# Patient Record
Sex: FEMALE | Race: WHITE | NOT HISPANIC OR LATINO | Employment: OTHER | URBAN - METROPOLITAN AREA
[De-identification: names, ages, dates, MRNs, and addresses within clinical notes are randomized per-mention and may not be internally consistent; named-entity substitution may affect disease eponyms.]

---

## 2017-02-03 ENCOUNTER — OFFICE VISIT (OUTPATIENT)
Dept: ORTHOPEDIC SURGERY | Facility: CLINIC | Age: 72
End: 2017-02-03

## 2017-02-03 VITALS — TEMPERATURE: 98 F | WEIGHT: 293 LBS | BODY MASS INDEX: 45.99 KG/M2 | HEIGHT: 67 IN

## 2017-02-03 DIAGNOSIS — G89.29 CHRONIC PAIN OF BOTH SHOULDERS: Primary | ICD-10-CM

## 2017-02-03 DIAGNOSIS — M25.512 CHRONIC PAIN OF BOTH SHOULDERS: Primary | ICD-10-CM

## 2017-02-03 DIAGNOSIS — M25.511 CHRONIC PAIN OF BOTH SHOULDERS: Primary | ICD-10-CM

## 2017-02-03 PROCEDURE — 20610 DRAIN/INJ JOINT/BURSA W/O US: CPT | Performed by: ORTHOPAEDIC SURGERY

## 2017-02-03 RX ORDER — BUPIVACAINE HYDROCHLORIDE 5 MG/ML
4 INJECTION, SOLUTION PERINEURAL
Status: COMPLETED | OUTPATIENT
Start: 2017-02-03 | End: 2017-02-03

## 2017-02-03 RX ORDER — METHYLPREDNISOLONE ACETATE 80 MG/ML
80 INJECTION, SUSPENSION INTRA-ARTICULAR; INTRALESIONAL; INTRAMUSCULAR; SOFT TISSUE
Status: COMPLETED | OUTPATIENT
Start: 2017-02-03 | End: 2017-02-03

## 2017-02-03 RX ADMIN — BUPIVACAINE HYDROCHLORIDE 4 ML: 5 INJECTION, SOLUTION PERINEURAL at 12:58

## 2017-02-03 RX ADMIN — METHYLPREDNISOLONE ACETATE 80 MG: 80 INJECTION, SUSPENSION INTRA-ARTICULAR; INTRALESIONAL; INTRAMUSCULAR; SOFT TISSUE at 12:58

## 2017-05-08 ENCOUNTER — CLINICAL SUPPORT (OUTPATIENT)
Dept: ORTHOPEDIC SURGERY | Facility: CLINIC | Age: 72
End: 2017-05-08

## 2017-05-08 VITALS — BODY MASS INDEX: 45.99 KG/M2 | WEIGHT: 293 LBS | HEIGHT: 67 IN

## 2017-05-08 DIAGNOSIS — R52 PAIN: Primary | ICD-10-CM

## 2017-05-08 PROCEDURE — 20610 DRAIN/INJ JOINT/BURSA W/O US: CPT | Performed by: ORTHOPAEDIC SURGERY

## 2017-05-08 RX ORDER — METHYLPREDNISOLONE ACETATE 80 MG/ML
80 INJECTION, SUSPENSION INTRA-ARTICULAR; INTRALESIONAL; INTRAMUSCULAR; SOFT TISSUE
Status: COMPLETED | OUTPATIENT
Start: 2017-05-08 | End: 2017-05-08

## 2017-05-08 RX ORDER — BUPIVACAINE HYDROCHLORIDE 2.5 MG/ML
4 INJECTION, SOLUTION INFILTRATION; PERINEURAL
Status: COMPLETED | OUTPATIENT
Start: 2017-05-08 | End: 2017-05-08

## 2017-05-08 RX ADMIN — METHYLPREDNISOLONE ACETATE 80 MG: 80 INJECTION, SUSPENSION INTRA-ARTICULAR; INTRALESIONAL; INTRAMUSCULAR; SOFT TISSUE at 12:27

## 2017-05-08 RX ADMIN — BUPIVACAINE HYDROCHLORIDE 4 ML: 2.5 INJECTION, SOLUTION INFILTRATION; PERINEURAL at 12:27

## 2017-05-15 DIAGNOSIS — E78.2 MIXED HYPERLIPIDEMIA: ICD-10-CM

## 2017-05-15 DIAGNOSIS — F41.1 GENERALIZED ANXIETY DISORDER: ICD-10-CM

## 2017-05-15 DIAGNOSIS — I10 ESSENTIAL HYPERTENSION: ICD-10-CM

## 2017-05-15 RX ORDER — VALSARTAN 40 MG/1
TABLET ORAL
Qty: 90 TABLET | Refills: 0 | OUTPATIENT
Start: 2017-05-15

## 2017-05-15 RX ORDER — ESCITALOPRAM OXALATE 10 MG/1
TABLET ORAL
Qty: 90 TABLET | Refills: 0 | OUTPATIENT
Start: 2017-05-15

## 2017-05-15 RX ORDER — NISOLDIPINE 8.5 MG/1
TABLET, FILM COATED, EXTENDED RELEASE ORAL
Qty: 90 TABLET | Refills: 0 | OUTPATIENT
Start: 2017-05-15

## 2017-05-15 RX ORDER — ATORVASTATIN CALCIUM 20 MG/1
TABLET, FILM COATED ORAL
Qty: 90 TABLET | Refills: 0 | OUTPATIENT
Start: 2017-05-15

## 2017-05-30 ENCOUNTER — OFFICE VISIT (OUTPATIENT)
Dept: FAMILY MEDICINE CLINIC | Facility: CLINIC | Age: 72
End: 2017-05-30

## 2017-05-30 VITALS
HEART RATE: 114 BPM | DIASTOLIC BLOOD PRESSURE: 83 MMHG | SYSTOLIC BLOOD PRESSURE: 156 MMHG | RESPIRATION RATE: 20 BRPM | BODY MASS INDEX: 44.41 KG/M2 | TEMPERATURE: 97.7 F | WEIGHT: 293 LBS | HEIGHT: 68 IN

## 2017-05-30 DIAGNOSIS — F41.1 GENERALIZED ANXIETY DISORDER: ICD-10-CM

## 2017-05-30 DIAGNOSIS — R73.01 IFG (IMPAIRED FASTING GLUCOSE): ICD-10-CM

## 2017-05-30 DIAGNOSIS — I10 ESSENTIAL HYPERTENSION: ICD-10-CM

## 2017-05-30 DIAGNOSIS — Z00.00 ANNUAL PHYSICAL EXAM: Primary | ICD-10-CM

## 2017-05-30 DIAGNOSIS — E78.2 MIXED HYPERLIPIDEMIA: ICD-10-CM

## 2017-05-30 PROCEDURE — 99214 OFFICE O/P EST MOD 30 MIN: CPT | Performed by: FAMILY MEDICINE

## 2017-05-30 PROCEDURE — G0438 PPPS, INITIAL VISIT: HCPCS | Performed by: FAMILY MEDICINE

## 2017-05-30 RX ORDER — NISOLDIPINE 8.5 MG/1
8.5 TABLET, FILM COATED, EXTENDED RELEASE ORAL DAILY
Qty: 90 TABLET | Refills: 1 | Status: SHIPPED | OUTPATIENT
Start: 2017-05-30 | End: 2017-11-26 | Stop reason: SDUPTHER

## 2017-05-30 RX ORDER — ESCITALOPRAM OXALATE 10 MG/1
10 TABLET ORAL DAILY
Qty: 90 TABLET | Refills: 1 | Status: SHIPPED | OUTPATIENT
Start: 2017-05-30 | End: 2017-11-26 | Stop reason: SDUPTHER

## 2017-05-30 RX ORDER — ATORVASTATIN CALCIUM 20 MG/1
20 TABLET, FILM COATED ORAL DAILY
Qty: 90 TABLET | Refills: 1 | Status: SHIPPED | OUTPATIENT
Start: 2017-05-30 | End: 2017-11-26 | Stop reason: SDUPTHER

## 2017-05-30 RX ORDER — VALSARTAN 40 MG/1
40 TABLET ORAL DAILY
Qty: 90 TABLET | Refills: 1 | Status: SHIPPED | OUTPATIENT
Start: 2017-05-30 | End: 2017-11-26 | Stop reason: SDUPTHER

## 2017-08-08 ENCOUNTER — CLINICAL SUPPORT (OUTPATIENT)
Dept: ORTHOPEDIC SURGERY | Facility: CLINIC | Age: 72
End: 2017-08-08

## 2017-08-08 VITALS — TEMPERATURE: 97.7 F

## 2017-08-08 DIAGNOSIS — M25.511 CHRONIC PAIN OF BOTH SHOULDERS: Primary | ICD-10-CM

## 2017-08-08 DIAGNOSIS — M25.512 CHRONIC PAIN OF BOTH SHOULDERS: Primary | ICD-10-CM

## 2017-08-08 DIAGNOSIS — M19.90 ARTHRITIS: ICD-10-CM

## 2017-08-08 DIAGNOSIS — G89.29 CHRONIC PAIN OF BOTH SHOULDERS: Primary | ICD-10-CM

## 2017-08-08 PROCEDURE — 73030 X-RAY EXAM OF SHOULDER: CPT | Performed by: ORTHOPAEDIC SURGERY

## 2017-08-08 PROCEDURE — 20610 DRAIN/INJ JOINT/BURSA W/O US: CPT | Performed by: ORTHOPAEDIC SURGERY

## 2017-08-08 RX ADMIN — BUPIVACAINE HYDROCHLORIDE 4 ML: 5 INJECTION, SOLUTION EPIDURAL; INTRACAUDAL at 12:33

## 2017-08-08 RX ADMIN — METHYLPREDNISOLONE ACETATE 80 MG: 80 INJECTION, SUSPENSION INTRA-ARTICULAR; INTRALESIONAL; INTRAMUSCULAR; SOFT TISSUE at 12:33

## 2017-08-08 NOTE — PROGRESS NOTES
Bilateral Shoulder Injection glenohumeral      Patient: Claudia Arnold        YOB: 1945            Chief Complaints: Bilateral Shoulder pain  Chief Complaint   Patient presents with   • Left Shoulder - Follow-up   • Right Shoulder - Follow-up           History of Present Illness: Pt gets intermittent shoulder injections with good relief. Is here for repeat injection.Her daughter is here today for further discussion on potential possibilities.  She has significant arthritis and wants nor options.  Her options are injections these fail joint replacement      Physical Exam: 72 y.o. female  General Appearance:    Alert, cooperative, in no acute distress                   Vitals:    08/08/17 1231   Temp: 97.7 °F (36.5 °C)   TempSrc: Temporal Artery       Patient is alert and read ×3 no acute distress appears her above-listed at height weight and age.  Affect is normal respiratory rate is normal unlabored. Heart rate regular rate rhythm, sclera, dentition and hearing are normal for the purpose of this exam.  Exam and complaints are unchanged.  X-rays AP scapular Y of both shoulders were taken she cannot tolerate the axillary lateral.  She has significant arthritis in both shoulders.  These were compared to previous films and appears stable she has marked narrowing of both joint spaces    Procedure:  Large Joint Arthrocentesis  Date/Time: 8/8/2017 12:33 PM  Consent given by: patient  Site marked: site marked  Timeout: Immediately prior to procedure a time out was called to verify the correct patient, procedure, equipment, support staff and site/side marked as required   Supporting Documentation  Indications: pain   Procedure Details  Location: shoulder - R glenohumeral  Preparation: Patient was prepped and draped in the usual sterile fashion  Needle size: 25 G  Approach: posterior  Medications administered: 4 mL bupivacaine (PF) 0.5 %; 80 mg methylPREDNISolone acetate 80 MG/ML  Patient tolerance: patient  tolerated the procedure well with no immediate complications    Large Joint Arthrocentesis  Date/Time: 8/8/2017 12:33 PM  Consent given by: patient  Site marked: site marked  Timeout: Immediately prior to procedure a time out was called to verify the correct patient, procedure, equipment, support staff and site/side marked as required   Supporting Documentation  Indications: pain   Procedure Details  Location: shoulder - L glenohumeral  Preparation: Patient was prepped and draped in the usual sterile fashion  Needle size: 25 G  Approach: posterior  Medications administered: 4 mL bupivacaine (PF) 0.5 %; 80 mg methylPREDNISolone acetate 80 MG/ML  Patient tolerance: patient tolerated the procedure well with no immediate complications                Assessment. Persistent shoulder pain with arthritis plan is proceed with an injection          Plan: Is to proceed with injection    The subacromial space was injected under strict sterile technique is form on a Marcaine and Depo-Medrol this was done sterilely and tolerated tolerated  well we did talk in detail about potential shoulder replacement she does not want to do that at this time

## 2017-08-14 RX ORDER — BUPIVACAINE HYDROCHLORIDE 5 MG/ML
4 INJECTION, SOLUTION EPIDURAL; INTRACAUDAL
Status: COMPLETED | OUTPATIENT
Start: 2017-08-08 | End: 2017-08-08

## 2017-08-14 RX ORDER — METHYLPREDNISOLONE ACETATE 80 MG/ML
80 INJECTION, SUSPENSION INTRA-ARTICULAR; INTRALESIONAL; INTRAMUSCULAR; SOFT TISSUE
Status: COMPLETED | OUTPATIENT
Start: 2017-08-08 | End: 2017-08-08

## 2017-11-13 ENCOUNTER — CLINICAL SUPPORT (OUTPATIENT)
Dept: ORTHOPEDIC SURGERY | Facility: CLINIC | Age: 72
End: 2017-11-13

## 2017-11-13 VITALS — TEMPERATURE: 98.7 F | HEIGHT: 67 IN | BODY MASS INDEX: 45.99 KG/M2 | WEIGHT: 293 LBS

## 2017-11-13 DIAGNOSIS — M19.90 ARTHRITIS: Primary | ICD-10-CM

## 2017-11-13 PROCEDURE — 20610 DRAIN/INJ JOINT/BURSA W/O US: CPT | Performed by: ORTHOPAEDIC SURGERY

## 2017-11-13 RX ADMIN — BUPIVACAINE HYDROCHLORIDE 4 ML: 5 INJECTION, SOLUTION EPIDURAL; INTRACAUDAL at 12:37

## 2017-11-13 RX ADMIN — METHYLPREDNISOLONE ACETATE 80 MG: 80 INJECTION, SUSPENSION INTRA-ARTICULAR; INTRALESIONAL; INTRAMUSCULAR; SOFT TISSUE at 12:37

## 2017-11-13 NOTE — PROGRESS NOTES
"Bilateral Shoulder Injection Glenohumeral      Patient: Claudia Arnold        YOB: 1945            Chief Complaints: Bilateral Shoulder pain  Chief Complaint   Patient presents with   • Left Shoulder - Follow-up   • Right Shoulder - Follow-up           History of Present Illness: Pt gets intermittent shoulder injections with good relief. Is here for repeat injection.      Physical Exam: 72 y.o. female  General Appearance:    Alert, cooperative, in no acute distress                   Vitals:    11/13/17 1235   Temp: 98.7 °F (37.1 °C)   TempSrc: Temporal Artery    Weight: (!) 329 lb (149 kg)   Height: 67\" (170.2 cm)      Patient is alert and read ×3 no acute distress appears her above-listed at height weight and age.  Affect is normal respiratory rate is normal unlabored. Heart rate regular rate rhythm, sclera, dentition and hearing are normal for the purpose of this exam.  Exam and complaints are unchanged.      Procedure:  Large Joint Arthrocentesis  Date/Time: 11/13/2017 12:37 PM  Consent given by: patient  Site marked: site marked  Timeout: Immediately prior to procedure a time out was called to verify the correct patient, procedure, equipment, support staff and site/side marked as required   Supporting Documentation  Indications: pain   Procedure Details  Location: shoulder - R glenohumeral  Preparation: Patient was prepped and draped in the usual sterile fashion  Needle size: 25 G  Approach: posterior  Medications administered: 80 mg methylPREDNISolone acetate 80 MG/ML; 4 mL bupivacaine (PF) 0.5 %  Patient tolerance: patient tolerated the procedure well with no immediate complications    Large Joint Arthrocentesis  Date/Time: 11/13/2017 12:37 PM  Consent given by: patient  Site marked: site marked  Timeout: Immediately prior to procedure a time out was called to verify the correct patient, procedure, equipment, support staff and site/side marked as required   Supporting Documentation  Indications: " pain   Procedure Details  Location: shoulder - L glenohumeral  Preparation: Patient was prepped and draped in the usual sterile fashion  Needle size: 25 G  Approach: posterior  Medications administered: 4 mL bupivacaine (PF) 0.5 %; 80 mg methylPREDNISolone acetate 80 MG/ML  Patient tolerance: patient tolerated the procedure well with no immediate complications                Assessment. Persistent shoulder pain with glenohumeral arthritis          Plan: Is to proceed with injection    The glenohumeral was injected under strict sterile technique is form on a Marcaine and Depo-Medrol this was done sterilely and tolerated tolerated  well

## 2017-11-14 RX ORDER — BUPIVACAINE HYDROCHLORIDE 5 MG/ML
4 INJECTION, SOLUTION EPIDURAL; INTRACAUDAL
Status: COMPLETED | OUTPATIENT
Start: 2017-11-13 | End: 2017-11-13

## 2017-11-14 RX ORDER — METHYLPREDNISOLONE ACETATE 80 MG/ML
80 INJECTION, SUSPENSION INTRA-ARTICULAR; INTRALESIONAL; INTRAMUSCULAR; SOFT TISSUE
Status: COMPLETED | OUTPATIENT
Start: 2017-11-13 | End: 2017-11-13

## 2017-11-26 DIAGNOSIS — E78.2 MIXED HYPERLIPIDEMIA: ICD-10-CM

## 2017-11-26 DIAGNOSIS — I10 ESSENTIAL HYPERTENSION: ICD-10-CM

## 2017-11-26 DIAGNOSIS — F41.1 GENERALIZED ANXIETY DISORDER: ICD-10-CM

## 2017-11-27 RX ORDER — NISOLDIPINE 8.5 MG/1
TABLET, FILM COATED, EXTENDED RELEASE ORAL
Qty: 90 TABLET | Refills: 1 | Status: SHIPPED | OUTPATIENT
Start: 2017-11-27 | End: 2017-12-18 | Stop reason: SDUPTHER

## 2017-11-27 RX ORDER — VALSARTAN 40 MG/1
TABLET ORAL
Qty: 90 TABLET | Refills: 1 | Status: SHIPPED | OUTPATIENT
Start: 2017-11-27 | End: 2017-12-18 | Stop reason: SDUPTHER

## 2017-11-27 RX ORDER — ATORVASTATIN CALCIUM 20 MG/1
TABLET, FILM COATED ORAL
Qty: 90 TABLET | Refills: 1 | Status: SHIPPED | OUTPATIENT
Start: 2017-11-27 | End: 2017-12-18 | Stop reason: SDUPTHER

## 2017-11-27 RX ORDER — ESCITALOPRAM OXALATE 10 MG/1
TABLET ORAL
Qty: 90 TABLET | Refills: 1 | Status: SHIPPED | OUTPATIENT
Start: 2017-11-27 | End: 2017-12-18 | Stop reason: SDUPTHER

## 2017-12-02 LAB
ALBUMIN SERPL-MCNC: 4.2 G/DL (ref 3.5–4.8)
ALBUMIN/GLOB SERPL: 1.5 {RATIO} (ref 1.2–2.2)
ALP SERPL-CCNC: 88 IU/L (ref 39–117)
ALT SERPL-CCNC: 10 IU/L (ref 0–32)
AST SERPL-CCNC: 12 IU/L (ref 0–40)
BASOPHILS # BLD AUTO: 0 X10E3/UL (ref 0–0.2)
BASOPHILS NFR BLD AUTO: 0 %
BILIRUB SERPL-MCNC: 0.7 MG/DL (ref 0–1.2)
BUN SERPL-MCNC: 26 MG/DL (ref 8–27)
BUN/CREAT SERPL: 27 (ref 12–28)
CALCIUM SERPL-MCNC: 10.4 MG/DL (ref 8.7–10.3)
CHLORIDE SERPL-SCNC: 101 MMOL/L (ref 96–106)
CHOLEST SERPL-MCNC: 167 MG/DL (ref 100–199)
CO2 SERPL-SCNC: 24 MMOL/L (ref 18–29)
CREAT SERPL-MCNC: 0.98 MG/DL (ref 0.57–1)
EOSINOPHIL # BLD AUTO: 0.1 X10E3/UL (ref 0–0.4)
EOSINOPHIL NFR BLD AUTO: 2 %
ERYTHROCYTE [DISTWIDTH] IN BLOOD BY AUTOMATED COUNT: 14.3 % (ref 12.3–15.4)
GFR SERPLBLD CREATININE-BSD FMLA CKD-EPI: 58 ML/MIN/1.73
GFR SERPLBLD CREATININE-BSD FMLA CKD-EPI: 67 ML/MIN/1.73
GLOBULIN SER CALC-MCNC: 2.8 G/DL (ref 1.5–4.5)
GLUCOSE SERPL-MCNC: 132 MG/DL (ref 65–99)
HBA1C MFR BLD: 6 % (ref 4.8–5.6)
HCT VFR BLD AUTO: 40.7 % (ref 34–46.6)
HDLC SERPL-MCNC: 69 MG/DL
HGB BLD-MCNC: 13.2 G/DL (ref 11.1–15.9)
IMM GRANULOCYTES # BLD: 0 X10E3/UL (ref 0–0.1)
IMM GRANULOCYTES NFR BLD: 0 %
LDLC SERPL CALC-MCNC: 84 MG/DL (ref 0–99)
LYMPHOCYTES # BLD AUTO: 1.6 X10E3/UL (ref 0.7–3.1)
LYMPHOCYTES NFR BLD AUTO: 18 %
MCH RBC QN AUTO: 29.7 PG (ref 26.6–33)
MCHC RBC AUTO-ENTMCNC: 32.4 G/DL (ref 31.5–35.7)
MCV RBC AUTO: 92 FL (ref 79–97)
MONOCYTES # BLD AUTO: 0.7 X10E3/UL (ref 0.1–0.9)
MONOCYTES NFR BLD AUTO: 8 %
NEUTROPHILS # BLD AUTO: 6.5 X10E3/UL (ref 1.4–7)
NEUTROPHILS NFR BLD AUTO: 72 %
PLATELET # BLD AUTO: 284 X10E3/UL (ref 150–379)
POTASSIUM SERPL-SCNC: 4.6 MMOL/L (ref 3.5–5.2)
PROT SERPL-MCNC: 7 G/DL (ref 6–8.5)
RBC # BLD AUTO: 4.45 X10E6/UL (ref 3.77–5.28)
SODIUM SERPL-SCNC: 143 MMOL/L (ref 134–144)
TRIGL SERPL-MCNC: 69 MG/DL (ref 0–149)
TSH SERPL DL<=0.005 MIU/L-ACNC: 2.74 UIU/ML (ref 0.45–4.5)
VLDLC SERPL CALC-MCNC: 14 MG/DL (ref 5–40)
WBC # BLD AUTO: 8.9 X10E3/UL (ref 3.4–10.8)

## 2017-12-18 ENCOUNTER — OFFICE VISIT (OUTPATIENT)
Dept: FAMILY MEDICINE CLINIC | Facility: CLINIC | Age: 72
End: 2017-12-18

## 2017-12-18 VITALS
WEIGHT: 293 LBS | HEART RATE: 94 BPM | SYSTOLIC BLOOD PRESSURE: 138 MMHG | BODY MASS INDEX: 44.41 KG/M2 | DIASTOLIC BLOOD PRESSURE: 72 MMHG | OXYGEN SATURATION: 96 % | TEMPERATURE: 98 F | HEIGHT: 68 IN | RESPIRATION RATE: 20 BRPM

## 2017-12-18 DIAGNOSIS — F41.1 GENERALIZED ANXIETY DISORDER: ICD-10-CM

## 2017-12-18 DIAGNOSIS — E78.2 MIXED HYPERLIPIDEMIA: ICD-10-CM

## 2017-12-18 DIAGNOSIS — R73.01 IFG (IMPAIRED FASTING GLUCOSE): ICD-10-CM

## 2017-12-18 DIAGNOSIS — I10 ESSENTIAL HYPERTENSION: Primary | ICD-10-CM

## 2017-12-18 PROCEDURE — 99214 OFFICE O/P EST MOD 30 MIN: CPT | Performed by: FAMILY MEDICINE

## 2017-12-18 RX ORDER — ESCITALOPRAM OXALATE 10 MG/1
10 TABLET ORAL DAILY
Qty: 90 TABLET | Refills: 1 | Status: SHIPPED | OUTPATIENT
Start: 2017-12-18 | End: 2018-12-11 | Stop reason: SDUPTHER

## 2017-12-18 RX ORDER — VALSARTAN 40 MG/1
40 TABLET ORAL DAILY
Qty: 90 TABLET | Refills: 1 | Status: SHIPPED | OUTPATIENT
Start: 2017-12-18 | End: 2018-08-14

## 2017-12-18 RX ORDER — ATORVASTATIN CALCIUM 20 MG/1
20 TABLET, FILM COATED ORAL DAILY
Qty: 90 TABLET | Refills: 1 | Status: SHIPPED | OUTPATIENT
Start: 2017-12-18 | End: 2018-12-11 | Stop reason: SDUPTHER

## 2017-12-18 RX ORDER — NISOLDIPINE 8.5 MG/1
8.5 TABLET, FILM COATED, EXTENDED RELEASE ORAL DAILY
Qty: 90 TABLET | Refills: 1 | Status: SHIPPED | OUTPATIENT
Start: 2017-12-18 | End: 2018-12-11 | Stop reason: SDUPTHER

## 2017-12-18 NOTE — PROGRESS NOTES
"Subjective   Claudia Arnold is a 72 y.o. female.     History of Present Illness     Chief Complaint:   Chief Complaint   Patient presents with   • Hypertension     MED REFILL - EYE EXAM DUE / MAMMOGRAM / DUE   • Hyperlipidemia     PATIENT GETS LABS DONE [facility]    • Arthritis     MANE BP DONE    • LAB RESULTS       Claudia Arnold 72 y.o. female who presents today for Medical Management of the below listed issues and medication refills.  she has a problem list of   Patient Active Problem List   Diagnosis   • Tear of rotator cuff   • Hypertension   • Generalized anxiety disorder   • Hyperlipidemia   • IFG (impaired fasting glucose)   • Heart murmur, systolic   • Shoulder pain, bilateral   • Pain of both shoulder joints   • Chronic pain of both shoulders   .  Since the last visit, she has overall felt well.  she has been compliant with   Current Outpatient Prescriptions:   •  atorvastatin (LIPITOR) 20 MG tablet, Take 1 tablet by mouth Daily., Disp: 90 tablet, Rfl: 1  •  escitalopram (LEXAPRO) 10 MG tablet, Take 1 tablet by mouth Daily., Disp: 90 tablet, Rfl: 1  •  nisoldipine (SULAR) 8.5 MG 24 hr tablet, Take 1 tablet by mouth Daily., Disp: 90 tablet, Rfl: 1  •  valsartan (DIOVAN) 40 MG tablet, Take 1 tablet by mouth Daily., Disp: 90 tablet, Rfl: 1  •  acetaminophen (TYLENOL) 325 MG tablet, Take 650 mg by mouth every 6 (six) hours as needed for mild pain (1-3)., Disp: , Rfl:   •  aspirin 81 MG EC tablet, Take 81 mg by mouth daily., Disp: , Rfl:   •  calcium carbonate (OS-DEBORAH) 600 MG tablet, Take 600 mg by mouth daily., Disp: , Rfl:   •  doxylamine (UNISOM) 25 MG tablet, Take 25 mg by mouth as needed for sleep., Disp: , Rfl: .  she denies medication side effects.    All of the chronic condition(s) listed above are stable w/o issues.    /72  Pulse 94  Temp 98 °F (36.7 °C) (Oral)   Resp 20  Ht 171.5 cm (67.5\")  Wt (!) 146 kg (322 lb)  SpO2 96%  BMI 49.69 kg/m2    Results for orders placed or " performed in visit on 12/01/17   Comprehensive Metabolic Panel   Result Value Ref Range    Glucose 132 (H) 65 - 99 mg/dL    BUN 26 8 - 27 mg/dL    Creatinine 0.98 0.57 - 1.00 mg/dL    eGFR Non African Am 58 (L) >59 mL/min/1.73    eGFR African Am 67 >59 mL/min/1.73    BUN/Creatinine Ratio 27 12 - 28    Sodium 143 134 - 144 mmol/L    Potassium 4.6 3.5 - 5.2 mmol/L    Chloride 101 96 - 106 mmol/L    Total CO2 24 18 - 29 mmol/L    Calcium 10.4 (H) 8.7 - 10.3 mg/dL    Total Protein 7.0 6.0 - 8.5 g/dL    Albumin 4.2 3.5 - 4.8 g/dL    Globulin 2.8 1.5 - 4.5 g/dL    A/G Ratio 1.5 1.2 - 2.2    Total Bilirubin 0.7 0.0 - 1.2 mg/dL    Alkaline Phosphatase 88 39 - 117 IU/L    AST (SGOT) 12 0 - 40 IU/L    ALT (SGPT) 10 0 - 32 IU/L   Lipid Panel   Result Value Ref Range    Total Cholesterol 167 100 - 199 mg/dL    Triglycerides 69 0 - 149 mg/dL    HDL Cholesterol 69 >39 mg/dL    VLDL Cholesterol 14 5 - 40 mg/dL    LDL Cholesterol  84 0 - 99 mg/dL   Hemoglobin A1c   Result Value Ref Range    Hemoglobin A1C 6.0 (H) 4.8 - 5.6 %   TSH   Result Value Ref Range    TSH 2.740 0.450 - 4.500 uIU/mL   CBC & Differential   Result Value Ref Range    WBC 8.9 3.4 - 10.8 x10E3/uL    RBC 4.45 3.77 - 5.28 x10E6/uL    Hemoglobin 13.2 11.1 - 15.9 g/dL    Hematocrit 40.7 34.0 - 46.6 %    MCV 92 79 - 97 fL    MCH 29.7 26.6 - 33.0 pg    MCHC 32.4 31.5 - 35.7 g/dL    RDW 14.3 12.3 - 15.4 %    Platelets 284 150 - 379 x10E3/uL    Neutrophil Rel % 72 Not Estab. %    Lymphocyte Rel % 18 Not Estab. %    Monocyte Rel % 8 Not Estab. %    Eosinophil Rel % 2 Not Estab. %    Basophil Rel % 0 Not Estab. %    Neutrophils Absolute 6.5 1.4 - 7.0 x10E3/uL    Lymphocytes Absolute 1.6 0.7 - 3.1 x10E3/uL    Monocytes Absolute 0.7 0.1 - 0.9 x10E3/uL    Eosinophils Absolute 0.1 0.0 - 0.4 x10E3/uL    Basophils Absolute 0.0 0.0 - 0.2 x10E3/uL    Immature Granulocyte Rel % 0 Not Estab. %    Immature Grans Absolute 0.0 0.0 - 0.1 x10E3/uL           The following portions of the  patient's history were reviewed and updated as appropriate: allergies, current medications, past family history, past medical history, past social history, past surgical history and problem list.    Review of Systems   Constitutional: Negative for activity change, chills, fatigue and fever.   Respiratory: Negative for cough, chest tightness and shortness of breath.    Cardiovascular: Negative for chest pain and palpitations.   Gastrointestinal: Negative for abdominal pain and nausea.   Endocrine: Negative for cold intolerance.   Musculoskeletal: Negative for neck pain.   Neurological: Negative for headaches.   Psychiatric/Behavioral: Negative for behavioral problems and dysphoric mood.       Objective   Physical Exam   Constitutional: She appears well-developed and well-nourished.   Neck: Neck supple. No thyromegaly present.   Cardiovascular: Normal rate and regular rhythm.    Murmur heard.   Systolic murmur is present with a grade of 1/6   Pulmonary/Chest: Effort normal and breath sounds normal.   Abdominal: Bowel sounds are normal.   Psychiatric: She has a normal mood and affect. Her behavior is normal.   Nursing note and vitals reviewed.  Labs reviewed with pt today during visit. All questions answered.      Assessment/Plan   Claudia was seen today for hypertension, hyperlipidemia, arthritis and lab results.    Diagnoses and all orders for this visit:    Essential hypertension  -     valsartan (DIOVAN) 40 MG tablet; Take 1 tablet by mouth Daily.  -     nisoldipine (SULAR) 8.5 MG 24 hr tablet; Take 1 tablet by mouth Daily.  -     Comprehensive metabolic panel; Future  -     Lipid panel; Future  -     CBC and Differential; Future  -     TSH; Future    Generalized anxiety disorder  -     escitalopram (LEXAPRO) 10 MG tablet; Take 1 tablet by mouth Daily.    Mixed hyperlipidemia  -     atorvastatin (LIPITOR) 20 MG tablet; Take 1 tablet by mouth Daily.  -     Lipid panel; Future    IFG (impaired fasting glucose)  -      Hemoglobin A1c; Future    Other orders  -     Cancel: Mammo Screening Bilateral With CAD; Future      Pt declines mammogram AMA.

## 2018-02-12 ENCOUNTER — CLINICAL SUPPORT (OUTPATIENT)
Dept: ORTHOPEDIC SURGERY | Facility: CLINIC | Age: 73
End: 2018-02-12

## 2018-02-12 VITALS — BODY MASS INDEX: 45.99 KG/M2 | HEIGHT: 67 IN | WEIGHT: 293 LBS | TEMPERATURE: 97.7 F

## 2018-02-12 DIAGNOSIS — M19.019 PRIMARY LOCALIZED OSTEOARTHROSIS OF SHOULDER REGION, UNSPECIFIED LATERALITY: ICD-10-CM

## 2018-02-12 DIAGNOSIS — M25.511 CHRONIC PAIN OF BOTH SHOULDERS: Primary | ICD-10-CM

## 2018-02-12 DIAGNOSIS — G89.29 CHRONIC PAIN OF BOTH SHOULDERS: Primary | ICD-10-CM

## 2018-02-12 DIAGNOSIS — M25.512 CHRONIC PAIN OF BOTH SHOULDERS: Primary | ICD-10-CM

## 2018-02-12 PROCEDURE — 20610 DRAIN/INJ JOINT/BURSA W/O US: CPT | Performed by: ORTHOPAEDIC SURGERY

## 2018-02-12 RX ADMIN — METHYLPREDNISOLONE ACETATE 80 MG: 80 INJECTION, SUSPENSION INTRA-ARTICULAR; INTRALESIONAL; INTRAMUSCULAR; SOFT TISSUE at 12:33

## 2018-02-12 RX ADMIN — BUPIVACAINE HYDROCHLORIDE 4 ML: 5 INJECTION, SOLUTION EPIDURAL; INTRACAUDAL at 12:33

## 2018-02-12 NOTE — PROGRESS NOTES
"Bilateral Shoulder Injection Glenohumeral      Patient: Claudia Arnold        YOB: 1945            Chief Complaints: Bilateral Shoulder pain  Chief Complaint   Patient presents with   • Left Shoulder - Follow-up, Pain, Injections   • Right Shoulder - Follow-up, Pain, Injections         History of Present Illness: Pt gets intermittent shoulder injections with good relief. Is here for repeat injection.      Physical Exam: 72 y.o. female  General Appearance:    Alert, cooperative, in no acute distress                   Vitals:    02/12/18 1228   Temp: 97.7 °F (36.5 °C)   TempSrc: Temporal Artery    Weight: (!) 147 kg (325 lb)   Height: 170.2 cm (67\")      Patient is alert and read ×3 no acute distress appears her above-listed at height weight and age.  Affect is normal respiratory rate is normal unlabored. Heart rate regular rate rhythm, sclera, dentition and hearing are normal for the purpose of this exam.  Exam and complaints are unchanged.      Procedure:  Large Joint Arthrocentesis  Date/Time: 2/12/2018 12:33 PM  Consent given by: patient  Site marked: site marked  Timeout: Immediately prior to procedure a time out was called to verify the correct patient, procedure, equipment, support staff and site/side marked as required   Supporting Documentation  Indications: pain   Procedure Details  Location: shoulder - R glenohumeral  Preparation: Patient was prepped and draped in the usual sterile fashion  Needle size: 25 G  Approach: posterior  Medications administered: 80 mg methylPREDNISolone acetate 80 MG/ML; 4 mL bupivacaine (PF) 0.5 %  Patient tolerance: patient tolerated the procedure well with no immediate complications    Large Joint Arthrocentesis  Date/Time: 2/12/2018 12:33 PM  Consent given by: patient  Site marked: site marked  Timeout: Immediately prior to procedure a time out was called to verify the correct patient, procedure, equipment, support staff and site/side marked as required "   Supporting Documentation  Indications: pain   Procedure Details  Location: shoulder - L glenohumeral  Preparation: Patient was prepped and draped in the usual sterile fashion  Needle size: 25 G  Approach: posterior  Medications administered: 80 mg methylPREDNISolone acetate 80 MG/ML; 4 mL bupivacaine (PF) 0.5 %  Patient tolerance: patient tolerated the procedure well with no immediate complications                Assessment. Persistent shoulder pain with glenohumeral arthritis          Plan: Is to proceed with injection    The glenohumeral was injected under strict sterile technique is form on a Marcaine and Depo-Medrol this was done sterilely and tolerated tolerated  well

## 2018-02-15 RX ORDER — METHYLPREDNISOLONE ACETATE 80 MG/ML
80 INJECTION, SUSPENSION INTRA-ARTICULAR; INTRALESIONAL; INTRAMUSCULAR; SOFT TISSUE
Status: COMPLETED | OUTPATIENT
Start: 2018-02-12 | End: 2018-02-12

## 2018-02-15 RX ORDER — BUPIVACAINE HYDROCHLORIDE 5 MG/ML
4 INJECTION, SOLUTION EPIDURAL; INTRACAUDAL
Status: COMPLETED | OUTPATIENT
Start: 2018-02-12 | End: 2018-02-12

## 2018-04-18 ENCOUNTER — RESULTS ENCOUNTER (OUTPATIENT)
Dept: FAMILY MEDICINE CLINIC | Facility: CLINIC | Age: 73
End: 2018-04-18

## 2018-04-18 DIAGNOSIS — I10 ESSENTIAL HYPERTENSION: ICD-10-CM

## 2018-04-18 DIAGNOSIS — R73.01 IFG (IMPAIRED FASTING GLUCOSE): ICD-10-CM

## 2018-04-18 DIAGNOSIS — E78.2 MIXED HYPERLIPIDEMIA: ICD-10-CM

## 2018-05-09 ENCOUNTER — TELEPHONE (OUTPATIENT)
Dept: ORTHOPEDIC SURGERY | Facility: CLINIC | Age: 73
End: 2018-05-09

## 2018-05-10 NOTE — TELEPHONE ENCOUNTER
Spoke with daughter, she is going to call [facility] and they will contact our office for orders for PT and OT.     (FYI, daughter states that it is hard to get mother out due to so much pain, only gets out when she needs to get injections.)

## 2018-05-14 ENCOUNTER — CLINICAL SUPPORT (OUTPATIENT)
Dept: ORTHOPEDIC SURGERY | Facility: CLINIC | Age: 73
End: 2018-05-14

## 2018-05-14 VITALS — TEMPERATURE: 97.7 F | BODY MASS INDEX: 45.99 KG/M2 | WEIGHT: 293 LBS | HEIGHT: 67 IN

## 2018-05-14 DIAGNOSIS — G89.29 CHRONIC PAIN OF BOTH SHOULDERS: Primary | ICD-10-CM

## 2018-05-14 DIAGNOSIS — M19.019 PRIMARY LOCALIZED OSTEOARTHROSIS OF SHOULDER REGION, UNSPECIFIED LATERALITY: ICD-10-CM

## 2018-05-14 DIAGNOSIS — M25.511 CHRONIC PAIN OF BOTH SHOULDERS: Primary | ICD-10-CM

## 2018-05-14 DIAGNOSIS — M25.512 CHRONIC PAIN OF BOTH SHOULDERS: Primary | ICD-10-CM

## 2018-05-14 PROCEDURE — 20610 DRAIN/INJ JOINT/BURSA W/O US: CPT | Performed by: ORTHOPAEDIC SURGERY

## 2018-05-14 RX ORDER — METHYLPREDNISOLONE ACETATE 80 MG/ML
80 INJECTION, SUSPENSION INTRA-ARTICULAR; INTRALESIONAL; INTRAMUSCULAR; SOFT TISSUE
Status: COMPLETED | OUTPATIENT
Start: 2018-05-14 | End: 2018-05-14

## 2018-05-14 RX ORDER — LIDOCAINE HYDROCHLORIDE 20 MG/ML
4 INJECTION, SOLUTION EPIDURAL; INFILTRATION; INTRACAUDAL; PERINEURAL
Status: COMPLETED | OUTPATIENT
Start: 2018-05-14 | End: 2018-05-14

## 2018-05-14 RX ADMIN — METHYLPREDNISOLONE ACETATE 80 MG: 80 INJECTION, SUSPENSION INTRA-ARTICULAR; INTRALESIONAL; INTRAMUSCULAR; SOFT TISSUE at 12:28

## 2018-05-14 RX ADMIN — METHYLPREDNISOLONE ACETATE 80 MG: 80 INJECTION, SUSPENSION INTRA-ARTICULAR; INTRALESIONAL; INTRAMUSCULAR; SOFT TISSUE at 12:27

## 2018-05-14 RX ADMIN — LIDOCAINE HYDROCHLORIDE 4 ML: 20 INJECTION, SOLUTION EPIDURAL; INFILTRATION; INTRACAUDAL; PERINEURAL at 12:28

## 2018-05-14 RX ADMIN — LIDOCAINE HYDROCHLORIDE 4 ML: 20 INJECTION, SOLUTION EPIDURAL; INFILTRATION; INTRACAUDAL; PERINEURAL at 12:27

## 2018-05-14 NOTE — PROGRESS NOTES
"Bilateral Shoulder Injection Glenohumeral      Patient: Claudia Arnold        YOB: 1945            Chief Complaints: Bilateral Shoulder pain  Chief Complaint   Patient presents with   • Right Shoulder - Follow-up, Pain, Injections   • Left Shoulder - Follow-up, Pain, Injections           History of Present Illness: Pt gets intermittent shoulder injections with good relief. Is here for repeat injection.      Physical Exam: 72 y.o. female  General Appearance:    Alert, cooperative, in no acute distress                   Vitals:    05/14/18 1225   Temp: 97.7 °F (36.5 °C)   TempSrc: Temporal Artery    Weight: (!) 147 kg (325 lb)   Height: 170.2 cm (67\")      Patient is alert and read ×3 no acute distress appears her above-listed at height weight and age.  Affect is normal respiratory rate is normal unlabored. Heart rate regular rate rhythm, sclera, dentition and hearing are normal for the purpose of this exam.  Exam and complaints are unchanged.      Procedure:  Large Joint Arthrocentesis  Date/Time: 5/14/2018 12:27 PM  Consent given by: patient  Site marked: site marked  Timeout: Immediately prior to procedure a time out was called to verify the correct patient, procedure, equipment, support staff and site/side marked as required   Supporting Documentation  Indications: pain   Procedure Details  Location: shoulder - L glenohumeral  Preparation: Patient was prepped and draped in the usual sterile fashion  Needle size: 25 G  Approach: posterior  Medications administered: 80 mg methylPREDNISolone acetate 80 MG/ML; 4 mL lidocaine PF 2% 2 %  Patient tolerance: patient tolerated the procedure well with no immediate complications    Large Joint Arthrocentesis  Date/Time: 5/14/2018 12:28 PM  Consent given by: patient  Site marked: site marked  Timeout: Immediately prior to procedure a time out was called to verify the correct patient, procedure, equipment, support staff and site/side marked as required "   Supporting Documentation  Indications: pain   Procedure Details  Location: shoulder - R glenohumeral  Preparation: Patient was prepped and draped in the usual sterile fashion  Needle size: 25 G  Approach: posterior  Medications administered: 80 mg methylPREDNISolone acetate 80 MG/ML; 4 mL lidocaine PF 2% 2 %  Patient tolerance: patient tolerated the procedure well with no immediate complications                Assessment. Persistent shoulder pain with glenohumeral arthritis          Plan: Is to proceed with injection    The glenohumeral was injected under strict sterile technique is form on a Marcaine and Depo-Medrol this was done sterilely and tolerated tolerated  well

## 2018-05-23 DIAGNOSIS — Z74.09 IMPAIRED MOBILITY: Primary | ICD-10-CM

## 2018-05-24 ENCOUNTER — TELEPHONE (OUTPATIENT)
Dept: ORTHOPEDIC SURGERY | Facility: CLINIC | Age: 73
End: 2018-05-24

## 2018-05-30 ENCOUNTER — TELEPHONE (OUTPATIENT)
Dept: ORTHOPEDIC SURGERY | Facility: CLINIC | Age: 73
End: 2018-05-30

## 2018-06-04 ENCOUNTER — TELEPHONE (OUTPATIENT)
Dept: ORTHOPEDIC SURGERY | Facility: CLINIC | Age: 73
End: 2018-06-04

## 2018-08-14 ENCOUNTER — CLINICAL SUPPORT (OUTPATIENT)
Dept: ORTHOPEDIC SURGERY | Facility: CLINIC | Age: 73
End: 2018-08-14

## 2018-08-14 VITALS — HEIGHT: 67 IN | BODY MASS INDEX: 45.99 KG/M2 | WEIGHT: 293 LBS

## 2018-08-14 DIAGNOSIS — G89.29 CHRONIC PAIN OF BOTH SHOULDERS: Primary | ICD-10-CM

## 2018-08-14 DIAGNOSIS — M25.511 CHRONIC PAIN OF BOTH SHOULDERS: Primary | ICD-10-CM

## 2018-08-14 DIAGNOSIS — M19.019 PRIMARY LOCALIZED OSTEOARTHROSIS OF SHOULDER REGION, UNSPECIFIED LATERALITY: ICD-10-CM

## 2018-08-14 DIAGNOSIS — M25.512 CHRONIC PAIN OF BOTH SHOULDERS: Primary | ICD-10-CM

## 2018-08-14 PROCEDURE — 73030 X-RAY EXAM OF SHOULDER: CPT | Performed by: ORTHOPAEDIC SURGERY

## 2018-08-14 PROCEDURE — 20610 DRAIN/INJ JOINT/BURSA W/O US: CPT | Performed by: ORTHOPAEDIC SURGERY

## 2018-08-14 RX ORDER — LOSARTAN POTASSIUM 25 MG/1
TABLET ORAL
COMMUNITY
Start: 2018-08-01 | End: 2018-12-11 | Stop reason: SDUPTHER

## 2018-08-14 RX ORDER — BUMETANIDE 1 MG/1
TABLET ORAL
COMMUNITY
Start: 2018-08-01 | End: 2018-12-11 | Stop reason: SDUPTHER

## 2018-08-14 RX ADMIN — METHYLPREDNISOLONE ACETATE 80 MG: 80 INJECTION, SUSPENSION INTRA-ARTICULAR; INTRALESIONAL; INTRAMUSCULAR; SOFT TISSUE at 12:30

## 2018-08-14 RX ADMIN — LIDOCAINE HYDROCHLORIDE 4 ML: 10 INJECTION, SOLUTION EPIDURAL; INFILTRATION; INTRACAUDAL; PERINEURAL at 12:30

## 2018-08-14 NOTE — PROGRESS NOTES
"Bilateral Shoulder Injection Glenohumeral      Patient: Claudia Arnold        YOB: 1945            Chief Complaints:Bilatearl Shoulder pain  Chief Complaint   Patient presents with   • Right Shoulder - Injections   • Left Shoulder - Injections         History of Present Illness: Pt gets intermittent shoulder injections with good relief. Is here for repeat injection.      Physical Exam: 73 y.o. female  General Appearance:    Alert, cooperative, in no acute distress                   Vitals:    08/14/18 1228   Weight: (!) 145 kg (320 lb)   Height: 170.2 cm (67\")      Patient is alert and read ×3 no acute distress appears her above-listed at height weight and age.  Affect is normal respiratory rate is normal unlabored. Heart rate regular rate rhythm, sclera, dentition and hearing are normal for the purpose of this exam.  Exam and complaints are unchanged.      Procedure:  Large Joint Arthrocentesis  Date/Time: 8/14/2018 12:30 PM  Consent given by: patient  Site marked: site marked  Timeout: Immediately prior to procedure a time out was called to verify the correct patient, procedure, equipment, support staff and site/side marked as required   Supporting Documentation  Indications: pain   Procedure Details  Location: shoulder - L glenohumeral  Preparation: Patient was prepped and draped in the usual sterile fashion  Needle size: 22 G  Approach: posterior  Medications administered: 80 mg methylPREDNISolone acetate 80 MG/ML; 4 mL lidocaine PF 1% 1 %  Patient tolerance: patient tolerated the procedure well with no immediate complications    Large Joint Arthrocentesis  Date/Time: 8/14/2018 12:30 PM  Consent given by: patient  Site marked: site marked  Timeout: Immediately prior to procedure a time out was called to verify the correct patient, procedure, equipment, support staff and site/side marked as required   Supporting Documentation  Indications: pain   Procedure Details  Location: shoulder - R " glenohumeral  Preparation: Patient was prepped and draped in the usual sterile fashion  Needle size: 22 G  Approach: posterior  Medications administered: 80 mg methylPREDNISolone acetate 80 MG/ML; 4 mL lidocaine PF 1% 1 %  Patient tolerance: patient tolerated the procedure well with no immediate complications            X-rays AP scapular Y and axillary lateral both shoulders were taken to evaluate her symptoms and compared to previous films she has severe degenerative changes about both with complete loss of joint space and osteophyte formation    Assessment. Persistent shoulder pain with glenohumeral arthritis          Plan: Is to proceed with injection    The glenohumeral was injected under strict sterile technique is form on a Marcaine and Depo-Medrol this was done sterilely and tolerated tolerated  well

## 2018-08-16 RX ORDER — METHYLPREDNISOLONE ACETATE 80 MG/ML
80 INJECTION, SUSPENSION INTRA-ARTICULAR; INTRALESIONAL; INTRAMUSCULAR; SOFT TISSUE
Status: COMPLETED | OUTPATIENT
Start: 2018-08-14 | End: 2018-08-14

## 2018-08-16 RX ORDER — LIDOCAINE HYDROCHLORIDE 10 MG/ML
4 INJECTION, SOLUTION EPIDURAL; INFILTRATION; INTRACAUDAL; PERINEURAL
Status: COMPLETED | OUTPATIENT
Start: 2018-08-14 | End: 2018-08-14

## 2018-11-26 ENCOUNTER — CLINICAL SUPPORT (OUTPATIENT)
Dept: ORTHOPEDIC SURGERY | Facility: CLINIC | Age: 73
End: 2018-11-26

## 2018-11-26 VITALS — TEMPERATURE: 97.4 F | HEIGHT: 67 IN | WEIGHT: 293 LBS | BODY MASS INDEX: 45.99 KG/M2

## 2018-11-26 DIAGNOSIS — M19.90 ARTHRITIS: Primary | ICD-10-CM

## 2018-11-26 PROCEDURE — 20610 DRAIN/INJ JOINT/BURSA W/O US: CPT | Performed by: ORTHOPAEDIC SURGERY

## 2018-11-26 RX ORDER — METHYLPREDNISOLONE ACETATE 80 MG/ML
80 INJECTION, SUSPENSION INTRA-ARTICULAR; INTRALESIONAL; INTRAMUSCULAR; SOFT TISSUE
Status: COMPLETED | OUTPATIENT
Start: 2018-11-26 | End: 2018-11-26

## 2018-11-26 RX ORDER — LIDOCAINE HYDROCHLORIDE 10 MG/ML
4 INJECTION, SOLUTION EPIDURAL; INFILTRATION; INTRACAUDAL; PERINEURAL
Status: COMPLETED | OUTPATIENT
Start: 2018-11-26 | End: 2018-11-26

## 2018-11-26 RX ADMIN — METHYLPREDNISOLONE ACETATE 80 MG: 80 INJECTION, SUSPENSION INTRA-ARTICULAR; INTRALESIONAL; INTRAMUSCULAR; SOFT TISSUE at 12:47

## 2018-11-26 RX ADMIN — LIDOCAINE HYDROCHLORIDE 4 ML: 10 INJECTION, SOLUTION EPIDURAL; INFILTRATION; INTRACAUDAL; PERINEURAL at 12:47

## 2018-11-26 RX ADMIN — METHYLPREDNISOLONE ACETATE 80 MG: 80 INJECTION, SUSPENSION INTRA-ARTICULAR; INTRALESIONAL; INTRAMUSCULAR; SOFT TISSUE at 12:46

## 2018-11-26 RX ADMIN — LIDOCAINE HYDROCHLORIDE 4 ML: 10 INJECTION, SOLUTION EPIDURAL; INFILTRATION; INTRACAUDAL; PERINEURAL at 12:46

## 2018-12-11 ENCOUNTER — OFFICE VISIT (OUTPATIENT)
Dept: FAMILY MEDICINE CLINIC | Facility: CLINIC | Age: 73
End: 2018-12-11

## 2018-12-11 VITALS
SYSTOLIC BLOOD PRESSURE: 141 MMHG | RESPIRATION RATE: 16 BRPM | HEIGHT: 67 IN | TEMPERATURE: 97.8 F | DIASTOLIC BLOOD PRESSURE: 69 MMHG | BODY MASS INDEX: 45.99 KG/M2 | WEIGHT: 293 LBS | HEART RATE: 91 BPM

## 2018-12-11 DIAGNOSIS — I10 ESSENTIAL HYPERTENSION: Primary | ICD-10-CM

## 2018-12-11 DIAGNOSIS — R73.01 IFG (IMPAIRED FASTING GLUCOSE): ICD-10-CM

## 2018-12-11 DIAGNOSIS — F41.1 GENERALIZED ANXIETY DISORDER: ICD-10-CM

## 2018-12-11 DIAGNOSIS — E78.2 MIXED HYPERLIPIDEMIA: ICD-10-CM

## 2018-12-11 DIAGNOSIS — Z23 IMMUNIZATION DUE: ICD-10-CM

## 2018-12-11 PROCEDURE — 90674 CCIIV4 VAC NO PRSV 0.5 ML IM: CPT | Performed by: FAMILY MEDICINE

## 2018-12-11 PROCEDURE — 99214 OFFICE O/P EST MOD 30 MIN: CPT | Performed by: FAMILY MEDICINE

## 2018-12-11 PROCEDURE — 90471 IMMUNIZATION ADMIN: CPT | Performed by: FAMILY MEDICINE

## 2018-12-11 RX ORDER — ATORVASTATIN CALCIUM 20 MG/1
20 TABLET, FILM COATED ORAL DAILY
Qty: 90 TABLET | Refills: 1 | Status: SHIPPED | OUTPATIENT
Start: 2018-12-11 | End: 2019-02-18 | Stop reason: HOSPADM

## 2018-12-11 RX ORDER — LOSARTAN POTASSIUM 25 MG/1
25 TABLET ORAL DAILY
Qty: 90 TABLET | Refills: 1 | Status: SHIPPED | OUTPATIENT
Start: 2018-12-11 | End: 2018-12-11 | Stop reason: SDUPTHER

## 2018-12-11 RX ORDER — LOSARTAN POTASSIUM 25 MG/1
25 TABLET ORAL DAILY
Qty: 90 TABLET | Refills: 1 | Status: SHIPPED | OUTPATIENT
Start: 2018-12-11 | End: 2019-02-18 | Stop reason: HOSPADM

## 2018-12-11 RX ORDER — ESCITALOPRAM OXALATE 10 MG/1
10 TABLET ORAL DAILY
Qty: 90 TABLET | Refills: 1 | Status: SHIPPED | OUTPATIENT
Start: 2018-12-11 | End: 2019-05-08 | Stop reason: SDUPTHER

## 2018-12-11 RX ORDER — ATORVASTATIN CALCIUM 20 MG/1
20 TABLET, FILM COATED ORAL DAILY
Qty: 90 TABLET | Refills: 1 | Status: SHIPPED | OUTPATIENT
Start: 2018-12-11 | End: 2018-12-11 | Stop reason: SDUPTHER

## 2018-12-11 RX ORDER — BUMETANIDE 1 MG/1
1 TABLET ORAL DAILY
Qty: 90 TABLET | Refills: 1 | Status: SHIPPED | OUTPATIENT
Start: 2018-12-11 | End: 2018-12-11 | Stop reason: SDUPTHER

## 2018-12-11 RX ORDER — ESCITALOPRAM OXALATE 10 MG/1
10 TABLET ORAL DAILY
Qty: 90 TABLET | Refills: 1 | Status: SHIPPED | OUTPATIENT
Start: 2018-12-11 | End: 2018-12-11 | Stop reason: SDUPTHER

## 2018-12-11 RX ORDER — BUMETANIDE 1 MG/1
1 TABLET ORAL DAILY
Qty: 90 TABLET | Refills: 1 | Status: SHIPPED | OUTPATIENT
Start: 2018-12-11 | End: 2019-02-18 | Stop reason: HOSPADM

## 2018-12-11 RX ORDER — NISOLDIPINE 8.5 MG/1
8.5 TABLET, FILM COATED, EXTENDED RELEASE ORAL DAILY
Qty: 90 TABLET | Refills: 1 | Status: SHIPPED | OUTPATIENT
Start: 2018-12-11 | End: 2019-02-18 | Stop reason: HOSPADM

## 2018-12-11 RX ORDER — NISOLDIPINE 8.5 MG/1
8.5 TABLET, FILM COATED, EXTENDED RELEASE ORAL DAILY
Qty: 90 TABLET | Refills: 1 | Status: SHIPPED | OUTPATIENT
Start: 2018-12-11 | End: 2018-12-11 | Stop reason: SDUPTHER

## 2018-12-11 NOTE — PROGRESS NOTES
"Subjective   Claudia Arnold is a 73 y.o. female.     History of Present Illness     Chief Complaint:   Chief Complaint   Patient presents with   • Anxiety   • Hypertension   • Hyperlipidemia       Claudia Arnold 73 y.o. female who presents today for Medical Management of the below listed issues and medication refills.  she has a problem list of   Patient Active Problem List   Diagnosis   • Tear of rotator cuff   • Hypertension   • Generalized anxiety disorder   • Hyperlipidemia   • IFG (impaired fasting glucose)   • Heart murmur, systolic   • Shoulder pain, bilateral   • Pain of both shoulder joints   • Chronic pain of both shoulders   .  Since the last visit, she has been going through rehab through [place] and has had some of her medications changed.   she has been compliant with   Current Outpatient Medications:   •  acetaminophen (TYLENOL) 325 MG tablet, Take 650 mg by mouth every 6 (six) hours as needed for mild pain (1-3)., Disp: , Rfl:   •  aspirin 81 MG EC tablet, Take 81 mg by mouth daily., Disp: , Rfl:   •  atorvastatin (LIPITOR) 20 MG tablet, Take 1 tablet by mouth Daily., Disp: 90 tablet, Rfl: 1  •  bumetanide (BUMEX) 1 MG tablet, Take 1 tablet by mouth Daily., Disp: 90 tablet, Rfl: 1  •  calcium carbonate (OS-DEBORAH) 600 MG tablet, Take 600 mg by mouth daily., Disp: , Rfl:   •  doxylamine (UNISOM) 25 MG tablet, Take 25 mg by mouth as needed for sleep., Disp: , Rfl:   •  escitalopram (LEXAPRO) 10 MG tablet, Take 1 tablet by mouth Daily., Disp: 90 tablet, Rfl: 1  •  losartan (COZAAR) 25 MG tablet, Take 1 tablet by mouth Daily., Disp: 90 tablet, Rfl: 1  •  nisoldipine (SULAR) 8.5 MG 24 hr tablet, Take 1 tablet by mouth Daily., Disp: 90 tablet, Rfl: 1.  she denies medication side effects.    All of the chronic condition(s) listed above are stable w/o issues.    /69   Pulse 91   Temp 97.8 °F (36.6 °C) (Oral)   Resp 16   Ht 170.2 cm (67\")   Wt (!) 145 kg (320 lb)   BMI 50.12 kg/m²     Results for " orders placed or performed in visit on 12/01/17   Comprehensive Metabolic Panel   Result Value Ref Range    Glucose 132 (H) 65 - 99 mg/dL    BUN 26 8 - 27 mg/dL    Creatinine 0.98 0.57 - 1.00 mg/dL    eGFR Non African Am 58 (L) >59 mL/min/1.73    eGFR African Am 67 >59 mL/min/1.73    BUN/Creatinine Ratio 27 12 - 28    Sodium 143 134 - 144 mmol/L    Potassium 4.6 3.5 - 5.2 mmol/L    Chloride 101 96 - 106 mmol/L    Total CO2 24 18 - 29 mmol/L    Calcium 10.4 (H) 8.7 - 10.3 mg/dL    Total Protein 7.0 6.0 - 8.5 g/dL    Albumin 4.2 3.5 - 4.8 g/dL    Globulin 2.8 1.5 - 4.5 g/dL    A/G Ratio 1.5 1.2 - 2.2    Total Bilirubin 0.7 0.0 - 1.2 mg/dL    Alkaline Phosphatase 88 39 - 117 IU/L    AST (SGOT) 12 0 - 40 IU/L    ALT (SGPT) 10 0 - 32 IU/L   Lipid Panel   Result Value Ref Range    Total Cholesterol 167 100 - 199 mg/dL    Triglycerides 69 0 - 149 mg/dL    HDL Cholesterol 69 >39 mg/dL    VLDL Cholesterol 14 5 - 40 mg/dL    LDL Cholesterol  84 0 - 99 mg/dL   Hemoglobin A1c   Result Value Ref Range    Hemoglobin A1C 6.0 (H) 4.8 - 5.6 %   TSH   Result Value Ref Range    TSH 2.740 0.450 - 4.500 uIU/mL   CBC & Differential   Result Value Ref Range    WBC 8.9 3.4 - 10.8 x10E3/uL    RBC 4.45 3.77 - 5.28 x10E6/uL    Hemoglobin 13.2 11.1 - 15.9 g/dL    Hematocrit 40.7 34.0 - 46.6 %    MCV 92 79 - 97 fL    MCH 29.7 26.6 - 33.0 pg    MCHC 32.4 31.5 - 35.7 g/dL    RDW 14.3 12.3 - 15.4 %    Platelets 284 150 - 379 x10E3/uL    Neutrophil Rel % 72 Not Estab. %    Lymphocyte Rel % 18 Not Estab. %    Monocyte Rel % 8 Not Estab. %    Eosinophil Rel % 2 Not Estab. %    Basophil Rel % 0 Not Estab. %    Neutrophils Absolute 6.5 1.4 - 7.0 x10E3/uL    Lymphocytes Absolute 1.6 0.7 - 3.1 x10E3/uL    Monocytes Absolute 0.7 0.1 - 0.9 x10E3/uL    Eosinophils Absolute 0.1 0.0 - 0.4 x10E3/uL    Basophils Absolute 0.0 0.0 - 0.2 x10E3/uL    Immature Granulocyte Rel % 0 Not Estab. %    Immature Grans Absolute 0.0 0.0 - 0.1 x10E3/uL           The following  portions of the patient's history were reviewed and updated as appropriate: allergies, current medications, past family history, past medical history, past social history, past surgical history and problem list.    Review of Systems   Constitutional: Negative for activity change, chills, fatigue and fever.   Respiratory: Negative for cough and chest tightness.    Cardiovascular: Negative for chest pain and palpitations.   Gastrointestinal: Negative for abdominal pain and nausea.   Endocrine: Negative for cold intolerance.   Psychiatric/Behavioral: Negative for behavioral problems and dysphoric mood.       Objective   Physical Exam   Constitutional: She appears well-developed and well-nourished.   Neck: Neck supple. No thyromegaly present.   Cardiovascular: Normal rate and regular rhythm.   Murmur heard.   Systolic murmur is present with a grade of 2/6.  Pulmonary/Chest: Effort normal and breath sounds normal.   Abdominal: Bowel sounds are normal. There is no tenderness.   Neurological: She is alert.   Psychiatric: She has a normal mood and affect. Her behavior is normal.   Nursing note and vitals reviewed.  Labs ordered and pt to complete.    Assessment/Plan   Claudia was seen today for anxiety, hypertension and hyperlipidemia.    Diagnoses and all orders for this visit:    Essential hypertension  -     Discontinue: nisoldipine (SULAR) 8.5 MG 24 hr tablet; Take 1 tablet by mouth Daily.  -     Discontinue: losartan (COZAAR) 25 MG tablet; Take 1 tablet by mouth Daily.  -     Discontinue: bumetanide (BUMEX) 1 MG tablet; Take 1 tablet by mouth Daily.  -     bumetanide (BUMEX) 1 MG tablet; Take 1 tablet by mouth Daily.  -     losartan (COZAAR) 25 MG tablet; Take 1 tablet by mouth Daily.  -     nisoldipine (SULAR) 8.5 MG 24 hr tablet; Take 1 tablet by mouth Daily.    Generalized anxiety disorder  -     Discontinue: escitalopram (LEXAPRO) 10 MG tablet; Take 1 tablet by mouth Daily.  -     escitalopram (LEXAPRO) 10 MG tablet;  Take 1 tablet by mouth Daily.    Mixed hyperlipidemia  -     Discontinue: atorvastatin (LIPITOR) 20 MG tablet; Take 1 tablet by mouth Daily.  -     atorvastatin (LIPITOR) 20 MG tablet; Take 1 tablet by mouth Daily.    IFG (impaired fasting glucose)    Immunization due  -     Flucelvax Quad=>4Years (PFS)

## 2019-01-10 ENCOUNTER — TELEPHONE (OUTPATIENT)
Dept: FAMILY MEDICINE CLINIC | Facility: CLINIC | Age: 74
End: 2019-01-10

## 2019-01-10 NOTE — TELEPHONE ENCOUNTER
concerned about pt with so much Edema in legs that she states that her legs are weeping. I asked her to come in for appt and she will not come also told to go to ER and she will not do that either. Patient calls today and wants us to order home health without her having to come in for that either. Explained to her that she really needs to get her legs looked at right away. Pt refuses, she hangs up. Dana Wallace did witness.

## 2019-01-16 ENCOUNTER — HOSPITAL ENCOUNTER (INPATIENT)
Facility: HOSPITAL | Age: 74
LOS: 33 days | Discharge: NURSING, REHAB OR LONG-TERM CARE | End: 2019-02-18
Attending: EMERGENCY MEDICINE | Admitting: THORACIC SURGERY (CARDIOTHORACIC VASCULAR SURGERY)

## 2019-01-16 ENCOUNTER — APPOINTMENT (OUTPATIENT)
Dept: GENERAL RADIOLOGY | Facility: HOSPITAL | Age: 74
End: 2019-01-16

## 2019-01-16 ENCOUNTER — APPOINTMENT (OUTPATIENT)
Dept: CARDIOLOGY | Facility: HOSPITAL | Age: 74
End: 2019-01-16

## 2019-01-16 DIAGNOSIS — N17.9 ACUTE RENAL FAILURE, UNSPECIFIED ACUTE RENAL FAILURE TYPE (HCC): ICD-10-CM

## 2019-01-16 DIAGNOSIS — I05.0 MITRAL VALVE STENOSIS, UNSPECIFIED ETIOLOGY: ICD-10-CM

## 2019-01-16 DIAGNOSIS — I48.92 ATRIAL FLUTTER, UNSPECIFIED TYPE (HCC): ICD-10-CM

## 2019-01-16 DIAGNOSIS — I50.9 ACUTE CONGESTIVE HEART FAILURE, UNSPECIFIED HEART FAILURE TYPE (HCC): Primary | ICD-10-CM

## 2019-01-16 DIAGNOSIS — R53.1 GENERALIZED WEAKNESS: ICD-10-CM

## 2019-01-16 DIAGNOSIS — Z95.2 S/P AVR (AORTIC VALVE REPLACEMENT): ICD-10-CM

## 2019-01-16 DIAGNOSIS — I07.1 TRICUSPID VALVE INSUFFICIENCY, UNSPECIFIED ETIOLOGY: ICD-10-CM

## 2019-01-16 DIAGNOSIS — I35.0 AORTIC VALVE STENOSIS, ETIOLOGY OF CARDIAC VALVE DISEASE UNSPECIFIED: ICD-10-CM

## 2019-01-16 PROBLEM — B36.9 FUNGAL SKIN INFECTION: Status: ACTIVE | Noted: 2019-01-16

## 2019-01-16 PROBLEM — E66.01 OBESITY, MORBID, BMI 50 OR HIGHER (HCC): Chronic | Status: ACTIVE | Noted: 2019-01-16

## 2019-01-16 LAB
ALBUMIN SERPL-MCNC: 3.6 G/DL (ref 3.5–5.2)
ALBUMIN/GLOB SERPL: 1.1 G/DL
ALP SERPL-CCNC: 81 U/L (ref 39–117)
ALT SERPL W P-5'-P-CCNC: 12 U/L (ref 1–33)
ANION GAP SERPL CALCULATED.3IONS-SCNC: 12 MMOL/L
ANION GAP SERPL CALCULATED.3IONS-SCNC: 13.5 MMOL/L
AST SERPL-CCNC: 12 U/L (ref 1–32)
BASOPHILS # BLD AUTO: 0.01 10*3/MM3 (ref 0–0.2)
BASOPHILS NFR BLD AUTO: 0.1 % (ref 0–1.5)
BILIRUB SERPL-MCNC: 0.5 MG/DL (ref 0.1–1.2)
BUN BLD-MCNC: 17 MG/DL (ref 8–23)
BUN BLD-MCNC: 19 MG/DL (ref 8–23)
BUN/CREAT SERPL: 20.2 (ref 7–25)
BUN/CREAT SERPL: 20.7 (ref 7–25)
CALCIUM SPEC-SCNC: 10 MG/DL (ref 8.6–10.5)
CALCIUM SPEC-SCNC: 10 MG/DL (ref 8.6–10.5)
CHLORIDE SERPL-SCNC: 102 MMOL/L (ref 98–107)
CHLORIDE SERPL-SCNC: 104 MMOL/L (ref 98–107)
CO2 SERPL-SCNC: 27.5 MMOL/L (ref 22–29)
CO2 SERPL-SCNC: 28 MMOL/L (ref 22–29)
CREAT BLD-MCNC: 0.84 MG/DL (ref 0.57–1)
CREAT BLD-MCNC: 0.92 MG/DL (ref 0.57–1)
DEPRECATED RDW RBC AUTO: 50.1 FL (ref 37–54)
EOSINOPHIL # BLD AUTO: 0.32 10*3/MM3 (ref 0–0.7)
EOSINOPHIL NFR BLD AUTO: 4.2 % (ref 0.3–6.2)
ERYTHROCYTE [DISTWIDTH] IN BLOOD BY AUTOMATED COUNT: 14.4 % (ref 11.7–13)
GFR SERPL CREATININE-BSD FRML MDRD: 60 ML/MIN/1.73
GFR SERPL CREATININE-BSD FRML MDRD: 66 ML/MIN/1.73
GLOBULIN UR ELPH-MCNC: 3.4 GM/DL
GLUCOSE BLD-MCNC: 101 MG/DL (ref 65–99)
GLUCOSE BLD-MCNC: 124 MG/DL (ref 65–99)
GLUCOSE BLDC GLUCOMTR-MCNC: 119 MG/DL (ref 70–130)
HCT VFR BLD AUTO: 38 % (ref 35.6–45.5)
HGB BLD-MCNC: 12 G/DL (ref 11.9–15.5)
HOLD SPECIMEN: NORMAL
HOLD SPECIMEN: NORMAL
IMM GRANULOCYTES # BLD AUTO: 0.03 10*3/MM3 (ref 0–0.03)
IMM GRANULOCYTES NFR BLD AUTO: 0.4 % (ref 0–0.5)
LYMPHOCYTES # BLD AUTO: 0.74 10*3/MM3 (ref 0.9–4.8)
LYMPHOCYTES NFR BLD AUTO: 9.7 % (ref 19.6–45.3)
MCH RBC QN AUTO: 30 PG (ref 26.9–32)
MCHC RBC AUTO-ENTMCNC: 31.6 G/DL (ref 32.4–36.3)
MCV RBC AUTO: 95 FL (ref 80.5–98.2)
MONOCYTES # BLD AUTO: 0.93 10*3/MM3 (ref 0.2–1.2)
MONOCYTES NFR BLD AUTO: 12.2 % (ref 5–12)
NEUTROPHILS # BLD AUTO: 5.65 10*3/MM3 (ref 1.9–8.1)
NEUTROPHILS NFR BLD AUTO: 73.8 % (ref 42.7–76)
NT-PROBNP SERPL-MCNC: 3101 PG/ML (ref 0–900)
PLATELET # BLD AUTO: 290 10*3/MM3 (ref 140–500)
PMV BLD AUTO: 10.5 FL (ref 6–12)
POTASSIUM BLD-SCNC: 3.5 MMOL/L (ref 3.5–5.2)
POTASSIUM BLD-SCNC: 4 MMOL/L (ref 3.5–5.2)
PROT SERPL-MCNC: 7 G/DL (ref 6–8.5)
RBC # BLD AUTO: 4 10*6/MM3 (ref 3.9–5.2)
SODIUM BLD-SCNC: 143 MMOL/L (ref 136–145)
SODIUM BLD-SCNC: 144 MMOL/L (ref 136–145)
TROPONIN T SERPL-MCNC: 0.02 NG/ML (ref 0–0.03)
TROPONIN T SERPL-MCNC: 0.03 NG/ML (ref 0–0.03)
WBC NRBC COR # BLD: 7.65 10*3/MM3 (ref 4.5–10.7)
WHOLE BLOOD HOLD SPECIMEN: NORMAL
WHOLE BLOOD HOLD SPECIMEN: NORMAL

## 2019-01-16 PROCEDURE — 71045 X-RAY EXAM CHEST 1 VIEW: CPT

## 2019-01-16 PROCEDURE — 99284 EMERGENCY DEPT VISIT MOD MDM: CPT

## 2019-01-16 PROCEDURE — 93005 ELECTROCARDIOGRAM TRACING: CPT | Performed by: PHYSICIAN ASSISTANT

## 2019-01-16 PROCEDURE — 25010000002 ENOXAPARIN PER 10 MG: Performed by: INTERNAL MEDICINE

## 2019-01-16 PROCEDURE — 84484 ASSAY OF TROPONIN QUANT: CPT | Performed by: NURSE PRACTITIONER

## 2019-01-16 PROCEDURE — 83880 ASSAY OF NATRIURETIC PEPTIDE: CPT | Performed by: PHYSICIAN ASSISTANT

## 2019-01-16 PROCEDURE — 93005 ELECTROCARDIOGRAM TRACING: CPT | Performed by: EMERGENCY MEDICINE

## 2019-01-16 PROCEDURE — 85025 COMPLETE CBC W/AUTO DIFF WBC: CPT | Performed by: PHYSICIAN ASSISTANT

## 2019-01-16 PROCEDURE — 93005 ELECTROCARDIOGRAM TRACING: CPT | Performed by: NURSE PRACTITIONER

## 2019-01-16 PROCEDURE — 84484 ASSAY OF TROPONIN QUANT: CPT | Performed by: PHYSICIAN ASSISTANT

## 2019-01-16 PROCEDURE — 93970 EXTREMITY STUDY: CPT

## 2019-01-16 PROCEDURE — 99222 1ST HOSP IP/OBS MODERATE 55: CPT | Performed by: NURSE PRACTITIONER

## 2019-01-16 PROCEDURE — 82962 GLUCOSE BLOOD TEST: CPT

## 2019-01-16 PROCEDURE — 80053 COMPREHEN METABOLIC PANEL: CPT | Performed by: PHYSICIAN ASSISTANT

## 2019-01-16 PROCEDURE — 93010 ELECTROCARDIOGRAM REPORT: CPT | Performed by: INTERNAL MEDICINE

## 2019-01-16 RX ORDER — NICOTINE POLACRILEX 4 MG
15 LOZENGE BUCCAL
Status: DISCONTINUED | OUTPATIENT
Start: 2019-01-16 | End: 2019-01-28

## 2019-01-16 RX ORDER — NYSTATIN 100000 [USP'U]/G
POWDER TOPICAL EVERY 12 HOURS SCHEDULED
Status: DISCONTINUED | OUTPATIENT
Start: 2019-01-16 | End: 2019-01-28

## 2019-01-16 RX ORDER — CEFAZOLIN SODIUM 1 G/50ML
1 INJECTION, SOLUTION INTRAVENOUS EVERY 8 HOURS
Status: DISCONTINUED | OUTPATIENT
Start: 2019-01-16 | End: 2019-01-18

## 2019-01-16 RX ORDER — SODIUM CHLORIDE 0.9 % (FLUSH) 0.9 %
3 SYRINGE (ML) INJECTION EVERY 12 HOURS SCHEDULED
Status: DISCONTINUED | OUTPATIENT
Start: 2019-01-16 | End: 2019-01-23

## 2019-01-16 RX ORDER — VIT E ACET/GLY/DIMETH/WATER
LOTION (ML) TOPICAL EVERY 12 HOURS SCHEDULED
Status: DISCONTINUED | OUTPATIENT
Start: 2019-01-16 | End: 2019-02-18 | Stop reason: HOSPADM

## 2019-01-16 RX ORDER — DEXTROSE MONOHYDRATE 25 G/50ML
25 INJECTION, SOLUTION INTRAVENOUS
Status: DISCONTINUED | OUTPATIENT
Start: 2019-01-16 | End: 2019-01-28

## 2019-01-16 RX ORDER — ASPIRIN 81 MG/1
81 TABLET ORAL DAILY
Status: DISCONTINUED | OUTPATIENT
Start: 2019-01-17 | End: 2019-01-28

## 2019-01-16 RX ORDER — ACETAMINOPHEN 325 MG/1
650 TABLET ORAL EVERY 6 HOURS PRN
Status: DISCONTINUED | OUTPATIENT
Start: 2019-01-16 | End: 2019-01-21 | Stop reason: SDUPTHER

## 2019-01-16 RX ORDER — POTASSIUM CHLORIDE 750 MG/1
40 CAPSULE, EXTENDED RELEASE ORAL ONCE
Status: COMPLETED | OUTPATIENT
Start: 2019-01-16 | End: 2019-01-16

## 2019-01-16 RX ORDER — SODIUM CHLORIDE 0.9 % (FLUSH) 0.9 %
3-10 SYRINGE (ML) INJECTION AS NEEDED
Status: DISCONTINUED | OUTPATIENT
Start: 2019-01-16 | End: 2019-01-23

## 2019-01-16 RX ORDER — UREA 10 %
1 LOTION (ML) TOPICAL NIGHTLY PRN
Status: DISCONTINUED | OUTPATIENT
Start: 2019-01-16 | End: 2019-01-28

## 2019-01-16 RX ORDER — ESCITALOPRAM OXALATE 10 MG/1
10 TABLET ORAL DAILY
Status: DISCONTINUED | OUTPATIENT
Start: 2019-01-17 | End: 2019-01-28

## 2019-01-16 RX ORDER — SODIUM CHLORIDE 0.9 % (FLUSH) 0.9 %
10 SYRINGE (ML) INJECTION AS NEEDED
Status: DISCONTINUED | OUTPATIENT
Start: 2019-01-16 | End: 2019-01-28

## 2019-01-16 RX ORDER — ATORVASTATIN CALCIUM 20 MG/1
20 TABLET, FILM COATED ORAL DAILY
Status: DISCONTINUED | OUTPATIENT
Start: 2019-01-17 | End: 2019-01-28

## 2019-01-16 RX ORDER — ONDANSETRON 2 MG/ML
4 INJECTION INTRAMUSCULAR; INTRAVENOUS EVERY 6 HOURS PRN
Status: DISCONTINUED | OUTPATIENT
Start: 2019-01-16 | End: 2019-01-28

## 2019-01-16 RX ORDER — POTASSIUM CHLORIDE 1.5 G/1.77G
40 POWDER, FOR SOLUTION ORAL AS NEEDED
Status: DISCONTINUED | OUTPATIENT
Start: 2019-01-16 | End: 2019-01-18 | Stop reason: SDUPTHER

## 2019-01-16 RX ORDER — BUMETANIDE 0.25 MG/ML
1 INJECTION INTRAMUSCULAR; INTRAVENOUS ONCE
Status: COMPLETED | OUTPATIENT
Start: 2019-01-16 | End: 2019-01-16

## 2019-01-16 RX ORDER — ALPRAZOLAM 0.25 MG/1
0.25 TABLET ORAL 2 TIMES DAILY PRN
Status: DISCONTINUED | OUTPATIENT
Start: 2019-01-16 | End: 2019-01-17

## 2019-01-16 RX ORDER — BUMETANIDE 0.25 MG/ML
2 INJECTION INTRAMUSCULAR; INTRAVENOUS EVERY 12 HOURS
Status: DISCONTINUED | OUTPATIENT
Start: 2019-01-16 | End: 2019-01-18

## 2019-01-16 RX ORDER — POTASSIUM CHLORIDE 750 MG/1
40 CAPSULE, EXTENDED RELEASE ORAL AS NEEDED
Status: DISCONTINUED | OUTPATIENT
Start: 2019-01-16 | End: 2019-01-18 | Stop reason: SDUPTHER

## 2019-01-16 RX ORDER — LOSARTAN POTASSIUM 25 MG/1
25 TABLET ORAL DAILY
Status: DISCONTINUED | OUTPATIENT
Start: 2019-01-17 | End: 2019-01-19

## 2019-01-16 RX ADMIN — ENOXAPARIN SODIUM 40 MG: 40 INJECTION SUBCUTANEOUS at 17:47

## 2019-01-16 RX ADMIN — SODIUM CHLORIDE, PRESERVATIVE FREE 10 ML: 5 INJECTION INTRAVENOUS at 17:48

## 2019-01-16 RX ADMIN — NYSTATIN: 100000 POWDER TOPICAL at 20:27

## 2019-01-16 RX ADMIN — BUMETANIDE 1 MG: 0.25 INJECTION INTRAMUSCULAR; INTRAVENOUS at 12:32

## 2019-01-16 RX ADMIN — BUMETANIDE 2 MG: 0.25 INJECTION INTRAMUSCULAR; INTRAVENOUS at 17:48

## 2019-01-16 RX ADMIN — ACETAMINOPHEN 650 MG: 325 TABLET, FILM COATED ORAL at 20:37

## 2019-01-16 RX ADMIN — POTASSIUM CHLORIDE 40 MEQ: 750 CAPSULE, EXTENDED RELEASE ORAL at 20:28

## 2019-01-16 RX ADMIN — SODIUM CHLORIDE, PRESERVATIVE FREE 3 ML: 5 INJECTION INTRAVENOUS at 20:27

## 2019-01-16 RX ADMIN — POTASSIUM CHLORIDE 40 MEQ: 750 CAPSULE, EXTENDED RELEASE ORAL at 17:48

## 2019-01-17 ENCOUNTER — APPOINTMENT (OUTPATIENT)
Dept: CARDIOLOGY | Facility: HOSPITAL | Age: 74
End: 2019-01-17

## 2019-01-17 PROBLEM — L03.119 CELLULITIS OF LOWER EXTREMITY: Status: ACTIVE | Noted: 2019-01-17

## 2019-01-17 LAB
ANION GAP SERPL CALCULATED.3IONS-SCNC: 9.8 MMOL/L
AORTIC DIMENSIONLESS INDEX: 0.3 (DI)
BH CV ECHO MEAS - ACS: 1.1 CM
BH CV ECHO MEAS - AO MAX PG (FULL): 87.4 MMHG
BH CV ECHO MEAS - AO MAX PG: 96 MMHG
BH CV ECHO MEAS - AO MEAN PG (FULL): 50 MMHG
BH CV ECHO MEAS - AO MEAN PG: 54 MMHG
BH CV ECHO MEAS - AO ROOT AREA (BSA CORRECTED): 1.4
BH CV ECHO MEAS - AO ROOT AREA: 9.6 CM^2
BH CV ECHO MEAS - AO ROOT DIAM: 3.5 CM
BH CV ECHO MEAS - AO V2 MAX: 489 CM/SEC
BH CV ECHO MEAS - AO V2 MEAN: 344 CM/SEC
BH CV ECHO MEAS - AO V2 VTI: 108 CM
BH CV ECHO MEAS - AVA(I,A): 0.84 CM^2
BH CV ECHO MEAS - AVA(I,D): 0.84 CM^2
BH CV ECHO MEAS - AVA(V,A): 0.83 CM^2
BH CV ECHO MEAS - AVA(V,D): 0.83 CM^2
BH CV ECHO MEAS - BSA(HAYCOCK): 2.7 M^2
BH CV ECHO MEAS - BSA: 2.5 M^2
BH CV ECHO MEAS - BZI_BMI: 51.4 KILOGRAMS/M^2
BH CV ECHO MEAS - BZI_METRIC_HEIGHT: 170.2 CM
BH CV ECHO MEAS - BZI_METRIC_WEIGHT: 148.8 KG
BH CV ECHO MEAS - EDV(CUBED): 227 ML
BH CV ECHO MEAS - EDV(MOD-SP2): 133 ML
BH CV ECHO MEAS - EDV(MOD-SP4): 154 ML
BH CV ECHO MEAS - EDV(TEICH): 186.9 ML
BH CV ECHO MEAS - EF(CUBED): 81.1 %
BH CV ECHO MEAS - EF(MOD-BP): 59 %
BH CV ECHO MEAS - EF(MOD-SP2): 61.7 %
BH CV ECHO MEAS - EF(MOD-SP4): 55.8 %
BH CV ECHO MEAS - EF(TEICH): 72.8 %
BH CV ECHO MEAS - ESV(CUBED): 42.9 ML
BH CV ECHO MEAS - ESV(MOD-SP2): 51 ML
BH CV ECHO MEAS - ESV(MOD-SP4): 68 ML
BH CV ECHO MEAS - ESV(TEICH): 50.9 ML
BH CV ECHO MEAS - FS: 42.6 %
BH CV ECHO MEAS - IVS/LVPW: 1
BH CV ECHO MEAS - IVSD: 1 CM
BH CV ECHO MEAS - LAT PEAK E' VEL: 11 CM/SEC
BH CV ECHO MEAS - LV DIASTOLIC VOL/BSA (35-75): 61.7 ML/M^2
BH CV ECHO MEAS - LV MASS(C)D: 253.9 GRAMS
BH CV ECHO MEAS - LV MASS(C)DI: 101.8 GRAMS/M^2
BH CV ECHO MEAS - LV MAX PG: 8.3 MMHG
BH CV ECHO MEAS - LV MEAN PG: 4 MMHG
BH CV ECHO MEAS - LV SYSTOLIC VOL/BSA (12-30): 27.3 ML/M^2
BH CV ECHO MEAS - LV V1 MAX: 144 CM/SEC
BH CV ECHO MEAS - LV V1 MEAN: 95.5 CM/SEC
BH CV ECHO MEAS - LV V1 VTI: 32.1 CM
BH CV ECHO MEAS - LVIDD: 6.1 CM
BH CV ECHO MEAS - LVIDS: 3.5 CM
BH CV ECHO MEAS - LVLD AP2: 9.6 CM
BH CV ECHO MEAS - LVLD AP4: 9.8 CM
BH CV ECHO MEAS - LVLS AP2: 7.7 CM
BH CV ECHO MEAS - LVLS AP4: 8 CM
BH CV ECHO MEAS - LVOT AREA (M): 2.8 CM^2
BH CV ECHO MEAS - LVOT AREA: 2.8 CM^2
BH CV ECHO MEAS - LVOT DIAM: 1.9 CM
BH CV ECHO MEAS - LVPWD: 1 CM
BH CV ECHO MEAS - MED PEAK E' VEL: 6 CM/SEC
BH CV ECHO MEAS - MR MAX PG: 136.4 MMHG
BH CV ECHO MEAS - MR MAX VEL: 584 CM/SEC
BH CV ECHO MEAS - MV A DUR: 0.15 SEC
BH CV ECHO MEAS - MV A MAX VEL: 158 CM/SEC
BH CV ECHO MEAS - MV DEC SLOPE: 930.5 CM/SEC^2
BH CV ECHO MEAS - MV DEC TIME: 0.25 SEC
BH CV ECHO MEAS - MV E MAX VEL: 197 CM/SEC
BH CV ECHO MEAS - MV E/A: 1.2
BH CV ECHO MEAS - MV MAX PG: 21.8 MMHG
BH CV ECHO MEAS - MV MEAN PG: 11 MMHG
BH CV ECHO MEAS - MV P1/2T MAX VEL: 236.5 CM/SEC
BH CV ECHO MEAS - MV P1/2T: 74.4 MSEC
BH CV ECHO MEAS - MV V2 MAX: 233.5 CM/SEC
BH CV ECHO MEAS - MV V2 MEAN: 159 CM/SEC
BH CV ECHO MEAS - MV V2 VTI: 59.8 CM
BH CV ECHO MEAS - MVA P1/2T LCG: 0.93 CM^2
BH CV ECHO MEAS - MVA(P1/2T): 3 CM^2
BH CV ECHO MEAS - MVA(VTI): 1.5 CM^2
BH CV ECHO MEAS - PA ACC TIME: 0.12 SEC
BH CV ECHO MEAS - PA MAX PG (FULL): 5.7 MMHG
BH CV ECHO MEAS - PA MAX PG: 7.6 MMHG
BH CV ECHO MEAS - PA PR(ACCEL): 26.8 MMHG
BH CV ECHO MEAS - PA V2 MAX: 138 CM/SEC
BH CV ECHO MEAS - PULM A REVS DUR: 0.15 SEC
BH CV ECHO MEAS - PULM A REVS VEL: 29.6 CM/SEC
BH CV ECHO MEAS - PULM DIAS VEL: 39.8 CM/SEC
BH CV ECHO MEAS - PULM S/D: 1.5
BH CV ECHO MEAS - PULM SYS VEL: 57.8 CM/SEC
BH CV ECHO MEAS - PVA(V,A): 3.1 CM^2
BH CV ECHO MEAS - PVA(V,D): 3.1 CM^2
BH CV ECHO MEAS - QP/QS: 0.84
BH CV ECHO MEAS - RAP SYSTOLE: 8 MMHG
BH CV ECHO MEAS - RV MAX PG: 1.9 MMHG
BH CV ECHO MEAS - RV MEAN PG: 1 MMHG
BH CV ECHO MEAS - RV V1 MAX: 68.7 CM/SEC
BH CV ECHO MEAS - RV V1 MEAN: 43.1 CM/SEC
BH CV ECHO MEAS - RV V1 VTI: 12.4 CM
BH CV ECHO MEAS - RVOT AREA: 6.2 CM^2
BH CV ECHO MEAS - RVOT DIAM: 2.8 CM
BH CV ECHO MEAS - RVSP: 52.1 MMHG
BH CV ECHO MEAS - SI(AO): 416.4 ML/M^2
BH CV ECHO MEAS - SI(CUBED): 73.8 ML/M^2
BH CV ECHO MEAS - SI(LVOT): 36.5 ML/M^2
BH CV ECHO MEAS - SI(MOD-SP2): 32.9 ML/M^2
BH CV ECHO MEAS - SI(MOD-SP4): 34.5 ML/M^2
BH CV ECHO MEAS - SI(TEICH): 54.5 ML/M^2
BH CV ECHO MEAS - SV(AO): 1039 ML
BH CV ECHO MEAS - SV(CUBED): 184.1 ML
BH CV ECHO MEAS - SV(LVOT): 91 ML
BH CV ECHO MEAS - SV(MOD-SP2): 82 ML
BH CV ECHO MEAS - SV(MOD-SP4): 86 ML
BH CV ECHO MEAS - SV(RVOT): 76.4 ML
BH CV ECHO MEAS - SV(TEICH): 136.1 ML
BH CV ECHO MEAS - TAPSE (>1.6): 1.9 CM2
BH CV ECHO MEAS - TR MAX VEL: 332 CM/SEC
BH CV ECHO MEASUREMENTS AVERAGE E/E' RATIO: 23.18
BH CV LOWER VASCULAR LEFT COMMON FEMORAL AUGMENT: NORMAL
BH CV LOWER VASCULAR LEFT COMMON FEMORAL COMPETENT: NORMAL
BH CV LOWER VASCULAR LEFT COMMON FEMORAL COMPRESS: NORMAL
BH CV LOWER VASCULAR LEFT COMMON FEMORAL PHASIC: NORMAL
BH CV LOWER VASCULAR LEFT COMMON FEMORAL SPONT: NORMAL
BH CV LOWER VASCULAR LEFT GASTRONEMIUS COMPRESS: NORMAL
BH CV LOWER VASCULAR LEFT GREATER SAPH AK COMPRESS: NORMAL
BH CV LOWER VASCULAR LEFT GREATER SAPH BK COMPRESS: NORMAL
BH CV LOWER VASCULAR LEFT MID FEMORAL AUGMENT: NORMAL
BH CV LOWER VASCULAR LEFT MID FEMORAL COMPETENT: NORMAL
BH CV LOWER VASCULAR LEFT MID FEMORAL COMPRESS: NORMAL
BH CV LOWER VASCULAR LEFT MID FEMORAL PHASIC: NORMAL
BH CV LOWER VASCULAR LEFT MID FEMORAL SPONT: NORMAL
BH CV LOWER VASCULAR LEFT PERONEAL COMPRESS: NORMAL
BH CV LOWER VASCULAR LEFT POPLITEAL AUGMENT: NORMAL
BH CV LOWER VASCULAR LEFT POPLITEAL COMPETENT: NORMAL
BH CV LOWER VASCULAR LEFT POPLITEAL COMPRESS: NORMAL
BH CV LOWER VASCULAR LEFT POPLITEAL PHASIC: NORMAL
BH CV LOWER VASCULAR LEFT POPLITEAL SPONT: NORMAL
BH CV LOWER VASCULAR LEFT POSTERIOR TIBIAL COMPRESS: NORMAL
BH CV LOWER VASCULAR LEFT PROXIMAL FEMORAL COMPRESS: NORMAL
BH CV LOWER VASCULAR LEFT SAPHENOFEMORAL JUNCTION AUGMENT: NORMAL
BH CV LOWER VASCULAR LEFT SAPHENOFEMORAL JUNCTION COMPETENT: NORMAL
BH CV LOWER VASCULAR LEFT SAPHENOFEMORAL JUNCTION COMPRESS: NORMAL
BH CV LOWER VASCULAR LEFT SAPHENOFEMORAL JUNCTION PHASIC: NORMAL
BH CV LOWER VASCULAR LEFT SAPHENOFEMORAL JUNCTION SPONT: NORMAL
BH CV LOWER VASCULAR RIGHT COMMON FEMORAL AUGMENT: NORMAL
BH CV LOWER VASCULAR RIGHT COMMON FEMORAL COMPETENT: NORMAL
BH CV LOWER VASCULAR RIGHT COMMON FEMORAL COMPRESS: NORMAL
BH CV LOWER VASCULAR RIGHT COMMON FEMORAL PHASIC: NORMAL
BH CV LOWER VASCULAR RIGHT COMMON FEMORAL SPONT: NORMAL
BH CV LOWER VASCULAR RIGHT GASTRONEMIUS COMPRESS: NORMAL
BH CV LOWER VASCULAR RIGHT GREATER SAPH AK COMPRESS: NORMAL
BH CV LOWER VASCULAR RIGHT GREATER SAPH BK COMPRESS: NORMAL
BH CV LOWER VASCULAR RIGHT MID FEMORAL AUGMENT: NORMAL
BH CV LOWER VASCULAR RIGHT MID FEMORAL COMPETENT: NORMAL
BH CV LOWER VASCULAR RIGHT MID FEMORAL COMPRESS: NORMAL
BH CV LOWER VASCULAR RIGHT MID FEMORAL PHASIC: NORMAL
BH CV LOWER VASCULAR RIGHT MID FEMORAL SPONT: NORMAL
BH CV LOWER VASCULAR RIGHT PERONEAL COMPRESS: NORMAL
BH CV LOWER VASCULAR RIGHT POPLITEAL AUGMENT: NORMAL
BH CV LOWER VASCULAR RIGHT POPLITEAL COMPETENT: NORMAL
BH CV LOWER VASCULAR RIGHT POPLITEAL COMPRESS: NORMAL
BH CV LOWER VASCULAR RIGHT POPLITEAL PHASIC: NORMAL
BH CV LOWER VASCULAR RIGHT POPLITEAL SPONT: NORMAL
BH CV LOWER VASCULAR RIGHT POSTERIOR TIBIAL COMPRESS: NORMAL
BH CV LOWER VASCULAR RIGHT PROXIMAL FEMORAL COMPRESS: NORMAL
BH CV LOWER VASCULAR RIGHT SAPHENOFEMORAL JUNCTION AUGMENT: NORMAL
BH CV LOWER VASCULAR RIGHT SAPHENOFEMORAL JUNCTION COMPETENT: NORMAL
BH CV LOWER VASCULAR RIGHT SAPHENOFEMORAL JUNCTION COMPRESS: NORMAL
BH CV LOWER VASCULAR RIGHT SAPHENOFEMORAL JUNCTION PHASIC: NORMAL
BH CV LOWER VASCULAR RIGHT SAPHENOFEMORAL JUNCTION SPONT: NORMAL
BH CV VAS BP RIGHT ARM: NORMAL MMHG
BH CV XLRA - RV BASE: 4 CM
BH CV XLRA - TDI S': 17 CM/SEC
BUN BLD-MCNC: 16 MG/DL (ref 8–23)
BUN/CREAT SERPL: 18 (ref 7–25)
CALCIUM SPEC-SCNC: 9.8 MG/DL (ref 8.6–10.5)
CHLORIDE SERPL-SCNC: 102 MMOL/L (ref 98–107)
CHOLEST SERPL-MCNC: 118 MG/DL (ref 0–200)
CO2 SERPL-SCNC: 31.2 MMOL/L (ref 22–29)
CREAT BLD-MCNC: 0.89 MG/DL (ref 0.57–1)
DEPRECATED RDW RBC AUTO: 51.1 FL (ref 37–54)
ERYTHROCYTE [DISTWIDTH] IN BLOOD BY AUTOMATED COUNT: 14.6 % (ref 11.7–13)
GFR SERPL CREATININE-BSD FRML MDRD: 62 ML/MIN/1.73
GLUCOSE BLD-MCNC: 101 MG/DL (ref 65–99)
GLUCOSE BLDC GLUCOMTR-MCNC: 114 MG/DL (ref 70–130)
GLUCOSE BLDC GLUCOMTR-MCNC: 134 MG/DL (ref 70–130)
GLUCOSE BLDC GLUCOMTR-MCNC: 140 MG/DL (ref 70–130)
GLUCOSE BLDC GLUCOMTR-MCNC: 97 MG/DL (ref 70–130)
HBA1C MFR BLD: 6 % (ref 4.8–5.6)
HCT VFR BLD AUTO: 36.4 % (ref 35.6–45.5)
HDLC SERPL-MCNC: 51 MG/DL (ref 40–60)
HGB BLD-MCNC: 11.2 G/DL (ref 11.9–15.5)
LDLC SERPL CALC-MCNC: 53 MG/DL (ref 0–100)
LDLC/HDLC SERPL: 1.05 {RATIO}
LEFT ATRIUM VOLUME INDEX: 42 ML/M2
LEFT ATRIUM VOLUME: 98 CM3
MAGNESIUM SERPL-MCNC: 2 MG/DL (ref 1.6–2.4)
MAXIMAL PREDICTED HEART RATE: 147 BPM
MCH RBC QN AUTO: 29.6 PG (ref 26.9–32)
MCHC RBC AUTO-ENTMCNC: 30.8 G/DL (ref 32.4–36.3)
MCV RBC AUTO: 96 FL (ref 80.5–98.2)
PLATELET # BLD AUTO: 260 10*3/MM3 (ref 140–500)
PMV BLD AUTO: 10.3 FL (ref 6–12)
POTASSIUM BLD-SCNC: 4.8 MMOL/L (ref 3.5–5.2)
RBC # BLD AUTO: 3.79 10*6/MM3 (ref 3.9–5.2)
SODIUM BLD-SCNC: 143 MMOL/L (ref 136–145)
STRESS TARGET HR: 125 BPM
TRIGL SERPL-MCNC: 68 MG/DL (ref 0–150)
TROPONIN T SERPL-MCNC: 0.03 NG/ML (ref 0–0.03)
VLDLC SERPL-MCNC: 13.6 MG/DL (ref 5–40)
WBC NRBC COR # BLD: 7.02 10*3/MM3 (ref 4.5–10.7)

## 2019-01-17 PROCEDURE — 97162 PT EVAL MOD COMPLEX 30 MIN: CPT

## 2019-01-17 PROCEDURE — 82962 GLUCOSE BLOOD TEST: CPT

## 2019-01-17 PROCEDURE — 36415 COLL VENOUS BLD VENIPUNCTURE: CPT | Performed by: NURSE PRACTITIONER

## 2019-01-17 PROCEDURE — 99233 SBSQ HOSP IP/OBS HIGH 50: CPT | Performed by: INTERNAL MEDICINE

## 2019-01-17 PROCEDURE — 25010000003 CEFAZOLIN 1-4 GM/50ML-% SOLUTION: Performed by: INTERNAL MEDICINE

## 2019-01-17 PROCEDURE — 80061 LIPID PANEL: CPT | Performed by: NURSE PRACTITIONER

## 2019-01-17 PROCEDURE — 85027 COMPLETE CBC AUTOMATED: CPT | Performed by: NURSE PRACTITIONER

## 2019-01-17 PROCEDURE — 83735 ASSAY OF MAGNESIUM: CPT | Performed by: INTERNAL MEDICINE

## 2019-01-17 PROCEDURE — 93306 TTE W/DOPPLER COMPLETE: CPT | Performed by: INTERNAL MEDICINE

## 2019-01-17 PROCEDURE — 93306 TTE W/DOPPLER COMPLETE: CPT

## 2019-01-17 PROCEDURE — 99232 SBSQ HOSP IP/OBS MODERATE 35: CPT | Performed by: NURSE PRACTITIONER

## 2019-01-17 PROCEDURE — 84484 ASSAY OF TROPONIN QUANT: CPT | Performed by: NURSE PRACTITIONER

## 2019-01-17 PROCEDURE — 80048 BASIC METABOLIC PNL TOTAL CA: CPT | Performed by: NURSE PRACTITIONER

## 2019-01-17 PROCEDURE — 97165 OT EVAL LOW COMPLEX 30 MIN: CPT

## 2019-01-17 PROCEDURE — 97110 THERAPEUTIC EXERCISES: CPT

## 2019-01-17 PROCEDURE — 25010000002 ENOXAPARIN PER 10 MG: Performed by: INTERNAL MEDICINE

## 2019-01-17 PROCEDURE — 83036 HEMOGLOBIN GLYCOSYLATED A1C: CPT | Performed by: NURSE PRACTITIONER

## 2019-01-17 RX ORDER — ALPRAZOLAM 0.25 MG/1
0.25 TABLET ORAL 4 TIMES DAILY PRN
Status: DISCONTINUED | OUTPATIENT
Start: 2019-01-17 | End: 2019-01-28

## 2019-01-17 RX ORDER — CARVEDILOL 3.12 MG/1
3.12 TABLET ORAL EVERY 12 HOURS SCHEDULED
Status: DISCONTINUED | OUTPATIENT
Start: 2019-01-17 | End: 2019-01-19

## 2019-01-17 RX ORDER — HYDROMORPHONE HYDROCHLORIDE 1 MG/ML
0.5 INJECTION, SOLUTION INTRAMUSCULAR; INTRAVENOUS; SUBCUTANEOUS
Status: DISPENSED | OUTPATIENT
Start: 2019-01-17 | End: 2019-01-27

## 2019-01-17 RX ADMIN — EMOLLIENT - LOTION: LOTION at 01:16

## 2019-01-17 RX ADMIN — BUMETANIDE 2 MG: 0.25 INJECTION INTRAMUSCULAR; INTRAVENOUS at 18:05

## 2019-01-17 RX ADMIN — CARVEDILOL 3.12 MG: 3.12 TABLET, FILM COATED ORAL at 21:15

## 2019-01-17 RX ADMIN — ESCITALOPRAM 10 MG: 10 TABLET, FILM COATED ORAL at 09:30

## 2019-01-17 RX ADMIN — ACETAMINOPHEN 650 MG: 325 TABLET, FILM COATED ORAL at 22:05

## 2019-01-17 RX ADMIN — CEFAZOLIN SODIUM 1 G: 1 INJECTION, SOLUTION INTRAVENOUS at 01:15

## 2019-01-17 RX ADMIN — EMOLLIENT - LOTION: LOTION at 22:05

## 2019-01-17 RX ADMIN — BUMETANIDE 2 MG: 0.25 INJECTION INTRAMUSCULAR; INTRAVENOUS at 05:51

## 2019-01-17 RX ADMIN — CEFAZOLIN SODIUM 1 G: 1 INJECTION, SOLUTION INTRAVENOUS at 09:30

## 2019-01-17 RX ADMIN — ENOXAPARIN SODIUM 40 MG: 40 INJECTION SUBCUTANEOUS at 13:33

## 2019-01-17 RX ADMIN — LOSARTAN POTASSIUM 25 MG: 25 TABLET, FILM COATED ORAL at 09:29

## 2019-01-17 RX ADMIN — NYSTATIN: 100000 POWDER TOPICAL at 21:15

## 2019-01-17 RX ADMIN — ACETAMINOPHEN 650 MG: 325 TABLET, FILM COATED ORAL at 09:30

## 2019-01-17 RX ADMIN — SODIUM CHLORIDE, PRESERVATIVE FREE 3 ML: 5 INJECTION INTRAVENOUS at 09:33

## 2019-01-17 RX ADMIN — ACETAMINOPHEN 650 MG: 325 TABLET, FILM COATED ORAL at 15:16

## 2019-01-17 RX ADMIN — NYSTATIN: 100000 POWDER TOPICAL at 09:33

## 2019-01-17 RX ADMIN — EMOLLIENT - LOTION: LOTION at 09:30

## 2019-01-17 RX ADMIN — CEFAZOLIN SODIUM 1 G: 1 INJECTION, SOLUTION INTRAVENOUS at 18:05

## 2019-01-17 RX ADMIN — ATORVASTATIN CALCIUM 20 MG: 20 TABLET, FILM COATED ORAL at 09:30

## 2019-01-17 RX ADMIN — ASPIRIN 81 MG: 81 TABLET, DELAYED RELEASE ORAL at 09:30

## 2019-01-18 LAB
ANION GAP SERPL CALCULATED.3IONS-SCNC: 13.1 MMOL/L
BASOPHILS # BLD AUTO: 0.01 10*3/MM3 (ref 0–0.2)
BASOPHILS NFR BLD AUTO: 0.1 % (ref 0–1.5)
BUN BLD-MCNC: 19 MG/DL (ref 8–23)
BUN/CREAT SERPL: 22.6 (ref 7–25)
CALCIUM SPEC-SCNC: 9.7 MG/DL (ref 8.6–10.5)
CHLORIDE SERPL-SCNC: 99 MMOL/L (ref 98–107)
CO2 SERPL-SCNC: 31.9 MMOL/L (ref 22–29)
CREAT BLD-MCNC: 0.84 MG/DL (ref 0.57–1)
DEPRECATED RDW RBC AUTO: 51.5 FL (ref 37–54)
EOSINOPHIL # BLD AUTO: 0.51 10*3/MM3 (ref 0–0.7)
EOSINOPHIL NFR BLD AUTO: 4.9 % (ref 0.3–6.2)
ERYTHROCYTE [DISTWIDTH] IN BLOOD BY AUTOMATED COUNT: 14.5 % (ref 11.7–13)
GFR SERPL CREATININE-BSD FRML MDRD: 66 ML/MIN/1.73
GLUCOSE BLD-MCNC: 114 MG/DL (ref 65–99)
GLUCOSE BLDC GLUCOMTR-MCNC: 103 MG/DL (ref 70–130)
GLUCOSE BLDC GLUCOMTR-MCNC: 111 MG/DL (ref 70–130)
GLUCOSE BLDC GLUCOMTR-MCNC: 127 MG/DL (ref 70–130)
GLUCOSE BLDC GLUCOMTR-MCNC: 146 MG/DL (ref 70–130)
HCT VFR BLD AUTO: 37.3 % (ref 35.6–45.5)
HGB BLD-MCNC: 11.2 G/DL (ref 11.9–15.5)
IMM GRANULOCYTES # BLD AUTO: 0.02 10*3/MM3 (ref 0–0.03)
IMM GRANULOCYTES NFR BLD AUTO: 0.2 % (ref 0–0.5)
LYMPHOCYTES # BLD AUTO: 0.74 10*3/MM3 (ref 0.9–4.8)
LYMPHOCYTES NFR BLD AUTO: 7.1 % (ref 19.6–45.3)
MCH RBC QN AUTO: 29.2 PG (ref 26.9–32)
MCHC RBC AUTO-ENTMCNC: 30 G/DL (ref 32.4–36.3)
MCV RBC AUTO: 97.4 FL (ref 80.5–98.2)
MONOCYTES # BLD AUTO: 1.46 10*3/MM3 (ref 0.2–1.2)
MONOCYTES NFR BLD AUTO: 14 % (ref 5–12)
NEUTROPHILS # BLD AUTO: 7.71 10*3/MM3 (ref 1.9–8.1)
NEUTROPHILS NFR BLD AUTO: 73.7 % (ref 42.7–76)
PLATELET # BLD AUTO: 253 10*3/MM3 (ref 140–500)
PMV BLD AUTO: 10 FL (ref 6–12)
POTASSIUM BLD-SCNC: 4.1 MMOL/L (ref 3.5–5.2)
RBC # BLD AUTO: 3.83 10*6/MM3 (ref 3.9–5.2)
SODIUM BLD-SCNC: 144 MMOL/L (ref 136–145)
WBC NRBC COR # BLD: 10.45 10*3/MM3 (ref 4.5–10.7)

## 2019-01-18 PROCEDURE — 25010000002 HYDROMORPHONE PER 4 MG: Performed by: INTERNAL MEDICINE

## 2019-01-18 PROCEDURE — 36415 COLL VENOUS BLD VENIPUNCTURE: CPT | Performed by: NURSE PRACTITIONER

## 2019-01-18 PROCEDURE — 85025 COMPLETE CBC W/AUTO DIFF WBC: CPT | Performed by: HOSPITALIST

## 2019-01-18 PROCEDURE — 97110 THERAPEUTIC EXERCISES: CPT

## 2019-01-18 PROCEDURE — 80048 BASIC METABOLIC PNL TOTAL CA: CPT | Performed by: NURSE PRACTITIONER

## 2019-01-18 PROCEDURE — 99232 SBSQ HOSP IP/OBS MODERATE 35: CPT | Performed by: THORACIC SURGERY (CARDIOTHORACIC VASCULAR SURGERY)

## 2019-01-18 PROCEDURE — 99233 SBSQ HOSP IP/OBS HIGH 50: CPT | Performed by: INTERNAL MEDICINE

## 2019-01-18 PROCEDURE — 25010000002 ENOXAPARIN PER 10 MG: Performed by: INTERNAL MEDICINE

## 2019-01-18 PROCEDURE — 82962 GLUCOSE BLOOD TEST: CPT

## 2019-01-18 PROCEDURE — 99223 1ST HOSP IP/OBS HIGH 75: CPT | Performed by: INTERNAL MEDICINE

## 2019-01-18 PROCEDURE — 25010000003 CEFAZOLIN 1-4 GM/50ML-% SOLUTION: Performed by: INTERNAL MEDICINE

## 2019-01-18 RX ORDER — POTASSIUM CHLORIDE 1.5 G/1.77G
40 POWDER, FOR SOLUTION ORAL AS NEEDED
Status: DISCONTINUED | OUTPATIENT
Start: 2019-01-18 | End: 2019-01-28

## 2019-01-18 RX ORDER — BUMETANIDE 0.25 MG/ML
2 INJECTION INTRAMUSCULAR; INTRAVENOUS EVERY 8 HOURS SCHEDULED
Status: DISCONTINUED | OUTPATIENT
Start: 2019-01-18 | End: 2019-01-19

## 2019-01-18 RX ORDER — MAGNESIUM SULFATE HEPTAHYDRATE 40 MG/ML
4 INJECTION, SOLUTION INTRAVENOUS AS NEEDED
Status: DISCONTINUED | OUTPATIENT
Start: 2019-01-18 | End: 2019-01-28

## 2019-01-18 RX ORDER — POTASSIUM CHLORIDE 750 MG/1
40 CAPSULE, EXTENDED RELEASE ORAL AS NEEDED
Status: DISCONTINUED | OUTPATIENT
Start: 2019-01-18 | End: 2019-01-28

## 2019-01-18 RX ORDER — MAGNESIUM SULFATE HEPTAHYDRATE 40 MG/ML
2 INJECTION, SOLUTION INTRAVENOUS AS NEEDED
Status: DISCONTINUED | OUTPATIENT
Start: 2019-01-18 | End: 2019-01-28

## 2019-01-18 RX ADMIN — NYSTATIN: 100000 POWDER TOPICAL at 09:10

## 2019-01-18 RX ADMIN — BUMETANIDE 2 MG: 0.25 INJECTION INTRAMUSCULAR; INTRAVENOUS at 05:15

## 2019-01-18 RX ADMIN — NYSTATIN: 100000 POWDER TOPICAL at 21:08

## 2019-01-18 RX ADMIN — LOSARTAN POTASSIUM 25 MG: 25 TABLET, FILM COATED ORAL at 09:07

## 2019-01-18 RX ADMIN — MICAFUNGIN SODIUM 100 MG: 20 INJECTION, POWDER, LYOPHILIZED, FOR SOLUTION INTRAVENOUS at 11:31

## 2019-01-18 RX ADMIN — CARVEDILOL 3.12 MG: 3.12 TABLET, FILM COATED ORAL at 21:08

## 2019-01-18 RX ADMIN — ATORVASTATIN CALCIUM 20 MG: 20 TABLET, FILM COATED ORAL at 09:09

## 2019-01-18 RX ADMIN — EMOLLIENT - LOTION: LOTION at 09:10

## 2019-01-18 RX ADMIN — CEFAZOLIN SODIUM 1 G: 1 INJECTION, SOLUTION INTRAVENOUS at 01:11

## 2019-01-18 RX ADMIN — SODIUM CHLORIDE, PRESERVATIVE FREE 3 ML: 5 INJECTION INTRAVENOUS at 21:08

## 2019-01-18 RX ADMIN — BUMETANIDE 2 MG: 0.25 INJECTION INTRAMUSCULAR; INTRAVENOUS at 16:04

## 2019-01-18 RX ADMIN — ENOXAPARIN SODIUM 40 MG: 40 INJECTION SUBCUTANEOUS at 16:06

## 2019-01-18 RX ADMIN — BUMETANIDE 2 MG: 0.25 INJECTION INTRAMUSCULAR; INTRAVENOUS at 21:08

## 2019-01-18 RX ADMIN — ESCITALOPRAM 10 MG: 10 TABLET, FILM COATED ORAL at 09:07

## 2019-01-18 RX ADMIN — CARVEDILOL 3.12 MG: 3.12 TABLET, FILM COATED ORAL at 09:09

## 2019-01-18 RX ADMIN — HYDROMORPHONE HYDROCHLORIDE 0.5 MG: 1 INJECTION, SOLUTION INTRAMUSCULAR; INTRAVENOUS; SUBCUTANEOUS at 10:14

## 2019-01-18 RX ADMIN — HYDROMORPHONE HYDROCHLORIDE 0.5 MG: 1 INJECTION, SOLUTION INTRAMUSCULAR; INTRAVENOUS; SUBCUTANEOUS at 18:11

## 2019-01-18 RX ADMIN — HYDROMORPHONE HYDROCHLORIDE 0.5 MG: 1 INJECTION, SOLUTION INTRAMUSCULAR; INTRAVENOUS; SUBCUTANEOUS at 12:57

## 2019-01-18 RX ADMIN — ASPIRIN 81 MG: 81 TABLET, DELAYED RELEASE ORAL at 09:09

## 2019-01-18 RX ADMIN — EMOLLIENT - LOTION: LOTION at 21:08

## 2019-01-18 RX ADMIN — HYDROMORPHONE HYDROCHLORIDE 0.5 MG: 1 INJECTION, SOLUTION INTRAMUSCULAR; INTRAVENOUS; SUBCUTANEOUS at 09:09

## 2019-01-18 RX ADMIN — SODIUM CHLORIDE, PRESERVATIVE FREE 3 ML: 5 INJECTION INTRAVENOUS at 09:11

## 2019-01-19 ENCOUNTER — APPOINTMENT (OUTPATIENT)
Dept: CT IMAGING | Facility: HOSPITAL | Age: 74
End: 2019-01-19

## 2019-01-19 LAB
ALBUMIN SERPL-MCNC: 3.3 G/DL (ref 3.5–5.2)
ALBUMIN/GLOB SERPL: 0.8 G/DL
ALP SERPL-CCNC: 77 U/L (ref 39–117)
ALT SERPL W P-5'-P-CCNC: 6 U/L (ref 1–33)
ANION GAP SERPL CALCULATED.3IONS-SCNC: 10.8 MMOL/L
ANION GAP SERPL CALCULATED.3IONS-SCNC: 13.1 MMOL/L
APTT PPP: 35.1 SECONDS (ref 22.7–35.4)
AST SERPL-CCNC: 11 U/L (ref 1–32)
BASOPHILS # BLD AUTO: 0.01 10*3/MM3 (ref 0–0.2)
BASOPHILS NFR BLD AUTO: 0.1 % (ref 0–1.5)
BILIRUB SERPL-MCNC: 0.5 MG/DL (ref 0.1–1.2)
BUN BLD-MCNC: 21 MG/DL (ref 8–23)
BUN BLD-MCNC: 26 MG/DL (ref 8–23)
BUN/CREAT SERPL: 19.7 (ref 7–25)
BUN/CREAT SERPL: 20 (ref 7–25)
CALCIUM SPEC-SCNC: 8.9 MG/DL (ref 8.6–10.5)
CALCIUM SPEC-SCNC: 9.7 MG/DL (ref 8.6–10.5)
CHLORIDE SERPL-SCNC: 95 MMOL/L (ref 98–107)
CHLORIDE SERPL-SCNC: 96 MMOL/L (ref 98–107)
CO2 SERPL-SCNC: 31.9 MMOL/L (ref 22–29)
CO2 SERPL-SCNC: 35.2 MMOL/L (ref 22–29)
CREAT BLD-MCNC: 1.05 MG/DL (ref 0.57–1)
CREAT BLD-MCNC: 1.32 MG/DL (ref 0.57–1)
D DIMER PPP FEU-MCNC: 1.67 MCGFEU/ML (ref 0–0.49)
DEPRECATED RDW RBC AUTO: 51 FL (ref 37–54)
DEPRECATED RDW RBC AUTO: 51.2 FL (ref 37–54)
EOSINOPHIL # BLD AUTO: 0.02 10*3/MM3 (ref 0–0.7)
EOSINOPHIL NFR BLD AUTO: 0.2 % (ref 0.3–6.2)
ERYTHROCYTE [DISTWIDTH] IN BLOOD BY AUTOMATED COUNT: 14.2 % (ref 11.7–13)
ERYTHROCYTE [DISTWIDTH] IN BLOOD BY AUTOMATED COUNT: 14.2 % (ref 11.7–13)
GFR SERPL CREATININE-BSD FRML MDRD: 39 ML/MIN/1.73
GFR SERPL CREATININE-BSD FRML MDRD: 51 ML/MIN/1.73
GLOBULIN UR ELPH-MCNC: 4.2 GM/DL
GLUCOSE BLD-MCNC: 116 MG/DL (ref 65–99)
GLUCOSE BLD-MCNC: 118 MG/DL (ref 65–99)
GLUCOSE BLDC GLUCOMTR-MCNC: 103 MG/DL (ref 70–130)
GLUCOSE BLDC GLUCOMTR-MCNC: 105 MG/DL (ref 70–130)
GLUCOSE BLDC GLUCOMTR-MCNC: 105 MG/DL (ref 70–130)
GLUCOSE BLDC GLUCOMTR-MCNC: 108 MG/DL (ref 70–130)
GLUCOSE BLDC GLUCOMTR-MCNC: 111 MG/DL (ref 70–130)
HCT VFR BLD AUTO: 36.8 % (ref 35.6–45.5)
HCT VFR BLD AUTO: 38.4 % (ref 35.6–45.5)
HGB BLD-MCNC: 11 G/DL (ref 11.9–15.5)
HGB BLD-MCNC: 11.7 G/DL (ref 11.9–15.5)
IMM GRANULOCYTES # BLD AUTO: 0.04 10*3/MM3 (ref 0–0.03)
IMM GRANULOCYTES NFR BLD AUTO: 0.3 % (ref 0–0.5)
LYMPHOCYTES # BLD AUTO: 1.34 10*3/MM3 (ref 0.9–4.8)
LYMPHOCYTES NFR BLD AUTO: 10.6 % (ref 19.6–45.3)
MAGNESIUM SERPL-MCNC: 2 MG/DL (ref 1.6–2.4)
MCH RBC QN AUTO: 29.5 PG (ref 26.9–32)
MCH RBC QN AUTO: 29.6 PG (ref 26.9–32)
MCHC RBC AUTO-ENTMCNC: 29.9 G/DL (ref 32.4–36.3)
MCHC RBC AUTO-ENTMCNC: 30.5 G/DL (ref 32.4–36.3)
MCV RBC AUTO: 97 FL (ref 80.5–98.2)
MCV RBC AUTO: 98.9 FL (ref 80.5–98.2)
MONOCYTES # BLD AUTO: 1.01 10*3/MM3 (ref 0.2–1.2)
MONOCYTES NFR BLD AUTO: 8 % (ref 5–12)
NEUTROPHILS # BLD AUTO: 10.29 10*3/MM3 (ref 1.9–8.1)
NEUTROPHILS NFR BLD AUTO: 81.1 % (ref 42.7–76)
PLATELET # BLD AUTO: 195 10*3/MM3 (ref 140–500)
PLATELET # BLD AUTO: 206 10*3/MM3 (ref 140–500)
PMV BLD AUTO: 10.3 FL (ref 6–12)
PMV BLD AUTO: 10.3 FL (ref 6–12)
POTASSIUM BLD-SCNC: 4.2 MMOL/L (ref 3.5–5.2)
POTASSIUM BLD-SCNC: 4.2 MMOL/L (ref 3.5–5.2)
PROT SERPL-MCNC: 7.5 G/DL (ref 6–8.5)
RBC # BLD AUTO: 3.72 10*6/MM3 (ref 3.9–5.2)
RBC # BLD AUTO: 3.96 10*6/MM3 (ref 3.9–5.2)
RETICS/RBC NFR AUTO: 1.08 % (ref 0.5–1.5)
SODIUM BLD-SCNC: 141 MMOL/L (ref 136–145)
SODIUM BLD-SCNC: 141 MMOL/L (ref 136–145)
TROPONIN T SERPL-MCNC: 0.05 NG/ML (ref 0–0.03)
WBC NRBC COR # BLD: 12.67 10*3/MM3 (ref 4.5–10.7)
WBC NRBC COR # BLD: 14.93 10*3/MM3 (ref 4.5–10.7)

## 2019-01-19 PROCEDURE — 82962 GLUCOSE BLOOD TEST: CPT

## 2019-01-19 PROCEDURE — 99221 1ST HOSP IP/OBS SF/LOW 40: CPT | Performed by: PSYCHIATRY & NEUROLOGY

## 2019-01-19 PROCEDURE — 25010000002 AMIODARONE IN DEXTROSE 5% 150-4.21 MG/100ML-% SOLUTION

## 2019-01-19 PROCEDURE — 99232 SBSQ HOSP IP/OBS MODERATE 35: CPT | Performed by: INTERNAL MEDICINE

## 2019-01-19 PROCEDURE — 25010000002 HYDROMORPHONE PER 4 MG: Performed by: INTERNAL MEDICINE

## 2019-01-19 PROCEDURE — 84484 ASSAY OF TROPONIN QUANT: CPT | Performed by: HOSPITALIST

## 2019-01-19 PROCEDURE — 84466 ASSAY OF TRANSFERRIN: CPT | Performed by: INTERNAL MEDICINE

## 2019-01-19 PROCEDURE — 25010000002 AMIODARONE IN DEXTROSE 5% 360-4.14 MG/200ML-% SOLUTION: Performed by: INTERNAL MEDICINE

## 2019-01-19 PROCEDURE — 25010000002 ENOXAPARIN PER 10 MG: Performed by: INTERNAL MEDICINE

## 2019-01-19 PROCEDURE — 71275 CT ANGIOGRAPHY CHEST: CPT

## 2019-01-19 PROCEDURE — 85045 AUTOMATED RETICULOCYTE COUNT: CPT | Performed by: INTERNAL MEDICINE

## 2019-01-19 PROCEDURE — 85379 FIBRIN DEGRADATION QUANT: CPT | Performed by: INTERNAL MEDICINE

## 2019-01-19 PROCEDURE — 93005 ELECTROCARDIOGRAM TRACING: CPT | Performed by: INTERNAL MEDICINE

## 2019-01-19 PROCEDURE — 70450 CT HEAD/BRAIN W/O DYE: CPT

## 2019-01-19 PROCEDURE — 25010000002 DIGOXIN PER 500 MCG: Performed by: INTERNAL MEDICINE

## 2019-01-19 PROCEDURE — 0 IOPAMIDOL PER 1 ML: Performed by: INTERNAL MEDICINE

## 2019-01-19 PROCEDURE — 85730 THROMBOPLASTIN TIME PARTIAL: CPT | Performed by: INTERNAL MEDICINE

## 2019-01-19 PROCEDURE — 85027 COMPLETE CBC AUTOMATED: CPT | Performed by: INTERNAL MEDICINE

## 2019-01-19 PROCEDURE — 93010 ELECTROCARDIOGRAM REPORT: CPT | Performed by: INTERNAL MEDICINE

## 2019-01-19 PROCEDURE — 85025 COMPLETE CBC W/AUTO DIFF WBC: CPT | Performed by: HOSPITALIST

## 2019-01-19 PROCEDURE — 82746 ASSAY OF FOLIC ACID SERUM: CPT | Performed by: INTERNAL MEDICINE

## 2019-01-19 PROCEDURE — 80053 COMPREHEN METABOLIC PANEL: CPT | Performed by: INTERNAL MEDICINE

## 2019-01-19 PROCEDURE — 83735 ASSAY OF MAGNESIUM: CPT | Performed by: INTERNAL MEDICINE

## 2019-01-19 PROCEDURE — 83540 ASSAY OF IRON: CPT | Performed by: INTERNAL MEDICINE

## 2019-01-19 PROCEDURE — 93005 ELECTROCARDIOGRAM TRACING: CPT | Performed by: HOSPITALIST

## 2019-01-19 PROCEDURE — 25010000002 HEPARIN (PORCINE) PER 1000 UNITS: Performed by: INTERNAL MEDICINE

## 2019-01-19 PROCEDURE — 94799 UNLISTED PULMONARY SVC/PX: CPT

## 2019-01-19 PROCEDURE — 82607 VITAMIN B-12: CPT | Performed by: INTERNAL MEDICINE

## 2019-01-19 RX ORDER — ACETAMINOPHEN 650 MG/1
650 SUPPOSITORY RECTAL EVERY 4 HOURS PRN
Status: DISCONTINUED | OUTPATIENT
Start: 2019-01-19 | End: 2019-01-28

## 2019-01-19 RX ORDER — HEPARIN SODIUM 5000 [USP'U]/ML
10000 INJECTION, SOLUTION INTRAVENOUS; SUBCUTANEOUS ONCE
Status: COMPLETED | OUTPATIENT
Start: 2019-01-19 | End: 2019-01-19

## 2019-01-19 RX ORDER — HEPARIN SODIUM 10000 [USP'U]/100ML
10.5 INJECTION, SOLUTION INTRAVENOUS
Status: DISCONTINUED | OUTPATIENT
Start: 2019-01-19 | End: 2019-01-22

## 2019-01-19 RX ORDER — FLUCONAZOLE 200 MG/1
400 TABLET ORAL EVERY 24 HOURS
Status: DISCONTINUED | OUTPATIENT
Start: 2019-01-19 | End: 2019-01-20

## 2019-01-19 RX ORDER — ACETAMINOPHEN 325 MG/1
650 TABLET ORAL EVERY 4 HOURS PRN
Status: DISCONTINUED | OUTPATIENT
Start: 2019-01-19 | End: 2019-01-28

## 2019-01-19 RX ORDER — DIGOXIN 0.25 MG/ML
250 INJECTION INTRAMUSCULAR; INTRAVENOUS ONCE
Status: COMPLETED | OUTPATIENT
Start: 2019-01-19 | End: 2019-01-19

## 2019-01-19 RX ORDER — HEPARIN SODIUM 5000 [USP'U]/ML
5700-10000 INJECTION, SOLUTION INTRAVENOUS; SUBCUTANEOUS EVERY 6 HOURS PRN
Status: DISCONTINUED | OUTPATIENT
Start: 2019-01-19 | End: 2019-01-22

## 2019-01-19 RX ORDER — SODIUM CHLORIDE, SODIUM LACTATE, POTASSIUM CHLORIDE, CALCIUM CHLORIDE 600; 310; 30; 20 MG/100ML; MG/100ML; MG/100ML; MG/100ML
75 INJECTION, SOLUTION INTRAVENOUS CONTINUOUS
Status: ACTIVE | OUTPATIENT
Start: 2019-01-19 | End: 2019-01-20

## 2019-01-19 RX ORDER — SODIUM CHLORIDE 0.9 % (FLUSH) 0.9 %
3 SYRINGE (ML) INJECTION EVERY 12 HOURS SCHEDULED
Status: DISCONTINUED | OUTPATIENT
Start: 2019-01-19 | End: 2019-01-23

## 2019-01-19 RX ORDER — CARVEDILOL 3.12 MG/1
3.12 TABLET ORAL ONCE
Status: COMPLETED | OUTPATIENT
Start: 2019-01-19 | End: 2019-01-19

## 2019-01-19 RX ORDER — BUMETANIDE 0.25 MG/ML
2 INJECTION INTRAMUSCULAR; INTRAVENOUS EVERY 12 HOURS
Status: DISCONTINUED | OUTPATIENT
Start: 2019-01-19 | End: 2019-01-19

## 2019-01-19 RX ORDER — SODIUM CHLORIDE 0.9 % (FLUSH) 0.9 %
3-10 SYRINGE (ML) INJECTION AS NEEDED
Status: DISCONTINUED | OUTPATIENT
Start: 2019-01-19 | End: 2019-01-23

## 2019-01-19 RX ORDER — CARVEDILOL 6.25 MG/1
6.25 TABLET ORAL EVERY 12 HOURS SCHEDULED
Status: DISCONTINUED | OUTPATIENT
Start: 2019-01-19 | End: 2019-01-21

## 2019-01-19 RX ADMIN — ASPIRIN 81 MG: 81 TABLET, DELAYED RELEASE ORAL at 09:50

## 2019-01-19 RX ADMIN — HYDROMORPHONE HYDROCHLORIDE 0.5 MG: 1 INJECTION, SOLUTION INTRAMUSCULAR; INTRAVENOUS; SUBCUTANEOUS at 10:24

## 2019-01-19 RX ADMIN — ENOXAPARIN SODIUM 40 MG: 40 INJECTION SUBCUTANEOUS at 14:14

## 2019-01-19 RX ADMIN — ESCITALOPRAM 10 MG: 10 TABLET, FILM COATED ORAL at 09:50

## 2019-01-19 RX ADMIN — CARVEDILOL 6.25 MG: 6.25 TABLET, FILM COATED ORAL at 20:30

## 2019-01-19 RX ADMIN — SODIUM CHLORIDE, PRESERVATIVE FREE 3 ML: 5 INJECTION INTRAVENOUS at 20:27

## 2019-01-19 RX ADMIN — HEPARIN SODIUM 10000 UNITS: 5000 INJECTION INTRAVENOUS; SUBCUTANEOUS at 20:11

## 2019-01-19 RX ADMIN — ACETAMINOPHEN 650 MG: 325 TABLET, FILM COATED ORAL at 16:21

## 2019-01-19 RX ADMIN — HYDROMORPHONE HYDROCHLORIDE 0.5 MG: 1 INJECTION, SOLUTION INTRAMUSCULAR; INTRAVENOUS; SUBCUTANEOUS at 02:41

## 2019-01-19 RX ADMIN — LOSARTAN POTASSIUM 25 MG: 25 TABLET, FILM COATED ORAL at 09:50

## 2019-01-19 RX ADMIN — ACETAMINOPHEN 650 MG: 325 TABLET, FILM COATED ORAL at 10:19

## 2019-01-19 RX ADMIN — CARVEDILOL 3.12 MG: 3.12 TABLET, FILM COATED ORAL at 09:50

## 2019-01-19 RX ADMIN — EMOLLIENT - LOTION: LOTION at 08:19

## 2019-01-19 RX ADMIN — HYDROMORPHONE HYDROCHLORIDE 0.5 MG: 1 INJECTION, SOLUTION INTRAMUSCULAR; INTRAVENOUS; SUBCUTANEOUS at 08:19

## 2019-01-19 RX ADMIN — CARVEDILOL 3.12 MG: 3.12 TABLET, FILM COATED ORAL at 10:51

## 2019-01-19 RX ADMIN — SODIUM CHLORIDE, PRESERVATIVE FREE 3 ML: 5 INJECTION INTRAVENOUS at 08:20

## 2019-01-19 RX ADMIN — SODIUM CHLORIDE, PRESERVATIVE FREE 3 ML: 5 INJECTION INTRAVENOUS at 20:29

## 2019-01-19 RX ADMIN — NYSTATIN: 100000 POWDER TOPICAL at 22:34

## 2019-01-19 RX ADMIN — IOPAMIDOL 95 ML: 755 INJECTION, SOLUTION INTRAVENOUS at 20:00

## 2019-01-19 RX ADMIN — HEPARIN SODIUM 10.5 UNITS/KG/HR: 10000 INJECTION, SOLUTION INTRAVENOUS at 20:46

## 2019-01-19 RX ADMIN — ATORVASTATIN CALCIUM 20 MG: 20 TABLET, FILM COATED ORAL at 09:50

## 2019-01-19 RX ADMIN — EMOLLIENT - LOTION: LOTION at 21:45

## 2019-01-19 RX ADMIN — NYSTATIN: 100000 POWDER TOPICAL at 08:20

## 2019-01-19 RX ADMIN — SODIUM CHLORIDE, POTASSIUM CHLORIDE, SODIUM LACTATE AND CALCIUM CHLORIDE 75 ML/HR: 600; 310; 30; 20 INJECTION, SOLUTION INTRAVENOUS at 22:12

## 2019-01-19 RX ADMIN — FLUCONAZOLE 400 MG: 200 TABLET ORAL at 10:51

## 2019-01-19 RX ADMIN — DIGOXIN 250 MCG: 0.25 INJECTION INTRAMUSCULAR; INTRAVENOUS at 10:41

## 2019-01-19 RX ADMIN — SODIUM CHLORIDE, PRESERVATIVE FREE 3 ML: 5 INJECTION INTRAVENOUS at 20:28

## 2019-01-19 RX ADMIN — AMIODARONE HYDROCHLORIDE 150 MG: 1.5 INJECTION, SOLUTION INTRAVENOUS at 20:05

## 2019-01-19 RX ADMIN — BUMETANIDE 2 MG: 0.25 INJECTION INTRAMUSCULAR; INTRAVENOUS at 05:22

## 2019-01-19 RX ADMIN — AMIODARONE HYDROCHLORIDE 1 MG/MIN: 1.8 INJECTION, SOLUTION INTRAVENOUS at 20:23

## 2019-01-20 ENCOUNTER — APPOINTMENT (OUTPATIENT)
Dept: CARDIOLOGY | Facility: HOSPITAL | Age: 74
End: 2019-01-20
Attending: INTERNAL MEDICINE

## 2019-01-20 ENCOUNTER — APPOINTMENT (OUTPATIENT)
Dept: MRI IMAGING | Facility: HOSPITAL | Age: 74
End: 2019-01-20

## 2019-01-20 LAB
ANION GAP SERPL CALCULATED.3IONS-SCNC: 15.8 MMOL/L
APTT PPP: 93.8 SECONDS (ref 22.7–35.4)
BASOPHILS # BLD AUTO: 0.01 10*3/MM3 (ref 0–0.2)
BASOPHILS NFR BLD AUTO: 0.1 % (ref 0–1.5)
BUN BLD-MCNC: 34 MG/DL (ref 8–23)
BUN/CREAT SERPL: 25.2 (ref 7–25)
CALCIUM SPEC-SCNC: 8.5 MG/DL (ref 8.6–10.5)
CHLORIDE SERPL-SCNC: 97 MMOL/L (ref 98–107)
CO2 SERPL-SCNC: 27.2 MMOL/L (ref 22–29)
CREAT BLD-MCNC: 1.35 MG/DL (ref 0.57–1)
DEPRECATED RDW RBC AUTO: 49.2 FL (ref 37–54)
EOSINOPHIL # BLD AUTO: 0.02 10*3/MM3 (ref 0–0.7)
EOSINOPHIL NFR BLD AUTO: 0.2 % (ref 0.3–6.2)
ERYTHROCYTE [DISTWIDTH] IN BLOOD BY AUTOMATED COUNT: 14 % (ref 11.7–13)
FOLATE SERPL-MCNC: 9.73 NG/ML (ref 4.78–24.2)
GFR SERPL CREATININE-BSD FRML MDRD: 38 ML/MIN/1.73
GLUCOSE BLD-MCNC: 137 MG/DL (ref 65–99)
GLUCOSE BLDC GLUCOMTR-MCNC: 130 MG/DL (ref 70–130)
GLUCOSE BLDC GLUCOMTR-MCNC: 93 MG/DL (ref 70–130)
GLUCOSE BLDC GLUCOMTR-MCNC: 97 MG/DL (ref 70–130)
HCT VFR BLD AUTO: 31.7 % (ref 35.6–45.5)
HGB BLD-MCNC: 9.6 G/DL (ref 11.9–15.5)
IMM GRANULOCYTES # BLD AUTO: 0.03 10*3/MM3 (ref 0–0.03)
IMM GRANULOCYTES NFR BLD AUTO: 0.3 % (ref 0–0.5)
IRON 24H UR-MRATE: 16 MCG/DL (ref 37–145)
IRON SATN MFR SERPL: 7 % (ref 20–50)
LYMPHOCYTES # BLD AUTO: 1.3 10*3/MM3 (ref 0.9–4.8)
LYMPHOCYTES NFR BLD AUTO: 11.7 % (ref 19.6–45.3)
MCH RBC QN AUTO: 29 PG (ref 26.9–32)
MCHC RBC AUTO-ENTMCNC: 30.3 G/DL (ref 32.4–36.3)
MCV RBC AUTO: 95.8 FL (ref 80.5–98.2)
MONOCYTES # BLD AUTO: 0.85 10*3/MM3 (ref 0.2–1.2)
MONOCYTES NFR BLD AUTO: 7.7 % (ref 5–12)
NEUTROPHILS # BLD AUTO: 8.93 10*3/MM3 (ref 1.9–8.1)
NEUTROPHILS NFR BLD AUTO: 80.3 % (ref 42.7–76)
PLATELET # BLD AUTO: 195 10*3/MM3 (ref 140–500)
PMV BLD AUTO: 10.7 FL (ref 6–12)
POTASSIUM BLD-SCNC: 4 MMOL/L (ref 3.5–5.2)
RBC # BLD AUTO: 3.31 10*6/MM3 (ref 3.9–5.2)
SODIUM BLD-SCNC: 140 MMOL/L (ref 136–145)
TIBC SERPL-MCNC: 219 MCG/DL
TRANSFERRIN SERPL-MCNC: 147 MG/DL (ref 200–360)
VIT B12 BLD-MCNC: 554 PG/ML (ref 211–946)
WBC NRBC COR # BLD: 11.11 10*3/MM3 (ref 4.5–10.7)

## 2019-01-20 PROCEDURE — 82962 GLUCOSE BLOOD TEST: CPT

## 2019-01-20 PROCEDURE — 99232 SBSQ HOSP IP/OBS MODERATE 35: CPT | Performed by: NURSE PRACTITIONER

## 2019-01-20 PROCEDURE — 25010000002 AMIODARONE IN DEXTROSE 5% 360-4.14 MG/200ML-% SOLUTION

## 2019-01-20 PROCEDURE — 70549 MR ANGIOGRAPH NECK W/O&W/DYE: CPT

## 2019-01-20 PROCEDURE — 80048 BASIC METABOLIC PNL TOTAL CA: CPT | Performed by: INTERNAL MEDICINE

## 2019-01-20 PROCEDURE — 70544 MR ANGIOGRAPHY HEAD W/O DYE: CPT

## 2019-01-20 PROCEDURE — A9577 INJ MULTIHANCE: HCPCS | Performed by: INTERNAL MEDICINE

## 2019-01-20 PROCEDURE — 93970 EXTREMITY STUDY: CPT

## 2019-01-20 PROCEDURE — 99232 SBSQ HOSP IP/OBS MODERATE 35: CPT | Performed by: INTERNAL MEDICINE

## 2019-01-20 PROCEDURE — 70553 MRI BRAIN STEM W/O & W/DYE: CPT

## 2019-01-20 PROCEDURE — 0 GADOBENATE DIMEGLUMINE 529 MG/ML SOLUTION: Performed by: INTERNAL MEDICINE

## 2019-01-20 PROCEDURE — 25010000002 AMIODARONE IN DEXTROSE 5% 360-4.14 MG/200ML-% SOLUTION: Performed by: INTERNAL MEDICINE

## 2019-01-20 PROCEDURE — 25010000002 HEPARIN (PORCINE) PER 1000 UNITS: Performed by: INTERNAL MEDICINE

## 2019-01-20 PROCEDURE — 93005 ELECTROCARDIOGRAM TRACING: CPT | Performed by: INTERNAL MEDICINE

## 2019-01-20 PROCEDURE — 85025 COMPLETE CBC W/AUTO DIFF WBC: CPT | Performed by: INTERNAL MEDICINE

## 2019-01-20 PROCEDURE — 25010000002 DIGOXIN PER 500 MCG: Performed by: INTERNAL MEDICINE

## 2019-01-20 PROCEDURE — 85730 THROMBOPLASTIN TIME PARTIAL: CPT | Performed by: INTERNAL MEDICINE

## 2019-01-20 PROCEDURE — 93010 ELECTROCARDIOGRAM REPORT: CPT | Performed by: INTERNAL MEDICINE

## 2019-01-20 PROCEDURE — 94799 UNLISTED PULMONARY SVC/PX: CPT

## 2019-01-20 RX ORDER — DIGOXIN 0.25 MG/ML
250 INJECTION INTRAMUSCULAR; INTRAVENOUS ONCE
Status: COMPLETED | OUTPATIENT
Start: 2019-01-20 | End: 2019-01-20

## 2019-01-20 RX ADMIN — AMIODARONE HYDROCHLORIDE 1 MG/MIN: 1.8 INJECTION, SOLUTION INTRAVENOUS at 00:30

## 2019-01-20 RX ADMIN — DIGOXIN 250 MCG: 0.25 INJECTION INTRAMUSCULAR; INTRAVENOUS at 09:58

## 2019-01-20 RX ADMIN — AMIODARONE HYDROCHLORIDE 0.5 MG/MIN: 1.8 INJECTION, SOLUTION INTRAVENOUS at 09:58

## 2019-01-20 RX ADMIN — SODIUM CHLORIDE, PRESERVATIVE FREE 3 ML: 5 INJECTION INTRAVENOUS at 08:56

## 2019-01-20 RX ADMIN — GADOBENATE DIMEGLUMINE 20 ML: 529 INJECTION, SOLUTION INTRAVENOUS at 18:54

## 2019-01-20 RX ADMIN — HEPARIN SODIUM 10.5 UNITS/KG/HR: 10000 INJECTION, SOLUTION INTRAVENOUS at 12:09

## 2019-01-20 RX ADMIN — MICAFUNGIN SODIUM 100 MG: 20 INJECTION, POWDER, LYOPHILIZED, FOR SOLUTION INTRAVENOUS at 12:10

## 2019-01-20 RX ADMIN — SODIUM CHLORIDE, PRESERVATIVE FREE 3 ML: 5 INJECTION INTRAVENOUS at 21:36

## 2019-01-20 RX ADMIN — ACETAMINOPHEN 650 MG: 325 TABLET, FILM COATED ORAL at 06:02

## 2019-01-20 RX ADMIN — ACETAMINOPHEN 650 MG: 325 TABLET, FILM COATED ORAL at 14:08

## 2019-01-20 RX ADMIN — NYSTATIN: 100000 POWDER TOPICAL at 21:32

## 2019-01-20 RX ADMIN — ASPIRIN 81 MG: 81 TABLET, DELAYED RELEASE ORAL at 08:54

## 2019-01-20 RX ADMIN — ATORVASTATIN CALCIUM 20 MG: 20 TABLET, FILM COATED ORAL at 08:54

## 2019-01-20 RX ADMIN — EMOLLIENT - LOTION: LOTION at 08:54

## 2019-01-20 RX ADMIN — ACETAMINOPHEN 650 MG: 325 TABLET, FILM COATED ORAL at 19:31

## 2019-01-20 RX ADMIN — AMIODARONE HYDROCHLORIDE 0.5 MG/MIN: 1.8 INJECTION, SOLUTION INTRAVENOUS at 21:31

## 2019-01-20 RX ADMIN — NYSTATIN: 100000 POWDER TOPICAL at 08:54

## 2019-01-20 RX ADMIN — ACETAMINOPHEN 650 MG: 325 TABLET, FILM COATED ORAL at 09:59

## 2019-01-20 RX ADMIN — ACETAMINOPHEN 650 MG: 325 TABLET, FILM COATED ORAL at 23:47

## 2019-01-20 RX ADMIN — EMOLLIENT - LOTION: LOTION at 21:33

## 2019-01-20 RX ADMIN — ESCITALOPRAM 10 MG: 10 TABLET, FILM COATED ORAL at 08:54

## 2019-01-20 RX ADMIN — ACETAMINOPHEN 650 MG: 325 TABLET, FILM COATED ORAL at 00:27

## 2019-01-21 LAB
ANION GAP SERPL CALCULATED.3IONS-SCNC: 12.8 MMOL/L
APTT PPP: 116.4 SECONDS (ref 22.7–35.4)
APTT PPP: 68.7 SECONDS (ref 22.7–35.4)
BASOPHILS # BLD AUTO: 0.01 10*3/MM3 (ref 0–0.2)
BASOPHILS NFR BLD AUTO: 0.1 % (ref 0–1.5)
BH CV LOWER VASCULAR LEFT COMMON FEMORAL AUGMENT: NORMAL
BH CV LOWER VASCULAR LEFT COMMON FEMORAL COMPETENT: NORMAL
BH CV LOWER VASCULAR LEFT COMMON FEMORAL COMPRESS: NORMAL
BH CV LOWER VASCULAR LEFT COMMON FEMORAL PHASIC: NORMAL
BH CV LOWER VASCULAR LEFT COMMON FEMORAL SPONT: NORMAL
BH CV LOWER VASCULAR LEFT DISTAL FEMORAL COMPRESS: NORMAL
BH CV LOWER VASCULAR LEFT GASTRONEMIUS COMPRESS: NORMAL
BH CV LOWER VASCULAR LEFT GREATER SAPH AK COMPRESS: NORMAL
BH CV LOWER VASCULAR LEFT GREATER SAPH BK COMPRESS: NORMAL
BH CV LOWER VASCULAR LEFT LESSER SAPH COMPRESS: NORMAL
BH CV LOWER VASCULAR LEFT MID FEMORAL AUGMENT: NORMAL
BH CV LOWER VASCULAR LEFT MID FEMORAL COMPETENT: NORMAL
BH CV LOWER VASCULAR LEFT MID FEMORAL COMPRESS: NORMAL
BH CV LOWER VASCULAR LEFT MID FEMORAL PHASIC: NORMAL
BH CV LOWER VASCULAR LEFT MID FEMORAL SPONT: NORMAL
BH CV LOWER VASCULAR LEFT PERONEAL COMPRESS: NORMAL
BH CV LOWER VASCULAR LEFT POPLITEAL AUGMENT: NORMAL
BH CV LOWER VASCULAR LEFT POPLITEAL COMPETENT: NORMAL
BH CV LOWER VASCULAR LEFT POPLITEAL COMPRESS: NORMAL
BH CV LOWER VASCULAR LEFT POPLITEAL PHASIC: NORMAL
BH CV LOWER VASCULAR LEFT POPLITEAL SPONT: NORMAL
BH CV LOWER VASCULAR LEFT POSTERIOR TIBIAL COMPRESS: NORMAL
BH CV LOWER VASCULAR LEFT PROXIMAL FEMORAL COMPRESS: NORMAL
BH CV LOWER VASCULAR LEFT SAPHENOFEMORAL JUNCTION AUGMENT: NORMAL
BH CV LOWER VASCULAR LEFT SAPHENOFEMORAL JUNCTION COMPETENT: NORMAL
BH CV LOWER VASCULAR LEFT SAPHENOFEMORAL JUNCTION COMPRESS: NORMAL
BH CV LOWER VASCULAR LEFT SAPHENOFEMORAL JUNCTION PHASIC: NORMAL
BH CV LOWER VASCULAR LEFT SAPHENOFEMORAL JUNCTION SPONT: NORMAL
BH CV LOWER VASCULAR RIGHT COMMON FEMORAL AUGMENT: NORMAL
BH CV LOWER VASCULAR RIGHT COMMON FEMORAL COMPETENT: NORMAL
BH CV LOWER VASCULAR RIGHT COMMON FEMORAL COMPRESS: NORMAL
BH CV LOWER VASCULAR RIGHT COMMON FEMORAL PHASIC: NORMAL
BH CV LOWER VASCULAR RIGHT COMMON FEMORAL SPONT: NORMAL
BH CV LOWER VASCULAR RIGHT DISTAL FEMORAL COMPRESS: NORMAL
BH CV LOWER VASCULAR RIGHT GASTRONEMIUS COMPRESS: NORMAL
BH CV LOWER VASCULAR RIGHT GREATER SAPH AK COMPRESS: NORMAL
BH CV LOWER VASCULAR RIGHT GREATER SAPH BK COMPRESS: NORMAL
BH CV LOWER VASCULAR RIGHT LESSER SAPH COMPRESS: NORMAL
BH CV LOWER VASCULAR RIGHT MID FEMORAL AUGMENT: NORMAL
BH CV LOWER VASCULAR RIGHT MID FEMORAL COMPETENT: NORMAL
BH CV LOWER VASCULAR RIGHT MID FEMORAL COMPRESS: NORMAL
BH CV LOWER VASCULAR RIGHT MID FEMORAL PHASIC: NORMAL
BH CV LOWER VASCULAR RIGHT MID FEMORAL SPONT: NORMAL
BH CV LOWER VASCULAR RIGHT PERONEAL COMPRESS: NORMAL
BH CV LOWER VASCULAR RIGHT POPLITEAL AUGMENT: NORMAL
BH CV LOWER VASCULAR RIGHT POPLITEAL COMPETENT: NORMAL
BH CV LOWER VASCULAR RIGHT POPLITEAL COMPRESS: NORMAL
BH CV LOWER VASCULAR RIGHT POPLITEAL PHASIC: NORMAL
BH CV LOWER VASCULAR RIGHT POPLITEAL SPONT: NORMAL
BH CV LOWER VASCULAR RIGHT POSTERIOR TIBIAL COMPRESS: NORMAL
BH CV LOWER VASCULAR RIGHT PROXIMAL FEMORAL COMPRESS: NORMAL
BH CV LOWER VASCULAR RIGHT SAPHENOFEMORAL JUNCTION AUGMENT: NORMAL
BH CV LOWER VASCULAR RIGHT SAPHENOFEMORAL JUNCTION COMPETENT: NORMAL
BH CV LOWER VASCULAR RIGHT SAPHENOFEMORAL JUNCTION COMPRESS: NORMAL
BH CV LOWER VASCULAR RIGHT SAPHENOFEMORAL JUNCTION PHASIC: NORMAL
BH CV LOWER VASCULAR RIGHT SAPHENOFEMORAL JUNCTION SPONT: NORMAL
BUN BLD-MCNC: 44 MG/DL (ref 8–23)
BUN/CREAT SERPL: 36.1 (ref 7–25)
CALCIUM SPEC-SCNC: 9.4 MG/DL (ref 8.6–10.5)
CHLORIDE SERPL-SCNC: 95 MMOL/L (ref 98–107)
CO2 SERPL-SCNC: 30.2 MMOL/L (ref 22–29)
CREAT BLD-MCNC: 1.22 MG/DL (ref 0.57–1)
DEPRECATED RDW RBC AUTO: 48.6 FL (ref 37–54)
EOSINOPHIL # BLD AUTO: 0.11 10*3/MM3 (ref 0–0.7)
EOSINOPHIL NFR BLD AUTO: 1 % (ref 0.3–6.2)
ERYTHROCYTE [DISTWIDTH] IN BLOOD BY AUTOMATED COUNT: 13.9 % (ref 11.7–13)
GFR SERPL CREATININE-BSD FRML MDRD: 43 ML/MIN/1.73
GLUCOSE BLD-MCNC: 96 MG/DL (ref 65–99)
GLUCOSE BLDC GLUCOMTR-MCNC: 115 MG/DL (ref 70–130)
GLUCOSE BLDC GLUCOMTR-MCNC: 120 MG/DL (ref 70–130)
GLUCOSE BLDC GLUCOMTR-MCNC: 130 MG/DL (ref 70–130)
GLUCOSE BLDC GLUCOMTR-MCNC: 131 MG/DL (ref 70–130)
GLUCOSE BLDC GLUCOMTR-MCNC: 98 MG/DL (ref 70–130)
HCT VFR BLD AUTO: 35.1 % (ref 35.6–45.5)
HGB BLD-MCNC: 10.6 G/DL (ref 11.9–15.5)
IMM GRANULOCYTES # BLD AUTO: 0.03 10*3/MM3 (ref 0–0.03)
IMM GRANULOCYTES NFR BLD AUTO: 0.3 % (ref 0–0.5)
LYMPHOCYTES # BLD AUTO: 0.56 10*3/MM3 (ref 0.9–4.8)
LYMPHOCYTES NFR BLD AUTO: 5.2 % (ref 19.6–45.3)
MCH RBC QN AUTO: 28.9 PG (ref 26.9–32)
MCHC RBC AUTO-ENTMCNC: 30.2 G/DL (ref 32.4–36.3)
MCV RBC AUTO: 95.6 FL (ref 80.5–98.2)
MONOCYTES # BLD AUTO: 1.29 10*3/MM3 (ref 0.2–1.2)
MONOCYTES NFR BLD AUTO: 12.1 % (ref 5–12)
NEUTROPHILS # BLD AUTO: 8.67 10*3/MM3 (ref 1.9–8.1)
NEUTROPHILS NFR BLD AUTO: 81.3 % (ref 42.7–76)
PLATELET # BLD AUTO: 243 10*3/MM3 (ref 140–500)
PMV BLD AUTO: 10.5 FL (ref 6–12)
POTASSIUM BLD-SCNC: 4.1 MMOL/L (ref 3.5–5.2)
RBC # BLD AUTO: 3.67 10*6/MM3 (ref 3.9–5.2)
SODIUM BLD-SCNC: 138 MMOL/L (ref 136–145)
TROPONIN T SERPL-MCNC: 0.31 NG/ML (ref 0–0.03)
WBC NRBC COR # BLD: 10.67 10*3/MM3 (ref 4.5–10.7)

## 2019-01-21 PROCEDURE — 99232 SBSQ HOSP IP/OBS MODERATE 35: CPT | Performed by: INTERNAL MEDICINE

## 2019-01-21 PROCEDURE — 99232 SBSQ HOSP IP/OBS MODERATE 35: CPT | Performed by: NURSE PRACTITIONER

## 2019-01-21 PROCEDURE — 84484 ASSAY OF TROPONIN QUANT: CPT | Performed by: NURSE PRACTITIONER

## 2019-01-21 PROCEDURE — 93005 ELECTROCARDIOGRAM TRACING: CPT | Performed by: INTERNAL MEDICINE

## 2019-01-21 PROCEDURE — 85730 THROMBOPLASTIN TIME PARTIAL: CPT | Performed by: INTERNAL MEDICINE

## 2019-01-21 PROCEDURE — 93010 ELECTROCARDIOGRAM REPORT: CPT | Performed by: INTERNAL MEDICINE

## 2019-01-21 PROCEDURE — 25010000002 AMIODARONE IN DEXTROSE 5% 360-4.14 MG/200ML-% SOLUTION: Performed by: INTERNAL MEDICINE

## 2019-01-21 PROCEDURE — 82962 GLUCOSE BLOOD TEST: CPT

## 2019-01-21 PROCEDURE — 25010000002 HEPARIN (PORCINE) PER 1000 UNITS: Performed by: INTERNAL MEDICINE

## 2019-01-21 PROCEDURE — 85025 COMPLETE CBC W/AUTO DIFF WBC: CPT | Performed by: INTERNAL MEDICINE

## 2019-01-21 PROCEDURE — 99232 SBSQ HOSP IP/OBS MODERATE 35: CPT | Performed by: THORACIC SURGERY (CARDIOTHORACIC VASCULAR SURGERY)

## 2019-01-21 PROCEDURE — 80048 BASIC METABOLIC PNL TOTAL CA: CPT | Performed by: INTERNAL MEDICINE

## 2019-01-21 PROCEDURE — 25010000002 HYDROMORPHONE PER 4 MG: Performed by: INTERNAL MEDICINE

## 2019-01-21 RX ORDER — DIGOXIN 125 MCG
125 TABLET ORAL
Status: DISCONTINUED | OUTPATIENT
Start: 2019-01-21 | End: 2019-01-28

## 2019-01-21 RX ORDER — DIGOXIN 0.25 MG/ML
125 INJECTION INTRAMUSCULAR; INTRAVENOUS
Status: DISCONTINUED | OUTPATIENT
Start: 2019-01-21 | End: 2019-01-21

## 2019-01-21 RX ORDER — CARVEDILOL 3.12 MG/1
3.12 TABLET ORAL EVERY 12 HOURS SCHEDULED
Status: DISCONTINUED | OUTPATIENT
Start: 2019-01-21 | End: 2019-01-21

## 2019-01-21 RX ADMIN — AMIODARONE HYDROCHLORIDE 0.5 MG/MIN: 1.8 INJECTION, SOLUTION INTRAVENOUS at 20:37

## 2019-01-21 RX ADMIN — AMIODARONE HYDROCHLORIDE 0.5 MG/MIN: 1.8 INJECTION, SOLUTION INTRAVENOUS at 09:10

## 2019-01-21 RX ADMIN — DIGOXIN 125 MCG: 125 TABLET ORAL at 16:57

## 2019-01-21 RX ADMIN — CARVEDILOL 6.25 MG: 6.25 TABLET, FILM COATED ORAL at 02:02

## 2019-01-21 RX ADMIN — HYDROMORPHONE HYDROCHLORIDE 0.5 MG: 1 INJECTION, SOLUTION INTRAMUSCULAR; INTRAVENOUS; SUBCUTANEOUS at 22:24

## 2019-01-21 RX ADMIN — HEPARIN SODIUM 12.5 UNITS/KG/HR: 10000 INJECTION, SOLUTION INTRAVENOUS at 08:54

## 2019-01-21 RX ADMIN — ESCITALOPRAM 10 MG: 10 TABLET, FILM COATED ORAL at 08:45

## 2019-01-21 RX ADMIN — ACETAMINOPHEN 650 MG: 325 TABLET, FILM COATED ORAL at 17:39

## 2019-01-21 RX ADMIN — NYSTATIN: 100000 POWDER TOPICAL at 20:11

## 2019-01-21 RX ADMIN — ACETAMINOPHEN 650 MG: 325 TABLET, FILM COATED ORAL at 21:43

## 2019-01-21 RX ADMIN — SODIUM CHLORIDE, PRESERVATIVE FREE 3 ML: 5 INJECTION INTRAVENOUS at 20:08

## 2019-01-21 RX ADMIN — SODIUM CHLORIDE, PRESERVATIVE FREE 3 ML: 5 INJECTION INTRAVENOUS at 20:09

## 2019-01-21 RX ADMIN — ACETAMINOPHEN 650 MG: 325 TABLET, FILM COATED ORAL at 08:45

## 2019-01-21 RX ADMIN — HEPARIN SODIUM 5700 UNITS: 5000 INJECTION INTRAVENOUS; SUBCUTANEOUS at 08:49

## 2019-01-21 RX ADMIN — SODIUM CHLORIDE, PRESERVATIVE FREE 3 ML: 5 INJECTION INTRAVENOUS at 08:48

## 2019-01-21 RX ADMIN — HEPARIN SODIUM 10.5 UNITS/KG/HR: 10000 INJECTION, SOLUTION INTRAVENOUS at 04:54

## 2019-01-21 RX ADMIN — ATORVASTATIN CALCIUM 20 MG: 20 TABLET, FILM COATED ORAL at 08:45

## 2019-01-21 RX ADMIN — HEPARIN SODIUM 9.5 UNITS/KG/HR: 10000 INJECTION, SOLUTION INTRAVENOUS at 22:02

## 2019-01-21 RX ADMIN — HYDROMORPHONE HYDROCHLORIDE 0.5 MG: 1 INJECTION, SOLUTION INTRAMUSCULAR; INTRAVENOUS; SUBCUTANEOUS at 20:22

## 2019-01-21 RX ADMIN — ASPIRIN 81 MG: 81 TABLET, DELAYED RELEASE ORAL at 08:45

## 2019-01-21 RX ADMIN — METOPROLOL TARTRATE 12.5 MG: 25 TABLET ORAL at 23:46

## 2019-01-21 RX ADMIN — ACETAMINOPHEN 650 MG: 325 TABLET, FILM COATED ORAL at 13:33

## 2019-01-21 RX ADMIN — MICAFUNGIN SODIUM 100 MG: 20 INJECTION, POWDER, LYOPHILIZED, FOR SOLUTION INTRAVENOUS at 08:46

## 2019-01-21 RX ADMIN — NITROGLYCERIN 0.5 INCH: 20 OINTMENT TOPICAL at 23:59

## 2019-01-21 RX ADMIN — EMOLLIENT - LOTION 1 APPLICATION: LOTION at 08:57

## 2019-01-21 RX ADMIN — NYSTATIN 1 APPLICATION: 100000 POWDER TOPICAL at 08:49

## 2019-01-21 RX ADMIN — ACETAMINOPHEN 650 MG: 325 TABLET, FILM COATED ORAL at 04:28

## 2019-01-21 RX ADMIN — EMOLLIENT - LOTION: LOTION at 20:11

## 2019-01-22 LAB
ANION GAP SERPL CALCULATED.3IONS-SCNC: 12.2 MMOL/L
APTT PPP: 120.4 SECONDS (ref 22.7–35.4)
APTT PPP: 67 SECONDS (ref 22.7–35.4)
BASOPHILS # BLD AUTO: 0 10*3/MM3 (ref 0–0.2)
BASOPHILS NFR BLD AUTO: 0 % (ref 0–1.5)
BUN BLD-MCNC: 46 MG/DL (ref 8–23)
BUN/CREAT SERPL: 42.6 (ref 7–25)
CALCIUM SPEC-SCNC: 9.8 MG/DL (ref 8.6–10.5)
CHLORIDE SERPL-SCNC: 95 MMOL/L (ref 98–107)
CO2 SERPL-SCNC: 28.8 MMOL/L (ref 22–29)
CREAT BLD-MCNC: 1.08 MG/DL (ref 0.57–1)
DEPRECATED RDW RBC AUTO: 49.1 FL (ref 37–54)
EOSINOPHIL # BLD AUTO: 0.01 10*3/MM3 (ref 0–0.7)
EOSINOPHIL NFR BLD AUTO: 0.1 % (ref 0.3–6.2)
ERYTHROCYTE [DISTWIDTH] IN BLOOD BY AUTOMATED COUNT: 14.1 % (ref 11.7–13)
GFR SERPL CREATININE-BSD FRML MDRD: 50 ML/MIN/1.73
GLUCOSE BLD-MCNC: 137 MG/DL (ref 65–99)
GLUCOSE BLDC GLUCOMTR-MCNC: 108 MG/DL (ref 70–130)
GLUCOSE BLDC GLUCOMTR-MCNC: 113 MG/DL (ref 70–130)
GLUCOSE BLDC GLUCOMTR-MCNC: 121 MG/DL (ref 70–130)
GLUCOSE BLDC GLUCOMTR-MCNC: 174 MG/DL (ref 70–130)
GLUCOSE BLDC GLUCOMTR-MCNC: 180 MG/DL (ref 70–130)
HCT VFR BLD AUTO: 37 % (ref 35.6–45.5)
HGB BLD-MCNC: 11.6 G/DL (ref 11.9–15.5)
IMM GRANULOCYTES # BLD AUTO: 0.05 10*3/MM3 (ref 0–0.03)
IMM GRANULOCYTES NFR BLD AUTO: 0.3 % (ref 0–0.5)
LYMPHOCYTES # BLD AUTO: 0.99 10*3/MM3 (ref 0.9–4.8)
LYMPHOCYTES NFR BLD AUTO: 6.2 % (ref 19.6–45.3)
MCH RBC QN AUTO: 29.8 PG (ref 26.9–32)
MCHC RBC AUTO-ENTMCNC: 31.4 G/DL (ref 32.4–36.3)
MCV RBC AUTO: 95.1 FL (ref 80.5–98.2)
MONOCYTES # BLD AUTO: 1.13 10*3/MM3 (ref 0.2–1.2)
MONOCYTES NFR BLD AUTO: 7.1 % (ref 5–12)
NEUTROPHILS # BLD AUTO: 13.83 10*3/MM3 (ref 1.9–8.1)
NEUTROPHILS NFR BLD AUTO: 86.6 % (ref 42.7–76)
PLATELET # BLD AUTO: 283 10*3/MM3 (ref 140–500)
PMV BLD AUTO: 11 FL (ref 6–12)
POTASSIUM BLD-SCNC: 4.3 MMOL/L (ref 3.5–5.2)
RBC # BLD AUTO: 3.89 10*6/MM3 (ref 3.9–5.2)
SODIUM BLD-SCNC: 136 MMOL/L (ref 136–145)
TROPONIN T SERPL-MCNC: 0.3 NG/ML (ref 0–0.03)
WBC NRBC COR # BLD: 15.96 10*3/MM3 (ref 4.5–10.7)

## 2019-01-22 PROCEDURE — 93460 R&L HRT ART/VENTRICLE ANGIO: CPT | Performed by: INTERNAL MEDICINE

## 2019-01-22 PROCEDURE — 93010 ELECTROCARDIOGRAM REPORT: CPT | Performed by: INTERNAL MEDICINE

## 2019-01-22 PROCEDURE — 4A0335C MEASUREMENT OF ARTERIAL FLOW, CORONARY, PERCUTANEOUS APPROACH: ICD-10-PCS | Performed by: INTERNAL MEDICINE

## 2019-01-22 PROCEDURE — 80048 BASIC METABOLIC PNL TOTAL CA: CPT | Performed by: INTERNAL MEDICINE

## 2019-01-22 PROCEDURE — 99024 POSTOP FOLLOW-UP VISIT: CPT | Performed by: THORACIC SURGERY (CARDIOTHORACIC VASCULAR SURGERY)

## 2019-01-22 PROCEDURE — 4A023N8 MEASUREMENT OF CARDIAC SAMPLING AND PRESSURE, BILATERAL, PERCUTANEOUS APPROACH: ICD-10-PCS | Performed by: INTERNAL MEDICINE

## 2019-01-22 PROCEDURE — 63710000001 INSULIN LISPRO (HUMAN) PER 5 UNITS: Performed by: INTERNAL MEDICINE

## 2019-01-22 PROCEDURE — 85025 COMPLETE CBC W/AUTO DIFF WBC: CPT | Performed by: INTERNAL MEDICINE

## 2019-01-22 PROCEDURE — 82962 GLUCOSE BLOOD TEST: CPT

## 2019-01-22 PROCEDURE — B2011ZZ PLAIN RADIOGRAPHY OF MULTIPLE CORONARY ARTERIES USING LOW OSMOLAR CONTRAST: ICD-10-PCS | Performed by: INTERNAL MEDICINE

## 2019-01-22 PROCEDURE — C1769 GUIDE WIRE: HCPCS | Performed by: INTERNAL MEDICINE

## 2019-01-22 PROCEDURE — B2051ZZ PLAIN RADIOGRAPHY OF LEFT HEART USING LOW OSMOLAR CONTRAST: ICD-10-PCS | Performed by: INTERNAL MEDICINE

## 2019-01-22 PROCEDURE — 85730 THROMBOPLASTIN TIME PARTIAL: CPT | Performed by: INTERNAL MEDICINE

## 2019-01-22 PROCEDURE — 25010000002 AMIODARONE IN DEXTROSE 5% 360-4.14 MG/200ML-% SOLUTION: Performed by: INTERNAL MEDICINE

## 2019-01-22 PROCEDURE — 93005 ELECTROCARDIOGRAM TRACING: CPT | Performed by: INTERNAL MEDICINE

## 2019-01-22 PROCEDURE — C1887 CATHETER, GUIDING: HCPCS | Performed by: INTERNAL MEDICINE

## 2019-01-22 PROCEDURE — 25010000002 HEPARIN (PORCINE) PER 1000 UNITS: Performed by: INTERNAL MEDICINE

## 2019-01-22 PROCEDURE — 99232 SBSQ HOSP IP/OBS MODERATE 35: CPT | Performed by: INTERNAL MEDICINE

## 2019-01-22 PROCEDURE — 85018 HEMOGLOBIN: CPT

## 2019-01-22 PROCEDURE — C1894 INTRO/SHEATH, NON-LASER: HCPCS | Performed by: INTERNAL MEDICINE

## 2019-01-22 PROCEDURE — 84484 ASSAY OF TROPONIN QUANT: CPT | Performed by: INTERNAL MEDICINE

## 2019-01-22 PROCEDURE — 0 IOPAMIDOL PER 1 ML: Performed by: INTERNAL MEDICINE

## 2019-01-22 PROCEDURE — 85014 HEMATOCRIT: CPT

## 2019-01-22 RX ORDER — HEPARIN SODIUM 10000 [USP'U]/100ML
10.5 INJECTION, SOLUTION INTRAVENOUS
Status: DISCONTINUED | OUTPATIENT
Start: 2019-01-22 | End: 2019-01-28

## 2019-01-22 RX ORDER — LIDOCAINE HYDROCHLORIDE 20 MG/ML
INJECTION, SOLUTION INFILTRATION; PERINEURAL AS NEEDED
Status: DISCONTINUED | OUTPATIENT
Start: 2019-01-22 | End: 2019-01-22 | Stop reason: HOSPADM

## 2019-01-22 RX ORDER — HEPARIN SODIUM 5000 [USP'U]/ML
5700-10000 INJECTION, SOLUTION INTRAVENOUS; SUBCUTANEOUS EVERY 6 HOURS PRN
Status: DISCONTINUED | OUTPATIENT
Start: 2019-01-22 | End: 2019-01-28

## 2019-01-22 RX ORDER — SODIUM CHLORIDE 9 MG/ML
INJECTION, SOLUTION INTRAVENOUS CONTINUOUS PRN
Status: COMPLETED | OUTPATIENT
Start: 2019-01-22 | End: 2019-01-22

## 2019-01-22 RX ORDER — AMIODARONE HYDROCHLORIDE 200 MG/1
400 TABLET ORAL EVERY 8 HOURS
Status: DISCONTINUED | OUTPATIENT
Start: 2019-01-22 | End: 2019-01-28

## 2019-01-22 RX ORDER — SODIUM CHLORIDE 9 MG/ML
50 INJECTION, SOLUTION INTRAVENOUS CONTINUOUS
Status: ACTIVE | OUTPATIENT
Start: 2019-01-22 | End: 2019-01-23

## 2019-01-22 RX ORDER — ACETAMINOPHEN 325 MG/1
650 TABLET ORAL EVERY 4 HOURS PRN
Status: DISCONTINUED | OUTPATIENT
Start: 2019-01-22 | End: 2019-01-28

## 2019-01-22 RX ADMIN — ATORVASTATIN CALCIUM 20 MG: 20 TABLET, FILM COATED ORAL at 08:22

## 2019-01-22 RX ADMIN — SODIUM CHLORIDE, PRESERVATIVE FREE 3 ML: 5 INJECTION INTRAVENOUS at 20:44

## 2019-01-22 RX ADMIN — INSULIN LISPRO 2 UNITS: 100 INJECTION, SOLUTION INTRAVENOUS; SUBCUTANEOUS at 20:43

## 2019-01-22 RX ADMIN — ASPIRIN 81 MG: 81 TABLET, DELAYED RELEASE ORAL at 08:22

## 2019-01-22 RX ADMIN — SODIUM CHLORIDE 50 ML/HR: 9 INJECTION, SOLUTION INTRAVENOUS at 15:04

## 2019-01-22 RX ADMIN — NITROGLYCERIN 0.5 INCH: 20 OINTMENT TOPICAL at 18:27

## 2019-01-22 RX ADMIN — METOPROLOL TARTRATE 12.5 MG: 25 TABLET ORAL at 08:22

## 2019-01-22 RX ADMIN — ACETAMINOPHEN 650 MG: 325 TABLET, FILM COATED ORAL at 20:43

## 2019-01-22 RX ADMIN — SODIUM CHLORIDE, PRESERVATIVE FREE 3 ML: 5 INJECTION INTRAVENOUS at 20:45

## 2019-01-22 RX ADMIN — HEPARIN SODIUM 10.5 UNITS/KG/HR: 10000 INJECTION, SOLUTION INTRAVENOUS at 18:28

## 2019-01-22 RX ADMIN — AMIODARONE HYDROCHLORIDE 400 MG: 200 TABLET ORAL at 15:03

## 2019-01-22 RX ADMIN — EMOLLIENT - LOTION: LOTION at 22:04

## 2019-01-22 RX ADMIN — EMOLLIENT - LOTION: LOTION at 08:27

## 2019-01-22 RX ADMIN — SODIUM CHLORIDE, PRESERVATIVE FREE 3 ML: 5 INJECTION INTRAVENOUS at 10:13

## 2019-01-22 RX ADMIN — NYSTATIN: 100000 POWDER TOPICAL at 08:22

## 2019-01-22 RX ADMIN — ESCITALOPRAM 10 MG: 10 TABLET, FILM COATED ORAL at 08:22

## 2019-01-22 RX ADMIN — HEPARIN SODIUM 5700 UNITS: 5000 INJECTION INTRAVENOUS; SUBCUTANEOUS at 02:05

## 2019-01-22 RX ADMIN — INSULIN LISPRO 2 UNITS: 100 INJECTION, SOLUTION INTRAVENOUS; SUBCUTANEOUS at 08:26

## 2019-01-22 RX ADMIN — ACETAMINOPHEN 650 MG: 325 TABLET, FILM COATED ORAL at 06:24

## 2019-01-22 RX ADMIN — AMIODARONE HYDROCHLORIDE 400 MG: 200 TABLET ORAL at 22:29

## 2019-01-22 RX ADMIN — ACETAMINOPHEN 650 MG: 325 TABLET, FILM COATED ORAL at 15:03

## 2019-01-22 RX ADMIN — NITROGLYCERIN 0.5 INCH: 20 OINTMENT TOPICAL at 06:34

## 2019-01-22 RX ADMIN — METOPROLOL TARTRATE 12.5 MG: 25 TABLET ORAL at 23:38

## 2019-01-22 RX ADMIN — ACETAMINOPHEN 650 MG: 325 TABLET, FILM COATED ORAL at 09:57

## 2019-01-22 RX ADMIN — AMIODARONE HYDROCHLORIDE 0.5 MG/MIN: 1.8 INJECTION, SOLUTION INTRAVENOUS at 08:22

## 2019-01-22 RX ADMIN — NYSTATIN: 100000 POWDER TOPICAL at 20:48

## 2019-01-22 RX ADMIN — MICAFUNGIN SODIUM 100 MG: 20 INJECTION, POWDER, LYOPHILIZED, FOR SOLUTION INTRAVENOUS at 08:47

## 2019-01-23 LAB
ANION GAP SERPL CALCULATED.3IONS-SCNC: 12.4 MMOL/L
APTT PPP: 52.2 SECONDS (ref 22.7–35.4)
APTT PPP: 75.9 SECONDS (ref 22.7–35.4)
APTT PPP: >200 SECONDS (ref 22.7–35.4)
ARTERIAL PATENCY WRIST A: POSITIVE
ATMOSPHERIC PRESS: 748.5 MMHG
BASE EXCESS BLDA CALC-SCNC: 7.8 MMOL/L (ref 0–2)
BASOPHILS # BLD AUTO: 0.01 10*3/MM3 (ref 0–0.2)
BASOPHILS NFR BLD AUTO: 0.1 % (ref 0–1.5)
BDY SITE: ABNORMAL
BUN BLD-MCNC: 51 MG/DL (ref 8–23)
BUN/CREAT SERPL: 45.5 (ref 7–25)
CALCIUM SPEC-SCNC: 9.6 MG/DL (ref 8.6–10.5)
CHLORIDE SERPL-SCNC: 96 MMOL/L (ref 98–107)
CO2 SERPL-SCNC: 28.6 MMOL/L (ref 22–29)
CREAT BLD-MCNC: 1.12 MG/DL (ref 0.57–1)
DEPRECATED RDW RBC AUTO: 49.8 FL (ref 37–54)
EOSINOPHIL # BLD AUTO: 0.06 10*3/MM3 (ref 0–0.7)
EOSINOPHIL NFR BLD AUTO: 0.6 % (ref 0.3–6.2)
ERYTHROCYTE [DISTWIDTH] IN BLOOD BY AUTOMATED COUNT: 14 % (ref 11.7–13)
GAS FLOW AIRWAY: 3 LPM
GFR SERPL CREATININE-BSD FRML MDRD: 48 ML/MIN/1.73
GLUCOSE BLD-MCNC: 100 MG/DL (ref 65–99)
GLUCOSE BLDC GLUCOMTR-MCNC: 109 MG/DL (ref 70–130)
GLUCOSE BLDC GLUCOMTR-MCNC: 115 MG/DL (ref 70–130)
GLUCOSE BLDC GLUCOMTR-MCNC: 132 MG/DL (ref 70–130)
GLUCOSE BLDC GLUCOMTR-MCNC: 154 MG/DL (ref 70–130)
GLUCOSE BLDC GLUCOMTR-MCNC: 166 MG/DL (ref 70–130)
GLUCOSE BLDC GLUCOMTR-MCNC: 95 MG/DL (ref 70–130)
HCO3 BLDA-SCNC: 34.9 MMOL/L (ref 22–28)
HCT VFR BLD AUTO: 34.4 % (ref 35.6–45.5)
HCT VFR BLDA CALC: 34 % (ref 38–51)
HCT VFR BLDA CALC: 35 % (ref 38–51)
HCT VFR BLDA CALC: 35 % (ref 38–51)
HGB BLD-MCNC: 10.4 G/DL (ref 11.9–15.5)
HGB BLDA-MCNC: 11.6 G/DL (ref 12–17)
HGB BLDA-MCNC: 11.9 G/DL (ref 12–17)
HGB BLDA-MCNC: 11.9 G/DL (ref 12–17)
IMM GRANULOCYTES # BLD AUTO: 0.04 10*3/MM3 (ref 0–0.03)
IMM GRANULOCYTES NFR BLD AUTO: 0.4 % (ref 0–0.5)
LYMPHOCYTES # BLD AUTO: 0.58 10*3/MM3 (ref 0.9–4.8)
LYMPHOCYTES NFR BLD AUTO: 6.2 % (ref 19.6–45.3)
MCH RBC QN AUTO: 29 PG (ref 26.9–32)
MCHC RBC AUTO-ENTMCNC: 30.2 G/DL (ref 32.4–36.3)
MCV RBC AUTO: 95.8 FL (ref 80.5–98.2)
MODALITY: ABNORMAL
MONOCYTES # BLD AUTO: 1.25 10*3/MM3 (ref 0.2–1.2)
MONOCYTES NFR BLD AUTO: 13.4 % (ref 5–12)
NEUTROPHILS # BLD AUTO: 7.38 10*3/MM3 (ref 1.9–8.1)
NEUTROPHILS NFR BLD AUTO: 79.3 % (ref 42.7–76)
PCO2 BLDA: 60.8 MM HG (ref 35–45)
PH BLDA: 7.37 PH UNITS (ref 7.35–7.45)
PLATELET # BLD AUTO: 256 10*3/MM3 (ref 140–500)
PMV BLD AUTO: 10.7 FL (ref 6–12)
PO2 BLDA: 69.4 MM HG (ref 80–100)
POTASSIUM BLD-SCNC: 4.1 MMOL/L (ref 3.5–5.2)
RBC # BLD AUTO: 3.59 10*6/MM3 (ref 3.9–5.2)
SAO2 % BLDA: 42 % (ref 95–98)
SAO2 % BLDA: 50 % (ref 95–98)
SAO2 % BLDA: 96 % (ref 95–98)
SAO2 % BLDCOA: 92.4 % (ref 92–99)
SODIUM BLD-SCNC: 137 MMOL/L (ref 136–145)
TOTAL RATE: 18 BREATHS/MINUTE
WBC NRBC COR # BLD: 9.32 10*3/MM3 (ref 4.5–10.7)

## 2019-01-23 PROCEDURE — 99232 SBSQ HOSP IP/OBS MODERATE 35: CPT | Performed by: INTERNAL MEDICINE

## 2019-01-23 PROCEDURE — 25010000003 MICAFUNGIN SODIUM 100 MG RECONSTITUTED SOLUTION: Performed by: INTERNAL MEDICINE

## 2019-01-23 PROCEDURE — 85730 THROMBOPLASTIN TIME PARTIAL: CPT | Performed by: INTERNAL MEDICINE

## 2019-01-23 PROCEDURE — 97535 SELF CARE MNGMENT TRAINING: CPT | Performed by: OCCUPATIONAL THERAPIST

## 2019-01-23 PROCEDURE — 85025 COMPLETE CBC W/AUTO DIFF WBC: CPT | Performed by: INTERNAL MEDICINE

## 2019-01-23 PROCEDURE — 25010000002 HEPARIN (PORCINE) PER 1000 UNITS: Performed by: INTERNAL MEDICINE

## 2019-01-23 PROCEDURE — 97110 THERAPEUTIC EXERCISES: CPT

## 2019-01-23 PROCEDURE — 99024 POSTOP FOLLOW-UP VISIT: CPT | Performed by: NURSE PRACTITIONER

## 2019-01-23 PROCEDURE — 82962 GLUCOSE BLOOD TEST: CPT

## 2019-01-23 PROCEDURE — 36600 WITHDRAWAL OF ARTERIAL BLOOD: CPT

## 2019-01-23 PROCEDURE — 63710000001 INSULIN LISPRO (HUMAN) PER 5 UNITS: Performed by: INTERNAL MEDICINE

## 2019-01-23 PROCEDURE — 85730 THROMBOPLASTIN TIME PARTIAL: CPT | Performed by: THORACIC SURGERY (CARDIOTHORACIC VASCULAR SURGERY)

## 2019-01-23 PROCEDURE — 80048 BASIC METABOLIC PNL TOTAL CA: CPT | Performed by: INTERNAL MEDICINE

## 2019-01-23 PROCEDURE — 82803 BLOOD GASES ANY COMBINATION: CPT

## 2019-01-23 RX ADMIN — SODIUM CHLORIDE, PRESERVATIVE FREE 3 ML: 5 INJECTION INTRAVENOUS at 09:23

## 2019-01-23 RX ADMIN — ASPIRIN 81 MG: 81 TABLET, DELAYED RELEASE ORAL at 09:20

## 2019-01-23 RX ADMIN — NYSTATIN: 100000 POWDER TOPICAL at 09:20

## 2019-01-23 RX ADMIN — MICAFUNGIN SODIUM 100 MG: 20 INJECTION, POWDER, LYOPHILIZED, FOR SOLUTION INTRAVENOUS at 09:20

## 2019-01-23 RX ADMIN — ACETAMINOPHEN 650 MG: 325 TABLET, FILM COATED ORAL at 09:29

## 2019-01-23 RX ADMIN — DIGOXIN 125 MCG: 125 TABLET ORAL at 15:04

## 2019-01-23 RX ADMIN — AMIODARONE HYDROCHLORIDE 400 MG: 200 TABLET ORAL at 06:36

## 2019-01-23 RX ADMIN — ACETAMINOPHEN 650 MG: 325 TABLET, FILM COATED ORAL at 15:11

## 2019-01-23 RX ADMIN — ESCITALOPRAM 10 MG: 10 TABLET, FILM COATED ORAL at 09:20

## 2019-01-23 RX ADMIN — HEPARIN SODIUM 11.5 UNITS/KG/HR: 10000 INJECTION, SOLUTION INTRAVENOUS at 10:47

## 2019-01-23 RX ADMIN — SODIUM CHLORIDE, PRESERVATIVE FREE 3 ML: 5 INJECTION INTRAVENOUS at 21:00

## 2019-01-23 RX ADMIN — AMIODARONE HYDROCHLORIDE 400 MG: 200 TABLET ORAL at 15:05

## 2019-01-23 RX ADMIN — METOPROLOL TARTRATE 12.5 MG: 25 TABLET ORAL at 09:20

## 2019-01-23 RX ADMIN — INSULIN LISPRO 2 UNITS: 100 INJECTION, SOLUTION INTRAVENOUS; SUBCUTANEOUS at 13:00

## 2019-01-23 RX ADMIN — ACETAMINOPHEN 650 MG: 325 TABLET, FILM COATED ORAL at 05:54

## 2019-01-23 RX ADMIN — ATORVASTATIN CALCIUM 20 MG: 20 TABLET, FILM COATED ORAL at 09:20

## 2019-01-23 RX ADMIN — ACETAMINOPHEN 650 MG: 325 TABLET, FILM COATED ORAL at 01:52

## 2019-01-23 RX ADMIN — HEPARIN SODIUM 10000 UNITS: 5000 INJECTION INTRAVENOUS; SUBCUTANEOUS at 01:46

## 2019-01-23 RX ADMIN — EMOLLIENT - LOTION: LOTION at 09:20

## 2019-01-23 RX ADMIN — ACETAMINOPHEN 650 MG: 325 TABLET, FILM COATED ORAL at 21:52

## 2019-01-23 RX ADMIN — EMOLLIENT - LOTION 1 APPLICATION: LOTION at 21:53

## 2019-01-23 RX ADMIN — NYSTATIN 1 APPLICATION: 100000 POWDER TOPICAL at 21:53

## 2019-01-24 ENCOUNTER — ANESTHESIA (OUTPATIENT)
Dept: POSTOP/PACU | Facility: HOSPITAL | Age: 74
End: 2019-01-24

## 2019-01-24 ENCOUNTER — APPOINTMENT (OUTPATIENT)
Dept: POSTOP/PACU | Facility: HOSPITAL | Age: 74
End: 2019-01-24
Attending: INTERNAL MEDICINE

## 2019-01-24 ENCOUNTER — ANESTHESIA EVENT (OUTPATIENT)
Dept: POSTOP/PACU | Facility: HOSPITAL | Age: 74
End: 2019-01-24

## 2019-01-24 VITALS — OXYGEN SATURATION: 96 % | DIASTOLIC BLOOD PRESSURE: 66 MMHG | SYSTOLIC BLOOD PRESSURE: 103 MMHG

## 2019-01-24 PROBLEM — I07.1 TRICUSPID VALVE INSUFFICIENCY: Status: ACTIVE | Noted: 2019-01-16

## 2019-01-24 PROBLEM — I35.0 AORTIC VALVE STENOSIS: Status: ACTIVE | Noted: 2019-01-16

## 2019-01-24 PROBLEM — I05.0 MITRAL VALVE STENOSIS: Status: ACTIVE | Noted: 2019-01-16

## 2019-01-24 LAB
ANION GAP SERPL CALCULATED.3IONS-SCNC: 13.3 MMOL/L
APTT PPP: 79.4 SECONDS (ref 22.7–35.4)
APTT PPP: 88.2 SECONDS (ref 22.7–35.4)
ARTERIAL PATENCY WRIST A: POSITIVE
ATMOSPHERIC PRESS: 749.8 MMHG
BASE EXCESS BLDA CALC-SCNC: 7 MMOL/L (ref 0–2)
BASOPHILS # BLD AUTO: 0.01 10*3/MM3 (ref 0–0.2)
BASOPHILS NFR BLD AUTO: 0.1 % (ref 0–1.5)
BDY SITE: ABNORMAL
BH CV ECHO MEAS - BSA(HAYCOCK): 2.7 M^2
BH CV ECHO MEAS - BSA: 2.5 M^2
BH CV ECHO MEAS - BZI_BMI: 50 KILOGRAMS/M^2
BH CV ECHO MEAS - BZI_METRIC_HEIGHT: 170.2 CM
BH CV ECHO MEAS - BZI_METRIC_WEIGHT: 144.7 KG
BH CV ECHO MEAS - MV MAX PG: 13 MMHG
BH CV ECHO MEAS - MV MEAN PG: 6 MMHG
BH CV ECHO MEAS - MV V2 MAX: 180 CM/SEC
BH CV ECHO MEAS - MV V2 MEAN: 109 CM/SEC
BH CV ECHO MEAS - MV V2 VTI: 40.5 CM
BH CV VAS BP RIGHT ARM: NORMAL MMHG
BILIRUB UR QL STRIP: NEGATIVE
BUN BLD-MCNC: 50 MG/DL (ref 8–23)
BUN/CREAT SERPL: 48.5 (ref 7–25)
CALCIUM SPEC-SCNC: 9.9 MG/DL (ref 8.6–10.5)
CHLORIDE SERPL-SCNC: 100 MMOL/L (ref 98–107)
CLARITY UR: CLEAR
CO2 SERPL-SCNC: 28.7 MMOL/L (ref 22–29)
COLOR UR: YELLOW
CREAT BLD-MCNC: 1.03 MG/DL (ref 0.57–1)
DEPRECATED RDW RBC AUTO: 51.1 FL (ref 37–54)
EOSINOPHIL # BLD AUTO: 0.16 10*3/MM3 (ref 0–0.7)
EOSINOPHIL NFR BLD AUTO: 2.2 % (ref 0.3–6.2)
ERYTHROCYTE [DISTWIDTH] IN BLOOD BY AUTOMATED COUNT: 14.3 % (ref 11.7–13)
GFR SERPL CREATININE-BSD FRML MDRD: 53 ML/MIN/1.73
GLUCOSE BLD-MCNC: 100 MG/DL (ref 65–99)
GLUCOSE BLDC GLUCOMTR-MCNC: 108 MG/DL (ref 70–130)
GLUCOSE BLDC GLUCOMTR-MCNC: 111 MG/DL (ref 70–130)
GLUCOSE BLDC GLUCOMTR-MCNC: 120 MG/DL (ref 70–130)
GLUCOSE BLDC GLUCOMTR-MCNC: 141 MG/DL (ref 70–130)
GLUCOSE BLDC GLUCOMTR-MCNC: 153 MG/DL (ref 70–130)
GLUCOSE BLDC GLUCOMTR-MCNC: 201 MG/DL (ref 70–130)
GLUCOSE UR STRIP-MCNC: NEGATIVE MG/DL
HCO3 BLDA-SCNC: 34.1 MMOL/L (ref 22–28)
HCT VFR BLD AUTO: 35.7 % (ref 35.6–45.5)
HGB BLD-MCNC: 10.6 G/DL (ref 11.9–15.5)
HGB UR QL STRIP.AUTO: NEGATIVE
IMM GRANULOCYTES # BLD AUTO: 0.09 10*3/MM3 (ref 0–0.03)
IMM GRANULOCYTES NFR BLD AUTO: 1.2 % (ref 0–0.5)
KETONES UR QL STRIP: NEGATIVE
LEUKOCYTE ESTERASE UR QL STRIP.AUTO: NEGATIVE
LYMPHOCYTES # BLD AUTO: 0.58 10*3/MM3 (ref 0.9–4.8)
LYMPHOCYTES NFR BLD AUTO: 7.9 % (ref 19.6–45.3)
MCH RBC QN AUTO: 28.8 PG (ref 26.9–32)
MCHC RBC AUTO-ENTMCNC: 29.7 G/DL (ref 32.4–36.3)
MCV RBC AUTO: 97 FL (ref 80.5–98.2)
MODALITY: ABNORMAL
MONOCYTES # BLD AUTO: 1.08 10*3/MM3 (ref 0.2–1.2)
MONOCYTES NFR BLD AUTO: 14.7 % (ref 5–12)
NEUTROPHILS # BLD AUTO: 5.43 10*3/MM3 (ref 1.9–8.1)
NEUTROPHILS NFR BLD AUTO: 73.9 % (ref 42.7–76)
NITRITE UR QL STRIP: NEGATIVE
PCO2 BLDA: 60.7 MM HG (ref 35–45)
PH BLDA: 7.36 PH UNITS (ref 7.35–7.45)
PH UR STRIP.AUTO: <=5 [PH] (ref 5–8)
PLATELET # BLD AUTO: 304 10*3/MM3 (ref 140–500)
PMV BLD AUTO: 10.6 FL (ref 6–12)
PO2 BLDA: 79.3 MM HG (ref 80–100)
POTASSIUM BLD-SCNC: 4.1 MMOL/L (ref 3.5–5.2)
PROT UR QL STRIP: NEGATIVE
RBC # BLD AUTO: 3.68 10*6/MM3 (ref 3.9–5.2)
SAO2 % BLDCOA: 94.6 % (ref 92–99)
SODIUM BLD-SCNC: 142 MMOL/L (ref 136–145)
SP GR UR STRIP: 1.02 (ref 1–1.03)
TOTAL RATE: 20 BREATHS/MINUTE
UROBILINOGEN UR QL STRIP: NORMAL
WBC NRBC COR # BLD: 7.35 10*3/MM3 (ref 4.5–10.7)

## 2019-01-24 PROCEDURE — 82962 GLUCOSE BLOOD TEST: CPT

## 2019-01-24 PROCEDURE — 36600 WITHDRAWAL OF ARTERIAL BLOOD: CPT

## 2019-01-24 PROCEDURE — 85730 THROMBOPLASTIN TIME PARTIAL: CPT | Performed by: INTERNAL MEDICINE

## 2019-01-24 PROCEDURE — 5A2204Z RESTORATION OF CARDIAC RHYTHM, SINGLE: ICD-10-PCS | Performed by: THORACIC SURGERY (CARDIOTHORACIC VASCULAR SURGERY)

## 2019-01-24 PROCEDURE — 93005 ELECTROCARDIOGRAM TRACING: CPT | Performed by: INTERNAL MEDICINE

## 2019-01-24 PROCEDURE — 82803 BLOOD GASES ANY COMBINATION: CPT

## 2019-01-24 PROCEDURE — 25010000002 PROPOFOL 10 MG/ML EMULSION: Performed by: ANESTHESIOLOGY

## 2019-01-24 PROCEDURE — 93312 ECHO TRANSESOPHAGEAL: CPT

## 2019-01-24 PROCEDURE — 80048 BASIC METABOLIC PNL TOTAL CA: CPT | Performed by: INTERNAL MEDICINE

## 2019-01-24 PROCEDURE — 25010000002 HEPARIN (PORCINE) PER 1000 UNITS: Performed by: INTERNAL MEDICINE

## 2019-01-24 PROCEDURE — 93325 DOPPLER ECHO COLOR FLOW MAPG: CPT | Performed by: INTERNAL MEDICINE

## 2019-01-24 PROCEDURE — 93010 ELECTROCARDIOGRAM REPORT: CPT | Performed by: INTERNAL MEDICINE

## 2019-01-24 PROCEDURE — 93312 ECHO TRANSESOPHAGEAL: CPT | Performed by: INTERNAL MEDICINE

## 2019-01-24 PROCEDURE — 85025 COMPLETE CBC W/AUTO DIFF WBC: CPT | Performed by: INTERNAL MEDICINE

## 2019-01-24 PROCEDURE — 94799 UNLISTED PULMONARY SVC/PX: CPT

## 2019-01-24 PROCEDURE — 93325 DOPPLER ECHO COLOR FLOW MAPG: CPT

## 2019-01-24 PROCEDURE — 93320 DOPPLER ECHO COMPLETE: CPT

## 2019-01-24 PROCEDURE — 63710000001 INSULIN LISPRO (HUMAN) PER 5 UNITS: Performed by: INTERNAL MEDICINE

## 2019-01-24 PROCEDURE — 81003 URINALYSIS AUTO W/O SCOPE: CPT | Performed by: THORACIC SURGERY (CARDIOTHORACIC VASCULAR SURGERY)

## 2019-01-24 PROCEDURE — 92960 CARDIOVERSION ELECTRIC EXT: CPT | Performed by: INTERNAL MEDICINE

## 2019-01-24 PROCEDURE — 93320 DOPPLER ECHO COMPLETE: CPT | Performed by: INTERNAL MEDICINE

## 2019-01-24 PROCEDURE — 92960 CARDIOVERSION ELECTRIC EXT: CPT

## 2019-01-24 PROCEDURE — 94660 CPAP INITIATION&MGMT: CPT

## 2019-01-24 PROCEDURE — 99232 SBSQ HOSP IP/OBS MODERATE 35: CPT | Performed by: NURSE PRACTITIONER

## 2019-01-24 PROCEDURE — 99232 SBSQ HOSP IP/OBS MODERATE 35: CPT | Performed by: INTERNAL MEDICINE

## 2019-01-24 RX ORDER — PROPOFOL 10 MG/ML
VIAL (ML) INTRAVENOUS CONTINUOUS PRN
Status: DISCONTINUED | OUTPATIENT
Start: 2019-01-24 | End: 2019-01-24

## 2019-01-24 RX ORDER — ONDANSETRON 2 MG/ML
4 INJECTION INTRAMUSCULAR; INTRAVENOUS ONCE AS NEEDED
Status: DISCONTINUED | OUTPATIENT
Start: 2019-01-24 | End: 2019-01-28 | Stop reason: HOSPADM

## 2019-01-24 RX ORDER — PROMETHAZINE HYDROCHLORIDE 25 MG/1
25 SUPPOSITORY RECTAL ONCE AS NEEDED
Status: DISCONTINUED | OUTPATIENT
Start: 2019-01-24 | End: 2019-01-28 | Stop reason: HOSPADM

## 2019-01-24 RX ORDER — PROMETHAZINE HYDROCHLORIDE 25 MG/ML
12.5 INJECTION, SOLUTION INTRAMUSCULAR; INTRAVENOUS ONCE AS NEEDED
Status: DISCONTINUED | OUTPATIENT
Start: 2019-01-24 | End: 2019-01-28 | Stop reason: HOSPADM

## 2019-01-24 RX ORDER — PROPOFOL 10 MG/ML
VIAL (ML) INTRAVENOUS AS NEEDED
Status: DISCONTINUED | OUTPATIENT
Start: 2019-01-24 | End: 2019-01-24 | Stop reason: SURG

## 2019-01-24 RX ORDER — LABETALOL HYDROCHLORIDE 5 MG/ML
5 INJECTION, SOLUTION INTRAVENOUS
Status: DISCONTINUED | OUTPATIENT
Start: 2019-01-24 | End: 2019-01-28 | Stop reason: HOSPADM

## 2019-01-24 RX ORDER — ACETAMINOPHEN 650 MG/1
650 SUPPOSITORY RECTAL ONCE AS NEEDED
Status: COMPLETED | OUTPATIENT
Start: 2019-01-24 | End: 2019-01-25

## 2019-01-24 RX ORDER — NALOXONE HCL 0.4 MG/ML
0.2 VIAL (ML) INJECTION AS NEEDED
Status: DISCONTINUED | OUTPATIENT
Start: 2019-01-24 | End: 2019-01-28 | Stop reason: HOSPADM

## 2019-01-24 RX ORDER — HYDROCODONE BITARTRATE AND ACETAMINOPHEN 7.5; 325 MG/1; MG/1
1 TABLET ORAL ONCE AS NEEDED
Status: COMPLETED | OUTPATIENT
Start: 2019-01-24 | End: 2019-01-26

## 2019-01-24 RX ORDER — EPHEDRINE SULFATE 50 MG/ML
5 INJECTION, SOLUTION INTRAVENOUS ONCE AS NEEDED
Status: DISCONTINUED | OUTPATIENT
Start: 2019-01-24 | End: 2019-01-28 | Stop reason: HOSPADM

## 2019-01-24 RX ORDER — DIPHENHYDRAMINE HYDROCHLORIDE 50 MG/ML
12.5 INJECTION INTRAMUSCULAR; INTRAVENOUS
Status: DISCONTINUED | OUTPATIENT
Start: 2019-01-24 | End: 2019-01-28 | Stop reason: HOSPADM

## 2019-01-24 RX ORDER — HYDROMORPHONE HYDROCHLORIDE 1 MG/ML
0.5 INJECTION, SOLUTION INTRAMUSCULAR; INTRAVENOUS; SUBCUTANEOUS
Status: DISCONTINUED | OUTPATIENT
Start: 2019-01-24 | End: 2019-01-28 | Stop reason: HOSPADM

## 2019-01-24 RX ORDER — FENTANYL CITRATE 50 UG/ML
50 INJECTION, SOLUTION INTRAMUSCULAR; INTRAVENOUS
Status: DISCONTINUED | OUTPATIENT
Start: 2019-01-24 | End: 2019-01-28 | Stop reason: HOSPADM

## 2019-01-24 RX ORDER — FLUMAZENIL 0.1 MG/ML
0.2 INJECTION INTRAVENOUS AS NEEDED
Status: DISCONTINUED | OUTPATIENT
Start: 2019-01-24 | End: 2019-01-28 | Stop reason: HOSPADM

## 2019-01-24 RX ORDER — OXYCODONE AND ACETAMINOPHEN 7.5; 325 MG/1; MG/1
1 TABLET ORAL ONCE AS NEEDED
Status: DISCONTINUED | OUTPATIENT
Start: 2019-01-24 | End: 2019-01-28 | Stop reason: HOSPADM

## 2019-01-24 RX ORDER — HYDRALAZINE HYDROCHLORIDE 20 MG/ML
5 INJECTION INTRAMUSCULAR; INTRAVENOUS
Status: DISCONTINUED | OUTPATIENT
Start: 2019-01-24 | End: 2019-01-28 | Stop reason: HOSPADM

## 2019-01-24 RX ORDER — DIPHENHYDRAMINE HCL 25 MG
25 CAPSULE ORAL
Status: DISCONTINUED | OUTPATIENT
Start: 2019-01-24 | End: 2019-01-28 | Stop reason: HOSPADM

## 2019-01-24 RX ORDER — SODIUM CHLORIDE 9 MG/ML
INJECTION, SOLUTION INTRAVENOUS CONTINUOUS PRN
Status: DISCONTINUED | OUTPATIENT
Start: 2019-01-24 | End: 2019-01-24 | Stop reason: SURG

## 2019-01-24 RX ORDER — ACETAMINOPHEN 325 MG/1
650 TABLET ORAL ONCE AS NEEDED
Status: COMPLETED | OUTPATIENT
Start: 2019-01-24 | End: 2019-01-25

## 2019-01-24 RX ORDER — PROMETHAZINE HYDROCHLORIDE 25 MG/1
25 TABLET ORAL ONCE AS NEEDED
Status: DISCONTINUED | OUTPATIENT
Start: 2019-01-24 | End: 2019-01-28 | Stop reason: HOSPADM

## 2019-01-24 RX ADMIN — ACETAMINOPHEN 650 MG: 325 TABLET, FILM COATED ORAL at 23:37

## 2019-01-24 RX ADMIN — HEPARIN SODIUM 11.5 UNITS/KG/HR: 10000 INJECTION, SOLUTION INTRAVENOUS at 20:43

## 2019-01-24 RX ADMIN — NYSTATIN 1 APPLICATION: 100000 POWDER TOPICAL at 20:27

## 2019-01-24 RX ADMIN — ASPIRIN 81 MG: 81 TABLET, DELAYED RELEASE ORAL at 09:35

## 2019-01-24 RX ADMIN — PROPOFOL 20 MG: 10 INJECTION, EMULSION INTRAVENOUS at 07:34

## 2019-01-24 RX ADMIN — PROPOFOL 20 MG: 10 INJECTION, EMULSION INTRAVENOUS at 07:40

## 2019-01-24 RX ADMIN — MICAFUNGIN SODIUM 100 MG: 20 INJECTION, POWDER, LYOPHILIZED, FOR SOLUTION INTRAVENOUS at 09:47

## 2019-01-24 RX ADMIN — EMOLLIENT - LOTION: LOTION at 09:37

## 2019-01-24 RX ADMIN — AMIODARONE HYDROCHLORIDE 400 MG: 200 TABLET ORAL at 05:56

## 2019-01-24 RX ADMIN — ACETAMINOPHEN 650 MG: 325 TABLET, FILM COATED ORAL at 09:35

## 2019-01-24 RX ADMIN — INSULIN LISPRO 4 UNITS: 100 INJECTION, SOLUTION INTRAVENOUS; SUBCUTANEOUS at 20:42

## 2019-01-24 RX ADMIN — ACETAMINOPHEN 650 MG: 325 TABLET, FILM COATED ORAL at 05:25

## 2019-01-24 RX ADMIN — PROPOFOL 30 MG: 10 INJECTION, EMULSION INTRAVENOUS at 07:31

## 2019-01-24 RX ADMIN — AMIODARONE HYDROCHLORIDE 400 MG: 200 TABLET ORAL at 21:58

## 2019-01-24 RX ADMIN — EMOLLIENT - LOTION 1 APPLICATION: LOTION at 20:48

## 2019-01-24 RX ADMIN — ESCITALOPRAM 10 MG: 10 TABLET, FILM COATED ORAL at 09:35

## 2019-01-24 RX ADMIN — NYSTATIN: 100000 POWDER TOPICAL at 09:37

## 2019-01-24 RX ADMIN — PROPOFOL 10 MG: 10 INJECTION, EMULSION INTRAVENOUS at 07:53

## 2019-01-24 RX ADMIN — PROPOFOL 20 MG: 10 INJECTION, EMULSION INTRAVENOUS at 07:49

## 2019-01-24 RX ADMIN — ACETAMINOPHEN 650 MG: 325 TABLET, FILM COATED ORAL at 19:04

## 2019-01-24 RX ADMIN — DIGOXIN 125 MCG: 125 TABLET ORAL at 12:36

## 2019-01-24 RX ADMIN — METOPROLOL TARTRATE 12.5 MG: 25 TABLET ORAL at 20:27

## 2019-01-24 RX ADMIN — ACETAMINOPHEN 650 MG: 325 TABLET, FILM COATED ORAL at 13:37

## 2019-01-24 RX ADMIN — AMIODARONE HYDROCHLORIDE 400 MG: 200 TABLET ORAL at 13:36

## 2019-01-24 RX ADMIN — HEPARIN SODIUM 11.5 UNITS/KG/HR: 10000 INJECTION, SOLUTION INTRAVENOUS at 02:43

## 2019-01-24 RX ADMIN — SODIUM CHLORIDE: 9 INJECTION, SOLUTION INTRAVENOUS at 07:26

## 2019-01-24 RX ADMIN — ATORVASTATIN CALCIUM 20 MG: 20 TABLET, FILM COATED ORAL at 09:35

## 2019-01-24 RX ADMIN — METOPROLOL TARTRATE 12.5 MG: 25 TABLET ORAL at 09:35

## 2019-01-24 NOTE — ANESTHESIA POSTPROCEDURE EVALUATION
"Patient: Claudia Arnold    Procedure Summary     Date:  01/24/19 Room / Location:  [facility] PACU    Anesthesia Start:  0726 Anesthesia Stop:  0804    Procedures:       ADULT TRANSESOPHAGEAL ECHO (LEE) W/ CONT IF NECESSARY PER PROTOCOL      [facility] Diagnosis:       (Valvular Disease)      (atrial fib)    Scheduled Providers:  Jasmin Montalvo MD Provider:  Favio Lagunas MD    Anesthesia Type:  MAC ASA Status:  4          Anesthesia Type: No value filed.  Last vitals  BP   121/93 (01/24/19 0532)   Temp   36.4 °C (97.5 °F) (01/24/19 0443)   Pulse   93 (01/24/19 0600)   Resp   18 (01/24/19 0600)     SpO2   92 % (01/24/19 0532)     Post Anesthesia Care and Evaluation    Patient location during evaluation: bedside  Patient participation: complete - patient participated  Level of consciousness: awake  Pain management: adequate  Airway patency: patent  Anesthetic complications: No anesthetic complications    Cardiovascular status: acceptable  Respiratory status: acceptable  Hydration status: acceptable    Comments: /93   Pulse 93   Temp 36.4 °C (97.5 °F) (Oral)   Resp 18   Ht 170.2 cm (67\")   Wt (!) 145 kg (319 lb 10.7 oz)   SpO2 92%   BMI 50.07 kg/m²         "

## 2019-01-24 NOTE — ANESTHESIA PREPROCEDURE EVALUATION
Anesthesia Evaluation     no history of anesthetic complications:  NPO Solid Status: > 8 hours             Airway   Mallampati: II  Dental      Pulmonary - negative pulmonary ROS and normal exam   Cardiovascular - normal exam    (+) hypertension, valvular problems/murmurs AS, MR, MS and TI, CHF, hyperlipidemia,   (-) CAD      Neuro/Psych  (+) psychiatric history Anxiety,     GI/Hepatic/Renal/Endo    (+) morbid obesity,  renal disease,     Musculoskeletal     Abdominal    Substance History      OB/GYN          Other                        Anesthesia Plan    ASA 4     MAC     Anesthetic plan, all risks, benefits, and alternatives have been provided, discussed and informed consent has been obtained with: patient.

## 2019-01-25 ENCOUNTER — ANESTHESIA EVENT (OUTPATIENT)
Dept: PERIOP | Facility: HOSPITAL | Age: 74
End: 2019-01-25

## 2019-01-25 ENCOUNTER — APPOINTMENT (OUTPATIENT)
Dept: CARDIOLOGY | Facility: HOSPITAL | Age: 74
End: 2019-01-25

## 2019-01-25 LAB
ABO GROUP BLD: NORMAL
ANION GAP SERPL CALCULATED.3IONS-SCNC: 11 MMOL/L
APTT PPP: 104.3 SECONDS (ref 22.7–35.4)
APTT PPP: 77.3 SECONDS (ref 22.7–35.4)
BASOPHILS # BLD AUTO: 0.01 10*3/MM3 (ref 0–0.2)
BASOPHILS NFR BLD AUTO: 0.1 % (ref 0–1.5)
BH CV XLRA MEAS LEFT DIST CCA EDV: -17.6 CM/SEC
BH CV XLRA MEAS LEFT DIST CCA PSV: -77.4 CM/SEC
BH CV XLRA MEAS LEFT DIST ICA EDV: 15.9 CM/SEC
BH CV XLRA MEAS LEFT DIST ICA PSV: 45.9 CM/SEC
BH CV XLRA MEAS LEFT MID ICA EDV: 17.7 CM/SEC
BH CV XLRA MEAS LEFT MID ICA PSV: 62.9 CM/SEC
BH CV XLRA MEAS LEFT PROX CCA EDV: 24.6 CM/SEC
BH CV XLRA MEAS LEFT PROX CCA PSV: 89.7 CM/SEC
BH CV XLRA MEAS LEFT PROX ECA EDV: -5.9 CM/SEC
BH CV XLRA MEAS LEFT PROX ECA PSV: -40.8 CM/SEC
BH CV XLRA MEAS LEFT PROX ICA EDV: -16.6 CM/SEC
BH CV XLRA MEAS LEFT PROX ICA PSV: -60 CM/SEC
BH CV XLRA MEAS LEFT PROX SCLA EDV: 5.9 CM/SEC
BH CV XLRA MEAS LEFT PROX SCLA PSV: 91.5 CM/SEC
BH CV XLRA MEAS LEFT VERTEBRAL A EDV: 11.7 CM/SEC
BH CV XLRA MEAS LEFT VERTEBRAL A PSV: 53.9 CM/SEC
BH CV XLRA MEAS RIGHT DIST CCA EDV: -17.7 CM/SEC
BH CV XLRA MEAS RIGHT DIST CCA PSV: -58.5 CM/SEC
BH CV XLRA MEAS RIGHT DIST ICA EDV: -24.2 CM/SEC
BH CV XLRA MEAS RIGHT DIST ICA PSV: -58.8 CM/SEC
BH CV XLRA MEAS RIGHT MID ICA EDV: -22.4 CM/SEC
BH CV XLRA MEAS RIGHT MID ICA PSV: -51.6 CM/SEC
BH CV XLRA MEAS RIGHT PROX CCA EDV: 24.6 CM/SEC
BH CV XLRA MEAS RIGHT PROX CCA PSV: 117 CM/SEC
BH CV XLRA MEAS RIGHT PROX ECA EDV: -5.9 CM/SEC
BH CV XLRA MEAS RIGHT PROX ECA PSV: -59.2 CM/SEC
BH CV XLRA MEAS RIGHT PROX ICA EDV: -24 CM/SEC
BH CV XLRA MEAS RIGHT PROX ICA PSV: -62.5 CM/SEC
BH CV XLRA MEAS RIGHT PROX SCLA PSV: 76.6 CM/SEC
BH CV XLRA MEAS RIGHT VERTEBRAL A EDV: -17.7 CM/SEC
BH CV XLRA MEAS RIGHT VERTEBRAL A PSV: -52.6 CM/SEC
BLD GP AB SCN SERPL QL: NEGATIVE
BUN BLD-MCNC: 47 MG/DL (ref 8–23)
BUN/CREAT SERPL: 44.8 (ref 7–25)
CALCIUM SPEC-SCNC: 9.9 MG/DL (ref 8.6–10.5)
CHLORIDE SERPL-SCNC: 102 MMOL/L (ref 98–107)
CO2 SERPL-SCNC: 30 MMOL/L (ref 22–29)
CREAT BLD-MCNC: 1.05 MG/DL (ref 0.57–1)
DEPRECATED RDW RBC AUTO: 49 FL (ref 37–54)
EOSINOPHIL # BLD AUTO: 0.25 10*3/MM3 (ref 0–0.7)
EOSINOPHIL NFR BLD AUTO: 3.6 % (ref 0.3–6.2)
ERYTHROCYTE [DISTWIDTH] IN BLOOD BY AUTOMATED COUNT: 14.2 % (ref 11.7–13)
GFR SERPL CREATININE-BSD FRML MDRD: 51 ML/MIN/1.73
GLUCOSE BLD-MCNC: 98 MG/DL (ref 65–99)
GLUCOSE BLDC GLUCOMTR-MCNC: 103 MG/DL (ref 70–130)
GLUCOSE BLDC GLUCOMTR-MCNC: 107 MG/DL (ref 70–130)
GLUCOSE BLDC GLUCOMTR-MCNC: 171 MG/DL (ref 70–130)
GLUCOSE BLDC GLUCOMTR-MCNC: 182 MG/DL (ref 70–130)
HCT VFR BLD AUTO: 35.2 % (ref 35.6–45.5)
HGB BLD-MCNC: 10.7 G/DL (ref 11.9–15.5)
IMM GRANULOCYTES # BLD AUTO: 0.12 10*3/MM3 (ref 0–0.03)
IMM GRANULOCYTES NFR BLD AUTO: 1.7 % (ref 0–0.5)
LYMPHOCYTES # BLD AUTO: 0.63 10*3/MM3 (ref 0.9–4.8)
LYMPHOCYTES NFR BLD AUTO: 9.1 % (ref 19.6–45.3)
MCH RBC QN AUTO: 28.8 PG (ref 26.9–32)
MCHC RBC AUTO-ENTMCNC: 30.4 G/DL (ref 32.4–36.3)
MCV RBC AUTO: 94.9 FL (ref 80.5–98.2)
MONOCYTES # BLD AUTO: 0.89 10*3/MM3 (ref 0.2–1.2)
MONOCYTES NFR BLD AUTO: 12.9 % (ref 5–12)
NEUTROPHILS # BLD AUTO: 5.12 10*3/MM3 (ref 1.9–8.1)
NEUTROPHILS NFR BLD AUTO: 74.3 % (ref 42.7–76)
NT-PROBNP SERPL-MCNC: 6163 PG/ML (ref 0–900)
PLATELET # BLD AUTO: 325 10*3/MM3 (ref 140–500)
PMV BLD AUTO: 10.3 FL (ref 6–12)
POTASSIUM BLD-SCNC: 4.4 MMOL/L (ref 3.5–5.2)
RBC # BLD AUTO: 3.71 10*6/MM3 (ref 3.9–5.2)
RH BLD: POSITIVE
RIGHT ARM BP: NORMAL MMHG
SODIUM BLD-SCNC: 143 MMOL/L (ref 136–145)
T&S EXPIRATION DATE: NORMAL
WBC NRBC COR # BLD: 6.9 10*3/MM3 (ref 4.5–10.7)

## 2019-01-25 PROCEDURE — 94799 UNLISTED PULMONARY SVC/PX: CPT

## 2019-01-25 PROCEDURE — 25010000002 HEPARIN (PORCINE) PER 1000 UNITS: Performed by: INTERNAL MEDICINE

## 2019-01-25 PROCEDURE — 83880 ASSAY OF NATRIURETIC PEPTIDE: CPT | Performed by: NURSE PRACTITIONER

## 2019-01-25 PROCEDURE — 93880 EXTRACRANIAL BILAT STUDY: CPT

## 2019-01-25 PROCEDURE — 86901 BLOOD TYPING SEROLOGIC RH(D): CPT | Performed by: NURSE PRACTITIONER

## 2019-01-25 PROCEDURE — 80048 BASIC METABOLIC PNL TOTAL CA: CPT | Performed by: INTERNAL MEDICINE

## 2019-01-25 PROCEDURE — 99233 SBSQ HOSP IP/OBS HIGH 50: CPT | Performed by: THORACIC SURGERY (CARDIOTHORACIC VASCULAR SURGERY)

## 2019-01-25 PROCEDURE — 86850 RBC ANTIBODY SCREEN: CPT | Performed by: NURSE PRACTITIONER

## 2019-01-25 PROCEDURE — 85730 THROMBOPLASTIN TIME PARTIAL: CPT | Performed by: INTERNAL MEDICINE

## 2019-01-25 PROCEDURE — 86920 COMPATIBILITY TEST SPIN: CPT

## 2019-01-25 PROCEDURE — 82962 GLUCOSE BLOOD TEST: CPT

## 2019-01-25 PROCEDURE — 94660 CPAP INITIATION&MGMT: CPT

## 2019-01-25 PROCEDURE — 99232 SBSQ HOSP IP/OBS MODERATE 35: CPT | Performed by: INTERNAL MEDICINE

## 2019-01-25 PROCEDURE — 85025 COMPLETE CBC W/AUTO DIFF WBC: CPT | Performed by: INTERNAL MEDICINE

## 2019-01-25 PROCEDURE — 97535 SELF CARE MNGMENT TRAINING: CPT

## 2019-01-25 PROCEDURE — 63710000001 INSULIN LISPRO (HUMAN) PER 5 UNITS: Performed by: INTERNAL MEDICINE

## 2019-01-25 PROCEDURE — 86900 BLOOD TYPING SEROLOGIC ABO: CPT | Performed by: NURSE PRACTITIONER

## 2019-01-25 PROCEDURE — 97110 THERAPEUTIC EXERCISES: CPT

## 2019-01-25 RX ORDER — POTASSIUM CHLORIDE 750 MG/1
20 CAPSULE, EXTENDED RELEASE ORAL EVERY 8 HOURS
Status: COMPLETED | OUTPATIENT
Start: 2019-01-25 | End: 2019-01-25

## 2019-01-25 RX ORDER — CEFAZOLIN SODIUM IN 0.9 % NACL 3 G/100 ML
3 INTRAVENOUS SOLUTION, PIGGYBACK (ML) INTRAVENOUS
Status: COMPLETED | OUTPATIENT
Start: 2019-01-28 | End: 2019-01-28

## 2019-01-25 RX ORDER — CHLORHEXIDINE GLUCONATE 500 MG/1
1 CLOTH TOPICAL EVERY 12 HOURS
Status: COMPLETED | OUTPATIENT
Start: 2019-01-27 | End: 2019-01-28

## 2019-01-25 RX ORDER — CHLORHEXIDINE GLUCONATE 0.12 MG/ML
15 RINSE ORAL EVERY 12 HOURS SCHEDULED
Status: DISCONTINUED | OUTPATIENT
Start: 2019-01-25 | End: 2019-01-26

## 2019-01-25 RX ORDER — BUMETANIDE 0.25 MG/ML
2 INJECTION INTRAMUSCULAR; INTRAVENOUS EVERY 8 HOURS
Status: COMPLETED | OUTPATIENT
Start: 2019-01-25 | End: 2019-01-25

## 2019-01-25 RX ADMIN — ACETAMINOPHEN 650 MG: 325 TABLET, FILM COATED ORAL at 13:28

## 2019-01-25 RX ADMIN — NYSTATIN: 100000 POWDER TOPICAL at 08:49

## 2019-01-25 RX ADMIN — ACETAMINOPHEN 650 MG: 325 TABLET, FILM COATED ORAL at 08:24

## 2019-01-25 RX ADMIN — ACETAMINOPHEN 650 MG: 325 TABLET, FILM COATED ORAL at 21:37

## 2019-01-25 RX ADMIN — DIGOXIN 125 MCG: 125 TABLET ORAL at 13:00

## 2019-01-25 RX ADMIN — METOPROLOL TARTRATE 12.5 MG: 25 TABLET ORAL at 08:24

## 2019-01-25 RX ADMIN — NYSTATIN 1 APPLICATION: 100000 POWDER TOPICAL at 20:30

## 2019-01-25 RX ADMIN — AMIODARONE HYDROCHLORIDE 400 MG: 200 TABLET ORAL at 21:39

## 2019-01-25 RX ADMIN — EMOLLIENT - LOTION: LOTION at 08:49

## 2019-01-25 RX ADMIN — INSULIN LISPRO 2 UNITS: 100 INJECTION, SOLUTION INTRAVENOUS; SUBCUTANEOUS at 20:30

## 2019-01-25 RX ADMIN — BUMETANIDE 2 MG: 0.25 INJECTION INTRAMUSCULAR; INTRAVENOUS at 15:00

## 2019-01-25 RX ADMIN — ESCITALOPRAM 10 MG: 10 TABLET, FILM COATED ORAL at 08:25

## 2019-01-25 RX ADMIN — ASPIRIN 81 MG: 81 TABLET, DELAYED RELEASE ORAL at 08:25

## 2019-01-25 RX ADMIN — METOPROLOL TARTRATE 12.5 MG: 25 TABLET ORAL at 20:29

## 2019-01-25 RX ADMIN — ACETAMINOPHEN 650 MG: 325 TABLET, FILM COATED ORAL at 17:40

## 2019-01-25 RX ADMIN — HEPARIN SODIUM 9.5 UNITS/KG/HR: 10000 INJECTION, SOLUTION INTRAVENOUS at 14:33

## 2019-01-25 RX ADMIN — AMIODARONE HYDROCHLORIDE 400 MG: 200 TABLET ORAL at 13:31

## 2019-01-25 RX ADMIN — INSULIN LISPRO 2 UNITS: 100 INJECTION, SOLUTION INTRAVENOUS; SUBCUTANEOUS at 17:37

## 2019-01-25 RX ADMIN — POTASSIUM CHLORIDE 20 MEQ: 750 CAPSULE, EXTENDED RELEASE ORAL at 21:38

## 2019-01-25 RX ADMIN — POTASSIUM CHLORIDE 20 MEQ: 750 CAPSULE, EXTENDED RELEASE ORAL at 13:33

## 2019-01-25 RX ADMIN — AMIODARONE HYDROCHLORIDE 400 MG: 200 TABLET ORAL at 06:48

## 2019-01-25 RX ADMIN — EMOLLIENT - LOTION 1 APPLICATION: LOTION at 20:30

## 2019-01-25 RX ADMIN — CHLORHEXIDINE GLUCONATE 15 ML: 1.2 RINSE ORAL at 17:16

## 2019-01-25 RX ADMIN — BUMETANIDE 2 MG: 0.25 INJECTION INTRAMUSCULAR; INTRAVENOUS at 21:38

## 2019-01-25 RX ADMIN — ACETAMINOPHEN 650 MG: 325 TABLET, FILM COATED ORAL at 04:05

## 2019-01-25 RX ADMIN — MUPIROCIN 1 APPLICATION: 20 OINTMENT TOPICAL at 17:17

## 2019-01-25 RX ADMIN — ATORVASTATIN CALCIUM 20 MG: 20 TABLET, FILM COATED ORAL at 08:25

## 2019-01-26 LAB
ANION GAP SERPL CALCULATED.3IONS-SCNC: 10.4 MMOL/L
APTT PPP: 70.1 SECONDS (ref 22.7–35.4)
APTT PPP: 71.6 SECONDS (ref 22.7–35.4)
BASOPHILS # BLD AUTO: 0.02 10*3/MM3 (ref 0–0.2)
BASOPHILS NFR BLD AUTO: 0.3 % (ref 0–1.5)
BUN BLD-MCNC: 41 MG/DL (ref 8–23)
BUN/CREAT SERPL: 40.6 (ref 7–25)
CALCIUM SPEC-SCNC: 10.1 MG/DL (ref 8.6–10.5)
CHLORIDE SERPL-SCNC: 101 MMOL/L (ref 98–107)
CLOSE TME COLL+ADP + EPINEP PNL BLD: 97 % (ref 86–100)
CO2 SERPL-SCNC: 31.6 MMOL/L (ref 22–29)
CREAT BLD-MCNC: 1.01 MG/DL (ref 0.57–1)
DEPRECATED RDW RBC AUTO: 49.1 FL (ref 37–54)
EOSINOPHIL # BLD AUTO: 0.38 10*3/MM3 (ref 0–0.7)
EOSINOPHIL NFR BLD AUTO: 5.2 % (ref 0.3–6.2)
ERYTHROCYTE [DISTWIDTH] IN BLOOD BY AUTOMATED COUNT: 14.2 % (ref 11.7–13)
GFR SERPL CREATININE-BSD FRML MDRD: 54 ML/MIN/1.73
GLUCOSE BLD-MCNC: 106 MG/DL (ref 65–99)
GLUCOSE BLDC GLUCOMTR-MCNC: 107 MG/DL (ref 70–130)
GLUCOSE BLDC GLUCOMTR-MCNC: 107 MG/DL (ref 70–130)
GLUCOSE BLDC GLUCOMTR-MCNC: 113 MG/DL (ref 70–130)
GLUCOSE BLDC GLUCOMTR-MCNC: 114 MG/DL (ref 70–130)
GLUCOSE BLDC GLUCOMTR-MCNC: 123 MG/DL (ref 70–130)
GLUCOSE BLDC GLUCOMTR-MCNC: 145 MG/DL (ref 70–130)
HCT VFR BLD AUTO: 35.5 % (ref 35.6–45.5)
HGB BLD-MCNC: 10.9 G/DL (ref 11.9–15.5)
IMM GRANULOCYTES # BLD AUTO: 0.15 10*3/MM3 (ref 0–0.03)
IMM GRANULOCYTES NFR BLD AUTO: 2.1 % (ref 0–0.5)
LYMPHOCYTES # BLD AUTO: 0.73 10*3/MM3 (ref 0.9–4.8)
LYMPHOCYTES NFR BLD AUTO: 10 % (ref 19.6–45.3)
MCH RBC QN AUTO: 29.1 PG (ref 26.9–32)
MCHC RBC AUTO-ENTMCNC: 30.7 G/DL (ref 32.4–36.3)
MCV RBC AUTO: 94.7 FL (ref 80.5–98.2)
MONOCYTES # BLD AUTO: 0.92 10*3/MM3 (ref 0.2–1.2)
MONOCYTES NFR BLD AUTO: 12.6 % (ref 5–12)
NEUTROPHILS # BLD AUTO: 5.24 10*3/MM3 (ref 1.9–8.1)
NEUTROPHILS NFR BLD AUTO: 71.9 % (ref 42.7–76)
PLATELET # BLD AUTO: 336 10*3/MM3 (ref 140–500)
PMV BLD AUTO: 10.4 FL (ref 6–12)
POTASSIUM BLD-SCNC: 4.6 MMOL/L (ref 3.5–5.2)
RBC # BLD AUTO: 3.75 10*6/MM3 (ref 3.9–5.2)
SODIUM BLD-SCNC: 143 MMOL/L (ref 136–145)
WBC NRBC COR # BLD: 7.29 10*3/MM3 (ref 4.5–10.7)

## 2019-01-26 PROCEDURE — 85730 THROMBOPLASTIN TIME PARTIAL: CPT | Performed by: THORACIC SURGERY (CARDIOTHORACIC VASCULAR SURGERY)

## 2019-01-26 PROCEDURE — 94660 CPAP INITIATION&MGMT: CPT

## 2019-01-26 PROCEDURE — 82962 GLUCOSE BLOOD TEST: CPT

## 2019-01-26 PROCEDURE — 94799 UNLISTED PULMONARY SVC/PX: CPT

## 2019-01-26 PROCEDURE — 80048 BASIC METABOLIC PNL TOTAL CA: CPT | Performed by: INTERNAL MEDICINE

## 2019-01-26 PROCEDURE — 99231 SBSQ HOSP IP/OBS SF/LOW 25: CPT | Performed by: INTERNAL MEDICINE

## 2019-01-26 PROCEDURE — 99233 SBSQ HOSP IP/OBS HIGH 50: CPT | Performed by: NURSE PRACTITIONER

## 2019-01-26 PROCEDURE — 85730 THROMBOPLASTIN TIME PARTIAL: CPT | Performed by: INTERNAL MEDICINE

## 2019-01-26 PROCEDURE — 85576 BLOOD PLATELET AGGREGATION: CPT | Performed by: NURSE PRACTITIONER

## 2019-01-26 PROCEDURE — 85025 COMPLETE CBC W/AUTO DIFF WBC: CPT | Performed by: INTERNAL MEDICINE

## 2019-01-26 PROCEDURE — 25010000002 HEPARIN (PORCINE) PER 1000 UNITS: Performed by: INTERNAL MEDICINE

## 2019-01-26 RX ORDER — CHLORHEXIDINE GLUCONATE 0.12 MG/ML
15 RINSE ORAL ONCE
Status: DISCONTINUED | OUTPATIENT
Start: 2019-01-26 | End: 2019-01-26

## 2019-01-26 RX ORDER — DIAPER,BRIEF,INFANT-TODD,DISP
1 EACH MISCELLANEOUS EVERY 6 HOURS SCHEDULED
Status: DISCONTINUED | OUTPATIENT
Start: 2019-01-27 | End: 2019-02-18 | Stop reason: HOSPADM

## 2019-01-26 RX ORDER — CHLORHEXIDINE GLUCONATE 0.12 MG/ML
15 RINSE ORAL EVERY 12 HOURS SCHEDULED
Status: DISCONTINUED | OUTPATIENT
Start: 2019-01-27 | End: 2019-01-28

## 2019-01-26 RX ORDER — CHLORHEXIDINE GLUCONATE 0.12 MG/ML
15 RINSE ORAL EVERY 12 HOURS SCHEDULED
Status: DISCONTINUED | OUTPATIENT
Start: 2019-01-27 | End: 2019-01-26

## 2019-01-26 RX ORDER — CHLORHEXIDINE GLUCONATE 500 MG/1
1 CLOTH TOPICAL EVERY 12 HOURS PRN
Status: DISCONTINUED | OUTPATIENT
Start: 2019-01-26 | End: 2019-01-28 | Stop reason: HOSPADM

## 2019-01-26 RX ORDER — CHLORHEXIDINE GLUCONATE 0.12 MG/ML
15 RINSE ORAL ONCE
Status: COMPLETED | OUTPATIENT
Start: 2019-01-28 | End: 2019-01-28

## 2019-01-26 RX ADMIN — MUPIROCIN 1 APPLICATION: 20 OINTMENT TOPICAL at 11:32

## 2019-01-26 RX ADMIN — ACETAMINOPHEN 650 MG: 325 TABLET, FILM COATED ORAL at 17:56

## 2019-01-26 RX ADMIN — ACETAMINOPHEN 650 MG: 325 TABLET, FILM COATED ORAL at 03:34

## 2019-01-26 RX ADMIN — NYSTATIN: 100000 POWDER TOPICAL at 08:57

## 2019-01-26 RX ADMIN — ATORVASTATIN CALCIUM 20 MG: 20 TABLET, FILM COATED ORAL at 08:40

## 2019-01-26 RX ADMIN — CHLORHEXIDINE GLUCONATE 15 ML: 1.2 RINSE ORAL at 08:40

## 2019-01-26 RX ADMIN — DIGOXIN 125 MCG: 125 TABLET ORAL at 11:32

## 2019-01-26 RX ADMIN — ACETAMINOPHEN 650 MG: 325 TABLET, FILM COATED ORAL at 13:46

## 2019-01-26 RX ADMIN — METOPROLOL TARTRATE 12.5 MG: 25 TABLET ORAL at 22:12

## 2019-01-26 RX ADMIN — ACETAMINOPHEN 650 MG: 325 TABLET, FILM COATED ORAL at 09:00

## 2019-01-26 RX ADMIN — HEPARIN SODIUM 11.5 UNITS/KG/HR: 10000 INJECTION, SOLUTION INTRAVENOUS at 10:18

## 2019-01-26 RX ADMIN — EMOLLIENT - LOTION: LOTION at 08:57

## 2019-01-26 RX ADMIN — NYSTATIN: 100000 POWDER TOPICAL at 22:14

## 2019-01-26 RX ADMIN — HYDROCODONE BITARTRATE AND ACETAMINOPHEN 1 TABLET: 7.5; 325 TABLET ORAL at 22:12

## 2019-01-26 RX ADMIN — EMOLLIENT - LOTION: LOTION at 22:14

## 2019-01-26 RX ADMIN — ASPIRIN 81 MG: 81 TABLET, DELAYED RELEASE ORAL at 08:40

## 2019-01-26 RX ADMIN — AMIODARONE HYDROCHLORIDE 400 MG: 200 TABLET ORAL at 06:22

## 2019-01-26 RX ADMIN — ESCITALOPRAM 10 MG: 10 TABLET, FILM COATED ORAL at 08:40

## 2019-01-26 RX ADMIN — METOPROLOL TARTRATE 12.5 MG: 25 TABLET ORAL at 08:40

## 2019-01-26 RX ADMIN — AMIODARONE HYDROCHLORIDE 400 MG: 200 TABLET ORAL at 17:44

## 2019-01-27 ENCOUNTER — APPOINTMENT (OUTPATIENT)
Dept: CARDIOLOGY | Facility: HOSPITAL | Age: 74
End: 2019-01-27
Attending: SURGERY

## 2019-01-27 ENCOUNTER — APPOINTMENT (OUTPATIENT)
Dept: GENERAL RADIOLOGY | Facility: HOSPITAL | Age: 74
End: 2019-01-27

## 2019-01-27 LAB
ABO + RH BLD: NORMAL
ABO + RH BLD: NORMAL
ABO GROUP BLD: NORMAL
ANION GAP SERPL CALCULATED.3IONS-SCNC: 8.2 MMOL/L
APTT PPP: 89.8 SECONDS (ref 22.7–35.4)
BASOPHILS # BLD AUTO: 0.01 10*3/MM3 (ref 0–0.2)
BASOPHILS NFR BLD AUTO: 0.1 % (ref 0–1.5)
BH BB BLOOD EXPIRATION DATE: NORMAL
BH BB BLOOD EXPIRATION DATE: NORMAL
BH BB BLOOD TYPE BARCODE: 7300
BH BB BLOOD TYPE BARCODE: 7300
BH BB DISPENSE STATUS: NORMAL
BH BB DISPENSE STATUS: NORMAL
BH BB PRODUCT CODE: NORMAL
BH BB PRODUCT CODE: NORMAL
BH BB UNIT NUMBER: NORMAL
BH BB UNIT NUMBER: NORMAL
BH CV UPPER VENOUS LEFT AXILLARY AUGMENT: NORMAL
BH CV UPPER VENOUS LEFT AXILLARY COMPETENT: NORMAL
BH CV UPPER VENOUS LEFT AXILLARY COMPRESS: NORMAL
BH CV UPPER VENOUS LEFT AXILLARY PHASIC: NORMAL
BH CV UPPER VENOUS LEFT AXILLARY SPONT: NORMAL
BH CV UPPER VENOUS LEFT BASILIC FOREARM COMPRESS: NORMAL
BH CV UPPER VENOUS LEFT BASILIC UPPER COMPRESS: NORMAL
BH CV UPPER VENOUS LEFT BRACHIAL COMPRESS: NORMAL
BH CV UPPER VENOUS LEFT CEPHALIC FOREARM COLOR: 1
BH CV UPPER VENOUS LEFT CEPHALIC FOREARM COMPRESS: NORMAL
BH CV UPPER VENOUS LEFT CEPHALIC FOREARM THROMBUS: NORMAL
BH CV UPPER VENOUS LEFT CEPHALIC UPPER COMPRESS: NORMAL
BH CV UPPER VENOUS LEFT INTERNAL JUGULAR AUGMENT: NORMAL
BH CV UPPER VENOUS LEFT INTERNAL JUGULAR COMPETENT: NORMAL
BH CV UPPER VENOUS LEFT INTERNAL JUGULAR COMPRESS: NORMAL
BH CV UPPER VENOUS LEFT INTERNAL JUGULAR PHASIC: NORMAL
BH CV UPPER VENOUS LEFT INTERNAL JUGULAR SPONT: NORMAL
BH CV UPPER VENOUS LEFT RADIAL COMPRESS: NORMAL
BH CV UPPER VENOUS LEFT SUBCLAVIAN AUGMENT: NORMAL
BH CV UPPER VENOUS LEFT SUBCLAVIAN COMPETENT: NORMAL
BH CV UPPER VENOUS LEFT SUBCLAVIAN COMPRESS: NORMAL
BH CV UPPER VENOUS LEFT SUBCLAVIAN PHASIC: NORMAL
BH CV UPPER VENOUS LEFT SUBCLAVIAN SPONT: NORMAL
BH CV UPPER VENOUS LEFT ULNAR COMPRESS: NORMAL
BH CV UPPER VENOUS RIGHT INTERNAL JUGULAR AUGMENT: NORMAL
BH CV UPPER VENOUS RIGHT INTERNAL JUGULAR COMPETENT: NORMAL
BH CV UPPER VENOUS RIGHT INTERNAL JUGULAR COMPRESS: NORMAL
BH CV UPPER VENOUS RIGHT INTERNAL JUGULAR PHASIC: NORMAL
BH CV UPPER VENOUS RIGHT INTERNAL JUGULAR SPONT: NORMAL
BH CV UPPER VENOUS RIGHT SUBCLAVIAN AUGMENT: NORMAL
BH CV UPPER VENOUS RIGHT SUBCLAVIAN COMPETENT: NORMAL
BH CV UPPER VENOUS RIGHT SUBCLAVIAN COMPRESS: NORMAL
BH CV UPPER VENOUS RIGHT SUBCLAVIAN PHASIC: NORMAL
BH CV UPPER VENOUS RIGHT SUBCLAVIAN SPONT: NORMAL
BLD GP AB SCN SERPL QL: NEGATIVE
BUN BLD-MCNC: 32 MG/DL (ref 8–23)
BUN/CREAT SERPL: 32.3 (ref 7–25)
CALCIUM SPEC-SCNC: 9.8 MG/DL (ref 8.6–10.5)
CHLORIDE SERPL-SCNC: 101 MMOL/L (ref 98–107)
CO2 SERPL-SCNC: 33.8 MMOL/L (ref 22–29)
CREAT BLD-MCNC: 0.99 MG/DL (ref 0.57–1)
CROSSMATCH INTERPRETATION: NORMAL
CROSSMATCH INTERPRETATION: NORMAL
DEPRECATED RDW RBC AUTO: 49.1 FL (ref 37–54)
EOSINOPHIL # BLD AUTO: 0.47 10*3/MM3 (ref 0–0.7)
EOSINOPHIL NFR BLD AUTO: 5.6 % (ref 0.3–6.2)
ERYTHROCYTE [DISTWIDTH] IN BLOOD BY AUTOMATED COUNT: 14.3 % (ref 11.7–13)
GFR SERPL CREATININE-BSD FRML MDRD: 55 ML/MIN/1.73
GLUCOSE BLD-MCNC: 111 MG/DL (ref 65–99)
GLUCOSE BLDC GLUCOMTR-MCNC: 104 MG/DL (ref 70–130)
GLUCOSE BLDC GLUCOMTR-MCNC: 107 MG/DL (ref 70–130)
GLUCOSE BLDC GLUCOMTR-MCNC: 116 MG/DL (ref 70–130)
GLUCOSE BLDC GLUCOMTR-MCNC: 169 MG/DL (ref 70–130)
HCT VFR BLD AUTO: 34.7 % (ref 35.6–45.5)
HGB BLD-MCNC: 10.7 G/DL (ref 11.9–15.5)
IMM GRANULOCYTES # BLD AUTO: 0.21 10*3/MM3 (ref 0–0.03)
IMM GRANULOCYTES NFR BLD AUTO: 2.5 % (ref 0–0.5)
LYMPHOCYTES # BLD AUTO: 0.9 10*3/MM3 (ref 0.9–4.8)
LYMPHOCYTES NFR BLD AUTO: 10.8 % (ref 19.6–45.3)
MCH RBC QN AUTO: 29.2 PG (ref 26.9–32)
MCHC RBC AUTO-ENTMCNC: 30.8 G/DL (ref 32.4–36.3)
MCV RBC AUTO: 94.8 FL (ref 80.5–98.2)
MONOCYTES # BLD AUTO: 1.19 10*3/MM3 (ref 0.2–1.2)
MONOCYTES NFR BLD AUTO: 14.3 % (ref 5–12)
NEUTROPHILS # BLD AUTO: 5.75 10*3/MM3 (ref 1.9–8.1)
NEUTROPHILS NFR BLD AUTO: 69.2 % (ref 42.7–76)
PLATELET # BLD AUTO: 344 10*3/MM3 (ref 140–500)
PMV BLD AUTO: 10.1 FL (ref 6–12)
POTASSIUM BLD-SCNC: 4.7 MMOL/L (ref 3.5–5.2)
RBC # BLD AUTO: 3.66 10*6/MM3 (ref 3.9–5.2)
RH BLD: POSITIVE
SODIUM BLD-SCNC: 143 MMOL/L (ref 136–145)
T&S EXPIRATION DATE: NORMAL
UNIT  ABO: NORMAL
UNIT  ABO: NORMAL
UNIT  RH: NORMAL
UNIT  RH: NORMAL
WBC NRBC COR # BLD: 8.32 10*3/MM3 (ref 4.5–10.7)

## 2019-01-27 PROCEDURE — 82962 GLUCOSE BLOOD TEST: CPT

## 2019-01-27 PROCEDURE — 99232 SBSQ HOSP IP/OBS MODERATE 35: CPT | Performed by: INTERNAL MEDICINE

## 2019-01-27 PROCEDURE — 86901 BLOOD TYPING SEROLOGIC RH(D): CPT | Performed by: INTERNAL MEDICINE

## 2019-01-27 PROCEDURE — 85025 COMPLETE CBC W/AUTO DIFF WBC: CPT | Performed by: INTERNAL MEDICINE

## 2019-01-27 PROCEDURE — 86850 RBC ANTIBODY SCREEN: CPT | Performed by: INTERNAL MEDICINE

## 2019-01-27 PROCEDURE — 25010000002 HEPARIN (PORCINE) PER 1000 UNITS: Performed by: NURSE PRACTITIONER

## 2019-01-27 PROCEDURE — 93971 EXTREMITY STUDY: CPT

## 2019-01-27 PROCEDURE — 99232 SBSQ HOSP IP/OBS MODERATE 35: CPT | Performed by: NURSE PRACTITIONER

## 2019-01-27 PROCEDURE — 93010 ELECTROCARDIOGRAM REPORT: CPT | Performed by: INTERNAL MEDICINE

## 2019-01-27 PROCEDURE — 71045 X-RAY EXAM CHEST 1 VIEW: CPT

## 2019-01-27 PROCEDURE — 80048 BASIC METABOLIC PNL TOTAL CA: CPT | Performed by: INTERNAL MEDICINE

## 2019-01-27 PROCEDURE — 97110 THERAPEUTIC EXERCISES: CPT | Performed by: PHYSICAL THERAPIST

## 2019-01-27 PROCEDURE — 86923 COMPATIBILITY TEST ELECTRIC: CPT

## 2019-01-27 PROCEDURE — 93005 ELECTROCARDIOGRAM TRACING: CPT | Performed by: NURSE PRACTITIONER

## 2019-01-27 PROCEDURE — 85730 THROMBOPLASTIN TIME PARTIAL: CPT | Performed by: INTERNAL MEDICINE

## 2019-01-27 PROCEDURE — 86900 BLOOD TYPING SEROLOGIC ABO: CPT | Performed by: INTERNAL MEDICINE

## 2019-01-27 PROCEDURE — 63710000001 INSULIN LISPRO (HUMAN) PER 5 UNITS: Performed by: INTERNAL MEDICINE

## 2019-01-27 RX ORDER — BUMETANIDE 0.25 MG/ML
2 INJECTION INTRAMUSCULAR; INTRAVENOUS EVERY 6 HOURS
Status: COMPLETED | OUTPATIENT
Start: 2019-01-27 | End: 2019-01-27

## 2019-01-27 RX ADMIN — EMOLLIENT - LOTION: LOTION at 22:41

## 2019-01-27 RX ADMIN — ASPIRIN 81 MG: 81 TABLET, DELAYED RELEASE ORAL at 09:48

## 2019-01-27 RX ADMIN — ACETAMINOPHEN 650 MG: 325 TABLET, FILM COATED ORAL at 22:44

## 2019-01-27 RX ADMIN — HYDROCORTISONE 1 APPLICATION: 1 CREAM TOPICAL at 22:40

## 2019-01-27 RX ADMIN — ESCITALOPRAM 10 MG: 10 TABLET, FILM COATED ORAL at 09:48

## 2019-01-27 RX ADMIN — CHLORHEXIDINE GLUCONATE 15 ML: 1.2 RINSE ORAL at 22:41

## 2019-01-27 RX ADMIN — HEPARIN SODIUM 11.5 UNITS/KG/HR: 10000 INJECTION, SOLUTION INTRAVENOUS at 16:24

## 2019-01-27 RX ADMIN — HYDROCORTISONE 1 APPLICATION: 1 CREAM TOPICAL at 18:25

## 2019-01-27 RX ADMIN — SODIUM CHLORIDE, PRESERVATIVE FREE 10 ML: 5 INJECTION INTRAVENOUS at 22:45

## 2019-01-27 RX ADMIN — NYSTATIN: 100000 POWDER TOPICAL at 09:48

## 2019-01-27 RX ADMIN — AMIODARONE HYDROCHLORIDE 400 MG: 200 TABLET ORAL at 09:47

## 2019-01-27 RX ADMIN — HYDROCORTISONE 1 APPLICATION: 1 CREAM TOPICAL at 09:47

## 2019-01-27 RX ADMIN — ACETAMINOPHEN 650 MG: 325 TABLET, FILM COATED ORAL at 18:25

## 2019-01-27 RX ADMIN — BUMETANIDE 2 MG: 0.25 INJECTION INTRAMUSCULAR; INTRAVENOUS at 16:26

## 2019-01-27 RX ADMIN — METOPROLOL TARTRATE 12.5 MG: 25 TABLET ORAL at 22:37

## 2019-01-27 RX ADMIN — AMIODARONE HYDROCHLORIDE 400 MG: 200 TABLET ORAL at 16:27

## 2019-01-27 RX ADMIN — BUMETANIDE 2 MG: 0.25 INJECTION INTRAMUSCULAR; INTRAVENOUS at 22:40

## 2019-01-27 RX ADMIN — ATORVASTATIN CALCIUM 20 MG: 20 TABLET, FILM COATED ORAL at 09:48

## 2019-01-27 RX ADMIN — CHLORHEXIDINE GLUCONATE 1 APPLICATION: 500 CLOTH TOPICAL at 22:41

## 2019-01-27 RX ADMIN — DIGOXIN 125 MCG: 125 TABLET ORAL at 13:57

## 2019-01-27 RX ADMIN — NYSTATIN: 100000 POWDER TOPICAL at 22:40

## 2019-01-27 RX ADMIN — AMIODARONE HYDROCHLORIDE 400 MG: 200 TABLET ORAL at 22:38

## 2019-01-27 RX ADMIN — ACETAMINOPHEN 650 MG: 325 TABLET, FILM COATED ORAL at 09:48

## 2019-01-27 RX ADMIN — METOPROLOL TARTRATE 12.5 MG: 25 TABLET ORAL at 09:48

## 2019-01-27 RX ADMIN — INSULIN LISPRO 2 UNITS: 100 INJECTION, SOLUTION INTRAVENOUS; SUBCUTANEOUS at 09:40

## 2019-01-27 RX ADMIN — HEPARIN SODIUM 11.5 UNITS/KG/HR: 10000 INJECTION, SOLUTION INTRAVENOUS at 01:46

## 2019-01-27 RX ADMIN — ACETAMINOPHEN 650 MG: 325 TABLET, FILM COATED ORAL at 13:58

## 2019-01-27 RX ADMIN — EMOLLIENT - LOTION: LOTION at 09:48

## 2019-01-28 ENCOUNTER — ANESTHESIA (OUTPATIENT)
Dept: PERIOP | Facility: HOSPITAL | Age: 74
End: 2019-01-28

## 2019-01-28 ENCOUNTER — APPOINTMENT (OUTPATIENT)
Dept: GENERAL RADIOLOGY | Facility: HOSPITAL | Age: 74
End: 2019-01-28

## 2019-01-28 ENCOUNTER — ANCILLARY PROCEDURE (OUTPATIENT)
Dept: PERIOP | Facility: HOSPITAL | Age: 74
End: 2019-01-28
Attending: ANESTHESIOLOGY

## 2019-01-28 LAB
ACT BLD: 109 SECONDS (ref 82–152)
ACT BLD: 131 SECONDS (ref 82–152)
ACT BLD: 307 SECONDS (ref 82–152)
ACT BLD: 340 SECONDS (ref 82–152)
ACT BLD: 356 SECONDS (ref 82–152)
ACT BLD: 378 SECONDS (ref 82–152)
ACT BLD: 400 SECONDS (ref 82–152)
ACT BLD: 406 SECONDS (ref 82–152)
ALBUMIN SERPL-MCNC: 3.3 G/DL (ref 3.5–5.2)
ALBUMIN SERPL-MCNC: 3.6 G/DL (ref 3.5–5.2)
ALBUMIN SERPL-MCNC: 3.8 G/DL (ref 3.5–5.2)
ALBUMIN/GLOB SERPL: 1.6 G/DL
ALP SERPL-CCNC: 53 U/L (ref 39–117)
ALT SERPL W P-5'-P-CCNC: 22 U/L (ref 1–33)
ANION GAP SERPL CALCULATED.3IONS-SCNC: 10.9 MMOL/L
ANION GAP SERPL CALCULATED.3IONS-SCNC: 17.1 MMOL/L
ANION GAP SERPL CALCULATED.3IONS-SCNC: 19.2 MMOL/L
ANION GAP SERPL CALCULATED.3IONS-SCNC: 20 MMOL/L
APTT PPP: 27.3 SECONDS (ref 22.7–35.4)
APTT PPP: 29.1 SECONDS (ref 22.7–35.4)
APTT PPP: 80.3 SECONDS (ref 22.7–35.4)
ARTERIAL PATENCY WRIST A: ABNORMAL
AST SERPL-CCNC: 112 U/L (ref 1–32)
ATMOSPHERIC PRESS: 742.8 MMHG
ATMOSPHERIC PRESS: 743.4 MMHG
ATMOSPHERIC PRESS: 743.9 MMHG
BASE EXCESS BLDA CALC-SCNC: 0.7 MMOL/L (ref 0–2)
BASE EXCESS BLDA CALC-SCNC: 0.9 MMOL/L (ref 0–2)
BASE EXCESS BLDA CALC-SCNC: 1.9 MMOL/L (ref 0–2)
BASE EXCESS BLDA CALC-SCNC: 12 MMOL/L (ref -5–5)
BASE EXCESS BLDA CALC-SCNC: 13 MMOL/L (ref -5–5)
BASE EXCESS BLDA CALC-SCNC: 16 MMOL/L (ref -5–5)
BASE EXCESS BLDA CALC-SCNC: 7 MMOL/L (ref -5–5)
BASOPHILS # BLD AUTO: 0.02 10*3/MM3 (ref 0–0.2)
BASOPHILS # BLD AUTO: 0.05 10*3/MM3 (ref 0–0.2)
BASOPHILS NFR BLD AUTO: 0.2 % (ref 0–1.5)
BASOPHILS NFR BLD AUTO: 0.2 % (ref 0–1.5)
BDY SITE: ABNORMAL
BILIRUB SERPL-MCNC: 0.6 MG/DL (ref 0.1–1.2)
BUN BLD-MCNC: 16 MG/DL (ref 8–23)
BUN BLD-MCNC: 21 MG/DL (ref 8–23)
BUN BLD-MCNC: 28 MG/DL (ref 8–23)
BUN BLD-MCNC: 31 MG/DL (ref 8–23)
BUN/CREAT SERPL: 14.5 (ref 7–25)
BUN/CREAT SERPL: 15.4 (ref 7–25)
BUN/CREAT SERPL: 20.7 (ref 7–25)
BUN/CREAT SERPL: 21.7 (ref 7–25)
CA-I BLD-MCNC: 5.2 MG/DL (ref 4.6–5.4)
CA-I BLD-MCNC: 5.4 MG/DL (ref 4.6–5.4)
CA-I BLD-MCNC: 5.4 MG/DL (ref 4.6–5.4)
CA-I SERPL ISE-MCNC: 1.31 MMOL/L (ref 1.15–1.35)
CA-I SERPL ISE-MCNC: 1.34 MMOL/L (ref 1.15–1.35)
CA-I SERPL ISE-MCNC: 1.35 MMOL/L (ref 1.15–1.35)
CALCIUM SPEC-SCNC: 10.1 MG/DL (ref 8.6–10.5)
CHLORIDE SERPL-SCNC: 100 MMOL/L (ref 98–107)
CHLORIDE SERPL-SCNC: 101 MMOL/L (ref 98–107)
CO2 BLDA-SCNC: 33 MMOL/L (ref 24–29)
CO2 BLDA-SCNC: 37 MMOL/L (ref 24–29)
CO2 BLDA-SCNC: 37 MMOL/L (ref 24–29)
CO2 BLDA-SCNC: 40 MMOL/L (ref 24–29)
CO2 BLDA-SCNC: 41 MMOL/L (ref 24–29)
CO2 SERPL-SCNC: 23 MMOL/L (ref 22–29)
CO2 SERPL-SCNC: 23.8 MMOL/L (ref 22–29)
CO2 SERPL-SCNC: 25.9 MMOL/L (ref 22–29)
CO2 SERPL-SCNC: 32.1 MMOL/L (ref 22–29)
CREAT BLD-MCNC: 1.04 MG/DL (ref 0.57–1)
CREAT BLD-MCNC: 1.29 MG/DL (ref 0.57–1)
CREAT BLD-MCNC: 1.45 MG/DL (ref 0.57–1)
CREAT BLD-MCNC: 1.5 MG/DL (ref 0.57–1)
DEPRECATED RDW RBC AUTO: 50 FL (ref 37–54)
DEPRECATED RDW RBC AUTO: 51.1 FL (ref 37–54)
DEPRECATED RDW RBC AUTO: 51.2 FL (ref 37–54)
DEPRECATED RDW RBC AUTO: 51.6 FL (ref 37–54)
EOSINOPHIL # BLD AUTO: 0.33 10*3/MM3 (ref 0–0.7)
EOSINOPHIL # BLD AUTO: 0.46 10*3/MM3 (ref 0–0.7)
EOSINOPHIL NFR BLD AUTO: 1.5 % (ref 0.3–6.2)
EOSINOPHIL NFR BLD AUTO: 5.6 % (ref 0.3–6.2)
ERYTHROCYTE [DISTWIDTH] IN BLOOD BY AUTOMATED COUNT: 14.3 % (ref 11.7–13)
ERYTHROCYTE [DISTWIDTH] IN BLOOD BY AUTOMATED COUNT: 14.3 % (ref 11.7–13)
ERYTHROCYTE [DISTWIDTH] IN BLOOD BY AUTOMATED COUNT: 14.5 % (ref 11.7–13)
ERYTHROCYTE [DISTWIDTH] IN BLOOD BY AUTOMATED COUNT: 14.5 % (ref 11.7–13)
FIBRINOGEN PPP-MCNC: 512 MG/DL (ref 219–464)
FIBRINOGEN PPP-MCNC: 540 MG/DL (ref 219–464)
GFR SERPL CREATININE-BSD FRML MDRD: 34 ML/MIN/1.73
GFR SERPL CREATININE-BSD FRML MDRD: 35 ML/MIN/1.73
GFR SERPL CREATININE-BSD FRML MDRD: 41 ML/MIN/1.73
GFR SERPL CREATININE-BSD FRML MDRD: 52 ML/MIN/1.73
GLOBULIN UR ELPH-MCNC: 2.3 GM/DL
GLUCOSE BLD-MCNC: 104 MG/DL (ref 65–99)
GLUCOSE BLD-MCNC: 173 MG/DL (ref 65–99)
GLUCOSE BLD-MCNC: 208 MG/DL (ref 65–99)
GLUCOSE BLD-MCNC: 211 MG/DL (ref 65–99)
GLUCOSE BLDC GLUCOMTR-MCNC: 106 MG/DL (ref 70–130)
GLUCOSE BLDC GLUCOMTR-MCNC: 119 MG/DL (ref 70–130)
GLUCOSE BLDC GLUCOMTR-MCNC: 122 MG/DL (ref 70–130)
GLUCOSE BLDC GLUCOMTR-MCNC: 128 MG/DL (ref 70–130)
GLUCOSE BLDC GLUCOMTR-MCNC: 134 MG/DL (ref 70–130)
GLUCOSE BLDC GLUCOMTR-MCNC: 140 MG/DL (ref 70–130)
GLUCOSE BLDC GLUCOMTR-MCNC: 152 MG/DL (ref 70–130)
GLUCOSE BLDC GLUCOMTR-MCNC: 154 MG/DL (ref 70–130)
GLUCOSE BLDC GLUCOMTR-MCNC: 156 MG/DL (ref 70–130)
GLUCOSE BLDC GLUCOMTR-MCNC: 159 MG/DL (ref 70–130)
GLUCOSE BLDC GLUCOMTR-MCNC: 161 MG/DL (ref 70–130)
GLUCOSE BLDC GLUCOMTR-MCNC: 169 MG/DL (ref 70–130)
GLUCOSE BLDC GLUCOMTR-MCNC: 174 MG/DL (ref 70–130)
GLUCOSE BLDC GLUCOMTR-MCNC: 175 MG/DL (ref 70–130)
GLUCOSE BLDC GLUCOMTR-MCNC: 182 MG/DL (ref 70–130)
GLUCOSE BLDC GLUCOMTR-MCNC: 200 MG/DL (ref 70–130)
GLUCOSE BLDC GLUCOMTR-MCNC: 203 MG/DL (ref 70–130)
GLUCOSE BLDC GLUCOMTR-MCNC: 205 MG/DL (ref 70–130)
GLUCOSE BLDC GLUCOMTR-MCNC: 207 MG/DL (ref 70–130)
GLUCOSE BLDC GLUCOMTR-MCNC: 221 MG/DL (ref 70–130)
HCO3 BLDA-SCNC: 26.7 MMOL/L (ref 22–28)
HCO3 BLDA-SCNC: 27 MMOL/L (ref 22–28)
HCO3 BLDA-SCNC: 29.9 MMOL/L (ref 22–28)
HCO3 BLDA-SCNC: 31.8 MMOL/L (ref 22–26)
HCO3 BLDA-SCNC: 35.8 MMOL/L (ref 22–26)
HCO3 BLDA-SCNC: 36.1 MMOL/L (ref 22–26)
HCO3 BLDA-SCNC: 38.6 MMOL/L (ref 22–26)
HCO3 BLDA-SCNC: 39 MMOL/L (ref 22–26)
HCO3 BLDA-SCNC: 39 MMOL/L (ref 22–26)
HCO3 BLDA-SCNC: 39.3 MMOL/L (ref 22–26)
HCT VFR BLD AUTO: 29.1 % (ref 35.6–45.5)
HCT VFR BLD AUTO: 35.3 % (ref 35.6–45.5)
HCT VFR BLD AUTO: 36 % (ref 35.6–45.5)
HCT VFR BLD AUTO: 36.8 % (ref 35.6–45.5)
HCT VFR BLDA CALC: 27 % (ref 38–51)
HCT VFR BLDA CALC: 29 % (ref 38–51)
HCT VFR BLDA CALC: 29 % (ref 38–51)
HCT VFR BLDA CALC: 30 % (ref 38–51)
HCT VFR BLDA CALC: 31 % (ref 38–51)
HGB BLD-MCNC: 10.5 G/DL (ref 11.9–15.5)
HGB BLD-MCNC: 11 G/DL (ref 11.9–15.5)
HGB BLD-MCNC: 11.1 G/DL (ref 11.9–15.5)
HGB BLD-MCNC: 8.7 G/DL (ref 11.9–15.5)
HGB BLDA-MCNC: 10.2 G/DL (ref 12–17)
HGB BLDA-MCNC: 10.5 G/DL (ref 12–17)
HGB BLDA-MCNC: 9.2 G/DL (ref 12–17)
HGB BLDA-MCNC: 9.9 G/DL (ref 12–17)
HGB BLDA-MCNC: 9.9 G/DL (ref 12–17)
HOROWITZ INDEX BLD+IHG-RTO: 100 %
HOROWITZ INDEX BLD+IHG-RTO: 100 %
HOROWITZ INDEX BLD+IHG-RTO: 40 %
IMM GRANULOCYTES # BLD AUTO: 0.33 10*3/MM3 (ref 0–0.03)
IMM GRANULOCYTES # BLD AUTO: 1.08 10*3/MM3 (ref 0–0.03)
IMM GRANULOCYTES NFR BLD AUTO: 4 % (ref 0–0.5)
IMM GRANULOCYTES NFR BLD AUTO: 5 % (ref 0–0.5)
INR PPP: 1.44 (ref 0.9–1.1)
INR PPP: 1.65 (ref 0.9–1.1)
LYMPHOCYTES # BLD AUTO: 1.37 10*3/MM3 (ref 0.9–4.8)
LYMPHOCYTES # BLD AUTO: 1.58 10*3/MM3 (ref 0.9–4.8)
LYMPHOCYTES NFR BLD AUTO: 19.3 % (ref 19.6–45.3)
LYMPHOCYTES NFR BLD AUTO: 6.3 % (ref 19.6–45.3)
MAGNESIUM SERPL-MCNC: 1.5 MG/DL (ref 1.6–2.4)
MAGNESIUM SERPL-MCNC: 1.6 MG/DL (ref 1.6–2.4)
MAGNESIUM SERPL-MCNC: 1.9 MG/DL (ref 1.6–2.4)
MCH RBC QN AUTO: 29 PG (ref 26.9–32)
MCH RBC QN AUTO: 29.1 PG (ref 26.9–32)
MCH RBC QN AUTO: 29.2 PG (ref 26.9–32)
MCH RBC QN AUTO: 29.3 PG (ref 26.9–32)
MCHC RBC AUTO-ENTMCNC: 29.7 G/DL (ref 32.4–36.3)
MCHC RBC AUTO-ENTMCNC: 29.9 G/DL (ref 32.4–36.3)
MCHC RBC AUTO-ENTMCNC: 29.9 G/DL (ref 32.4–36.3)
MCHC RBC AUTO-ENTMCNC: 30.8 G/DL (ref 32.4–36.3)
MCV RBC AUTO: 95 FL (ref 80.5–98.2)
MCV RBC AUTO: 97.3 FL (ref 80.5–98.2)
MCV RBC AUTO: 97.5 FL (ref 80.5–98.2)
MCV RBC AUTO: 97.6 FL (ref 80.5–98.2)
MODALITY: ABNORMAL
MONOCYTES # BLD AUTO: 0.43 10*3/MM3 (ref 0.2–1.2)
MONOCYTES # BLD AUTO: 0.46 10*3/MM3 (ref 0.2–1.2)
MONOCYTES NFR BLD AUTO: 2 % (ref 5–12)
MONOCYTES NFR BLD AUTO: 5.6 % (ref 5–12)
NEUTROPHILS # BLD AUTO: 18.42 10*3/MM3 (ref 1.9–8.1)
NEUTROPHILS # BLD AUTO: 5.66 10*3/MM3 (ref 1.9–8.1)
NEUTROPHILS NFR BLD AUTO: 69.3 % (ref 42.7–76)
NEUTROPHILS NFR BLD AUTO: 85 % (ref 42.7–76)
O2 A-A PPRESDIFF RESPIRATORY: 0.1 MMHG
O2 A-A PPRESDIFF RESPIRATORY: 0.2 MMHG
O2 A-A PPRESDIFF RESPIRATORY: 0.3 MMHG
OVALOCYTES BLD QL SMEAR: NORMAL
PCO2 BLDA: 43.4 MM HG (ref 35–45)
PCO2 BLDA: 46.1 MM HG (ref 35–45)
PCO2 BLDA: 46.1 MM HG (ref 35–45)
PCO2 BLDA: 47.1 MM HG (ref 35–45)
PCO2 BLDA: 47.8 MM HG (ref 35–45)
PCO2 BLDA: 48.5 MM HG (ref 35–45)
PCO2 BLDA: 48.9 MM HG (ref 35–45)
PCO2 BLDA: 49.5 MM HG (ref 35–45)
PCO2 BLDA: 49.7 MM HG (ref 35–45)
PCO2 BLDA: 61.2 MM HG (ref 35–45)
PEEP RESPIRATORY: 10 CM[H2O]
PEEP RESPIRATORY: 10 CM[H2O]
PEEP RESPIRATORY: 8 CM[H2O]
PH BLDA: 7.3 PH UNITS (ref 7.35–7.45)
PH BLDA: 7.34 PH UNITS (ref 7.35–7.45)
PH BLDA: 7.36 PH UNITS (ref 7.35–7.45)
PH BLDA: 7.42 PH UNITS (ref 7.35–7.6)
PH BLDA: 7.48 PH UNITS (ref 7.35–7.6)
PH BLDA: 7.5 PH UNITS (ref 7.35–7.6)
PH BLDA: 7.5 PH UNITS (ref 7.35–7.6)
PH BLDA: 7.51 PH UNITS (ref 7.35–7.6)
PH BLDA: 7.54 PH UNITS (ref 7.35–7.6)
PH BLDA: 7.56 PH UNITS (ref 7.35–7.6)
PHOSPHATE SERPL-MCNC: 3 MG/DL (ref 2.5–4.5)
PHOSPHATE SERPL-MCNC: 3.2 MG/DL (ref 2.5–4.5)
PLAT MORPH BLD: NORMAL
PLATELET # BLD AUTO: 197 10*3/MM3 (ref 140–500)
PLATELET # BLD AUTO: 206 10*3/MM3 (ref 140–500)
PLATELET # BLD AUTO: 217 10*3/MM3 (ref 140–500)
PLATELET # BLD AUTO: 365 10*3/MM3 (ref 140–500)
PMV BLD AUTO: 10.3 FL (ref 6–12)
PMV BLD AUTO: 9.4 FL (ref 6–12)
PMV BLD AUTO: 9.6 FL (ref 6–12)
PMV BLD AUTO: 9.6 FL (ref 6–12)
PO2 BLDA: 140.4 MM HG (ref 80–100)
PO2 BLDA: 355 MMHG (ref 80–105)
PO2 BLDA: 357 MMHG (ref 80–105)
PO2 BLDA: 38 MMHG (ref 80–105)
PO2 BLDA: 393 MMHG (ref 80–105)
PO2 BLDA: 412 MMHG (ref 80–105)
PO2 BLDA: 424 MMHG (ref 80–105)
PO2 BLDA: 69.9 MM HG (ref 80–100)
PO2 BLDA: 91 MMHG (ref 80–105)
PO2 BLDA: 91.9 MM HG (ref 80–100)
POTASSIUM BLD-SCNC: 4.2 MMOL/L (ref 3.5–5.2)
POTASSIUM BLD-SCNC: 4.2 MMOL/L (ref 3.5–5.2)
POTASSIUM BLD-SCNC: 4.5 MMOL/L (ref 3.5–5.2)
POTASSIUM BLD-SCNC: 4.7 MMOL/L (ref 3.5–5.2)
POTASSIUM BLDA-SCNC: 4.2 MMOL/L (ref 3.5–4.9)
POTASSIUM BLDA-SCNC: 4.3 MMOL/L (ref 3.5–4.9)
POTASSIUM BLDA-SCNC: 4.5 MMOL/L (ref 3.5–4.9)
POTASSIUM BLDA-SCNC: 4.6 MMOL/L (ref 3.5–4.9)
POTASSIUM BLDA-SCNC: 4.8 MMOL/L (ref 3.5–4.9)
PROT SERPL-MCNC: 5.9 G/DL (ref 6–8.5)
PROTHROMBIN TIME: 17.3 SECONDS (ref 11.7–14.2)
PROTHROMBIN TIME: 19.2 SECONDS (ref 11.7–14.2)
RBC # BLD AUTO: 2.99 10*6/MM3 (ref 3.9–5.2)
RBC # BLD AUTO: 3.62 10*6/MM3 (ref 3.9–5.2)
RBC # BLD AUTO: 3.77 10*6/MM3 (ref 3.9–5.2)
RBC # BLD AUTO: 3.79 10*6/MM3 (ref 3.9–5.2)
SAO2 % BLDA: 100 % (ref 95–98)
SAO2 % BLDA: 75 % (ref 95–98)
SAO2 % BLDA: 97 % (ref 95–98)
SAO2 % BLDCOA: 92.9 % (ref 92–99)
SAO2 % BLDCOA: 95.8 % (ref 92–99)
SAO2 % BLDCOA: 99 % (ref 92–99)
SET MECH RESP RATE: 12
SET MECH RESP RATE: 14
SET MECH RESP RATE: 14
SODIUM BLD-SCNC: 143 MMOL/L (ref 136–145)
SODIUM BLD-SCNC: 144 MMOL/L (ref 136–145)
TOTAL RATE: 12 BREATHS/MINUTE
TOTAL RATE: 14 BREATHS/MINUTE
TOTAL RATE: 15 BREATHS/MINUTE
VENTILATOR MODE: ABNORMAL
VENTILATOR MODE: ABNORMAL
VENTILATOR MODE: AC
VT ON VENT VENT: 650 ML
VT ON VENT VENT: 700 ML
VT ON VENT VENT: 700 ML
WBC MORPH BLD: NORMAL
WBC NRBC COR # BLD: 13.78 10*3/MM3 (ref 4.5–10.7)
WBC NRBC COR # BLD: 21.68 10*3/MM3 (ref 4.5–10.7)
WBC NRBC COR # BLD: 28.06 10*3/MM3 (ref 4.5–10.7)
WBC NRBC COR # BLD: 8.18 10*3/MM3 (ref 4.5–10.7)

## 2019-01-28 PROCEDURE — 80069 RENAL FUNCTION PANEL: CPT | Performed by: THORACIC SURGERY (CARDIOTHORACIC VASCULAR SURGERY)

## 2019-01-28 PROCEDURE — 87176 TISSUE HOMOGENIZATION CULTR: CPT | Performed by: THORACIC SURGERY (CARDIOTHORACIC VASCULAR SURGERY)

## 2019-01-28 PROCEDURE — P9041 ALBUMIN (HUMAN),5%, 50ML: HCPCS | Performed by: THORACIC SURGERY (CARDIOTHORACIC VASCULAR SURGERY)

## 2019-01-28 PROCEDURE — 82330 ASSAY OF CALCIUM: CPT | Performed by: NURSE PRACTITIONER

## 2019-01-28 PROCEDURE — C1729 CATH, DRAINAGE: HCPCS | Performed by: THORACIC SURGERY (CARDIOTHORACIC VASCULAR SURGERY)

## 2019-01-28 PROCEDURE — 33259 ABLATE ATRIA W/BYPASS ADD-ON: CPT | Performed by: THORACIC SURGERY (CARDIOTHORACIC VASCULAR SURGERY)

## 2019-01-28 PROCEDURE — 25010000002 PROTAMINE SULFATE PER 10 MG: Performed by: ANESTHESIOLOGY

## 2019-01-28 PROCEDURE — 94002 VENT MGMT INPAT INIT DAY: CPT

## 2019-01-28 PROCEDURE — 82947 ASSAY GLUCOSE BLOOD QUANT: CPT

## 2019-01-28 PROCEDURE — 25010000002 PROPOFOL 1000 MG/ML EMULSION: Performed by: THORACIC SURGERY (CARDIOTHORACIC VASCULAR SURGERY)

## 2019-01-28 PROCEDURE — 63710000001 INSULIN REGULAR HUMAN PER 5 UNITS: Performed by: ANESTHESIOLOGY

## 2019-01-28 PROCEDURE — 82803 BLOOD GASES ANY COMBINATION: CPT

## 2019-01-28 PROCEDURE — 25010000002 PROPOFOL 10 MG/ML EMULSION: Performed by: ANESTHESIOLOGY

## 2019-01-28 PROCEDURE — 87116 MYCOBACTERIA CULTURE: CPT | Performed by: THORACIC SURGERY (CARDIOTHORACIC VASCULAR SURGERY)

## 2019-01-28 PROCEDURE — 87102 FUNGUS ISOLATION CULTURE: CPT | Performed by: THORACIC SURGERY (CARDIOTHORACIC VASCULAR SURGERY)

## 2019-01-28 PROCEDURE — 85014 HEMATOCRIT: CPT

## 2019-01-28 PROCEDURE — C1713 ANCHOR/SCREW BN/BN,TIS/BN: HCPCS | Performed by: THORACIC SURGERY (CARDIOTHORACIC VASCULAR SURGERY)

## 2019-01-28 PROCEDURE — 85018 HEMOGLOBIN: CPT

## 2019-01-28 PROCEDURE — 85384 FIBRINOGEN ACTIVITY: CPT | Performed by: THORACIC SURGERY (CARDIOTHORACIC VASCULAR SURGERY)

## 2019-01-28 PROCEDURE — 80053 COMPREHEN METABOLIC PANEL: CPT | Performed by: INTERNAL MEDICINE

## 2019-01-28 PROCEDURE — 25010000002 SUCCINYLCHOLINE PER 20 MG: Performed by: ANESTHESIOLOGY

## 2019-01-28 PROCEDURE — 25010000002 ALBUMIN HUMAN 5% PER 50 ML: Performed by: THORACIC SURGERY (CARDIOTHORACIC VASCULAR SURGERY)

## 2019-01-28 PROCEDURE — 85025 COMPLETE CBC W/AUTO DIFF WBC: CPT | Performed by: THORACIC SURGERY (CARDIOTHORACIC VASCULAR SURGERY)

## 2019-01-28 PROCEDURE — 94799 UNLISTED PULMONARY SVC/PX: CPT

## 2019-01-28 PROCEDURE — 82330 ASSAY OF CALCIUM: CPT | Performed by: THORACIC SURGERY (CARDIOTHORACIC VASCULAR SURGERY)

## 2019-01-28 PROCEDURE — 88305 TISSUE EXAM BY PATHOLOGIST: CPT | Performed by: THORACIC SURGERY (CARDIOTHORACIC VASCULAR SURGERY)

## 2019-01-28 PROCEDURE — 5A1945Z RESPIRATORY VENTILATION, 24-96 CONSECUTIVE HOURS: ICD-10-PCS | Performed by: INTERNAL MEDICINE

## 2019-01-28 PROCEDURE — P9047 ALBUMIN (HUMAN), 25%, 50ML: HCPCS

## 2019-01-28 PROCEDURE — 93318 ECHO TRANSESOPHAGEAL INTRAOP: CPT

## 2019-01-28 PROCEDURE — 02580ZZ DESTRUCTION OF CONDUCTION MECHANISM, OPEN APPROACH: ICD-10-PCS | Performed by: THORACIC SURGERY (CARDIOTHORACIC VASCULAR SURGERY)

## 2019-01-28 PROCEDURE — 25010000002 AMIODARONE IN DEXTROSE 5% 360-4.14 MG/200ML-% SOLUTION: Performed by: ANESTHESIOLOGY

## 2019-01-28 PROCEDURE — 87075 CULTR BACTERIA EXCEPT BLOOD: CPT | Performed by: THORACIC SURGERY (CARDIOTHORACIC VASCULAR SURGERY)

## 2019-01-28 PROCEDURE — 25010000002 MORPHINE (PF) 10 MG/ML SOLUTION: Performed by: THORACIC SURGERY (CARDIOTHORACIC VASCULAR SURGERY)

## 2019-01-28 PROCEDURE — 83735 ASSAY OF MAGNESIUM: CPT | Performed by: THORACIC SURGERY (CARDIOTHORACIC VASCULAR SURGERY)

## 2019-01-28 PROCEDURE — 25010000002 ALBUMIN HUMAN 25% PER 50 ML

## 2019-01-28 PROCEDURE — 33405 REPLACEMENT AORTIC VALVE OPN: CPT | Performed by: THORACIC SURGERY (CARDIOTHORACIC VASCULAR SURGERY)

## 2019-01-28 PROCEDURE — 25010000002 MAGNESIUM SULFATE IN D5W 1G/100ML (PREMIX) 1-5 GM/100ML-% SOLUTION: Performed by: THORACIC SURGERY (CARDIOTHORACIC VASCULAR SURGERY)

## 2019-01-28 PROCEDURE — 25010000002 HEPARIN (PORCINE) PER 1000 UNITS: Performed by: ANESTHESIOLOGY

## 2019-01-28 PROCEDURE — 93010 ELECTROCARDIOGRAM REPORT: CPT | Performed by: INTERNAL MEDICINE

## 2019-01-28 PROCEDURE — 82962 GLUCOSE BLOOD TEST: CPT

## 2019-01-28 PROCEDURE — 02RF0JZ REPLACEMENT OF AORTIC VALVE WITH SYNTHETIC SUBSTITUTE, OPEN APPROACH: ICD-10-PCS | Performed by: THORACIC SURGERY (CARDIOTHORACIC VASCULAR SURGERY)

## 2019-01-28 PROCEDURE — 33430 REPLACEMENT OF MITRAL VALVE: CPT | Performed by: THORACIC SURGERY (CARDIOTHORACIC VASCULAR SURGERY)

## 2019-01-28 PROCEDURE — 25010000002 ONDANSETRON PER 1 MG: Performed by: ANESTHESIOLOGY

## 2019-01-28 PROCEDURE — 25010000002 MIDAZOLAM PER 1 MG: Performed by: ANESTHESIOLOGY

## 2019-01-28 PROCEDURE — 25010000002 VANCOMYCIN 1 G RECONSTITUTED SOLUTION: Performed by: THORACIC SURGERY (CARDIOTHORACIC VASCULAR SURGERY)

## 2019-01-28 PROCEDURE — 85347 COAGULATION TIME ACTIVATED: CPT

## 2019-01-28 PROCEDURE — 85610 PROTHROMBIN TIME: CPT | Performed by: THORACIC SURGERY (CARDIOTHORACIC VASCULAR SURGERY)

## 2019-01-28 PROCEDURE — C1751 CATH, INF, PER/CENT/MIDLINE: HCPCS | Performed by: ANESTHESIOLOGY

## 2019-01-28 PROCEDURE — 85027 COMPLETE CBC AUTOMATED: CPT | Performed by: THORACIC SURGERY (CARDIOTHORACIC VASCULAR SURGERY)

## 2019-01-28 PROCEDURE — 85730 THROMBOPLASTIN TIME PARTIAL: CPT | Performed by: INTERNAL MEDICINE

## 2019-01-28 PROCEDURE — 85007 BL SMEAR W/DIFF WBC COUNT: CPT | Performed by: THORACIC SURGERY (CARDIOTHORACIC VASCULAR SURGERY)

## 2019-01-28 PROCEDURE — 25010000002 AMIODARONE IN DEXTROSE 5% 360-4.14 MG/200ML-% SOLUTION: Performed by: THORACIC SURGERY (CARDIOTHORACIC VASCULAR SURGERY)

## 2019-01-28 PROCEDURE — 02L70ZK OCCLUSION OF LEFT ATRIAL APPENDAGE, OPEN APPROACH: ICD-10-PCS | Performed by: THORACIC SURGERY (CARDIOTHORACIC VASCULAR SURGERY)

## 2019-01-28 PROCEDURE — 25010000002 EPINEPHRINE PER 0.1 MG: Performed by: ANESTHESIOLOGY

## 2019-01-28 PROCEDURE — 02QJ0ZZ REPAIR TRICUSPID VALVE, OPEN APPROACH: ICD-10-PCS | Performed by: THORACIC SURGERY (CARDIOTHORACIC VASCULAR SURGERY)

## 2019-01-28 PROCEDURE — 87206 SMEAR FLUORESCENT/ACID STAI: CPT | Performed by: THORACIC SURGERY (CARDIOTHORACIC VASCULAR SURGERY)

## 2019-01-28 PROCEDURE — 02RG08Z REPLACEMENT OF MITRAL VALVE WITH ZOOPLASTIC TISSUE, OPEN APPROACH: ICD-10-PCS | Performed by: THORACIC SURGERY (CARDIOTHORACIC VASCULAR SURGERY)

## 2019-01-28 PROCEDURE — 5A1221Z PERFORMANCE OF CARDIAC OUTPUT, CONTINUOUS: ICD-10-PCS | Performed by: THORACIC SURGERY (CARDIOTHORACIC VASCULAR SURGERY)

## 2019-01-28 PROCEDURE — 0BH17EZ INSERTION OF ENDOTRACHEAL AIRWAY INTO TRACHEA, VIA NATURAL OR ARTIFICIAL OPENING: ICD-10-PCS | Performed by: INTERNAL MEDICINE

## 2019-01-28 PROCEDURE — 25010000002 PHENYLEPHRINE PER 1 ML: Performed by: ANESTHESIOLOGY

## 2019-01-28 PROCEDURE — 85730 THROMBOPLASTIN TIME PARTIAL: CPT | Performed by: THORACIC SURGERY (CARDIOTHORACIC VASCULAR SURGERY)

## 2019-01-28 PROCEDURE — 93005 ELECTROCARDIOGRAM TRACING: CPT | Performed by: THORACIC SURGERY (CARDIOTHORACIC VASCULAR SURGERY)

## 2019-01-28 PROCEDURE — 33463 VALVULOPLASTY TRICUSPID: CPT | Performed by: THORACIC SURGERY (CARDIOTHORACIC VASCULAR SURGERY)

## 2019-01-28 PROCEDURE — 71045 X-RAY EXAM CHEST 1 VIEW: CPT

## 2019-01-28 PROCEDURE — B246ZZ4 ULTRASONOGRAPHY OF RIGHT AND LEFT HEART, TRANSESOPHAGEAL: ICD-10-PCS | Performed by: THORACIC SURGERY (CARDIOTHORACIC VASCULAR SURGERY)

## 2019-01-28 PROCEDURE — 87205 SMEAR GRAM STAIN: CPT | Performed by: THORACIC SURGERY (CARDIOTHORACIC VASCULAR SURGERY)

## 2019-01-28 PROCEDURE — 87070 CULTURE OTHR SPECIMN AEROBIC: CPT | Performed by: THORACIC SURGERY (CARDIOTHORACIC VASCULAR SURGERY)

## 2019-01-28 PROCEDURE — 25010000002 CEFAZOLIN PER 500 MG: Performed by: THORACIC SURGERY (CARDIOTHORACIC VASCULAR SURGERY)

## 2019-01-28 PROCEDURE — 25010000002 HEPARIN (PORCINE) PER 1000 UNITS

## 2019-01-28 PROCEDURE — 85025 COMPLETE CBC W/AUTO DIFF WBC: CPT | Performed by: INTERNAL MEDICINE

## 2019-01-28 DEVICE — SS SUTURE, 3 PER SLEEVE
Type: IMPLANTABLE DEVICE | Site: STERNUM | Status: FUNCTIONAL
Brand: MYO/WIRE II

## 2019-01-28 DEVICE — IMPLANTABLE DEVICE: Type: IMPLANTABLE DEVICE | Site: HEART | Status: FUNCTIONAL

## 2019-01-28 DEVICE — VLV PERICARD PERIMOUNT MAGNA EASE 23: Type: IMPLANTABLE DEVICE | Site: HEART | Status: FUNCTIONAL

## 2019-01-28 DEVICE — SS SUTURE, 4 PER SLEEVE
Type: IMPLANTABLE DEVICE | Site: STERNUM | Status: FUNCTIONAL
Brand: MYO/WIRE II

## 2019-01-28 RX ORDER — MILRINONE LACTATE 0.2 MG/ML
0.12 INJECTION, SOLUTION INTRAVENOUS CONTINUOUS
Status: DISCONTINUED | OUTPATIENT
Start: 2019-01-28 | End: 2019-02-02

## 2019-01-28 RX ORDER — MORPHINE SULFATE 2 MG/ML
4 INJECTION, SOLUTION INTRAMUSCULAR; INTRAVENOUS
Status: DISCONTINUED | OUTPATIENT
Start: 2019-01-28 | End: 2019-02-02

## 2019-01-28 RX ORDER — ALPRAZOLAM 0.25 MG/1
0.25 TABLET ORAL EVERY 8 HOURS PRN
Status: ACTIVE | OUTPATIENT
Start: 2019-01-28 | End: 2019-02-07

## 2019-01-28 RX ORDER — MAGNESIUM SULFATE 1 G/100ML
1 INJECTION INTRAVENOUS EVERY 8 HOURS
Status: COMPLETED | OUTPATIENT
Start: 2019-01-28 | End: 2019-01-29

## 2019-01-28 RX ORDER — PANTOPRAZOLE SODIUM 40 MG/1
40 TABLET, DELAYED RELEASE ORAL
Status: DISCONTINUED | OUTPATIENT
Start: 2019-01-29 | End: 2019-02-02

## 2019-01-28 RX ORDER — PROTAMINE SULFATE 10 MG/ML
INJECTION, SOLUTION INTRAVENOUS AS NEEDED
Status: DISCONTINUED | OUTPATIENT
Start: 2019-01-28 | End: 2019-01-28 | Stop reason: SURG

## 2019-01-28 RX ORDER — CEFAZOLIN SODIUM IN 0.9 % NACL 3 G/100 ML
3 INTRAVENOUS SOLUTION, PIGGYBACK (ML) INTRAVENOUS EVERY 8 HOURS
Status: COMPLETED | OUTPATIENT
Start: 2019-01-28 | End: 2019-01-30

## 2019-01-28 RX ORDER — SUCCINYLCHOLINE CHLORIDE 20 MG/ML
INJECTION INTRAMUSCULAR; INTRAVENOUS AS NEEDED
Status: DISCONTINUED | OUTPATIENT
Start: 2019-01-28 | End: 2019-01-28 | Stop reason: SURG

## 2019-01-28 RX ORDER — SUFENTANIL CITRATE 50 UG/ML
INJECTION EPIDURAL; INTRAVENOUS AS NEEDED
Status: DISCONTINUED | OUTPATIENT
Start: 2019-01-28 | End: 2019-01-28 | Stop reason: SURG

## 2019-01-28 RX ORDER — PANTOPRAZOLE SODIUM 40 MG/1
40 TABLET, DELAYED RELEASE ORAL DAILY
Status: DISCONTINUED | OUTPATIENT
Start: 2019-01-29 | End: 2019-01-28

## 2019-01-28 RX ORDER — POTASSIUM CHLORIDE 29.8 MG/ML
20 INJECTION INTRAVENOUS
Status: DISCONTINUED | OUTPATIENT
Start: 2019-01-28 | End: 2019-02-03

## 2019-01-28 RX ORDER — METOCLOPRAMIDE HYDROCHLORIDE 5 MG/ML
10 INJECTION INTRAMUSCULAR; INTRAVENOUS EVERY 6 HOURS
Status: DISPENSED | OUTPATIENT
Start: 2019-01-28 | End: 2019-01-29

## 2019-01-28 RX ORDER — HYDROCODONE BITARTRATE AND ACETAMINOPHEN 5; 325 MG/1; MG/1
2 TABLET ORAL EVERY 4 HOURS PRN
Status: DISCONTINUED | OUTPATIENT
Start: 2019-01-28 | End: 2019-01-31

## 2019-01-28 RX ORDER — ACETAMINOPHEN 650 MG/1
650 SUPPOSITORY RECTAL EVERY 4 HOURS PRN
Status: DISCONTINUED | OUTPATIENT
Start: 2019-01-28 | End: 2019-01-31

## 2019-01-28 RX ORDER — NALOXONE HCL 0.4 MG/ML
0.4 VIAL (ML) INJECTION
Status: DISCONTINUED | OUTPATIENT
Start: 2019-01-28 | End: 2019-02-02

## 2019-01-28 RX ORDER — CHLORHEXIDINE GLUCONATE 0.12 MG/ML
15 RINSE ORAL EVERY 12 HOURS
Status: DISCONTINUED | OUTPATIENT
Start: 2019-01-28 | End: 2019-02-06

## 2019-01-28 RX ORDER — SODIUM CHLORIDE 9 MG/ML
30 INJECTION, SOLUTION INTRAVENOUS CONTINUOUS PRN
Status: DISCONTINUED | OUTPATIENT
Start: 2019-01-28 | End: 2019-02-18 | Stop reason: HOSPADM

## 2019-01-28 RX ORDER — NITROGLYCERIN 20 MG/100ML
5-200 INJECTION INTRAVENOUS
Status: DISCONTINUED | OUTPATIENT
Start: 2019-01-28 | End: 2019-02-02

## 2019-01-28 RX ORDER — VANCOMYCIN HYDROCHLORIDE 1 G/20ML
INJECTION, POWDER, LYOPHILIZED, FOR SOLUTION INTRAVENOUS AS NEEDED
Status: DISCONTINUED | OUTPATIENT
Start: 2019-01-28 | End: 2019-01-28 | Stop reason: HOSPADM

## 2019-01-28 RX ORDER — BISACODYL 5 MG/1
10 TABLET, DELAYED RELEASE ORAL DAILY PRN
Status: DISCONTINUED | OUTPATIENT
Start: 2019-01-28 | End: 2019-02-18 | Stop reason: HOSPADM

## 2019-01-28 RX ORDER — MORPHINE SULFATE 2 MG/ML
1 INJECTION, SOLUTION INTRAMUSCULAR; INTRAVENOUS EVERY 4 HOURS PRN
Status: DISCONTINUED | OUTPATIENT
Start: 2019-01-28 | End: 2019-02-02

## 2019-01-28 RX ORDER — NOREPINEPHRINE BIT/0.9 % NACL 8 MG/250ML
.02-.3 INFUSION BOTTLE (ML) INTRAVENOUS CONTINUOUS PRN
Status: DISCONTINUED | OUTPATIENT
Start: 2019-01-28 | End: 2019-02-03

## 2019-01-28 RX ORDER — MIDAZOLAM HYDROCHLORIDE 1 MG/ML
2 INJECTION INTRAMUSCULAR; INTRAVENOUS
Status: DISCONTINUED | OUTPATIENT
Start: 2019-01-28 | End: 2019-02-02

## 2019-01-28 RX ORDER — BISACODYL 10 MG
10 SUPPOSITORY, RECTAL RECTAL DAILY PRN
Status: DISCONTINUED | OUTPATIENT
Start: 2019-01-29 | End: 2019-02-18 | Stop reason: HOSPADM

## 2019-01-28 RX ORDER — POTASSIUM CHLORIDE 1.5 G/1.77G
40 POWDER, FOR SOLUTION ORAL AS NEEDED
Status: DISCONTINUED | OUTPATIENT
Start: 2019-01-28 | End: 2019-02-03

## 2019-01-28 RX ORDER — AMINOCAPROIC ACID 250 MG/ML
INJECTION, SOLUTION INTRAVENOUS AS NEEDED
Status: DISCONTINUED | OUTPATIENT
Start: 2019-01-28 | End: 2019-01-28 | Stop reason: SURG

## 2019-01-28 RX ORDER — POTASSIUM CHLORIDE 7.45 MG/ML
10 INJECTION INTRAVENOUS
Status: DISCONTINUED | OUTPATIENT
Start: 2019-01-28 | End: 2019-02-03

## 2019-01-28 RX ORDER — ROCURONIUM BROMIDE 10 MG/ML
INJECTION, SOLUTION INTRAVENOUS AS NEEDED
Status: DISCONTINUED | OUTPATIENT
Start: 2019-01-28 | End: 2019-01-28 | Stop reason: SURG

## 2019-01-28 RX ORDER — MIDAZOLAM HYDROCHLORIDE 1 MG/ML
INJECTION INTRAMUSCULAR; INTRAVENOUS AS NEEDED
Status: DISCONTINUED | OUTPATIENT
Start: 2019-01-28 | End: 2019-01-28 | Stop reason: SURG

## 2019-01-28 RX ORDER — SENNA AND DOCUSATE SODIUM 50; 8.6 MG/1; MG/1
2 TABLET, FILM COATED ORAL NIGHTLY
Status: DISCONTINUED | OUTPATIENT
Start: 2019-01-29 | End: 2019-02-12

## 2019-01-28 RX ORDER — CYCLOBENZAPRINE HCL 10 MG
10 TABLET ORAL EVERY 8 HOURS PRN
Status: DISCONTINUED | OUTPATIENT
Start: 2019-01-29 | End: 2019-02-18 | Stop reason: HOSPADM

## 2019-01-28 RX ORDER — ALBUMIN, HUMAN INJ 5% 5 %
1500 SOLUTION INTRAVENOUS AS NEEDED
Status: DISPENSED | OUTPATIENT
Start: 2019-01-28 | End: 2019-01-29

## 2019-01-28 RX ORDER — ACETAMINOPHEN 325 MG/1
650 TABLET ORAL EVERY 4 HOURS PRN
Status: DISCONTINUED | OUTPATIENT
Start: 2019-01-28 | End: 2019-01-31

## 2019-01-28 RX ORDER — EPHEDRINE SULFATE 50 MG/ML
INJECTION, SOLUTION INTRAVENOUS AS NEEDED
Status: DISCONTINUED | OUTPATIENT
Start: 2019-01-28 | End: 2019-01-28 | Stop reason: SURG

## 2019-01-28 RX ORDER — SODIUM CHLORIDE 9 MG/ML
INJECTION, SOLUTION INTRAVENOUS CONTINUOUS PRN
Status: DISCONTINUED | OUTPATIENT
Start: 2019-01-28 | End: 2019-01-28 | Stop reason: SURG

## 2019-01-28 RX ORDER — PROMETHAZINE HYDROCHLORIDE 25 MG/1
12.5 TABLET ORAL EVERY 6 HOURS PRN
Status: DISCONTINUED | OUTPATIENT
Start: 2019-01-28 | End: 2019-02-18 | Stop reason: HOSPADM

## 2019-01-28 RX ORDER — NOREPINEPHRINE BITARTRATE 1 MG/ML
INJECTION, SOLUTION INTRAVENOUS CONTINUOUS PRN
Status: DISCONTINUED | OUTPATIENT
Start: 2019-01-28 | End: 2019-01-28 | Stop reason: SURG

## 2019-01-28 RX ORDER — ASPIRIN 81 MG/1
81 TABLET ORAL DAILY
Status: DISCONTINUED | OUTPATIENT
Start: 2019-01-29 | End: 2019-01-29

## 2019-01-28 RX ORDER — FUROSEMIDE 10 MG/ML
40 INJECTION INTRAMUSCULAR; INTRAVENOUS EVERY 6 HOURS PRN
Status: DISCONTINUED | OUTPATIENT
Start: 2019-01-28 | End: 2019-01-29

## 2019-01-28 RX ORDER — CEFAZOLIN SODIUM IN 0.9 % NACL 3 G/100 ML
3 INTRAVENOUS SOLUTION, PIGGYBACK (ML) INTRAVENOUS EVERY 8 HOURS
Status: DISCONTINUED | OUTPATIENT
Start: 2019-01-28 | End: 2019-01-28

## 2019-01-28 RX ORDER — SODIUM CHLORIDE 9 MG/ML
30 INJECTION, SOLUTION INTRAVENOUS CONTINUOUS
Status: DISCONTINUED | OUTPATIENT
Start: 2019-01-28 | End: 2019-02-04

## 2019-01-28 RX ORDER — ONDANSETRON 2 MG/ML
INJECTION INTRAMUSCULAR; INTRAVENOUS AS NEEDED
Status: DISCONTINUED | OUTPATIENT
Start: 2019-01-28 | End: 2019-01-28 | Stop reason: SURG

## 2019-01-28 RX ORDER — POTASSIUM CHLORIDE 750 MG/1
40 CAPSULE, EXTENDED RELEASE ORAL AS NEEDED
Status: DISCONTINUED | OUTPATIENT
Start: 2019-01-28 | End: 2019-02-03

## 2019-01-28 RX ORDER — PROPOFOL 10 MG/ML
VIAL (ML) INTRAVENOUS CONTINUOUS PRN
Status: DISCONTINUED | OUTPATIENT
Start: 2019-01-28 | End: 2019-01-28 | Stop reason: SURG

## 2019-01-28 RX ORDER — BUMETANIDE 0.25 MG/ML
2 INJECTION INTRAMUSCULAR; INTRAVENOUS ONCE
Status: COMPLETED | OUTPATIENT
Start: 2019-01-28 | End: 2019-01-28

## 2019-01-28 RX ORDER — NITROGLYCERIN 5 MG/ML
INJECTION, SOLUTION INTRAVENOUS AS NEEDED
Status: DISCONTINUED | OUTPATIENT
Start: 2019-01-28 | End: 2019-01-28 | Stop reason: SURG

## 2019-01-28 RX ORDER — DOPAMINE HYDROCHLORIDE 160 MG/100ML
2-20 INJECTION, SOLUTION INTRAVENOUS CONTINUOUS PRN
Status: DISCONTINUED | OUTPATIENT
Start: 2019-01-28 | End: 2019-02-04

## 2019-01-28 RX ORDER — OXYCODONE HYDROCHLORIDE 5 MG/1
10 TABLET ORAL EVERY 4 HOURS PRN
Status: DISCONTINUED | OUTPATIENT
Start: 2019-01-28 | End: 2019-01-31

## 2019-01-28 RX ORDER — PROPOFOL 10 MG/ML
VIAL (ML) INTRAVENOUS AS NEEDED
Status: DISCONTINUED | OUTPATIENT
Start: 2019-01-28 | End: 2019-01-28 | Stop reason: SURG

## 2019-01-28 RX ORDER — PROMETHAZINE HYDROCHLORIDE 25 MG/ML
12.5 INJECTION, SOLUTION INTRAMUSCULAR; INTRAVENOUS EVERY 6 HOURS PRN
Status: DISCONTINUED | OUTPATIENT
Start: 2019-01-28 | End: 2019-02-18 | Stop reason: HOSPADM

## 2019-01-28 RX ORDER — ONDANSETRON 2 MG/ML
4 INJECTION INTRAMUSCULAR; INTRAVENOUS EVERY 6 HOURS PRN
Status: DISCONTINUED | OUTPATIENT
Start: 2019-01-28 | End: 2019-02-18 | Stop reason: HOSPADM

## 2019-01-28 RX ORDER — SODIUM CHLORIDE 0.9 % (FLUSH) 0.9 %
30 SYRINGE (ML) INJECTION ONCE AS NEEDED
Status: DISCONTINUED | OUTPATIENT
Start: 2019-01-28 | End: 2019-02-18 | Stop reason: HOSPADM

## 2019-01-28 RX ORDER — HEPARIN SODIUM 1000 [USP'U]/ML
INJECTION, SOLUTION INTRAVENOUS; SUBCUTANEOUS AS NEEDED
Status: DISCONTINUED | OUTPATIENT
Start: 2019-01-28 | End: 2019-01-28 | Stop reason: SURG

## 2019-01-28 RX ORDER — MEPERIDINE HYDROCHLORIDE 25 MG/ML
25 INJECTION INTRAMUSCULAR; INTRAVENOUS; SUBCUTANEOUS EVERY 4 HOURS PRN
Status: ACTIVE | OUTPATIENT
Start: 2019-01-28 | End: 2019-01-29

## 2019-01-28 RX ORDER — MILRINONE LACTATE 0.2 MG/ML
.25-.75 INJECTION, SOLUTION INTRAVENOUS CONTINUOUS PRN
Status: DISCONTINUED | OUTPATIENT
Start: 2019-01-28 | End: 2019-01-28

## 2019-01-28 RX ADMIN — PROTAMINE SULFATE 350 MG: 10 INJECTION, SOLUTION INTRAVENOUS at 11:11

## 2019-01-28 RX ADMIN — MORPHINE SULFATE 4 MG: 10 INJECTION INTRAVENOUS at 15:21

## 2019-01-28 RX ADMIN — MAGNESIUM SULFATE HEPTAHYDRATE 1 G: 1 INJECTION, SOLUTION INTRAVENOUS at 13:39

## 2019-01-28 RX ADMIN — BUMETANIDE 2 MG: 0.25 INJECTION INTRAMUSCULAR; INTRAVENOUS at 16:13

## 2019-01-28 RX ADMIN — PROPOFOL 40 MCG/KG/MIN: 10 INJECTION, EMULSION INTRAVENOUS at 08:18

## 2019-01-28 RX ADMIN — NOREPINEPHRINE BITARTRATE 0.03 MCG/KG/MIN: 1 INJECTION, SOLUTION, CONCENTRATE INTRAVENOUS at 11:45

## 2019-01-28 RX ADMIN — AMINOCAPROIC ACID 10 G: 250 INJECTION, SOLUTION INTRAVENOUS at 11:19

## 2019-01-28 RX ADMIN — SODIUM CHLORIDE: 9 INJECTION, SOLUTION INTRAVENOUS at 06:46

## 2019-01-28 RX ADMIN — PHENYLEPHRINE HYDROCHLORIDE 0.5 MCG/KG/MIN: 10 INJECTION, SOLUTION INTRAMUSCULAR; INTRAVENOUS; SUBCUTANEOUS at 07:07

## 2019-01-28 RX ADMIN — SUFENTANIL CITRATE 75 MCG: 50 INJECTION, SOLUTION EPIDURAL; INTRAVENOUS at 07:58

## 2019-01-28 RX ADMIN — SUFENTANIL CITRATE 50 MCG: 50 INJECTION, SOLUTION EPIDURAL; INTRAVENOUS at 08:33

## 2019-01-28 RX ADMIN — Medication 2 MG: at 06:46

## 2019-01-28 RX ADMIN — CHLORHEXIDINE GLUCONATE 15 ML: 1.2 RINSE ORAL at 20:41

## 2019-01-28 RX ADMIN — NITROGLYCERIN 100 MCG: 5 INJECTION, SOLUTION INTRAVENOUS at 08:12

## 2019-01-28 RX ADMIN — ONDANSETRON 4 MG: 2 INJECTION INTRAMUSCULAR; INTRAVENOUS at 11:50

## 2019-01-28 RX ADMIN — CEFAZOLIN 3 G: 1 INJECTION, POWDER, FOR SOLUTION INTRAMUSCULAR; INTRAVENOUS; PARENTERAL at 11:28

## 2019-01-28 RX ADMIN — NITROGLYCERIN 100 MCG: 5 INJECTION, SOLUTION INTRAVENOUS at 08:16

## 2019-01-28 RX ADMIN — PROPOFOL 50 MG: 10 INJECTION, EMULSION INTRAVENOUS at 08:02

## 2019-01-28 RX ADMIN — MAGNESIUM SULFATE HEPTAHYDRATE 1 G: 1 INJECTION, SOLUTION INTRAVENOUS at 20:36

## 2019-01-28 RX ADMIN — DEXMEDETOMIDINE HYDROCHLORIDE 0.5 MCG/KG/HR: 100 INJECTION, SOLUTION, CONCENTRATE INTRAVENOUS at 12:53

## 2019-01-28 RX ADMIN — ALBUMIN (HUMAN) 500 ML: 12.5 SOLUTION INTRAVENOUS at 13:14

## 2019-01-28 RX ADMIN — CHLORHEXIDINE GLUCONATE 15 ML: 1.2 RINSE ORAL at 06:06

## 2019-01-28 RX ADMIN — AMINOCAPROIC ACID 10 G: 250 INJECTION, SOLUTION INTRAVENOUS at 07:48

## 2019-01-28 RX ADMIN — VASOPRESSIN 0.04 UNITS/MIN: 20 INJECTION, SOLUTION INTRAMUSCULAR; SUBCUTANEOUS at 15:15

## 2019-01-28 RX ADMIN — SODIUM CHLORIDE: 9 INJECTION, SOLUTION INTRAVENOUS at 07:42

## 2019-01-28 RX ADMIN — PROPOFOL 100 MG: 10 INJECTION, EMULSION INTRAVENOUS at 06:57

## 2019-01-28 RX ADMIN — CEFAZOLIN 3 G: 1 INJECTION, POWDER, FOR SOLUTION INTRAMUSCULAR; INTRAVENOUS; PARENTERAL at 07:41

## 2019-01-28 RX ADMIN — ALBUMIN (HUMAN) 500 ML: 12.5 SOLUTION INTRAVENOUS at 20:17

## 2019-01-28 RX ADMIN — HEPARIN SODIUM 30000 UNITS: 1000 INJECTION, SOLUTION INTRAVENOUS; SUBCUTANEOUS at 08:20

## 2019-01-28 RX ADMIN — HYDROCORTISONE 1 APPLICATION: 1 CREAM TOPICAL at 23:50

## 2019-01-28 RX ADMIN — AMIODARONE HYDROCHLORIDE 0.5 MG/MIN: 1.8 INJECTION, SOLUTION INTRAVENOUS at 16:24

## 2019-01-28 RX ADMIN — SODIUM CHLORIDE 1 MCG/KG/HR: 900 INJECTION, SOLUTION INTRAVENOUS at 07:40

## 2019-01-28 RX ADMIN — SUFENTANIL CITRATE 50 MCG: 50 INJECTION, SOLUTION EPIDURAL; INTRAVENOUS at 11:00

## 2019-01-28 RX ADMIN — METHYLENE BLUE 28 ML: 5 INJECTION INTRAVENOUS at 15:56

## 2019-01-28 RX ADMIN — CHLORHEXIDINE GLUCONATE 1 APPLICATION: 500 CLOTH TOPICAL at 05:30

## 2019-01-28 RX ADMIN — MUPIROCIN: 20 OINTMENT TOPICAL at 13:00

## 2019-01-28 RX ADMIN — METOPROLOL TARTRATE 12.5 MG: 25 TABLET ORAL at 06:07

## 2019-01-28 RX ADMIN — EPINEPHRINE 0.03 MCG/KG/MIN: 1 INJECTION, SOLUTION, CONCENTRATE INTRAVENOUS at 10:45

## 2019-01-28 RX ADMIN — MUPIROCIN 1 APPLICATION: 20 OINTMENT TOPICAL at 06:07

## 2019-01-28 RX ADMIN — SODIUM CHLORIDE 0.25 MCG/KG/MIN: 0.9 INJECTION, SOLUTION INTRAVENOUS at 10:19

## 2019-01-28 RX ADMIN — SUCCINYLCHOLINE CHLORIDE 180 MG: 20 INJECTION, SOLUTION INTRAMUSCULAR; INTRAVENOUS; PARENTERAL at 06:58

## 2019-01-28 RX ADMIN — SODIUM CHLORIDE 30 ML/HR: 9 INJECTION, SOLUTION INTRAVENOUS at 12:15

## 2019-01-28 RX ADMIN — ROCURONIUM BROMIDE 20 MG: 10 INJECTION INTRAVENOUS at 10:17

## 2019-01-28 RX ADMIN — CEFAZOLIN SODIUM 3 G: 10 INJECTION, POWDER, FOR SOLUTION INTRAVENOUS at 19:13

## 2019-01-28 RX ADMIN — CHLORHEXIDINE GLUCONATE 15 ML: 1.2 RINSE ORAL at 13:40

## 2019-01-28 RX ADMIN — MUPIROCIN 10 APPLICATION: 20 OINTMENT TOPICAL at 20:36

## 2019-01-28 RX ADMIN — EMOLLIENT - LOTION: LOTION at 21:37

## 2019-01-28 RX ADMIN — SODIUM CHLORIDE: 9 INJECTION, SOLUTION INTRAVENOUS at 07:41

## 2019-01-28 RX ADMIN — PROPOFOL 50 MCG/KG/MIN: 10 INJECTION, EMULSION INTRAVENOUS at 14:10

## 2019-01-28 RX ADMIN — EPHEDRINE SULFATE 10 MG: 50 INJECTION INTRAMUSCULAR; INTRAVENOUS; SUBCUTANEOUS at 07:08

## 2019-01-28 RX ADMIN — SODIUM CHLORIDE 3.4 UNITS/HR: 900 INJECTION, SOLUTION INTRAVENOUS at 11:34

## 2019-01-28 RX ADMIN — HYDROCORTISONE 1 APPLICATION: 1 CREAM TOPICAL at 20:36

## 2019-01-28 RX ADMIN — AMIODARONE HYDROCHLORIDE 1 MG/MIN: 1.8 INJECTION, SOLUTION INTRAVENOUS at 10:58

## 2019-01-28 RX ADMIN — ROCURONIUM BROMIDE 50 MG: 10 INJECTION INTRAVENOUS at 07:10

## 2019-01-28 RX ADMIN — ALBUMIN (HUMAN) 250 ML: 12.5 SOLUTION INTRAVENOUS at 13:07

## 2019-01-28 RX ADMIN — SUFENTANIL CITRATE 25 MCG: 50 INJECTION, SOLUTION EPIDURAL; INTRAVENOUS at 06:57

## 2019-01-28 RX ADMIN — SODIUM CHLORIDE 30 ML/HR: 9 INJECTION, SOLUTION INTRAVENOUS at 12:57

## 2019-01-28 RX ADMIN — DEXMEDETOMIDINE HYDROCHLORIDE 0.5 MCG/KG/HR: 100 INJECTION, SOLUTION, CONCENTRATE INTRAVENOUS at 17:30

## 2019-01-28 RX ADMIN — PROPOFOL 50 MG: 10 INJECTION, EMULSION INTRAVENOUS at 11:29

## 2019-01-28 NOTE — ANESTHESIA PROCEDURE NOTES
Procedure Performed: Emergent/Open-Heart Anesthesia LEE     Start Time:        End Time:        General Procedure Information  Physician Requesting Echo: Scar Gaxiola MD  Intubated  Bite block not placed  Heart visualized  Probe Insertion:  Easy  Probe Type:  Multiplane  Modalities:  2D only, color flow mapping and continuous wave Doppler    Echocardiographic and Doppler Measurements    Ventricles    Right Ventricle:  Cavity size normal.    Left Ventricle:  Cavity size normal.  Global Function moderately impaired.  Ejection Fraction 40%.          Valves    Aortic Valve:  Annulus calcified.  Stenosis severe.      Mitral Valve:  Annulus calcified.  Stenosis moderate.  Regurgitation moderate.  Leaflet motions restricted.      Tricuspid Valve:  Annulus dilated.  Regurgitation moderate.          Aorta    Ascending Aorta:  Size normal.    Aortic Arch:  Size normal.          Atria      Left Atrium:  Spontaneous echo contrast present.  Left atrial appendage normal.      Septa    Atrial Septum:  Intra-atrial septal morphology normal.                  Anesthesia Information      Echocardiogram Comments:       Diagnostic LEE.  Diagnosis: mitral annular calcification.  Post-CPB, there were no paravalvular leaks and no tricuspid regurgitation.  EF improved on inotropes.

## 2019-01-28 NOTE — ANESTHESIA PROCEDURE NOTES
Central Line      Patient reassessed immediately prior to procedure    Patient location during procedure: OR  Start time: 1/28/2019 7:10 AM  Stop Time:1/28/2019 7:20 AM  Indications: vascular access and central pressure monitoring  Staff  Anesthesiologist: Christiano Melchor MD  Preanesthetic Checklist  Completed: patient identified and risks and benefits discussed  Central Line Prep  Sterile Tech:cap, gloves, gown, mask and sterile barriers  Prep: chloraprep  Patient monitoring: blood pressure monitoring, continuous pulse oximetry and EKG  Central Line Procedure  Laterality:right  Location:internal jugular  Catheter Type:double lumen and MAC  Catheter Size:9 Fr  Guidance:ultrasound guided  PROCEDURE NOTE/ULTRASOUND INTERPRETATION.  Using ultrasound guidance the potential vascular sites for insertion of the catheter were visualized to determine the patency of the vessel to be used for vascular access.  After selecting the appropriate site for insertion, the needle was visualized under ultrasound being inserted into the internal jugular vein, followed by ultrasound confirmation of wire and catheter placement. There were no abnormalities seen on ultrasound; an image was taken; and the patient tolerated the procedure with no complications.   Assessment  Post procedure:biopatch applied, line sutured, occlusive dressing applied and secured with tape  Assessement:blood return through all ports and chest x-ray ordered  Complications:no  Patient Tolerance:patient tolerated the procedure well with no apparent complications  Additional Notes  Ultrasound Interpretation:  Using ultrasound guidance the potential vascular sites for insertion of the catheter were visualized to determine the patency of the vessel to be used for vascular access.  After selecting the appropriate site for insertion, the needle was visualized under ultrasound being inserted into the vessel, followed by ultrasound confirmation of wire and catheter  placement.  There were no abnormalities seen on ultrasound; an image was taken/ and the patient tolerated the procedure with no complications.

## 2019-01-28 NOTE — ANESTHESIA PROCEDURE NOTES
Airway    General Information and Staff    Patient location during procedure: OR  Anesthesiologist: Christiano Melchor MD    Indications and Patient Condition    Preoxygenated: yes  Mask difficulty assessment: 0 - not attempted    Final Airway Details  Final airway type: endotracheal airway      Successful airway: ETT  Cuffed: yes   Successful intubation technique: video laryngoscopy  Facilitating devices/methods: intubating stylet  Endotracheal tube insertion site: oral  Blade: CMAC  Blade size: D  ETT size (mm): 8.0  Cormack-Lehane Classification: grade I - full view of glottis  Placement verified by: capnometry   Measured from: teeth  ETT to teeth (cm): 21  Number of attempts at approach: 1    Additional Comments  Atraumatic

## 2019-01-28 NOTE — ANESTHESIA PROCEDURE NOTES
Arterial Line      Patient reassessed immediately prior to procedure    Patient location during procedure: OR  Start time: 1/28/2019 6:50 AM  Stop Time:1/28/2019 6:55 AM       Performed By   Anesthesiologist: Christiano Melchor MD  Preanesthetic Checklist  Completed: patient identified, surgical consent, pre-op evaluation, timeout performed and risks and benefits discussed  Arterial Line Prep   Sterile Tech: cap, gloves and mask  Prep: ChloraPrep  Patient monitoring: blood pressure monitoring, continuous pulse oximetry and EKG  Arterial Line Procedure   Laterality:left  Location:  radial artery  Catheter size: 20 G   Guidance: ultrasound guided  PROCEDURE NOTE/ULTRASOUND INTERPRETATION.  Using ultrasound guidance the potential vascular sites for insertion of the catheter were visualized to determine the patency of the vessel to be used for vascular access.  After selecting the appropriate site for insertion, the needle was visualized under ultrasound being inserted into the radial artery, followed by ultrasound confirmation of wire and catheter placement. There were no abnormalities seen on ultrasound; an image was taken; and the patient tolerated the procedure with no complications.   Number of attempts: 1  Successful placement: yes          Post Assessment   Dressing Type: occlusive dressing applied and secured with tape.   Complications no  Patient Tolerance: patient tolerated the procedure well with no apparent complications

## 2019-01-28 NOTE — ANESTHESIA PREPROCEDURE EVALUATION
Anesthesia Evaluation     NPO Solid Status: > 8 hours             Airway   Mallampati: II  TM distance: >3 FB  Neck ROM: full  Dental - normal exam     Pulmonary - normal exam   (+) sleep apnea,   Cardiovascular - normal exam    (+) hypertension, valvular problems/murmurs AS, MR and TI, CAD, dysrhythmias Atrial Fib, CHF, hyperlipidemia,       Neuro/Psych  GI/Hepatic/Renal/Endo    (+) morbid obesity,      Musculoskeletal     Abdominal    Substance History      OB/GYN          Other                        Anesthesia Plan    ASA 4     general     Anesthetic plan, all risks, benefits, and alternatives have been provided, discussed and informed consent has been obtained with: patient.

## 2019-01-28 NOTE — ANESTHESIA POSTPROCEDURE EVALUATION
"Patient: Claudia Arnold    Procedure Summary     Date:  01/28/19 Room / Location:  [facility] OR [address] [place] [address] MAIN OR    Anesthesia Start:  0644 Anesthesia Stop:  1226    Procedure:  LEE STERNOTOMY AORTIC VALVE REPLACEMENT, MITRAL VALVE REPLACEMENT, TRICUSPID VALVE REPAIR, MAZE PROCEDURE, CLOSURE OF LEFT ATRIAL APPENDAGE  AND PRP (N/A Chest) Diagnosis:       Aortic valve stenosis, etiology of cardiac valve disease unspecified      Tricuspid valve insufficiency, unspecified etiology      Mitral valve stenosis, unspecified etiology      (Aortic valve stenosis, etiology of cardiac valve disease unspecified [I35.0])      (Tricuspid valve insufficiency, unspecified etiology [I07.1])      (Mitral valve stenosis, unspecified etiology [I05.0])    Surgeon:  Scar Gaxiola MD Provider:  Christiano Melchor MD    Anesthesia Type:  general ASA Status:  4          Anesthesia Type: general  Last vitals  BP   99/50 (01/28/19 1315)   Temp   36.3 °C (97.4 °F) (01/27/19 1609)   Pulse   80 (01/28/19 1600)   Resp   14 (01/28/19 1422)     SpO2   92 % (01/28/19 1600)     Post Anesthesia Care and Evaluation    Patient location during evaluation: bedside  Patient participation: complete - patient cannot participate (Patient intubated and sedated)  Post-procedure mental status: patient sedated.  Pain management: adequate  Anesthetic complications: No anesthetic complications    Cardiovascular status: acceptable  Respiratory status: acceptable  Hydration status: acceptable    Comments: Patient delivered to ICU intubated and sedated.  The surgeon and nursing staff will manage the patient in the ICU.  We will not be involved in their post anesthesia care.      BP 99/50 (BP Location: Right arm, Patient Position: Lying)   Pulse 80   Temp 36.3 °C (97.4 °F) (Oral)   Resp 14   Ht 170.2 cm (67.01\")   Wt (!) 140 kg (308 lb 1.6 oz)   SpO2 92%   BMI 48.24 kg/m²             "

## 2019-01-28 NOTE — ANESTHESIA PROCEDURE NOTES
Central Line      Patient reassessed immediately prior to procedure    Patient location during procedure: OR  Start time: 1/28/2019 7:20 AM  Stop Time:1/28/2019 7:30 AM  Indications: central pressure monitoring and vascular access  Staff  Anesthesiologist: Christiano Melchor MD  Preanesthetic Checklist  Completed: patient identified, surgical consent, pre-op evaluation, timeout performed and risks and benefits discussed  Central Line Prep  Sterile Tech:cap, gloves, gown, mask and sterile barriers  Prep: chloraprep  Patient monitoring: blood pressure monitoring, continuous pulse oximetry and EKG  Central Line Procedure  Laterality:right  Location:internal jugular  Catheter Type:[place]  Assessment  Post procedure:biopatch applied, line sutured and occlusive dressing applied  Assessement:blood return through all ports and free fluid flow  Complications:no  Patient Tolerance:patient tolerated the procedure well with no apparent complications

## 2019-01-29 ENCOUNTER — APPOINTMENT (OUTPATIENT)
Dept: GENERAL RADIOLOGY | Facility: HOSPITAL | Age: 74
End: 2019-01-29

## 2019-01-29 LAB
ALBUMIN SERPL-MCNC: 3.5 G/DL (ref 3.5–5.2)
ANION GAP SERPL CALCULATED.3IONS-SCNC: 18 MMOL/L
ANION GAP SERPL CALCULATED.3IONS-SCNC: 20.6 MMOL/L
ARTERIAL PATENCY WRIST A: ABNORMAL
ARTERIAL PATENCY WRIST A: ABNORMAL
ATMOSPHERIC PRESS: 750 MMHG
ATMOSPHERIC PRESS: 751.9 MMHG
BACTERIA UR QL AUTO: ABNORMAL /HPF
BASE EXCESS BLDA CALC-SCNC: 0.4 MMOL/L (ref 0–2)
BASE EXCESS BLDA CALC-SCNC: 1.5 MMOL/L (ref 0–2)
BASE EXCESS BLDA CALC-SCNC: 13 MMOL/L (ref -5–5)
BASOPHILS # BLD AUTO: 0.03 10*3/MM3 (ref 0–0.2)
BASOPHILS NFR BLD AUTO: 0.1 % (ref 0–1.5)
BDY SITE: ABNORMAL
BDY SITE: ABNORMAL
BILIRUB UR QL STRIP: NEGATIVE
BUN BLD-MCNC: 33 MG/DL (ref 8–23)
BUN BLD-MCNC: 39 MG/DL (ref 8–23)
BUN/CREAT SERPL: 17.2 (ref 7–25)
BUN/CREAT SERPL: 19.1 (ref 7–25)
CA-I BLD-MCNC: 5.7 MG/DL (ref 4.6–5.4)
CA-I SERPL ISE-MCNC: 1.42 MMOL/L (ref 1.15–1.35)
CALCIUM SPEC-SCNC: 10.3 MG/DL (ref 8.6–10.5)
CALCIUM SPEC-SCNC: 10.3 MG/DL (ref 8.6–10.5)
CHLORIDE SERPL-SCNC: 101 MMOL/L (ref 98–107)
CHLORIDE SERPL-SCNC: 103 MMOL/L (ref 98–107)
CLARITY UR: ABNORMAL
CO2 BLDA-SCNC: 38 MMOL/L (ref 24–29)
CO2 SERPL-SCNC: 21.4 MMOL/L (ref 22–29)
CO2 SERPL-SCNC: 24 MMOL/L (ref 22–29)
COLOR UR: ABNORMAL
CREAT BLD-MCNC: 1.73 MG/DL (ref 0.57–1)
CREAT BLD-MCNC: 2.27 MG/DL (ref 0.57–1)
CYTO UR: NORMAL
DEPRECATED RDW RBC AUTO: 48.9 FL (ref 37–54)
EOSINOPHIL # BLD AUTO: 0 10*3/MM3 (ref 0–0.7)
EOSINOPHIL NFR BLD AUTO: 0 % (ref 0.3–6.2)
ERYTHROCYTE [DISTWIDTH] IN BLOOD BY AUTOMATED COUNT: 14.5 % (ref 11.7–13)
FINE GRAN CASTS URNS QL MICRO: ABNORMAL /LPF
GFR SERPL CREATININE-BSD FRML MDRD: 21 ML/MIN/1.73
GFR SERPL CREATININE-BSD FRML MDRD: 29 ML/MIN/1.73
GLUCOSE BLD-MCNC: 118 MG/DL (ref 65–99)
GLUCOSE BLD-MCNC: 183 MG/DL (ref 65–99)
GLUCOSE BLDC GLUCOMTR-MCNC: 104 MG/DL (ref 70–130)
GLUCOSE BLDC GLUCOMTR-MCNC: 105 MG/DL (ref 70–130)
GLUCOSE BLDC GLUCOMTR-MCNC: 107 MG/DL (ref 70–130)
GLUCOSE BLDC GLUCOMTR-MCNC: 109 MG/DL (ref 70–130)
GLUCOSE BLDC GLUCOMTR-MCNC: 110 MG/DL (ref 70–130)
GLUCOSE BLDC GLUCOMTR-MCNC: 111 MG/DL (ref 70–130)
GLUCOSE BLDC GLUCOMTR-MCNC: 116 MG/DL (ref 70–130)
GLUCOSE BLDC GLUCOMTR-MCNC: 123 MG/DL (ref 70–130)
GLUCOSE BLDC GLUCOMTR-MCNC: 124 MG/DL (ref 70–130)
GLUCOSE BLDC GLUCOMTR-MCNC: 126 MG/DL (ref 70–130)
GLUCOSE BLDC GLUCOMTR-MCNC: 127 MG/DL (ref 70–130)
GLUCOSE BLDC GLUCOMTR-MCNC: 128 MG/DL (ref 70–130)
GLUCOSE BLDC GLUCOMTR-MCNC: 178 MG/DL (ref 70–130)
GLUCOSE BLDC GLUCOMTR-MCNC: 203 MG/DL (ref 70–130)
GLUCOSE BLDC GLUCOMTR-MCNC: 210 MG/DL (ref 70–130)
GLUCOSE UR STRIP-MCNC: NEGATIVE MG/DL
HCO3 BLDA-SCNC: 27 MMOL/L (ref 22–28)
HCO3 BLDA-SCNC: 27.6 MMOL/L (ref 22–28)
HCO3 BLDA-SCNC: 36.7 MMOL/L (ref 22–26)
HCT VFR BLD AUTO: 30.8 % (ref 35.6–45.5)
HCT VFR BLDA CALC: 36 % (ref 38–51)
HGB BLD-MCNC: 9.7 G/DL (ref 11.9–15.5)
HGB BLDA-MCNC: 12.2 G/DL (ref 12–17)
HGB UR QL STRIP.AUTO: ABNORMAL
HOROWITZ INDEX BLD+IHG-RTO: 40 %
HOROWITZ INDEX BLD+IHG-RTO: 40 %
HYALINE CASTS UR QL AUTO: ABNORMAL /LPF
IMM GRANULOCYTES # BLD AUTO: 0.53 10*3/MM3 (ref 0–0.03)
IMM GRANULOCYTES NFR BLD AUTO: 2 % (ref 0–0.5)
INR PPP: 1.35 (ref 0.9–1.1)
KETONES UR QL STRIP: ABNORMAL
LAB AP CASE REPORT: NORMAL
LEUKOCYTE ESTERASE UR QL STRIP.AUTO: NEGATIVE
LYMPHOCYTES # BLD AUTO: 1.71 10*3/MM3 (ref 0.9–4.8)
LYMPHOCYTES NFR BLD AUTO: 6.6 % (ref 19.6–45.3)
MAGNESIUM SERPL-MCNC: 2.1 MG/DL (ref 1.6–2.4)
MCH RBC QN AUTO: 29.4 PG (ref 26.9–32)
MCHC RBC AUTO-ENTMCNC: 31.5 G/DL (ref 32.4–36.3)
MCV RBC AUTO: 93.3 FL (ref 80.5–98.2)
MODALITY: ABNORMAL
MODALITY: ABNORMAL
MONOCYTES # BLD AUTO: 0.98 10*3/MM3 (ref 0.2–1.2)
MONOCYTES NFR BLD AUTO: 3.8 % (ref 5–12)
NEUTROPHILS # BLD AUTO: 23.24 10*3/MM3 (ref 1.9–8.1)
NEUTROPHILS NFR BLD AUTO: 89.5 % (ref 42.7–76)
NITRITE UR QL STRIP: NEGATIVE
O2 A-A PPRESDIFF RESPIRATORY: 0.4 MMHG
O2 A-A PPRESDIFF RESPIRATORY: 0.4 MMHG
OVALOCYTES BLD QL SMEAR: NORMAL
PATH REPORT.FINAL DX SPEC: NORMAL
PATH REPORT.GROSS SPEC: NORMAL
PCO2 BLDA: 49.6 MM HG (ref 35–45)
PCO2 BLDA: 50.4 MM HG (ref 35–45)
PCO2 BLDA: 52.6 MM HG (ref 35–45)
PEEP RESPIRATORY: 5 CM[H2O]
PEEP RESPIRATORY: 8 CM[H2O]
PH BLDA: 7.32 PH UNITS (ref 7.35–7.45)
PH BLDA: 7.35 PH UNITS (ref 7.35–7.45)
PH BLDA: 7.47 PH UNITS (ref 7.35–7.6)
PH UR STRIP.AUTO: <=5 [PH] (ref 5–8)
PHOSPHATE SERPL-MCNC: 2.6 MG/DL (ref 2.5–4.5)
PLAT MORPH BLD: NORMAL
PLATELET # BLD AUTO: 228 10*3/MM3 (ref 140–500)
PMV BLD AUTO: 10.1 FL (ref 6–12)
PO2 BLDA: 101 MMHG (ref 80–105)
PO2 BLDA: 84.6 MM HG (ref 80–100)
PO2 BLDA: 96.3 MM HG (ref 80–100)
POTASSIUM BLD-SCNC: 4.2 MMOL/L (ref 3.5–5.2)
POTASSIUM BLD-SCNC: 4.6 MMOL/L (ref 3.5–5.2)
POTASSIUM BLDA-SCNC: 4.7 MMOL/L (ref 3.5–4.9)
PROT UR QL STRIP: ABNORMAL
PROTHROMBIN TIME: 16.4 SECONDS (ref 11.7–14.2)
PSV: 7 CMH2O
PSV: 8 CMH2O
RBC # BLD AUTO: 3.3 10*6/MM3 (ref 3.9–5.2)
RBC # UR: ABNORMAL /HPF
REF LAB TEST METHOD: ABNORMAL
SAO2 % BLDA: 98 % (ref 95–98)
SAO2 % BLDCOA: 95.2 % (ref 92–99)
SAO2 % BLDCOA: 97 % (ref 92–99)
SODIUM BLD-SCNC: 143 MMOL/L (ref 136–145)
SODIUM BLD-SCNC: 145 MMOL/L (ref 136–145)
SP GR UR STRIP: 1.03 (ref 1–1.03)
SQUAMOUS #/AREA URNS HPF: ABNORMAL /HPF
TOTAL RATE: 14 BREATHS/MINUTE
TOTAL RATE: 19 BREATHS/MINUTE
TRANS CELLS #/AREA URNS HPF: ABNORMAL /HPF
UROBILINOGEN UR QL STRIP: ABNORMAL
VENTILATOR MODE: ABNORMAL
VENTILATOR MODE: ABNORMAL
VT ON VENT VENT: 486 ML
WAXY CASTS #/AREA URNS LPF: ABNORMAL /LPF
WBC MORPH BLD: NORMAL
WBC NRBC COR # BLD: 25.96 10*3/MM3 (ref 4.5–10.7)
WBC UR QL AUTO: ABNORMAL /HPF

## 2019-01-29 PROCEDURE — 94799 UNLISTED PULMONARY SVC/PX: CPT

## 2019-01-29 PROCEDURE — 25010000002 ENOXAPARIN PER 10 MG: Performed by: THORACIC SURGERY (CARDIOTHORACIC VASCULAR SURGERY)

## 2019-01-29 PROCEDURE — 25010000002 CEFAZOLIN PER 500 MG: Performed by: THORACIC SURGERY (CARDIOTHORACIC VASCULAR SURGERY)

## 2019-01-29 PROCEDURE — 99232 SBSQ HOSP IP/OBS MODERATE 35: CPT | Performed by: INTERNAL MEDICINE

## 2019-01-29 PROCEDURE — 93010 ELECTROCARDIOGRAM REPORT: CPT | Performed by: INTERNAL MEDICINE

## 2019-01-29 PROCEDURE — 85007 BL SMEAR W/DIFF WBC COUNT: CPT | Performed by: THORACIC SURGERY (CARDIOTHORACIC VASCULAR SURGERY)

## 2019-01-29 PROCEDURE — P9041 ALBUMIN (HUMAN),5%, 50ML: HCPCS | Performed by: THORACIC SURGERY (CARDIOTHORACIC VASCULAR SURGERY)

## 2019-01-29 PROCEDURE — 25010000003 EPINEPHRINE 30 MG/30ML SOLUTION 30 ML VIAL: Performed by: THORACIC SURGERY (CARDIOTHORACIC VASCULAR SURGERY)

## 2019-01-29 PROCEDURE — 25010000002 MORPHINE (PF) 10 MG/ML SOLUTION: Performed by: THORACIC SURGERY (CARDIOTHORACIC VASCULAR SURGERY)

## 2019-01-29 PROCEDURE — 71045 X-RAY EXAM CHEST 1 VIEW: CPT

## 2019-01-29 PROCEDURE — 85610 PROTHROMBIN TIME: CPT | Performed by: THORACIC SURGERY (CARDIOTHORACIC VASCULAR SURGERY)

## 2019-01-29 PROCEDURE — 25010000002 METOCLOPRAMIDE PER 10 MG: Performed by: THORACIC SURGERY (CARDIOTHORACIC VASCULAR SURGERY)

## 2019-01-29 PROCEDURE — 82330 ASSAY OF CALCIUM: CPT | Performed by: NURSE PRACTITIONER

## 2019-01-29 PROCEDURE — 74018 RADEX ABDOMEN 1 VIEW: CPT

## 2019-01-29 PROCEDURE — 83735 ASSAY OF MAGNESIUM: CPT | Performed by: THORACIC SURGERY (CARDIOTHORACIC VASCULAR SURGERY)

## 2019-01-29 PROCEDURE — 82803 BLOOD GASES ANY COMBINATION: CPT

## 2019-01-29 PROCEDURE — 63710000001 INSULIN LISPRO (HUMAN) PER 5 UNITS: Performed by: NURSE PRACTITIONER

## 2019-01-29 PROCEDURE — 99024 POSTOP FOLLOW-UP VISIT: CPT | Performed by: THORACIC SURGERY (CARDIOTHORACIC VASCULAR SURGERY)

## 2019-01-29 PROCEDURE — 94003 VENT MGMT INPAT SUBQ DAY: CPT

## 2019-01-29 PROCEDURE — 82962 GLUCOSE BLOOD TEST: CPT

## 2019-01-29 PROCEDURE — 80069 RENAL FUNCTION PANEL: CPT | Performed by: THORACIC SURGERY (CARDIOTHORACIC VASCULAR SURGERY)

## 2019-01-29 PROCEDURE — 25010000002 ALBUMIN HUMAN 5% PER 50 ML: Performed by: THORACIC SURGERY (CARDIOTHORACIC VASCULAR SURGERY)

## 2019-01-29 PROCEDURE — 85025 COMPLETE CBC W/AUTO DIFF WBC: CPT | Performed by: THORACIC SURGERY (CARDIOTHORACIC VASCULAR SURGERY)

## 2019-01-29 PROCEDURE — 99233 SBSQ HOSP IP/OBS HIGH 50: CPT | Performed by: INTERNAL MEDICINE

## 2019-01-29 PROCEDURE — 81001 URINALYSIS AUTO W/SCOPE: CPT | Performed by: INTERNAL MEDICINE

## 2019-01-29 PROCEDURE — 25010000002 MAGNESIUM SULFATE IN D5W 1G/100ML (PREMIX) 1-5 GM/100ML-% SOLUTION: Performed by: THORACIC SURGERY (CARDIOTHORACIC VASCULAR SURGERY)

## 2019-01-29 PROCEDURE — 80048 BASIC METABOLIC PNL TOTAL CA: CPT | Performed by: INTERNAL MEDICINE

## 2019-01-29 PROCEDURE — 93005 ELECTROCARDIOGRAM TRACING: CPT | Performed by: THORACIC SURGERY (CARDIOTHORACIC VASCULAR SURGERY)

## 2019-01-29 RX ORDER — DEXTROSE MONOHYDRATE 25 G/50ML
25 INJECTION, SOLUTION INTRAVENOUS
Status: DISCONTINUED | OUTPATIENT
Start: 2019-01-29 | End: 2019-02-03

## 2019-01-29 RX ORDER — NYSTATIN 100000 [USP'U]/G
POWDER TOPICAL EVERY 12 HOURS SCHEDULED
Status: DISPENSED | OUTPATIENT
Start: 2019-01-29 | End: 2019-02-09

## 2019-01-29 RX ORDER — NICOTINE POLACRILEX 4 MG
15 LOZENGE BUCCAL
Status: DISCONTINUED | OUTPATIENT
Start: 2019-01-29 | End: 2019-02-03

## 2019-01-29 RX ORDER — BUMETANIDE 0.25 MG/ML
2 INJECTION INTRAMUSCULAR; INTRAVENOUS ONCE
Status: COMPLETED | OUTPATIENT
Start: 2019-01-29 | End: 2019-01-29

## 2019-01-29 RX ORDER — ASPIRIN 81 MG/1
81 TABLET, CHEWABLE ORAL DAILY
Status: DISCONTINUED | OUTPATIENT
Start: 2019-01-29 | End: 2019-02-18 | Stop reason: HOSPADM

## 2019-01-29 RX ORDER — BUMETANIDE 0.25 MG/ML
4 INJECTION INTRAMUSCULAR; INTRAVENOUS ONCE
Status: COMPLETED | OUTPATIENT
Start: 2019-01-29 | End: 2019-01-29

## 2019-01-29 RX ADMIN — CHLORHEXIDINE GLUCONATE 15 ML: 1.2 RINSE ORAL at 20:51

## 2019-01-29 RX ADMIN — METOCLOPRAMIDE 10 MG: 5 INJECTION, SOLUTION INTRAMUSCULAR; INTRAVENOUS at 06:12

## 2019-01-29 RX ADMIN — INSULIN LISPRO 3 UNITS: 100 INJECTION, SOLUTION INTRAVENOUS; SUBCUTANEOUS at 21:12

## 2019-01-29 RX ADMIN — CHLORHEXIDINE GLUCONATE 15 ML: 1.2 RINSE ORAL at 10:14

## 2019-01-29 RX ADMIN — CEFAZOLIN SODIUM 3 G: 10 INJECTION, POWDER, FOR SOLUTION INTRAVENOUS at 03:18

## 2019-01-29 RX ADMIN — SENNOSIDES AND DOCUSATE SODIUM 2 TABLET: 8.6; 5 TABLET ORAL at 20:51

## 2019-01-29 RX ADMIN — VASOPRESSIN 0.05 UNITS/MIN: 20 INJECTION, SOLUTION INTRAMUSCULAR; SUBCUTANEOUS at 03:21

## 2019-01-29 RX ADMIN — MAGNESIUM SULFATE HEPTAHYDRATE 1 G: 1 INJECTION, SOLUTION INTRAVENOUS at 05:00

## 2019-01-29 RX ADMIN — MUPIROCIN 10 APPLICATION: 20 OINTMENT TOPICAL at 10:15

## 2019-01-29 RX ADMIN — HYDROCORTISONE 1 APPLICATION: 1 CREAM TOPICAL at 19:12

## 2019-01-29 RX ADMIN — Medication 81 MG: at 11:37

## 2019-01-29 RX ADMIN — BUMETANIDE 4 MG: 0.25 INJECTION INTRAMUSCULAR; INTRAVENOUS at 19:04

## 2019-01-29 RX ADMIN — ACETAMINOPHEN 650 MG: 325 TABLET, FILM COATED ORAL at 17:12

## 2019-01-29 RX ADMIN — HYDROCORTISONE 1 APPLICATION: 1 CREAM TOPICAL at 15:12

## 2019-01-29 RX ADMIN — HYDROCORTISONE 1 APPLICATION: 1 CREAM TOPICAL at 05:01

## 2019-01-29 RX ADMIN — MUPIROCIN 10 APPLICATION: 20 OINTMENT TOPICAL at 20:48

## 2019-01-29 RX ADMIN — CEFAZOLIN SODIUM 3 G: 10 INJECTION, POWDER, FOR SOLUTION INTRAVENOUS at 11:20

## 2019-01-29 RX ADMIN — EMOLLIENT - LOTION: LOTION at 20:48

## 2019-01-29 RX ADMIN — NYSTATIN: 100000 POWDER TOPICAL at 12:33

## 2019-01-29 RX ADMIN — VASOPRESSIN 0.04 UNITS/MIN: 20 INJECTION, SOLUTION INTRAMUSCULAR; SUBCUTANEOUS at 10:53

## 2019-01-29 RX ADMIN — NYSTATIN: 100000 POWDER TOPICAL at 20:48

## 2019-01-29 RX ADMIN — MORPHINE SULFATE 2 MG: 10 INJECTION INTRAVENOUS at 15:12

## 2019-01-29 RX ADMIN — OXYCODONE 5 MG: 5 TABLET ORAL at 12:32

## 2019-01-29 RX ADMIN — CEFAZOLIN SODIUM 3 G: 10 INJECTION, POWDER, FOR SOLUTION INTRAVENOUS at 19:52

## 2019-01-29 RX ADMIN — ACETAMINOPHEN 650 MG: 325 TABLET, FILM COATED ORAL at 11:37

## 2019-01-29 RX ADMIN — METOCLOPRAMIDE 10 MG: 5 INJECTION, SOLUTION INTRAMUSCULAR; INTRAVENOUS at 01:18

## 2019-01-29 RX ADMIN — HYDROCODONE BITARTRATE AND ACETAMINOPHEN 2 TABLET: 5; 325 TABLET ORAL at 21:24

## 2019-01-29 RX ADMIN — EPINEPHRINE 0.02 MCG/KG/MIN: 1 INJECTION PARENTERAL at 15:11

## 2019-01-29 RX ADMIN — BUMETANIDE 2 MG: 0.25 INJECTION INTRAMUSCULAR; INTRAVENOUS at 10:55

## 2019-01-29 RX ADMIN — OXYCODONE 10 MG: 5 TABLET ORAL at 17:11

## 2019-01-29 RX ADMIN — OXYCODONE 5 MG: 5 TABLET ORAL at 11:37

## 2019-01-29 RX ADMIN — ENOXAPARIN SODIUM 40 MG: 40 INJECTION SUBCUTANEOUS at 19:03

## 2019-01-29 RX ADMIN — EMOLLIENT - LOTION: LOTION at 10:13

## 2019-01-29 RX ADMIN — ALBUMIN (HUMAN) 500 ML: 12.5 SOLUTION INTRAVENOUS at 08:10

## 2019-01-30 ENCOUNTER — APPOINTMENT (OUTPATIENT)
Dept: GENERAL RADIOLOGY | Facility: HOSPITAL | Age: 74
End: 2019-01-30
Attending: THORACIC SURGERY (CARDIOTHORACIC VASCULAR SURGERY)

## 2019-01-30 ENCOUNTER — APPOINTMENT (OUTPATIENT)
Dept: CARDIOLOGY | Facility: HOSPITAL | Age: 74
End: 2019-01-30

## 2019-01-30 ENCOUNTER — APPOINTMENT (OUTPATIENT)
Dept: GENERAL RADIOLOGY | Facility: HOSPITAL | Age: 74
End: 2019-01-30

## 2019-01-30 LAB
ANION GAP SERPL CALCULATED.3IONS-SCNC: 18.3 MMOL/L
ANION GAP SERPL CALCULATED.3IONS-SCNC: 19.3 MMOL/L
ANION GAP SERPL CALCULATED.3IONS-SCNC: 20.1 MMOL/L
BH CV ECHO MEAS - BSA(HAYCOCK): 2.6 M^2
BH CV ECHO MEAS - BSA: 2.4 M^2
BH CV ECHO MEAS - BZI_BMI: 48.2 KILOGRAMS/M^2
BH CV ECHO MEAS - BZI_METRIC_HEIGHT: 170.2 CM
BH CV ECHO MEAS - BZI_METRIC_WEIGHT: 139.7 KG
BH CV VAS BP LEFT ARM: NORMAL MMHG
BUN BLD-MCNC: 44 MG/DL (ref 8–23)
BUN BLD-MCNC: 47 MG/DL (ref 8–23)
BUN BLD-MCNC: 51 MG/DL (ref 8–23)
BUN/CREAT SERPL: 16.6 (ref 7–25)
BUN/CREAT SERPL: 17 (ref 7–25)
BUN/CREAT SERPL: 17.7 (ref 7–25)
CA-I BLD-MCNC: 5.6 MG/DL (ref 4.6–5.4)
CA-I SERPL ISE-MCNC: 1.4 MMOL/L (ref 1.15–1.35)
CALCIUM SPEC-SCNC: 10 MG/DL (ref 8.6–10.5)
CALCIUM SPEC-SCNC: 10 MG/DL (ref 8.6–10.5)
CALCIUM SPEC-SCNC: 9.9 MG/DL (ref 8.6–10.5)
CHLORIDE SERPL-SCNC: 103 MMOL/L (ref 98–107)
CHLORIDE SERPL-SCNC: 103 MMOL/L (ref 98–107)
CHLORIDE SERPL-SCNC: 104 MMOL/L (ref 98–107)
CO2 SERPL-SCNC: 22.9 MMOL/L (ref 22–29)
CO2 SERPL-SCNC: 23.7 MMOL/L (ref 22–29)
CO2 SERPL-SCNC: 23.7 MMOL/L (ref 22–29)
CORTIS SERPL-MCNC: 29.31 MCG/DL
CREAT BLD-MCNC: 2.65 MG/DL (ref 0.57–1)
CREAT BLD-MCNC: 2.77 MG/DL (ref 0.57–1)
CREAT BLD-MCNC: 2.88 MG/DL (ref 0.57–1)
DEPRECATED RDW RBC AUTO: 55.3 FL (ref 37–54)
ERYTHROCYTE [DISTWIDTH] IN BLOOD BY AUTOMATED COUNT: 15.4 % (ref 11.7–13)
GFR SERPL CREATININE-BSD FRML MDRD: 16 ML/MIN/1.73
GFR SERPL CREATININE-BSD FRML MDRD: 17 ML/MIN/1.73
GFR SERPL CREATININE-BSD FRML MDRD: 18 ML/MIN/1.73
GLUCOSE BLD-MCNC: 138 MG/DL (ref 65–99)
GLUCOSE BLD-MCNC: 174 MG/DL (ref 65–99)
GLUCOSE BLD-MCNC: 179 MG/DL (ref 65–99)
GLUCOSE BLDC GLUCOMTR-MCNC: 114 MG/DL (ref 70–130)
GLUCOSE BLDC GLUCOMTR-MCNC: 127 MG/DL (ref 70–130)
GLUCOSE BLDC GLUCOMTR-MCNC: 140 MG/DL (ref 70–130)
GLUCOSE BLDC GLUCOMTR-MCNC: 143 MG/DL (ref 70–130)
GLUCOSE BLDC GLUCOMTR-MCNC: 159 MG/DL (ref 70–130)
GLUCOSE BLDC GLUCOMTR-MCNC: 175 MG/DL (ref 70–130)
HBV SURFACE AG SERPL QL IA: NORMAL
HCT VFR BLD AUTO: 30.7 % (ref 35.6–45.5)
HGB BLD-MCNC: 9 G/DL (ref 11.9–15.5)
MAXIMAL PREDICTED HEART RATE: 147 BPM
MCH RBC QN AUTO: 28.9 PG (ref 26.9–32)
MCHC RBC AUTO-ENTMCNC: 29.3 G/DL (ref 32.4–36.3)
MCV RBC AUTO: 98.7 FL (ref 80.5–98.2)
PHOSPHATE SERPL-MCNC: 5.9 MG/DL (ref 2.5–4.5)
PLATELET # BLD AUTO: 210 10*3/MM3 (ref 140–500)
PMV BLD AUTO: 10.3 FL (ref 6–12)
POTASSIUM BLD-SCNC: 4.4 MMOL/L (ref 3.5–5.2)
POTASSIUM BLD-SCNC: 4.9 MMOL/L (ref 3.5–5.2)
POTASSIUM BLD-SCNC: 4.9 MMOL/L (ref 3.5–5.2)
RBC # BLD AUTO: 3.11 10*6/MM3 (ref 3.9–5.2)
SODIUM BLD-SCNC: 145 MMOL/L (ref 136–145)
SODIUM BLD-SCNC: 146 MMOL/L (ref 136–145)
SODIUM BLD-SCNC: 147 MMOL/L (ref 136–145)
STRESS TARGET HR: 125 BPM
URATE SERPL-MCNC: 8.5 MG/DL (ref 2.4–5.7)
WBC NRBC COR # BLD: 27.62 10*3/MM3 (ref 4.5–10.7)

## 2019-01-30 PROCEDURE — 99232 SBSQ HOSP IP/OBS MODERATE 35: CPT | Performed by: INTERNAL MEDICINE

## 2019-01-30 PROCEDURE — C1751 CATH, INF, PER/CENT/MIDLINE: HCPCS

## 2019-01-30 PROCEDURE — P9047 ALBUMIN (HUMAN), 25%, 50ML: HCPCS | Performed by: INTERNAL MEDICINE

## 2019-01-30 PROCEDURE — 93005 ELECTROCARDIOGRAM TRACING: CPT | Performed by: THORACIC SURGERY (CARDIOTHORACIC VASCULAR SURGERY)

## 2019-01-30 PROCEDURE — 84550 ASSAY OF BLOOD/URIC ACID: CPT | Performed by: INTERNAL MEDICINE

## 2019-01-30 PROCEDURE — 5A1D70Z PERFORMANCE OF URINARY FILTRATION, INTERMITTENT, LESS THAN 6 HOURS PER DAY: ICD-10-PCS | Performed by: INTERNAL MEDICINE

## 2019-01-30 PROCEDURE — 25010000002 ALBUMIN HUMAN 25% PER 50 ML: Performed by: INTERNAL MEDICINE

## 2019-01-30 PROCEDURE — 25010000002 CEFAZOLIN PER 500 MG: Performed by: THORACIC SURGERY (CARDIOTHORACIC VASCULAR SURGERY)

## 2019-01-30 PROCEDURE — 93321 DOPPLER ECHO F-UP/LMTD STD: CPT

## 2019-01-30 PROCEDURE — 25010000002 DOPAMINE PER 40 MG: Performed by: THORACIC SURGERY (CARDIOTHORACIC VASCULAR SURGERY)

## 2019-01-30 PROCEDURE — 82330 ASSAY OF CALCIUM: CPT | Performed by: THORACIC SURGERY (CARDIOTHORACIC VASCULAR SURGERY)

## 2019-01-30 PROCEDURE — 93325 DOPPLER ECHO COLOR FLOW MAPG: CPT | Performed by: INTERNAL MEDICINE

## 2019-01-30 PROCEDURE — 71045 X-RAY EXAM CHEST 1 VIEW: CPT

## 2019-01-30 PROCEDURE — 25010000002 CHLOROTHIAZIDE PER 500 MG: Performed by: INTERNAL MEDICINE

## 2019-01-30 PROCEDURE — 74018 RADEX ABDOMEN 1 VIEW: CPT

## 2019-01-30 PROCEDURE — 93308 TTE F-UP OR LMTD: CPT | Performed by: INTERNAL MEDICINE

## 2019-01-30 PROCEDURE — 99221 1ST HOSP IP/OBS SF/LOW 40: CPT | Performed by: NURSE PRACTITIONER

## 2019-01-30 PROCEDURE — 25010000002 HEPARIN (PORCINE) PER 1000 UNITS: Performed by: SURGERY

## 2019-01-30 PROCEDURE — 63710000001 INSULIN LISPRO (HUMAN) PER 5 UNITS: Performed by: NURSE PRACTITIONER

## 2019-01-30 PROCEDURE — 85027 COMPLETE CBC AUTOMATED: CPT | Performed by: THORACIC SURGERY (CARDIOTHORACIC VASCULAR SURGERY)

## 2019-01-30 PROCEDURE — 84100 ASSAY OF PHOSPHORUS: CPT | Performed by: INTERNAL MEDICINE

## 2019-01-30 PROCEDURE — 87340 HEPATITIS B SURFACE AG IA: CPT | Performed by: INTERNAL MEDICINE

## 2019-01-30 PROCEDURE — 80048 BASIC METABOLIC PNL TOTAL CA: CPT | Performed by: INTERNAL MEDICINE

## 2019-01-30 PROCEDURE — 82533 TOTAL CORTISOL: CPT | Performed by: THORACIC SURGERY (CARDIOTHORACIC VASCULAR SURGERY)

## 2019-01-30 PROCEDURE — 94799 UNLISTED PULMONARY SVC/PX: CPT

## 2019-01-30 PROCEDURE — 80048 BASIC METABOLIC PNL TOTAL CA: CPT | Performed by: SURGERY

## 2019-01-30 PROCEDURE — 93308 TTE F-UP OR LMTD: CPT

## 2019-01-30 PROCEDURE — 97110 THERAPEUTIC EXERCISES: CPT

## 2019-01-30 PROCEDURE — 82962 GLUCOSE BLOOD TEST: CPT

## 2019-01-30 PROCEDURE — 99024 POSTOP FOLLOW-UP VISIT: CPT | Performed by: THORACIC SURGERY (CARDIOTHORACIC VASCULAR SURGERY)

## 2019-01-30 PROCEDURE — 97163 PT EVAL HIGH COMPLEX 45 MIN: CPT

## 2019-01-30 PROCEDURE — 25010000002 MORPHINE (PF) 10 MG/ML SOLUTION: Performed by: THORACIC SURGERY (CARDIOTHORACIC VASCULAR SURGERY)

## 2019-01-30 PROCEDURE — 94640 AIRWAY INHALATION TREATMENT: CPT

## 2019-01-30 PROCEDURE — B544ZZA ULTRASONOGRAPHY OF LEFT JUGULAR VEINS, GUIDANCE: ICD-10-PCS | Performed by: SURGERY

## 2019-01-30 PROCEDURE — 25010000002 ENOXAPARIN PER 10 MG: Performed by: THORACIC SURGERY (CARDIOTHORACIC VASCULAR SURGERY)

## 2019-01-30 PROCEDURE — 05HN33Z INSERTION OF INFUSION DEVICE INTO LEFT INTERNAL JUGULAR VEIN, PERCUTANEOUS APPROACH: ICD-10-PCS | Performed by: SURGERY

## 2019-01-30 PROCEDURE — 93321 DOPPLER ECHO F-UP/LMTD STD: CPT | Performed by: INTERNAL MEDICINE

## 2019-01-30 PROCEDURE — 80048 BASIC METABOLIC PNL TOTAL CA: CPT | Performed by: THORACIC SURGERY (CARDIOTHORACIC VASCULAR SURGERY)

## 2019-01-30 PROCEDURE — 93325 DOPPLER ECHO COLOR FLOW MAPG: CPT

## 2019-01-30 RX ORDER — ALBUMIN (HUMAN) 12.5 G/50ML
12.5 SOLUTION INTRAVENOUS AS NEEDED
Status: CANCELLED | OUTPATIENT
Start: 2019-01-30 | End: 2019-01-31

## 2019-01-30 RX ORDER — CHLOROTHIAZIDE SODIUM 500 MG/1
500 INJECTION INTRAVENOUS ONCE
Status: COMPLETED | OUTPATIENT
Start: 2019-01-30 | End: 2019-01-30

## 2019-01-30 RX ORDER — ALBUMIN (HUMAN) 12.5 G/50ML
25 SOLUTION INTRAVENOUS ONCE
Status: COMPLETED | OUTPATIENT
Start: 2019-01-30 | End: 2019-01-30

## 2019-01-30 RX ORDER — IPRATROPIUM BROMIDE AND ALBUTEROL SULFATE 2.5; .5 MG/3ML; MG/3ML
3 SOLUTION RESPIRATORY (INHALATION) EVERY 6 HOURS PRN
Status: DISCONTINUED | OUTPATIENT
Start: 2019-01-30 | End: 2019-02-18 | Stop reason: HOSPADM

## 2019-01-30 RX ORDER — SODIUM CHLORIDE FOR INHALATION 7 %
4 VIAL, NEBULIZER (ML) INHALATION
Status: DISCONTINUED | OUTPATIENT
Start: 2019-01-30 | End: 2019-02-18 | Stop reason: HOSPADM

## 2019-01-30 RX ORDER — BUMETANIDE 0.25 MG/ML
4 INJECTION INTRAMUSCULAR; INTRAVENOUS ONCE
Status: COMPLETED | OUTPATIENT
Start: 2019-01-30 | End: 2019-01-30

## 2019-01-30 RX ORDER — IPRATROPIUM BROMIDE AND ALBUTEROL SULFATE 2.5; .5 MG/3ML; MG/3ML
3 SOLUTION RESPIRATORY (INHALATION)
Status: DISCONTINUED | OUTPATIENT
Start: 2019-01-30 | End: 2019-02-18 | Stop reason: HOSPADM

## 2019-01-30 RX ORDER — HEPARIN SODIUM 1000 [USP'U]/ML
5000 INJECTION, SOLUTION INTRAVENOUS; SUBCUTANEOUS EVERY 12 HOURS PRN
Status: DISCONTINUED | OUTPATIENT
Start: 2019-01-30 | End: 2019-02-03

## 2019-01-30 RX ORDER — HEPARIN SODIUM 1000 [USP'U]/ML
3000 INJECTION, SOLUTION INTRAVENOUS; SUBCUTANEOUS ONCE
Status: COMPLETED | OUTPATIENT
Start: 2019-01-30 | End: 2019-01-30

## 2019-01-30 RX ADMIN — HYDROCORTISONE 1 APPLICATION: 1 CREAM TOPICAL at 05:11

## 2019-01-30 RX ADMIN — EMOLLIENT - LOTION: LOTION at 20:27

## 2019-01-30 RX ADMIN — HYDROCODONE BITARTRATE AND ACETAMINOPHEN 2 TABLET: 5; 325 TABLET ORAL at 12:03

## 2019-01-30 RX ADMIN — ALBUMIN (HUMAN) 25 G: 12.5 SOLUTION INTRAVENOUS at 22:35

## 2019-01-30 RX ADMIN — NYSTATIN: 100000 POWDER TOPICAL at 20:27

## 2019-01-30 RX ADMIN — SODIUM CHLORIDE 4 ML: 7 NEBU SOLN,3 % NEBU at 19:31

## 2019-01-30 RX ADMIN — IPRATROPIUM BROMIDE AND ALBUTEROL SULFATE 3 ML: 2.5; .5 SOLUTION RESPIRATORY (INHALATION) at 19:31

## 2019-01-30 RX ADMIN — ACETAMINOPHEN 650 MG: 325 TABLET, FILM COATED ORAL at 18:39

## 2019-01-30 RX ADMIN — BUMETANIDE 4 MG: 0.25 INJECTION INTRAMUSCULAR; INTRAVENOUS at 09:48

## 2019-01-30 RX ADMIN — CEFAZOLIN SODIUM 3 G: 10 INJECTION, POWDER, FOR SOLUTION INTRAVENOUS at 02:59

## 2019-01-30 RX ADMIN — CHLORHEXIDINE GLUCONATE 15 ML: 1.2 RINSE ORAL at 20:27

## 2019-01-30 RX ADMIN — HYDROCORTISONE 1 APPLICATION: 1 CREAM TOPICAL at 00:09

## 2019-01-30 RX ADMIN — IPRATROPIUM BROMIDE AND ALBUTEROL SULFATE 3 ML: 2.5; .5 SOLUTION RESPIRATORY (INHALATION) at 09:54

## 2019-01-30 RX ADMIN — HEPARIN SODIUM 3000 UNITS: 1000 INJECTION, SOLUTION INTRAVENOUS; SUBCUTANEOUS at 15:00

## 2019-01-30 RX ADMIN — EMOLLIENT - LOTION: LOTION at 09:00

## 2019-01-30 RX ADMIN — CHLORHEXIDINE GLUCONATE 15 ML: 1.2 RINSE ORAL at 10:18

## 2019-01-30 RX ADMIN — NYSTATIN: 100000 POWDER TOPICAL at 10:15

## 2019-01-30 RX ADMIN — MORPHINE SULFATE 1 MG: 10 INJECTION INTRAVENOUS at 10:37

## 2019-01-30 RX ADMIN — OXYCODONE 10 MG: 5 TABLET ORAL at 14:22

## 2019-01-30 RX ADMIN — MUPIROCIN 10 APPLICATION: 20 OINTMENT TOPICAL at 08:20

## 2019-01-30 RX ADMIN — HYDROCORTISONE 1 APPLICATION: 1 CREAM TOPICAL at 19:15

## 2019-01-30 RX ADMIN — ENOXAPARIN SODIUM 30 MG: 30 INJECTION SUBCUTANEOUS at 18:38

## 2019-01-30 RX ADMIN — OXYCODONE 10 MG: 5 TABLET ORAL at 18:39

## 2019-01-30 RX ADMIN — MUPIROCIN 10 APPLICATION: 20 OINTMENT TOPICAL at 20:27

## 2019-01-30 RX ADMIN — SODIUM CHLORIDE 4 ML: 7 NEBU SOLN,3 % NEBU at 09:54

## 2019-01-30 RX ADMIN — CHLOROTHIAZIDE SODIUM 500 MG: 500 INJECTION, POWDER, LYOPHILIZED, FOR SOLUTION INTRAVENOUS at 09:06

## 2019-01-30 RX ADMIN — Medication 81 MG: at 12:02

## 2019-01-30 RX ADMIN — MORPHINE SULFATE 1 MG: 10 INJECTION INTRAVENOUS at 14:15

## 2019-01-30 RX ADMIN — INSULIN LISPRO 2 UNITS: 100 INJECTION, SOLUTION INTRAVENOUS; SUBCUTANEOUS at 08:20

## 2019-01-30 RX ADMIN — DOPAMINE HYDROCHLORIDE 2 MCG/KG/MIN: 160 INJECTION, SOLUTION INTRAVENOUS at 08:37

## 2019-01-31 ENCOUNTER — APPOINTMENT (OUTPATIENT)
Dept: GENERAL RADIOLOGY | Facility: HOSPITAL | Age: 74
End: 2019-01-31

## 2019-01-31 LAB
ABO + RH BLD: NORMAL
ABO + RH BLD: NORMAL
ANION GAP SERPL CALCULATED.3IONS-SCNC: 16 MMOL/L
BACTERIA SPEC AEROBE CULT: NORMAL
BH BB BLOOD EXPIRATION DATE: NORMAL
BH BB BLOOD EXPIRATION DATE: NORMAL
BH BB BLOOD TYPE BARCODE: 7300
BH BB BLOOD TYPE BARCODE: 7300
BH BB DISPENSE STATUS: NORMAL
BH BB DISPENSE STATUS: NORMAL
BH BB PRODUCT CODE: NORMAL
BH BB PRODUCT CODE: NORMAL
BH BB UNIT NUMBER: NORMAL
BH BB UNIT NUMBER: NORMAL
BUN BLD-MCNC: 30 MG/DL (ref 8–23)
BUN/CREAT SERPL: 14.8 (ref 7–25)
CALCIUM SPEC-SCNC: 9.6 MG/DL (ref 8.6–10.5)
CHLORIDE SERPL-SCNC: 100 MMOL/L (ref 98–107)
CO2 SERPL-SCNC: 25 MMOL/L (ref 22–29)
CREAT BLD-MCNC: 2.03 MG/DL (ref 0.57–1)
DEPRECATED RDW RBC AUTO: 53.5 FL (ref 37–54)
ERYTHROCYTE [DISTWIDTH] IN BLOOD BY AUTOMATED COUNT: 15.4 % (ref 11.7–13)
GFR SERPL CREATININE-BSD FRML MDRD: 24 ML/MIN/1.73
GLUCOSE BLD-MCNC: 125 MG/DL (ref 65–99)
GLUCOSE BLDC GLUCOMTR-MCNC: 125 MG/DL (ref 70–130)
GLUCOSE BLDC GLUCOMTR-MCNC: 137 MG/DL (ref 70–130)
GLUCOSE BLDC GLUCOMTR-MCNC: 142 MG/DL (ref 70–130)
GLUCOSE BLDC GLUCOMTR-MCNC: 144 MG/DL (ref 70–130)
GRAM STN SPEC: NORMAL
HCT VFR BLD AUTO: 30.4 % (ref 35.6–45.5)
HGB BLD-MCNC: 9.1 G/DL (ref 11.9–15.5)
MCH RBC QN AUTO: 28.8 PG (ref 26.9–32)
MCHC RBC AUTO-ENTMCNC: 29.9 G/DL (ref 32.4–36.3)
MCV RBC AUTO: 96.2 FL (ref 80.5–98.2)
PLATELET # BLD AUTO: 171 10*3/MM3 (ref 140–500)
PMV BLD AUTO: 10.3 FL (ref 6–12)
POTASSIUM BLD-SCNC: 3.8 MMOL/L (ref 3.5–5.2)
RBC # BLD AUTO: 3.16 10*6/MM3 (ref 3.9–5.2)
SODIUM BLD-SCNC: 141 MMOL/L (ref 136–145)
UNIT  ABO: NORMAL
UNIT  ABO: NORMAL
UNIT  RH: NORMAL
UNIT  RH: NORMAL
WBC NRBC COR # BLD: 21.38 10*3/MM3 (ref 4.5–10.7)

## 2019-01-31 PROCEDURE — 5A1D70Z PERFORMANCE OF URINARY FILTRATION, INTERMITTENT, LESS THAN 6 HOURS PER DAY: ICD-10-PCS | Performed by: INTERNAL MEDICINE

## 2019-01-31 PROCEDURE — 25010000002 ALBUMIN HUMAN 25% PER 50 ML: Performed by: INTERNAL MEDICINE

## 2019-01-31 PROCEDURE — 85027 COMPLETE CBC AUTOMATED: CPT | Performed by: THORACIC SURGERY (CARDIOTHORACIC VASCULAR SURGERY)

## 2019-01-31 PROCEDURE — 71045 X-RAY EXAM CHEST 1 VIEW: CPT

## 2019-01-31 PROCEDURE — 99024 POSTOP FOLLOW-UP VISIT: CPT | Performed by: THORACIC SURGERY (CARDIOTHORACIC VASCULAR SURGERY)

## 2019-01-31 PROCEDURE — P9047 ALBUMIN (HUMAN), 25%, 50ML: HCPCS | Performed by: INTERNAL MEDICINE

## 2019-01-31 PROCEDURE — 94799 UNLISTED PULMONARY SVC/PX: CPT

## 2019-01-31 PROCEDURE — 97110 THERAPEUTIC EXERCISES: CPT

## 2019-01-31 PROCEDURE — 25010000002 ENOXAPARIN PER 10 MG: Performed by: THORACIC SURGERY (CARDIOTHORACIC VASCULAR SURGERY)

## 2019-01-31 PROCEDURE — 99232 SBSQ HOSP IP/OBS MODERATE 35: CPT | Performed by: INTERNAL MEDICINE

## 2019-01-31 PROCEDURE — 25010000002 HEPARIN (PORCINE) PER 1000 UNITS: Performed by: SURGERY

## 2019-01-31 PROCEDURE — 80048 BASIC METABOLIC PNL TOTAL CA: CPT | Performed by: THORACIC SURGERY (CARDIOTHORACIC VASCULAR SURGERY)

## 2019-01-31 PROCEDURE — 25010000002 PHENYLEPHRINE 10 MG/ML SOLUTION 5 ML VIAL: Performed by: THORACIC SURGERY (CARDIOTHORACIC VASCULAR SURGERY)

## 2019-01-31 PROCEDURE — 82962 GLUCOSE BLOOD TEST: CPT

## 2019-01-31 RX ORDER — OXYCODONE HYDROCHLORIDE 5 MG/1
5 TABLET ORAL EVERY 4 HOURS PRN
Status: DISPENSED | OUTPATIENT
Start: 2019-01-31 | End: 2019-02-07

## 2019-01-31 RX ORDER — GABAPENTIN 100 MG/1
100 CAPSULE ORAL EVERY 12 HOURS SCHEDULED
Status: DISCONTINUED | OUTPATIENT
Start: 2019-01-31 | End: 2019-02-02

## 2019-01-31 RX ORDER — ACETAMINOPHEN 500 MG
500 TABLET ORAL EVERY 4 HOURS
Status: DISCONTINUED | OUTPATIENT
Start: 2019-01-31 | End: 2019-02-18 | Stop reason: HOSPADM

## 2019-01-31 RX ORDER — ALBUMIN (HUMAN) 12.5 G/50ML
25 SOLUTION INTRAVENOUS AS NEEDED
Status: DISPENSED | OUTPATIENT
Start: 2019-01-31 | End: 2019-02-01

## 2019-01-31 RX ORDER — BUMETANIDE 0.25 MG/ML
4 INJECTION INTRAMUSCULAR; INTRAVENOUS ONCE
Status: COMPLETED | OUTPATIENT
Start: 2019-01-31 | End: 2019-01-31

## 2019-01-31 RX ADMIN — EMOLLIENT - LOTION: LOTION at 21:45

## 2019-01-31 RX ADMIN — HYDROCORTISONE 1 APPLICATION: 1 CREAM TOPICAL at 01:14

## 2019-01-31 RX ADMIN — Medication 81 MG: at 09:30

## 2019-01-31 RX ADMIN — ALBUMIN (HUMAN) 25 G: 12.5 SOLUTION INTRAVENOUS at 20:16

## 2019-01-31 RX ADMIN — HYDROCORTISONE 1 APPLICATION: 1 CREAM TOPICAL at 12:10

## 2019-01-31 RX ADMIN — ACETAMINOPHEN 500 MG: 500 TABLET, FILM COATED ORAL at 14:40

## 2019-01-31 RX ADMIN — SODIUM CHLORIDE 4 ML: 7 NEBU SOLN,3 % NEBU at 08:46

## 2019-01-31 RX ADMIN — HYDROCORTISONE 1 APPLICATION: 1 CREAM TOPICAL at 19:03

## 2019-01-31 RX ADMIN — BUMETANIDE 4 MG: 0.25 INJECTION INTRAMUSCULAR; INTRAVENOUS at 08:55

## 2019-01-31 RX ADMIN — IPRATROPIUM BROMIDE AND ALBUTEROL SULFATE 3 ML: 2.5; .5 SOLUTION RESPIRATORY (INHALATION) at 08:46

## 2019-01-31 RX ADMIN — ENOXAPARIN SODIUM 30 MG: 30 INJECTION SUBCUTANEOUS at 18:04

## 2019-01-31 RX ADMIN — CHLORHEXIDINE GLUCONATE 15 ML: 1.2 RINSE ORAL at 08:53

## 2019-01-31 RX ADMIN — CHLORHEXIDINE GLUCONATE 15 ML: 1.2 RINSE ORAL at 21:47

## 2019-01-31 RX ADMIN — NYSTATIN: 100000 POWDER TOPICAL at 21:45

## 2019-01-31 RX ADMIN — EMOLLIENT - LOTION: LOTION at 08:29

## 2019-01-31 RX ADMIN — ACETAMINOPHEN 500 MG: 500 TABLET, FILM COATED ORAL at 09:30

## 2019-01-31 RX ADMIN — GABAPENTIN 100 MG: 100 CAPSULE ORAL at 09:30

## 2019-01-31 RX ADMIN — GABAPENTIN 100 MG: 100 CAPSULE ORAL at 21:47

## 2019-01-31 RX ADMIN — MUPIROCIN 10 APPLICATION: 20 OINTMENT TOPICAL at 09:30

## 2019-01-31 RX ADMIN — ACETAMINOPHEN 500 MG: 500 TABLET, FILM COATED ORAL at 18:04

## 2019-01-31 RX ADMIN — MUPIROCIN 10 APPLICATION: 20 OINTMENT TOPICAL at 21:47

## 2019-01-31 RX ADMIN — NYSTATIN: 100000 POWDER TOPICAL at 08:29

## 2019-01-31 RX ADMIN — PHENYLEPHRINE HYDROCHLORIDE 0.2 MCG/KG/MIN: 10 INJECTION INTRAVENOUS at 00:45

## 2019-01-31 RX ADMIN — HYDROCORTISONE 1 APPLICATION: 1 CREAM TOPICAL at 05:54

## 2019-01-31 RX ADMIN — HEPARIN SODIUM 4000 UNITS: 1000 INJECTION, SOLUTION INTRAVENOUS; SUBCUTANEOUS at 02:46

## 2019-01-31 RX ADMIN — SENNOSIDES AND DOCUSATE SODIUM 2 TABLET: 8.6; 5 TABLET ORAL at 21:46

## 2019-01-31 RX ADMIN — ACETAMINOPHEN 500 MG: 500 TABLET, FILM COATED ORAL at 21:47

## 2019-02-01 ENCOUNTER — APPOINTMENT (OUTPATIENT)
Dept: GENERAL RADIOLOGY | Facility: HOSPITAL | Age: 74
End: 2019-02-01

## 2019-02-01 ENCOUNTER — APPOINTMENT (OUTPATIENT)
Dept: ULTRASOUND IMAGING | Facility: HOSPITAL | Age: 74
End: 2019-02-01
Attending: INTERNAL MEDICINE

## 2019-02-01 LAB
ALBUMIN SERPL-MCNC: 3.9 G/DL (ref 3.5–5.2)
ALBUMIN/GLOB SERPL: 1.4 G/DL
ALP SERPL-CCNC: 71 U/L (ref 39–117)
ALT SERPL W P-5'-P-CCNC: <5 U/L (ref 1–33)
AMORPH URATE CRY URNS QL MICRO: ABNORMAL /HPF
ANION GAP SERPL CALCULATED.3IONS-SCNC: 18.3 MMOL/L
AST SERPL-CCNC: 22 U/L (ref 1–32)
BACTERIA UR QL AUTO: ABNORMAL /HPF
BILIRUB SERPL-MCNC: 0.4 MG/DL (ref 0.1–1.2)
BILIRUB UR QL STRIP: NEGATIVE
BUN BLD-MCNC: 50 MG/DL (ref 8–23)
BUN/CREAT SERPL: 17.7 (ref 7–25)
CALCIUM SPEC-SCNC: 9.6 MG/DL (ref 8.6–10.5)
CHLORIDE SERPL-SCNC: 101 MMOL/L (ref 98–107)
CLARITY UR: ABNORMAL
CO2 SERPL-SCNC: 23.7 MMOL/L (ref 22–29)
COARSE GRAN CASTS URNS QL MICRO: ABNORMAL /LPF
COLOR UR: ABNORMAL
CREAT BLD-MCNC: 2.83 MG/DL (ref 0.57–1)
DEPRECATED RDW RBC AUTO: 53.2 FL (ref 37–54)
ERYTHROCYTE [DISTWIDTH] IN BLOOD BY AUTOMATED COUNT: 15.4 % (ref 11.7–13)
GFR SERPL CREATININE-BSD FRML MDRD: 16 ML/MIN/1.73
GLOBULIN UR ELPH-MCNC: 2.8 GM/DL
GLUCOSE BLD-MCNC: 122 MG/DL (ref 65–99)
GLUCOSE BLDC GLUCOMTR-MCNC: 115 MG/DL (ref 70–130)
GLUCOSE BLDC GLUCOMTR-MCNC: 116 MG/DL (ref 70–130)
GLUCOSE BLDC GLUCOMTR-MCNC: 119 MG/DL (ref 70–130)
GLUCOSE BLDC GLUCOMTR-MCNC: 134 MG/DL (ref 70–130)
GLUCOSE UR STRIP-MCNC: NEGATIVE MG/DL
HCT VFR BLD AUTO: 29.1 % (ref 35.6–45.5)
HGB BLD-MCNC: 8.9 G/DL (ref 11.9–15.5)
HGB UR QL STRIP.AUTO: ABNORMAL
HYALINE CASTS UR QL AUTO: ABNORMAL /LPF
KETONES UR QL STRIP: ABNORMAL
LEUKOCYTE ESTERASE UR QL STRIP.AUTO: ABNORMAL
MCH RBC QN AUTO: 29.4 PG (ref 26.9–32)
MCHC RBC AUTO-ENTMCNC: 30.6 G/DL (ref 32.4–36.3)
MCV RBC AUTO: 96 FL (ref 80.5–98.2)
NITRITE UR QL STRIP: NEGATIVE
PH UR STRIP.AUTO: 6.5 [PH] (ref 5–8)
PHOSPHATE SERPL-MCNC: 3.7 MG/DL (ref 2.5–4.5)
PLATELET # BLD AUTO: 164 10*3/MM3 (ref 140–500)
PMV BLD AUTO: 10.4 FL (ref 6–12)
POTASSIUM BLD-SCNC: 3.3 MMOL/L (ref 3.5–5.2)
POTASSIUM BLD-SCNC: 4 MMOL/L (ref 3.5–5.2)
PROT SERPL-MCNC: 6.7 G/DL (ref 6–8.5)
PROT UR QL STRIP: ABNORMAL
RBC # BLD AUTO: 3.03 10*6/MM3 (ref 3.9–5.2)
RBC # UR: ABNORMAL /HPF
REF LAB TEST METHOD: ABNORMAL
SODIUM BLD-SCNC: 143 MMOL/L (ref 136–145)
SODIUM UR-SCNC: <20 MMOL/L
SP GR UR STRIP: >=1.03 (ref 1–1.03)
SQUAMOUS #/AREA URNS HPF: ABNORMAL /HPF
UROBILINOGEN UR QL STRIP: ABNORMAL
WBC NRBC COR # BLD: 15.14 10*3/MM3 (ref 4.5–10.7)
WBC UR QL AUTO: ABNORMAL /HPF

## 2019-02-01 PROCEDURE — 5A1D70Z PERFORMANCE OF URINARY FILTRATION, INTERMITTENT, LESS THAN 6 HOURS PER DAY: ICD-10-PCS | Performed by: INTERNAL MEDICINE

## 2019-02-01 PROCEDURE — 84100 ASSAY OF PHOSPHORUS: CPT | Performed by: INTERNAL MEDICINE

## 2019-02-01 PROCEDURE — 25010000002 HEPARIN (PORCINE) PER 1000 UNITS: Performed by: SURGERY

## 2019-02-01 PROCEDURE — P9047 ALBUMIN (HUMAN), 25%, 50ML: HCPCS | Performed by: INTERNAL MEDICINE

## 2019-02-01 PROCEDURE — 71045 X-RAY EXAM CHEST 1 VIEW: CPT

## 2019-02-01 PROCEDURE — 25010000002 DOPAMINE PER 40 MG: Performed by: THORACIC SURGERY (CARDIOTHORACIC VASCULAR SURGERY)

## 2019-02-01 PROCEDURE — 92610 EVALUATE SWALLOWING FUNCTION: CPT

## 2019-02-01 PROCEDURE — 94799 UNLISTED PULMONARY SVC/PX: CPT

## 2019-02-01 PROCEDURE — 84132 ASSAY OF SERUM POTASSIUM: CPT | Performed by: INTERNAL MEDICINE

## 2019-02-01 PROCEDURE — 84300 ASSAY OF URINE SODIUM: CPT | Performed by: INTERNAL MEDICINE

## 2019-02-01 PROCEDURE — 97110 THERAPEUTIC EXERCISES: CPT | Performed by: OCCUPATIONAL THERAPIST

## 2019-02-01 PROCEDURE — 82962 GLUCOSE BLOOD TEST: CPT

## 2019-02-01 PROCEDURE — 85027 COMPLETE CBC AUTOMATED: CPT | Performed by: THORACIC SURGERY (CARDIOTHORACIC VASCULAR SURGERY)

## 2019-02-01 PROCEDURE — 99232 SBSQ HOSP IP/OBS MODERATE 35: CPT | Performed by: INTERNAL MEDICINE

## 2019-02-01 PROCEDURE — 80053 COMPREHEN METABOLIC PANEL: CPT | Performed by: THORACIC SURGERY (CARDIOTHORACIC VASCULAR SURGERY)

## 2019-02-01 PROCEDURE — 99024 POSTOP FOLLOW-UP VISIT: CPT | Performed by: THORACIC SURGERY (CARDIOTHORACIC VASCULAR SURGERY)

## 2019-02-01 PROCEDURE — 97110 THERAPEUTIC EXERCISES: CPT

## 2019-02-01 PROCEDURE — 81001 URINALYSIS AUTO W/SCOPE: CPT | Performed by: INTERNAL MEDICINE

## 2019-02-01 PROCEDURE — 25010000002 ENOXAPARIN PER 10 MG: Performed by: THORACIC SURGERY (CARDIOTHORACIC VASCULAR SURGERY)

## 2019-02-01 PROCEDURE — 99231 SBSQ HOSP IP/OBS SF/LOW 25: CPT | Performed by: INTERNAL MEDICINE

## 2019-02-01 PROCEDURE — 25010000002 ALBUMIN HUMAN 25% PER 50 ML: Performed by: INTERNAL MEDICINE

## 2019-02-01 PROCEDURE — 76775 US EXAM ABDO BACK WALL LIM: CPT

## 2019-02-01 RX ORDER — POTASSIUM CHLORIDE 1.5 G/1.77G
40 POWDER, FOR SOLUTION ORAL ONCE
Status: COMPLETED | OUTPATIENT
Start: 2019-02-01 | End: 2019-02-01

## 2019-02-01 RX ORDER — ALBUMIN (HUMAN) 12.5 G/50ML
25 SOLUTION INTRAVENOUS AS NEEDED
Status: DISPENSED | OUTPATIENT
Start: 2019-02-01 | End: 2019-02-02

## 2019-02-01 RX ADMIN — GABAPENTIN 100 MG: 100 CAPSULE ORAL at 21:20

## 2019-02-01 RX ADMIN — HYDROCORTISONE 1 APPLICATION: 1 CREAM TOPICAL at 18:32

## 2019-02-01 RX ADMIN — Medication 81 MG: at 10:13

## 2019-02-01 RX ADMIN — HEPARIN SODIUM 5000 UNITS: 1000 INJECTION, SOLUTION INTRAVENOUS; SUBCUTANEOUS at 19:15

## 2019-02-01 RX ADMIN — IPRATROPIUM BROMIDE AND ALBUTEROL SULFATE 3 ML: 2.5; .5 SOLUTION RESPIRATORY (INHALATION) at 10:34

## 2019-02-01 RX ADMIN — IPRATROPIUM BROMIDE AND ALBUTEROL SULFATE 3 ML: 2.5; .5 SOLUTION RESPIRATORY (INHALATION) at 00:09

## 2019-02-01 RX ADMIN — SODIUM CHLORIDE 4 ML: 7 NEBU SOLN,3 % NEBU at 00:09

## 2019-02-01 RX ADMIN — ENOXAPARIN SODIUM 30 MG: 30 INJECTION SUBCUTANEOUS at 18:44

## 2019-02-01 RX ADMIN — EMOLLIENT - LOTION: LOTION at 08:28

## 2019-02-01 RX ADMIN — ALBUMIN (HUMAN) 25 G: 12.5 SOLUTION INTRAVENOUS at 15:04

## 2019-02-01 RX ADMIN — HYDROCORTISONE 1 APPLICATION: 1 CREAM TOPICAL at 06:18

## 2019-02-01 RX ADMIN — MUPIROCIN 10 APPLICATION: 20 OINTMENT TOPICAL at 10:13

## 2019-02-01 RX ADMIN — SENNOSIDES AND DOCUSATE SODIUM 2 TABLET: 8.6; 5 TABLET ORAL at 21:11

## 2019-02-01 RX ADMIN — NYSTATIN: 100000 POWDER TOPICAL at 10:14

## 2019-02-01 RX ADMIN — CHLORHEXIDINE GLUCONATE 15 ML: 1.2 RINSE ORAL at 10:12

## 2019-02-01 RX ADMIN — NYSTATIN: 100000 POWDER TOPICAL at 21:05

## 2019-02-01 RX ADMIN — POTASSIUM CHLORIDE 40 MEQ: 1.5 POWDER, FOR SOLUTION ORAL at 10:13

## 2019-02-01 RX ADMIN — ACETAMINOPHEN 500 MG: 500 TABLET, FILM COATED ORAL at 02:07

## 2019-02-01 RX ADMIN — ACETAMINOPHEN 500 MG: 500 TABLET, FILM COATED ORAL at 06:18

## 2019-02-01 RX ADMIN — HYDROCORTISONE 1 APPLICATION: 1 CREAM TOPICAL at 12:30

## 2019-02-01 RX ADMIN — DOPAMINE HYDROCHLORIDE 1.5 MCG/KG/MIN: 160 INJECTION, SOLUTION INTRAVENOUS at 02:07

## 2019-02-01 RX ADMIN — SODIUM CHLORIDE 4 ML: 7 NEBU SOLN,3 % NEBU at 20:20

## 2019-02-01 RX ADMIN — SODIUM CHLORIDE 4 ML: 7 NEBU SOLN,3 % NEBU at 10:34

## 2019-02-01 RX ADMIN — ALBUMIN HUMAN 25 G: 0.25 SOLUTION INTRAVENOUS at 16:45

## 2019-02-01 RX ADMIN — ACETAMINOPHEN 500 MG: 500 TABLET, FILM COATED ORAL at 10:12

## 2019-02-01 RX ADMIN — CHLORHEXIDINE GLUCONATE 15 ML: 1.2 RINSE ORAL at 21:11

## 2019-02-01 RX ADMIN — ACETAMINOPHEN 500 MG: 500 TABLET, FILM COATED ORAL at 18:44

## 2019-02-01 RX ADMIN — GABAPENTIN 100 MG: 100 CAPSULE ORAL at 10:13

## 2019-02-01 RX ADMIN — ACETAMINOPHEN 500 MG: 500 TABLET, FILM COATED ORAL at 14:11

## 2019-02-01 RX ADMIN — MUPIROCIN 10 APPLICATION: 20 OINTMENT TOPICAL at 21:11

## 2019-02-01 RX ADMIN — HYDROCORTISONE 1 APPLICATION: 1 CREAM TOPICAL at 00:58

## 2019-02-01 RX ADMIN — EMOLLIENT - LOTION: LOTION at 21:05

## 2019-02-01 RX ADMIN — ACETAMINOPHEN 500 MG: 500 TABLET, FILM COATED ORAL at 22:57

## 2019-02-01 RX ADMIN — IPRATROPIUM BROMIDE AND ALBUTEROL SULFATE 3 ML: 2.5; .5 SOLUTION RESPIRATORY (INHALATION) at 20:19

## 2019-02-01 NOTE — PROGRESS NOTES
LOS: 16 days   Patient Care Team:  Robert Cortez MD as PCP - General (Family Medicine)  Deborah Agudelo MD as Surgeon (Orthopedic Surgery)    Chief Complaint:   pain    Interval History:   More alert.  Slow underlying junctional escape rate approximately 30.     Objective   Vital Signs  Temp:  [97.6 °F (36.4 °C)] 97.6 °F (36.4 °C)  Heart Rate:  [60-97] 87  Resp:  [12-22] 18  BP: ()/(55-73) 108/63  Arterial Line BP: ()/(48-62) 116/51    Intake/Output Summary (Last 24 hours) at 2/1/2019 1415  Last data filed at 2/1/2019 1230  Gross per 24 hour   Intake 1928.34 ml   Output 4545 ml   Net -2616.66 ml       Physical Exam   Constitutional: No distress.   HENT:   Head: Normocephalic.   Dialysis catheter in neck   Eyes: No scleral icterus.   Neck: Normal range of motion. Neck supple.   Cardiovascular: Bradycardia present.   No murmur heard.  Pulmonary/Chest: Effort normal.   On nasal cannula oxygen   Abdominal: Bowel sounds are normal.   Musculoskeletal: She exhibits no edema or deformity.   Skin: Skin is warm.   Psychiatric: She has a normal mood and affect. Her behavior is normal.     Underlying escape rhythm at 30 bpm  Adequate Threshold    Results Review:      Results from last 7 days   Lab Units 02/01/19  0259 01/31/19  0601 01/30/19  1124   SODIUM mmol/L 143 141 146*   POTASSIUM mmol/L 3.3* 3.8 4.4   CHLORIDE mmol/L 101 100 103   CO2 mmol/L 23.7 25.0 23.7   BUN mg/dL 50* 30* 51*   CREATININE mg/dL 2.83* 2.03* 2.88*   GLUCOSE mg/dL 122* 125* 138*   CALCIUM mg/dL 9.6 9.6 10.0         Results from last 7 days   Lab Units 02/01/19  0259 01/31/19  0601 01/30/19  0259   WBC 10*3/mm3 15.14* 21.38* 27.62*   HEMOGLOBIN g/dL 8.9* 9.1* 9.0*   HEMATOCRIT % 29.1* 30.4* 30.7*   PLATELETS 10*3/mm3 164 171 210     Results from last 7 days   Lab Units 01/29/19  0241 01/28/19  1233 01/28/19  1120 01/28/19  0337   INR  1.35* 1.44* 1.65*  --    APTT seconds  --  29.1 27.3 80.3*         Results from last 7 days   Lab  Units 01/29/19  0241   MAGNESIUM mg/dL 2.1           I reviewed the patient's new clinical results.  I personally viewed and interpreted the patient's EKG/Telemetry data        Medication Review:     acetaminophen 500 mg Oral Q4H   aspirin 81 mg Oral Daily   cetaphil  Topical Q12H   chlorhexidine 15 mL Mouth/Throat Q12H   enoxaparin 30 mg Subcutaneous Daily   gabapentin 100 mg Oral Q12H   hydrocortisone 1 application Topical Q6H   insulin lispro 0-7 Units Subcutaneous 4x Daily With Meals & Nightly   ipratropium-albuterol 3 mL Nebulization BID - RT   mupirocin  Each Nare BID   nystatin  Topical Q12H   pantoprazole 40 mg Oral Q AM   sennosides-docusate sodium 2 tablet Oral Nightly   sodium chloride 4 mL Nebulization BID - RT         clevidipine 2-32 mg/hr    dexmedetomidine 0.2-1.5 mcg/kg/hr Last Rate: Stopped (01/28/19 1750)   DOPamine 2-20 mcg/kg/min Last Rate: 1.5 mcg/kg/min (02/01/19 0207)   EPINEPHrine 0.02-0.3 mcg/kg/min Last Rate: Stopped (01/30/19 0840)   insulin 0-50 Units/hr Last Rate: 1.5 Units/hr (01/29/19 1203)   milrinone 0.125 mcg/kg/min Last Rate: Stopped (01/29/19 0830)   niCARdipine 5-15 mg/hr    nitroglycerin 5-200 mcg/min    norepinephrine 0.02-0.3 mcg/kg/min Last Rate: Stopped (01/28/19 1632)   phenylephrine 0.5-3 mcg/kg/min Last Rate: Stopped (01/31/19 0840)   propofol 5-50 mcg/kg/min Last Rate: Stopped (01/28/19 1632)   sodium chloride 30 mL/hr Last Rate: 30 mL/hr (01/28/19 1257)   sodium chloride 30 mL/hr Last Rate: 30 mL/hr (01/28/19 1215)   vasopressin 0.04 Units/min Last Rate: 0.04 Units/min (01/29/19 1053)       Assessment/Plan       Acute congestive heart failure (CMS/HCC)    Hypertension    Generalized anxiety disorder    Hyperlipidemia    IFG (impaired fasting glucose)    Heart murmur, systolic    Obesity, morbid, BMI 50 or higher (CMS/HCC)    Fungal skin infection    Cellulitis of lower extremity    Aortic valve stenosis    Tricuspid valve insufficiency    Mitral valve stenosis    See  is going to need a CRT-D.  She needs to be on warfarin and off lovenox beforehand.  She ideally should be as close to going home as possible to reduce infection risk.  Will continue to monitor ventricular lead threshold.     Alex Jarrell MD  02/01/19  2:15 PM

## 2019-02-01 NOTE — PROGRESS NOTES
"   LOS: 16 days    Patient Care Team:  Robert Cortez MD as PCP - General (Family Medicine)  Deborah Agudelo MD as Surgeon (Orthopedic Surgery)    Chief Complaint:    Chief Complaint   Patient presents with   • Shortness of Breath   • Leg Swelling     Follow UP LAVERNE   Subjective     Interval History:   I/O 1.8/4.5 (UF 4000, ).  UF yest kerrie well.  On low dose dopamine gtt, off neosynephrine.  3L NC O2.  Drowsy but arousable.  Denies dyspnea.      Objective     Vital Signs  Heart Rate:  [78-89] 80  Resp:  [12-22] 15  BP: (107-134)/(56-73) 122/63  Arterial Line BP: ()/(47-62) 133/62    Flowsheet Rows      First Filed Value   Admission Height  170.2 cm (67\") Documented at 01/16/2019 0910   Admission Weight  145 kg (320 lb)  (Abnormal)  Documented at 01/16/2019 0921          No intake/output data recorded.  I/O last 3 completed shifts:  In: 2612.6 [I.V.:455.6; Other:516; NG/GT:1441; IV Piggyback:200]  Out: 8980 [Urine:925; Other:8000; Chest Tube:55]    Intake/Output Summary (Last 24 hours) at 2/1/2019 0724  Last data filed at 2/1/2019 0600  Gross per 24 hour   Intake 1871.62 ml   Output 4545 ml   Net -2673.38 ml       Physical Exam:  gen obese WF sitting upright, drowsy but arousable, no acute distress  Neck RIJ cordis, LIJ shiley  Lungs bibasilar rales  CV RRR , sternotomy incision intact  abd soft, NT/ND  +andrea scant urine  vasc 1+ BLE edema, decreased      Results Review:    Results from last 7 days   Lab Units 02/01/19  0259 01/31/19  0601 01/30/19  1124  01/28/19  1801   SODIUM mmol/L 143 141 146*   < > 143   POTASSIUM mmol/L 3.3* 3.8 4.4   < > 4.5   CHLORIDE mmol/L 101 100 103   < > 100   CO2 mmol/L 23.7 25.0 23.7   < > 23.8   BUN mg/dL 50* 30* 51*   < > 31*   CREATININE mg/dL 2.83* 2.03* 2.88*   < > 1.50*   CALCIUM mg/dL 9.6 9.6 10.0   < > 10.1   BILIRUBIN mg/dL 0.4  --   --   --  0.6   ALK PHOS U/L 71  --   --   --  53   ALT (SGPT) U/L <5  --   --   --  22   AST (SGOT) U/L 22  --   --   --  112* "   GLUCOSE mg/dL 122* 125* 138*   < > 211*    < > = values in this interval not displayed.       Estimated Creatinine Clearance: 26 mL/min (A) (by C-G formula based on SCr of 2.83 mg/dL (H)).    Results from last 7 days   Lab Units 02/01/19  0259 01/30/19  0259 01/29/19  0241 01/28/19  1801 01/28/19  1436   MAGNESIUM mg/dL  --   --  2.1 1.9 1.5*   PHOSPHORUS mg/dL 3.7 5.9* 2.6  --  3.0       Results from last 7 days   Lab Units 01/30/19  0259   URIC ACID mg/dL 8.5*       Results from last 7 days   Lab Units 02/01/19  0259 01/31/19  0601 01/30/19  0259 01/29/19  0241 01/28/19  1436   WBC 10*3/mm3 15.14* 21.38* 27.62* 25.96* 28.06*   HEMOGLOBIN g/dL 8.9* 9.1* 9.0* 9.7* 10.5*   PLATELETS 10*3/mm3 164 171 210 228 206       Results from last 7 days   Lab Units 01/29/19  0241 01/28/19  1233 01/28/19  1120   INR  1.35* 1.44* 1.65*         Imaging Results (last 24 hours)     Procedure Component Value Units Date/Time    XR Chest 1 View [[id number]] Collected:  02/01/19 0634     Updated:  02/01/19 0634    Narrative:       PORTABLE CHEST X-RAY     CLINICAL HISTORY: soa; I50.9-Heart failure, unspecified; R53.1-Weakness;  I35.0-Nonrheumatic aortic (valve) stenosis; I07.1-Rheumatic tricuspid  insufficiency; I05.0-Rheumatic mitral stenosis; N17.9-Acute kidney  failure, unspecified     COMPARISON: 01/31/2019.     FINDINGS: Portable AP view of the chest was obtained with overlying  monitor leads in place. Life support lines are unchanged without  pneumothorax. Edema persists, minimally improved. Basilar atelectasis  and small effusions are stable. Stable cardiomegaly.             Impression:       Minimal improvement in edema/CHF.                XR Chest 1 View [[id number]] Collected:  01/31/19 1455     Updated:  01/31/19 1501    Narrative:       PORTABLE CHEST X-RAY     HISTORY: Chest tube removal.     Portable chest x-ray is provided and is correlated with chest x-ray from  3:45 AM this morning as well as other recent prior x-rays.      FINDINGS: The Dobbhoff feeding tube and right IJ introducer remain.  Mediastinal drains appear to have been removed. There is no  pneumothorax. Small pleural effusions are observed. Cardiac silhouette  is enlarged and there is pulmonary vascular engorgement with appears to  be mild pulmonary edema.       Impression:       Status post chest tube removal. No pneumothorax. Persistent  volume overload or heart failure with mild edema.     This report was finalized on 1/31/2019 2:58 PM by Dr. Sonu Calloway M.D.             acetaminophen 500 mg Oral Q4H   aspirin 81 mg Oral Daily   cetaphil  Topical Q12H   chlorhexidine 15 mL Mouth/Throat Q12H   enoxaparin 30 mg Subcutaneous Daily   gabapentin 100 mg Oral Q12H   hydrocortisone 1 application Topical Q6H   insulin lispro 0-7 Units Subcutaneous 4x Daily With Meals & Nightly   ipratropium-albuterol 3 mL Nebulization BID - RT   mupirocin  Each Nare BID   nystatin  Topical Q12H   pantoprazole 40 mg Oral Q AM   potassium chloride 40 mEq Nasogastric Once   sennosides-docusate sodium 2 tablet Oral Nightly   sodium chloride 4 mL Nebulization BID - RT       clevidipine 2-32 mg/hr    dexmedetomidine 0.2-1.5 mcg/kg/hr Last Rate: Stopped (01/28/19 1750)   DOPamine 2-20 mcg/kg/min Last Rate: 1.5 mcg/kg/min (02/01/19 0207)   EPINEPHrine 0.02-0.3 mcg/kg/min Last Rate: Stopped (01/30/19 0840)   insulin 0-50 Units/hr Last Rate: 1.5 Units/hr (01/29/19 1203)   milrinone 0.125 mcg/kg/min Last Rate: Stopped (01/29/19 0830)   niCARdipine 5-15 mg/hr    nitroglycerin 5-200 mcg/min    norepinephrine 0.02-0.3 mcg/kg/min Last Rate: Stopped (01/28/19 1632)   phenylephrine 0.5-3 mcg/kg/min Last Rate: Stopped (01/31/19 0840)   propofol 5-50 mcg/kg/min Last Rate: Stopped (01/28/19 1632)   sodium chloride 30 mL/hr Last Rate: 30 mL/hr (01/28/19 1257)   sodium chloride 30 mL/hr Last Rate: 30 mL/hr (01/28/19 1215)   vasopressin 0.04 Units/min Last Rate: 0.04 Units/min (01/29/19 8633)       Medication  Review:   Current Facility-Administered Medications   Medication Dose Route Frequency Provider Last Rate Last Dose   • acetaminophen (TYLENOL) tablet 500 mg  500 mg Oral Q4H Yahaira Garza APRN   500 mg at 02/01/19 0618   • albumin human 25 % IV SOLN 25 g  25 g Intravenous PRN Wallace Falcon MD   25 g at 01/31/19 2016   • ALPRAZolam (XANAX) tablet 0.25 mg  0.25 mg Oral Q8H PRN Scar Gaxiola MD       • aspirin chewable tablet 81 mg  81 mg Oral Daily Scar Gaxiola MD   81 mg at 01/31/19 0930   • bisacodyl (DULCOLAX) EC tablet 10 mg  10 mg Oral Daily PRN Scar Gaxiola MD       • bisacodyl (DULCOLAX) suppository 10 mg  10 mg Rectal Daily PRN Scar Gaxiola MD       • cetaphil lotion   Topical Q12H Zonia Lopez MD       • chlorhexidine (PERIDEX) 0.12 % solution 15 mL  15 mL Mouth/Throat Q12H Scar Gaxiola MD   15 mL at 01/31/19 2147   • clevidipine (CLEVIPREX) infusion 0.5 mg/mL  2-32 mg/hr Intravenous Continuous PRN Scar Gaxiola MD       • cyclobenzaprine (FLEXERIL) tablet 10 mg  10 mg Oral Q8H PRN Scar Gaxiola MD       • dexmedetomidine (PRECEDEX) 400 mcg/100mL (4 mcg/mL) NS infusion kit  0.2-1.5 mcg/kg/hr Intravenous Titrated Scar Gaxiola MD   Stopped at 01/28/19 1750   • dextrose (D50W) 25 g/ 50mL Intravenous Solution 25 g  25 g Intravenous Q15 Min PRN Yahaira Garza APRN       • dextrose (GLUTOSE) oral gel 15 g  15 g Oral Q15 Min PRN Yahaira Garza APRN       • DOPamine 400 mg/250 mL (1.6 mg/mL) infusion  2-20 mcg/kg/min Intravenous Continuous PRN Scar Gaxiola MD 7.88 mL/hr at 02/01/19 0207 1.5 mcg/kg/min at 02/01/19 0207   • enoxaparin (LOVENOX) syringe 30 mg  30 mg Subcutaneous Daily Scar Gaxiola MD   30 mg at 01/31/19 1804   • EPINEPHrine (ADRENALIN) 5,000 mcg in sodium chloride 0.9 % 250 mL (20 mcg/mL) infusion  0.02-0.3 mcg/kg/min Intravenous Continuous PRN Scar Gaxiola MD   Stopped at 01/30/19 0840   • gabapentin (NEURONTIN)  capsule 100 mg  100 mg Oral Q12H Scar Gaxiola MD   100 mg at 01/31/19 2147   • glucagon (human recombinant) (GLUCAGEN DIAGNOSTIC) injection 1 mg  1 mg Subcutaneous PRN Yahaira Garza APRN       • heparin (porcine) injection 5,000 Units  5,000 Units Intracatheter Q12H PRN Fran Tilley MD   4,000 Units at 01/31/19 0246   • hydrocortisone 1 % cream 1 application  1 application Topical Q6H Fran Tilley MD   1 application at 02/01/19 0618   • insulin lispro (humaLOG) injection 0-7 Units  0-7 Units Subcutaneous 4x Daily With Meals & Nightly Yahaira Garza APRN   2 Units at 01/30/19 0820   • insulin regular (HumuLIN R,NovoLIN R) 100 Units in sodium chloride 0.9 % 100 mL (1 Units/mL) infusion  0-50 Units/hr Intravenous Continuous PRN Scar Gaxiola MD 1.5 mL/hr at 01/29/19 1203 1.5 Units/hr at 01/29/19 1203   • ipratropium-albuterol (DUO-NEB) nebulizer solution 3 mL  3 mL Nebulization BID - RT Yahaira Garza APRN   3 mL at 02/01/19 0009   • ipratropium-albuterol (DUO-NEB) nebulizer solution 3 mL  3 mL Nebulization Q6H PRN Yahaira Garza APRN       • magic mouthwash oral supsension 5 mL  5 mL Swish & Spit Q4H PRN Yahaira Garza APRN       • magnesium hydroxide (MILK OF MAGNESIA) suspension 2400 mg/10mL 10 mL  10 mL Oral Daily PRN Scar Gaxiola MD       • midazolam (VERSED) injection 2 mg  2 mg Intravenous Q1H PRN Scar Gaxiola MD       • milrinone 20 mg/100 mL (0.2 mg/mL) in 5 % dextrose infusion  0.125 mcg/kg/min Intravenous Continuous Alicia Schmitz APRN   Stopped at 01/29/19 0830   • morphine injection 1 mg  1 mg Intravenous Q4H PRN Scar Gaxiola MD   1 mg at 01/30/19 1415    And   • naloxone (NARCAN) injection 0.4 mg  0.4 mg Intravenous Q5 Min PRN Scar Gaxiola MD       • morphine injection 4 mg  4 mg Intravenous Q30 Min PRN Scar Gaxiola MD   2 mg at 01/29/19 1512   • mupirocin (BACTROBAN) 2 % nasal ointment   Each Nare BID Scar Gaxiola MD   10 application at  01/31/19 2147   • niCARdipine (CARDENE) 25 mg/250 mL (0.1 mg/mL) NS infusion kit  5-15 mg/hr Intravenous Continuous PRN Scar Gaxiola MD       • nitroglycerin 50 mg/250 mL (0.2 mg/mL) infusion  5-200 mcg/min Intravenous Titrated Scar Gaxiola MD       • norepinephrine (LEVOPHED) 8 mg/250 mL (32 mcg/mL) in sodium chloride 0.9% infusion (premix)  0.02-0.3 mcg/kg/min Intravenous Continuous PRN Scar Gaxiola MD   Stopped at 01/28/19 1632   • nystatin (MYCOSTATIN) powder   Topical Q12H Leonides Martinez MD       • ondansetron (ZOFRAN) injection 4 mg  4 mg Intravenous Q6H PRN Scar Gaxiola MD       • oxyCODONE (ROXICODONE) immediate release tablet 5 mg  5 mg Oral Q4H PRN Yahaira Garza APRN       • pantoprazole (PROTONIX) EC tablet 40 mg  40 mg Oral Q AM Scar Gaxiola MD       • phenylephrine (JESSE-SYNEPHRINE) 50 mg in sodium chloride 0.9 % 250 mL (0.2 mg/mL) infusion  0.5-3 mcg/kg/min Intravenous Titrated Scar Gaxiola MD   Stopped at 01/31/19 0840   • potassium chloride (MICRO-K) CR capsule 40 mEq  40 mEq Oral PRN Scar Gaxiola MD        Or   • potassium chloride (KLOR-CON) packet 40 mEq  40 mEq Oral PRN Scar Gaxiola MD       • potassium chloride (KLOR-CON) packet 40 mEq  40 mEq Nasogastric Once Wallace Falcon MD       • potassium chloride 10 mEq in 100 mL IVPB  10 mEq Intravenous Q1H PRN Scar Gaxiola MD        Or   • potassium chloride 10 mEq in 100 mL IVPB  10 mEq Intravenous Q1H PRN Scar Gaxiola MD       • potassium chloride 20 mEq in 50 mL IVPB  20 mEq Intravenous Q1H PRN Scar Gaxiola MD       • potassium chloride 20 mEq in 50 mL IVPB  20 mEq Intravenous Q1H PRN Scar Gaxiola MD       • potassium chloride 20 mEq in 50 mL IVPB  20 mEq Intravenous Q1H PRN Scar Gaxiola MD       • promethazine (PHENERGAN) tablet 12.5 mg  12.5 mg Oral Q6H PRN Scar Gaxiola MD        Or   • promethazine (PHENERGAN) injection 12.5 mg  12.5 mg Intravenous  Q6H PRN Scar Gaxiola MD       • propofol (DIPRIVAN) infusion 10 mg/mL 100 mL  5-50 mcg/kg/min Intravenous Continuous PRN Scar Gaxiola MD   Stopped at 01/28/19 1632   • sennosides-docusate sodium (SENOKOT-S) 8.6-50 MG tablet 2 tablet  2 tablet Oral Nightly Scar Gaxiola MD   2 tablet at 01/31/19 2146   • sodium chloride 0.9 % flush 30 mL  30 mL Intravenous Once PRN Scar Gaxiola MD       • sodium chloride 0.9 % infusion  30 mL/hr Intravenous Continuous Scar Gaxiola MD 30 mL/hr at 01/28/19 1257 30 mL/hr at 01/28/19 1257   • sodium chloride 0.9 % infusion  30 mL/hr Intravenous Continuous PRN Scar Gaxiola MD 30 mL/hr at 01/28/19 1215 30 mL/hr at 01/28/19 1215   • sodium chloride 7 % nebulizer solution nebulizer solution 4 mL  4 mL Nebulization BID - RT Yahaira Garza APRN   4 mL at 02/01/19 0009   • vasopressin (PITRESSIN) 20 Units in sodium chloride 0.9 % 100 mL (0.2 Units/mL) infusion  0.04 Units/min Intravenous Continuous PRN Alicia Schmitz APRN 12 mL/hr at 01/29/19 1053 0.04 Units/min at 01/29/19 1053       Assessment/Plan   Oliguric LAVERNE - suspect ischemic ATN post AVR/MVR.  No role of contrast exposure from remote [facility]. Dialyzed WED, UF only THURS.  No e/o renal recovery.  UOP only 500cc/24h s/p bumex 4mg IV, waste products climbing.  Hypokalemic.  HCO3 stable.  Has ROCHELLE perez.  UA: mod blood, 30 protein.  Still expect eventual renal recovery with BL Cr ~ 1.       VHD, POD3 AVR, MVR, tricuspic bicuspidization, and left cryo MAZE with PENNIE ligation  Cardiogenic shock - off neosynephrine, on low dose dopamine   Volume overload - CXR still shows pulm edema, small effusions, despite removal of 8L/48h  -- periph edema down significantly  Acute resp failure - extubated, on 3L NC o2  Moderate pulm HTN, s/p RHC 1/22/19   Candidate intertrigo - topical nystatin and IV micafungin per ID  Anemia of ABL - Hgb stable 8.9  AFIB - HR stable post-op, off amiodarone gtt     Plan  - HD again today, 4h,  4K bath, , remove 3L today   - albumin at start of HD  - repeat UA, send Luzmaria   - bedside renal US (if feasible)  - KCL 40meq elixir per tube  - D/w Dr Sadler          Acute congestive heart failure (CMS/HCC)    Hypertension    Generalized anxiety disorder    Hyperlipidemia    IFG (impaired fasting glucose)    Heart murmur, systolic    Obesity, morbid, BMI 50 or higher (CMS/HCC)    Fungal skin infection    Cellulitis of lower extremity    Aortic valve stenosis    Tricuspid valve insufficiency    Mitral valve stenosis              Wallace Falcon MD  02/01/19  7:24 AM

## 2019-02-01 NOTE — THERAPY TREATMENT NOTE
Acute Care - Occupational Therapy Treatment Note  [facility]     Patient Name: Claudia Aronld  : 1945  MRN: [id number]  Today's Date: 2019  Onset of Illness/Injury or Date of Surgery: 19          Admit Date: 2019       ICD-10-CM ICD-9-CM   1. Acute congestive heart failure, unspecified heart failure type (CMS/[facility]) I50.9 428.0   2. Generalized weakness R53.1 780.79   3. Aortic valve stenosis, etiology of cardiac valve disease unspecified I35.0 424.1   4. Tricuspid valve insufficiency, unspecified etiology I07.1 397.0   5. Mitral valve stenosis, unspecified etiology I05.0 394.0   6. Acute renal failure, unspecified acute renal failure type (CMS/[facility]) N17.9 584.9     Patient Active Problem List   Diagnosis   • Tear of rotator cuff   • Hypertension   • Generalized anxiety disorder   • Hyperlipidemia   • IFG (impaired fasting glucose)   • Heart murmur, systolic   • Shoulder pain, bilateral   • Pain of both shoulder joints   • Chronic pain of both shoulders   • Acute congestive heart failure (CMS/[facility])   • Obesity, morbid, BMI 50 or higher (CMS/[facility])   • Fungal skin infection   • Cellulitis of lower extremity   • Aortic valve stenosis   • Tricuspid valve insufficiency   • Mitral valve stenosis     Past Medical History:   Diagnosis Date   • A-fib (CMS/[facility])     Meds   • Arthritis     w/Difficult Mobility   • Bilateral lower extremity edema     Legs   • CAD (coronary artery disease)     Affecting LAD    • Cardiomyopathy (CMS/[facility])    • CHF (congestive heart failure) (CMS/[facility])    • Chronic fatigue    • Generalized anxiety disorder    • Hernia, inguinal     Hx Repair   • History of echocardiogram 19-[facility]    The L Ventricular Cavity is Mild-to-Moderately Dilated; Left Ventricular Wall Thickness is Consistent w/Mild Concentric Hypertrophy; Left Atrial Cavity Size is Moderate-to-Severely Dilated; Severe AVS; Severe MVS; Moderate TVR Noted   • Hyperlipidemia     Controlled w/Meds   • Hypertension      Controlled w/Meds   • Hypokinesis 01/22/2019    Apical Noted on Cardiac Cath   • IFG (impaired fasting glucose)    • LAD stenosis 01/22/2019    Mid LAD Irregularities Noted on Cardiac Cath    • Left atrial dilatation 01/17/2019    Moderate-Severe Noted on Echo   • Left ventricular dilatation 01/17/2019    Noted on Echo/LEE   • Mild concentric left ventricular hypertrophy 01/17/2019    Noted on Echo/LEE   • Mitral annular calcification 01/17/2019    Severe Noted on Echo   • Moderate tricuspid valve regurgitation 01/24/2019    Noted on LEE   • Morbid obesity (CMS/[facility])    • NSTEMI (non-ST elevated myocardial infarction) (CMS/[facility])    • OCTAVIANO (obstructive sleep apnea)    • Osteoarthritis    • Pulmonary hypertension (CMS/[facility]) 01/22/2019    Noted on Cardiac Cath   • Right bundle branch block 01/24/2019    Noted on LEE   • Severe aortic stenosis 01/22/2019    Noted on Cardiac Cath & LEE on 01/24/19   • Severe mitral valve stenosis 01/24/2019    Noted on LEE   • Shoulder pain, bilateral    • Skin yeast infection     Folds Under Skin--Nystatin/Diflucan     Past Surgical History:   Procedure Laterality Date   • AORTIC VALVE REPAIR/REPLACEMENT MITRAL VALVE REPAIR/REPLACEMENT N/A 1/28/2019    Procedure: LEE STERNOTOMY AORTIC VALVE REPLACEMENT, MITRAL VALVE REPLACEMENT, TRICUSPID VALVE REPAIR, MAZE PROCEDURE, CLOSURE OF LEFT ATRIAL APPENDAGE  AND PRP;  Surgeon: Scar Gaxiola MD;  Location[facility] OR;  Service: Cardiothoracic   • CARDIAC CATHETERIZATION N/A 1/22/2019    Procedure: Coronary angiography;  Surgeon: Rick Talavera MD;  Location: [facility] INVASIVE LOCATION;  Service: Cardiology   • CARDIAC CATHETERIZATION N/A 1/22/2019    Procedure: Left Heart Cath;  Surgeon: Rick Talavera MD;  Location: [facility] INVASIVE LOCATION;  Service: Cardiology   • CARDIAC CATHETERIZATION N/A 1/22/2019    Procedure: Right Heart Cath;  Surgeon: Rick Talavera MD;  Location: [facility] INVASIVE LOCATION;   Service: Cardiology   • CARDIAC CATHETERIZATION N/A 1/22/2019    Procedure: Left ventriculography;  Surgeon: Rick Talavera MD;  Location: [facility] INVASIVE LOCATION;  Service: Cardiology   • COLONOSCOPY     • HERNIA REPAIR      NO FURTHER INFORMATION   • REPLACEMENT TOTAL KNEE Bilateral 2007/2009       Therapy Treatment    Rehabilitation Treatment Summary     Row Name 02/01/19 1405 02/01/19 0902          Treatment Time/Intention    Discipline  --  physical therapist  -     Document Type  --  therapy note (daily note)  -     Subjective Information  --  complains of;fatigue;weakness  -CH     Mode of Treatment  --  physical therapy  -CH     Patient/Family Observations  Pt now with TV repair, MAZE PENNIE ligation  -SG  pt sitting in bariatric chair, appears sleepy but no distress noted at rest  -CH     Patient Effort  --  good  -CH     Comment  Extremely fatigued, max cues to maintain alertness  -SG  pt in elevated bariatric chair and pt unable to be lowered enough to get feet to floor and RN reports pt fatigues quickly, therefore not safe to attempt standing at this time  -CH     Existing Precautions/Restrictions  --  cardiac;fall;sternal  -CH     Recorded by [SG] Jess Dumont, OTR 02/01/19 1409 [CH] Kae Aldrich, PT 02/01/19 1051     Row Name 02/01/19 0902             Cognitive Assessment/Intervention    Additional Documentation  Cognitive Assessment/Intervention (Group)  -CH      Recorded by [CH] Kae Aldrich, PT 02/01/19 1051      Row Name 02/01/19 1405 02/01/19 0902          Cognitive Assessment/Intervention- PT/OT    Affect/Mental Status (Cognitive)  low arousal/lethargic  -  --     Orientation Status (Cognition)  oriented to;place;person  -SG  oriented x 3  -CH     Follows Commands (Cognition)  delayed response/completion;repetition of directions required;verbal cues/prompting required  -  WFL  -     Personal Safety Interventions  --  fall prevention program maintained;gait  belt;nonskid shoes/slippers when out of bed  -CH     Recorded by [SG] Jess Dumont, OTR 02/01/19 1409 [CH] Kae Aldrich, PT 02/01/19 1051     Row Name 02/01/19 1405 02/01/19 0902          Bed Mobility Assessment/Treatment    Supine-Sit [place] (Bed Mobility)  --  not tested  -CH     Sit-Supine [place] (Bed Mobility)  --  not tested  -CH     Comment (Bed Mobility)  Did not attempt, too lethargic  -SG  pt sitting in chair  -CH     Recorded by [SG] Jess Dumont, OTR 02/01/19 1409 [CH] Kae Aldrich, PT 02/01/19 1051     Row Name 02/01/19 0902             Sit-Stand Transfer    Sit-Stand [place] (Transfers)  not tested pt in bariatric chair and unable to reach floor  -CH      Recorded by [CH] Kae Aldrich, PT 02/01/19 1051      Row Name 02/01/19 1405             Therapeutic Exercise    Therapeutic Exercise  supine, upper extremities  -SG      Recorded by [SG] Jess Dumont, OTR 02/01/19 1409      Row Name 02/01/19 1405             Upper Extremity Supine Therapeutic Exercise    Performed, Supine Upper Extremity (Therapeutic Exercise)  shoulder flexion/extension;elbow flexion/extension  -SG      Exercise Type, Supine Upper Extremity (Therapeutic Exercise)  AAROM (active assistive range of motion)  -SG      Expected Outcomes, Supine Upper Extremity (Therapeutic Exercise)  improve functional tolerance, single extremity activity  -SG      Sets/Reps Detail, Supine Upper Extremity (Therapeutic Exercise)  10x2  -SG      Comment, Supine Upper Extremity (Therapeutic Exercise)  Mod cues to stay alert  -SG      Recorded by [SG] Jess Dumont, OTR 02/01/19 1409      Row Name 02/01/19 0902             Lower Extremity Seated Therapeutic Exercise    Performed, Seated Lower Extremity (Therapeutic Exercise)  LAQ (long arc quad), knee extension;ankle dorsiflexion/plantarflexion  -      Sets/Reps Detail, Seated Lower Extremity (Therapeutic Exercise)  10  -CH      Comment, Seated Lower  Extremity (Therapeutic Exercise)  5 reps cardiac protocol  -CH      Recorded by [CH] Kae Aldrich, PT 02/01/19 1051      Row Name 02/01/19 1405 02/01/19 0902          Positioning and Restraints    Pre-Treatment Position  in bed  -SG  sitting in chair/recliner  -CH     Post Treatment Position  bed  -SG  chair  -CH     In Bed  supine;call light within reach;encouraged to call for assist;exit alarm on  -SG  --     In Chair  --  sitting;with nsg  -CH     Recorded by [SG] Jess Dumont, OTR 02/01/19 1409 [CH] Kae Aldrich, PT 02/01/19 1051     Row Name 02/01/19 0902             Pain Assessment    Additional Documentation  Pain Scale: Numbers Pre/Post-Treatment (Group)  -CH      Recorded by [CH] Kae Aldrich, PT 02/01/19 1051      Row Name 02/01/19 1405 02/01/19 0902          Pain Scale: Numbers Pre/Post-Treatment    Pain Scale: Numbers, Pretreatment  0/10 - no pain  -SG  -- pt with no complaints of pain during treatment  -CH     Recorded by [SG] Jess Dumont, OTR 02/01/19 1409 [CH] Kae Aldrich, PT 02/01/19 1051     Row Name                Wound 01/28/19 1138 chest incision    Wound - Properties Group Date first assessed: 01/28/19 [SR] Time first assessed: 1138 [SR] Location: chest [SR] Type: incision [SR] Recorded by:  [SR] Keisha Gandara RN 01/28/19 1138    Row Name                Wound 01/28/19 1215 coccyx unspecified    Wound - Properties Group Date first assessed: 01/28/19 [MB] Time first assessed: 1215 [MB] Present On Admission : yes;picture taken [MB] Location: coccyx [MB] Type: unspecified [MB] Recorded by:  [MB] Julissa Chatterjee, RN 01/28/19 1541    Row Name                Wound 01/31/19 2300 Right gluteal pressure injury    Wound - Properties Group Date first assessed: 01/31/19 [NW] Time first assessed: 2300 [NW] Present On Admission : no [NW] Side: Right [NW] Location: gluteal [NW] Type: pressure injury [NW] Stage, Pressure Injury: deep tissue injury [NW] Recorded by:  [NW] Екатерина,  DARCIE Wells 02/01/19 0414    Row Name                Wound 01/31/19 2000 Left posterior ear pressure injury    Wound - Properties Group Date first assessed: 01/31/19 [NW] Time first assessed: 2000 [NW] Side: Left [NW] Orientation: posterior [NW] Location: ear [NW2] Type: pressure injury [NW] Stage, Pressure Injury: Stage 2;medical device related [NW] Recorded by:  [NW] Priscilla Willams RN 02/01/19 0416 [NW2] Priscilla Willams RN 02/01/19 0417    Row Name 02/01/19 0902             Plan of Care Review    Plan of Care Reviewed With  patient  -CH      Recorded by [CH] Kae Aldrich, PT 02/01/19 1051      Row Name 02/01/19 0902             Outcome Summary/Treatment Plan (PT)    Anticipated Discharge Disposition (PT)  skilled nursing facility  -CH      Recorded by [CH] Kae Aldrich, PT 02/01/19 1051        User Key  (r) = Recorded By, (t) = Taken By, (c) = Cosigned By    Initials Name Effective Dates Discipline    SG Jess Dumont, OTR 12/26/18 -  OT    CH Kae Aldrich, PT 04/03/18 -  PT    Julissa Sweet, RN 06/16/16 -  Nurse    SR Keisha Gandara RN 06/16/16 -  Nurse    Priscilla Jensen RN 03/05/18 -  Nurse        Wound 01/28/19 1138 chest incision (Active)   Dressing Appearance open to air 2/1/2019 12:30 PM   Closure Approximated 2/1/2019 12:30 PM   Base pink 2/1/2019 12:30 PM   Drainage Amount none 2/1/2019 12:30 PM   Dressing Care, Wound open to air 2/1/2019 12:30 PM       Wound 01/28/19 1215 coccyx unspecified (Active)   Dressing Appearance open to air 2/1/2019 12:30 PM   Closure None 2/1/2019  3:59 AM   Base pink 2/1/2019 12:30 PM   Drainage Amount none 2/1/2019 12:30 PM   Care, Wound barrier applied 2/1/2019 12:30 PM   Dressing Care, Wound open to air 2/1/2019 12:30 PM       Wound 01/31/19 2300 Right gluteal pressure injury (Active)   Dressing Appearance open to air 2/1/2019 12:30 PM   Drainage Amount none 2/1/2019 12:30 PM   Care, Wound barrier applied 2/1/2019 12:30 PM   Dressing Care, Wound  open to air 2/1/2019 12:30 PM       Wound 01/31/19 2000 Left posterior ear pressure injury (Active)   Base pink 2/1/2019 12:30 PM   Drainage Amount none 2/1/2019 12:30 PM   Dressing Care, Wound other (see comments) 2/1/2019 12:30 PM     Rehab Goal Summary     Row Name 02/01/19 1400 02/01/19 1203          Occupational Therapy Goals    Transfer Goal Selection (OT)  transfer, OT goal 1  -SG  --     Dressing Goal Selection (OT)  --  -SG  --     Strength Goal Selection (OT)  strength, OT goal 1  -SG  --     Balance Goal Selection (OT)  balance, OT goal 1  -SG  --     Additional Documentation  Strength Goal Selection (OT) (Row);Balance Goal Selection (OT) (Row)  -SG  --        Transfer Goal 1 (OT)    Activity/Assistive Device (Transfer Goal 1, OT)  toilet  -SG  --     [place] Level/Cues Needed (Transfer Goal 1, OT)  maximum assist (25-49% patient effort)  -SG  --     Time Frame (Transfer Goal 1, OT)  1 week  -SG  --        Strength Goal 1 (OT)    Strength Goal 1 (OT)  Pt to increase UE strength to 3-/5 to assist with ADLs.  -SG  --     Time Frame (Strength Goal 1, OT)  short term goal (STG);1 week  -SG  --     Progress/Outcome (Strength Goal 1, OT)  goal ongoing  -SG  --        Balance Goal 1 (OT)    Activity/Assistive Device (Balance Goal 1, OT)  sitting, static  -SG  --     [place] Level/Cues Needed (Balance Goal 1, OT)  maximum assist (25-49% patient effort)  -SG  --     Time Frame (Balance Goal 1, OT)  short term goal (STG);1 week  -SG  --     Progress/Outcomes (Balance Goal 1, OT)  goal ongoing  -SG  --        Swallow Goals (SLP)    Oral Nutrition/Hydration Goal Selection (SLP)  --  oral nutrition/hydration, SLP goal 1  -OC        Oral Nutrition/Hydration Goal 1 (SLP)    Oral Nutrition/Hydration Goal 1, SLP  --  Pt will tolerate ice chips and thin via spoon sparingly.   -OC     Time Frame (Oral Nutrition/Hydration Goal 1, SLP)  --  by discharge  -OC       User Key  (r) = Recorded By, (t) = Taken By, (c) =  Cosigned By    Initials Name Provider Type Discipline    Geovanna Roberts MA,CCC-SLP Speech and Language Pathologist SLP    Jess Potts, OTR Occupational Therapist OT        Occupational Therapy Education     Title: PT OT SLP Therapies (In Progress)     Topic: Occupational Therapy (In Progress)     Point: ADL training (In Progress)     Description: Instruct learner(s) on proper safety adaptation and remediation techniques during self care or transfers.   Instruct in proper use of assistive devices.    Learning Progress Summary           Patient Acceptance, E, VU by  at 1/25/2019  1:26 PM    Comment:  Enncouraged increased participation with ADL's grooming. Discussed safety and positioning.    Acceptance, E, NR by SG at 1/17/2019  2:00 PM    Comment:  safety for adls. May benefit from AE teaching and e/c and w/s teaching   Family Acceptance, E, NR by  at 1/17/2019  2:00 PM    Comment:  safety for adls. May benefit from AE teaching and e/c and w/s teaching                               User Key     Initials Effective Dates Name Provider Type Discipline     06/08/18 -  Keisha Hernandez, OTR Occupational Therapist OT     06/08/18 -  Christina Colmenares OTR Occupational Therapist OT                OT Recommendation and Plan     Plan of Care Review  Plan of Care Reviewed With: patient  Plan of Care Reviewed With: patient  Outcome Summary: OT re-eval completed this date, pt extremely fatigued with limited alertness. Tolerates UE ther ex but extremely weak. Goals updated. Will need rehab at d/c.  Outcome Measures     Row Name 02/01/19 1400 02/01/19 1000 01/31/19 1200       How much help from another person do you currently need...    Turning from your back to your side while in flat bed without using bedrails?  --  1  -CH  1  -MA    Moving from lying on back to sitting on the side of a flat bed without bedrails?  --  1  -CH  1  -MA    Moving to and from a bed to a chair (including a wheelchair)?  --  1  -CH  1   -MA    Standing up from a chair using your arms (e.g., wheelchair, bedside chair)?  --  1  -  1  -MA    Climbing 3-5 steps with a railing?  --  1  -  1  -MA    To walk in hospital room?  --  1  -  1  -MA    AM-PAC 6 Clicks Score  --  6  -  6  -MA       How much help from another is currently needed...    Putting on and taking off regular lower body clothing?  1  -SG  --  --    Bathing (including washing, rinsing, and drying)  1  -SG  --  --    Toileting (which includes using toilet bed pan or urinal)  1  -SG  --  --    Putting on and taking off regular upper body clothing  1  -SG  --  --    Taking care of personal grooming (such as brushing teeth)  1  -SG  --  --    Eating meals  1  -SG  --  --    Score  6  -SG  --  --       Functional Assessment    Outcome Measure Options  AM-PAC 6 Clicks Daily Activity (OT)  -[facility] 6 Clicks Basic Mobility (PT)  -[facility] 6 Clicks Basic Mobility (PT)  -MA    Row Name 01/30/19 1000             How much help from another person do you currently need...    Turning from your back to your side while in flat bed without using bedrails?  1  -MA      Moving from lying on back to sitting on the side of a flat bed without bedrails?  1  -MA      Moving to and from a bed to a chair (including a wheelchair)?  1  -MA      Standing up from a chair using your arms (e.g., wheelchair, bedside chair)?  1  -MA      Climbing 3-5 steps with a railing?  1  -MA      To walk in hospital room?  1  -MA      AM-PAC 6 Clicks Score  6  -MA         Functional Assessment    Outcome Measure Options  AM-[facility] 6 Clicks Basic Mobility (PT)  -MA        User Key  (r) = Recorded By, (t) = Taken By, (c) = Cosigned By    Initials Name Provider Type    Jess Potts, OTR Occupational Therapist    Kae Martinez, PT Physical Therapist    Tabatha Fischer, PT Physical Therapist           Time Calculation:   Time Calculation- OT     Row Name 02/01/19 1411             Time Calculation- OT    OT Start  Time  1315  -      OT Stop Time  1323  -      OT Time Calculation (min)  8 min  -      Total Timed Code Minutes- OT  8 minute(s)  -      OT Received On  02/01/19  -      OT Goal Re-Cert Due Date  02/08/19  -        User Key  (r) = Recorded By, (t) = Taken By, (c) = Cosigned By    Initials Name Provider Type     Jess Dumont OTR Occupational Therapist           Therapy Suggested Charges     Code   Minutes Charges    None           Therapy Charges for Today     Code Description Service Date Service Provider Modifiers Qty    [id number] HC OT THER PROC EA 15 MIN 2/1/2019 Jess Dumont OTR GO 1               JANELLE Hernandez  2/1/2019

## 2019-02-01 NOTE — THERAPY EVALUATION
Acute Care - Speech Language Pathology   Swallow Initial Evaluation [facility]     Patient Name: Claudia Arnold  : 1945  MRN: [id number]  Today's Date: 2019  Onset of Illness/Injury or Date of Surgery: 19            Admit Date: 2019    Visit Dx:     ICD-10-CM ICD-9-CM   1. Acute congestive heart failure, unspecified heart failure type (CMS/HCC) I50.9 428.0   2. Generalized weakness R53.1 780.79   3. Aortic valve stenosis, etiology of cardiac valve disease unspecified I35.0 424.1   4. Tricuspid valve insufficiency, unspecified etiology I07.1 397.0   5. Mitral valve stenosis, unspecified etiology I05.0 394.0   6. Acute renal failure, unspecified acute renal failure type (CMS/HCC) N17.9 584.9     Patient Active Problem List   Diagnosis   • Tear of rotator cuff   • Hypertension   • Generalized anxiety disorder   • Hyperlipidemia   • IFG (impaired fasting glucose)   • Heart murmur, systolic   • Shoulder pain, bilateral   • Pain of both shoulder joints   • Chronic pain of both shoulders   • Acute congestive heart failure (CMS/HCC)   • Obesity, morbid, BMI 50 or higher (CMS/HCC)   • Fungal skin infection   • Cellulitis of lower extremity   • Aortic valve stenosis   • Tricuspid valve insufficiency   • Mitral valve stenosis     Past Medical History:   Diagnosis Date   • A-fib (CMS/[facility])     Meds   • Arthritis     w/Difficult Mobility   • Bilateral lower extremity edema     Legs   • CAD (coronary artery disease)     Affecting LAD    • Cardiomyopathy (CMS/[facility])    • CHF (congestive heart failure) (CMS/[facility])    • Chronic fatigue    • Generalized anxiety disorder    • Hernia, inguinal     Hx Repair   • History of echocardiogram 19-[facility]    The L Ventricular Cavity is Mild-to-Moderately Dilated; Left Ventricular Wall Thickness is Consistent w/Mild Concentric Hypertrophy; Left Atrial Cavity Size is Moderate-to-Severely Dilated; Severe AVS; Severe MVS; Moderate TVR Noted   • Hyperlipidemia     Controlled  w/Meds   • Hypertension     Controlled w/Meds   • Hypokinesis 01/22/2019    Apical Noted on Cardiac Cath   • IFG (impaired fasting glucose)    • LAD stenosis 01/22/2019    Mid LAD Irregularities Noted on Cardiac Cath    • Left atrial dilatation 01/17/2019    Moderate-Severe Noted on Echo   • Left ventricular dilatation 01/17/2019    Noted on Echo/LEE   • Mild concentric left ventricular hypertrophy 01/17/2019    Noted on Echo/LEE   • Mitral annular calcification 01/17/2019    Severe Noted on Echo   • Moderate tricuspid valve regurgitation 01/24/2019    Noted on LEE   • Morbid obesity (CMS/[facility])    • NSTEMI (non-ST elevated myocardial infarction) (CMS/[facility])    • OCTAVIANO (obstructive sleep apnea)    • Osteoarthritis    • Pulmonary hypertension (CMS/[facility]) 01/22/2019    Noted on Cardiac Cath   • Right bundle branch block 01/24/2019    Noted on LEE   • Severe aortic stenosis 01/22/2019    Noted on Cardiac Cath & LEE on 01/24/19   • Severe mitral valve stenosis 01/24/2019    Noted on LEE   • Shoulder pain, bilateral    • Skin yeast infection     Folds Under Skin--Nystatin/Diflucan     Past Surgical History:   Procedure Laterality Date   • AORTIC VALVE REPAIR/REPLACEMENT MITRAL VALVE REPAIR/REPLACEMENT N/A 1/28/2019    Procedure: LEE STERNOTOMY AORTIC VALVE REPLACEMENT, MITRAL VALVE REPLACEMENT, TRICUSPID VALVE REPAIR, MAZE PROCEDURE, CLOSURE OF LEFT ATRIAL APPENDAGE  AND PRP;  Surgeon: Scar Gaxiola MD;  Location: [facility] OR;  Service: Cardiothoracic   • CARDIAC CATHETERIZATION N/A 1/22/2019    Procedure: Coronary angiography;  Surgeon: Rick Talavera MD;  Location: [facility] INVASIVE LOCATION;  Service: Cardiology   • CARDIAC CATHETERIZATION N/A 1/22/2019    Procedure: Left Heart Cath;  Surgeon: Rick aTlavera MD;  Location: [facility] CATH INVASIVE LOCATION;  Service: Cardiology   • CARDIAC CATHETERIZATION N/A 1/22/2019    Procedure: Right Heart Cath;  Surgeon: Rick Talavera MD;  Location: [facility]  "CATH INVASIVE LOCATION;  Service: Cardiology   • CARDIAC CATHETERIZATION N/A 1/22/2019    Procedure: Left ventriculography;  Surgeon: Rick Talavera MD;  Location: [facility] CATH INVASIVE LOCATION;  Service: Cardiology   • COLONOSCOPY     • HERNIA REPAIR      NO FURTHER INFORMATION   • REPLACEMENT TOTAL KNEE Bilateral 2007/2009        SWALLOW EVALUATION (last 72 hours)      SLP Adult Swallow Evaluation     Row Name 02/01/19 1203          Document Type  evaluation  -OC    Subjective Information  no complaints  -OC    Patient Observations  alert;cooperative;agree to therapy  -OC    Patient Effort  good  -OC    Symptoms Noted During/After Treatment  fatigue  -OC          Patient Profile Reviewed  yes  -OC    Pertinent History Of Current Problem  Pt admitted with acute congestive heart failure, s/p \"LEE STERNOTOMY AORTIC VALVE REPLACEMENT, MITRAL VALVE REPLACEMENT, TRICUSPID VALVE REPAIR, MAZE PROCEDURE, CLOSURE OF LEFT ATRIAL APPENDAGE  AND PRP\"  -OC    Current Method of Nutrition  NPO;nasogastric feedings  -OC    Precautions/Limitations, Vision  WFL  -OC    Precautions/Limitations, Hearing  WFL  -OC    Prior Level of Function-Communication  WFL  -OC    Prior Level of Function-Swallowing  no diet consistency restrictions  -OC    Plans/Goals Discussed with  patient;agreed upon  -OC    Barriers to Rehab  medically complex  -OC    Patient's Goals for Discharge  patient did not state  -OC          Pain Scale: Numbers, Pretreatment  0/10 - no pain  -OC    Pain Scale: Numbers, Post-Treatment  0/10 - no pain  -OC          Dentition Assessment  natural, present and adequate  -OC    Secretion Management  WNL/WFL  -OC    Mucosal Quality  moist, healthy  -OC    Volitional Swallow  delayed  -OC    Volitional Cough  weak  -OC          Oral Motor General Assessment  generalized oral motor weakness  -OC          Oral Prep Phase  WFL  -OC    Oral Transit  WFL  -OC    Oral Residue  WFL  -OC    Pharyngeal Phase  suspected " pharyngeal impairment  -OC    Clinical Swallow Evaluation Summary  Pt demonstrated no overt s/s aspiration with ice chips, thin via spoon, and nectar thick via spoon. Delayed wet coughing with cup sips of thin and nectar thick liquids. Pt quick to fatigue, no appropriate for PO at this time.   -OC          SLP Swallowing Diagnosis  oral dysfunction;suspected pharyngeal dysfunction  -OC    Functional Impact  risk of aspiration/pneumonia  -OC    Rehab Potential/Prognosis, Swallowing  good, to achieve stated therapy goals  -OC    Swallow Criteria for Skilled Therapeutic Interventions Met  demonstrates skilled criteria  -OC          Therapy Frequency (Swallow)  PRN  -OC    Predicted Duration Therapy Intervention (Days)  until discharge  -OC    SLP Diet Recommendation  NPO;other (see comments) ice chips, thin via spoon sparingly  -OC    Recommended Diagnostics  reassess via clinical swallow evaluation  -OC    Recommended Precautions and Strategies  upright posture during/after eating;small bites of food and sips of liquid  -OC    SLP Rec. for Method of Medication Administration  meds via alternate route  -OC    Monitor for Signs of Aspiration  yes;notify SLP if any concerns  -OC    Anticipated Dischage Disposition  anticipate therapy at next level of care  -OC          Oral Nutrition/Hydration Goal Selection (SLP)  oral nutrition/hydration, SLP goal 1  -OC          Oral Nutrition/Hydration Goal 1, SLP  Pt will tolerate ice chips and thin via spoon sparingly.   -OC    Time Frame (Oral Nutrition/Hydration Goal 1, SLP)  by discharge  -OC      User Key  (r) = Recorded By, (t) = Taken By, (c) = Cosigned By    Initials Name Effective Dates    OC Eduard, JUD Austin,[facility]-SLP 06/08/18 -           EDUCATION  The patient has been educated in the following areas:   Dysphagia (Swallowing Impairment).    SLP Recommendation and Plan  SLP Swallowing Diagnosis: oral dysfunction, suspected pharyngeal dysfunction  SLP Diet Recommendation:  NPO, other (see comments)(ice chips, thin via spoon sparingly)  Recommended Precautions and Strategies: upright posture during/after eating, small bites of food and sips of liquid     Monitor for Signs of Aspiration: yes, notify SLP if any concerns  Recommended Diagnostics: reassess via clinical swallow evaluation  Swallow Criteria for Skilled Therapeutic Interventions Met: demonstrates skilled criteria  Anticipated Dischage Disposition: anticipate therapy at next level of care  Rehab Potential/Prognosis, Swallowing: good, to achieve stated therapy goals  Therapy Frequency (Swallow): PRN  Predicted Duration Therapy Intervention (Days): until discharge       Plan of Care Reviewed With: patient  Plan of Care Review  Plan of Care Reviewed With: patient  Outcome Summary: Bedside swallow eval completed. Pt quick to fatigue. No overt s/s aspiration with ice chips and thin via spoon. Delayed wet coughing with thin and nectar thick via cup, multiple swallows observed. Recommend continue NPO with alternate means of nutrition, meds alternate route. Ice chips and water via spoon sparingly. ST to follow for re-eval as pt endurance improves.      SLP GOALS     Row Name 02/01/19 1203             Oral Nutrition/Hydration Goal 1 (SLP)    Oral Nutrition/Hydration Goal 1, SLP  Pt will tolerate ice chips and thin via spoon sparingly.   -OC      Time Frame (Oral Nutrition/Hydration Goal 1, SLP)  by discharge  -OC        User Key  (r) = Recorded By, (t) = Taken By, (c) = Cosigned By    Initials Name Provider Type    OC Geovanna Chapa MA,CCC-SLP Speech and Language Pathologist           SLP Outcome Measures (last 72 hours)      SLP Outcome Measures     Row Name 02/01/19 1200             SLP Outcome Measures    Outcome Measure Used?  Adult NOMS  -OC         Adult FCM Scores    FCM Chosen  Swallowing  -OC      Swallowing FCM Score  1  -OC        User Key  (r) = Recorded By, (t) = Taken By, (c) = Cosigned By    Initials Name Effective Dates     Geovanna Roberts MA,CCC-SLP 06/08/18 -            Time Calculation:   Time Calculation- SLP     Row Name 02/01/19 1213             Time Calculation- SLP    SLP Start Time  1130  -OC      SLP Received On  02/01/19  -OC        User Key  (r) = Recorded By, (t) = Taken By, (c) = Cosigned By    Initials Name Provider Type    Geovanna Roberts MA,CCC-SLP Speech and Language Pathologist          Therapy Charges for Today     Code Description Service Date Service Provider Modifiers Qty    [id number]  ST EVAL ORAL PHARYNG SWALLOW 2 2/1/2019 Geovanna Chapa MA,CCC-SLP GN 1               Geovanna Chapa MA,ENOCH-SLP  2/1/2019

## 2019-02-01 NOTE — PLAN OF CARE
Problem: Patient Care Overview  Goal: Plan of Care Review  Outcome: Ongoing (interventions implemented as appropriate)   02/01/19 1211   Coping/Psychosocial   Plan of Care Reviewed With patient   OTHER   Outcome Summary Bedside swallow eval completed. Pt quick to fatigue. No overt s/s aspiration with ice chips and thin via spoon. Delayed wet coughing with thin and nectar thick via cup, multiple swallows observed. Recommend continue NPO with alternate means of nutrition, meds alternate route. Ice chips and water via spoon sparingly. ST to follow for re-eval as pt endurance improves.

## 2019-02-01 NOTE — PLAN OF CARE
Problem: Patient Care Overview  Goal: Plan of Care Review  Outcome: Ongoing (interventions implemented as appropriate)   02/01/19 1058   Coping/Psychosocial   Plan of Care Reviewed With patient   OTHER   Outcome Summary Pt unsafe to attempt standing today due to height of bariatric chair. Pt had been slid into chair from bed by nursing. RN also states pt fatigues quickly. Pt is quite weak but was able to participate in LE and UE exercises. PT will continue to follow to address strength, mobility, and transfers.

## 2019-02-01 NOTE — THERAPY TREATMENT NOTE
Acute Care - Physical Therapy Treatment Note  [facility]     Patient Name: Claudia Arnold  : 1945  MRN: [id number]  Today's Date: 2019  Onset of Illness/Injury or Date of Surgery: 19          Admit Date: 2019    Visit Dx:    ICD-10-CM ICD-9-CM   1. Acute congestive heart failure, unspecified heart failure type (CMS/[facility]) I50.9 428.0   2. Generalized weakness R53.1 780.79   3. Aortic valve stenosis, etiology of cardiac valve disease unspecified I35.0 424.1   4. Tricuspid valve insufficiency, unspecified etiology I07.1 397.0   5. Mitral valve stenosis, unspecified etiology I05.0 394.0   6. Acute renal failure, unspecified acute renal failure type (CMS/[facility]) N17.9 584.9     Patient Active Problem List   Diagnosis   • Tear of rotator cuff   • Hypertension   • Generalized anxiety disorder   • Hyperlipidemia   • IFG (impaired fasting glucose)   • Heart murmur, systolic   • Shoulder pain, bilateral   • Pain of both shoulder joints   • Chronic pain of both shoulders   • Acute congestive heart failure (CMS/HCC)   • Obesity, morbid, BMI 50 or higher (CMS/HCC)   • Fungal skin infection   • Cellulitis of lower extremity   • Aortic valve stenosis   • Tricuspid valve insufficiency   • Mitral valve stenosis       Therapy Treatment    Rehabilitation Treatment Summary     Row Name 19 0902             Treatment Time/Intention    Discipline  physical therapist  -      Document Type  therapy note (daily note)  -      Subjective Information  complains of;fatigue;weakness  -      Mode of Treatment  physical therapy  -      Patient/Family Observations  pt sitting in bariatric chair, appears sleepy but no distress noted at rest  -      Patient Effort  good  -      Comment  pt in elevated bariatric chair and pt unable to be lowered enough to get feet to floor and RN reports pt fatigues quickly, therefore not safe to attempt standing at this time  -      Existing Precautions/Restrictions   cardiac;fall;sternal  -CH      Recorded by [CH] Kae Aldrich, PT 02/01/19 1051      Row Name 02/01/19 0902             Cognitive Assessment/Intervention    Additional Documentation  Cognitive Assessment/Intervention (Group)  -CH      Recorded by [CH] Kae Aldrich, PT 02/01/19 1051      Row Name 02/01/19 0902             Cognitive Assessment/Intervention- PT/OT    Orientation Status (Cognition)  oriented x 3  -CH      Follows Commands (Cognition)  WFL  -CH      Personal Safety Interventions  fall prevention program maintained;gait belt;nonskid shoes/slippers when out of bed  -CH      Recorded by [CH] Kae Aldrich, PT 02/01/19 1051      Row Name 02/01/19 0902             Bed Mobility Assessment/Treatment    Supine-Sit Wheeler (Bed Mobility)  not tested  -CH      Sit-Supine Wheeler (Bed Mobility)  not tested  -CH      Comment (Bed Mobility)  pt sitting in chair  -CH      Recorded by [] Kae Aldrich, PT 02/01/19 1051      Row Name 02/01/19 0902             Sit-Stand Transfer    Sit-Stand Wheeler (Transfers)  not tested pt in bariatric chair and unable to reach floor  -CH      Recorded by [] Kae Aldrich, PT 02/01/19 1051      Row Name 02/01/19 0902             Lower Extremity Seated Therapeutic Exercise    Performed, Seated Lower Extremity (Therapeutic Exercise)  LAQ (long arc quad), knee extension;ankle dorsiflexion/plantarflexion  -CH      Sets/Reps Detail, Seated Lower Extremity (Therapeutic Exercise)  10  -CH      Comment, Seated Lower Extremity (Therapeutic Exercise)  5 reps cardiac protocol  -CH      Recorded by [] Kae Aldrich, PT 02/01/19 1051      Row Name 02/01/19 0902             Positioning and Restraints    Pre-Treatment Position  sitting in chair/recliner  -CH      Post Treatment Position  chair  -CH      In Chair  sitting;with nsg  -CH      Recorded by [] Kae Aldrich, PT 02/01/19 1051      Row Name 02/01/19 0902             Pain Assessment     Additional Documentation  Pain Scale: Numbers Pre/Post-Treatment (Group)  -CH      Recorded by [CH] Kae Aldrich, PT 02/01/19 1051      Row Name 02/01/19 0902             Pain Scale: Numbers Pre/Post-Treatment    Pain Scale: Numbers, Pretreatment  -- pt with no complaints of pain during treatment  -CH      Recorded by [CH] Kae Aldrich, PT 02/01/19 1051      Row Name                Wound 01/28/19 1138 chest incision    Wound - Properties Group Date first assessed: 01/28/19 [SR] Time first assessed: 1138 [SR] Location: chest [SR] Type: incision [SR] Recorded by:  [SR] Keisha Gandara RN 01/28/19 1138    Row Name                Wound 01/28/19 1215 coccyx unspecified    Wound - Properties Group Date first assessed: 01/28/19 [MB] Time first assessed: 1215 [MB] Present On Admission : yes;picture taken [MB] Location: coccyx [MB] Type: unspecified [MB] Recorded by:  [MB] Julissa Chatterjee, RN 01/28/19 1541    Row Name                Wound 01/31/19 2300 Right gluteal pressure injury    Wound - Properties Group Date first assessed: 01/31/19 [NW] Time first assessed: 2300 [NW] Present On Admission : no [NW] Side: Right [NW] Location: gluteal [NW] Type: pressure injury [NW] Stage, Pressure Injury: deep tissue injury [NW] Recorded by:  [NW] Priscilla Willams RN 02/01/19 0414    Row Name                Wound 01/31/19 2000 Left posterior ear pressure injury    Wound - Properties Group Date first assessed: 01/31/19 [NW] Time first assessed: 2000 [NW] Side: Left [NW] Orientation: posterior [NW] Location: ear [NW2] Type: pressure injury [NW] Stage, Pressure Injury: Stage 2;medical device related [NW] Recorded by:  [NW] Priscilla Willams RN 02/01/19 0416 [NW2] Priscilla Willams RN 02/01/19 0417    Row Name 02/01/19 0902             Plan of Care Review    Plan of Care Reviewed With  patient  -CH      Recorded by [CH] Kae Aldrich, PT 02/01/19 1051      Row Name 02/01/19 0902             Outcome Summary/Treatment Plan (PT)     Anticipated Discharge Disposition (PT)  skilled nursing facility  -      Recorded by [CH] Elinor Kae S, PT 02/01/19 1051        User Key  (r) = Recorded By, (t) = Taken By, (c) = Cosigned By    Initials Name Effective Dates Discipline    CH ElinorMargueriteie S, PT 04/03/18 -  PT    Julissa Sweet RN 06/16/16 -  Nurse    Keisha Fournier RN 06/16/16 -  Nurse    Priscilla Jensen RN 03/05/18 -  Nurse          Wound 01/28/19 1138 chest incision (Active)   Dressing Appearance open to air 2/1/2019  8:00 AM   Closure Approximated 2/1/2019  8:00 AM   Drainage Amount none 2/1/2019  8:00 AM   Dressing Care, Wound open to air 2/1/2019  8:00 AM       Wound 01/28/19 1215 coccyx unspecified (Active)   Dressing Appearance open to air 2/1/2019  8:00 AM   Closure None 2/1/2019  3:59 AM   Base pink 2/1/2019  3:59 AM   Drainage Amount none 2/1/2019  8:00 AM   Care, Wound other (see comments) 2/1/2019  8:00 AM   Dressing Care, Wound open to air 2/1/2019  8:00 AM       Wound 01/31/19 2300 Right gluteal pressure injury (Active)   Dressing Appearance open to air 2/1/2019  8:00 AM   Drainage Amount none 2/1/2019  8:00 AM   Dressing Care, Wound open to air 2/1/2019  8:00 AM       Wound 01/31/19 2000 Left posterior ear pressure injury (Active)   Drainage Amount none 2/1/2019  8:00 AM   Dressing Care, Wound other (see comments) 2/1/2019  8:00 AM           Physical Therapy Education     Title: PT OT SLP Therapies (In Progress)     Topic: Physical Therapy (In Progress)     Point: Mobility training (In Progress)     Learning Progress Summary           Patient Acceptance, E, NR by MA at 1/31/2019 12:08 PM    Acceptance, E, NR by MA at 1/30/2019 10:23 AM    Acceptance, E,TB, VU by YANETH at 1/27/2019  9:10 AM    Acceptance, E, VU,NR by MA at 1/25/2019  3:54 PM    Acceptance, E,TB,D, VU,NR by CHELSEA at 1/23/2019  3:14 PM    Acceptance, TB,E, SIOBHAN,DU by CW at 1/18/2019  4:37 PM    Acceptance, EALMAZ by EM at 1/17/2019 12:15 PM   Family Acceptance,  TB,E, VU,DU by JANNET at 1/18/2019  4:37 PM                   Point: Home exercise program (Done)     Learning Progress Summary           Patient Acceptance, E,TB,D, VU,NR by  at 2/1/2019 10:51 AM    Acceptance, E, NR by MA at 1/31/2019 12:08 PM    Acceptance, E, NR by MA at 1/30/2019 10:23 AM    Acceptance, E,TB, VU by YANETH at 1/27/2019  9:10 AM    Acceptance, E, VU,NR by MA at 1/25/2019  3:54 PM    Acceptance, E,TB,D, VU,NR by  at 1/23/2019  3:14 PM    Acceptance, TB,E, VU,DU by JANNET at 1/18/2019  4:37 PM   Family Acceptance, TB,E, VU,DU by JANNET at 1/18/2019  4:37 PM                   Point: Body mechanics (In Progress)     Learning Progress Summary           Patient Acceptance, E, NR by MA at 1/31/2019 12:08 PM    Acceptance, E, NR by MA at 1/30/2019 10:23 AM    Acceptance, E,TB, VU by YANETH at 1/27/2019  9:10 AM    Acceptance, E, VU,NR by MA at 1/25/2019  3:54 PM    Acceptance, E,TB,D, VU,NR by  at 1/23/2019  3:14 PM    Acceptance, TB,E, VU,DU by JANNET at 1/18/2019  4:37 PM   Family Acceptance, TB,E, VU,DU by JANNET at 1/18/2019  4:37 PM                   Point: Precautions (In Progress)     Learning Progress Summary           Patient Acceptance, E, NR by MA at 1/31/2019 12:08 PM    Acceptance, E, NR by MA at 1/30/2019 10:23 AM    Acceptance, E,TB, VU by YANETH at 1/27/2019  9:10 AM    Acceptance, E, VU,NR by MA at 1/25/2019  3:54 PM    Acceptance, E,TB,D, VU,NR by  at 1/23/2019  3:14 PM    Acceptance, TB,E, VU,DU by JANNET at 1/18/2019  4:37 PM   Family Acceptance, TB,E, VU,DU by JANNET at 1/18/2019  4:37 PM                               User Key     Initials Effective Dates Name Provider Type Discipline     04/03/18 -  Kae Aldrich, PT Physical Therapist PT    EM 04/03/18 -  Heike Strauss, PT Physical Therapist PT    KH 06/22/16 -  Zuleyma Mancilla, PT Physical Therapist PT    CW 03/07/18 -  Jose Ott, PTA Physical Therapy Assistant PT    MA 10/19/18 -  Tabatha Lawrence, PT Physical Therapist PT                PT  Recommendation and Plan  Anticipated Discharge Disposition (PT): skilled nursing facility  Outcome Summary/Treatment Plan (PT)  Anticipated Discharge Disposition (PT): skilled nursing facility  Plan of Care Reviewed With: patient  Outcome Summary: Pt unsafe to attempt standing today due to height of bariatric chair. Pt had been slid into chair from bed by nursing. RN also states pt fatigues quickly. Pt is quite weak but was able to participate in LE and UE exercises. PT will continue to follow to address strength, mobility, and transfers.   Outcome Measures     Row Name 02/01/19 1000 01/31/19 1200 01/30/19 1000       How much help from another person do you currently need...    Turning from your back to your side while in flat bed without using bedrails?  1  -CH  1  -MA  1  -MA    Moving from lying on back to sitting on the side of a flat bed without bedrails?  1  -CH  1  -MA  1  -MA    Moving to and from a bed to a chair (including a wheelchair)?  1  -CH  1  -MA  1  -MA    Standing up from a chair using your arms (e.g., wheelchair, bedside chair)?  1  -CH  1  -MA  1  -MA    Climbing 3-5 steps with a railing?  1  -CH  1  -MA  1  -MA    To walk in hospital room?  1  -CH  1  -MA  1  -MA    AM-PAC 6 Clicks Score  6  -CH  6  -MA  6  -MA       Functional Assessment    Outcome Measure Options  AM-PAC 6 Clicks Basic Mobility (PT)  -CH  AM-PAC 6 Clicks Basic Mobility (PT)  -MA  AM-PAC 6 Clicks Basic Mobility (PT)  -MA      User Key  (r) = Recorded By, (t) = Taken By, (c) = Cosigned By    Initials Name Provider Type     Kae Aldrich, PT Physical Therapist    Tabatha Fischer, PT Physical Therapist         Time Calculation:   PT Charges     Row Name 02/01/19 1014             Time Calculation    Start Time  0854  -      Stop Time  0902  -      Time Calculation (min)  8 min  -      PT Received On  02/01/19  -      PT - Next Appointment  02/02/19  -         Time Calculation- PT    Total Timed Code Minutes- PT  8  minute(s)  -        User Key  (r) = Recorded By, (t) = Taken By, (c) = Cosigned By    Initials Name Provider Type     Kae Aldrich, PT Physical Therapist        Therapy Suggested Charges     Code   Minutes Charges    None           Therapy Charges for Today     Code Description Service Date Service Provider Modifiers Qty    80355603833 HC PT THER PROC EA 15 MIN 2/1/2019 Kae Aldrich, PT GP 1          PT G-Codes  Outcome Measure Options: AM-PAC 6 Clicks Basic Mobility (PT)  AM-PAC 6 Clicks Score: 6  Score: 16    Kae Aldrich, PT  2/1/2019

## 2019-02-01 NOTE — PROGRESS NOTES
" LOS: 16 days   Patient Care Team:  Robert Cortez MD as PCP - General (Family Medicine)  Deborah Agudelo MD as Surgeon (Orthopedic Surgery)    Chief Complaint: post op    Subjective:  Symptoms:  No shortness of breath or chest pain.    Diet:  No nausea.          Vital Signs  Heart Rate:  [78-89] 80  Resp:  [12-22] 15  BP: (107-134)/(56-73) 122/63  Arterial Line BP: ()/(47-62) 133/62  Body mass index is 48.37 kg/m².    Intake/Output Summary (Last 24 hours) at 2/1/2019 0759  Last data filed at 2/1/2019 0600  Gross per 24 hour   Intake 1871.62 ml   Output 4545 ml   Net -2673.38 ml     No intake/output data recorded.            01/28/19  0430 01/30/19  1518 02/01/19  0600   Weight: (!) 140 kg (308 lb 1.6 oz) (!) 140 kg (308 lb) (!) 140 kg (308 lb 13.8 oz)         Objective:  General Appearance:  In no acute distress.    Vital signs: (most recent): Blood pressure 95/55, pulse 60, temperature 97.6 °F (36.4 °C), temperature source Oral, resp. rate 20, height 170.2 cm (67\"), weight (!) 140 kg (308 lb 13.8 oz), SpO2 92 %.  Vital signs are normal.    Output: Producing urine and producing stool.    Lungs:  Normal effort and normal respiratory rate.    Abdomen: Abdomen is soft.  Bowel sounds are normal.               Results Review:        WBC WBC   Date Value Ref Range Status   02/01/2019 15.14 (H) 4.50 - 10.70 10*3/mm3 Final   01/31/2019 21.38 (H) 4.50 - 10.70 10*3/mm3 Final   01/30/2019 27.62 (H) 4.50 - 10.70 10*3/mm3 Final      HGB Hemoglobin   Date Value Ref Range Status   02/01/2019 8.9 (L) 11.9 - 15.5 g/dL Final   01/31/2019 9.1 (L) 11.9 - 15.5 g/dL Final   01/30/2019 9.0 (L) 11.9 - 15.5 g/dL Final      HCT Hematocrit   Date Value Ref Range Status   02/01/2019 29.1 (L) 35.6 - 45.5 % Final   01/31/2019 30.4 (L) 35.6 - 45.5 % Final   01/30/2019 30.7 (L) 35.6 - 45.5 % Final      Platelets Platelets   Date Value Ref Range Status   02/01/2019 164 140 - 500 10*3/mm3 Final   01/31/2019 171 140 - 500 10*3/mm3 Final "   01/30/2019 210 140 - 500 10*3/mm3 Final        PT/INR:  No results found for: PROTIME/No results found for: INR    Sodium Sodium   Date Value Ref Range Status   02/01/2019 143 136 - 145 mmol/L Final   01/31/2019 141 136 - 145 mmol/L Final   01/30/2019 146 (H) 136 - 145 mmol/L Final   01/30/2019 147 (H) 136 - 145 mmol/L Final   01/30/2019 145 136 - 145 mmol/L Final   01/29/2019 143 136 - 145 mmol/L Final      Potassium Potassium   Date Value Ref Range Status   02/01/2019 3.3 (L) 3.5 - 5.2 mmol/L Final   01/31/2019 3.8 3.5 - 5.2 mmol/L Final   01/30/2019 4.4 3.5 - 5.2 mmol/L Final   01/30/2019 4.9 3.5 - 5.2 mmol/L Final   01/30/2019 4.9 3.5 - 5.2 mmol/L Final   01/29/2019 4.6 3.5 - 5.2 mmol/L Final      Chloride Chloride   Date Value Ref Range Status   02/01/2019 101 98 - 107 mmol/L Final   01/31/2019 100 98 - 107 mmol/L Final   01/30/2019 103 98 - 107 mmol/L Final   01/30/2019 104 98 - 107 mmol/L Final   01/30/2019 103 98 - 107 mmol/L Final   01/29/2019 101 98 - 107 mmol/L Final      Bicarbonate CO2   Date Value Ref Range Status   02/01/2019 23.7 22.0 - 29.0 mmol/L Final   01/31/2019 25.0 22.0 - 29.0 mmol/L Final   01/30/2019 23.7 22.0 - 29.0 mmol/L Final   01/30/2019 22.9 22.0 - 29.0 mmol/L Final   01/30/2019 23.7 22.0 - 29.0 mmol/L Final   01/29/2019 21.4 (L) 22.0 - 29.0 mmol/L Final      BUN BUN   Date Value Ref Range Status   02/01/2019 50 (H) 8 - 23 mg/dL Final   01/31/2019 30 (H) 8 - 23 mg/dL Final   01/30/2019 51 (H) 8 - 23 mg/dL Final   01/30/2019 47 (H) 8 - 23 mg/dL Final   01/30/2019 44 (H) 8 - 23 mg/dL Final   01/29/2019 39 (H) 8 - 23 mg/dL Final      Creatinine Creatinine   Date Value Ref Range Status   02/01/2019 2.83 (H) 0.57 - 1.00 mg/dL Final   01/31/2019 2.03 (H) 0.57 - 1.00 mg/dL Final   01/30/2019 2.88 (H) 0.57 - 1.00 mg/dL Final   01/30/2019 2.77 (H) 0.57 - 1.00 mg/dL Final   01/30/2019 2.65 (H) 0.57 - 1.00 mg/dL Final   01/29/2019 2.27 (H) 0.57 - 1.00 mg/dL Final      Calcium Calcium   Date  Value Ref Range Status   02/01/2019 9.6 8.6 - 10.5 mg/dL Final   01/31/2019 9.6 8.6 - 10.5 mg/dL Final   01/30/2019 10.0 8.6 - 10.5 mg/dL Final   01/30/2019 10.0 8.6 - 10.5 mg/dL Final   01/30/2019 9.9 8.6 - 10.5 mg/dL Final   01/29/2019 10.3 8.6 - 10.5 mg/dL Final      Magnesium No results found for: MG         acetaminophen 500 mg Oral Q4H   aspirin 81 mg Oral Daily   cetaphil  Topical Q12H   chlorhexidine 15 mL Mouth/Throat Q12H   enoxaparin 30 mg Subcutaneous Daily   gabapentin 100 mg Oral Q12H   hydrocortisone 1 application Topical Q6H   insulin lispro 0-7 Units Subcutaneous 4x Daily With Meals & Nightly   ipratropium-albuterol 3 mL Nebulization BID - RT   mupirocin  Each Nare BID   nystatin  Topical Q12H   pantoprazole 40 mg Oral Q AM   potassium chloride 40 mEq Nasogastric Once   sennosides-docusate sodium 2 tablet Oral Nightly   sodium chloride 4 mL Nebulization BID - RT       clevidipine 2-32 mg/hr    dexmedetomidine 0.2-1.5 mcg/kg/hr Last Rate: Stopped (01/28/19 1750)   DOPamine 2-20 mcg/kg/min Last Rate: 1.5 mcg/kg/min (02/01/19 0207)   EPINEPHrine 0.02-0.3 mcg/kg/min Last Rate: Stopped (01/30/19 0840)   insulin 0-50 Units/hr Last Rate: 1.5 Units/hr (01/29/19 1203)   milrinone 0.125 mcg/kg/min Last Rate: Stopped (01/29/19 0830)   niCARdipine 5-15 mg/hr    nitroglycerin 5-200 mcg/min    norepinephrine 0.02-0.3 mcg/kg/min Last Rate: Stopped (01/28/19 1632)   phenylephrine 0.5-3 mcg/kg/min Last Rate: Stopped (01/31/19 0840)   propofol 5-50 mcg/kg/min Last Rate: Stopped (01/28/19 1632)   sodium chloride 30 mL/hr Last Rate: 30 mL/hr (01/28/19 1257)   sodium chloride 30 mL/hr Last Rate: 30 mL/hr (01/28/19 1215)   vasopressin 0.04 Units/min Last Rate: 0.04 Units/min (01/29/19 1053)           Patient Active Problem List   Diagnosis Code   • Tear of rotator cuff M75.100   • Hypertension I10   • Generalized anxiety disorder F41.1   • Hyperlipidemia E78.5   • IFG (impaired fasting glucose) R73.01   • Heart murmur,  systolic R01.1   • Shoulder pain, bilateral M25.511, M25.512   • Pain of both shoulder joints M25.511, M25.512   • Chronic pain of both shoulders M25.511, G89.29, M25.512   • Acute congestive heart failure (CMS/HCC) I50.9   • Obesity, morbid, BMI 50 or higher (CMS/HCC) E66.01   • Fungal skin infection B36.9   • Cellulitis of lower extremity L03.119   • Aortic valve stenosis I35.0   • Tricuspid valve insufficiency I07.1   • Mitral valve stenosis I05.0       Assessment & Plan    -Severe AS, MS, TV regurgitation--s/p AVR/MVR (tissue),TV repair, MAZE PENNIE ligation-- POD#4 Pagni  -Admit with acute congestive heart failure  -NICM, non obstructive dx by cath  -RBBB  -HTN  -Morbid obesity with arthritis-complicating mobility and all aspects of care  -Hypoventilation sx, probable OCTAVIANO-----chronic CO2 retain by ABG, pulmonary signed off  -Yeast under skin folds-----ID following, on nystatin topical and micafungin IV  -Stasis dermatitis due to severe bilateral lower extremity edema and chronic venous stasis-------improved   -Possible PE, NO LE DVT------on heparin  -New a.fib----s/p cardioversion 1/24/19, reverted to a.fib again, on amiodarone/digoxin   -preop anemia----Hb stable  -Left arm thrombophlebitis, infiltration  -Deconditioning----unable to complete transfer preop, assist x4 to stand at bedside  -LAVERNE creatinine  2.65  -Leukocytosis- WBC 27.62, probable re  -Post op anemia- expected ABL     CRRT yesterday 4L removed. Plans for UF today.  On dopamine, asystolic, EP following.  Aggressive pulmonary toilet.  CXR worsening chest congestion.   Neuro intact but drowsy, needs to mobilize.  she has a long road to recovery. Continue routine care.       Yahaira Paulette, APRN  02/01/19  7:59 AM     Above findings confirmed, agree with assessment. Agree with plan.

## 2019-02-01 NOTE — PROGRESS NOTES
LOS: 16 days   Patient Care Team:  Robert Cortez MD as PCP - General (Family Medicine)  Deborah Agudelo MD as Surgeon (Orthopedic Surgery)    Chief Complaint: Follow-up for tissue aortic and mitral valve replacements, tricuspid valve repair, paroxysmal atrial fibrillation with RVR, acute diastolic and valvular CHF.    Interval History: CXR still with significant pulmonary edema.  Plan for HD again today.  No CP or SOA.  Asystole now under pacer.    Vital Signs:  Heart Rate:  [78-81] 80  Resp:  [12-22] 20  BP: ()/(56-73) 94/56  Arterial Line BP: ()/(47-62) 110/53    Intake/Output Summary (Last 24 hours) at 2/1/2019 0911  Last data filed at 2/1/2019 0600  Gross per 24 hour   Intake 1871.62 ml   Output 4520 ml   Net -2648.38 ml       Physical Exam:   General Appearance:    No acute distress, alert and oriented x4   Lungs:     Bilateral rales at the bases, diffuse rhonci    Heart:    Regular rhythm and normal rate, II/VI SM RUSB.   Abdomen:     Soft, non-tender, non-distended.    Extremities:   1+ lower extremity edema     Results Review:    Results from last 7 days   Lab Units 02/01/19  0259   SODIUM mmol/L 143   POTASSIUM mmol/L 3.3*   CHLORIDE mmol/L 101   CO2 mmol/L 23.7   BUN mg/dL 50*   CREATININE mg/dL 2.83*   GLUCOSE mg/dL 122*   CALCIUM mg/dL 9.6         Results from last 7 days   Lab Units 02/01/19  0259   WBC 10*3/mm3 15.14*   HEMOGLOBIN g/dL 8.9*   HEMATOCRIT % 29.1*   PLATELETS 10*3/mm3 164     Results from last 7 days   Lab Units 01/29/19  0241 01/28/19  1233 01/28/19  1120 01/28/19  0337   INR  1.35* 1.44* 1.65*  --    APTT seconds  --  29.1 27.3 80.3*         Results from last 7 days   Lab Units 01/29/19  0241   MAGNESIUM mg/dL 2.1           I reviewed the patient's new clinical results.        Assessment:  1. Acute valvular and diastolic CHF  2. Severe aortic stenosis, severe mitral stenosis secondary to calcification, and severe tricuspid regurgitation  3. Normal EF 60-65%  4.  Paroxysmal atrial fibrillation with RVR -status post cardioversion 1/24/2019 and subsequent reversion to atrial fibrillation.  5. Moderate pulmonary hypertension (mean PA pressure 32)  6. Status post tissue AVR, tissue MVR, tricuspid valve repair (Martha suture repair), left cryomaze, and PENNIE ligation on 1/28/2019 (Dr. Gaxiola).  7. Small bilateral pulmonary emboli  8. Oliguric acute kidney injury post-operatively  9. Bifascicular block (RBBB and LAFB)  10. Candida intertrigo (skin folds); left axillary fungal infection (treated)  11. Osteoarthritis with immobility  12. Left wrist thrombophlebitis from IV infiltration  13. Undiagnosed OCTAVIANO  14. Morbid obesity   15. Anemia of chronic disease  16. Severe deconditioning   17. Junctional bradycardia and asystole post-op    Plan:  -Unfortunately, still no urine output.  CXR with pulmonary edema and little improvement.  Plan for HD again today per Dr. Falcon.    -No asystolic under pacer.  Suspect she will need permanent device.  Dr. Jarrell following - likely closer to time of discharge.  Still with pacing wires.  Off beta-blockers.    -Minimal dopamine currently.    -Systemic anticoagulation when appropriate from Dr. Gaxiola's perspective.    -Will need rehab.  Very weak and deconditioned.    Scott Segura MD  02/01/19  9:11 AM

## 2019-02-01 NOTE — PROGRESS NOTES
"  PROGRESS NOTE  Patient Name: Claudia Arnold  Age/Sex: 73 y.o. female  : 1945  MRN: [id number]    Date of Admission: 2019  Date of Encounter Visit: 19   LOS: 16 days   Patient Care Team:  Robert Cortez MD as PCP - General (Family Medicine)  Deborah Agudelo MD as Surgeon (Orthopedic Surgery)    Chief Complaint:denies SOA    Hospital course: had valves replacement with maze, history of OHS and chronic hypoxemia but no use of inhalers at home. Post op was weaned off the vent and is doing better and working with pulmonary hygiene measures. No fever, on nasal O2 and denies SOA at rest    Interval History: denies SOA< pain is fairly well controlled, improving O2 requirements, on 3 lpm    REVIEW OF SYSTEMS:   CONSTITUTIONAL: no fever or chills  CARDIOVASCULAR: No chest pain, chest pressure or chest discomfort. No palpitations or edema.   RESPIRATORY: No shortness of breath, cough or sputum.   GASTROINTESTINAL: No anorexia, nausea, vomiting or diarrhea. No abdominal pain or blood.   HEMATOLOGIC: No bleeding or bruising.     Ventilator/Non-Invasive Ventilation Settings (From admission, onward)    Start     Ordered            Vital Signs  Temp:  [97.6 °F (36.4 °C)] 97.6 °F (36.4 °C)  Heart Rate:  [60-81] 80  Resp:  [12-22] 20  BP: ()/(55-73) 95/55  Arterial Line BP: ()/(48-62) 116/51  SpO2:  [92 %-98 %] 96 %  on  Flow (L/min):  [3-4] 3 Device (Oxygen Therapy): nasal cannula    Intake/Output Summary (Last 24 hours) at 2019 1146  Last data filed at 2019 0800  Gross per 24 hour   Intake 1828.34 ml   Output 4600 ml   Net -2771.66 ml     Flowsheet Rows      First Filed Value   Admission Height  170.2 cm (67\") Documented at 2019 0910   Admission Weight  145 kg (320 lb)  (Abnormal)  Documented at 2019 0921        Body mass index is 48.37 kg/m².      19  0430 19  1518 19  0600   Weight: (!) 140 kg (308 lb 1.6 oz) (!) 140 kg (308 lb) (!) 140 kg (308 lb 13.8 oz) "       Physical Exam:  GEN:  No acute distress, awake and responsive, cooperative, well developed , on nasal O2  EYES:   Sclera clear. No icterus. PERRL. Normal EOM  ENT:   External ears/nose normal, no oral lesions, no thrush, mucous membranes moist  NECK:  Supple, midline trachea, no JVD, right central line/cordis  LUNGS: Normal chest on inspection,no expiratory wheezing, decreased posteriorly.   CV:  Regular rhythm and rate (paced)..  ABD:  Soft, non-tender and non-distended. Normal bowel sounds. No guarding  EXT:  Moves all extremities well. No cyanosis. No redness. Resolving edema.   Skin: dry, intact, no bleeding    Results Review:      Results from last 7 days   Lab Units 02/01/19  0259 01/31/19  0601 01/30/19  1124 01/30/19  0645 01/30/19  0259 01/29/19  1541 01/29/19  0241 01/28/19  1801 01/28/19  1436 01/28/19  1233   SODIUM mmol/L 143 141 146* 147* 145 143 145 143 143 143   POTASSIUM mmol/L 3.3* 3.8 4.4 4.9 4.9 4.6 4.2 4.5 4.2 4.2   CHLORIDE mmol/L 101 100 103 104 103 101 103 100 100 100   CO2 mmol/L 23.7 25.0 23.7 22.9 23.7 21.4* 24.0 23.8 23.0 25.9   BUN mg/dL 50* 30* 51* 47* 44* 39* 33* 31* 21 28*   CREATININE mg/dL 2.83* 2.03* 2.88* 2.77* 2.65* 2.27* 1.73* 1.50* 1.45* 1.29*   CALCIUM mg/dL 9.6 9.6 10.0 10.0 9.9 10.3 10.3 10.1 10.1 10.1   AST (SGOT) U/L 22  --   --   --   --   --   --  112*  --   --    ALT (SGPT) U/L <5  --   --   --   --   --   --  22  --   --    ANION GAP mmol/L 18.3 16.0 19.3 20.1 18.3 20.6 18.0 19.2 20.0 17.1   ALBUMIN g/dL 3.90  --   --   --   --   --  3.50 3.60 3.80 3.30*                 Results from last 7 days   Lab Units 02/01/19  0259 01/31/19  0601 01/30/19  0259 01/29/19  0241 01/28/19  1436 01/28/19  1233 01/28/19  1122 01/28/19  1120  01/28/19  0337 01/27/19  0447 01/26/19  0659   WBC 10*3/mm3 15.14* 21.38* 27.62* 25.96* 28.06* 21.68*  --  13.78*  --  8.18 8.32 7.29   HEMOGLOBIN g/dL 8.9* 9.1* 9.0* 9.7* 10.5* 11.0*  --  8.7*  --  11.1* 10.7* 10.9*   HEMOGLOBIN, POC g/dL   --   --   --   --   --   --  9.2*  --    < >  --   --   --    HEMATOCRIT % 29.1* 30.4* 30.7* 30.8* 35.3* 36.8  --  29.1*  --  36.0 34.7* 35.5*   HEMATOCRIT POC %  --   --   --   --   --   --  27*  --    < >  --   --   --    PLATELETS 10*3/mm3 164 171 210 228 206 217  --  197  --  365 344 336   MCV fL 96.0 96.2 98.7* 93.3 97.5 97.6  --  97.3  --  95.0 94.8 94.7   NEUTROPHIL % %  --   --   --  89.5*  --  85.0*  --   --   --  69.3 69.2 71.9   LYMPHOCYTE % %  --   --   --  6.6*  --  6.3*  --   --   --  19.3* 10.8* 10.0*   MONOCYTES % %  --   --   --  3.8*  --  2.0*  --   --   --  5.6 14.3* 12.6*   EOSINOPHIL % %  --   --   --  0.0*  --  1.5  --   --   --  5.6 5.6 5.2   BASOPHIL % %  --   --   --  0.1  --  0.2  --   --   --  0.2 0.1 0.3   IMM GRAN % %  --   --   --  2.0*  --  5.0*  --   --   --  4.0* 2.5* 2.1*    < > = values in this interval not displayed.     Results from last 7 days   Lab Units 01/29/19  0241 01/28/19  1233 01/28/19  1120 01/28/19  0337 01/27/19  0447 01/26/19  1553 01/26/19  0659 01/25/19  1213   INR  1.35* 1.44* 1.65*  --   --   --   --   --    APTT seconds  --  29.1 27.3 80.3* 89.8* 71.6* 70.1* 77.3*     Results from last 7 days   Lab Units 01/29/19 0241 01/28/19  1801 01/28/19  1436 01/28/19  1233   MAGNESIUM mg/dL 2.1 1.9 1.5* 1.6           Invalid input(s): LDLCALC  Results from last 7 days   Lab Units 01/29/19  0954 01/29/19  0257 01/28/19  1900 01/28/19  1415 01/28/19  1303 01/28/19  1122 01/28/19  1051 01/28/19  1021 01/28/19  0959 01/28/19  0922 01/28/19  0854 01/28/19  0847 01/28/19  0726   PH, ARTERIAL pH units 7.319* 7.353 7.362 7.344* 7.297* 7.42 7.50 7.56 7.54 7.51 7.48 7.50 7.47   PCO2, ARTERIAL mm Hg 52.6* 49.6* 47.1* 49.7* 61.2*  --   --   --   --   --   --   --   --    PO2 ART mm Hg 84.6 96.3 69.9* 140.4* 91.9  --   --   --   --   --   --   --   --    HCO3 ART mmol/L 27.0 27.6 26.7 27.0 29.9*  --   --   --   --   --   --   --   --          Glucose   Date/Time Value Ref Range Status    02/01/2019 0657 115 70 - 130 mg/dL Final   01/31/2019 2136 137 (H) 70 - 130 mg/dL Final   01/31/2019 1821 144 (H) 70 - 130 mg/dL Final   01/31/2019 1234 142 (H) 70 - 130 mg/dL Final   01/31/2019 0814 125 70 - 130 mg/dL Final   01/30/2019 2033 143 (H) 70 - 130 mg/dL Final   01/30/2019 1845 127 70 - 130 mg/dL Final   01/30/2019 1558 114 70 - 130 mg/dL Final             Results from last 7 days   Lab Units 01/29/19  1045   NITRITE UA  Negative   WBC UA /HPF 6-12*   BACTERIA UA /HPF 1+*   SQUAM EPITHEL UA /HPF 3-6*         Results from last 7 days   Lab Units 01/30/19  0259   URIC ACID mg/dL 8.5*       Imaging:   Imaging Results (all)     Procedure Component Value Units Date/Time          I reviewed the patient's new clinical results.  I personally viewed and interpreted the patient's imaging results:        Medication Review:     acetaminophen 500 mg Oral Q4H   aspirin 81 mg Oral Daily   cetaphil  Topical Q12H   chlorhexidine 15 mL Mouth/Throat Q12H   enoxaparin 30 mg Subcutaneous Daily   gabapentin 100 mg Oral Q12H   hydrocortisone 1 application Topical Q6H   insulin lispro 0-7 Units Subcutaneous 4x Daily With Meals & Nightly   ipratropium-albuterol 3 mL Nebulization BID - RT   mupirocin  Each Nare BID   nystatin  Topical Q12H   pantoprazole 40 mg Oral Q AM   sennosides-docusate sodium 2 tablet Oral Nightly   sodium chloride 4 mL Nebulization BID - RT         clevidipine 2-32 mg/hr    dexmedetomidine 0.2-1.5 mcg/kg/hr Last Rate: Stopped (01/28/19 1750)   DOPamine 2-20 mcg/kg/min Last Rate: 1.5 mcg/kg/min (02/01/19 0207)   EPINEPHrine 0.02-0.3 mcg/kg/min Last Rate: Stopped (01/30/19 0840)   insulin 0-50 Units/hr Last Rate: 1.5 Units/hr (01/29/19 1203)   milrinone 0.125 mcg/kg/min Last Rate: Stopped (01/29/19 0830)   niCARdipine 5-15 mg/hr    nitroglycerin 5-200 mcg/min    norepinephrine 0.02-0.3 mcg/kg/min Last Rate: Stopped (01/28/19 6162)   phenylephrine 0.5-3 mcg/kg/min Last Rate: Stopped (01/31/19 4509)    propofol 5-50 mcg/kg/min Last Rate: Stopped (01/28/19 1632)   sodium chloride 30 mL/hr Last Rate: 30 mL/hr (01/28/19 1257)   sodium chloride 30 mL/hr Last Rate: 30 mL/hr (01/28/19 1215)   vasopressin 0.04 Units/min Last Rate: 0.04 Units/min (01/29/19 1053)       ASSESSMENT:   1. Postop respiratory failure, improving, liberated from the ventilator  2. Status post aortic valve replacement, mitral valve replacement, tricuspid repair, Maze procedure  3. Acute kidney injury  4. Pulmonary hypertension  5. Diastolic left ventricular dysfunction  6. Essential hypertension  7. Chronic respiratory failure  8. Obesity hypoventilation syndrome  9. Suspected obstructive sleep apnea    PLAN:  continues to improve  No wheezing on exam  Working on deep breathing  Consider OCTAVIANO evaluation post discharge  Expect gradual recovery, will continue to follow.  Discussed with patient    Disposition: per primary team    Kirt Cam MD  02/01/19  11:46 AM          Dictated utilizing Dragon dictation

## 2019-02-01 NOTE — PLAN OF CARE
Problem: Patient Care Overview  Goal: Plan of Care Review   02/01/19 1410   OTHER   Outcome Summary OT re-eval completed this date, pt extremely fatigued with limited alertness. Tolerates UE ther ex but extremely weak. Goals updated. Will need rehab at d/c.

## 2019-02-02 ENCOUNTER — APPOINTMENT (OUTPATIENT)
Dept: GENERAL RADIOLOGY | Facility: HOSPITAL | Age: 74
End: 2019-02-02

## 2019-02-02 LAB
ANION GAP SERPL CALCULATED.3IONS-SCNC: 13.8 MMOL/L
APTT PPP: 30.5 SECONDS (ref 22.7–35.4)
BACTERIA SPEC ANAEROBE CULT: NORMAL
BUN BLD-MCNC: 37 MG/DL (ref 8–23)
BUN/CREAT SERPL: 18 (ref 7–25)
CALCIUM SPEC-SCNC: 9.3 MG/DL (ref 8.6–10.5)
CHLORIDE SERPL-SCNC: 99 MMOL/L (ref 98–107)
CO2 SERPL-SCNC: 25.2 MMOL/L (ref 22–29)
CREAT BLD-MCNC: 2.06 MG/DL (ref 0.57–1)
DEPRECATED RDW RBC AUTO: 51.9 FL (ref 37–54)
EOSINOPHIL # BLD MANUAL: 0.42 10*3/MM3 (ref 0–0.7)
EOSINOPHIL NFR BLD MANUAL: 3 % (ref 0.3–6.2)
ERYTHROCYTE [DISTWIDTH] IN BLOOD BY AUTOMATED COUNT: 15.3 % (ref 11.7–13)
GFR SERPL CREATININE-BSD FRML MDRD: 24 ML/MIN/1.73
GLUCOSE BLD-MCNC: 117 MG/DL (ref 65–99)
GLUCOSE BLDC GLUCOMTR-MCNC: 117 MG/DL (ref 70–130)
GLUCOSE BLDC GLUCOMTR-MCNC: 122 MG/DL (ref 70–130)
GLUCOSE BLDC GLUCOMTR-MCNC: 132 MG/DL (ref 70–130)
GLUCOSE BLDC GLUCOMTR-MCNC: 133 MG/DL (ref 70–130)
HCT VFR BLD AUTO: 29.3 % (ref 35.6–45.5)
HGB BLD-MCNC: 9 G/DL (ref 11.9–15.5)
INR PPP: 1.32 (ref 0.9–1.1)
LYMPHOCYTES # BLD MANUAL: 1.69 10*3/MM3 (ref 0.9–4.8)
LYMPHOCYTES NFR BLD MANUAL: 11 % (ref 5–12)
LYMPHOCYTES NFR BLD MANUAL: 12 % (ref 19.6–45.3)
MCH RBC QN AUTO: 28.8 PG (ref 26.9–32)
MCHC RBC AUTO-ENTMCNC: 30.7 G/DL (ref 32.4–36.3)
MCV RBC AUTO: 93.6 FL (ref 80.5–98.2)
MONOCYTES # BLD AUTO: 1.55 10*3/MM3 (ref 0.2–1.2)
NEUTROPHILS # BLD AUTO: 10.44 10*3/MM3 (ref 1.9–8.1)
NEUTROPHILS NFR BLD MANUAL: 74 % (ref 42.7–76)
PLAT MORPH BLD: NORMAL
PLATELET # BLD AUTO: 202 10*3/MM3 (ref 140–500)
PMV BLD AUTO: 10.1 FL (ref 6–12)
POTASSIUM BLD-SCNC: 4 MMOL/L (ref 3.5–5.2)
PROTHROMBIN TIME: 16.1 SECONDS (ref 11.7–14.2)
RBC # BLD AUTO: 3.13 10*6/MM3 (ref 3.9–5.2)
RBC MORPH BLD: NORMAL
SCAN SLIDE: NORMAL
SODIUM BLD-SCNC: 138 MMOL/L (ref 136–145)
WBC MORPH BLD: NORMAL
WBC NRBC COR # BLD: 14.11 10*3/MM3 (ref 4.5–10.7)

## 2019-02-02 PROCEDURE — 85610 PROTHROMBIN TIME: CPT | Performed by: NURSE PRACTITIONER

## 2019-02-02 PROCEDURE — 02HV33Z INSERTION OF INFUSION DEVICE INTO SUPERIOR VENA CAVA, PERCUTANEOUS APPROACH: ICD-10-PCS | Performed by: INTERNAL MEDICINE

## 2019-02-02 PROCEDURE — 97110 THERAPEUTIC EXERCISES: CPT | Performed by: PHYSICAL THERAPIST

## 2019-02-02 PROCEDURE — 94799 UNLISTED PULMONARY SVC/PX: CPT

## 2019-02-02 PROCEDURE — 99232 SBSQ HOSP IP/OBS MODERATE 35: CPT | Performed by: INTERNAL MEDICINE

## 2019-02-02 PROCEDURE — B548ZZA ULTRASONOGRAPHY OF SUPERIOR VENA CAVA, GUIDANCE: ICD-10-PCS | Performed by: INTERNAL MEDICINE

## 2019-02-02 PROCEDURE — 82962 GLUCOSE BLOOD TEST: CPT

## 2019-02-02 PROCEDURE — 25010000002 HEPARIN (PORCINE) PER 1000 UNITS: Performed by: NURSE PRACTITIONER

## 2019-02-02 PROCEDURE — 85025 COMPLETE CBC W/AUTO DIFF WBC: CPT | Performed by: NURSE PRACTITIONER

## 2019-02-02 PROCEDURE — C1751 CATH, INF, PER/CENT/MIDLINE: HCPCS

## 2019-02-02 PROCEDURE — 97530 THERAPEUTIC ACTIVITIES: CPT | Performed by: PHYSICAL THERAPIST

## 2019-02-02 PROCEDURE — 92610 EVALUATE SWALLOWING FUNCTION: CPT

## 2019-02-02 PROCEDURE — 80048 BASIC METABOLIC PNL TOTAL CA: CPT | Performed by: THORACIC SURGERY (CARDIOTHORACIC VASCULAR SURGERY)

## 2019-02-02 PROCEDURE — 85007 BL SMEAR W/DIFF WBC COUNT: CPT | Performed by: NURSE PRACTITIONER

## 2019-02-02 PROCEDURE — 25010000002 DOPAMINE PER 40 MG: Performed by: THORACIC SURGERY (CARDIOTHORACIC VASCULAR SURGERY)

## 2019-02-02 PROCEDURE — 85730 THROMBOPLASTIN TIME PARTIAL: CPT | Performed by: NURSE PRACTITIONER

## 2019-02-02 RX ORDER — BUMETANIDE 0.25 MG/ML
4 INJECTION INTRAMUSCULAR; INTRAVENOUS EVERY 6 HOURS
Status: COMPLETED | OUTPATIENT
Start: 2019-02-02 | End: 2019-02-03

## 2019-02-02 RX ORDER — LANSOPRAZOLE
30 KIT
Status: DISCONTINUED | OUTPATIENT
Start: 2019-02-02 | End: 2019-02-05

## 2019-02-02 RX ORDER — HEPARIN SODIUM 10000 [USP'U]/100ML
7.14 INJECTION, SOLUTION INTRAVENOUS
Status: DISCONTINUED | OUTPATIENT
Start: 2019-02-02 | End: 2019-02-04

## 2019-02-02 RX ADMIN — SODIUM CHLORIDE 4 ML: 7 NEBU SOLN,3 % NEBU at 10:56

## 2019-02-02 RX ADMIN — ACETAMINOPHEN 500 MG: 500 TABLET, FILM COATED ORAL at 18:23

## 2019-02-02 RX ADMIN — BUMETANIDE 4 MG: 0.25 INJECTION INTRAMUSCULAR; INTRAVENOUS at 11:59

## 2019-02-02 RX ADMIN — PANTOPRAZOLE SODIUM 40 MG: 40 TABLET, DELAYED RELEASE ORAL at 05:20

## 2019-02-02 RX ADMIN — HYDROCORTISONE 1 APPLICATION: 1 CREAM TOPICAL at 00:57

## 2019-02-02 RX ADMIN — OXYCODONE 5 MG: 5 TABLET ORAL at 15:11

## 2019-02-02 RX ADMIN — MUPIROCIN 10 APPLICATION: 20 OINTMENT TOPICAL at 09:05

## 2019-02-02 RX ADMIN — NYSTATIN: 100000 POWDER TOPICAL at 09:05

## 2019-02-02 RX ADMIN — CHLORHEXIDINE GLUCONATE 15 ML: 1.2 RINSE ORAL at 20:54

## 2019-02-02 RX ADMIN — HYDROCORTISONE 1 APPLICATION: 1 CREAM TOPICAL at 15:04

## 2019-02-02 RX ADMIN — HEPARIN SODIUM 7.14 UNITS/KG/HR: 10000 INJECTION, SOLUTION INTRAVENOUS at 20:55

## 2019-02-02 RX ADMIN — DOPAMINE HYDROCHLORIDE 2 MCG/KG/MIN: 160 INJECTION, SOLUTION INTRAVENOUS at 12:05

## 2019-02-02 RX ADMIN — SODIUM CHLORIDE 4 ML: 7 NEBU SOLN,3 % NEBU at 21:55

## 2019-02-02 RX ADMIN — EMOLLIENT - LOTION: LOTION at 20:54

## 2019-02-02 RX ADMIN — HYDROCORTISONE 1 APPLICATION: 1 CREAM TOPICAL at 18:24

## 2019-02-02 RX ADMIN — ACETAMINOPHEN 500 MG: 500 TABLET, FILM COATED ORAL at 22:47

## 2019-02-02 RX ADMIN — LANSOPRAZOLE 30 MG: KIT at 18:24

## 2019-02-02 RX ADMIN — BUMETANIDE 4 MG: 0.25 INJECTION INTRAMUSCULAR; INTRAVENOUS at 18:17

## 2019-02-02 RX ADMIN — OXYCODONE 5 MG: 5 TABLET ORAL at 20:54

## 2019-02-02 RX ADMIN — ACETAMINOPHEN 500 MG: 500 TABLET, FILM COATED ORAL at 05:20

## 2019-02-02 RX ADMIN — IPRATROPIUM BROMIDE AND ALBUTEROL SULFATE 3 ML: 2.5; .5 SOLUTION RESPIRATORY (INHALATION) at 10:56

## 2019-02-02 RX ADMIN — NYSTATIN: 100000 POWDER TOPICAL at 20:54

## 2019-02-02 RX ADMIN — IPRATROPIUM BROMIDE AND ALBUTEROL SULFATE 3 ML: 2.5; .5 SOLUTION RESPIRATORY (INHALATION) at 21:55

## 2019-02-02 RX ADMIN — EMOLLIENT - LOTION: LOTION at 09:05

## 2019-02-02 RX ADMIN — SENNOSIDES AND DOCUSATE SODIUM 2 TABLET: 8.6; 5 TABLET ORAL at 20:51

## 2019-02-02 RX ADMIN — MUPIROCIN 10 APPLICATION: 20 OINTMENT TOPICAL at 20:54

## 2019-02-02 RX ADMIN — HYDROCORTISONE 1 APPLICATION: 1 CREAM TOPICAL at 05:20

## 2019-02-02 RX ADMIN — ACETAMINOPHEN 500 MG: 500 TABLET, FILM COATED ORAL at 14:00

## 2019-02-02 RX ADMIN — OXYCODONE 5 MG: 5 TABLET ORAL at 09:09

## 2019-02-02 RX ADMIN — CHLORHEXIDINE GLUCONATE 15 ML: 1.2 RINSE ORAL at 09:06

## 2019-02-02 RX ADMIN — ACETAMINOPHEN 500 MG: 500 TABLET, FILM COATED ORAL at 02:09

## 2019-02-02 RX ADMIN — LANSOPRAZOLE 30 MG: KIT at 11:56

## 2019-02-02 RX ADMIN — Medication 81 MG: at 09:05

## 2019-02-02 RX ADMIN — ACETAMINOPHEN 500 MG: 500 TABLET, FILM COATED ORAL at 10:00

## 2019-02-02 NOTE — PLAN OF CARE
Problem: Patient Care Overview  Goal: Plan of Care Review  Outcome: Ongoing (interventions implemented as appropriate)   02/02/19 1036   Coping/Psychosocial   Plan of Care Reviewed With patient   Plan of Care Review   Progress improving   OTHER   Outcome Summary Pt transferred supine to sit with mod x 1 and use of draw sheet. Pt sat EOB for 10 min with supervision. Pt performed BLE AROM 2 sets of 10 while seated. Attempted sit to stand with max x 1 assist from PT. Pt was able to barely clear hips from bed with max x 1 assist. She transferred sit to supine with mod x 1.

## 2019-02-02 NOTE — PROGRESS NOTES
"   LOS: 17 days    Patient Care Team:  Robert Cortez MD as PCP - General (Family Medicine)  Deborah Agudelo MD as Surgeon (Orthopedic Surgery)    Chief Complaint:    Chief Complaint   Patient presents with   • Shortness of Breath   • Leg Swelling     Follow UP LAVERNE   Subjective     Interval History:   Had HD yest with 3 L removed. On low dose dopamine gtt; on  3L/min NC O2.  Drowsy but arousable; oriented. Denies dyspnea.      Objective     Vital Signs  Temp:  [97.6 °F (36.4 °C)] 97.6 °F (36.4 °C)  Heart Rate:  [] 80  Resp:  [16-20] 19  BP: ()/(56-72) 116/60  Arterial Line BP: (105-132)/(50-61) 117/56    Flowsheet Rows      First Filed Value   Admission Height  170.2 cm (67\") Documented at 01/16/2019 0910   Admission Weight  145 kg (320 lb)  (Abnormal)  Documented at 01/16/2019 0921          No intake/output data recorded.  I/O last 3 completed shifts:  In: 2647.2 [I.V.:590.2; Other:311; NG/GT:1446; IV Piggyback:300]  Out: 7545 [Urine:545; Other:7000]    Intake/Output Summary (Last 24 hours) at 2/2/2019 0958  Last data filed at 2/2/2019 0500  Gross per 24 hour   Intake 1710.5 ml   Output 3220 ml   Net -1509.5 ml       Physical Exam:  gen obese WF lying in bed, drowsy but arousable, no acute distress  Oriented; follows commands; feeding tube in nose  Neck RIJ cordis, LIJ shiley  Lungs bibasilar rales; diminished in bases; not labored  CV RRR , sternotomy incision intact  abd soft, NT/ND, BS+   +andrea; blue urine  vasc 1+ BLE edema, decreased; wrinkles     Results Review:    Results from last 7 days   Lab Units 02/02/19  0252 02/01/19  1411 02/01/19  0259 01/31/19  0601  01/28/19  1801   SODIUM mmol/L 138  --  143 141   < > 143   POTASSIUM mmol/L 4.0 4.0 3.3* 3.8   < > 4.5   CHLORIDE mmol/L 99  --  101 100   < > 100   CO2 mmol/L 25.2  --  23.7 25.0   < > 23.8   BUN mg/dL 37*  --  50* 30*   < > 31*   CREATININE mg/dL 2.06*  --  2.83* 2.03*   < > 1.50*   CALCIUM mg/dL 9.3  --  9.6 9.6   < > 10.1 "   BILIRUBIN mg/dL  --   --  0.4  --   --  0.6   ALK PHOS U/L  --   --  71  --   --  53   ALT (SGPT) U/L  --   --  <5  --   --  22   AST (SGOT) U/L  --   --  22  --   --  112*   GLUCOSE mg/dL 117*  --  122* 125*   < > 211*    < > = values in this interval not displayed.       Estimated Creatinine Clearance: 35.7 mL/min (A) (by C-G formula based on SCr of 2.06 mg/dL (H)).    Results from last 7 days   Lab Units 02/01/19  0259 01/30/19  0259 01/29/19  0241 01/28/19  1801 01/28/19  1436   MAGNESIUM mg/dL  --   --  2.1 1.9 1.5*   PHOSPHORUS mg/dL 3.7 5.9* 2.6  --  3.0       Results from last 7 days   Lab Units 01/30/19  0259   URIC ACID mg/dL 8.5*       Results from last 7 days   Lab Units 02/01/19  0259 01/31/19  0601 01/30/19  0259 01/29/19  0241 01/28/19  1436   WBC 10*3/mm3 15.14* 21.38* 27.62* 25.96* 28.06*   HEMOGLOBIN g/dL 8.9* 9.1* 9.0* 9.7* 10.5*   PLATELETS 10*3/mm3 164 171 210 228 206       Results from last 7 days   Lab Units 01/29/19  0241 01/28/19  1233 01/28/19  1120   INR  1.35* 1.44* 1.65*         Imaging Results (last 24 hours)     Procedure Component Value Units Date/Time    XR Chest 1 View [[id number]] Collected:  02/01/19 0634     Updated:  02/02/19 0548    Narrative:       PORTABLE CHEST X-RAY     CLINICAL HISTORY: soa; I50.9-Heart failure, unspecified; R53.1-Weakness;  I35.0-Nonrheumatic aortic (valve) stenosis; I07.1-Rheumatic tricuspid  insufficiency; I05.0-Rheumatic mitral stenosis; N17.9-Acute kidney  failure, unspecified     COMPARISON: 01/31/2019.     FINDINGS: Portable AP view of the chest was obtained with overlying  monitor leads in place. Life support lines are unchanged without  pneumothorax. Edema persists, minimally improved. Basilar atelectasis  and small effusions are stable. Stable cardiomegaly.             Impression:       Minimal improvement in edema/CHF.        This report was finalized on 2/2/2019 5:44 AM by Pop Oliveros M.D.        Renal Bilateral [[id number]] Collected:   02/01/19 1656     Updated:  02/01/19 1383    Narrative:       BILATERAL RENAL SONOGRAPHY     HISTORY: 73-year-old female with a history of oliguria and acute renal  insufficiency.      FINDINGS: Sonographic evaluation of the kidneys was performed.  Comparison is made to a renal sonogram dated 11/15/2013. The right  kidney measures 12.3 x 5.8 x 4.5 cm and the left kidney measures 12.1 x  6.0 x 4.7 cm. A 1.3 cm left renal cyst is noted. Increased echogenicity  of the bilateral renal cortices is noted. There is no evidence for  hydronephrosis or proximal hydroureter. The urinary bladder is not  visualized.       Impression:       Findings consistent with bilateral medical renal disease. A  1.3 cm left renal cyst is noted. The urinary bladder cannot be  visualized.     This report was finalized on 2/1/2019 6:50 PM by Dr. Maldonado Salmeron M.D.             acetaminophen 500 mg Oral Q4H   aspirin 81 mg Oral Daily   cetaphil  Topical Q12H   chlorhexidine 15 mL Mouth/Throat Q12H   hydrocortisone 1 application Topical Q6H   insulin lispro 0-7 Units Subcutaneous 4x Daily With Meals & Nightly   ipratropium-albuterol 3 mL Nebulization BID - RT   lansoprazole 30 mg Nasogastric BID AC   mupirocin  Each Nare BID   nystatin  Topical Q12H   sennosides-docusate sodium 2 tablet Oral Nightly   sodium chloride 4 mL Nebulization BID - RT       DOPamine 2-20 mcg/kg/min Last Rate: 1.5 mcg/kg/min (02/01/19 0207)   heparin (porcine) 7.14 Units/kg/hr    insulin 0-50 Units/hr Last Rate: 1.5 Units/hr (01/29/19 1203)   niCARdipine 5-15 mg/hr    norepinephrine 0.02-0.3 mcg/kg/min Last Rate: Stopped (01/28/19 1632)   sodium chloride 30 mL/hr Last Rate: 30 mL/hr (01/28/19 1257)   sodium chloride 30 mL/hr Last Rate: 30 mL/hr (01/28/19 1215)       Medication Review:   Current Facility-Administered Medications   Medication Dose Route Frequency Provider Last Rate Last Dose   • acetaminophen (TYLENOL) tablet 500 mg  500 mg Oral Q4H Yahaira Garza, APRN    500 mg at 02/02/19 0520   • albumin human 25 % IV SOLN 25 g  25 g Intravenous PRN Wallace Falcon MD   25 g at 02/01/19 1645   • ALPRAZolam (XANAX) tablet 0.25 mg  0.25 mg Oral Q8H PRN Scar Gaxiola MD       • aspirin chewable tablet 81 mg  81 mg Oral Daily Scar Gaxiola MD   81 mg at 02/02/19 0905   • bisacodyl (DULCOLAX) EC tablet 10 mg  10 mg Oral Daily PRN Scar Gaxiola MD       • bisacodyl (DULCOLAX) suppository 10 mg  10 mg Rectal Daily PRN Scar Gaxiola MD       • cetaphil lotion   Topical Q12H Zonia Lopez MD       • chlorhexidine (PERIDEX) 0.12 % solution 15 mL  15 mL Mouth/Throat Q12H Scar Gaxiola MD   15 mL at 02/02/19 0906   • cyclobenzaprine (FLEXERIL) tablet 10 mg  10 mg Oral Q8H PRN Scar Gaxiola MD       • dextrose (D50W) 25 g/ 50mL Intravenous Solution 25 g  25 g Intravenous Q15 Min PRN Yahaira Garza APRN       • dextrose (GLUTOSE) oral gel 15 g  15 g Oral Q15 Min PRN Yahaira Garza APRN       • DOPamine 400 mg/250 mL (1.6 mg/mL) infusion  2-20 mcg/kg/min Intravenous Continuous PRN Scar Gaxiola MD 7.88 mL/hr at 02/01/19 0207 1.5 mcg/kg/min at 02/01/19 0207   • glucagon (human recombinant) (GLUCAGEN DIAGNOSTIC) injection 1 mg  1 mg Subcutaneous PRN Yahaira Garza APRN       • heparin (porcine) injection 5,000 Units  5,000 Units Intracatheter Q12H PRN Fran Tilley MD   5,000 Units at 02/01/19 1915   • heparin 40981 units/250 mL (100 units/mL) in 0.45 % NaCl infusion  7.14 Units/kg/hr Intravenous Titrated Yahaira Garza APRN       • hydrocortisone 1 % cream 1 application  1 application Topical Q6H Fran Tilley MD   1 application at 02/02/19 0520   • insulin lispro (humaLOG) injection 0-7 Units  0-7 Units Subcutaneous 4x Daily With Meals & Nightly Yahaira Garza APRN   2 Units at 01/30/19 0820   • insulin regular (HumuLIN R,NovoLIN R) 100 Units in sodium chloride 0.9 % 100 mL (1 Units/mL) infusion  0-50 Units/hr Intravenous  Continuous PRN Scar Gaxiola MD 1.5 mL/hr at 01/29/19 1203 1.5 Units/hr at 01/29/19 1203   • ipratropium-albuterol (DUO-NEB) nebulizer solution 3 mL  3 mL Nebulization BID - RT Yahaira Garza APRN   3 mL at 02/01/19 2019   • ipratropium-albuterol (DUO-NEB) nebulizer solution 3 mL  3 mL Nebulization Q6H PRN Yahaira Garza APRN       • lansoprazole (FIRST) oral suspension 30 mg  30 mg Nasogastric BID AC Scar Gaxiola MD       • magic mouthwash oral supsension 5 mL  5 mL Swish & Spit Q4H PRN Yahaira Garza APRN       • magnesium hydroxide (MILK OF MAGNESIA) suspension 2400 mg/10mL 10 mL  10 mL Oral Daily PRN Scar Gaxiola MD       • mupirocin (BACTROBAN) 2 % nasal ointment   Each Nare BID Scar Gaxiola MD   10 application at 02/02/19 0905   • niCARdipine (CARDENE) 25 mg/250 mL (0.1 mg/mL) NS infusion kit  5-15 mg/hr Intravenous Continuous PRN Scar Gaxiola MD       • norepinephrine (LEVOPHED) 8 mg/250 mL (32 mcg/mL) in sodium chloride 0.9% infusion (premix)  0.02-0.3 mcg/kg/min Intravenous Continuous PRN Scar Gaxiola MD   Stopped at 01/28/19 1632   • nystatin (MYCOSTATIN) powder   Topical Q12H Leonides Martinez MD       • ondansetron (ZOFRAN) injection 4 mg  4 mg Intravenous Q6H PRN Scar Gaxiola MD       • oxyCODONE (ROXICODONE) immediate release tablet 5 mg  5 mg Oral Q4H PRN Yahaira Garza APRN   5 mg at 02/02/19 0909   • potassium chloride (MICRO-K) CR capsule 40 mEq  40 mEq Oral PRN Scar Gaxiola MD        Or   • potassium chloride (KLOR-CON) packet 40 mEq  40 mEq Oral PRN Scar Gaxiola MD       • potassium chloride 10 mEq in 100 mL IVPB  10 mEq Intravenous Q1H PRN Scar Gaxiola MD        Or   • potassium chloride 10 mEq in 100 mL IVPB  10 mEq Intravenous Q1H PRN Scar Gaxiola MD       • potassium chloride 20 mEq in 50 mL IVPB  20 mEq Intravenous Q1H PRN Scar Gaxiola MD       • potassium chloride 20 mEq in 50 mL IVPB  20 mEq Intravenous Q1H  PRN Scar Gaxiola MD       • potassium chloride 20 mEq in 50 mL IVPB  20 mEq Intravenous Q1H PRN Scar Gaxiola MD       • promethazine (PHENERGAN) tablet 12.5 mg  12.5 mg Oral Q6H PRN Scar Gaxiola MD        Or   • promethazine (PHENERGAN) injection 12.5 mg  12.5 mg Intravenous Q6H PRN Scar Gaxiola MD       • sennosides-docusate sodium (SENOKOT-S) 8.6-50 MG tablet 2 tablet  2 tablet Oral Nightly Scar Gaxiola MD   2 tablet at 02/01/19 2111   • sodium chloride 0.9 % flush 30 mL  30 mL Intravenous Once PRN Scar Gaxioal MD       • sodium chloride 0.9 % infusion  30 mL/hr Intravenous Continuous Scra Gaxiola MD 30 mL/hr at 01/28/19 1257 30 mL/hr at 01/28/19 1257   • sodium chloride 0.9 % infusion  30 mL/hr Intravenous Continuous PRN Scar Gaxiola MD 30 mL/hr at 01/28/19 1215 30 mL/hr at 01/28/19 1215   • sodium chloride 7 % nebulizer solution nebulizer solution 4 mL  4 mL Nebulization BID - RT Yahaira Garza, MORGAN   4 mL at 02/01/19 2020       Assessment/Plan   Oliguric LAVERNE - suspect ischemic ATN post AVR/MVR.  No role of contrast exposure from remote [facility]. Dialyzed WED, UF only THURS, and HD yesterday.  No e/o renal recovery.  UOP only 300cc/24h s/p  Stable lytes; Has ROCHELLE perez.  UA: mod blood, 30 protein.  Still expect eventual renal recovery with BL Cr ~ 1.       VHD, POD4 AVR, MVR, tricuspic bicuspidization, and left cryo MAZE with PENNIE ligation  Cardiogenic shock - off neosynephrine, on low dose dopamine   Volume overload - CXR showed pulm edema, small effusions;  -- periph edema down significantly  Acute resp failure - extubated, on 3L NC o2  Moderate pulm HTN, s/p RHC 1/22/19   Candidate intertrigo - topical nystatin and IV micafungin per ID  Anemia of ABL - Hgb stable 8.9  AFIB - HR stable post-op, off amiodarone gtt     Plan  - bumex trial today: 4 mg for 3 doses  - likely HD tomorrow barring surprise in UOP or SCr tomorrow  - surveillance labs  - D/w Dr. Khalida Mckeon  congestive heart failure (CMS/[facility])    Hypertension    Generalized anxiety disorder    Hyperlipidemia    IFG (impaired fasting glucose)    Heart murmur, systolic    Obesity, morbid, BMI 50 or higher (CMS/[facility])    Fungal skin infection    Cellulitis of lower extremity    Aortic valve stenosis    Tricuspid valve insufficiency    Mitral valve stenosis              Valentin Gabriel MD  02/02/19  9:58 AM

## 2019-02-02 NOTE — THERAPY TREATMENT NOTE
Acute Care - Physical Therapy Treatment Note  [facility]     Patient Name: Claudia Arnold  : 1945  MRN: [id number]  Today's Date: 2019  Onset of Illness/Injury or Date of Surgery: 19          Admit Date: 2019    Visit Dx:    ICD-10-CM ICD-9-CM   1. Acute congestive heart failure, unspecified heart failure type (CMS/[facility]) I50.9 428.0   2. Generalized weakness R53.1 780.79   3. Aortic valve stenosis, etiology of cardiac valve disease unspecified I35.0 424.1   4. Tricuspid valve insufficiency, unspecified etiology I07.1 397.0   5. Mitral valve stenosis, unspecified etiology I05.0 394.0   6. Acute renal failure, unspecified acute renal failure type (CMS/[facility]) N17.9 584.9     Patient Active Problem List   Diagnosis   • Tear of rotator cuff   • Hypertension   • Generalized anxiety disorder   • Hyperlipidemia   • IFG (impaired fasting glucose)   • Heart murmur, systolic   • Shoulder pain, bilateral   • Pain of both shoulder joints   • Chronic pain of both shoulders   • Acute congestive heart failure (CMS/[facility])   • Obesity, morbid, BMI 50 or higher (CMS/[facility])   • Fungal skin infection   • Cellulitis of lower extremity   • Aortic valve stenosis   • Tricuspid valve insufficiency   • Mitral valve stenosis       Therapy Treatment    Rehabilitation Treatment Summary     Row Name 19 1000             Treatment Time/Intention    Discipline  physical therapist  -      Document Type  therapy note (daily note)  -      Subjective Information  complains of;weakness;fatigue;dyspnea;dizziness;pain  -      Mode of Treatment  individual therapy;physical therapy  -      Patient/Family Observations  awake, alert, fowlers, IV, telemetry, andrea  -      Care Plan Review  care plan/treatment goals reviewed;risks/benefits reviewed;current/potential barriers reviewed;patient/other agree to care plan  -LC2      Patient Effort  good  -LC2      Existing Precautions/Restrictions  cardiac;fall;sternal  -LC2       Treatment Considerations/Comments  patient very deconditioned  -LC2      Recorded by [LC] Khurram Littlejohn, PT DPT 02/02/19 1030  [LC2] Khurram Littlejohn, PT DPT 02/02/19 1035      Row Name 02/02/19 1000             Cognitive Assessment/Intervention- PT/OT    Affect/Mental Status (Cognitive)  WFL  -LC      Personal Safety Interventions  fall prevention program maintained;nonskid shoes/slippers when out of bed;supervised activity  -LC      Recorded by [LC] Khurram Littlejohn, PT DPT 02/02/19 1035      Row Name 02/02/19 1000             Bed Mobility Assessment/Treatment    Scooting/Bridging [place] (Bed Mobility)  minimum assist (75% patient effort)  -LC      Supine-Sit [place] (Bed Mobility)  moderate assist (50% patient effort)  -LC      Sit-Supine [place] (Bed Mobility)  moderate assist (50% patient effort)  -LC      Bed Mobility, Safety Issues  decreased use of arms for pushing/pulling;decreased use of legs for bridging/pushing  -      Assistive Device (Bed Mobility)  bed rails;draw sheet;head of bed elevated  -LC      Recorded by [LC] Khurram Littlejohn, PT DPT 02/02/19 1035      Row Name 02/02/19 1000             Transfer Assessment/Treatment    Comment (Transfers)  attempted sit to stand, patient was able to clear hips from bed with max x 1 assist  -      Recorded by [LC] Khurram Littlejohn, PT DPT 02/02/19 1035      Row Name 02/02/19 1000             Sit-Stand Transfer    Sit-Stand [place] (Transfers)  maximum assist (25% patient effort)  -      Recorded by [LC] Khurram Littlejohn, PT DPT 02/02/19 1035      Row Name 02/02/19 1000             Stand-Sit Transfer    Stand-Sit [place] (Transfers)  maximum assist (25% patient effort)  -      Recorded by [LC] Khurram Littlejohn, PT DPT 02/02/19 1035      Row Name 02/02/19 1000             Lower Extremity Seated Therapeutic Exercise    Performed, Seated Lower Extremity (Therapeutic Exercise)  hip flexion/extension;hip abduction/adduction;LAQ (long arc  quad), knee extension;ankle dorsiflexion/plantarflexion  -LC      Exercise Type, Seated Lower Extremity (Therapeutic Exercise)  AROM (active range of motion)  -LC      Sets/Reps Detail, Seated Lower Extremity (Therapeutic Exercise)  2 sets of 10  -LC      Recorded by [LC] Khurram Littlejohn, PT DPT 02/02/19 1035      Row Name 02/02/19 1000             Positioning and Restraints    Pre-Treatment Position  in bed  -LC      Post Treatment Position  bed  -LC      In Bed  fowlers;notified nsg;call light within reach;encouraged to call for assist;with family/caregiver;side rails up x2 boots reapplied  -LC      Recorded by [LC] Khurram Littlejohn, PT DPT 02/02/19 1035      Row Name                Wound 01/28/19 1138 chest incision    Wound - Properties Group Date first assessed: 01/28/19 [SR] Time first assessed: 1138 [SR] Location: chest [SR] Type: incision [SR] Recorded by:  [SR] Keisha Gandara RN 01/28/19 1138    Row Name                Wound 01/28/19 1215 coccyx unspecified    Wound - Properties Group Date first assessed: 01/28/19 [MB] Time first assessed: 1215 [MB] Present On Admission : yes;picture taken [MB] Location: coccyx [MB] Type: unspecified [MB] Recorded by:  [MB] Julissa Chatterjee RN 01/28/19 1541    Row Name                Wound 01/31/19 2300 Right gluteal pressure injury    Wound - Properties Group Date first assessed: 01/31/19 [NW] Time first assessed: 2300 [NW] Present On Admission : no [NW] Side: Right [NW] Location: gluteal [NW] Type: pressure injury [NW] Stage, Pressure Injury: deep tissue injury [NW] Recorded by:  [NW] Priscilla Willams, RN 02/01/19 0414    Row Name                Wound 01/31/19 2000 Left posterior ear pressure injury    Wound - Properties Group Date first assessed: 01/31/19 [NW] Time first assessed: 2000 [NW] Side: Left [NW] Orientation: posterior [NW] Location: ear [NW2] Type: pressure injury [NW] Stage, Pressure Injury: Stage 2;medical device related [NW] Recorded by:  [NW] Priscilla Willams, RN  02/01/19 0416 [NW2] Priscilla Willams RN 02/01/19 0417      User Key  (r) = Recorded By, (t) = Taken By, (c) = Cosigned By    Initials Name Effective Dates Discipline    Julissa Sweet, RN 06/16/16 -  Nurse    SR Juliocesar, Keisha RICE RN 06/16/16 -  Nurse    Khurram Carnes, PT DPT 08/02/16 -  PT    NW Priscilla Willams RN 03/05/18 -  Nurse          Wound 01/28/19 1138 chest incision (Active)   Dressing Appearance open to air 2/2/2019  4:00 AM   Closure Approximated 2/2/2019  4:00 AM   Base dry;pink 2/2/2019  4:00 AM   Drainage Amount none 2/2/2019  4:00 AM   Dressing Care, Wound open to air 2/1/2019  8:00 PM       Wound 01/28/19 1215 coccyx unspecified (Active)   Dressing Appearance open to air 2/2/2019  4:00 AM   Closure None 2/2/2019  4:00 AM   Base dry;pink 2/2/2019  4:00 AM   Drainage Amount none 2/2/2019  4:00 AM   Care, Wound barrier applied 2/1/2019 12:30 PM   Dressing Care, Wound open to air 2/1/2019  4:49 PM       Wound 01/31/19 2300 Right gluteal pressure injury (Active)   Dressing Appearance open to air 2/2/2019  4:00 AM   Closure None 2/2/2019  4:00 AM   Base maroon/purple 2/2/2019  4:00 AM   Periwound pink;intact 2/2/2019  4:00 AM   Drainage Amount none 2/2/2019  4:00 AM   Care, Wound barrier applied 2/1/2019  8:00 PM   Dressing Care, Wound open to air 2/1/2019  8:00 PM       Wound 01/31/19 2000 Left posterior ear pressure injury (Active)   Dressing Appearance open to air 2/2/2019  4:00 AM   Closure None 2/2/2019  4:00 AM   Base dry;pink 2/2/2019  4:00 AM   Periwound intact 2/2/2019  4:00 AM   Drainage Amount none 2/2/2019  4:00 AM   Dressing Care, Wound other (see comments) 2/1/2019  4:49 PM           Physical Therapy Education     Title: PT OT SLP Therapies (In Progress)     Topic: Physical Therapy (Done)     Point: Mobility training (Done)     Learning Progress Summary           Patient Acceptance, SHAMA,D, DU,NR by DARRYN at 2/2/2019 10:36 AM    Comment:  safety during sit to stand, benefits of activity     Acceptance, E, NR by MA at 1/31/2019 12:08 PM    Acceptance, E, NR by MA at 1/30/2019 10:23 AM    Acceptance, E,TB, VU by YANETH at 1/27/2019  9:10 AM    Acceptance, E, VU,NR by MA at 1/25/2019  3:54 PM    Acceptance, E,TB,D, VU,NR by CHELSEA at 1/23/2019  3:14 PM    Acceptance, TB,E, VU,DU by CW at 1/18/2019  4:37 PM    Acceptance, E, NR by  at 1/17/2019 12:15 PM   Family Acceptance, TB,E, VU,DU by CW at 1/18/2019  4:37 PM                   Point: Home exercise program (Done)     Learning Progress Summary           Patient Acceptance, E,D, DU,NR by DARRYN at 2/2/2019 10:36 AM    Comment:  safety during sit to stand, benefits of activity    Acceptance, E,TB,D, VU,NR by CHELSEA at 2/1/2019 10:51 AM    Acceptance, E, NR by MA at 1/31/2019 12:08 PM    Acceptance, E, NR by MA at 1/30/2019 10:23 AM    Acceptance, E,TB, VU by YANETH at 1/27/2019  9:10 AM    Acceptance, E, VU,NR by MA at 1/25/2019  3:54 PM    Acceptance, E,TB,D, VU,NR by CHELSEA at 1/23/2019  3:14 PM    Acceptance, TB,E, VU,DU by JANNET at 1/18/2019  4:37 PM   Family Acceptance, TB,E, VU,DU by JANNET at 1/18/2019  4:37 PM                   Point: Body mechanics (Done)     Learning Progress Summary           Patient Acceptance, E,D, DU,NR by DARRYN at 2/2/2019 10:36 AM    Comment:  safety during sit to stand, benefits of activity    Acceptance, E, NR by MA at 1/31/2019 12:08 PM    Acceptance, E, NR by MA at 1/30/2019 10:23 AM    Acceptance, E,TB, VU by YANETH at 1/27/2019  9:10 AM    Acceptance, E, VU,NR by MA at 1/25/2019  3:54 PM    Acceptance, E,TB,D, VU,NR by CHELSEA at 1/23/2019  3:14 PM    Acceptance, TB,E, VU,DU by JANNET at 1/18/2019  4:37 PM   Family Acceptance, SHAMA PRINCE VU, DU by JANNET at 1/18/2019  4:37 PM                   Point: Precautions (Done)     Learning Progress Summary           Patient AcceptanceSHAMA D, DU, NR by DARRYN at 2/2/2019 10:36 AM    Comment:  safety during sit to stand, benefits of activity    SHAMA Foss NR by MA at 1/31/2019 12:08 PM    AcceptanceSHAMA NR by MA at 1/30/2019 10:23 AM     Acceptance, E,TB, VU by  at 1/27/2019  9:10 AM    Acceptance, E, VU,NR by MA at 1/25/2019  3:54 PM    Acceptance, E,TB,D, VU,NR by  at 1/23/2019  3:14 PM    Acceptance, TB,E, VU,DU by  at 1/18/2019  4:37 PM   Family Acceptance, TB,E, VU,DU by CW at 1/18/2019  4:37 PM                               User Key     Initials Effective Dates Name Provider Type Discipline     04/03/18 -  Kae Aldrich, PT Physical Therapist PT    EM 04/03/18 -  Heike Strauss, PT Physical Therapist PT     06/22/16 -  Zuleyma Mancilla, PT Physical Therapist PT     08/02/16 -  Khurram Littlejohn, PT DPT Physical Therapist PT    CW 03/07/18 -  Jose Ott, PTA Physical Therapy Assistant PT    MA 10/19/18 -  Tabatha Lawrence, PT Physical Therapist PT                PT Recommendation and Plan     Plan of Care Reviewed With: patient  Progress: improving  Outcome Summary: Pt transferred supine to sit with mod x 1 and use of draw sheet. Pt sat EOB for 10 min with supervision. Pt performed BLE AROM 2 sets of 10 while seated. Attempted sit to stand with max x 1 assist from PT. Pt was able to barely clear hips from bed with max x 1 assist. She transferred sit to supine with mod x 1.  Outcome Measures     Row Name 02/02/19 1000 02/01/19 1400 02/01/19 1000       How much help from another person do you currently need...    Turning from your back to your side while in flat bed without using bedrails?  2  -LC  --  1  -CH    Moving from lying on back to sitting on the side of a flat bed without bedrails?  2  -LC  --  1  -CH    Moving to and from a bed to a chair (including a wheelchair)?  1  -LC  --  1  -CH    Standing up from a chair using your arms (e.g., wheelchair, bedside chair)?  1  -LC  --  1  -CH    Climbing 3-5 steps with a railing?  1  -LC  --  1  -CH    To walk in hospital room?  1  -LC  --  1  -CH    AM-PAC 6 Clicks Score  8  -LC  --  6  -CH       How much help from another is currently needed...    Putting on and taking  off regular lower body clothing?  --  1  -SG  --    Bathing (including washing, rinsing, and drying)  --  1  -SG  --    Toileting (which includes using toilet bed pan or urinal)  --  1  -SG  --    Putting on and taking off regular upper body clothing  --  1  -SG  --    Taking care of personal grooming (such as brushing teeth)  --  1  -SG  --    Eating meals  --  1  -SG  --    Score  --  6  -SG  --       Functional Assessment    Outcome Measure Options  AM-PAC 6 Clicks Basic Mobility (PT)  -  AM-PAC 6 Clicks Daily Activity (OT)  -  AM-[facility] 6 Clicks Basic Mobility (PT)  -    Row Name 01/31/19 1200             How much help from another person do you currently need...    Turning from your back to your side while in flat bed without using bedrails?  1  -MA      Moving from lying on back to sitting on the side of a flat bed without bedrails?  1  -MA      Moving to and from a bed to a chair (including a wheelchair)?  1  -MA      Standing up from a chair using your arms (e.g., wheelchair, bedside chair)?  1  -MA      Climbing 3-5 steps with a railing?  1  -MA      To walk in hospital room?  1  -MA      AM-PAC 6 Clicks Score  6  -MA         Functional Assessment    Outcome Measure Options  AM-[facility] 6 Clicks Basic Mobility (PT)  -MA        User Key  (r) = Recorded By, (t) = Taken By, (c) = Cosigned By    Initials Name Provider Type     Jess Dumont, OTR Occupational Therapist    Kae Martinez, PT Physical Therapist    Khurram Carnes, PT DPT Physical Therapist    Tabatha Fischer, PT Physical Therapist         Time Calculation:   PT Charges     Row Name 02/02/19 1039             Time Calculation    Start Time  0935  -      Stop Time  1000  -      Time Calculation (min)  25 min  -      PT Received On  02/02/19  -      PT - Next Appointment  02/03/19  -         Time Calculation- PT    Total Timed Code Minutes- PT  25 minute(s)  -         Timed Charges    93230 - PT Therapeutic Exercise Minutes   15  -      57232 - PT Therapeutic Activity Minutes  10  -LC        User Key  (r) = Recorded By, (t) = Taken By, (c) = Cosigned By    Initials Name Provider Type    Khurram Carnes, PT DPT Physical Therapist        Therapy Suggested Charges     Code   Minutes Charges    31794 (CPT®) Hc Pt Neuromusc Re Education Ea 15 Min      65181 (CPT®) Hc Pt Ther Proc Ea 15 Min 15 1    47307 (CPT®) Hc Gait Training Ea 15 Min      57101 (CPT®) Hc Pt Therapeutic Act Ea 15 Min 10 1    30242 (CPT®) Hc Pt Manual Therapy Ea 15 Min      76357 (CPT®) Hc Pt Iontophoresis Ea 15 Min      31853 (CPT®) Hc Pt Elec Stim Ea-Per 15 Min      39045 (CPT®) Hc Pt Ultrasound Ea 15 Min      86462 (CPT®) Hc Pt Self Care/Mgmt/Train Ea 15 Min      46889 (CPT®) Hc Pt Prosthetic (S) Train Initial Encounter, Each 15 Min      12824 (CPT®) Hc Pt Orthotic(S)/Prosthetic(S) Encounter, Each 15 Min      13933 (CPT®) Hc Orthotic(S) Mgmt/Train Initial Encounter, Each 15min      Total  25 2        Therapy Charges for Today     Code Description Service Date Service Provider Modifiers Qty    54713818040 HC PT THER PROC EA 15 MIN 2/2/2019 Khurram Littlejohn, PT DPT GP 1    86309314786 HC PT THERAPEUTIC ACT EA 15 MIN 2/2/2019 Khurram Littlejohn, PT DPT GP 1          PT G-Codes  Outcome Measure Options: AM-PAC 6 Clicks Basic Mobility (PT)  AM-PAC 6 Clicks Score: 8  Score: 6    Khurram Littlejohn PT DPT  2/2/2019

## 2019-02-02 NOTE — PROGRESS NOTES
-Severe AS, MS, TV regurgitation--s/p AVR/MVR (tissue),TV repair, MAZE PENNIE ligation-- POD#4 Khalida  -Admit with acute congestive heart failure  -NICM, non obstructive dx by cath  -RBBB  -HTN  -Morbid obesity with arthritis-complicating mobility and all aspects of care  -Hypoventilation sx, probable OCTAVIANO-----chronic CO2 retain by ABG, pulmonary signed off  -Yeast under skin folds-----ID following, on nystatin topical and micafungin IV  -Stasis dermatitis due to severe bilateral lower extremity edema and chronic venous stasis-------improved   -Possible PE, NO LE DVT------on heparin  -New a.fib----s/p cardioversion 1/24/19, reverted to a.fib again, on amiodarone/digoxin   -preop anemia----Hb stable  -Left arm thrombophlebitis, infiltration  -Deconditioning----unable to complete transfer preop, assist x4 to stand at bedside  -LAVERNE creatinine  2.65  -Leukocytosis- WBC 27.62, probable re  -Post op anemia- expected ABL  -metabolic encephalopathy      Labs reviewed  Neuro intact  Benign abdomen    Speech recs NPO with sparingly ice chips. she has a long road to recovery. 3L removed yesterday.  Still drowsy will try to only use tylenol for pain and decrease sedating medications. Stop lovenox switch to low dose heparin gtt, D/W Dr Gaxiola, he wants to hold off on anticoagulation.  Will need pacemaker and rehab. Continue routine care.         Yahaira Caldwell, MORGAN  02/02/19  7:16 AM

## 2019-02-02 NOTE — PLAN OF CARE
Problem: Patient Care Overview  Goal: Plan of Care Review   02/02/19 2042   Coping/Psychosocial   Plan of Care Reviewed With patient   OTHER   Outcome Summary Swallow evaluated at bedside. No overt s/s of aspiration or dysphagia noted. Swallow much improved this date. Recommend a regular texture diet with thin liquids and meds as tolerated. Will monitor diet tolerance and for need for a possible VFSS.

## 2019-02-02 NOTE — PROGRESS NOTES
LOS: 17 days   Patient Care Team:  Robert Cortez MD as PCP - General (Family Medicine)  Deborah Agudelo MD as Surgeon (Orthopedic Surgery)    Chief Complaint: Follow-up for tissue aortic and mitral valve replacements, tricuspid valve repair, paroxysmal atrial fibrillation with RVR, acute diastolic and valvular CHF.    Interval History: She is slightly improved.  She is still very weak, but was able to sit up to the side of the bed today.  She is short of breath with movement, but is good at rest.  She still ill has underlying significant conduction disease with junctional rhythm in the 20s.  No CP.     Vital Signs:  Heart Rate:  [72-84] 72  Resp:  [16-20] 20  BP: ()/(56-72) 130/65  Arterial Line BP: (105-146)/(50-61) 146/61    Intake/Output Summary (Last 24 hours) at 2/2/2019 1445  Last data filed at 2/2/2019 0500  Gross per 24 hour   Intake 1610.5 ml   Output 3160 ml   Net -1549.5 ml       Physical Exam:   General Appearance:    No acute distress, alert and oriented x4   Lungs:     Bilateral rhonci    Heart:    Regular rhythm and normal rate (paced), II/VI SM RUSB.   Abdomen:     Soft, non-tender, non-distended.    Extremities:   Trace edema lower extremities.      Results Review:    Results from last 7 days   Lab Units 02/02/19  0252   SODIUM mmol/L 138   POTASSIUM mmol/L 4.0   CHLORIDE mmol/L 99   CO2 mmol/L 25.2   BUN mg/dL 37*   CREATININE mg/dL 2.06*   GLUCOSE mg/dL 117*   CALCIUM mg/dL 9.3         Results from last 7 days   Lab Units 02/01/19  0259   WBC 10*3/mm3 15.14*   HEMOGLOBIN g/dL 8.9*   HEMATOCRIT % 29.1*   PLATELETS 10*3/mm3 164     Results from last 7 days   Lab Units 01/29/19  0241 01/28/19  1233 01/28/19  1120 01/28/19  0337   INR  1.35* 1.44* 1.65*  --    APTT seconds  --  29.1 27.3 80.3*         Results from last 7 days   Lab Units 01/29/19  0241   MAGNESIUM mg/dL 2.1           I reviewed the patient's new clinical results.        Assessment:  1. Acute valvular and diastolic CHF  2.  Severe aortic stenosis, severe mitral stenosis secondary to calcification, and severe tricuspid regurgitation  3. Normal EF 60-65%  4. Paroxysmal atrial fibrillation with RVR -status post cardioversion 1/24/2019 and subsequent reversion to atrial fibrillation.  5. Moderate pulmonary hypertension (mean PA pressure 32)  6. Status post tissue AVR, tissue MVR, tricuspid valve repair (Martha suture repair), left cryomaze, and PENNIE ligation on 1/28/2019 (Dr. Gaxiola).  7. Small bilateral pulmonary emboli  8. Oliguric acute kidney injury post-operatively  9. Bifascicular block (RBBB and LAFB)  10. Candida intertrigo (skin folds); left axillary fungal infection (treated)  11. Osteoarthritis with immobility  12. Left wrist thrombophlebitis from IV infiltration  13. Undiagnosed OCTAVIANO  14. Morbid obesity   15. Anemia of chronic disease  16. Severe deconditioning   17. Junctional bradycardia and asystole post-op    Plan:  -Still with junctional bradycardia and asystole.  Currently, her rate is in the 20s.  She will need a permanent pacemaker.  I am going to talk to Dr. Jarrell regarding he timing on this.      -She is off beta blockers.    - Still requiring minimal dopamine.    -Holding off on anticoagulation for now per Dr. Gaxiola.    -Bumex trial today.  Will continue HD as needed.      Scott Segura MD  02/02/19  2:45 PM

## 2019-02-02 NOTE — PROGRESS NOTES
"  PROGRESS NOTE  Patient Name: Claudia Arnold  Age/Sex: 73 y.o. female  : 1945  MRN: [id number]    Date of Admission: 2019  Date of Encounter Visit: 19   LOS: 17 days   Patient Care Team:  Robert Cortez MD as PCP - General (Family Medicine)  Deborah Agudelo MD as Surgeon (Orthopedic Surgery)    Chief Complaint: Resting comfortably.  Feels better overall.  Patient still in open heart recovery due to unstable cardiac rhythm with plans for pacemaker placement.    Hospital course: had valves replacement with maze, history of OHS and chronic hypoxemia but no use of inhalers at home. Post op was weaned off the vent and is doing better and working with pulmonary hygiene measures. No fever, on nasal O2 and denies SOA at rest    Interval History: denies SOA< pain is fairly well controlled, improving O2 requirements, on 3 lpm    REVIEW OF SYSTEMS:   CONSTITUTIONAL: no fever or chills  CARDIOVASCULAR: No chest pain, chest pressure or chest discomfort. No palpitations or edema.   RESPIRATORY: Shortness of breath with minimal exertion  GASTROINTESTINAL: No anorexia, nausea, vomiting or diarrhea. No abdominal pain or blood.   HEMATOLOGIC: No bleeding or bruising.     Ventilator/Non-Invasive Ventilation Settings (From admission, onward)    Start     Ordered            Vital Signs  Heart Rate:  [72-84] 72  Resp:  [16-20] 20  BP: ()/(56-72) 130/65  Arterial Line BP: (105-146)/(50-61) 146/61  SpO2:  [91 %-100 %] 100 %  on  Flow (L/min):  [3] 3 Device (Oxygen Therapy): nasal cannula    Intake/Output Summary (Last 24 hours) at 2019 1426  Last data filed at 2019 0500  Gross per 24 hour   Intake 1610.5 ml   Output 3160 ml   Net -1549.5 ml     Flowsheet Rows      First Filed Value   Admission Height  170.2 cm (67\") Documented at 2019 0910   Admission Weight  145 kg (320 lb)  (Abnormal)  Documented at 2019 0921        Body mass index is 48.37 kg/m².      19  0430 19  1518 " 02/01/19  0600   Weight: (!) 140 kg (308 lb 1.6 oz) (!) 140 kg (308 lb) (!) 140 kg (308 lb 13.8 oz)       Physical Exam:  GEN:  No acute distress, awake and responsive, cooperative, well developed , on nasal O2  EYES:   Sclera clear. No icterus. PERRL. Normal EOM  ENT:   External ears/nose normal, no oral lesions, no thrush, mucous membranes moist  NECK:  Supple, midline trachea, no JVD, right central line/cordis  LUNGS: Normal chest on inspection,no expiratory wheezing, decreased posteriorly.   CV:  Regular rhythm and rate (paced)..  ABD:  Soft, non-tender and non-distended. Normal bowel sounds. No guarding  EXT:  Moves all extremities well. No cyanosis. No redness. Resolving edema.   Skin: dry, intact, no bleeding    Results Review:      Results from last 7 days   Lab Units 02/02/19  0252 02/01/19  1411 02/01/19  0259 01/31/19  0601 01/30/19  1124 01/30/19  0645 01/30/19  0259 01/29/19  1541 01/29/19  0241 01/28/19  1801 01/28/19  1436 01/28/19  1233   SODIUM mmol/L 138  --  143 141 146* 147* 145 143 145 143 143 143   POTASSIUM mmol/L 4.0 4.0 3.3* 3.8 4.4 4.9 4.9 4.6 4.2 4.5 4.2 4.2   CHLORIDE mmol/L 99  --  101 100 103 104 103 101 103 100 100 100   CO2 mmol/L 25.2  --  23.7 25.0 23.7 22.9 23.7 21.4* 24.0 23.8 23.0 25.9   BUN mg/dL 37*  --  50* 30* 51* 47* 44* 39* 33* 31* 21 28*   CREATININE mg/dL 2.06*  --  2.83* 2.03* 2.88* 2.77* 2.65* 2.27* 1.73* 1.50* 1.45* 1.29*   CALCIUM mg/dL 9.3  --  9.6 9.6 10.0 10.0 9.9 10.3 10.3 10.1 10.1 10.1   AST (SGOT) U/L  --   --  22  --   --   --   --   --   --  112*  --   --    ALT (SGPT) U/L  --   --  <5  --   --   --   --   --   --  22  --   --    ANION GAP mmol/L 13.8  --  18.3 16.0 19.3 20.1 18.3 20.6 18.0 19.2 20.0 17.1   ALBUMIN g/dL  --   --  3.90  --   --   --   --   --  3.50 3.60 3.80 3.30*                 Results from last 7 days   Lab Units 02/01/19  0259 01/31/19  0601 01/30/19  0259 01/29/19  0241 01/28/19  1436 01/28/19  1233 01/28/19  1122 01/28/19  1120   01/28/19 0337 01/27/19 0447   WBC 10*3/mm3 15.14* 21.38* 27.62* 25.96* 28.06* 21.68*  --  13.78*  --  8.18 8.32   HEMOGLOBIN g/dL 8.9* 9.1* 9.0* 9.7* 10.5* 11.0*  --  8.7*  --  11.1* 10.7*   HEMOGLOBIN, POC g/dL  --   --   --   --   --   --  9.2*  --    < >  --   --    HEMATOCRIT % 29.1* 30.4* 30.7* 30.8* 35.3* 36.8  --  29.1*  --  36.0 34.7*   HEMATOCRIT POC %  --   --   --   --   --   --  27*  --    < >  --   --    PLATELETS 10*3/mm3 164 171 210 228 206 217  --  197  --  365 344   MCV fL 96.0 96.2 98.7* 93.3 97.5 97.6  --  97.3  --  95.0 94.8   NEUTROPHIL % %  --   --   --  89.5*  --  85.0*  --   --   --  69.3 69.2   LYMPHOCYTE % %  --   --   --  6.6*  --  6.3*  --   --   --  19.3* 10.8*   MONOCYTES % %  --   --   --  3.8*  --  2.0*  --   --   --  5.6 14.3*   EOSINOPHIL % %  --   --   --  0.0*  --  1.5  --   --   --  5.6 5.6   BASOPHIL % %  --   --   --  0.1  --  0.2  --   --   --  0.2 0.1   IMM GRAN % %  --   --   --  2.0*  --  5.0*  --   --   --  4.0* 2.5*    < > = values in this interval not displayed.     Results from last 7 days   Lab Units 01/29/19  0241 01/28/19  1233 01/28/19  1120 01/28/19 0337 01/27/19 0447 01/26/19  1553   INR  1.35* 1.44* 1.65*  --   --   --    APTT seconds  --  29.1 27.3 80.3* 89.8* 71.6*     Results from last 7 days   Lab Units 01/29/19  0241 01/28/19  1801 01/28/19  1436 01/28/19  1233   MAGNESIUM mg/dL 2.1 1.9 1.5* 1.6           Invalid input(s): LDLCALC  Results from last 7 days   Lab Units 01/29/19  0954 01/29/19  0257 01/28/19  1900 01/28/19  1415 01/28/19  1303 01/28/19  1122 01/28/19  1051 01/28/19  1021 01/28/19  0959 01/28/19  0922 01/28/19  0854 01/28/19  0847 01/28/19  0726   PH, ARTERIAL pH units 7.319* 7.353 7.362 7.344* 7.297* 7.42 7.50 7.56 7.54 7.51 7.48 7.50 7.47   PCO2, ARTERIAL mm Hg 52.6* 49.6* 47.1* 49.7* 61.2*  --   --   --   --   --   --   --   --    PO2 ART mm Hg 84.6 96.3 69.9* 140.4* 91.9  --   --   --   --   --   --   --   --    HCO3 ART mmol/L 27.0  27.6 26.7 27.0 29.9*  --   --   --   --   --   --   --   --          Glucose   Date/Time Value Ref Range Status   02/02/2019 0912 117 70 - 130 mg/dL Final   02/01/2019 1958 119 70 - 130 mg/dL Final   02/01/2019 1746 116 70 - 130 mg/dL Final   02/01/2019 1154 134 (H) 70 - 130 mg/dL Final   02/01/2019 0657 115 70 - 130 mg/dL Final   01/31/2019 2136 137 (H) 70 - 130 mg/dL Final   01/31/2019 1821 144 (H) 70 - 130 mg/dL Final   01/31/2019 1234 142 (H) 70 - 130 mg/dL Final             Results from last 7 days   Lab Units 02/01/19  1242   NITRITE UA  Negative   WBC UA /HPF 13-20*   BACTERIA UA /HPF 4+*   SQUAM EPITHEL UA /HPF 3-6*         Results from last 7 days   Lab Units 02/01/19  1242 01/30/19  0259   SODIUM UR mmol/L <20  --    URIC ACID mg/dL  --  8.5*       Imaging:   Imaging Results (all)     Procedure Component Value Units Date/Time          I reviewed the patient's new clinical results.  I personally viewed and interpreted the patient's imaging results:        Medication Review:     acetaminophen 500 mg Oral Q4H   aspirin 81 mg Oral Daily   bumetanide 4 mg Intravenous Q6H   cetaphil  Topical Q12H   chlorhexidine 15 mL Mouth/Throat Q12H   hydrocortisone 1 application Topical Q6H   insulin lispro 0-7 Units Subcutaneous 4x Daily With Meals & Nightly   ipratropium-albuterol 3 mL Nebulization BID - RT   lansoprazole 30 mg Nasogastric BID AC   mupirocin  Each Nare BID   nystatin  Topical Q12H   sennosides-docusate sodium 2 tablet Oral Nightly   sodium chloride 4 mL Nebulization BID - RT         DOPamine 2-20 mcg/kg/min Last Rate: 2 mcg/kg/min (02/02/19 1205)   heparin (porcine) 7.14 Units/kg/hr    insulin 0-50 Units/hr Last Rate: 1.5 Units/hr (01/29/19 1203)   niCARdipine 5-15 mg/hr    norepinephrine 0.02-0.3 mcg/kg/min Last Rate: Stopped (01/28/19 1632)   sodium chloride 30 mL/hr Last Rate: 30 mL/hr (01/28/19 1257)   sodium chloride 30 mL/hr Last Rate: 30 mL/hr (01/28/19 1215)       ASSESSMENT:   1. Postop  respiratory failure, improving, liberated from the ventilator  2. Status post aortic valve replacement, mitral valve replacement, tricuspid repair, Maze procedure  3. Acute kidney injury  4. Pulmonary hypertension  5. Diastolic left ventricular dysfunction  6. Essential hypertension  7. Chronic respiratory failure  8. Obesity hypoventilation syndrome  9. Suspected obstructive sleep apnea    PLAN:  continues to improve slowly  No wheezing on exam  Working on deep breathing  Consider OCTAVIANO evaluation post discharge  Plans noted for pacemaker placement  Watch for postop pneumonia etc.      Disposition: per primary team    Ady Arceo MD  02/02/19  2:26 PM          Dictated utilizing Dragon dictation

## 2019-02-02 NOTE — PLAN OF CARE
Problem: Patient Care Overview  Goal: Plan of Care Review  Outcome: Ongoing (interventions implemented as appropriate)   02/02/19 0452   Coping/Psychosocial   Plan of Care Reviewed With patient   Plan of Care Review   Progress improving   OTHER   Outcome Summary improving post op/mininebs

## 2019-02-02 NOTE — THERAPY RE-EVALUATION
Acute Care - Speech Language Pathology   Swallow Re-Evaluation [facility]     Patient Name: Claudia Arnold  : 1945  MRN: [id number]  Today's Date: 2019  Onset of Illness/Injury or Date of Surgery: 19            Admit Date: 2019    Visit Dx:     ICD-10-CM ICD-9-CM   1. Acute congestive heart failure, unspecified heart failure type (CMS/[facility]) I50.9 428.0   2. Generalized weakness R53.1 780.79   3. Aortic valve stenosis, etiology of cardiac valve disease unspecified I35.0 424.1   4. Tricuspid valve insufficiency, unspecified etiology I07.1 397.0   5. Mitral valve stenosis, unspecified etiology I05.0 394.0   6. Acute renal failure, unspecified acute renal failure type (CMS/[facility]) N17.9 584.9     Patient Active Problem List   Diagnosis   • Tear of rotator cuff   • Hypertension   • Generalized anxiety disorder   • Hyperlipidemia   • IFG (impaired fasting glucose)   • Heart murmur, systolic   • Shoulder pain, bilateral   • Pain of both shoulder joints   • Chronic pain of both shoulders   • Acute congestive heart failure (CMS/HCC)   • Obesity, morbid, BMI 50 or higher (CMS/[facility])   • Fungal skin infection   • Cellulitis of lower extremity   • Aortic valve stenosis   • Tricuspid valve insufficiency   • Mitral valve stenosis     Past Medical History:   Diagnosis Date   • A-fib (CMS/[facility])     Meds   • Arthritis     w/Difficult Mobility   • Bilateral lower extremity edema     Legs   • CAD (coronary artery disease)     Affecting LAD    • Cardiomyopathy (CMS/[facility])    • CHF (congestive heart failure) (CMS/[facility])    • Chronic fatigue    • Generalized anxiety disorder    • Hernia, inguinal     Hx Repair   • History of echocardiogram 19-[facility]    The L Ventricular Cavity is Mild-to-Moderately Dilated; Left Ventricular Wall Thickness is Consistent w/Mild Concentric Hypertrophy; Left Atrial Cavity Size is Moderate-to-Severely Dilated; Severe AVS; Severe MVS; Moderate TVR Noted   • Hyperlipidemia     Controlled w/Meds    • Hypertension     Controlled w/Meds   • Hypokinesis 01/22/2019    Apical Noted on Cardiac Cath   • IFG (impaired fasting glucose)    • LAD stenosis 01/22/2019    Mid LAD Irregularities Noted on Cardiac Cath    • Left atrial dilatation 01/17/2019    Moderate-Severe Noted on Echo   • Left ventricular dilatation 01/17/2019    Noted on Echo/LEE   • Mild concentric left ventricular hypertrophy 01/17/2019    Noted on Echo/LEE   • Mitral annular calcification 01/17/2019    Severe Noted on Echo   • Moderate tricuspid valve regurgitation 01/24/2019    Noted on LEE   • Morbid obesity (CMS/[facility])    • NSTEMI (non-ST elevated myocardial infarction) (CMS/[facility])    • OCTAVIANO (obstructive sleep apnea)    • Osteoarthritis    • Pulmonary hypertension (CMS/[facility]) 01/22/2019    Noted on Cardiac Cath   • Right bundle branch block 01/24/2019    Noted on LEE   • Severe aortic stenosis 01/22/2019    Noted on Cardiac Cath & LEE on 01/24/19   • Severe mitral valve stenosis 01/24/2019    Noted on LEE   • Shoulder pain, bilateral    • Skin yeast infection     Folds Under Skin--Nystatin/Diflucan     Past Surgical History:   Procedure Laterality Date   • AORTIC VALVE REPAIR/REPLACEMENT MITRAL VALVE REPAIR/REPLACEMENT N/A 1/28/2019    Procedure: LEE STERNOTOMY AORTIC VALVE REPLACEMENT, MITRAL VALVE REPLACEMENT, TRICUSPID VALVE REPAIR, MAZE PROCEDURE, CLOSURE OF LEFT ATRIAL APPENDAGE  AND PRP;  Surgeon: Scar Gaxiola MD;  Location: [facility] OR;  Service: Cardiothoracic   • CARDIAC CATHETERIZATION N/A 1/22/2019    Procedure: Coronary angiography;  Surgeon: Rick Talavera MD;  Location: [facility] CATH INVASIVE LOCATION;  Service: Cardiology   • CARDIAC CATHETERIZATION N/A 1/22/2019    Procedure: Left Heart Cath;  Surgeon: Rick Talavera MD;  Location: [facility] CATH INVASIVE LOCATION;  Service: Cardiology   • CARDIAC CATHETERIZATION N/A 1/22/2019    Procedure: Right Heart Cath;  Surgeon: Rick Talavera MD;  Location: [facility] CATH  INVASIVE LOCATION;  Service: Cardiology   • CARDIAC CATHETERIZATION N/A 1/22/2019    Procedure: Left ventriculography;  Surgeon: Rick Talavera MD;  Location:  [facility] CATH INVASIVE LOCATION;  Service: Cardiology   • COLONOSCOPY     • HERNIA REPAIR      NO FURTHER INFORMATION   • REPLACEMENT TOTAL KNEE Bilateral 2007/2009        SWALLOW EVALUATION (last 72 hours)      SLP Adult Swallow Evaluation     Row Name 02/02/19 1500 02/01/19 1203                Rehab Evaluation    Document Type  re-evaluation  -NR  evaluation  -OC       Subjective Information  no complaints  -NR  no complaints  -OC       Patient Observations  --  alert;cooperative;agree to therapy  -OC       Patient Effort  good  -NR  good  -OC       Symptoms Noted During/After Treatment  none  -NR  fatigue  -OC          General Information    Patient Profile Reviewed  yes  -NR  yes  -OC       Pertinent History Of Current Problem  --  Pt admitted with acute congestive heart failure, s/p LEE STERNOTOMY AORTIC VALVE REPLACEMENT, MITRAL VALVE REPLACEMENT, TRICUSPID VALVE REPAIR, MAZE PROCEDURE, CLOSURE OF LEFT ATRIAL APPENDAGE  AND PRP  -OC       Current Method of Nutrition  --  NPO;nasogastric feedings  -OC       Precautions/Limitations, Vision  --  WFL  -OC       Precautions/Limitations, Hearing  --  WFL  -OC       Prior Level of Function-Communication  --  WFL  -OC       Prior Level of Function-Swallowing  --  no diet consistency restrictions  -OC       Plans/Goals Discussed with  patient;agreed upon  -NR  patient;agreed upon  -OC       Barriers to Rehab  medically complex  -NR  medically complex  -OC       Patient's Goals for Discharge  return to PO diet  -NR  patient did not state  -OC          Pain Scale: Numbers Pre/Post-Treatment    Pain Scale: Numbers, Pretreatment  0/10 - no pain  -NR  0/10 - no pain  -OC       Pain Scale: Numbers, Post-Treatment  0/10 - no pain  -NR  0/10 - no pain  -OC          Oral Motor and Function    Dentition Assessment   natural, present and adequate  -NR  natural, present and adequate  -OC       Secretion Management  WNL/WFL  -NR  WNL/WFL  -OC       Mucosal Quality  moist, healthy  -NR  moist, healthy  -OC       Volitional Swallow  WFL  -NR  delayed  -OC       Volitional Cough  --  weak  -OC          Oral Musculature and Cranial Nerve Assessment    Oral Motor General Assessment  generalized oral motor weakness  -NR  generalized oral motor weakness  -OC          General Eating/Swallowing Observations    Respiratory Support Currently in Use  nasal cannula  -NR  --       Pre SpO2 (%)  -- 95  -NR  --       Post SpO2 (%)  -- 94  -NR  --          Clinical Swallow Eval    Oral Prep Phase  WFL  -NR  WFL  -OC       Oral Transit  WFL  -NR  WFL  -OC       Oral Residue  WFL  -NR  WFL  -OC       Pharyngeal Phase  no overt signs/symptoms of pharyngeal impairment  -NR  suspected pharyngeal impairment  -OC       Esophageal Phase  unremarkable  -NR  --       Clinical Swallow Evaluation Summary  The oral phase of the swallow is noted for adequate bolus consolidation/transfer and/or mastication.  The pharyngeal swallow reflex is timely with adequate laryngeal elevation upon palpation.  No overt s/s of aspiration noted this date on any tested consistency.  Unable to rule out silent symptoms.  Recommend a regular texture diet with thin liquids with meds as tolerated.  Will monitor for diet tolerance and for need for a VFSS.   -NR  Pt demonstrated no overt s/s aspiration with ice chips, thin via spoon, and nectar thick via spoon. Delayed wet coughing with cup sips of thin and nectar thick liquids. Pt quick to fatigue, no appropriate for PO at this time.   -OC          Clinical Impression    SLP Swallowing Diagnosis  functional oral phase;functional pharyngeal phase  -NR  oral dysfunction;suspected pharyngeal dysfunction  -OC       Functional Impact  risk of aspiration/pneumonia  -NR  risk of aspiration/pneumonia  -OC       Rehab Potential/Prognosis,  Swallowing  good, to achieve stated therapy goals  -NR  good, to achieve stated therapy goals  -OC       Swallow Criteria for Skilled Therapeutic Interventions Met  demonstrates skilled criteria  -NR  demonstrates skilled criteria  -OC          Recommendations    Therapy Frequency (Swallow)  PRN  -NR  PRN  -OC       Predicted Duration Therapy Intervention (Days)  until discharge  -NR  until discharge  -OC       SLP Diet Recommendation  regular textures;thin liquids  -NR  NPO;other (see comments) ice chips, thin via spoon sparingly  -OC       Recommended Diagnostics  other (see comments) monitor for need for a VFSS  -NR  reassess via clinical swallow evaluation  -OC       Recommended Precautions and Strategies  upright posture during/after eating;small bites of food and sips of liquid  -NR  upright posture during/after eating;small bites of food and sips of liquid  -OC       SLP Rec. for Method of Medication Administration  as tolerated  -NR  meds via alternate route  -OC       Monitor for Signs of Aspiration  yes;notify SLP if any concerns  -NR  yes;notify SLP if any concerns  -OC       Anticipated Dischage Disposition  unknown  -NR  anticipate therapy at next level of care  -OC          Swallow Goals (SLP)    Oral Nutrition/Hydration Goal Selection (SLP)  oral nutrition/hydration, SLP goal 2;oral nutrition/hydration, SLP goal (free text)  -NR  oral nutrition/hydration, SLP goal 1  -OC          Oral Nutrition/Hydration Goal 1 (SLP)    Oral Nutrition/Hydration Goal 1, SLP  --  Pt will tolerate ice chips and thin via spoon sparingly.   -OC       Time Frame (Oral Nutrition/Hydration Goal 1, SLP)  --  by discharge  -OC       Progress/Outcomes (Oral Nutrition/Hydration Goal 1, SLP)  goal met  -NR  --          Oral Nutrition/Hydration Goal 2 (SLP)    Oral Nutrition/Hydration Goal 2, SLP  -- Maxim regular diet c TL s overt/clinical signs of aspiration  -NR  --       Time Frame (Oral Nutrition/Hydration Goal 2, SLP)  by  discharge  -NR  --          Oral Nutrition/Hydration Goal (SLP)    Oral Nutrition/Hydration Goal, SLP  -- Monitor for need for a VFSS  -NR  --       Time Frame (Oral Nutrition/Hydration Goal, SLP)  by discharge  -NR  --         User Key  (r) = Recorded By, (t) = Taken By, (c) = Cosigned By    Initials Name Effective Dates    OC Geovanna Chapa MA,[facility]-SLP 06/08/18 -     NR Nadiya Garcia MA,[facility]-SLP 06/08/18 -           EDUCATION  The patient has been educated in the following areas:   Dysphagia (Swallowing Impairment).    SLP Recommendation and Plan  SLP Swallowing Diagnosis: functional oral phase, functional pharyngeal phase  SLP Diet Recommendation: regular textures, thin liquids  Recommended Precautions and Strategies: upright posture during/after eating, small bites of food and sips of liquid     Monitor for Signs of Aspiration: yes, notify SLP if any concerns  Recommended Diagnostics: other (see comments)(monitor for need for a VFSS)  Swallow Criteria for Skilled Therapeutic Interventions Met: demonstrates skilled criteria  Anticipated Dischage Disposition: unknown  Rehab Potential/Prognosis, Swallowing: good, to achieve stated therapy goals  Therapy Frequency (Swallow): PRN  Predicted Duration Therapy Intervention (Days): until discharge       Plan of Care Reviewed With: patient  Plan of Care Review  Plan of Care Reviewed With: patient  Outcome Summary: Swallow evaluated at bedside.  No overt s/s of aspiration or dysphagia noted.  Swallow much improved this date.  Recommend a regular texture diet with thin liquids and meds as tolerated.  Will monitor diet tolerance and for need for a possible VFSS.     SLP GOALS     Row Name 02/02/19 1500 02/01/19 1203          Oral Nutrition/Hydration Goal 1 (SLP)    Oral Nutrition/Hydration Goal 1, SLP  --  Pt will tolerate ice chips and thin via spoon sparingly.   -OC     Time Frame (Oral Nutrition/Hydration Goal 1, SLP)  --  by discharge  -OC     Progress/Outcomes (Oral  Nutrition/Hydration Goal 1, SLP)  goal met  -NR  --        Oral Nutrition/Hydration Goal 2 (SLP)    Oral Nutrition/Hydration Goal 2, SLP  -- Maxim regular diet c TL s overt/clinical signs of aspiration  -NR  --     Time Frame (Oral Nutrition/Hydration Goal 2, SLP)  by discharge  -NR  --        Oral Nutrition/Hydration Goal (SLP)    Oral Nutrition/Hydration Goal, SLP  -- Monitor for need for a VFSS  -NR  --     Time Frame (Oral Nutrition/Hydration Goal, SLP)  by discharge  -NR  --       User Key  (r) = Recorded By, (t) = Taken By, (c) = Cosigned By    Initials Name Provider Type    Geovanna Roberts MA,[facility]-SLP Speech and Language Pathologist    NR Nadiya Garcia MA,[facility]-SLP Speech and Language Pathologist           SLP Outcome Measures (last 72 hours)      SLP Outcome Measures     Row Name 02/02/19 1500 02/01/19 1200          SLP Outcome Measures    Outcome Measure Used?  Adult NOMS  -NR  Adult NOMS  -OC        Adult FCM Scores    FCM Chosen  --  Swallowing  -OC     Swallowing FCM Score  6  -NR  1  -OC       User Key  (r) = Recorded By, (t) = Taken By, (c) = Cosigned By    Initials Name Effective Dates    Geovanna Roberts MA,[facility]-SLP 06/08/18 -     NR Nadiya Garcia MA,[facility]-SLP 06/08/18 -            Time Calculation:   Time Calculation- SLP     Row Name 02/02/19 1529             Time Calculation- SLP    SLP Start Time  1430  -NR      SLP Stop Time  1530  -NR      SLP Time Calculation (min)  60 min  -NR      SLP Received On  02/02/19  -NR        User Key  (r) = Recorded By, (t) = Taken By, (c) = Cosigned By    Initials Name Provider Type    NR Nadiya Garcia MA,[facility]-SLP Speech and Language Pathologist          Therapy Charges for Today     Code Description Service Date Service Provider Modifiers Qty    [id number] HC ST EVAL ORAL PHARYNG SWALLOW 4 2/2/2019 Nadiya Garcia MA,[facility]-SLP GN 1               Nadiya Garcia MA,[facility]-SLP  2/2/2019

## 2019-02-03 ENCOUNTER — APPOINTMENT (OUTPATIENT)
Dept: GENERAL RADIOLOGY | Facility: HOSPITAL | Age: 74
End: 2019-02-03

## 2019-02-03 LAB
ALBUMIN SERPL-MCNC: 3.8 G/DL (ref 3.5–5.2)
ANION GAP SERPL CALCULATED.3IONS-SCNC: 13 MMOL/L
APTT PPP: 44.2 SECONDS (ref 22.7–35.4)
APTT PPP: 59.2 SECONDS (ref 22.7–35.4)
APTT PPP: 63.3 SECONDS (ref 22.7–35.4)
BASOPHILS # BLD AUTO: 0.04 10*3/MM3 (ref 0–0.2)
BASOPHILS NFR BLD AUTO: 0.3 % (ref 0–1.5)
BUN BLD-MCNC: 69 MG/DL (ref 8–23)
BUN/CREAT SERPL: 23.5 (ref 7–25)
CALCIUM SPEC-SCNC: 9.6 MG/DL (ref 8.6–10.5)
CHLORIDE SERPL-SCNC: 97 MMOL/L (ref 98–107)
CO2 SERPL-SCNC: 26 MMOL/L (ref 22–29)
CREAT BLD-MCNC: 2.93 MG/DL (ref 0.57–1)
DEPRECATED RDW RBC AUTO: 51.2 FL (ref 37–54)
EOSINOPHIL # BLD AUTO: 0.33 10*3/MM3 (ref 0–0.7)
EOSINOPHIL NFR BLD AUTO: 2.4 % (ref 0.3–6.2)
ERYTHROCYTE [DISTWIDTH] IN BLOOD BY AUTOMATED COUNT: 15.2 % (ref 11.7–13)
GFR SERPL CREATININE-BSD FRML MDRD: 16 ML/MIN/1.73
GLUCOSE BLD-MCNC: 128 MG/DL (ref 65–99)
GLUCOSE BLDC GLUCOMTR-MCNC: 113 MG/DL (ref 70–130)
GLUCOSE BLDC GLUCOMTR-MCNC: 122 MG/DL (ref 70–130)
GLUCOSE BLDC GLUCOMTR-MCNC: 135 MG/DL (ref 70–130)
HCT VFR BLD AUTO: 29.3 % (ref 35.6–45.5)
HGB BLD-MCNC: 9.3 G/DL (ref 11.9–15.5)
IMM GRANULOCYTES # BLD AUTO: 0.68 10*3/MM3 (ref 0–0.03)
IMM GRANULOCYTES NFR BLD AUTO: 5 % (ref 0–0.5)
LYMPHOCYTES # BLD AUTO: 2.18 10*3/MM3 (ref 0.9–4.8)
LYMPHOCYTES NFR BLD AUTO: 15.9 % (ref 19.6–45.3)
MCH RBC QN AUTO: 29.2 PG (ref 26.9–32)
MCHC RBC AUTO-ENTMCNC: 31.7 G/DL (ref 32.4–36.3)
MCV RBC AUTO: 91.8 FL (ref 80.5–98.2)
MONOCYTES # BLD AUTO: 0.23 10*3/MM3 (ref 0.2–1.2)
MONOCYTES NFR BLD AUTO: 1.7 % (ref 5–12)
NEUTROPHILS # BLD AUTO: 10.93 10*3/MM3 (ref 1.9–8.1)
NEUTROPHILS NFR BLD AUTO: 79.7 % (ref 42.7–76)
OVALOCYTES BLD QL SMEAR: NORMAL
PHOSPHATE SERPL-MCNC: 3.8 MG/DL (ref 2.5–4.5)
PLAT MORPH BLD: NORMAL
PLATELET # BLD AUTO: 202 10*3/MM3 (ref 140–500)
PMV BLD AUTO: 10.2 FL (ref 6–12)
POTASSIUM BLD-SCNC: 4.2 MMOL/L (ref 3.5–5.2)
RBC # BLD AUTO: 3.19 10*6/MM3 (ref 3.9–5.2)
SODIUM BLD-SCNC: 136 MMOL/L (ref 136–145)
WBC MORPH BLD: NORMAL
WBC NRBC COR # BLD: 13.71 10*3/MM3 (ref 4.5–10.7)

## 2019-02-03 PROCEDURE — 71045 X-RAY EXAM CHEST 1 VIEW: CPT

## 2019-02-03 PROCEDURE — 25010000002 HEPARIN (PORCINE) PER 1000 UNITS: Performed by: NURSE PRACTITIONER

## 2019-02-03 PROCEDURE — 85730 THROMBOPLASTIN TIME PARTIAL: CPT | Performed by: THORACIC SURGERY (CARDIOTHORACIC VASCULAR SURGERY)

## 2019-02-03 PROCEDURE — 5A1D70Z PERFORMANCE OF URINARY FILTRATION, INTERMITTENT, LESS THAN 6 HOURS PER DAY: ICD-10-PCS | Performed by: INTERNAL MEDICINE

## 2019-02-03 PROCEDURE — 82962 GLUCOSE BLOOD TEST: CPT

## 2019-02-03 PROCEDURE — 25010000002 HEPARIN (PORCINE) PER 1000 UNITS: Performed by: THORACIC SURGERY (CARDIOTHORACIC VASCULAR SURGERY)

## 2019-02-03 PROCEDURE — 97110 THERAPEUTIC EXERCISES: CPT

## 2019-02-03 PROCEDURE — 99232 SBSQ HOSP IP/OBS MODERATE 35: CPT | Performed by: INTERNAL MEDICINE

## 2019-02-03 PROCEDURE — 94799 UNLISTED PULMONARY SVC/PX: CPT

## 2019-02-03 PROCEDURE — 85007 BL SMEAR W/DIFF WBC COUNT: CPT | Performed by: NURSE PRACTITIONER

## 2019-02-03 PROCEDURE — 85730 THROMBOPLASTIN TIME PARTIAL: CPT | Performed by: NURSE PRACTITIONER

## 2019-02-03 PROCEDURE — 25010000002 DOPAMINE PER 40 MG: Performed by: THORACIC SURGERY (CARDIOTHORACIC VASCULAR SURGERY)

## 2019-02-03 PROCEDURE — 85025 COMPLETE CBC W/AUTO DIFF WBC: CPT | Performed by: NURSE PRACTITIONER

## 2019-02-03 PROCEDURE — 80069 RENAL FUNCTION PANEL: CPT | Performed by: INTERNAL MEDICINE

## 2019-02-03 RX ORDER — DEXTROSE MONOHYDRATE 25 G/50ML
25 INJECTION, SOLUTION INTRAVENOUS
Status: DISCONTINUED | OUTPATIENT
Start: 2019-02-03 | End: 2019-02-18 | Stop reason: HOSPADM

## 2019-02-03 RX ORDER — HEPARIN SODIUM 1000 [USP'U]/ML
3000 INJECTION, SOLUTION INTRAVENOUS; SUBCUTANEOUS ONCE
Status: COMPLETED | OUTPATIENT
Start: 2019-02-03 | End: 2019-02-03

## 2019-02-03 RX ORDER — ESCITALOPRAM OXALATE 10 MG/1
10 TABLET ORAL DAILY
Status: DISCONTINUED | OUTPATIENT
Start: 2019-02-03 | End: 2019-02-18 | Stop reason: HOSPADM

## 2019-02-03 RX ORDER — NICOTINE POLACRILEX 4 MG
15 LOZENGE BUCCAL
Status: DISCONTINUED | OUTPATIENT
Start: 2019-02-03 | End: 2019-02-18 | Stop reason: HOSPADM

## 2019-02-03 RX ORDER — ALBUMIN (HUMAN) 12.5 G/50ML
12.5 SOLUTION INTRAVENOUS AS NEEDED
Status: ACTIVE | OUTPATIENT
Start: 2019-02-03 | End: 2019-02-03

## 2019-02-03 RX ADMIN — HEPARIN SODIUM 3000 UNITS: 1000 INJECTION, SOLUTION INTRAVENOUS; SUBCUTANEOUS at 13:30

## 2019-02-03 RX ADMIN — HYDROCORTISONE 1 APPLICATION: 1 CREAM TOPICAL at 17:15

## 2019-02-03 RX ADMIN — BUMETANIDE 4 MG: 0.25 INJECTION INTRAMUSCULAR; INTRAVENOUS at 00:12

## 2019-02-03 RX ADMIN — HYDROCORTISONE 1 APPLICATION: 1 CREAM TOPICAL at 06:25

## 2019-02-03 RX ADMIN — MUPIROCIN 10 APPLICATION: 20 OINTMENT TOPICAL at 21:19

## 2019-02-03 RX ADMIN — IPRATROPIUM BROMIDE AND ALBUTEROL SULFATE 3 ML: 2.5; .5 SOLUTION RESPIRATORY (INHALATION) at 19:25

## 2019-02-03 RX ADMIN — CHLORHEXIDINE GLUCONATE 15 ML: 1.2 RINSE ORAL at 09:19

## 2019-02-03 RX ADMIN — MUPIROCIN 10 APPLICATION: 20 OINTMENT TOPICAL at 09:28

## 2019-02-03 RX ADMIN — SENNOSIDES AND DOCUSATE SODIUM 2 TABLET: 8.6; 5 TABLET ORAL at 21:19

## 2019-02-03 RX ADMIN — ESCITALOPRAM 10 MG: 10 TABLET, FILM COATED ORAL at 12:22

## 2019-02-03 RX ADMIN — Medication 81 MG: at 09:19

## 2019-02-03 RX ADMIN — DOPAMINE HYDROCHLORIDE 3 MCG/KG/MIN: 160 INJECTION, SOLUTION INTRAVENOUS at 14:36

## 2019-02-03 RX ADMIN — CHLORHEXIDINE GLUCONATE 15 ML: 1.2 RINSE ORAL at 21:19

## 2019-02-03 RX ADMIN — ACETAMINOPHEN 500 MG: 500 TABLET, FILM COATED ORAL at 15:03

## 2019-02-03 RX ADMIN — OXYCODONE 5 MG: 5 TABLET ORAL at 07:27

## 2019-02-03 RX ADMIN — ACETAMINOPHEN 500 MG: 500 TABLET, FILM COATED ORAL at 17:13

## 2019-02-03 RX ADMIN — SODIUM CHLORIDE 4 ML: 7 NEBU SOLN,3 % NEBU at 19:27

## 2019-02-03 RX ADMIN — OXYCODONE 5 MG: 5 TABLET ORAL at 15:04

## 2019-02-03 RX ADMIN — OXYCODONE 5 MG: 5 TABLET ORAL at 11:30

## 2019-02-03 RX ADMIN — BISACODYL 10 MG: 10 SUPPOSITORY RECTAL at 21:20

## 2019-02-03 RX ADMIN — IPRATROPIUM BROMIDE AND ALBUTEROL SULFATE 3 ML: 2.5; .5 SOLUTION RESPIRATORY (INHALATION) at 14:34

## 2019-02-03 RX ADMIN — EMOLLIENT - LOTION: LOTION at 09:28

## 2019-02-03 RX ADMIN — LANSOPRAZOLE 30 MG: KIT at 16:54

## 2019-02-03 RX ADMIN — HYDROCORTISONE 1 APPLICATION: 1 CREAM TOPICAL at 00:12

## 2019-02-03 RX ADMIN — LANSOPRAZOLE 30 MG: KIT at 09:20

## 2019-02-03 RX ADMIN — HYDROCORTISONE 1 APPLICATION: 1 CREAM TOPICAL at 12:23

## 2019-02-03 RX ADMIN — ACETAMINOPHEN 500 MG: 500 TABLET, FILM COATED ORAL at 02:42

## 2019-02-03 RX ADMIN — ACETAMINOPHEN 500 MG: 500 TABLET, FILM COATED ORAL at 21:19

## 2019-02-03 RX ADMIN — NYSTATIN 1 APPLICATION: 100000 POWDER TOPICAL at 21:20

## 2019-02-03 RX ADMIN — SODIUM CHLORIDE 4 ML: 7 NEBU SOLN,3 % NEBU at 14:34

## 2019-02-03 RX ADMIN — HEPARIN SODIUM 10.14 UNITS/KG/HR: 10000 INJECTION, SOLUTION INTRAVENOUS at 16:52

## 2019-02-03 RX ADMIN — ACETAMINOPHEN 500 MG: 500 TABLET, FILM COATED ORAL at 09:19

## 2019-02-03 RX ADMIN — NYSTATIN: 100000 POWDER TOPICAL at 09:28

## 2019-02-03 RX ADMIN — EMOLLIENT - LOTION 1 APPLICATION: LOTION at 21:19

## 2019-02-03 RX ADMIN — ACETAMINOPHEN 500 MG: 500 TABLET, FILM COATED ORAL at 06:25

## 2019-02-03 NOTE — PLAN OF CARE
"Problem: Patient Care Overview  Goal: Plan of Care Review  Outcome: Ongoing (interventions implemented as appropriate)   02/03/19 8470   Coping/Psychosocial   Plan of Care Reviewed With patient   Plan of Care Review   Progress improving   OTHER   Outcome Summary Pt tolerated treatment fair this date. Required mod A x2 for bed mobility, then max A x2 to attmept standing 2x, though unsuccessful at clearing bottom off bed. Pt reported \"I'm just scared\". Instructed pt through several LE exercises. PT will continue to address functional mobility deficits as tolerated.         "

## 2019-02-03 NOTE — THERAPY TREATMENT NOTE
Acute Care - Physical Therapy Treatment Note  [facility]     Patient Name: Claudia Arnold  : 1945  MRN: [id number]  Today's Date: 2/3/2019  Onset of Illness/Injury or Date of Surgery: 19          Admit Date: 2019    Visit Dx:    ICD-10-CM ICD-9-CM   1. Acute congestive heart failure, unspecified heart failure type (CMS/HCC) I50.9 428.0   2. Generalized weakness R53.1 780.79   3. Aortic valve stenosis, etiology of cardiac valve disease unspecified I35.0 424.1   4. Tricuspid valve insufficiency, unspecified etiology I07.1 397.0   5. Mitral valve stenosis, unspecified etiology I05.0 394.0   6. Acute renal failure, unspecified acute renal failure type (CMS/[facility]) N17.9 584.9     Patient Active Problem List   Diagnosis   • Tear of rotator cuff   • Hypertension   • Generalized anxiety disorder   • Hyperlipidemia   • IFG (impaired fasting glucose)   • Heart murmur, systolic   • Shoulder pain, bilateral   • Pain of both shoulder joints   • Chronic pain of both shoulders   • Acute congestive heart failure (CMS/HCC)   • Obesity, morbid, BMI 50 or higher (CMS/[facility])   • Fungal skin infection   • Cellulitis of lower extremity   • Aortic valve stenosis   • Tricuspid valve insufficiency   • Mitral valve stenosis       Therapy Treatment    Rehabilitation Treatment Summary     Row Name 19 1545             Treatment Time/Intention    Discipline  physical therapy assistant  -      Document Type  therapy note (daily note)  -SM      Subjective Information  complains of;weakness;fatigue;pain;dizziness  -SM      Mode of Treatment  physical therapy  -SM      Patient Effort  good  -SM      Existing Precautions/Restrictions  cardiac;fall;sternal  -SM      Recorded by [SM] Ksenia Lopez, hospitals 19 1635      Row Name 19 1545             Cognitive Assessment/Intervention- PT/OT    Orientation Status (Cognition)  oriented x 3  -SM      Follows Commands (Cognition)  follows one step commands  -SM       Personal Safety Interventions  fall prevention program maintained;nonskid shoes/slippers when out of bed  -      Recorded by [] Ksenia Lopez PTA 02/03/19 1635      Row Name 02/03/19 1545             Bed Mobility Assessment/Treatment    Bed Mobility Assessment/Treatment  supine-sit;sit-supine  -      Supine-Sit Reynolds (Bed Mobility)  moderate assist (50% patient effort);2 person assist  -SM      Sit-Supine Reynolds (Bed Mobility)  maximum assist (25% patient effort);2 person assist  -      Bed Mobility, Safety Issues  decreased use of arms for pushing/pulling;decreased use of legs for bridging/pushing  -      Assistive Device (Bed Mobility)  draw sheet;head of bed elevated  -      Recorded by [] Ksenia Lopez PTA 02/03/19 1635      Row Name 02/03/19 1545             Transfer Assessment/Treatment    Transfer Assessment/Treatment  sit-stand transfer;stand-sit transfer  -      Comment (Transfers)  attempted 2x, though unable to clear bottom off bed  -      Recorded by [] Ksenia Lopez PTA 02/03/19 1635      Row Name 02/03/19 1545             Sit-Stand Transfer    Sit-Stand Reynolds (Transfers)  maximum assist (25% patient effort);2 person assist  -      Assistive Device (Sit-Stand Transfers)  -- HHA  -      Recorded by [] Ksenia Lopez PTA 02/03/19 1635      Row Name 02/03/19 1545             Stand-Sit Transfer    Stand-Sit Reynolds (Transfers)  maximum assist (25% patient effort);2 person assist  -      Assistive Device (Stand-Sit Transfers)  -- HHA  -      Recorded by [] Ksenia Lopez PTA 02/03/19 1635      Row Name 02/03/19 1545             Motor Skills Assessment/Interventions    Additional Documentation  Therapeutic Exercise (Group)  -      Recorded by [] Ksenia Lopez PTA 02/03/19 1635      Row Name 02/03/19 1545             Therapeutic Exercise    Lower Extremity (Therapeutic Exercise)  gluteal sets;heel slides,  bilateral;LAQ (long arc quad), bilateral;quad sets, bilateral  -SM      Lower Extremity Range of Motion (Therapeutic Exercise)  hip abduction/adduction, bilateral;ankle dorsiflexion/plantar flexion, bilateral  -SM      Exercise Type (Therapeutic Exercise)  AROM (active range of motion)  -SM      Position (Therapeutic Exercise)  seated;supine  -SM      Sets/Reps (Therapeutic Exercise)  10 reps  -SM      Recorded by [SM] Ksenia Lopez PTA 02/03/19 1635      Row Name 02/03/19 1545             Static Sitting Balance    Level of Sullivan (Unsupported Sitting, Static Balance)  contact guard assist  -SM      Sitting Position (Unsupported Sitting, Static Balance)  sitting on edge of bed  -      Time Able to Maintain Position (Unsupported Sitting, Static Balance)  4 to 5 minutes  -SM      Recorded by [SM] Ksenia Lopez PTA 02/03/19 1646      Row Name 02/03/19 1545             Positioning and Restraints    Pre-Treatment Position  in bed  -SM      Post Treatment Position  bed  -SM2      In Bed  fowlers;call light within reach;encouraged to call for assist  -SM2      Recorded by [SM] Ksenia Lopez, STAN 02/03/19 1635  [SM2] Ksenia Lopez, [facility] 02/03/19 1637      Row Name 02/03/19 1545             Pain Scale: Numbers Pre/Post-Treatment    Pain Scale: Numbers, Pretreatment  0/10 - no pain  -      Pain Scale: Numbers, Post-Treatment  3/10  -SM      Pain Location - Side  Bilateral  -SM      Pain Location - Orientation  lower  -SM      Pain Location  extremity  -SM      Pain Intervention(s)  Repositioned;Ambulation/increased activity;Rest  -SM      Recorded by [SM] Ksenia Lopez PTA 02/03/19 1646      Row Name                Wound 01/28/19 1138 chest incision    Wound - Properties Group Date first assessed: 01/28/19 [SR] Time first assessed: 1138 [SR] Location: chest [SR] Type: incision [SR] Recorded by:  [SR] Keisha Gandara RN 01/28/19 1138    Row Name                Wound 01/28/19 1215 coccyx  unspecified    Wound - Properties Group Date first assessed: 01/28/19 [MB] Time first assessed: 1215 [MB] Present On Admission : yes;picture taken [MB] Location: coccyx [MB] Type: unspecified [MB] Recorded by:  [MB] Julissa Chatterjee RN 01/28/19 1541    Row Name                Wound 01/31/19 2300 Right gluteal pressure injury    Wound - Properties Group Date first assessed: 01/31/19 [NW] Time first assessed: 2300 [NW] Present On Admission : no [NW] Side: Right [NW] Location: gluteal [NW] Type: pressure injury [NW] Stage, Pressure Injury: deep tissue injury [NW] Recorded by:  [NW] Priscilla Willams RN 02/01/19 0414    Row Name                Wound 01/31/19 2000 Left posterior ear pressure injury    Wound - Properties Group Date first assessed: 01/31/19 [NW] Time first assessed: 2000 [NW] Side: Left [NW] Orientation: posterior [NW] Location: ear [NW2] Type: pressure injury [NW] Stage, Pressure Injury: Stage 2;medical device related [NW] Recorded by:  [NW] Priscilla Willams RN 02/01/19 0416 [NW2] Priscilla Willams RN 02/01/19 0417      User Key  (r) = Recorded By, (t) = Taken By, (c) = Cosigned By    Initials Name Effective Dates Discipline    Julissa Sweet RN 06/16/16 -  Nurse    Keisha Fournier RN 06/16/16 -  Nurse    Ksenia Swann PTA 03/07/18 -  PT    NW Priscilla Willams RN 03/05/18 -  Nurse          Wound 01/28/19 1138 chest incision (Active)   Dressing Appearance open to air 2/3/2019 12:00 PM   Closure Approximated 2/3/2019 12:00 PM   Base dry;pink 2/3/2019 12:00 PM   Drainage Amount none 2/3/2019 12:00 PM   Dressing Care, Wound open to air 2/3/2019  8:00 AM       Wound 01/28/19 1215 coccyx unspecified (Active)   Dressing Appearance open to air 2/3/2019 12:00 PM   Closure None 2/3/2019 12:00 PM   Base pink 2/3/2019 12:00 PM   Drainage Amount none 2/3/2019 12:00 PM   Care, Wound barrier applied 2/2/2019  8:30 PM   Dressing Care, Wound open to air 2/3/2019  8:00 AM       Wound 01/31/19 2300 Right gluteal  pressure injury (Active)   Dressing Appearance open to air 2/3/2019 12:00 PM   Base maroon/purple;clean 2/3/2019 12:00 PM   Periwound intact;pink 2/3/2019 12:00 PM   Drainage Amount none 2/3/2019 12:00 PM   Care, Wound barrier applied;pressure removed 2/2/2019  8:30 PM   Dressing Care, Wound open to air 2/3/2019  8:00 AM       Wound 01/31/19 2000 Left posterior ear pressure injury (Active)   Dressing Appearance open to air 2/3/2019 12:00 PM   Base pink 2/3/2019 12:00 PM   Periwound intact 2/3/2019 12:00 PM   Drainage Amount none 2/3/2019 12:00 PM           Physical Therapy Education     Title: PT OT SLP Therapies (In Progress)     Topic: Physical Therapy (Done)     Point: Mobility training (Done)     Learning Progress Summary           Patient Acceptance, E,D, VU,NR by DARRIUS at 2/3/2019  4:47 PM    Acceptance, E,D, DU,NR by DARRYN at 2/2/2019 10:36 AM    Comment:  safety during sit to stand, benefits of activity    Acceptance, E, NR by MA at 1/31/2019 12:08 PM    Acceptance, E, NR by MA at 1/30/2019 10:23 AM    Acceptance, E,TB, VU by  at 1/27/2019  9:10 AM    Acceptance, E, VU,NR by MA at 1/25/2019  3:54 PM    Acceptance, E,TB,D, VU,NR by CH at 1/23/2019  3:14 PM    Acceptance, TB,E, VU,DU by JANNET at 1/18/2019  4:37 PM    Acceptance, E, NR by EM at 1/17/2019 12:15 PM   Family Acceptance, TB,E, VU,DU by CW at 1/18/2019  4:37 PM                   Point: Home exercise program (Done)     Learning Progress Summary           Patient Acceptance, E,D, VU,NR by DARRIUS at 2/3/2019  4:47 PM    Acceptance, E,D, DU,NR by DARRYN at 2/2/2019 10:36 AM    Comment:  safety during sit to stand, benefits of activity    Acceptance, E,TB,D, VU,NR by CHELSEA at 2/1/2019 10:51 AM    Acceptance, E, NR by MA at 1/31/2019 12:08 PM    Acceptance, E, NR by MA at 1/30/2019 10:23 AM    Acceptance, E,NIKI, VU by  at 1/27/2019  9:10 AM    Acceptance, SIOBHAN SESAY,NR by MA at 1/25/2019  3:54 PM    Acceptance, NIKI SESAY,GABRIELLE, SIOBHAN,NR by  at 1/23/2019  3:14 PM    Acceptance, SHAMA PRINCE,  VU,DU by JANNET at 1/18/2019  4:37 PM   Family Acceptance, TB,E, VU,DU by JANNET at 1/18/2019  4:37 PM                   Point: Body mechanics (Done)     Learning Progress Summary           Patient Acceptance, E,D, VU,NR by  at 2/3/2019  4:47 PM    Acceptance, E,D, DU,NR by DARRYN at 2/2/2019 10:36 AM    Comment:  safety during sit to stand, benefits of activity    Acceptance, E, NR by MA at 1/31/2019 12:08 PM    Acceptance, E, NR by MA at 1/30/2019 10:23 AM    Acceptance, E,TB, VU by YANETH at 1/27/2019  9:10 AM    Acceptance, E, VU,NR by MA at 1/25/2019  3:54 PM    Acceptance, E,TB,D, VU,NR by CHELSEA at 1/23/2019  3:14 PM    Acceptance, TB,E, VU,DU by JANNET at 1/18/2019  4:37 PM   Family Acceptance, TB,E, VU,DU by JANNET at 1/18/2019  4:37 PM                   Point: Precautions (Done)     Learning Progress Summary           Patient Acceptance, E,D, VU,NR by  at 2/3/2019  4:47 PM    Acceptance, E,D, DU,NR by DARRYN at 2/2/2019 10:36 AM    Comment:  safety during sit to stand, benefits of activity    Acceptance, E, NR by MA at 1/31/2019 12:08 PM    Acceptance, E, NR by MA at 1/30/2019 10:23 AM    Acceptance, E,TB, VU by YANETH at 1/27/2019  9:10 AM    Acceptance, E, VU,NR by MA at 1/25/2019  3:54 PM    Acceptance, E,TB,D, VU,NR by  at 1/23/2019  3:14 PM    Acceptance, TB,E, VU,DU by JANNET at 1/18/2019  4:37 PM   Family Acceptance, TB,E, VU,DU by JANNET at 1/18/2019  4:37 PM                               User Key     Initials Effective Dates Name Provider Type Discipline     04/03/18 -  Kae Aldrich, PT Physical Therapist PT    EM 04/03/18 -  Heike Strauss, PT Physical Therapist PT     03/07/18 -  Ksenia Lopez, PTA Physical Therapy Assistant PT    KH 06/22/16 -  Zuleyma Mancilla, PT Physical Therapist PT    LC 08/02/16 -  Khurram Littlejohn, PT DPT Physical Therapist PT    CW 03/07/18 -  Jose Ott, PTA Physical Therapy Assistant PT    MA 10/19/18 -  Tabatha Lawrence, PT Physical Therapist PT                PT Recommendation and  "Plan     Plan of Care Reviewed With: patient  Progress: improving  Outcome Summary: Pt tolerated treatment fair this date. Required mod A x2 for bed mobility, then max A x2 to attmept standing 2x, though unsuccessful at clearing bottom off bed. Pt reported \"I'm just scared\". Instructed pt through several LE exercises. PT will continue to address functional mobility deficits as tolerated.  Outcome Measures     Row Name 02/03/19 1600 02/02/19 1000 02/01/19 1400       How much help from another person do you currently need...    Turning from your back to your side while in flat bed without using bedrails?  2  -SM  2  -LC  --    Moving from lying on back to sitting on the side of a flat bed without bedrails?  2  -SM  2  -LC  --    Moving to and from a bed to a chair (including a wheelchair)?  1  -SM  1  -LC  --    Standing up from a chair using your arms (e.g., wheelchair, bedside chair)?  1  -SM  1  -LC  --    Climbing 3-5 steps with a railing?  1  -SM  1  -LC  --    To walk in hospital room?  1  -SM  1  -LC  --    AM-PAC 6 Clicks Score  8  -SM  8  -LC  --       How much help from another is currently needed...    Putting on and taking off regular lower body clothing?  --  --  1  -SG    Bathing (including washing, rinsing, and drying)  --  --  1  -SG    Toileting (which includes using toilet bed pan or urinal)  --  --  1  -SG    Putting on and taking off regular upper body clothing  --  --  1  -SG    Taking care of personal grooming (such as brushing teeth)  --  --  1  -SG    Eating meals  --  --  1  -SG    Score  --  --  6  -SG       Functional Assessment    Outcome Measure Options  AM-PAC 6 Clicks Basic Mobility (PT)  -SM  AM-PAC 6 Clicks Basic Mobility (PT)  -LC  AM-PAC 6 Clicks Daily Activity (OT)  -SG    Row Name 02/01/19 1000             How much help from another person do you currently need...    Turning from your back to your side while in flat bed without using bedrails?  1  -CH      Moving from lying on back " to sitting on the side of a flat bed without bedrails?  1  -CH      Moving to and from a bed to a chair (including a wheelchair)?  1  -CH      Standing up from a chair using your arms (e.g., wheelchair, bedside chair)?  1  -CH      Climbing 3-5 steps with a railing?  1  -CH      To walk in hospital room?  1  -CH      AM-PAC 6 Clicks Score  6  -CH         Functional Assessment    Outcome Measure Options  AM-PAC 6 Clicks Basic Mobility (PT)  -CH        User Key  (r) = Recorded By, (t) = Taken By, (c) = Cosigned By    Initials Name Provider Type    SG Jess Dumont, OTR Occupational Therapist    CH Kae Aldrich, PT Physical Therapist    Ksenia Swann, STAN Physical Therapy Assistant    Khurram Carnes, PT DPT Physical Therapist         Time Calculation:   PT Charges     Row Name 02/03/19 1649             Time Calculation    Start Time  1545  -      Stop Time  1610  -SM      Time Calculation (min)  25 min  -      PT Received On  02/03/19  -      PT - Next Appointment  02/04/19  -        User Key  (r) = Recorded By, (t) = Taken By, (c) = Cosigned By    Initials Name Provider Type    Ksenia Swann, PTA Physical Therapy Assistant        Therapy Suggested Charges     Code   Minutes Charges    55306 (CPT®) Hc Pt Neuromusc Re Education Ea 15 Min      72167 (CPT®) Hc Pt Ther Proc Ea 15 Min 15 1    25183 (CPT®) Hc Gait Training Ea 15 Min      24152 (CPT®) Hc Pt Therapeutic Act Ea 15 Min 10 1    86279 (CPT®) Hc Pt Manual Therapy Ea 15 Min      60732 (CPT®) Hc Pt Iontophoresis Ea 15 Min      91972 (CPT®) Hc Pt Elec Stim Ea-Per 15 Min      73864 (CPT®) Hc Pt Ultrasound Ea 15 Min      78579 (CPT®) Hc Pt Self Care/Mgmt/Train Ea 15 Min      24719 (CPT®) Hc Pt Prosthetic (S) Train Initial Encounter, Each 15 Min      14758 (CPT®) Hc Pt Orthotic(S)/Prosthetic(S) Encounter, Each 15 Min      05046 (CPT®) Hc Orthotic(S) Mgmt/Train Initial Encounter, Each 15min      Total  25 2        Therapy Charges for  Today     Code Description Service Date Service Provider Modifiers Qty    41388675512 HC PT THER PROC EA 15 MIN 2/3/2019 Ksenia Lopez, PTA GP 2    90153733128 HC PT THER SUPP EA 15 MIN 2/3/2019 Ksenia Lopez, STAN GP 1          PT G-Codes  Outcome Measure Options: AM-PAC 6 Clicks Basic Mobility (PT)  AM-PAC 6 Clicks Score: 8  Score: 6    Ksenia Lopez PTA  2/3/2019

## 2019-02-03 NOTE — PROGRESS NOTES
LOS: 18 days   Patient Care Team:  Robert Cortez MD as PCP - General (Family Medicine)  Deborah Agudelo MD as Surgeon (Orthopedic Surgery)    Chief Complaint: Follow-up for tissue aortic and mitral valve replacements, tricuspid valve repair, paroxysmal atrial fibrillation with RVR, acute diastolic and valvular CHF.    Interval History: Slowly improving everyday.  HD today.  Still weak, but better. No CP.  Her rhythm is junctional with some sinus today.    Vital Signs:  Heart Rate:  [70-75] 72  Resp:  [15-20] 18  BP: (111-123)/(63-68) 118/68  Arterial Line BP: ()/(47-65) 116/48    Intake/Output Summary (Last 24 hours) at 2/3/2019 1600  Last data filed at 2/3/2019 1445  Gross per 24 hour   Intake 2962 ml   Output 1240 ml   Net 1722 ml       Physical Exam:   General Appearance:    No acute distress, alert and oriented x4   Lungs:     Clear to auscultation bilaterally     Heart:    Regular rhythm and normal rate (paced), II/VI SM RUSB.   Abdomen:     Soft, non-tender, non-distended.    Extremities:   Trace edema.     Results Review:    Results from last 7 days   Lab Units 02/03/19  0259   SODIUM mmol/L 136   POTASSIUM mmol/L 4.2   CHLORIDE mmol/L 97*   CO2 mmol/L 26.0   BUN mg/dL 69*   CREATININE mg/dL 2.93*   GLUCOSE mg/dL 128*   CALCIUM mg/dL 9.6         Results from last 7 days   Lab Units 02/03/19  0259   WBC 10*3/mm3 13.71*   HEMOGLOBIN g/dL 9.3*   HEMATOCRIT % 29.3*   PLATELETS 10*3/mm3 202     Results from last 7 days   Lab Units 02/03/19  1015 02/03/19  0259 02/02/19  1830 01/29/19  0241 01/28/19  1233   INR   --   --  1.32* 1.35* 1.44*   APTT seconds 63.3* 44.2* 30.5  --  29.1         Results from last 7 days   Lab Units 01/29/19  0241   MAGNESIUM mg/dL 2.1           I reviewed the patient's new clinical results.        Assessment:  1. Acute valvular and diastolic CHF  2. Severe aortic stenosis, severe mitral stenosis secondary to calcification, and severe tricuspid regurgitation  3. Normal EF  60-65%  4. Paroxysmal atrial fibrillation with RVR -status post cardioversion 1/24/2019 and subsequent reversion to atrial fibrillation.  5. Moderate pulmonary hypertension (mean PA pressure 32)  6. Status post tissue AVR, tissue MVR, tricuspid valve repair (Martha suture repair), left cryomaze, and PENNIE ligation on 1/28/2019 (Dr. Gaxiola).  7. Small bilateral pulmonary emboli  8. Oliguric acute kidney injury post-operatively  9. Bifascicular block (RBBB and LAFB)  10. Candida intertrigo (skin folds); left axillary fungal infection (treated)  11. Osteoarthritis with immobility  12. Left wrist thrombophlebitis from IV infiltration  13. Undiagnosed OCTAVIANO  14. Morbid obesity   15. Anemia of chronic disease  16. Severe deconditioning   17. Junctional bradycardia and asystole post-op    Plan:  -She actually has periods of sinus rhythm!  Her junctional rate is now in the 50's to 60's.  Will continue to watch for now.  If this continues to improve, maybe we can avoid a permanent pacemaker.  Dr. Jarrell following.    -Off beta blockers for now.      -HD for volume control for now.  Her volume status looks better.    -On low-dose Heparin rodrigue Segura MD  02/03/19  4:00 PM

## 2019-02-03 NOTE — PLAN OF CARE
Problem: Patient Care Overview  Goal: Plan of Care Review   02/02/19 7051   Coping/Psychosocial   Plan of Care Reviewed With patient   Plan of Care Review   Progress no change   OTHER   Outcome Summary promotion of respiratory hygiene/nebulizers and oxygen/cough encouraged

## 2019-02-03 NOTE — PROGRESS NOTES
"   LOS: 18 days    Patient Care Team:  Robert Cortez MD as PCP - General (Family Medicine)  Deborah Agudelo MD as Surgeon (Orthopedic Surgery)    Chief Complaint:    Chief Complaint   Patient presents with   • Shortness of Breath   • Leg Swelling     Follow UP LAVERNE   Subjective     Interval History:   Seen and examined on HD; ultrafiltration goal today is 3 kg;  fair UOP yesterday of 1.2 liters (after three doses of bumex 4 mg iv); breathng is comfortable; dopamine at 3 mcg; stable BP's thus far    Objective     Vital Signs  Heart Rate:  [70-72] 72  Resp:  [15-20] 19  BP: (111-123)/(56-75) 118/68  Arterial Line BP: ()/(47-65) 108/58    Flowsheet Rows      First Filed Value   Admission Height  170.2 cm (67\") Documented at 01/16/2019 0910   Admission Weight  145 kg (320 lb)  (Abnormal)  Documented at 01/16/2019 0921          No intake/output data recorded.  I/O last 3 completed shifts:  In: 2893 [P.O.:370; I.V.:778; Other:231; NG/GT:1514]  Out: 4295 [Urine:1295; Other:3000]    Intake/Output Summary (Last 24 hours) at 2/3/2019 1131  Last data filed at 2/3/2019 0600  Gross per 24 hour   Intake 2165 ml   Output 1095 ml   Net 1070 ml       Physical Exam:  gen obese WF lying in bed, awake and oriented; no acute distress  Follows commands; feeding tube in nose  LIJ shiley  Lungs bibasilar rales; diminished in bases/flanks; not labored  CV RRR, +rub, sternotomy incision intact  abd soft, NT/ND, BS+   +andrea; blue urine  vasc 1+ BLE edema, decreased; wrinkles     Results Review:    Results from last 7 days   Lab Units 02/03/19  0259 02/02/19  0252 02/01/19  1411 02/01/19  0259  01/28/19  1801   SODIUM mmol/L 136 138  --  143   < > 143   POTASSIUM mmol/L 4.2 4.0 4.0 3.3*   < > 4.5   CHLORIDE mmol/L 97* 99  --  101   < > 100   CO2 mmol/L 26.0 25.2  --  23.7   < > 23.8   BUN mg/dL 69* 37*  --  50*   < > 31*   CREATININE mg/dL 2.93* 2.06*  --  2.83*   < > 1.50*   CALCIUM mg/dL 9.6 9.3  --  9.6   < > 10.1   BILIRUBIN " mg/dL  --   --   --  0.4  --  0.6   ALK PHOS U/L  --   --   --  71  --  53   ALT (SGPT) U/L  --   --   --  <5  --  22   AST (SGOT) U/L  --   --   --  22  --  112*   GLUCOSE mg/dL 128* 117*  --  122*   < > 211*    < > = values in this interval not displayed.       Estimated Creatinine Clearance: 25.1 mL/min (A) (by C-G formula based on SCr of 2.93 mg/dL (H)).    Results from last 7 days   Lab Units 02/03/19  0259 02/01/19  0259 01/30/19  0259 01/29/19  0241 01/28/19  1801 01/28/19  1436   MAGNESIUM mg/dL  --   --   --  2.1 1.9 1.5*   PHOSPHORUS mg/dL 3.8 3.7 5.9* 2.6  --  3.0       Results from last 7 days   Lab Units 01/30/19  0259   URIC ACID mg/dL 8.5*       Results from last 7 days   Lab Units 02/03/19  0259 02/02/19  1830 02/01/19  0259 01/31/19  0601 01/30/19  0259   WBC 10*3/mm3 13.71* 14.11* 15.14* 21.38* 27.62*   HEMOGLOBIN g/dL 9.3* 9.0* 8.9* 9.1* 9.0*   PLATELETS 10*3/mm3 202 202 164 171 210       Results from last 7 days   Lab Units 02/02/19  1830 01/29/19  0241 01/28/19  1233 01/28/19  1120   INR  1.32* 1.35* 1.44* 1.65*         Imaging Results (last 24 hours)     Procedure Component Value Units Date/Time    XR Chest 1 View [[id number]] Collected:  02/03/19 0717     Updated:  02/03/19 0717    Narrative:       PORTABLE CHEST X-RAY     CLINICAL HISTORY: post op     COMPARISON: 02/02/2019.     FINDINGS: Portable AP view of the chest was obtained with overlying  monitor leads in place. Life support lines are unchanged without  pneumothorax. Edema again noted with small effusions, right greater than  left. Stable cardiomegaly.               Impression:          Stable appearance of the chest by portable radiography.                XR Chest Post CVA Port [[id number]] Collected:  02/02/19 1758     Updated:  02/02/19 2004    Narrative:       PORTABLE CHEST RADIOGRAPH POST CENTRAL VENOUS ACCESS     HISTORY: 73-year-old female status post PICC line placement.     FINDINGS: An AP portable chest radiograph was  obtained and demonstrates  the tip of a PICC catheter within the SVC. Improved but persistent  interstitial opacification in both lungs, most consistent with  interstitial edema is noted. A stable right internal jugular  catheter/sheath is noted. Stable cardiomegaly and bibasilar atelectasis  is also appreciated.     This report was finalized on 2/2/2019 8:01 PM by Dr. Maldonado Salmeron M.D.             acetaminophen 500 mg Oral Q4H   aspirin 81 mg Oral Daily   cetaphil  Topical Q12H   chlorhexidine 15 mL Mouth/Throat Q12H   escitalopram 10 mg Oral Daily   hydrocortisone 1 application Topical Q6H   insulin lispro 0-7 Units Subcutaneous 4x Daily With Meals & Nightly   ipratropium-albuterol 3 mL Nebulization BID - RT   lansoprazole 30 mg Nasogastric BID AC   mupirocin  Each Nare BID   nystatin  Topical Q12H   sennosides-docusate sodium 2 tablet Oral Nightly   sodium chloride 4 mL Nebulization BID - RT       DOPamine 2-20 mcg/kg/min Last Rate: 2 mcg/kg/min (02/02/19 1205)   heparin (porcine) 7.14 Units/kg/hr Last Rate: 10.14 Units/kg/hr (02/03/19 0413)   insulin 0-50 Units/hr Last Rate: 1.5 Units/hr (01/29/19 1203)   niCARdipine 5-15 mg/hr    norepinephrine 0.02-0.3 mcg/kg/min Last Rate: Stopped (01/28/19 1632)   sodium chloride 30 mL/hr Last Rate: 30 mL/hr (01/28/19 1257)   sodium chloride 30 mL/hr Last Rate: 30 mL/hr (01/28/19 1215)       Medication Review:   Current Facility-Administered Medications   Medication Dose Route Frequency Provider Last Rate Last Dose   • acetaminophen (TYLENOL) tablet 500 mg  500 mg Oral Q4H Yahaira Garza APRN   500 mg at 02/03/19 0919   • ALPRAZolam (XANAX) tablet 0.25 mg  0.25 mg Oral Q8H PRN Scar Gaxiola MD       • aspirin chewable tablet 81 mg  81 mg Oral Daily Scar Gaxiola MD   81 mg at 02/03/19 0919   • bisacodyl (DULCOLAX) EC tablet 10 mg  10 mg Oral Daily PRN Scar Gaxiola MD       • bisacodyl (DULCOLAX) suppository 10 mg  10 mg Rectal Daily PRN Scar Gaxiola,  MD       • cetaphil lotion   Topical Q12H Zonia Lopez MD       • chlorhexidine (PERIDEX) 0.12 % solution 15 mL  15 mL Mouth/Throat Q12H Scar Gaxiola MD   15 mL at 02/03/19 0919   • cyclobenzaprine (FLEXERIL) tablet 10 mg  10 mg Oral Q8H PRN Scar Gaxiola MD       • dextrose (D50W) 25 g/ 50mL Intravenous Solution 25 g  25 g Intravenous Q15 Min PRN Yahaira Garza APRN       • dextrose (GLUTOSE) oral gel 15 g  15 g Oral Q15 Min PRN Yahaira Garza APRN       • DOPamine 400 mg/250 mL (1.6 mg/mL) infusion  2-20 mcg/kg/min Intravenous Continuous PRN Scar Gaxiola MD 10.5 mL/hr at 02/02/19 1205 2 mcg/kg/min at 02/02/19 1205   • escitalopram (LEXAPRO) tablet 10 mg  10 mg Oral Daily Oscar Rodriguez MD       • glucagon (human recombinant) (GLUCAGEN DIAGNOSTIC) injection 1 mg  1 mg Subcutaneous PRN Yahaira Garza APRN       • heparin (porcine) injection 5,000 Units  5,000 Units Intracatheter Q12H PRN Fran Tilley MD   5,000 Units at 02/01/19 1915   • heparin 06871 units/250 mL (100 units/mL) in 0.45 % NaCl infusion  7.14 Units/kg/hr Intravenous Titrated Yahaira Garza APRN 14.19 mL/hr at 02/03/19 0413 10.14 Units/kg/hr at 02/03/19 0413   • hydrocortisone 1 % cream 1 application  1 application Topical Q6H Fran Tilley MD   1 application at 02/03/19 0625   • insulin lispro (humaLOG) injection 0-7 Units  0-7 Units Subcutaneous 4x Daily With Meals & Nightly Yahaira Garza APRN   2 Units at 01/30/19 0820   • insulin regular (HumuLIN R,NovoLIN R) 100 Units in sodium chloride 0.9 % 100 mL (1 Units/mL) infusion  0-50 Units/hr Intravenous Continuous PRN Scar Gaxiola MD 1.5 mL/hr at 01/29/19 1203 1.5 Units/hr at 01/29/19 1203   • ipratropium-albuterol (DUO-NEB) nebulizer solution 3 mL  3 mL Nebulization BID - RT Yahaira Garza APRN   3 mL at 02/02/19 2155   • ipratropium-albuterol (DUO-NEB) nebulizer solution 3 mL  3 mL Nebulization Q6H PRN Yahaira Garza APRN       •  lansoprazole (FIRST) oral suspension 30 mg  30 mg Nasogastric BID AC Scar Gaxiola MD   30 mg at 02/03/19 0920   • magic mouthwash oral supsension 5 mL  5 mL Swish & Spit Q4H PRN Yahaira Garza APRN       • magnesium hydroxide (MILK OF MAGNESIA) suspension 2400 mg/10mL 10 mL  10 mL Oral Daily PRN Scar Gaxiola MD       • mupirocin (BACTROBAN) 2 % nasal ointment   Each Nare BID Scar Gaxiola MD   10 application at 02/03/19 0928   • niCARdipine (CARDENE) 25 mg/250 mL (0.1 mg/mL) NS infusion kit  5-15 mg/hr Intravenous Continuous PRN Scar Gaxiola MD       • norepinephrine (LEVOPHED) 8 mg/250 mL (32 mcg/mL) in sodium chloride 0.9% infusion (premix)  0.02-0.3 mcg/kg/min Intravenous Continuous PRN Scar Gaxiola MD   Stopped at 01/28/19 1632   • nystatin (MYCOSTATIN) powder   Topical Q12H Leonides Martinez MD       • ondansetron (ZOFRAN) injection 4 mg  4 mg Intravenous Q6H PRN Scar Gaxiola MD       • oxyCODONE (ROXICODONE) immediate release tablet 5 mg  5 mg Oral Q4H PRN Yahaira Garza APRN   5 mg at 02/03/19 0727   • potassium chloride (MICRO-K) CR capsule 40 mEq  40 mEq Oral PRN Scar Gaxiola MD        Or   • potassium chloride (KLOR-CON) packet 40 mEq  40 mEq Oral PRN Scar Gaxiola MD       • potassium chloride 10 mEq in 100 mL IVPB  10 mEq Intravenous Q1H PRN Scar Gaxiola MD        Or   • potassium chloride 10 mEq in 100 mL IVPB  10 mEq Intravenous Q1H PRN Scar Gaxiola MD       • potassium chloride 20 mEq in 50 mL IVPB  20 mEq Intravenous Q1H PRN Scar Gaxiola MD       • potassium chloride 20 mEq in 50 mL IVPB  20 mEq Intravenous Q1H PRN Scar Gaxiola MD       • potassium chloride 20 mEq in 50 mL IVPB  20 mEq Intravenous Q1H PRN Scar Gaxiola MD       • promethazine (PHENERGAN) tablet 12.5 mg  12.5 mg Oral Q6H PRN Scar Gaxiola MD        Or   • promethazine (PHENERGAN) injection 12.5 mg  12.5 mg Intravenous Q6H PRN cSar Gaxiola MD       •  sennosides-docusate sodium (SENOKOT-S) 8.6-50 MG tablet 2 tablet  2 tablet Oral Nightly Scar Gaxiola MD   2 tablet at 02/02/19 2051   • sodium chloride 0.9 % flush 30 mL  30 mL Intravenous Once PRN Scar Gaxiola MD       • sodium chloride 0.9 % infusion  30 mL/hr Intravenous Continuous Scar Gaxiola MD 30 mL/hr at 01/28/19 1257 30 mL/hr at 01/28/19 1257   • sodium chloride 0.9 % infusion  30 mL/hr Intravenous Continuous PRN Scar Gaxiola MD 30 mL/hr at 01/28/19 1215 30 mL/hr at 01/28/19 1215   • sodium chloride 7 % nebulizer solution nebulizer solution 4 mL  4 mL Nebulization BID - RT Garza YahairaMORGAN michelle   4 mL at 02/02/19 2155       Assessment/Plan   Non-oliguric LAVERNE - suspect ischemic ATN post AVR/MVR.  No role of contrast exposure from remote [facility]. HD initiated on 1.30.19, with nearly daily Rx's since.   UOP improving. Stable lytes; has ROCHELLE perez.  UA: mod blood, 30 protein.  Still expect eventual renal recovery with BL Cr ~ 1.       VHD, POD5 AVR, MVR, tricuspic bicuspidization, and left cryo MAZE with PENNIE ligation  Cardiogenic shock - off neosynephrine, on low-dose dopamine   Volume overload - CXR showed pulm edema, small effusions;  -- periph edema down significantly  Acute resp failure - extubated, on 3L NC o2  Moderate pulm HTN, s/p RHC 1/22/19   Candida intertrigo - topical nystatin and IV micafungin per ID  Anemia of ABL - Hgb stable  AFIB - HR stable post-op, off amiodarone gtt; paced rhythm    Plan  1.  HD with 3-kg UF today  2.  Anticipate another bumex trial tomorrow  3.  Surveillnce labs            Acute congestive heart failure (CMS/HCC)    Hypertension    Generalized anxiety disorder    Hyperlipidemia    IFG (impaired fasting glucose)    Heart murmur, systolic    Obesity, morbid, BMI 50 or higher (CMS/HCC)    Fungal skin infection    Cellulitis of lower extremity    Aortic valve stenosis    Tricuspid valve insufficiency    Mitral valve stenosis              Valentin Gabriel,  MD  02/03/19  11:31 AM

## 2019-02-03 NOTE — PLAN OF CARE
Problem: Fall Risk (Adult)  Goal: Identify Related Risk Factors and Signs and Symptoms  Outcome: Ongoing (interventions implemented as appropriate)    Goal: Absence of Fall  Outcome: Ongoing (interventions implemented as appropriate)    Goal: Identify Related Risk Factors and Signs and Symptoms  Outcome: Ongoing (interventions implemented as appropriate)    Goal: Absence of Fall  Outcome: Ongoing (interventions implemented as appropriate)   02/03/19 1748   Fall Risk (Adult)   Absence of Fall making progress toward outcome       Problem: Patient Care Overview  Goal: Plan of Care Review  Outcome: Ongoing (interventions implemented as appropriate)   02/03/19 1748   Coping/Psychosocial   Plan of Care Reviewed With patient   Plan of Care Review   Progress improving   OTHER   Outcome Summary Patient transferred from [facility] this afternoon. Patient completed hemodialysis today and had 2300 mL of fluids removed. Worked with PT today and was able to stand a little bit with PT. Patient still V paced with pacer at 72. Cotrak in place with tube feedings going. Regular food with thin liquids order per Speech evaluation but patient only eating minimal amounts of solids. Heparin and Dopamine gtt continued. Parks still present with adequate output. Patient still on 3L. Right picc in place with blood return. Left shiley also in place with a pigtail- flushed and positive blood return.      Goal: Individualization and Mutuality  Outcome: Ongoing (interventions implemented as appropriate)    Goal: Discharge Needs Assessment  Outcome: Ongoing (interventions implemented as appropriate)    Goal: Interprofessional Rounds/Family Conf  Outcome: Ongoing (interventions implemented as appropriate)      Problem: Pain, Acute (Adult)  Goal: Acceptable Pain Control/Comfort Level  Outcome: Ongoing (interventions implemented as appropriate)   02/03/19 1748   Pain, Acute (Adult)   Acceptable Pain Control/Comfort Level making progress toward outcome        Problem: Anxiety (Adult)  Goal: Reduction/Resolution  Outcome: Ongoing (interventions implemented as appropriate)   02/03/19 1748   Anxiety (Adult)   Reduction/Resolution making progress toward outcome       Problem: Breathing Pattern Ineffective (Adult)  Goal: Effective Oxygenation/Ventilation  Outcome: Ongoing (interventions implemented as appropriate)   02/03/19 1748   Breathing Pattern Ineffective (Adult)   Effective Oxygenation/Ventilation making progress toward outcome     Goal: Anxiety/Fear Reduction  Outcome: Ongoing (interventions implemented as appropriate)   02/03/19 1748   Breathing Pattern Ineffective (Adult)   Anxiety/Fear Reduction making progress toward outcome       Problem: Activity Intolerance (Adult)  Goal: Activity Tolerance  Outcome: Ongoing (interventions implemented as appropriate)   02/03/19 1748   Activity Intolerance (Adult)   Activity Tolerance making progress toward outcome       Problem: Skin Injury Risk (Adult)  Goal: Skin Health and Integrity  Outcome: Ongoing (interventions implemented as appropriate)   02/03/19 1748   Skin Injury Risk (Adult)   Skin Health and Integrity making progress toward outcome       Problem: Cardiac Surgery (Adult)  Goal: Signs and Symptoms of Listed Potential Problems Will be Absent, Minimized or Managed (Cardiac Surgery)  Outcome: Ongoing (interventions implemented as appropriate)   02/03/19 1748   Goal/Outcome Evaluation   Problems Assessed (Cardiac Surgery) all   Problems Present (Cardiac Surgery) cardiac complications;neurologic complications;pain;respiratory compromise     Goal: Anesthesia/Sedation Recovery  Outcome: Ongoing (interventions implemented as appropriate)   02/03/19 1748   Goal/Outcome Evaluation   Anesthesia/Sedation Recovery progressing toward baseline       Problem: Nutrition, Enteral (Adult)  Goal: Signs and Symptoms of Listed Potential Problems Will be Absent, Minimized or Managed (Nutrition, Enteral)  Outcome: Ongoing  (interventions implemented as appropriate)   02/03/19 3647   Goal/Outcome Evaluation   Problems Assessed (Enteral Nutrition) all   Problems Present (Enteral Nutrition) none

## 2019-02-03 NOTE — PROGRESS NOTES
-Severe AS, MS, TV regurgitation--s/p AVR/MVR (tissue),TV repair, MAZE PENNIE ligation-- POD#6 Khalida  -Admit with acute congestive heart failure  -NICM, non obstructive dx by cath  -RBBB  -HTN  -Morbid obesity with arthritis-complicating mobility and all aspects of care  -Hypoventilation sx, probable OCTAVIANO-----chronic CO2 retain by ABG, pulmonary signed off  -Yeast under skin folds-----ID following, on nystatin topical and micafungin IV  -Stasis dermatitis due to severe bilateral lower extremity edema and chronic venous stasis-------improved   -Possible PE, NO LE DVT------on heparin  -New a.fib----s/p cardioversion 1/24/19, reverted to a.fib again, on amiodarone/digoxin   -preop anemia----Hb stable  -Left arm thrombophlebitis, infiltration  -Deconditioning----unable to complete transfer preop, assist x4 to stand at bedside  -LAVERNE creatinine  2.65  -Leukocytosis- WBC 27.62, probable re  -Post op anemia- expected ABL  -metabolic encephalopathy      Labs reviewed  Neuro intact  Benign abdomen     Passed swallow.  Now junctional in the 50-60.  Dialysis today.  On low dose heparin gtt, D/W Dr Gaxiola, he wants to hold off on anticoagulation.  Okay to transfer to stepdown. Continue routine care.         Yahaira Caldwell, APRN  02/3/19  7:19AM

## 2019-02-03 NOTE — PROGRESS NOTES
"  PROGRESS NOTE  Patient Name: Claudia Arnold  Age/Sex: 73 y.o. female  : 1945  MRN: [id number]    Date of Admission: 2019  Date of Encounter Visit: 19   LOS: 18 days   Patient Care Team:  Robert Cortez MD as PCP - General (Family Medicine)  Deborah Agudelo MD as Surgeon (Orthopedic Surgery)    Chief Complaint: Resting comfortably.  Feels better overall.  Apparently she has some return of her intrinsic cardiac rhythm.  Plans to move out of the open heart recovery    Hospital course: had valves replacement with maze, history of OHS and chronic hypoxemia but no use of inhalers at home. Post op was weaned off the vent and is doing better and working with pulmonary hygiene measures. No fever, on nasal O2 and denies SOA at rest    Interval History: denies SOA< pain is fairly well controlled, improving O2 requirements, on 3 lpm    REVIEW OF SYSTEMS:   CONSTITUTIONAL: no fever or chills  CARDIOVASCULAR: No chest pain, chest pressure or chest discomfort. No palpitations or edema.   RESPIRATORY: Shortness of breath with minimal exertion  GASTROINTESTINAL: No anorexia, nausea, vomiting or diarrhea. No abdominal pain or blood.   HEMATOLOGIC: No bleeding or bruising.     Ventilator/Non-Invasive Ventilation Settings (From admission, onward)    Start     Ordered            Vital Signs  Heart Rate:  [70-75] 72  Resp:  [15-20] 18  BP: (111-123)/(63-68) 118/68  Arterial Line BP: ()/(47-65) 103/56  SpO2:  [88 %-100 %] 100 %  on  Flow (L/min):  [2-3.5] 3 Device (Oxygen Therapy): nasal cannula    Intake/Output Summary (Last 24 hours) at 2/3/2019 1518  Last data filed at 2/3/2019 1445  Gross per 24 hour   Intake 2962 ml   Output 1240 ml   Net 1722 ml     Flowsheet Rows      First Filed Value   Admission Height  170.2 cm (67\") Documented at 2019 0910   Admission Weight  145 kg (320 lb)  (Abnormal)  Documented at 2019 0921        Body mass index is 48.37 kg/m².      19  0430 19  1518 " 02/01/19  0600   Weight: (!) 140 kg (308 lb 1.6 oz) (!) 140 kg (308 lb) (!) 140 kg (308 lb 13.8 oz)       Physical Exam:  GEN:  No acute distress, awake and responsive, cooperative, well developed , on nasal O2  EYES:   Sclera clear. No icterus. PERRL. Normal EOM  ENT:   External ears/nose normal, no oral lesions, no thrush, mucous membranes moist  NECK:  Supple, midline trachea, no JVD, right central line/cordis  LUNGS: Normal chest on inspection,no expiratory wheezing, decreased posteriorly.   CV:  Regular rhythm and rate (paced)..  ABD:  Soft, non-tender and non-distended. Normal bowel sounds. No guarding  EXT:  Moves all extremities well. No cyanosis. No redness. Resolving edema.   Skin: dry, intact, no bleeding    Results Review:      Results from last 7 days   Lab Units 02/03/19  0259 02/02/19  0252 02/01/19  1411 02/01/19  0259 01/31/19  0601 01/30/19  1124 01/30/19  0645 01/30/19  0259  01/29/19  0241 01/28/19  1801 01/28/19  1436 01/28/19  1233   SODIUM mmol/L 136 138  --  143 141 146* 147* 145   < > 145 143 143 143   POTASSIUM mmol/L 4.2 4.0 4.0 3.3* 3.8 4.4 4.9 4.9   < > 4.2 4.5 4.2 4.2   CHLORIDE mmol/L 97* 99  --  101 100 103 104 103   < > 103 100 100 100   CO2 mmol/L 26.0 25.2  --  23.7 25.0 23.7 22.9 23.7   < > 24.0 23.8 23.0 25.9   BUN mg/dL 69* 37*  --  50* 30* 51* 47* 44*   < > 33* 31* 21 28*   CREATININE mg/dL 2.93* 2.06*  --  2.83* 2.03* 2.88* 2.77* 2.65*   < > 1.73* 1.50* 1.45* 1.29*   CALCIUM mg/dL 9.6 9.3  --  9.6 9.6 10.0 10.0 9.9   < > 10.3 10.1 10.1 10.1   AST (SGOT) U/L  --   --   --  22  --   --   --   --   --   --  112*  --   --    ALT (SGPT) U/L  --   --   --  <5  --   --   --   --   --   --  22  --   --    ANION GAP mmol/L 13.0 13.8  --  18.3 16.0 19.3 20.1 18.3   < > 18.0 19.2 20.0 17.1   ALBUMIN g/dL 3.80  --   --  3.90  --   --   --   --   --  3.50 3.60 3.80 3.30*    < > = values in this interval not displayed.                 Results from last 7 days   Lab Units 02/03/19  0259  02/02/19 1830 02/01/19 0259 01/31/19  0601 01/30/19  0259 01/29/19  0241 01/28/19  1436 01/28/19  1233  01/28/19  0337   WBC 10*3/mm3 13.71* 14.11* 15.14* 21.38* 27.62* 25.96* 28.06* 21.68*   < > 8.18   HEMOGLOBIN g/dL 9.3* 9.0* 8.9* 9.1* 9.0* 9.7* 10.5* 11.0*   < > 11.1*   HEMOGLOBIN, POC   --   --   --   --   --   --   --   --    < >  --    HEMATOCRIT % 29.3* 29.3* 29.1* 30.4* 30.7* 30.8* 35.3* 36.8   < > 36.0   HEMATOCRIT POC   --   --   --   --   --   --   --   --    < >  --    PLATELETS 10*3/mm3 202 202 164 171 210 228 206 217   < > 365   MCV fL 91.8 93.6 96.0 96.2 98.7* 93.3 97.5 97.6   < > 95.0   NEUTROPHIL % % 79.7*  --   --   --   --  89.5*  --  85.0*  --  69.3   LYMPHOCYTE % % 15.9*  --   --   --   --  6.6*  --  6.3*  --  19.3*   MONOCYTES % % 1.7*  --   --   --   --  3.8*  --  2.0*  --  5.6   EOSINOPHIL % % 2.4  --   --   --   --  0.0*  --  1.5  --  5.6   BASOPHIL % % 0.3  --   --   --   --  0.1  --  0.2  --  0.2   IMM GRAN % % 5.0*  --   --   --   --  2.0*  --  5.0*  --  4.0*    < > = values in this interval not displayed.     Results from last 7 days   Lab Units 02/03/19  1015 02/03/19 0259 02/02/19 1830 01/29/19  0241 01/28/19  1233 01/28/19  1120  01/28/19  0337   INR   --   --  1.32* 1.35* 1.44* 1.65*   < >  --    APTT seconds 63.3* 44.2* 30.5  --  29.1 27.3  --  80.3*    < > = values in this interval not displayed.     Results from last 7 days   Lab Units 01/29/19  0241 01/28/19  1801 01/28/19  1436 01/28/19  1233   MAGNESIUM mg/dL 2.1 1.9 1.5* 1.6           Invalid input(s): LDLCALC  Results from last 7 days   Lab Units 01/29/19  0954 01/29/19  0257 01/28/19  1900 01/28/19  1415 01/28/19  1303 01/28/19  1122 01/28/19  1051 01/28/19  1021 01/28/19  0959 01/28/19  0922 01/28/19  0854 01/28/19  0847 01/28/19  0726   PH, ARTERIAL pH units 7.319* 7.353 7.362 7.344* 7.297* 7.42 7.50 7.56 7.54 7.51 7.48 7.50 7.47   PCO2, ARTERIAL mm Hg 52.6* 49.6* 47.1* 49.7* 61.2*  --   --   --   --   --   --   --    --    PO2 ART mm Hg 84.6 96.3 69.9* 140.4* 91.9  --   --   --   --   --   --   --   --    HCO3 ART mmol/L 27.0 27.6 26.7 27.0 29.9*  --   --   --   --   --   --   --   --          Glucose   Date/Time Value Ref Range Status   02/03/2019 1235 113 70 - 130 mg/dL Final   02/03/2019 0927 135 (H) 70 - 130 mg/dL Final   02/03/2019 0702 122 70 - 130 mg/dL Final   02/02/2019 2051 122 70 - 130 mg/dL Final   02/02/2019 1832 133 (H) 70 - 130 mg/dL Final   02/02/2019 1514 132 (H) 70 - 130 mg/dL Final   02/02/2019 0912 117 70 - 130 mg/dL Final   02/01/2019 1958 119 70 - 130 mg/dL Final             Results from last 7 days   Lab Units 02/01/19  1242   NITRITE UA  Negative   WBC UA /HPF 13-20*   BACTERIA UA /HPF 4+*   SQUAM EPITHEL UA /HPF 3-6*         Results from last 7 days   Lab Units 02/01/19  1242 01/30/19  0259   SODIUM UR mmol/L <20  --    URIC ACID mg/dL  --  8.5*       Imaging:   Imaging Results (all)     Procedure Component Value Units Date/Time          I reviewed the patient's new clinical results.  I personally viewed and interpreted the patient's imaging results:        Medication Review:     acetaminophen 500 mg Oral Q4H   aspirin 81 mg Oral Daily   cetaphil  Topical Q12H   chlorhexidine 15 mL Mouth/Throat Q12H   escitalopram 10 mg Oral Daily   hydrocortisone 1 application Topical Q6H   ipratropium-albuterol 3 mL Nebulization BID - RT   lansoprazole 30 mg Nasogastric BID AC   mupirocin  Each Nare BID   nystatin  Topical Q12H   sennosides-docusate sodium 2 tablet Oral Nightly   sodium chloride 4 mL Nebulization BID - RT         DOPamine 2-20 mcg/kg/min Last Rate: 3 mcg/kg/min (02/03/19 1436)   heparin (porcine) 7.14 Units/kg/hr Last Rate: 10.14 Units/kg/hr (02/03/19 3703)   niCARdipine 5-15 mg/hr    sodium chloride 30 mL/hr Last Rate: 30 mL/hr (01/28/19 1257)   sodium chloride 30 mL/hr Last Rate: 30 mL/hr (01/28/19 1215)       ASSESSMENT:   1. Postop respiratory failure, improving, liberated from the  ventilator  2. Status post aortic valve replacement, mitral valve replacement, tricuspid repair, Maze procedure  3. Acute kidney injury  4. Pulmonary hypertension  5. Diastolic left ventricular dysfunction  6. Essential hypertension  7. Chronic respiratory failure  8. Obesity hypoventilation syndrome  9. Suspected obstructive sleep apnea    PLAN:  continues to improve slowly  No wheezing on exam today  Working on deep breathing  Consider OCTAVIANO evaluation post discharge  Pacemaker evaluation ongoing.  Watch for postop pneumonia etc.  Tube feeds for now until able to do swallow evaluation      Disposition: per primary team    Ady Arceo MD  02/03/19  3:18 PM          Dictated utilizing Dragon dictation

## 2019-02-04 LAB
ALBUMIN SERPL-MCNC: 3.5 G/DL (ref 3.5–5.2)
ANION GAP SERPL CALCULATED.3IONS-SCNC: 13.5 MMOL/L
APTT PPP: 86.1 SECONDS (ref 22.7–35.4)
BUN BLD-MCNC: 52 MG/DL (ref 8–23)
BUN/CREAT SERPL: 25.1 (ref 7–25)
CALCIUM SPEC-SCNC: 9.3 MG/DL (ref 8.6–10.5)
CHLORIDE SERPL-SCNC: 98 MMOL/L (ref 98–107)
CO2 SERPL-SCNC: 24.5 MMOL/L (ref 22–29)
CREAT BLD-MCNC: 2.07 MG/DL (ref 0.57–1)
DEPRECATED RDW RBC AUTO: 49.3 FL (ref 37–54)
EOSINOPHIL # BLD MANUAL: 0.68 10*3/MM3 (ref 0–0.7)
EOSINOPHIL NFR BLD MANUAL: 4 % (ref 0.3–6.2)
ERYTHROCYTE [DISTWIDTH] IN BLOOD BY AUTOMATED COUNT: 15 % (ref 11.7–13)
GFR SERPL CREATININE-BSD FRML MDRD: 23 ML/MIN/1.73
GLUCOSE BLD-MCNC: 211 MG/DL (ref 65–99)
HCT VFR BLD AUTO: 28.8 % (ref 35.6–45.5)
HGB BLD-MCNC: 9.2 G/DL (ref 11.9–15.5)
LYMPHOCYTES # BLD MANUAL: 1.03 10*3/MM3 (ref 0.9–4.8)
LYMPHOCYTES NFR BLD MANUAL: 4 % (ref 5–12)
LYMPHOCYTES NFR BLD MANUAL: 6 % (ref 19.6–45.3)
MAGNESIUM SERPL-MCNC: 2.2 MG/DL (ref 1.6–2.4)
MCH RBC QN AUTO: 29.1 PG (ref 26.9–32)
MCHC RBC AUTO-ENTMCNC: 31.9 G/DL (ref 32.4–36.3)
MCV RBC AUTO: 91.1 FL (ref 80.5–98.2)
METAMYELOCYTES NFR BLD MANUAL: 1 % (ref 0–0)
MONOCYTES # BLD AUTO: 0.68 10*3/MM3 (ref 0.2–1.2)
MYELOCYTES NFR BLD MANUAL: 3 % (ref 0–0)
NEUTROPHILS # BLD AUTO: 14.02 10*3/MM3 (ref 1.9–8.1)
NEUTROPHILS NFR BLD MANUAL: 82 % (ref 42.7–76)
OVALOCYTES BLD QL SMEAR: ABNORMAL
PHOSPHATE SERPL-MCNC: 3.2 MG/DL (ref 2.5–4.5)
PLAT MORPH BLD: NORMAL
PLATELET # BLD AUTO: 193 10*3/MM3 (ref 140–500)
PMV BLD AUTO: 10.6 FL (ref 6–12)
POTASSIUM BLD-SCNC: 4.4 MMOL/L (ref 3.5–5.2)
RBC # BLD AUTO: 3.16 10*6/MM3 (ref 3.9–5.2)
SCAN SLIDE: NORMAL
SODIUM BLD-SCNC: 136 MMOL/L (ref 136–145)
WBC MORPH BLD: NORMAL
WBC NRBC COR # BLD: 17.1 10*3/MM3 (ref 4.5–10.7)

## 2019-02-04 PROCEDURE — 97110 THERAPEUTIC EXERCISES: CPT

## 2019-02-04 PROCEDURE — 99232 SBSQ HOSP IP/OBS MODERATE 35: CPT | Performed by: INTERNAL MEDICINE

## 2019-02-04 PROCEDURE — 25010000002 ONDANSETRON PER 1 MG: Performed by: THORACIC SURGERY (CARDIOTHORACIC VASCULAR SURGERY)

## 2019-02-04 PROCEDURE — 94799 UNLISTED PULMONARY SVC/PX: CPT

## 2019-02-04 PROCEDURE — 83735 ASSAY OF MAGNESIUM: CPT | Performed by: INTERNAL MEDICINE

## 2019-02-04 PROCEDURE — 99024 POSTOP FOLLOW-UP VISIT: CPT | Performed by: NURSE PRACTITIONER

## 2019-02-04 PROCEDURE — 85025 COMPLETE CBC W/AUTO DIFF WBC: CPT | Performed by: NURSE PRACTITIONER

## 2019-02-04 PROCEDURE — 80069 RENAL FUNCTION PANEL: CPT | Performed by: INTERNAL MEDICINE

## 2019-02-04 PROCEDURE — 85007 BL SMEAR W/DIFF WBC COUNT: CPT | Performed by: NURSE PRACTITIONER

## 2019-02-04 PROCEDURE — 85730 THROMBOPLASTIN TIME PARTIAL: CPT | Performed by: THORACIC SURGERY (CARDIOTHORACIC VASCULAR SURGERY)

## 2019-02-04 RX ORDER — BUMETANIDE 0.25 MG/ML
4 INJECTION INTRAMUSCULAR; INTRAVENOUS
Status: COMPLETED | OUTPATIENT
Start: 2019-02-04 | End: 2019-02-04

## 2019-02-04 RX ADMIN — ONDANSETRON 4 MG: 2 INJECTION INTRAMUSCULAR; INTRAVENOUS at 09:06

## 2019-02-04 RX ADMIN — Medication 81 MG: at 11:40

## 2019-02-04 RX ADMIN — ACETAMINOPHEN 500 MG: 500 TABLET, FILM COATED ORAL at 06:22

## 2019-02-04 RX ADMIN — BUMETANIDE 4 MG: 0.25 INJECTION INTRAMUSCULAR; INTRAVENOUS at 11:41

## 2019-02-04 RX ADMIN — HYDROCORTISONE 1 APPLICATION: 1 CREAM TOPICAL at 00:53

## 2019-02-04 RX ADMIN — NYSTATIN: 100000 POWDER TOPICAL at 11:42

## 2019-02-04 RX ADMIN — MUPIROCIN 10 APPLICATION: 20 OINTMENT TOPICAL at 20:56

## 2019-02-04 RX ADMIN — LANSOPRAZOLE 30 MG: KIT at 06:43

## 2019-02-04 RX ADMIN — EMOLLIENT - LOTION: LOTION at 20:55

## 2019-02-04 RX ADMIN — HYDROCORTISONE 1 APPLICATION: 1 CREAM TOPICAL at 06:22

## 2019-02-04 RX ADMIN — SENNOSIDES AND DOCUSATE SODIUM 2 TABLET: 8.6; 5 TABLET ORAL at 20:56

## 2019-02-04 RX ADMIN — MUPIROCIN 10 APPLICATION: 20 OINTMENT TOPICAL at 17:14

## 2019-02-04 RX ADMIN — ACETAMINOPHEN 500 MG: 500 TABLET, FILM COATED ORAL at 17:14

## 2019-02-04 RX ADMIN — ACETAMINOPHEN 500 MG: 500 TABLET, FILM COATED ORAL at 21:04

## 2019-02-04 RX ADMIN — BUMETANIDE 4 MG: 0.25 INJECTION INTRAMUSCULAR; INTRAVENOUS at 17:14

## 2019-02-04 RX ADMIN — IPRATROPIUM BROMIDE AND ALBUTEROL SULFATE 3 ML: 2.5; .5 SOLUTION RESPIRATORY (INHALATION) at 19:50

## 2019-02-04 RX ADMIN — ACETAMINOPHEN 500 MG: 500 TABLET, FILM COATED ORAL at 01:13

## 2019-02-04 RX ADMIN — SODIUM CHLORIDE 4 ML: 7 NEBU SOLN,3 % NEBU at 19:50

## 2019-02-04 RX ADMIN — IPRATROPIUM BROMIDE AND ALBUTEROL SULFATE 3 ML: 2.5; .5 SOLUTION RESPIRATORY (INHALATION) at 07:13

## 2019-02-04 RX ADMIN — EMOLLIENT - LOTION: LOTION at 11:41

## 2019-02-04 RX ADMIN — ACETAMINOPHEN 500 MG: 500 TABLET, FILM COATED ORAL at 11:15

## 2019-02-04 RX ADMIN — ESCITALOPRAM 10 MG: 10 TABLET, FILM COATED ORAL at 11:41

## 2019-02-04 RX ADMIN — SODIUM CHLORIDE 4 ML: 7 NEBU SOLN,3 % NEBU at 07:16

## 2019-02-04 RX ADMIN — HYDROCORTISONE 1 APPLICATION: 1 CREAM TOPICAL at 17:14

## 2019-02-04 RX ADMIN — NYSTATIN 1 APPLICATION: 100000 POWDER TOPICAL at 20:56

## 2019-02-04 RX ADMIN — HYDROCORTISONE 1 APPLICATION: 1 CREAM TOPICAL at 11:41

## 2019-02-04 NOTE — PLAN OF CARE
Problem: Patient Care Overview  Goal: Plan of Care Review  Outcome: Ongoing (interventions implemented as appropriate)   02/04/19 4589   Coping/Psychosocial   Plan of Care Reviewed With patient   OTHER   Outcome Summary Pt continues to require MaxA for bed mobility and is unable to clear her bottom from the bed due to generalized weakness and pain. PT will continue to follow to address strength, mobility, and transfers.

## 2019-02-04 NOTE — PLAN OF CARE
Problem: Patient Care Overview  Goal: Plan of Care Review  Outcome: Ongoing (interventions implemented as appropriate)   02/04/19 6473   Plan of Care Review   Progress improving   OTHER   Outcome Summary VSS. DOPAMINE STOPPED. TOLERATED. PACER TURNED DOWN TO 50. PACING LESS THAN 50%. TUBE FEEDS CHANGED TO NOCTURNAL. IV BUMEX GIVEN. POSSIBLE DIALYSIS PLANNED FOR TOMORROW. REMAINS ON 3LNC. ENCOURAGEMENT OF IS AND DEEP BREATHING. UP TO CHAIR. TOLERATED. TURNED AND SKIN CARE AS ORDERED. WILL CONTINUE TO MONITOR.        Problem: Cardiac Surgery (Adult)  Goal: Signs and Symptoms of Listed Potential Problems Will be Absent, Minimized or Managed (Cardiac Surgery)  Outcome: Ongoing (interventions implemented as appropriate)   02/04/19 0244   Goal/Outcome Evaluation   Problems Assessed (Cardiac Surgery) all   Problems Present (Cardiac Surgery) pain;situational response;cardiac complications;functional deficit

## 2019-02-04 NOTE — THERAPY TREATMENT NOTE
Acute Care - Physical Therapy Treatment Note  [facility]     Patient Name: Claudia Arnold  : 1945  MRN: [id number]  Today's Date: 2019  Onset of Illness/Injury or Date of Surgery: 19          Admit Date: 2019    Visit Dx:    ICD-10-CM ICD-9-CM   1. Acute congestive heart failure, unspecified heart failure type (CMS/[facility]) I50.9 428.0   2. Generalized weakness R53.1 780.79   3. Aortic valve stenosis, etiology of cardiac valve disease unspecified I35.0 424.1   4. Tricuspid valve insufficiency, unspecified etiology I07.1 397.0   5. Mitral valve stenosis, unspecified etiology I05.0 394.0   6. Acute renal failure, unspecified acute renal failure type (CMS/[facility]) N17.9 584.9     Patient Active Problem List   Diagnosis   • Tear of rotator cuff   • Hypertension   • Generalized anxiety disorder   • Hyperlipidemia   • IFG (impaired fasting glucose)   • Heart murmur, systolic   • Shoulder pain, bilateral   • Pain of both shoulder joints   • Chronic pain of both shoulders   • Acute congestive heart failure (CMS/HCC)   • Obesity, morbid, BMI 50 or higher (CMS/[facility])   • Fungal skin infection   • Cellulitis of lower extremity   • Aortic valve stenosis   • Tricuspid valve insufficiency   • Mitral valve stenosis       Therapy Treatment    Rehabilitation Treatment Summary     Row Name 19 1100             Treatment Time/Intention    Discipline  physical therapist  -      Document Type  therapy note (daily note)  -      Subjective Information  no complaints  -      Mode of Treatment  physical therapy  -      Patient/Family Observations  pt supine in bed, pt reports she had a rough night  -      Patient Effort  good  -      Existing Precautions/Restrictions  cardiac;fall;sternal  -      Recorded by [] Kae Aldrich, PT 19 1134      Row Name 19 1100             Cognitive Assessment/Intervention    Additional Documentation  Cognitive Assessment/Intervention (Group)  -       Recorded by [CH] Kae Aldrich, PT 02/04/19 1134      Row Name 02/04/19 1100             Cognitive Assessment/Intervention- PT/OT    Orientation Status (Cognition)  oriented x 3  -CH      Follows Commands (Cognition)  WFL  -CH      Personal Safety Interventions  fall prevention program maintained;gait belt;nonskid shoes/slippers when out of bed  -CH      Recorded by [CH] Kae Aldrich, PT 02/04/19 1134      Row Name 02/04/19 1100             Bed Mobility Assessment/Treatment    Supine-Sit [place] (Bed Mobility)  verbal cues;nonverbal cues (demo/gesture);maximum assist (25% patient effort);2 person assist  -CH      Sit-Supine [place] (Bed Mobility)  verbal cues;nonverbal cues (demo/gesture);maximum assist (25% patient effort);2 person assist  -CH      Comment (Bed Mobility)  pt very stiff, repeated cues to bend at the hips  -CH      Recorded by [] Kae Aldrich, PT 02/04/19 1134      Row Name 02/04/19 1100             Transfer Assessment/Treatment    Comment (Transfers)  attempted to stand x3, pt unable to clear her bottom from the bed  -CH      Recorded by [CH] Kae Aldrich, PT 02/04/19 1134      Row Name 02/04/19 1100             Lower Extremity Seated Therapeutic Exercise    Performed, Seated Lower Extremity (Therapeutic Exercise)  LAQ (long arc quad), knee extension;ankle dorsiflexion/plantarflexion  -      Sets/Reps Detail, Seated Lower Extremity (Therapeutic Exercise)  10  -CH      Recorded by [] Kae Aldrich, PT 02/04/19 1134      Row Name 02/04/19 1100             Positioning and Restraints    Pre-Treatment Position  in bed  -CH      Post Treatment Position  bed  -CH      In Bed  supine;call light within reach;encouraged to call for assist;with nsg  -CH      Recorded by [] Kae Aldrich, PT 02/04/19 1134      Row Name 02/04/19 1100             Pain Assessment    Additional Documentation  Pain Scale: Numbers Pre/Post-Treatment (Group)  -CH      Recorded by [CH]  Kae Aldrich, PT 02/04/19 1134      Row Name 02/04/19 1100             Pain Scale: Numbers Pre/Post-Treatment    Pain Location  abdomen  -CH      Pain Intervention(s)  Repositioned  -CH      Recorded by [CH] Kae Aldrich, PT 02/04/19 1134      Row Name                Wound 01/28/19 1138 chest incision    Wound - Properties Group Date first assessed: 01/28/19 [SR] Time first assessed: 1138 [SR] Location: chest [SR] Type: incision [SR] Recorded by:  [SR] Keisha Gandara RN 01/28/19 1138    Row Name                Wound 01/28/19 1215 coccyx unspecified    Wound - Properties Group Date first assessed: 01/28/19 [MB] Time first assessed: 1215 [MB] Present On Admission : yes;picture taken [MB] Location: coccyx [MB] Type: unspecified [MB] Recorded by:  [MB] Julissa Chatterjee RN 01/28/19 1541    Row Name                Wound 01/31/19 2300 Right gluteal pressure injury    Wound - Properties Group Date first assessed: 01/31/19 [NW] Time first assessed: 2300 [NW] Present On Admission : no [NW] Side: Right [NW] Location: gluteal [NW] Type: pressure injury [NW] Stage, Pressure Injury: deep tissue injury [NW] Recorded by:  [NW] Priscilla Willams, RN 02/01/19 0414    Row Name                Wound 01/31/19 2000 Left posterior ear pressure injury    Wound - Properties Group Date first assessed: 01/31/19 [NW] Time first assessed: 2000 [NW] Side: Left [NW] Orientation: posterior [NW] Location: ear [NW2] Type: pressure injury [NW] Stage, Pressure Injury: Stage 2;medical device related [NW] Recorded by:  [NW] Priscilla Willams, RN 02/01/19 0416 [NW2] Priscilla Willams, RN 02/01/19 0417    Row Name 02/04/19 1100             Plan of Care Review    Plan of Care Reviewed With  patient  -CH      Recorded by [CH] Kae Aldrich, PT 02/04/19 1134      Row Name 02/04/19 1100             Outcome Summary/Treatment Plan (PT)    Anticipated Discharge Disposition (PT)  skilled nursing facility  -CH      Recorded by [CH] Kae Aldrich, PT  02/04/19 1134        User Key  (r) = Recorded By, (t) = Taken By, (c) = Cosigned By    Initials Name Effective Dates Discipline    CH Kae Aldrich, PT 04/03/18 -  PT    Julissa Sweet, RN 06/16/16 -  Nurse    SR Keisha Gandara RN 06/16/16 -  Nurse    Priscilla Jensen RN 03/05/18 -  Nurse          Wound 01/28/19 1138 chest incision (Active)   Dressing Appearance open to air 2/4/2019  8:00 AM   Closure Approximated;Open to air 2/4/2019  8:00 AM   Base dry;pink 2/4/2019  8:00 AM   Drainage Amount none 2/3/2019 12:00 PM   Dressing Care, Wound open to air 2/4/2019  8:00 AM       Wound 01/28/19 1215 coccyx unspecified (Active)   Dressing Appearance open to air 2/4/2019  8:00 AM   Closure None 2/3/2019 12:00 PM   Base clean;dry 2/4/2019  8:00 AM   Drainage Amount none 2/3/2019 12:00 PM   Care, Wound barrier applied 2/4/2019  8:00 AM       Wound 01/31/19 2300 Right gluteal pressure injury (Active)   Dressing Appearance open to air 2/4/2019  8:00 AM   Base maroon/purple;clean 2/3/2019 12:00 PM   Periwound intact;pink 2/3/2019 12:00 PM   Drainage Amount none 2/3/2019 12:00 PM   Care, Wound barrier applied 2/4/2019  8:00 AM       Wound 01/31/19 2000 Left posterior ear pressure injury (Active)   Dressing Appearance open to air 2/4/2019  8:00 AM   Base pink;clean;dry 2/3/2019  3:24 PM   Periwound intact 2/3/2019  3:24 PM   Drainage Amount none 2/3/2019 12:00 PM           Physical Therapy Education     Title: PT OT SLP Therapies (In Progress)     Topic: Physical Therapy (Done)     Point: Mobility training (Done)     Learning Progress Summary           Patient Acceptance, E,TB,D, VU,NR by  at 2/4/2019 11:34 AM    Acceptance, E,D, VU,NR by  at 2/3/2019  4:47 PM    Acceptance, E,D, DU,NR by DARRYN at 2/2/2019 10:36 AM    Comment:  safety during sit to stand, benefits of activity    Acceptance, E, NR by MA at 1/31/2019 12:08 PM    AcceptanceSHAMA NR by MA at 1/30/2019 10:23 AM    SHAMA Foss TB, VU by YANETH at 1/27/2019  9:10  AM    Acceptance, E, VU,NR by MA at 1/25/2019  3:54 PM    Acceptance, E,TB,D, VU,NR by CH at 1/23/2019  3:14 PM    Acceptance, TB,E, VU,DU by CW at 1/18/2019  4:37 PM    Acceptance, E, NR by  at 1/17/2019 12:15 PM   Family Acceptance, TB,E, VU,DU by CW at 1/18/2019  4:37 PM                   Point: Home exercise program (Done)     Learning Progress Summary           Patient Acceptance, E,TB,D, VU,NR by  at 2/4/2019 11:34 AM    Acceptance, E,D, VU,NR by DARRIUS at 2/3/2019  4:47 PM    Acceptance, E,D, DU,NR by DARRYN at 2/2/2019 10:36 AM    Comment:  safety during sit to stand, benefits of activity    Acceptance, E,TB,D, VU,NR by  at 2/1/2019 10:51 AM    Acceptance, E, NR by MA at 1/31/2019 12:08 PM    Acceptance, E, NR by MA at 1/30/2019 10:23 AM    Acceptance, E,TB, VU by YANETH at 1/27/2019  9:10 AM    Acceptance, E, VU,NR by MA at 1/25/2019  3:54 PM    Acceptance, E,TB,D, VU,NR by CH at 1/23/2019  3:14 PM    Acceptance, TB,E, VU,DU by CW at 1/18/2019  4:37 PM   Family Acceptance, TB,E, VU,DU by CW at 1/18/2019  4:37 PM                   Point: Body mechanics (Done)     Learning Progress Summary           Patient Acceptance, E,TB,D, VU,NR by  at 2/4/2019 11:34 AM    Acceptance, E,D, VU,NR by DARRIUS at 2/3/2019  4:47 PM    Acceptance, E,D, DU,NR by DARRYN at 2/2/2019 10:36 AM    Comment:  safety during sit to stand, benefits of activity    Acceptance, E, NR by MA at 1/31/2019 12:08 PM    Acceptance, E, NR by MA at 1/30/2019 10:23 AM    Acceptance, E,TB, VU by YANETH at 1/27/2019  9:10 AM    Acceptance, E, VU,NR by MA at 1/25/2019  3:54 PM    Acceptance, E,TB,D, VU,NR by CH at 1/23/2019  3:14 PM    Acceptance, TB,E, VU,DU by CW at 1/18/2019  4:37 PM   Family Acceptance, TB,E, VU,DU by CW at 1/18/2019  4:37 PM                   Point: Precautions (Done)     Learning Progress Summary           Patient Acceptance, E,TB,D, VU,NR by CHELSEA at 2/4/2019 11:34 AM    Acceptance, E,D, VU,NR by  at 2/3/2019  4:47 PM    Acceptance, E,D, DU,NR by   at 2/2/2019 10:36 AM    Comment:  safety during sit to stand, benefits of activity    Acceptance, E, NR by MA at 1/31/2019 12:08 PM    Acceptance, E, NR by MA at 1/30/2019 10:23 AM    Acceptance, E,TB, VU by  at 1/27/2019  9:10 AM    Acceptance, E, VU,NR by MA at 1/25/2019  3:54 PM    Acceptance, E,TB,D, VU,NR by  at 1/23/2019  3:14 PM    Acceptance, TB,E, VU,DU by  at 1/18/2019  4:37 PM   Family Acceptance, TB,E, VU,DU by  at 1/18/2019  4:37 PM                               User Key     Initials Effective Dates Name Provider Type Discipline     04/03/18 -  Kae Aldrich, PT Physical Therapist PT    EM 04/03/18 -  Heike Strauss, PT Physical Therapist PT     03/07/18 -  Ksenia Lopez, PTA Physical Therapy Assistant PT     06/22/16 -  Zuleyma Mancilla, PT Physical Therapist PT     08/02/16 -  Khurram Littlejohn, PT DPT Physical Therapist PT     03/07/18 -  Jose Ott, PTA Physical Therapy Assistant PT    MA 10/19/18 -  Tabatha Lawrence, PT Physical Therapist PT                PT Recommendation and Plan  Anticipated Discharge Disposition (PT): skilled nursing facility  Outcome Summary/Treatment Plan (PT)  Anticipated Discharge Disposition (PT): skilled nursing facility  Plan of Care Reviewed With: patient  Outcome Summary: Pt continues to require MaxA for bed mobility and is unable to clear her bottom from the bed due to generalized weakness and pain. PT will continue to follow to address strength, mobility, and transfers.  Outcome Measures     Row Name 02/04/19 1100 02/03/19 1600 02/02/19 1000       How much help from another person do you currently need...    Turning from your back to your side while in flat bed without using bedrails?  2  -  2  -SM  2  -LC    Moving from lying on back to sitting on the side of a flat bed without bedrails?  2  -  2  -SM  2  -LC    Moving to and from a bed to a chair (including a wheelchair)?  1  -  1  -SM  1  -LC    Standing up from a chair  using your arms (e.g., wheelchair, bedside chair)?  1  -CH  1  -SM  1  -LC    Climbing 3-5 steps with a railing?  1  -CH  1  -SM  1  -LC    To walk in hospital room?  1  -CH  1  -SM  1  -LC    AM-PAC 6 Clicks Score  8  -CH  8  -SM  8  -LC       Functional Assessment    Outcome Measure Options  AM-PAC 6 Clicks Basic Mobility (PT)  -  AM-PAC 6 Clicks Basic Mobility (PT)  -  AM-PAC 6 Clicks Basic Mobility (PT)  -    Row Name 02/01/19 1400             How much help from another is currently needed...    Putting on and taking off regular lower body clothing?  1  -SG      Bathing (including washing, rinsing, and drying)  1  -SG      Toileting (which includes using toilet bed pan or urinal)  1  -SG      Putting on and taking off regular upper body clothing  1  -SG      Taking care of personal grooming (such as brushing teeth)  1  -SG      Eating meals  1  -SG      Score  6  -SG         Functional Assessment    Outcome Measure Options  AM-PAC 6 Clicks Daily Activity (OT)  -SG        User Key  (r) = Recorded By, (t) = Taken By, (c) = Cosigned By    Initials Name Provider Type     Jess Dumont, OTR Occupational Therapist    Kae Martinez, PT Physical Therapist    Ksenia Swann, PTA Physical Therapy Assistant    Khurram Carnes, PT DPT Physical Therapist         Time Calculation:   PT Charges     Row Name 02/04/19 1136             Time Calculation    Start Time  1041  -      Stop Time  1100  -      Time Calculation (min)  19 min  -      PT Received On  02/04/19  -      PT - Next Appointment  02/05/19  -         Time Calculation- PT    Total Timed Code Minutes- PT  19 minute(s)  -        User Key  (r) = Recorded By, (t) = Taken By, (c) = Cosigned By    Initials Name Provider Type    Kae Martinez, PT Physical Therapist        Therapy Suggested Charges     Code   Minutes Charges    38743 (CPT®) Hc Pt Neuromusc Re Education Ea 15 Min      69709 (CPT®) Hc Pt Ther Proc Ea 15 Min  15 1    26809 (CPT®) Hc Gait Training Ea 15 Min      68345 (CPT®) Hc Pt Therapeutic Act Ea 15 Min 10 1    36135 (CPT®) Hc Pt Manual Therapy Ea 15 Min      67469 (CPT®) Hc Pt Iontophoresis Ea 15 Min      37213 (CPT®) Hc Pt Elec Stim Ea-Per 15 Min      87796 (CPT®) Hc Pt Ultrasound Ea 15 Min      93923 (CPT®) Hc Pt Self Care/Mgmt/Train Ea 15 Min      88715 (CPT®) Hc Pt Prosthetic (S) Train Initial Encounter, Each 15 Min      10036 (CPT®) Hc Pt Orthotic(S)/Prosthetic(S) Encounter, Each 15 Min      85030 (CPT®) Hc Orthotic(S) Mgmt/Train Initial Encounter, Each 15min      Total  25 2        Therapy Charges for Today     Code Description Service Date Service Provider Modifiers Qty    76447410297 HC PT THER PROC EA 15 MIN 2/4/2019 Kae Aldrich, PT GP 1    87081841753 HC PT THER SUPP EA 15 MIN 2/4/2019 Kae Aldrich, PT GP 1          PT G-Codes  Outcome Measure Options: AM-PAC 6 Clicks Basic Mobility (PT)  AM-PAC 6 Clicks Score: 8  Score: 6    Kae Aldrich, PT  2/4/2019

## 2019-02-04 NOTE — PLAN OF CARE
Problem: Pain, Acute (Adult)  Goal: Acceptable Pain Control/Comfort Level  Outcome: Ongoing (interventions implemented as appropriate)   02/04/19 0532   Pain, Acute (Adult)   Acceptable Pain Control/Comfort Level making progress toward outcome       Problem: Anxiety (Adult)  Goal: Reduction/Resolution  Outcome: Ongoing (interventions implemented as appropriate)   02/04/19 0532   Anxiety (Adult)   Reduction/Resolution making progress toward outcome       Problem: Activity Intolerance (Adult)  Goal: Activity Tolerance  Outcome: Ongoing (interventions implemented as appropriate)   02/04/19 0532   Activity Intolerance (Adult)   Activity Tolerance making progress toward outcome       Problem: Skin Injury Risk (Adult)  Goal: Skin Health and Integrity  Outcome: Ongoing (interventions implemented as appropriate)   02/04/19 0532   Skin Injury Risk (Adult)   Skin Health and Integrity making progress toward outcome       Problem: Cardiac Surgery (Adult)  Goal: Signs and Symptoms of Listed Potential Problems Will be Absent, Minimized or Managed (Cardiac Surgery)  Outcome: Ongoing (interventions implemented as appropriate)   02/04/19 0532   Goal/Outcome Evaluation   Problems Assessed (Cardiac Surgery) all   Problems Present (Cardiac Surgery) cardiac complications;neurologic complications;respiratory compromise       Problem: Nutrition, Enteral (Adult)  Goal: Signs and Symptoms of Listed Potential Problems Will be Absent, Minimized or Managed (Nutrition, Enteral)  Outcome: Ongoing (interventions implemented as appropriate)   02/04/19 0532   Goal/Outcome Evaluation   Problems Assessed (Enteral Nutrition) all   Problems Present (Enteral Nutrition) none

## 2019-02-04 NOTE — PROGRESS NOTES
LOS: 19 days   Patient Care Team:  Robert Cortez MD as PCP - General (Family Medicine)  Deborah Agudelo MD as Surgeon (Orthopedic Surgery)    Chief Complaint: Follow-up for tissue aortic and mitral valve replacements, tricuspid valve repair, paroxysmal atrial fibrillation with RVR, acute diastolic and valvular CHF.    Interval History: In atrial fibrillation now with reasonable rate.  Had nosebleed overnight and nauseated this AM.  No chest pain.  SOA stable.    Vital Signs:  Temp:  [97.2 °F (36.2 °C)-97.8 °F (36.6 °C)] 97.5 °F (36.4 °C)  Heart Rate:  [56-80] 56  Resp:  [16-18] 18  BP: (100-129)/(53-74) 129/72  Arterial Line BP: (103-116)/(48-63) 116/48    Intake/Output Summary (Last 24 hours) at 2/4/2019 1259  Last data filed at 2/4/2019 0723  Gross per 24 hour   Intake 1263.34 ml   Output 2740 ml   Net -1476.66 ml       Physical Exam:   General Appearance:    No acute distress, alert and oriented x4   Lungs:     Clear to auscultation bilaterally     Heart:    Irregularly irregular rhythm with a normal rate.  II/VI SM RUSB   Abdomen:     Soft, non-tender, non-distended.    Extremities:   Trace edema.     Results Review:    Results from last 7 days   Lab Units 02/04/19  0337   SODIUM mmol/L 136   POTASSIUM mmol/L 4.4   CHLORIDE mmol/L 98   CO2 mmol/L 24.5   BUN mg/dL 52*   CREATININE mg/dL 2.07*   GLUCOSE mg/dL 211*   CALCIUM mg/dL 9.3         Results from last 7 days   Lab Units 02/04/19  0337   WBC 10*3/mm3 17.10*   HEMOGLOBIN g/dL 9.2*   HEMATOCRIT % 28.8*   PLATELETS 10*3/mm3 193     Results from last 7 days   Lab Units 02/04/19  0021 02/03/19  1643 02/03/19  1015  02/02/19  1830 01/29/19  0241   INR   --   --   --   --  1.32* 1.35*   APTT seconds 86.1* 59.2* 63.3*   < > 30.5  --     < > = values in this interval not displayed.         Results from last 7 days   Lab Units 02/04/19  0337   MAGNESIUM mg/dL 2.2           I reviewed the patient's new clinical results.        Assessment:  1. Acute valvular and  diastolic CHF  2. Severe aortic stenosis, severe mitral stenosis secondary to calcification, and severe tricuspid regurgitation  3. Normal EF 60-65%  4. Paroxysmal atrial fibrillation with RVR -status post cardioversion 1/24/2019 and subsequent reversion to atrial fibrillation.  5. Moderate pulmonary hypertension (mean PA pressure 32)  6. Status post tissue AVR, tissue MVR, tricuspid valve repair (Martha suture repair), left cryomaze, and PENNIE ligation on 1/28/2019 (Dr. Gaxiola).  7. Small bilateral pulmonary emboli  8. Oliguric acute kidney injury post-operatively  9. Bifascicular block (RBBB and LAFB)  10. Candida intertrigo (skin folds); left axillary fungal infection (treated)  11. Osteoarthritis with immobility  12. Left wrist thrombophlebitis from IV infiltration  13. Undiagnosed OCTAVIANO  14. Morbid obesity   15. Anemia of chronic disease  16. Severe deconditioning   17. Junctional bradycardia and asystole post-op      Plan:  -Nosebleed overnight.  Holding Heparin for now.  Nausea being addressed.  Tube feeds to be held for several hours.    -In rate-controlled atrial fibrillation above pacing threshold.  Will continue to watch.  Hopefully can avoid pacemaker.    -Bumex trial today.  On intermittent HD for volume control.     -Hold beta-blockers given junctional rhythm and asystole post-op.     Scott Segura MD  02/04/19  12:59 PM

## 2019-02-04 NOTE — PROGRESS NOTES
"  PROGRESS NOTE  Patient Name: Claudia Arnold  Age/Sex: 73 y.o. female  : 1945  MRN: [id number]    Date of Admission: 2019  Date of Encounter Visit: 19   LOS: 19 days   Patient Care Team:  Robert Cortez MD as PCP - General (Family Medicine)  Deborah Agudelo MD as Surgeon (Orthopedic Surgery)        Hospital course: had valves replacement with maze, history of OHS and chronic hypoxemia but no use of inhalers at home. Post op was weaned off the vent and is doing better and working with pulmonary hygiene measures. No fever, on nasal O2 and denies SOA at rest    Interval History: Resting comfortably and sitting up in a chair.  Continues to improve.  Tolerating limited by mouth diet well.  Denies any shortness of breath at rest or cough.    REVIEW OF SYSTEMS:   CONSTITUTIONAL: no fever or chills  CARDIOVASCULAR: No chest pain, chest pressure or chest discomfort. No palpitations or edema.   RESPIRATORY: Shortness of breath with minimal exertion  GASTROINTESTINAL: No anorexia, nausea, vomiting or diarrhea. No abdominal pain or blood.   HEMATOLOGIC: No bleeding or bruising.     Ventilator/Non-Invasive Ventilation Settings (From admission, onward)    Start     Ordered            Vital Signs  Temp:  [97.2 °F (36.2 °C)-97.8 °F (36.6 °C)] 97.5 °F (36.4 °C)  Heart Rate:  [56-80] 56  Resp:  [16-18] 18  BP: (103-129)/(60-74) 129/72  SpO2:  [91 %-97 %] 93 %  on  Flow (L/min):  [3] 3 Device (Oxygen Therapy): nasal cannula    Intake/Output Summary (Last 24 hours) at 2019 1741  Last data filed at 2019 1700  Gross per 24 hour   Intake 470 ml   Output 155 ml   Net 315 ml     Flowsheet Rows      First Filed Value   Admission Height  170.2 cm (67\") Documented at 2019 0910   Admission Weight  145 kg (320 lb)  (Abnormal)  Documented at 2019 0921        Body mass index is 55.08 kg/m².      19  1518 19  0600 19  0651   Weight: (!) 140 kg (308 lb) (!) 140 kg (308 lb 13.8 oz) (!) 160 " kg (351 lb 11.2 oz)       Physical Exam:  GEN:  No acute distress, awake and responsive, cooperative, well developed , on nasal O2  EYES:   Sclera clear. No icterus. PERRL. Normal EOM  ENT:   External ears/nose normal, no oral lesions, no thrush, mucous membranes moist  NECK:  Supple, midline trachea, no JVD, right central line/cordis  LUNGS: Diminished breath sounds bilaterally.  CV:  Regular rhythm and rate (paced)..  ABD:  Soft, non-tender and non-distended. Normal bowel sounds. No guarding  EXT:  Moves all extremities well. No cyanosis. No redness. Resolving edema.   Skin: dry, intact, no bleeding    Results Review:      Results from last 7 days   Lab Units 02/04/19  0337 02/03/19  0259 02/02/19  0252 02/01/19  1411 02/01/19  0259 01/31/19  0601 01/30/19  1124 01/30/19  0645  01/29/19  0241 01/28/19  1801   SODIUM mmol/L 136 136 138  --  143 141 146* 147*   < > 145 143   POTASSIUM mmol/L 4.4 4.2 4.0 4.0 3.3* 3.8 4.4 4.9   < > 4.2 4.5   CHLORIDE mmol/L 98 97* 99  --  101 100 103 104   < > 103 100   CO2 mmol/L 24.5 26.0 25.2  --  23.7 25.0 23.7 22.9   < > 24.0 23.8   BUN mg/dL 52* 69* 37*  --  50* 30* 51* 47*   < > 33* 31*   CREATININE mg/dL 2.07* 2.93* 2.06*  --  2.83* 2.03* 2.88* 2.77*   < > 1.73* 1.50*   CALCIUM mg/dL 9.3 9.6 9.3  --  9.6 9.6 10.0 10.0   < > 10.3 10.1   AST (SGOT) U/L  --   --   --   --  22  --   --   --   --   --  112*   ALT (SGPT) U/L  --   --   --   --  <5  --   --   --   --   --  22   ANION GAP mmol/L 13.5 13.0 13.8  --  18.3 16.0 19.3 20.1   < > 18.0 19.2   ALBUMIN g/dL 3.50 3.80  --   --  3.90  --   --   --   --  3.50 3.60    < > = values in this interval not displayed.                 Results from last 7 days   Lab Units 02/04/19  0337 02/03/19  0259 02/02/19  1830 02/01/19 0259 01/31/19  0601 01/30/19 0259 01/29/19  0241   WBC 10*3/mm3 17.10* 13.71* 14.11* 15.14* 21.38* 27.62* 25.96*   HEMOGLOBIN g/dL 9.2* 9.3* 9.0* 8.9* 9.1* 9.0* 9.7*   HEMATOCRIT % 28.8* 29.3* 29.3* 29.1* 30.4*  30.7* 30.8*   PLATELETS 10*3/mm3 193 202 202 164 171 210 228   MCV fL 91.1 91.8 93.6 96.0 96.2 98.7* 93.3   NEUTROPHIL % %  --  79.7*  --   --   --   --  89.5*   LYMPHOCYTE % %  --  15.9*  --   --   --   --  6.6*   MONOCYTES % %  --  1.7*  --   --   --   --  3.8*   EOSINOPHIL % %  --  2.4  --   --   --   --  0.0*   BASOPHIL % %  --  0.3  --   --   --   --  0.1   IMM GRAN % %  --  5.0*  --   --   --   --  2.0*     Results from last 7 days   Lab Units 02/04/19  0021 02/03/19  1643 02/03/19  1015 02/03/19  0259 02/02/19  1830 01/29/19  0241   INR   --   --   --   --  1.32* 1.35*   APTT seconds 86.1* 59.2* 63.3* 44.2* 30.5  --      Results from last 7 days   Lab Units 02/04/19  0337 01/29/19  0241 01/28/19  1801   MAGNESIUM mg/dL 2.2 2.1 1.9           Invalid input(s): LDLCALC  Results from last 7 days   Lab Units 01/29/19  0954 01/29/19  0257 01/28/19  1900   PH, ARTERIAL pH units 7.319* 7.353 7.362   PCO2, ARTERIAL mm Hg 52.6* 49.6* 47.1*   PO2 ART mm Hg 84.6 96.3 69.9*   HCO3 ART mmol/L 27.0 27.6 26.7         Glucose   Date/Time Value Ref Range Status   02/03/2019 1235 113 70 - 130 mg/dL Final   02/03/2019 0927 135 (H) 70 - 130 mg/dL Final   02/03/2019 0702 122 70 - 130 mg/dL Final   02/02/2019 2051 122 70 - 130 mg/dL Final   02/02/2019 1832 133 (H) 70 - 130 mg/dL Final   02/02/2019 1514 132 (H) 70 - 130 mg/dL Final   02/02/2019 0912 117 70 - 130 mg/dL Final   02/01/2019 1958 119 70 - 130 mg/dL Final             Results from last 7 days   Lab Units 02/01/19  1242   NITRITE UA  Negative   WBC UA /HPF 13-20*   BACTERIA UA /HPF 4+*   SQUAM EPITHEL UA /HPF 3-6*         Results from last 7 days   Lab Units 02/01/19  1242 01/30/19  0259   SODIUM UR mmol/L <20  --    URIC ACID mg/dL  --  8.5*       Imaging:   Imaging Results (all)     Procedure Component Value Units Date/Time          I reviewed the patient's new clinical results.  I personally viewed and interpreted the patient's imaging results:        Medication Review:      acetaminophen 500 mg Oral Q4H   aspirin 81 mg Oral Daily   cetaphil  Topical Q12H   chlorhexidine 15 mL Mouth/Throat Q12H   enoxaparin 30 mg Subcutaneous Nightly   escitalopram 10 mg Oral Daily   hydrocortisone 1 application Topical Q6H   ipratropium-albuterol 3 mL Nebulization BID - RT   lansoprazole 30 mg Nasogastric BID AC   mupirocin  Each Nare BID   nystatin  Topical Q12H   sennosides-docusate sodium 2 tablet Oral Nightly   sodium chloride 4 mL Nebulization BID - RT         niCARdipine 5-15 mg/hr    sodium chloride 30 mL/hr Last Rate: 30 mL/hr (01/28/19 1215)       ASSESSMENT:   1. Postop respiratory failure, improving, liberated from the ventilator  2. Status post aortic valve replacement, mitral valve replacement, tricuspid repair, Maze procedure  3. Acute kidney injury  4. Pulmonary hypertension  5. Diastolic left ventricular dysfunction  6. Essential hypertension  7. Chronic respiratory failure  8. Obesity hypoventilation syndrome  9. Suspected obstructive sleep apnea    PLAN:  continues to improve slowly  No wheezing on exam again today  Working on deep breathing  Consider OCTAVIANO evaluation post discharge  Watch for postop pneumonia etc.  Optimize nutrition    Disposition: per primary team    Ady Arceo MD  02/04/19  5:41 PM          Dictated utilizing Dragon dictation

## 2019-02-04 NOTE — PROGRESS NOTES
"   LOS: 19 days    Patient Care Team:  Robert Cortez MD as PCP - General (Family Medicine)  Deborah Agudelo MD as Surgeon (Orthopedic Surgery)    Chief Complaint:    Chief Complaint   Patient presents with   • Shortness of Breath   • Leg Swelling     Follow UP LAVERNE  Subjective     Interval History:   I/O 1.0/3.0 (UF 2.4L, UOP 600cc).  Dialyzed yest.  Mild dyspnea.  On NC O2, dopamine gtt    Objective     Vital Signs  Temp:  [97.2 °F (36.2 °C)-97.8 °F (36.6 °C)] 97.2 °F (36.2 °C)  Heart Rate:  [71-80] 80  Resp:  [16-18] 18  BP: (100-123)/(53-74) 123/74  Arterial Line BP: (103-116)/(48-63) 116/48    Flowsheet Rows      First Filed Value   Admission Height  170.2 cm (67\") Documented at 01/16/2019 0910   Admission Weight  145 kg (320 lb)  (Abnormal)  Documented at 01/16/2019 0921          No intake/output data recorded.  I/O last 3 completed shifts:  In: 2207.3 [P.O.:445; I.V.:739.3; Other:225; NG/GT:798]  Out: 3715 [Urine:1315; Other:2400]    Intake/Output Summary (Last 24 hours) at 2/4/2019 0720  Last data filed at 2/4/2019 0600  Gross per 24 hour   Intake 1053.34 ml   Output 3020 ml   Net -1966.66 ml       Physical Exam:  gen NAD, alert  Neck LIJ shiley   Lungs dec BS bilat  CV RRR   abd soft NT/ND  vasc trace BLE edema, 2+ radial pulses     Results Review:    Results from last 7 days   Lab Units 02/04/19  0337 02/03/19  0259 02/02/19  0252  02/01/19  0259  01/28/19  1801   SODIUM mmol/L 136 136 138  --  143   < > 143   POTASSIUM mmol/L 4.4 4.2 4.0   < > 3.3*   < > 4.5   CHLORIDE mmol/L 98 97* 99  --  101   < > 100   CO2 mmol/L 24.5 26.0 25.2  --  23.7   < > 23.8   BUN mg/dL 52* 69* 37*  --  50*   < > 31*   CREATININE mg/dL 2.07* 2.93* 2.06*  --  2.83*   < > 1.50*   CALCIUM mg/dL 9.3 9.6 9.3  --  9.6   < > 10.1   BILIRUBIN mg/dL  --   --   --   --  0.4  --  0.6   ALK PHOS U/L  --   --   --   --  71  --  53   ALT (SGPT) U/L  --   --   --   --  <5  --  22   AST (SGOT) U/L  --   --   --   --  22  --  112*   GLUCOSE " mg/dL 211* 128* 117*  --  122*   < > 211*    < > = values in this interval not displayed.       Estimated Creatinine Clearance: 38.6 mL/min (A) (by C-G formula based on SCr of 2.07 mg/dL (H)).    Results from last 7 days   Lab Units 02/04/19  0337 02/03/19  0259 02/01/19  0259  01/29/19  0241 01/28/19  1801   MAGNESIUM mg/dL 2.2  --   --   --  2.1 1.9   PHOSPHORUS mg/dL 3.2 3.8 3.7   < > 2.6  --     < > = values in this interval not displayed.       Results from last 7 days   Lab Units 01/30/19  0259   URIC ACID mg/dL 8.5*       Results from last 7 days   Lab Units 02/04/19  0337 02/03/19  0259 02/02/19  1830 02/01/19  0259 01/31/19  0601   WBC 10*3/mm3 17.10* 13.71* 14.11* 15.14* 21.38*   HEMOGLOBIN g/dL 9.2* 9.3* 9.0* 8.9* 9.1*   PLATELETS 10*3/mm3 193 202 202 164 171       Results from last 7 days   Lab Units 02/02/19  1830 01/29/19  0241 01/28/19  1233 01/28/19  1120   INR  1.32* 1.35* 1.44* 1.65*         Imaging Results (last 24 hours)     Procedure Component Value Units Date/Time    XR Chest 1 View [[id number]] Collected:  02/03/19 0717     Updated:  02/03/19 2125    Narrative:       PORTABLE CHEST X-RAY     CLINICAL HISTORY: post op     COMPARISON: 02/02/2019.     FINDINGS: Portable AP view of the chest was obtained with overlying  monitor leads in place. Life support lines are unchanged without  pneumothorax. Edema again noted with small effusions, right greater than  left. Stable cardiomegaly.               Impression:          Stable appearance of the chest by portable radiography.        This report was finalized on 2/3/2019 9:21 PM by Pop Oliveros M.D.             acetaminophen 500 mg Oral Q4H   aspirin 81 mg Oral Daily   cetaphil  Topical Q12H   chlorhexidine 15 mL Mouth/Throat Q12H   escitalopram 10 mg Oral Daily   hydrocortisone 1 application Topical Q6H   ipratropium-albuterol 3 mL Nebulization BID - RT   lansoprazole 30 mg Nasogastric BID AC   mupirocin  Each Nare BID   nystatin  Topical Q12H    sennosides-docusate sodium 2 tablet Oral Nightly   sodium chloride 4 mL Nebulization BID - RT       DOPamine 2-20 mcg/kg/min Last Rate: 3 mcg/kg/min (02/03/19 1436)   niCARdipine 5-15 mg/hr    sodium chloride 30 mL/hr Last Rate: 30 mL/hr (01/28/19 1257)   sodium chloride 30 mL/hr Last Rate: 30 mL/hr (01/28/19 1215)       Medication Review:   Current Facility-Administered Medications   Medication Dose Route Frequency Provider Last Rate Last Dose   • acetaminophen (TYLENOL) tablet 500 mg  500 mg Oral Q4H Yahaira Garza APRN   500 mg at 02/04/19 0622   • ALPRAZolam (XANAX) tablet 0.25 mg  0.25 mg Oral Q8H PRN Scar Gaxiola MD       • aspirin chewable tablet 81 mg  81 mg Oral Daily Scar Gaxiola MD   81 mg at 02/03/19 0919   • bisacodyl (DULCOLAX) EC tablet 10 mg  10 mg Oral Daily PRN Scar Gaxiola MD       • bisacodyl (DULCOLAX) suppository 10 mg  10 mg Rectal Daily PRN Scar Gaxiola MD   10 mg at 02/03/19 2120   • cetaphil lotion   Topical Q12H Zonia Lopez MD   1 application at 02/03/19 2119   • chlorhexidine (PERIDEX) 0.12 % solution 15 mL  15 mL Mouth/Throat Q12H Scar Gaxiola MD   15 mL at 02/03/19 2119   • cyclobenzaprine (FLEXERIL) tablet 10 mg  10 mg Oral Q8H PRN Scar Gaxiola MD       • dextrose (D50W) 25 g/ 50mL Intravenous Solution 25 g  25 g Intravenous Q15 Min PRN Oscar Rodriguez MD       • dextrose (GLUTOSE) oral gel 15 g  15 g Oral Q15 Min PRN Oscar Rodriguez MD       • DOPamine 400 mg/250 mL (1.6 mg/mL) infusion  2-20 mcg/kg/min Intravenous Continuous PRN Scar Gaxiola MD 15.75 mL/hr at 02/03/19 1436 3 mcg/kg/min at 02/03/19 1436   • escitalopram (LEXAPRO) tablet 10 mg  10 mg Oral Daily Oscar Rodriguez MD   10 mg at 02/03/19 1222   • glucagon (human recombinant) (GLUCAGEN DIAGNOSTIC) injection 1 mg  1 mg Subcutaneous PRN Oscar Rodriguez MD       • hydrocortisone 1 % cream 1 application  1 application Topical Q6H Fran Tilley MD   1  application at 02/04/19 0622   • ipratropium-albuterol (DUO-NEB) nebulizer solution 3 mL  3 mL Nebulization BID - RT Yahaira Garza APRN   3 mL at 02/04/19 0713   • ipratropium-albuterol (DUO-NEB) nebulizer solution 3 mL  3 mL Nebulization Q6H PRN Yahaira Garza APRN       • lansoprazole (FIRST) oral suspension 30 mg  30 mg Nasogastric BID AC Scar Gaxiola MD   30 mg at 02/04/19 0643   • magic mouthwash oral supsension 5 mL  5 mL Swish & Spit Q4H PRN Yahaira Garza APRN       • magnesium hydroxide (MILK OF MAGNESIA) suspension 2400 mg/10mL 10 mL  10 mL Oral Daily PRN Scar Gaxiola MD       • mupirocin (BACTROBAN) 2 % nasal ointment   Each Nare BID Scar Gaxiola MD   10 application at 02/03/19 2119   • niCARdipine (CARDENE) 25 mg/250 mL (0.1 mg/mL) NS infusion kit  5-15 mg/hr Intravenous Continuous PRN Scar Gaxiola MD       • nystatin (MYCOSTATIN) powder   Topical Q12H Scar Gaxiola MD   1 application at 02/03/19 2120   • ondansetron (ZOFRAN) injection 4 mg  4 mg Intravenous Q6H PRN Scar Gaxiola MD       • oxyCODONE (ROXICODONE) immediate release tablet 5 mg  5 mg Oral Q4H PRN Yahaira Garza APRN   5 mg at 02/03/19 1504   • promethazine (PHENERGAN) tablet 12.5 mg  12.5 mg Oral Q6H PRN Scar Gaxiola MD        Or   • promethazine (PHENERGAN) injection 12.5 mg  12.5 mg Intravenous Q6H PRN Scar Gaxiola MD       • sennosides-docusate sodium (SENOKOT-S) 8.6-50 MG tablet 2 tablet  2 tablet Oral Nightly Scar Gaxiola MD   2 tablet at 02/03/19 2119   • sodium chloride 0.9 % flush 30 mL  30 mL Intravenous Once PRN Scar Gaxiola MD       • sodium chloride 0.9 % infusion  30 mL/hr Intravenous Continuous Scar Gaxiola MD 30 mL/hr at 01/28/19 1257 30 mL/hr at 01/28/19 1257   • sodium chloride 0.9 % infusion  30 mL/hr Intravenous Continuous PRN Scar Gaxiola MD 30 mL/hr at 01/28/19 1215 30 mL/hr at 01/28/19 1215   • sodium chloride 7 % nebulizer solution nebulizer  solution 4 mL  4 mL Nebulization BID - RT Yahaira Garza, APRN   4 mL at 02/04/19 0716       Assessment/Plan      oliguric LAVERNE - suspect ischemic ATN post AVR/MVR.  No role of contrast exposure from remote [facility].  HD initiated on 1.30.19, dialyzed yest.  Waste products, K/HCo3 stable.  UOP down yest. Has ROCHELLE perez, may need TDC if no e/o recovery in next couple days.  UA: mod blood, 30 protein.  US: no hydro.  Still expect eventual renal recovery with BL Cr ~ 1.       VHD, POD6 AVR, MVR, tricuspic bicuspidization, and left cryo MAZE with PENNIE ligation  Cardiogenic shock - off neosynephrine, on low-dose dopamine   Volume overload - CXR noted yest with persistent mild edema, small effusions  -- periph edema down significantly  Acute resp failure - extubated, on NC o2  Moderate pulm HTN, s/p RHC 1/22/19   Candida intertrigo - treated  Anemia of ABL - Hgb stable  AFIB     Plan  - bumex 4mg IV x 2 doses today  - tentatively plan HD tomorrow  - may need TDC in a few days         Acute congestive heart failure (CMS/HCC)    Hypertension    Generalized anxiety disorder    Hyperlipidemia    IFG (impaired fasting glucose)    Heart murmur, systolic    Obesity, morbid, BMI 50 or higher (CMS/HCC)    Fungal skin infection    Cellulitis of lower extremity    Aortic valve stenosis    Tricuspid valve insufficiency    Mitral valve stenosis              Wallace Falcon MD  02/04/19  7:20 AM

## 2019-02-04 NOTE — CONSULTS
"Met with patient, discussed benefits of cardiac rehab. Provided phase II information packet, which includes; general information about cardiac rehab, [facility]iac Rehab Programs handout and [facility] letter article entitled “Cardiac Rehab is often the Best Medicine for Recovery\", stresses the importance of cardiac rehab after a heart event.   I provided the contact information for cardiac rehab here at [facility]. Patient not interested in program.   " Date of Procedure: 10/01/18


Description of Procedure: 





SURGEON:  NUNU JENKINS MD


PREOPERATIVE DIAGNOSES:


1.  Large bowel obstruction due to sigmoid diverticulitis


2. History of sigmoid volvulus


3. Descending colostomy status


4. History of myocardial infarction


5. Coronary artery disease with stent


6. Hypertensive heart disease


7. History of chronic iron anemia deficiency





POSTOPERATIVE DIAGNOSES:


1.  Large bowel obstruction due to sigmoid diverticulitis


2. History of sigmoid volvulus


3. Descending colostomy status


4. History of myocardial infarction


5. Coronary artery disease with stent


6. Hypertensive heart disease


7. History of chronic iron anemia deficiency





OPERATION:


1.  Robotic-assisted daVinci Xi laparoscopic descending colostomy reversal, 

multi-port


2.  Application of PREVENA 13-cm wound vac at left lower quadrant





ANESTHESIA: General with local


ESTIMATED BLOOD LOSS:  10  mL


SPECIMENS REMOVED: Descending colostomy





FINDINGS:


1.  Peritoneal adhesions midline


2.  Linear intracorporeal isoperistaltic anastomosis colo-colon anastomosis 

using 45 mm blue staple load.


3.  Robotic arms using multiport, stapler along the right upper and left upper 

quadrant.


4.  All ports place 15 to 20 cm away from target anatomy, the sigmoid colon.


5.  Anastomosis oversewn using 3-0 silk and 3-0 Vicryl


6.  Console time 94 minutes





INDICATIONS: The patient is a 60-year-old male who presented with large bowel 

obstruction due to diverticulitis.  He is over 3 months out from a descending 

colostomy.  Surgical options were described. Benefits and risks, including 

infection, possibility for additional surgery,


including possible colostomy was discussed at length. Informed consent was 

obtained. All questions of the patient and family were answered.





DESCRIPTION: Earlier the patient had undergone a bowel prep using the enhanced 

colon recovery program and he took his beta blocker this morning.  The patient


was transferred to the operating room and placed supine.  The patient was then 

intubated.  A Londono catheter was placed.  The abdomen was then prepped and 

draped in standard sterile fashion. 





After a timeout protocol was performed, attention was then brought to the left 

upper quadrant whereby a 0 degree


5 mm laparoscopic trocar entry was performed. The abdominal cavity was entered 

and insufflated to 15 mmHg pressure, which he tolerated well.


Diagnostic laparoscopy demonstrated adhesions along the midline.





All 3 arms of the robot were used.  Next an robotic 8-mm trocar was placed 

along the right lateral abdominal wall 15 cm proximal from the pelvis.  At the 

epigastrium, a 8 mm port was placed.  Ports were placed 8 cm apart from each 

other including 15  away from the target anatomy of the left pelvis. The 5-mm 

port was exchanged for an 12 mm robotic port.  The stapler 12-mm port was 

placed along the right upper quadrant and left lateral abdominal wall. The 

patient was then placed in Trendelenburg position, 18.





The robotic da Lucien Xi system was primed.  





The robot was docked along the left-side of the patient with direction into the 

pelvis.





Prior to docking all arms, adhesions were addressed using the camera and vessel 

sealer along the right upper quadrant.  Adhesionolysis occurred for 15 minutes.


Using a grasper for arm 1, a grasper for arm 3, including vessel sealer for arm 

4, the robotic system was docked and primed as described.  Instruments were 

interchanged by the assistant including scissors the cartridge, needle , 

robotic stapler and vessel sealer. 





Next, attention was brought to identify the rectum.  





The previous Prolene stitch was identified.  The devitalized rectum was 

prepared for anastomosis.  Next, pressure was placed over the descending ostomy 

by the assistant to allow for division of the descending colostomy at the 

abdominal wall using blue 45 mm staplers.





A stay suture using 3-0 silk was placed along the anterior serosa of the 

descending colon including along the rectum.  The mesentery of the sigmoid 

colon was mobilized towards the descending colon using a vessel sealer.  The 

descending colon was similarly marked using 3-0 silk.  The rest of the sigmoid 

colon mesentery was mobilized using vessel sealer. 





The proximal and distal colon was brought in an isoperistaltic fashion after 

placing interrupted sutures along the proposed shyla-lumen using 3-0 silk.  Along 

the tinea coli of the proximal including distal limbs, a colotomy was prepared 

along both limbs.  Using scissors with cautery  Next, a 45 mm blue stapler was 

fired to create the shyla-lumen.  The colotomy of the shyla-lumen was closed using 3

-0 Vicryl and 3-0 silk.





The robot was undocked.  I re-scrubbed into the case.  





The 12-mm fascial defect was oversewn using 0 Vicryl and a Duc Coker.  

Similarly, the 12 mm trocar site of the right upper quadrant was also oversewn 

along its fascia using 0 Vicryl and a Duc Coker.





Using insufflation, the colostomy was excised in an elliptical fashion from the 

skin to the fascia circumferentially.  The specimen was passed off.  The fascia 

of the colostomy was oversewn using interrupted 0 Vicryl.  The colostomy site 

was irrigated using dilute hydrogen peroxide.  The incision of the colostomy 

was reapproximated using 3-0 Vicryl for the deep dermis followed by skin 

staples.





Next all pneumoperitoneum was evacuated from the abdominal cavity. 





The 8-mm trocar sites were reapproximated using 4-0 Monocryl in an interrupted 

subcuticular fashion.  The larger trocar sites were irrigated using warm normal 

saline and hydrogen peroxide solution.  Local anesthetic was infiltrated to all 

wounds for postop analgesia.  Optifoam surgical dressing were placed over all 

trocar sites.  PREVENA 13-cm wound VAC was prepared and placed over the ostomy 

site and placed to suction with negative seal.





The patient had tolerated the procedure well. Estimated blood loss was


approximately 10 mL.  The patient was extubated successfully. 





Intraoperative photos were reviewed with the patient's family who were


overall pleased with the level of care. The patient was transferred to


the postanesthesia care unit in stable condition.

## 2019-02-04 NOTE — PROGRESS NOTES
" LOS: 19 days   Patient Care Team:  Robert Cortez MD as PCP - General (Family Medicine)  Deborah Agudelo MD as Surgeon (Orthopedic Surgery)    Chief Complaint: post op fu    Subjective:  Symptoms:  No shortness of breath or chest pain.    Diet:  She reports  nausea and vomiting.    Activity level: Impaired due to weakness.    Pain:  She reports no pain.          Vital Signs  Temp:  [97.2 °F (36.2 °C)-97.8 °F (36.6 °C)] 97.7 °F (36.5 °C)  Heart Rate:  [71-80] 73  Resp:  [16-18] 18  BP: (100-125)/(53-74) 125/70  Arterial Line BP: (103-116)/(48-63) 116/48  Body mass index is 55.08 kg/m².    Intake/Output Summary (Last 24 hours) at 2/4/2019 0823  Last data filed at 2/4/2019 0723  Gross per 24 hour   Intake 1263.34 ml   Output 3020 ml   Net -1756.66 ml     I/O this shift:  In: 210 [P.O.:210]  Out: -           01/30/19  1518 02/01/19  0600 02/04/19  0651   Weight: (!) 140 kg (308 lb) (!) 140 kg (308 lb 13.8 oz) (!) 160 kg (351 lb 11.2 oz)         Objective:  General Appearance:  In no acute distress.    Vital signs: (most recent): Blood pressure 125/70, pulse 73, temperature 97.7 °F (36.5 °C), temperature source Oral, resp. rate 18, height 170.2 cm (67\"), weight (!) 160 kg (351 lb 11.2 oz), SpO2 92 %.    Lungs:  Normal effort and normal respiratory rate.  (O2 at 2L)  Heart: Normal rate.  Irregular rhythm.    Abdomen: Abdomen is soft and non-distended.    Extremities: There is dependent edema (trace pretibial).    Neurological: Patient is alert and oriented to person, place and time.    Skin:  Warm and dry.  (Sternal incision well approximated without erythema, edema, or drainage)            Results Review:        WBC WBC   Date Value Ref Range Status   02/04/2019 17.10 (H) 4.50 - 10.70 10*3/mm3 Final   02/03/2019 13.71 (H) 4.50 - 10.70 10*3/mm3 Final   02/02/2019 14.11 (H) 4.50 - 10.70 10*3/mm3 Final      HGB Hemoglobin   Date Value Ref Range Status   02/04/2019 9.2 (L) 11.9 - 15.5 g/dL Final   02/03/2019 9.3 (L) 11.9 " - 15.5 g/dL Final   02/02/2019 9.0 (L) 11.9 - 15.5 g/dL Final      HCT Hematocrit   Date Value Ref Range Status   02/04/2019 28.8 (L) 35.6 - 45.5 % Final   02/03/2019 29.3 (L) 35.6 - 45.5 % Final   02/02/2019 29.3 (L) 35.6 - 45.5 % Final      Platelets Platelets   Date Value Ref Range Status   02/04/2019 193 140 - 500 10*3/mm3 Final   02/03/2019 202 140 - 500 10*3/mm3 Final   02/02/2019 202 140 - 500 10*3/mm3 Final        PT/INR:    Protime   Date Value Ref Range Status   02/02/2019 16.1 (H) 11.7 - 14.2 Seconds Final   /  INR   Date Value Ref Range Status   02/02/2019 1.32 (H) 0.90 - 1.10 Final       Sodium Sodium   Date Value Ref Range Status   02/04/2019 136 136 - 145 mmol/L Final   02/03/2019 136 136 - 145 mmol/L Final   02/02/2019 138 136 - 145 mmol/L Final      Potassium Potassium   Date Value Ref Range Status   02/04/2019 4.4 3.5 - 5.2 mmol/L Final   02/03/2019 4.2 3.5 - 5.2 mmol/L Final   02/02/2019 4.0 3.5 - 5.2 mmol/L Final   02/01/2019 4.0 3.5 - 5.2 mmol/L Final      Chloride Chloride   Date Value Ref Range Status   02/04/2019 98 98 - 107 mmol/L Final   02/03/2019 97 (L) 98 - 107 mmol/L Final   02/02/2019 99 98 - 107 mmol/L Final      Bicarbonate CO2   Date Value Ref Range Status   02/04/2019 24.5 22.0 - 29.0 mmol/L Final   02/03/2019 26.0 22.0 - 29.0 mmol/L Final   02/02/2019 25.2 22.0 - 29.0 mmol/L Final      BUN BUN   Date Value Ref Range Status   02/04/2019 52 (H) 8 - 23 mg/dL Final   02/03/2019 69 (H) 8 - 23 mg/dL Final   02/02/2019 37 (H) 8 - 23 mg/dL Final      Creatinine Creatinine   Date Value Ref Range Status   02/04/2019 2.07 (H) 0.57 - 1.00 mg/dL Final   02/03/2019 2.93 (H) 0.57 - 1.00 mg/dL Final   02/02/2019 2.06 (H) 0.57 - 1.00 mg/dL Final      Calcium Calcium   Date Value Ref Range Status   02/04/2019 9.3 8.6 - 10.5 mg/dL Final   02/03/2019 9.6 8.6 - 10.5 mg/dL Final   02/02/2019 9.3 8.6 - 10.5 mg/dL Final      Magnesium Magnesium   Date Value Ref Range Status   02/04/2019 2.2 1.6 - 2.4  mg/dL Final            acetaminophen 500 mg Oral Q4H   aspirin 81 mg Oral Daily   bumetanide 4 mg Intravenous BID   cetaphil  Topical Q12H   chlorhexidine 15 mL Mouth/Throat Q12H   escitalopram 10 mg Oral Daily   hydrocortisone 1 application Topical Q6H   ipratropium-albuterol 3 mL Nebulization BID - RT   lansoprazole 30 mg Nasogastric BID AC   mupirocin  Each Nare BID   nystatin  Topical Q12H   sennosides-docusate sodium 2 tablet Oral Nightly   sodium chloride 4 mL Nebulization BID - RT       DOPamine 2-20 mcg/kg/min Last Rate: 3 mcg/kg/min (02/03/19 1436)   niCARdipine 5-15 mg/hr    sodium chloride 30 mL/hr Last Rate: 30 mL/hr (01/28/19 1215)           Patient Active Problem List   Diagnosis Code   • Tear of rotator cuff M75.100   • Hypertension I10   • Generalized anxiety disorder F41.1   • Hyperlipidemia E78.5   • IFG (impaired fasting glucose) R73.01   • Heart murmur, systolic R01.1   • Shoulder pain, bilateral M25.511, M25.512   • Pain of both shoulder joints M25.511, M25.512   • Chronic pain of both shoulders M25.511, G89.29, M25.512   • Acute congestive heart failure (CMS/HCC) I50.9   • Obesity, morbid, BMI 50 or higher (CMS/HCC) E66.01   • Fungal skin infection B36.9   • Cellulitis of lower extremity L03.119   • Aortic valve stenosis I35.0   • Tricuspid valve insufficiency I07.1   • Mitral valve stenosis I05.0       Assessment & Plan    -Severe AS, MS, TV regurgitation s/p AVR/MVR (tissue),TV repair, MAZE PENNIE ligation-- POD#7 (Pagni)  -NICM, non obstructive dx by cath  -RBBB  -HTN  -Morbid obesity with arthritis-complicating mobility and all aspects of care  -Hypoventilation sx, probable OCTAVIANO-----chronic CO2 retain by ABG, pulmonary following  -Possible PE, NO LE DVT------on heparin  -Preop new a.fib----s/p cardioversion 1/24/19, reverted to a.fib again  -Left arm thrombophlebitis, infiltration  -LAVERNE-----Left IJ shiley, HD per renal  -Leukocytosis-  -preop anemia, post op expected ABL-----Hb stable  -Post op  junctional bradycardia---rate improved with Dopamine, back in a.flutter/a.fib with rate 60-80, EP following     Renal giving bumex IV today, plan for HD tomorrow, I doubt weight up by 20 kg/72 hours, no change in cxr last few days, will need tunnel cath soon.  White count up but no fever, increase pulmonary toilet, repeat cbc in am.  Developed nosebleed and subsequent nausea/vomiting from drainage, Heparin stopped, will discuss with Dr. Gaxiola.  Passed swallow for regular diet, will start nocturnal feeds and discontinue when taking adequate po.        Alicia Schmitz, APRN  02/04/19  8:23 AM

## 2019-02-04 NOTE — PLAN OF CARE
Problem: Patient Care Overview  Goal: Plan of Care Review  Outcome: Ongoing (interventions implemented as appropriate)   02/03/19 9818   Coping/Psychosocial   Plan of Care Reviewed With patient   Plan of Care Review   Progress no change   OTHER   Outcome Summary respiratory status unchanged/mininebs and deep breathing encouraged

## 2019-02-04 NOTE — PROGRESS NOTES
Adult Nutrition  Assessment/PES    Patient Name:  Claudia Arnold  YOB: 1945  MRN: [id number]  Admit Date:  1/16/2019    Assessment Date:  2/4/2019    Comments:  Nutrition follow up. Passed swallow eval. Po intake poor this am. Nausea noted after nosebleed. Last BM charted is 1/27.  Pt states she is passing gas and has had several - ? Accurate.  Recommend, running TF at night from 7p-7a and see is her appetite picks up (start tomorrow). Will follow.    Reason for Assessment     Row Name 02/04/19 1201          Reason for Assessment    Reason For Assessment  follow-up protocol;TF/PN         Nutrition/Diet History     Row Name 02/04/19 1201          Nutrition/Diet History    Typical Food/Fluid Intake  nose bleed today - likely swallowed a bit of blood     Factors Affecting Nutritional Intake  nausea         Anthropometrics     Row Name 02/04/19 0651          Anthropometrics    Weight  160 kg (351 lb 11.2 oz)  (Abnormal)          Labs/Tests/Procedures/Meds     Row Name 02/04/19 1202          Labs/Procedures/Meds    Lab Results Reviewed  reviewed, pertinent     Lab Results Comments  glu, BUN, Cr, Hgb, wbc        Diagnostic Tests/Procedures    Diagnostic Test/Procedure Reviewed  reviewed, pertinent        Medications    Pertinent Medications Reviewed  reviewed, pertinent     Pertinent Medications Comments  bumex, insulin, ppi, dulcolax, zofran         Physical Findings     Row Name 02/04/19 1204          Physical Findings    Overall Physical Appearance  edematous;obese     Gastrointestinal  feeding tube     Tubes  nasoduodenal tube     Skin  other (see comments);pressure injury chest incision, candida in skin folds, PI to gluteakl area, ear           Nutrition Prescription Ordered     Row Name 02/04/19 1205          Nutrition Prescription PO    Common Modifiers  Renal        Nutrition Prescription EN    Enteral Route  ND     Product  Novasource Renal (Nepro)     TF Delivery Method  Continuous     Continuous  TF Goal Rate (mL/hr)  35 mL/hr     Continuous TF Current Rate (mL/hr)  15 mL/hr     Water flush (mL)   -- 35cc with meds         Evaluation of Received Nutrient/Fluid Intake     Row Name 02/04/19 1208          Calories Evaluation    Enteral Calories (kcal)  720     % of Kcal Needs  46        Protein Evaluation    Enteral Protein (gm)  32.65     % of Protein Needs  44        Intake Assessment    Energy/Calorie Requirement Assessment  not meeting needs     Protein Requirement Assessment  not meeting needs     Fluid Requirement Assessment  not meeting needs     Tolerance  tolerating        Fluid Intake Evaluation    Enteral (Free Water) Fluid (mL)  259.2        PO Evaluation    % PO Intake  bites of oatmeal this am        EN Evaluation    TF Changes  Rate decreased     TF Tolerance  Other (comment) last BM charted is 1/27, Per pr - has had several since surgery     HOB  Greater than or equal to 30 degress               Problem/Interventions:            Intervention Goal     Row Name 02/04/19 1212          Intervention Goal    General  Maintain nutrition;Reduce/improve symptoms;Nutrition support treatment;Meet nutritional needs for age/condition;Disease management/therapy;Improved nutrition related lab(s)     PO  Tolerate PO;Increase intake;PO intake (%)     PO Intake %  75 %     TF/PN  Tolerate TF at goal     Weight  Appropriate weight loss         Nutrition Intervention     Row Name 02/04/19 1213          Nutrition Intervention    RD/Tech Action  Advise alternate selection;Advise available snack;Interview for preference;Encourage intake;Care plan reviewd;Follow Tx progress;Recommend/ordered     Recommended/Ordered  Supplement         Nutrition Prescription     Row Name 02/04/19 1213          Nutrition Prescription PO    PO Prescription  Begin/change supplement     Supplement  Mighty Shake     Supplement Frequency  3 times a day     New PO Prescription Ordered?  Yes        Nutrition Prescription EN    Product   [facility]     TF Delivery Method  Cyclic     Cyclic TF Goal Rate (mL/hr)  35 mL/hr     Cyclic TF Cycle Over (hrs)   12 hrs, 7p-7a     New EN Prescription Ordered?  No, recommended        EN to Supply    Kcal/Day  840 Kcal/Day     Protein (gm/day)  38.09 gm/day     Meet Estimated Kcal Need (%)  55 %     Meet Estimated Protein Need (%)  53 %     TF Free H2O (mL)  302 mL         Education/Evaluation     Row Name 02/04/19 1214          Education    Education  Will Instruct as appropriate        Monitor/Evaluation    Monitor  Per protocol;Weight;Skin status;I&O;PO intake;Symptoms;Pertinent labs;TF delivery/tolerance           Electronically signed by:  Suki Fulton RD  02/04/19 12:15 PM

## 2019-02-05 LAB
ALBUMIN SERPL-MCNC: 3.5 G/DL (ref 3.5–5.2)
ANION GAP SERPL CALCULATED.3IONS-SCNC: 17 MMOL/L
BASOPHILS # BLD AUTO: 0.04 10*3/MM3 (ref 0–0.2)
BASOPHILS NFR BLD AUTO: 0.2 % (ref 0–1.5)
BUN BLD-MCNC: 84 MG/DL (ref 8–23)
BUN/CREAT SERPL: 28.1 (ref 7–25)
CALCIUM SPEC-SCNC: 9.9 MG/DL (ref 8.6–10.5)
CHLORIDE SERPL-SCNC: 96 MMOL/L (ref 98–107)
CO2 SERPL-SCNC: 23 MMOL/L (ref 22–29)
CREAT BLD-MCNC: 2.99 MG/DL (ref 0.57–1)
DEPRECATED RDW RBC AUTO: 53.9 FL (ref 37–54)
EOSINOPHIL # BLD AUTO: 0.17 10*3/MM3 (ref 0–0.7)
EOSINOPHIL NFR BLD AUTO: 1 % (ref 0.3–6.2)
ERYTHROCYTE [DISTWIDTH] IN BLOOD BY AUTOMATED COUNT: 15.5 % (ref 11.7–13)
GFR SERPL CREATININE-BSD FRML MDRD: 15 ML/MIN/1.73
GLUCOSE BLD-MCNC: 133 MG/DL (ref 65–99)
HCT VFR BLD AUTO: 28.6 % (ref 35.6–45.5)
HGB BLD-MCNC: 8.5 G/DL (ref 11.9–15.5)
IMM GRANULOCYTES # BLD AUTO: 1.21 10*3/MM3 (ref 0–0.03)
IMM GRANULOCYTES NFR BLD AUTO: 7.3 % (ref 0–0.5)
LYMPHOCYTES # BLD AUTO: 1.82 10*3/MM3 (ref 0.9–4.8)
LYMPHOCYTES NFR BLD AUTO: 10.9 % (ref 19.6–45.3)
MCH RBC QN AUTO: 28.6 PG (ref 26.9–32)
MCHC RBC AUTO-ENTMCNC: 29.7 G/DL (ref 32.4–36.3)
MCV RBC AUTO: 96.3 FL (ref 80.5–98.2)
MONOCYTES # BLD AUTO: 0.66 10*3/MM3 (ref 0.2–1.2)
MONOCYTES NFR BLD AUTO: 4 % (ref 5–12)
NEUTROPHILS # BLD AUTO: 12.74 10*3/MM3 (ref 1.9–8.1)
NEUTROPHILS NFR BLD AUTO: 76.6 % (ref 42.7–76)
OVALOCYTES BLD QL SMEAR: NORMAL
PHOSPHATE SERPL-MCNC: 5.8 MG/DL (ref 2.5–4.5)
PLAT MORPH BLD: NORMAL
PLATELET # BLD AUTO: 198 10*3/MM3 (ref 140–500)
PMV BLD AUTO: 10.6 FL (ref 6–12)
POTASSIUM BLD-SCNC: 5.1 MMOL/L (ref 3.5–5.2)
RBC # BLD AUTO: 2.97 10*6/MM3 (ref 3.9–5.2)
SODIUM BLD-SCNC: 136 MMOL/L (ref 136–145)
WBC MORPH BLD: NORMAL
WBC NRBC COR # BLD: 16.64 10*3/MM3 (ref 4.5–10.7)

## 2019-02-05 PROCEDURE — 80069 RENAL FUNCTION PANEL: CPT | Performed by: INTERNAL MEDICINE

## 2019-02-05 PROCEDURE — 97110 THERAPEUTIC EXERCISES: CPT

## 2019-02-05 PROCEDURE — P9047 ALBUMIN (HUMAN), 25%, 50ML: HCPCS | Performed by: INTERNAL MEDICINE

## 2019-02-05 PROCEDURE — 94799 UNLISTED PULMONARY SVC/PX: CPT

## 2019-02-05 PROCEDURE — 85025 COMPLETE CBC W/AUTO DIFF WBC: CPT | Performed by: NURSE PRACTITIONER

## 2019-02-05 PROCEDURE — 97110 THERAPEUTIC EXERCISES: CPT | Performed by: OCCUPATIONAL THERAPIST

## 2019-02-05 PROCEDURE — 25010000002 ENOXAPARIN PER 10 MG: Performed by: NURSE PRACTITIONER

## 2019-02-05 PROCEDURE — 85007 BL SMEAR W/DIFF WBC COUNT: CPT | Performed by: NURSE PRACTITIONER

## 2019-02-05 PROCEDURE — 25010000002 ALBUMIN HUMAN 25% PER 50 ML: Performed by: INTERNAL MEDICINE

## 2019-02-05 PROCEDURE — 5A1D70Z PERFORMANCE OF URINARY FILTRATION, INTERMITTENT, LESS THAN 6 HOURS PER DAY: ICD-10-PCS | Performed by: INTERNAL MEDICINE

## 2019-02-05 PROCEDURE — 99024 POSTOP FOLLOW-UP VISIT: CPT | Performed by: NURSE PRACTITIONER

## 2019-02-05 PROCEDURE — 99232 SBSQ HOSP IP/OBS MODERATE 35: CPT | Performed by: INTERNAL MEDICINE

## 2019-02-05 RX ORDER — ALBUMIN (HUMAN) 12.5 G/50ML
12.5 SOLUTION INTRAVENOUS AS NEEDED
Status: DISPENSED | OUTPATIENT
Start: 2019-02-05 | End: 2019-02-06

## 2019-02-05 RX ORDER — PANTOPRAZOLE SODIUM 40 MG/1
40 TABLET, DELAYED RELEASE ORAL
Status: DISCONTINUED | OUTPATIENT
Start: 2019-02-06 | End: 2019-02-07

## 2019-02-05 RX ADMIN — ACETAMINOPHEN 500 MG: 500 TABLET, FILM COATED ORAL at 13:52

## 2019-02-05 RX ADMIN — EMOLLIENT - LOTION: LOTION at 08:35

## 2019-02-05 RX ADMIN — ESCITALOPRAM 10 MG: 10 TABLET, FILM COATED ORAL at 08:35

## 2019-02-05 RX ADMIN — ACETAMINOPHEN 500 MG: 500 TABLET, FILM COATED ORAL at 17:14

## 2019-02-05 RX ADMIN — SENNOSIDES AND DOCUSATE SODIUM 2 TABLET: 8.6; 5 TABLET ORAL at 20:53

## 2019-02-05 RX ADMIN — IPRATROPIUM BROMIDE AND ALBUTEROL SULFATE 3 ML: 2.5; .5 SOLUTION RESPIRATORY (INHALATION) at 08:44

## 2019-02-05 RX ADMIN — MUPIROCIN 10 APPLICATION: 20 OINTMENT TOPICAL at 08:35

## 2019-02-05 RX ADMIN — HYDROCORTISONE 1 APPLICATION: 1 CREAM TOPICAL at 13:52

## 2019-02-05 RX ADMIN — LANSOPRAZOLE 30 MG: KIT at 06:38

## 2019-02-05 RX ADMIN — Medication 81 MG: at 08:34

## 2019-02-05 RX ADMIN — ACETAMINOPHEN 500 MG: 500 TABLET, FILM COATED ORAL at 10:04

## 2019-02-05 RX ADMIN — ACETAMINOPHEN 500 MG: 500 TABLET, FILM COATED ORAL at 03:41

## 2019-02-05 RX ADMIN — MUPIROCIN 10 APPLICATION: 20 OINTMENT TOPICAL at 20:53

## 2019-02-05 RX ADMIN — SODIUM CHLORIDE 4 ML: 7 NEBU SOLN,3 % NEBU at 08:46

## 2019-02-05 RX ADMIN — HYDROCORTISONE 1 APPLICATION: 1 CREAM TOPICAL at 00:57

## 2019-02-05 RX ADMIN — ALBUMIN (HUMAN) 12.5 G: 12.5 SOLUTION INTRAVENOUS at 18:00

## 2019-02-05 RX ADMIN — ACETAMINOPHEN 500 MG: 500 TABLET, FILM COATED ORAL at 21:04

## 2019-02-05 RX ADMIN — HYDROCORTISONE 1 APPLICATION: 1 CREAM TOPICAL at 17:16

## 2019-02-05 RX ADMIN — HYDROCORTISONE 1 APPLICATION: 1 CREAM TOPICAL at 06:38

## 2019-02-05 RX ADMIN — NYSTATIN 1 APPLICATION: 100000 POWDER TOPICAL at 20:53

## 2019-02-05 RX ADMIN — ENOXAPARIN SODIUM 30 MG: 30 INJECTION SUBCUTANEOUS at 08:35

## 2019-02-05 RX ADMIN — EMOLLIENT - LOTION 1 APPLICATION: LOTION at 20:53

## 2019-02-05 RX ADMIN — NYSTATIN: 100000 POWDER TOPICAL at 08:36

## 2019-02-05 NOTE — PLAN OF CARE
Problem: Patient Care Overview  Goal: Plan of Care Review   02/05/19 1438   Coping/Psychosocial   Plan of Care Reviewed With patient   Plan of Care Review   Progress improving   OTHER   Outcome Summary Tolerates UE ther ex in supine position to strengthen for ADL tasks.

## 2019-02-05 NOTE — PLAN OF CARE
Problem: Patient Care Overview  Goal: Plan of Care Review  Outcome: Ongoing (interventions implemented as appropriate)   02/04/19 2009   Coping/Psychosocial   Plan of Care Reviewed With patient   Plan of Care Review   Progress improving   OTHER   Outcome Summary improving post op respiratory status/nebs and flutter in use

## 2019-02-05 NOTE — PROGRESS NOTES
Adult Nutrition  Assessment/PES    Patient Name:  Claudia Arnold  YOB: 1945  MRN: [id number]  Admit Date:  1/16/2019    Assessment Date:  2/5/2019    Comments:  Nutrition follow up. Cortrak is out. Improving po but only eating about 25%. To try the mighty shakes (150 mg K). Encouraged po. Will follow.     Reason for Assessment     Row Name 02/05/19 1348          Reason for Assessment    Reason For Assessment  follow-up protocol;TF/PN         Nutrition/Diet History     Row Name 02/05/19 1349          Nutrition/Diet History    Typical Food/Fluid Intake  improving po per RN           Labs/Tests/Procedures/Meds     Row Name 02/05/19 1349          Labs/Procedures/Meds    Lab Results Reviewed  reviewed, pertinent     Lab Results Comments  BUN, Cr, Glu, Phos        Diagnostic Tests/Procedures    Diagnostic Test/Procedure Reviewed  reviewed, pertinent     Diagnostic Test/Procedures Comments  HD today        Medications    Pertinent Medications Reviewed  reviewed, pertinent     Pertinent Medications Comments  protonix, senokot         Physical Findings     Row Name 02/05/19 1350          Physical Findings    Overall Physical Appearance  obese;edematous     Skin  other (see comments);pressure injury;edema PI to gluteal area, incisions, candida           Nutrition Prescription Ordered     Row Name 02/05/19 1351          Nutrition Prescription PO    Supplement  Mighty Shake     Supplement Frequency  3 times a day     Common Modifiers  Renal         Evaluation of Received Nutrient/Fluid Intake     Row Name 02/05/19 1351          PO Evaluation    Number of Meals  2     % PO Intake  25%        EN Evaluation    TF Changes  Discontinued         Problem/Interventions:    Intervention Goal     Row Name 02/05/19 1351          Intervention Goal    General  Maintain nutrition;Reduce/improve symptoms;Meet nutritional needs for age/condition;Improved nutrition related lab(s);Disease management/therapy     PO  Tolerate  PO;Increase intake;PO intake (%)     PO Intake %  75 %     Weight  Appropriate weight loss         Nutrition Intervention     Row Name 02/05/19 1351          Nutrition Intervention    RD/Tech Action  Follow Tx progress;Care plan reviewd;Encourage intake;Interview for preference           Education/Evaluation     Row Name 02/05/19 1352          Education    Education  Will Instruct as appropriate        Monitor/Evaluation    Monitor  Per protocol;Weight;Skin status;PO intake;Symptoms;I&O;Pertinent labs;Supplement intake           Electronically signed by:  Suki Fulton RD  02/05/19 1:53 PM

## 2019-02-05 NOTE — PLAN OF CARE
Problem: Patient Care Overview  Goal: Plan of Care Review   02/05/19 1112   OTHER   Outcome Summary Pt continues to require max-dependent assist x2 for supine to sit transfer and for standing transfer. Educated on exercises to complete on own. Will continue to progress as tolerated.

## 2019-02-05 NOTE — PROGRESS NOTES
LOS: 20 days   Patient Care Team:  Robert Cortez MD as PCP - General (Family Medicine)  Deborah Agudelo MD as Surgeon (Orthopedic Surgery)    Chief Complaint: Follow-up for tissue aortic and mitral valve replacements, tricuspid valve repair, paroxysmal atrial fibrillation with RVR, acute diastolic and valvular CHF.    Interval History: Atrial fibrillation, but still intermittently drops below 50 and paces. Mild SOA.  No further significant nosebleeds.  No CP.    Vital Signs:  Temp:  [97.3 °F (36.3 °C)-97.5 °F (36.4 °C)] 97.4 °F (36.3 °C)  Heart Rate:  [56-73] 68  Resp:  [16-20] 18  BP: ()/(63-82) 117/63    Intake/Output Summary (Last 24 hours) at 2/5/2019 1023  Last data filed at 2/5/2019 0829  Gross per 24 hour   Intake 757 ml   Output 275 ml   Net 482 ml       Physical Exam:   General Appearance:    No acute distress, alert and oriented x4   Lungs:     Clear to auscultation bilaterally     Heart:    Irregularly irregular rhythm with a normal rate.  II/VI SM RUSB   Abdomen:     Soft, non-tender, non-distended.    Extremities:   Trace edema.     Results Review:    Results from last 7 days   Lab Units 02/05/19  0342   SODIUM mmol/L 136   POTASSIUM mmol/L 5.1   CHLORIDE mmol/L 96*   CO2 mmol/L 23.0   BUN mg/dL 84*   CREATININE mg/dL 2.99*   GLUCOSE mg/dL 133*   CALCIUM mg/dL 9.9         Results from last 7 days   Lab Units 02/05/19  0342   WBC 10*3/mm3 16.64*   HEMOGLOBIN g/dL 8.5*   HEMATOCRIT % 28.6*   PLATELETS 10*3/mm3 198     Results from last 7 days   Lab Units 02/04/19  0021 02/03/19  1643 02/03/19  1015  02/02/19  1830   INR   --   --   --   --  1.32*   APTT seconds 86.1* 59.2* 63.3*   < > 30.5    < > = values in this interval not displayed.         Results from last 7 days   Lab Units 02/04/19  0337   MAGNESIUM mg/dL 2.2           I reviewed the patient's new clinical results.        Assessment:  1. Acute valvular and diastolic CHF  2. Severe aortic stenosis, severe mitral stenosis secondary to  calcification, and severe tricuspid regurgitation  3. Normal EF 60-65%  4. Paroxysmal atrial fibrillation with RVR -status post cardioversion 1/24/2019 and subsequent reversion to atrial fibrillation.  5. Moderate pulmonary hypertension (mean PA pressure 32)  6. Status post tissue AVR, tissue MVR, tricuspid valve repair (Martha suture repair), left cryomaze, and PENNIE ligation on 1/28/2019 (Dr. Gaxiola).  7. Small bilateral pulmonary emboli  8. Oliguric acute kidney injury post-operatively  9. Bifascicular block (RBBB and LAFB)  10. Candida intertrigo (skin folds); left axillary fungal infection (treated)  11. Osteoarthritis with immobility  12. Left wrist thrombophlebitis from IV infiltration  13. Undiagnosed OCTAVIANO  14. Morbid obesity   15. Anemia of chronic disease  16. Severe deconditioning   17. Junctional bradycardia and asystole post-op    Plan:  -Still with intermittent pacing, which means her rate is dropping below 50 at times.  Will watch for now.  Overall, improvement.    -Only on DVT prophylaxis for now given recent nosebleed.  Will defer to Dr. Gaxiola for timing on systemic anticoagulation.    -Hemodialysis again today.  Making very little urine still.  On Bumex 4 mg IV bid.    -Try to improve functional capacity.  Making progress slowly.    Scott Segura MD  02/05/19  10:23 AM

## 2019-02-05 NOTE — PROGRESS NOTES
" LOS: 20 days   Patient Care Team:  Robert Cortez MD as PCP - General (Family Medicine)  Deborah Agudelo MD as Surgeon (Orthopedic Surgery)    Chief Complaint: postop follow up    Subjective:  Symptoms:  No shortness of breath or chest pain.    Diet:  Adequate intake.  No nausea or vomiting.    Activity level: Impaired due to weakness.          Vital Signs  Temp:  [97.3 °F (36.3 °C)-97.5 °F (36.4 °C)] 97.4 °F (36.3 °C)  Heart Rate:  [56-73] 68  Resp:  [16-20] 18  BP: ()/(63-82) 117/63  Body mass index is 55.08 kg/m².    Intake/Output Summary (Last 24 hours) at 2/5/2019 1038  Last data filed at 2/5/2019 0829  Gross per 24 hour   Intake 757 ml   Output 275 ml   Net 482 ml     I/O this shift:  In: 75 [P.O.:75]  Out: -           01/30/19  1518 02/01/19  0600 02/04/19  0651   Weight: (!) 140 kg (308 lb) (!) 140 kg (308 lb 13.8 oz) (!) 160 kg (351 lb 11.2 oz)         Objective:  General Appearance:  Comfortable.    Vital signs: (most recent): Blood pressure 117/63, pulse 68, temperature 97.4 °F (36.3 °C), temperature source Oral, resp. rate 18, height 170.2 cm (67\"), weight (!) 160 kg (351 lb 11.2 oz), SpO2 96 %.    Output: Minimal urine output and producing stool.    Lungs:  Normal effort and normal respiratory rate.    Heart: Normal rate.  Irregular rhythm.    Abdomen: Abdomen is soft and non-distended.    Extremities: There is dependent edema (1+ pretibial).    Neurological: Patient is alert and oriented to person, place and time.    Skin:  Warm and dry.  (Sternum stable)            Results Review:        WBC WBC   Date Value Ref Range Status   02/05/2019 16.64 (H) 4.50 - 10.70 10*3/mm3 Final   02/04/2019 17.10 (H) 4.50 - 10.70 10*3/mm3 Final   02/03/2019 13.71 (H) 4.50 - 10.70 10*3/mm3 Final   02/02/2019 14.11 (H) 4.50 - 10.70 10*3/mm3 Final      HGB Hemoglobin   Date Value Ref Range Status   02/05/2019 8.5 (L) 11.9 - 15.5 g/dL Final   02/04/2019 9.2 (L) 11.9 - 15.5 g/dL Final   02/03/2019 9.3 (L) 11.9 - " 15.5 g/dL Final   02/02/2019 9.0 (L) 11.9 - 15.5 g/dL Final      HCT Hematocrit   Date Value Ref Range Status   02/05/2019 28.6 (L) 35.6 - 45.5 % Final   02/04/2019 28.8 (L) 35.6 - 45.5 % Final   02/03/2019 29.3 (L) 35.6 - 45.5 % Final   02/02/2019 29.3 (L) 35.6 - 45.5 % Final      Platelets Platelets   Date Value Ref Range Status   02/05/2019 198 140 - 500 10*3/mm3 Final   02/04/2019 193 140 - 500 10*3/mm3 Final   02/03/2019 202 140 - 500 10*3/mm3 Final   02/02/2019 202 140 - 500 10*3/mm3 Final        PT/INR:    Protime   Date Value Ref Range Status   02/02/2019 16.1 (H) 11.7 - 14.2 Seconds Final   /  INR   Date Value Ref Range Status   02/02/2019 1.32 (H) 0.90 - 1.10 Final       Sodium Sodium   Date Value Ref Range Status   02/05/2019 136 136 - 145 mmol/L Final   02/04/2019 136 136 - 145 mmol/L Final   02/03/2019 136 136 - 145 mmol/L Final      Potassium Potassium   Date Value Ref Range Status   02/05/2019 5.1 3.5 - 5.2 mmol/L Final   02/04/2019 4.4 3.5 - 5.2 mmol/L Final   02/03/2019 4.2 3.5 - 5.2 mmol/L Final      Chloride Chloride   Date Value Ref Range Status   02/05/2019 96 (L) 98 - 107 mmol/L Final   02/04/2019 98 98 - 107 mmol/L Final   02/03/2019 97 (L) 98 - 107 mmol/L Final      Bicarbonate CO2   Date Value Ref Range Status   02/05/2019 23.0 22.0 - 29.0 mmol/L Final   02/04/2019 24.5 22.0 - 29.0 mmol/L Final   02/03/2019 26.0 22.0 - 29.0 mmol/L Final      BUN BUN   Date Value Ref Range Status   02/05/2019 84 (H) 8 - 23 mg/dL Final   02/04/2019 52 (H) 8 - 23 mg/dL Final   02/03/2019 69 (H) 8 - 23 mg/dL Final      Creatinine Creatinine   Date Value Ref Range Status   02/05/2019 2.99 (H) 0.57 - 1.00 mg/dL Final   02/04/2019 2.07 (H) 0.57 - 1.00 mg/dL Final   02/03/2019 2.93 (H) 0.57 - 1.00 mg/dL Final      Calcium Calcium   Date Value Ref Range Status   02/05/2019 9.9 8.6 - 10.5 mg/dL Final   02/04/2019 9.3 8.6 - 10.5 mg/dL Final   02/03/2019 9.6 8.6 - 10.5 mg/dL Final      Magnesium Magnesium   Date Value  Ref Range Status   02/04/2019 2.2 1.6 - 2.4 mg/dL Final            acetaminophen 500 mg Oral Q4H   aspirin 81 mg Oral Daily   cetaphil  Topical Q12H   chlorhexidine 15 mL Mouth/Throat Q12H   enoxaparin 30 mg Subcutaneous Nightly   escitalopram 10 mg Oral Daily   hydrocortisone 1 application Topical Q6H   ipratropium-albuterol 3 mL Nebulization BID - RT   mupirocin  Each Nare BID   nystatin  Topical Q12H   [START ON 2/6/2019] pantoprazole 40 mg Oral Q AM   sennosides-docusate sodium 2 tablet Oral Nightly   sodium chloride 4 mL Nebulization BID - RT       niCARdipine 5-15 mg/hr    sodium chloride 30 mL/hr Last Rate: 30 mL/hr (01/28/19 1215)           Patient Active Problem List   Diagnosis Code   • Tear of rotator cuff M75.100   • Hypertension I10   • Generalized anxiety disorder F41.1   • Hyperlipidemia E78.5   • IFG (impaired fasting glucose) R73.01   • Heart murmur, systolic R01.1   • Shoulder pain, bilateral M25.511, M25.512   • Pain of both shoulder joints M25.511, M25.512   • Chronic pain of both shoulders M25.511, G89.29, M25.512   • Acute congestive heart failure (CMS/HCC) I50.9   • Obesity, morbid, BMI 50 or higher (CMS/HCC) E66.01   • Fungal skin infection B36.9   • Cellulitis of lower extremity L03.119   • Aortic valve stenosis I35.0   • Tricuspid valve insufficiency I07.1   • Mitral valve stenosis I05.0       Assessment & Plan    -Severe AS, MS, TV regurgitation s/p AVR/MVR (tissue),TV repair, MAZE PENNIE ligation-- POD#8 (Pagni)  -NICM, non obstructive dx by cath  -RBBB  -HTN---controlled  -Morbid obesity with arthritis-complicating mobility and all aspects of care  -Hypoventilation sx, probable OCTAVIANO-----chronic CO2 retain by ABG, pulmonary following  -Possible PE, NO LE DVT------on heparin  -Preop new a.fib----s/p cardioversion 1/24/19, reverted to a.fib again  -Left arm thrombophlebitis, infiltration  -LAVERNE-----Left IJ shiley, HD per renal  -Leukocytosis-  -preop anemia, post op expected ABL-----Hb  stable  -Post op junctional bradycardia---rate improved with Dopamine, back in a.flutter/a.fib with rate 60-80, EP following      Nausea resolved, adequate intake this am, d/c dobhoff tube.  Heart rate varied from 50-80, remove atrial wires.  PPM final decision will need to be made prior to insertion of tunnel cath.  White count with mild improvement, will recheck again in am.     MORGAN Cook  02/05/19  10:38 AM

## 2019-02-05 NOTE — PROGRESS NOTES
Continued Stay Note  [facility]     Patient Name: Claudia Arnold  MRN: [id number]  Today's Date: 2/5/2019    Admit Date: 1/16/2019    Discharge Plan     Row Name 02/05/19 1243       Plan    Plan  [facility] referral pending    Plan Comments  Following pt's progress with treatment team. Spoke with Kalyn Murrieta with [facility] [phone] who is continuing to follow pt's recovery for ongoing assessment for SNU.          Discharge Codes    No documentation.             KRYSTIN Damon

## 2019-02-05 NOTE — THERAPY TREATMENT NOTE
Acute Care - Physical Therapy Treatment Note  [facility]     Patient Name: Claudia Arnold  : 1945  MRN: [id number]  Today's Date: 2019  Onset of Illness/Injury or Date of Surgery: 19          Admit Date: 2019    Visit Dx:    ICD-10-CM ICD-9-CM   1. Acute congestive heart failure, unspecified heart failure type (CMS/HCC) I50.9 428.0   2. Generalized weakness R53.1 780.79   3. Aortic valve stenosis, etiology of cardiac valve disease unspecified I35.0 424.1   4. Tricuspid valve insufficiency, unspecified etiology I07.1 397.0   5. Mitral valve stenosis, unspecified etiology I05.0 394.0   6. Acute renal failure, unspecified acute renal failure type (CMS/HCC) N17.9 584.9   7. S/P AVR (aortic valve replacement) Z95.2 V43.3     Patient Active Problem List   Diagnosis   • Tear of rotator cuff   • Hypertension   • Generalized anxiety disorder   • Hyperlipidemia   • IFG (impaired fasting glucose)   • Heart murmur, systolic   • Shoulder pain, bilateral   • Pain of both shoulder joints   • Chronic pain of both shoulders   • Acute congestive heart failure (CMS/HCC)   • Obesity, morbid, BMI 50 or higher (CMS/HCC)   • Fungal skin infection   • Cellulitis of lower extremity   • Aortic valve stenosis   • Tricuspid valve insufficiency   • Mitral valve stenosis       Therapy Treatment    Rehabilitation Treatment Summary     Row Name 19 1042             Treatment Time/Intention    Discipline  physical therapist  -MA      Document Type  therapy note (daily note)  -MA      Subjective Information  complains of;weakness;fatigue  -MA      Mode of Treatment  physical therapy;individual therapy  -MA      Patient/Family Observations  supine in bed, no acute distress  -MA      Patient Effort  good  -MA      Existing Precautions/Restrictions  cardiac;fall;sternal  -MA2      Recorded by [MA] Tabatha Lawrence, PT 19 1101  [MA2] Tabatha Lawrence, PT 19 1109      Row Name 19 1042             Vital Signs    O2  Delivery Pre Treatment  supplemental O2  -MA      Recorded by [MA] Tabatha Lawrence, PT 02/05/19 1109      Row Name 02/05/19 1042             Cognitive Assessment/Intervention- PT/OT    Affect/Mental Status (Cognitive)  WNL  -MA      Orientation Status (Cognition)  oriented x 4  -MA      Follows Commands (Cognition)  WNL  -MA      Personal Safety Interventions  fall prevention program maintained;gait belt;muscle strengthening facilitated;nonskid shoes/slippers when out of bed;supervised activity  -MA      Recorded by [MA] Tabatha Lawrence, PT 02/05/19 1109      Row Name 02/05/19 1042             Bed Mobility Assessment/Treatment    Supine-Sit [place] (Bed Mobility)  maximum assist (25% patient effort);2 person assist  -MA      Sit-Supine [place] (Bed Mobility)  maximum assist (25% patient effort);2 person assist  -MA      Bed Mobility, Safety Issues  decreased use of arms for pushing/pulling;decreased use of legs for bridging/pushing;impaired trunk control for bed mobility  -MA      Assistive Device (Bed Mobility)  bed rails;draw sheet;head of bed elevated  -MA      Comment (Bed Mobility)  cues to flex at hips to bring trunk forward  -MA      Recorded by [MA] Tabatha Lawrence, PT 02/05/19 1109      Row Name 02/05/19 1042             Transfer Assessment/Treatment    Comment (Transfers)  attempted to stand 3x, unable to clear bottom from bed  -MA      Recorded by [MA] Tabatha Lawrence, PT 02/05/19 1111      Row Name 02/05/19 1042             Sit-Stand Transfer    Sit-Stand [place] (Transfers)  maximum assist (25% patient effort);dependent (less than 25% patient effort);2 person assist  -MA      Assistive Device (Sit-Stand Transfers)  -- B HHA  -MA      Recorded by [MA] Tabatha Lawrence, PT 02/05/19 1109      Row Name 02/05/19 1042             Stand-Sit Transfer    Stand-Sit [place] (Transfers)  maximum assist (25% patient effort);dependent (less than 25% patient effort);2 person assist  -MA      Assistive Device  (Stand-Sit Transfers)  -- B HHA  -MA      Recorded by [MA] Tabatha Lawrence, PT 02/05/19 1109      Row Name 02/05/19 1042             Lower Extremity Seated Therapeutic Exercise    Performed, Seated Lower Extremity (Therapeutic Exercise)  LAQ (long arc quad), knee extension;hip flexion/extension  -MA      Exercise Type, Seated Lower Extremity (Therapeutic Exercise)  AROM (active range of motion)  -MA      Sets/Reps Detail, Seated Lower Extremity (Therapeutic Exercise)  10  -MA      Recorded by [MA] Tabatha Lawrence, PT 02/05/19 1109      Row Name 02/05/19 1042             Static Sitting Balance    Level of [place] (Unsupported Sitting, Static Balance)  contact guard assist  -MA      Sitting Position (Unsupported Sitting, Static Balance)  sitting on edge of bed  -MA      Time Able to Maintain Position (Unsupported Sitting, Static Balance)  4 to 5 minutes  -MA      Recorded by [MA] Tabatha Lawrence, PT 02/05/19 1109      Row Name 02/05/19 1042             Positioning and Restraints    Pre-Treatment Position  in bed  -MA      Post Treatment Position  bed  -MA      In Bed  supine;call light within reach;encouraged to call for assist;with nsg with nursing getting up to chair with lift   -MA      Recorded by [MA] Tabatha Lawrence, PT 02/05/19 1109      Row Name 02/05/19 1042             Pain Scale: Numbers Pre/Post-Treatment    Pain Scale: Numbers, Pretreatment  0/10 - no pain  -MA      Pain Scale: Numbers, Post-Treatment  3/10  -MA      Pain Location - Orientation  lower  -MA      Pain Location  extremity  -MA      Pain Intervention(s)  Repositioned;Ambulation/increased activity  -MA      Recorded by [MA] Tabatha Lawrence, PT 02/05/19 1109      Row Name                Wound 01/28/19 1138 chest incision    Wound - Properties Group Date first assessed: 01/28/19 [SR] Time first assessed: 1138 [SR] Location: chest [SR] Type: incision [SR] Recorded by:  [SR] Keisha Gandara RN 01/28/19 1138    Row Name                Wound 01/28/19 1219  coccyx unspecified    Wound - Properties Group Date first assessed: 01/28/19 [MB] Time first assessed: 1215 [MB] Present On Admission : yes;picture taken [MB] Location: coccyx [MB] Type: unspecified [MB] Recorded by:  [MB] Julissa Chatterjee RN 01/28/19 1541    Row Name                Wound 01/31/19 2300 Right gluteal pressure injury    Wound - Properties Group Date first assessed: 01/31/19 [NW] Time first assessed: 2300 [NW] Present On Admission : no [NW] Side: Right [NW] Location: gluteal [NW] Type: pressure injury [NW] Stage, Pressure Injury: deep tissue injury [NW] Recorded by:  [NW] Priscilla Willams RN 02/01/19 0414    Row Name                Wound 01/31/19 2000 Left posterior ear pressure injury    Wound - Properties Group Date first assessed: 01/31/19 [NW] Time first assessed: 2000 [NW] Side: Left [NW] Orientation: posterior [NW] Location: ear [NW2] Type: pressure injury [NW] Stage, Pressure Injury: Stage 2;medical device related [NW] Recorded by:  [NW] Priscilla Willams RN 02/01/19 0416 [NW2] Priscilla Willams RN 02/01/19 0417      User Key  (r) = Recorded By, (t) = Taken By, (c) = Cosigned By    Initials Name Effective Dates Discipline    Julissa Sweet RN 06/16/16 -  Nurse    Keisha Fournier RN 06/16/16 -  Nurse    Tabatha Fischer, PT 10/19/18 -  PT    NW Priscilal Willams RN 03/05/18 -  Nurse          Wound 01/28/19 1138 chest incision (Active)   Dressing Appearance open to air 2/5/2019  7:15 AM   Closure Approximated;Open to air 2/5/2019  7:15 AM   Base dry;pink 2/5/2019  7:15 AM   Drainage Amount none 2/5/2019  7:15 AM   Care, Wound cleansed with;antimicrobial agent applied 2/5/2019  7:15 AM   Dressing Care, Wound open to air 2/5/2019  4:00 AM       Wound 01/28/19 1215 coccyx unspecified (Active)   Dressing Appearance open to air 2/5/2019  7:15 AM   Base clean;dry 2/5/2019  7:15 AM   Drainage Amount none 2/5/2019  7:15 AM   Care, Wound barrier applied 2/5/2019  7:15 AM   Dressing Care, Wound open to air  2/5/2019  4:00 AM       Wound 01/31/19 2300 Right gluteal pressure injury (Active)   Dressing Appearance open to air 2/5/2019  7:15 AM   Drainage Amount none 2/5/2019  7:15 AM   Care, Wound barrier applied 2/5/2019  7:15 AM       Wound 01/31/19 2000 Left posterior ear pressure injury (Active)   Dressing Appearance open to air 2/5/2019  7:15 AM   Base pink;clean;dry 2/5/2019  7:15 AM   Drainage Amount none 2/5/2019  7:15 AM           Physical Therapy Education     Title: PT OT SLP Therapies (In Progress)     Topic: Physical Therapy (Done)     Point: Mobility training (Done)     Learning Progress Summary           Patient Acceptance, E, VU,NR by MA at 2/5/2019 11:09 AM    Acceptance, E,TB,D, VU,NR by CHELSEA at 2/4/2019 11:34 AM    Acceptance, E,D, VU,NR by DARRIUS at 2/3/2019  4:47 PM    Acceptance, E,D, DU,NR by DARRYN at 2/2/2019 10:36 AM    Comment:  safety during sit to stand, benefits of activity    Acceptance, E, NR by MA at 1/31/2019 12:08 PM    Acceptance, E, NR by MA at 1/30/2019 10:23 AM    Acceptance, E,TB, VU by  at 1/27/2019  9:10 AM    Acceptance, E, VU,NR by MA at 1/25/2019  3:54 PM    Acceptance, E,TB,D, VU,NR by CH at 1/23/2019  3:14 PM    Acceptance, TB,E, VU,DU by JANNET at 1/18/2019  4:37 PM    Acceptance, E, NR by EM at 1/17/2019 12:15 PM   Family Acceptance, TB,E, VU,DU by JANNET at 1/18/2019  4:37 PM                   Point: Home exercise program (Done)     Learning Progress Summary           Patient Acceptance, E, VU,NR by MA at 2/5/2019 11:09 AM    Acceptance, E,TB,D, VU,NR by CHELSEA at 2/4/2019 11:34 AM    Acceptance, E,D, VU,NR by DARRIUS at 2/3/2019  4:47 PM    Acceptance, E,D, DU,NR by DARRYN at 2/2/2019 10:36 AM    Comment:  safety during sit to stand, benefits of activity    Acceptance, E,TB,D, SIOBHAN,NR by CHELSEA at 2/1/2019 10:51 AM    Acceptance, E, NR by MA at 1/31/2019 12:08 PM    Acceptance, E, NR by MA at 1/30/2019 10:23 AM    Acceptance, E,NIKI, VU by YANETH at 1/27/2019  9:10 AM    Acceptance, E, SIOBHAN,NR by MA at 1/25/2019   3:54 PM    Acceptance, E,TB,D, VU,NR by CHELSEA at 1/23/2019  3:14 PM    Acceptance, TB,E, VU,DU by JANNET at 1/18/2019  4:37 PM   Family Acceptance, TB,E, VU,DU by CW at 1/18/2019  4:37 PM                   Point: Body mechanics (Done)     Learning Progress Summary           Patient Acceptance, E, VU,NR by MA at 2/5/2019 11:09 AM    Acceptance, E,TB,D, VU,NR by CHELSEA at 2/4/2019 11:34 AM    Acceptance, E,D, VU,NR by SM at 2/3/2019  4:47 PM    Acceptance, E,D, DU,NR by DARRYN at 2/2/2019 10:36 AM    Comment:  safety during sit to stand, benefits of activity    Acceptance, E, NR by MA at 1/31/2019 12:08 PM    Acceptance, E, NR by MA at 1/30/2019 10:23 AM    Acceptance, E,TB, VU by YANETH at 1/27/2019  9:10 AM    Acceptance, E, VU,NR by MA at 1/25/2019  3:54 PM    Acceptance, E,TB,D, VU,NR by CHELSEA at 1/23/2019  3:14 PM    Acceptance, TB,E, VU,DU by JANNET at 1/18/2019  4:37 PM   Family Acceptance, TB,E, VU,DU by CW at 1/18/2019  4:37 PM                   Point: Precautions (Done)     Learning Progress Summary           Patient Acceptance, E, VU,NR by MA at 2/5/2019 11:09 AM    Acceptance, E,TB,D, VU,NR by CHELSEA at 2/4/2019 11:34 AM    Acceptance, E,D, VU,NR by DARRIUS at 2/3/2019  4:47 PM    Acceptance, E,D, DU,NR by DARRYN at 2/2/2019 10:36 AM    Comment:  safety during sit to stand, benefits of activity    Acceptance, E, NR by MA at 1/31/2019 12:08 PM    Acceptance, E, NR by MA at 1/30/2019 10:23 AM    Acceptance, E,TB, VU by YANETH at 1/27/2019  9:10 AM    Acceptance, E, VU,NR by MA at 1/25/2019  3:54 PM    Acceptance, E,TB,D, VU,NR by  at 1/23/2019  3:14 PM    Acceptance, SHAMA PRINCE VU, DU by CW at 1/18/2019  4:37 PM   Family Acceptance, SHAMA PRINCE VU, DU by CW at 1/18/2019  4:37 PM                               User Key     Initials Effective Dates Name Provider Type Discipline     04/03/18 -  Kae Aldrich, PT Physical Therapist PT    EM 04/03/18 -  Heike Strauss, PT Physical Therapist PT     03/07/18 -  Ksenia Lopez, PTA Physical Therapy  Assistant PT    KH 06/22/16 -  Zuleyma Mancilla, PT Physical Therapist PT    LC 08/02/16 -  Khurram Littlejohn, PT DPT Physical Therapist PT    CW 03/07/18 -  Jose Ott, PTA Physical Therapy Assistant PT    MA 10/19/18 -  Tabatha Lawrence, PT Physical Therapist PT                PT Recommendation and Plan  Anticipated Discharge Disposition (PT): skilled nursing facility  Planned Therapy Interventions (PT Eval): balance training, bed mobility training, home exercise program, strengthening, transfer training  Therapy Frequency (PT Clinical Impression): daily  Outcome Summary/Treatment Plan (PT)  Anticipated Discharge Disposition (PT): skilled nursing facility  Outcome Summary: Pt continues to require max-dependent assist of 2-3 to sit edge of bed. Very weak throughout body. Vitals WNL throughout session. Able to complete seated exercises with assist. Will continue to progress as tolerated.   Outcome Measures     Row Name 02/05/19 1100 02/04/19 1100 02/03/19 1600       How much help from another person do you currently need...    Turning from your back to your side while in flat bed without using bedrails?  2  -MA  2  -CH  2  -SM    Moving from lying on back to sitting on the side of a flat bed without bedrails?  2  -MA  2  -CH  2  -SM    Moving to and from a bed to a chair (including a wheelchair)?  1  -MA  1  -CH  1  -SM    Standing up from a chair using your arms (e.g., wheelchair, bedside chair)?  1  -MA  1  -CH  1  -SM    Climbing 3-5 steps with a railing?  1  -MA  1  -CH  1  -SM    To walk in hospital room?  1  -MA  1  -CH  1  -SM    AM-PAC 6 Clicks Score  8  -MA  8  -CH  8  -SM       Functional Assessment    Outcome Measure Options  AM-PAC 6 Clicks Basic Mobility (PT)  -MA  AM-PAC 6 Clicks Basic Mobility (PT)  -  AM-PAC 6 Clicks Basic Mobility (PT)  -SM      User Key  (r) = Recorded By, (t) = Taken By, (c) = Cosigned By    Initials Name Provider Type    Kae Martinez, PT Physical Therapist    DARRIUS Lopez  Ksenia Ralph, PTA Physical Therapy Assistant    Tabatha Fischer, PT Physical Therapist         Time Calculation:   PT Charges     Row Name 02/05/19 1111             Time Calculation    Start Time  1025  -MA      Stop Time  1042  -MA      Time Calculation (min)  17 min  -MA      PT Received On  02/05/19  -MA      PT - Next Appointment  02/06/19  -MA         Time Calculation- PT    Total Timed Code Minutes- PT  17 minute(s)  -MA        User Key  (r) = Recorded By, (t) = Taken By, (c) = Cosigned By    Initials Name Provider Type    Tabatha Fischer, PT Physical Therapist        Therapy Suggested Charges     Code   Minutes Charges    84113 (CPT®) Hc Pt Neuromusc Re Education Ea 15 Min      81774 (CPT®) Hc Pt Ther Proc Ea 15 Min 15 1    37691 (CPT®) Hc Gait Training Ea 15 Min      33983 (CPT®) Hc Pt Therapeutic Act Ea 15 Min 10 1    71307 (CPT®) Hc Pt Manual Therapy Ea 15 Min      75383 (CPT®) Hc Pt Iontophoresis Ea 15 Min      79513 (CPT®) Hc Pt Elec Stim Ea-Per 15 Min      03270 (CPT®) Hc Pt Ultrasound Ea 15 Min      04577 (CPT®) Hc Pt Self Care/Mgmt/Train Ea 15 Min      94438 (CPT®) Hc Pt Prosthetic (S) Train Initial Encounter, Each 15 Min      01014 (CPT®) Hc Pt Orthotic(S)/Prosthetic(S) Encounter, Each 15 Min      93513 (CPT®) Hc Orthotic(S) Mgmt/Train Initial Encounter, Each 15min      Total  25 2        Therapy Charges for Today     Code Description Service Date Service Provider Modifiers Qty    96691557141 HC PT THER PROC EA 15 MIN 2/5/2019 Tabatha Lawrence, PT GP 1    01624300956 HC PT THER SUPP EA 15 MIN 2/5/2019 Tabatha Lawrence, PT GP 1          PT G-Codes  Outcome Measure Options: AM-PAC 6 Clicks Basic Mobility (PT)  AM-PAC 6 Clicks Score: 8  Score: 6    Tabatha Lawrence PT  2/5/2019

## 2019-02-05 NOTE — THERAPY TREATMENT NOTE
Acute Care - Occupational Therapy Treatment Note  [facility]     Patient Name: Claudia Arnold  : 1945  MRN: [id number]  Today's Date: 2019  Onset of Illness/Injury or Date of Surgery: 19          Admit Date: 2019       ICD-10-CM ICD-9-CM   1. Acute congestive heart failure, unspecified heart failure type (CMS/[facility]) I50.9 428.0   2. Generalized weakness R53.1 780.79   3. Aortic valve stenosis, etiology of cardiac valve disease unspecified I35.0 424.1   4. Tricuspid valve insufficiency, unspecified etiology I07.1 397.0   5. Mitral valve stenosis, unspecified etiology I05.0 394.0   6. Acute renal failure, unspecified acute renal failure type (CMS/[facility]) N17.9 584.9   7. S/P AVR (aortic valve replacement) Z95.2 V43.3     Patient Active Problem List   Diagnosis   • Tear of rotator cuff   • Hypertension   • Generalized anxiety disorder   • Hyperlipidemia   • IFG (impaired fasting glucose)   • Heart murmur, systolic   • Shoulder pain, bilateral   • Pain of both shoulder joints   • Chronic pain of both shoulders   • Acute congestive heart failure (CMS/[facility])   • Obesity, morbid, BMI 50 or higher (CMS/[facility])   • Fungal skin infection   • Cellulitis of lower extremity   • Aortic valve stenosis   • Tricuspid valve insufficiency   • Mitral valve stenosis     Past Medical History:   Diagnosis Date   • A-fib (CMS/[facility])     Meds   • Arthritis     w/Difficult Mobility   • Bilateral lower extremity edema     Legs   • CAD (coronary artery disease)     Affecting LAD    • Cardiomyopathy (CMS/[facility])    • CHF (congestive heart failure) (CMS/[facility])    • Chronic fatigue    • Generalized anxiety disorder    • Hernia, inguinal     Hx Repair   • History of echocardiogram 19-[facility]    The L Ventricular Cavity is Mild-to-Moderately Dilated; Left Ventricular Wall Thickness is Consistent w/Mild Concentric Hypertrophy; Left Atrial Cavity Size is Moderate-to-Severely Dilated; Severe AVS; Severe MVS; Moderate TVR Noted   •  Hyperlipidemia     Controlled w/Meds   • Hypertension     Controlled w/Meds   • Hypokinesis 01/22/2019    Apical Noted on Cardiac Cath   • IFG (impaired fasting glucose)    • LAD stenosis 01/22/2019    Mid LAD Irregularities Noted on Cardiac Cath    • Left atrial dilatation 01/17/2019    Moderate-Severe Noted on Echo   • Left ventricular dilatation 01/17/2019    Noted on Echo/LEE   • Mild concentric left ventricular hypertrophy 01/17/2019    Noted on Echo/LEE   • Mitral annular calcification 01/17/2019    Severe Noted on Echo   • Moderate tricuspid valve regurgitation 01/24/2019    Noted on LEE   • Morbid obesity (CMS/[facility])    • NSTEMI (non-ST elevated myocardial infarction) (CMS/[facility])    • OCTAVIANO (obstructive sleep apnea)    • Osteoarthritis    • Pulmonary hypertension (CMS/[facility]) 01/22/2019    Noted on Cardiac Cath   • Right bundle branch block 01/24/2019    Noted on LEE   • Severe aortic stenosis 01/22/2019    Noted on Cardiac Cath & LEE on 01/24/19   • Severe mitral valve stenosis 01/24/2019    Noted on LEE   • Shoulder pain, bilateral    • Skin yeast infection     Folds Under Skin--Nystatin/Diflucan     Past Surgical History:   Procedure Laterality Date   • AORTIC VALVE REPAIR/REPLACEMENT MITRAL VALVE REPAIR/REPLACEMENT N/A 1/28/2019    Procedure: LEE STERNOTOMY AORTIC VALVE REPLACEMENT, MITRAL VALVE REPLACEMENT, TRICUSPID VALVE REPAIR, MAZE PROCEDURE, CLOSURE OF LEFT ATRIAL APPENDAGE  AND PRP;  Surgeon: Scar Gaxiola MD;  Location: [facility];  Service: Cardiothoracic   • CARDIAC CATHETERIZATION N/A 1/22/2019    Procedure: Coronary angiography;  Surgeon: Rick Talavera MD;  Location: [facility] INVASIVE LOCATION;  Service: Cardiology   • CARDIAC CATHETERIZATION N/A 1/22/2019    Procedure: Left Heart Cath;  Surgeon: Rick Talavera MD;  Location: [facility] INVASIVE LOCATION;  Service: Cardiology   • CARDIAC CATHETERIZATION N/A 1/22/2019    Procedure: Right Heart Cath;  Surgeon: Sadaf  Rick MARSH MD;  Location: [facility] CATH INVASIVE LOCATION;  Service: Cardiology   • CARDIAC CATHETERIZATION N/A 1/22/2019    Procedure: Left ventriculography;  Surgeon: Rick Talavera MD;  Location: [facility] INVASIVE LOCATION;  Service: Cardiology   • COLONOSCOPY     • HERNIA REPAIR      NO FURTHER INFORMATION   • REPLACEMENT TOTAL KNEE Bilateral 2007/2009       Therapy Treatment    Rehabilitation Treatment Summary     Row Name 02/05/19 1431 02/05/19 1042          Treatment Time/Intention    Discipline  occupational therapist  -SG  physical therapist  -MA     Document Type  therapy note (daily note)  -SG  therapy note (daily note)  -MA     Subjective Information  no complaints  -SG  complains of;weakness;fatigue  -MA     Mode of Treatment  individual therapy;occupational therapy  -SG  physical therapy;individual therapy  -MA     Patient/Family Observations  Pt supine in bed  -SG  supine in bed, no acute distress  -MA     Patient Effort  good  -SG  good  -MA     Existing Precautions/Restrictions  cardiac;fall;sternal  -SG  cardiac;fall;sternal  -MA2     Recorded by [SG] Jess Dumont, OTR 02/05/19 1433 [MA] Tabatha Lawrence, PT 02/05/19 1101  [MA2] Tabatha Lawrence, PT 02/05/19 1109     Row Name 02/05/19 1431 02/05/19 1042          Vital Signs    O2 Delivery Pre Treatment  supplemental O2  -SG  supplemental O2  -MA     Recorded by [SG] Jess Dumont, OTR 02/05/19 1433 [MA] Tabatha Lawrence, PT 02/05/19 1109     Row Name 02/05/19 1431 02/05/19 1042          Cognitive Assessment/Intervention- PT/OT    Affect/Mental Status (Cognitive)  WNL  -SG  WNL  -MA     Orientation Status (Cognition)  oriented x 4  -SG  oriented x 4  -MA     Follows Commands (Cognition)  WNL  -SG  WNL  -MA     Cognitive Function (Cognitive)  WNL  -SG  --     Personal Safety Interventions  --  fall prevention program maintained;gait belt;muscle strengthening facilitated;nonskid shoes/slippers when out of bed;supervised activity  -MA      Recorded by [SG] Jess Dumont, OTR 02/05/19 1433 [MA] Tabatha Lawrence, PT 02/05/19 1109     Row Name 02/05/19 1431 02/05/19 1042          Bed Mobility Assessment/Treatment    Supine-Sit [place] (Bed Mobility)  --  maximum assist (25% patient effort);2 person assist  -MA     Sit-Supine [place] (Bed Mobility)  --  maximum assist (25% patient effort);2 person assist  -MA     Bed Mobility, Safety Issues  --  decreased use of arms for pushing/pulling;decreased use of legs for bridging/pushing;impaired trunk control for bed mobility  -MA     Assistive Device (Bed Mobility)  --  bed rails;draw sheet;head of bed elevated  -MA     Comment (Bed Mobility)  Denies, about to have dialysis, needs Ax2  -SG  cues to flex at hips to bring trunk forward  -MA     Recorded by [SG] Jess Dumont, OTR 02/05/19 1433 [MA] Tabatha Lawrence, PT 02/05/19 1109     Row Name 02/05/19 1042             Transfer Assessment/Treatment    Comment (Transfers)  attempted to stand 3x, unable to clear bottom from bed  -MA      Recorded by [MA] Tabatha Lawrence, PT 02/05/19 1111      Row Name 02/05/19 1042             Sit-Stand Transfer    Sit-Stand [place] (Transfers)  maximum assist (25% patient effort);dependent (less than 25% patient effort);2 person assist  -MA      Assistive Device (Sit-Stand Transfers)  -- B HHA  -MA      Recorded by [MA] Tabatha Lawrence, PT 02/05/19 1109      Row Name 02/05/19 1042             Stand-Sit Transfer    Stand-Sit [place] (Transfers)  maximum assist (25% patient effort);dependent (less than 25% patient effort);2 person assist  -MA      Assistive Device (Stand-Sit Transfers)  -- B HHA  -MA      Recorded by [MA] Tabatha Lawrence, PT 02/05/19 1109      Row Name 02/05/19 1431             Upper Extremity Supine Therapeutic Exercise    Performed, Supine Upper Extremity (Therapeutic Exercise)  shoulder flexion/extension;shoulder/scapular protraction/retraction;elbow flexion/extension  -SG      Exercise Type, Supine  Upper Extremity (Therapeutic Exercise)  AAROM (active assistive range of motion)  -SG      Expected Outcomes, Supine Upper Extremity (Therapeutic Exercise)  improve functional tolerance, self-care activity;improve functional tolerance, single extremity activity;improve performance, reaching for objects;improve performance, reaching above shoulder level  -SG      Sets/Reps Detail, Supine Upper Extremity (Therapeutic Exercise)  10x1  -SG      Comment, Supine Upper Extremity (Therapeutic Exercise)  Green foam block for B hand strengthing  -SG      Recorded by [SG] Jess Dumont, OTR 02/05/19 1433      Row Name 02/05/19 1042             Lower Extremity Seated Therapeutic Exercise    Performed, Seated Lower Extremity (Therapeutic Exercise)  LAQ (long arc quad), knee extension;hip flexion/extension  -MA      Exercise Type, Seated Lower Extremity (Therapeutic Exercise)  AROM (active range of motion)  -MA      Sets/Reps Detail, Seated Lower Extremity (Therapeutic Exercise)  10  -MA      Recorded by [MA] Tbaatha Lawrence, PT 02/05/19 1109      Row Name 02/05/19 1042             Static Sitting Balance    Level of [place] (Unsupported Sitting, Static Balance)  contact guard assist  -MA      Sitting Position (Unsupported Sitting, Static Balance)  sitting on edge of bed  -MA      Time Able to Maintain Position (Unsupported Sitting, Static Balance)  4 to 5 minutes  -MA      Recorded by [MA] Tabatha Lawrence, PT 02/05/19 1109      Row Name 02/05/19 1431 02/05/19 1042          Positioning and Restraints    Pre-Treatment Position  in bed  -SG  in bed  -MA     Post Treatment Position  bed  -SG  bed  -MA     In Bed  supine;call light within reach;encouraged to call for assist;exit alarm on;with nsg  -SG  supine;call light within reach;encouraged to call for assist;with nsg with nursing getting up to chair with lift   -MA     Recorded by [SG] Jess Dumont, OTR 02/05/19 1433 [MA] Tabatha Lawrence, PT 02/05/19 1109     Row Name 02/05/19  1431 02/05/19 1042          Pain Scale: Numbers Pre/Post-Treatment    Pain Scale: Numbers, Pretreatment  0/10 - no pain  -SG  0/10 - no pain  -MA     Pain Scale: Numbers, Post-Treatment  --  3/10  -MA     Pain Location - Orientation  --  lower  -MA     Pain Location  --  extremity  -MA     Pain Intervention(s)  --  Repositioned;Ambulation/increased activity  -MA     Recorded by [SG] Jess Dumont, OTR 02/05/19 1433 [MA] Tabatha Lawrence, PT 02/05/19 1109     Row Name                Wound 01/28/19 1138 chest incision    Wound - Properties Group Date first assessed: 01/28/19 [SR] Time first assessed: 1138 [SR] Location: chest [SR] Type: incision [SR] Recorded by:  [SR] Keisha Gandara RN 01/28/19 1138    Row Name                Wound 01/28/19 1215 coccyx unspecified    Wound - Properties Group Date first assessed: 01/28/19 [MB] Time first assessed: 1215 [MB] Present On Admission : yes;picture taken [MB] Location: coccyx [MB] Type: unspecified [MB] Recorded by:  [MB] Julissa Chatterjee RN 01/28/19 1541    Row Name                Wound 01/31/19 2300 Right gluteal pressure injury    Wound - Properties Group Date first assessed: 01/31/19 [NW] Time first assessed: 2300 [NW] Present On Admission : no [NW] Side: Right [NW] Location: gluteal [NW] Type: pressure injury [NW] Stage, Pressure Injury: deep tissue injury [NW] Recorded by:  [NW] Priscilla Willams RN 02/01/19 0414    Row Name                Wound 01/31/19 2000 Left posterior ear pressure injury    Wound - Properties Group Date first assessed: 01/31/19 [NW] Time first assessed: 2000 [NW] Side: Left [NW] Orientation: posterior [NW] Location: ear [NW2] Type: pressure injury [NW] Stage, Pressure Injury: Stage 2;medical device related [NW] Recorded by:  [NW] Priscilla Willams RN 02/01/19 0416 [NW2] Priscilla Willams RN 02/01/19 0417      User Key  (r) = Recorded By, (t) = Taken By, (c) = Cosigned By    Initials Name Effective Dates Discipline     Jess Dumont, OTR 12/26/18 -   OT    Julissa Sweet, RN 06/16/16 -  Nurse    Keisha Fournier RN 06/16/16 -  Nurse    Tabatha Fischer, PT 10/19/18 -  PT    Priscilla Jensen, RN 03/05/18 -  Nurse        Wound 01/28/19 1138 chest incision (Active)   Dressing Appearance open to air 2/5/2019 11:51 AM   Closure Approximated;Open to air 2/5/2019 11:51 AM   Base dry;pink 2/5/2019 11:51 AM   Drainage Amount none 2/5/2019 11:51 AM   Care, Wound cleansed with;antimicrobial agent applied 2/5/2019  7:15 AM   Dressing Care, Wound open to air 2/5/2019  4:00 AM       Wound 01/28/19 1215 coccyx unspecified (Active)   Dressing Appearance open to air 2/5/2019 11:51 AM   Base clean;dry 2/5/2019 11:51 AM   Drainage Amount none 2/5/2019 11:51 AM   Care, Wound barrier applied 2/5/2019  7:15 AM   Dressing Care, Wound open to air 2/5/2019  4:00 AM       Wound 01/31/19 2300 Right gluteal pressure injury (Active)   Dressing Appearance open to air 2/5/2019 11:51 AM   Drainage Amount none 2/5/2019 11:51 AM   Care, Wound barrier applied 2/5/2019  7:15 AM       Wound 01/31/19 2000 Left posterior ear pressure injury (Active)   Dressing Appearance open to air 2/5/2019 11:51 AM   Base pink;clean;dry 2/5/2019 11:51 AM   Drainage Amount none 2/5/2019 11:51 AM       Occupational Therapy Education     Title: PT OT SLP Therapies (In Progress)     Topic: Occupational Therapy (In Progress)     Point: ADL training (In Progress)     Description: Instruct learner(s) on proper safety adaptation and remediation techniques during self care or transfers.   Instruct in proper use of assistive devices.    Learning Progress Summary           Patient Acceptance, E, VU by CW at 1/25/2019  1:26 PM    Comment:  Enncouraged increased participation with ADL's grooming. Discussed safety and positioning.    Acceptance, E, NR by SG at 1/17/2019  2:00 PM    Comment:  safety for adls. May benefit from AE teaching and e/c and w/s teaching   Family Acceptance, E, NR by SG at 1/17/2019  2:00 PM     Comment:  safety for adls. May benefit from AE teaching and e/c and w/s teaching                               User Key     Initials Effective Dates Name Provider Type Discipline     06/08/18 -  Keisha Hernandez OTR Occupational Therapist OT     06/08/18 -  Christina Colmenares OTR Occupational Therapist OT                OT Recommendation and Plan     Plan of Care Review  Plan of Care Reviewed With: patient  Plan of Care Reviewed With: patient  Outcome Summary: Tolerates UE ther ex in supine position to strengthen for ADL tasks.  Outcome Measures     Row Name 02/05/19 1400 02/05/19 1100 02/04/19 1100       How much help from another person do you currently need...    Turning from your back to your side while in flat bed without using bedrails?  --  2  -MA  2  -CH    Moving from lying on back to sitting on the side of a flat bed without bedrails?  --  2  -MA  2  -CH    Moving to and from a bed to a chair (including a wheelchair)?  --  1  -MA  1  -CH    Standing up from a chair using your arms (e.g., wheelchair, bedside chair)?  --  1  -MA  1  -CH    Climbing 3-5 steps with a railing?  --  1  -MA  1  -CH    To walk in hospital room?  --  1  -MA  1  -CH    AM-PAC 6 Clicks Score  --  8  -MA  8  -CH       How much help from another is currently needed...    Putting on and taking off regular lower body clothing?  1  -SG  --  --    Bathing (including washing, rinsing, and drying)  1  -SG  --  --    Toileting (which includes using toilet bed pan or urinal)  1  -SG  --  --    Putting on and taking off regular upper body clothing  2  -SG  --  --    Taking care of personal grooming (such as brushing teeth)  2  -SG  --  --    Eating meals  2  -SG  --  --    Score  9  -SG  --  --       Functional Assessment    Outcome Measure Options  AM-PAC 6 Clicks Daily Activity (OT)  -SG  AM-PAC 6 Clicks Basic Mobility (PT)  -MA  AM-PAC 6 Clicks Basic Mobility (PT)  -CH    Row Name 02/03/19 1600             How much help from another person  do you currently need...    Turning from your back to your side while in flat bed without using bedrails?  2  -SM      Moving from lying on back to sitting on the side of a flat bed without bedrails?  2  -SM      Moving to and from a bed to a chair (including a wheelchair)?  1  -SM      Standing up from a chair using your arms (e.g., wheelchair, bedside chair)?  1  -SM      Climbing 3-5 steps with a railing?  1  -SM      To walk in hospital room?  1  -SM      AM-PAC 6 Clicks Score  8  -SM         Functional Assessment    Outcome Measure Options  AM-PAC 6 Clicks Basic Mobility (PT)  -SM        User Key  (r) = Recorded By, (t) = Taken By, (c) = Cosigned By    Initials Name Provider Type    Jess Potts OTR Occupational Therapist    Kae Martinez, PT Physical Therapist    Ksenia Swann, PTA Physical Therapy Assistant    Tabatha Fischer, PT Physical Therapist           Time Calculation:   Time Calculation- OT     Row Name 02/05/19 1434             Time Calculation- OT    OT Start Time  1347  -SG      OT Stop Time  1359  -SG      OT Time Calculation (min)  12 min  -SG      Total Timed Code Minutes- OT  12 minute(s)  -SG      OT Received On  02/05/19  -        User Key  (r) = Recorded By, (t) = Taken By, (c) = Cosigned By    Initials Name Provider Type    Jess Potts OTR Occupational Therapist           Therapy Suggested Charges     Code   Minutes Charges    None           Therapy Charges for Today     Code Description Service Date Service Provider Modifiers Qty    [id number]  OT THER PROC EA 15 MIN 2/5/2019 Jess Dumont OTR GO 1               JANELLE Hernandez  2/5/2019

## 2019-02-05 NOTE — PROGRESS NOTES
"   LOS: 20 days    Patient Care Team:  Robert Cortez MD as PCP - General (Family Medicine)  Deborah Agudelo MD as Surgeon (Orthopedic Surgery)    Chief Complaint:    Chief Complaint   Patient presents with   • Shortness of Breath   • Leg Swelling     Follow UP LAVERNE  Subjective     Interval History:   UOP NR yest s/p bumex 4mg IV x2 (though has andrea).  Approx 150cc in bag now.  Pt sleeping.    Objective     Vital Signs  Temp:  [97.2 °F (36.2 °C)-97.7 °F (36.5 °C)] 97.3 °F (36.3 °C)  Heart Rate:  [56-80] 66  Resp:  [16-18] 16  BP: ()/(65-82) 133/75    Flowsheet Rows      First Filed Value   Admission Height  170.2 cm (67\") Documented at 01/16/2019 0910   Admission Weight  145 kg (320 lb)  (Abnormal)  Documented at 01/16/2019 0921          I/O this shift:  In: 100 [P.O.:100]  Out: -   I/O last 3 completed shifts:  In: 1443.3 [P.O.:565; I.V.:408.3; Other:120; NG/GT:350]  Out: 3020 [Urine:620; Other:2400]    Intake/Output Summary (Last 24 hours) at 2/5/2019 0146  Last data filed at 2/4/2019 2104  Gross per 24 hour   Intake 490 ml   Output 45 ml   Net 445 ml       Physical Exam:  GEN sleeping comfortably   Neck RIJ shiley  Lungs dec BS bilat no rales  CV RRR   abd soft NT/ND  vasc 1+ BLE edema   +andrea scant urine     Results Review:    Results from last 7 days   Lab Units 02/04/19  0337 02/03/19  0259 02/02/19  0252  02/01/19  0259   SODIUM mmol/L 136 136 138  --  143   POTASSIUM mmol/L 4.4 4.2 4.0   < > 3.3*   CHLORIDE mmol/L 98 97* 99  --  101   CO2 mmol/L 24.5 26.0 25.2  --  23.7   BUN mg/dL 52* 69* 37*  --  50*   CREATININE mg/dL 2.07* 2.93* 2.06*  --  2.83*   CALCIUM mg/dL 9.3 9.6 9.3  --  9.6   BILIRUBIN mg/dL  --   --   --   --  0.4   ALK PHOS U/L  --   --   --   --  71   ALT (SGPT) U/L  --   --   --   --  <5   AST (SGOT) U/L  --   --   --   --  22   GLUCOSE mg/dL 211* 128* 117*  --  122*    < > = values in this interval not displayed.       Estimated Creatinine Clearance: 38.6 mL/min (A) (by C-G " formula based on SCr of 2.07 mg/dL (H)).    Results from last 7 days   Lab Units 02/04/19  0337 02/03/19  0259 02/01/19  0259 01/29/19  0241   MAGNESIUM mg/dL 2.2  --   --   --  2.1   PHOSPHORUS mg/dL 3.2 3.8 3.7   < > 2.6    < > = values in this interval not displayed.       Results from last 7 days   Lab Units 01/30/19  0259   URIC ACID mg/dL 8.5*       Results from last 7 days   Lab Units 02/04/19  0337 02/03/19  0259 02/02/19  1830 02/01/19  0259 01/31/19  0601   WBC 10*3/mm3 17.10* 13.71* 14.11* 15.14* 21.38*   HEMOGLOBIN g/dL 9.2* 9.3* 9.0* 8.9* 9.1*   PLATELETS 10*3/mm3 193 202 202 164 171       Results from last 7 days   Lab Units 02/02/19  1830 01/29/19  0241   INR  1.32* 1.35*         Imaging Results (last 24 hours)     ** No results found for the last 24 hours. **          acetaminophen 500 mg Oral Q4H   aspirin 81 mg Oral Daily   cetaphil  Topical Q12H   chlorhexidine 15 mL Mouth/Throat Q12H   enoxaparin 30 mg Subcutaneous Nightly   escitalopram 10 mg Oral Daily   hydrocortisone 1 application Topical Q6H   ipratropium-albuterol 3 mL Nebulization BID - RT   lansoprazole 30 mg Nasogastric BID AC   mupirocin  Each Nare BID   nystatin  Topical Q12H   sennosides-docusate sodium 2 tablet Oral Nightly   sodium chloride 4 mL Nebulization BID - RT       niCARdipine 5-15 mg/hr    sodium chloride 30 mL/hr Last Rate: 30 mL/hr (01/28/19 1215)       Medication Review:   Current Facility-Administered Medications   Medication Dose Route Frequency Provider Last Rate Last Dose   • acetaminophen (TYLENOL) tablet 500 mg  500 mg Oral Q4H Yahaira Garza APRN   500 mg at 02/04/19 2104   • ALPRAZolam (XANAX) tablet 0.25 mg  0.25 mg Oral Q8H PRN Scar Gaxiola MD       • aspirin chewable tablet 81 mg  81 mg Oral Daily Scar Gaxiola MD   81 mg at 02/04/19 1140   • bisacodyl (DULCOLAX) EC tablet 10 mg  10 mg Oral Daily PRN Scar Gaxiola MD       • bisacodyl (DULCOLAX) suppository 10 mg  10 mg Rectal Daily PRN Khalida,  MD Scar   10 mg at 02/03/19 2120   • cetaphil lotion   Topical Q12H Zonia Lopez MD       • chlorhexidine (PERIDEX) 0.12 % solution 15 mL  15 mL Mouth/Throat Q12H Scar Gaxiola MD   15 mL at 02/03/19 2119   • cyclobenzaprine (FLEXERIL) tablet 10 mg  10 mg Oral Q8H PRN Scar Gaxiola MD       • dextrose (D50W) 25 g/ 50mL Intravenous Solution 25 g  25 g Intravenous Q15 Min PRN Oscar Rodriguez MD       • dextrose (GLUTOSE) oral gel 15 g  15 g Oral Q15 Min PRN Oscar Rodriguez MD       • enoxaparin (LOVENOX) syringe 30 mg  30 mg Subcutaneous Nightly Alicia Schmitz APRN       • escitalopram (LEXAPRO) tablet 10 mg  10 mg Oral Daily Oscar Rodriguez MD   10 mg at 02/04/19 1141   • glucagon (human recombinant) (GLUCAGEN DIAGNOSTIC) injection 1 mg  1 mg Subcutaneous PRN Oscar Rodriguez MD       • hydrocortisone 1 % cream 1 application  1 application Topical Q6H Fran Tilley MD   1 application at 02/05/19 0057   • ipratropium-albuterol (DUO-NEB) nebulizer solution 3 mL  3 mL Nebulization BID - RT Yahaira Garza APRN   3 mL at 02/04/19 1950   • ipratropium-albuterol (DUO-NEB) nebulizer solution 3 mL  3 mL Nebulization Q6H PRN Yahaira Garza APRN       • lansoprazole (FIRST) oral suspension 30 mg  30 mg Nasogastric BID AC Scar Gaxiola MD   30 mg at 02/04/19 0643   • magic mouthwash oral supsension 5 mL  5 mL Swish & Spit Q4H PRN Yahaira Garza APRN       • magnesium hydroxide (MILK OF MAGNESIA) suspension 2400 mg/10mL 10 mL  10 mL Oral Daily PRN Scar Gaxiola MD       • mupirocin (BACTROBAN) 2 % nasal ointment   Each Nare BID Scar Gaxiola MD   10 application at 02/04/19 2056   • niCARdipine (CARDENE) 25 mg/250 mL (0.1 mg/mL) NS infusion kit  5-15 mg/hr Intravenous Continuous PRN Scar Gaxiola MD       • nystatin (MYCOSTATIN) powder   Topical Q12H Scar Gaxiola MD   1 application at 02/04/19 2056   • ondansetron (ZOFRAN) injection 4 mg  4 mg Intravenous  Q6H PRN Scar Gaxiola MD   4 mg at 02/04/19 0906   • oxyCODONE (ROXICODONE) immediate release tablet 5 mg  5 mg Oral Q4H PRN Yahaira Garza APRN   5 mg at 02/03/19 1504   • promethazine (PHENERGAN) tablet 12.5 mg  12.5 mg Oral Q6H PRN Scar Gaxiola MD        Or   • promethazine (PHENERGAN) injection 12.5 mg  12.5 mg Intravenous Q6H PRN Scar Gaxiola MD       • sennosides-docusate sodium (SENOKOT-S) 8.6-50 MG tablet 2 tablet  2 tablet Oral Nightly Scar Gaxiola MD   2 tablet at 02/04/19 2056   • sodium chloride 0.9 % flush 30 mL  30 mL Intravenous Once PRN Scar Gaxiola MD       • sodium chloride 0.9 % infusion  30 mL/hr Intravenous Continuous PRN Scar Gaxiola MD 30 mL/hr at 01/28/19 1215 30 mL/hr at 01/28/19 1215   • sodium chloride 7 % nebulizer solution nebulizer solution 4 mL  4 mL Nebulization BID - RT Yahaira Garza APRN   4 mL at 02/04/19 1950       Assessment/Plan   oliguric LAVERNE - suspect ischemic ATN post AVR/MVR.  No role of contrast exposure from remote [facility].  HD initiated on 1-30-19, dialyzed on SUN.  UOP appears poor still (not recorded yest).  Has ROCHELLE perez, may need TDC if no e/o recovery in next couple days.  UA: mod blood, 30 protein.  US: no hydro.  Still expect eventual renal recovery with BL Cr ~ 1.       VHD, POD7 AVR, MVR, tricuspic bicuspidization, and left cryo MAZE with PENNIE ligation  Cardiogenic shock - off neosynephrine, on low-dose dopamine   Volume overload - CXR 2/3 with persistent mild edema, small effusions  -- periph edema down significantly  Acute resp failure - extubated, on NC o2  Moderate pulm HTN, s/p RHC 1/22/19   Candida intertrigo - treated  Anemia of ABL - Hgb stable  AFIB   Leukocytosis - WBC up 17K yest    Plan  - follow-up labs this AM but anticipate need for HD   - plan for TDC when WBC stabilizes         Acute congestive heart failure (CMS/HCC)    Hypertension    Generalized anxiety disorder    Hyperlipidemia    IFG (impaired fasting  glucose)    Heart murmur, systolic    Obesity, morbid, BMI 50 or higher (CMS/HCC)    Fungal skin infection    Cellulitis of lower extremity    Aortic valve stenosis    Tricuspid valve insufficiency    Mitral valve stenosis              Wallace Falcon MD  02/05/19  1:46 AM

## 2019-02-05 NOTE — PLAN OF CARE
Problem: Fall Risk (Adult)  Goal: Absence of Fall  Outcome: Ongoing (interventions implemented as appropriate)      Problem: Patient Care Overview  Goal: Plan of Care Review  Outcome: Ongoing (interventions implemented as appropriate)   02/05/19 0154   Coping/Psychosocial   Plan of Care Reviewed With patient   Plan of Care Review   Progress improving   OTHER   Outcome Summary VSS. Nocturnal tube feedings in place. Will continue to monitor       Problem: Pain, Acute (Adult)  Goal: Acceptable Pain Control/Comfort Level  Outcome: Ongoing (interventions implemented as appropriate)      Problem: Skin Injury Risk (Adult)  Goal: Skin Health and Integrity  Outcome: Ongoing (interventions implemented as appropriate)      Problem: Cardiac Surgery (Adult)  Goal: Signs and Symptoms of Listed Potential Problems Will be Absent, Minimized or Managed (Cardiac Surgery)  Outcome: Ongoing (interventions implemented as appropriate)      Problem: Nutrition, Enteral (Adult)  Goal: Signs and Symptoms of Listed Potential Problems Will be Absent, Minimized or Managed (Nutrition, Enteral)  Outcome: Ongoing (interventions implemented as appropriate)

## 2019-02-06 LAB
ALBUMIN SERPL-MCNC: 3.8 G/DL (ref 3.5–5.2)
ANION GAP SERPL CALCULATED.3IONS-SCNC: 15.7 MMOL/L
BASOPHILS # BLD AUTO: 0.02 10*3/MM3 (ref 0–0.2)
BASOPHILS NFR BLD AUTO: 0.1 % (ref 0–1.5)
BUN BLD-MCNC: 53 MG/DL (ref 8–23)
BUN/CREAT SERPL: 25.4 (ref 7–25)
CALCIUM SPEC-SCNC: 9.6 MG/DL (ref 8.6–10.5)
CHLORIDE SERPL-SCNC: 95 MMOL/L (ref 98–107)
CO2 SERPL-SCNC: 26.3 MMOL/L (ref 22–29)
CREAT BLD-MCNC: 2.09 MG/DL (ref 0.57–1)
DEPRECATED RDW RBC AUTO: 51.4 FL (ref 37–54)
EOSINOPHIL # BLD AUTO: 0.08 10*3/MM3 (ref 0–0.7)
EOSINOPHIL NFR BLD AUTO: 0.5 % (ref 0.3–6.2)
ERYTHROCYTE [DISTWIDTH] IN BLOOD BY AUTOMATED COUNT: 15.2 % (ref 11.7–13)
GFR SERPL CREATININE-BSD FRML MDRD: 23 ML/MIN/1.73
GLUCOSE BLD-MCNC: 94 MG/DL (ref 65–99)
HCT VFR BLD AUTO: 26 % (ref 35.6–45.5)
HGB BLD-MCNC: 8.1 G/DL (ref 11.9–15.5)
IMM GRANULOCYTES # BLD AUTO: 0.76 10*3/MM3 (ref 0–0.03)
IMM GRANULOCYTES NFR BLD AUTO: 4.9 % (ref 0–0.5)
LYMPHOCYTES # BLD AUTO: 0.73 10*3/MM3 (ref 0.9–4.8)
LYMPHOCYTES NFR BLD AUTO: 4.7 % (ref 19.6–45.3)
MCH RBC QN AUTO: 28.9 PG (ref 26.9–32)
MCHC RBC AUTO-ENTMCNC: 31.2 G/DL (ref 32.4–36.3)
MCV RBC AUTO: 92.9 FL (ref 80.5–98.2)
MONOCYTES # BLD AUTO: 1.58 10*3/MM3 (ref 0.2–1.2)
MONOCYTES NFR BLD AUTO: 10.2 % (ref 5–12)
NEUTROPHILS # BLD AUTO: 13.1 10*3/MM3 (ref 1.9–8.1)
NEUTROPHILS NFR BLD AUTO: 84.5 % (ref 42.7–76)
PHOSPHATE SERPL-MCNC: 4.6 MG/DL (ref 2.5–4.5)
PLAT MORPH BLD: NORMAL
PLATELET # BLD AUTO: 191 10*3/MM3 (ref 140–500)
PMV BLD AUTO: 10.4 FL (ref 6–12)
POTASSIUM BLD-SCNC: 4.5 MMOL/L (ref 3.5–5.2)
RBC # BLD AUTO: 2.8 10*6/MM3 (ref 3.9–5.2)
RBC MORPH BLD: NORMAL
SODIUM BLD-SCNC: 137 MMOL/L (ref 136–145)
TSH SERPL DL<=0.05 MIU/L-ACNC: 4.81 MIU/ML (ref 0.27–4.2)
WBC MORPH BLD: NORMAL
WBC NRBC COR # BLD: 15.51 10*3/MM3 (ref 4.5–10.7)

## 2019-02-06 PROCEDURE — 80069 RENAL FUNCTION PANEL: CPT | Performed by: INTERNAL MEDICINE

## 2019-02-06 PROCEDURE — 94799 UNLISTED PULMONARY SVC/PX: CPT

## 2019-02-06 PROCEDURE — 85007 BL SMEAR W/DIFF WBC COUNT: CPT | Performed by: INTERNAL MEDICINE

## 2019-02-06 PROCEDURE — 99232 SBSQ HOSP IP/OBS MODERATE 35: CPT | Performed by: NURSE PRACTITIONER

## 2019-02-06 PROCEDURE — 84443 ASSAY THYROID STIM HORMONE: CPT | Performed by: INTERNAL MEDICINE

## 2019-02-06 PROCEDURE — 25010000002 ENOXAPARIN PER 10 MG: Performed by: NURSE PRACTITIONER

## 2019-02-06 PROCEDURE — 92526 ORAL FUNCTION THERAPY: CPT

## 2019-02-06 PROCEDURE — 99024 POSTOP FOLLOW-UP VISIT: CPT | Performed by: THORACIC SURGERY (CARDIOTHORACIC VASCULAR SURGERY)

## 2019-02-06 PROCEDURE — 85025 COMPLETE CBC W/AUTO DIFF WBC: CPT | Performed by: INTERNAL MEDICINE

## 2019-02-06 PROCEDURE — 97110 THERAPEUTIC EXERCISES: CPT

## 2019-02-06 RX ORDER — BUMETANIDE 2 MG/1
4 TABLET ORAL DAILY
Status: DISCONTINUED | OUTPATIENT
Start: 2019-02-06 | End: 2019-02-08

## 2019-02-06 RX ADMIN — PANTOPRAZOLE SODIUM 40 MG: 40 TABLET, DELAYED RELEASE ORAL at 06:49

## 2019-02-06 RX ADMIN — EMOLLIENT - LOTION: LOTION at 20:30

## 2019-02-06 RX ADMIN — SODIUM CHLORIDE 4 ML: 7 NEBU SOLN,3 % NEBU at 20:03

## 2019-02-06 RX ADMIN — NYSTATIN: 100000 POWDER TOPICAL at 20:30

## 2019-02-06 RX ADMIN — ACETAMINOPHEN 500 MG: 500 TABLET, FILM COATED ORAL at 06:49

## 2019-02-06 RX ADMIN — HYDROCORTISONE 1 APPLICATION: 1 CREAM TOPICAL at 18:07

## 2019-02-06 RX ADMIN — ACETAMINOPHEN 500 MG: 500 TABLET, FILM COATED ORAL at 22:50

## 2019-02-06 RX ADMIN — HYDROCORTISONE 1 APPLICATION: 1 CREAM TOPICAL at 11:11

## 2019-02-06 RX ADMIN — EMOLLIENT - LOTION: LOTION at 08:51

## 2019-02-06 RX ADMIN — ESCITALOPRAM 10 MG: 10 TABLET, FILM COATED ORAL at 08:51

## 2019-02-06 RX ADMIN — ENOXAPARIN SODIUM 30 MG: 30 INJECTION SUBCUTANEOUS at 08:51

## 2019-02-06 RX ADMIN — SODIUM CHLORIDE 4 ML: 7 NEBU SOLN,3 % NEBU at 08:16

## 2019-02-06 RX ADMIN — BUMETANIDE 4 MG: 2 TABLET ORAL at 12:24

## 2019-02-06 RX ADMIN — ACETAMINOPHEN 500 MG: 500 TABLET, FILM COATED ORAL at 11:11

## 2019-02-06 RX ADMIN — HYDROCORTISONE 1 APPLICATION: 1 CREAM TOPICAL at 06:49

## 2019-02-06 RX ADMIN — ACETAMINOPHEN 500 MG: 500 TABLET, FILM COATED ORAL at 17:30

## 2019-02-06 RX ADMIN — MUPIROCIN 10 APPLICATION: 20 OINTMENT TOPICAL at 08:51

## 2019-02-06 RX ADMIN — ACETAMINOPHEN 500 MG: 500 TABLET, FILM COATED ORAL at 03:02

## 2019-02-06 RX ADMIN — HYDROCORTISONE 1 APPLICATION: 1 CREAM TOPICAL at 03:02

## 2019-02-06 RX ADMIN — IPRATROPIUM BROMIDE AND ALBUTEROL SULFATE 3 ML: 2.5; .5 SOLUTION RESPIRATORY (INHALATION) at 20:02

## 2019-02-06 RX ADMIN — HYDROCORTISONE 1 APPLICATION: 1 CREAM TOPICAL at 23:22

## 2019-02-06 RX ADMIN — ACETAMINOPHEN 500 MG: 500 TABLET, FILM COATED ORAL at 15:11

## 2019-02-06 RX ADMIN — MUPIROCIN 10 APPLICATION: 20 OINTMENT TOPICAL at 20:30

## 2019-02-06 RX ADMIN — Medication 81 MG: at 08:51

## 2019-02-06 RX ADMIN — NYSTATIN: 100000 POWDER TOPICAL at 08:51

## 2019-02-06 RX ADMIN — IPRATROPIUM BROMIDE AND ALBUTEROL SULFATE 3 ML: 2.5; .5 SOLUTION RESPIRATORY (INHALATION) at 08:17

## 2019-02-06 NOTE — THERAPY TREATMENT NOTE
Acute Care - Speech Language Pathology   Swallow Treatment Note [facility]     Patient Name: Claudia Arnold  : 1945  MRN: [id number]  Today's Date: 2019  Onset of Illness/Injury or Date of Surgery: 19            Admit Date: 2019    Visit Dx:      ICD-10-CM ICD-9-CM   1. Acute congestive heart failure, unspecified heart failure type (CMS/HCC) I50.9 428.0   2. Generalized weakness R53.1 780.79   3. Aortic valve stenosis, etiology of cardiac valve disease unspecified I35.0 424.1   4. Tricuspid valve insufficiency, unspecified etiology I07.1 397.0   5. Mitral valve stenosis, unspecified etiology I05.0 394.0   6. Acute renal failure, unspecified acute renal failure type (CMS/HCC) N17.9 584.9   7. S/P AVR (aortic valve replacement) Z95.2 V43.3     Patient Active Problem List   Diagnosis   • Tear of rotator cuff   • Hypertension   • Generalized anxiety disorder   • Hyperlipidemia   • IFG (impaired fasting glucose)   • Heart murmur, systolic   • Shoulder pain, bilateral   • Pain of both shoulder joints   • Chronic pain of both shoulders   • Acute congestive heart failure (CMS/HCC)   • Obesity, morbid, BMI 50 or higher (CMS/HCC)   • Fungal skin infection   • Cellulitis of lower extremity   • Aortic valve stenosis   • Tricuspid valve insufficiency   • Mitral valve stenosis       Therapy Treatment  Rehabilitation Treatment Summary     Row Name 19 0900          Discipline  speech language pathologist  -ML    Document Type  therapy note (daily note)  -ML    Subjective Information  no complaints  -ML    Mode of Treatment  speech-language pathology  -ML    Patient/Family Observations  Pt alert, sitting upright in chair, and agreeable to therapy.   -ML    Care Plan Review  evaluation/treatment results reviewed;care plan/treatment goals reviewed  -ML    Patient Effort  good  -ML    Recorded by [ML] Vaishnavi Trevizo MS CCC-SLP 19 0921    Row Name                Wound 19 1138 chest  incision    Wound - Properties Group Date first assessed: 01/28/19 [SR] Time first assessed: 1138 [SR] Location: chest [SR] Type: incision [SR] Recorded by:  [SR] Keisha Gandara RN 01/28/19 1138    Row Name                Wound 01/28/19 1215 coccyx unspecified    Wound - Properties Group Date first assessed: 01/28/19 [MB] Time first assessed: 1215 [MB] Present On Admission : yes;picture taken [MB] Location: coccyx [MB] Type: unspecified [MB] Recorded by:  [MB] Julissa Chatterjee RN 01/28/19 1541    Row Name                Wound 01/31/19 2300 Right gluteal pressure injury    Wound - Properties Group Date first assessed: 01/31/19 [NW] Time first assessed: 2300 [NW] Present On Admission : no [NW] Side: Right [NW] Location: gluteal [NW] Type: pressure injury [NW] Stage, Pressure Injury: deep tissue injury [NW] Recorded by:  [NW] Priscilla Willams RN 02/01/19 0414    Row Name                Wound 01/31/19 2000 Left posterior ear pressure injury    Wound - Properties Group Date first assessed: 01/31/19 [NW] Time first assessed: 2000 [NW] Side: Left [NW] Orientation: posterior [NW] Location: ear [NW2] Type: pressure injury [NW] Stage, Pressure Injury: Stage 2;medical device related [NW] Recorded by:  [NW] Priscilla Willams RN 02/01/19 0416 [NW2] Priscilla Willams RN 02/01/19 0417      User Key  (r) = Recorded By, (t) = Taken By, (c) = Cosigned By    Initials Name Effective Dates Discipline    Julissa Sweet RN 06/16/16 -  Nurse    Keisha Fournier RN 06/16/16 -  Nurse    Priscilla Jensen RN 03/05/18 -  Nurse    Vaishnavi Barth MS CCC-SLP 10/04/18 -  SLP          Outcome Summary         SLP GOALS     Row Name 02/06/19 0900          Oral Nutrition/Hydration Goal 1, SLP  Pt will safely swallow regular diet and thin liquids without overt signs/symptoms of penetration/aspiration.  -ML    Time Frame (Oral Nutrition/Hydration Goal 1, SLP)  by discharge  -ML    Barriers (Oral Nutrition/Hydration Goal 1, SLP)  Progress: Pt seen  for diet tolerance during trials of thin liquids via straw (single and consecutive), puree x6, and regular solid x4. Pt independently took small drinks and small bites. Pt exhibited functional oral phase with no presence of oral residue and no overt signs/symptoms of penetration/aspiration during any trails of consistencies assessed. Pt appears appropriate to contiue regular diet and thin liquids. No further SLP services warranted at this time.   -ML    Progress/Outcomes (Oral Nutrition/Hydration Goal 1, SLP)  goal met  -ML      User Key  (r) = Recorded By, (t) = Taken By, (c) = Cosigned By    Initials Name Provider Type    Vaishnavi Barth MS CCC-SLP Speech and Language Pathologist          EDUCATION  The patient has been educated in the following areas:   Dysphagia (Swallowing Impairment).    SLP Recommendation and Plan       Plan of Care Reviewed With: patient  Plan of Care Review  Plan of Care Reviewed With: patient  Outcome Summary: Progress: Pt seen for diet tolerance during trials of thin liquids via straw (single and consecutive), puree x6, and regular solid x4. Pt independently took small drinks and small bites. Pt exhibited functional oral phase with no presence of oral residue and no overt signs/symptoms of penetration/aspiration during any trails of consistencies assessed. Pt appears appropriate to contiue regular diet and thin liquids. No further SLP services warranted at this time.        SLP Outcome Measures (last 72 hours)      SLP Outcome Measures     Row Name 02/06/19 0900             SLP Outcome Measures    Outcome Measure Used?  Adult NOMS  -ML         Adult FCM Scores    FCM Chosen  Swallowing  -ML      Swallowing FCM Score  7  -ML        User Key  (r) = Recorded By, (t) = Taken By, (c) = Cosigned By    Initials Name Effective Dates    Vaishnavi Barth MS CCC-SLP 10/04/18 -              Time Calculation:   Time Calculation- SLP     Row Name 02/06/19 0927             Time  Calculation- SLP    SLP Start Time  0900  -ML      SLP Received On  02/06/19  -ML        User Key  (r) = Recorded By, (t) = Taken By, (c) = Cosigned By    Initials Name Provider Type    Vaishnavi Barth MS CCC-SLP Speech and Language Pathologist          Therapy Charges for Today     Code Description Service Date Service Provider Modifiers Qty    [id number]  ST TREATMENT SWALLOW 3 2/6/2019 Vaishnavi Trevizo MS CCC-SLP GN 1                 Vaishnavi Trevizo MS CCC-CINDA  2/6/2019

## 2019-02-06 NOTE — PROGRESS NOTES
Dr. Jarrell and I reviewed tele-she has some occasional times that she drops and paces at 50 for short periods-will turn back up pacing rate to 30 to see if she has any prolonged periods of bradycardia but at this time don't think she is going to require perm pacemaker, will continue to monitor.

## 2019-02-06 NOTE — PLAN OF CARE
Problem: Fall Risk (Adult)  Goal: Absence of Fall  Outcome: Ongoing (interventions implemented as appropriate)      Problem: Patient Care Overview  Goal: Plan of Care Review  Outcome: Ongoing (interventions implemented as appropriate)   02/06/19 0209   Coping/Psychosocial   Plan of Care Reviewed With patient   Plan of Care Review   Progress improving   OTHER   Outcome Summary VSS. 3 L removed during hemiodilaysis yesterday. Complaints of pain, medicated per MAR. Will continue to monitor       Problem: Pain, Acute (Adult)  Goal: Acceptable Pain Control/Comfort Level  Outcome: Outcome(s) achieved Date Met: 02/06/19      Problem: Activity Intolerance (Adult)  Goal: Activity Tolerance  Outcome: Ongoing (interventions implemented as appropriate)      Problem: Skin Injury Risk (Adult)  Goal: Skin Health and Integrity  Outcome: Ongoing (interventions implemented as appropriate)      Problem: Cardiac Surgery (Adult)  Goal: Signs and Symptoms of Listed Potential Problems Will be Absent, Minimized or Managed (Cardiac Surgery)  Outcome: Ongoing (interventions implemented as appropriate)

## 2019-02-06 NOTE — PLAN OF CARE
Problem: Patient Care Overview  Goal: Plan of Care Review  Outcome: Ongoing (interventions implemented as appropriate)   02/06/19 1022   Coping/Psychosocial   Plan of Care Reviewed With patient   Plan of Care Review   Progress no change   OTHER   Outcome Summary Pt is unable to come to stand with assist of 2 people from chair today. Able to perform LE exercises with verbal cues. Only safe method for bed to chair transfers is mechanical lift at this time. Slow progress due to overall weakness. Pt will continue to benefit from skilled PT to work on strengthening, bed mob, and transfers to work on improved function.

## 2019-02-06 NOTE — PROGRESS NOTES
"    Patient Name: Claudia Arnold  :1945  73 y.o.      Patient Care Team:  Robert Cortze MD as PCP - General (Family Medicine)  Deborah Agudelo MD as Surgeon (Orthopedic Surgery)    Chief Complaint:  F/U aortic and mitral valve replacements, tricuspid valve repair, PAF with RVR, acute diastolic HF and valvular HF.    Interval History: Postop day #9.  Patient denies any shortness of breath or chest pain today.  Patient states that she is still not been able to ambulate secondary to generalized weakness.  She reports that she is working with physical therapy daily.  She states that she has pressure with how long she has been in the hospital.  She has not had any further nosebleeds.       Objective   Vital Signs  Temp:  [97.7 °F (36.5 °C)-98.6 °F (37 °C)] 97.7 °F (36.5 °C)  Heart Rate:  [55-77] 75  Resp:  [18-20] 18  BP: ()/(46-85) 95/63    Intake/Output Summary (Last 24 hours) at 2019 0917  Last data filed at 2019 0751  Gross per 24 hour   Intake 340 ml   Output 3217 ml   Net -2877 ml     Flowsheet Rows      First Filed Value   Admission Height  170.2 cm (67\") Documented at 2019 0910   Admission Weight  145 kg (320 lb)  (Abnormal)  Documented at 2019 0921            Physical Exam   Constitutional: She is oriented to person, place, and time. Vital signs are normal. She appears well-developed and well-nourished. She is active.   Obesity     Eyes: Conjunctivae are normal.   Neck: Carotid bruit is not present.   Cardiovascular: Normal rate. An irregularly irregular rhythm present.   Murmur heard.   Systolic murmur is present with a grade of 2/6 at the upper right sternal border. Continues with some intermittent paced beats  Pulmonary/Chest: Breath sounds normal.   Abdominal: Normal appearance.   Musculoskeletal:   No cyanosis, clubbing, edema  Normal ROM   Neurological: She is alert and oriented to person, place, and time. GCS eye subscore is 4. GCS verbal subscore is 5. GCS motor " subscore is 6.   Skin: Skin is warm, dry and intact.   Psychiatric: She has a normal mood and affect. Her speech is normal and behavior is normal. Judgment and thought content normal. Cognition and memory are normal.   Vitals reviewed.      Results Review:    Results from last 7 days   Lab Units 02/06/19  0304   SODIUM mmol/L 137   POTASSIUM mmol/L 4.5   CHLORIDE mmol/L 95*   CO2 mmol/L 26.3   BUN mg/dL 53*   CREATININE mg/dL 2.09*   GLUCOSE mg/dL 94   CALCIUM mg/dL 9.6         Results from last 7 days   Lab Units 02/06/19  0304   WBC 10*3/mm3 15.51*   HEMOGLOBIN g/dL 8.1*   HEMATOCRIT % 26.0*   PLATELETS 10*3/mm3 191     Results from last 7 days   Lab Units 02/04/19  0021 02/03/19  1643 02/03/19  1015  02/02/19  1830   INR   --   --   --   --  1.32*   APTT seconds 86.1* 59.2* 63.3*   < > 30.5    < > = values in this interval not displayed.     Results from last 7 days   Lab Units 02/04/19  0337   MAGNESIUM mg/dL 2.2                   Medication Review:     acetaminophen 500 mg Oral Q4H   aspirin 81 mg Oral Daily   cetaphil  Topical Q12H   chlorhexidine 15 mL Mouth/Throat Q12H   enoxaparin 30 mg Subcutaneous Nightly   escitalopram 10 mg Oral Daily   hydrocortisone 1 application Topical Q6H   ipratropium-albuterol 3 mL Nebulization BID - RT   mupirocin  Each Nare BID   nystatin  Topical Q12H   pantoprazole 40 mg Oral Q AM   sennosides-docusate sodium 2 tablet Oral Nightly   sodium chloride 4 mL Nebulization BID - RT          niCARdipine 5-15 mg/hr    sodium chloride 30 mL/hr Last Rate: 30 mL/hr (01/28/19 1215)       Assessment/Plan   1. Acute valvular diastolic heart failure: Diuretics per nephrology.  Will try oral diuretics today.  Patient denies any dyspnea today.  EF 60-65%  2. Severe aortic stenosis, mitral valve stenosis, tricuspid valve regurgitation: Status post aortic valve replacement in mitral valve replacement with tissue valve repair, MAZE PENNIE ligation postop day 9 with Dr. Gaxiola  3. Paroxysmal atrial  fibrillation: Status post cardioversion 1/24/19 and subsequent reversion to atrial fibrillation  4. Moderate pulmonary hypertension  5. Small pulmonary emboli  6. Acute kidney injury postoperatively: Per nephrology  7. Right bundle branch block and left anterior fascicular block  8. Undiagnosed sleep apnea  9. Morbid obesity  10. Severe deconditioning: Patient states that she is still unable to ambulate.  She reports prior to hospitalization she was able to ambulate with a walker.  She reports that physical therapy is working with her at least one to 2 times daily.  11. Junctional bradycardia and asystole postop: Patient is still having episodes where her external pacer is pacing for her which means heart rate is dropping.  Patient has been seen by Dr. Jarrell, GOMEZ .  Recommends a CRT device.  Last note dated 2/1/19 stated that patient would need to be off Lovenox and on Coumadin before device implant.  Patient was unable to tolerate anticoagulation secondary to nosebleeds.  She is currently only on the DVT prophylaxis dose of Lovenox.  This was discussed with Dr. Segura who states that he will again discussed the case with .  Placement of tunneled catheter for dialysis access has been postponed until permanent pacemaker decision has been made.    MORGAN Butts  [facility]  02/06/19  9:17 AM

## 2019-02-06 NOTE — PLAN OF CARE
Problem: Patient Care Overview  Goal: Plan of Care Review  Outcome: Ongoing (interventions implemented as appropriate)   02/06/19 5221   Coping/Psychosocial   Plan of Care Reviewed With patient   OTHER   Outcome Summary Pt seen for diet tolerance during trials of thin liquids via straw (single and consecutive), puree x6, and regular solid x4. Pt independently took small drinks and small bites. Pt exhibited functional oral phase with no presence of oral residue and no overt signs/symptoms of penetration/aspiration during any trails of consistencies assessed. Pt appears appropriate to contiue regular diet and thin liquids. No further SLP services warranted at this time.

## 2019-02-06 NOTE — PROGRESS NOTES
"   LOS: 21 days    Patient Care Team:  Robert Cortez MD as PCP - General (Family Medicine)  Deborah Agudelo MD as Surgeon (Orthopedic Surgery)    Chief Complaint:    Chief Complaint   Patient presents with   • Shortness of Breath   • Leg Swelling     Follow UP LAVERNE  Subjective     Interval History:     Sitting up in chair.  Fatigued.  Backside sore from sitting.  No bm. Passing gas.  Not eating much, takin shakes.  Tolerated HD yesterday. Urine green. ( Methylene blue 1/28).     Review of Systems:   See above.     Objective     Vital Signs  Temp:  [97.7 °F (36.5 °C)-98.6 °F (37 °C)] 97.7 °F (36.5 °C)  Heart Rate:  [55-77] 75  Resp:  [18-20] 18  BP: ()/(46-85) 95/63    Flowsheet Rows      First Filed Value   Admission Height  170.2 cm (67\") Documented at 01/16/2019 0910   Admission Weight  145 kg (320 lb)  (Abnormal)  Documented at 01/16/2019 0921          I/O this shift:  In: 240 [P.O.:240]  Out: -   I/O last 3 completed shifts:  In: 677 [P.O.:275; Other:45; NG/GT:357]  Out: 3492 [Urine:575; Other:2917]    Intake/Output Summary (Last 24 hours) at 2/6/2019 1027  Last data filed at 2/6/2019 0751  Gross per 24 hour   Intake 340 ml   Output 3217 ml   Net -2877 ml       Physical Exam:  Obese, sitting in chair. Awake, alert. Nasal o2  Heart RRR no s3 or rub  Lungs decreased bs bases  Abd obese, distended, body wall edema.  + bs. Nontender.   Ext 1+ lower ext edema. Wrinkled. Nodular hematomas on arms.   andrea.  Green urine in bag.       Results Review:    Results from last 7 days   Lab Units 02/06/19  0304 02/05/19  0342 02/04/19  0337  02/01/19  0259   SODIUM mmol/L 137 136 136   < > 143   POTASSIUM mmol/L 4.5 5.1 4.4   < > 3.3*   CHLORIDE mmol/L 95* 96* 98   < > 101   CO2 mmol/L 26.3 23.0 24.5   < > 23.7   BUN mg/dL 53* 84* 52*   < > 50*   CREATININE mg/dL 2.09* 2.99* 2.07*   < > 2.83*   CALCIUM mg/dL 9.6 9.9 9.3   < > 9.6   BILIRUBIN mg/dL  --   --   --   --  0.4   ALK PHOS U/L  --   --   --   --  71   ALT " (SGPT) U/L  --   --   --   --  <5   AST (SGOT) U/L  --   --   --   --  22   GLUCOSE mg/dL 94 133* 211*   < > 122*    < > = values in this interval not displayed.       Estimated Creatinine Clearance: 34 mL/min (A) (by C-G formula based on SCr of 2.09 mg/dL (H)).    Results from last 7 days   Lab Units 02/06/19  0304 02/05/19 0342 02/04/19  0337   MAGNESIUM mg/dL  --   --  2.2   PHOSPHORUS mg/dL 4.6* 5.8* 3.2             Results from last 7 days   Lab Units 02/06/19  0304 02/05/19  0342 02/04/19  0337 02/03/19  0259 02/02/19  1830   WBC 10*3/mm3 15.51* 16.64* 17.10* 13.71* 14.11*   HEMOGLOBIN g/dL 8.1* 8.5* 9.2* 9.3* 9.0*   PLATELETS 10*3/mm3 191 198 193 202 202       Results from last 7 days   Lab Units 02/02/19  1830   INR  1.32*         Imaging Results (last 24 hours)     ** No results found for the last 24 hours. **          acetaminophen 500 mg Oral Q4H   aspirin 81 mg Oral Daily   bumetanide 4 mg Oral Daily   cetaphil  Topical Q12H   chlorhexidine 15 mL Mouth/Throat Q12H   enoxaparin 30 mg Subcutaneous Nightly   escitalopram 10 mg Oral Daily   hydrocortisone 1 application Topical Q6H   ipratropium-albuterol 3 mL Nebulization BID - RT   mupirocin  Each Nare BID   nystatin  Topical Q12H   pantoprazole 40 mg Oral Q AM   sennosides-docusate sodium 2 tablet Oral Nightly   sodium chloride 4 mL Nebulization BID - RT       niCARdipine 5-15 mg/hr    sodium chloride 30 mL/hr Last Rate: 30 mL/hr (01/28/19 1215)       Medication Review:   Current Facility-Administered Medications   Medication Dose Route Frequency Provider Last Rate Last Dose   • acetaminophen (TYLENOL) tablet 500 mg  500 mg Oral Q4H Yahaira Garza, APRN   500 mg at 02/06/19 0649   • albumin human 25 % IV SOLN 12.5 g  12.5 g Intravenous PRN Schissler, Wallace Francois, MD   12.5 g at 02/05/19 1800   • ALPRAZolam (XANAX) tablet 0.25 mg  0.25 mg Oral Q8H PRN Scar Gaxiola MD       • aspirin chewable tablet 81 mg  81 mg Oral Daily Scar Gaxiola MD   81  mg at 02/06/19 0851   • bisacodyl (DULCOLAX) EC tablet 10 mg  10 mg Oral Daily PRN Scar Gaxiola MD       • bisacodyl (DULCOLAX) suppository 10 mg  10 mg Rectal Daily PRN Scar Gaxiola MD   10 mg at 02/03/19 2120   • bumetanide (BUMEX) tablet 4 mg  4 mg Oral Daily Maria Fernanda Maldonado MD       • cetaphil lotion   Topical Q12H Zonia Lopez MD       • chlorhexidine (PERIDEX) 0.12 % solution 15 mL  15 mL Mouth/Throat Q12H Scar Gaxiola MD   15 mL at 02/03/19 2119   • cyclobenzaprine (FLEXERIL) tablet 10 mg  10 mg Oral Q8H PRN Scar Gaxiola MD       • dextrose (D50W) 25 g/ 50mL Intravenous Solution 25 g  25 g Intravenous Q15 Min PRN Oscar Rodriguez MD       • dextrose (GLUTOSE) oral gel 15 g  15 g Oral Q15 Min PRN Oscar Rodriguez MD       • enoxaparin (LOVENOX) syringe 30 mg  30 mg Subcutaneous Nightly Alicia Schmitz APRN   30 mg at 02/06/19 0851   • escitalopram (LEXAPRO) tablet 10 mg  10 mg Oral Daily Oscar Rodriguez MD   10 mg at 02/06/19 0851   • glucagon (human recombinant) (GLUCAGEN DIAGNOSTIC) injection 1 mg  1 mg Subcutaneous PRN Oscar Rodriguez MD       • hydrocortisone 1 % cream 1 application  1 application Topical Q6H Fran Tilley MD   1 application at 02/06/19 0649   • ipratropium-albuterol (DUO-NEB) nebulizer solution 3 mL  3 mL Nebulization BID - RT Yahaira Garza APRN   3 mL at 02/06/19 0817   • ipratropium-albuterol (DUO-NEB) nebulizer solution 3 mL  3 mL Nebulization Q6H PRN Yahaira Garza APRN       • magic mouthwash oral supsension 5 mL  5 mL Swish & Spit Q4H PRN Garza, Yahaira, APRN       • mupirocin (BACTROBAN) 2 % nasal ointment   Each Nare BID Scar Gaxiola MD   10 application at 02/06/19 0851   • niCARdipine (CARDENE) 25 mg/250 mL (0.1 mg/mL) NS infusion kit  5-15 mg/hr Intravenous Continuous PRN Scar Gaxiola MD       • nystatin (MYCOSTATIN) powder   Topical Q12H Scar Gaxiola MD       • ondansetron (ZOFRAN) injection 4 mg  4  mg Intravenous Q6H PRN Scar Gaxiola MD   4 mg at 02/04/19 0906   • oxyCODONE (ROXICODONE) immediate release tablet 5 mg  5 mg Oral Q4H PRN Yahaira Garza APRN   5 mg at 02/03/19 1504   • pantoprazole (PROTONIX) EC tablet 40 mg  40 mg Oral Q AM Scar Gaxiola MD   40 mg at 02/06/19 0649   • promethazine (PHENERGAN) tablet 12.5 mg  12.5 mg Oral Q6H PRN Scar Gaxiola MD        Or   • promethazine (PHENERGAN) injection 12.5 mg  12.5 mg Intravenous Q6H PRN Scar Gaxiola MD       • sennosides-docusate sodium (SENOKOT-S) 8.6-50 MG tablet 2 tablet  2 tablet Oral Nightly Scar Gaxiola MD   2 tablet at 02/05/19 2053   • sodium chloride 0.9 % flush 30 mL  30 mL Intravenous Once PRN Scar Gaxiola MD       • sodium chloride 0.9 % infusion  30 mL/hr Intravenous Continuous PRN Scar Gaxiola MD 30 mL/hr at 01/28/19 1215 30 mL/hr at 01/28/19 1215   • sodium chloride 7 % nebulizer solution nebulizer solution 4 mL  4 mL Nebulization BID - RT Yahaira Garza APRN   4 mL at 02/06/19 0816       Assessment/Plan   1. LAVERNE. NOn-oliguric. Urine output may be picking up.  Dialysis yesterday. Day 6 [facility].  Will need TDC if no recovery next 48 hours.  WBC coming down slowly.  Try po diuretic today. Anticipate HD tomorrow.   2. SP AVR, MVR, TV repair, Left cryo MAZE PENNIE ligation.  POD 9  3. Morbid obesity.   4. Probable OCTAVIANO  5. Possible PE, on lovenox  6. PAF  7. Leukocytosis.  8. Anemia.       Acute congestive heart failure (CMS/HCC)    Hypertension    Generalized anxiety disorder    Hyperlipidemia    IFG (impaired fasting glucose)    Heart murmur, systolic    Obesity, morbid, BMI 50 or higher (CMS/HCC)    Fungal skin infection    Cellulitis of lower extremity    Aortic valve stenosis    Tricuspid valve insufficiency    Mitral valve stenosis              Maria Fernanda Maldonado MD  02/06/19  10:27 AM

## 2019-02-06 NOTE — PROGRESS NOTES
" LOS: 21 days   Patient Care Team:  Robert Cortez MD as PCP - General (Family Medicine)  Deborah Agudelo MD as Surgeon (Orthopedic Surgery)    Chief Complaint: post op    Subjective    \"frustrated\"  Vital Signs  Temp:  [97.7 °F (36.5 °C)-98.6 °F (37 °C)] 97.7 °F (36.5 °C)  Heart Rate:  [55-77] 75  Resp:  [18-20] 18  BP: ()/(46-85) 95/63  Body mass index is 45.67 kg/m².    Intake/Output Summary (Last 24 hours) at 2/6/2019 0956  Last data filed at 2/6/2019 0751  Gross per 24 hour   Intake 340 ml   Output 3217 ml   Net -2877 ml     I/O this shift:  In: 240 [P.O.:240]  Out: -           02/01/19  0600 02/04/19  0651 02/06/19  0534   Weight: (!) 140 kg (308 lb 13.8 oz) (!) 160 kg (351 lb 11.2 oz) 132 kg (291 lb 9.6 oz)         Objective    Results Review:        WBC WBC   Date Value Ref Range Status   02/06/2019 15.51 (H) 4.50 - 10.70 10*3/mm3 Final   02/05/2019 16.64 (H) 4.50 - 10.70 10*3/mm3 Final   02/04/2019 17.10 (H) 4.50 - 10.70 10*3/mm3 Final      HGB Hemoglobin   Date Value Ref Range Status   02/06/2019 8.1 (L) 11.9 - 15.5 g/dL Final   02/05/2019 8.5 (L) 11.9 - 15.5 g/dL Final   02/04/2019 9.2 (L) 11.9 - 15.5 g/dL Final      HCT Hematocrit   Date Value Ref Range Status   02/06/2019 26.0 (L) 35.6 - 45.5 % Final   02/05/2019 28.6 (L) 35.6 - 45.5 % Final   02/04/2019 28.8 (L) 35.6 - 45.5 % Final      Platelets Platelets   Date Value Ref Range Status   02/06/2019 191 140 - 500 10*3/mm3 Final   02/05/2019 198 140 - 500 10*3/mm3 Final   02/04/2019 193 140 - 500 10*3/mm3 Final        PT/INR:  No results found for: PROTIME/No results found for: INR    Sodium Sodium   Date Value Ref Range Status   02/06/2019 137 136 - 145 mmol/L Final   02/05/2019 136 136 - 145 mmol/L Final   02/04/2019 136 136 - 145 mmol/L Final      Potassium Potassium   Date Value Ref Range Status   02/06/2019 4.5 3.5 - 5.2 mmol/L Final   02/05/2019 5.1 3.5 - 5.2 mmol/L Final   02/04/2019 4.4 3.5 - 5.2 mmol/L Final      Chloride Chloride "   Date Value Ref Range Status   02/06/2019 95 (L) 98 - 107 mmol/L Final   02/05/2019 96 (L) 98 - 107 mmol/L Final   02/04/2019 98 98 - 107 mmol/L Final      Bicarbonate CO2   Date Value Ref Range Status   02/06/2019 26.3 22.0 - 29.0 mmol/L Final   02/05/2019 23.0 22.0 - 29.0 mmol/L Final   02/04/2019 24.5 22.0 - 29.0 mmol/L Final      BUN BUN   Date Value Ref Range Status   02/06/2019 53 (H) 8 - 23 mg/dL Final   02/05/2019 84 (H) 8 - 23 mg/dL Final   02/04/2019 52 (H) 8 - 23 mg/dL Final      Creatinine Creatinine   Date Value Ref Range Status   02/06/2019 2.09 (H) 0.57 - 1.00 mg/dL Final   02/05/2019 2.99 (H) 0.57 - 1.00 mg/dL Final   02/04/2019 2.07 (H) 0.57 - 1.00 mg/dL Final      Calcium Calcium   Date Value Ref Range Status   02/06/2019 9.6 8.6 - 10.5 mg/dL Final   02/05/2019 9.9 8.6 - 10.5 mg/dL Final   02/04/2019 9.3 8.6 - 10.5 mg/dL Final      Magnesium Magnesium   Date Value Ref Range Status   02/04/2019 2.2 1.6 - 2.4 mg/dL Final            acetaminophen 500 mg Oral Q4H   aspirin 81 mg Oral Daily   cetaphil  Topical Q12H   chlorhexidine 15 mL Mouth/Throat Q12H   enoxaparin 30 mg Subcutaneous Nightly   escitalopram 10 mg Oral Daily   hydrocortisone 1 application Topical Q6H   ipratropium-albuterol 3 mL Nebulization BID - RT   mupirocin  Each Nare BID   nystatin  Topical Q12H   pantoprazole 40 mg Oral Q AM   sennosides-docusate sodium 2 tablet Oral Nightly   sodium chloride 4 mL Nebulization BID - RT       niCARdipine 5-15 mg/hr    sodium chloride 30 mL/hr Last Rate: 30 mL/hr (01/28/19 1215)           Patient Active Problem List   Diagnosis Code   • Tear of rotator cuff M75.100   • Hypertension I10   • Generalized anxiety disorder F41.1   • Hyperlipidemia E78.5   • IFG (impaired fasting glucose) R73.01   • Heart murmur, systolic R01.1   • Shoulder pain, bilateral M25.511, M25.512   • Pain of both shoulder joints M25.511, M25.512   • Chronic pain of both shoulders M25.511, G89.29, M25.512   • Acute congestive  heart failure (CMS/[facility]) I50.9   • Obesity, morbid, BMI 50 or higher (CMS/[facility]) E66.01   • Fungal skin infection B36.9   • Cellulitis of lower extremity L03.119   • Aortic valve stenosis I35.0   • Tricuspid valve insufficiency I07.1   • Mitral valve stenosis I05.0       Assessment & Plan    -Severe AS, MS, TV regurgitation s/p AVR/MVR (tissue),TV repair, MAZE PENNIE ligation-- POD#9 (Pagni)  -NICM, non obstructive dx by cath  -RBBB  -HTN---controlled  -Morbid obesity with arthritis-complicating mobility and all aspects of care  -Hypoventilation sx, probable OCTAVIANO-----chronic CO2 retain by ABG, pulmonary following  -Possible PE, NO LE DVT------on heparin  -Preop new a.fib----s/p cardioversion 1/24/19, reverted to a.fib again  -Left arm thrombophlebitis, infiltration  -LAVERNE-----Left IJ shiley, HD per renal  -Leukocytosis-  -preop anemia, post op expected ABL-----Hb stable  -Post op junctional bradycardia---rate improved with Dopamine, back in a.flutter/a.fib with rate 60-80, EP following      Atrial fibrillation controlled rate. PPM final decision will need to be made prior to insertion of tunnel cath.  White count with mild improvement, will recheck again in am.               MORGAN Francisco  02/06/19  9:56 AM

## 2019-02-06 NOTE — THERAPY TREATMENT NOTE
Acute Care - Physical Therapy Treatment Note  [facility]     Patient Name: Claudia Arnold  : 1945  MRN: [id number]  Today's Date: 2019  Onset of Illness/Injury or Date of Surgery: 19          Admit Date: 2019    Visit Dx:    ICD-10-CM ICD-9-CM   1. Acute congestive heart failure, unspecified heart failure type (CMS/HCC) I50.9 428.0   2. Generalized weakness R53.1 780.79   3. Aortic valve stenosis, etiology of cardiac valve disease unspecified I35.0 424.1   4. Tricuspid valve insufficiency, unspecified etiology I07.1 397.0   5. Mitral valve stenosis, unspecified etiology I05.0 394.0   6. Acute renal failure, unspecified acute renal failure type (CMS/HCC) N17.9 584.9   7. S/P AVR (aortic valve replacement) Z95.2 V43.3     Patient Active Problem List   Diagnosis   • Tear of rotator cuff   • Hypertension   • Generalized anxiety disorder   • Hyperlipidemia   • IFG (impaired fasting glucose)   • Heart murmur, systolic   • Shoulder pain, bilateral   • Pain of both shoulder joints   • Chronic pain of both shoulders   • Acute congestive heart failure (CMS/HCC)   • Obesity, morbid, BMI 50 or higher (CMS/HCC)   • Fungal skin infection   • Cellulitis of lower extremity   • Aortic valve stenosis   • Tricuspid valve insufficiency   • Mitral valve stenosis       Therapy Treatment    Rehabilitation Treatment Summary     Row Name 19 1013 19 0900          Treatment Time/Intention    Discipline  physical therapist  -EF  speech language pathologist  -ML     Document Type  therapy note (daily note)  -EF  therapy note (daily note)  -ML     Subjective Information  complains of;weakness;fatigue;pain  -EF  no complaints  -ML     Mode of Treatment  physical therapy  -EF  speech-language pathology  -ML     Patient/Family Observations  reclined in chair  -EF  Pt alert, sitting upright in chair, and agreeable to therapy.   -ML     Care Plan Review  --  evaluation/treatment results reviewed;care  plan/treatment goals reviewed  -ML     Patient Effort  adequate  -EF  good  -ML     Existing Precautions/Restrictions  cardiac;fall;sternal  -EF  --     Recorded by [EF] Heike Anthony, PT 02/06/19 1019 [ML] Vaishnavi Trevizo MS CCC-SLP 02/06/19 0921     Row Name 02/06/19 1013             Cognitive Assessment/Intervention- PT/OT    Affect/Mental Status (Cognitive)  WNL  -EF      Orientation Status (Cognition)  oriented x 4  -EF      Follows Commands (Cognition)  WNL  -EF      Personal Safety Interventions  fall prevention program maintained;gait belt;muscle strengthening facilitated;nonskid shoes/slippers when out of bed  -EF      Recorded by [EF] Heike Anthony, PT 02/06/19 1019      Row Name 02/06/19 1013             Bed Mobility Assessment/Treatment    Comment (Bed Mobility)  not tested-up in chair  -EF      Recorded by [EF] Heike Anthony, PT 02/06/19 1019      Row Name 02/06/19 1013             Sit-Stand Transfer    Sit-Stand [place] (Transfers)  dependent (less than 25% patient effort);2 person assist attempt x 3-unable to clear hips off chair to stand  -EF      Recorded by [EF] Heike Anthony, PT 02/06/19 1019      Row Name 02/06/19 1013             Stand-Sit Transfer    Stand-Sit [place] (Transfers)  unable to assess;dependent (less than 25% patient effort);2 person assist  -EF      Recorded by [EF] Heike Anthony, PT 02/06/19 1019      Row Name 02/06/19 1013             Gait/Stairs Assessment/Training    Comment (Gait/Stairs)  unable to assess due to unable to come to stand  -EF      Recorded by [EF] Heike Anthony, PT 02/06/19 1019      Row Name 02/06/19 1013             Lower Extremity Seated Therapeutic Exercise    Comment, Seated Lower Extremity (Therapeutic Exercise)  10 reps AP, LAQ, QS, GS  -EF      Recorded by [EF] Heike Anthony, PT 02/06/19 1019      Row Name 02/06/19 1013             Static Standing Balance    Level of [place] (Supported Standing,  Static Balance)  unable to perform activity unable with assist of 2 people  -EF      Recorded by [EF] Heike Anthony, PT 02/06/19 1019      Row Name 02/06/19 1013             Positioning and Restraints    Pre-Treatment Position  sitting in chair/recliner  -EF      Post Treatment Position  chair  -EF      In Chair  notified nsg;call light within reach;encouraged to call for assist nsg to use lift to txf pt from chair back to bed  -EF      Recorded by [EF] Heike Anthony, PT 02/06/19 1019      Row Name 02/06/19 1013             Pain Scale: Numbers Pre/Post-Treatment    Pain Scale: Numbers, Pretreatment  5/10  -EF      Pain Scale: Numbers, Post-Treatment  5/10  -EF      Recorded by [EF] Heike Anthony, PT 02/06/19 1019      Row Name                Wound 01/28/19 1138 chest incision    Wound - Properties Group Date first assessed: 01/28/19 [SR] Time first assessed: 1138 [SR] Location: chest [SR] Type: incision [SR] Recorded by:  [SR] Keisha Gandara RN 01/28/19 1138    Row Name                Wound 01/28/19 1215 coccyx unspecified    Wound - Properties Group Date first assessed: 01/28/19 [MB] Time first assessed: 1215 [MB] Present On Admission : yes;picture taken [MB] Location: coccyx [MB] Type: unspecified [MB] Recorded by:  [MB] Julissa Chatterjee RN 01/28/19 1541    Row Name                Wound 01/31/19 2300 Right gluteal pressure injury    Wound - Properties Group Date first assessed: 01/31/19 [NW] Time first assessed: 2300 [NW] Present On Admission : no [NW] Side: Right [NW] Location: gluteal [NW] Type: pressure injury [NW] Stage, Pressure Injury: deep tissue injury [NW] Recorded by:  [NW] Priscilla Willams, RN 02/01/19 0414    Row Name                Wound 01/31/19 2000 Left posterior ear pressure injury    Wound - Properties Group Date first assessed: 01/31/19 [NW] Time first assessed: 2000 [NW] Side: Left [NW] Orientation: posterior [NW] Location: ear [NW2] Type: pressure injury [NW] Stage, Pressure Injury:  Stage 2;medical device related [NW] Recorded by:  [NW] Priscilla Willams RN 02/01/19 0416 [NW2] Priscilla Willams RN 02/01/19 0417      User Key  (r) = Recorded By, (t) = Taken By, (c) = Cosigned By    Initials Name Effective Dates Discipline    EF Heike Anthony, PT 06/08/18 -  PT    Julissa Sweet RN 06/16/16 -  Nurse    Keisha Fournier RN 06/16/16 -  Nurse    Priscilla Jensen RN 03/05/18 -  Nurse    Vaishnavi Barth, MS CCC-SLP 10/04/18 -  SLP          Wound 01/28/19 1138 chest incision (Active)   Dressing Appearance open to air 2/6/2019  8:51 AM   Closure Approximated;Open to air 2/6/2019  8:51 AM   Base dry;pink 2/6/2019  8:51 AM   Drainage Amount none 2/6/2019  8:51 AM   Dressing Care, Wound open to air 2/6/2019  4:30 AM       Wound 01/28/19 1215 coccyx unspecified (Active)   Dressing Appearance open to air 2/6/2019  8:51 AM   Base clean;dry 2/6/2019  8:51 AM   Drainage Amount none 2/6/2019  8:51 AM   Care, Wound pressure applied 2/5/2019  4:10 PM   Dressing Care, Wound open to air 2/6/2019  4:30 AM       Wound 01/31/19 2300 Right gluteal pressure injury (Active)   Dressing Appearance open to air 2/6/2019  8:51 AM   Drainage Amount none 2/6/2019  8:51 AM   Care, Wound barrier applied 2/5/2019  8:45 PM   Dressing Care, Wound open to air 2/6/2019  4:30 AM       Wound 01/31/19 2000 Left posterior ear pressure injury (Active)   Dressing Appearance open to air 2/6/2019  8:51 AM   Base pink;clean;dry 2/6/2019  8:51 AM   Drainage Amount none 2/6/2019  8:51 AM       Rehab Goal Summary     Row Name 02/06/19 1000 02/06/19 0900          Physical Therapy Goals    Bed Mobility Goal Selection (PT)  bed mobility, PT goal 1  -EF  --     Transfer Goal Selection (PT)  transfer, PT goal 1  -EF  --     Gait Training Goal Selection (PT)  gait training, PT goal 1  -EF  --        Bed Mobility Goal 1 (PT)    Activity/Assistive Device (Bed Mobility Goal 1, PT)  bed mobility activities, all  -EF  --     [place]  Level/Cues Needed (Bed Mobility Goal 1, PT)  moderate assist (50-74% patient effort)  -EF  --     Time Frame (Bed Mobility Goal 1, PT)  1 week  -EF  --     Progress/Outcomes (Bed Mobility Goal 1, PT)  progress slower than expected;goal ongoing  -EF  --        Transfer Goal 1 (PT)    Activity/Assistive Device (Transfer Goal 1, PT)  sit-to-stand/stand-to-sit;bed-to-chair/chair-to-bed;walker, rolling  -EF  --     [place] Level/Cues Needed (Transfer Goal 1, PT)  maximum assist (25-49% patient effort);2 person assist  -EF  --     Time Frame (Transfer Goal 1, PT)  1 week  -EF  --     Progress/Outcome (Transfer Goal 1, PT)  progress slower than expected;goal ongoing  -EF  --        Gait Training Goal 1 (PT)    Progress/Outcome (Gait Training Goal 1, PT)  continuing progress toward goal;goal no longer appropriate  -EF  --        Swallow Goals (SLP)    Oral Nutrition/Hydration Goal Selection (SLP)  --  oral nutrition/hydration, SLP goal 1  -ML        Oral Nutrition/Hydration Goal 1 (SLP)    Oral Nutrition/Hydration Goal 1, SLP  --  Pt will safely swallow regular diet and thin liquids without overt signs/symptoms of penetration/aspiration.  -ML     Time Frame (Oral Nutrition/Hydration Goal 1, SLP)  --  by discharge  -ML     Barriers (Oral Nutrition/Hydration Goal 1, SLP)  --  Progress: Pt seen for diet tolerance during trials of thin liquids via straw (single and consecutive), puree x6, and regular solid x4. Pt independently took small drinks and small bites. Pt exhibited functional oral phase with no presence of oral residue and no overt signs/symptoms of penetration/aspiration during any trails of consistencies assessed. Pt appears appropriate to contiue regular diet and thin liquids. No further SLP services warranted at this time.   -ML     Progress/Outcomes (Oral Nutrition/Hydration Goal 1, SLP)  --  goal met  -ML       User Key  (r) = Recorded By, (t) = Taken By, (c) = Cosigned By    Initials Name Provider Type  Discipline    EF Heike Anthony, PT Physical Therapist PT    Vaishnavi Barth, MS CCC-SLP Speech and Language Pathologist SLP          Physical Therapy Education     Title: PT OT SLP Therapies (In Progress)     Topic: Physical Therapy (Done)     Point: Mobility training (Done)     Learning Progress Summary           Patient Acceptance, E, VU,NR by PATRICIO at 2/6/2019 10:19 AM    Acceptance, E, VU,NR by MA at 2/5/2019 11:09 AM    Acceptance, E,TB,D, VU,NR by CHELSEA at 2/4/2019 11:34 AM    Acceptance, E,D, VU,NR by DARRIUS at 2/3/2019  4:47 PM    Acceptance, E,D, DU,NR by DARRYN at 2/2/2019 10:36 AM    Comment:  safety during sit to stand, benefits of activity    Acceptance, E, NR by MA at 1/31/2019 12:08 PM    Acceptance, E, NR by MA at 1/30/2019 10:23 AM    Acceptance, E,TB, VU by  at 1/27/2019  9:10 AM    Acceptance, E, VU,NR by MA at 1/25/2019  3:54 PM    Acceptance, E,TB,D, VU,NR by CHELSEA at 1/23/2019  3:14 PM    Acceptance, TB,E, VU,DU by JANNET at 1/18/2019  4:37 PM    Acceptance, E, NR by EM at 1/17/2019 12:15 PM   Family Acceptance, TB,E, VU,DU by JANNET at 1/18/2019  4:37 PM                   Point: Home exercise program (Done)     Learning Progress Summary           Patient Acceptance, E, VU,NR by PATRICIO at 2/6/2019 10:19 AM    Acceptance, E, VU,NR by MA at 2/5/2019 11:09 AM    Acceptance, E,TB,D, VU,NR by CHELSEA at 2/4/2019 11:34 AM    Acceptance, E,D, VU,NR by DARRIUS at 2/3/2019  4:47 PM    Acceptance, E,D, DU,NR by DARRYN at 2/2/2019 10:36 AM    Comment:  safety during sit to stand, benefits of activity    Acceptance, E,TB,D, VU,NR by CHELSEA at 2/1/2019 10:51 AM    Acceptance, E, NR by MA at 1/31/2019 12:08 PM    Acceptance, E, NR by MA at 1/30/2019 10:23 AM    Acceptance, E,TB, VU by YANETH at 1/27/2019  9:10 AM    Acceptance, E, VU,NR by MA at 1/25/2019  3:54 PM    Acceptance, E,TB,GABRIELLE, SIOBHAN,NR by  at 1/23/2019  3:14 PM    Acceptance, TBSHAMA, VU,DU by JANNET at 1/18/2019  4:37 PM   Family Acceptance, TBSHAMA VU,DU by JANNET at 1/18/2019  4:37 PM                    Point: Body mechanics (Done)     Learning Progress Summary           Patient Acceptance, E, VU,NR by PATRICIO at 2/6/2019 10:19 AM    Acceptance, E, VU,NR by MA at 2/5/2019 11:09 AM    Acceptance, E,TB,D, VU,NR by CHELSEA at 2/4/2019 11:34 AM    Acceptance, E,D, VU,NR by DARRIUS at 2/3/2019  4:47 PM    Acceptance, E,D, DU,NR by DARRYN at 2/2/2019 10:36 AM    Comment:  safety during sit to stand, benefits of activity    Acceptance, E, NR by MA at 1/31/2019 12:08 PM    Acceptance, E, NR by MA at 1/30/2019 10:23 AM    Acceptance, E,TB, VU by YANETH at 1/27/2019  9:10 AM    Acceptance, E, VU,NR by MA at 1/25/2019  3:54 PM    Acceptance, E,TB,D, VU,NR by CHELSEA at 1/23/2019  3:14 PM    Acceptance, TB,E, VU,DU by JANNET at 1/18/2019  4:37 PM   Family Acceptance, TB,E, VU,DU by JANNET at 1/18/2019  4:37 PM                   Point: Precautions (Done)     Learning Progress Summary           Patient Acceptance, E, VU,NR by PATRICIO at 2/6/2019 10:19 AM    Acceptance, E, VU,NR by MA at 2/5/2019 11:09 AM    Acceptance, E,TB,D, VU,NR by  at 2/4/2019 11:34 AM    Acceptance, E,D, VU,NR by DARRIUS at 2/3/2019  4:47 PM    Acceptance, E,D, DU,NR by DARRYN at 2/2/2019 10:36 AM    Comment:  safety during sit to stand, benefits of activity    Acceptance, E, NR by MA at 1/31/2019 12:08 PM    Acceptance, E, NR by MA at 1/30/2019 10:23 AM    Acceptance, E,TB, VU by YANETH at 1/27/2019  9:10 AM    Acceptance, E, VU,NR by MA at 1/25/2019  3:54 PM    Acceptance, E,TB,D, VU,NR by CHELSEA at 1/23/2019  3:14 PM    Acceptance, TB,E, VU,DU by JANNET at 1/18/2019  4:37 PM   Family Acceptance, TB,E, SIOBHAN,DU by CW at 1/18/2019  4:37 PM                               User Key     Initials Effective Dates Name Provider Type Discipline    EF 06/08/18 -  Heike Anthony, PT Physical Therapist PT    CH 04/03/18 -  Kae Aldrich, PT Physical Therapist PT    EM 04/03/18 -  Heike Strauss, PT Physical Therapist PT    SM 03/07/18 -  Ksenia Lopez, PTA Physical Therapy Assistant PT    KH  06/22/16 -  Zuleyma Mancilla, PT Physical Therapist PT    LC 08/02/16 -  Khurram Littlejohn, PT DPT Physical Therapist PT    CW 03/07/18 -  Jose Ott, PTA Physical Therapy Assistant PT    MA 10/19/18 -  Tabatha Lawrence, PT Physical Therapist PT                PT Recommendation and Plan     Plan of Care Reviewed With: patient  Progress: no change  Outcome Summary: Pt is unable to come to stand with assist of 2 people from chair today. Able to perform LE exercises with verbal cues. Only safe method for bed to chair transfers is mechanical lift at this time. Slow progress due to overall weakness. Pt will continue to benefit from skilled PT to work on strengthening, bed mob, and transfers to work on improved function.  Outcome Measures     Row Name 02/06/19 1000 02/05/19 1400 02/05/19 1100       How much help from another person do you currently need...    Turning from your back to your side while in flat bed without using bedrails?  2  -EF  --  2  -MA    Moving from lying on back to sitting on the side of a flat bed without bedrails?  2  -EF  --  2  -MA    Moving to and from a bed to a chair (including a wheelchair)?  1  -EF  --  1  -MA    Standing up from a chair using your arms (e.g., wheelchair, bedside chair)?  1  -EF  --  1  -MA    Climbing 3-5 steps with a railing?  1  -EF  --  1  -MA    To walk in hospital room?  1  -EF  --  1  -MA    AM-PAC 6 Clicks Score  8  -EF  --  8  -MA       How much help from another is currently needed...    Putting on and taking off regular lower body clothing?  --  1  -SG  --    Bathing (including washing, rinsing, and drying)  --  1  -SG  --    Toileting (which includes using toilet bed pan or urinal)  --  1  -SG  --    Putting on and taking off regular upper body clothing  --  2  -SG  --    Taking care of personal grooming (such as brushing teeth)  --  2  -SG  --    Eating meals  --  2  -SG  --    Score  --  9  -SG  --       Functional Assessment    Outcome Measure Options  AM-PAC 6  Clicks Basic Mobility (PT)  -EF  AM-PAC 6 Clicks Daily Activity (OT)  -SG  AM-PAC 6 Clicks Basic Mobility (PT)  -MA    Row Name 02/04/19 1100 02/03/19 1600          How much help from another person do you currently need...    Turning from your back to your side while in flat bed without using bedrails?  2  -CH  2  -SM     Moving from lying on back to sitting on the side of a flat bed without bedrails?  2  -CH  2  -SM     Moving to and from a bed to a chair (including a wheelchair)?  1  -CH  1  -SM     Standing up from a chair using your arms (e.g., wheelchair, bedside chair)?  1  -CH  1  -SM     Climbing 3-5 steps with a railing?  1  -CH  1  -SM     To walk in hospital room?  1  -CH  1  -SM     AM-PAC 6 Clicks Score  8  -CH  8  -SM        Functional Assessment    Outcome Measure Options  AM-PAC 6 Clicks Basic Mobility (PT)  -CH  AM-PAC 6 Clicks Basic Mobility (PT)  -SM       User Key  (r) = Recorded By, (t) = Taken By, (c) = Cosigned By    Initials Name Provider Type    EF Heike Anthony, PT Physical Therapist    SG Jess Dumont, OTR Occupational Therapist    CH Kae Aldrich, PT Physical Therapist    SM Ksenia Lopez, PTA Physical Therapy Assistant    Tabatha Fischer, PT Physical Therapist         Time Calculation:   PT Charges     Row Name 02/06/19 1025             Time Calculation    Start Time  1001  -EF      Stop Time  1011  -EF      Time Calculation (min)  10 min  -EF      PT Received On  02/06/19  -EF      PT - Next Appointment  02/07/19  -EF      PT Goal Re-Cert Due Date  02/13/19  -EF         Time Calculation- PT    Total Timed Code Minutes- PT  10 minute(s)  -EF        User Key  (r) = Recorded By, (t) = Taken By, (c) = Cosigned By    Initials Name Provider Type    EF Heike Anthony, PT Physical Therapist        Therapy Suggested Charges     Code   Minutes Charges    90630 (CPT®) Hc Pt Neuromusc Re Education Ea 15 Min      12603 (CPT®) Hc Pt Ther Proc Ea 15 Min 15 1    80819 (CPT®)  Hc Gait Training Ea 15 Min      88234 (CPT®) Hc Pt Therapeutic Act Ea 15 Min 10 1    98658 (CPT®) Hc Pt Manual Therapy Ea 15 Min      39598 (CPT®) Hc Pt Iontophoresis Ea 15 Min      66295 (CPT®) Hc Pt Elec Stim Ea-Per 15 Min      29500 (CPT®) Hc Pt Ultrasound Ea 15 Min      56733 (CPT®) Hc Pt Self Care/Mgmt/Train Ea 15 Min      15115 (CPT®) Hc Pt Prosthetic (S) Train Initial Encounter, Each 15 Min      89860 (CPT®) Hc Pt Orthotic(S)/Prosthetic(S) Encounter, Each 15 Min      11401 (CPT®) Hc Orthotic(S) Mgmt/Train Initial Encounter, Each 15min      Total  25 2        Therapy Charges for Today     Code Description Service Date Service Provider Modifiers Qty    41947034506 HC PT THER PROC EA 15 MIN 2/6/2019 Heike Anthony, PT GP 1    14165986654 HC PT THER SUPP EA 15 MIN 2/6/2019 Heike Anthony, PT GP 1          PT G-Codes  Outcome Measure Options: AM-PAC 6 Clicks Basic Mobility (PT)  AM-PAC 6 Clicks Score: 8  Score: 9    Heike Anthony, PT  2/6/2019

## 2019-02-07 ENCOUNTER — TELEPHONE (OUTPATIENT)
Dept: ORTHOPEDIC SURGERY | Facility: CLINIC | Age: 74
End: 2019-02-07

## 2019-02-07 LAB
ALBUMIN SERPL-MCNC: 3.6 G/DL (ref 3.5–5.2)
ALBUMIN/GLOB SERPL: 1.2 G/DL
ALP SERPL-CCNC: 67 U/L (ref 39–117)
ALT SERPL W P-5'-P-CCNC: <5 U/L (ref 1–33)
ANION GAP SERPL CALCULATED.3IONS-SCNC: 17.8 MMOL/L
AST SERPL-CCNC: 13 U/L (ref 1–32)
BASOPHILS # BLD AUTO: 0.02 10*3/MM3 (ref 0–0.2)
BASOPHILS NFR BLD AUTO: 0.2 % (ref 0–1.5)
BILIRUB SERPL-MCNC: 0.6 MG/DL (ref 0.1–1.2)
BUN BLD-MCNC: 78 MG/DL (ref 8–23)
BUN/CREAT SERPL: 28.5 (ref 7–25)
CALCIUM SPEC-SCNC: 9.8 MG/DL (ref 8.6–10.5)
CHLORIDE SERPL-SCNC: 93 MMOL/L (ref 98–107)
CO2 SERPL-SCNC: 24.2 MMOL/L (ref 22–29)
CREAT BLD-MCNC: 2.74 MG/DL (ref 0.57–1)
DEPRECATED RDW RBC AUTO: 51.4 FL (ref 37–54)
EOSINOPHIL # BLD AUTO: 0.21 10*3/MM3 (ref 0–0.7)
EOSINOPHIL NFR BLD AUTO: 1.6 % (ref 0.3–6.2)
ERYTHROCYTE [DISTWIDTH] IN BLOOD BY AUTOMATED COUNT: 15.2 % (ref 11.7–13)
GFR SERPL CREATININE-BSD FRML MDRD: 17 ML/MIN/1.73
GLOBULIN UR ELPH-MCNC: 3.1 GM/DL
GLUCOSE BLD-MCNC: 94 MG/DL (ref 65–99)
HBV CORE IGM SERPL QL IA: NORMAL
HBV SURFACE AB SER RIA-ACNC: NORMAL
HCT VFR BLD AUTO: 25.3 % (ref 35.6–45.5)
HGB BLD-MCNC: 7.8 G/DL (ref 11.9–15.5)
IMM GRANULOCYTES # BLD AUTO: 0.47 10*3/MM3 (ref 0–0.03)
IMM GRANULOCYTES NFR BLD AUTO: 3.6 % (ref 0–0.5)
LYMPHOCYTES # BLD AUTO: 1.77 10*3/MM3 (ref 0.9–4.8)
LYMPHOCYTES NFR BLD AUTO: 13.6 % (ref 19.6–45.3)
MCH RBC QN AUTO: 28.9 PG (ref 26.9–32)
MCHC RBC AUTO-ENTMCNC: 30.8 G/DL (ref 32.4–36.3)
MCV RBC AUTO: 93.7 FL (ref 80.5–98.2)
MONOCYTES # BLD AUTO: 0.48 10*3/MM3 (ref 0.2–1.2)
MONOCYTES NFR BLD AUTO: 3.7 % (ref 5–12)
NEUTROPHILS # BLD AUTO: 10.08 10*3/MM3 (ref 1.9–8.1)
NEUTROPHILS NFR BLD AUTO: 77.3 % (ref 42.7–76)
PLATELET # BLD AUTO: 210 10*3/MM3 (ref 140–500)
PMV BLD AUTO: 9.8 FL (ref 6–12)
POTASSIUM BLD-SCNC: 4.5 MMOL/L (ref 3.5–5.2)
PROT SERPL-MCNC: 6.7 G/DL (ref 6–8.5)
RBC # BLD AUTO: 2.7 10*6/MM3 (ref 3.9–5.2)
SODIUM BLD-SCNC: 135 MMOL/L (ref 136–145)
WBC NRBC COR # BLD: 13.03 10*3/MM3 (ref 4.5–10.7)

## 2019-02-07 PROCEDURE — 94799 UNLISTED PULMONARY SVC/PX: CPT

## 2019-02-07 PROCEDURE — 80053 COMPREHEN METABOLIC PANEL: CPT | Performed by: INTERNAL MEDICINE

## 2019-02-07 PROCEDURE — 25010000002 ENOXAPARIN PER 10 MG: Performed by: NURSE PRACTITIONER

## 2019-02-07 PROCEDURE — 85025 COMPLETE CBC W/AUTO DIFF WBC: CPT | Performed by: NURSE PRACTITIONER

## 2019-02-07 PROCEDURE — 99024 POSTOP FOLLOW-UP VISIT: CPT | Performed by: NURSE PRACTITIONER

## 2019-02-07 PROCEDURE — 86705 HEP B CORE ANTIBODY IGM: CPT | Performed by: INTERNAL MEDICINE

## 2019-02-07 PROCEDURE — 97110 THERAPEUTIC EXERCISES: CPT

## 2019-02-07 PROCEDURE — 99232 SBSQ HOSP IP/OBS MODERATE 35: CPT | Performed by: INTERNAL MEDICINE

## 2019-02-07 PROCEDURE — 5A1D70Z PERFORMANCE OF URINARY FILTRATION, INTERMITTENT, LESS THAN 6 HOURS PER DAY: ICD-10-PCS | Performed by: INTERNAL MEDICINE

## 2019-02-07 PROCEDURE — 86706 HEP B SURFACE ANTIBODY: CPT | Performed by: INTERNAL MEDICINE

## 2019-02-07 RX ORDER — ALBUMIN (HUMAN) 12.5 G/50ML
37.5 SOLUTION INTRAVENOUS AS NEEDED
Status: DISCONTINUED | OUTPATIENT
Start: 2019-02-07 | End: 2019-02-18 | Stop reason: HOSPADM

## 2019-02-07 RX ORDER — FAMOTIDINE 20 MG/1
20 TABLET, FILM COATED ORAL DAILY
Status: DISCONTINUED | OUTPATIENT
Start: 2019-02-07 | End: 2019-02-18 | Stop reason: HOSPADM

## 2019-02-07 RX ADMIN — MUPIROCIN 10 APPLICATION: 20 OINTMENT TOPICAL at 09:02

## 2019-02-07 RX ADMIN — ACETAMINOPHEN 500 MG: 500 TABLET, FILM COATED ORAL at 14:55

## 2019-02-07 RX ADMIN — SODIUM CHLORIDE 4 ML: 7 NEBU SOLN,3 % NEBU at 20:17

## 2019-02-07 RX ADMIN — NYSTATIN: 100000 POWDER TOPICAL at 09:03

## 2019-02-07 RX ADMIN — ENOXAPARIN SODIUM 30 MG: 30 INJECTION SUBCUTANEOUS at 09:02

## 2019-02-07 RX ADMIN — ACETAMINOPHEN 500 MG: 500 TABLET, FILM COATED ORAL at 19:48

## 2019-02-07 RX ADMIN — Medication 81 MG: at 09:01

## 2019-02-07 RX ADMIN — PANTOPRAZOLE SODIUM 40 MG: 40 TABLET, DELAYED RELEASE ORAL at 05:51

## 2019-02-07 RX ADMIN — ACETAMINOPHEN 500 MG: 500 TABLET, FILM COATED ORAL at 05:51

## 2019-02-07 RX ADMIN — ACETAMINOPHEN 500 MG: 500 TABLET, FILM COATED ORAL at 22:58

## 2019-02-07 RX ADMIN — HYDROCORTISONE 1 APPLICATION: 1 CREAM TOPICAL at 05:51

## 2019-02-07 RX ADMIN — EMOLLIENT - LOTION: LOTION at 21:29

## 2019-02-07 RX ADMIN — NYSTATIN: 100000 POWDER TOPICAL at 22:58

## 2019-02-07 RX ADMIN — ACETAMINOPHEN 500 MG: 500 TABLET, FILM COATED ORAL at 09:53

## 2019-02-07 RX ADMIN — MUPIROCIN 1 APPLICATION: 20 OINTMENT TOPICAL at 21:29

## 2019-02-07 RX ADMIN — EMOLLIENT - LOTION: LOTION at 09:02

## 2019-02-07 RX ADMIN — HYDROCORTISONE 1 APPLICATION: 1 CREAM TOPICAL at 17:40

## 2019-02-07 RX ADMIN — HYDROCORTISONE 1 APPLICATION: 1 CREAM TOPICAL at 11:31

## 2019-02-07 RX ADMIN — IPRATROPIUM BROMIDE AND ALBUTEROL SULFATE 3 ML: 2.5; .5 SOLUTION RESPIRATORY (INHALATION) at 06:43

## 2019-02-07 RX ADMIN — IPRATROPIUM BROMIDE AND ALBUTEROL SULFATE 3 ML: 2.5; .5 SOLUTION RESPIRATORY (INHALATION) at 20:14

## 2019-02-07 RX ADMIN — SODIUM CHLORIDE 4 ML: 7 NEBU SOLN,3 % NEBU at 06:43

## 2019-02-07 RX ADMIN — ESCITALOPRAM 10 MG: 10 TABLET, FILM COATED ORAL at 09:01

## 2019-02-07 NOTE — PROGRESS NOTES
" LOS: 22 days   Patient Care Team:  Robert Cortez MD as PCP - General (Family Medicine)  Deborah Agudelo MD as Surgeon (Orthopedic Surgery)    Chief Complaint: post op fu    Subjective:  Symptoms:  No shortness of breath or chest pain.    Diet:  Adequate intake.  No nausea or vomiting.    Activity level: Impaired due to weakness.    Pain:  She reports no pain.          Vital Signs  Temp:  [97.3 °F (36.3 °C)-97.9 °F (36.6 °C)] 97.3 °F (36.3 °C)  Heart Rate:  [73-84] 79  Resp:  [18] 18  BP: (108-129)/(69-76) 120/75  Body mass index is 30.23 kg/m².    Intake/Output Summary (Last 24 hours) at 2/7/2019 0921  Last data filed at 2/7/2019 0700  Gross per 24 hour   Intake 360 ml   Output 350 ml   Net 10 ml     No intake/output data recorded.          02/04/19  0651 02/06/19  0534 02/07/19  0440   Weight: (!) 160 kg (351 lb 11.2 oz) 132 kg (291 lb 9.6 oz) 87.5 kg (193 lb)         Objective:  General Appearance:  Comfortable.    Vital signs: (most recent): Blood pressure 120/75, pulse 79, temperature 97.3 °F (36.3 °C), temperature source Oral, resp. rate 18, height 170.2 cm (67\"), weight 87.5 kg (193 lb), SpO2 90 %.    Output: Producing urine.    Lungs:  Normal effort and normal respiratory rate.  Breath sounds clear to auscultation.    Heart: Normal rate.  Irregular rhythm.  S1 normal and S2 normal.    Abdomen: Abdomen is soft and non-distended.    Extremities: There is no dependent edema.    Pulses: Distal pulses are intact.    Neurological: Patient is alert and oriented to person, place and time.    Skin:  Warm and dry.  (Sternal incision well approximated without erythema, edema, or drainage. Stable to palpation.)            Results Review:        WBC WBC   Date Value Ref Range Status   02/07/2019 13.03 (H) 4.50 - 10.70 10*3/mm3 Final   02/06/2019 15.51 (H) 4.50 - 10.70 10*3/mm3 Final   02/05/2019 16.64 (H) 4.50 - 10.70 10*3/mm3 Final      HGB Hemoglobin   Date Value Ref Range Status   02/07/2019 7.8 (L) 11.9 - 15.5 " g/dL Final   02/06/2019 8.1 (L) 11.9 - 15.5 g/dL Final   02/05/2019 8.5 (L) 11.9 - 15.5 g/dL Final      HCT Hematocrit   Date Value Ref Range Status   02/07/2019 25.3 (L) 35.6 - 45.5 % Final   02/06/2019 26.0 (L) 35.6 - 45.5 % Final   02/05/2019 28.6 (L) 35.6 - 45.5 % Final      Platelets Platelets   Date Value Ref Range Status   02/07/2019 210 140 - 500 10*3/mm3 Final   02/06/2019 191 140 - 500 10*3/mm3 Final   02/05/2019 198 140 - 500 10*3/mm3 Final        PT/INR:  No results found for: PROTIME/No results found for: INR    Sodium Sodium   Date Value Ref Range Status   02/07/2019 135 (L) 136 - 145 mmol/L Final   02/06/2019 137 136 - 145 mmol/L Final   02/05/2019 136 136 - 145 mmol/L Final      Potassium Potassium   Date Value Ref Range Status   02/07/2019 4.5 3.5 - 5.2 mmol/L Final   02/06/2019 4.5 3.5 - 5.2 mmol/L Final   02/05/2019 5.1 3.5 - 5.2 mmol/L Final      Chloride Chloride   Date Value Ref Range Status   02/07/2019 93 (L) 98 - 107 mmol/L Final   02/06/2019 95 (L) 98 - 107 mmol/L Final   02/05/2019 96 (L) 98 - 107 mmol/L Final      Bicarbonate CO2   Date Value Ref Range Status   02/07/2019 24.2 22.0 - 29.0 mmol/L Final   02/06/2019 26.3 22.0 - 29.0 mmol/L Final   02/05/2019 23.0 22.0 - 29.0 mmol/L Final      BUN BUN   Date Value Ref Range Status   02/07/2019 78 (H) 8 - 23 mg/dL Final   02/06/2019 53 (H) 8 - 23 mg/dL Final   02/05/2019 84 (H) 8 - 23 mg/dL Final      Creatinine Creatinine   Date Value Ref Range Status   02/07/2019 2.74 (H) 0.57 - 1.00 mg/dL Final   02/06/2019 2.09 (H) 0.57 - 1.00 mg/dL Final   02/05/2019 2.99 (H) 0.57 - 1.00 mg/dL Final      Calcium Calcium   Date Value Ref Range Status   02/07/2019 9.8 8.6 - 10.5 mg/dL Final   02/06/2019 9.6 8.6 - 10.5 mg/dL Final   02/05/2019 9.9 8.6 - 10.5 mg/dL Final      Magnesium No results found for: MG         acetaminophen 500 mg Oral Q4H   aspirin 81 mg Oral Daily   bumetanide 4 mg Oral Daily   cetaphil  Topical Q12H   enoxaparin 30 mg Subcutaneous  Nightly   escitalopram 10 mg Oral Daily   famotidine 20 mg Oral Daily   hydrocortisone 1 application Topical Q6H   ipratropium-albuterol 3 mL Nebulization BID - RT   mupirocin  Each Nare BID   nystatin  Topical Q12H   sennosides-docusate sodium 2 tablet Oral Nightly   sodium chloride 4 mL Nebulization BID - RT       niCARdipine 5-15 mg/hr    sodium chloride 30 mL/hr Last Rate: 30 mL/hr (01/28/19 1215)           Patient Active Problem List   Diagnosis Code   • Tear of rotator cuff M75.100   • Hypertension I10   • Generalized anxiety disorder F41.1   • Hyperlipidemia E78.5   • IFG (impaired fasting glucose) R73.01   • Heart murmur, systolic R01.1   • Shoulder pain, bilateral M25.511, M25.512   • Pain of both shoulder joints M25.511, M25.512   • Chronic pain of both shoulders M25.511, G89.29, M25.512   • Acute congestive heart failure (CMS/HCC) I50.9   • Obesity, morbid, BMI 50 or higher (CMS/HCC) E66.01   • Fungal skin infection B36.9   • Cellulitis of lower extremity L03.119   • Aortic valve stenosis I35.0   • Tricuspid valve insufficiency I07.1   • Mitral valve stenosis I05.0       Assessment & Plan    -Severe AS, MS, TV regurgitation s/p AVR/MVR (tissue),TV repair, MAZE PENNIE ligation-- POD#10 (Pagni)  -NICM, non obstructive dx by cath  -RBBB  -HTN---controlled  -Morbid obesity with arthritis-complicating mobility and all aspects of care  -Hypoventilation sx, probable OCTAVIANO-----chronic CO2 retain by ABG, pulmonary following  -Possible PE, NO LE DVT------nosebleeds on heparin  -Preop new a.fib----s/p cardioversion 1/24/19, reverted to a.fib again  -Left arm thrombophlebitis, infiltration  -LAVERNE-----Left IJ shiley, HD per renal  -Leukocytosis-----improving  -preop anemia, post op expected ABL  -Post op junctional bradycardia-----back in a.flutter/a.fib with rate 60-80, EP following     Looks great!  Stood at bedside independently this am.  HD today, consult vascular for tunnel cath placement.  Will need final decision  regarding pacemaker prior to insertion of TC and removal of V.wires.  Speech therapy signed off, tolerating regular diet.         Alicia Schmitz, APRN  02/07/19  9:21 AM

## 2019-02-07 NOTE — PLAN OF CARE
Problem: Fall Risk (Adult)  Goal: Identify Related Risk Factors and Signs and Symptoms  Outcome: Ongoing (interventions implemented as appropriate)   01/19/19 1810   Fall Risk (Adult)   Related Risk Factors (Fall Risk) age-related changes;gait/mobility problems;environment unfamiliar   Signs and Symptoms (Fall Risk) presence of risk factors     Goal: Absence of Fall  Outcome: Ongoing (interventions implemented as appropriate)   02/07/19 0454   Fall Risk (Adult)   Absence of Fall making progress toward outcome       Problem: Patient Care Overview  Goal: Plan of Care Review  Outcome: Ongoing (interventions implemented as appropriate)   02/07/19 0454   Coping/Psychosocial   Plan of Care Reviewed With patient   Plan of Care Review   Progress improving   OTHER   Outcome Summary VS as charted. No c/o pain or discomfort t/o shift. HR NSR 60-80's with pauses; the longest being 2.1sec and patient kerrie. well. WIll con't to monitor.      Goal: Individualization and Mutuality  Outcome: Ongoing (interventions implemented as appropriate)   02/07/19 0454   Individualization   Patient Specific Interventions education       Problem: Anxiety (Adult)  Goal: Reduction/Resolution  Outcome: Ongoing (interventions implemented as appropriate)   02/07/19 0454   Anxiety (Adult)   Reduction/Resolution making progress toward outcome       Problem: Breathing Pattern Ineffective (Adult)  Goal: Effective Oxygenation/Ventilation  Outcome: Ongoing (interventions implemented as appropriate)   02/07/19 0454   Breathing Pattern Ineffective (Adult)   Effective Oxygenation/Ventilation making progress toward outcome     Goal: Anxiety/Fear Reduction  Outcome: Ongoing (interventions implemented as appropriate)      Problem: Activity Intolerance (Adult)  Goal: Activity Tolerance  Outcome: Ongoing (interventions implemented as appropriate)   02/07/19 0454   Activity Intolerance (Adult)   Activity Tolerance making progress toward outcome       Problem: Skin  Injury Risk (Adult)  Goal: Skin Health and Integrity  Outcome: Ongoing (interventions implemented as appropriate)   02/07/19 0454   Skin Injury Risk (Adult)   Skin Health and Integrity making progress toward outcome       Problem: Cardiac Surgery (Adult)  Goal: Signs and Symptoms of Listed Potential Problems Will be Absent, Minimized or Managed (Cardiac Surgery)  Outcome: Ongoing (interventions implemented as appropriate)   02/05/19 1723   Goal/Outcome Evaluation   Problems Assessed (Cardiac Surgery) all   Problems Present (Cardiac Surgery) functional deficit;cardiac complications;situational response       Problem: Nutrition, Enteral (Adult)  Goal: Signs and Symptoms of Listed Potential Problems Will be Absent, Minimized or Managed (Nutrition, Enteral)  Outcome: Ongoing (interventions implemented as appropriate)   02/07/19 0454   Goal/Outcome Evaluation   Problems Assessed (Enteral Nutrition) all   Problems Present (Enteral Nutrition) none

## 2019-02-07 NOTE — CONSULTS
The patient had been previously seen by our service for temporary dialysis catheter placement.  She had placement of a left internal jugular Shiley catheter 8 days ago.  Her urine output has remained poor with no evidence of renal recovery in the short-term.  It is felt that she needs longer-term access.  There is a request for tunneled dialysis catheter placement.    We will place her on the schedule for tomorrow for tunneled catheter placement.  Orders have been placed.    This was discussed in detail with the pt and she agrees.

## 2019-02-07 NOTE — TELEPHONE ENCOUNTER
----- Message from Maria Fernanda Anthony sent at 2/7/2019 11:43 AM EST -----  Regarding: APPT CX  Daughter called to cancel appt with MARCUS on 2/25 for shoulder injections. Patient in the hospital-had open heart surgery. afsaneh prn to r/s.

## 2019-02-07 NOTE — THERAPY TREATMENT NOTE
Acute Care - Physical Therapy Treatment Note  [facility]     Patient Name: Claudia Arnold  : 1945  MRN: [id number]  Today's Date: 2019  Onset of Illness/Injury or Date of Surgery: 19          Admit Date: 2019    Visit Dx:    ICD-10-CM ICD-9-CM   1. Acute congestive heart failure, unspecified heart failure type (CMS/HCC) I50.9 428.0   2. Generalized weakness R53.1 780.79   3. Aortic valve stenosis, etiology of cardiac valve disease unspecified I35.0 424.1   4. Tricuspid valve insufficiency, unspecified etiology I07.1 397.0   5. Mitral valve stenosis, unspecified etiology I05.0 394.0   6. Acute renal failure, unspecified acute renal failure type (CMS/HCC) N17.9 584.9   7. S/P AVR (aortic valve replacement) Z95.2 V43.3     Patient Active Problem List   Diagnosis   • Tear of rotator cuff   • Hypertension   • Generalized anxiety disorder   • Hyperlipidemia   • IFG (impaired fasting glucose)   • Heart murmur, systolic   • Shoulder pain, bilateral   • Pain of both shoulder joints   • Chronic pain of both shoulders   • Acute congestive heart failure (CMS/HCC)   • Obesity, morbid, BMI 50 or higher (CMS/HCC)   • Fungal skin infection   • Cellulitis of lower extremity   • Aortic valve stenosis   • Tricuspid valve insufficiency   • Mitral valve stenosis       Therapy Treatment    Rehabilitation Treatment Summary     Row Name 19 0902             Treatment Time/Intention    Discipline  physical therapist  -      Document Type  therapy note (daily note)  -CH      Subjective Information  no complaints  -CH      Mode of Treatment  physical therapy  -CH      Patient/Family Observations  pt sitting in chair, no acute distress noted at rest  -CH      Patient Effort  good  -CH      Existing Precautions/Restrictions  cardiac;fall;sternal  -CH      Recorded by [] Kae Aldrich, PT 19 1244      Row Name 19 0902             Cognitive Assessment/Intervention    Additional Documentation   Cognitive Assessment/Intervention (Group)  -CH      Recorded by [] Kae Aldrich, PT 02/07/19 1244      Row Name 02/07/19 0902             Cognitive Assessment/Intervention- PT/OT    Orientation Status (Cognition)  oriented x 3  -CH      Follows Commands (Cognition)  WFL  -CH      Personal Safety Interventions  fall prevention program maintained;gait belt;nonskid shoes/slippers when out of bed  -CH      Recorded by [] Kae Aldrich, PT 02/07/19 1244      Row Name 02/07/19 0902             Bed Mobility Assessment/Treatment    Supine-Sit [place] (Bed Mobility)  not tested  -CH      Sit-Supine [place] (Bed Mobility)  not tested  -CH      Comment (Bed Mobility)  sitting in chair  -CH      Recorded by [] Kae Aldrich, PT 02/07/19 1244      Row Name 02/07/19 0902             Transfer Assessment/Treatment    Comment (Transfers)  attempted to stand x4, pt able to acheive partial stand and clear bottom from chair x1  -CH      Recorded by [] Kae Aldrich, PT 02/07/19 1244      Row Name 02/07/19 0902             Sit-Stand Transfer    Sit-Stand [place] (Transfers)  verbal cues;nonverbal cues (demo/gesture);maximum assist (25% patient effort);2 person assist  -      Assistive Device (Sit-Stand Transfers)  -- HHA  -CH      Recorded by [] Kae Aldrich, PT 02/07/19 1244      Row Name 02/07/19 0902             Stand-Sit Transfer    Stand-Sit [place] (Transfers)  verbal cues;nonverbal cues (demo/gesture);maximum assist (25% patient effort);2 person assist  -      Assistive Device (Stand-Sit Transfers)  -- HHA  -CH      Recorded by [] Kae Aldrich, PT 02/07/19 1244      Row Name 02/07/19 0902             Therapeutic Exercise    Comment (Therapeutic Exercise)  10 reps cardiac protocol, level 1  -CH      Recorded by [] Kae Aldrich, PT 02/07/19 1244      Row Name 02/07/19 0902             Positioning and Restraints    Pre-Treatment Position  sitting in  chair/recliner  -CH      Post Treatment Position  chair  -CH      In Chair  sitting;call light within reach;encouraged to call for assist  -CH      Recorded by [CH] Kae Aldrich, PT 02/07/19 1244      Row Name 02/07/19 0902             Pain Assessment    Additional Documentation  Pain Scale: Numbers Pre/Post-Treatment (Group)  -CH      Recorded by [CH] Kae Aldrich, PT 02/07/19 1244      Row Name 02/07/19 0902             Pain Scale: Numbers Pre/Post-Treatment    Pain Scale: Numbers, Pretreatment  0/10 - no pain  -CH      Recorded by [CH] Kae Aldrich, PT 02/07/19 1244      Row Name                Wound 01/28/19 1138 chest incision    Wound - Properties Group Date first assessed: 01/28/19 [SR] Time first assessed: 1138 [SR] Location: chest [SR] Type: incision [SR] Recorded by:  [SR] Keisha Gandara RN 01/28/19 1138    Row Name                Wound 01/28/19 1215 coccyx unspecified    Wound - Properties Group Date first assessed: 01/28/19 [MB] Time first assessed: 1215 [MB] Present On Admission : yes;picture taken [MB] Location: coccyx [MB] Type: unspecified [MB] Recorded by:  [MB] Julissa Chatterjee, RN 01/28/19 1541    Row Name                Wound 01/31/19 2300 Right gluteal pressure injury    Wound - Properties Group Date first assessed: 01/31/19 [NW] Time first assessed: 2300 [NW] Present On Admission : no [NW] Side: Right [NW] Location: gluteal [NW] Type: pressure injury [NW] Stage, Pressure Injury: deep tissue injury [NW] Recorded by:  [NW] Priscilla Willams, RN 02/01/19 0414    Row Name                Wound 01/31/19 2000 Left posterior ear pressure injury    Wound - Properties Group Date first assessed: 01/31/19 [NW] Time first assessed: 2000 [NW] Side: Left [NW] Orientation: posterior [NW] Location: ear [NW2] Type: pressure injury [NW] Stage, Pressure Injury: Stage 2;medical device related [NW] Recorded by:  [NW] Priscilla Willams RN 02/01/19 0416 [NW2] Priscilla Willams RN 02/01/19 0417    Row Name  02/07/19 0902             Plan of Care Review    Plan of Care Reviewed With  patient  -CH      Recorded by [CH] Kae Aldrich, PT 02/07/19 1244      Row Name 02/07/19 0902             Outcome Summary/Treatment Plan (PT)    Anticipated Discharge Disposition (PT)  skilled nursing facility  -CH      Recorded by [CH] Kae Aldrich, PT 02/07/19 1244        User Key  (r) = Recorded By, (t) = Taken By, (c) = Cosigned By    Initials Name Effective Dates Discipline    CH Kae Aldrich, PT 04/03/18 -  PT    Julissa Sweet, RN 06/16/16 -  Nurse    Keisha Fournier RN 06/16/16 -  Nurse    Priscilla Jensen RN 03/05/18 -  Nurse          Wound 01/28/19 1138 chest incision (Active)   Dressing Appearance open to air 2/7/2019 11:34 AM   Closure Approximated;Open to air 2/7/2019 11:34 AM   Base dry;pink 2/7/2019 11:34 AM   Drainage Amount none 2/7/2019 11:34 AM   Care, Wound cleansed with;antimicrobial agent applied 2/6/2019  8:04 PM   Dressing Care, Wound open to air 2/7/2019 12:08 AM       Wound 01/28/19 1215 coccyx unspecified (Active)   Dressing Appearance open to air 2/7/2019 11:34 AM   Closure None 2/7/2019 11:34 AM   Base clean;dry 2/7/2019 12:08 AM   Drainage Amount none 2/7/2019 12:08 AM   Dressing Care, Wound open to air 2/7/2019 12:08 AM       Wound 01/31/19 2300 Right gluteal pressure injury (Active)   Dressing Appearance open to air 2/7/2019 11:34 AM   Closure None 2/7/2019 11:34 AM   Drainage Amount none 2/7/2019 11:34 AM   Care, Wound barrier applied 2/6/2019  8:04 PM   Dressing Care, Wound open to air 2/7/2019 12:08 AM       Wound 01/31/19 2000 Left posterior ear pressure injury (Active)   Dressing Appearance open to air 2/7/2019 11:34 AM   Closure None 2/7/2019 11:34 AM   Base pink;clean;dry 2/7/2019 11:34 AM   Periwound intact 2/7/2019 11:34 AM   Drainage Amount none 2/7/2019 11:34 AM           Physical Therapy Education     Title: PT OT SLP Therapies (In Progress)     Topic: Physical Therapy  (Done)     Point: Mobility training (Done)     Learning Progress Summary           Patient Acceptance, E,TB,D, VU,NR by CHELSEA at 2/7/2019 12:44 PM    Acceptance, E, VU,NR by PATRICIO at 2/6/2019 10:19 AM    Acceptance, E, VU,NR by MA at 2/5/2019 11:09 AM    Acceptance, E,TB,D, VU,NR by CHELSEA at 2/4/2019 11:34 AM    Acceptance, E,D, VU,NR by DARRIUS at 2/3/2019  4:47 PM    Acceptance, E,D, DU,NR by DARRYN at 2/2/2019 10:36 AM    Comment:  safety during sit to stand, benefits of activity    Acceptance, E, NR by MA at 1/31/2019 12:08 PM    Acceptance, E, NR by MA at 1/30/2019 10:23 AM    Acceptance, E,TB, VU by  at 1/27/2019  9:10 AM    Acceptance, E, VU,NR by MA at 1/25/2019  3:54 PM    Acceptance, E,TB,D, VU,NR by CH at 1/23/2019  3:14 PM    Acceptance, TB,E, VU,DU by JANNET at 1/18/2019  4:37 PM    Acceptance, E, NR by EM at 1/17/2019 12:15 PM   Family Acceptance, TB,E, VU,DU by CW at 1/18/2019  4:37 PM                   Point: Home exercise program (Done)     Learning Progress Summary           Patient Acceptance, E,TB,D, VU,NR by  at 2/7/2019 12:44 PM    Acceptance, E, VU,NR by PATRICIO at 2/6/2019 10:19 AM    Acceptance, E, VU,NR by MA at 2/5/2019 11:09 AM    Acceptance, E,TB,D, VU,NR by CHELSEA at 2/4/2019 11:34 AM    Acceptance, E,D, VU,NR by DARRIUS at 2/3/2019  4:47 PM    Acceptance, E,D, DU,NR by DARRYN at 2/2/2019 10:36 AM    Comment:  safety during sit to stand, benefits of activity    Acceptance, E,TB,D, VU,NR by CHELSEA at 2/1/2019 10:51 AM    Acceptance, E, NR by MA at 1/31/2019 12:08 PM    Acceptance, E, NR by MA at 1/30/2019 10:23 AM    Acceptance, E,TB, VU by YANETH at 1/27/2019  9:10 AM    Acceptance, E, VU,NR by MA at 1/25/2019  3:54 PM    Acceptance, E,TB,D, VU,NR by CHELSEA at 1/23/2019  3:14 PM    Acceptance, TB,E, VU,DU by JANNET at 1/18/2019  4:37 PM   Family Acceptance, TB,E, VU,DU by JANNET at 1/18/2019  4:37 PM                   Point: Body mechanics (Done)     Learning Progress Summary           Patient Acceptance, E,TB,D, VU,NR by CHELSEA at 2/7/2019  12:44 PM    Acceptance, E, VU,NR by PATRICIO at 2/6/2019 10:19 AM    Acceptance, E, VU,NR by MA at 2/5/2019 11:09 AM    Acceptance, E,TB,D, VU,NR by CHELSEA at 2/4/2019 11:34 AM    Acceptance, E,D, VU,NR by DARRIUS at 2/3/2019  4:47 PM    Acceptance, E,D, DU,NR by DARRYN at 2/2/2019 10:36 AM    Comment:  safety during sit to stand, benefits of activity    Acceptance, E, NR by MA at 1/31/2019 12:08 PM    Acceptance, E, NR by MA at 1/30/2019 10:23 AM    Acceptance, E,TB, VU by YANETH at 1/27/2019  9:10 AM    Acceptance, E, VU,NR by MA at 1/25/2019  3:54 PM    Acceptance, E,TB,D, VU,NR by CHELSEA at 1/23/2019  3:14 PM    Acceptance, TB,E, VU,DU by JANNET at 1/18/2019  4:37 PM   Family Acceptance, TB,E, VU,DU by JANNET at 1/18/2019  4:37 PM                   Point: Precautions (Done)     Learning Progress Summary           Patient Acceptance, E,TB,D, VU,NR by CHELSEA at 2/7/2019 12:44 PM    Acceptance, E, VU,NR by PATRICIO at 2/6/2019 10:19 AM    Acceptance, E, VU,NR by MA at 2/5/2019 11:09 AM    Acceptance, E,TB,D, VU,NR by CHELSEA at 2/4/2019 11:34 AM    Acceptance, E,D, VU,NR by DARRIUS at 2/3/2019  4:47 PM    Acceptance, E,D, DU,NR by DARRYN at 2/2/2019 10:36 AM    Comment:  safety during sit to stand, benefits of activity    Acceptance, E, NR by MA at 1/31/2019 12:08 PM    Acceptance, E, NR by MA at 1/30/2019 10:23 AM    Acceptance, E,TB, VU by YANETH at 1/27/2019  9:10 AM    Acceptance, E, VU,NR by MA at 1/25/2019  3:54 PM    Acceptance, E,TB,D, VU,NR by CHELSEA at 1/23/2019  3:14 PM    Acceptance, TB,E, VU,DU by JANNET at 1/18/2019  4:37 PM   Family Acceptance, SHAMA PRINCE VU, DU by CW at 1/18/2019  4:37 PM                               User Key     Initials Effective Dates Name Provider Type Discipline    EF 06/08/18 -  Heike Anthony, PT Physical Therapist PT     04/03/18 -  Kae Aldrich, PT Physical Therapist PT    EM 04/03/18 -  Heike Strauss, PT Physical Therapist PT     03/07/18 -  Ksenia Lopez, PTA Physical Therapy Assistant PT     06/22/16 -  Zuleyma Mancilla,  PT Physical Therapist PT    LC 08/02/16 -  Khurram Littlejohn, PT DPT Physical Therapist PT    CW 03/07/18 -  Jose Ott, PTA Physical Therapy Assistant PT    MA 10/19/18 -  Tabatha Lawrence, CARMELLA Physical Therapist PT                PT Recommendation and Plan  Anticipated Discharge Disposition (PT): skilled nursing facility  Outcome Summary/Treatment Plan (PT)  Anticipated Discharge Disposition (PT): skilled nursing facility  Plan of Care Reviewed With: patient  Outcome Summary: Pt demonstrates some increased functional strength as she was able to clear her bottom from the chair during sit to stand training. Pt's mobility continues to be very limited by generalized weakness and decreased activity tolerance. PT will continue to follow to address strength, mobility, and transfers.  Outcome Measures     Row Name 02/07/19 1200 02/06/19 1000 02/05/19 1400       How much help from another person do you currently need...    Turning from your back to your side while in flat bed without using bedrails?  2  -CH  2  -EF  --    Moving from lying on back to sitting on the side of a flat bed without bedrails?  2  -CH  2  -EF  --    Moving to and from a bed to a chair (including a wheelchair)?  1  -CH  1  -EF  --    Standing up from a chair using your arms (e.g., wheelchair, bedside chair)?  2  -CH  1  -EF  --    Climbing 3-5 steps with a railing?  1  -CH  1  -EF  --    To walk in hospital room?  1  -CH  1  -EF  --    AM-PAC 6 Clicks Score  9  -CH  8  -EF  --       How much help from another is currently needed...    Putting on and taking off regular lower body clothing?  --  --  1  -SG    Bathing (including washing, rinsing, and drying)  --  --  1  -SG    Toileting (which includes using toilet bed pan or urinal)  --  --  1  -SG    Putting on and taking off regular upper body clothing  --  --  2  -SG    Taking care of personal grooming (such as brushing teeth)  --  --  2  -SG    Eating meals  --  --  2  -SG    Score  --  --  9   -SG       Functional Assessment    Outcome Measure Options  AM-PAC 6 Clicks Basic Mobility (PT)  -CH  AM-PAC 6 Clicks Basic Mobility (PT)  -EF  AM-PAC 6 Clicks Daily Activity (OT)  -SG    Row Name 02/05/19 1100             How much help from another person do you currently need...    Turning from your back to your side while in flat bed without using bedrails?  2  -MA      Moving from lying on back to sitting on the side of a flat bed without bedrails?  2  -MA      Moving to and from a bed to a chair (including a wheelchair)?  1  -MA      Standing up from a chair using your arms (e.g., wheelchair, bedside chair)?  1  -MA      Climbing 3-5 steps with a railing?  1  -MA      To walk in hospital room?  1  -MA      AM-PAC 6 Clicks Score  8  -MA         Functional Assessment    Outcome Measure Options  AM-PAC 6 Clicks Basic Mobility (PT)  -MA        User Key  (r) = Recorded By, (t) = Taken By, (c) = Cosigned By    Initials Name Provider Type    EF Heike Anthony, PT Physical Therapist    SG Jess Dumont, OTR Occupational Therapist    Kae Martinez, PT Physical Therapist    Tabatha Fischer, PT Physical Therapist         Time Calculation:   PT Charges     Row Name 02/07/19 1246             Time Calculation    Start Time  0844  -      Stop Time  0902  -      Time Calculation (min)  18 min  -      PT Received On  02/07/19  -      PT - Next Appointment  02/08/19  -         Time Calculation- PT    Total Timed Code Minutes- PT  18 minute(s)  -        User Key  (r) = Recorded By, (t) = Taken By, (c) = Cosigned By    Initials Name Provider Type    Kae Martinez, PT Physical Therapist        Therapy Suggested Charges     Code   Minutes Charges    95027 (CPT®) Hc Pt Neuromusc Re Education Ea 15 Min      49653 (CPT®) Hc Pt Ther Proc Ea 15 Min 15 1    33826 (CPT®) Hc Gait Training Ea 15 Min      09101 (CPT®) Hc Pt Therapeutic Act Ea 15 Min 10 1    26380 (CPT®) Hc Pt Manual Therapy Ea 15 Min       67198 (CPT®) Hc Pt Iontophoresis Ea 15 Min      03888 (CPT®) Hc Pt Elec Stim Ea-Per 15 Min      42927 (CPT®) Hc Pt Ultrasound Ea 15 Min      14488 (CPT®) Hc Pt Self Care/Mgmt/Train Ea 15 Min      66482 (CPT®) Hc Pt Prosthetic (S) Train Initial Encounter, Each 15 Min      71856 (CPT®) Hc Pt Orthotic(S)/Prosthetic(S) Encounter, Each 15 Min      68838 (CPT®) Hc Orthotic(S) Mgmt/Train Initial Encounter, Each 15min      Total  25 2        Therapy Charges for Today     Code Description Service Date Service Provider Modifiers Qty    80952773833 HC PT THER PROC EA 15 MIN 2/7/2019 Kae Aldrich, PT GP 1    21663212033 HC PT THER SUPP EA 15 MIN 2/7/2019 Kae Aldrich, PT GP 1          PT G-Codes  Outcome Measure Options: AM-PAC 6 Clicks Basic Mobility (PT)  AM-PAC 6 Clicks Score: 9  Score: 9    Kae Aldrich, PT  2/7/2019

## 2019-02-07 NOTE — PROGRESS NOTES
LOS: 22 days   Patient Care Team:  Robert Cortez MD as PCP - General (Family Medicine)  Deborah Agudelo MD as Surgeon (Orthopedic Surgery)    Chief Complaint:   shortness of breath    Interval History:   She looks much improved since I last saw her.  No pacing since pacemaker reduced to LLR of 30.  She is scheduled to get more permanent dialysis access tomorrow.     Objective   Vital Signs  Temp:  [97.3 °F (36.3 °C)-98.2 °F (36.8 °C)] 98.2 °F (36.8 °C)  Heart Rate:  [62-84] 75  Resp:  [18] 18  BP: ()/(49-79) 93/49    Intake/Output Summary (Last 24 hours) at 2/7/2019 1636  Last data filed at 2/7/2019 1154  Gross per 24 hour   Intake 240 ml   Output 350 ml   Net -110 ml       Physical Exam   Constitutional: She is oriented to person, place, and time. No distress.   HENT:   Right Ear: External ear normal.   Left Ear: External ear normal.   Eyes: Right eye exhibits no discharge. Left eye exhibits no discharge.   Neck: Normal range of motion.   Dialysis access in IJ   Cardiovascular: Normal rate. An irregularly irregular rhythm present.   Pulmonary/Chest: Effort normal.   Abdominal: Soft. There is no tenderness.   Musculoskeletal: She exhibits no edema.   Neurological: She is alert and oriented to person, place, and time.   Skin: Skin is warm. She is not diaphoretic.   Psychiatric: She has a normal mood and affect. Her behavior is normal.   Nursing note and vitals reviewed.        Results Review:      Results from last 7 days   Lab Units 02/07/19  0336 02/06/19  0304 02/05/19  0342   SODIUM mmol/L 135* 137 136   POTASSIUM mmol/L 4.5 4.5 5.1   CHLORIDE mmol/L 93* 95* 96*   CO2 mmol/L 24.2 26.3 23.0   BUN mg/dL 78* 53* 84*   CREATININE mg/dL 2.74* 2.09* 2.99*   GLUCOSE mg/dL 94 94 133*   CALCIUM mg/dL 9.8 9.6 9.9         Results from last 7 days   Lab Units 02/07/19  0336 02/06/19  0304 02/05/19  0342   WBC 10*3/mm3 13.03* 15.51* 16.64*   HEMOGLOBIN g/dL 7.8* 8.1* 8.5*   HEMATOCRIT % 25.3* 26.0* 28.6*    PLATELETS 10*3/mm3 210 191 198     Results from last 7 days   Lab Units 02/04/19  0021 02/03/19  1643 02/03/19  1015  02/02/19  1830   INR   --   --   --   --  1.32*   APTT seconds 86.1* 59.2* 63.3*   < > 30.5    < > = values in this interval not displayed.         Results from last 7 days   Lab Units 02/04/19  0337   MAGNESIUM mg/dL 2.2           I reviewed the patient's new clinical results.  I personally viewed and interpreted the patient's EKG/Telemetry data        Medication Review:     acetaminophen 500 mg Oral Q4H   aspirin 81 mg Oral Daily   bumetanide 4 mg Oral Daily   cetaphil  Topical Q12H   enoxaparin 30 mg Subcutaneous Nightly   escitalopram 10 mg Oral Daily   famotidine 20 mg Oral Daily   hydrocortisone 1 application Topical Q6H   ipratropium-albuterol 3 mL Nebulization BID - RT   mupirocin  Each Nare BID   nystatin  Topical Q12H   sennosides-docusate sodium 2 tablet Oral Nightly   sodium chloride 4 mL Nebulization BID - RT         niCARdipine 5-15 mg/hr    sodium chloride 30 mL/hr Last Rate: 30 mL/hr (01/28/19 1215)       Assessment/Plan       Acute congestive heart failure (CMS/HCC)    Hypertension    Generalized anxiety disorder    Hyperlipidemia    IFG (impaired fasting glucose)    Heart murmur, systolic    Obesity, morbid, BMI 50 or higher (CMS/HCC)    Fungal skin infection    Cellulitis of lower extremity    Aortic valve stenosis    Tricuspid valve insufficiency    Mitral valve stenosis     She is in rate controlled atrial fibrillation.  There is no indication for pacing at this time.  I recommend removal of her pacing wires.  It would be ideal if dialysis access could be located on right side in case indication for pacing develops in the future, but if not possible it can be managed.    Alex Jarrell MD  02/07/19  4:36 PM

## 2019-02-07 NOTE — PROGRESS NOTES
"   LOS: 22 days    Patient Care Team:  Robert Cortez MD as PCP - General (Family Medicine)  Deborah Agudelo MD as Surgeon (Orthopedic Surgery)    Chief Complaint:    Chief Complaint   Patient presents with   • Shortness of Breath   • Leg Swelling     Follow UP LAVERNE  Subjective     Interval History:   UOP only 350cc/24h .  Denies dyspnea.  No n/v.    Objective     Vital Signs  Temp:  [97.3 °F (36.3 °C)-97.9 °F (36.6 °C)] 97.3 °F (36.3 °C)  Heart Rate:  [71-84] 79  Resp:  [18] 18  BP: ()/(46-76) 120/75    Flowsheet Rows      First Filed Value   Admission Height  170.2 cm (67\") Documented at 01/16/2019 0910   Admission Weight  145 kg (320 lb)  (Abnormal)  Documented at 01/16/2019 0921          No intake/output data recorded.  I/O last 3 completed shifts:  In: 700 [P.O.:700]  Out: 650 [Urine:650]    Intake/Output Summary (Last 24 hours) at 2/7/2019 0725  Last data filed at 2/7/2019 0700  Gross per 24 hour   Intake 600 ml   Output 350 ml   Net 250 ml       Physical Exam:  GEN NAD, in chair, comfortable  Neck LIJ shiley, no JVD  Lungs CTA bilat   CV RRR   abd soft NT/ND  vasc trace pedal/ankle edema  +andrea scant urine     Results Review:    Results from last 7 days   Lab Units 02/07/19  0336 02/06/19  0304 02/05/19  0342  02/01/19  0259   SODIUM mmol/L 135* 137 136   < > 143   POTASSIUM mmol/L 4.5 4.5 5.1   < > 3.3*   CHLORIDE mmol/L 93* 95* 96*   < > 101   CO2 mmol/L 24.2 26.3 23.0   < > 23.7   BUN mg/dL 78* 53* 84*   < > 50*   CREATININE mg/dL 2.74* 2.09* 2.99*   < > 2.83*   CALCIUM mg/dL 9.8 9.6 9.9   < > 9.6   BILIRUBIN mg/dL 0.6  --   --   --  0.4   ALK PHOS U/L 67  --   --   --  71   ALT (SGPT) U/L <5  --   --   --  <5   AST (SGOT) U/L 13  --   --   --  22   GLUCOSE mg/dL 94 94 133*   < > 122*    < > = values in this interval not displayed.       Estimated Creatinine Clearance: 20.8 mL/min (A) (by C-G formula based on SCr of 2.74 mg/dL (H)).    Results from last 7 days   Lab Units 02/06/19  0304 " 02/05/19  0342 02/04/19  0337   MAGNESIUM mg/dL  --   --  2.2   PHOSPHORUS mg/dL 4.6* 5.8* 3.2             Results from last 7 days   Lab Units 02/07/19  0336 02/06/19  0304 02/05/19  0342 02/04/19  0337 02/03/19  0259   WBC 10*3/mm3 13.03* 15.51* 16.64* 17.10* 13.71*   HEMOGLOBIN g/dL 7.8* 8.1* 8.5* 9.2* 9.3*   PLATELETS 10*3/mm3 210 191 198 193 202       Results from last 7 days   Lab Units 02/02/19  1830   INR  1.32*         Imaging Results (last 24 hours)     ** No results found for the last 24 hours. **          acetaminophen 500 mg Oral Q4H   aspirin 81 mg Oral Daily   bumetanide 4 mg Oral Daily   cetaphil  Topical Q12H   enoxaparin 30 mg Subcutaneous Nightly   escitalopram 10 mg Oral Daily   hydrocortisone 1 application Topical Q6H   ipratropium-albuterol 3 mL Nebulization BID - RT   mupirocin  Each Nare BID   nystatin  Topical Q12H   pantoprazole 40 mg Oral Q AM   sennosides-docusate sodium 2 tablet Oral Nightly   sodium chloride 4 mL Nebulization BID - RT       niCARdipine 5-15 mg/hr    sodium chloride 30 mL/hr Last Rate: 30 mL/hr (01/28/19 1215)       Medication Review:   Current Facility-Administered Medications   Medication Dose Route Frequency Provider Last Rate Last Dose   • acetaminophen (TYLENOL) tablet 500 mg  500 mg Oral Q4H Yahaira Garza APRN   500 mg at 02/07/19 0551   • ALPRAZolam (XANAX) tablet 0.25 mg  0.25 mg Oral Q8H PRN Scar Gaxiola MD       • aspirin chewable tablet 81 mg  81 mg Oral Daily Scar Gaxiola MD   81 mg at 02/06/19 0851   • bisacodyl (DULCOLAX) EC tablet 10 mg  10 mg Oral Daily PRN Scar Gaxiola MD       • bisacodyl (DULCOLAX) suppository 10 mg  10 mg Rectal Daily PRN Scar Gaxiola MD   10 mg at 02/03/19 2120   • bumetanide (BUMEX) tablet 4 mg  4 mg Oral Daily Maria Fernanda Maldonado MD   4 mg at 02/06/19 1224   • cetaphil lotion   Topical Q12H Zonia Lopez MD       • cyclobenzaprine (FLEXERIL) tablet 10 mg  10 mg Oral Q8H PRN Scar Gaxiola MD        • dextrose (D50W) 25 g/ 50mL Intravenous Solution 25 g  25 g Intravenous Q15 Min PRN Oscar Rodriguez MD       • dextrose (GLUTOSE) oral gel 15 g  15 g Oral Q15 Min PRN Oscar Rodriguez MD       • enoxaparin (LOVENOX) syringe 30 mg  30 mg Subcutaneous Nightly Alicia Schmitz APRN   30 mg at 02/06/19 0851   • escitalopram (LEXAPRO) tablet 10 mg  10 mg Oral Daily Oscar Rodriguez MD   10 mg at 02/06/19 0851   • glucagon (human recombinant) (GLUCAGEN DIAGNOSTIC) injection 1 mg  1 mg Subcutaneous PRN Oscar Rodriguez MD       • hydrocortisone 1 % cream 1 application  1 application Topical Q6H Fran Tilley MD   1 application at 02/07/19 0551   • ipratropium-albuterol (DUO-NEB) nebulizer solution 3 mL  3 mL Nebulization BID - RT Yahaira Garza APRN   3 mL at 02/07/19 0643   • ipratropium-albuterol (DUO-NEB) nebulizer solution 3 mL  3 mL Nebulization Q6H PRN Yahaira Garza APRN       • magic mouthwash oral supsension 5 mL  5 mL Swish & Spit Q4H PRN Yahaira Garza APRN       • mupirocin (BACTROBAN) 2 % nasal ointment   Each Nare BID Scar Gaxiola MD   10 application at 02/06/19 2030   • niCARdipine (CARDENE) 25 mg/250 mL (0.1 mg/mL) NS infusion kit  5-15 mg/hr Intravenous Continuous PRN Scar Gxaiola MD       • nystatin (MYCOSTATIN) powder   Topical Q12H Scar Gaxiola MD       • ondansetron (ZOFRAN) injection 4 mg  4 mg Intravenous Q6H PRN Scar Gaxiola MD   4 mg at 02/04/19 0906   • oxyCODONE (ROXICODONE) immediate release tablet 5 mg  5 mg Oral Q4H PRN Yahaira Garza APRN   5 mg at 02/03/19 1504   • pantoprazole (PROTONIX) EC tablet 40 mg  40 mg Oral Q AM Scar Gaxiola MD   40 mg at 02/07/19 0551   • promethazine (PHENERGAN) tablet 12.5 mg  12.5 mg Oral Q6H PRN Scar Gaxiola MD        Or   • promethazine (PHENERGAN) injection 12.5 mg  12.5 mg Intravenous Q6H PRN Scar Gaxiola MD       • sennosides-docusate sodium (SENOKOT-S) 8.6-50 MG tablet 2 tablet  2 tablet  Oral Nightly Scar Gaxiola MD   Stopped at 02/06/19 2030   • sodium chloride 0.9 % flush 30 mL  30 mL Intravenous Once PRN Scar Gaxiola MD       • sodium chloride 0.9 % infusion  30 mL/hr Intravenous Continuous PRN Scar Gaxiola MD 30 mL/hr at 01/28/19 1215 30 mL/hr at 01/28/19 1215   • sodium chloride 7 % nebulizer solution nebulizer solution 4 mL  4 mL Nebulization BID - RT Yahaira Garza, APRN   4 mL at 02/07/19 0643       Assessment/Plan   1. Oliguric LAVERNE - ATN post AVR/MVR.  Last HD TUES.  Day 7 ROCHELLE Lazaro.  UOP remains poor.  Needs TDC as no e/o renal recovery (still just ~ 1 week out from HD initiation and do anticipate eventual recovery).      2. SP AVR, MVR, TV repair, Left cryo MAZE PENNIE ligation.  POD10  3. Vol overload - much better, scant periph edema, central vol stable, no dyspnea   4. Probable OCTAVIANO  5. GERD - on PPI   6. PAF  7. Leukocytosis.  Improving, 13K today  8. Anemia.  Hgb down to 7.8    Plan  - HD today, remove 3L  - consult vascular surg for TDC placement  - involve CCP for out-pt dialysis arrangements  - remove andrea tomorrow (pt resistant to doing so today)  - check iron stores   - change PPI to pepcid       Acute congestive heart failure (CMS/HCC)    Hypertension    Generalized anxiety disorder    Hyperlipidemia    IFG (impaired fasting glucose)    Heart murmur, systolic    Obesity, morbid, BMI 50 or higher (CMS/HCC)    Fungal skin infection    Cellulitis of lower extremity    Aortic valve stenosis    Tricuspid valve insufficiency    Mitral valve stenosis              Wallace Falcon MD  02/07/19  7:25 AM

## 2019-02-07 NOTE — PROGRESS NOTES
LOS: 22 days   Patient Care Team:  Robert Cortez MD as PCP - General (Family Medicine)  Deborah Agudelo MD as Surgeon (Orthopedic Surgery)    Chief Complaint: Follow-up for tissue aortic and mitral valve replacements, tricuspid valve repair, paroxysmal atrial fibrillation with RVR, acute diastolic and valvular CHF.    Interval History: Doing better today.  Actually stood up with assistance.  SOA mild.  No CP.  Paced intermittently overnight.    Vital Signs:  Temp:  [97.3 °F (36.3 °C)-97.5 °F (36.4 °C)] 97.3 °F (36.3 °C)  Heart Rate:  [73-84] 79  Resp:  [18] 18  BP: (108-129)/(69-76) 120/75    Intake/Output Summary (Last 24 hours) at 2/7/2019 1154  Last data filed at 2/7/2019 0700  Gross per 24 hour   Intake --   Output 350 ml   Net -350 ml       Physical Exam:   General Appearance:    No acute distress, alert and oriented x4   Lungs:     Clear to auscultation bilaterally     Heart:    Irregularly irregular rhythm with a normal rate.  II/VI SM RUSB   Abdomen:     Soft, non-tender, non-distended.    Extremities:   Trace edema.     Results Review:    Results from last 7 days   Lab Units 02/07/19  0336   SODIUM mmol/L 135*   POTASSIUM mmol/L 4.5   CHLORIDE mmol/L 93*   CO2 mmol/L 24.2   BUN mg/dL 78*   CREATININE mg/dL 2.74*   GLUCOSE mg/dL 94   CALCIUM mg/dL 9.8         Results from last 7 days   Lab Units 02/07/19  0336   WBC 10*3/mm3 13.03*   HEMOGLOBIN g/dL 7.8*   HEMATOCRIT % 25.3*   PLATELETS 10*3/mm3 210     Results from last 7 days   Lab Units 02/04/19  0021 02/03/19  1643 02/03/19  1015  02/02/19  1830   INR   --   --   --   --  1.32*   APTT seconds 86.1* 59.2* 63.3*   < > 30.5    < > = values in this interval not displayed.         Results from last 7 days   Lab Units 02/04/19  0337   MAGNESIUM mg/dL 2.2           I reviewed the patient's new clinical results.        Assessment:  1. Acute valvular and diastolic CHF  2. Severe aortic stenosis, severe mitral stenosis secondary to calcification, and severe  tricuspid regurgitation  3. Normal EF 60-65%  4. Paroxysmal atrial fibrillation with RVR -status post cardioversion 1/24/2019 and subsequent reversion to atrial fibrillation.  5. Moderate pulmonary hypertension (mean PA pressure 32)  6. Status post tissue AVR, tissue MVR, tricuspid valve repair (Martha suture repair), left cryomaze, and PENNIE ligation on 1/28/2019 (Dr. Gaxiola).  7. Small bilateral pulmonary emboli  8. Oliguric acute kidney injury post-operatively  9. Bifascicular block (RBBB and LAFB)  10. Candida intertrigo (skin folds); left axillary fungal infection (treated)  11. Osteoarthritis with immobility  12. Left wrist thrombophlebitis from IV infiltration  13. Undiagnosed OCTAVIANO  14. Morbid obesity   15. Anemia of chronic disease  16. Severe deconditioning   17. Junctional bradycardia and asystole post-op    Plan:    -Still with intermittent pacing, which means her rate is dropping below 50 at times.  I will discuss with Dr. Jarrell today and come up with definitive plan regarding pacemaker.    -Going for tunneled dialysis catheter placement tomorrow.  Please use right side if possible as she may still need pacemaker placement prior to discharge.    -Only on DVT prophylaxis for now given recent nosebleed.  Will defer to Dr. Gaxiola for timing on systemic anticoagulation.    -Stood up today.  She is slowly making progress.  Continue to increase activity.    Scott Segura MD  02/07/19  11:54 AM

## 2019-02-07 NOTE — PLAN OF CARE
Problem: Patient Care Overview  Goal: Plan of Care Review  Outcome: Ongoing (interventions implemented as appropriate)   02/07/19 9197   Coping/Psychosocial   Plan of Care Reviewed With patient   OTHER   Outcome Summary Pt demonstrates some increased functional strength as she was able to clear her bottom from the chair during sit to stand training. Pt's mobility continues to be very limited by generalized weakness and decreased activity tolerance. PT will continue to follow to address strength, mobility, and transfers.

## 2019-02-07 NOTE — PROGRESS NOTES
Continued Stay Note  [facility]     Patient Name: Claudia Arnold  MRN: [id number]  Today's Date: 2/7/2019    Admit Date: 1/16/2019    Discharge Plan     Row Name 02/07/19 4865       Plan    Plan  SNF w/ hemodialysis on site.  Awaiting dtr's pick.    Plan Comments  CCP noted that nephrology Md wanted Pt outpatient hemodialysis set up to be started.  CCP called Pt dtr Leyla Cobos [phone] at 5:02 PM to ask if she had decided on [facility] or [facility].  CCP is awaiting a return call from dtr.  CCP has started the [facility] Admission checklist with pertinent printed documentation.  CCP awaiting a return call from dtr to see which location she would like Pt to go to.  CCP following.  D[place] [place]/CCP        Discharge Codes    No documentation.             Maki Lu RN

## 2019-02-08 ENCOUNTER — APPOINTMENT (OUTPATIENT)
Dept: GENERAL RADIOLOGY | Facility: HOSPITAL | Age: 74
End: 2019-02-08

## 2019-02-08 ENCOUNTER — ANESTHESIA EVENT (OUTPATIENT)
Dept: PERIOP | Facility: HOSPITAL | Age: 74
End: 2019-02-08

## 2019-02-08 ENCOUNTER — ANESTHESIA (OUTPATIENT)
Dept: PERIOP | Facility: HOSPITAL | Age: 74
End: 2019-02-08

## 2019-02-08 LAB
ALBUMIN SERPL-MCNC: 3.9 G/DL (ref 3.5–5.2)
ANION GAP SERPL CALCULATED.3IONS-SCNC: 13.1 MMOL/L
BASOPHILS # BLD AUTO: 0.02 10*3/MM3 (ref 0–0.2)
BASOPHILS NFR BLD AUTO: 0.2 % (ref 0–1.5)
BUN BLD-MCNC: 35 MG/DL (ref 8–23)
BUN/CREAT SERPL: 22 (ref 7–25)
CALCIUM SPEC-SCNC: 9.5 MG/DL (ref 8.6–10.5)
CHLORIDE SERPL-SCNC: 99 MMOL/L (ref 98–107)
CO2 SERPL-SCNC: 25.9 MMOL/L (ref 22–29)
CREAT BLD-MCNC: 1.59 MG/DL (ref 0.57–1)
DEPRECATED RDW RBC AUTO: 51.6 FL (ref 37–54)
EOSINOPHIL # BLD AUTO: 0.13 10*3/MM3 (ref 0–0.7)
EOSINOPHIL NFR BLD AUTO: 1 % (ref 0.3–6.2)
ERYTHROCYTE [DISTWIDTH] IN BLOOD BY AUTOMATED COUNT: 15.3 % (ref 11.7–13)
FERRITIN SERPL-MCNC: 473.4 NG/ML (ref 13–150)
GFR SERPL CREATININE-BSD FRML MDRD: 32 ML/MIN/1.73
GLUCOSE BLD-MCNC: 104 MG/DL (ref 65–99)
GLUCOSE BLDC GLUCOMTR-MCNC: 113 MG/DL (ref 70–130)
GLUCOSE BLDC GLUCOMTR-MCNC: 117 MG/DL (ref 70–130)
GLUCOSE BLDC GLUCOMTR-MCNC: 119 MG/DL (ref 70–130)
HCT VFR BLD AUTO: 24.2 % (ref 35.6–45.5)
HGB BLD-MCNC: 7.5 G/DL (ref 11.9–15.5)
IMM GRANULOCYTES # BLD AUTO: 0.29 10*3/MM3 (ref 0–0.03)
IMM GRANULOCYTES NFR BLD AUTO: 2.3 % (ref 0–0.5)
IRON 24H UR-MRATE: 20 MCG/DL (ref 37–145)
IRON SATN MFR SERPL: 9 % (ref 20–50)
LYMPHOCYTES # BLD AUTO: 1.56 10*3/MM3 (ref 0.9–4.8)
LYMPHOCYTES NFR BLD AUTO: 12.4 % (ref 19.6–45.3)
MCH RBC QN AUTO: 29.3 PG (ref 26.9–32)
MCHC RBC AUTO-ENTMCNC: 31 G/DL (ref 32.4–36.3)
MCV RBC AUTO: 94.5 FL (ref 80.5–98.2)
MONOCYTES # BLD AUTO: 0.52 10*3/MM3 (ref 0.2–1.2)
MONOCYTES NFR BLD AUTO: 4.1 % (ref 5–12)
NEUTROPHILS # BLD AUTO: 10.06 10*3/MM3 (ref 1.9–8.1)
NEUTROPHILS NFR BLD AUTO: 80 % (ref 42.7–76)
PHOSPHATE SERPL-MCNC: 3.7 MG/DL (ref 2.5–4.5)
PLATELET # BLD AUTO: 225 10*3/MM3 (ref 140–500)
PMV BLD AUTO: 10.1 FL (ref 6–12)
POTASSIUM BLD-SCNC: 4.2 MMOL/L (ref 3.5–5.2)
RBC # BLD AUTO: 2.56 10*6/MM3 (ref 3.9–5.2)
SODIUM BLD-SCNC: 138 MMOL/L (ref 136–145)
TIBC SERPL-MCNC: 228 MCG/DL
TRANSFERRIN SERPL-MCNC: 153 MG/DL (ref 200–360)
WBC NRBC COR # BLD: 12.58 10*3/MM3 (ref 4.5–10.7)

## 2019-02-08 PROCEDURE — 76000 FLUOROSCOPY <1 HR PHYS/QHP: CPT

## 2019-02-08 PROCEDURE — 25010000002 PROPOFOL 10 MG/ML EMULSION: Performed by: NURSE ANESTHETIST, CERTIFIED REGISTERED

## 2019-02-08 PROCEDURE — 02H633Z INSERTION OF INFUSION DEVICE INTO RIGHT ATRIUM, PERCUTANEOUS APPROACH: ICD-10-PCS | Performed by: SURGERY

## 2019-02-08 PROCEDURE — 25010000002 HYDROMORPHONE PER 4 MG: Performed by: NURSE ANESTHETIST, CERTIFIED REGISTERED

## 2019-02-08 PROCEDURE — C1894 INTRO/SHEATH, NON-LASER: HCPCS | Performed by: SURGERY

## 2019-02-08 PROCEDURE — 99232 SBSQ HOSP IP/OBS MODERATE 35: CPT | Performed by: NURSE PRACTITIONER

## 2019-02-08 PROCEDURE — 80069 RENAL FUNCTION PANEL: CPT | Performed by: INTERNAL MEDICINE

## 2019-02-08 PROCEDURE — 82728 ASSAY OF FERRITIN: CPT | Performed by: INTERNAL MEDICINE

## 2019-02-08 PROCEDURE — 25010000002 HEPARIN (PORCINE) PER 1000 UNITS: Performed by: SURGERY

## 2019-02-08 PROCEDURE — B2141ZZ FLUOROSCOPY OF RIGHT HEART USING LOW OSMOLAR CONTRAST: ICD-10-PCS | Performed by: SURGERY

## 2019-02-08 PROCEDURE — 94799 UNLISTED PULMONARY SVC/PX: CPT

## 2019-02-08 PROCEDURE — 0 IOPAMIDOL PER 1 ML: Performed by: SURGERY

## 2019-02-08 PROCEDURE — 83540 ASSAY OF IRON: CPT | Performed by: INTERNAL MEDICINE

## 2019-02-08 PROCEDURE — 85025 COMPLETE CBC W/AUTO DIFF WBC: CPT | Performed by: NURSE PRACTITIONER

## 2019-02-08 PROCEDURE — 0JH63XZ INSERTION OF TUNNELED VASCULAR ACCESS DEVICE INTO CHEST SUBCUTANEOUS TISSUE AND FASCIA, PERCUTANEOUS APPROACH: ICD-10-PCS | Performed by: SURGERY

## 2019-02-08 PROCEDURE — C1750 CATH, HEMODIALYSIS,LONG-TERM: HCPCS | Performed by: SURGERY

## 2019-02-08 PROCEDURE — 25010000003 CEFAZOLIN IN DEXTROSE 2-4 GM/100ML-% SOLUTION: Performed by: SURGERY

## 2019-02-08 PROCEDURE — B548ZZA ULTRASONOGRAPHY OF SUPERIOR VENA CAVA, GUIDANCE: ICD-10-PCS | Performed by: SURGERY

## 2019-02-08 PROCEDURE — 25010000002 MIDAZOLAM PER 1 MG: Performed by: ANESTHESIOLOGY

## 2019-02-08 PROCEDURE — 02HV33Z INSERTION OF INFUSION DEVICE INTO SUPERIOR VENA CAVA, PERCUTANEOUS APPROACH: ICD-10-PCS | Performed by: SURGERY

## 2019-02-08 PROCEDURE — C1769 GUIDE WIRE: HCPCS | Performed by: SURGERY

## 2019-02-08 PROCEDURE — 99024 POSTOP FOLLOW-UP VISIT: CPT | Performed by: THORACIC SURGERY (CARDIOTHORACIC VASCULAR SURGERY)

## 2019-02-08 PROCEDURE — 84466 ASSAY OF TRANSFERRIN: CPT | Performed by: INTERNAL MEDICINE

## 2019-02-08 PROCEDURE — 25010000002 PHENYLEPHRINE PER 1 ML: Performed by: NURSE ANESTHETIST, CERTIFIED REGISTERED

## 2019-02-08 PROCEDURE — 82962 GLUCOSE BLOOD TEST: CPT

## 2019-02-08 RX ORDER — MIDAZOLAM HYDROCHLORIDE 1 MG/ML
2 INJECTION INTRAMUSCULAR; INTRAVENOUS
Status: DISCONTINUED | OUTPATIENT
Start: 2019-02-08 | End: 2019-02-08 | Stop reason: HOSPADM

## 2019-02-08 RX ORDER — DIPHENHYDRAMINE HCL 25 MG
25 CAPSULE ORAL
Status: DISCONTINUED | OUTPATIENT
Start: 2019-02-08 | End: 2019-02-08 | Stop reason: HOSPADM

## 2019-02-08 RX ORDER — ONDANSETRON 2 MG/ML
4 INJECTION INTRAMUSCULAR; INTRAVENOUS ONCE AS NEEDED
Status: DISCONTINUED | OUTPATIENT
Start: 2019-02-08 | End: 2019-02-08 | Stop reason: HOSPADM

## 2019-02-08 RX ORDER — FLUMAZENIL 0.1 MG/ML
0.2 INJECTION INTRAVENOUS AS NEEDED
Status: DISCONTINUED | OUTPATIENT
Start: 2019-02-08 | End: 2019-02-08 | Stop reason: HOSPADM

## 2019-02-08 RX ORDER — SODIUM CHLORIDE 0.9 % (FLUSH) 0.9 %
1-10 SYRINGE (ML) INJECTION AS NEEDED
Status: DISCONTINUED | OUTPATIENT
Start: 2019-02-08 | End: 2019-02-08 | Stop reason: HOSPADM

## 2019-02-08 RX ORDER — HYDROCODONE BITARTRATE AND ACETAMINOPHEN 7.5; 325 MG/1; MG/1
1 TABLET ORAL ONCE AS NEEDED
Status: DISCONTINUED | OUTPATIENT
Start: 2019-02-08 | End: 2019-02-08 | Stop reason: HOSPADM

## 2019-02-08 RX ORDER — PROMETHAZINE HYDROCHLORIDE 25 MG/1
25 SUPPOSITORY RECTAL ONCE AS NEEDED
Status: DISCONTINUED | OUTPATIENT
Start: 2019-02-08 | End: 2019-02-08 | Stop reason: HOSPADM

## 2019-02-08 RX ORDER — NALOXONE HCL 0.4 MG/ML
0.2 VIAL (ML) INJECTION AS NEEDED
Status: DISCONTINUED | OUTPATIENT
Start: 2019-02-08 | End: 2019-02-08 | Stop reason: HOSPADM

## 2019-02-08 RX ORDER — EPHEDRINE SULFATE 50 MG/ML
5 INJECTION, SOLUTION INTRAVENOUS ONCE AS NEEDED
Status: DISCONTINUED | OUTPATIENT
Start: 2019-02-08 | End: 2019-02-08 | Stop reason: HOSPADM

## 2019-02-08 RX ORDER — PROMETHAZINE HYDROCHLORIDE 25 MG/ML
12.5 INJECTION, SOLUTION INTRAMUSCULAR; INTRAVENOUS ONCE AS NEEDED
Status: DISCONTINUED | OUTPATIENT
Start: 2019-02-08 | End: 2019-02-08 | Stop reason: HOSPADM

## 2019-02-08 RX ORDER — OXYCODONE AND ACETAMINOPHEN 7.5; 325 MG/1; MG/1
1 TABLET ORAL ONCE AS NEEDED
Status: DISCONTINUED | OUTPATIENT
Start: 2019-02-08 | End: 2019-02-08 | Stop reason: HOSPADM

## 2019-02-08 RX ORDER — CEFAZOLIN SODIUM 2 G/100ML
2 INJECTION, SOLUTION INTRAVENOUS ONCE
Status: COMPLETED | OUTPATIENT
Start: 2019-02-08 | End: 2019-02-08

## 2019-02-08 RX ORDER — MIDAZOLAM HYDROCHLORIDE 1 MG/ML
1 INJECTION INTRAMUSCULAR; INTRAVENOUS
Status: DISCONTINUED | OUTPATIENT
Start: 2019-02-08 | End: 2019-02-08 | Stop reason: HOSPADM

## 2019-02-08 RX ORDER — PROPOFOL 10 MG/ML
VIAL (ML) INTRAVENOUS CONTINUOUS PRN
Status: DISCONTINUED | OUTPATIENT
Start: 2019-02-08 | End: 2019-02-08 | Stop reason: SURG

## 2019-02-08 RX ORDER — LIDOCAINE HYDROCHLORIDE 20 MG/ML
INJECTION, SOLUTION INFILTRATION; PERINEURAL AS NEEDED
Status: DISCONTINUED | OUTPATIENT
Start: 2019-02-08 | End: 2019-02-08 | Stop reason: SURG

## 2019-02-08 RX ORDER — DIPHENHYDRAMINE HYDROCHLORIDE 50 MG/ML
12.5 INJECTION INTRAMUSCULAR; INTRAVENOUS
Status: DISCONTINUED | OUTPATIENT
Start: 2019-02-08 | End: 2019-02-08 | Stop reason: HOSPADM

## 2019-02-08 RX ORDER — ACETAMINOPHEN 325 MG/1
650 TABLET ORAL ONCE AS NEEDED
Status: DISCONTINUED | OUTPATIENT
Start: 2019-02-08 | End: 2019-02-08 | Stop reason: HOSPADM

## 2019-02-08 RX ORDER — HEPARIN SODIUM 1000 [USP'U]/ML
INJECTION, SOLUTION INTRAVENOUS; SUBCUTANEOUS AS NEEDED
Status: DISCONTINUED | OUTPATIENT
Start: 2019-02-08 | End: 2019-02-08 | Stop reason: HOSPADM

## 2019-02-08 RX ORDER — SODIUM CHLORIDE 9 MG/ML
9 INJECTION, SOLUTION INTRAVENOUS CONTINUOUS
Status: DISCONTINUED | OUTPATIENT
Start: 2019-02-08 | End: 2019-02-18 | Stop reason: HOSPADM

## 2019-02-08 RX ORDER — SODIUM CHLORIDE, SODIUM LACTATE, POTASSIUM CHLORIDE, CALCIUM CHLORIDE 600; 310; 30; 20 MG/100ML; MG/100ML; MG/100ML; MG/100ML
9 INJECTION, SOLUTION INTRAVENOUS CONTINUOUS
Status: DISCONTINUED | OUTPATIENT
Start: 2019-02-08 | End: 2019-02-08

## 2019-02-08 RX ORDER — LIDOCAINE HYDROCHLORIDE 10 MG/ML
0.5 INJECTION, SOLUTION EPIDURAL; INFILTRATION; INTRACAUDAL; PERINEURAL ONCE AS NEEDED
Status: DISCONTINUED | OUTPATIENT
Start: 2019-02-08 | End: 2019-02-08 | Stop reason: HOSPADM

## 2019-02-08 RX ORDER — PROMETHAZINE HYDROCHLORIDE 25 MG/1
25 TABLET ORAL ONCE AS NEEDED
Status: DISCONTINUED | OUTPATIENT
Start: 2019-02-08 | End: 2019-02-08 | Stop reason: HOSPADM

## 2019-02-08 RX ORDER — LABETALOL HYDROCHLORIDE 5 MG/ML
5 INJECTION, SOLUTION INTRAVENOUS
Status: DISCONTINUED | OUTPATIENT
Start: 2019-02-08 | End: 2019-02-08 | Stop reason: HOSPADM

## 2019-02-08 RX ORDER — FENTANYL CITRATE 50 UG/ML
50 INJECTION, SOLUTION INTRAMUSCULAR; INTRAVENOUS
Status: DISCONTINUED | OUTPATIENT
Start: 2019-02-08 | End: 2019-02-08 | Stop reason: HOSPADM

## 2019-02-08 RX ORDER — HYDROMORPHONE HYDROCHLORIDE 1 MG/ML
0.5 INJECTION, SOLUTION INTRAMUSCULAR; INTRAVENOUS; SUBCUTANEOUS
Status: DISCONTINUED | OUTPATIENT
Start: 2019-02-08 | End: 2019-02-08 | Stop reason: HOSPADM

## 2019-02-08 RX ORDER — FAMOTIDINE 10 MG/ML
20 INJECTION, SOLUTION INTRAVENOUS ONCE
Status: COMPLETED | OUTPATIENT
Start: 2019-02-08 | End: 2019-02-08

## 2019-02-08 RX ORDER — HYDRALAZINE HYDROCHLORIDE 20 MG/ML
5 INJECTION INTRAMUSCULAR; INTRAVENOUS
Status: DISCONTINUED | OUTPATIENT
Start: 2019-02-08 | End: 2019-02-08 | Stop reason: HOSPADM

## 2019-02-08 RX ADMIN — HYDROCORTISONE 1 APPLICATION: 1 CREAM TOPICAL at 05:03

## 2019-02-08 RX ADMIN — PHENYLEPHRINE HYDROCHLORIDE 100 MCG: 10 INJECTION INTRAVENOUS at 09:52

## 2019-02-08 RX ADMIN — PROPOFOL 50 MCG/KG/MIN: 10 INJECTION, EMULSION INTRAVENOUS at 09:37

## 2019-02-08 RX ADMIN — SODIUM CHLORIDE 9 ML/HR: 9 INJECTION, SOLUTION INTRAVENOUS at 08:50

## 2019-02-08 RX ADMIN — PHENYLEPHRINE HYDROCHLORIDE 100 MCG: 10 INJECTION INTRAVENOUS at 10:01

## 2019-02-08 RX ADMIN — SENNOSIDES AND DOCUSATE SODIUM 2 TABLET: 8.6; 5 TABLET ORAL at 20:52

## 2019-02-08 RX ADMIN — ACETAMINOPHEN 500 MG: 500 TABLET, FILM COATED ORAL at 05:03

## 2019-02-08 RX ADMIN — HYDROMORPHONE HYDROCHLORIDE 0.5 MG: 1 INJECTION, SOLUTION INTRAMUSCULAR; INTRAVENOUS; SUBCUTANEOUS at 11:50

## 2019-02-08 RX ADMIN — LIDOCAINE HYDROCHLORIDE 50 MG: 20 INJECTION, SOLUTION INFILTRATION; PERINEURAL at 09:37

## 2019-02-08 RX ADMIN — CEFAZOLIN SODIUM 2 G: 2 INJECTION, SOLUTION INTRAVENOUS at 09:37

## 2019-02-08 RX ADMIN — NYSTATIN: 100000 POWDER TOPICAL at 20:52

## 2019-02-08 RX ADMIN — ACETAMINOPHEN 500 MG: 500 TABLET, FILM COATED ORAL at 22:38

## 2019-02-08 RX ADMIN — HYDROMORPHONE HYDROCHLORIDE 0.5 MG: 1 INJECTION, SOLUTION INTRAMUSCULAR; INTRAVENOUS; SUBCUTANEOUS at 12:00

## 2019-02-08 RX ADMIN — ACETAMINOPHEN 500 MG: 500 TABLET, FILM COATED ORAL at 02:15

## 2019-02-08 RX ADMIN — IPRATROPIUM BROMIDE AND ALBUTEROL SULFATE 3 ML: 2.5; .5 SOLUTION RESPIRATORY (INHALATION) at 18:51

## 2019-02-08 RX ADMIN — SODIUM CHLORIDE 4 ML: 7 NEBU SOLN,3 % NEBU at 18:55

## 2019-02-08 RX ADMIN — SODIUM CHLORIDE 4 ML: 7 NEBU SOLN,3 % NEBU at 07:35

## 2019-02-08 RX ADMIN — EMOLLIENT - LOTION: LOTION at 20:52

## 2019-02-08 RX ADMIN — FAMOTIDINE 20 MG: 10 INJECTION, SOLUTION INTRAVENOUS at 08:51

## 2019-02-08 RX ADMIN — IPRATROPIUM BROMIDE AND ALBUTEROL SULFATE 3 ML: 2.5; .5 SOLUTION RESPIRATORY (INHALATION) at 07:35

## 2019-02-08 RX ADMIN — HYDROCORTISONE 1 APPLICATION: 1 CREAM TOPICAL at 14:00

## 2019-02-08 RX ADMIN — PHENYLEPHRINE HYDROCHLORIDE 100 MCG: 10 INJECTION INTRAVENOUS at 10:39

## 2019-02-08 RX ADMIN — HYDROMORPHONE HYDROCHLORIDE 0.5 MG: 1 INJECTION, SOLUTION INTRAMUSCULAR; INTRAVENOUS; SUBCUTANEOUS at 12:15

## 2019-02-08 RX ADMIN — HYDROCORTISONE 1 APPLICATION: 1 CREAM TOPICAL at 00:25

## 2019-02-08 RX ADMIN — MIDAZOLAM 0.5 MG: 1 INJECTION INTRAMUSCULAR; INTRAVENOUS at 08:51

## 2019-02-08 NOTE — OP NOTE
Operative Note  Date of Admission:  1/16/2019  OR Date: 2/8/2019    Pre-op Diagnosis:   ARF    Post-op Diagnosis:     Same    Procedure:  1) Tunneled dialysis catheter insertion (67981)  2) Ultrasound guided vascular access (62574)  3) Radiologic supervision of catheter tip placement (35432)  4) SVC gram    Assistant: Maria Fernanda Clark and they provided critical assistance during the case including suctioning, exposure, retraction, and reduction of blood loss.    Anesthesia: Monitor Anesthesia Care    Estimated Blood Loss: minimal    Staff:   Circulator: Kenya Boudreaux RN  Radiology Technologist: Jeff Cisneros, RRT  Scrub Person: Vanesa Phelps  Assistant: Maria Fernanda Clark    Complications: None    Specimen: None    Findings:  Complicated placement because of body habitus, absence of an anatomic neck, prior right IJ Cordis placement, and stenosis found on venogram     Implants: 23 cm palindrome catheter    Indications:    The patient is an 73 y.o. female referred for tunneled dialysis catheter placement.  The risks, benefits, and alternatives have been discussed with the patient and/or family and include but are not limited to injury to artery, vein, lung, bleeding, and infection.    Procedure:  The patient was taken to the operating room and placed supine on the operating room table. After the induction of IV sedation, the neck and chest were prepped and draped with ChloraPrep. The patient was placed in Trendelenburg position. Timeout was observed. The right  Internal Jugular  was evaluated on ultrasound and found to be occluded at a previous point of access.  However, it was patent below this although posterior and small.  Accessing this was also very difficult because of the patient's body habitus.  There was basically no room for even an ultrasound probe between the clavicle and the angle of the mandible.. The vessel was entered under direct ultrasound guidance and photographic documentation was recorded in  the chart for the record. An angled wire was then advanced but instead of going down the SVC and went up the IJ.  I had to re-access this multiple times to try to improve my angle and finally was able to redirect it down the SVC.  However, prior to doing this I had to delineate the anatomy and performed a contrast injected superior venacavogram.  This showed that the internal jugular vein was patent.  At the base of the neck there was a stenosis probably associated with the prior Cordis catheter that was left in place for several days. Exit site was chosen on the chest. The tract was anesthetized with 1% lidocaine mixed with 0.25% Marcaine. A 23 cm catheter was then flushed and brought up onto the field. It was tunneled in an antegrade fashion up to the IJ insertion site. Dilator and peel-away sheath were then advanced over the wire under fluoroscopy without any significant difficulty. Dilator and wire were removed leaving the peel-away sheath in place. The distal tip of the catheter was then placed into the peel-away sheath and the peel-away sheath was removed. Under fluoroscopy, the distal tip of the catheter was positioned into the right atrium. There was no kinking at the catheter insertion site. The catheter flushed and aspirated easily. The lung fields were examined. There was no evidence of hemothorax or pneumothorax. The catheter flushed and aspirated quite easily. It was locked with concentrated heparin. The catheter was then anchored to the chest with nylon sutures. A stitch and dermal glue were used to close the neck site as well. Sterile dressings were applied. The patient tolerated the procedure well. There were no immediately apparent complications.           Active Hospital Problems    Diagnosis Date Noted   • **Acute congestive heart failure (CMS/[facility]) [I50.9] 01/16/2019   • Cellulitis of lower extremity [L03.119] 01/17/2019   • Obesity, morbid, BMI 50 or higher (CMS/HCC) [E66.01] 01/16/2019   •  Fungal skin infection [B36.9] 01/16/2019   • Aortic valve stenosis [I35.0] 01/16/2019   • Tricuspid valve insufficiency [I07.1] 01/16/2019   • Mitral valve stenosis [I05.0] 01/16/2019   • Hypertension [I10] 05/24/2016   • Hyperlipidemia [E78.5] 05/24/2016   • Heart murmur, systolic [R01.1] 05/24/2016   • Generalized anxiety disorder [F41.1] 05/24/2016   • IFG (impaired fasting glucose) [R73.01] 05/24/2016      Resolved Hospital Problems   No resolved problems to display.      Satish Stahl MD     Date: 2/8/2019  Time: 10:56 AM

## 2019-02-08 NOTE — PLAN OF CARE
Problem: Patient Care Overview  Goal: Plan of Care Review  Outcome: Ongoing (interventions implemented as appropriate)   02/08/19 0546   Coping/Psychosocial   Plan of Care Reviewed With patient   Plan of Care Review   Progress improving   OTHER   Outcome Summary Dialysis on 2/7/2019 pulled 2 liters out. Currently in Afib with rates of 60s-70s. External pacemaker on stand-by. Plans for placement of tunnel cath today. Will continue to monitor      Goal: Individualization and Mutuality  Outcome: Ongoing (interventions implemented as appropriate)    Goal: Discharge Needs Assessment  Outcome: Ongoing (interventions implemented as appropriate)    Goal: Interprofessional Rounds/Family Conf  Outcome: Ongoing (interventions implemented as appropriate)      Problem: Anxiety (Adult)  Goal: Reduction/Resolution  Outcome: Ongoing (interventions implemented as appropriate)      Problem: Breathing Pattern Ineffective (Adult)  Goal: Effective Oxygenation/Ventilation  Outcome: Ongoing (interventions implemented as appropriate)    Goal: Anxiety/Fear Reduction  Outcome: Ongoing (interventions implemented as appropriate)      Problem: Activity Intolerance (Adult)  Goal: Activity Tolerance  Outcome: Ongoing (interventions implemented as appropriate)      Problem: Skin Injury Risk (Adult)  Goal: Skin Health and Integrity  Outcome: Ongoing (interventions implemented as appropriate)      Problem: Cardiac Surgery (Adult)  Goal: Signs and Symptoms of Listed Potential Problems Will be Absent, Minimized or Managed (Cardiac Surgery)  Outcome: Ongoing (interventions implemented as appropriate)    Goal: Anesthesia/Sedation Recovery  Outcome: Ongoing (interventions implemented as appropriate)      Problem: Nutrition, Enteral (Adult)  Goal: Signs and Symptoms of Listed Potential Problems Will be Absent, Minimized or Managed (Nutrition, Enteral)  Outcome: Ongoing (interventions implemented as appropriate)

## 2019-02-08 NOTE — PROGRESS NOTES
LOS: 23 days   Patient Care Team:  Robert Cortez MD as PCP - General (Family Medicine)  Deborah Agudelo MD as Surgeon (Orthopedic Surgery)    Chief Complaint: post op    Subjective      Vital Signs  Temp:  [97.5 °F (36.4 °C)-98.3 °F (36.8 °C)] 97.7 °F (36.5 °C)  Heart Rate:  [62-96] 81  Resp:  [14-18] 14  BP: ()/(49-92) 134/92  Body mass index is 46.36 kg/m².    Intake/Output Summary (Last 24 hours) at 2/8/2019 1127  Last data filed at 2/8/2019 0436  Gross per 24 hour   Intake 600 ml   Output 300 ml   Net 300 ml     No intake/output data recorded.            02/07/19  0440 02/07/19  1600 02/08/19  0615   Weight: 87.5 kg (193 lb) 133 kg (293 lb) 134 kg (296 lb)         Objective    Results Review:        WBC WBC   Date Value Ref Range Status   02/08/2019 12.58 (H) 4.50 - 10.70 10*3/mm3 Final   02/07/2019 13.03 (H) 4.50 - 10.70 10*3/mm3 Final   02/06/2019 15.51 (H) 4.50 - 10.70 10*3/mm3 Final      HGB Hemoglobin   Date Value Ref Range Status   02/08/2019 7.5 (L) 11.9 - 15.5 g/dL Final   02/07/2019 7.8 (L) 11.9 - 15.5 g/dL Final   02/06/2019 8.1 (L) 11.9 - 15.5 g/dL Final      HCT Hematocrit   Date Value Ref Range Status   02/08/2019 24.2 (L) 35.6 - 45.5 % Final   02/07/2019 25.3 (L) 35.6 - 45.5 % Final   02/06/2019 26.0 (L) 35.6 - 45.5 % Final      Platelets Platelets   Date Value Ref Range Status   02/08/2019 225 140 - 500 10*3/mm3 Final   02/07/2019 210 140 - 500 10*3/mm3 Final   02/06/2019 191 140 - 500 10*3/mm3 Final        PT/INR:  No results found for: PROTIME/No results found for: INR    Sodium Sodium   Date Value Ref Range Status   02/08/2019 138 136 - 145 mmol/L Final   02/07/2019 135 (L) 136 - 145 mmol/L Final   02/06/2019 137 136 - 145 mmol/L Final      Potassium Potassium   Date Value Ref Range Status   02/08/2019 4.2 3.5 - 5.2 mmol/L Final   02/07/2019 4.5 3.5 - 5.2 mmol/L Final   02/06/2019 4.5 3.5 - 5.2 mmol/L Final      Chloride Chloride   Date Value Ref Range Status   02/08/2019 99 98 - 107  mmol/L Final   02/07/2019 93 (L) 98 - 107 mmol/L Final   02/06/2019 95 (L) 98 - 107 mmol/L Final      Bicarbonate CO2   Date Value Ref Range Status   02/08/2019 25.9 22.0 - 29.0 mmol/L Final   02/07/2019 24.2 22.0 - 29.0 mmol/L Final   02/06/2019 26.3 22.0 - 29.0 mmol/L Final      BUN BUN   Date Value Ref Range Status   02/08/2019 35 (H) 8 - 23 mg/dL Final   02/07/2019 78 (H) 8 - 23 mg/dL Final   02/06/2019 53 (H) 8 - 23 mg/dL Final      Creatinine Creatinine   Date Value Ref Range Status   02/08/2019 1.59 (H) 0.57 - 1.00 mg/dL Final   02/07/2019 2.74 (H) 0.57 - 1.00 mg/dL Final   02/06/2019 2.09 (H) 0.57 - 1.00 mg/dL Final      Calcium Calcium   Date Value Ref Range Status   02/08/2019 9.5 8.6 - 10.5 mg/dL Final   02/07/2019 9.8 8.6 - 10.5 mg/dL Final   02/06/2019 9.6 8.6 - 10.5 mg/dL Final      Magnesium No results found for: MG         [MAR Hold] acetaminophen 500 mg Oral Q4H   [MAR Hold] aspirin 81 mg Oral Daily   [MAR Hold] cetaphil  Topical Q12H   [MAR Hold] enoxaparin 30 mg Subcutaneous Nightly   [MAR Hold] escitalopram 10 mg Oral Daily   famotidine 20 mg Oral Daily   [MAR Hold] hydrocortisone 1 application Topical Q6H   [MAR Hold] ipratropium-albuterol 3 mL Nebulization BID - RT   [MAR Hold] iron sucrose 200 mg Intravenous Q24H   [MAR Hold] mupirocin  Each Nare BID   [MAR Hold] nystatin  Topical Q12H   [MAR Hold] sennosides-docusate sodium 2 tablet Oral Nightly   [MAR Hold] sodium chloride 4 mL Nebulization BID - RT       lactated ringers 9 mL/hr    sodium chloride 30 mL/hr Last Rate: 30 mL/hr (01/28/19 1215)   sodium chloride 9 mL/hr Last Rate: 9 mL/hr (02/08/19 0850)           Patient Active Problem List   Diagnosis Code   • Tear of rotator cuff M75.100   • Hypertension I10   • Generalized anxiety disorder F41.1   • Hyperlipidemia E78.5   • IFG (impaired fasting glucose) R73.01   • Heart murmur, systolic R01.1   • Shoulder pain, bilateral M25.511, M25.512   • Pain of both shoulder joints M25.511, M25.512    • Chronic pain of both shoulders M25.511, G89.29, M25.512   • Acute congestive heart failure (CMS/HCC) I50.9   • Obesity, morbid, BMI 50 or higher (CMS/[facility]) E66.01   • Fungal skin infection B36.9   • Cellulitis of lower extremity L03.119   • Aortic valve stenosis I35.0   • Tricuspid valve insufficiency I07.1   • Mitral valve stenosis I05.0       Assessment & Plan       -Severe AS, MS, TV regurgitation s/p AVR/MVR (tissue),TV repair, MAZE PENNIE ligation-- POD#11 (Pagni)  -NICM, non obstructive dx by cath  -RBBB  -HTN---controlled  -Morbid obesity with arthritis-complicating mobility and all aspects of care  -Hypoventilation sx, probable OCTAVIANO-----chronic CO2 retain by ABG, pulmonary following  -Possible PE, NO LE DVT------nosebleeds on heparin  -Preop new a.fib----s/p cardioversion 1/24/19, reverted to a.fib again  -Left arm thrombophlebitis, infiltration  -LAVERNE-----Left IJ shiley, HD per renal  -Leukocytosis-----improving  -preop anemia, post op expected ABL  -Post op junctional bradycardia-----back in a.flutter/a.fib with rate 60-80, EP following      In OR for tunnel cath placement. HD plans for tomorrow.  venofer series to start to day.  No indication for pacing at this time per EP, okay to remove wires. making slow but steady progress.         Yahaira Caldwell, MORGAN  02/08/19  11:27 AM

## 2019-02-08 NOTE — SIGNIFICANT NOTE
02/08/19 1512   Rehab Treatment   Discipline physical therapist   Reason Treatment Not Performed other (see comments)  (Pt sleeping this PM, Very lethargic after returning from OR for tunnel catheter. PT will check back tomorrow.)   Recommendation   PT - Next Appointment 02/09/19

## 2019-02-08 NOTE — PROGRESS NOTES
Continued Stay Note  [facility]     Patient Name: Claudia Arnold  MRN: [id number]  Today's Date: 2/8/2019    Admit Date: 1/16/2019    Discharge Plan     Row Name 02/08/19 1315       Plan    Plan  pending referral to [facility] and setting up outpatient dialysis with [facility]     Patient/Family in Agreement with Plan  yes    Plan Comments  CCP spoke with patient's daughter, Leyla Cobos Mobile: [phone]. Discussed discharge planning and SNF at discharge along with outpatient dialysis. CCP discussed Dr. Falcon being with [facility]  dialysis and [facility] having [facility] dialysis on site. Patient's daughter agreeable to referral to [facility]. CCP left voicemail for [facility]. CCP faxing referral to [facility]. CCP will follow for pending referral to [facility] and setting up outpatient dialysis with [facility]. Diana HAYS         Discharge Codes    No documentation.             LIS Malagon

## 2019-02-08 NOTE — ANESTHESIA POSTPROCEDURE EVALUATION
Patient: Claudia Arnold    Procedure Summary     Date:  02/08/19 Room / Location:  [facility] OR  / [facility] MAIN OR    Anesthesia Start:  0931 Anesthesia Stop:  1112    Procedure:  tunnel CATHETER PLACEMENT WITH FLUROSCOPY (N/A ) Diagnosis:      Surgeon:  Satish Stahl MD Provider:  Casandra London MD    Anesthesia Type:  MAC ASA Status:  4          Anesthesia Type: MAC  Last vitals  BP   111/62 (02/08/19 1200)   Temp   36.5 °C (97.7 °F) (02/08/19 1110)   Pulse   85 (02/08/19 1200)   Resp   14 (02/08/19 1200)     SpO2   93 % (02/08/19 1200)     Post Anesthesia Care and Evaluation    Patient location during evaluation: PACU  Patient participation: complete - patient participated  Level of consciousness: awake and alert  Pain management: adequate  Airway patency: patent  Anesthetic complications: No anesthetic complications    Cardiovascular status: acceptable  Respiratory status: acceptable  Hydration status: acceptable    Comments: --------------------            02/08/19               1200     --------------------   BP:       111/62     Pulse:      85       Resp:       14       Temp:                SpO2:      93%      --------------------

## 2019-02-08 NOTE — DISCHARGE PLACEMENT REQUEST
"Jose Carlos Morales (73 y.o. Female)     Date of Birth Social Security Number Address Home Phone MRN    1945  [address]  [place] [address] [phone] [id number]    Orthodoxy Marital Status          None        Admission Date Admission Type Admitting Provider Attending Provider Department, Room/Bed    1/16/19 Emergency Corey Jones III, MD Meriwether, Louis G, MD [facility] MAIN OR, [facility] Main OR/MAIN OR    Discharge Date Discharge Disposition Discharge Destination                       Attending Provider:  Scott Segura MD    Allergies:  No Known Allergies    Isolation:  None   Infection:  None   Code Status:  CPR    Ht:  170.2 cm (67\")   Wt:  134 kg (296 lb)    Admission Cmt:  None   Principal Problem:  Acute congestive heart failure (CMS/HCC) [I50.9]                 Active Insurance as of 1/16/2019     Primary Coverage     Payor Plan Insurance Group Employer/Plan Group    MEDICARE MEDICARE A & B      Payor Plan Address Payor Plan Phone Number Payor Plan Fax Number Effective Dates    [address] [phone]  7/1/2010 - None Entered    [facility] [id number]       Subscriber Name Subscriber Birth Date Member ID       JOSE CARLOS MORALES 1945 [id number]           Secondary Coverage     Payor Plan Insurance Group Employer/Plan Group    AETNA COMMERCIAL AETNA [id number]     Payor Plan Address Payor Plan Phone Number Payor Plan Fax Number Effective Dates    [address] [phone]  1/1/2013 - None Entered    [facility] [id number]       Subscriber Name Subscriber Birth Date Member ID       KRISHAN MORALES 10/25/1943 [id number]                 Emergency Contacts      (Rel.) Home Phone Work Phone Mobile Phone    Sha Cobosa (Daughter) [phone] -- [phone]    Krishan Morales (Spouse) [phone] -- --              "

## 2019-02-08 NOTE — PROGRESS NOTES
"    Patient Name: Claudia Arnold  :1945  73 y.o.      Patient Care Team:  Robert Cortez MD as PCP - General (Family Medicine)  Deborah Agudelo MD as Surgeon (Orthopedic Surgery)    Chief Complaint:  F/U aortic and mitral valve replacements, tricuspid valve repair, PAF with RVR, acute diastolic HF and valvular HF.    Interval History: Postop day #11.  No longer requiring pacer; no indication for PPM at this time.  Pt returned from getting tunnel catheter,  She is drowsy from Dilaudid, but responds to voice and is following commands.        Objective   Vital Signs  Temp:  [97.5 °F (36.4 °C)-98.2 °F (36.8 °C)] 98.1 °F (36.7 °C)  Heart Rate:  [62-96] 78  Resp:  [18] 18  BP: ()/(49-79) 108/61    Intake/Output Summary (Last 24 hours) at 2019 0805  Last data filed at 2019 0436  Gross per 24 hour   Intake 600 ml   Output 300 ml   Net 300 ml     Flowsheet Rows      First Filed Value   Admission Height  170.2 cm (67\") Documented at 2019 0910   Admission Weight  145 kg (320 lb)  (Abnormal)  Documented at 2019 0921            Physical Exam   Constitutional: She is oriented to person, place, and time. Vital signs are normal. She appears well-developed and well-nourished. She is active.   Obesity     Eyes: Conjunctivae are normal.   Neck: Carotid bruit is not present.   Cardiovascular: Normal rate. An irregularly irregular rhythm present.   Murmur heard.   Systolic murmur is present with a grade of 2/6 at the upper right sternal border. Continues with some intermittent paced beats  Pulmonary/Chest: Breath sounds normal.   Abdominal: Normal appearance.   Musculoskeletal:   No cyanosis, clubbing, edema  Normal ROM   Neurological: She is alert and oriented to person, place, and time. GCS eye subscore is 4. GCS verbal subscore is 5. GCS motor subscore is 6.   Skin: Skin is warm, dry and intact.   Psychiatric: She has a normal mood and affect. Her speech is normal and behavior is normal. Judgment " and thought content normal. Cognition and memory are normal.   Vitals reviewed.      Results Review:    Results from last 7 days   Lab Units 02/08/19  0350   SODIUM mmol/L 138   POTASSIUM mmol/L 4.2   CHLORIDE mmol/L 99   CO2 mmol/L 25.9   BUN mg/dL 35*   CREATININE mg/dL 1.59*   GLUCOSE mg/dL 104*   CALCIUM mg/dL 9.5         Results from last 7 days   Lab Units 02/08/19  0350   WBC 10*3/mm3 12.58*   HEMOGLOBIN g/dL 7.5*   HEMATOCRIT % 24.2*   PLATELETS 10*3/mm3 225     Results from last 7 days   Lab Units 02/04/19  0021 02/03/19  1643 02/03/19  1015  02/02/19  1830   INR   --   --   --   --  1.32*   APTT seconds 86.1* 59.2* 63.3*   < > 30.5    < > = values in this interval not displayed.     Results from last 7 days   Lab Units 02/04/19  0337   MAGNESIUM mg/dL 2.2                   Medication Review:     acetaminophen 500 mg Oral Q4H   aspirin 81 mg Oral Daily   cetaphil  Topical Q12H   enoxaparin 30 mg Subcutaneous Nightly   escitalopram 10 mg Oral Daily   famotidine 20 mg Oral Daily   hydrocortisone 1 application Topical Q6H   ipratropium-albuterol 3 mL Nebulization BID - RT   iron sucrose 200 mg Intravenous Q24H   mupirocin  Each Nare BID   nystatin  Topical Q12H   sennosides-docusate sodium 2 tablet Oral Nightly   sodium chloride 4 mL Nebulization BID - RT          niCARdipine 5-15 mg/hr    sodium chloride 30 mL/hr Last Rate: 30 mL/hr (01/28/19 1215)       Assessment/Plan   1. Acute valvular diastolic heart failure:  Diuretics were discontinued per nephrology.  Patient denies any dyspnea today.  EF 60-65%  2. Severe aortic stenosis, mitral valve stenosis, tricuspid valve regurgitation: Status post aortic valve replacement in mitral valve replacement with tissue valve repair, MAZE PENNIE ligation postop day 9 with Dr. Gaxiola  3. Paroxysmal atrial fibrillation: Status post cardioversion 1/24/19 and subsequent reversion to atrial fibrillation.  Rate is currently controlled and at this time pt does not have indication  for PPM.   4. Moderate pulmonary hypertension  5. Small pulmonary emboli  6. Acute kidney injury postoperatively: Per nephrology  7. Right bundle branch block and left anterior fascicular block  8. Undiagnosed sleep apnea  9. Morbid obesity  10. Severe deconditioning: encourage PT  11. Junctional bradycardia and asystole postop: No longer requiring external pacemaker.  Seen by EP, no indication for PPM at this time, they do recommend that HD access be placed on the right in case there is indication in the future for PPM.       MORGAN Butts  [facility]  02/08/19  8:05 AM

## 2019-02-08 NOTE — PROGRESS NOTES
"   LOS: 23 days    Patient Care Team:  Robert Cortez MD as PCP - General (Family Medicine)  Deborah Agudelo MD as Surgeon (Orthopedic Surgery)    Chief Complaint:    Chief Complaint   Patient presents with   • Shortness of Breath   • Leg Swelling     Follow UP LAVERNE  Subjective     Interval History:   UOP 300cc/24h .  HD yest c/b low BP so only 2L removed (intended for 3L).  NO dyspnea.  Going for TDC now.    Objective     Vital Signs  Temp:  [97.5 °F (36.4 °C)-98.2 °F (36.8 °C)] 98.1 °F (36.7 °C)  Heart Rate:  [62-96] 71  Resp:  [18] 18  BP: ()/(49-79) 108/61    Flowsheet Rows      First Filed Value   Admission Height  170.2 cm (67\") Documented at 01/16/2019 0910   Admission Weight  145 kg (320 lb)  (Abnormal)  Documented at 01/16/2019 0921          No intake/output data recorded.  I/O last 3 completed shifts:  In: 600 [P.O.:600]  Out: 550 [Urine:550]    Intake/Output Summary (Last 24 hours) at 2/8/2019 0731  Last data filed at 2/8/2019 0436  Gross per 24 hour   Intake 600 ml   Output 300 ml   Net 300 ml       Physical Exam:  gen nad, alert  Neck IJ shiley, no JVD  Lungs CTA bilat no rales  CV RRR no m/g  abd soft NT/ND  vasc trace pedal edema, 2+ radial pulses     Results Review:    Results from last 7 days   Lab Units 02/08/19  0350 02/07/19  0336 02/06/19  0304   SODIUM mmol/L 138 135* 137   POTASSIUM mmol/L 4.2 4.5 4.5   CHLORIDE mmol/L 99 93* 95*   CO2 mmol/L 25.9 24.2 26.3   BUN mg/dL 35* 78* 53*   CREATININE mg/dL 1.59* 2.74* 2.09*   CALCIUM mg/dL 9.5 9.8 9.6   BILIRUBIN mg/dL  --  0.6  --    ALK PHOS U/L  --  67  --    ALT (SGPT) U/L  --  <5  --    AST (SGOT) U/L  --  13  --    GLUCOSE mg/dL 104* 94 94       Estimated Creatinine Clearance: 45.1 mL/min (A) (by C-G formula based on SCr of 1.59 mg/dL (H)).    Results from last 7 days   Lab Units 02/08/19  0350 02/06/19  0304 02/05/19  0342 02/04/19  0337   MAGNESIUM mg/dL  --   --   --  2.2   PHOSPHORUS mg/dL 3.7 4.6* 5.8* 3.2             Results from " last 7 days   Lab Units 02/08/19  0350 02/07/19  0336 02/06/19  0304 02/05/19  0342 02/04/19  0337   WBC 10*3/mm3 12.58* 13.03* 15.51* 16.64* 17.10*   HEMOGLOBIN g/dL 7.5* 7.8* 8.1* 8.5* 9.2*   PLATELETS 10*3/mm3 225 210 191 198 193       Results from last 7 days   Lab Units 02/02/19  1830   INR  1.32*         Imaging Results (last 24 hours)     ** No results found for the last 24 hours. **          acetaminophen 500 mg Oral Q4H   aspirin 81 mg Oral Daily   bumetanide 4 mg Oral Daily   cetaphil  Topical Q12H   enoxaparin 30 mg Subcutaneous Nightly   escitalopram 10 mg Oral Daily   famotidine 20 mg Oral Daily   hydrocortisone 1 application Topical Q6H   ipratropium-albuterol 3 mL Nebulization BID - RT   mupirocin  Each Nare BID   nystatin  Topical Q12H   sennosides-docusate sodium 2 tablet Oral Nightly   sodium chloride 4 mL Nebulization BID - RT       niCARdipine 5-15 mg/hr    sodium chloride 30 mL/hr Last Rate: 30 mL/hr (01/28/19 1215)       Medication Review:   Current Facility-Administered Medications   Medication Dose Route Frequency Provider Last Rate Last Dose   • acetaminophen (TYLENOL) tablet 500 mg  500 mg Oral Q4H Yahaira Garza APRN   500 mg at 02/08/19 0503   • albumin human 25 % IV SOLN 37.5 g  37.5 g Intravenous PRN Sheila Terrazas MD       • aspirin chewable tablet 81 mg  81 mg Oral Daily Scar Gaxiola MD   81 mg at 02/07/19 0901   • bisacodyl (DULCOLAX) EC tablet 10 mg  10 mg Oral Daily PRN Scar Gaxiola MD       • bisacodyl (DULCOLAX) suppository 10 mg  10 mg Rectal Daily PRN Scar Gaxiola MD   10 mg at 02/03/19 2120   • bumetanide (BUMEX) tablet 4 mg  4 mg Oral Daily Maria Fernanda Maldonado MD   4 mg at 02/06/19 1224   • cetaphil lotion   Topical Q12H Zonia Lopez MD       • cyclobenzaprine (FLEXERIL) tablet 10 mg  10 mg Oral Q8H PRN Scar Gaxiola MD       • dextrose (D50W) 25 g/ 50mL Intravenous Solution 25 g  25 g Intravenous Q15 Min PRN Oscar Rodriguez MD        • dextrose (GLUTOSE) oral gel 15 g  15 g Oral Q15 Min PRN Oscar Rodriguez MD       • enoxaparin (LOVENOX) syringe 30 mg  30 mg Subcutaneous Nightly Alicia Schmitz APRN   30 mg at 02/07/19 0902   • escitalopram (LEXAPRO) tablet 10 mg  10 mg Oral Daily Oscar Rodriguez MD   10 mg at 02/07/19 0901   • famotidine (PEPCID) tablet 20 mg  20 mg Oral Daily Wallace Falcon MD       • glucagon (human recombinant) (GLUCAGEN DIAGNOSTIC) injection 1 mg  1 mg Subcutaneous PRN Oscar Rodriguez MD       • hydrocortisone 1 % cream 1 application  1 application Topical Q6H Fran Tilley MD   1 application at 02/08/19 0503   • ipratropium-albuterol (DUO-NEB) nebulizer solution 3 mL  3 mL Nebulization BID - RT Yahaira Garza APRN   3 mL at 02/07/19 2014   • ipratropium-albuterol (DUO-NEB) nebulizer solution 3 mL  3 mL Nebulization Q6H PRN Yahaira Garza APRN       • magic mouthwash oral supsension 5 mL  5 mL Swish & Spit Q4H PRN Yahaira Garza APRN       • mupirocin (BACTROBAN) 2 % nasal ointment   Each Nare BID Scar Gaxiola MD   1 application at 02/07/19 2129   • niCARdipine (CARDENE) 25 mg/250 mL (0.1 mg/mL) NS infusion kit  5-15 mg/hr Intravenous Continuous PRN Scar Gaxiola MD       • nystatin (MYCOSTATIN) powder   Topical Q12H Scar Gaxiola MD       • ondansetron (ZOFRAN) injection 4 mg  4 mg Intravenous Q6H PRN Scar Gaxiola MD   4 mg at 02/04/19 0906   • promethazine (PHENERGAN) tablet 12.5 mg  12.5 mg Oral Q6H PRN Scar Gaxiola MD        Or   • promethazine (PHENERGAN) injection 12.5 mg  12.5 mg Intravenous Q6H PRN Scar Gaxiola MD       • sennosides-docusate sodium (SENOKOT-S) 8.6-50 MG tablet 2 tablet  2 tablet Oral Nightly Scar Gaxiola MD   Stopped at 02/06/19 2030   • sodium chloride 0.9 % flush 30 mL  30 mL Intravenous Once PRN Scar Gaxiola MD       • sodium chloride 0.9 % infusion  30 mL/hr Intravenous Continuous PRN Scar Gaxiola MD 30 mL/hr at  01/28/19 1215 30 mL/hr at 01/28/19 1215   • sodium chloride 7 % nebulizer solution nebulizer solution 4 mL  4 mL Nebulization BID - RT Yahaira Garza, MORGAN   4 mL at 02/07/19 2017       Assessment/Plan   1. Oliguric LAVERNE - ATN post AVR/MVR.  Dialyzed yest (HD-dep since 1/30).  UOP remains poor.  Needs TDC as no e/o renal recovery.  BL Cr ~ 1, so still expect eventual recovery      2. SP AVR, MVR, TV repair, Left cryo MAZE PENNIE ligation.  POD11  3. Vol overload - improved, HD yest c/b low BP so only removed 2L; periph edema much less   4. Probable OCTAVIANO  5. GERD - on PPI   6. PAF  7. Leukocytosis.  Improving, 13K today  8. Anemia.  Hgb down to 7.5.  Iron def, TSAT 9%, ferritin 473    Plan  - start venofer series  - TDC today  - HD tomorrow, remove 2L again as BP tolerates   - may need PRBC(s) on dialysis tomorrow   - d/c PO bumex, having no effect on UOP  - CCP working on out-pt dialysis ([facility] vs [facility])        Acute congestive heart failure (CMS/HCC)    Hypertension    Generalized anxiety disorder    Hyperlipidemia    IFG (impaired fasting glucose)    Heart murmur, systolic    Obesity, morbid, BMI 50 or higher (CMS/HCC)    Fungal skin infection    Cellulitis of lower extremity    Aortic valve stenosis    Tricuspid valve insufficiency    Mitral valve stenosis              Wallace Falcon MD  02/08/19  7:31 AM

## 2019-02-08 NOTE — DISCHARGE PLACEMENT REQUEST
"Jose Carlos Morales (73 y.o. Female)     Date of Birth Social Security Number Address Home Phone MRN    1945  [address]  [place] [address] [phone] [id number]    Hinduism Marital Status          None        Admission Date Admission Type Admitting Provider Attending Provider Department, Room/Bed    1/16/19 Emergency Corey Jones III, MD Meriwether, Louis G, MD [facility] MAIN OR, [facility] Main OR/MAIN OR    Discharge Date Discharge Disposition Discharge Destination                       Attending Provider:  Scott Segura MD    Allergies:  No Known Allergies    Isolation:  None   Infection:  None   Code Status:  CPR    Ht:  170.2 cm (67\")   Wt:  134 kg (296 lb)    Admission Cmt:  None   Principal Problem:  Acute congestive heart failure (CMS/HCC) [I50.9]                 Active Insurance as of 1/16/2019     Primary Coverage     Payor Plan Insurance Group Employer/Plan Group    MEDICARE MEDICARE A & B      Payor Plan Address Payor Plan Phone Number Payor Plan Fax Number Effective Dates    [address] [phone]  7/1/2010 - None Entered    [facility] [address]       Subscriber Name Subscriber Birth Date Member ID       JOSE CARLOS MORALES 1945 [id number]           Secondary Coverage     Payor Plan Insurance Group Employer/Plan Group    AETNA COMMERCIAL AETNA [id number]     Payor Plan Address Payor Plan Phone Number Payor Plan Fax Number Effective Dates    [address] [phone]  1/1/2013 - None Entered    [facility] [id number]       Subscriber Name Subscriber Birth Date Member ID       KRISHAN MORALES 10/25/1943 [id number]                 Emergency Contacts      (Rel.) Home Phone Work Phone Mobile Phone    Sha Cobosa (Daughter) [phone] -- [phone]    Krishan Morales (Spouse) [phone] -- --              "

## 2019-02-08 NOTE — DISCHARGE PLACEMENT REQUEST
"Jose Carlos Morales (73 y.o. Female)     Date of Birth Social Security Number Address Home Phone MRN    1945  [address]  [place] [address] [phone] [id number]    [employer] Marital Status          None        Admission Date Admission Type Admitting Provider Attending Provider Department, Room/Bed    1/16/19 Emergency Corey Jones III, MD Meriwether, Louis G, MD [facility] CARDIOVASC UNIT, 2220/1    Discharge Date Discharge Disposition Discharge Destination                       Attending Provider:  Scott Segura MD    Allergies:  No Known Allergies    Isolation:  None   Infection:  None   Code Status:  CPR    Ht:  170.2 cm (67\")   Wt:  134 kg (296 lb)    Admission Cmt:  None   Principal Problem:  Acute congestive heart failure (CMS/HCC) [I50.9]                 Active Insurance as of 1/16/2019     Primary Coverage     Payor Plan Insurance Group Employer/Plan Group    MEDICARE MEDICARE A & B      Payor Plan Address Payor Plan Phone Number Payor Plan Fax Number Effective Dates    [address] [phone]  7/1/2010 - None Entered    [facility] [id number]       Subscriber Name Subscriber Birth Date Member ID       JOSE CARLOS MORALES 1945 [id number]           Secondary Coverage     Payor Plan Insurance Group Employer/Plan Group    AETNA COMMERCIAL AETNA [id number]     Payor Plan Address Payor Plan Phone Number Payor Plan Fax Number Effective Dates    [address] [phone]  1/1/2013 - None Entered    [facility] [id number]       Subscriber Name Subscriber Birth Date Member ID       KRISHAN MORALES 10/25/1943 [id number]                 Emergency Contacts      (Rel.) Home Phone Work Phone Mobile Phone    Leyla Cobos (Daughter) [phone] -- [phone]    Krishan Morales (Spouse) [phone] -- --            "

## 2019-02-08 NOTE — PROGRESS NOTES
Adult Nutrition  Assessment/PES    Patient Name:  Claudia Arnold  YOB: 1945  MRN: [id number]  Admit Date:  1/16/2019    Assessment Date:  2/8/2019    Comments:  Eating about 50% per I/O graphics. Currently in OR for TDC placement. Will continue to monitor.     Reason for Assessment     Row Name 02/08/19 1337          Reason for Assessment    Reason For Assessment  follow-up protocol         Nutrition/Diet History     Row Name 02/08/19 1337          Nutrition/Diet History    Typical Food/Fluid Intake  Off unit in OR for tunneled cath placement for HD. Working towards dc to SNF. Will have HD tomorrow.          Anthropometrics     Row Name 02/08/19 1338 02/08/19 0615       Anthropometrics    Weight  --  134 kg (296 lb)       Admit Weight    Admit Weight  -- current 296 lb  --        Labs/Tests/Procedures/Meds     Row Name 02/08/19 1338          Labs/Procedures/Meds    Lab Results Reviewed  reviewed     Lab Results Comments  BUN, Cr, Glu, Phos        Diagnostic Tests/Procedures    Diagnostic Test/Procedure Reviewed  reviewed        Medications    Pertinent Medications Reviewed  reviewed         Physical Findings     Row Name 02/08/19 1339          Physical Findings    Overall Physical Appearance  obese;edematous     Skin  other (see comments);pressure injury;edema PI to gluteal area, incisions, candida           Nutrition Prescription Ordered     Row Name 02/08/19 1339          Nutrition Prescription PO    Current PO Diet  NPO for OR only; had progressed to regular diet     Supplement  Mighty Shake     Supplement Frequency  3 times a day         Evaluation of Received Nutrient/Fluid Intake     Row Name 02/08/19 1341          PO Evaluation    Number of Days PO Intake Evaluated  2 days     % PO Intake  50%           Problem/Interventions:      Intervention Goal     Row Name 02/08/19 1341          Intervention Goal    General  Maintain nutrition;Disease management/therapy     PO  PO intake (%);Increase  intake     PO Intake %  75 %     Weight  Appropriate weight loss         Nutrition Intervention     Row Name 02/08/19 1342          Nutrition Intervention    RD/Tech Action  Interview for preference;Encourage intake;Follow Tx progress;Care plan reviewd           Education/Evaluation     Row Name 02/08/19 1342          Education    Education  Will Instruct as appropriate        Monitor/Evaluation    Monitor  Per protocol           Electronically signed by:  Yahaira Griffin RD  02/08/19 1:42 PM

## 2019-02-08 NOTE — ANESTHESIA PREPROCEDURE EVALUATION
Anesthesia Evaluation     Patient summary reviewed and Nursing notes reviewed   NPO Solid Status: > 8 hours             Airway   Mallampati: III  TM distance: >3 FB  Neck ROM: full  Possible difficult intubation and Large neck circumference  Dental - normal exam     Pulmonary - normal exam   (+) sleep apnea, decreased breath sounds,   Cardiovascular - normal exam  Exercise tolerance: poor (<4 METS)    ECG reviewed  Rhythm: regular    (+) hypertension, valvular problems/murmurs AS, past MI , CAD, CABG, dysrhythmias Atrial Fib, CHF, hyperlipidemia,     ROS comment: · aortic valve area 0.84 ·     Left ventricular systolic function is low normal.  · Severe aortic valve stenosis is present.  · Moderate mitral valve stenosis is present  · Severe tricuspid valve regurgitation is present.  · Left atrial cavity size is severely dilated.  · Right ventricular cavity is mildly dilated.     Sinus rhythm  Atrial premature complex  Prolonged FL interval  RBBB and LAFB  Pacemaker activity is not currently demonstrated    Neuro/Psych  (+) psychiatric history,     GI/Hepatic/Renal/Endo    (+) obesity, morbid obesity,      Musculoskeletal     Abdominal  - normal exam  (+) obese,     Bowel sounds: normal.   Substance History - negative use     OB/GYN negative ob/gyn ROS         Other   (+) arthritis                     Anesthesia Plan    ASA 4     MAC     Anesthetic plan, all risks, benefits, and alternatives have been provided, discussed and informed consent has been obtained with: patient.    Plan discussed with CRNA and attending.

## 2019-02-08 NOTE — PROGRESS NOTES
Continued Stay Note  [facility]     Patient Name: Claudia Arnold  MRN: [id number]  Today's Date: 2/8/2019    Admit Date: 1/16/2019    Discharge Plan     Row Name 02/08/19 1443       Plan    Plan Comments  Hemodialysis referral faxed to [facility] at 2:42 PM @ FX: [phone], for [facility] at [facility].  DARCIE [place]/CCP    Row Name 02/08/19 1315       Plan    Plan  pending referral to [facility] and setting up outpatient dialysis with [facility]     Patient/Family in Agreement with Plan  yes    Plan Comments  CCP spoke with patient's daughter, Leyla Cobos Mobile: [phone]. Discussed discharge planning and SNF at discharge along with outpatient dialysis. CCP discussed Dr. Falcon being with [facility]  dialysis and [facility] having [facility] dialysis on site. Patient's daughter agreeable to [facility]. CCP left voicemail for [facility]. CCP faxing referral to [facility]. CCP will follow for pending referral to [facility] and setting up outpatient dialysis with [facility]. Diana Parmar W         Discharge Codes    No documentation.             Maki Lu RN

## 2019-02-09 LAB
ABO GROUP BLD: NORMAL
ALBUMIN SERPL-MCNC: 3.7 G/DL (ref 3.5–5.2)
ANION GAP SERPL CALCULATED.3IONS-SCNC: 19.5 MMOL/L
BASOPHILS # BLD AUTO: 0.03 10*3/MM3 (ref 0–0.2)
BASOPHILS NFR BLD AUTO: 0.3 % (ref 0–1.5)
BLD GP AB SCN SERPL QL: NEGATIVE
BUN BLD-MCNC: 61 MG/DL (ref 8–23)
BUN/CREAT SERPL: 22.7 (ref 7–25)
CALCIUM SPEC-SCNC: 9.9 MG/DL (ref 8.6–10.5)
CHLORIDE SERPL-SCNC: 95 MMOL/L (ref 98–107)
CO2 SERPL-SCNC: 23.5 MMOL/L (ref 22–29)
CREAT BLD-MCNC: 2.69 MG/DL (ref 0.57–1)
DEPRECATED RDW RBC AUTO: 53.5 FL (ref 37–54)
EOSINOPHIL # BLD AUTO: 0.12 10*3/MM3 (ref 0–0.7)
EOSINOPHIL NFR BLD AUTO: 1 % (ref 0.3–6.2)
ERYTHROCYTE [DISTWIDTH] IN BLOOD BY AUTOMATED COUNT: 15.6 % (ref 11.7–13)
GFR SERPL CREATININE-BSD FRML MDRD: 17 ML/MIN/1.73
GLUCOSE BLD-MCNC: 100 MG/DL (ref 65–99)
GLUCOSE BLDC GLUCOMTR-MCNC: 100 MG/DL (ref 70–130)
HCT VFR BLD AUTO: 23.8 % (ref 35.6–45.5)
HGB BLD-MCNC: 7.4 G/DL (ref 11.9–15.5)
IMM GRANULOCYTES # BLD AUTO: 0.23 10*3/MM3 (ref 0–0.03)
IMM GRANULOCYTES NFR BLD AUTO: 2 % (ref 0–0.5)
LYMPHOCYTES # BLD AUTO: 1.28 10*3/MM3 (ref 0.9–4.8)
LYMPHOCYTES NFR BLD AUTO: 11 % (ref 19.6–45.3)
MCH RBC QN AUTO: 29.8 PG (ref 26.9–32)
MCHC RBC AUTO-ENTMCNC: 31.1 G/DL (ref 32.4–36.3)
MCV RBC AUTO: 96 FL (ref 80.5–98.2)
MONOCYTES # BLD AUTO: 0.59 10*3/MM3 (ref 0.2–1.2)
MONOCYTES NFR BLD AUTO: 5.1 % (ref 5–12)
NEUTROPHILS # BLD AUTO: 9.34 10*3/MM3 (ref 1.9–8.1)
NEUTROPHILS NFR BLD AUTO: 80.6 % (ref 42.7–76)
PHOSPHATE SERPL-MCNC: 7.1 MG/DL (ref 2.5–4.5)
PLATELET # BLD AUTO: 230 10*3/MM3 (ref 140–500)
PMV BLD AUTO: 9.6 FL (ref 6–12)
POTASSIUM BLD-SCNC: 4.6 MMOL/L (ref 3.5–5.2)
RBC # BLD AUTO: 2.48 10*6/MM3 (ref 3.9–5.2)
RH BLD: POSITIVE
SODIUM BLD-SCNC: 138 MMOL/L (ref 136–145)
T&S EXPIRATION DATE: NORMAL
WBC NRBC COR # BLD: 11.59 10*3/MM3 (ref 4.5–10.7)

## 2019-02-09 PROCEDURE — 86923 COMPATIBILITY TEST ELECTRIC: CPT

## 2019-02-09 PROCEDURE — 86900 BLOOD TYPING SEROLOGIC ABO: CPT

## 2019-02-09 PROCEDURE — 82962 GLUCOSE BLOOD TEST: CPT

## 2019-02-09 PROCEDURE — 25010000002 ALBUMIN HUMAN 25% PER 50 ML: Performed by: INTERNAL MEDICINE

## 2019-02-09 PROCEDURE — 86850 RBC ANTIBODY SCREEN: CPT | Performed by: INTERNAL MEDICINE

## 2019-02-09 PROCEDURE — 86901 BLOOD TYPING SEROLOGIC RH(D): CPT | Performed by: INTERNAL MEDICINE

## 2019-02-09 PROCEDURE — 94799 UNLISTED PULMONARY SVC/PX: CPT

## 2019-02-09 PROCEDURE — P9016 RBC LEUKOCYTES REDUCED: HCPCS

## 2019-02-09 PROCEDURE — 85025 COMPLETE CBC W/AUTO DIFF WBC: CPT | Performed by: INTERNAL MEDICINE

## 2019-02-09 PROCEDURE — P9047 ALBUMIN (HUMAN), 25%, 50ML: HCPCS | Performed by: INTERNAL MEDICINE

## 2019-02-09 PROCEDURE — 25010000002 IRON SUCROSE PER 1 MG: Performed by: INTERNAL MEDICINE

## 2019-02-09 PROCEDURE — 99232 SBSQ HOSP IP/OBS MODERATE 35: CPT | Performed by: INTERNAL MEDICINE

## 2019-02-09 PROCEDURE — 5A1D70Z PERFORMANCE OF URINARY FILTRATION, INTERMITTENT, LESS THAN 6 HOURS PER DAY: ICD-10-PCS | Performed by: INTERNAL MEDICINE

## 2019-02-09 PROCEDURE — 25010000002 ENOXAPARIN PER 10 MG: Performed by: NURSE PRACTITIONER

## 2019-02-09 PROCEDURE — 86900 BLOOD TYPING SEROLOGIC ABO: CPT | Performed by: INTERNAL MEDICINE

## 2019-02-09 PROCEDURE — 80069 RENAL FUNCTION PANEL: CPT | Performed by: INTERNAL MEDICINE

## 2019-02-09 PROCEDURE — 25010000002 HEPARIN (PORCINE) PER 1000 UNITS: Performed by: INTERNAL MEDICINE

## 2019-02-09 RX ORDER — HEPARIN SODIUM 1000 [USP'U]/ML
4000 INJECTION, SOLUTION INTRAVENOUS; SUBCUTANEOUS ONCE
Status: COMPLETED | OUTPATIENT
Start: 2019-02-09 | End: 2019-02-09

## 2019-02-09 RX ADMIN — MUPIROCIN: 20 OINTMENT TOPICAL at 09:17

## 2019-02-09 RX ADMIN — HEPARIN SODIUM 4000 UNITS: 1000 INJECTION, SOLUTION INTRAVENOUS; SUBCUTANEOUS at 14:05

## 2019-02-09 RX ADMIN — HYDROCORTISONE 1 APPLICATION: 1 CREAM TOPICAL at 11:15

## 2019-02-09 RX ADMIN — SODIUM CHLORIDE 4 ML: 7 NEBU SOLN,3 % NEBU at 19:21

## 2019-02-09 RX ADMIN — SODIUM CHLORIDE 4 ML: 7 NEBU SOLN,3 % NEBU at 07:15

## 2019-02-09 RX ADMIN — IPRATROPIUM BROMIDE AND ALBUTEROL SULFATE 3 ML: 2.5; .5 SOLUTION RESPIRATORY (INHALATION) at 19:19

## 2019-02-09 RX ADMIN — MUPIROCIN 10 APPLICATION: 20 OINTMENT TOPICAL at 20:30

## 2019-02-09 RX ADMIN — ACETAMINOPHEN 500 MG: 500 TABLET, FILM COATED ORAL at 20:30

## 2019-02-09 RX ADMIN — ACETAMINOPHEN 500 MG: 500 TABLET, FILM COATED ORAL at 16:06

## 2019-02-09 RX ADMIN — SENNOSIDES AND DOCUSATE SODIUM 2 TABLET: 8.6; 5 TABLET ORAL at 20:30

## 2019-02-09 RX ADMIN — FAMOTIDINE 20 MG: 20 TABLET, FILM COATED ORAL at 09:22

## 2019-02-09 RX ADMIN — ESCITALOPRAM 10 MG: 10 TABLET, FILM COATED ORAL at 09:21

## 2019-02-09 RX ADMIN — IPRATROPIUM BROMIDE AND ALBUTEROL SULFATE 3 ML: 2.5; .5 SOLUTION RESPIRATORY (INHALATION) at 07:13

## 2019-02-09 RX ADMIN — EMOLLIENT - LOTION: LOTION at 09:28

## 2019-02-09 RX ADMIN — IRON SUCROSE 200 MG: 20 INJECTION, SOLUTION INTRAVENOUS at 14:12

## 2019-02-09 RX ADMIN — HYDROCORTISONE 1 APPLICATION: 1 CREAM TOPICAL at 17:34

## 2019-02-09 RX ADMIN — ENOXAPARIN SODIUM 30 MG: 30 INJECTION SUBCUTANEOUS at 09:21

## 2019-02-09 RX ADMIN — Medication 81 MG: at 09:21

## 2019-02-09 RX ADMIN — HYDROCORTISONE 1 APPLICATION: 1 CREAM TOPICAL at 00:15

## 2019-02-09 RX ADMIN — EMOLLIENT - LOTION: LOTION at 20:31

## 2019-02-09 RX ADMIN — ACETAMINOPHEN 500 MG: 500 TABLET, FILM COATED ORAL at 11:15

## 2019-02-09 RX ADMIN — ALBUMIN HUMAN 25 G: 0.25 SOLUTION INTRAVENOUS at 12:08

## 2019-02-09 RX ADMIN — HYDROCORTISONE 1 APPLICATION: 1 CREAM TOPICAL at 06:55

## 2019-02-09 RX ADMIN — NYSTATIN: 100000 POWDER TOPICAL at 09:22

## 2019-02-09 RX ADMIN — ACETAMINOPHEN 500 MG: 500 TABLET, FILM COATED ORAL at 02:58

## 2019-02-09 RX ADMIN — ACETAMINOPHEN 500 MG: 500 TABLET, FILM COATED ORAL at 06:58

## 2019-02-09 NOTE — PLAN OF CARE
Problem: Breathing Pattern Ineffective (Adult)  Goal: Effective Oxygenation/Ventilation  Outcome: Ongoing (interventions implemented as appropriate)  Able to wean o2 following surgery.   Goal: Anxiety/Fear Reduction  Outcome: Ongoing (interventions implemented as appropriate)

## 2019-02-09 NOTE — PROGRESS NOTES
POD 12.    Will get RBC with HD today.    No complaints today. Feeling well.    Aiming for rehab this week.    Will follow.

## 2019-02-09 NOTE — PROGRESS NOTES
"   LOS: 24 days    Patient Care Team:  Robert Cortez MD as PCP - General (Family Medicine)  Deborah Agudelo MD as Surgeon (Orthopedic Surgery)    Chief Complaint:    Chief Complaint   Patient presents with   • Shortness of Breath   • Leg Swelling     Follow UP LAVERNE  Subjective     Interval History:   HD getting ready to start; UOP 400cc/24h.  Breathing is comfortable; appetite good; had TDC placed yesterday    Objective     Vital Signs  Temp:  [97.3 °F (36.3 °C)-98.7 °F (37.1 °C)] 97.5 °F (36.4 °C)  Heart Rate:  [67-90] 78  Resp:  [14-20] 18  BP: ()/(46-92) 114/47    Flowsheet Rows      First Filed Value   Admission Height  170.2 cm (67\") Documented at 01/16/2019 0910   Admission Weight  145 kg (320 lb)  (Abnormal)  Documented at 01/16/2019 0921          No intake/output data recorded.  I/O last 3 completed shifts:  In: -   Out: 550 [Urine:550]    Intake/Output Summary (Last 24 hours) at 2/9/2019 1012  Last data filed at 2/9/2019 0700  Gross per 24 hour   Intake --   Output 400 ml   Net -400 ml       Physical Exam:  gen nad, alert; MO; chr ill  Right chest TDC; no JVD  Lungs CTA anteriorly; no rales  CV RRR no m/g  abd soft NT/ND, BS+  vasc trace pedal edema, 2+ radial pulses     Results Review:    Results from last 7 days   Lab Units 02/09/19  0520 02/08/19  0350 02/07/19  0336   SODIUM mmol/L 138 138 135*   POTASSIUM mmol/L 4.6 4.2 4.5   CHLORIDE mmol/L 95* 99 93*   CO2 mmol/L 23.5 25.9 24.2   BUN mg/dL 61* 35* 78*   CREATININE mg/dL 2.69* 1.59* 2.74*   CALCIUM mg/dL 9.9 9.5 9.8   BILIRUBIN mg/dL  --   --  0.6   ALK PHOS U/L  --   --  67   ALT (SGPT) U/L  --   --  <5   AST (SGOT) U/L  --   --  13   GLUCOSE mg/dL 100* 104* 94       Estimated Creatinine Clearance: 26.6 mL/min (A) (by C-G formula based on SCr of 2.69 mg/dL (H)).    Results from last 7 days   Lab Units 02/09/19  0520 02/08/19  0350 02/06/19  0304  02/04/19  0337   MAGNESIUM mg/dL  --   --   --   --  2.2   PHOSPHORUS mg/dL 7.1* 3.7 4.6*   < > " 3.2    < > = values in this interval not displayed.             Results from last 7 days   Lab Units 02/09/19  0520 02/08/19  0350 02/07/19  0336 02/06/19  0304 02/05/19  0342   WBC 10*3/mm3 11.59* 12.58* 13.03* 15.51* 16.64*   HEMOGLOBIN g/dL 7.4* 7.5* 7.8* 8.1* 8.5*   PLATELETS 10*3/mm3 230 225 210 191 198       Results from last 7 days   Lab Units 02/02/19  1830   INR  1.32*         Imaging Results (last 24 hours)     Procedure Component Value Units Date/Time    XR Chest Post CVA Port [[id number]] Collected:  02/08/19 1304     Updated:  02/08/19 1308    Narrative:       ONE VIEW PORTABLE CHEST AT 12:45 PM     HISTORY: Vascular catheter placement     There has been recent insertion of a right-sided vascular catheter which  ends in the distal SVC. There is no pneumothorax. The heart remains  enlarged and there is less vascular congestion when compared to the  study performed 5 days ago.     This report was finalized on 2/8/2019 1:05 PM by Dr. Donny Delgado M.D.       FL Surgery Fluoro [[id number]] Updated:  02/08/19 1104          acetaminophen 500 mg Oral Q4H   aspirin 81 mg Oral Daily   cetaphil  Topical Q12H   enoxaparin 30 mg Subcutaneous Nightly   escitalopram 10 mg Oral Daily   famotidine 20 mg Oral Daily   hydrocortisone 1 application Topical Q6H   ipratropium-albuterol 3 mL Nebulization BID - RT   iron sucrose 200 mg Intravenous Q24H   mupirocin  Each Nare BID   nystatin  Topical Q12H   sennosides-docusate sodium 2 tablet Oral Nightly   sodium chloride 4 mL Nebulization BID - RT       sodium chloride 30 mL/hr Last Rate: 30 mL/hr (01/28/19 1215)   sodium chloride 9 mL/hr Last Rate: 9 mL/hr (02/08/19 0850)       Medication Review:   Current Facility-Administered Medications   Medication Dose Route Frequency Provider Last Rate Last Dose   • acetaminophen (TYLENOL) tablet 500 mg  500 mg Oral Q4H Yahaira Garza, APRN   500 mg at 02/09/19 0658   • albumin human 25 % IV SOLN 37.5 g  37.5 g Intravenous PRN  Sheila Terrazas MD       • aspirin chewable tablet 81 mg  81 mg Oral Daily Scar Gaxiola MD   81 mg at 02/09/19 0921   • bisacodyl (DULCOLAX) EC tablet 10 mg  10 mg Oral Daily PRN Scar Gaxiola MD       • bisacodyl (DULCOLAX) suppository 10 mg  10 mg Rectal Daily PRN Scar Gaxiola MD   10 mg at 02/03/19 2120   • bupivacaine PF 30 mL, lidocaine 1 % 30 mL mixture    PRN Satish Stahl MD   18 mL at 02/08/19 1001   • cetaphil lotion   Topical Q12H Zonia Lopez MD       • cyclobenzaprine (FLEXERIL) tablet 10 mg  10 mg Oral Q8H PRN Scar Gaxiola MD       • dextrose (D50W) 25 g/ 50mL Intravenous Solution 25 g  25 g Intravenous Q15 Min PRN Oscar Rodriguez MD       • dextrose (GLUTOSE) oral gel 15 g  15 g Oral Q15 Min PRN Oscar Rodriguez MD       • enoxaparin (LOVENOX) syringe 30 mg  30 mg Subcutaneous Nightly Alicia Schmitz APRN   30 mg at 02/09/19 0921   • escitalopram (LEXAPRO) tablet 10 mg  10 mg Oral Daily Oscar Rodriguez MD   10 mg at 02/09/19 0921   • famotidine (PEPCID) tablet 20 mg  20 mg Oral Daily Wallace Falcon MD   20 mg at 02/09/19 0922   • glucagon (human recombinant) (GLUCAGEN DIAGNOSTIC) injection 1 mg  1 mg Subcutaneous PRN Oscar Rodriguez MD       • hydrocortisone 1 % cream 1 application  1 application Topical Q6H Fran Tilley MD   1 application at 02/09/19 0655   • ipratropium-albuterol (DUO-NEB) nebulizer solution 3 mL  3 mL Nebulization BID - RT Yahaira Garza APRN   3 mL at 02/09/19 0713   • ipratropium-albuterol (DUO-NEB) nebulizer solution 3 mL  3 mL Nebulization Q6H PRN Garza, Yahaira, APRN       • iron sucrose (VENOFER) 200 mg in sodium chloride 0.9 % 100 mL IVPB  200 mg Intravenous Q24H Wallace Falcon MD       • magic mouthwash oral supsension 5 mL  5 mL Swish & Spit Q4H PRN Yahaira Garza APRN       • mupirocin (BACTROBAN) 2 % nasal ointment   Each Nare BID Scar Gaxiola MD       • nystatin  (MYCOSTATIN) powder   Topical Q12H Scar Gaxiola MD       • ondansetron (ZOFRAN) injection 4 mg  4 mg Intravenous Q6H PRN Scar Gaxiola MD   4 mg at 02/04/19 0906   • promethazine (PHENERGAN) tablet 12.5 mg  12.5 mg Oral Q6H PRN Scar Gaxiola MD        Or   • promethazine (PHENERGAN) injection 12.5 mg  12.5 mg Intravenous Q6H PRN Scar Gaxiola MD       • sennosides-docusate sodium (SENOKOT-S) 8.6-50 MG tablet 2 tablet  2 tablet Oral Nightly Scar Gaxiola MD   2 tablet at 02/08/19 2052   • sodium chloride 0.9 % flush 30 mL  30 mL Intravenous Once PRN Scar Gaxiola MD       • sodium chloride 0.9 % infusion  30 mL/hr Intravenous Continuous PRN Scar Gaxiola MD 30 mL/hr at 01/28/19 1215 30 mL/hr at 01/28/19 1215   • sodium chloride 0.9 % infusion  9 mL/hr Intravenous Continuous Protzer, Casandra Sun MD 9 mL/hr at 02/08/19 0850 9 mL/hr at 02/08/19 0850   • sodium chloride 7 % nebulizer solution nebulizer solution 4 mL  4 mL Nebulization BID - RT Yahaira Garza APRN   4 mL at 02/09/19 0715       Assessment/Plan   1. Oliguric --> non-oliguric LAVERNE - ATN post AVR/MVR.  HD-dep since 1/30); UOP mediocre but bit better.  Briskly rising SCr in absence of HD, and thus no e/o renal recovery.  BL Cr ~ 1, so still expect eventual recovery      2. SP AVR, MVR, TV repair, left cryo MAZE PENNIE ligation.  POD12  3. Vol overload - improved, periph edema much less   4. Probable OCTAVIANO  5. GERD - on PPI   6. PAF  7. Leukocytosis.  Improving  8. Anemia.  Hgb down to 7.4.  Iron def, TSAT 9%, ferritin 473    Plan  1.  HD today, remove 2L again as BP tolerates; one unit PRBC   2.  [facility]        Acute congestive heart failure (CMS/[facility])    Hypertension    Generalized anxiety disorder    Hyperlipidemia    IFG (impaired fasting glucose)    Heart murmur, systolic    Obesity, morbid, BMI 50 or higher (CMS/[facility])    Fungal skin infection    Cellulitis of lower extremity     Aortic valve stenosis    Tricuspid valve insufficiency    Mitral valve stenosis              Valentin Gabriel MD  02/09/19  10:12 AM

## 2019-02-09 NOTE — PROGRESS NOTES
Favio Mauricio MD       LOS: 24 days   Patient Care Team:  Robert Cortez MD as PCP - General (Family Medicine)  Deborah Agudelo MD as Surgeon (Orthopedic Surgery)    Subjective     73 y.o. female with functioning right tunneled dialysis catheter on dialysis at time of visit this morning.  Left forearm bruising from infiltration stable with skin intact.    Review of Systems  Review of Systems - Negative except no chest pain or shortness of breath      Objective     Vital Signs  Temp:  [97.3 °F (36.3 °C)-98.7 °F (37.1 °C)] 97.5 °F (36.4 °C)  Heart Rate:  [67-90] 78  Resp:  [14-20] 18  BP: ()/(46-92) 114/47    Physical Exam  General: No acute distress. Alert and oriented x 4  HEENT: No jugular venous distension, trachea is midline, right tunneled catheter site clean  CV: Irregular controlled rate, S1S2  Resp: Clear unlabored breathing on oxygen  Abd: Abdomen is soft, nontender, nondistended  Extremities: Left forearm with bruising and swelling but skin intact and viable with palpable radial pulse    Results Review:       Recent Results (from the past 12 hour(s))   Renal Function Panel    Collection Time: 02/09/19  5:20 AM   Result Value Ref Range    Glucose 100 (H) 65 - 99 mg/dL    BUN 61 (H) 8 - 23 mg/dL    Creatinine 2.69 (H) 0.57 - 1.00 mg/dL    Sodium 138 136 - 145 mmol/L    Potassium 4.6 3.5 - 5.2 mmol/L    Chloride 95 (L) 98 - 107 mmol/L    CO2 23.5 22.0 - 29.0 mmol/L    Calcium 9.9 8.6 - 10.5 mg/dL    Albumin 3.70 3.50 - 5.20 g/dL    Phosphorus 7.1 (H) 2.5 - 4.5 mg/dL    Anion Gap 19.5 mmol/L    BUN/Creatinine Ratio 22.7 7.0 - 25.0    eGFR Non African Amer 17 (L) >60 mL/min/1.73   Type & Screen    Collection Time: 02/09/19  5:20 AM   Result Value Ref Range    ABO Type B     RH type Positive     Antibody Screen Negative     T&S Expiration Date 2/12/2019 11:59:59 PM    CBC Auto Differential    Collection Time: 02/09/19  5:20 AM   Result Value Ref Range    WBC 11.59 (H) 4.50 - 10.70 10*3/mm3    RBC  2.48 (L) 3.90 - 5.20 10*6/mm3    Hemoglobin 7.4 (L) 11.9 - 15.5 g/dL    Hematocrit 23.8 (L) 35.6 - 45.5 %    MCV 96.0 80.5 - 98.2 fL    MCH 29.8 26.9 - 32.0 pg    MCHC 31.1 (L) 32.4 - 36.3 g/dL    RDW 15.6 (H) 11.7 - 13.0 %    RDW-SD 53.5 37.0 - 54.0 fl    MPV 9.6 6.0 - 12.0 fL    Platelets 230 140 - 500 10*3/mm3    Neutrophil % 80.6 (H) 42.7 - 76.0 %    Lymphocyte % 11.0 (L) 19.6 - 45.3 %    Monocyte % 5.1 5.0 - 12.0 %    Eosinophil % 1.0 0.3 - 6.2 %    Basophil % 0.3 0.0 - 1.5 %    Immature Grans % 2.0 (H) 0.0 - 0.5 %    Neutrophils, Absolute 9.34 (H) 1.90 - 8.10 10*3/mm3    Lymphocytes, Absolute 1.28 0.90 - 4.80 10*3/mm3    Monocytes, Absolute 0.59 0.20 - 1.20 10*3/mm3    Eosinophils, Absolute 0.12 0.00 - 0.70 10*3/mm3    Basophils, Absolute 0.03 0.00 - 0.20 10*3/mm3    Immature Grans, Absolute 0.23 (H) 0.00 - 0.03 10*3/mm3   POC Glucose Once    Collection Time: 02/09/19  5:31 AM   Result Value Ref Range    Glucose 100 70 - 130 mg/dL   Prepare RBC, 1 Units    Collection Time: 02/09/19  7:56 AM   Result Value Ref Range    Product Code E4402A61     Unit Number [id number]     UNIT  ABO B     UNIT  RH POS     Dispense Status XM     Blood Type BPOS     Blood Expiration Date 201902272359     Blood Type Barcode 7300    ]      Assessment/Plan             Acute congestive heart failure (CMS/HCC)    Hypertension    Generalized anxiety disorder    Hyperlipidemia    IFG (impaired fasting glucose)    Heart murmur, systolic    Obesity, morbid, BMI 50 or higher (CMS/HCC)    Fungal skin infection    Cellulitis of lower extremity    Aortic valve stenosis    Tricuspid valve insufficiency    Mitral valve stenosis      Assessment & Plan  73 y.o. female doing well after tunnel dialysis catheter placement.  Left forearm stable after infiltration.  We will see as needed.      Favio Mauricio MD  02/09/19  10:29 AM

## 2019-02-09 NOTE — PROGRESS NOTES
LOS: 24 days   Patient Care Team:  Robert Cortez MD as PCP - General (Family Medicine)  Deborah Agudelo MD as Surgeon (Orthopedic Surgery)    Chief Complaint:     F/u chf    Interval History:     Breathing is better, no cp, no dizziness, no nausea.  Doing ok with HD    Objective   Vital Signs  Temp:  [97.3 °F (36.3 °C)-98.7 °F (37.1 °C)] 97.5 °F (36.4 °C)  Heart Rate:  [67-90] 78  Resp:  [14-20] 18  BP: ()/(46-92) 111/49    Intake/Output Summary (Last 24 hours) at 2/9/2019 0857  Last data filed at 2/9/2019 0700  Gross per 24 hour   Intake --   Output 400 ml   Net -400 ml       Comfortable NAD  Neck supple, no JVD or thyromegaly appreciated  S1/S2 RRR, no m/r/g  Lungs CTA B, normal effort  Abdomen S/NT/ND (+) BS, no HSM appreciated  Extremities warm, no clubbing, cyanosis, or edema  No visible or palpable skin lesions  A/Ox4, mood and affect appropriate    Results Review:      Results from last 7 days   Lab Units 02/09/19  0520 02/08/19  0350 02/07/19  0336   SODIUM mmol/L 138 138 135*   POTASSIUM mmol/L 4.6 4.2 4.5   CHLORIDE mmol/L 95* 99 93*   CO2 mmol/L 23.5 25.9 24.2   BUN mg/dL 61* 35* 78*   CREATININE mg/dL 2.69* 1.59* 2.74*   GLUCOSE mg/dL 100* 104* 94   CALCIUM mg/dL 9.9 9.5 9.8         Results from last 7 days   Lab Units 02/09/19  0520 02/08/19  0350 02/07/19  0336   WBC 10*3/mm3 11.59* 12.58* 13.03*   HEMOGLOBIN g/dL 7.4* 7.5* 7.8*   HEMATOCRIT % 23.8* 24.2* 25.3*   PLATELETS 10*3/mm3 230 225 210     Results from last 7 days   Lab Units 02/04/19  0021 02/03/19  1643 02/03/19  1015  02/02/19  1830   INR   --   --   --   --  1.32*   APTT seconds 86.1* 59.2* 63.3*   < > 30.5    < > = values in this interval not displayed.         Results from last 7 days   Lab Units 02/04/19  0337   MAGNESIUM mg/dL 2.2           I reviewed the patient's new clinical results.  I personally viewed and interpreted the patient's EKG/Telemetry data        Medication Review:     acetaminophen 500 mg Oral Q4H   aspirin  81 mg Oral Daily   cetaphil  Topical Q12H   enoxaparin 30 mg Subcutaneous Nightly   escitalopram 10 mg Oral Daily   famotidine 20 mg Oral Daily   hydrocortisone 1 application Topical Q6H   ipratropium-albuterol 3 mL Nebulization BID - RT   iron sucrose 200 mg Intravenous Q24H   mupirocin  Each Nare BID   nystatin  Topical Q12H   sennosides-docusate sodium 2 tablet Oral Nightly   sodium chloride 4 mL Nebulization BID - RT         sodium chloride 30 mL/hr Last Rate: 30 mL/hr (01/28/19 1215)   sodium chloride 9 mL/hr Last Rate: 9 mL/hr (02/08/19 0850)       Assessment/Plan       Acute congestive heart failure (CMS/HCC)    Hypertension    Generalized anxiety disorder    Hyperlipidemia    IFG (impaired fasting glucose)    Heart murmur, systolic    Obesity, morbid, BMI 50 or higher (CMS/HCC)    Fungal skin infection    Cellulitis of lower extremity    Aortic valve stenosis    Tricuspid valve insufficiency    Mitral valve stenosis       1. Acute valvular diastolic heart failure:  Diuretics were discontinued per nephrology.  Patient denies any dyspnea today.  EF 60-65%  2. Severe aortic stenosis, mitral valve stenosis, tricuspid valve regurgitation: Status post aortic valve replacement in mitral valve replacement with tissue valve repair, MAZE PENNIE ligation postop done 1/28/19 with Dr. Gaxiola  3. Paroxysmal atrial fibrillation: Status post cardioversion 1/24/19 and subsequent reversion to atrial fibrillation.  Rate is currently controlled and at this time pt does not have indication for PPM.   4. Moderate pulmonary hypertension  5. Small pulmonary emboli  6. Acute kidney injury postoperatively: Per nephrology, now has tunneled catheter on right   7. Right bundle branch block and left anterior fascicular block  8. Undiagnosed sleep apnea  9. Morbid obesity  10. Severe deconditioning: encourage PT  11. Junctional bradycardia and asystole postop: No longer requiring external pacemaker.  Seen by EP, no indication for PPM at this  time, they do recommend that HD access be placed on the right in case there is indication in the future for PPM.   12. Anemia, transfusion on 2/9/19 with hD.     Overall making improvement. No changes. Rhythm is stable.           Nunu Santos MD  02/09/19  8:57 AM

## 2019-02-09 NOTE — PLAN OF CARE
Problem: Patient Care Overview  Goal: Plan of Care Review  Outcome: Ongoing (interventions implemented as appropriate)   02/09/19 6655   Coping/Psychosocial   Plan of Care Reviewed With patient   Plan of Care Review   Progress improving   OTHER   Outcome Summary VSS, HD today 1/2 liter taken off, andrea in place pt did not want it removed today- agreed to get it removed in AM , will continue to monitor.

## 2019-02-09 NOTE — SIGNIFICANT NOTE
02/09/19 1029   Rehab Treatment   Discipline physical therapist   Reason Treatment Not Performed unavailable for treatment  (Patient receiving dialysis, will check back tomorrow)   Recommendation   PT - Next Appointment 02/10/19

## 2019-02-09 NOTE — PLAN OF CARE
Problem: Patient Care Overview  Goal: Plan of Care Review  Outcome: Ongoing (interventions implemented as appropriate)   02/08/19 2350   Coping/Psychosocial   Plan of Care Reviewed With patient   Plan of Care Review   Progress improving   OTHER   Outcome Summary Pt drowsy from tunnel cath procedure. VSS. bathed. Q2hr turn, up w/ lift. HD tomorrow. Parks in place. PICC in R UA, blood return noted in gray/red lumens. Pacer @ VVI 30 10 .8. Will continue to monitor.

## 2019-02-10 LAB
ABO + RH BLD: NORMAL
ALBUMIN SERPL-MCNC: 3.9 G/DL (ref 3.5–5.2)
ANION GAP SERPL CALCULATED.3IONS-SCNC: 14.8 MMOL/L
BH BB BLOOD EXPIRATION DATE: NORMAL
BH BB BLOOD TYPE BARCODE: 7300
BH BB DISPENSE STATUS: NORMAL
BH BB PRODUCT CODE: NORMAL
BH BB UNIT NUMBER: NORMAL
BUN BLD-MCNC: 40 MG/DL (ref 8–23)
BUN/CREAT SERPL: 20.3 (ref 7–25)
CALCIUM SPEC-SCNC: 9.7 MG/DL (ref 8.6–10.5)
CHLORIDE SERPL-SCNC: 97 MMOL/L (ref 98–107)
CO2 SERPL-SCNC: 26.2 MMOL/L (ref 22–29)
CREAT BLD-MCNC: 1.97 MG/DL (ref 0.57–1)
DEPRECATED RDW RBC AUTO: 54.4 FL (ref 37–54)
ERYTHROCYTE [DISTWIDTH] IN BLOOD BY AUTOMATED COUNT: 15.9 % (ref 11.7–13)
GFR SERPL CREATININE-BSD FRML MDRD: 25 ML/MIN/1.73
GLUCOSE BLD-MCNC: 93 MG/DL (ref 65–99)
HCT VFR BLD AUTO: 26.2 % (ref 35.6–45.5)
HGB BLD-MCNC: 8.2 G/DL (ref 11.9–15.5)
MCH RBC QN AUTO: 29.4 PG (ref 26.9–32)
MCHC RBC AUTO-ENTMCNC: 31.3 G/DL (ref 32.4–36.3)
MCV RBC AUTO: 93.9 FL (ref 80.5–98.2)
PHOSPHATE SERPL-MCNC: 4.6 MG/DL (ref 2.5–4.5)
PLATELET # BLD AUTO: 227 10*3/MM3 (ref 140–500)
PMV BLD AUTO: 10 FL (ref 6–12)
POTASSIUM BLD-SCNC: 4.2 MMOL/L (ref 3.5–5.2)
RBC # BLD AUTO: 2.79 10*6/MM3 (ref 3.9–5.2)
SODIUM BLD-SCNC: 138 MMOL/L (ref 136–145)
UNIT  ABO: NORMAL
UNIT  RH: NORMAL
WBC NRBC COR # BLD: 10.55 10*3/MM3 (ref 4.5–10.7)

## 2019-02-10 PROCEDURE — 94799 UNLISTED PULMONARY SVC/PX: CPT

## 2019-02-10 PROCEDURE — 80069 RENAL FUNCTION PANEL: CPT | Performed by: INTERNAL MEDICINE

## 2019-02-10 PROCEDURE — 25010000002 IRON SUCROSE PER 1 MG: Performed by: INTERNAL MEDICINE

## 2019-02-10 PROCEDURE — 85027 COMPLETE CBC AUTOMATED: CPT | Performed by: INTERNAL MEDICINE

## 2019-02-10 PROCEDURE — 25010000002 ENOXAPARIN PER 10 MG: Performed by: NURSE PRACTITIONER

## 2019-02-10 PROCEDURE — 97110 THERAPEUTIC EXERCISES: CPT

## 2019-02-10 PROCEDURE — 99232 SBSQ HOSP IP/OBS MODERATE 35: CPT | Performed by: INTERNAL MEDICINE

## 2019-02-10 RX ADMIN — IPRATROPIUM BROMIDE AND ALBUTEROL SULFATE 3 ML: 2.5; .5 SOLUTION RESPIRATORY (INHALATION) at 20:36

## 2019-02-10 RX ADMIN — Medication 81 MG: at 08:24

## 2019-02-10 RX ADMIN — ENOXAPARIN SODIUM 30 MG: 30 INJECTION SUBCUTANEOUS at 08:28

## 2019-02-10 RX ADMIN — ACETAMINOPHEN 500 MG: 500 TABLET, FILM COATED ORAL at 00:28

## 2019-02-10 RX ADMIN — HYDROCORTISONE 1 APPLICATION: 1 CREAM TOPICAL at 06:51

## 2019-02-10 RX ADMIN — SODIUM CHLORIDE 4 ML: 7 NEBU SOLN,3 % NEBU at 07:21

## 2019-02-10 RX ADMIN — ACETAMINOPHEN 500 MG: 500 TABLET, FILM COATED ORAL at 04:14

## 2019-02-10 RX ADMIN — EMOLLIENT - LOTION: LOTION at 20:23

## 2019-02-10 RX ADMIN — ACETAMINOPHEN 500 MG: 500 TABLET, FILM COATED ORAL at 08:23

## 2019-02-10 RX ADMIN — SODIUM CHLORIDE 4 ML: 7 NEBU SOLN,3 % NEBU at 20:36

## 2019-02-10 RX ADMIN — ESCITALOPRAM 10 MG: 10 TABLET, FILM COATED ORAL at 08:24

## 2019-02-10 RX ADMIN — BISACODYL 10 MG: 5 TABLET, COATED ORAL at 21:16

## 2019-02-10 RX ADMIN — IPRATROPIUM BROMIDE AND ALBUTEROL SULFATE 3 ML: 2.5; .5 SOLUTION RESPIRATORY (INHALATION) at 07:21

## 2019-02-10 RX ADMIN — HYDROCORTISONE 1 APPLICATION: 1 CREAM TOPICAL at 11:41

## 2019-02-10 RX ADMIN — MUPIROCIN: 20 OINTMENT TOPICAL at 08:40

## 2019-02-10 RX ADMIN — MUPIROCIN 10 APPLICATION: 20 OINTMENT TOPICAL at 20:23

## 2019-02-10 RX ADMIN — HYDROCORTISONE 1 APPLICATION: 1 CREAM TOPICAL at 00:28

## 2019-02-10 RX ADMIN — IRON SUCROSE 200 MG: 20 INJECTION, SOLUTION INTRAVENOUS at 08:24

## 2019-02-10 RX ADMIN — ACETAMINOPHEN 500 MG: 500 TABLET, FILM COATED ORAL at 12:34

## 2019-02-10 RX ADMIN — EMOLLIENT - LOTION: LOTION at 08:24

## 2019-02-10 RX ADMIN — SENNOSIDES AND DOCUSATE SODIUM 2 TABLET: 8.6; 5 TABLET ORAL at 20:23

## 2019-02-10 RX ADMIN — ACETAMINOPHEN 500 MG: 500 TABLET, FILM COATED ORAL at 16:49

## 2019-02-10 RX ADMIN — FAMOTIDINE 20 MG: 20 TABLET, FILM COATED ORAL at 08:24

## 2019-02-10 RX ADMIN — HYDROCORTISONE 1 APPLICATION: 1 CREAM TOPICAL at 16:49

## 2019-02-10 RX ADMIN — ACETAMINOPHEN 500 MG: 500 TABLET, FILM COATED ORAL at 20:23

## 2019-02-10 NOTE — PLAN OF CARE
Problem: Breathing Pattern Ineffective (Adult)  Intervention: Optimize Oxygenation/Ventilation/Perfusion   02/10/19 0338   Respiratory Interventions   Airway/Ventilation Management airway patency maintained;pulmonary hygiene promoted   Positioning   Head of Bed (HOB) HOB elevated       Goal: Effective Oxygenation/Ventilation  Outcome: Ongoing (interventions implemented as appropriate)   02/10/19 0338   Breathing Pattern Ineffective (Adult)   Effective Oxygenation/Ventilation making progress toward outcome

## 2019-02-10 NOTE — PLAN OF CARE
Problem: Patient Care Overview  Goal: Plan of Care Review  Outcome: Ongoing (interventions implemented as appropriate)   02/10/19 1014   Coping/Psychosocial   Plan of Care Reviewed With patient   Plan of Care Review   Progress no change   OTHER   Outcome Summary Pt tolerated treatment with c/o weakness. pt performed seated exercises and sit to/from stand. Pt able to partially get bottom off chair with each stand (3X) with modA to max assist of 2,but not able to come to a full stand.

## 2019-02-10 NOTE — PLAN OF CARE
Problem: Patient Care Overview  Goal: Plan of Care Review   02/10/19 3675   Coping/Psychosocial   Plan of Care Reviewed With patient   Plan of Care Review   Progress no change   OTHER   Outcome Summary VSS, andrea removed per protocol, minor pain reported, remaining V wire isolated, will continue to monitor.

## 2019-02-10 NOTE — PLAN OF CARE
Problem: Fall Risk (Adult)  Goal: Absence of Fall  Outcome: Ongoing (interventions implemented as appropriate)      Problem: Patient Care Overview  Goal: Plan of Care Review  Outcome: Ongoing (interventions implemented as appropriate)   02/10/19 0329   Coping/Psychosocial   Plan of Care Reviewed With patient   Plan of Care Review   Progress improving   OTHER   Outcome Summary VSS. Medicated with scheduled tylenol for pain. Will continue to monitor       Problem: Anxiety (Adult)  Goal: Reduction/Resolution  Outcome: Ongoing (interventions implemented as appropriate)      Problem: Breathing Pattern Ineffective (Adult)  Goal: Effective Oxygenation/Ventilation  Outcome: Ongoing (interventions implemented as appropriate)      Problem: Activity Intolerance (Adult)  Goal: Activity Tolerance  Outcome: Ongoing (interventions implemented as appropriate)      Problem: Skin Injury Risk (Adult)  Goal: Skin Health and Integrity  Outcome: Ongoing (interventions implemented as appropriate)      Problem: Cardiac Surgery (Adult)  Goal: Signs and Symptoms of Listed Potential Problems Will be Absent, Minimized or Managed (Cardiac Surgery)  Outcome: Ongoing (interventions implemented as appropriate)

## 2019-02-10 NOTE — THERAPY TREATMENT NOTE
Acute Care - Physical Therapy Treatment Note  [facility]     Patient Name: Claudia Arnold  : 1945  MRN: [id number]  Today's Date: 2/10/2019  Onset of Illness/Injury or Date of Surgery: 19          Admit Date: 2019    Visit Dx:    ICD-10-CM ICD-9-CM   1. Acute congestive heart failure, unspecified heart failure type (CMS/HCC) I50.9 428.0   2. Generalized weakness R53.1 780.79   3. Aortic valve stenosis, etiology of cardiac valve disease unspecified I35.0 424.1   4. Tricuspid valve insufficiency, unspecified etiology I07.1 397.0   5. Mitral valve stenosis, unspecified etiology I05.0 394.0   6. Acute renal failure, unspecified acute renal failure type (CMS/HCC) N17.9 584.9   7. S/P AVR (aortic valve replacement) Z95.2 V43.3     Patient Active Problem List   Diagnosis   • Tear of rotator cuff   • Hypertension   • Generalized anxiety disorder   • Hyperlipidemia   • IFG (impaired fasting glucose)   • Heart murmur, systolic   • Shoulder pain, bilateral   • Pain of both shoulder joints   • Chronic pain of both shoulders   • Acute congestive heart failure (CMS/HCC)   • Obesity, morbid, BMI 50 or higher (CMS/HCC)   • Fungal skin infection   • Cellulitis of lower extremity   • Aortic valve stenosis   • Tricuspid valve insufficiency   • Mitral valve stenosis       Therapy Treatment    Rehabilitation Treatment Summary     Row Name 02/10/19 1000             Treatment Time/Intention    Discipline  physical therapy assistant  -      Document Type  therapy note (daily note)  -      Subjective Information  complains of;weakness  -      Mode of Treatment  physical therapy  -      Patient/Family Observations  pt sitting up in chair  -      Care Plan Review  patient/other agree to care plan  -      Therapy Frequency (PT Clinical Impression)  daily  -      Patient Effort  good  -      Existing Precautions/Restrictions  fall;oxygen therapy device and L/min  -EH      Recorded by [] Cadence Cobb PTA  02/10/19 1013      Row Name 02/10/19 1000             Cognitive Assessment/Intervention- PT/OT    Affect/Mental Status (Cognitive)  WNL  -      Orientation Status (Cognition)  oriented x 3  -EH      Follows Commands (Cognition)  WNL  -EH      Cognitive Function (Cognitive)  WNL  -EH      Personal Safety Interventions  fall prevention program maintained;nonskid shoes/slippers when out of bed  -EH      Recorded by [] Cadence Cobb PTA 02/10/19 1013      Row Name 02/10/19 1000             Bed Mobility Assessment/Treatment    Supine-Sit [place] (Bed Mobility)  not tested  -      Sit-Supine [place] (Bed Mobility)  not tested  -EH      Recorded by [] Cadence Cobb PTA 02/10/19 1013      Row Name 02/10/19 1000             Transfer Assessment/Treatment    Comment (Transfers)  Pt partially able to get bottom off chair. Pt performed 3 times  -EH      Recorded by [] Cadence Cobb PTA 02/10/19 1013      Row Name 02/10/19 1000             Sit-Stand Transfer    Sit-Stand [place] (Transfers)  moderate assist (50% patient effort);maximum assist (25% patient effort);2 person assist  -      Assistive Device (Sit-Stand Transfers)  -- No AD  -EH      Recorded by [] Cadence Cobb PTA 02/10/19 1013      Row Name 02/10/19 1000             Stand-Sit Transfer    Stand-Sit [place] (Transfers)  moderate assist (50% patient effort);maximum assist (25% patient effort);2 person assist  -      Assistive Device (Stand-Sit Transfers)  -- No  AD  -EH      Recorded by [] Cadence Cobb PTA 02/10/19 1013      Row Name 02/10/19 1000             Therapeutic Exercise    Lower Extremity (Therapeutic Exercise)  LAQ (long arc quad), bilateral;marching while seated;gluteal sets  -      Lower Extremity Range of Motion (Therapeutic Exercise)  ankle dorsiflexion/plantar flexion, bilateral  -      Exercise Type (Therapeutic Exercise)  AROM (active range of motion)  -      Position (Therapeutic Exercise)  seated   -EH      Sets/Reps (Therapeutic Exercise)  X10  -EH      Recorded by [EH] Cadence Cobb PTA 02/10/19 1013      Row Name 02/10/19 1000             Positioning and Restraints    Pre-Treatment Position  sitting in chair/recliner  -EH      Post Treatment Position  chair  -EH      In Chair  sitting;call light within reach;encouraged to call for assist;with nsg  -EH      Recorded by [EH] Cadence Cobb PTA 02/10/19 1013      Row Name                Wound 01/28/19 1138 chest incision    Wound - Properties Group Date first assessed: 01/28/19 [SR] Time first assessed: 1138 [SR] Location: chest [SR] Type: incision [SR] Recorded by:  [SR] Keisha Gandara RN 01/28/19 1138    Row Name                Wound 01/28/19 1215 coccyx unspecified    Wound - Properties Group Date first assessed: 01/28/19 [MB] Time first assessed: 1215 [MB] Present On Admission : yes;picture taken [MB] Location: coccyx [MB] Type: unspecified [MB] Recorded by:  [MB] Julissa Chatterjee RN 01/28/19 1541    Row Name                Wound 01/31/19 2300 Right gluteal pressure injury    Wound - Properties Group Date first assessed: 01/31/19 [NW] Time first assessed: 2300 [NW] Present On Admission : no [NW] Side: Right [NW] Location: gluteal [NW] Type: pressure injury [NW] Stage, Pressure Injury: deep tissue injury [NW] Recorded by:  [NW] Priscilla Willams RN 02/01/19 0414    Row Name                Wound 01/31/19 2000 Left posterior ear pressure injury    Wound - Properties Group Date first assessed: 01/31/19 [NW] Time first assessed: 2000 [NW] Side: Left [NW] Orientation: posterior [NW] Location: ear [NW2] Type: pressure injury [NW] Stage, Pressure Injury: Stage 2;medical device related [NW] Recorded by:  [NW] Priscilla Willams RN 02/01/19 0416 [NW2] Priscilla Willams RN 02/01/19 0417    Row Name                Wound 02/08/19 1039 Other (See comments) chest incision    Wound - Properties Group Date first assessed: 02/08/19 [MC] Time first assessed: 1039 [MC] Side: Other  (See comments) [MC] Location: chest [MC] Type: incision [MC] Recorded by:  [MC] Kenya Boudreaux RN 02/08/19 1039    Row Name                Wound 02/08/19 1053 Left neck incision    Wound - Properties Group Date first assessed: 02/08/19 [MC] Time first assessed: 1053 [MC] Side: Left [MC] Location: neck [MC] Type: incision [MC] Recorded by:  [[place]] Kenya Boudreaux RN 02/08/19 1053      User Key  (r) = Recorded By, (t) = Taken By, (c) = Cosigned By    Initials Name Effective Dates Discipline    Julissa Sweet, RN 06/16/16 -  Nurse    Keisha Fournier RN 06/16/16 -  Nurse    Cadence Barbour PTA 08/19/18 -  PT    Kenya Qureshi RN 02/23/18 -  Nurse    Priscilla Jensen RN 03/05/18 -  Nurse          Wound 01/28/19 1138 chest incision (Active)   Dressing Appearance open to air 2/10/2019  8:31 AM   Closure Approximated 2/10/2019  8:31 AM   Drainage Amount none 2/10/2019  8:31 AM   Care, Wound cleansed with;antimicrobial agent applied 2/9/2019  4:00 PM   Dressing Care, Wound open to air 2/10/2019  4:15 AM       Wound 01/28/19 1215 coccyx unspecified (Active)   Dressing Appearance open to air 2/10/2019  8:31 AM   Closure None 2/10/2019  8:31 AM   Base purple 2/10/2019  8:31 AM   Drainage Amount none 2/10/2019  8:31 AM   Care, Wound barrier applied 2/10/2019  4:15 AM   Dressing Care, Wound open to air 2/10/2019  4:15 AM       Wound 01/31/19 2300 Right gluteal pressure injury (Active)   Dressing Appearance open to air 2/10/2019  8:31 AM   Closure None 2/10/2019  8:31 AM   Base maroon/purple;clean 2/10/2019  8:31 AM   Drainage Amount none 2/10/2019  8:31 AM   Care, Wound barrier applied 2/10/2019  4:15 AM   Dressing Care, Wound open to air 2/10/2019  4:15 AM       Wound 01/31/19 2000 Left posterior ear pressure injury (Active)   Dressing Appearance open to air 2/10/2019  8:31 AM   Closure None 2/10/2019  8:31 AM   Periwound intact 2/10/2019  4:15 AM   Drainage Amount none 2/10/2019  8:31 AM       Wound 02/08/19  1039 Other (See comments) chest incision (Active)   Dressing Appearance dry;intact;no drainage 2/10/2019  4:15 AM   Closure Open to air 2/10/2019  8:31 AM   Drainage Amount none 2/10/2019  8:31 AM   Dressing Care, Wound transparent film 2/10/2019  4:15 AM       Wound 02/08/19 1053 Left neck incision (Active)   Dressing Appearance dry;intact;no drainage 2/10/2019  8:31 AM   Closure Open to air 2/10/2019  8:31 AM   Base dressing in place, unable to visualize 2/9/2019 12:00 PM   Drainage Amount none 2/10/2019  8:31 AM   Dressing Care, Wound open to air 2/10/2019  4:15 AM           Physical Therapy Education     Title: PT OT SLP Therapies (In Progress)     Topic: Physical Therapy (Done)     Point: Mobility training (Done)     Learning Progress Summary           Patient Acceptance, E,D, VU,DU,NR by  at 2/10/2019 10:10 AM    Acceptance, E,TB,D, VU,NR by CHELSEA at 2/7/2019 12:44 PM    Acceptance, E, VU,NR by  at 2/6/2019 10:19 AM    Acceptance, E, VU,NR by MA at 2/5/2019 11:09 AM    Acceptance, E,TB,D, VU,NR by CH at 2/4/2019 11:34 AM    Acceptance, E,D, VU,NR by SM at 2/3/2019  4:47 PM    Acceptance, E,D, DU,NR by DARRYN at 2/2/2019 10:36 AM    Comment:  safety during sit to stand, benefits of activity    Acceptance, E, NR by MA at 1/31/2019 12:08 PM    Acceptance, E, NR by MA at 1/30/2019 10:23 AM    Acceptance, E,TB, VU by  at 1/27/2019  9:10 AM    Acceptance, E, VU,NR by MA at 1/25/2019  3:54 PM    Acceptance, E,TB,D, VU,NR by CH at 1/23/2019  3:14 PM    Acceptance, TB,E, VU,DU by CW at 1/18/2019  4:37 PM    Acceptance, E, NR by EM at 1/17/2019 12:15 PM   Family Acceptance, TB,E, VU,DU by CW at 1/18/2019  4:37 PM                   Point: Home exercise program (Done)     Learning Progress Summary           Patient Acceptance, GABRIELLE SESAY VU, DU,NR by  at 2/10/2019 10:10 AM    Acceptance, NIKI SESAY D, VU,NR by  at 2/7/2019 12:44 PM    AcceptanceSHAMA VUNR by EF at 2/6/2019 10:19 AM    AcceptanceSHAMA VUNR by MA at 2/5/2019 11:09 AM     Acceptance, E,TB,D, VU,NR by  at 2/4/2019 11:34 AM    Acceptance, E,D, VU,NR by SM at 2/3/2019  4:47 PM    Acceptance, E,D, DU,NR by DARRYN at 2/2/2019 10:36 AM    Comment:  safety during sit to stand, benefits of activity    Acceptance, E,TB,D, VU,NR by  at 2/1/2019 10:51 AM    Acceptance, E, NR by MA at 1/31/2019 12:08 PM    Acceptance, E, NR by MA at 1/30/2019 10:23 AM    Acceptance, E,TB, VU by YANETH at 1/27/2019  9:10 AM    Acceptance, E, VU,NR by MA at 1/25/2019  3:54 PM    Acceptance, E,TB,D, VU,NR by CHELSEA at 1/23/2019  3:14 PM    Acceptance, TB,E, VU,DU by JANNET at 1/18/2019  4:37 PM   Family Acceptance, TB,E, VU,DU by CW at 1/18/2019  4:37 PM                   Point: Body mechanics (Done)     Learning Progress Summary           Patient Acceptance, E,D, VU,DU,NR by  at 2/10/2019 10:10 AM    Acceptance, E,TB,D, VU,NR by CHELSEA at 2/7/2019 12:44 PM    Acceptance, E, VU,NR by PATRICIO at 2/6/2019 10:19 AM    Acceptance, E, VU,NR by MA at 2/5/2019 11:09 AM    Acceptance, E,TB,D, VU,NR by CHELSEA at 2/4/2019 11:34 AM    Acceptance, E,D, VU,NR by DARRIUS at 2/3/2019  4:47 PM    Acceptance, E,D, DU,NR by DARRYN at 2/2/2019 10:36 AM    Comment:  safety during sit to stand, benefits of activity    Acceptance, E, NR by MA at 1/31/2019 12:08 PM    Acceptance, E, NR by MA at 1/30/2019 10:23 AM    Acceptance, E,TB, VU by YANETH at 1/27/2019  9:10 AM    Acceptance, E, VU,NR by MA at 1/25/2019  3:54 PM    Acceptance, E,TB,D, VU,NR by CH at 1/23/2019  3:14 PM    Acceptance, TB,E, VU,DU by CW at 1/18/2019  4:37 PM   Family Acceptance, TB,E, VU,DU by CW at 1/18/2019  4:37 PM                   Point: Precautions (Done)     Learning Progress Summary           Patient Acceptance, E,D, VU,DU,NR by  at 2/10/2019 10:10 AM    Acceptance, E,TB,D, VU,NR by CH at 2/7/2019 12:44 PM    Acceptance, E, VU,NR by PATRICIO at 2/6/2019 10:19 AM    Acceptance, E, VU,NR by MA at 2/5/2019 11:09 AM    Acceptance, E,TB,D, VU,NR by  at 2/4/2019 11:34 AM    Acceptance, E,D, VU,NR by   at 2/3/2019  4:47 PM    Acceptance, E,D, DU,NR by  at 2/2/2019 10:36 AM    Comment:  safety during sit to stand, benefits of activity    Acceptance, E, NR by MA at 1/31/2019 12:08 PM    Acceptance, E, NR by MA at 1/30/2019 10:23 AM    Acceptance, E,TB, VU by  at 1/27/2019  9:10 AM    Acceptance, E, VU,NR by MA at 1/25/2019  3:54 PM    Acceptance, E,TB,D, VU,NR by  at 1/23/2019  3:14 PM    Acceptance, TB,E, VU,DU by  at 1/18/2019  4:37 PM   Family Acceptance, TB,E, VU,DU by CW at 1/18/2019  4:37 PM                               User Key     Initials Effective Dates Name Provider Type Discipline    EF 06/08/18 -  Heike Anthony, PT Physical Therapist PT     04/03/18 -  Kae Aldrich, PT Physical Therapist PT    EM 04/03/18 -  Heike Strauss, PT Physical Therapist PT     03/07/18 -  Ksenia Lopez, PTA Physical Therapy Assistant PT     06/22/16 -  Zuleyma Mancilla, PT Physical Therapist PT     08/02/16 -  Khurram Littlejohn, PT DPT Physical Therapist PT     03/07/18 -  Jose Ott, PTA Physical Therapy Assistant PT     08/19/18 -  Cadence Cobb, PTA Physical Therapy Assistant PT    MA 10/19/18 -  Tabatha Lawrence, PT Physical Therapist PT                PT Recommendation and Plan  Therapy Frequency (PT Clinical Impression): daily  Plan of Care Reviewed With: patient  Progress: no change  Outcome Summary: Pt tolerated treatment with c/o weakness. pt performed seated exercises and sit to/from stand. Pt able to partially get bottom off chair with each stand (3X) with modA to max assist of 2,but not able to come to a full stand.   Outcome Measures     Row Name 02/10/19 1000 02/07/19 1200          How much help from another person do you currently need...    Turning from your back to your side while in flat bed without using bedrails?  2  -  2  -CH     Moving from lying on back to sitting on the side of a flat bed without bedrails?  2  -EH  2  -CH     Moving to and from a bed to a  chair (including a wheelchair)?  1  -  1  -CH     Standing up from a chair using your arms (e.g., wheelchair, bedside chair)?  2  -  2  -CH     Climbing 3-5 steps with a railing?  1  -EH  1  -CH     To walk in hospital room?  1  -EH  1  -CH     AM-PAC 6 Clicks Score  9  -  9  -CH        Functional Assessment    Outcome Measure Options  --  AM-PAC 6 Clicks Basic Mobility (PT)  -       User Key  (r) = Recorded By, (t) = Taken By, (c) = Cosigned By    Initials Name Provider Type     Kae Aldrich, PT Physical Therapist     Cadence Cobb, STAN Physical Therapy Assistant         Time Calculation:   PT Charges     Row Name 02/10/19 1009             Time Calculation    Start Time  0853  -      Stop Time  0907  -      Time Calculation (min)  14 min  -      PT Received On  02/10/19  -      PT - Next Appointment  02/11/19  -         Time Calculation- PT    Total Timed Code Minutes- PT  14 minute(s)  -        User Key  (r) = Recorded By, (t) = Taken By, (c) = Cosigned By    Initials Name Provider Type     Cadence Cobb, STAN Physical Therapy Assistant        Therapy Suggested Charges     Code   Minutes Charges    35095 (CPT®) Hc Pt Neuromusc Re Education Ea 15 Min      36248 (CPT®) Hc Pt Ther Proc Ea 15 Min 15 1    27582 (CPT®) Hc Gait Training Ea 15 Min      96143 (CPT®) Hc Pt Therapeutic Act Ea 15 Min 10 1    96861 (CPT®) Hc Pt Manual Therapy Ea 15 Min      37716 (CPT®) Hc Pt Iontophoresis Ea 15 Min      17780 (CPT®) Hc Pt Elec Stim Ea-Per 15 Min      01120 (CPT®) Hc Pt Ultrasound Ea 15 Min      33712 (CPT®) Hc Pt Self Care/Mgmt/Train Ea 15 Min      24536 (CPT®) Hc Pt Prosthetic (S) Train Initial Encounter, Each 15 Min      66954 (CPT®) Hc Pt Orthotic(S)/Prosthetic(S) Encounter, Each 15 Min      27785 (CPT®) Hc Orthotic(S) Mgmt/Train Initial Encounter, Each 15min      Total  25 2        Therapy Charges for Today     Code Description Service Date Service Provider Modifiers Qty    21673409014 HC PT  THER PROC EA 15 MIN 2/10/2019 Cadence Cobb PTA GP 1    24964146859 HC PT THER SUPP EA 15 MIN 2/10/2019 Cadence Cobb PTA GP 1          PT G-Codes  Outcome Measure Options: AM-PAC 6 Clicks Basic Mobility (PT)  AM-PAC 6 Clicks Score: 9  Score: 9    Cadence Cobb PTA  2/10/2019

## 2019-02-10 NOTE — PROGRESS NOTES
LOS: 25 days   Patient Care Team:  Robert Cortez MD as PCP - General (Family Medicine)  Deborah Agudelo MD as Surgeon (Orthopedic Surgery)    Chief Complaint:     F/u diastolic heart failure.     Interval History:     Breathing is better, no cp, very weak.  Slow progress.     Objective   Vital Signs  Temp:  [97.6 °F (36.4 °C)-98.4 °F (36.9 °C)] 98.4 °F (36.9 °C)  Heart Rate:  [73-89] 87  Resp:  [18-20] 18  BP: ()/(39-92) 107/92    Intake/Output Summary (Last 24 hours) at 2/10/2019 0724  Last data filed at 2/10/2019 0653  Gross per 24 hour   Intake 880 ml   Output 250 ml   Net 630 ml       Comfortable NAD  Neck supple, no JVD or thyromegaly appreciated  S1/S2 RRR, no m/r/g  Lungs CTA B, normal effort  Abdomen S/NT/ND (+) BS, no HSM appreciated  Extremities warm, no clubbing, cyanosis, or edema  No visible or palpable skin lesions  A/Ox4, mood and affect appropriate    Results Review:      Results from last 7 days   Lab Units 02/10/19  0416 02/09/19  0520 02/08/19  0350   SODIUM mmol/L 138 138 138   POTASSIUM mmol/L 4.2 4.6 4.2   CHLORIDE mmol/L 97* 95* 99   CO2 mmol/L 26.2 23.5 25.9   BUN mg/dL 40* 61* 35*   CREATININE mg/dL 1.97* 2.69* 1.59*   GLUCOSE mg/dL 93 100* 104*   CALCIUM mg/dL 9.7 9.9 9.5         Results from last 7 days   Lab Units 02/10/19  0416 02/09/19  0520 02/08/19  0350   WBC 10*3/mm3 10.55 11.59* 12.58*   HEMOGLOBIN g/dL 8.2* 7.4* 7.5*   HEMATOCRIT % 26.2* 23.8* 24.2*   PLATELETS 10*3/mm3 227 230 225     Results from last 7 days   Lab Units 02/04/19  0021 02/03/19  1643 02/03/19  1015   APTT seconds 86.1* 59.2* 63.3*         Results from last 7 days   Lab Units 02/04/19  0337   MAGNESIUM mg/dL 2.2           I reviewed the patient's new clinical results.  I personally viewed and interpreted the patient's EKG/Telemetry data        Medication Review:     acetaminophen 500 mg Oral Q4H   aspirin 81 mg Oral Daily   cetaphil  Topical Q12H   enoxaparin 30 mg Subcutaneous Nightly   escitalopram  10 mg Oral Daily   famotidine 20 mg Oral Daily   hydrocortisone 1 application Topical Q6H   ipratropium-albuterol 3 mL Nebulization BID - RT   iron sucrose 200 mg Intravenous Q24H   mupirocin  Each Nare BID   sennosides-docusate sodium 2 tablet Oral Nightly   sodium chloride 4 mL Nebulization BID - RT         sodium chloride 30 mL/hr Last Rate: 30 mL/hr (01/28/19 1215)   sodium chloride 9 mL/hr Last Rate: 9 mL/hr (02/08/19 0850)       Assessment/Plan       Acute congestive heart failure (CMS/HCC)    Hypertension    Generalized anxiety disorder    Hyperlipidemia    IFG (impaired fasting glucose)    Heart murmur, systolic    Obesity, morbid, BMI 50 or higher (CMS/HCC)    Fungal skin infection    Cellulitis of lower extremity    Aortic valve stenosis    Tricuspid valve insufficiency    Mitral valve stenosis    1. Acute valvular diastolic heart failure:  Diuretics were discontinued per nephrology.  Patient denies any dyspnea today.  EF 60-65%  2. Severe aortic stenosis, mitral valve stenosis, tricuspid valve regurgitation: Status post aortic valve replacement in mitral valve replacement with tissue valve repair, MAZE PENNIE ligation postop done 1/28/19 with Dr. Gaxiola  3. Paroxysmal atrial fibrillation: Status post cardioversion 1/24/19 and subsequent reversion to atrial fibrillation.  Rate is currently controlled and at this time pt does not have indication for PPM.   4. Moderate pulmonary hypertension  5. Small pulmonary emboli  6. Acute kidney injury postoperatively: Per nephrology, now has tunneled catheter on right   7. Right bundle branch block and left anterior fascicular block  8. Undiagnosed sleep apnea  9. Morbid obesity  10. Severe deconditioning: encourage PT  11. Junctional bradycardia and asystole postop: No longer requiring external pacemaker.  Seen by EP, no indication for PPM at this time, they do recommend that HD access be placed on the right in case there is indication in the future for PPM.    12. Anemia, transfusion on 2/9/19 with hD.       Overall doing better - slow improvement    Nunu Santos MD  02/10/19  7:24 AM

## 2019-02-11 LAB
ALBUMIN SERPL-MCNC: 3.5 G/DL (ref 3.5–5.2)
ANION GAP SERPL CALCULATED.3IONS-SCNC: 16.7 MMOL/L
BUN BLD-MCNC: 57 MG/DL (ref 8–23)
BUN/CREAT SERPL: 21.6 (ref 7–25)
CALCIUM SPEC-SCNC: 9.8 MG/DL (ref 8.6–10.5)
CHLORIDE SERPL-SCNC: 95 MMOL/L (ref 98–107)
CO2 SERPL-SCNC: 25.3 MMOL/L (ref 22–29)
CREAT BLD-MCNC: 2.64 MG/DL (ref 0.57–1)
DEPRECATED RDW RBC AUTO: 52.2 FL (ref 37–54)
ERYTHROCYTE [DISTWIDTH] IN BLOOD BY AUTOMATED COUNT: 15.9 % (ref 11.7–13)
GFR SERPL CREATININE-BSD FRML MDRD: 18 ML/MIN/1.73
GLUCOSE BLD-MCNC: 109 MG/DL (ref 65–99)
HCT VFR BLD AUTO: 25.7 % (ref 35.6–45.5)
HGB BLD-MCNC: 8.2 G/DL (ref 11.9–15.5)
INR PPP: 1.24 (ref 0.9–1.1)
MCH RBC QN AUTO: 29 PG (ref 26.9–32)
MCHC RBC AUTO-ENTMCNC: 31.9 G/DL (ref 32.4–36.3)
MCV RBC AUTO: 90.8 FL (ref 80.5–98.2)
PHOSPHATE SERPL-MCNC: 5.3 MG/DL (ref 2.5–4.5)
PLATELET # BLD AUTO: 255 10*3/MM3 (ref 140–500)
PMV BLD AUTO: 9.7 FL (ref 6–12)
POTASSIUM BLD-SCNC: 4 MMOL/L (ref 3.5–5.2)
PROTHROMBIN TIME: 15.4 SECONDS (ref 11.7–14.2)
RBC # BLD AUTO: 2.83 10*6/MM3 (ref 3.9–5.2)
SODIUM BLD-SCNC: 137 MMOL/L (ref 136–145)
WBC NRBC COR # BLD: 11.89 10*3/MM3 (ref 4.5–10.7)

## 2019-02-11 PROCEDURE — 99024 POSTOP FOLLOW-UP VISIT: CPT | Performed by: THORACIC SURGERY (CARDIOTHORACIC VASCULAR SURGERY)

## 2019-02-11 PROCEDURE — 25010000002 ENOXAPARIN PER 10 MG: Performed by: NURSE PRACTITIONER

## 2019-02-11 PROCEDURE — 85027 COMPLETE CBC AUTOMATED: CPT | Performed by: INTERNAL MEDICINE

## 2019-02-11 PROCEDURE — P9047 ALBUMIN (HUMAN), 25%, 50ML: HCPCS | Performed by: INTERNAL MEDICINE

## 2019-02-11 PROCEDURE — 97110 THERAPEUTIC EXERCISES: CPT

## 2019-02-11 PROCEDURE — 94799 UNLISTED PULMONARY SVC/PX: CPT

## 2019-02-11 PROCEDURE — 25010000002 ALBUMIN HUMAN 25% PER 50 ML: Performed by: INTERNAL MEDICINE

## 2019-02-11 PROCEDURE — 99232 SBSQ HOSP IP/OBS MODERATE 35: CPT | Performed by: INTERNAL MEDICINE

## 2019-02-11 PROCEDURE — 80069 RENAL FUNCTION PANEL: CPT | Performed by: INTERNAL MEDICINE

## 2019-02-11 PROCEDURE — 25010000002 ONDANSETRON PER 1 MG: Performed by: THORACIC SURGERY (CARDIOTHORACIC VASCULAR SURGERY)

## 2019-02-11 PROCEDURE — 85610 PROTHROMBIN TIME: CPT | Performed by: NURSE PRACTITIONER

## 2019-02-11 PROCEDURE — 25010000002 IRON SUCROSE PER 1 MG: Performed by: INTERNAL MEDICINE

## 2019-02-11 PROCEDURE — 5A1D70Z PERFORMANCE OF URINARY FILTRATION, INTERMITTENT, LESS THAN 6 HOURS PER DAY: ICD-10-PCS | Performed by: INTERNAL MEDICINE

## 2019-02-11 RX ORDER — BISACODYL 10 MG
10 SUPPOSITORY, RECTAL RECTAL ONCE
Status: COMPLETED | OUTPATIENT
Start: 2019-02-11 | End: 2019-02-11

## 2019-02-11 RX ORDER — LACTULOSE 10 G/15ML
30 SOLUTION ORAL DAILY
Status: DISCONTINUED | OUTPATIENT
Start: 2019-02-11 | End: 2019-02-12

## 2019-02-11 RX ORDER — WARFARIN SODIUM 2 MG/1
2 TABLET ORAL
Status: DISCONTINUED | OUTPATIENT
Start: 2019-02-11 | End: 2019-02-13

## 2019-02-11 RX ORDER — ALBUMIN (HUMAN) 12.5 G/50ML
12.5 SOLUTION INTRAVENOUS AS NEEDED
Status: ACTIVE | OUTPATIENT
Start: 2019-02-11 | End: 2019-02-11

## 2019-02-11 RX ADMIN — WARFARIN SODIUM 2 MG: 2 TABLET ORAL at 18:00

## 2019-02-11 RX ADMIN — ALBUMIN (HUMAN) 12.5 G: 12.5 SOLUTION INTRAVENOUS at 21:05

## 2019-02-11 RX ADMIN — HYDROCORTISONE 1 APPLICATION: 1 CREAM TOPICAL at 13:28

## 2019-02-11 RX ADMIN — ACETAMINOPHEN 500 MG: 500 TABLET, FILM COATED ORAL at 13:27

## 2019-02-11 RX ADMIN — ESCITALOPRAM 10 MG: 10 TABLET, FILM COATED ORAL at 08:47

## 2019-02-11 RX ADMIN — SODIUM CHLORIDE 4 ML: 7 NEBU SOLN,3 % NEBU at 07:27

## 2019-02-11 RX ADMIN — ENOXAPARIN SODIUM 30 MG: 30 INJECTION SUBCUTANEOUS at 08:46

## 2019-02-11 RX ADMIN — LACTULOSE 30 G: 10 SOLUTION ORAL at 13:27

## 2019-02-11 RX ADMIN — HYDROCORTISONE 1 APPLICATION: 1 CREAM TOPICAL at 00:20

## 2019-02-11 RX ADMIN — HYDROCORTISONE 1 APPLICATION: 1 CREAM TOPICAL at 18:00

## 2019-02-11 RX ADMIN — ACETAMINOPHEN 500 MG: 500 TABLET, FILM COATED ORAL at 04:30

## 2019-02-11 RX ADMIN — MUPIROCIN 10 APPLICATION: 20 OINTMENT TOPICAL at 08:47

## 2019-02-11 RX ADMIN — ONDANSETRON 4 MG: 2 INJECTION INTRAMUSCULAR; INTRAVENOUS at 20:22

## 2019-02-11 RX ADMIN — IPRATROPIUM BROMIDE AND ALBUTEROL SULFATE 3 ML: 2.5; .5 SOLUTION RESPIRATORY (INHALATION) at 07:27

## 2019-02-11 RX ADMIN — FAMOTIDINE 20 MG: 20 TABLET, FILM COATED ORAL at 08:47

## 2019-02-11 RX ADMIN — ACETAMINOPHEN 500 MG: 500 TABLET, FILM COATED ORAL at 08:47

## 2019-02-11 RX ADMIN — EMOLLIENT - LOTION: LOTION at 08:47

## 2019-02-11 RX ADMIN — MUPIROCIN 10 APPLICATION: 20 OINTMENT TOPICAL at 20:22

## 2019-02-11 RX ADMIN — ACETAMINOPHEN 500 MG: 500 TABLET, FILM COATED ORAL at 18:00

## 2019-02-11 RX ADMIN — ACETAMINOPHEN 500 MG: 500 TABLET, FILM COATED ORAL at 00:20

## 2019-02-11 RX ADMIN — EMOLLIENT - LOTION: LOTION at 20:22

## 2019-02-11 RX ADMIN — HYDROCORTISONE 1 APPLICATION: 1 CREAM TOPICAL at 06:33

## 2019-02-11 RX ADMIN — IRON SUCROSE 200 MG: 20 INJECTION, SOLUTION INTRAVENOUS at 08:46

## 2019-02-11 RX ADMIN — Medication 81 MG: at 08:47

## 2019-02-11 RX ADMIN — BISACODYL 10 MG: 10 SUPPOSITORY RECTAL at 09:15

## 2019-02-11 NOTE — PROGRESS NOTES
Continued Stay Note  [facility]     Patient Name: Claudia Arnold  MRN: [id number]  Today's Date: 2/11/2019    Admit Date: 1/16/2019    Discharge Plan     Row Name 02/11/19 1040       Plan    Plan  2/11- [facility] SNF & H.D. set up w/ [facility] in progress.    Plan Comments  2/11- S/W Ariela Henning, [facility] Liaison, [phone] to get an update on Pt hemodialysis set up.  Ariela reports she will call CCP back to advise.  CCP following.  DARCIE [place]/CCP        Discharge Codes    No documentation.             Maki Lu RN

## 2019-02-11 NOTE — PROGRESS NOTES
LOS: 26 days   Patient Care Team:  Robert Cortez MD as PCP - General (Family Medicine)  Deborah Agudelo MD as Surgeon (Orthopedic Surgery)    Chief Complaint: Follow-up for tissue aortic and mitral valve replacements, tricuspid valve repair, paroxysmal atrial fibrillation with RVR, acute diastolic and valvular CHF.    Interval History: Constipated today and having cramps - states she needs to have a bowel movement.  No CP or SOA.  In rate-controlled atrial fibrillation.      Vital Signs:  Temp:  [97.4 °F (36.3 °C)-97.8 °F (36.6 °C)] 97.6 °F (36.4 °C)  Heart Rate:  [72-86] 80  Resp:  [18] 18  BP: ()/(44-86) 111/59    Intake/Output Summary (Last 24 hours) at 2/11/2019 0915  Last data filed at 2/11/2019 0645  Gross per 24 hour   Intake 1060 ml   Output 100 ml   Net 960 ml       Physical Exam:   General Appearance:    No acute distress, alert and oriented x4   Lungs:     Clear to auscultation bilaterally     Heart:     Irregularly irregular rhythm with a normal rate.  II/VI SM RUSB   Abdomen:     Soft, non-tender, non-distended.    Extremities:   Trace edema.     Results Review:    Results from last 7 days   Lab Units 02/11/19  0433   SODIUM mmol/L 137   POTASSIUM mmol/L 4.0   CHLORIDE mmol/L 95*   CO2 mmol/L 25.3   BUN mg/dL 57*   CREATININE mg/dL 2.64*   GLUCOSE mg/dL 109*   CALCIUM mg/dL 9.8         Results from last 7 days   Lab Units 02/11/19  0433   WBC 10*3/mm3 11.89*   HEMOGLOBIN g/dL 8.2*   HEMATOCRIT % 25.7*   PLATELETS 10*3/mm3 255                       I reviewed the patient's new clinical results.        Assessment:  1. Acute valvular and diastolic CHF  2. Severe aortic stenosis, severe mitral stenosis secondary to calcification, and severe tricuspid regurgitation  3. Normal EF 60-65%  4. Paroxysmal atrial fibrillation with RVR -status post cardioversion 1/24/2019 and subsequent reversion to atrial fibrillation.  5. Moderate pulmonary hypertension (mean PA pressure 32)  6. Status post tissue AVR,  tissue MVR, tricuspid valve repair (Martha suture repair), left cryomaze, and PENNIE ligation on 1/28/2019 (Dr. Gaxiola).  7. Small bilateral pulmonary emboli  8. Oliguric acute kidney injury post-operatively - on hemodialysis and status post tunneled catheter placement on 2/8/2019.  9. Bifascicular block (RBBB and LAFB)  10. Candida intertrigo (skin folds); left axillary fungal infection (treated)  11. Osteoarthritis with immobility  12. Left wrist thrombophlebitis from IV infiltration  13. Undiagnosed OCTAVIANO  14. Morbid obesity   15. Anemia of chronic disease  16. Severe deconditioning   17. Junctional bradycardia and asystole post-op - resolved       Plan:  -Working on BM with lactulose and suppository.      -Atrial fibrillation - no need for pacemaker at this point.  Not on beta-blocker because of bradycardia. Needs anticoagulation with Coumadin at some point.  Will defer to Dr. Gaxiola on timing.      -Tunneled catheter placed last week.  Continuing HD.      -Continue to work with PT.  CCP working on rehab and dialysis post discharge.  Anticipate discharge sometime this week hopefully.      Scott Segura MD  02/11/19  9:15 AM

## 2019-02-11 NOTE — PROGRESS NOTES
LOS: 26 days   Patient Care Team:  Robert Cortez MD as PCP - General (Family Medicine)  Deborah Agudelo MD as Surgeon (Orthopedic Surgery)    Chief Complaint: post op    Subjective      Vital Signs  Temp:  [97.4 °F (36.3 °C)-97.8 °F (36.6 °C)] 97.6 °F (36.4 °C)  Heart Rate:  [72-86] 80  Resp:  [18] 18  BP: ()/(44-86) 111/59  Body mass index is 60.69 kg/m².    Intake/Output Summary (Last 24 hours) at 2/11/2019 0829  Last data filed at 2/11/2019 0645  Gross per 24 hour   Intake 1060 ml   Output 100 ml   Net 960 ml     No intake/output data recorded.            02/07/19  1600 02/08/19  0615 02/10/19  0900   Weight: 133 kg (293 lb) 134 kg (296 lb) (!) 176 kg (387 lb 8 oz)         Objective    Results Review:        WBC WBC   Date Value Ref Range Status   02/11/2019 11.89 (H) 4.50 - 10.70 10*3/mm3 Final   02/10/2019 10.55 4.50 - 10.70 10*3/mm3 Final   02/09/2019 11.59 (H) 4.50 - 10.70 10*3/mm3 Final      HGB Hemoglobin   Date Value Ref Range Status   02/11/2019 8.2 (L) 11.9 - 15.5 g/dL Final   02/10/2019 8.2 (L) 11.9 - 15.5 g/dL Final   02/09/2019 7.4 (L) 11.9 - 15.5 g/dL Final      HCT Hematocrit   Date Value Ref Range Status   02/11/2019 25.7 (L) 35.6 - 45.5 % Final   02/10/2019 26.2 (L) 35.6 - 45.5 % Final   02/09/2019 23.8 (L) 35.6 - 45.5 % Final      Platelets Platelets   Date Value Ref Range Status   02/11/2019 255 140 - 500 10*3/mm3 Final   02/10/2019 227 140 - 500 10*3/mm3 Final   02/09/2019 230 140 - 500 10*3/mm3 Final        PT/INR:  No results found for: PROTIME/No results found for: INR    Sodium Sodium   Date Value Ref Range Status   02/11/2019 137 136 - 145 mmol/L Final   02/10/2019 138 136 - 145 mmol/L Final   02/09/2019 138 136 - 145 mmol/L Final      Potassium Potassium   Date Value Ref Range Status   02/11/2019 4.0 3.5 - 5.2 mmol/L Final   02/10/2019 4.2 3.5 - 5.2 mmol/L Final   02/09/2019 4.6 3.5 - 5.2 mmol/L Final      Chloride Chloride   Date Value Ref Range Status   02/11/2019 95 (L) 98 -  107 mmol/L Final   02/10/2019 97 (L) 98 - 107 mmol/L Final   02/09/2019 95 (L) 98 - 107 mmol/L Final      Bicarbonate CO2   Date Value Ref Range Status   02/11/2019 25.3 22.0 - 29.0 mmol/L Final   02/10/2019 26.2 22.0 - 29.0 mmol/L Final   02/09/2019 23.5 22.0 - 29.0 mmol/L Final      BUN BUN   Date Value Ref Range Status   02/11/2019 57 (H) 8 - 23 mg/dL Final   02/10/2019 40 (H) 8 - 23 mg/dL Final   02/09/2019 61 (H) 8 - 23 mg/dL Final      Creatinine Creatinine   Date Value Ref Range Status   02/11/2019 2.64 (H) 0.57 - 1.00 mg/dL Final   02/10/2019 1.97 (H) 0.57 - 1.00 mg/dL Final   02/09/2019 2.69 (H) 0.57 - 1.00 mg/dL Final      Calcium Calcium   Date Value Ref Range Status   02/11/2019 9.8 8.6 - 10.5 mg/dL Final   02/10/2019 9.7 8.6 - 10.5 mg/dL Final   02/09/2019 9.9 8.6 - 10.5 mg/dL Final      Magnesium No results found for: MG         acetaminophen 500 mg Oral Q4H   aspirin 81 mg Oral Daily   bisacodyl 10 mg Rectal Once   cetaphil  Topical Q12H   enoxaparin 30 mg Subcutaneous Nightly   escitalopram 10 mg Oral Daily   famotidine 20 mg Oral Daily   hydrocortisone 1 application Topical Q6H   ipratropium-albuterol 3 mL Nebulization BID - RT   iron sucrose 200 mg Intravenous Q24H   lactulose 30 g Oral Daily   mupirocin  Each Nare BID   sennosides-docusate sodium 2 tablet Oral Nightly   sodium chloride 4 mL Nebulization BID - RT       sodium chloride 30 mL/hr Last Rate: 30 mL/hr (01/28/19 1215)   sodium chloride 9 mL/hr Last Rate: 9 mL/hr (02/08/19 0850)           Patient Active Problem List   Diagnosis Code   • Tear of rotator cuff M75.100   • Hypertension I10   • Generalized anxiety disorder F41.1   • Hyperlipidemia E78.5   • IFG (impaired fasting glucose) R73.01   • Heart murmur, systolic R01.1   • Shoulder pain, bilateral M25.511, M25.512   • Pain of both shoulder joints M25.511, M25.512   • Chronic pain of both shoulders M25.511, G89.29, M25.512   • Acute congestive heart failure (CMS/[facility]) I50.9   • Obesity,  morbid, BMI 50 or higher (CMS/HCC) E66.01   • Fungal skin infection B36.9   • Cellulitis of lower extremity L03.119   • Aortic valve stenosis I35.0   • Tricuspid valve insufficiency I07.1   • Mitral valve stenosis I05.0       Assessment & Plan    -Severe AS, MS, TV regurgitation s/p AVR/MVR (tissue),TV repair, MAZE PENNIE ligation-- POD#15 (Khalida)  -NICM, non obstructive dx by cath  -RBBB  -HTN---controlled  -Morbid obesity with arthritis-complicating mobility and all aspects of care  -Hypoventilation sx, probable OCTAVIANO-----chronic CO2 retain by ABG, pulmonary following  -Possible PE, NO LE DVT------nosebleeds on heparin  -Preop new a.fib----s/p cardioversion 1/24/19, reverted to a.fib again  -Left arm thrombophlebitis, infiltration  -LAVERNE-----Left IJ shiley, HD per renal  -Leukocytosis-----improving  -preop anemia, post op expected ABL  -Post op junctional bradycardia-----back in a.flutter/a.fib with rate 60-80, EP following     Complaints of constipation this morning. Will give suppository and lactulose.  Will discuss anticoagulation with Dr. Gaxiola.  She remains in atrial fibrillation.  Needs rehab.  CCP working on HD spot for rehab.     Yahaira Caldwell, MORGAN  02/11/19  8:29 AM

## 2019-02-11 NOTE — THERAPY TREATMENT NOTE
Acute Care - Physical Therapy Treatment Note  [facility]     Patient Name: Claudia Arnold  : 1945  MRN: [id number]  Today's Date: 2019  Onset of Illness/Injury or Date of Surgery: 19          Admit Date: 2019    Visit Dx:    ICD-10-CM ICD-9-CM   1. Acute congestive heart failure, unspecified heart failure type (CMS/HCC) I50.9 428.0   2. Generalized weakness R53.1 780.79   3. Aortic valve stenosis, etiology of cardiac valve disease unspecified I35.0 424.1   4. Tricuspid valve insufficiency, unspecified etiology I07.1 397.0   5. Mitral valve stenosis, unspecified etiology I05.0 394.0   6. Acute renal failure, unspecified acute renal failure type (CMS/HCC) N17.9 584.9   7. S/P AVR (aortic valve replacement) Z95.2 V43.3     Patient Active Problem List   Diagnosis   • Tear of rotator cuff   • Hypertension   • Generalized anxiety disorder   • Hyperlipidemia   • IFG (impaired fasting glucose)   • Heart murmur, systolic   • Shoulder pain, bilateral   • Pain of both shoulder joints   • Chronic pain of both shoulders   • Acute congestive heart failure (CMS/HCC)   • Obesity, morbid, BMI 50 or higher (CMS/HCC)   • Fungal skin infection   • Cellulitis of lower extremity   • Aortic valve stenosis   • Tricuspid valve insufficiency   • Mitral valve stenosis       Therapy Treatment    Rehabilitation Treatment Summary     Row Name 19 0906             Treatment Time/Intention    Discipline  physical therapist  -      Document Type  therapy note (daily note)  -      Subjective Information  complains of;weakness;pain pt with abdominal cramping  -      Mode of Treatment  physical therapy  -      Patient/Family Observations  pt supine in bed, complaints of abdominal cramping, required encouragement and education to participate  -      Patient Effort  good  -CH      Existing Precautions/Restrictions  cardiac;fall;sternal  -CH      Recorded by [CH] Kae Aldrich, PT 19 4916      Row Name  02/11/19 0906             Cognitive Assessment/Intervention    Additional Documentation  Cognitive Assessment/Intervention (Group)  -CH      Recorded by [] Kae Aldrich, PT 02/11/19 0953      Row Name 02/11/19 0906             Cognitive Assessment/Intervention- PT/OT    Orientation Status (Cognition)  oriented x 4  -CH      Follows Commands (Cognition)  WFL  -CH      Personal Safety Interventions  fall prevention program maintained;gait belt;nonskid shoes/slippers when out of bed  -CH      Recorded by [] Kae Aldrich, PT 02/11/19 0953      Row Name 02/11/19 0906             Bed Mobility Assessment/Treatment    Bed Mobility Assessment/Treatment  supine-sit;sit-supine  -CH      Scooting/Bridging Reeves (Bed Mobility)  verbal cues;nonverbal cues (demo/gesture);maximum assist (25% patient effort);2 person assist  -      Supine-Sit Reeves (Bed Mobility)  verbal cues;nonverbal cues (demo/gesture);maximum assist (25% patient effort);2 person assist  -CH      Recorded by [] Kae Aldrich, PT 02/11/19 0953      Row Name 02/11/19 0906             Transfer Assessment/Treatment    Comment (Transfers)  pt only agreeable to attempt to stand x1, unable to achieve full stand, only partially cleared bottom from bed  -CH      Recorded by [] Kae Aldrich, PT 02/11/19 0953      Row Name 02/11/19 0906             Sit-Stand Transfer    Sit-Stand Reeves (Transfers)  verbal cues;nonverbal cues (demo/gesture);maximum assist (25% patient effort);2 person assist  -      Assistive Device (Sit-Stand Transfers)  -- HHA  -CH      Recorded by [] Kae Aldrich, PT 02/11/19 0953      Row Name 02/11/19 0906             Stand-Sit Transfer    Stand-Sit Reeves (Transfers)  verbal cues;nonverbal cues (demo/gesture);maximum assist (25% patient effort);2 person assist  -      Assistive Device (Stand-Sit Transfers)  -- HHA  -CH      Recorded by [] Kae Aldrich, PT 02/11/19 0953       Row Name 02/11/19 0906             Lower Extremity Seated Therapeutic Exercise    Comment, Seated Lower Extremity (Therapeutic Exercise)  10 reps AP and LAQ on EOB, pt educated on doing LE exercies in bed as well  -CH      Recorded by [CH] Kae Aldrich, PT 02/11/19 0953      Row Name 02/11/19 0906             Positioning and Restraints    Pre-Treatment Position  in bed  -CH      Post Treatment Position  bed  -CH      In Bed  supine;call light within reach;encouraged to call for assist  -CH      Recorded by [CH] Kae Aldrich, PT 02/11/19 0953      Row Name 02/11/19 0906             Pain Assessment    Additional Documentation  Pain Scale: Numbers Pre/Post-Treatment (Group)  -CH      Recorded by [CH] Kae Aldrich, PT 02/11/19 0953      Row Name 02/11/19 0906             Pain Scale: Numbers Pre/Post-Treatment    Pain Scale: Numbers, Pretreatment  5/10  -CH      Pain Location  abdomen  -CH      Pain Intervention(s)  Repositioned  -CH      Recorded by [CH] Kae Aldrich, PT 02/11/19 0953      Row Name                Wound 01/28/19 1138 chest incision    Wound - Properties Group Date first assessed: 01/28/19 [SR] Time first assessed: 1138 [SR] Location: chest [SR] Type: incision [SR] Recorded by:  [SR] Keisha Gandara RN 01/28/19 1138    Row Name                Wound 01/28/19 1215 coccyx unspecified    Wound - Properties Group Date first assessed: 01/28/19 [MB] Time first assessed: 1215 [MB] Present On Admission : yes;picture taken [MB] Location: coccyx [MB] Type: unspecified [MB] Recorded by:  [MB] Julissa Chatterjee, RN 01/28/19 1541    Row Name                Wound 01/31/19 2300 Right gluteal pressure injury    Wound - Properties Group Date first assessed: 01/31/19 [NW] Time first assessed: 2300 [NW] Present On Admission : no [NW] Side: Right [NW] Location: gluteal [NW] Type: pressure injury [NW] Stage, Pressure Injury: deep tissue injury [NW] Recorded by:  [NW] Priscilla Willams, RN 02/01/19 4118    Cullen  Name                Wound 01/31/19 2000 Left posterior ear pressure injury    Wound - Properties Group Date first assessed: 01/31/19 [NW] Time first assessed: 2000 [NW] Side: Left [NW] Orientation: posterior [NW] Location: ear [NW2] Type: pressure injury [NW] Stage, Pressure Injury: Stage 2;medical device related [NW] Recorded by:  [NW] Priscilla Willams RN 02/01/19 0416 [NW2] Priscilla Willams RN 02/01/19 0417    Row Name                Wound 02/08/19 1039 Other (See comments) chest incision    Wound - Properties Group Date first assessed: 02/08/19 [MC] Time first assessed: 1039 [MC] Side: Other (See comments) [MC] Location: chest [MC] Type: incision [MC] Recorded by:  [DALLAS] Kenya Boudreaux RN 02/08/19 1039    Row Name                Wound 02/08/19 1053 Left neck incision    Wound - Properties Group Date first assessed: 02/08/19 [MC] Time first assessed: 1053 [MC] Side: Left [MC] Location: neck [MC] Type: incision [MC] Recorded by:  [DALLAS] Kenya Boudreaux RN 02/08/19 1053    Row Name 02/11/19 0906             Plan of Care Review    Plan of Care Reviewed With  patient  -CH      Recorded by [CH] Kae Aldrich, PT 02/11/19 0953      Row Name 02/11/19 0906             Outcome Summary/Treatment Plan (PT)    Anticipated Discharge Disposition (PT)  skilled nursing facility  -CH      Recorded by [] Kae Aldrich, PT 02/11/19 0953        User Key  (r) = Recorded By, (t) = Taken By, (c) = Cosigned By    Initials Name Effective Dates Discipline    CH Kae Aldrich, PT 04/03/18 -  PT    Julissa Sweet, RN 06/16/16 -  Nurse    Keisha Fournier RN 06/16/16 -  Nurse    Kenya Qureshi RN 02/23/18 -  Nurse    Priscilla Jensen RN 03/05/18 -  Nurse          Wound 01/28/19 1138 chest incision (Active)   Dressing Appearance open to air 2/11/2019  8:30 AM   Closure Approximated 2/11/2019  8:30 AM   Drainage Amount none 2/11/2019  8:30 AM   Dressing Care, Wound open to air 2/10/2019  8:23 PM       Wound  01/28/19 1215 coccyx unspecified (Active)   Dressing Appearance open to air 2/11/2019  8:30 AM   Closure None 2/11/2019  8:30 AM   Base purple;pink 2/11/2019  8:30 AM   Drainage Amount none 2/11/2019  8:30 AM   Care, Wound barrier applied 2/11/2019  1:07 AM   Dressing Care, Wound open to air 2/11/2019  4:30 AM       Wound 01/31/19 2300 Right gluteal pressure injury (Active)   Dressing Appearance open to air 2/11/2019  8:30 AM   Closure None 2/11/2019  8:30 AM   Base maroon/purple;clean 2/11/2019  8:30 AM   Drainage Amount none 2/11/2019  8:30 AM   Care, Wound barrier applied 2/11/2019  1:07 AM   Dressing Care, Wound open to air 2/11/2019  1:07 AM       Wound 01/31/19 2000 Left posterior ear pressure injury (Active)   Dressing Appearance open to air 2/11/2019  8:30 AM   Closure None 2/11/2019  8:30 AM   Drainage Amount none 2/11/2019  8:30 AM       Wound 02/08/19 1039 Other (See comments) chest incision (Active)   Dressing Appearance dry;intact;no drainage 2/11/2019  8:30 AM   Closure SOPHIA 2/11/2019  8:30 AM   Drainage Amount none 2/11/2019  8:30 AM   Dressing Care, Wound transparent film 2/11/2019  4:30 AM       Wound 02/08/19 1053 Left neck incision (Active)   Dressing Appearance dry;intact;no drainage 2/11/2019  8:30 AM   Closure Open to air 2/11/2019  8:30 AM   Periwound ecchymotic 2/11/2019  8:30 AM   Drainage Amount none 2/11/2019  8:30 AM   Dressing Care, Wound open to air 2/11/2019  4:30 AM           Physical Therapy Education     Title: PT OT SLP Therapies (In Progress)     Topic: Physical Therapy (Done)     Point: Mobility training (Done)     Learning Progress Summary           Patient Acceptance, E,TB,D, VU,NR by  at 2/11/2019  9:53 AM    Acceptance, E,D, VU,DU,NR by  at 2/10/2019 10:10 AM    Acceptance, E,TB,D, VU,NR by  at 2/7/2019 12:44 PM    Acceptance, E, VU,NR by PATRICIO at 2/6/2019 10:19 AM    AcceptanceSHAMA VUNR by MA at 2/5/2019 11:09 AM    SHAMA Foss TB, D, VUNR by  at 2/4/2019 11:34 AM     Acceptance, E,D, VU,NR by DARRIUS at 2/3/2019  4:47 PM    Acceptance, E,D, DU,NR by DARRYN at 2/2/2019 10:36 AM    Comment:  safety during sit to stand, benefits of activity    Acceptance, E, NR by MA at 1/31/2019 12:08 PM    Acceptance, E, NR by MA at 1/30/2019 10:23 AM    Acceptance, E,TB, VU by YANETH at 1/27/2019  9:10 AM    Acceptance, E, VU,NR by MA at 1/25/2019  3:54 PM    Acceptance, E,TB,D, VU,NR by CH at 1/23/2019  3:14 PM    Acceptance, TB,E, VU,DU by CW at 1/18/2019  4:37 PM    Acceptance, E, NR by EM at 1/17/2019 12:15 PM   Family Acceptance, TB,E, VU,DU by CW at 1/18/2019  4:37 PM                   Point: Home exercise program (Done)     Learning Progress Summary           Patient Acceptance, E,TB,D, VU,NR by CH at 2/11/2019  9:53 AM    Acceptance, E,D, VU,DU,NR by  at 2/10/2019 10:10 AM    Acceptance, E,TB,D, VU,NR by CH at 2/7/2019 12:44 PM    Acceptance, E, VU,NR by PATRICIO at 2/6/2019 10:19 AM    Acceptance, E, VU,NR by MA at 2/5/2019 11:09 AM    Acceptance, E,TB,D, VU,NR by CH at 2/4/2019 11:34 AM    Acceptance, E,D, VU,NR by DARRIUS at 2/3/2019  4:47 PM    Acceptance, E,D, DU,NR by DARRYN at 2/2/2019 10:36 AM    Comment:  safety during sit to stand, benefits of activity    Acceptance, E,TB,D, VU,NR by CHELSEA at 2/1/2019 10:51 AM    Acceptance, E, NR by MA at 1/31/2019 12:08 PM    Acceptance, E, NR by MA at 1/30/2019 10:23 AM    Acceptance, E,TB, VU by YANETH at 1/27/2019  9:10 AM    Acceptance, E, VU,NR by MA at 1/25/2019  3:54 PM    Acceptance, E,TB,D, VU,NR by CH at 1/23/2019  3:14 PM    Acceptance, TB,E, VU,DU by CW at 1/18/2019  4:37 PM   Family Acceptance, TB,E, VU,DU by CW at 1/18/2019  4:37 PM                   Point: Body mechanics (Done)     Learning Progress Summary           Patient Acceptance, E,TB,D, VU,NR by  at 2/11/2019  9:53 AM    Acceptance, E,D, VU,DU,NR by  at 2/10/2019 10:10 AM    Acceptance, E,TB,D, VU,NR by CH at 2/7/2019 12:44 PM    Acceptance, E, VU,NR by  at 2/6/2019 10:19 AM    Acceptance, E,  VU,NR by MA at 2/5/2019 11:09 AM    Acceptance, E,TB,D, VU,NR by CH at 2/4/2019 11:34 AM    Acceptance, E,D, VU,NR by SM at 2/3/2019  4:47 PM    Acceptance, E,D, DU,NR by DARRYN at 2/2/2019 10:36 AM    Comment:  safety during sit to stand, benefits of activity    Acceptance, E, NR by MA at 1/31/2019 12:08 PM    Acceptance, E, NR by MA at 1/30/2019 10:23 AM    Acceptance, E,TB, VU by YANETH at 1/27/2019  9:10 AM    Acceptance, E, VU,NR by MA at 1/25/2019  3:54 PM    Acceptance, E,TB,D, VU,NR by CHELSEA at 1/23/2019  3:14 PM    Acceptance, TB,E, VU,DU by JANNET at 1/18/2019  4:37 PM   Family Acceptance, TB,E, VU,DU by CW at 1/18/2019  4:37 PM                   Point: Precautions (Done)     Learning Progress Summary           Patient Acceptance, E,TB,D, VU,NR by CH at 2/11/2019  9:53 AM    Acceptance, E,D, VU,DU,NR by  at 2/10/2019 10:10 AM    Acceptance, E,TB,D, VU,NR by CHELSEA at 2/7/2019 12:44 PM    Acceptance, E, VU,NR by  at 2/6/2019 10:19 AM    Acceptance, E, VU,NR by MA at 2/5/2019 11:09 AM    Acceptance, E,TB,D, VU,NR by CHELSEA at 2/4/2019 11:34 AM    Acceptance, E,D, VU,NR by DARRIUS at 2/3/2019  4:47 PM    Acceptance, E,D, DU,NR by DARRYN at 2/2/2019 10:36 AM    Comment:  safety during sit to stand, benefits of activity    Acceptance, E, NR by MA at 1/31/2019 12:08 PM    Acceptance, E, NR by MA at 1/30/2019 10:23 AM    Acceptance, E,TB, VU by YANETH at 1/27/2019  9:10 AM    Acceptance, E, VU,NR by MA at 1/25/2019  3:54 PM    Acceptance, E,TB,D, VU,NR by CH at 1/23/2019  3:14 PM    Acceptance, SHAMA PRINCE VU, DU by CW at 1/18/2019  4:37 PM   Family Acceptance, SHAMA PRINCE VU, DU by CW at 1/18/2019  4:37 PM                               User Key     Initials Effective Dates Name Provider Type Discipline    EF 06/08/18 -  Heike Anthony, PT Physical Therapist PT     04/03/18 -  Kae Aldrich, PT Physical Therapist PT    EM 04/03/18 -  Heike Strauss, PT Physical Therapist PT     03/07/18 -  Ksenia Lopez, PTA Physical Therapy  Assistant PT    KH 06/22/16 -  Zuleyma Mancilla, PT Physical Therapist PT    LC 08/02/16 -  Khurram Littlejohn, PT DPT Physical Therapist PT    CW 03/07/18 -  Jose Ott PTA Physical Therapy Assistant PT     08/19/18 -  Cadence Cobb PTA Physical Therapy Assistant PT    MA 10/19/18 -  Tabatha Lawrence, PT Physical Therapist PT                PT Recommendation and Plan  Anticipated Discharge Disposition (PT): skilled nursing facility  Outcome Summary/Treatment Plan (PT)  Anticipated Discharge Disposition (PT): skilled nursing facility  Plan of Care Reviewed With: patient  Outcome Summary: Pt with limited activity today secondary to complaints of abdominal cramping. Pt hesitant to participate in PT today but was agreeable with some education and encouragement. Pt continue to require a lot of assistance secondary to weakness and joint stiffnes. PT will continue to follow to address strength, mobility, and transfers.  Outcome Measures     Row Name 02/11/19 0900 02/10/19 1000          How much help from another person do you currently need...    Turning from your back to your side while in flat bed without using bedrails?  2  -  2  -EH     Moving from lying on back to sitting on the side of a flat bed without bedrails?  2  -  2  -EH     Moving to and from a bed to a chair (including a wheelchair)?  1  -  1  -EH     Standing up from a chair using your arms (e.g., wheelchair, bedside chair)?  2  -  2  -EH     Climbing 3-5 steps with a railing?  1  -  1  -EH     To walk in hospital room?  1  -  1  -EH     AM-PAC 6 Clicks Score  9  -  9  -        Functional Assessment    Outcome Measure Options  AM-PAC 6 Clicks Basic Mobility (PT)  -  --       User Key  (r) = Recorded By, (t) = Taken By, (c) = Cosigned By    Initials Name Provider Type     Kae Aldrich, PT Physical Therapist     Cadence Cobb PTA Physical Therapy Assistant         Time Calculation:   PT Charges     Row Name 02/11/19 0965              Time Calculation    Start Time  0851  -      Stop Time  0906  -      Time Calculation (min)  15 min  -      PT Received On  02/11/19  -      PT - Next Appointment  02/12/19  -         Time Calculation- PT    Total Timed Code Minutes- PT  15 minute(s)  -        User Key  (r) = Recorded By, (t) = Taken By, (c) = Cosigned By    Initials Name Provider Type     Kae Aldrich, PT Physical Therapist        Therapy Suggested Charges     Code   Minutes Charges    61372 (CPT®) Hc Pt Neuromusc Re Education Ea 15 Min      49194 (CPT®) Hc Pt Ther Proc Ea 15 Min 15 1    24431 (CPT®) Hc Gait Training Ea 15 Min      38393 (CPT®) Hc Pt Therapeutic Act Ea 15 Min 10 1    47300 (CPT®) Hc Pt Manual Therapy Ea 15 Min      52472 (CPT®) Hc Pt Iontophoresis Ea 15 Min      92912 (CPT®) Hc Pt Elec Stim Ea-Per 15 Min      78957 (CPT®) Hc Pt Ultrasound Ea 15 Min      51847 (CPT®) Hc Pt Self Care/Mgmt/Train Ea 15 Min      15566 (CPT®) Hc Pt Prosthetic (S) Train Initial Encounter, Each 15 Min      48869 (CPT®) Hc Pt Orthotic(S)/Prosthetic(S) Encounter, Each 15 Min      11485 (CPT®) Hc Orthotic(S) Mgmt/Train Initial Encounter, Each 15min      Total  25 2        Therapy Charges for Today     Code Description Service Date Service Provider Modifiers Qty    51925700197 HC PT THER PROC EA 15 MIN 2/11/2019 Kae Aldrich, PT GP 1    95866431491 HC PT THER SUPP EA 15 MIN 2/11/2019 Kae Aldrich, PT GP 1          PT G-Codes  Outcome Measure Options: AM-PAC 6 Clicks Basic Mobility (PT)  AM-PAC 6 Clicks Score: 9  Score: 9    Kae Aldrich, PT  2/11/2019

## 2019-02-11 NOTE — PLAN OF CARE
Problem: Breathing Pattern Ineffective (Adult)  Intervention: Optimize Oxygenation/Ventilation/Perfusion   02/11/19 0302   Respiratory Interventions   Airway/Ventilation Management airway patency maintained;calming measures promoted;humidification applied;pulmonary hygiene promoted   Positioning   Head of Bed (HOB) HOB elevated

## 2019-02-11 NOTE — PLAN OF CARE
Problem: Patient Care Overview  Goal: Plan of Care Review  Outcome: Ongoing (interventions implemented as appropriate)   02/11/19 8093   Coping/Psychosocial   Plan of Care Reviewed With patient   OTHER   Outcome Summary Pt with limited activity today secondary to complaints of abdominal cramping. Pt hesitant to participate in PT today but was agreeable with some education and encouragement. Pt continue to require a lot of assistance secondary to weakness and joint stiffnes. PT will continue to follow to address strength, mobility, and transfers.

## 2019-02-11 NOTE — PLAN OF CARE
Problem: Fall Risk (Adult)  Goal: Absence of Fall  Outcome: Ongoing (interventions implemented as appropriate)      Problem: Patient Care Overview  Goal: Plan of Care Review  Outcome: Ongoing (interventions implemented as appropriate)   02/11/19 0237   Coping/Psychosocial   Plan of Care Reviewed With patient   Plan of Care Review   Progress improving   OTHER   Outcome Summary VSS. Pain managed per MAR. 2L NC. Will continue to monitor       Problem: Anxiety (Adult)  Goal: Reduction/Resolution  Outcome: Ongoing (interventions implemented as appropriate)      Problem: Breathing Pattern Ineffective (Adult)  Goal: Effective Oxygenation/Ventilation  Outcome: Ongoing (interventions implemented as appropriate)      Problem: Activity Intolerance (Adult)  Goal: Activity Tolerance  Outcome: Ongoing (interventions implemented as appropriate)      Problem: Skin Injury Risk (Adult)  Goal: Skin Health and Integrity  Outcome: Ongoing (interventions implemented as appropriate)      Problem: Cardiac Surgery (Adult)  Goal: Signs and Symptoms of Listed Potential Problems Will be Absent, Minimized or Managed (Cardiac Surgery)  Outcome: Ongoing (interventions implemented as appropriate)

## 2019-02-11 NOTE — PROGRESS NOTES
"   LOS: 26 days    Patient Care Team:  Robert Cortez MD as PCP - General (Family Medicine)  Deborah Agudelo MD as Surgeon (Orthopedic Surgery)    Chief Complaint:    Chief Complaint   Patient presents with   • Shortness of Breath   • Leg Swelling     Follow UP LAVERNE  Subjective     Interval History:  The patient feeling the same, having constipation, using the suppository to help now.  No chest pain or shortness of air, no orthopnea or PND, no nausea or vomiting, no dysuria or gross hematuria, urine output remains low, she had 200 cc in the past 24 hours.  Last dialysis was on Saturday      Objective     Vital Signs  Temp:  [97.4 °F (36.3 °C)-97.8 °F (36.6 °C)] 97.6 °F (36.4 °C)  Heart Rate:  [72-86] 80  Resp:  [18] 18  BP: ()/(44-86) 111/59    Flowsheet Rows      First Filed Value   Admission Height  170.2 cm (67\") Documented at 01/16/2019 0910   Admission Weight  145 kg (320 lb)  (Abnormal)  Documented at 01/16/2019 0921          No intake/output data recorded.  I/O last 3 completed shifts:  In: 1280 [P.O.:1280]  Out: 200 [Urine:200]    Intake/Output Summary (Last 24 hours) at 2/11/2019 1028  Last data filed at 2/11/2019 0645  Gross per 24 hour   Intake 1060 ml   Output 100 ml   Net 960 ml       Physical Exam:  General Appearance: alert, oriented x 3, no acute distress, obese  Skin: warm and dry  HEENT: Nonicteric sclerae, oral mucosa normal,   Neck: supple, no JVD, trachea midline, TDC in the right IJ with exit site below the right clavicle  Lungs: CTA, unlabored breathing effort  Heart: RRR, normal S1 and S2, no S3, no rub  Abdomen: soft, non-tender,  present bowel sounds to auscultation  : no palpable bladder,  Extremities: Trace pedal edema, no cyanosis or clubbing  Neuro: normal speech and mental status       Results Review:    Results from last 7 days   Lab Units 02/11/19  0433 02/10/19  0416 02/09/19  0520  02/07/19  0336   SODIUM mmol/L 137 138 138   < > 135*   POTASSIUM mmol/L 4.0 4.2 4.6   < > " 4.5   CHLORIDE mmol/L 95* 97* 95*   < > 93*   CO2 mmol/L 25.3 26.2 23.5   < > 24.2   BUN mg/dL 57* 40* 61*   < > 78*   CREATININE mg/dL 2.64* 1.97* 2.69*   < > 2.74*   CALCIUM mg/dL 9.8 9.7 9.9   < > 9.8   BILIRUBIN mg/dL  --   --   --   --  0.6   ALK PHOS U/L  --   --   --   --  67   ALT (SGPT) U/L  --   --   --   --  <5   AST (SGOT) U/L  --   --   --   --  13   GLUCOSE mg/dL 109* 93 100*   < > 94    < > = values in this interval not displayed.       Estimated Creatinine Clearance: 32.1 mL/min (A) (by C-G formula based on SCr of 2.64 mg/dL (H)).    Results from last 7 days   Lab Units 02/11/19  0433 02/10/19  0416 02/09/19  0520   PHOSPHORUS mg/dL 5.3* 4.6* 7.1*             Results from last 7 days   Lab Units 02/11/19  0433 02/10/19  0416 02/09/19  0520 02/08/19  0350 02/07/19  0336   WBC 10*3/mm3 11.89* 10.55 11.59* 12.58* 13.03*   HEMOGLOBIN g/dL 8.2* 8.2* 7.4* 7.5* 7.8*   PLATELETS 10*3/mm3 255 227 230 225 210               Imaging Results (last 24 hours)     Procedure Component Value Units Date/Time    FL Surgery Fluoro [[id number]] Resulted:  02/11/19 0907     Updated:  02/11/19 0907          acetaminophen 500 mg Oral Q4H   aspirin 81 mg Oral Daily   bisacodyl 10 mg Rectal Once   cetaphil  Topical Q12H   enoxaparin 30 mg Subcutaneous Nightly   escitalopram 10 mg Oral Daily   famotidine 20 mg Oral Daily   hydrocortisone 1 application Topical Q6H   ipratropium-albuterol 3 mL Nebulization BID - RT   iron sucrose 200 mg Intravenous Q24H   lactulose 30 g Oral Daily   mupirocin  Each Nare BID   sennosides-docusate sodium 2 tablet Oral Nightly   sodium chloride 4 mL Nebulization BID - RT       sodium chloride 30 mL/hr Last Rate: 30 mL/hr (01/28/19 1215)   sodium chloride 9 mL/hr Last Rate: 9 mL/hr (02/08/19 0850)       Medication Review:   Current Facility-Administered Medications   Medication Dose Route Frequency Provider Last Rate Last Dose   • acetaminophen (TYLENOL) tablet 500 mg  500 mg Oral Q4H Kayla  MORGAN Syed   500 mg at 02/11/19 0847   • albumin human 25 % IV SOLN 37.5 g  37.5 g Intravenous PRN Sheila Terrazas MD   25 g at 02/09/19 1208   • aspirin chewable tablet 81 mg  81 mg Oral Daily Scar Gaxiola MD   81 mg at 02/11/19 0847   • bisacodyl (DULCOLAX) EC tablet 10 mg  10 mg Oral Daily PRN Scar Gaxiola MD   10 mg at 02/10/19 2116   • bisacodyl (DULCOLAX) suppository 10 mg  10 mg Rectal Daily PRN Scar Gaxiola MD   10 mg at 02/03/19 2120   • bisacodyl (DULCOLAX) suppository 10 mg  10 mg Rectal Once Yahaira Garza APRN       • bupivacaine PF 30 mL, lidocaine 1 % 30 mL mixture    PRN Satish Stahl MD   18 mL at 02/08/19 1001   • cetaphil lotion   Topical Q12H Zonia Lopez MD       • cyclobenzaprine (FLEXERIL) tablet 10 mg  10 mg Oral Q8H PRN Scar Gaxiola MD       • dextrose (D50W) 25 g/ 50mL Intravenous Solution 25 g  25 g Intravenous Q15 Min PRN Oscar Rodriguez MD       • dextrose (GLUTOSE) oral gel 15 g  15 g Oral Q15 Min PRN Oscar Rodriguez MD       • enoxaparin (LOVENOX) syringe 30 mg  30 mg Subcutaneous Nightly Alicia Schmitz APRN   30 mg at 02/11/19 0846   • escitalopram (LEXAPRO) tablet 10 mg  10 mg Oral Daily Oscar Rodriguez MD   10 mg at 02/11/19 0847   • famotidine (PEPCID) tablet 20 mg  20 mg Oral Daily Wallace Falcon MD   20 mg at 02/11/19 0847   • glucagon (human recombinant) (GLUCAGEN DIAGNOSTIC) injection 1 mg  1 mg Subcutaneous PRN Oscar Rodriguez MD       • hydrocortisone 1 % cream 1 application  1 application Topical Q6H Fran Tilley MD   1 application at 02/11/19 0633   • ipratropium-albuterol (DUO-NEB) nebulizer solution 3 mL  3 mL Nebulization BID - RT Yahaira Garza APRN   3 mL at 02/11/19 0727   • ipratropium-albuterol (DUO-NEB) nebulizer solution 3 mL  3 mL Nebulization Q6H PRN Yahaira Garza, MORGAN       • iron sucrose (VENOFER) 200 mg in sodium chloride 0.9 % 100 mL IVPB  200 mg Intravenous Q24H  Wallace Falcon MD   200 mg at 02/11/19 0846   • lactulose (CHRONULAC) 10 GM/15ML solution 30 g  30 g Oral Daily Yahaira Garza APRN       • magic mouthwash oral supsension 5 mL  5 mL Swish & Spit Q4H PRN Yahaira Garza APRN       • mupirocin (BACTROBAN) 2 % nasal ointment   Each Nare BID Scar Gaxiola MD   10 application at 02/11/19 0847   • ondansetron (ZOFRAN) injection 4 mg  4 mg Intravenous Q6H PRN Scar Gaxiola MD   4 mg at 02/04/19 0906   • promethazine (PHENERGAN) tablet 12.5 mg  12.5 mg Oral Q6H PRN Scar Gaxiola MD        Or   • promethazine (PHENERGAN) injection 12.5 mg  12.5 mg Intravenous Q6H PRN Scar Gaxiola MD       • sennosides-docusate sodium (SENOKOT-S) 8.6-50 MG tablet 2 tablet  2 tablet Oral Nightly Scar Gaxiola MD   2 tablet at 02/10/19 2023   • sodium chloride 0.9 % flush 30 mL  30 mL Intravenous Once PRN Scar Gaxiola MD       • sodium chloride 0.9 % infusion  30 mL/hr Intravenous Continuous PRN Scar Gaxiola MD 30 mL/hr at 01/28/19 1215 30 mL/hr at 01/28/19 1215   • sodium chloride 0.9 % infusion  9 mL/hr Intravenous Continuous ProtzerCasandra MD 9 mL/hr at 02/08/19 0850 9 mL/hr at 02/08/19 0850   • sodium chloride 7 % nebulizer solution nebulizer solution 4 mL  4 mL Nebulization BID - RT Yahaira Garza APRN   4 mL at 02/11/19 0727       Assessment/Plan   1. Oliguric LAVERNE - ATN post AVR/MVR.  HD-dep since 1/30;  urine output was 200 cc yesterday. Briskly rising rising creatinine without dialysis.  2. SP AVR, MVR, TV repair, left cryo MAZE PENNIE ligation.  POD13  3.   Fluid excess, improved  4. Probable OCTAVIANO  5. GERD - on PPI   6.  Paroxysmal atrial fibrillation  7. Leukocytosis.   Stable, WBC is 11.89  8. Anemia.  Hgb down to 7.4.  Iron def, TSAT 9%, ferritin 473    Plan  1.  HD today  2.  CCP working on out-pt dialysis ([facility] vs [facility])  3.  Surveillance lab          Christopher Jin MD  02/11/19  10:28 AM

## 2019-02-11 NOTE — PROGRESS NOTES
"   LOS: 25 days    Patient Care Team:  Robert Cortez MD as PCP - General (Family Medicine)  Deborah Agudelo MD as Surgeon (Orthopedic Surgery)    Chief Complaint:    Chief Complaint   Patient presents with   • Shortness of Breath   • Leg Swelling     Follow UP LAVERNE  Subjective     Interval History:   Had HD yesterday without problem; states that she has made very little urine today; appetite fair; reading is comfortable at rest     Objective     Vital Signs  Temp:  [97.4 °F (36.3 °C)-98.4 °F (36.9 °C)] 97.4 °F (36.3 °C)  Heart Rate:  [72-87] 77  Resp:  [18] 18  BP: ()/(44-92) 98/44    Flowsheet Rows      First Filed Value   Admission Height  170.2 cm (67\") Documented at 01/16/2019 0910   Admission Weight  145 kg (320 lb)  (Abnormal)  Documented at 01/16/2019 0921          No intake/output data recorded.  I/O last 3 completed shifts:  In: 1480 [P.O.:1180; Blood:300]  Out: 350 [Urine:350]    Intake/Output Summary (Last 24 hours) at 2/10/2019 1953  Last data filed at 2/10/2019 1720  Gross per 24 hour   Intake 820 ml   Output 200 ml   Net 620 ml       Physical Exam:  gen nad, alert; MO; chr ill  Right chest TDC; no JVD  Lungs CTA anteriorly; no rales  CV RRR no m/g  abd soft NT/ND, BS+  vasc trace pedal edema, 2+ radial pulses     Results Review:    Results from last 7 days   Lab Units 02/10/19  0416 02/09/19  0520 02/08/19  0350 02/07/19  0336   SODIUM mmol/L 138 138 138 135*   POTASSIUM mmol/L 4.2 4.6 4.2 4.5   CHLORIDE mmol/L 97* 95* 99 93*   CO2 mmol/L 26.2 23.5 25.9 24.2   BUN mg/dL 40* 61* 35* 78*   CREATININE mg/dL 1.97* 2.69* 1.59* 2.74*   CALCIUM mg/dL 9.7 9.9 9.5 9.8   BILIRUBIN mg/dL  --   --   --  0.6   ALK PHOS U/L  --   --   --  67   ALT (SGPT) U/L  --   --   --  <5   AST (SGOT) U/L  --   --   --  13   GLUCOSE mg/dL 93 100* 104* 94       Estimated Creatinine Clearance: 43 mL/min (A) (by C-G formula based on SCr of 1.97 mg/dL (H)).    Results from last 7 days   Lab Units 02/10/19  0416 " 02/09/19  0520 02/08/19  0350  02/04/19  0337   MAGNESIUM mg/dL  --   --   --   --  2.2   PHOSPHORUS mg/dL 4.6* 7.1* 3.7   < > 3.2    < > = values in this interval not displayed.             Results from last 7 days   Lab Units 02/10/19  0416 02/09/19  0520 02/08/19  0350 02/07/19  0336 02/06/19  0304   WBC 10*3/mm3 10.55 11.59* 12.58* 13.03* 15.51*   HEMOGLOBIN g/dL 8.2* 7.4* 7.5* 7.8* 8.1*   PLATELETS 10*3/mm3 227 230 225 210 191               Imaging Results (last 24 hours)     ** No results found for the last 24 hours. **          acetaminophen 500 mg Oral Q4H   aspirin 81 mg Oral Daily   cetaphil  Topical Q12H   enoxaparin 30 mg Subcutaneous Nightly   escitalopram 10 mg Oral Daily   famotidine 20 mg Oral Daily   hydrocortisone 1 application Topical Q6H   ipratropium-albuterol 3 mL Nebulization BID - RT   iron sucrose 200 mg Intravenous Q24H   mupirocin  Each Nare BID   sennosides-docusate sodium 2 tablet Oral Nightly   sodium chloride 4 mL Nebulization BID - RT       sodium chloride 30 mL/hr Last Rate: 30 mL/hr (01/28/19 1215)   sodium chloride 9 mL/hr Last Rate: 9 mL/hr (02/08/19 0850)       Medication Review:   Current Facility-Administered Medications   Medication Dose Route Frequency Provider Last Rate Last Dose   • acetaminophen (TYLENOL) tablet 500 mg  500 mg Oral Q4H Yahaira Garza APRN   500 mg at 02/10/19 1649   • albumin human 25 % IV SOLN 37.5 g  37.5 g Intravenous PRN Sheila Terrazas MD   25 g at 02/09/19 1208   • aspirin chewable tablet 81 mg  81 mg Oral Daily Scar Gaxiola MD   81 mg at 02/10/19 0824   • bisacodyl (DULCOLAX) EC tablet 10 mg  10 mg Oral Daily PRN Scar Gaxiola MD       • bisacodyl (DULCOLAX) suppository 10 mg  10 mg Rectal Daily PRN Scar Gaxiola MD   10 mg at 02/03/19 2120   • bupivacaine PF 30 mL, lidocaine 1 % 30 mL mixture    PRN Satish Stahl MD   18 mL at 02/08/19 1001   • cetaphil lotion   Topical Q12H Zonia Lopez MD       •  cyclobenzaprine (FLEXERIL) tablet 10 mg  10 mg Oral Q8H PRN Scar Gaxiola MD       • dextrose (D50W) 25 g/ 50mL Intravenous Solution 25 g  25 g Intravenous Q15 Min PRN Oscar Rodriguez MD       • dextrose (GLUTOSE) oral gel 15 g  15 g Oral Q15 Min PRN Oscar Rodriguez MD       • enoxaparin (LOVENOX) syringe 30 mg  30 mg Subcutaneous Nightly Alicia Schmitz APRN   30 mg at 02/10/19 0828   • escitalopram (LEXAPRO) tablet 10 mg  10 mg Oral Daily Oscar Rodriguez MD   10 mg at 02/10/19 0824   • famotidine (PEPCID) tablet 20 mg  20 mg Oral Daily Wallace Falcon MD   20 mg at 02/10/19 0824   • glucagon (human recombinant) (GLUCAGEN DIAGNOSTIC) injection 1 mg  1 mg Subcutaneous PRN Oscar Rodriguez MD       • hydrocortisone 1 % cream 1 application  1 application Topical Q6H Fran Tilley MD   1 application at 02/10/19 1649   • ipratropium-albuterol (DUO-NEB) nebulizer solution 3 mL  3 mL Nebulization BID - RT Yahaira Garza APRN   3 mL at 02/10/19 0721   • ipratropium-albuterol (DUO-NEB) nebulizer solution 3 mL  3 mL Nebulization Q6H PRN Yahaira Garza APRN       • iron sucrose (VENOFER) 200 mg in sodium chloride 0.9 % 100 mL IVPB  200 mg Intravenous Q24H Wallace Falcon MD   200 mg at 02/10/19 0824   • magic mouthwash oral supsension 5 mL  5 mL Swish & Spit Q4H PRN Yahaira Garza APRN       • mupirocin (BACTROBAN) 2 % nasal ointment   Each Nare BID Scar Gaxiola MD       • ondansetron (ZOFRAN) injection 4 mg  4 mg Intravenous Q6H PRN Scar Gaxiola MD   4 mg at 02/04/19 0906   • promethazine (PHENERGAN) tablet 12.5 mg  12.5 mg Oral Q6H PRN Scar Gaxiola MD        Or   • promethazine (PHENERGAN) injection 12.5 mg  12.5 mg Intravenous Q6H PRN Scar Gaxiola MD       • sennosides-docusate sodium (SENOKOT-S) 8.6-50 MG tablet 2 tablet  2 tablet Oral Nightly Scar Gaxiola MD   2 tablet at 02/09/19 2030   • sodium chloride 0.9 % flush 30 mL  30 mL Intravenous  Once PRN Scar Gaxiola MD       • sodium chloride 0.9 % infusion  30 mL/hr Intravenous Continuous PRN Scar Gaxiola MD 30 mL/hr at 01/28/19 1215 30 mL/hr at 01/28/19 1215   • sodium chloride 0.9 % infusion  9 mL/hr Intravenous Continuous Protzer, Casandra Sun MD 9 mL/hr at 02/08/19 0850 9 mL/hr at 02/08/19 0850   • sodium chloride 7 % nebulizer solution nebulizer solution 4 mL  4 mL Nebulization BID - RT GarzaYahaira, MORGAN   4 mL at 02/10/19 0721       Assessment/Plan   1. Oliguric LAVERNE - ATN post AVR/MVR.  HD-dep since 1/30; UOP meager.  Briskly rising SCr in absence of HD, and thus no e/o renal recovery.  BL Cr ~ 1, so still expect eventual recovery      2. SP AVR, MVR, TV repair, left cryo MAZE PENNIE ligation.  POD13  3. Vol overload - improved, periph edema much less   4. Probable OCTAVIANO  5. GERD - on PPI   6. PAF  7. Leukocytosis.  Improving  8. Anemia.  Hgb down to 7.4.  Iron def, TSAT 9%, ferritin 473    Plan  1.  HD tomorrow  2.  CCP working on out-pt dialysis ([facility] vs [facility])        Acute congestive heart failure (CMS/HCC)    Hypertension    Generalized anxiety disorder    Hyperlipidemia    IFG (impaired fasting glucose)    Heart murmur, systolic    Obesity, morbid, BMI 50 or higher (CMS/HCC)    Fungal skin infection    Cellulitis of lower extremity    Aortic valve stenosis    Tricuspid valve insufficiency    Mitral valve stenosis              Valentin Gabriel MD  02/10/19  7:53 PM

## 2019-02-11 NOTE — NURSING NOTE
02/11/19 1140   Wound 01/28/19 1215 coccyx unspecified   Date first assessed/Time first assessed: 01/28/19 1215   Present On Admission : yes;picture taken  Location: coccyx  Type: unspecified   Dressing Appearance no drainage;open to air   Periwound intact   Wound Length (cm) 1 cm   Wound Width (cm) 0.1 cm   Drainage Amount none   Care, Wound barrier applied   CWOCN consult for coccyx.  Patient with no skin breakdown or pressure area noted at coccyx.  Gluteal cleft with small area of partial thickness skin loss currently treated with barrier cream to good effect.  Patient with  ecchymosis across soft tissue of buttocks.  Patient states this has been here since she came in and she does not recollect how it happened.  She does not remember a fall or trauma.  Bruising is currently blanchable and appears to be resolving.  Please continue z-guard and off loading.

## 2019-02-12 LAB
ALBUMIN SERPL-MCNC: 3.9 G/DL (ref 3.5–5.2)
ANION GAP SERPL CALCULATED.3IONS-SCNC: 14.9 MMOL/L
BASOPHILS # BLD AUTO: 0.03 10*3/MM3 (ref 0–0.2)
BASOPHILS NFR BLD AUTO: 0.2 % (ref 0–1.5)
BUN BLD-MCNC: 25 MG/DL (ref 8–23)
BUN/CREAT SERPL: 17.7 (ref 7–25)
C DIFF TOX GENS STL QL NAA+PROBE: NEGATIVE
CALCIUM SPEC-SCNC: 9.5 MG/DL (ref 8.6–10.5)
CHLORIDE SERPL-SCNC: 96 MMOL/L (ref 98–107)
CO2 SERPL-SCNC: 27.1 MMOL/L (ref 22–29)
CREAT BLD-MCNC: 1.41 MG/DL (ref 0.57–1)
DEPRECATED RDW RBC AUTO: 52.8 FL (ref 37–54)
EOSINOPHIL # BLD AUTO: 0.12 10*3/MM3 (ref 0–0.4)
EOSINOPHIL NFR BLD AUTO: 0.7 % (ref 0.3–6.2)
ERYTHROCYTE [DISTWIDTH] IN BLOOD BY AUTOMATED COUNT: 15.8 % (ref 12.3–15.4)
GFR SERPL CREATININE-BSD FRML MDRD: 37 ML/MIN/1.73
GLUCOSE BLD-MCNC: 114 MG/DL (ref 65–99)
HCT VFR BLD AUTO: 29 % (ref 34–46.6)
HGB BLD-MCNC: 8.8 G/DL (ref 12–15.9)
IMM GRANULOCYTES # BLD AUTO: 0.36 10*3/MM3 (ref 0–0.05)
IMM GRANULOCYTES NFR BLD AUTO: 2.1 % (ref 0–0.5)
INR PPP: 1.33 (ref 0.9–1.1)
LYMPHOCYTES # BLD AUTO: 0.59 10*3/MM3 (ref 0.7–3.1)
LYMPHOCYTES NFR BLD AUTO: 3.5 % (ref 19.6–45.3)
MCH RBC QN AUTO: 28.7 PG (ref 26.6–33)
MCHC RBC AUTO-ENTMCNC: 30.3 G/DL (ref 31.5–35.7)
MCV RBC AUTO: 94.5 FL (ref 79–97)
MONOCYTES # BLD AUTO: 1.14 10*3/MM3 (ref 0.1–0.9)
MONOCYTES NFR BLD AUTO: 6.7 % (ref 5–12)
NEUTROPHILS # BLD AUTO: 14.71 10*3/MM3 (ref 1.4–7)
NEUTROPHILS NFR BLD AUTO: 86.8 % (ref 42.7–76)
NRBC BLD AUTO-RTO: 0 /100 WBC (ref 0–0)
PHOSPHATE SERPL-MCNC: 2.6 MG/DL (ref 2.5–4.5)
PLATELET # BLD AUTO: 220 10*3/MM3 (ref 140–450)
PMV BLD AUTO: 9.8 FL (ref 6–12)
POTASSIUM BLD-SCNC: 4 MMOL/L (ref 3.5–5.2)
PROTHROMBIN TIME: 16.3 SECONDS (ref 11.7–14.2)
RBC # BLD AUTO: 3.07 10*6/MM3 (ref 3.77–5.28)
SODIUM BLD-SCNC: 138 MMOL/L (ref 136–145)
WBC NRBC COR # BLD: 16.95 10*3/MM3 (ref 3.4–10.8)

## 2019-02-12 PROCEDURE — 94799 UNLISTED PULMONARY SVC/PX: CPT

## 2019-02-12 PROCEDURE — 99232 SBSQ HOSP IP/OBS MODERATE 35: CPT | Performed by: INTERNAL MEDICINE

## 2019-02-12 PROCEDURE — 97110 THERAPEUTIC EXERCISES: CPT

## 2019-02-12 PROCEDURE — 85610 PROTHROMBIN TIME: CPT | Performed by: NURSE PRACTITIONER

## 2019-02-12 PROCEDURE — 25010000002 ENOXAPARIN PER 10 MG: Performed by: NURSE PRACTITIONER

## 2019-02-12 PROCEDURE — 97162 PT EVAL MOD COMPLEX 30 MIN: CPT

## 2019-02-12 PROCEDURE — 97110 THERAPEUTIC EXERCISES: CPT | Performed by: OCCUPATIONAL THERAPIST

## 2019-02-12 PROCEDURE — 85025 COMPLETE CBC W/AUTO DIFF WBC: CPT | Performed by: INTERNAL MEDICINE

## 2019-02-12 PROCEDURE — 87493 C DIFF AMPLIFIED PROBE: CPT | Performed by: NURSE PRACTITIONER

## 2019-02-12 PROCEDURE — 25010000002 IRON SUCROSE PER 1 MG: Performed by: INTERNAL MEDICINE

## 2019-02-12 PROCEDURE — 99024 POSTOP FOLLOW-UP VISIT: CPT | Performed by: NURSE PRACTITIONER

## 2019-02-12 PROCEDURE — 80069 RENAL FUNCTION PANEL: CPT | Performed by: INTERNAL MEDICINE

## 2019-02-12 PROCEDURE — 97535 SELF CARE MNGMENT TRAINING: CPT | Performed by: OCCUPATIONAL THERAPIST

## 2019-02-12 RX ORDER — BUMETANIDE 2 MG/1
4 TABLET ORAL DAILY
Status: DISCONTINUED | OUTPATIENT
Start: 2019-02-12 | End: 2019-02-18 | Stop reason: HOSPADM

## 2019-02-12 RX ADMIN — EMOLLIENT - LOTION: LOTION at 09:15

## 2019-02-12 RX ADMIN — WARFARIN SODIUM 2 MG: 2 TABLET ORAL at 17:34

## 2019-02-12 RX ADMIN — ACETAMINOPHEN 500 MG: 500 TABLET, FILM COATED ORAL at 17:34

## 2019-02-12 RX ADMIN — ACETAMINOPHEN 500 MG: 500 TABLET, FILM COATED ORAL at 04:53

## 2019-02-12 RX ADMIN — FAMOTIDINE 20 MG: 20 TABLET, FILM COATED ORAL at 09:12

## 2019-02-12 RX ADMIN — MUPIROCIN 10 APPLICATION: 20 OINTMENT TOPICAL at 09:12

## 2019-02-12 RX ADMIN — HYDROCORTISONE 1 APPLICATION: 1 CREAM TOPICAL at 12:29

## 2019-02-12 RX ADMIN — EMOLLIENT - LOTION: LOTION at 20:42

## 2019-02-12 RX ADMIN — METOPROLOL TARTRATE 25 MG: 25 TABLET ORAL at 10:28

## 2019-02-12 RX ADMIN — MUPIROCIN 10 APPLICATION: 20 OINTMENT TOPICAL at 20:42

## 2019-02-12 RX ADMIN — Medication 81 MG: at 09:12

## 2019-02-12 RX ADMIN — ESCITALOPRAM 10 MG: 10 TABLET, FILM COATED ORAL at 09:12

## 2019-02-12 RX ADMIN — HYDROCORTISONE 1 APPLICATION: 1 CREAM TOPICAL at 05:00

## 2019-02-12 RX ADMIN — ENOXAPARIN SODIUM 30 MG: 30 INJECTION SUBCUTANEOUS at 09:12

## 2019-02-12 RX ADMIN — HYDROCORTISONE 1 APPLICATION: 1 CREAM TOPICAL at 00:23

## 2019-02-12 RX ADMIN — IRON SUCROSE 200 MG: 20 INJECTION, SOLUTION INTRAVENOUS at 09:22

## 2019-02-12 RX ADMIN — ACETAMINOPHEN 500 MG: 500 TABLET, FILM COATED ORAL at 12:28

## 2019-02-12 RX ADMIN — IPRATROPIUM BROMIDE AND ALBUTEROL SULFATE 3 ML: 2.5; .5 SOLUTION RESPIRATORY (INHALATION) at 20:18

## 2019-02-12 RX ADMIN — ACETAMINOPHEN 500 MG: 500 TABLET, FILM COATED ORAL at 20:42

## 2019-02-12 RX ADMIN — HYDROCORTISONE 1 APPLICATION: 1 CREAM TOPICAL at 17:34

## 2019-02-12 RX ADMIN — BUMETANIDE 4 MG: 2 TABLET ORAL at 14:22

## 2019-02-12 RX ADMIN — ACETAMINOPHEN 500 MG: 500 TABLET, FILM COATED ORAL at 09:13

## 2019-02-12 RX ADMIN — SODIUM CHLORIDE 4 ML: 7 NEBU SOLN,3 % NEBU at 20:18

## 2019-02-12 NOTE — PROGRESS NOTES
Adult Nutrition  Assessment/PES    Patient Name:  Claudia Arnold  YOB: 1945  MRN: [id number]  Admit Date:  1/16/2019    Assessment Date:  2/12/2019    Comments:  PO intake continues to be a challenge at times. Hasn't eaten much over past day due to nausea; also having diarrhea (was given an enema yesterday). WBC ^, they plan to check for cdiff. Need to encourage intake. Will cont to follow.     Reason for Assessment     Row Name 02/12/19 1136          Reason for Assessment    Reason For Assessment  follow-up protocol         Nutrition/Diet History     Row Name 02/12/19 1136          Nutrition/Diet History    Typical Food/Fluid Intake Didn't eat much of anything yesterday, continues with poor appetite today. +nausea; given enema then started having diarrhea. To check for cdiff.      Factors Affecting Nutritional Intake  diarrhea;nausea;other (see comments) no appetite         Anthropometrics     Row Name 02/12/19 1137 02/12/19 0511       Anthropometrics    Weight  --  173 kg (382 lb 0.9 oz)  (Abnormal)        Admit Weight    Admit Weight  -- today's wt of 382 lb accurate??  --        Labs/Tests/Procedures/Meds     Row Name 02/12/19 1137          Labs/Procedures/Meds    Lab Results Reviewed  reviewed     Lab Results Comments  Cl, BUN, Cr, WBC ^, h/h        Diagnostic Tests/Procedures    Diagnostic Test/Procedures Comments  HD tomorrow        Medications    Pertinent Medications Comments  diuretic, ppi, coumadin         Physical Findings     Row Name 02/12/19 1139          Physical Findings    Overall Physical Appearance  obese;edematous;on oxygen therapy     Skin  other (see comments);pressure injury;edema WOCN says area on coccyx is not a PI - partial skin loss, there upon adm           Nutrition Prescription Ordered     Row Name 02/12/19 1140          Nutrition Prescription PO    Supplement  Mighty Shake     Supplement Frequency  3 times a day     Common Modifiers  Cardiac         Evaluation of  Received Nutrient/Fluid Intake     Row Name 02/12/19 1140          PO Evaluation    % PO Intake  minimal PO intake x 24 hrs.                Problem/Interventions:  Problem 1     Row Name 02/12/19 1140          Nutrition Diagnoses Problem 1    Problem 1  Inadequate Intake/Infusion     Inadequate Intake Type  Oral     Etiology (related to)  Factors Affecting Nutrition     Appetite  Poor at this Time;Poor Due to GI Factor     Reported GI Symptoms  Diarrhea;N & V     Signs/Symptoms (evidenced by)  Report of Mnimal PO Intake                 Intervention Goal     Row Name 02/12/19 1141          Intervention Goal    General  Maintain nutrition;Reduce/improve symptoms;Meet nutritional needs for age/condition;Disease management/therapy     PO  Tolerate PO;Increase intake     PO Intake %  75 %         Nutrition Intervention     Row Name 02/12/19 1141          Nutrition Intervention    RD/Tech Action  Follow Tx progress;Care plan reviewd;Encourage intake;Interview for preference           Education/Evaluation     Row Name 02/12/19 1141          Monitor/Evaluation    Monitor  Per protocol           Electronically signed by:  Yahaira Griffin RD  02/12/19 11:41 AM

## 2019-02-12 NOTE — PLAN OF CARE
Problem: Patient Care Overview  Goal: Plan of Care Review  Outcome: Ongoing (interventions implemented as appropriate)   02/12/19 1132   Coping/Psychosocial   Plan of Care Reviewed With patient   OTHER   Outcome Summary Pt required slightly less assistance with bed mobility today but continues to have difficulty coming to stand. May try transfers with cardiac walker during next session. PT will continue to follow to address strength, mobility, and transfers.

## 2019-02-12 NOTE — PROGRESS NOTES
"   LOS: 27 days    Patient Care Team:  Robert Cortez MD as PCP - General (Family Medicine)  Deborah Agudelo MD as Surgeon (Orthopedic Surgery)    Chief Complaint:    Chief Complaint   Patient presents with   • Shortness of Breath   • Leg Swelling     Follow UP [facility]  Subjective     Interval History:  Had enema yesterday. Frequent small pasty stools since. No appetite.  Thinks she is making urine but not measured. Not soa. No pain. PT thinks making progress.       Objective     Vital Signs  Temp:  [97.4 °F (36.3 °C)-97.7 °F (36.5 °C)] 97.7 °F (36.5 °C)  Heart Rate:  [] 89  Resp:  [18-20] 20  BP: (108-141)/(63-82) 108/65    Flowsheet Rows      First Filed Value   Admission Height  170.2 cm (67\") Documented at 01/16/2019 0910   Admission Weight  145 kg (320 lb)  (Abnormal)  Documented at 01/16/2019 0921          I/O this shift:  In: 120 [P.O.:120]  Out: -   I/O last 3 completed shifts:  In: 680 [P.O.:680]  Out: 1000 [Other:1000]    Intake/Output Summary (Last 24 hours) at 2/12/2019 1048  Last data filed at 2/12/2019 0727  Gross per 24 hour   Intake 340 ml   Output 1000 ml   Net -660 ml       Physical Exam:  General Appearance: alert, oriented x 3, no acute distress, obese  Skin: warm and dry  HEENT: Nonicteric sclerae, oral mucosa normal  Neck: supple, no JVD, trachea midline, RIJ TDC  Lungs: Clear to auscultation anteriorly.   Heart: RRR, normal S1 and S2, no S3, no rub  Abdomen: soft, non-tender,  +bs  Extremities: Trace pedal edema, no cyanosis or clubbing. Multiple ecchymoses, arms with nodular hematomas.   Neuro: normal speech and mental status       Results Review:    Results from last 7 days   Lab Units 02/12/19  0459 02/11/19  0433 02/10/19  0416  02/07/19  0336   SODIUM mmol/L 138 137 138   < > 135*   POTASSIUM mmol/L 4.0 4.0 4.2   < > 4.5   CHLORIDE mmol/L 96* 95* 97*   < > 93*   CO2 mmol/L 27.1 25.3 26.2   < > 24.2   BUN mg/dL 25* 57* 40*   < > 78*   CREATININE mg/dL 1.41* 2.64* 1.97*   < > 2.74* "   CALCIUM mg/dL 9.5 9.8 9.7   < > 9.8   BILIRUBIN mg/dL  --   --   --   --  0.6   ALK PHOS U/L  --   --   --   --  67   ALT (SGPT) U/L  --   --   --   --  <5   AST (SGOT) U/L  --   --   --   --  13   GLUCOSE mg/dL 114* 109* 93   < > 94    < > = values in this interval not displayed.       Estimated Creatinine Clearance: 59.5 mL/min (A) (by C-G formula based on SCr of 1.41 mg/dL (H)).    Results from last 7 days   Lab Units 02/12/19 0459 02/11/19  0433 02/10/19  0416   PHOSPHORUS mg/dL 2.6 5.3* 4.6*             Results from last 7 days   Lab Units 02/12/19 0459 02/11/19  0433 02/10/19  0416 02/09/19  0520 02/08/19  0350   WBC 10*3/mm3 16.95* 11.89* 10.55 11.59* 12.58*   HEMOGLOBIN g/dL 8.8* 8.2* 8.2* 7.4* 7.5*   PLATELETS 10*3/mm3 220 255 227 230 225       Results from last 7 days   Lab Units 02/12/19 0459 02/11/19  1801   INR  1.33* 1.24*         Imaging Results (last 24 hours)     ** No results found for the last 24 hours. **          acetaminophen 500 mg Oral Q4H   aspirin 81 mg Oral Daily   cetaphil  Topical Q12H   enoxaparin 30 mg Subcutaneous Nightly   escitalopram 10 mg Oral Daily   famotidine 20 mg Oral Daily   hydrocortisone 1 application Topical Q6H   ipratropium-albuterol 3 mL Nebulization BID - RT   iron sucrose 200 mg Intravenous Q24H   metoprolol tartrate 25 mg Oral Q12H   mupirocin  Each Nare BID   sodium chloride 4 mL Nebulization BID - RT   warfarin 2 mg Oral Daily       sodium chloride 30 mL/hr Last Rate: 30 mL/hr (01/28/19 1215)   sodium chloride 9 mL/hr Last Rate: 9 mL/hr (02/08/19 0850)       Medication Review:   Current Facility-Administered Medications   Medication Dose Route Frequency Provider Last Rate Last Dose   • acetaminophen (TYLENOL) tablet 500 mg  500 mg Oral Q4H Yahaira Garza APRN   500 mg at 02/12/19 0913   • albumin human 25 % IV SOLN 37.5 g  37.5 g Intravenous PRN Sheila Terrazas MD   25 g at 02/09/19 1208   • aspirin chewable tablet 81 mg  81 mg Oral Daily Khalida  MD Scar   81 mg at 02/12/19 0912   • bisacodyl (DULCOLAX) EC tablet 10 mg  10 mg Oral Daily PRN Scar Gaxiola MD   10 mg at 02/10/19 2116   • bisacodyl (DULCOLAX) suppository 10 mg  10 mg Rectal Daily PRN Scar Gaxiola MD   10 mg at 02/03/19 2120   • bupivacaine PF 30 mL, lidocaine 1 % 30 mL mixture    PRN Satish Stahl MD   18 mL at 02/08/19 1001   • cetaphil lotion   Topical Q12H Zonia Lopez MD       • cyclobenzaprine (FLEXERIL) tablet 10 mg  10 mg Oral Q8H PRN Scar Gaxiola MD       • dextrose (D50W) 25 g/ 50mL Intravenous Solution 25 g  25 g Intravenous Q15 Min PRN Oscar Rodriguez MD       • dextrose (GLUTOSE) oral gel 15 g  15 g Oral Q15 Min PRN Oscar Rodriguez MD       • enoxaparin (LOVENOX) syringe 30 mg  30 mg Subcutaneous Nightly Alicia Schmitz APRN   30 mg at 02/12/19 0912   • escitalopram (LEXAPRO) tablet 10 mg  10 mg Oral Daily Oscar Rodriguez MD   10 mg at 02/12/19 0912   • famotidine (PEPCID) tablet 20 mg  20 mg Oral Daily Wallace Falcon MD   20 mg at 02/12/19 0912   • glucagon (human recombinant) (GLUCAGEN DIAGNOSTIC) injection 1 mg  1 mg Subcutaneous PRN Oscar Rodriguez MD       • hydrocortisone 1 % cream 1 application  1 application Topical Q6H Fran Tilley MD   1 application at 02/12/19 0500   • ipratropium-albuterol (DUO-NEB) nebulizer solution 3 mL  3 mL Nebulization BID - RT Yahaira Garza APRN   3 mL at 02/11/19 0727   • ipratropium-albuterol (DUO-NEB) nebulizer solution 3 mL  3 mL Nebulization Q6H PRN Yahaira Garza APRN       • iron sucrose (VENOFER) 200 mg in sodium chloride 0.9 % 100 mL IVPB  200 mg Intravenous Q24H Wallace Falcon MD   200 mg at 02/12/19 0922   • magic mouthwash oral supsension 5 mL  5 mL Swish & Spit Q4H PRN Yahaira Garza APRN       • metoprolol tartrate (LOPRESSOR) tablet 25 mg  25 mg Oral Q12H Scott Segura MD   25 mg at 02/12/19 1028   • mupirocin (BACTROBAN) 2 % nasal  ointment   Each Nare BID Scar Gaxiola MD   10 application at 02/12/19 0912   • ondansetron (ZOFRAN) injection 4 mg  4 mg Intravenous Q6H PRN Scar Gaxiola MD   4 mg at 02/11/19 2022   • promethazine (PHENERGAN) tablet 12.5 mg  12.5 mg Oral Q6H PRN Scar Gaxiola MD        Or   • promethazine (PHENERGAN) injection 12.5 mg  12.5 mg Intravenous Q6H PRN Scar Gaxiola MD       • sodium chloride 0.9 % flush 30 mL  30 mL Intravenous Once PRN Scar Gaxiola MD       • sodium chloride 0.9 % infusion  30 mL/hr Intravenous Continuous PRN Scar Gaxiola MD 30 mL/hr at 01/28/19 1215 30 mL/hr at 01/28/19 1215   • sodium chloride 0.9 % infusion  9 mL/hr Intravenous Continuous Protzer, Casandra Sun MD 9 mL/hr at 02/08/19 0850 9 mL/hr at 02/08/19 0850   • sodium chloride 7 % nebulizer solution nebulizer solution 4 mL  4 mL Nebulization BID - RT Yahaira Garza APRN   4 mL at 02/11/19 0727   • warfarin (COUMADIN) tablet 2 mg  2 mg Oral Daily Yahaira Garza APRN   2 mg at 02/11/19 1800       Assessment/Plan   1. Oliguric [facility] - ATN post AVR/MVR.  HD-dep since 1/30;  Urine not recorded due to frequent stools and incontinence.   2. SP AVR, MVR, TV repair, left cryo MAZE PENNIE ligation.  POD14  3. Anemia. Iv venofer.   4. Probable OCTAVIANO  5. GERD - on PPI   6.  Paroxysmal atrial fibrillation  7. Leukocytosis.   Stable, WBC is 11.89      Plan  1.  HD tomorrow.   2.  CCP working on out-pt dialysis ([facility] vs [facility])            Maria Fernanda Maldonado MD  02/12/19  10:48 AM

## 2019-02-12 NOTE — THERAPY TREATMENT NOTE
Acute Care - Occupational Therapy Treatment Note  [facility]     Patient Name: Claudia Arnold  : 1945  MRN: [id number]  Today's Date: 2019  Onset of Illness/Injury or Date of Surgery: 19          Admit Date: 2019       ICD-10-CM ICD-9-CM   1. Acute congestive heart failure, unspecified heart failure type (CMS/[facility]) I50.9 428.0   2. Generalized weakness R53.1 780.79   3. Aortic valve stenosis, etiology of cardiac valve disease unspecified I35.0 424.1   4. Tricuspid valve insufficiency, unspecified etiology I07.1 397.0   5. Mitral valve stenosis, unspecified etiology I05.0 394.0   6. Acute renal failure, unspecified acute renal failure type (CMS/[facility]) N17.9 584.9   7. S/P AVR (aortic valve replacement) Z95.2 V43.3     Patient Active Problem List   Diagnosis   • Tear of rotator cuff   • Hypertension   • Generalized anxiety disorder   • Hyperlipidemia   • IFG (impaired fasting glucose)   • Heart murmur, systolic   • Shoulder pain, bilateral   • Pain of both shoulder joints   • Chronic pain of both shoulders   • Acute congestive heart failure (CMS/[facility])   • Obesity, morbid, BMI 50 or higher (CMS/[facility])   • Fungal skin infection   • Cellulitis of lower extremity   • Aortic valve stenosis   • Tricuspid valve insufficiency   • Mitral valve stenosis     Past Medical History:   Diagnosis Date   • A-fib (CMS/[facility])     Meds   • Arthritis     w/Difficult Mobility   • Bilateral lower extremity edema     Legs   • CAD (coronary artery disease)     Affecting LAD    • Cardiomyopathy (CMS/[facility])    • CHF (congestive heart failure) (CMS/[facility])    • Chronic fatigue    • Generalized anxiety disorder    • Hernia, inguinal     Hx Repair   • History of echocardiogram 19-[facility]    The L Ventricular Cavity is Mild-to-Moderately Dilated; Left Ventricular Wall Thickness is Consistent w/Mild Concentric Hypertrophy; Left Atrial Cavity Size is Moderate-to-Severely Dilated; Severe AVS; Severe MVS; Moderate TVR Noted   •  Hyperlipidemia     Controlled w/Meds   • Hypertension     Controlled w/Meds   • Hypokinesis 01/22/2019    Apical Noted on Cardiac Cath   • IFG (impaired fasting glucose)    • LAD stenosis 01/22/2019    Mid LAD Irregularities Noted on Cardiac Cath    • Left atrial dilatation 01/17/2019    Moderate-Severe Noted on Echo   • Left ventricular dilatation 01/17/2019    Noted on Echo/LEE   • Mild concentric left ventricular hypertrophy 01/17/2019    Noted on Echo/LEE   • Mitral annular calcification 01/17/2019    Severe Noted on Echo   • Moderate tricuspid valve regurgitation 01/24/2019    Noted on LEE   • Morbid obesity (CMS/[facility])    • NSTEMI (non-ST elevated myocardial infarction) (CMS/[facility])    • OCTAVIANO (obstructive sleep apnea)    • Osteoarthritis    • Pulmonary hypertension (CMS/[facility]) 01/22/2019    Noted on Cardiac Cath   • Right bundle branch block 01/24/2019    Noted on LEE   • Severe aortic stenosis 01/22/2019    Noted on Cardiac Cath & LEE on 01/24/19   • Severe mitral valve stenosis 01/24/2019    Noted on LEE   • Shoulder pain, bilateral    • Skin yeast infection     Folds Under Skin--Nystatin/Diflucan     Past Surgical History:   Procedure Laterality Date   • AORTIC VALVE REPAIR/REPLACEMENT MITRAL VALVE REPAIR/REPLACEMENT N/A 1/28/2019    Procedure: LEE STERNOTOMY AORTIC VALVE REPLACEMENT, MITRAL VALVE REPLACEMENT, TRICUSPID VALVE REPAIR, MAZE PROCEDURE, CLOSURE OF LEFT ATRIAL APPENDAGE  AND PRP;  Surgeon: Scar Gaxiola MD;  Location: [facility];  Service: Cardiothoracic   • CARDIAC CATHETERIZATION N/A 1/22/2019    Procedure: Coronary angiography;  Surgeon: Rick Talavera MD;  Location: [facility] INVASIVE LOCATION;  Service: Cardiology   • CARDIAC CATHETERIZATION N/A 1/22/2019    Procedure: Left Heart Cath;  Surgeon: Rick Talavera MD;  Location: [facility] INVASIVE LOCATION;  Service: Cardiology   • CARDIAC CATHETERIZATION N/A 1/22/2019    Procedure: Right Heart Cath;  Surgeon: Sadaf  Rick MARSH MD;  Location:  [facility] CATH INVASIVE LOCATION;  Service: Cardiology   • CARDIAC CATHETERIZATION N/A 1/22/2019    Procedure: Left ventriculography;  Surgeon: Rick Talavera MD;  [facility];  Service: Cardiology   • COLONOSCOPY     • HERNIA REPAIR      NO FURTHER INFORMATION   • INSERTION HEMODIALYSIS CATHETER N/A 2/8/2019    Procedure: tunnel CATHETER PLACEMENT WITH FLUROSCOPY;  Surgeon: Satish Stahl MD;  Location: [facility] MAIN OR;  Service: Vascular   • REPLACEMENT TOTAL KNEE Bilateral 2007/2009       Therapy Treatment    Rehabilitation Treatment Summary     Row Name 02/12/19 1530 02/12/19 1052          Treatment Time/Intention    Discipline  occupational therapist  -SG  physical therapist  -CH     Document Type  therapy note (daily note)  -SG  therapy note (daily note)  -CH     Subjective Information  no complaints  -SG  no complaints  -CH     Mode of Treatment  individual therapy;occupational therapy  -SG  physical therapy  -CH     Patient/Family Observations  Pt supine in bed  -SG  pt supine in bed, no acute distress noted at rest pt reports she is tearful today  -CH     Patient Effort  good  -SG  good  -CH     Existing Precautions/Restrictions  cardiac;fall;sternal  -SG  cardiac;fall;sternal  -CH     Recorded by [SG] Jess Dumont, OTR 02/12/19 1532 [CH] Kae Aldrich, PT 02/12/19 1132     Row Name 02/12/19 1530             Vital Signs    O2 Delivery Pre Treatment  supplemental O2  -SG      Recorded by [SG] Jess Dumont, OTR 02/12/19 1532      Row Name 02/12/19 1052             Cognitive Assessment/Intervention    Additional Documentation  Cognitive Assessment/Intervention (Group)  -CH      Recorded by [CH] Kae Aldrich, PT 02/12/19 1132      Row Name 02/12/19 1530 02/12/19 1052          Cognitive Assessment/Intervention- PT/OT    Affect/Mental Status (Cognitive)  WNL  -SG  --     Orientation Status (Cognition)  oriented x 3  -SG   oriented x 3  -CH     Follows Commands (Cognition)  WFL  -SG  WFL  -CH     Personal Safety Interventions  --  fall prevention program maintained;nonskid shoes/slippers when out of bed;gait belt  -CH     Recorded by [SG] Jess Dumont, OTR 02/12/19 1532 [CH] Kae Aldrich, PT 02/12/19 1132     Row Name 02/12/19 1530 02/12/19 1052          Bed Mobility Assessment/Treatment    Bed Mobility Assessment/Treatment  rolling left;rolling right  -SG  --     Rolling Left [place] (Bed Mobility)  moderate assist (50% patient effort);2 person assist  -SG  --     Rolling Right [place] (Bed Mobility)  moderate assist (50% patient effort);2 person assist  -SG  --     Supine-Sit [place] (Bed Mobility)  --  verbal cues;nonverbal cues (demo/gesture);maximum assist (25% patient effort);2 person assist  -CH     Sit-Supine [place] (Bed Mobility)  --  verbal cues;nonverbal cues (demo/gesture);maximum assist (25% patient effort);2 person assist  -CH     Recorded by [SG] Jess Dumont, OTR 02/12/19 1532 [CH] Kae Aldrich, PT 02/12/19 1132     Row Name 02/12/19 1052             Transfer Assessment/Treatment    Comment (Transfers)  attempted to stand x2, pt unable to fully clear  bottom from bed  -CH      Recorded by [CH] Kae Aldrich, PT 02/12/19 1132      Row Name 02/12/19 1052             Sit-Stand Transfer    Sit-Stand [place] (Transfers)  verbal cues;nonverbal cues (demo/gesture);dependent (less than 25% patient effort);2 person assist  -      Assistive Device (Sit-Stand Transfers)  -- HHA  -CH      Recorded by [CH] Kae Aldrich, PT 02/12/19 1132      Row Name 02/12/19 1530             ADL Assessment/Intervention    BADL Assessment/Intervention  feeding;toileting  -SG      Recorded by [SG] Jess Dumont, OTR 02/12/19 1532      Row Name 02/12/19 1530             Self-Feeding Assessment/Training    Comment (Feeding)  Simulated self feeding, given built up  for utensils  -       Recorded by [SG] Jess Dumont, ALR 02/12/19 1532      Row Name 02/12/19 1530             Toileting Assessment/Training    Comment (Toileting)  Dependent for brief change in supine position  -SG      Recorded by [SG] Jess Dumont OTR 02/12/19 1532      Row Name 02/12/19 1530 02/12/19 1052          Motor Skills Assessment/Interventions    Additional Documentation  Therapeutic Exercise (Group)  -SG  Therapeutic Exercise (Group)  -CH     Recorded by [SG] Jess Dumont, JANELLE 02/12/19 1532 [CH] Kae Aldrich, PT 02/12/19 1132     Row Name 02/12/19 1530             Therapeutic Exercise    Therapeutic Exercise  supine, upper extremities  -SG      Recorded by [SG] Jess Dumont OTR 02/12/19 1532      Row Name 02/12/19 1530             Upper Extremity Supine Therapeutic Exercise    Performed, Supine Upper Extremity (Therapeutic Exercise)  shoulder flexion/extension;shoulder/scapular protraction/retraction;elbow flexion/extension  -SG      Exercise Type, Supine Upper Extremity (Therapeutic Exercise)  AAROM (active assistive range of motion)  -SG      Expected Outcomes, Supine Upper Extremity (Therapeutic Exercise)  improve functional tolerance, self-care activity;improve functional tolerance, single extremity activity  -SG      Recorded by [SG] Jess Dumont OTR 02/12/19 1532      Row Name 02/12/19 1052             Lower Extremity Seated Therapeutic Exercise    Performed, Seated Lower Extremity (Therapeutic Exercise)  ankle dorsiflexion/plantarflexion;LAQ (long arc quad), knee extension  -      Sets/Reps Detail, Seated Lower Extremity (Therapeutic Exercise)  10  -CH      Recorded by [CH] Kae Aldrich, PT 02/12/19 1132      Row Name 02/12/19 1530 02/12/19 1052          Positioning and Restraints    Pre-Treatment Position  in bed  -SG  in bed  -CH     Post Treatment Position  bed  -SG  bed  -CH     In Bed  supine;call light within reach;encouraged to call for assist;exit alarm on;with nsg;notified  nsg  -SG  supine;call light within reach;encouraged to call for assist  -CH     Recorded by [SG] Jess Dumont, OTR 02/12/19 1532 [CH] Kae Aldrich, PT 02/12/19 1132     Row Name 02/12/19 1052             Pain Assessment    Additional Documentation  Pain Scale: Numbers Pre/Post-Treatment (Group)  -CH      Recorded by [CH] Kae Aldrich, PT 02/12/19 1132      Row Name 02/12/19 1530 02/12/19 1052          Pain Scale: Numbers Pre/Post-Treatment    Pain Scale: Numbers, Pretreatment  0/10 - no pain  -SG  0/10 - no pain  -CH     Recorded by [SG] Jess Dumont, OTR 02/12/19 1532 [CH] Kae Aldrich, PT 02/12/19 1132     Row Name                Wound 01/28/19 1138 chest incision    Wound - Properties Group Date first assessed: 01/28/19 [SR] Time first assessed: 1138 [SR] Location: chest [SR] Type: incision [SR] Recorded by:  [SR] Keisha Gandara RN 01/28/19 1138    Row Name                Wound 01/28/19 1215 coccyx unspecified    Wound - Properties Group Date first assessed: 01/28/19 [MB] Time first assessed: 1215 [MB] Present On Admission : yes;picture taken [MB] Location: coccyx [MB] Type: unspecified [MB] Recorded by:  [MB] Julissa Chatterjee, RN 01/28/19 1541    Row Name                Wound 01/31/19 2300 Right gluteal pressure injury    Wound - Properties Group Date first assessed: 01/31/19 [NW] Time first assessed: 2300 [NW] Present On Admission : no [NW] Side: Right [NW] Location: gluteal [NW] Type: pressure injury [NW] Stage, Pressure Injury: deep tissue injury [NW] Recorded by:  [NW] Priscilla Willams, RN 02/01/19 0414    Row Name                Wound 01/31/19 2000 Left posterior ear pressure injury    Wound - Properties Group Date first assessed: 01/31/19 [NW] Time first assessed: 2000 [NW] Side: Left [NW] Orientation: posterior [NW] Location: ear [NW2] Type: pressure injury [NW] Stage, Pressure Injury: Stage 2;medical device related [NW] Recorded by:  [NW] Priscilla Willams RN 02/01/19 0416 [NW2]  Priscilla Willams, RN 02/01/19 0417    Row Name                Wound 02/08/19 1039 Other (See comments) chest incision    Wound - Properties Group Date first assessed: 02/08/19 [MC] Time first assessed: 1039 [MC] Side: Other (See comments) [MC] Location: chest [MC] Type: incision [MC] Recorded by:  [[place]] Kenya Boudreaux RN 02/08/19 1039    Row Name                Wound 02/08/19 1053 Left neck incision    Wound - Properties Group Date first assessed: 02/08/19 [MC] Time first assessed: 1053 [MC] Side: Left [MC] Location: neck [MC] Type: incision [MC] Recorded by:  [[place]] Kenya Boudreaux RN 02/08/19 1053    Row Name 02/12/19 1052             Plan of Care Review    Plan of Care Reviewed With  patient  -CH      Recorded by [CH] Kae Aldrich, PT 02/12/19 1132      Row Name 02/12/19 1052             Outcome Summary/Treatment Plan (PT)    Anticipated Discharge Disposition (PT)  skilled nursing facility  -CH      Recorded by [CH] Kae Aldrich, PT 02/12/19 1132        User Key  (r) = Recorded By, (t) = Taken By, (c) = Cosigned By    Initials Name Effective Dates Discipline    SG Jess Dumont, OTR 12/26/18 -  OT    CH Kae Aldrich, PT 04/03/18 -  PT    Julissa Sweet RN 06/16/16 -  Nurse    Keisha Fournier RN 06/16/16 -  Nurse    Kenya Qureshi RN 02/23/18 -  Nurse    Priscilla Jensen RN 03/05/18 -  Nurse        Wound 01/28/19 1138 chest incision (Active)   Dressing Appearance open to air 2/12/2019 12:28 PM   Closure Approximated;Open to air 2/12/2019 12:28 PM   Base clean;dry;pink 2/12/2019 12:28 PM   Periwound intact;dry;pink 2/12/2019 12:28 PM   Periwound Temperature warm 2/12/2019 12:28 PM   Drainage Amount none 2/12/2019 12:28 PM   Dressing Care, Wound open to air 2/12/2019 12:28 PM       Wound 01/28/19 1215 coccyx unspecified (Active)   Dressing Appearance open to air 2/12/2019 12:28 PM   Closure Open to air 2/12/2019 12:28 PM   Base purple;pink 2/12/2019 12:28 PM   Periwound  intact;pink;dry 2/12/2019 12:28 PM   Periwound Temperature warm 2/12/2019 12:28 PM   Drainage Amount none 2/12/2019 12:28 PM   Care, Wound barrier applied 2/12/2019  9:12 AM   Dressing Care, Wound open to air 2/12/2019 12:28 PM   Periwound Care, Wound barrier ointment applied 2/12/2019 12:28 PM       Wound 01/31/19 2300 Right gluteal pressure injury (Active)   Dressing Appearance open to air 2/12/2019 12:28 PM   Closure Open to air 2/12/2019 12:28 PM   Base maroon/purple;clean 2/12/2019 12:28 PM   Periwound intact;pink;dry 2/12/2019 12:28 PM   Periwound Temperature warm 2/12/2019 12:28 PM   Drainage Amount none 2/12/2019 12:28 PM   Care, Wound barrier applied 2/12/2019  9:12 AM   Dressing Care, Wound open to air 2/12/2019 12:28 PM   Periwound Care, Wound barrier ointment applied 2/12/2019 12:28 PM       Wound 01/31/19 2000 Left posterior ear pressure injury (Active)   Dressing Appearance open to air 2/12/2019 12:28 PM   Closure Open to air 2/12/2019 12:28 PM   Base pink;clean;dry 2/12/2019 12:28 PM   Periwound intact 2/12/2019 12:28 PM   Periwound Temperature warm 2/12/2019 12:28 PM   Drainage Amount none 2/12/2019 12:28 PM   Dressing Care, Wound open to air 2/12/2019 12:28 PM       Wound 02/08/19 1039 Other (See comments) chest incision (Active)   Dressing Appearance no drainage;dry;intact 2/12/2019 12:28 PM   Closure SOPHIA 2/12/2019 12:28 PM   Periwound intact;dry;pink 2/12/2019 12:28 PM   Periwound Temperature warm 2/12/2019 12:28 PM   Drainage Amount none 2/12/2019 12:28 PM   Dressing Care, Wound transparent film 2/12/2019  9:12 AM       Wound 02/08/19 1053 Left neck incision (Active)   Dressing Appearance open to air 2/12/2019 12:28 PM   Closure Open to air 2/12/2019 12:28 PM   Base clean;dry;pink 2/12/2019 12:28 PM   Periwound ecchymotic 2/12/2019 12:28 PM   Periwound Temperature warm 2/12/2019 12:28 PM   Drainage Amount none 2/12/2019 12:28 PM   Dressing Care, Wound open to air 2/12/2019 12:28 PM        Occupational Therapy Education     Title: PT OT SLP Therapies (In Progress)     Topic: Occupational Therapy (In Progress)     Point: ADL training (In Progress)     Description: Instruct learner(s) on proper safety adaptation and remediation techniques during self care or transfers.   Instruct in proper use of assistive devices.    Learning Progress Summary           Patient Acceptance, E,TB, VU by SG at 2/12/2019  3:33 PM    Comment:  Given built up  for utensils; cont using foam block, pt states hands getting stronger    Acceptance, E, VU by CW at 1/25/2019  1:26 PM    Comment:  Enncouraged increased participation with ADL's grooming. Discussed safety and positioning.    Acceptance, E, NR by SG1 at 1/17/2019  2:00 PM    Comment:  safety for adls. May benefit from AE teaching and e/c and w/s teaching   Family Acceptance, E, NR by SG1 at 1/17/2019  2:00 PM    Comment:  safety for adls. May benefit from AE teaching and e/c and w/s teaching                               User Key     Initials Effective Dates Name Provider Type Discipline    [facility] [place]18 -  Keisha Hernandez, OTR Occupational Therapist OT     12/26/18 -  Jess Dumont, OTR Occupational Therapist OT     06/08/18 -  Christina Colmenares OTR Occupational Therapist OT                OT Recommendation and Plan     Plan of Care Review  Plan of Care Reviewed With: patient  Plan of Care Reviewed With: patient  Outcome Summary: Tolerates UE ther ex in supine position to strengthen for ADL tasks.  Outcome Measures     Row Name 02/12/19 1100 02/11/19 0900 02/10/19 1000       How much help from another person do you currently need...    Turning from your back to your side while in flat bed without using bedrails?  2  -CH  2  -CH  2  -EH    Moving from lying on back to sitting on the side of a flat bed without bedrails?  2  -CH  2  -CH  2  -EH    Moving to and from a bed to a chair (including a wheelchair)?  1  -CH  1  -CH  1  -EH    Standing up from a  chair using your arms (e.g., wheelchair, bedside chair)?  2  -CH  2  -CH  2  -EH    Climbing 3-5 steps with a railing?  1  -CH  1  -CH  1  -    To walk in hospital room?  1  -CH  1  -CH  1  -EH    AM-PAC 6 Clicks Score  9  -CH  9  -CH  9  -EH       Functional Assessment    Outcome Measure Options  AM-PAC 6 Clicks Basic Mobility (PT)  -CH  AM-PAC 6 Clicks Basic Mobility (PT)  -  --      User Key  (r) = Recorded By, (t) = Taken By, (c) = Cosigned By    Initials Name Provider Type     Kae Aldrich, PT Physical Therapist     Cadence Cobb, PTA Physical Therapy Assistant           Time Calculation:   Time Calculation- OT     Row Name 02/12/19 1533             Time Calculation- OT    OT Start Time  1353  -      OT Stop Time  1416  -      OT Time Calculation (min)  23 min  -      OT Received On  02/12/19  -        User Key  (r) = Recorded By, (t) = Taken By, (c) = Cosigned By    Initials Name Provider Type     Jess Dumont OTR Occupational Therapist           Therapy Suggested Charges     Code   Minutes Charges    None           Therapy Charges for Today     Code Description Service Date Service Provider Modifiers Qty    79880457073 HC OT SELF CARE/MGMT/TRAIN EA 15 MIN 2/12/2019 Jess Dumont OTR GO 1    [id number]  OT THER PROC EA 15 MIN 2/12/2019 Jess Dumont OTR GO 1               JANELLE Hernandez  2/12/2019

## 2019-02-12 NOTE — PLAN OF CARE
Problem: Breathing Pattern Ineffective (Adult)  Intervention: Minimize Oxygen Consumption/Demand   02/12/19 0350   Coping/Psychosocial Interventions   Environmental Support environmental consistency promoted;personal routine supported;rest periods encouraged   Activity   Activity Management activity adjusted per tolerance   Pain/Comfort/Sleep Interventions   Sleep/Rest Enhancement consistent schedule promoted

## 2019-02-12 NOTE — PLAN OF CARE
Problem: Fall Risk (Adult)  Goal: Identify Related Risk Factors and Signs and Symptoms  Outcome: Outcome(s) achieved Date Met: 02/12/19 02/12/19 0544   Fall Risk (Adult)   Related Risk Factors (Fall Risk) bladder function altered;gait/mobility problems;sleep pattern alteration;slippery/uneven surfaces;environment unfamiliar   Signs and Symptoms (Fall Risk) presence of risk factors     Goal: Absence of Fall  Outcome: Ongoing (interventions implemented as appropriate)   02/12/19 0544   Fall Risk (Adult)   Absence of Fall making progress toward outcome       Problem: Patient Care Overview  Goal: Plan of Care Review  Outcome: Ongoing (interventions implemented as appropriate)   02/12/19 0544   Coping/Psychosocial   Plan of Care Reviewed With patient   Plan of Care Review   Progress no change   OTHER   Outcome Summary Vitals stable. No falls. No c/o pain. Pt remains on 2L nasal cannula. V wire I/T. Pt had HD during shift. Pt having several loose stools during shift. Pt refusing turns at times, education complete. Pt resting comfortably. Monitoring closely.        Problem: Anxiety (Adult)  Goal: Reduction/Resolution  Outcome: Ongoing (interventions implemented as appropriate)   02/12/19 0544   Anxiety (Adult)   Reduction/Resolution making progress toward outcome       Problem: Activity Intolerance (Adult)  Goal: Activity Tolerance  Outcome: Ongoing (interventions implemented as appropriate)   02/12/19 0544   Activity Intolerance (Adult)   Activity Tolerance making progress toward outcome       Problem: Skin Injury Risk (Adult)  Goal: Skin Health and Integrity  Outcome: Ongoing (interventions implemented as appropriate)   02/12/19 0544   Skin Injury Risk (Adult)   Skin Health and Integrity making progress toward outcome       Problem: Cardiac Surgery (Adult)  Goal: Signs and Symptoms of Listed Potential Problems Will be Absent, Minimized or Managed (Cardiac Surgery)  Outcome: Ongoing (interventions implemented as  appropriate)   02/12/19 0544   Goal/Outcome Evaluation   Problems Assessed (Cardiac Surgery) all   Problems Present (Cardiac Surgery) functional deficit;respiratory compromise;situational response

## 2019-02-12 NOTE — PROGRESS NOTES
LOS: 27 days   Patient Care Team:  Robert Cortez MD as PCP - General (Family Medicine)  Deborah Agudelo MD as Surgeon (Orthopedic Surgery)    Chief Complaint: Follow-up for tissue aortic and mitral valve replacements, tricuspid valve repair, paroxysmal atrial fibrillation with RVR, acute diastolic and valvular CHF.    Interval History: Had bowel movement yesterday - now with some diarrhea after enema.  Heart rate is higher today.  No CP or SOA.      Vital Signs:  Temp:  [97.4 °F (36.3 °C)-97.7 °F (36.5 °C)] 97.7 °F (36.5 °C)  Heart Rate:  [] 90  Resp:  [18-20] 20  BP: (132-141)/(63-82) 137/63    Intake/Output Summary (Last 24 hours) at 2/12/2019 0915  Last data filed at 2/12/2019 0727  Gross per 24 hour   Intake 340 ml   Output 1000 ml   Net -660 ml       Physical Exam:   General Appearance:    No acute distress, alert and oriented x4   Lungs:     Clear to auscultation bilaterally     Heart:     Irregularly irregular rhythm with a tachy rate.  II/VI SM RUSB   Abdomen:     Soft, non-tender, non-distended.    Extremities:   Trace edema.     Results Review:    Results from last 7 days   Lab Units 02/12/19  0459   SODIUM mmol/L 138   POTASSIUM mmol/L 4.0   CHLORIDE mmol/L 96*   CO2 mmol/L 27.1   BUN mg/dL 25*   CREATININE mg/dL 1.41*   GLUCOSE mg/dL 114*   CALCIUM mg/dL 9.5         Results from last 7 days   Lab Units 02/12/19  0459   WBC 10*3/mm3 16.95*   HEMOGLOBIN g/dL 8.8*   HEMATOCRIT % 29.0*   PLATELETS 10*3/mm3 220     Results from last 7 days   Lab Units 02/12/19  0459 02/11/19  1801   INR  1.33* 1.24*                   I reviewed the patient's new clinical results.        Assessment:  . Acute valvular and diastolic CHF  2. Severe aortic stenosis, severe mitral stenosis secondary to calcification, and severe tricuspid regurgitation  3. Normal EF 60-65%  4. Paroxysmal atrial fibrillation with RVR -status post cardioversion 1/24/2019 and subsequent reversion to atrial fibrillation.  5. Moderate  pulmonary hypertension (mean PA pressure 32)  6. Status post tissue AVR, tissue MVR, tricuspid valve repair (Martha suture repair), left cryomaze, and PENNIE ligation on 1/28/2019 (Dr. Gaxiola).  7. Small bilateral pulmonary emboli  8. Oliguric acute kidney injury post-operatively - on hemodialysis and status post tunneled catheter placement on 2/8/2019.  9. Bifascicular block (RBBB and LAFB)  10. Candida intertrigo (skin folds); left axillary fungal infection (treated)  11. Osteoarthritis with immobility  12. Left wrist thrombophlebitis from IV infiltration  13. Undiagnosed OCTAVIANO  14. Morbid obesity   15. Anemia of chronic disease  16. Severe deconditioning   17. Junctional bradycardia and asystole post-op - resolved    Plan:  -Rate is higher today.  I am going to try her on metoprolol for better rate-control.  She has not had recent bradycardia, but will watch closely.  -Coumadin 2 mg started yesterday.  Daily INR.  -Had HD last night - continue for volume control.  -Continue physical therapy.  CCP working on discharge for rehab and dialysis arrangements.    Scott Segura MD  02/12/19  9:15 AM

## 2019-02-12 NOTE — PROGRESS NOTES
" LOS: 27 days   Patient Care Team:  Robert Cortez MD as PCP - General (Family Medicine)  Deborah Agudelo MD as Surgeon (Orthopedic Surgery)    Chief Complaint: postop fu    Subjective:  Symptoms:  She reports diarrhea.  No shortness of breath or chest pain.    Diet:  She reports  nausea (early this am, improved).    Activity level: Impaired due to weakness.    Pain:  She reports no pain.          Vital Signs  Temp:  [97.4 °F (36.3 °C)-97.7 °F (36.5 °C)] 97.7 °F (36.5 °C)  Heart Rate:  [] 90  Resp:  [18-20] 20  BP: (132-141)/(63-82) 137/63  Body mass index is 59.84 kg/m².    Intake/Output Summary (Last 24 hours) at 2/12/2019 0910  Last data filed at 2/12/2019 0727  Gross per 24 hour   Intake 340 ml   Output 1000 ml   Net -660 ml     I/O this shift:  In: 120 [P.O.:120]  Out: -           02/08/19  0615 02/10/19  0900 02/12/19  0511   Weight: 134 kg (296 lb) (!) 176 kg (387 lb 8 oz) (!) 173 kg (382 lb 0.9 oz)         Objective:  General Appearance:  Comfortable.    Vital signs: (most recent): Blood pressure 137/63, pulse 90, temperature 97.7 °F (36.5 °C), temperature source Oral, resp. rate 20, height 170.2 cm (67\"), weight (!) 173 kg (382 lb 0.9 oz), SpO2 95 %.    Output: Producing urine and producing stool.    Lungs:  Normal effort and normal respiratory rate.  Breath sounds clear to auscultation.    Heart: Normal rate.  Irregular rhythm.  S1 normal and S2 normal.    Abdomen: Abdomen is soft and non-distended.  Bowel sounds are normal.     Extremities: There is no dependent edema.    Neurological: Patient is alert and oriented to person, place and time.    Skin:  Warm and dry.  (Sternal incision well approximated without erythema, edema, or drainage)            Results Review:        WBC WBC   Date Value Ref Range Status   02/12/2019 16.95 (H) 3.40 - 10.80 10*3/mm3 Final   02/11/2019 11.89 (H) 4.50 - 10.70 10*3/mm3 Final   02/10/2019 10.55 4.50 - 10.70 10*3/mm3 Final      HGB Hemoglobin   Date Value Ref " Range Status   02/12/2019 8.8 (L) 12.0 - 15.9 g/dL Final   02/11/2019 8.2 (L) 11.9 - 15.5 g/dL Final   02/10/2019 8.2 (L) 11.9 - 15.5 g/dL Final      HCT Hematocrit   Date Value Ref Range Status   02/12/2019 29.0 (L) 34.0 - 46.6 % Final   02/11/2019 25.7 (L) 35.6 - 45.5 % Final   02/10/2019 26.2 (L) 35.6 - 45.5 % Final      Platelets Platelets   Date Value Ref Range Status   02/12/2019 220 140 - 450 10*3/mm3 Final   02/11/2019 255 140 - 500 10*3/mm3 Final   02/10/2019 227 140 - 500 10*3/mm3 Final        PT/INR:    Protime   Date Value Ref Range Status   02/12/2019 16.3 (H) 11.7 - 14.2 Seconds Final   02/11/2019 15.4 (H) 11.7 - 14.2 Seconds Final   /  INR   Date Value Ref Range Status   02/12/2019 1.33 (H) 0.90 - 1.10 Final   02/11/2019 1.24 (H) 0.90 - 1.10 Final       Sodium Sodium   Date Value Ref Range Status   02/12/2019 138 136 - 145 mmol/L Final   02/11/2019 137 136 - 145 mmol/L Final   02/10/2019 138 136 - 145 mmol/L Final      Potassium Potassium   Date Value Ref Range Status   02/12/2019 4.0 3.5 - 5.2 mmol/L Final   02/11/2019 4.0 3.5 - 5.2 mmol/L Final   02/10/2019 4.2 3.5 - 5.2 mmol/L Final      Chloride Chloride   Date Value Ref Range Status   02/12/2019 96 (L) 98 - 107 mmol/L Final   02/11/2019 95 (L) 98 - 107 mmol/L Final   02/10/2019 97 (L) 98 - 107 mmol/L Final      Bicarbonate CO2   Date Value Ref Range Status   02/12/2019 27.1 22.0 - 29.0 mmol/L Final   02/11/2019 25.3 22.0 - 29.0 mmol/L Final   02/10/2019 26.2 22.0 - 29.0 mmol/L Final      BUN BUN   Date Value Ref Range Status   02/12/2019 25 (H) 8 - 23 mg/dL Final   02/11/2019 57 (H) 8 - 23 mg/dL Final   02/10/2019 40 (H) 8 - 23 mg/dL Final      Creatinine Creatinine   Date Value Ref Range Status   02/12/2019 1.41 (H) 0.57 - 1.00 mg/dL Final   02/11/2019 2.64 (H) 0.57 - 1.00 mg/dL Final   02/10/2019 1.97 (H) 0.57 - 1.00 mg/dL Final      Calcium Calcium   Date Value Ref Range Status   02/12/2019 9.5 8.6 - 10.5 mg/dL Final   02/11/2019 9.8 8.6 -  10.5 mg/dL Final   02/10/2019 9.7 8.6 - 10.5 mg/dL Final      Magnesium No results found for: MG         acetaminophen 500 mg Oral Q4H   aspirin 81 mg Oral Daily   cetaphil  Topical Q12H   enoxaparin 30 mg Subcutaneous Nightly   escitalopram 10 mg Oral Daily   famotidine 20 mg Oral Daily   hydrocortisone 1 application Topical Q6H   ipratropium-albuterol 3 mL Nebulization BID - RT   iron sucrose 200 mg Intravenous Q24H   lactulose 30 g Oral Daily   mupirocin  Each Nare BID   sennosides-docusate sodium 2 tablet Oral Nightly   sodium chloride 4 mL Nebulization BID - RT   warfarin 2 mg Oral Daily       sodium chloride 30 mL/hr Last Rate: 30 mL/hr (01/28/19 1215)   sodium chloride 9 mL/hr Last Rate: 9 mL/hr (02/08/19 0850)           Patient Active Problem List   Diagnosis Code   • Tear of rotator cuff M75.100   • Hypertension I10   • Generalized anxiety disorder F41.1   • Hyperlipidemia E78.5   • IFG (impaired fasting glucose) R73.01   • Heart murmur, systolic R01.1   • Shoulder pain, bilateral M25.511, M25.512   • Pain of both shoulder joints M25.511, M25.512   • Chronic pain of both shoulders M25.511, G89.29, M25.512   • Acute congestive heart failure (CMS/HCC) I50.9   • Obesity, morbid, BMI 50 or higher (CMS/HCC) E66.01   • Fungal skin infection B36.9   • Cellulitis of lower extremity L03.119   • Aortic valve stenosis I35.0   • Tricuspid valve insufficiency I07.1   • Mitral valve stenosis I05.0       Assessment & Plan    -Severe AS, MS, TV regurgitation s/p AVR/MVR (tissue),TV repair, MAZE PENNIE ligation-- POD#15 (Pagni)  -NICM, non obstructive dx by cath----EF improved to 60%  -RBBB  -HTN---controlled  -Morbid obesity with arthritis-complicating mobility and all aspects of care  -Hypoventilation sx, probable OCTAVIANO-----chronic CO2 retain by ABG, pulmonary following  -Possible PE, NO LE DVT------nosebleeds on heparin  -Preop new a.fib----s/p cardioversion 1/24/19, reverted to a.fib again  -Left arm thrombophlebitis,  infiltration  -LAVERNE-----Left IJ shiley, HD per renal  -Leukocytosis-----  -preop anemia, post op expected ABL  -Post op junctional bradycardia-----back in a.flutter/a.fib with rate 60-80    No pacemaker needed, no beta blocker r/t bradycardia.  Loading coumadin.  Plan [facility] when outpatient HD set up.  White count trend up, afebrille, diarrheal stool, check c.diff.  Remove v.wires.     Alicia Schmitz, MORGAN  02/12/19  9:10 AM

## 2019-02-12 NOTE — THERAPY TREATMENT NOTE
Acute Care - Physical Therapy Treatment Note  [facility]     Patient Name: Claudia Arnlod  : 1945  MRN: [id number]  Today's Date: 2019  Onset of Illness/Injury or Date of Surgery: 19          Admit Date: 2019    Visit Dx:    ICD-10-CM ICD-9-CM   1. Acute congestive heart failure, unspecified heart failure type (CMS/HCC) I50.9 428.0   2. Generalized weakness R53.1 780.79   3. Aortic valve stenosis, etiology of cardiac valve disease unspecified I35.0 424.1   4. Tricuspid valve insufficiency, unspecified etiology I07.1 397.0   5. Mitral valve stenosis, unspecified etiology I05.0 394.0   6. Acute renal failure, unspecified acute renal failure type (CMS/HCC) N17.9 584.9   7. S/P AVR (aortic valve replacement) Z95.2 V43.3     Patient Active Problem List   Diagnosis   • Tear of rotator cuff   • Hypertension   • Generalized anxiety disorder   • Hyperlipidemia   • IFG (impaired fasting glucose)   • Heart murmur, systolic   • Shoulder pain, bilateral   • Pain of both shoulder joints   • Chronic pain of both shoulders   • Acute congestive heart failure (CMS/HCC)   • Obesity, morbid, BMI 50 or higher (CMS/HCC)   • Fungal skin infection   • Cellulitis of lower extremity   • Aortic valve stenosis   • Tricuspid valve insufficiency   • Mitral valve stenosis       Therapy Treatment    Rehabilitation Treatment Summary     Row Name 19 1052             Treatment Time/Intention    Discipline  physical therapist  -      Document Type  therapy note (daily note)  -CH      Subjective Information  no complaints  -CH      Mode of Treatment  physical therapy  -CH      Patient/Family Observations  pt supine in bed, no acute distress noted at rest pt reports she is tearful today  -      Patient Effort  good  -      Existing Precautions/Restrictions  cardiac;fall;sternal  -CH      Recorded by [] Kae Aldrich, PT 19 1132      Row Name 19 1052             Cognitive Assessment/Intervention     Additional Documentation  Cognitive Assessment/Intervention (Group)  -CH      Recorded by [] Kae Aldrich, PT 02/12/19 1132      Row Name 02/12/19 1052             Cognitive Assessment/Intervention- PT/OT    Orientation Status (Cognition)  oriented x 3  -CH      Follows Commands (Cognition)  WFL  -CH      Personal Safety Interventions  fall prevention program maintained;nonskid shoes/slippers when out of bed;gait belt  -CH      Recorded by [] Kae Aldrich, PT 02/12/19 1132      Row Name 02/12/19 1052             Bed Mobility Assessment/Treatment    Supine-Sit [place] (Bed Mobility)  verbal cues;nonverbal cues (demo/gesture);maximum assist (25% patient effort);2 person assist  -CH      Sit-Supine [place] (Bed Mobility)  verbal cues;nonverbal cues (demo/gesture);maximum assist (25% patient effort);2 person assist  -CH      Recorded by [] Kae Aldrich, PT 02/12/19 1132      Row Name 02/12/19 1052             Transfer Assessment/Treatment    Comment (Transfers)  attempted to stand x2, pt unable to fully clear  bottom from bed  -CH      Recorded by [] Kae Aldrich, PT 02/12/19 1132      Row Name 02/12/19 1052             Sit-Stand Transfer    Sit-Stand [place] (Transfers)  verbal cues;nonverbal cues (demo/gesture);dependent (less than 25% patient effort);2 person assist  -      Assistive Device (Sit-Stand Transfers)  -- HHA  -CH      Recorded by [] Kae Aldrich, PT 02/12/19 1132      Row Name 02/12/19 1052             Motor Skills Assessment/Interventions    Additional Documentation  Therapeutic Exercise (Group)  -CH      Recorded by [] Kae Aldrich, PT 02/12/19 1132      Row Name 02/12/19 1052             Lower Extremity Seated Therapeutic Exercise    Performed, Seated Lower Extremity (Therapeutic Exercise)  ankle dorsiflexion/plantarflexion;LAQ (long arc quad), knee extension  -      Sets/Reps Detail, Seated Lower Extremity (Therapeutic Exercise)  10   -CH      Recorded by [CH] Kae Aldrich, PT 02/12/19 1132      Row Name 02/12/19 1052             Positioning and Restraints    Pre-Treatment Position  in bed  -CH      Post Treatment Position  bed  -CH      In Bed  supine;call light within reach;encouraged to call for assist  -CH      Recorded by [CH] Kae Aldrich, PT 02/12/19 1132      Row Name 02/12/19 1052             Pain Assessment    Additional Documentation  Pain Scale: Numbers Pre/Post-Treatment (Group)  -CH      Recorded by [CH] Kae Aldrich, PT 02/12/19 1132      Row Name 02/12/19 1052             Pain Scale: Numbers Pre/Post-Treatment    Pain Scale: Numbers, Pretreatment  0/10 - no pain  -CH      Recorded by [CH] Kae Aldrich, PT 02/12/19 1132      Row Name                Wound 01/28/19 1138 chest incision    Wound - Properties Group Date first assessed: 01/28/19 [SR] Time first assessed: 1138 [SR] Location: chest [SR] Type: incision [SR] Recorded by:  [SR] Keisha Gandara RN 01/28/19 1138    Row Name                Wound 01/28/19 1215 coccyx unspecified    Wound - Properties Group Date first assessed: 01/28/19 [MB] Time first assessed: 1215 [MB] Present On Admission : yes;picture taken [MB] Location: coccyx [MB] Type: unspecified [MB] Recorded by:  [MB] Julissa Chatterjee, RN 01/28/19 1541    Row Name                Wound 01/31/19 2300 Right gluteal pressure injury    Wound - Properties Group Date first assessed: 01/31/19 [NW] Time first assessed: 2300 [NW] Present On Admission : no [NW] Side: Right [NW] Location: gluteal [NW] Type: pressure injury [NW] Stage, Pressure Injury: deep tissue injury [NW] Recorded by:  [NW] Priscilla Willams, RN 02/01/19 0414    Row Name                Wound 01/31/19 2000 Left posterior ear pressure injury    Wound - Properties Group Date first assessed: 01/31/19 [NW] Time first assessed: 2000 [NW] Side: Left [NW] Orientation: posterior [NW] Location: ear [NW2] Type: pressure injury [NW] Stage, Pressure Injury:  Stage 2;medical device related [NW] Recorded by:  [NW] Priscilla Willams RN 02/01/19 0416 [NW2] Priscilla Willams RN 02/01/19 0417    Row Name                Wound 02/08/19 1039 Other (See comments) chest incision    Wound - Properties Group Date first assessed: 02/08/19 [MC] Time first assessed: 1039 [MC] Side: Other (See comments) [MC] Location: chest [MC] Type: incision [MC] Recorded by:  [[place]] Kenya Boudreaux RN 02/08/19 1039    Row Name                Wound 02/08/19 1053 Left neck incision    Wound - Properties Group Date first assessed: 02/08/19 [MC] Time first assessed: 1053 [MC] Side: Left [MC] Location: neck [MC] Type: incision [MC] Recorded by:  [[place]] Kenya Boudreaux RN 02/08/19 1053    Row Name 02/12/19 1052             Plan of Care Review    Plan of Care Reviewed With  patient  -CH      Recorded by [CH] Kae Aldrich, PT 02/12/19 1132      Row Name 02/12/19 1052             Outcome Summary/Treatment Plan (PT)    Anticipated Discharge Disposition (PT)  skilled nursing facility  -      Recorded by [CH] Kae Aldrich, PT 02/12/19 1132        User Key  (r) = Recorded By, (t) = Taken By, (c) = Cosigned By    Initials Name Effective Dates Discipline    CH Kae Aldrich, PT 04/03/18 -  PT    Julissa Sweet, RN 06/16/16 -  Nurse    Keisha Fournier RN 06/16/16 -  Nurse    Kenya Qureshi RN 02/23/18 -  Nurse    Priscilla Jensen RN 03/05/18 -  Nurse          Wound 01/28/19 1138 chest incision (Active)   Dressing Appearance open to air 2/12/2019  9:12 AM   Closure Approximated;Open to air 2/12/2019  9:12 AM   Base clean;dry;pink 2/12/2019  9:12 AM   Periwound intact;dry;pink 2/12/2019  9:12 AM   Periwound Temperature warm 2/12/2019  9:12 AM   Drainage Amount none 2/12/2019  9:12 AM   Dressing Care, Wound open to air 2/12/2019  9:12 AM       Wound 01/28/19 1215 coccyx unspecified (Active)   Dressing Appearance open to air 2/12/2019  9:12 AM   Closure Open to air 2/12/2019  9:12 AM   Base  purple;pink 2/12/2019  9:12 AM   Periwound intact;pink;dry 2/12/2019  9:12 AM   Periwound Temperature warm 2/12/2019  9:12 AM   Wound Length (cm) 1 cm 2/11/2019 11:40 AM   Wound Width (cm) 0.1 cm 2/11/2019 11:40 AM   Drainage Amount none 2/12/2019  9:12 AM   Care, Wound barrier applied 2/12/2019  9:12 AM   Dressing Care, Wound open to air 2/12/2019  9:12 AM   Periwound Care, Wound barrier ointment applied 2/12/2019  9:12 AM       Wound 01/31/19 2300 Right gluteal pressure injury (Active)   Dressing Appearance open to air 2/12/2019  9:12 AM   Closure Open to air 2/12/2019  9:12 AM   Base maroon/purple;clean 2/12/2019  9:12 AM   Periwound intact;pink;dry 2/12/2019  9:12 AM   Periwound Temperature warm 2/12/2019  9:12 AM   Drainage Amount none 2/12/2019  9:12 AM   Care, Wound barrier applied 2/12/2019  9:12 AM   Dressing Care, Wound open to air 2/12/2019  9:12 AM   Periwound Care, Wound barrier ointment applied 2/12/2019  9:12 AM       Wound 01/31/19 2000 Left posterior ear pressure injury (Active)   Dressing Appearance open to air 2/12/2019  9:12 AM   Closure Open to air 2/12/2019  9:12 AM   Base pink;clean;dry 2/12/2019  9:12 AM   Periwound intact 2/12/2019  9:12 AM   Periwound Temperature warm 2/12/2019  9:12 AM   Drainage Amount none 2/12/2019  9:12 AM   Dressing Care, Wound open to air 2/12/2019  9:12 AM       Wound 02/08/19 1039 Other (See comments) chest incision (Active)   Dressing Appearance no drainage;dry;intact 2/12/2019  9:12 AM   Closure SOPHIA 2/12/2019  9:12 AM   Periwound intact;dry;pink 2/12/2019  9:12 AM   Periwound Temperature warm 2/12/2019  9:12 AM   Drainage Amount none 2/12/2019  9:12 AM   Dressing Care, Wound transparent film 2/12/2019  9:12 AM       Wound 02/08/19 1053 Left neck incision (Active)   Dressing Appearance open to air 2/12/2019  9:12 AM   Closure Open to air 2/12/2019  9:12 AM   Base clean;dry;pink 2/12/2019  9:12 AM   Periwound ecchymotic 2/12/2019  9:12 AM   Periwound Temperature  warm 2/12/2019  9:12 AM   Drainage Amount none 2/12/2019  9:12 AM   Dressing Care, Wound open to air 2/12/2019  9:12 AM           Physical Therapy Education     Title: PT OT SLP Therapies (In Progress)     Topic: Physical Therapy (Done)     Point: Mobility training (Done)     Learning Progress Summary           Patient Acceptance, E,TB,D, VU,NR by  at 2/12/2019 11:32 AM    Acceptance, E,TB,D, VU,NR by  at 2/11/2019  9:53 AM    Acceptance, E,D, VU,DU,NR by  at 2/10/2019 10:10 AM    Acceptance, E,TB,D, VU,NR by CH at 2/7/2019 12:44 PM    Acceptance, E, VU,NR by  at 2/6/2019 10:19 AM    Acceptance, E, VU,NR by MA at 2/5/2019 11:09 AM    Acceptance, E,TB,D, VU,NR by CH at 2/4/2019 11:34 AM    Acceptance, E,D, VU,NR by SM at 2/3/2019  4:47 PM    Acceptance, E,D, DU,NR by DARRYN at 2/2/2019 10:36 AM    Comment:  safety during sit to stand, benefits of activity    Acceptance, E, NR by MA at 1/31/2019 12:08 PM    Acceptance, E, NR by MA at 1/30/2019 10:23 AM    Acceptance, E,TB, VU by  at 1/27/2019  9:10 AM    Acceptance, E, VU,NR by MA at 1/25/2019  3:54 PM    Acceptance, E,TB,D, VU,NR by CH at 1/23/2019  3:14 PM    Acceptance, TB,E, VU,DU by CW at 1/18/2019  4:37 PM    Acceptance, E, NR by EM at 1/17/2019 12:15 PM   Family Acceptance, TB,E, VU,DU by CW at 1/18/2019  4:37 PM                   Point: Home exercise program (Done)     Learning Progress Summary           Patient Acceptance, E,TB,D, VU,NR by  at 2/12/2019 11:32 AM    Acceptance, E,TB,D, VU,NR by  at 2/11/2019  9:53 AM    Acceptance, E,D, VU,DU,NR by EH at 2/10/2019 10:10 AM    Acceptance, E,TB,D, VU,NR by CH at 2/7/2019 12:44 PM    Acceptance, E, VU,NR by EF at 2/6/2019 10:19 AM    Acceptance, E, VU,NR by MA at 2/5/2019 11:09 AM    Acceptance, E,TB,D, VU,NR by CH at 2/4/2019 11:34 AM    Acceptance, E,D, VU,NR by SM at 2/3/2019  4:47 PM    Acceptance, E,D, DU,NR by LC at 2/2/2019 10:36 AM    Comment:  safety during sit to stand, benefits of activity     Acceptance, E,TB,D, VU,NR by  at 2/1/2019 10:51 AM    Acceptance, E, NR by MA at 1/31/2019 12:08 PM    Acceptance, E, NR by MA at 1/30/2019 10:23 AM    Acceptance, E,TB, VU by YANETH at 1/27/2019  9:10 AM    Acceptance, E, VU,NR by MA at 1/25/2019  3:54 PM    Acceptance, E,TB,D, VU,NR by CH at 1/23/2019  3:14 PM    Acceptance, TB,E, VU,DU by CW at 1/18/2019  4:37 PM   Family Acceptance, TB,E, VU,DU by CW at 1/18/2019  4:37 PM                   Point: Body mechanics (Done)     Learning Progress Summary           Patient Acceptance, E,TB,D, VU,NR by  at 2/12/2019 11:32 AM    Acceptance, E,TB,D, VU,NR by  at 2/11/2019  9:53 AM    Acceptance, E,D, VU,DU,NR by  at 2/10/2019 10:10 AM    Acceptance, E,TB,D, VU,NR by CH at 2/7/2019 12:44 PM    Acceptance, E, VU,NR by  at 2/6/2019 10:19 AM    Acceptance, E, VU,NR by MA at 2/5/2019 11:09 AM    Acceptance, E,TB,D, VU,NR by CH at 2/4/2019 11:34 AM    Acceptance, E,D, VU,NR by  at 2/3/2019  4:47 PM    Acceptance, E,D, DU,NR by  at 2/2/2019 10:36 AM    Comment:  safety during sit to stand, benefits of activity    Acceptance, E, NR by MA at 1/31/2019 12:08 PM    Acceptance, E, NR by MA at 1/30/2019 10:23 AM    Acceptance, E,TB, VU by YANETH at 1/27/2019  9:10 AM    Acceptance, E, VU,NR by MA at 1/25/2019  3:54 PM    Acceptance, E,TB,D, VU,NR by CH at 1/23/2019  3:14 PM    Acceptance, TB,E, VU,DU by CW at 1/18/2019  4:37 PM   Family Acceptance, TB,E, VU,DU by CW at 1/18/2019  4:37 PM                   Point: Precautions (Done)     Learning Progress Summary           Patient Acceptance, E,TB,D, VU,NR by  at 2/12/2019 11:32 AM    Acceptance, E,TB,D, VU,NR by  at 2/11/2019  9:53 AM    Acceptance, E,D, VU,DU,NR by  at 2/10/2019 10:10 AM    Acceptance, E,TB,D, VU,NR by  at 2/7/2019 12:44 PM    Acceptance, E, VU,NR by EF at 2/6/2019 10:19 AM    Acceptance, E, VU,NR by MA at 2/5/2019 11:09 AM    Acceptance, E,TB,D, VU,NR by  at 2/4/2019 11:34 AM    Acceptance, E,D, VU,NR  by  at 2/3/2019  4:47 PM    Acceptance, E,D, DU,NR by  at 2/2/2019 10:36 AM    Comment:  safety during sit to stand, benefits of activity    Acceptance, E, NR by MA at 1/31/2019 12:08 PM    Acceptance, E, NR by MA at 1/30/2019 10:23 AM    Acceptance, E,TB, VU by  at 1/27/2019  9:10 AM    Acceptance, E, VU,NR by MA at 1/25/2019  3:54 PM    Acceptance, E,TB,D, VU,NR by  at 1/23/2019  3:14 PM    Acceptance, TB,E, VU,DU by  at 1/18/2019  4:37 PM   Family Acceptance, TB,E, VU,DU by CW at 1/18/2019  4:37 PM                               User Key     Initials Effective Dates Name Provider Type Discipline     06/08/18 -  Heike Anthony, PT Physical Therapist PT     04/03/18 -  Kae Aldrich, PT Physical Therapist PT     04/03/18 -  Heike Strauss, PT Physical Therapist PT     03/07/18 -  Ksenia Lopez, PTA Physical Therapy Assistant PT     06/22/16 -  Zuleyma Mancilla, PT Physical Therapist PT     08/02/16 -  Khurram Littlejohn, PT DPT Physical Therapist PT     03/07/18 -  Jose Ott, PTA Physical Therapy Assistant PT     08/19/18 -  Cadence Cobb, PTA Physical Therapy Assistant PT    MA 10/19/18 -  Tabatha Lawrence, PT Physical Therapist PT                PT Recommendation and Plan  Anticipated Discharge Disposition (PT): skilled nursing facility  Outcome Summary/Treatment Plan (PT)  Anticipated Discharge Disposition (PT): skilled nursing facility  Plan of Care Reviewed With: patient  Outcome Summary: Pt required slightly less assistance with bed mobility today but continues to have difficulty coming to stand. May try transfers with cardiac walker during next session. PT will continue to follow to address strength, mobility, and transfers.  Outcome Measures     Row Name 02/12/19 1100 02/11/19 0900 02/10/19 1000       How much help from another person do you currently need...    Turning from your back to your side while in flat bed without using bedrails?  2  -CH  2  -  2  -     Moving from lying on back to sitting on the side of a flat bed without bedrails?  2  -CH  2  -CH  2  -EH    Moving to and from a bed to a chair (including a wheelchair)?  1  -CH  1  -CH  1  -EH    Standing up from a chair using your arms (e.g., wheelchair, bedside chair)?  2  -CH  2  -CH  2  -EH    Climbing 3-5 steps with a railing?  1  -CH  1  -CH  1  -EH    To walk in hospital room?  1  -CH  1  -CH  1  -EH    AM-PAC 6 Clicks Score  9  -CH  9  -CH  9  -EH       Functional Assessment    Outcome Measure Options  AM-PAC 6 Clicks Basic Mobility (PT)  -CH  AM-PAC 6 Clicks Basic Mobility (PT)  -  --      User Key  (r) = Recorded By, (t) = Taken By, (c) = Cosigned By    Initials Name Provider Type     Kae Aldrich, PT Physical Therapist     Cadence Cobb, STAN Physical Therapy Assistant         Time Calculation:   PT Charges     Row Name 02/12/19 1134             Time Calculation    Start Time  1032  -      Stop Time  1052  -      Time Calculation (min)  20 min  -      PT Received On  02/12/19  -      PT - Next Appointment  02/13/19  -         Time Calculation- PT    Total Timed Code Minutes- PT  20 minute(s)  -        User Key  (r) = Recorded By, (t) = Taken By, (c) = Cosigned By    Initials Name Provider Type     Kae Aldrich, PT Physical Therapist        Therapy Suggested Charges     Code   Minutes Charges    74383 (CPT®) Hc Pt Neuromusc Re Education Ea 15 Min      44856 (CPT®) Hc Pt Ther Proc Ea 15 Min 15 1    23741 (CPT®) Hc Gait Training Ea 15 Min      48728 (CPT®) Hc Pt Therapeutic Act Ea 15 Min 10 1    73558 (CPT®) Hc Pt Manual Therapy Ea 15 Min      21957 (CPT®) Hc Pt Iontophoresis Ea 15 Min      85794 (CPT®) Hc Pt Elec Stim Ea-Per 15 Min      88117 (CPT®) Hc Pt Ultrasound Ea 15 Min      76716 (CPT®) Hc Pt Self Care/Mgmt/Train Ea 15 Min      79054 (CPT®) Hc Pt Prosthetic (S) Train Initial Encounter, Each 15 Min      05496 (CPT®) Hc Pt Orthotic(S)/Prosthetic(S) Encounter, Each 15  Min      95905 (CPT®) Hc Orthotic(S) Mgmt/Train Initial Encounter, Each 15min      Total  25 2        Therapy Charges for Today     Code Description Service Date Service Provider Modifiers Qty    68144094862 HC PT THER PROC EA 15 MIN 2/11/2019 Kae Aldrich, PT GP 1    93455577800 HC PT THER SUPP EA 15 MIN 2/11/2019 Kae Aldrich, PT GP 1    45874079397 HC PT EVAL MOD COMPLEXITY 2 2/12/2019 Kae Aldrich, PT GP 1    08180175654 HC PT THER PROC EA 15 MIN 2/12/2019 Kae Aldrich, PT GP 1    61112864187 HC PT THER SUPP EA 15 MIN 2/12/2019 Kae Aldrich, PT GP 1          PT G-Codes  Outcome Measure Options: AM-PAC 6 Clicks Basic Mobility (PT)  AM-PAC 6 Clicks Score: 9  Score: 9    Kae Aldrich, PT  2/12/2019

## 2019-02-13 LAB
ALBUMIN SERPL-MCNC: 3.5 G/DL (ref 3.5–5.2)
ALBUMIN/GLOB SERPL: 1.1 G/DL
ALP SERPL-CCNC: 81 U/L (ref 39–117)
ALT SERPL W P-5'-P-CCNC: <5 U/L (ref 1–33)
ANION GAP SERPL CALCULATED.3IONS-SCNC: 14.9 MMOL/L
AST SERPL-CCNC: 9 U/L (ref 1–32)
BASOPHILS # BLD AUTO: 0.03 10*3/MM3 (ref 0–0.2)
BASOPHILS NFR BLD AUTO: 0.2 % (ref 0–1.5)
BILIRUB SERPL-MCNC: 0.5 MG/DL (ref 0.1–1.2)
BUN BLD-MCNC: 46 MG/DL (ref 8–23)
BUN/CREAT SERPL: 20.3 (ref 7–25)
CALCIUM SPEC-SCNC: 9.8 MG/DL (ref 8.6–10.5)
CHLORIDE SERPL-SCNC: 98 MMOL/L (ref 98–107)
CO2 SERPL-SCNC: 27.1 MMOL/L (ref 22–29)
CREAT BLD-MCNC: 2.27 MG/DL (ref 0.57–1)
DEPRECATED RDW RBC AUTO: 53.8 FL (ref 37–54)
EOSINOPHIL # BLD AUTO: 0.16 10*3/MM3 (ref 0–0.4)
EOSINOPHIL NFR BLD AUTO: 1.1 % (ref 0.3–6.2)
ERYTHROCYTE [DISTWIDTH] IN BLOOD BY AUTOMATED COUNT: 15.8 % (ref 12.3–15.4)
GFR SERPL CREATININE-BSD FRML MDRD: 21 ML/MIN/1.73
GLOBULIN UR ELPH-MCNC: 3.1 GM/DL
GLUCOSE BLD-MCNC: 107 MG/DL (ref 65–99)
HCT VFR BLD AUTO: 28 % (ref 34–46.6)
HGB BLD-MCNC: 8.6 G/DL (ref 12–15.9)
IMM GRANULOCYTES # BLD AUTO: 0.28 10*3/MM3 (ref 0–0.05)
IMM GRANULOCYTES NFR BLD AUTO: 1.9 % (ref 0–0.5)
INR PPP: 2.02 (ref 0.9–1.1)
LYMPHOCYTES # BLD AUTO: 0.72 10*3/MM3 (ref 0.7–3.1)
LYMPHOCYTES NFR BLD AUTO: 4.9 % (ref 19.6–45.3)
MCH RBC QN AUTO: 29.5 PG (ref 26.6–33)
MCHC RBC AUTO-ENTMCNC: 30.7 G/DL (ref 31.5–35.7)
MCV RBC AUTO: 95.9 FL (ref 79–97)
MONOCYTES # BLD AUTO: 1.3 10*3/MM3 (ref 0.1–0.9)
MONOCYTES NFR BLD AUTO: 8.9 % (ref 5–12)
NEUTROPHILS # BLD AUTO: 12.07 10*3/MM3 (ref 1.4–7)
NEUTROPHILS NFR BLD AUTO: 83 % (ref 42.7–76)
NRBC BLD AUTO-RTO: 0 /100 WBC (ref 0–0)
PLATELET # BLD AUTO: 216 10*3/MM3 (ref 140–450)
PMV BLD AUTO: 9.7 FL (ref 6–12)
POTASSIUM BLD-SCNC: 4.1 MMOL/L (ref 3.5–5.2)
PROT SERPL-MCNC: 6.6 G/DL (ref 6–8.5)
PROTHROMBIN TIME: 22.5 SECONDS (ref 11.7–14.2)
RBC # BLD AUTO: 2.92 10*6/MM3 (ref 3.77–5.28)
SODIUM BLD-SCNC: 140 MMOL/L (ref 136–145)
WBC NRBC COR # BLD: 14.56 10*3/MM3 (ref 3.4–10.8)

## 2019-02-13 PROCEDURE — 85025 COMPLETE CBC W/AUTO DIFF WBC: CPT | Performed by: NURSE PRACTITIONER

## 2019-02-13 PROCEDURE — 99232 SBSQ HOSP IP/OBS MODERATE 35: CPT | Performed by: NURSE PRACTITIONER

## 2019-02-13 PROCEDURE — 80053 COMPREHEN METABOLIC PANEL: CPT | Performed by: INTERNAL MEDICINE

## 2019-02-13 PROCEDURE — 94799 UNLISTED PULMONARY SVC/PX: CPT

## 2019-02-13 PROCEDURE — 5A1D70Z PERFORMANCE OF URINARY FILTRATION, INTERMITTENT, LESS THAN 6 HOURS PER DAY: ICD-10-PCS | Performed by: INTERNAL MEDICINE

## 2019-02-13 PROCEDURE — 63510000001 EPOETIN ALFA PER 1000 UNITS: Performed by: INTERNAL MEDICINE

## 2019-02-13 PROCEDURE — 99024 POSTOP FOLLOW-UP VISIT: CPT | Performed by: NURSE PRACTITIONER

## 2019-02-13 PROCEDURE — 85610 PROTHROMBIN TIME: CPT | Performed by: NURSE PRACTITIONER

## 2019-02-13 RX ADMIN — HYDROCORTISONE 1 APPLICATION: 1 CREAM TOPICAL at 11:23

## 2019-02-13 RX ADMIN — METOPROLOL TARTRATE 25 MG: 25 TABLET ORAL at 12:43

## 2019-02-13 RX ADMIN — ACETAMINOPHEN 500 MG: 500 TABLET, FILM COATED ORAL at 10:25

## 2019-02-13 RX ADMIN — HYDROCORTISONE 1 APPLICATION: 1 CREAM TOPICAL at 05:02

## 2019-02-13 RX ADMIN — EMOLLIENT - LOTION: LOTION at 10:27

## 2019-02-13 RX ADMIN — FAMOTIDINE 20 MG: 20 TABLET, FILM COATED ORAL at 10:24

## 2019-02-13 RX ADMIN — HYDROCORTISONE 1 APPLICATION: 1 CREAM TOPICAL at 00:26

## 2019-02-13 RX ADMIN — MUPIROCIN 10 APPLICATION: 20 OINTMENT TOPICAL at 10:23

## 2019-02-13 RX ADMIN — EMOLLIENT - LOTION: LOTION at 20:32

## 2019-02-13 RX ADMIN — IPRATROPIUM BROMIDE AND ALBUTEROL SULFATE 3 ML: 2.5; .5 SOLUTION RESPIRATORY (INHALATION) at 19:56

## 2019-02-13 RX ADMIN — HYDROCORTISONE 1 APPLICATION: 1 CREAM TOPICAL at 17:15

## 2019-02-13 RX ADMIN — MUPIROCIN 10 APPLICATION: 20 OINTMENT TOPICAL at 20:33

## 2019-02-13 RX ADMIN — Medication 81 MG: at 10:25

## 2019-02-13 RX ADMIN — IPRATROPIUM BROMIDE AND ALBUTEROL SULFATE 3 ML: 2.5; .5 SOLUTION RESPIRATORY (INHALATION) at 07:54

## 2019-02-13 RX ADMIN — BUMETANIDE 4 MG: 2 TABLET ORAL at 10:24

## 2019-02-13 RX ADMIN — ERYTHROPOIETIN 10000 UNITS: 10000 INJECTION, SOLUTION INTRAVENOUS; SUBCUTANEOUS at 08:36

## 2019-02-13 RX ADMIN — ACETAMINOPHEN 500 MG: 500 TABLET, FILM COATED ORAL at 16:22

## 2019-02-13 RX ADMIN — SODIUM CHLORIDE 4 ML: 7 NEBU SOLN,3 % NEBU at 07:54

## 2019-02-13 RX ADMIN — ACETAMINOPHEN 500 MG: 500 TABLET, FILM COATED ORAL at 20:33

## 2019-02-13 RX ADMIN — SODIUM CHLORIDE 4 ML: 7 NEBU SOLN,3 % NEBU at 19:56

## 2019-02-13 RX ADMIN — ACETAMINOPHEN 500 MG: 500 TABLET, FILM COATED ORAL at 04:51

## 2019-02-13 RX ADMIN — ESCITALOPRAM 10 MG: 10 TABLET, FILM COATED ORAL at 10:25

## 2019-02-13 NOTE — PROGRESS NOTES
"   LOS: 28 days    Patient Care Team:  Robert Cortez MD as PCP - General (Family Medicine)  Deborah Agudelo MD as Surgeon (Orthopedic Surgery)    Chief Complaint:    Chief Complaint   Patient presents with   • Shortness of Breath   • Leg Swelling     Follow UP LAVERNE  Subjective     Interval History:  Just completed dialysis.   Eating. Slept well.  Less abdominal cramping and stools.  C.dif negative.        Objective     Vital Signs  Temp:  [97.2 °F (36.2 °C)-97.9 °F (36.6 °C)] 97.2 °F (36.2 °C)  Heart Rate:  [] 137  Resp:  [18-20] 18  BP: ()/(44-71) 102/71    Flowsheet Rows      First Filed Value   Admission Height  170.2 cm (67\") Documented at 01/16/2019 0910   Admission Weight  145 kg (320 lb)  (Abnormal)  Documented at 01/16/2019 0921          No intake/output data recorded.  I/O last 3 completed shifts:  In: 900 [P.O.:900]  Out: 1000 [Other:1000]    Intake/Output Summary (Last 24 hours) at 2/13/2019 1308  Last data filed at 2/13/2019 0452  Gross per 24 hour   Intake 200 ml   Output --   Net 200 ml       Physical Exam:  General Appearance: alert, oriented x 3, no acute distress, obese  Skin: warm and dry  HEENT: Nonicteric sclerae, oral mucosa normal  Neck: supple, no JVD, trachea midline, RIJ TDC  Lungs: Clear to auscultation anteriorly.   Heart: Tachy, RRR, normal S1 and S2, no S3, no rub  Abdomen: soft, non-tender,  +bs  Extremities: Trace pedal edema, no cyanosis or clubbing. Multiple ecchymoses, arms with nodular hematomas.   Neuro: normal speech and mental status       Results Review:    Results from last 7 days   Lab Units 02/13/19  0452 02/12/19  0459 02/11/19  0433  02/07/19  0336   SODIUM mmol/L 140 138 137   < > 135*   POTASSIUM mmol/L 4.1 4.0 4.0   < > 4.5   CHLORIDE mmol/L 98 96* 95*   < > 93*   CO2 mmol/L 27.1 27.1 25.3   < > 24.2   BUN mg/dL 46* 25* 57*   < > 78*   CREATININE mg/dL 2.27* 1.41* 2.64*   < > 2.74*   CALCIUM mg/dL 9.8 9.5 9.8   < > 9.8   BILIRUBIN mg/dL 0.5  --   --   --  " 0.6   ALK PHOS U/L 81  --   --   --  67   ALT (SGPT) U/L <5  --   --   --  <5   AST (SGOT) U/L 9  --   --   --  13   GLUCOSE mg/dL 107* 114* 109*   < > 94    < > = values in this interval not displayed.       Estimated Creatinine Clearance: 36.6 mL/min (A) (by C-G formula based on SCr of 2.27 mg/dL (H)).    Results from last 7 days   Lab Units 02/12/19 0459 02/11/19  0433 02/10/19  0416   PHOSPHORUS mg/dL 2.6 5.3* 4.6*             Results from last 7 days   Lab Units 02/13/19  0452 02/12/19  0459 02/11/19  0433 02/10/19  0416 02/09/19  0520   WBC 10*3/mm3 14.56* 16.95* 11.89* 10.55 11.59*   HEMOGLOBIN g/dL 8.6* 8.8* 8.2* 8.2* 7.4*   PLATELETS 10*3/mm3 216 220 255 227 230       Results from last 7 days   Lab Units 02/13/19  0452 02/12/19  0459 02/11/19  1801   INR  2.02* 1.33* 1.24*         Imaging Results (last 24 hours)     ** No results found for the last 24 hours. **          acetaminophen 500 mg Oral Q4H   aspirin 81 mg Oral Daily   bumetanide 4 mg Oral Daily   cetaphil  Topical Q12H   enoxaparin 30 mg Subcutaneous Nightly   epoetin lisha 10,000 Units Intravenous Once per day on Mon Wed Fri   escitalopram 10 mg Oral Daily   famotidine 20 mg Oral Daily   hydrocortisone 1 application Topical Q6H   ipratropium-albuterol 3 mL Nebulization BID - RT   metoprolol tartrate 25 mg Oral Q12H   mupirocin  Each Nare BID   sodium chloride 4 mL Nebulization BID - RT       sodium chloride 30 mL/hr Last Rate: 30 mL/hr (01/28/19 1215)   sodium chloride 9 mL/hr Last Rate: 9 mL/hr (02/08/19 0850)       Medication Review:   Current Facility-Administered Medications   Medication Dose Route Frequency Provider Last Rate Last Dose   • acetaminophen (TYLENOL) tablet 500 mg  500 mg Oral Q4H Yahaira Garza APRN   500 mg at 02/13/19 1025   • albumin human 25 % IV SOLN 37.5 g  37.5 g Intravenous PRN Sheila Terrazas MD   25 g at 02/09/19 1208   • aspirin chewable tablet 81 mg  81 mg Oral Daily Scar Gaxiola MD   81 mg at 02/13/19  1025   • bisacodyl (DULCOLAX) EC tablet 10 mg  10 mg Oral Daily PRN Scar Gaxiola MD   10 mg at 02/10/19 2116   • bisacodyl (DULCOLAX) suppository 10 mg  10 mg Rectal Daily PRN Scar Gaxiola MD   10 mg at 02/03/19 2120   • bumetanide (BUMEX) tablet 4 mg  4 mg Oral Daily Maria Fernanda Maldonado MD   4 mg at 02/13/19 1024   • bupivacaine PF 30 mL, lidocaine 1 % 30 mL mixture    PRN Satish Stahl MD   18 mL at 02/08/19 1001   • cetaphil lotion   Topical Q12H Zonia Lopez MD       • cyclobenzaprine (FLEXERIL) tablet 10 mg  10 mg Oral Q8H PRN Scar Gaxiola MD       • dextrose (D50W) 25 g/ 50mL Intravenous Solution 25 g  25 g Intravenous Q15 Min PRN Oscar Rodriguez MD       • dextrose (GLUTOSE) oral gel 15 g  15 g Oral Q15 Min PRN Oscar Rodriguez MD       • enoxaparin (LOVENOX) syringe 30 mg  30 mg Subcutaneous Nightly Alicia Schmitz APRN   30 mg at 02/12/19 0912   • epoetin lisha (EPOGEN,PROCRIT) injection 10,000 Units  10,000 Units Intravenous Once per day on Mon Wed Fri Maria Fernanda Maldonado MD   10,000 Units at 02/13/19 0836   • escitalopram (LEXAPRO) tablet 10 mg  10 mg Oral Daily Oscar Rodriguez MD   10 mg at 02/13/19 1025   • famotidine (PEPCID) tablet 20 mg  20 mg Oral Daily Wallace Falcon MD   20 mg at 02/13/19 1024   • glucagon (human recombinant) (GLUCAGEN DIAGNOSTIC) injection 1 mg  1 mg Subcutaneous PRN Oscar Rodriguez MD       • hydrocortisone 1 % cream 1 application  1 application Topical Q6H Fran Tilley MD   1 application at 02/13/19 1123   • ipratropium-albuterol (DUO-NEB) nebulizer solution 3 mL  3 mL Nebulization BID - RT Yahaira Garza APRN   3 mL at 02/13/19 0754   • ipratropium-albuterol (DUO-NEB) nebulizer solution 3 mL  3 mL Nebulization Q6H PRN Yahaira Garza APRN       • magic mouthwash oral supsension 5 mL  5 mL Swish & Spit Q4H PRN Yahaira Garza APRN       • metoprolol tartrate (LOPRESSOR) tablet 25 mg  25 mg Oral Q12H  Maria Fernanda Maldonado MD   25 mg at 02/13/19 1243   • mupirocin (BACTROBAN) 2 % nasal ointment   Each Nare BID Scar Gaxiola MD   10 application at 02/13/19 1023   • ondansetron (ZOFRAN) injection 4 mg  4 mg Intravenous Q6H PRN Scar Gaxiola MD   4 mg at 02/11/19 2022   • promethazine (PHENERGAN) tablet 12.5 mg  12.5 mg Oral Q6H PRN Scar Gaxiola MD        Or   • promethazine (PHENERGAN) injection 12.5 mg  12.5 mg Intravenous Q6H PRN Scar Gaxiola MD       • sodium chloride 0.9 % flush 30 mL  30 mL Intravenous Once PRN Scar Gaxiola MD       • sodium chloride 0.9 % infusion  30 mL/hr Intravenous Continuous PRN Scar Gaxiola MD 30 mL/hr at 01/28/19 1215 30 mL/hr at 01/28/19 1215   • sodium chloride 0.9 % infusion  9 mL/hr Intravenous Continuous ProtzerCasandra MD 9 mL/hr at 02/08/19 0850 9 mL/hr at 02/08/19 0850   • sodium chloride 7 % nebulizer solution nebulizer solution 4 mL  4 mL Nebulization BID - RT Yahaira Garza APRN   4 mL at 02/13/19 0754       Assessment/Plan   1. Oliguric LAVERNE - ATN post AVR/MVR.  HD-dep since 1/30;  Urine not recorded due to frequent stools and incontinence. Dialyzed today.  Waste products rise between treatments, but main issue is volume. Will monitor labs and urine output at [facility] for recovery.   2. SP AVR, MVR, TV repair, left cryo MAZE PENNIE ligation.  POD15  3. Anemia. Iv venofer.   4. Probable OCTAVIANO  5. GERD - on PPI   6.  Paroxysmal atrial fibrillation        Plan  1.  HD next at [facility] on Saturday.   2.  CCP working on out-pt dialysis ([facility] ).  3. Dialysis orders called to [facility] yesterday.   4. DW son .            Maria Fernanda Maldonado MD  02/13/19  1:08 PM

## 2019-02-13 NOTE — PROGRESS NOTES
"    Patient Name: Claudia Arnold  :1945  73 y.o.      Patient Care Team:  Robert Cortez MD as PCP - General (Family Medicine)  Deborah Agudelo MD as Surgeon (Orthopedic Surgery)    Chief Complaint: follow up valvular heart disease, atrial fibrillation.     Interval History: She is currently on dialysis. She was sleeping comfortably when I walked in. She denies any dyspnea. She states her pain is well controlled. HR is in the 90's.        Objective   Vital Signs  Temp:  [97.5 °F (36.4 °C)-97.9 °F (36.6 °C)] 97.5 °F (36.4 °C)  Heart Rate:  [] 97  Resp:  [18-20] 18  BP: ()/(44-65) 99/53    Intake/Output Summary (Last 24 hours) at 2019 0948  Last data filed at 2019 0452  Gross per 24 hour   Intake 560 ml   Output --   Net 560 ml     Flowsheet Rows      First Filed Value   Admission Height  170.2 cm (67\") Documented at 2019 0910   Admission Weight  145 kg (320 lb)  (Abnormal)  Documented at 2019 0921          Physical Exam:   General Appearance:    Alert, cooperative, in no acute distress   Lungs:     Clear to auscultation.  Normal respiratory effort and rate.      Heart:    irregular rhythm and normal rate, normal S1 and S2, 2/6 systolic murmur murmurs, gallops or rubs.     Chest Wall:    No abnormalities observed   Abdomen:     Soft, nontender, positive bowel sounds.     Extremities:   no cyanosis, clubbing or edema.  No marked joint deformities.  Adequate musculoskeletal strength.       Results Review:    Results from last 7 days   Lab Units 19  0452   SODIUM mmol/L 140   POTASSIUM mmol/L 4.1   CHLORIDE mmol/L 98   CO2 mmol/L 27.1   BUN mg/dL 46*   CREATININE mg/dL 2.27*   GLUCOSE mg/dL 107*   CALCIUM mg/dL 9.8         Results from last 7 days   Lab Units 19  0452   WBC 10*3/mm3 14.56*   HEMOGLOBIN g/dL 8.6*   HEMATOCRIT % 28.0*   PLATELETS 10*3/mm3 216     Results from last 7 days   Lab Units 19  0452 19  0459 19  1801   INR  2.02* 1.33* 1.24* "                       Medication Review:     acetaminophen 500 mg Oral Q4H   aspirin 81 mg Oral Daily   bumetanide 4 mg Oral Daily   cetaphil  Topical Q12H   enoxaparin 30 mg Subcutaneous Nightly   epoetin lisha 10,000 Units Intravenous Once per day on Mon Wed Fri   escitalopram 10 mg Oral Daily   famotidine 20 mg Oral Daily   hydrocortisone 1 application Topical Q6H   ipratropium-albuterol 3 mL Nebulization BID - RT   metoprolol tartrate 25 mg Oral Q12H   mupirocin  Each Nare BID   sodium chloride 4 mL Nebulization BID - RT   warfarin 2 mg Oral Daily          sodium chloride 30 mL/hr Last Rate: 30 mL/hr (01/28/19 1215)   sodium chloride 9 mL/hr Last Rate: 9 mL/hr (02/08/19 0850)       Assessment/Plan   1. Severe aortic and mitral valve stenosis, moderate TR - s/p tissue AVR, tissue MVR, TV repair, left cryomaze and LAAA ligation. (1/28 - Dr. Gaxiola).   2. Acute diastolic heart failure 2/2 severe valve disease  3. Atrial fibrillation - rate right around 100. Beta blocker restarted. BP on the low side. Will follow.   4. Acute renal failure - on HD, nephrology following.   5. Small bilateral pulmonary emboli  6. Candida intertrigo (skin folds) , left axillary fungal infection (treated).   7. Morbid obesity with severe deconditioning - effecting all aspects of care  8. Junctional bradycardia and asystole post op - resolved. Tolerating beta blocker.   9. Probable sleep apnea.      - Continue low dose beta blocker. Monitor BP. No bradycardia noted.    - INR jumped from 1.3 to 2.02 after 2 small doses of warfarin. Will hold off on a dose of warfarin today. Check INR daily. Discussed with Dr. Segura.    - Continue physical therapy. CPP working on dc disposition for rehab/dialysis.    MORGAN Arreguin  [facility]  02/13/19  9:48 AM

## 2019-02-13 NOTE — PLAN OF CARE
Problem: Fall Risk (Adult)  Goal: Absence of Fall  Outcome: Ongoing (interventions implemented as appropriate)   02/13/19 0544   Fall Risk (Adult)   Absence of Fall making progress toward outcome       Problem: Patient Care Overview  Goal: Plan of Care Review  Outcome: Ongoing (interventions implemented as appropriate)   02/13/19 0544   Coping/Psychosocial   Plan of Care Reviewed With patient   Plan of Care Review   Progress no change   OTHER   Outcome Summary Vitals stable, pt remains in afib. No falls. No c/o pain. Pt having several loose BMs. Pt refusing turns, education complete. Pt resting comfortably. Monitoring closely.        Problem: Anxiety (Adult)  Goal: Reduction/Resolution  Outcome: Ongoing (interventions implemented as appropriate)   02/13/19 0544   Anxiety (Adult)   Reduction/Resolution making progress toward outcome       Problem: Activity Intolerance (Adult)  Goal: Activity Tolerance  Outcome: Ongoing (interventions implemented as appropriate)   02/13/19 0544   Activity Intolerance (Adult)   Activity Tolerance making progress toward outcome       Problem: Skin Injury Risk (Adult)  Goal: Skin Health and Integrity  Outcome: Ongoing (interventions implemented as appropriate)   02/13/19 0544   Skin Injury Risk (Adult)   Skin Health and Integrity making progress toward outcome       Problem: Cardiac Surgery (Adult)  Goal: Signs and Symptoms of Listed Potential Problems Will be Absent, Minimized or Managed (Cardiac Surgery)  Outcome: Ongoing (interventions implemented as appropriate)   02/13/19 0544   Goal/Outcome Evaluation   Problems Assessed (Cardiac Surgery) all   Problems Present (Cardiac Surgery) functional deficit;respiratory compromise;situational response

## 2019-02-13 NOTE — PROGRESS NOTES
" LOS: 28 days   Patient Care Team:  Robert Cortez MD as PCP - General (Family Medicine)  Deborah Agudelo MD as Surgeon (Orthopedic Surgery)    Chief Complaint: post op fu    Subjective:  Symptoms:  She reports weakness.  No shortness of breath or chest pain.    Diet:  Poor intake.  No nausea or vomiting.    Activity level: Impaired due to weakness.    Pain:  She reports no pain.          Vital Signs  Temp:  [97.5 °F (36.4 °C)-97.9 °F (36.6 °C)] 97.5 °F (36.4 °C)  Heart Rate:  [] 97  Resp:  [18-20] 18  BP: ()/(44-65) 99/53  Body mass index is 59.15 kg/m².    Intake/Output Summary (Last 24 hours) at 2/13/2019 1026  Last data filed at 2/13/2019 0452  Gross per 24 hour   Intake 560 ml   Output --   Net 560 ml     No intake/output data recorded.          02/10/19  0900 02/12/19  0511 02/13/19  0502   Weight: (!) 176 kg (387 lb 8 oz) (!) 173 kg (382 lb 0.9 oz) (!) 171 kg (377 lb 10.4 oz)         Objective:  General Appearance:  Comfortable.    Vital signs: (most recent): Blood pressure 99/53, pulse 97, temperature 97.5 °F (36.4 °C), temperature source Oral, resp. rate 18, height 170.2 cm (67\"), weight (!) 171 kg (377 lb 10.4 oz), SpO2 96 %.    Output: Producing urine and producing stool.    Lungs:  Normal effort and normal respiratory rate.    Heart: Normal rate.  Irregular rhythm.    Abdomen: Abdomen is soft and non-distended.    Extremities: There is no dependent edema.    Neurological: Patient is alert and oriented to person, place and time.    Skin:  Warm and dry.  (Sternum stable)            Results Review:        WBC WBC   Date Value Ref Range Status   02/13/2019 14.56 (H) 3.40 - 10.80 10*3/mm3 Final   02/12/2019 16.95 (H) 3.40 - 10.80 10*3/mm3 Final   02/11/2019 11.89 (H) 4.50 - 10.70 10*3/mm3 Final      HGB Hemoglobin   Date Value Ref Range Status   02/13/2019 8.6 (L) 12.0 - 15.9 g/dL Final   02/12/2019 8.8 (L) 12.0 - 15.9 g/dL Final   02/11/2019 8.2 (L) 11.9 - 15.5 g/dL Final      HCT Hematocrit "   Date Value Ref Range Status   02/13/2019 28.0 (L) 34.0 - 46.6 % Final   02/12/2019 29.0 (L) 34.0 - 46.6 % Final   02/11/2019 25.7 (L) 35.6 - 45.5 % Final      Platelets Platelets   Date Value Ref Range Status   02/13/2019 216 140 - 450 10*3/mm3 Final   02/12/2019 220 140 - 450 10*3/mm3 Final   02/11/2019 255 140 - 500 10*3/mm3 Final        PT/INR:    Protime   Date Value Ref Range Status   02/13/2019 22.5 (H) 11.7 - 14.2 Seconds Final   02/12/2019 16.3 (H) 11.7 - 14.2 Seconds Final   02/11/2019 15.4 (H) 11.7 - 14.2 Seconds Final   /  INR   Date Value Ref Range Status   02/13/2019 2.02 (H) 0.90 - 1.10 Final   02/12/2019 1.33 (H) 0.90 - 1.10 Final   02/11/2019 1.24 (H) 0.90 - 1.10 Final       Sodium Sodium   Date Value Ref Range Status   02/13/2019 140 136 - 145 mmol/L Final   02/12/2019 138 136 - 145 mmol/L Final   02/11/2019 137 136 - 145 mmol/L Final      Potassium Potassium   Date Value Ref Range Status   02/13/2019 4.1 3.5 - 5.2 mmol/L Final   02/12/2019 4.0 3.5 - 5.2 mmol/L Final   02/11/2019 4.0 3.5 - 5.2 mmol/L Final      Chloride Chloride   Date Value Ref Range Status   02/13/2019 98 98 - 107 mmol/L Final   02/12/2019 96 (L) 98 - 107 mmol/L Final   02/11/2019 95 (L) 98 - 107 mmol/L Final      Bicarbonate CO2   Date Value Ref Range Status   02/13/2019 27.1 22.0 - 29.0 mmol/L Final   02/12/2019 27.1 22.0 - 29.0 mmol/L Final   02/11/2019 25.3 22.0 - 29.0 mmol/L Final      BUN BUN   Date Value Ref Range Status   02/13/2019 46 (H) 8 - 23 mg/dL Final   02/12/2019 25 (H) 8 - 23 mg/dL Final   02/11/2019 57 (H) 8 - 23 mg/dL Final      Creatinine Creatinine   Date Value Ref Range Status   02/13/2019 2.27 (H) 0.57 - 1.00 mg/dL Final   02/12/2019 1.41 (H) 0.57 - 1.00 mg/dL Final   02/11/2019 2.64 (H) 0.57 - 1.00 mg/dL Final      Calcium Calcium   Date Value Ref Range Status   02/13/2019 9.8 8.6 - 10.5 mg/dL Final   02/12/2019 9.5 8.6 - 10.5 mg/dL Final   02/11/2019 9.8 8.6 - 10.5 mg/dL Final      Magnesium No results  found for: MG         acetaminophen 500 mg Oral Q4H   aspirin 81 mg Oral Daily   bumetanide 4 mg Oral Daily   cetaphil  Topical Q12H   enoxaparin 30 mg Subcutaneous Nightly   epoetin lisha 10,000 Units Intravenous Once per day on Mon Wed Fri   escitalopram 10 mg Oral Daily   famotidine 20 mg Oral Daily   hydrocortisone 1 application Topical Q6H   ipratropium-albuterol 3 mL Nebulization BID - RT   metoprolol tartrate 25 mg Oral Q12H   mupirocin  Each Nare BID   sodium chloride 4 mL Nebulization BID - RT       sodium chloride 30 mL/hr Last Rate: 30 mL/hr (01/28/19 1215)   sodium chloride 9 mL/hr Last Rate: 9 mL/hr (02/08/19 0850)           Patient Active Problem List   Diagnosis Code   • Tear of rotator cuff M75.100   • Hypertension I10   • Generalized anxiety disorder F41.1   • Hyperlipidemia E78.5   • IFG (impaired fasting glucose) R73.01   • Heart murmur, systolic R01.1   • Shoulder pain, bilateral M25.511, M25.512   • Pain of both shoulder joints M25.511, M25.512   • Chronic pain of both shoulders M25.511, G89.29, M25.512   • Acute congestive heart failure (CMS/HCC) I50.9   • Obesity, morbid, BMI 50 or higher (CMS/HCC) E66.01   • Fungal skin infection B36.9   • Cellulitis of lower extremity L03.119   • Aortic valve stenosis I35.0   • Tricuspid valve insufficiency I07.1   • Mitral valve stenosis I05.0       Assessment & Plan    -Severe AS, MS, TV regurgitation s/p AVR/MVR (tissue),TV repair, MAZE PENNIE ligation-- POD#16 (Pagni)  -NICM, non obstructive dx by cath----EF improved to 60%  -RBBB  -HTN---controlled  -Morbid obesity with arthritis-complicating mobility and all aspects of care  -Hypoventilation sx, probable OCTAVIANO-----chronic CO2 retain by ABG, pulmonary following  -Possible PE, NO LE DVT------nosebleeds on heparin  -Preop new a.fib----s/p cardioversion 1/24/19, reverted to a.fib again  -Left arm thrombophlebitis, infiltration  -LAVERNE----tunnel cath in place, HD per renal  -Leukocytosis-----  -preop anemia, post  op expected ABL  -Post op junctional bradycardia-----back in a.flutter/a.fib with rate 60-80    INR 2.0 today, Coumadin dosing per cardiology.  C.diff negative, white count trending down.  Ok for rehab from CTS standpoint.      Alicia Schmitz, MORGAN  02/13/19  10:26 AM

## 2019-02-13 NOTE — PROGRESS NOTES
Physicians Statement of Medical Necessity for Ambulance Transportation    It is medically necessary for:    Patient Name: Claudia Arnold    Insurance Information:  Medicare AB    To be transported by ambulance:    From (if nursing facility, specify level of care: skilled, assisted, etc): [facility]    To (specify level of care if nursing facility): [place] [facility].  SNF  ([facility])    Date of Service:     For dialysis patients state date dialysis began: 1/30/19    Diagnosis: CHF, LAVERNE, S/P AVR & MVR  Past Medical/Surgical History:  Past Medical History:   Diagnosis Date   • A-fib (CMS/[facility])     Meds   • Arthritis     w/Difficult Mobility   • Bilateral lower extremity edema     Legs   • CAD (coronary artery disease)     Affecting LAD    • Cardiomyopathy (CMS/[facility])    • CHF (congestive heart failure) (CMS/[facility])    • Chronic fatigue    • Generalized anxiety disorder    • Hernia, inguinal     Hx Repair   • History of echocardiogram 1/17/19-[facility]    The L Ventricular Cavity is Mild-to-Moderately Dilated; Left Ventricular Wall Thickness is Consistent w/Mild Concentric Hypertrophy; Left Atrial Cavity Size is Moderate-to-Severely Dilated; Severe AVS; Severe MVS; Moderate TVR Noted   • Hyperlipidemia     Controlled w/Meds   • Hypertension     Controlled w/Meds   • Hypokinesis 01/22/2019    Apical Noted on Cardiac Cath   • IFG (impaired fasting glucose)    • LAD stenosis 01/22/2019    Mid LAD Irregularities Noted on Cardiac Cath    • Left atrial dilatation 01/17/2019    Moderate-Severe Noted on Echo   • Left ventricular dilatation 01/17/2019    Noted on Echo/LEE   • Mild concentric left ventricular hypertrophy 01/17/2019    Noted on Echo/LEE   • Mitral annular calcification 01/17/2019    Severe Noted on Echo   • Moderate tricuspid valve regurgitation 01/24/2019    Noted on LEE   • Morbid obesity (CMS/[facility])    • NSTEMI (non-ST elevated myocardial infarction) (CMS/[facility])    • OCTAVIANO (obstructive  sleep apnea)    • Osteoarthritis    • Pulmonary hypertension (CMS/HCC) 01/22/2019    Noted on Cardiac Cath   • Right bundle branch block 01/24/2019    Noted on LEE   • Severe aortic stenosis 01/22/2019    Noted on Cardiac Cath & LEE on 01/24/19; S/p AVR on 01/28/19 by Dr. Gaxiola   • Severe mitral valve stenosis 01/24/2019    Noted on LEE; S/p MVR on 01/28/19 by Dr. Gaxiola   • Shoulder pain, bilateral    • Skin yeast infection     Folds Under Skin--Nystatin/Diflucan      Past Surgical History:   Procedure Laterality Date   • AORTIC VALVE REPAIR/REPLACEMENT MITRAL VALVE REPAIR/REPLACEMENT N/A 1/28/2019    Procedure: LEE STERNOTOMY AORTIC VALVE REPLACEMENT, MITRAL VALVE REPLACEMENT, TRICUSPID VALVE REPAIR, MAZE PROCEDURE, CLOSURE OF LEFT ATRIAL APPENDAGE  AND PRP;  Surgeon: Scar Gaxiola MD;  Location: [facility] MAIN OR;  Service: Cardiothoracic   • CARDIAC CATHETERIZATION N/A 1/22/2019    Procedure: Coronary angiography;  Surgeon: Rick Talavera MD;  Location: [facility] CATH INVASIVE LOCATION;  Service: Cardiology   • CARDIAC CATHETERIZATION N/A 1/22/2019    Procedure: Left Heart Cath;  Surgeon: Rick Talavera MD;  Location: [facility] CATH [facility]VASIVE LOCATION;  Service: Cardiology   • CARDIAC CATHETERIZATION N/A 1/22/2019    Procedure: Right Heart Cath;  Surgeon: Rick Talavera MD;  Location: [facility] CATH [facility]VASIVE LOCATION;  Service: Cardiology   • CARDIAC CATHETERIZATION N/A 1/22/2019    Procedure: Left ventriculography;  Surgeon: Rick Talavera MD;  Location: [facility] CATH [facility]VASIVE LOCATION;  Service: Cardiology   • COLONOSCOPY     • HERNIA REPAIR     • INSERTION HEMODIALYSIS CATHETER N/A 2/8/2019    Procedure: tunnel CATHETER PLACEMENT WITH FLUROSCOPY;  Surgeon: Satish Stahl MD;  Location: [facility] MAIN OR;  Service: Vascular   • REPLACEMENT TOTAL KNEE Bilateral 2007/2009        Current Objective Medical Evidence(including physical exam finding to support reason for  limitations):    Immobilization syndrome  Bedridden  Requires oxygen  Unable to sit at 90 degree angle    Other:     Physician Signature:           (RN,NP,PA,CAN, Discharge Planner) Date/Time:      Printed Name:    __________________________________    [facility]   Phone: [phone] Phone: [phone]   Fax: [phone] Fax: [phone]

## 2019-02-13 NOTE — PLAN OF CARE
Problem: Patient Care Overview  Goal: Plan of Care Review  Outcome: Ongoing (interventions implemented as appropriate)   02/13/19 4421   Coping/Psychosocial   Plan of Care Reviewed With patient   Plan of Care Review   Progress improving   OTHER   Outcome Summary Pt. refusing turns, plan to d/c to [facility] home tomorrow at 0900, out pt. dialysis set up by ccp, and pt. still in a-fib. On RA. No SOA or chest pain.

## 2019-02-13 NOTE — SIGNIFICANT NOTE
02/13/19 1607   Rehab Treatment   Discipline physical therapist   Reason Treatment Not Performed patient/family declined treatment  (Pt states she is too tired to PT at this time as she had dialysis this AM. PT will check back tomorrow.)   Recommendation   PT - Next Appointment 02/14/19

## 2019-02-13 NOTE — PROGRESS NOTES
Continued Stay Note  [facility]     Patient Name: Claudia Arnold  MRN: [id number]  Today's Date: 2/13/2019    Admit Date: 1/16/2019    Discharge Plan     Row Name 02/13/19 1613       Plan    Plan  [facility] for SNF & H.D. Bed is ready.  [facility] pre-booked Pt transportation with [facility] for Thurs 2/14 @ 09:00 AM    Plan Comments  2/13- Per Veronica, liaison with [facility], Pt bed is ready and hemodialysis is approved and set up to start on Saturday.  [facility] called [facility] and set up Pt ambulance for 9:00 AM Thursday morning.  Packet on [facility] desk.  [facility] updated Pt and her son and daughter at bedside.  CCP following.  [facility] [place]    Row Name 02/13/19 1446       Plan    Plan  [facility] for SNF & H.D.  Bed ready.  [facility] pre-booked for Thursday 2/14 @ 9:00 AM.         Discharge Codes    No documentation.             Maki Lu RN

## 2019-02-14 LAB
ALBUMIN SERPL-MCNC: 3.6 G/DL (ref 3.5–5.2)
ANION GAP SERPL CALCULATED.3IONS-SCNC: 15.2 MMOL/L
APTT PPP: 35 SECONDS (ref 22.7–35.4)
APTT PPP: 81.1 SECONDS (ref 22.7–35.4)
BASOPHILS # BLD AUTO: 0.04 10*3/MM3 (ref 0–0.2)
BASOPHILS # BLD AUTO: 0.05 10*3/MM3 (ref 0–0.2)
BASOPHILS NFR BLD AUTO: 0.4 % (ref 0–1.5)
BASOPHILS NFR BLD AUTO: 0.5 % (ref 0–1.5)
BUN BLD-MCNC: 33 MG/DL (ref 8–23)
BUN/CREAT SERPL: 14.9 (ref 7–25)
CALCIUM SPEC-SCNC: 9.9 MG/DL (ref 8.6–10.5)
CHLORIDE SERPL-SCNC: 95 MMOL/L (ref 98–107)
CO2 SERPL-SCNC: 26.8 MMOL/L (ref 22–29)
CREAT BLD-MCNC: 2.21 MG/DL (ref 0.57–1)
DEPRECATED RDW RBC AUTO: 53.2 FL (ref 37–54)
DEPRECATED RDW RBC AUTO: 55.6 FL (ref 37–54)
EOSINOPHIL # BLD AUTO: 0.14 10*3/MM3 (ref 0–0.4)
EOSINOPHIL # BLD AUTO: 0.17 10*3/MM3 (ref 0–0.4)
EOSINOPHIL NFR BLD AUTO: 1.4 % (ref 0.3–6.2)
EOSINOPHIL NFR BLD AUTO: 1.5 % (ref 0.3–6.2)
ERYTHROCYTE [DISTWIDTH] IN BLOOD BY AUTOMATED COUNT: 16 % (ref 12.3–15.4)
ERYTHROCYTE [DISTWIDTH] IN BLOOD BY AUTOMATED COUNT: 16.1 % (ref 12.3–15.4)
GFR SERPL CREATININE-BSD FRML MDRD: 22 ML/MIN/1.73
GLUCOSE BLD-MCNC: 126 MG/DL (ref 65–99)
HCT VFR BLD AUTO: 30.8 % (ref 34–46.6)
HCT VFR BLD AUTO: 32.1 % (ref 34–46.6)
HGB BLD-MCNC: 9 G/DL (ref 12–15.9)
HGB BLD-MCNC: 9.6 G/DL (ref 12–15.9)
IMM GRANULOCYTES # BLD AUTO: 0.45 10*3/MM3 (ref 0–0.05)
IMM GRANULOCYTES # BLD AUTO: 0.52 10*3/MM3 (ref 0–0.05)
IMM GRANULOCYTES NFR BLD AUTO: 4.5 % (ref 0–0.5)
IMM GRANULOCYTES NFR BLD AUTO: 4.6 % (ref 0–0.5)
INR PPP: 1.97 (ref 0.9–1.1)
INR PPP: 2.04 (ref 0.9–1.1)
INR PPP: 2.06 (ref 0.9–1.1)
LYMPHOCYTES # BLD AUTO: 0.74 10*3/MM3 (ref 0.7–3.1)
LYMPHOCYTES # BLD AUTO: 0.91 10*3/MM3 (ref 0.7–3.1)
LYMPHOCYTES NFR BLD AUTO: 7.4 % (ref 19.6–45.3)
LYMPHOCYTES NFR BLD AUTO: 8 % (ref 19.6–45.3)
MCH RBC QN AUTO: 27.9 PG (ref 26.6–33)
MCH RBC QN AUTO: 28.7 PG (ref 26.6–33)
MCHC RBC AUTO-ENTMCNC: 29.2 G/DL (ref 31.5–35.7)
MCHC RBC AUTO-ENTMCNC: 29.9 G/DL (ref 31.5–35.7)
MCV RBC AUTO: 95.4 FL (ref 79–97)
MCV RBC AUTO: 95.8 FL (ref 79–97)
MONOCYTES # BLD AUTO: 1.1 10*3/MM3 (ref 0.1–0.9)
MONOCYTES # BLD AUTO: 1.3 10*3/MM3 (ref 0.1–0.9)
MONOCYTES NFR BLD AUTO: 11 % (ref 5–12)
MONOCYTES NFR BLD AUTO: 11.4 % (ref 5–12)
NEUTROPHILS # BLD AUTO: 7.48 10*3/MM3 (ref 1.4–7)
NEUTROPHILS # BLD AUTO: 8.44 10*3/MM3 (ref 1.4–7)
NEUTROPHILS NFR BLD AUTO: 74.1 % (ref 42.7–76)
NEUTROPHILS NFR BLD AUTO: 75.2 % (ref 42.7–76)
NRBC BLD AUTO-RTO: 0.1 /100 WBC (ref 0–0)
NRBC BLD AUTO-RTO: 0.1 /100 WBC (ref 0–0)
PHOSPHATE SERPL-MCNC: 3.5 MG/DL (ref 2.5–4.5)
PLATELET # BLD AUTO: 198 10*3/MM3 (ref 140–450)
PLATELET # BLD AUTO: 215 10*3/MM3 (ref 140–450)
PMV BLD AUTO: 10 FL (ref 6–12)
PMV BLD AUTO: 10.1 FL (ref 6–12)
POTASSIUM BLD-SCNC: 4.2 MMOL/L (ref 3.5–5.2)
PROTHROMBIN TIME: 22.1 SECONDS (ref 11.7–14.2)
PROTHROMBIN TIME: 22.7 SECONDS (ref 11.7–14.2)
PROTHROMBIN TIME: 22.8 SECONDS (ref 11.7–14.2)
RBC # BLD AUTO: 3.23 10*6/MM3 (ref 3.77–5.28)
RBC # BLD AUTO: 3.35 10*6/MM3 (ref 3.77–5.28)
SODIUM BLD-SCNC: 137 MMOL/L (ref 136–145)
WBC NRBC COR # BLD: 11.38 10*3/MM3 (ref 3.4–10.8)
WBC NRBC COR # BLD: 9.96 10*3/MM3 (ref 3.4–10.8)

## 2019-02-14 PROCEDURE — 85025 COMPLETE CBC W/AUTO DIFF WBC: CPT | Performed by: NURSE PRACTITIONER

## 2019-02-14 PROCEDURE — 97110 THERAPEUTIC EXERCISES: CPT

## 2019-02-14 PROCEDURE — 85610 PROTHROMBIN TIME: CPT | Performed by: INTERNAL MEDICINE

## 2019-02-14 PROCEDURE — 94799 UNLISTED PULMONARY SVC/PX: CPT

## 2019-02-14 PROCEDURE — 99233 SBSQ HOSP IP/OBS HIGH 50: CPT | Performed by: INTERNAL MEDICINE

## 2019-02-14 PROCEDURE — 85610 PROTHROMBIN TIME: CPT | Performed by: NURSE PRACTITIONER

## 2019-02-14 PROCEDURE — 25010000002 HEPARIN (PORCINE) PER 1000 UNITS: Performed by: NURSE PRACTITIONER

## 2019-02-14 PROCEDURE — 25010000002 AMIODARONE IN DEXTROSE 5% 150-4.21 MG/100ML-% SOLUTION: Performed by: NURSE PRACTITIONER

## 2019-02-14 PROCEDURE — 80069 RENAL FUNCTION PANEL: CPT | Performed by: INTERNAL MEDICINE

## 2019-02-14 PROCEDURE — 99024 POSTOP FOLLOW-UP VISIT: CPT | Performed by: NURSE PRACTITIONER

## 2019-02-14 PROCEDURE — 25010000002 AMIODARONE IN DEXTROSE 5% 360-4.14 MG/200ML-% SOLUTION: Performed by: NURSE PRACTITIONER

## 2019-02-14 PROCEDURE — 93005 ELECTROCARDIOGRAM TRACING: CPT | Performed by: THORACIC SURGERY (CARDIOTHORACIC VASCULAR SURGERY)

## 2019-02-14 PROCEDURE — 85730 THROMBOPLASTIN TIME PARTIAL: CPT | Performed by: THORACIC SURGERY (CARDIOTHORACIC VASCULAR SURGERY)

## 2019-02-14 PROCEDURE — 93010 ELECTROCARDIOGRAM REPORT: CPT | Performed by: INTERNAL MEDICINE

## 2019-02-14 PROCEDURE — 85730 THROMBOPLASTIN TIME PARTIAL: CPT | Performed by: NURSE PRACTITIONER

## 2019-02-14 RX ORDER — METOPROLOL TARTRATE 50 MG/1
50 TABLET, FILM COATED ORAL EVERY 12 HOURS SCHEDULED
Status: DISCONTINUED | OUTPATIENT
Start: 2019-02-14 | End: 2019-02-14

## 2019-02-14 RX ORDER — WARFARIN SODIUM 1 MG/1
1 TABLET ORAL
Status: DISCONTINUED | OUTPATIENT
Start: 2019-02-14 | End: 2019-02-18 | Stop reason: HOSPADM

## 2019-02-14 RX ORDER — HEPARIN SODIUM 10000 [USP'U]/100ML
12 INJECTION, SOLUTION INTRAVENOUS
Status: DISCONTINUED | OUTPATIENT
Start: 2019-02-14 | End: 2019-02-14

## 2019-02-14 RX ORDER — ALPRAZOLAM 0.25 MG/1
0.25 TABLET ORAL 4 TIMES DAILY PRN
Status: DISCONTINUED | OUTPATIENT
Start: 2019-02-14 | End: 2019-02-18 | Stop reason: HOSPADM

## 2019-02-14 RX ORDER — HEPARIN SODIUM 10000 [USP'U]/100ML
8.7 INJECTION, SOLUTION INTRAVENOUS
Status: DISCONTINUED | OUTPATIENT
Start: 2019-02-14 | End: 2019-02-15

## 2019-02-14 RX ADMIN — HYDROCORTISONE 1 APPLICATION: 1 CREAM TOPICAL at 17:30

## 2019-02-14 RX ADMIN — AMIODARONE HYDROCHLORIDE 150 MG: 1.5 INJECTION, SOLUTION INTRAVENOUS at 10:39

## 2019-02-14 RX ADMIN — HYDROCORTISONE 1 APPLICATION: 1 CREAM TOPICAL at 12:42

## 2019-02-14 RX ADMIN — HYDROCORTISONE 1 APPLICATION: 1 CREAM TOPICAL at 00:55

## 2019-02-14 RX ADMIN — WARFARIN SODIUM 1 MG: 1 TABLET ORAL at 17:30

## 2019-02-14 RX ADMIN — EMOLLIENT - LOTION: LOTION at 10:22

## 2019-02-14 RX ADMIN — FAMOTIDINE 20 MG: 20 TABLET, FILM COATED ORAL at 10:22

## 2019-02-14 RX ADMIN — IPRATROPIUM BROMIDE AND ALBUTEROL SULFATE 3 ML: 2.5; .5 SOLUTION RESPIRATORY (INHALATION) at 08:06

## 2019-02-14 RX ADMIN — ACETAMINOPHEN 500 MG: 500 TABLET, FILM COATED ORAL at 00:55

## 2019-02-14 RX ADMIN — MUPIROCIN 10 APPLICATION: 20 OINTMENT TOPICAL at 10:21

## 2019-02-14 RX ADMIN — AMIODARONE HYDROCHLORIDE 1 MG/MIN: 1.8 INJECTION, SOLUTION INTRAVENOUS at 11:08

## 2019-02-14 RX ADMIN — BUMETANIDE 4 MG: 2 TABLET ORAL at 10:22

## 2019-02-14 RX ADMIN — MUPIROCIN 10 APPLICATION: 20 OINTMENT TOPICAL at 22:02

## 2019-02-14 RX ADMIN — ACETAMINOPHEN 500 MG: 500 TABLET, FILM COATED ORAL at 04:54

## 2019-02-14 RX ADMIN — ESCITALOPRAM 10 MG: 10 TABLET, FILM COATED ORAL at 10:22

## 2019-02-14 RX ADMIN — HYDROCORTISONE 1 APPLICATION: 1 CREAM TOPICAL at 22:03

## 2019-02-14 RX ADMIN — ACETAMINOPHEN 500 MG: 500 TABLET, FILM COATED ORAL at 10:20

## 2019-02-14 RX ADMIN — Medication 81 MG: at 10:21

## 2019-02-14 RX ADMIN — EMOLLIENT - LOTION: LOTION at 22:03

## 2019-02-14 RX ADMIN — SODIUM CHLORIDE 4 ML: 7 NEBU SOLN,3 % NEBU at 20:50

## 2019-02-14 RX ADMIN — HYDROCORTISONE 1 APPLICATION: 1 CREAM TOPICAL at 05:01

## 2019-02-14 RX ADMIN — ACETAMINOPHEN 500 MG: 500 TABLET, FILM COATED ORAL at 22:02

## 2019-02-14 RX ADMIN — METOPROLOL TARTRATE 25 MG: 25 TABLET ORAL at 00:57

## 2019-02-14 RX ADMIN — SODIUM CHLORIDE 4 ML: 7 NEBU SOLN,3 % NEBU at 08:07

## 2019-02-14 RX ADMIN — METOPROLOL TARTRATE 25 MG: 25 TABLET ORAL at 22:04

## 2019-02-14 RX ADMIN — IPRATROPIUM BROMIDE AND ALBUTEROL SULFATE 3 ML: 2.5; .5 SOLUTION RESPIRATORY (INHALATION) at 20:42

## 2019-02-14 RX ADMIN — ACETAMINOPHEN 500 MG: 500 TABLET, FILM COATED ORAL at 14:02

## 2019-02-14 RX ADMIN — ACETAMINOPHEN 500 MG: 500 TABLET, FILM COATED ORAL at 17:30

## 2019-02-14 RX ADMIN — AMIODARONE HYDROCHLORIDE 0.5 MG/MIN: 1.8 INJECTION, SOLUTION INTRAVENOUS at 17:30

## 2019-02-14 RX ADMIN — HEPARIN SODIUM 12 UNITS/KG/HR: 10000 INJECTION, SOLUTION INTRAVENOUS at 13:56

## 2019-02-14 NOTE — PROGRESS NOTES
Electrophysiology Follow-Up Note      Patient Name: Claudia Arnold  Age/Sex: 73 y.o. female  : 1945  MRN: [id number]      Day of Service: 19       Chief Complaint/Follow-up: Afib w/RVR, s/p TVrepair, AVR and MVR    S: No complaints.       Temp:  [97.3 °F (36.3 °C)-97.7 °F (36.5 °C)] 97.7 °F (36.5 °C)  Heart Rate:  [] 104  Resp:  [18-20] 20  BP: ()/(47-76) 78/49     PHYSICAL EXAM:    General Appearance: No acute distress.  Eyes: Conjunctiva and lids: No erythema, swelling, or discharge. Sclera non-icteric.   HENT: Atraumatic, normocephalic. External eyes, ears, and nose normal.   Respiratory: No signs of respiratory distress. Respiration rhythm and depth normal.   Cardiovascular:  Heart Rate and Rhythm: Irregularly, irregular, rapid.  Heart Sounds: Normal S1 and S2.   Gastrointestinal:  Abdomen soft, non-distended, non-tender.  Musculoskeletal: Moves all extremities.  Skin: Warm and dry.        ECG/TELE: Afib, 's       Results from last 7 days   Lab Units 19  0503 19  04519  045   SODIUM mmol/L 137 140 138   POTASSIUM mmol/L 4.2 4.1 4.0   CHLORIDE mmol/L 95* 98 96*   CO2 mmol/L 26.8 27.1 27.1   BUN mg/dL 33* 46* 25*   CREATININE mg/dL 2.21* 2.27* 1.41*   GLUCOSE mg/dL 126* 107* 114*   CALCIUM mg/dL 9.9 9.8 9.5     Results from last 7 days   Lab Units 19  1241 19  0503 19  045   WBC 10*3/mm3 9.96 11.38* 14.56*   HEMOGLOBIN g/dL 9.0* 9.6* 8.6*   HEMATOCRIT % 30.8* 32.1* 28.0*   PLATELETS 10*3/mm3 198 215 216     Results from last 7 days   Lab Units 19  1241 19  0503 19  045   INR  2.06* 2.04* 2.02*                   Current Medications:   Scheduled Meds:  acetaminophen 500 mg Oral Q4H   aspirin 81 mg Oral Daily   bumetanide 4 mg Oral Daily   cetaphil  Topical Q12H   epoetin lisha 10,000 Units Intravenous Once per day on    escitalopram 10 mg Oral Daily   famotidine 20 mg Oral Daily   hydrocortisone 1  application Topical Q6H   ipratropium-albuterol 3 mL Nebulization BID - RT   metoprolol tartrate 25 mg Oral Q12H   mupirocin  Each Nare BID   sodium chloride 4 mL Nebulization BID - RT   warfarin 1 mg Oral Daily           Acute congestive heart failure (CMS/HCC)    Hypertension    Generalized anxiety disorder    Hyperlipidemia    IFG (impaired fasting glucose)    Heart murmur, systolic    Obesity, morbid, BMI 50 or higher (CMS/HCC)    Fungal skin infection    Cellulitis of lower extremity    Aortic valve stenosis    Tricuspid valve insufficiency    Mitral valve stenosis       Plan:     Dr Jarrell and I were ask to resee due to afib, which she has been in but her HR is rapid this am. She was suppose to be dc'd rehab today but holding due to tachycardia. Will trying increasing metoprolol but if doesn't work can consider LEE/CV tomorrow.     Ana Cristina Callaway, APRN  02/14/19  0800

## 2019-02-14 NOTE — PROGRESS NOTES
" LOS: 29 days   Patient Care Team:  Robert Cortez MD as PCP - General (Family Medicine)  Deborah Agudelo MD as Surgeon (Orthopedic Surgery)    Chief Complaint: post op fu    Subjective:  Symptoms:  No shortness of breath or chest pain.    Diet:  Adequate intake.  No nausea or vomiting.    Activity level: Impaired due to weakness.    Pain:  She reports no pain.          Vital Signs  Temp:  [97.2 °F (36.2 °C)-97.7 °F (36.5 °C)] 97.5 °F (36.4 °C)  Heart Rate:  [] 131  Resp:  [18-20] 20  BP: ()/(47-76) 97/76  Body mass index is 66.99 kg/m².    Intake/Output Summary (Last 24 hours) at 2/14/2019 1015  Last data filed at 2/14/2019 0700  Gross per 24 hour   Intake 730 ml   Output --   Net 730 ml     No intake/output data recorded.          02/12/19  0511 02/13/19  0502 02/14/19  0640   Weight: (!) 173 kg (382 lb 0.9 oz) (!) 171 kg (377 lb 10.4 oz) (!) 194 kg (427 lb 11.1 oz)         Objective:  General Appearance:  Comfortable.    Vital signs: (most recent): Blood pressure 97/76, pulse (!) 131, temperature 97.5 °F (36.4 °C), temperature source Oral, resp. rate 20, height 170.2 cm (67\"), weight (!) 194 kg (427 lb 11.1 oz), SpO2 93 %.    Output: Producing urine and producing stool.    Lungs:  Normal effort and normal respiratory rate.  Breath sounds clear to auscultation.    Heart: Tachycardia.  Irregular rhythm.  S1 normal and S2 normal.    Abdomen: Abdomen is soft and non-distended.    Extremities: There is no dependent edema.    Neurological: Patient is alert and oriented to person, place and time.    Skin:  Warm and dry.  (Sternal incision well approximated without erythema, edema, or drainage. )            Results Review:        WBC WBC   Date Value Ref Range Status   02/14/2019 11.38 (H) 3.40 - 10.80 10*3/mm3 Final   02/13/2019 14.56 (H) 3.40 - 10.80 10*3/mm3 Final   02/12/2019 16.95 (H) 3.40 - 10.80 10*3/mm3 Final      HGB Hemoglobin   Date Value Ref Range Status   02/14/2019 9.6 (L) 12.0 - 15.9 g/dL " Final   02/13/2019 8.6 (L) 12.0 - 15.9 g/dL Final   02/12/2019 8.8 (L) 12.0 - 15.9 g/dL Final      HCT Hematocrit   Date Value Ref Range Status   02/14/2019 32.1 (L) 34.0 - 46.6 % Final   02/13/2019 28.0 (L) 34.0 - 46.6 % Final   02/12/2019 29.0 (L) 34.0 - 46.6 % Final      Platelets Platelets   Date Value Ref Range Status   02/14/2019 215 140 - 450 10*3/mm3 Final   02/13/2019 216 140 - 450 10*3/mm3 Final   02/12/2019 220 140 - 450 10*3/mm3 Final        PT/INR:    Protime   Date Value Ref Range Status   02/14/2019 22.7 (H) 11.7 - 14.2 Seconds Final   02/13/2019 22.5 (H) 11.7 - 14.2 Seconds Final   02/12/2019 16.3 (H) 11.7 - 14.2 Seconds Final   02/11/2019 15.4 (H) 11.7 - 14.2 Seconds Final   /  INR   Date Value Ref Range Status   02/14/2019 2.04 (H) 0.90 - 1.10 Final   02/13/2019 2.02 (H) 0.90 - 1.10 Final   02/12/2019 1.33 (H) 0.90 - 1.10 Final   02/11/2019 1.24 (H) 0.90 - 1.10 Final       Sodium Sodium   Date Value Ref Range Status   02/14/2019 137 136 - 145 mmol/L Final   02/13/2019 140 136 - 145 mmol/L Final   02/12/2019 138 136 - 145 mmol/L Final      Potassium Potassium   Date Value Ref Range Status   02/14/2019 4.2 3.5 - 5.2 mmol/L Final   02/13/2019 4.1 3.5 - 5.2 mmol/L Final   02/12/2019 4.0 3.5 - 5.2 mmol/L Final      Chloride Chloride   Date Value Ref Range Status   02/14/2019 95 (L) 98 - 107 mmol/L Final   02/13/2019 98 98 - 107 mmol/L Final   02/12/2019 96 (L) 98 - 107 mmol/L Final      Bicarbonate CO2   Date Value Ref Range Status   02/14/2019 26.8 22.0 - 29.0 mmol/L Final   02/13/2019 27.1 22.0 - 29.0 mmol/L Final   02/12/2019 27.1 22.0 - 29.0 mmol/L Final      BUN BUN   Date Value Ref Range Status   02/14/2019 33 (H) 8 - 23 mg/dL Final   02/13/2019 46 (H) 8 - 23 mg/dL Final   02/12/2019 25 (H) 8 - 23 mg/dL Final      Creatinine Creatinine   Date Value Ref Range Status   02/14/2019 2.21 (H) 0.57 - 1.00 mg/dL Final   02/13/2019 2.27 (H) 0.57 - 1.00 mg/dL Final   02/12/2019 1.41 (H) 0.57 - 1.00 mg/dL  Final      Calcium Calcium   Date Value Ref Range Status   02/14/2019 9.9 8.6 - 10.5 mg/dL Final   02/13/2019 9.8 8.6 - 10.5 mg/dL Final   02/12/2019 9.5 8.6 - 10.5 mg/dL Final      Magnesium No results found for: MG         acetaminophen 500 mg Oral Q4H   aspirin 81 mg Oral Daily   bumetanide 4 mg Oral Daily   cetaphil  Topical Q12H   enoxaparin 30 mg Subcutaneous Nightly   epoetin lisha 10,000 Units Intravenous Once per day on Mon Wed Fri   escitalopram 10 mg Oral Daily   famotidine 20 mg Oral Daily   hydrocortisone 1 application Topical Q6H   ipratropium-albuterol 3 mL Nebulization BID - RT   metoprolol tartrate 50 mg Oral Q12H   mupirocin  Each Nare BID   sodium chloride 4 mL Nebulization BID - RT   warfarin 1 mg Oral Daily       sodium chloride 30 mL/hr Last Rate: 30 mL/hr (01/28/19 1215)   sodium chloride 9 mL/hr Last Rate: 9 mL/hr (02/08/19 0850)           Patient Active Problem List   Diagnosis Code   • Tear of rotator cuff M75.100   • Hypertension I10   • Generalized anxiety disorder F41.1   • Hyperlipidemia E78.5   • IFG (impaired fasting glucose) R73.01   • Heart murmur, systolic R01.1   • Shoulder pain, bilateral M25.511, M25.512   • Pain of both shoulder joints M25.511, M25.512   • Chronic pain of both shoulders M25.511, G89.29, M25.512   • Acute congestive heart failure (CMS/[facility]) I50.9   • Obesity, morbid, BMI 50 or higher (CMS/[facility]) E66.01   • Fungal skin infection B36.9   • Cellulitis of lower extremity L03.119   • Aortic valve stenosis I35.0   • Tricuspid valve insufficiency I07.1   • Mitral valve stenosis I05.0       Assessment & Plan    -Severe AS, MS, TV regurgitation s/p AVR/MVR (tissue),TV repair, MAZE PENNIE ligation-- POD#17 (Pagni)  -NICM, non obstructive dx by cath----EF improved to 60%  -RBBB  -HTN---controlled  -Morbid obesity with arthritis-complicating mobility and all aspects of care  -Hypoventilation sx, probable OCTAVIANO-----chronic CO2 retain by ABG, pulmonary following  -Possible PE, NO LE  DVT------nosebleeds on heparin  -Preop new a.fib----s/p cardioversion 1/24/19, reverted to a.fib again  -Left arm thrombophlebitis, infiltration  -LAVERNE----tunnel cath in place, HD per renal  -Leukocytosis-----  -preop anemia, post op expected ABL  -Post op junctional bradycardia-----back in a.flutter/a.fib with rate 60-80    Back in a.flutter RVR today, Dr. Gaxiola wants Amiodarone bolus and drip given, start low dose heparin gtt, Dr. Meeks discussing with Dr. Juarez.  Plan for rehab on hold until rhythm issue stabilized.        Alicia Schmitz, APRDEE  02/14/19  10:15 AM

## 2019-02-14 NOTE — PROGRESS NOTES
Continued Stay Note  [facility]     Patient Name: Claudia Arnold  MRN: [id number]  Today's Date: 2/14/2019    Admit Date: 1/16/2019    Discharge Plan     Row Name 02/14/19 1412       Plan    Plan  [facility] on Tues, Thur & Sat.    Plan Comments  2/14- Pt d/c cancelled.  Ruth, [place] [facility] and [facility]W [facility] at 08:10 AM to cancel ambulance transport this morning.  [facility] notified Veronica, liaison with [facility] about cancelation of Pt discharge due to her heart rate issues.  Pt packet is on CCP desk.  Pt will require ambulance transport upon discharge.  CCP following.  [facility] [place]/[facility]P        Discharge Codes    No documentation.             Maki Lu RN

## 2019-02-14 NOTE — PROGRESS NOTES
LOS: 29 days   Patient Care Team:  Robert oCrtez MD as PCP - General (Family Medicine)  Deborah Agudelo MD as Surgeon (Orthopedic Surgery)    Chief Complaint: Follow-up for tissue aortic and mitral valve replacements, tricuspid valve repair, paroxysmal atrial fibrillation with RVR, acute diastolic and valvular CHF.    Interval History: 2:1 atrial flutter with rate 130 - discharge to rehab held up.  SOA is baseline.  No chest pain.    Vital Signs:  Temp:  [97.3 °F (36.3 °C)-97.7 °F (36.5 °C)] 97.7 °F (36.5 °C)  Heart Rate:  [] 104  Resp:  [18-20] 20  BP: ()/(47-76) 78/49    Intake/Output Summary (Last 24 hours) at 2/14/2019 1338  Last data filed at 2/14/2019 1300  Gross per 24 hour   Intake 730 ml   Output --   Net 730 ml       Physical Exam:   General Appearance:    No acute distress, alert and oriented x4   Lungs:     Clear to auscultation bilaterally     Heart:    Irregularly irregular rhythm with a tachy rate.  II/VI SM RUSB   Abdomen:     Soft, non-tender, non-distended.    Extremities:   Trace edema.     Results Review:    Results from last 7 days   Lab Units 02/14/19  0503   SODIUM mmol/L 137   POTASSIUM mmol/L 4.2   CHLORIDE mmol/L 95*   CO2 mmol/L 26.8   BUN mg/dL 33*   CREATININE mg/dL 2.21*   GLUCOSE mg/dL 126*   CALCIUM mg/dL 9.9         Results from last 7 days   Lab Units 02/14/19  1241   WBC 10*3/mm3 9.96   HEMOGLOBIN g/dL 9.0*   HEMATOCRIT % 30.8*   PLATELETS 10*3/mm3 198     Results from last 7 days   Lab Units 02/14/19  1241 02/14/19  0503 02/13/19  0452   INR  2.06* 2.04* 2.02*   APTT seconds 35.0  --   --                    I reviewed the patient's new clinical results.        Assessment:  1. Acute valvular and diastolic CHF  2. Severe aortic stenosis, severe mitral stenosis secondary to calcification, and severe tricuspid regurgitation  3. Normal EF 60-65%  4. Paroxysmal atrial fibrillation with RVR -status post cardioversion 1/24/2019 and subsequent reversion to atrial  fibrillation.  5. Moderate pulmonary hypertension (mean PA pressure 32)  6. Status post tissue AVR, tissue MVR, tricuspid valve repair (Martha suture repair), left cryomaze, and PENNIE ligation on 1/28/2019 (Dr. Gaxiola).  7. Small bilateral pulmonary emboli  8. Oliguric acute kidney injury post-operatively - on hemodialysis and status post tunneled catheter placement on 2/8/2019.  9. Bifascicular block (RBBB and LAFB)  10. Candida intertrigo (skin folds); left axillary fungal infection (treated)  11. Osteoarthritis with immobility  12. Left wrist thrombophlebitis from IV infiltration  13. Undiagnosed OCTAVIANO  14. Morbid obesity   15. Anemia of chronic disease  16. Severe deconditioning   17. Junctional bradycardia and asystole post-op - resolved  18. Atrial flutter, type unknown    Plan:  -Rapid atrial flutter noted (new).  Dr. Juarez has seen patient - I am going to decrease the metoprolol back to 25 mg bid.  She is hypotensive.    -Amio bolus and drip per Dr. Gaxiola.    -On low-dose Heparin again.  INR 2.06.  Will give 1 mg Coumadin tonight and watch INR closely (increased quickly, and now on Amiodarone).    -HD for volume control.  On Bumex 4 mg po qday.    -If not better by AM, she will need a DCCV.  She will also need a LEE as she has been off anticoagulation intermittently while in atrial fibrillation.     -Rehab when rhythm is stable     Scott Segura MD  02/14/19  1:38 PM

## 2019-02-14 NOTE — PLAN OF CARE
Problem: Fall Risk (Adult)  Goal: Absence of Fall  Outcome: Ongoing (interventions implemented as appropriate)   02/14/19 0612   Fall Risk (Adult)   Absence of Fall making progress toward outcome       Problem: Patient Care Overview  Goal: Plan of Care Review  Outcome: Ongoing (interventions implemented as appropriate)   02/14/19 0612   Coping/Psychosocial   Plan of Care Reviewed With patient   Plan of Care Review   Progress improving   OTHER   Outcome Summary Vitals stable. No falls. Pt c/o neck pain, medicated per MAR. Pt on 1L nasal cannula. Pt to be discharged to rehab at 0900 this AM. Resting comfortably. Monitoring closely.        Problem: Anxiety (Adult)  Goal: Reduction/Resolution  Outcome: Ongoing (interventions implemented as appropriate)   02/14/19 0612   Anxiety (Adult)   Reduction/Resolution making progress toward outcome       Problem: Activity Intolerance (Adult)  Goal: Activity Tolerance  Outcome: Ongoing (interventions implemented as appropriate)   02/14/19 0612   Activity Intolerance (Adult)   Activity Tolerance making progress toward outcome       Problem: Skin Injury Risk (Adult)  Goal: Skin Health and Integrity  Outcome: Ongoing (interventions implemented as appropriate)   02/14/19 0612   Skin Injury Risk (Adult)   Skin Health and Integrity making progress toward outcome       Problem: Cardiac Surgery (Adult)  Goal: Signs and Symptoms of Listed Potential Problems Will be Absent, Minimized or Managed (Cardiac Surgery)  Outcome: Ongoing (interventions implemented as appropriate)   02/14/19 0612   Goal/Outcome Evaluation   Problems Assessed (Cardiac Surgery) all   Problems Present (Cardiac Surgery) functional deficit;pain;respiratory compromise;situational response

## 2019-02-14 NOTE — PLAN OF CARE
Problem: Patient Care Overview  Goal: Plan of Care Review  Outcome: Ongoing (interventions implemented as appropriate)   02/14/19 8079   Coping/Psychosocial   Plan of Care Reviewed With patient   OTHER   Outcome Summary Pt tolerated therapy well today demonstrated by increased independence with bed mobility. Pt also exhibited improved body mechanics and muscle contraction when attempting to come to stand. Skilled PT services continue to be indicated to address strength and functional mobility deficits in order to return to PLOF.

## 2019-02-14 NOTE — PROGRESS NOTES
"   LOS: 29 days    Patient Care Team:  Robert Cortez MD as PCP - General (Family Medicine)  Deborah Agudelo MD as Surgeon (Orthopedic Surgery)    Chief Complaint:    Chief Complaint   Patient presents with   • Shortness of Breath   • Leg Swelling     Follow UP [facility]  Subjective     Interval History:  The patient had dialysis yesterday without any difficulties, denies any chest pain or shortness of air.  No abdominal pain.  Plan for transfer to [place] for rehab and she will have dialysis done there next dialysis planned for Saturday      Objective     Vital Signs  Temp:  [97.2 °F (36.2 °C)-97.7 °F (36.5 °C)] 97.3 °F (36.3 °C)  Heart Rate:  [] 111  Resp:  [18] 18  BP: ()/(47-71) 108/70    Flowsheet Rows      First Filed Value   Admission Height  170.2 cm (67\") Documented at 01/16/2019 0910   Admission Weight  145 kg (320 lb)  (Abnormal)  Documented at 01/16/2019 0921          I/O this shift:  In: 250 [P.O.:250]  Out: -   I/O last 3 completed shifts:  In: 800 [P.O.:800]  Out: -     Intake/Output Summary (Last 24 hours) at 2/14/2019 0653  Last data filed at 2/14/2019 0600  Gross per 24 hour   Intake 370 ml   Output --   Net 370 ml       Physical Exam:  General Appearance: alert, oriented x 3, no acute distress, obese  Skin: warm and dry  HEENT: Nonicteric sclerae, oral mucosa normal  Neck: supple, no JVD, trachea midline, RIJ TDC  Lungs: Clear to auscultation anteriorly.   Heart: Tachy, RRR, normal S1 and S2, no S3, no rub  Abdomen: soft, non-tender,  +bs  Extremities: Trace pedal edema, no cyanosis or clubbing. Multiple ecchymoses, arms with nodular hematomas.   Neuro: normal speech and mental status       Results Review:    Results from last 7 days   Lab Units 02/14/19  0503 02/13/19  0452 02/12/19  0459   SODIUM mmol/L 137 140 138   POTASSIUM mmol/L 4.2 4.1 4.0   CHLORIDE mmol/L 95* 98 96*   CO2 mmol/L 26.8 27.1 27.1   BUN mg/dL 33* 46* 25*   CREATININE mg/dL 2.21* 2.27* 1.41*   CALCIUM mg/dL 9.9 " 9.8 9.5   BILIRUBIN mg/dL  --  0.5  --    ALK PHOS U/L  --  81  --    ALT (SGPT) U/L  --  <5  --    AST (SGOT) U/L  --  9  --    GLUCOSE mg/dL 126* 107* 114*       Estimated Creatinine Clearance: 41.2 mL/min (A) (by C-G formula based on SCr of 2.21 mg/dL (H)).    Results from last 7 days   Lab Units 02/14/19  0503 02/12/19 0459 02/11/19  0433   PHOSPHORUS mg/dL 3.5 2.6 5.3*             Results from last 7 days   Lab Units 02/14/19  0503 02/13/19  0452 02/12/19 0459 02/11/19  0433 02/10/19  0416   WBC 10*3/mm3 11.38* 14.56* 16.95* 11.89* 10.55   HEMOGLOBIN g/dL 9.6* 8.6* 8.8* 8.2* 8.2*   PLATELETS 10*3/mm3 215 216 220 255 227       Results from last 7 days   Lab Units 02/14/19  0503 02/13/19  0452 02/12/19  0459 02/11/19  1801   INR  2.04* 2.02* 1.33* 1.24*         Imaging Results (last 24 hours)     ** No results found for the last 24 hours. **          acetaminophen 500 mg Oral Q4H   aspirin 81 mg Oral Daily   bumetanide 4 mg Oral Daily   cetaphil  Topical Q12H   enoxaparin 30 mg Subcutaneous Nightly   epoetin lisha 10,000 Units Intravenous Once per day on Mon Wed Fri   escitalopram 10 mg Oral Daily   famotidine 20 mg Oral Daily   hydrocortisone 1 application Topical Q6H   ipratropium-albuterol 3 mL Nebulization BID - RT   metoprolol tartrate 25 mg Oral Q12H   mupirocin  Each Nare BID   sodium chloride 4 mL Nebulization BID - RT       sodium chloride 30 mL/hr Last Rate: 30 mL/hr (01/28/19 1215)   sodium chloride 9 mL/hr Last Rate: 9 mL/hr (02/08/19 0850)       Medication Review:   Current Facility-Administered Medications   Medication Dose Route Frequency Provider Last Rate Last Dose   • acetaminophen (TYLENOL) tablet 500 mg  500 mg Oral Q4H Yahaira Garza APRN   500 mg at 02/14/19 0454   • albumin human 25 % IV SOLN 37.5 g  37.5 g Intravenous PRN Sheila Terrazas MD   25 g at 02/09/19 1208   • aspirin chewable tablet 81 mg  81 mg Oral Daily Scar Gaxiola MD   81 mg at 02/13/19 1025   • bisacodyl  (DULCOLAX) EC tablet 10 mg  10 mg Oral Daily PRN Scar Gaxiola MD   10 mg at 02/10/19 2116   • bisacodyl (DULCOLAX) suppository 10 mg  10 mg Rectal Daily PRN Scar Gaxiola MD   10 mg at 02/03/19 2120   • bumetanide (BUMEX) tablet 4 mg  4 mg Oral Daily Maria Fernanda Maldonado MD   4 mg at 02/13/19 1024   • bupivacaine PF 30 mL, lidocaine 1 % 30 mL mixture    PRN Satish Stahl MD   18 mL at 02/08/19 1001   • cetaphil lotion   Topical Q12H Zonia Lopez MD       • cyclobenzaprine (FLEXERIL) tablet 10 mg  10 mg Oral Q8H PRN Scar Gaxiola MD       • dextrose (D50W) 25 g/ 50mL Intravenous Solution 25 g  25 g Intravenous Q15 Min PRN Oscar Rodriguez MD       • dextrose (GLUTOSE) oral gel 15 g  15 g Oral Q15 Min PRN Oscar Rodriguez MD       • enoxaparin (LOVENOX) syringe 30 mg  30 mg Subcutaneous Nightly Alicia Schmitz APRN   30 mg at 02/12/19 0912   • epoetin lisha (EPOGEN,PROCRIT) injection 10,000 Units  10,000 Units Intravenous Once per day on Mon Wed Fri Maria Fernanda Maldonado MD   10,000 Units at 02/13/19 0836   • escitalopram (LEXAPRO) tablet 10 mg  10 mg Oral Daily Oscar Rodriguez MD   10 mg at 02/13/19 1025   • famotidine (PEPCID) tablet 20 mg  20 mg Oral Daily Wallace Falcon MD   20 mg at 02/13/19 1024   • glucagon (human recombinant) (GLUCAGEN DIAGNOSTIC) injection 1 mg  1 mg Subcutaneous PRN Oscar Rodriguez MD       • hydrocortisone 1 % cream 1 application  1 application Topical Q6H Fran Tilley MD   1 application at 02/14/19 0501   • ipratropium-albuterol (DUO-NEB) nebulizer solution 3 mL  3 mL Nebulization BID - RT Yahaira Garza APRN   3 mL at 02/13/19 1956   • ipratropium-albuterol (DUO-NEB) nebulizer solution 3 mL  3 mL Nebulization Q6H PRN Yahaira Garza APRN       • magic mouthwash oral supsension 5 mL  5 mL Swish & Spit Q4H PRN Yahaira Garza APRN       • metoprolol tartrate (LOPRESSOR) tablet 25 mg  25 mg Oral Q12H Maria Fernanda Maldonado MD    25 mg at 02/14/19 0057   • mupirocin (BACTROBAN) 2 % nasal ointment   Each Nare BID Scar Gaxiola MD   10 application at 02/13/19 2033   • ondansetron (ZOFRAN) injection 4 mg  4 mg Intravenous Q6H PRN Scar Gaxiola MD   4 mg at 02/11/19 2022   • promethazine (PHENERGAN) tablet 12.5 mg  12.5 mg Oral Q6H PRN Scar Gaxiola MD        Or   • promethazine (PHENERGAN) injection 12.5 mg  12.5 mg Intravenous Q6H PRN Scar Gaxiola MD       • sodium chloride 0.9 % flush 30 mL  30 mL Intravenous Once PRN Scar Gaxiola MD       • sodium chloride 0.9 % infusion  30 mL/hr Intravenous Continuous PRN Scar Gaxiola MD 30 mL/hr at 01/28/19 1215 30 mL/hr at 01/28/19 1215   • sodium chloride 0.9 % infusion  9 mL/hr Intravenous Continuous Protzer, Casandra Sun MD 9 mL/hr at 02/08/19 0850 9 mL/hr at 02/08/19 0850   • sodium chloride 7 % nebulizer solution nebulizer solution 4 mL  4 mL Nebulization BID - RT Yahaira Garza APRN   4 mL at 02/13/19 1956       Assessment/Plan   1. Oliguric [facility] - ATN post AVR/MVR.  HD-dep since 1/30;  Urine not recorded due to frequent stools and incontinence. Dialyzed today.  Waste products rise between treatments, but main issue is volume. Will monitor labs and urine output at [facility] for recovery.  Creatinine today at 2.21, electrolytes within acceptable range  2. SP AVR, MVR, TV repair, left cryo MAZE PENNIE ligation.  POD15  3. Anemia. Iv venofer.   4. Probable OCTAVIANO  5. GERD - on PPI   6.  Paroxysmal atrial fibrillation  7.  Anemia, hemoglobin is 9.6        Plan  1.  HD next at [facility] on Saturday.   2.  Could be transferred to the [facility] home from the renal standpoint any time.              Christopher Jin MD  02/14/19  6:53 AM

## 2019-02-14 NOTE — THERAPY TREATMENT NOTE
Acute Care - Physical Therapy Treatment Note  [facility]     Patient Name: Clauida Arnold  : 1945  MRN: [id number]  Today's Date: 2019  Onset of Illness/Injury or Date of Surgery: 19          Admit Date: 2019    Visit Dx:    ICD-10-CM ICD-9-CM   1. Acute congestive heart failure, unspecified heart failure type (CMS/HCC) I50.9 428.0   2. Generalized weakness R53.1 780.79   3. Aortic valve stenosis, etiology of cardiac valve disease unspecified I35.0 424.1   4. Tricuspid valve insufficiency, unspecified etiology I07.1 397.0   5. Mitral valve stenosis, unspecified etiology I05.0 394.0   6. Acute renal failure, unspecified acute renal failure type (CMS/HCC) N17.9 584.9   7. S/P AVR (aortic valve replacement) Z95.2 V43.3     Patient Active Problem List   Diagnosis   • Tear of rotator cuff   • Hypertension   • Generalized anxiety disorder   • Hyperlipidemia   • IFG (impaired fasting glucose)   • Heart murmur, systolic   • Shoulder pain, bilateral   • Pain of both shoulder joints   • Chronic pain of both shoulders   • Acute congestive heart failure (CMS/HCC)   • Obesity, morbid, BMI 50 or higher (CMS/HCC)   • Fungal skin infection   • Cellulitis of lower extremity   • Aortic valve stenosis   • Tricuspid valve insufficiency   • Mitral valve stenosis       Therapy Treatment    Rehabilitation Treatment Summary     Row Name 19 1621             Treatment Time/Intention    Discipline  physical therapist  (Pended)   -CG      Document Type  therapy note (daily note)  (Pended)   -CG      Subjective Information  complains of;fatigue  (Pended)  states she did not sleep well last night  -CG      Mode of Treatment  physical therapy  (Pended)   -CG      Patient/Family Observations  Pt supine in bed, no signs of acute distress at rest  (Pended)   -CG      Patient Effort  good  (Pended)   -CG      Existing Precautions/Restrictions  cardiac;fall;sternal  (Pended)   -CG      Recorded by [CG] Percy  Jackson PT Student 02/14/19 1638      Row Name 02/14/19 1621             Cognitive Assessment/Intervention    Additional Documentation  Cognitive Assessment/Intervention (Group)  (Pended)   -CG      Recorded by [CG] Jackson Greer, PT Student 02/14/19 1638      Row Name 02/14/19 1621             Cognitive Assessment/Intervention- PT/OT    Orientation Status (Cognition)  oriented x 4  (Pended)   -CG      Follows Commands (Cognition)  WNL  (Pended)   -CG      Personal Safety Interventions  gait belt;nonskid shoes/slippers when out of bed;fall prevention program maintained  (Pended)   -CG      Recorded by [CG] Jackson Greer, PT Student 02/14/19 1638      Row Name 02/14/19 1621             Safety Issues, Functional Mobility    Impairments Affecting Function (Mobility)  strength;endurance/activity tolerance  (Pended)   -CG      Recorded by [CG] Jackson Greer, PT Student 02/14/19 1638      Row Name 02/14/19 1621             Bed Mobility Assessment/Treatment    Bed Mobility Assessment/Treatment  supine-sit;sit-supine  (Pended)   -CG      Supine-Sit Lipscomb (Bed Mobility)  minimum assist (75% patient effort);2 person assist;verbal cues;nonverbal cues (demo/gesture)  (Pended)   -CG      Sit-Supine Lipscomb (Bed Mobility)  moderate assist (50% patient effort);verbal cues;2 person assist  (Pended)   -CG      Recorded by [CG] Jackson Greer, PT Student 02/14/19 1638      Row Name 02/14/19 1621             Sit-Stand Transfer    Sit-Stand Lipscomb (Transfers)  maximum assist (25% patient effort);2 person assist;verbal cues;nonverbal cues (demo/gesture)  (Pended)  attempted coming to stand x4, min clearance from bed  -CG      Assistive Device (Sit-Stand Transfers)  walker, rolling platform  (Pended)   -CG      Recorded by [CG] Jackson Greer, PT Student 02/14/19 1638      Row Name 02/14/19 1621             Stand-Sit Transfer    Stand-Sit Lipscomb (Transfers)  minimum assist (75% patient  effort);2 person assist;verbal cues  (Pended)   -CG      Assistive Device (Stand-Sit Transfers)  walker, rolling platform  (Pended)   -CG      Recorded by [CG] Jackson Greer, PT Student 02/14/19 1638      Row Name 02/14/19 1621             Motor Skills Assessment/Interventions    Additional Documentation  Therapeutic Exercise (Group);Therapeutic Exercise Interventions (Group)  (Pended)   -CG      Recorded by [CG] Jackson Greer, PT Student 02/14/19 1638      Row Name 02/14/19 1621             Therapeutic Exercise    Comment (Therapeutic Exercise)  Attempted coming to stand from EOB x4, minimal clearance noted in each attempt, some volitional contractions observed  (Pended)   -CG      Recorded by [CG] Jackson Greer, PT Student 02/14/19 1638      Row Name 02/14/19 1621             Positioning and Restraints    Pre-Treatment Position  in bed  (Pended)   -CG      Post Treatment Position  bed  (Pended)   -CG      In Bed  supine;call light within reach;encouraged to call for assist  (Pended)   -CG      Recorded by [CG] Jackson Greer, PT Student 02/14/19 1638      Row Name                Wound 01/28/19 1138 chest incision    Wound - Properties Group Date first assessed: 01/28/19 [SR] Time first assessed: 1138 [SR] Location: chest [SR] Type: incision [SR] Recorded by:  [SR] Keisha Gandara RN 01/28/19 1138    Row Name                Wound 01/28/19 1215 coccyx unspecified    Wound - Properties Group Date first assessed: 01/28/19 [MB] Time first assessed: 1215 [MB] Present On Admission : yes;picture taken [MB] Location: coccyx [MB] Type: unspecified [MB] Recorded by:  [MB] Julissa Chatterjee, RN 01/28/19 1541    Row Name                Wound 01/31/19 2300 Right gluteal pressure injury    Wound - Properties Group Date first assessed: 01/31/19 [NW] Time first assessed: 2300 [NW] Present On Admission : no [NW] Side: Right [NW] Location: gluteal [NW] Type: pressure injury [NW] Stage, Pressure Injury: deep tissue  injury [NW] Recorded by:  [NW] Priscilla Willams RN 02/01/19 0414    Row Name                Wound 01/31/19 2000 Left posterior ear pressure injury    Wound - Properties Group Date first assessed: 01/31/19 [NW] Time first assessed: 2000 [NW] Side: Left [NW] Orientation: posterior [NW] Location: ear [NW2] Type: pressure injury [NW] Stage, Pressure Injury: Stage 2;medical device related [NW] Recorded by:  [NW] Priscilla Willams RN 02/01/19 0416 [NW2] Priscilla Willams RN 02/01/19 0417    Row Name                Wound 02/08/19 1039 Other (See comments) chest incision    Wound - Properties Group Date first assessed: 02/08/19 [MC] Time first assessed: 1039 [MC] Side: Other (See comments) [MC] Location: chest [MC] Type: incision [MC] Recorded by:  [MC] Kenya Boudreaux RN 02/08/19 1039    Row Name                Wound 02/08/19 1053 Left neck incision    Wound - Properties Group Date first assessed: 02/08/19 [MC] Time first assessed: 1053 [MC] Side: Left [MC] Location: neck [MC] Type: incision [MC] Recorded by:  [DALLAS] Kenya Boudreaux RN 02/08/19 1053    Row Name 02/14/19 1621             Outcome Summary/Treatment Plan (PT)    Anticipated Discharge Disposition (PT)  skilled nursing facility  (Pended)   -CG      Recorded by [CG] Jackson Greer, CARMELLA Student 02/14/19 1640        User Key  (r) = Recorded By, (t) = Taken By, (c) = Cosigned By    Initials Name Effective Dates Discipline    Julissa Sweet RN 06/16/16 -  Nurse    Keisha Fournier RN 06/16/16 -  Nurse    Kenya Qureshi RN 02/23/18 -  Nurse    Priscilla Jensen RN 03/05/18 -  Nurse    Jackson Dozier PT Student 02/04/19 -  PT          Wound 01/28/19 1138 chest incision (Active)   Dressing Appearance open to air 2/14/2019 12:29 PM   Closure Approximated 2/14/2019 12:29 PM   Base clean;dry;pink 2/14/2019 12:29 PM   Drainage Amount none 2/14/2019 12:29 PM   Dressing Care, Wound open to air 2/14/2019  8:00 AM       Wound 01/28/19 1215 perez  unspecified (Active)   Dressing Appearance open to air 2/14/2019 12:29 PM   Closure Open to air 2/14/2019 12:29 PM   Base clean;dry;pink;purple 2/14/2019 12:29 PM   Drainage Amount none 2/14/2019 12:29 PM   Care, Wound barrier applied 2/14/2019 12:29 PM   Dressing Care, Wound open to air 2/14/2019 12:29 PM       Wound 01/31/19 2300 Right gluteal pressure injury (Active)   Dressing Appearance open to air 2/14/2019 12:29 PM   Closure Open to air 2/14/2019 12:29 PM   Base maroon/purple;clean 2/14/2019 12:29 PM   Drainage Amount none 2/14/2019 12:29 PM   Care, Wound barrier applied 2/14/2019 12:29 PM   Dressing Care, Wound open to air 2/14/2019 12:29 PM       Wound 01/31/19 2000 Left posterior ear pressure injury (Active)   Dressing Appearance dry;intact;open to air 2/14/2019 12:29 PM   Closure Open to air 2/14/2019 12:29 PM   Base pink;clean;dry 2/14/2019 12:29 PM   Drainage Amount none 2/14/2019 12:29 PM   Dressing Care, Wound open to air 2/14/2019  8:00 AM       Wound 02/08/19 1039 Other (See comments) chest incision (Active)   Dressing Appearance no drainage;dry;intact 2/14/2019 12:29 PM   Closure SOPHIA 2/14/2019  4:45 AM   Base clean;dry;pink 2/14/2019  4:45 AM   Drainage Amount none 2/14/2019 12:29 PM   Dressing Care, Wound transparent film 2/14/2019  8:00 AM       Wound 02/08/19 1053 Left neck incision (Active)   Dressing Appearance open to air;intact;dry 2/14/2019 12:29 PM   Closure Open to air 2/14/2019 12:29 PM   Base clean;dry;pink 2/14/2019 12:29 PM   Drainage Amount none 2/14/2019 12:29 PM   Dressing Care, Wound open to air 2/14/2019  8:00 AM           Physical Therapy Education     Title: PT OT SLP Therapies (In Progress)     Topic: Physical Therapy (Done)     Point: Mobility training (Done)     Learning Progress Summary           Patient Acceptance, E,TB, VU,NR by CG at 2/14/2019  4:40 PM    Acceptance, NIKI SESAY D, VU,NR by  at 2/12/2019 11:32 AM    Acceptance, NIKI SESAY D, VU,NR by  at 2/11/2019  9:53 AM     Acceptance, E,D, VU,DU,NR by  at 2/10/2019 10:10 AM    Acceptance, E,TB,D, VU,NR by CH at 2/7/2019 12:44 PM    Acceptance, E, VU,NR by EF at 2/6/2019 10:19 AM    Acceptance, E, VU,NR by MA at 2/5/2019 11:09 AM    Acceptance, E,TB,D, VU,NR by CH at 2/4/2019 11:34 AM    Acceptance, E,D, VU,NR by  at 2/3/2019  4:47 PM    Acceptance, E,D, DU,NR by  at 2/2/2019 10:36 AM    Comment:  safety during sit to stand, benefits of activity    Acceptance, E, NR by MA at 1/31/2019 12:08 PM    Acceptance, E, NR by MA at 1/30/2019 10:23 AM    Acceptance, E,TB, VU by  at 1/27/2019  9:10 AM    Acceptance, E, VU,NR by MA at 1/25/2019  3:54 PM    Acceptance, E,TB,D, VU,NR by CH at 1/23/2019  3:14 PM    Acceptance, TB,E, VU,DU by CW at 1/18/2019  4:37 PM    Acceptance, E, NR by EM at 1/17/2019 12:15 PM   Family Acceptance, TB,E, VU,DU by CW at 1/18/2019  4:37 PM                   Point: Home exercise program (Done)     Learning Progress Summary           Patient Acceptance, E,TB, VU,NR by  at 2/14/2019  4:40 PM    Acceptance, E,TB,D, VU,NR by CH at 2/12/2019 11:32 AM    Acceptance, E,TB,D, VU,NR by CH at 2/11/2019  9:53 AM    Acceptance, E,D, VU,DU,NR by  at 2/10/2019 10:10 AM    Acceptance, E,TB,D, VU,NR by CHELSEA at 2/7/2019 12:44 PM    Acceptance, E, VU,NR by PATRICIO at 2/6/2019 10:19 AM    Acceptance, E, VU,NR by MA at 2/5/2019 11:09 AM    Acceptance, E,TB,D, VU,NR by CHELSEA at 2/4/2019 11:34 AM    Acceptance, E,D, VU,NR by DARRIUS at 2/3/2019  4:47 PM    Acceptance, E,D, DU,NR by DARRYN at 2/2/2019 10:36 AM    Comment:  safety during sit to stand, benefits of activity    Acceptance, E,TB,D, VU,NR by CHELSEA at 2/1/2019 10:51 AM    Acceptance, E, NR by MA at 1/31/2019 12:08 PM    Acceptance, E, NR by MA at 1/30/2019 10:23 AM    Acceptance, E,TB, VU by YANETH at 1/27/2019  9:10 AM    Acceptance, E, VU,NR by MA at 1/25/2019  3:54 PM    Acceptance, E,TB,D, VU,NR by CHELSEA at 1/23/2019  3:14 PM    Acceptance, TB,E, VU,DU by JANNET at 1/18/2019  4:37 PM   Family  Acceptance, TB,E, VU,DU by CW at 1/18/2019  4:37 PM                   Point: Body mechanics (Done)     Learning Progress Summary           Patient Acceptance, E,TB, VU,NR by CG at 2/14/2019  4:40 PM    Acceptance, E,TB,D, VU,NR by CH at 2/12/2019 11:32 AM    Acceptance, E,TB,D, VU,NR by CH at 2/11/2019  9:53 AM    Acceptance, E,D, VU,DU,NR by  at 2/10/2019 10:10 AM    Acceptance, E,TB,D, VU,NR by CH at 2/7/2019 12:44 PM    Acceptance, E, VU,NR by  at 2/6/2019 10:19 AM    Acceptance, E, VU,NR by MA at 2/5/2019 11:09 AM    Acceptance, E,TB,D, VU,NR by CH at 2/4/2019 11:34 AM    Acceptance, E,D, VU,NR by  at 2/3/2019  4:47 PM    Acceptance, E,D, DU,NR by  at 2/2/2019 10:36 AM    Comment:  safety during sit to stand, benefits of activity    Acceptance, E, NR by MA at 1/31/2019 12:08 PM    Acceptance, E, NR by MA at 1/30/2019 10:23 AM    Acceptance, E,TB, VU by  at 1/27/2019  9:10 AM    Acceptance, E, VU,NR by MA at 1/25/2019  3:54 PM    Acceptance, E,TB,D, VU,NR by CH at 1/23/2019  3:14 PM    Acceptance, TB,E, VU,DU by CW at 1/18/2019  4:37 PM   Family Acceptance, TB,E, VU,DU by CW at 1/18/2019  4:37 PM                   Point: Precautions (Done)     Learning Progress Summary           Patient Acceptance, E,TB, VU,NR by CG at 2/14/2019  4:40 PM    Acceptance, E,TB,D, VU,NR by CH at 2/12/2019 11:32 AM    Acceptance, E,TB,D, VU,NR by CH at 2/11/2019  9:53 AM    Acceptance, E,D, VU,DU,NR by  at 2/10/2019 10:10 AM    Acceptance, E,TB,D, VU,NR by CH at 2/7/2019 12:44 PM    Acceptance, E, VU,NR by  at 2/6/2019 10:19 AM    Acceptance, E, VU,NR by MA at 2/5/2019 11:09 AM    Acceptance, E,TB,D, VU,NR by CH at 2/4/2019 11:34 AM    Acceptance, E,D, VU,NR by  at 2/3/2019  4:47 PM    Acceptance, E,D, DU,NR by  at 2/2/2019 10:36 AM    Comment:  safety during sit to stand, benefits of activity    Acceptance, E, NR by MA at 1/31/2019 12:08 PM    Acceptance, E, NR by MA at 1/30/2019 10:23 AM    Acceptance, E,TB, VU by   at 1/27/2019  9:10 AM    Acceptance, E, VU,NR by MA at 1/25/2019  3:54 PM    Acceptance, E,TB,D, VU,NR by  at 1/23/2019  3:14 PM    Acceptance, TB,E, VU,DU by  at 1/18/2019  4:37 PM   Family Acceptance, TB,E, VU,DU by CW at 1/18/2019  4:37 PM                               User Key     Initials Effective Dates Name Provider Type Discipline    EF 06/08/18 -  Heike Anthony, PT Physical Therapist PT    CH 04/03/18 -  Kae Aldrich, PT Physical Therapist PT    EM 04/03/18 -  Heike Strauss, PT Physical Therapist PT     03/07/18 -  Ksenia Lopez, PTA Physical Therapy Assistant PT     06/22/16 -  Zuleyma Mancilla, PT Physical Therapist PT     08/02/16 -  Khurram Littlejohn, PT DPT Physical Therapist PT    CW 03/07/18 -  Jose Ott, PTA Physical Therapy Assistant PT     08/19/18 -  Cadence Cobb, PTA Physical Therapy Assistant PT    MA 10/19/18 -  Tabatha Lawrence, PT Physical Therapist PT     02/04/19 -  Jackson Greer, PT Student PT Student PT                PT Recommendation and Plan  Anticipated Discharge Disposition (PT): (P) skilled nursing facility  Outcome Summary/Treatment Plan (PT)  Anticipated Discharge Disposition (PT): (P) skilled nursing facility  Plan of Care Reviewed With: (P) patient  Outcome Summary: (P) Pt tolerated therapy well today demonstrated by increased independence with bed mobility. Pt also exhibited improved body mechanics and muscle contraction when attempting to come to stand. Skilled PT services continue to be indicated to address strength and functional mobility deficits in order to return to PLOF.  Outcome Measures     Row Name 02/14/19 1600 02/12/19 1100          How much help from another person do you currently need...    Turning from your back to your side while in flat bed without using bedrails?  3  (Pended)   -CG  2  -CH     Moving from lying on back to sitting on the side of a flat bed without bedrails?  3  (Pended)   -CG  2  -CH     Moving  to and from a bed to a chair (including a wheelchair)?  1  (Pended)   -CG  1  -CH     Standing up from a chair using your arms (e.g., wheelchair, bedside chair)?  2  (Pended)   -CG  2  -CH     Climbing 3-5 steps with a railing?  1  (Pended)   -CG  1  -CH     To walk in hospital room?  1  (Pended)   -CG  1  -CH     AM-PAC 6 Clicks Score  11  (Pended)   -CG  9  -CH        Functional Assessment    Outcome Measure Options  AM-PAC 6 Clicks Basic Mobility (PT)  (Pended)   -CG  AM-PAC 6 Clicks Basic Mobility (PT)  -CH       User Key  (r) = Recorded By, (t) = Taken By, (c) = Cosigned By    Initials Name Provider Type     Kae Aldrich, PT Physical Therapist    CG Jackson Greer, PT Student PT Student         Time Calculation:   PT Charges     Row Name 02/14/19 1644             Time Calculation    Start Time  1600  (Pended)   -CG      Stop Time  1621  (Pended)   -CG      Time Calculation (min)  21 min  (Pended)   -CG      PT Received On  02/14/19  (Pended)   -CG      PT - Next Appointment  02/15/19  (Pended)   -CG         Time Calculation- PT    Total Timed Code Minutes- PT  21 minute(s)  (Pended)   -CG        User Key  (r) = Recorded By, (t) = Taken By, (c) = Cosigned By    Initials Name Provider Type    CG Jackson Greer, PT Student PT Student        Therapy Suggested Charges     Code   Minutes Charges    71702 (CPT®) Hc Pt Neuromusc Re Education Ea 15 Min      59473 (CPT®) Hc Pt Ther Proc Ea 15 Min 15 1    05049 (CPT®) Hc Gait Training Ea 15 Min      06050 (CPT®) Hc Pt Therapeutic Act Ea 15 Min 10 1    58373 (CPT®) Hc Pt Manual Therapy Ea 15 Min      17257 (CPT®) Hc Pt Iontophoresis Ea 15 Min      00511 (CPT®) Hc Pt Elec Stim Ea-Per 15 Min      68636 (CPT®) Hc Pt Ultrasound Ea 15 Min      68082 (CPT®) Hc Pt Self Care/Mgmt/Train Ea 15 Min      68854 (CPT®) Hc Pt Prosthetic (S) Train Initial Encounter, Each 15 Min      67255 (CPT®) Hc Pt Orthotic(S)/Prosthetic(S) Encounter, Each 15 Min      15255 (CPT®) Hc  Orthotic(S) Mgmt/Train Initial Encounter, Each 15min      Total  25 2        Therapy Charges for Today     Code Description Service Date Service Provider Modifiers Qty    90305996737 HC PT THER PROC EA 15 MIN 2/14/2019 Jackson Greer, PT Student GP 1    11308428010 HC PT THER SUPP EA 15 MIN 2/14/2019 Jackson Greer, PT Student GP 1          PT G-Codes  Outcome Measure Options: (P) AM-PAC 6 Clicks Basic Mobility (PT)  AM-PAC 6 Clicks Score: (P) 11  Score: 9    Jackson Greer PT Student  2/14/2019

## 2019-02-15 ENCOUNTER — APPOINTMENT (OUTPATIENT)
Dept: CARDIOLOGY | Facility: HOSPITAL | Age: 74
End: 2019-02-15

## 2019-02-15 LAB
ALBUMIN SERPL-MCNC: 3.3 G/DL (ref 3.5–5.2)
ANION GAP SERPL CALCULATED.3IONS-SCNC: 18.8 MMOL/L
APTT PPP: 97.4 SECONDS (ref 22.7–35.4)
BASOPHILS # BLD AUTO: 0.05 10*3/MM3 (ref 0–0.2)
BASOPHILS NFR BLD AUTO: 0.5 % (ref 0–1.5)
BH CV ECHO MEAS - BSA(HAYCOCK): 3.1 M^2
BH CV ECHO MEAS - BSA: 2.8 M^2
BH CV ECHO MEAS - BZI_BMI: 65.9 KILOGRAMS/M^2
BH CV ECHO MEAS - BZI_METRIC_HEIGHT: 170.2 CM
BH CV ECHO MEAS - BZI_METRIC_WEIGHT: 191 KG
BH CV ECHO MEAS - MV DEC SLOPE: 482 CM/SEC^2
BH CV ECHO MEAS - MV MAX PG: 23.2 MMHG
BH CV ECHO MEAS - MV MEAN PG: 11 MMHG
BH CV ECHO MEAS - MV P1/2T MAX VEL: 246 CM/SEC
BH CV ECHO MEAS - MV P1/2T: 149.5 MSEC
BH CV ECHO MEAS - MV V2 MAX: 241 CM/SEC
BH CV ECHO MEAS - MV V2 MEAN: 155 CM/SEC
BH CV ECHO MEAS - MV V2 VTI: 36.4 CM
BH CV ECHO MEAS - MVA P1/2T LCG: 0.89 CM^2
BH CV ECHO MEAS - MVA(P1/2T): 1.5 CM^2
BH CV VAS BP LEFT ARM: NORMAL MMHG
BUN BLD-MCNC: 51 MG/DL (ref 8–23)
BUN/CREAT SERPL: 18 (ref 7–25)
CALCIUM SPEC-SCNC: 9.6 MG/DL (ref 8.6–10.5)
CHLORIDE SERPL-SCNC: 92 MMOL/L (ref 98–107)
CO2 SERPL-SCNC: 25.2 MMOL/L (ref 22–29)
CREAT BLD-MCNC: 2.84 MG/DL (ref 0.57–1)
DEPRECATED RDW RBC AUTO: 56.4 FL (ref 37–54)
EOSINOPHIL # BLD AUTO: 0.27 10*3/MM3 (ref 0–0.4)
EOSINOPHIL NFR BLD AUTO: 2.9 % (ref 0.3–6.2)
ERYTHROCYTE [DISTWIDTH] IN BLOOD BY AUTOMATED COUNT: 16.1 % (ref 12.3–15.4)
GFR SERPL CREATININE-BSD FRML MDRD: 16 ML/MIN/1.73
GLUCOSE BLD-MCNC: 217 MG/DL (ref 65–99)
HCT VFR BLD AUTO: 30.1 % (ref 34–46.6)
HGB BLD-MCNC: 9 G/DL (ref 12–15.9)
IMM GRANULOCYTES # BLD AUTO: 0.43 10*3/MM3 (ref 0–0.05)
IMM GRANULOCYTES NFR BLD AUTO: 4.7 % (ref 0–0.5)
INR PPP: 2.12 (ref 0.9–1.1)
LYMPHOCYTES # BLD AUTO: 0.9 10*3/MM3 (ref 0.7–3.1)
LYMPHOCYTES NFR BLD AUTO: 9.8 % (ref 19.6–45.3)
MCH RBC QN AUTO: 28.8 PG (ref 26.6–33)
MCHC RBC AUTO-ENTMCNC: 29.9 G/DL (ref 31.5–35.7)
MCV RBC AUTO: 96.5 FL (ref 79–97)
MONOCYTES # BLD AUTO: 0.95 10*3/MM3 (ref 0.1–0.9)
MONOCYTES NFR BLD AUTO: 10.3 % (ref 5–12)
NEUTROPHILS # BLD AUTO: 6.58 10*3/MM3 (ref 1.4–7)
NEUTROPHILS NFR BLD AUTO: 71.8 % (ref 42.7–76)
NRBC BLD AUTO-RTO: 0.1 /100 WBC (ref 0–0)
PHOSPHATE SERPL-MCNC: 4.3 MG/DL (ref 2.5–4.5)
PLATELET # BLD AUTO: 231 10*3/MM3 (ref 140–450)
PMV BLD AUTO: 9.6 FL (ref 6–12)
POTASSIUM BLD-SCNC: 3.7 MMOL/L (ref 3.5–5.2)
PROTHROMBIN TIME: 23.4 SECONDS (ref 11.7–14.2)
RBC # BLD AUTO: 3.12 10*6/MM3 (ref 3.77–5.28)
SODIUM BLD-SCNC: 136 MMOL/L (ref 136–145)
WBC NRBC COR # BLD: 9.18 10*3/MM3 (ref 3.4–10.8)

## 2019-02-15 PROCEDURE — 85025 COMPLETE CBC W/AUTO DIFF WBC: CPT | Performed by: NURSE PRACTITIONER

## 2019-02-15 PROCEDURE — 93325 DOPPLER ECHO COLOR FLOW MAPG: CPT | Performed by: INTERNAL MEDICINE

## 2019-02-15 PROCEDURE — 93010 ELECTROCARDIOGRAM REPORT: CPT | Performed by: INTERNAL MEDICINE

## 2019-02-15 PROCEDURE — 99024 POSTOP FOLLOW-UP VISIT: CPT | Performed by: NURSE PRACTITIONER

## 2019-02-15 PROCEDURE — 93325 DOPPLER ECHO COLOR FLOW MAPG: CPT

## 2019-02-15 PROCEDURE — 97110 THERAPEUTIC EXERCISES: CPT | Performed by: OCCUPATIONAL THERAPIST

## 2019-02-15 PROCEDURE — 5A2204Z RESTORATION OF CARDIAC RHYTHM, SINGLE: ICD-10-PCS | Performed by: THORACIC SURGERY (CARDIOTHORACIC VASCULAR SURGERY)

## 2019-02-15 PROCEDURE — 94799 UNLISTED PULMONARY SVC/PX: CPT

## 2019-02-15 PROCEDURE — 92960 CARDIOVERSION ELECTRIC EXT: CPT | Performed by: INTERNAL MEDICINE

## 2019-02-15 PROCEDURE — 93320 DOPPLER ECHO COMPLETE: CPT | Performed by: INTERNAL MEDICINE

## 2019-02-15 PROCEDURE — 99152 MOD SED SAME PHYS/QHP 5/>YRS: CPT

## 2019-02-15 PROCEDURE — 25010000002 FENTANYL CITRATE (PF) 100 MCG/2ML SOLUTION: Performed by: INTERNAL MEDICINE

## 2019-02-15 PROCEDURE — 97110 THERAPEUTIC EXERCISES: CPT

## 2019-02-15 PROCEDURE — 92960 CARDIOVERSION ELECTRIC EXT: CPT

## 2019-02-15 PROCEDURE — 93312 ECHO TRANSESOPHAGEAL: CPT

## 2019-02-15 PROCEDURE — 93312 ECHO TRANSESOPHAGEAL: CPT | Performed by: INTERNAL MEDICINE

## 2019-02-15 PROCEDURE — 80069 RENAL FUNCTION PANEL: CPT | Performed by: INTERNAL MEDICINE

## 2019-02-15 PROCEDURE — 93005 ELECTROCARDIOGRAM TRACING: CPT | Performed by: INTERNAL MEDICINE

## 2019-02-15 PROCEDURE — 93320 DOPPLER ECHO COMPLETE: CPT

## 2019-02-15 PROCEDURE — 25010000002 AMIODARONE IN DEXTROSE 5% 360-4.14 MG/200ML-% SOLUTION: Performed by: NURSE PRACTITIONER

## 2019-02-15 PROCEDURE — 85730 THROMBOPLASTIN TIME PARTIAL: CPT | Performed by: NURSE PRACTITIONER

## 2019-02-15 PROCEDURE — 85610 PROTHROMBIN TIME: CPT | Performed by: NURSE PRACTITIONER

## 2019-02-15 PROCEDURE — 99232 SBSQ HOSP IP/OBS MODERATE 35: CPT | Performed by: INTERNAL MEDICINE

## 2019-02-15 PROCEDURE — 25010000002 MIDAZOLAM PER 1 MG: Performed by: INTERNAL MEDICINE

## 2019-02-15 RX ORDER — SODIUM CHLORIDE 9 MG/ML
INJECTION, SOLUTION INTRAVENOUS
Status: COMPLETED | OUTPATIENT
Start: 2019-02-15 | End: 2019-02-15

## 2019-02-15 RX ORDER — FENTANYL CITRATE 50 UG/ML
INJECTION, SOLUTION INTRAMUSCULAR; INTRAVENOUS
Status: COMPLETED | OUTPATIENT
Start: 2019-02-15 | End: 2019-02-15

## 2019-02-15 RX ORDER — MIDAZOLAM HYDROCHLORIDE 1 MG/ML
INJECTION INTRAMUSCULAR; INTRAVENOUS
Status: COMPLETED | OUTPATIENT
Start: 2019-02-15 | End: 2019-02-15

## 2019-02-15 RX ADMIN — ACETAMINOPHEN 500 MG: 500 TABLET, FILM COATED ORAL at 05:05

## 2019-02-15 RX ADMIN — MIDAZOLAM 1 MG: 1 INJECTION INTRAMUSCULAR; INTRAVENOUS at 10:20

## 2019-02-15 RX ADMIN — Medication 81 MG: at 15:51

## 2019-02-15 RX ADMIN — FENTANYL CITRATE 25 MCG: 50 INJECTION INTRAMUSCULAR; INTRAVENOUS at 10:20

## 2019-02-15 RX ADMIN — WARFARIN SODIUM 1 MG: 1 TABLET ORAL at 18:43

## 2019-02-15 RX ADMIN — HYDROCORTISONE 1 APPLICATION: 1 CREAM TOPICAL at 15:53

## 2019-02-15 RX ADMIN — BENZOCAINE, BUTAMBEN, AND TETRACAINE HYDROCHLORIDE 1 SPRAY: .028; .004; .004 AEROSOL, SPRAY TOPICAL at 10:10

## 2019-02-15 RX ADMIN — METHOHEXITAL SODIUM 20 MG: 500 INJECTION, POWDER, LYOPHILIZED, FOR SOLUTION INTRAMUSCULAR; INTRAVENOUS; RECTAL at 10:38

## 2019-02-15 RX ADMIN — MUPIROCIN 10 APPLICATION: 20 OINTMENT TOPICAL at 21:54

## 2019-02-15 RX ADMIN — ACETAMINOPHEN 500 MG: 500 TABLET, FILM COATED ORAL at 15:57

## 2019-02-15 RX ADMIN — METHOHEXITAL SODIUM 10 MG: 500 INJECTION, POWDER, LYOPHILIZED, FOR SOLUTION INTRAMUSCULAR; INTRAVENOUS; RECTAL at 10:40

## 2019-02-15 RX ADMIN — METOPROLOL TARTRATE 25 MG: 25 TABLET ORAL at 15:51

## 2019-02-15 RX ADMIN — AMIODARONE HYDROCHLORIDE 0.5 MG/MIN: 1.8 INJECTION, SOLUTION INTRAVENOUS at 07:28

## 2019-02-15 RX ADMIN — IPRATROPIUM BROMIDE AND ALBUTEROL SULFATE 3 ML: 2.5; .5 SOLUTION RESPIRATORY (INHALATION) at 06:45

## 2019-02-15 RX ADMIN — METOPROLOL TARTRATE 25 MG: 25 TABLET ORAL at 21:55

## 2019-02-15 RX ADMIN — ESCITALOPRAM 10 MG: 10 TABLET, FILM COATED ORAL at 15:51

## 2019-02-15 RX ADMIN — FENTANYL CITRATE 25 MCG: 50 INJECTION INTRAMUSCULAR; INTRAVENOUS at 10:18

## 2019-02-15 RX ADMIN — EMOLLIENT - LOTION: LOTION at 15:52

## 2019-02-15 RX ADMIN — FAMOTIDINE 20 MG: 20 TABLET, FILM COATED ORAL at 15:51

## 2019-02-15 RX ADMIN — MIDAZOLAM 1 MG: 1 INJECTION INTRAMUSCULAR; INTRAVENOUS at 10:17

## 2019-02-15 RX ADMIN — SODIUM CHLORIDE 4 ML: 7 NEBU SOLN,3 % NEBU at 06:45

## 2019-02-15 RX ADMIN — SODIUM CHLORIDE 4 ML: 7 NEBU SOLN,3 % NEBU at 21:50

## 2019-02-15 RX ADMIN — HYDROCORTISONE 1 APPLICATION: 1 CREAM TOPICAL at 05:04

## 2019-02-15 RX ADMIN — MUPIROCIN: 20 OINTMENT TOPICAL at 15:53

## 2019-02-15 RX ADMIN — SODIUM CHLORIDE 50 ML/HR: 9 INJECTION, SOLUTION INTRAVENOUS at 09:59

## 2019-02-15 RX ADMIN — AMIODARONE HYDROCHLORIDE 0.5 MG/MIN: 1.8 INJECTION, SOLUTION INTRAVENOUS at 21:53

## 2019-02-15 RX ADMIN — HYDROCORTISONE 1 APPLICATION: 1 CREAM TOPICAL at 21:56

## 2019-02-15 RX ADMIN — ACETAMINOPHEN 500 MG: 500 TABLET, FILM COATED ORAL at 21:54

## 2019-02-15 RX ADMIN — BUMETANIDE 4 MG: 2 TABLET ORAL at 15:51

## 2019-02-15 RX ADMIN — EMOLLIENT - LOTION: LOTION at 21:55

## 2019-02-15 RX ADMIN — LIDOCAINE HYDROCHLORIDE 10 ML: 20 SOLUTION ORAL; TOPICAL at 09:59

## 2019-02-15 RX ADMIN — IPRATROPIUM BROMIDE AND ALBUTEROL SULFATE 3 ML: 2.5; .5 SOLUTION RESPIRATORY (INHALATION) at 21:43

## 2019-02-15 NOTE — PROGRESS NOTES
" LOS: 30 days   Patient Care Team:  Robert Cortez MD as PCP - General (Family Medicine)  Deborah Agudelo MD as Surgeon (Orthopedic Surgery)    Chief Complaint: postop fu    Subjective:  Symptoms:  No shortness of breath.    Diet:  NPO.    Activity level: Impaired due to weakness.    Pain:  She reports no pain.          Vital Signs  Temp:  [97.4 °F (36.3 °C)-98.2 °F (36.8 °C)] 98.2 °F (36.8 °C)  Heart Rate:  [] 128  Resp:  [18-20] 18  BP: ()/(48-81) 99/68  Body mass index is 65.95 kg/m².    Intake/Output Summary (Last 24 hours) at 2/15/2019 0848  Last data filed at 2/14/2019 2202  Gross per 24 hour   Intake 240 ml   Output --   Net 240 ml     No intake/output data recorded.          02/13/19  0502 02/14/19  0640 02/15/19  0500   Weight: (!) 171 kg (377 lb 10.4 oz) (!) 194 kg (427 lb 11.1 oz) (!) 191 kg (421 lb 1.3 oz)         Objective:  General Appearance:  Comfortable.    Vital signs: (most recent): Blood pressure 101/60, pulse 72, temperature 97.6 °F (36.4 °C), temperature source Oral, resp. rate 18, height 170.2 cm (67\"), weight (!) 191 kg (421 lb 1.3 oz), SpO2 93 %.    Lungs:  Normal effort and normal respiratory rate.  Breath sounds clear to auscultation.    Heart: Tachycardia.  Irregular rhythm.  S1 normal and S2 normal.    Abdomen: Abdomen is soft and non-distended.    Extremities: There is no dependent edema.    Neurological: Patient is alert and oriented to person, place and time.    Skin:  Warm and dry.  (Sternum stable)            Results Review:        WBC WBC   Date Value Ref Range Status   02/15/2019 9.18 3.40 - 10.80 10*3/mm3 Final   02/14/2019 9.96 3.40 - 10.80 10*3/mm3 Final   02/14/2019 11.38 (H) 3.40 - 10.80 10*3/mm3 Final   02/13/2019 14.56 (H) 3.40 - 10.80 10*3/mm3 Final      HGB Hemoglobin   Date Value Ref Range Status   02/15/2019 9.0 (L) 12.0 - 15.9 g/dL Final   02/14/2019 9.0 (L) 12.0 - 15.9 g/dL Final   02/14/2019 9.6 (L) 12.0 - 15.9 g/dL Final   02/13/2019 8.6 (L) 12.0 - " 15.9 g/dL Final      HCT Hematocrit   Date Value Ref Range Status   02/15/2019 30.1 (L) 34.0 - 46.6 % Final   02/14/2019 30.8 (L) 34.0 - 46.6 % Final   02/14/2019 32.1 (L) 34.0 - 46.6 % Final   02/13/2019 28.0 (L) 34.0 - 46.6 % Final      Platelets Platelets   Date Value Ref Range Status   02/15/2019 231 140 - 450 10*3/mm3 Final   02/14/2019 198 140 - 450 10*3/mm3 Final   02/14/2019 215 140 - 450 10*3/mm3 Final   02/13/2019 216 140 - 450 10*3/mm3 Final        PT/INR:    Protime   Date Value Ref Range Status   02/15/2019 23.4 (H) 11.7 - 14.2 Seconds Final   02/14/2019 22.1 (H) 11.7 - 14.2 Seconds Final   02/14/2019 22.8 (H) 11.7 - 14.2 Seconds Final   02/14/2019 22.7 (H) 11.7 - 14.2 Seconds Final   02/13/2019 22.5 (H) 11.7 - 14.2 Seconds Final   /  INR   Date Value Ref Range Status   02/15/2019 2.12 (H) 0.90 - 1.10 Final   02/14/2019 1.97 (H) 0.90 - 1.10 Final   02/14/2019 2.06 (H) 0.90 - 1.10 Final   02/14/2019 2.04 (H) 0.90 - 1.10 Final   02/13/2019 2.02 (H) 0.90 - 1.10 Final       Sodium Sodium   Date Value Ref Range Status   02/15/2019 136 136 - 145 mmol/L Final   02/14/2019 137 136 - 145 mmol/L Final   02/13/2019 140 136 - 145 mmol/L Final      Potassium Potassium   Date Value Ref Range Status   02/15/2019 3.7 3.5 - 5.2 mmol/L Final   02/14/2019 4.2 3.5 - 5.2 mmol/L Final   02/13/2019 4.1 3.5 - 5.2 mmol/L Final      Chloride Chloride   Date Value Ref Range Status   02/15/2019 92 (L) 98 - 107 mmol/L Final   02/14/2019 95 (L) 98 - 107 mmol/L Final   02/13/2019 98 98 - 107 mmol/L Final      Bicarbonate CO2   Date Value Ref Range Status   02/15/2019 25.2 22.0 - 29.0 mmol/L Final   02/14/2019 26.8 22.0 - 29.0 mmol/L Final   02/13/2019 27.1 22.0 - 29.0 mmol/L Final      BUN BUN   Date Value Ref Range Status   02/15/2019 51 (H) 8 - 23 mg/dL Final   02/14/2019 33 (H) 8 - 23 mg/dL Final   02/13/2019 46 (H) 8 - 23 mg/dL Final      Creatinine Creatinine   Date Value Ref Range Status   02/15/2019 2.84 (H) 0.57 - 1.00 mg/dL  Final   02/14/2019 2.21 (H) 0.57 - 1.00 mg/dL Final   02/13/2019 2.27 (H) 0.57 - 1.00 mg/dL Final      Calcium Calcium   Date Value Ref Range Status   02/15/2019 9.6 8.6 - 10.5 mg/dL Final   02/14/2019 9.9 8.6 - 10.5 mg/dL Final   02/13/2019 9.8 8.6 - 10.5 mg/dL Final      Magnesium No results found for: MG         acetaminophen 500 mg Oral Q4H   aspirin 81 mg Oral Daily   bumetanide 4 mg Oral Daily   cetaphil  Topical Q12H   epoetin lisha 10,000 Units Intravenous Once per day on Mon Wed Fri   escitalopram 10 mg Oral Daily   famotidine 20 mg Oral Daily   hydrocortisone 1 application Topical Q6H   ipratropium-albuterol 3 mL Nebulization BID - RT   metoprolol tartrate 25 mg Oral Q12H   mupirocin  Each Nare BID   sodium chloride 4 mL Nebulization BID - RT   warfarin 1 mg Oral Daily       amiodarone 0.5 mg/min Last Rate: 0.5 mg/min (02/15/19 0728)   heparin (porcine) 8.7 Units/kg/hr (Adjusted) Last Rate: 6.7 Units/kg/hr (02/15/19 0727)   sodium chloride 30 mL/hr Last Rate: 30 mL/hr (01/28/19 1215)   sodium chloride 9 mL/hr Last Rate: 9 mL/hr (02/08/19 0850)           Patient Active Problem List   Diagnosis Code   • Tear of rotator cuff M75.100   • Hypertension I10   • Generalized anxiety disorder F41.1   • Hyperlipidemia E78.5   • IFG (impaired fasting glucose) R73.01   • Heart murmur, systolic R01.1   • Shoulder pain, bilateral M25.511, M25.512   • Pain of both shoulder joints M25.511, M25.512   • Chronic pain of both shoulders M25.511, G89.29, M25.512   • Acute congestive heart failure (CMS/HCC) I50.9   • Obesity, morbid, BMI 50 or higher (CMS/HCC) E66.01   • Fungal skin infection B36.9   • Cellulitis of lower extremity L03.119   • Aortic valve stenosis I35.0   • Tricuspid valve insufficiency I07.1   • Mitral valve stenosis I05.0       Assessment & Plan    -Severe AS, MS, TV regurgitation s/p AVR/MVR (tissue),TV repair, MAZE PENNIE ligation-- POD#18 (Pagni)  -NICM, non obstructive dx by cath----EF improved to  60%  -RBBB  -HTN---controlled  -Morbid obesity with arthritis-complicating mobility and all aspects of care  -Hypoventilation sx, probable OCTAVIANO-----chronic CO2 retain by ABG, pulmonary following  -Possible PE, NO LE DVT------nosebleeds on heparin  -Preop new a.fib----s/p cardioversion 1/24/19, reverted to a.fib again  -Left arm thrombophlebitis, infiltration  -LAVERNE----tunnel cath in place, HD per renal  -Leukocytosis-----  -preop anemia, post op expected ABL  -Post op junctional bradycardia-----back in a.flutter/a.fib with rate 60-80    Plan for cardioversion today, HD tomorrow.  Rehab next week if hemodynamically stable.     MORGAN Cook  02/15/19  8:48 AM

## 2019-02-15 NOTE — PLAN OF CARE
Problem: Patient Care Overview  Goal: Plan of Care Review   02/15/19 1430   Coping/Psychosocial   Plan of Care Reviewed With patient   OTHER   Outcome Summary Pt sitting balance and endurance improving as well as UE ther ex. Able to tolerate exercises sitting up EOB and some grooming tasks.

## 2019-02-15 NOTE — THERAPY TREATMENT NOTE
Acute Care - Occupational Therapy Treatment Note  [facility]     Patient Name: Claudia Arnold  : 1945  MRN: [id number]  Today's Date: 2/15/2019  Onset of Illness/Injury or Date of Surgery: 19          Admit Date: 2019       ICD-10-CM ICD-9-CM   1. Acute congestive heart failure, unspecified heart failure type (CMS/[facility]) I50.9 428.0   2. Generalized weakness R53.1 780.79   3. Aortic valve stenosis, etiology of cardiac valve disease unspecified I35.0 424.1   4. Tricuspid valve insufficiency, unspecified etiology I07.1 397.0   5. Mitral valve stenosis, unspecified etiology I05.0 394.0   6. Acute renal failure, unspecified acute renal failure type (CMS/[facility]) N17.9 584.9   7. S/P AVR (aortic valve replacement) Z95.2 V43.3     Patient Active Problem List   Diagnosis   • Tear of rotator cuff   • Hypertension   • Generalized anxiety disorder   • Hyperlipidemia   • IFG (impaired fasting glucose)   • Heart murmur, systolic   • Shoulder pain, bilateral   • Pain of both shoulder joints   • Chronic pain of both shoulders   • Acute congestive heart failure (CMS/[facility])   • Obesity, morbid, BMI 50 or higher (CMS/[facility])   • Fungal skin infection   • Cellulitis of lower extremity   • Aortic valve stenosis   • Tricuspid valve insufficiency   • Mitral valve stenosis     Past Medical History:   Diagnosis Date   • A-fib (CMS/[facility])     Meds   • Arthritis     w/Difficult Mobility   • Bilateral lower extremity edema     Legs   • CAD (coronary artery disease)     Affecting LAD    • Cardiomyopathy (CMS/[facility])    • CHF (congestive heart failure) (CMS/[facility])    • Chronic fatigue    • Generalized anxiety disorder    • Hernia, inguinal     Hx Repair   • History of echocardiogram 19-[facility]    The L Ventricular Cavity is Mild-to-Moderately Dilated; Left Ventricular Wall Thickness is Consistent w/Mild Concentric Hypertrophy; Left Atrial Cavity Size is Moderate-to-Severely Dilated; Severe AVS; Severe MVS; Moderate TVR Noted   •  Hyperlipidemia     Controlled w/Meds   • Hypertension     Controlled w/Meds   • Hypokinesis 01/22/2019    Apical Noted on Cardiac Cath   • IFG (impaired fasting glucose)    • LAD stenosis 01/22/2019    Mid LAD Irregularities Noted on Cardiac Cath    • Left atrial dilatation 01/17/2019    Moderate-Severe Noted on Echo   • Left ventricular dilatation 01/17/2019    Noted on Echo/LEE   • Mild concentric left ventricular hypertrophy 01/17/2019    Noted on Echo/LEE   • Mitral annular calcification 01/17/2019    Severe Noted on Echo   • Moderate tricuspid valve regurgitation 01/24/2019    Noted on LEE   • Morbid obesity (CMS/[facility])    • NSTEMI (non-ST elevated myocardial infarction) (CMS/[facility])    • OCTAVIANO (obstructive sleep apnea)    • Osteoarthritis    • Pulmonary hypertension (CMS/[facility]) 01/22/2019    Noted on Cardiac Cath   • Right bundle branch block 01/24/2019    Noted on LEE   • Severe aortic stenosis 01/22/2019    Noted on Cardiac Cath & LEE on 01/24/19; S/p AVR on 01/28/19 by Dr. Gaxiola   • Severe mitral valve stenosis 01/24/2019    Noted on LEE; S/p MVR on 01/28/19 by Dr. Gaxiola   • Shoulder pain, bilateral    • Skin yeast infection     Folds Under Skin--Nystatin/Diflucan     Past Surgical History:   Procedure Laterality Date   • AORTIC VALVE REPAIR/REPLACEMENT MITRAL VALVE REPAIR/REPLACEMENT N/A 1/28/2019    Procedure: LEE STERNOTOMY AORTIC VALVE REPLACEMENT, MITRAL VALVE REPLACEMENT, TRICUSPID VALVE REPAIR, MAZE PROCEDURE, CLOSURE OF LEFT ATRIAL APPENDAGE  AND PRP;  Surgeon: Scar Gaxiola MD;  Location: [facility] OR;  Service: Cardiothoracic   • CARDIAC CATHETERIZATION N/A 1/22/2019    Procedure: Coronary angiography;  Surgeon: Rick Talavera MD;  Location: [facility] CATH INVASIVE LOCATION;  Service: Cardiology   • CARDIAC CATHETERIZATION N/A 1/22/2019    Procedure: Left Heart Cath;  Surgeon: Rick Talavera MD;  Location: [facility] CATH INVASIVE LOCATION;  Service: Cardiology   • CARDIAC CATHETERIZATION  N/A 1/22/2019    Procedure: Right Heart Cath;  Surgeon: Rick Talavera MD;  Location:  [facility] CATH INVASIVE LOCATION;  Service: Cardiology   • CARDIAC CATHETERIZATION N/A 1/22/2019    Procedure: Left ventriculography;  Surgeon: Rick Talavera MD;  [facility];  Service: Cardiology   • COLONOSCOPY     • HERNIA REPAIR     • INSERTION HEMODIALYSIS CATHETER N/A 2/8/2019    Procedure: tunnel CATHETER PLACEMENT WITH FLUROSCOPY;  Surgeon: Satish Stahl MD;  Location: [facility] MAIN OR;  Service: Vascular   • REPLACEMENT TOTAL KNEE Bilateral 2007/2009       Therapy Treatment    Rehabilitation Treatment Summary     Row Name 02/15/19 1424             Treatment Time/Intention    Discipline  occupational therapist  -SG      Document Type  therapy note (daily note)  -SG      Subjective Information  no complaints  -SG      Mode of Treatment  individual therapy;occupational therapy  -SG      Patient/Family Observations  Pt supine in bed  -SG      Patient Effort  good  -SG      Existing Precautions/Restrictions  cardiac;fall;sternal  -SG      Recorded by [SG] Jess Dumont OTR 02/15/19 1430      Row Name 02/15/19 1424             Vital Signs    O2 Delivery Pre Treatment  room air  -SG      Recorded by [SG] Jess Dumont OTR 02/15/19 1430      Row Name 02/15/19 1424             Cognitive Assessment/Intervention- PT/OT    Affect/Mental Status (Cognitive)  WNL  -SG      Orientation Status (Cognition)  oriented x 4  -SG      Follows Commands (Cognition)  WNL  -SG      Cognitive Function (Cognitive)  WNL  -SG      Recorded by [SG] Jess Dumont OTR 02/15/19 1430      Row Name 02/15/19 1424             Bed Mobility Assessment/Treatment    Supine-Sit [place] (Bed Mobility)  minimum assist (75% patient effort);moderate assist (50% patient effort);verbal cues  -SG      Sit-Supine [place] (Bed Mobility)  moderate assist (50% patient effort);maximum assist (25% patient  effort);verbal cues  -SG      Recorded by [SG] Jess Dumont OTR 02/15/19 1430      Row Name 02/15/19 1424             Transfer Assessment/Treatment    Comment (Transfers)  Did not attempt, needs Ax2  -SG      Recorded by [SG] Jess Dumont OTR 02/15/19 1430      Row Name 02/15/19 1424             Grooming Assessment/Training    Comment (Grooming)  A sitting up EOB  -SG      Recorded by [SG] Jess Dumont OTR 02/15/19 1430      Row Name 02/15/19 1424             Motor Skills Assessment/Interventions    Additional Documentation  Therapeutic Exercise (Group)  -SG      Recorded by [SG] Jess Dumont R 02/15/19 1430      Row Name 02/15/19 1424             Therapeutic Exercise    Therapeutic Exercise  seated, upper extremities  -SG      Recorded by [SG] Jess Dumont R 02/15/19 1430      Row Name 02/15/19 1424             Upper Extremity Seated Therapeutic Exercise    Performed, Seated Upper Extremity (Therapeutic Exercise)  shoulder flexion/extension;scapular protraction/retraction;elbow flexion/extension  -SG      Exercise Type, Seated Upper Extremity (Therapeutic Exercise)  AROM (active range of motion);AAROM (active assistive range of motion)  -SG      Expected Outcomes, Seated Upper Extremity (Therapeutic Exercise)  improve functional tolerance, self-care activity;improve functional tolerance, single extremity activity;improve functional tolerance, household activity;improve functional tolerance, community activity  -SG      Sets/Reps Detail, Seated Upper Extremity (Therapeutic Exercise)  10x3  -SG      Comment, Seated Upper Extremity (Therapeutic Exercise)  Needs A for shoulder flexion to 90'  -SG      Recorded by [SG] Jess Dumont OTR 02/15/19 1430      Row Name 02/15/19 1424             Static Sitting Balance    Level of [place] (Unsupported Sitting, Static Balance)  supervision  -SG      Sitting Position (Unsupported Sitting, Static Balance)  sitting on edge of bed  -SG       Time Able to Maintain Position (Unsupported Sitting, Static Balance)  more than 5 minutes  -SG      Comment (Unsupported Sitting, Static Balance)  15min  -SG      Recorded by [SG] Jess Dumont OTR 02/15/19 1430      Row Name 02/15/19 1424             Positioning and Restraints    Pre-Treatment Position  in bed  -SG      Post Treatment Position  bed  -SG      In Bed  supine;call light within reach;encouraged to call for assist;exit alarm on;notified nsg  -SG      Recorded by [SG] Jess Dumont OTR 02/15/19 1430      Row Name 02/15/19 1424             Pain Scale: Numbers Pre/Post-Treatment    Pain Scale: Numbers, Pretreatment  0/10 - no pain  -SG      Recorded by [SG] Jess Dumont OTR 02/15/19 1430      Row Name                Wound 01/28/19 1138 chest incision    Wound - Properties Group Date first assessed: 01/28/19 [SR] Time first assessed: 1138 [SR] Location: chest [SR] Type: incision [SR] Recorded by:  [SR] Keisha Gandara RN 01/28/19 1138    Row Name                Wound 01/28/19 1215 coccyx unspecified    Wound - Properties Group Date first assessed: 01/28/19 [MB] Time first assessed: 1215 [MB] Present On Admission : yes;picture taken [MB] Location: coccyx [MB] Type: unspecified [MB] Recorded by:  [MB] Julissa Chatterjee, RN 01/28/19 1541    Row Name                Wound 01/31/19 2300 Right gluteal pressure injury    Wound - Properties Group Date first assessed: 01/31/19 [NW] Time first assessed: 2300 [NW] Present On Admission : no [NW] Side: Right [NW] Location: gluteal [NW] Type: pressure injury [NW] Stage, Pressure Injury: deep tissue injury [NW] Recorded by:  [NW] Priscilla Willams, RN 02/01/19 0414    Row Name                Wound 01/31/19 2000 Left posterior ear pressure injury    Wound - Properties Group Date first assessed: 01/31/19 [NW] Time first assessed: 2000 [NW] Side: Left [NW] Orientation: posterior [NW] Location: ear [NW2] Type: pressure injury [NW] Stage, Pressure Injury: Stage 2;medical  device related [NW] Recorded by:  [NW] Priscilla Willams RN 02/01/19 0416 [NW2] Priscilla Willams RN 02/01/19 0417    Row Name                Wound 02/08/19 1039 Other (See comments) chest incision    Wound - Properties Group Date first assessed: 02/08/19 [MC] Time first assessed: 1039 [MC] Side: Other (See comments) [MC] Location: chest [MC] Type: incision [MC] Recorded by:  [[place]] Kenya Boudreaux RN 02/08/19 1039    Row Name                Wound 02/08/19 1053 Left neck incision    Wound - Properties Group Date first assessed: 02/08/19 [MC] Time first assessed: 1053 [MC] Side: Left [MC] Location: neck [MC] Type: incision [MC] Recorded by:  [[place]] Kenya Boudreaux RN 02/08/19 1053      User Key  (r) = Recorded By, (t) = Taken By, (c) = Cosigned By    Initials Name Effective Dates Discipline    SG Jess Dumont, OTR 12/26/18 -  OT    Julissa Sweet RN 06/16/16 -  Nurse    Keisha Fournier RN 06/16/16 -  Nurse    Kenya Qureshi RN 02/23/18 -  Nurse    NW Priscilla Willams RN 03/05/18 -  Nurse        Wound 01/28/19 1138 chest incision (Active)   Dressing Appearance open to air 2/15/2019 12:30 PM   Closure Approximated 2/15/2019 12:30 PM   Base clean;dry;pink 2/15/2019 12:30 PM   Drainage Amount none 2/15/2019 12:30 PM   Dressing Care, Wound open to air 2/14/2019 10:02 PM       Wound 01/28/19 1215 coccyx unspecified (Active)   Dressing Appearance open to air 2/15/2019 12:30 PM   Closure Open to air 2/15/2019 12:30 PM   Base clean;dry;pink;purple 2/15/2019 12:30 PM   Drainage Amount none 2/15/2019 12:30 PM   Dressing Care, Wound open to air 2/14/2019 10:02 PM       Wound 01/31/19 2300 Right gluteal pressure injury (Active)   Dressing Appearance open to air 2/15/2019 12:30 PM   Closure Open to air 2/15/2019 12:30 PM   Base maroon/purple;clean 2/15/2019 12:30 PM   Drainage Amount none 2/15/2019 12:30 PM   Dressing Care, Wound open to air 2/14/2019 10:02 PM       Wound 01/31/19 2000 Left posterior ear pressure  injury (Active)   Dressing Appearance dry;intact;open to air 2/15/2019 12:30 PM   Closure Open to air 2/15/2019 12:30 PM   Base pink;clean;dry 2/15/2019 12:30 PM   Drainage Amount none 2/15/2019 12:30 PM   Dressing Care, Wound open to air 2/14/2019 10:02 PM       Wound 02/08/19 1039 Other (See comments) chest incision (Active)   Dressing Appearance no drainage;dry;intact 2/15/2019 12:30 PM   Closure SOPHIA 2/15/2019 12:30 PM   Base clean;dry;pink 2/15/2019 12:30 PM   Drainage Amount none 2/15/2019 12:30 PM       Wound 02/08/19 1053 Left neck incision (Active)   Dressing Appearance open to air;intact;dry 2/15/2019 12:30 PM   Closure Open to air 2/15/2019 12:30 PM   Base clean;dry;pink 2/15/2019 12:30 PM   Drainage Amount none 2/15/2019 12:30 PM   Dressing Care, Wound open to air 2/14/2019 10:02 PM       Occupational Therapy Education     Title: PT OT SLP Therapies (In Progress)     Topic: Occupational Therapy (In Progress)     Point: ADL training (In Progress)     Description: Instruct learner(s) on proper safety adaptation and remediation techniques during self care or transfers.   Instruct in proper use of assistive devices.    Learning Progress Summary           Patient Acceptance, E,TB, VU by SG at 2/12/2019  3:33 PM    Comment:  Given built up  for utensils; cont using foam block, pt states hands getting stronger    Acceptance, E, VU by  at 1/25/2019  1:26 PM    Comment:  Enncouraged increased participation with ADL's grooming. Discussed safety and positioning.    Acceptance, E, NR by SG1 at 1/17/2019  2:00 PM    Comment:  safety for adls. May benefit from AE teaching and e/c and w/s teaching   Family Acceptance, E, NR by SG1 at 1/17/2019  2:00 PM    Comment:  safety for adls. May benefit from AE teaching and e/c and w/s teaching                               User Key     Initials Effective Dates Name Provider Type Discipline    [facility] [place] 06/08/18 -  Keisha Hernandez OTR Occupational Therapist OT     12/26/18 -   Jess Dumont, OTR Occupational Therapist OT     06/08/18 -  Christina Colmenares OTR Occupational Therapist OT                OT Recommendation and Plan     Plan of Care Review  Plan of Care Reviewed With: patient  Plan of Care Reviewed With: patient  Outcome Summary: Pt sitting balance and endurance improving as well as UE ther ex. Able to tolerate exercises sitting up EOB and some grooming tasks.  Outcome Measures     Row Name 02/15/19 1400 02/14/19 1600          How much help from another person do you currently need...    Turning from your back to your side while in flat bed without using bedrails?  --  3  -CH (r) CG (t) CH (c)     Moving from lying on back to sitting on the side of a flat bed without bedrails?  --  3  -CH (r) CG (t) CH (c)     Moving to and from a bed to a chair (including a wheelchair)?  --  1  -CH (r) CG (t) CH (c)     Standing up from a chair using your arms (e.g., wheelchair, bedside chair)?  --  2  -CH (r) CG (t) CH (c)     Climbing 3-5 steps with a railing?  --  1  -CH (r) CG (t) CH (c)     To walk in hospital room?  --  1  -CH (r) CG (t) CH (c)     AM-PAC 6 Clicks Score  --  11  -CH (r) CG (t)        How much help from another is currently needed...    Putting on and taking off regular lower body clothing?  1  -SG  --     Bathing (including washing, rinsing, and drying)  1  -SG  --     Toileting (which includes using toilet bed pan or urinal)  1  -SG  --     Putting on and taking off regular upper body clothing  2  -SG  --     Taking care of personal grooming (such as brushing teeth)  2  -SG  --     Eating meals  3  -SG  --     Score  10  -SG  --        Functional Assessment    Outcome Measure Options  AM-PAC 6 Clicks Daily Activity (OT)  -SG  AM-PAC 6 Clicks Basic Mobility (PT)  -CH (r) CG (t) CH (c)       User Key  (r) = Recorded By, (t) = Taken By, (c) = Cosigned By    Initials Name Provider Type    Jess Potts, OTR Occupational Therapist    Kae Martinez, PT Physical  Therapist    CG Jackson Greer, PT Student PT Student           Time Calculation:   Time Calculation- OT     Row Name 02/15/19 1430             Time Calculation- OT    OT Start Time  1325  -SG      OT Stop Time  1349  -SG      OT Time Calculation (min)  24 min  -      Total Timed Code Minutes- OT  24 minute(s)  -      OT Received On  02/15/19  -        User Key  (r) = Recorded By, (t) = Taken By, (c) = Cosigned By    Initials Name Provider Type     Jess Dumont OTR Occupational Therapist           Therapy Suggested Charges     Code   Minutes Charges    None           Therapy Charges for Today     Code Description Service Date Service Provider Modifiers Qty    [id number] HC OT THER PROC EA 15 MIN 2/15/2019 Jess Dumont OTR GO 2               JANELLE Hernandez  2/15/2019

## 2019-02-15 NOTE — PROGRESS NOTES
LOS: 30 days   Patient Care Team:  Robert Cortez MD as PCP - General (Family Medicine)  Deborah Agudelo MD as Surgeon (Orthopedic Surgery)    Chief Complaint: Follow-up for tissue aortic and mitral valve replacements, tricuspid valve repair, paroxysmal atrial fibrillation with RVR, acute diastolic and valvular CHF.    Interval History: Cardioverted earlier today from atrial flutter. No acute events.  No chest pain or SOA.      Vital Signs:  Temp:  [97.4 °F (36.3 °C)-98.2 °F (36.8 °C)] 97.6 °F (36.4 °C)  Heart Rate:  [] 72  Resp:  [16-22] 18  BP: ()/(60-88) 101/60    Intake/Output Summary (Last 24 hours) at 2/15/2019 1317  Last data filed at 2/14/2019 2202  Gross per 24 hour   Intake 120 ml   Output --   Net 120 ml       Physical Exam:   General Appearance:    No acute distress, alert and oriented x4   Lungs:     Clear to auscultation bilaterally     Heart:    Irregularly irregular rhythm with a tachy rate.  II/VI SM RUSB (I saw her pre-cardioversion)   Abdomen:     Soft, non-tender, non-distended.    Extremities:   Trace edema.     Results Review:    Results from last 7 days   Lab Units 02/15/19  0515   SODIUM mmol/L 136   POTASSIUM mmol/L 3.7   CHLORIDE mmol/L 92*   CO2 mmol/L 25.2   BUN mg/dL 51*   CREATININE mg/dL 2.84*   GLUCOSE mg/dL 217*   CALCIUM mg/dL 9.6         Results from last 7 days   Lab Units 02/15/19  0515   WBC 10*3/mm3 9.18   HEMOGLOBIN g/dL 9.0*   HEMATOCRIT % 30.1*   PLATELETS 10*3/mm3 231     Results from last 7 days   Lab Units 02/15/19  0515 02/14/19  1959 02/14/19  1241   INR  2.12* 1.97* 2.06*   APTT seconds 97.4* 81.1* 35.0                   I reviewed the patient's new clinical results.        Assessment:  1. Acute valvular and diastolic CHF  2. Severe aortic stenosis, severe mitral stenosis secondary to calcification, and severe tricuspid regurgitation  3. Normal EF 60-65%  4. Paroxysmal atrial fibrillation with RVR -status post cardioversion 1/24/2019 and subsequent  reversion to atrial fibrillation.  5. Moderate pulmonary hypertension (mean PA pressure 32)  6. Status post tissue AVR, tissue MVR, tricuspid valve repair (Martha suture repair), left cryomaze, and PENNIE ligation on 1/28/2019 (Dr. Gaxiola).  7. Small bilateral pulmonary emboli  8. Oliguric acute kidney injury post-operatively - on hemodialysis and status post tunneled catheter placement on 2/8/2019.  9. Bifascicular block (RBBB and LAFB)  10. Candida intertrigo (skin folds); left axillary fungal infection (treated)  11. Osteoarthritis with immobility  12. Left wrist thrombophlebitis from IV infiltration  13. Undiagnosed OCTAVIANO  14. Morbid obesity   15. Anemia of chronic disease  16. Severe deconditioning   17. Junctional bradycardia and asystole post-op - resolved  18. Atrial flutter, type unknown - status post DCCV on 2/15/2019      Plan:  -Rapid atrial flutter - cardioverted successfully today.  I would leave her on the Amiodarone drip at least overnight.  She will likely go back into atrial fibrillation at some point.    -Continue metoprolol 25 mg bid.    -INR 2.12 - continue Coumadin 1 mg per day - she went up very quickly, and Amio will increase this further.  Will stop IV Heparin.      -HD for volume control.  On Bumex 4 mg po qday per Nephrology.    -Hopefully to rehab Monday if stable.    Scott Segura MD  02/15/19  1:17 PM

## 2019-02-15 NOTE — THERAPY TREATMENT NOTE
Acute Care - Physical Therapy Treatment Note  [facility]     Patient Name: Claudia Arnold  : 1945  MRN: [id number]  Today's Date: 2/15/2019  Onset of Illness/Injury or Date of Surgery: 19          Admit Date: 2019    Visit Dx:    ICD-10-CM ICD-9-CM   1. Acute congestive heart failure, unspecified heart failure type (CMS/HCC) I50.9 428.0   2. Generalized weakness R53.1 780.79   3. Aortic valve stenosis, etiology of cardiac valve disease unspecified I35.0 424.1   4. Tricuspid valve insufficiency, unspecified etiology I07.1 397.0   5. Mitral valve stenosis, unspecified etiology I05.0 394.0   6. Acute renal failure, unspecified acute renal failure type (CMS/HCC) N17.9 584.9   7. S/P AVR (aortic valve replacement) Z95.2 V43.3     Patient Active Problem List   Diagnosis   • Tear of rotator cuff   • Hypertension   • Generalized anxiety disorder   • Hyperlipidemia   • IFG (impaired fasting glucose)   • Heart murmur, systolic   • Shoulder pain, bilateral   • Pain of both shoulder joints   • Chronic pain of both shoulders   • Acute congestive heart failure (CMS/HCC)   • Obesity, morbid, BMI 50 or higher (CMS/HCC)   • Fungal skin infection   • Cellulitis of lower extremity   • Aortic valve stenosis   • Tricuspid valve insufficiency   • Mitral valve stenosis       Therapy Treatment    Rehabilitation Treatment Summary     Row Name 02/15/19 1440 02/15/19 1424          Treatment Time/Intention    Discipline  physical therapist  -EE  occupational therapist  -SG     Document Type  therapy note (daily note)  -EE  therapy note (daily note)  -SG     Subjective Information  complains of;weakness;pain  -EE  no complaints  -SG     Mode of Treatment  physical therapy;individual therapy  -EE  individual therapy;occupational therapy  -SG     Patient/Family Observations  Pt supine in bed in no acute distress.  -EE  Pt supine in bed  -SG     Patient Effort  good  -EE  good  -SG     Existing Precautions/Restrictions   cardiac;fall;sternal  -EE  cardiac;fall;sternal  -SG     Recorded by [EE] Kaley Norton, PT 02/15/19 1514 [SG] Jess Dumont, OTR 02/15/19 1430     Row Name 02/15/19 1424             Vital Signs    O2 Delivery Pre Treatment  room air  -SG      Recorded by [SG] Jess Dumont, OTR 02/15/19 1430      Row Name 02/15/19 1440 02/15/19 1424          Cognitive Assessment/Intervention- PT/OT    Affect/Mental Status (Cognitive)  WNL  -EE  WNL  -SG     Orientation Status (Cognition)  oriented x 4  -EE  oriented x 4  -SG     Follows Commands (Cognition)  WNL  -EE  WNL  -SG     Cognitive Function (Cognitive)  WNL  -EE  WNL  -SG     Personal Safety Interventions  fall prevention program maintained;gait belt;muscle strengthening facilitated;nonskid shoes/slippers when out of bed;supervised activity  -EE  --     Recorded by [EE] Kaley Norton, PT 02/15/19 1514 [SG] Jess Dumont, OTR 02/15/19 1430     Row Name 02/15/19 1440             Safety Issues, Functional Mobility    Impairments Affecting Function (Mobility)  endurance/activity tolerance;strength  -EE      Recorded by [EE] Kaley Norton, PT 02/15/19 1514      Row Name 02/15/19 1440 02/15/19 1424          Bed Mobility Assessment/Treatment    Supine-Sit [place] (Bed Mobility)  minimum assist (75% patient effort);2 person assist;verbal cues;nonverbal cues (demo/gesture)  -EE  minimum assist (75% patient effort);moderate assist (50% patient effort);verbal cues  -SG     Sit-Supine [place] (Bed Mobility)  moderate assist (50% patient effort);2 person assist;verbal cues;nonverbal cues (demo/gesture)  -EE  moderate assist (50% patient effort);maximum assist (25% patient effort);verbal cues  -SG     Bed Mobility, Safety Issues  decreased use of arms for pushing/pulling;decreased use of legs for bridging/pushing  -EE  --     Assistive Device (Bed Mobility)  bed rails;draw sheet;head of bed elevated  -EE  --     Recorded by [EE] Kaley Norton, PT 02/15/19 1514 [SG]  Jess Dumont, OTR 02/15/19 1430     Row Name 02/15/19 1440 02/15/19 1424          Transfer Assessment/Treatment    Comment (Transfers)  Attempted sit to stand x 3 from EOB. Pt reporting increased L foot cramping during attempts at weight bearing, unable to clear bed today.  -EE  Did not attempt, needs Ax2  -SG     Recorded by [EE] Kaley Norton, PT 02/15/19 1514 [SG] Jess Dumont, OTR 02/15/19 1430     Row Name 02/15/19 1440             Sit-Stand Transfer    Sit-Stand [place] (Transfers)  maximum assist (25% patient effort);2 person assist;verbal cues;nonverbal cues (demo/gesture)  -EE      Assistive Device (Sit-Stand Transfers)  other (see comments) pulm walker  -EE      Recorded by [EE] Kaley Norton, PT 02/15/19 1514      Row Name 02/15/19 1424             Grooming Assessment/Training    Comment (Grooming)  A sitting up EOB  -SG      Recorded by [SG] Jess Dumont OTR 02/15/19 1430      Row Name 02/15/19 1424             Motor Skills Assessment/Interventions    Additional Documentation  Therapeutic Exercise (Group)  -SG      Recorded by [SG] Jess Dumont OTR 02/15/19 1430      Row Name 02/15/19 1424             Therapeutic Exercise    Therapeutic Exercise  seated, upper extremities  -SG      Recorded by [SG] Jess Dumont OTR 02/15/19 1430      Row Name 02/15/19 1424             Upper Extremity Seated Therapeutic Exercise    Performed, Seated Upper Extremity (Therapeutic Exercise)  shoulder flexion/extension;scapular protraction/retraction;elbow flexion/extension  -SG      Exercise Type, Seated Upper Extremity (Therapeutic Exercise)  AROM (active range of motion);AAROM (active assistive range of motion)  -SG      Expected Outcomes, Seated Upper Extremity (Therapeutic Exercise)  improve functional tolerance, self-care activity;improve functional tolerance, single extremity activity;improve functional tolerance, household activity;improve functional tolerance, community activity  -SG       Sets/Reps Detail, Seated Upper Extremity (Therapeutic Exercise)  10x3  -SG      Comment, Seated Upper Extremity (Therapeutic Exercise)  Needs A for shoulder flexion to 90'  -SG      Recorded by [SG] Jess Dumont OTR 02/15/19 1430      Row Name 02/15/19 1440             Lower Extremity Seated Therapeutic Exercise    Performed, Seated Lower Extremity (Therapeutic Exercise)  ankle dorsiflexion/plantarflexion;LAQ (long arc quad), knee extension  -EE      Exercise Type, Seated Lower Extremity (Therapeutic Exercise)  AROM (active range of motion)  -EE      Sets/Reps Detail, Seated Lower Extremity (Therapeutic Exercise)  1/10  -EE      Recorded by [EE] Kaley Norton, PT 02/15/19 1514      Row Name 02/15/19 1440             Therapeutic Exercise    Lower Extremity (Therapeutic Exercise)  SAQ (short arc quad), bilateral SLR, bridging   -EE      Exercise Type (Therapeutic Exercise)  AROM (active range of motion)  -EE      Position (Therapeutic Exercise)  supine  -EE      Sets/Reps (Therapeutic Exercise)  1/10  -EE      Recorded by [EE] Kaley Norton, PT 02/15/19 1514      Row Name 02/15/19 1440 02/15/19 1424          Static Sitting Balance    Level of [place] (Unsupported Sitting, Static Balance)  supervision  -EE  supervision  -SG     Sitting Position (Unsupported Sitting, Static Balance)  sitting on edge of bed  -EE  sitting on edge of bed  -SG     Time Able to Maintain Position (Unsupported Sitting, Static Balance)  more than 5 minutes  -EE  more than 5 minutes  -SG     Comment (Unsupported Sitting, Static Balance)  --  15min  -SG     Recorded by [EE] Kaley Norton, PT 02/15/19 1514 [SG] Jess Dumont, OTR 02/15/19 1430     Row Name 02/15/19 1440 02/15/19 1424          Positioning and Restraints    Pre-Treatment Position  in bed  -EE  in bed  -SG     Post Treatment Position  bed  -EE  bed  -SG     In Bed  fowlers;call light within reach;encouraged to call for assist  -EE  supine;call light within reach;encouraged  to call for assist;exit alarm on;notified nsg  -SG     Recorded by [EE] Kaley Norton, PT 02/15/19 1514 [SG] Jess Dumont, OTR 02/15/19 1430     Row Name 02/15/19 1440 02/15/19 1424          Pain Scale: Numbers Pre/Post-Treatment    Pain Scale: Numbers, Pretreatment  5/10  -EE  0/10 - no pain  -SG     Pain Scale: Numbers, Post-Treatment  5/10  -EE  --     Pain Location - Side  Left  -EE  --     Pain Location - Orientation  distal  -EE  --     Pain Location  foot reports cramping near toes  -EE  --     Pre/Post Treatment Pain Comment  improved with rest, worsened during standing attempts  -EE  --     Pain Intervention(s)  Repositioned;Elevated;Rest  -EE  --     Recorded by [EE] Kaley Norton, PT 02/15/19 1514 [SG] Jess Dumont, OTR 02/15/19 1430     Row Name                Wound 01/28/19 1138 chest incision    Wound - Properties Group Date first assessed: 01/28/19 [SR] Time first assessed: 1138 [SR] Location: chest [SR] Type: incision [SR] Recorded by:  [SR] Keisha Gandara RN 01/28/19 1138    Row Name                Wound 01/28/19 1215 coccyx unspecified    Wound - Properties Group Date first assessed: 01/28/19 [MB] Time first assessed: 1215 [MB] Present On Admission : yes;picture taken [MB] Location: coccyx [MB] Type: unspecified [MB] Recorded by:  [MB] Julissa Chatterjee, RN 01/28/19 1541    Row Name                Wound 01/31/19 2300 Right gluteal pressure injury    Wound - Properties Group Date first assessed: 01/31/19 [NW] Time first assessed: 2300 [NW] Present On Admission : no [NW] Side: Right [NW] Location: gluteal [NW] Type: pressure injury [NW] Stage, Pressure Injury: deep tissue injury [NW] Recorded by:  [NW] Priscilla Willams, RN 02/01/19 0414    Row Name                Wound 01/31/19 2000 Left posterior ear pressure injury    Wound - Properties Group Date first assessed: 01/31/19 [NW] Time first assessed: 2000 [NW] Side: Left [NW] Orientation: posterior [NW] Location: ear [NW2] Type: pressure injury [NW]  Stage, Pressure Injury: Stage 2;medical device related [NW] Recorded by:  [NW] Priscilla Willmas RN 02/01/19 0416 [NW2] Priscilla Willams RN 02/01/19 0417    Row Name                Wound 02/08/19 1039 Other (See comments) chest incision    Wound - Properties Group Date first assessed: 02/08/19 [MC] Time first assessed: 1039 [MC] Side: Other (See comments) [MC] Location: chest [MC] Type: incision [MC] Recorded by:  [[place]] Kenya Boudreaux RN 02/08/19 1039    Row Name                Wound 02/08/19 1053 Left neck incision    Wound - Properties Group Date first assessed: 02/08/19 [MC] Time first assessed: 1053 [MC] Side: Left [MC] Location: neck [MC] Type: incision [MC] Recorded by:  [[place]] Kenya Boudreaux RN 02/08/19 1053      User Key  (r) = Recorded By, (t) = Taken By, (c) = Cosigned By    Initials Name Effective Dates Discipline    SG Jess Dumont, OTR 12/26/18 -  OT    Julissa Sweet, RN 06/16/16 -  Nurse    Kaley Zamarripa, PT 04/03/18 -  PT    Keisha Fournier RN 06/16/16 -  Nurse    Kenya Qureshi, RN 02/23/18 -  Nurse    Priscilla Jensen RN 03/05/18 -  Nurse          Wound 01/28/19 1138 chest incision (Active)   Dressing Appearance open to air 2/15/2019 12:30 PM   Closure Approximated 2/15/2019 12:30 PM   Base clean;dry;pink 2/15/2019 12:30 PM   Drainage Amount none 2/15/2019 12:30 PM   Dressing Care, Wound open to air 2/14/2019 10:02 PM       Wound 01/28/19 1215 coccyx unspecified (Active)   Dressing Appearance open to air 2/15/2019 12:30 PM   Closure Open to air 2/15/2019 12:30 PM   Base clean;dry;pink;purple 2/15/2019 12:30 PM   Drainage Amount none 2/15/2019 12:30 PM   Dressing Care, Wound open to air 2/14/2019 10:02 PM       Wound 01/31/19 2300 Right gluteal pressure injury (Active)   Dressing Appearance open to air 2/15/2019 12:30 PM   Closure Open to air 2/15/2019 12:30 PM   Base maroon/purple;clean 2/15/2019 12:30 PM   Drainage Amount none 2/15/2019 12:30 PM   Dressing Care, Wound open to  air 2/14/2019 10:02 PM       Wound 01/31/19 2000 Left posterior ear pressure injury (Active)   Dressing Appearance dry;intact;open to air 2/15/2019 12:30 PM   Closure Open to air 2/15/2019 12:30 PM   Base pink;clean;dry 2/15/2019 12:30 PM   Drainage Amount none 2/15/2019 12:30 PM   Dressing Care, Wound open to air 2/14/2019 10:02 PM       Wound 02/08/19 1039 Other (See comments) chest incision (Active)   Dressing Appearance no drainage;dry;intact 2/15/2019 12:30 PM   Closure [place] 2/15/2019 12:30 PM   Base clean;dry;pink 2/15/2019 12:30 PM   Drainage Amount none 2/15/2019 12:30 PM       Wound 02/08/19 1053 Left neck incision (Active)   Dressing Appearance open to air;intact;dry 2/15/2019 12:30 PM   Closure Open to air 2/15/2019 12:30 PM   Base clean;dry;pink 2/15/2019 12:30 PM   Drainage Amount none 2/15/2019 12:30 PM   Dressing Care, Wound open to air 2/14/2019 10:02 PM           Physical Therapy Education     Title: PT OT SLP Therapies (In Progress)     Topic: Physical Therapy (Done)     Point: Mobility training (Done)     Learning Progress Summary           Patient Acceptance, E,TB,D, VU,NR by EE at 2/15/2019  3:14 PM    Acceptance, E,TB, VU,NR by CG at 2/14/2019  4:40 PM    Acceptance, E,TB,D, VU,NR by  at 2/12/2019 11:32 AM    Acceptance, E,TB,D, VU,NR by  at 2/11/2019  9:53 AM    Acceptance, E,D, VU,DU,NR by EH at 2/10/2019 10:10 AM    Acceptance, E,TB,D, VU,NR by CH at 2/7/2019 12:44 PM    Acceptance, E, VU,NR by EF at 2/6/2019 10:19 AM    Acceptance, E, VU,NR by MA at 2/5/2019 11:09 AM    Acceptance, E,TB,D, VU,NR by CH at 2/4/2019 11:34 AM    Acceptance, E,D, VU,NR by SM at 2/3/2019  4:47 PM    Acceptance, GABRIELLE SESAY DUNR by DARRYN at 2/2/2019 10:36 AM    Comment:  safety during sit to stand, benefits of activity    AcceptanceSHAMA NR by MA at 1/31/2019 12:08 PM    AcceptanceSHAMA NR by MA at 1/30/2019 10:23 AM    SHAMA Foss TB, VU by YANETH at 1/27/2019  9:10 AM    AcceptanceSHAMA VU, NR by MA at 1/25/2019  3:54 PM     Acceptance, E,TB,D, VU,NR by CH at 1/23/2019  3:14 PM    Acceptance, TB,E, VU,DU by CW at 1/18/2019  4:37 PM    Acceptance, E, NR by EM at 1/17/2019 12:15 PM   Family Acceptance, TB,E, VU,DU by CW at 1/18/2019  4:37 PM                   Point: Home exercise program (Done)     Learning Progress Summary           Patient Acceptance, E,TB,D, VU,NR by EE at 2/15/2019  3:14 PM    Acceptance, E,TB, VU,NR by CG at 2/14/2019  4:40 PM    Acceptance, E,TB,D, VU,NR by CH at 2/12/2019 11:32 AM    Acceptance, E,TB,D, VU,NR by CH at 2/11/2019  9:53 AM    Acceptance, E,D, VU,DU,NR by  at 2/10/2019 10:10 AM    Acceptance, E,TB,D, VU,NR by CH at 2/7/2019 12:44 PM    Acceptance, E, VU,NR by  at 2/6/2019 10:19 AM    Acceptance, E, VU,NR by MA at 2/5/2019 11:09 AM    Acceptance, E,TB,D, VU,NR by CH at 2/4/2019 11:34 AM    Acceptance, E,D, VU,NR by SM at 2/3/2019  4:47 PM    Acceptance, E,D, DU,NR by LC at 2/2/2019 10:36 AM    Comment:  safety during sit to stand, benefits of activity    Acceptance, E,TB,D, VU,NR by CH at 2/1/2019 10:51 AM    Acceptance, E, NR by MA at 1/31/2019 12:08 PM    Acceptance, E, NR by MA at 1/30/2019 10:23 AM    Acceptance, E,TB, VU by  at 1/27/2019  9:10 AM    Acceptance, E, VU,NR by MA at 1/25/2019  3:54 PM    Acceptance, E,TB,D, VU,NR by CH at 1/23/2019  3:14 PM    Acceptance, TB,E, VU,DU by CW at 1/18/2019  4:37 PM   Family Acceptance, TB,E, VU,DU by CW at 1/18/2019  4:37 PM                   Point: Body mechanics (Done)     Learning Progress Summary           Patient Acceptance, E,TB,D, VU,NR by EE at 2/15/2019  3:14 PM    Acceptance, E,TB, VU,NR by CG at 2/14/2019  4:40 PM    Acceptance, E,TB,D, VU,NR by CH at 2/12/2019 11:32 AM    Acceptance, E,TB,D, VU,NR by CH at 2/11/2019  9:53 AM    Acceptance, E,D, VU,DU,NR by EH at 2/10/2019 10:10 AM    Acceptance, E,TB,D, VU,NR by CH at 2/7/2019 12:44 PM    Acceptance, E, VU,NR by EF at 2/6/2019 10:19 AM    Acceptance, E, VU,NR by MA at 2/5/2019 11:09 AM     Acceptance, E,TB,D, VU,NR by CH at 2/4/2019 11:34 AM    Acceptance, E,D, VU,NR by SM at 2/3/2019  4:47 PM    Acceptance, E,D, DU,NR by DARRYN at 2/2/2019 10:36 AM    Comment:  safety during sit to stand, benefits of activity    Acceptance, E, NR by MA at 1/31/2019 12:08 PM    Acceptance, E, NR by MA at 1/30/2019 10:23 AM    Acceptance, E,TB, VU by YANETH at 1/27/2019  9:10 AM    Acceptance, E, VU,NR by MA at 1/25/2019  3:54 PM    Acceptance, E,TB,D, VU,NR by CH at 1/23/2019  3:14 PM    Acceptance, TB,E, VU,DU by CW at 1/18/2019  4:37 PM   Family Acceptance, TB,E, VU,DU by CW at 1/18/2019  4:37 PM                   Point: Precautions (Done)     Learning Progress Summary           Patient Acceptance, E,TB,D, VU,NR by EE at 2/15/2019  3:14 PM    Acceptance, E,TB, VU,NR by CG at 2/14/2019  4:40 PM    Acceptance, E,TB,D, VU,NR by CH at 2/12/2019 11:32 AM    Acceptance, E,TB,D, VU,NR by CH at 2/11/2019  9:53 AM    Acceptance, E,D, VU,DU,NR by  at 2/10/2019 10:10 AM    Acceptance, E,TB,D, VU,NR by CHELSEA at 2/7/2019 12:44 PM    Acceptance, E, VU,NR by PATRICIO at 2/6/2019 10:19 AM    Acceptance, E, VU,NR by MA at 2/5/2019 11:09 AM    Acceptance, E,TB,D, VU,NR by CH at 2/4/2019 11:34 AM    Acceptance, E,D, VU,NR by SM at 2/3/2019  4:47 PM    Acceptance, E,D, DU,NR by DARRYN at 2/2/2019 10:36 AM    Comment:  safety during sit to stand, benefits of activity    Acceptance, E, NR by MA at 1/31/2019 12:08 PM    Acceptance, E, NR by MA at 1/30/2019 10:23 AM    Acceptance, E,TB, VU by KH at 1/27/2019  9:10 AM    Acceptance, E, VU,NR by MA at 1/25/2019  3:54 PM    Acceptance, E,TB,D, VU,NR by  at 1/23/2019  3:14 PM    Acceptance, TB,E, VU,DU by CW at 1/18/2019  4:37 PM   Family Acceptance, TB,E, VU,DU by CW at 1/18/2019  4:37 PM                               User Key     Initials Effective Dates Name Provider Type Discipline     06/08/18 -  Heike Anthony, PT Physical Therapist PT     04/03/18 -  Kae Aldrich, PT Physical Therapist PT     EE 04/03/18 -  Kaley Norton, PT Physical Therapist PT    EM 04/03/18 -  Heike Strauss, PT Physical Therapist PT    SM 03/07/18 -  Ksenia Lopez, PTA Physical Therapy Assistant PT    KH 06/22/16 -  Zuleyma Mancilla, PT Physical Therapist PT    LC 08/02/16 -  Khurram Littlejohn, PT DPT Physical Therapist PT    CW 03/07/18 -  Jose Ott, PTA Physical Therapy Assistant PT    EH 08/19/18 -  Cadence Cobb, PTA Physical Therapy Assistant PT    MA 10/19/18 -  Tabatha Lawrence, PT Physical Therapist PT    CG 02/04/19 -  Jackson Greer, PT Student PT Student PT                PT Recommendation and Plan     Plan of Care Reviewed With: patient  Outcome Summary: Pt demonstrates gradual progress with PT. L foot pain limiting attempts at sit to stand today. Good tolerance of seated and supine ther ex for LE strengthening; able to progress several exercises today.   Outcome Measures     Row Name 02/15/19 1500 02/15/19 1400 02/14/19 1600       How much help from another person do you currently need...    Turning from your back to your side while in flat bed without using bedrails?  3  -EE  --  3  -CH (r) CG (t) CH (c)    Moving from lying on back to sitting on the side of a flat bed without bedrails?  3  -EE  --  3  -CH (r) CG (t) CH (c)    Moving to and from a bed to a chair (including a wheelchair)?  1  -EE  --  1  -CH (r) CG (t) CH (c)    Standing up from a chair using your arms (e.g., wheelchair, bedside chair)?  1  -EE  --  2  -CH (r) CG (t) CH (c)    Climbing 3-5 steps with a railing?  1  -EE  --  1  -CH (r) CG (t) CH (c)    To walk in hospital room?  1  -EE  --  1  -CH (r) CG (t) CH (c)    AM-PAC 6 Clicks Score  10  -EE  --  11  -CH (r) CG (t)       How much help from another is currently needed...    Putting on and taking off regular lower body clothing?  --  1  -SG  --    Bathing (including washing, rinsing, and drying)  --  1  -SG  --    Toileting (which includes using toilet bed pan or urinal)  --  1   -SG  --    Putting on and taking off regular upper body clothing  --  2  -SG  --    Taking care of personal grooming (such as brushing teeth)  --  2  -SG  --    Eating meals  --  3  -SG  --    Score  --  10  -SG  --       Functional Assessment    Outcome Measure Options  AM-PAC 6 Clicks Basic Mobility (PT)  -EE  AM-PAC 6 Clicks Daily Activity (OT)  -SG  AM-PAC 6 Clicks Basic Mobility (PT)  -CH (r) CG (t) CH (c)      User Key  (r) = Recorded By, (t) = Taken By, (c) = Cosigned By    Initials Name Provider Type    SG Geraldmumtazlori Jess, OTR Occupational Therapist    CH Kae Aldrich, PT Physical Therapist    Kaley Zamarripa, PT Physical Therapist    CG Jackson Greer, PT Student PT Student         Time Calculation:   PT Charges     Row Name 02/15/19 1515             Time Calculation    Start Time  1440  -EE      Stop Time  1505  -EE      Time Calculation (min)  25 min  -EE      PT Received On  02/15/19  -EE      PT - Next Appointment  02/16/19  -EE         Time Calculation- PT    Total Timed Code Minutes- PT  25 minute(s)  -EE        User Key  (r) = Recorded By, (t) = Taken By, (c) = Cosigned By    Initials Name Provider Type    Kaley Zamarripa, PT Physical Therapist        Therapy Suggested Charges     Code   Minutes Charges    36687 (CPT®) Hc Pt Neuromusc Re Education Ea 15 Min      30281 (CPT®) Hc Pt Ther Proc Ea 15 Min 15 1    91206 (CPT®) Hc Gait Training Ea 15 Min      66655 (CPT®) Hc Pt Therapeutic Act Ea 15 Min 10 1    47941 (CPT®) Hc Pt Manual Therapy Ea 15 Min      00282 (CPT®) Hc Pt Iontophoresis Ea 15 Min      17180 (CPT®) Hc Pt Elec Stim Ea-Per 15 Min      51270 (CPT®) Hc Pt Ultrasound Ea 15 Min      21656 (CPT®) Hc Pt Self Care/Mgmt/Train Ea 15 Min      02394 (CPT®) Hc Pt Prosthetic (S) Train Initial Encounter, Each 15 Min      20457 (CPT®) Hc Pt Orthotic(S)/Prosthetic(S) Encounter, Each 15 Min      40962 (CPT®) Hc Orthotic(S) Mgmt/Train Initial Encounter, Each 15min      Total  25 2         Therapy Charges for Today     Code Description Service Date Service Provider Modifiers Qty    44480974242 HC PT THER PROC EA 15 MIN 2/15/2019 Kaley Norton, PT GP 2    18345507909 HC PT THER SUPP EA 15 MIN 2/15/2019 Kaley Norton, PT GP 1          PT G-Codes  Outcome Measure Options: AM-PAC 6 Clicks Basic Mobility (PT)  AM-PAC 6 Clicks Score: 10  Score: 10    Kaley Norton, PT  2/15/2019

## 2019-02-15 NOTE — PLAN OF CARE
Problem: Fall Risk (Adult)  Goal: Absence of Fall  Outcome: Ongoing (interventions implemented as appropriate)      Problem: Anxiety (Adult)  Goal: Reduction/Resolution  Outcome: Ongoing (interventions implemented as appropriate)      Problem: Activity Intolerance (Adult)  Goal: Activity Tolerance  Outcome: Ongoing (interventions implemented as appropriate)      Problem: Skin Injury Risk (Adult)  Goal: Skin Health and Integrity  Outcome: Ongoing (interventions implemented as appropriate)      Problem: Cardiac Surgery (Adult)  Goal: Signs and Symptoms of Listed Potential Problems Will be Absent, Minimized or Managed (Cardiac Surgery)  Outcome: Ongoing (interventions implemented as appropriate)

## 2019-02-15 NOTE — PROGRESS NOTES
"   LOS: 30 days    Patient Care Team:  Robert Cortez MD as PCP - General (Family Medicine)  Deborah Agudelo MD as Surgeon (Orthopedic Surgery)    Chief Complaint:    Chief Complaint   Patient presents with   • Shortness of Breath   • Leg Swelling     Follow UP LAVERNE  Subjective     Interval History:  The patient's discharged to the [facility] home was delayed because of atrial fibrillation with rapid ventricular response, at the present time her heart rate is slower, denies any chest pain or shortness of air, no orthopnea or PND, no nausea or vomiting.  No dysuria or gross hematuria.  Plan for dialysis tomorrow      Objective     Vital Signs  Temp:  [97.4 °F (36.3 °C)-98.2 °F (36.8 °C)] 98.2 °F (36.8 °C)  Heart Rate:  [] 128  Resp:  [18-20] 18  BP: ()/(48-81) 99/68    Flowsheet Rows      First Filed Value   Admission Height  170.2 cm (67\") Documented at 01/16/2019 0910   Admission Weight  145 kg (320 lb)  (Abnormal)  Documented at 01/16/2019 0921          No intake/output data recorded.  I/O last 3 completed shifts:  In: 850 [P.O.:850]  Out: -     Intake/Output Summary (Last 24 hours) at 2/15/2019 0921  Last data filed at 2/14/2019 2202  Gross per 24 hour   Intake 240 ml   Output --   Net 240 ml       Physical Exam:  General Appearance: alert, oriented x 3, no acute distress, obese  Skin: warm and dry  HEENT: Nonicteric sclerae, oral mucosa normal  Neck: supple, no JVD, trachea midline, RIJ TDC  Lungs: Clear to auscultation anteriorly.   Heart: Irregularly irregular, no rub  Abdomen: soft, non-tender,  +bs  Extremities: Trace pedal edema, no cyanosis or clubbing. Multiple ecchymoses, arms with nodular hematomas.   Neuro: normal speech and mental status       Results Review:    Results from last 7 days   Lab Units 02/15/19  0515 02/14/19  0503 02/13/19  0452   SODIUM mmol/L 136 137 140   POTASSIUM mmol/L 3.7 4.2 4.1   CHLORIDE mmol/L 92* 95* 98   CO2 mmol/L 25.2 26.8 27.1   BUN mg/dL 51* 33* 46* "   CREATININE mg/dL 2.84* 2.21* 2.27*   CALCIUM mg/dL 9.6 9.9 9.8   BILIRUBIN mg/dL  --   --  0.5   ALK PHOS U/L  --   --  81   ALT (SGPT) U/L  --   --  <5   AST (SGOT) U/L  --   --  9   GLUCOSE mg/dL 217* 126* 107*       Estimated Creatinine Clearance: 31.5 mL/min (A) (by C-G formula based on SCr of 2.84 mg/dL (H)).    Results from last 7 days   Lab Units 02/15/19  0515 02/14/19  0503 02/12/19 0459   PHOSPHORUS mg/dL 4.3 3.5 2.6             Results from last 7 days   Lab Units 02/15/19  0515 02/14/19  1241 02/14/19  0503 02/13/19 0452 02/12/19 0459   WBC 10*3/mm3 9.18 9.96 11.38* 14.56* 16.95*   HEMOGLOBIN g/dL 9.0* 9.0* 9.6* 8.6* 8.8*   PLATELETS 10*3/mm3 231 198 215 216 220       Results from last 7 days   Lab Units 02/15/19  0515 02/14/19  1959 02/14/19  1241 02/14/19  0503 02/13/19 0452   INR  2.12* 1.97* 2.06* 2.04* 2.02*         Imaging Results (last 24 hours)     ** No results found for the last 24 hours. **          acetaminophen 500 mg Oral Q4H   aspirin 81 mg Oral Daily   bumetanide 4 mg Oral Daily   cetaphil  Topical Q12H   epoetin lisha 10,000 Units Intravenous Once per day on Mon Wed Fri   escitalopram 10 mg Oral Daily   famotidine 20 mg Oral Daily   hydrocortisone 1 application Topical Q6H   ipratropium-albuterol 3 mL Nebulization BID - RT   metoprolol tartrate 25 mg Oral Q12H   mupirocin  Each Nare BID   sodium chloride 4 mL Nebulization BID - RT   warfarin 1 mg Oral Daily       amiodarone 0.5 mg/min Last Rate: 0.5 mg/min (02/15/19 0728)   heparin (porcine) 8.7 Units/kg/hr (Adjusted) Last Rate: 6.7 Units/kg/hr (02/15/19 07)   sodium chloride 30 mL/hr Last Rate: 30 mL/hr (01/28/19 1215)   sodium chloride 9 mL/hr Last Rate: 9 mL/hr (02/08/19 0850)       Medication Review:   Current Facility-Administered Medications   Medication Dose Route Frequency Provider Last Rate Last Dose   • acetaminophen (TYLENOL) tablet 500 mg  500 mg Oral Q4H Yahaira Garza APRN   500 mg at 02/15/19 0505   • albumin  human 25 % IV SOLN 37.5 g  37.5 g Intravenous PRN Sheila Terrazas MD   25 g at 02/09/19 1208   • ALPRAZolam (XANAX) tablet 0.25 mg  0.25 mg Oral 4x Daily PRN Scott Segura MD       • amiodarone (NEXTERONE) 360 mg/200 mL (1.8 mg/mL) infusion  0.5 mg/min Intravenous Continuous Alicia Schmitz APRN 16.67 mL/hr at 02/15/19 0728 0.5 mg/min at 02/15/19 0728   • aspirin chewable tablet 81 mg  81 mg Oral Daily Scar Gaxiola MD   81 mg at 02/14/19 1021   • bisacodyl (DULCOLAX) EC tablet 10 mg  10 mg Oral Daily PRN Scar Gaxiola MD   10 mg at 02/10/19 2116   • bisacodyl (DULCOLAX) suppository 10 mg  10 mg Rectal Daily PRN Scar Gaxiola MD   10 mg at 02/03/19 2120   • bumetanide (BUMEX) tablet 4 mg  4 mg Oral Daily Maria Fernanda Maldonado MD   4 mg at 02/14/19 1022   • bupivacaine PF 30 mL, lidocaine 1 % 30 mL mixture    PRN Satish Stahl MD   18 mL at 02/08/19 1001   • cetaphil lotion   Topical Q12H Zonia Lopez MD       • cyclobenzaprine (FLEXERIL) tablet 10 mg  10 mg Oral Q8H PRN Scar Gaxiola MD       • dextrose (D50W) 25 g/ 50mL Intravenous Solution 25 g  25 g Intravenous Q15 Min PRN Oscar Rodriguez MD       • dextrose (GLUTOSE) oral gel 15 g  15 g Oral Q15 Min PRN Oscar Rodriguez MD       • epoetin lisha (EPOGEN,PROCRIT) injection 10,000 Units  10,000 Units Intravenous Once per day on Mon Wed Fri Maria Fernanda Maldonado MD   10,000 Units at 02/13/19 0836   • escitalopram (LEXAPRO) tablet 10 mg  10 mg Oral Daily Oscar Rodriguez MD   10 mg at 02/14/19 1022   • famotidine (PEPCID) tablet 20 mg  20 mg Oral Daily Wallace Falcon MD   20 mg at 02/14/19 1022   • glucagon (human recombinant) (GLUCAGEN DIAGNOSTIC) injection 1 mg  1 mg Subcutaneous PRN Oscar Rodriguez MD       • heparin 36732 units/250 mL (100 units/mL) in 0.45 % NaCl infusion  8.7 Units/kg/hr (Adjusted) Intravenous Titrated Scar Gaxiola MD 7.7 mL/hr at 02/15/19 0727 6.7 Units/kg/hr at 02/15/19  0727   • hydrocortisone 1 % cream 1 application  1 application Topical Q6H Fran Tilley MD   1 application at 02/15/19 0504   • ipratropium-albuterol (DUO-NEB) nebulizer solution 3 mL  3 mL Nebulization BID - RT Yahaira Garza APRN   3 mL at 02/15/19 0645   • ipratropium-albuterol (DUO-NEB) nebulizer solution 3 mL  3 mL Nebulization Q6H PRN Yahaira Garza APRN       • magic mouthwash oral supsension 5 mL  5 mL Swish & Spit Q4H PRN Yahaira Garza APRN       • metoprolol tartrate (LOPRESSOR) tablet 25 mg  25 mg Oral Q12H Scott Segura MD   25 mg at 02/14/19 2204   • mupirocin (BACTROBAN) 2 % nasal ointment   Each Nare BID Scar Gaxiola MD   10 application at 02/14/19 2202   • ondansetron (ZOFRAN) injection 4 mg  4 mg Intravenous Q6H PRN Scar Gaxiola MD   4 mg at 02/11/19 2022   • promethazine (PHENERGAN) tablet 12.5 mg  12.5 mg Oral Q6H PRN Scar Gaxiola MD        Or   • promethazine (PHENERGAN) injection 12.5 mg  12.5 mg Intravenous Q6H PRN Scar Gaxiola MD       • sodium chloride 0.9 % flush 30 mL  30 mL Intravenous Once PRN Scar Gaxiola MD       • sodium chloride 0.9 % infusion  30 mL/hr Intravenous Continuous PRN Scar Gaxiola MD 30 mL/hr at 01/28/19 1215 30 mL/hr at 01/28/19 1215   • sodium chloride 0.9 % infusion  9 mL/hr Intravenous Continuous ProtCasandra ansari MD 9 mL/hr at 02/08/19 0850 9 mL/hr at 02/08/19 0850   • sodium chloride 7 % nebulizer solution nebulizer solution 4 mL  4 mL Nebulization BID - RT Yahaira Garza APRN   4 mL at 02/15/19 0645   • warfarin (COUMADIN) tablet 1 mg  1 mg Oral Daily Scott Segura MD   1 mg at 02/14/19 0700       Assessment/Plan   1. Oliguric LAVERNE - ATN post AVR/MVR.  HD-dep since 1/30;  Urine not recorded due to frequent stools and incontinence. Dialyzed today.  Waste products rise between treatments, but main issue is volume. Will monitor labs and urine output at [facility] for recovery.  Creatinine today at  2.84, electrolytes within acceptable range  2. SP AVR, MVR, TV repair, left cryo MAZE PENNIE ligation.  POD16  3. Anemia. Iv venofer.   4. Probable OCTAVIANO  5. GERD - on PPI   6.  Paroxysmal atrial fibrillation  7.  Anemia, hemoglobin is 9.        Plan  1.  Hemodialysis tomorrow  2.  Surveillance lab              Christopher Jin MD  02/15/19  9:21 AM

## 2019-02-15 NOTE — PLAN OF CARE
Problem: Patient Care Overview  Goal: Plan of Care Review   02/15/19 1517   Coping/Psychosocial   Plan of Care Reviewed With patient   OTHER   Outcome Summary Pt demonstrates gradual progress with PT. L foot pain limiting attempts at sit to stand today. Good tolerance of seated and supine ther ex for LE strengthening; able to progress several exercises today.

## 2019-02-16 LAB
ALBUMIN SERPL-MCNC: 3.3 G/DL (ref 3.5–5.2)
ANION GAP SERPL CALCULATED.3IONS-SCNC: 19.7 MMOL/L
APTT PPP: 42.7 SECONDS (ref 22.7–35.4)
BASOPHILS # BLD AUTO: 0.03 10*3/MM3 (ref 0–0.2)
BASOPHILS NFR BLD AUTO: 0.3 % (ref 0–1.5)
BUN BLD-MCNC: 65 MG/DL (ref 8–23)
BUN/CREAT SERPL: 17.5 (ref 7–25)
CALCIUM SPEC-SCNC: 9.5 MG/DL (ref 8.6–10.5)
CHLORIDE SERPL-SCNC: 95 MMOL/L (ref 98–107)
CO2 SERPL-SCNC: 23.3 MMOL/L (ref 22–29)
CREAT BLD-MCNC: 3.71 MG/DL (ref 0.57–1)
DEPRECATED RDW RBC AUTO: 56.1 FL (ref 37–54)
EOSINOPHIL # BLD AUTO: 0.31 10*3/MM3 (ref 0–0.4)
EOSINOPHIL NFR BLD AUTO: 3.6 % (ref 0.3–6.2)
ERYTHROCYTE [DISTWIDTH] IN BLOOD BY AUTOMATED COUNT: 16.5 % (ref 12.3–15.4)
GFR SERPL CREATININE-BSD FRML MDRD: 12 ML/MIN/1.73
GFR SERPL CREATININE-BSD FRML MDRD: ABNORMAL ML/MIN/1.73
GLUCOSE BLD-MCNC: 107 MG/DL (ref 65–99)
HCT VFR BLD AUTO: 28.6 % (ref 34–46.6)
HGB BLD-MCNC: 8.5 G/DL (ref 12–15.9)
IMM GRANULOCYTES # BLD AUTO: 0.38 10*3/MM3 (ref 0–0.05)
IMM GRANULOCYTES NFR BLD AUTO: 4.4 % (ref 0–0.5)
INR PPP: 2.26 (ref 0.9–1.1)
LYMPHOCYTES # BLD AUTO: 0.8 10*3/MM3 (ref 0.7–3.1)
LYMPHOCYTES NFR BLD AUTO: 9.2 % (ref 19.6–45.3)
MCH RBC QN AUTO: 28.4 PG (ref 26.6–33)
MCHC RBC AUTO-ENTMCNC: 29.7 G/DL (ref 31.5–35.7)
MCV RBC AUTO: 95.7 FL (ref 79–97)
MONOCYTES # BLD AUTO: 0.91 10*3/MM3 (ref 0.1–0.9)
MONOCYTES NFR BLD AUTO: 10.4 % (ref 5–12)
NEUTROPHILS # BLD AUTO: 6.29 10*3/MM3 (ref 1.4–7)
NEUTROPHILS NFR BLD AUTO: 72.1 % (ref 42.7–76)
NRBC BLD AUTO-RTO: 0.1 /100 WBC (ref 0–0)
PHOSPHATE SERPL-MCNC: 4.9 MG/DL (ref 2.5–4.5)
PLATELET # BLD AUTO: 225 10*3/MM3 (ref 140–450)
PMV BLD AUTO: 10 FL (ref 6–12)
POTASSIUM BLD-SCNC: 3.6 MMOL/L (ref 3.5–5.2)
PROTHROMBIN TIME: 24.5 SECONDS (ref 11.7–14.2)
RBC # BLD AUTO: 2.99 10*6/MM3 (ref 3.77–5.28)
SODIUM BLD-SCNC: 138 MMOL/L (ref 136–145)
WBC NRBC COR # BLD: 8.72 10*3/MM3 (ref 3.4–10.8)

## 2019-02-16 PROCEDURE — 97110 THERAPEUTIC EXERCISES: CPT

## 2019-02-16 PROCEDURE — 25010000002 HEPARIN (PORCINE) PER 1000 UNITS: Performed by: INTERNAL MEDICINE

## 2019-02-16 PROCEDURE — 5A1D70Z PERFORMANCE OF URINARY FILTRATION, INTERMITTENT, LESS THAN 6 HOURS PER DAY: ICD-10-PCS | Performed by: INTERNAL MEDICINE

## 2019-02-16 PROCEDURE — 85730 THROMBOPLASTIN TIME PARTIAL: CPT | Performed by: NURSE PRACTITIONER

## 2019-02-16 PROCEDURE — 63510000001 EPOETIN ALFA PER 1000 UNITS: Performed by: INTERNAL MEDICINE

## 2019-02-16 PROCEDURE — 85025 COMPLETE CBC W/AUTO DIFF WBC: CPT | Performed by: INTERNAL MEDICINE

## 2019-02-16 PROCEDURE — 85610 PROTHROMBIN TIME: CPT | Performed by: NURSE PRACTITIONER

## 2019-02-16 PROCEDURE — 94799 UNLISTED PULMONARY SVC/PX: CPT

## 2019-02-16 PROCEDURE — 99024 POSTOP FOLLOW-UP VISIT: CPT | Performed by: NURSE PRACTITIONER

## 2019-02-16 PROCEDURE — 80069 RENAL FUNCTION PANEL: CPT | Performed by: INTERNAL MEDICINE

## 2019-02-16 PROCEDURE — 99232 SBSQ HOSP IP/OBS MODERATE 35: CPT | Performed by: NURSE PRACTITIONER

## 2019-02-16 RX ORDER — HEPARIN SODIUM 1000 [USP'U]/ML
4000 INJECTION, SOLUTION INTRAVENOUS; SUBCUTANEOUS ONCE
Status: COMPLETED | OUTPATIENT
Start: 2019-02-16 | End: 2019-02-16

## 2019-02-16 RX ORDER — AMIODARONE HYDROCHLORIDE 200 MG/1
200 TABLET ORAL EVERY 12 HOURS SCHEDULED
Status: DISCONTINUED | OUTPATIENT
Start: 2019-02-16 | End: 2019-02-18 | Stop reason: HOSPADM

## 2019-02-16 RX ADMIN — AMIODARONE HYDROCHLORIDE 200 MG: 200 TABLET ORAL at 19:56

## 2019-02-16 RX ADMIN — HYDROCORTISONE 1 APPLICATION: 1 CREAM TOPICAL at 06:22

## 2019-02-16 RX ADMIN — AMIODARONE HYDROCHLORIDE 200 MG: 200 TABLET ORAL at 12:41

## 2019-02-16 RX ADMIN — BUMETANIDE 4 MG: 2 TABLET ORAL at 21:11

## 2019-02-16 RX ADMIN — IPRATROPIUM BROMIDE AND ALBUTEROL SULFATE 3 ML: 2.5; .5 SOLUTION RESPIRATORY (INHALATION) at 20:07

## 2019-02-16 RX ADMIN — MUPIROCIN: 20 OINTMENT TOPICAL at 19:59

## 2019-02-16 RX ADMIN — HEPARIN SODIUM 4000 UNITS: 1000 INJECTION INTRAVENOUS; SUBCUTANEOUS at 16:43

## 2019-02-16 RX ADMIN — METOPROLOL TARTRATE 25 MG: 25 TABLET ORAL at 19:56

## 2019-02-16 RX ADMIN — FAMOTIDINE 20 MG: 20 TABLET, FILM COATED ORAL at 21:12

## 2019-02-16 RX ADMIN — IPRATROPIUM BROMIDE AND ALBUTEROL SULFATE 3 ML: 2.5; .5 SOLUTION RESPIRATORY (INHALATION) at 08:19

## 2019-02-16 RX ADMIN — EMOLLIENT - LOTION: LOTION at 19:58

## 2019-02-16 RX ADMIN — EMOLLIENT - LOTION: LOTION at 08:20

## 2019-02-16 RX ADMIN — SODIUM CHLORIDE 4 ML: 7 NEBU SOLN,3 % NEBU at 20:07

## 2019-02-16 RX ADMIN — HYDROCORTISONE 1 APPLICATION: 1 CREAM TOPICAL at 19:58

## 2019-02-16 RX ADMIN — MUPIROCIN: 20 OINTMENT TOPICAL at 10:44

## 2019-02-16 RX ADMIN — ESCITALOPRAM 10 MG: 10 TABLET, FILM COATED ORAL at 21:12

## 2019-02-16 RX ADMIN — ACETAMINOPHEN 500 MG: 500 TABLET, FILM COATED ORAL at 00:41

## 2019-02-16 RX ADMIN — Medication 81 MG: at 08:16

## 2019-02-16 RX ADMIN — WARFARIN SODIUM 1 MG: 1 TABLET ORAL at 19:56

## 2019-02-16 RX ADMIN — ACETAMINOPHEN 500 MG: 500 TABLET, FILM COATED ORAL at 12:40

## 2019-02-16 RX ADMIN — ACETAMINOPHEN 500 MG: 500 TABLET, FILM COATED ORAL at 04:34

## 2019-02-16 RX ADMIN — HYDROCORTISONE 1 APPLICATION: 1 CREAM TOPICAL at 00:41

## 2019-02-16 RX ADMIN — ACETAMINOPHEN 500 MG: 500 TABLET, FILM COATED ORAL at 08:16

## 2019-02-16 RX ADMIN — SODIUM CHLORIDE 4 ML: 7 NEBU SOLN,3 % NEBU at 08:24

## 2019-02-16 RX ADMIN — ACETAMINOPHEN 500 MG: 500 TABLET, FILM COATED ORAL at 19:53

## 2019-02-16 RX ADMIN — ERYTHROPOIETIN 10000 UNITS: 10000 INJECTION, SOLUTION INTRAVENOUS; SUBCUTANEOUS at 16:43

## 2019-02-16 RX ADMIN — HYDROCORTISONE 1 APPLICATION: 1 CREAM TOPICAL at 12:41

## 2019-02-16 NOTE — CONSULTS
Adult Nutrition  Assessment/PES    Patient Name:  Claudia Arnold  YOB: 1945  MRN: [id number]  Admit Date:  1/16/2019    Assessment Date:  2/16/2019    Comments: Diet education consult. Pt & her daughter seen re: heart healthy, renal diet (getting HD). She's currently on a regular diet due to poor appetite/intake but this is improving. Pt's biggest concerns are related to her bowels - she's been constipated but then started having diarrhea/loose stools for the past 3-4 days after receiving an enema & stool softeners. We discussed fiber to help bulk stool, drinking adequate water/fluid. Written materials provided.     Reason for Assessment     Row Name 02/16/19 1230          Reason for Assessment    Reason For Assessment  nurse/nurse practitioner consult     Identified At Risk by Screening Criteria  need for education         Nutrition/Diet History     Row Name 02/16/19 1230          Nutrition/Diet History    Typical Food/Fluid Intake  Pt having bowel issues (constipation - treated; now having loose stools/urgency). Appetite slowly improving.            Problem/Interventions:      Problem 3     Row Name 02/16/19 1231          Nutrition Diagnoses Problem 3    Problem 3  Knowledge Deficit     Etiology (related to)  Medical Diagnosis     Cardiac  CAD;CHF     Renal  Hemodialysis;ARF     Signs/Symptoms (evidenced by)  Reported Information Deficit             Intervention Goal     Row Name 02/16/19 1231          Intervention Goal    General  Provide information regarding MNT for treatment/condition;Disease management/therapy             Education/Evaluation     Row Name 02/16/19 1232          Education    Education  Will Instruct as appropriate;Other (comment);Provided education regarding;Education topics met with pt & daughter     Provided education regarding  Diet rationale;Key food habit change     Education Topics  Cardiac heart health;CHF;Protein;Renal diet;Na+        Monitor/Evaluation    Monitor  Per  protocol     Education Follow-up  Reinforce PRN           Electronically signed by:  Yahaira Griffin RD  02/16/19 12:32 PM

## 2019-02-16 NOTE — PROGRESS NOTES
"   LOS: 31 days    Patient Care Team:  Robert Cortez MD as PCP - General (Family Medicine)  Deborah Agudelo MD as Surgeon (Orthopedic Surgery)    Chief Complaint:    Chief Complaint   Patient presents with   • Shortness of Breath   • Leg Swelling     Follow UP LAVERNE  Subjective     Interval History:  The patient had cardioversion yesterday, her heart rate has much improved.  Denies any chest pain or shortness of air, no orthopnea PND, no nausea or vomiting, no dysuria or gross hematuria.        Objective     Vital Signs  Temp:  [97.2 °F (36.2 °C)-97.9 °F (36.6 °C)] 97.9 °F (36.6 °C)  Heart Rate:  [] 55  Resp:  [16-22] 18  BP: ()/(53-88) 104/54    Flowsheet Rows      First Filed Value   Admission Height  170.2 cm (67\") Documented at 01/16/2019 0910   Admission Weight  145 kg (320 lb)  (Abnormal)  Documented at 01/16/2019 0921          I/O this shift:  In: 200 [P.O.:200]  Out: -   I/O last 3 completed shifts:  In: 360 [P.O.:360]  Out: -     Intake/Output Summary (Last 24 hours) at 2/16/2019 0824  Last data filed at 2/16/2019 0745  Gross per 24 hour   Intake 440 ml   Output --   Net 440 ml       Physical Exam:  General Appearance: alert, oriented x 3, no acute distress, obese  Skin: warm and dry  HEENT: Nonicteric sclerae, oral mucosa normal  Neck: supple, no JVD, trachea midline, RIJ TDC  Lungs: Clear to auscultation anteriorly.   Heart: RRR, normal S1 and S2, no S3, no rub  Abdomen: soft, non-tender,  +bs  Extremities: Trace pedal edema, no cyanosis or clubbing. Multiple ecchymoses, arms with nodular hematomas.   Neuro: normal speech and mental status       Results Review:    Results from last 7 days   Lab Units 02/16/19  0334 02/15/19  0515 02/14/19  0503 02/13/19  0452   SODIUM mmol/L 138 136 137 140   POTASSIUM mmol/L 3.6 3.7 4.2 4.1   CHLORIDE mmol/L 95* 92* 95* 98   CO2 mmol/L 23.3 25.2 26.8 27.1   BUN mg/dL 65* 51* 33* 46*   CREATININE mg/dL 3.71* 2.84* 2.21* 2.27*   CALCIUM mg/dL 9.5 9.6 9.9 9.8 "   BILIRUBIN mg/dL  --   --   --  0.5   ALK PHOS U/L  --   --   --  81   ALT (SGPT) U/L  --   --   --  <5   AST (SGOT) U/L  --   --   --  9   GLUCOSE mg/dL 107* 217* 126* 107*       Estimated Creatinine Clearance: 24.1 mL/min (A) (by C-G formula based on SCr of 3.71 mg/dL (H)).    Results from last 7 days   Lab Units 02/16/19  0334 02/15/19  0515 02/14/19  0503   PHOSPHORUS mg/dL 4.9* 4.3 3.5             Results from last 7 days   Lab Units 02/16/19  0334 02/15/19  0515 02/14/19  1241 02/14/19  0503 02/13/19  0452   WBC 10*3/mm3 8.72 9.18 9.96 11.38* 14.56*   HEMOGLOBIN g/dL 8.5* 9.0* 9.0* 9.6* 8.6*   PLATELETS 10*3/mm3 225 231 198 215 216       Results from last 7 days   Lab Units 02/16/19  0334 02/15/19  0515 02/14/19  1959 02/14/19  1241 02/14/19  0503   INR  2.26* 2.12* 1.97* 2.06* 2.04*         Imaging Results (last 24 hours)     ** No results found for the last 24 hours. **          acetaminophen 500 mg Oral Q4H   aspirin 81 mg Oral Daily   bumetanide 4 mg Oral Daily   cetaphil  Topical Q12H   epoetin lisha 10,000 Units Intravenous Once per day on Mon Wed Fri   escitalopram 10 mg Oral Daily   famotidine 20 mg Oral Daily   hydrocortisone 1 application Topical Q6H   ipratropium-albuterol 3 mL Nebulization BID - RT   metoprolol tartrate 25 mg Oral Q12H   mupirocin  Each Nare BID   sodium chloride 4 mL Nebulization BID - RT   warfarin 1 mg Oral Daily       amiodarone 0.5 mg/min Last Rate: 0.5 mg/min (02/15/19 2153)   sodium chloride 30 mL/hr Last Rate: 30 mL/hr (01/28/19 1215)   sodium chloride 9 mL/hr Last Rate: 9 mL/hr (02/08/19 0850)       Medication Review:   Current Facility-Administered Medications   Medication Dose Route Frequency Provider Last Rate Last Dose   • acetaminophen (TYLENOL) tablet 500 mg  500 mg Oral Q4H Yahaira Garza APRN   500 mg at 02/16/19 0816   • albumin human 25 % IV SOLN 37.5 g  37.5 g Intravenous PRN Sheila Terrazas MD   25 g at 02/09/19 1208   • ALPRAZolam (XANAX) tablet 0.25 mg   0.25 mg Oral 4x Daily PRN Scott Segura MD       • amiodarone (NEXTERONE) 360 mg/200 mL (1.8 mg/mL) infusion  0.5 mg/min Intravenous Continuous Alicia Schmitz APRN 16.67 mL/hr at 02/15/19 2153 0.5 mg/min at 02/15/19 2153   • aspirin chewable tablet 81 mg  81 mg Oral Daily Scar Gaxiola MD   81 mg at 02/16/19 0816   • bisacodyl (DULCOLAX) EC tablet 10 mg  10 mg Oral Daily PRN Scar Gaxiola MD   10 mg at 02/10/19 2116   • bisacodyl (DULCOLAX) suppository 10 mg  10 mg Rectal Daily PRN Scar Gaxiola MD   10 mg at 02/03/19 2120   • bumetanide (BUMEX) tablet 4 mg  4 mg Oral Daily Maria Fernanda Maldonado MD   4 mg at 02/15/19 1551   • bupivacaine PF 30 mL, lidocaine 1 % 30 mL mixture    PRN Satish Stahl MD   18 mL at 02/08/19 1001   • cetaphil lotion   Topical Q12H Zonia Lopez MD       • cyclobenzaprine (FLEXERIL) tablet 10 mg  10 mg Oral Q8H PRN Scar Gaxiola MD       • dextrose (D50W) 25 g/ 50mL Intravenous Solution 25 g  25 g Intravenous Q15 Min PRN Oscar Rodriguez MD       • dextrose (GLUTOSE) oral gel 15 g  15 g Oral Q15 Min PRN Oscar Rodriguez MD       • epoetin lisha (EPOGEN,PROCRIT) injection 10,000 Units  10,000 Units Intravenous Once per day on Mon Wed Fri Maria Fernanda Maldonado MD   10,000 Units at 02/13/19 0836   • escitalopram (LEXAPRO) tablet 10 mg  10 mg Oral Daily Oscar Rodriguez MD   10 mg at 02/15/19 1551   • famotidine (PEPCID) tablet 20 mg  20 mg Oral Daily Wallace Falcon MD   20 mg at 02/15/19 1551   • glucagon (human recombinant) (GLUCAGEN DIAGNOSTIC) injection 1 mg  1 mg Subcutaneous PRN Oscar Rodriguez MD       • hydrocortisone 1 % cream 1 application  1 application Topical Q6H Fran Tilley MD   1 application at 02/16/19 0622   • ipratropium-albuterol (DUO-NEB) nebulizer solution 3 mL  3 mL Nebulization BID - RT Yahaira Garza APRN   3 mL at 02/16/19 0819   • ipratropium-albuterol (DUO-NEB) nebulizer solution 3 mL  3 mL  Nebulization Q6H PRN Yahaira Garza APRN       • magic mouthwash oral supsension 5 mL  5 mL Swish & Spit Q4H PRN Yahaira Garza APRN       • metoprolol tartrate (LOPRESSOR) tablet 25 mg  25 mg Oral Q12H Scott Segura MD   25 mg at 02/15/19 2155   • mupirocin (BACTROBAN) 2 % nasal ointment   Each Nare BID Scar Gaxiola MD   10 application at 02/15/19 2154   • ondansetron (ZOFRAN) injection 4 mg  4 mg Intravenous Q6H PRN Scar Gaxiola MD   4 mg at 02/11/19 2022   • promethazine (PHENERGAN) tablet 12.5 mg  12.5 mg Oral Q6H PRN Scar Gaxiola MD        Or   • promethazine (PHENERGAN) injection 12.5 mg  12.5 mg Intravenous Q6H PRN Scar Gaxiola MD       • sodium chloride 0.9 % flush 30 mL  30 mL Intravenous Once PRN Scar Gaxiola MD       • sodium chloride 0.9 % infusion  30 mL/hr Intravenous Continuous PRN Scar Gaxiola MD 30 mL/hr at 01/28/19 1215 30 mL/hr at 01/28/19 1215   • sodium chloride 0.9 % infusion  9 mL/hr Intravenous Continuous Protzer, Casandra Sun MD 9 mL/hr at 02/08/19 0850 9 mL/hr at 02/08/19 0850   • sodium chloride 7 % nebulizer solution nebulizer solution 4 mL  4 mL Nebulization BID - RT Yahaira Garza APRN   4 mL at 02/15/19 2150   • warfarin (COUMADIN) tablet 1 mg  1 mg Oral Daily Scott Segura MD   1 mg at 02/15/19 1843       Assessment/Plan   1. Oliguric LAVERNE - ATN post AVR/MVR.  HD-dep since 1/30;  Urine not recorded due to frequent stools and incontinence. Dialyzed today.  Waste products rise between treatments, but main issue is volume. Will monitor labs and urine output at [facility] for recovery.  Creatinine today at 3.671 electrolytes within acceptable range  2. SP AVR, MVR, TV repair, left cryo MAZE PENNIE ligation.  POD17  3. Anemia.  Received IV event of  4. Probable OCTAVIANO  5. GERD - on PPI   6.  Paroxysmal atrial fibrillation  7.  Anemia, hemoglobin is 8.5.        Plan  1.  Hemodialysis today, change the dialysate to 4K  2.  Surveillance  lab              Christopher Jin MD  02/16/19  8:24 AM

## 2019-02-16 NOTE — PLAN OF CARE
Problem: Patient Care Overview  Goal: Plan of Care Review  Outcome: Ongoing (interventions implemented as appropriate)   02/16/19 0538   Coping/Psychosocial   Plan of Care Reviewed With patient   Plan of Care Review   Progress no change     Goal: Individualization and Mutuality  Outcome: Ongoing (interventions implemented as appropriate)      Problem: Anxiety (Adult)  Goal: Reduction/Resolution  Outcome: Ongoing (interventions implemented as appropriate)      Problem: Breathing Pattern Ineffective (Adult)  Goal: Effective Oxygenation/Ventilation  Outcome: Ongoing (interventions implemented as appropriate)    Goal: Anxiety/Fear Reduction  Outcome: Ongoing (interventions implemented as appropriate)      Problem: Activity Intolerance (Adult)  Goal: Activity Tolerance  Outcome: Ongoing (interventions implemented as appropriate)      Problem: Skin Injury Risk (Adult)  Goal: Skin Health and Integrity  Outcome: Ongoing (interventions implemented as appropriate)      Problem: Cardiac Surgery (Adult)  Goal: Signs and Symptoms of Listed Potential Problems Will be Absent, Minimized or Managed (Cardiac Surgery)  Outcome: Ongoing (interventions implemented as appropriate)      Problem: Nutrition, Enteral (Adult)  Goal: Signs and Symptoms of Listed Potential Problems Will be Absent, Minimized or Managed (Nutrition, Enteral)  Outcome: Ongoing (interventions implemented as appropriate)      Problem: Cardiac: Heart Failure (Adult)  Goal: Signs and Symptoms of Listed Potential Problems Will be Absent, Minimized or Managed (Cardiac: Heart Failure)  Outcome: Ongoing (interventions implemented as appropriate)

## 2019-02-16 NOTE — PLAN OF CARE
Problem: Patient Care Overview  Goal: Plan of Care Review  Outcome: Ongoing (interventions implemented as appropriate)   02/16/19 1030   Coping/Psychosocial   Plan of Care Reviewed With patient   Plan of Care Review   Progress no change   OTHER   Outcome Summary Pt presented to PT today with continued reports of L foot pain with WBing. She performed seated exercises with no increases in pain. Bed to chair transfer attempted, pt unable to clear seat, although reports she was able to stand taller than yesterday. Max A x2 needed for this partial STS. She may benefit from continued skilled PT to address functional mobility deficits and reach functional goals.

## 2019-02-16 NOTE — THERAPY TREATMENT NOTE
Acute Care - Physical Therapy Treatment Note  [facility]     Patient Name: Claudia Arnold  : 1945  MRN: [id number]  Today's Date: 2019  Onset of Illness/Injury or Date of Surgery: 19          Admit Date: 2019    Visit Dx:    ICD-10-CM ICD-9-CM   1. Acute congestive heart failure, unspecified heart failure type (CMS/HCC) I50.9 428.0   2. Generalized weakness R53.1 780.79   3. Aortic valve stenosis, etiology of cardiac valve disease unspecified I35.0 424.1   4. Tricuspid valve insufficiency, unspecified etiology I07.1 397.0   5. Mitral valve stenosis, unspecified etiology I05.0 394.0   6. Acute renal failure, unspecified acute renal failure type (CMS/HCC) N17.9 584.9   7. S/P AVR (aortic valve replacement) Z95.2 V43.3     Patient Active Problem List   Diagnosis   • Tear of rotator cuff   • Hypertension   • Generalized anxiety disorder   • Hyperlipidemia   • IFG (impaired fasting glucose)   • Heart murmur, systolic   • Shoulder pain, bilateral   • Pain of both shoulder joints   • Chronic pain of both shoulders   • Acute congestive heart failure (CMS/HCC)   • Obesity, morbid, BMI 50 or higher (CMS/HCC)   • Fungal skin infection   • Cellulitis of lower extremity   • Aortic valve stenosis   • Tricuspid valve insufficiency   • Mitral valve stenosis       Therapy Treatment    Rehabilitation Treatment Summary     Row Name 19 1022             Treatment Time/Intention    Discipline  physical therapist  -      Document Type  therapy note (daily note)  -      Subjective Information  complains of;weakness;pain  -      Mode of Treatment  physical therapy;individual therapy  -      Patient/Family Observations  female pt, sitting in chair, no acute distress noted  -      Care Plan Review  evaluation/treatment results reviewed;care plan/treatment goals reviewed  -      Total Minutes, Physical Therapy Treatment  17  -CH      Therapy Frequency (PT Clinical Impression)  daily  -      Patient  Effort  adequate  -CH      Existing Precautions/Restrictions  cardiac;fall;sternal  -CH      Recorded by [] Jericho Ortiz, PT 02/16/19 1029      Row Name 02/16/19 1022             Vital Signs    Pre Systolic BP Rehab  104  -CH      Pre Treatment Diastolic BP  54  -CH      Pretreatment Heart Rate (beats/min)  61  -CH      Pre SpO2 (%)  94  -CH      O2 Delivery Pre Treatment  room air  -CH      Recorded by [] Jericho Ortiz, PT 02/16/19 1029      Row Name 02/16/19 1022             Cognitive Assessment/Intervention- PT/OT    Affect/Mental Status (Cognitive)  WNL  -CH      Orientation Status (Cognition)  oriented x 4  -CH      Follows Commands (Cognition)  WNL  -CH      Recorded by [] Jericho Ortiz, PT 02/16/19 1029      Row Name 02/16/19 1022             Safety Issues, Functional Mobility    Impairments Affecting Function (Mobility)  endurance/activity tolerance;strength  -CH      Recorded by [] Jericho Ortiz, PT 02/16/19 1029      Row Name 02/16/19 1022             Bed Mobility Assessment/Treatment    Comment (Bed Mobility)  not tested; up in chair  -CH      Recorded by [] Jericho Ortiz, PT 02/16/19 1029      Row Name 02/16/19 1022             Sit-Stand Transfer    Sit-Stand [place] (Transfers)  maximum assist (25% patient effort);2 person assist;verbal cues;nonverbal cues (demo/gesture)  -CH      Recorded by [] Jericho Ortiz, PT 02/16/19 1029      Row Name 02/16/19 1022             Gait/Stairs Assessment/Training    [place] Level (Gait)  not tested;unable to assess  -CH      Recorded by [] Jericho Ortiz, PT 02/16/19 1029      Row Name 02/16/19 1022             Lower Extremity Seated Therapeutic Exercise    Performed, Seated Lower Extremity (Therapeutic Exercise)  ankle dorsiflexion/plantarflexion;LAQ (long arc quad), knee extension  -      Exercise Type, Seated Lower Extremity (Therapeutic Exercise)  AROM (active range of motion)  -      Sets/Reps Detail, Seated Lower  Extremity (Therapeutic Exercise)  10x  -CH      Recorded by [CH] Jericho Ortiz, PT 02/16/19 1029      Row Name 02/16/19 1022             Positioning and Restraints    Pre-Treatment Position  sitting in chair/recliner  -CH      Post Treatment Position  chair  -CH      In Chair  notified nsg;sitting;call light within reach;encouraged to call for assist  -CH      Recorded by [CH] Jericho Ortiz, PT 02/16/19 1029      Row Name 02/16/19 1022             Pain Scale: Numbers Pre/Post-Treatment    Pain Scale: Numbers, Pretreatment  5/10  -CH      Pain Scale: Numbers, Post-Treatment  5/10  -CH      Pain Location - Side  Left  -CH      Pain Location - Orientation  distal  -CH      Pain Location  foot  -CH      Pain Intervention(s)  Repositioned  -CH      Recorded by [CH] Jericho Ortiz, PT 02/16/19 1029      Row Name                Wound 01/28/19 1138 chest incision    Wound - Properties Group Date first assessed: 01/28/19 [SR] Time first assessed: 1138 [SR] Location: chest [SR] Type: incision [SR] Recorded by:  [SR] Keisha Gandara RN 01/28/19 1138    Row Name                Wound 01/28/19 1215 coccyx unspecified    Wound - Properties Group Date first assessed: 01/28/19 [MB] Time first assessed: 1215 [MB] Present On Admission : yes;picture taken [MB] Location: coccyx [MB] Type: unspecified [MB] Recorded by:  [MB] Julissa Chatterjee RN 01/28/19 1541    Row Name                Wound 01/31/19 2300 Right gluteal pressure injury    Wound - Properties Group Date first assessed: 01/31/19 [NW] Time first assessed: 2300 [NW] Present On Admission : no [NW] Side: Right [NW] Location: gluteal [NW] Type: pressure injury [NW] Stage, Pressure Injury: deep tissue injury [NW] Recorded by:  [NW] Priscilla Willams, RN 02/01/19 0414    Row Name                Wound 01/31/19 2000 Left posterior ear pressure injury    Wound - Properties Group Date first assessed: 01/31/19 [NW] Time first assessed: 2000 [NW] Side: Left [NW] Orientation: posterior [NW]  Location: ear [NW2] Type: pressure injury [NW] Stage, Pressure Injury: Stage 2;medical device related [NW] Recorded by:  [NW] Priscilla Willams RN 02/01/19 0416 [NW2] Priscilla Willams RN 02/01/19 0417    Row Name                Wound 02/08/19 1039 Other (See comments) chest incision    Wound - Properties Group Date first assessed: 02/08/19 [MC] Time first assessed: 1039 [MC] Side: Other (See comments) [MC] Location: chest [MC] Type: incision [MC] Recorded by:  [MC] Kenya Boudreaux RN 02/08/19 1039    Row Name                Wound 02/08/19 1053 Left neck incision    Wound - Properties Group Date first assessed: 02/08/19 [MC] Time first assessed: 1053 [MC] Side: Left [MC] Location: neck [MC] Type: incision [MC] Recorded by:  [MC] Kenya Boudreaux RN 02/08/19 1053      User Key  (r) = Recorded By, (t) = Taken By, (c) = Cosigned By    Initials Name Effective Dates Discipline    Julissa Sweet, RN 06/16/16 -  Nurse    Keisha Fournier RN 06/16/16 -  Nurse    Jericho Bone, PT 04/03/18 -  PT    Kenya Qureshi RN 02/23/18 -  Nurse    Priscilla Jensen RN 03/05/18 -  Nurse          Wound 01/28/19 1138 chest incision (Active)   Dressing Appearance open to air 2/16/2019  8:10 AM   Closure Approximated 2/16/2019  8:10 AM   Base clean;dry;pink 2/16/2019  8:10 AM   Periwound intact;dry;pink 2/16/2019  8:10 AM   Periwound Temperature warm 2/16/2019  8:10 AM   Drainage Amount none 2/16/2019  8:10 AM       Wound 01/28/19 1215 coccyx unspecified (Active)   Dressing Appearance open to air 2/16/2019  8:10 AM   Closure Open to air 2/16/2019  8:10 AM   Base clean;dry;pink;purple 2/16/2019  8:10 AM   Periwound intact;dry;pink 2/16/2019  8:10 AM   Periwound Temperature warm 2/16/2019  8:10 AM   Drainage Amount none 2/16/2019  8:10 AM       Wound 01/31/19 2300 Right gluteal pressure injury (Active)   Dressing Appearance open to air 2/16/2019  8:10 AM   Closure Open to air 2/16/2019  8:10 AM   Base maroon/purple;clean  2/16/2019  8:10 AM   Periwound intact;pink;dry 2/16/2019  8:10 AM   Periwound Temperature warm 2/16/2019  8:10 AM   Drainage Amount none 2/16/2019  8:10 AM       Wound 01/31/19 2000 Left posterior ear pressure injury (Active)   Dressing Appearance dry;intact;open to air 2/16/2019  8:10 AM   Closure Open to air 2/16/2019  8:10 AM   Base pink;clean;dry 2/16/2019  8:10 AM   Periwound intact 2/16/2019  8:10 AM   Periwound Temperature warm 2/16/2019  8:10 AM   Drainage Amount none 2/16/2019  8:10 AM       Wound 02/08/19 1039 Other (See comments) chest incision (Active)   Dressing Appearance no drainage;dry;intact 2/16/2019  8:10 AM   Closure SOPHIA 2/16/2019  8:10 AM   Base clean;dry;pink 2/16/2019  8:10 AM   Periwound intact;dry;pink 2/16/2019  8:10 AM   Periwound Temperature warm 2/16/2019  8:10 AM   Drainage Amount none 2/16/2019  8:10 AM       Wound 02/08/19 1053 Left neck incision (Active)   Dressing Appearance open to air;intact;dry 2/16/2019  8:10 AM   Closure Open to air 2/16/2019  8:10 AM   Base clean;dry;pink 2/16/2019  8:10 AM   Periwound ecchymotic 2/16/2019  8:10 AM   Periwound Temperature warm 2/16/2019  8:10 AM   Drainage Amount none 2/16/2019  8:10 AM           Physical Therapy Education     Title: PT OT SLP Therapies (In Progress)     Topic: Physical Therapy (Done)     Point: Mobility training (Done)     Learning Progress Summary           Patient Acceptance, E, VU by CH at 2/16/2019 10:29 AM    Acceptance, E,TB,D, VU,NR by EE at 2/15/2019  3:14 PM    Acceptance, E,TB, VU,NR by CG at 2/14/2019  4:40 PM    Acceptance, E,TB,D, VU,NR by CH at 2/12/2019 11:32 AM    Acceptance, E,TB,D, VU,NR by CH1 at 2/11/2019  9:53 AM    Acceptance, E,D, VU,DU,NR by  at 2/10/2019 10:10 AM    Acceptance, NKII SESAY D, SIOBHAN,NR by Rachel at 2/7/2019 12:44 PM    Acceptance, SIOBHAN SESAY,NR by  at 2/6/2019 10:19 AM    Acceptance, SIOBHAN SESAY,NR by MA at 2/5/2019 11:09 AM    Acceptance, NIKI SESAY D, SIOBHAN,NR by Rachel at 2/4/2019 11:34 AM    Acceptance, GABRIELLE SESAY,  VU,NR by SM at 2/3/2019  4:47 PM    Acceptance, E,D, DU,NR by DARRYN at 2/2/2019 10:36 AM    Comment:  safety during sit to stand, benefits of activity    Acceptance, E, NR by MA at 1/31/2019 12:08 PM    Acceptance, E, NR by MA at 1/30/2019 10:23 AM    Acceptance, E,TB, VU by  at 1/27/2019  9:10 AM    Acceptance, E, VU,NR by MA at 1/25/2019  3:54 PM    Acceptance, E,TB,D, VU,NR by CH1 at 1/23/2019  3:14 PM    Acceptance, TB,E, VU,DU by CW at 1/18/2019  4:37 PM    Acceptance, E, NR by EM at 1/17/2019 12:15 PM   Family Acceptance, TB,E, VU,DU by CW at 1/18/2019  4:37 PM                   Point: Home exercise program (Done)     Learning Progress Summary           Patient Acceptance, E, VU by CH at 2/16/2019 10:29 AM    Acceptance, E,TB,D, VU,NR by EE at 2/15/2019  3:14 PM    Acceptance, E,TB, VU,NR by CG at 2/14/2019  4:40 PM    Acceptance, E,TB,D, VU,NR by CH1 at 2/12/2019 11:32 AM    Acceptance, E,TB,D, VU,NR by CH1 at 2/11/2019  9:53 AM    Acceptance, E,D, VU,DU,NR by  at 2/10/2019 10:10 AM    Acceptance, E,TB,D, VU,NR by CH1 at 2/7/2019 12:44 PM    Acceptance, E, VU,NR by PATRICIO at 2/6/2019 10:19 AM    Acceptance, E, VU,NR by MA at 2/5/2019 11:09 AM    Acceptance, E,TB,D, VU,NR by CH1 at 2/4/2019 11:34 AM    Acceptance, E,D, VU,NR by SM at 2/3/2019  4:47 PM    Acceptance, E,D, DU,NR by DARRYN at 2/2/2019 10:36 AM    Comment:  safety during sit to stand, benefits of activity    Acceptance, E,TB,D, VU,NR by CH1 at 2/1/2019 10:51 AM    Acceptance, E, NR by MA at 1/31/2019 12:08 PM    Acceptance, E, NR by MA at 1/30/2019 10:23 AM    Acceptance, E,TB, VU by  at 1/27/2019  9:10 AM    Acceptance, E, VU,NR by MA at 1/25/2019  3:54 PM    Acceptance, E,TB,D, VU,NR by CH1 at 1/23/2019  3:14 PM    Acceptance, TB,E, VU,DU by CW at 1/18/2019  4:37 PM   Family Acceptance, TB,E, VU,DU by CW at 1/18/2019  4:37 PM                   Point: Body mechanics (Done)     Learning Progress Summary           Patient Acceptance, E, VU by CH at  2/16/2019 10:29 AM    Acceptance, E,TB,D, VU,NR by EE at 2/15/2019  3:14 PM    Acceptance, E,TB, VU,NR by CG at 2/14/2019  4:40 PM    Acceptance, E,TB,D, VU,NR by CH1 at 2/12/2019 11:32 AM    Acceptance, E,TB,D, VU,NR by CH1 at 2/11/2019  9:53 AM    Acceptance, E,D, VU,DU,NR by  at 2/10/2019 10:10 AM    Acceptance, E,TB,D, VU,NR by CH1 at 2/7/2019 12:44 PM    Acceptance, E, VU,NR by PATRICIO at 2/6/2019 10:19 AM    Acceptance, E, VU,NR by MA at 2/5/2019 11:09 AM    Acceptance, E,TB,D, VU,NR by CH1 at 2/4/2019 11:34 AM    Acceptance, E,D, VU,NR by  at 2/3/2019  4:47 PM    Acceptance, E,D, DU,NR by DARRYN at 2/2/2019 10:36 AM    Comment:  safety during sit to stand, benefits of activity    Acceptance, E, NR by MA at 1/31/2019 12:08 PM    Acceptance, E, NR by MA at 1/30/2019 10:23 AM    Acceptance, E,TB, VU by  at 1/27/2019  9:10 AM    Acceptance, E, VU,NR by MA at 1/25/2019  3:54 PM    Acceptance, E,TB,D, VU,NR by CHRachel at 1/23/2019  3:14 PM    Acceptance, TB,E, VU,DU by CW at 1/18/2019  4:37 PM   Family Acceptance, TB,E, VU,DU by CW at 1/18/2019  4:37 PM                   Point: Precautions (Done)     Learning Progress Summary           Patient Acceptance, E, VU by  at 2/16/2019 10:29 AM    Acceptance, E,TB,D, VU,NR by EE at 2/15/2019  3:14 PM    Acceptance, E,TB, VU,NR by CG at 2/14/2019  4:40 PM    Acceptance, E,TB,D, VU,NR by CH at 2/12/2019 11:32 AM    Acceptance, E,TB,D, VU,NR by CH1 at 2/11/2019  9:53 AM    Acceptance, E,D, VU,DU,NR by  at 2/10/2019 10:10 AM    Acceptance, E,TB,D, VU,NR by CH1 at 2/7/2019 12:44 PM    Acceptance, E, VU,NR by  at 2/6/2019 10:19 AM    Acceptance, E, VU,NR by MA at 2/5/2019 11:09 AM    Acceptance, E,TB,D, VU,NR by CH1 at 2/4/2019 11:34 AM    Acceptance, E,D, VU,NR by  at 2/3/2019  4:47 PM    Acceptance, E,D, DU,NR by  at 2/2/2019 10:36 AM    Comment:  safety during sit to stand, benefits of activity    Acceptance, E, NR by MA at 1/31/2019 12:08 PM    Acceptance, E, NR by MA at  1/30/2019 10:23 AM    Acceptance, E,TB, VU by  at 1/27/2019  9:10 AM    Acceptance, E, VU,NR by MA at 1/25/2019  3:54 PM    Acceptance, E,TB,D, VU,NR by [facility] at 1/23/2019  3:14 PM    Acceptance, TB,E, VU,DU by CW at 1/18/2019  4:37 PM   Family Acceptance, TB,E, VU,DU by CW at 1/18/2019  4:37 PM                               User Key     Initials Effective Dates Name Provider Type Discipline    EF 06/08/18 -  Heike Anthony, PT Physical Therapist PT    CH 04/03/18 -  Kae Aldrich, PT Physical Therapist PT    EE 04/03/18 -  Kaley Norton, PT Physical Therapist PT    EM 04/03/18 -  Heike Strauss, PT Physical Therapist PT    SM 03/07/18 -  Ksenia Lopez, PTA Physical Therapy Assistant PT     06/22/16 -  Zuleyma Mancilla, PT Physical Therapist PT    LC 08/02/16 -  Khurram Littlejohn, PT DPT Physical Therapist PT    CW 03/07/18 -  Jose Ott, PTA Physical Therapy Assistant PT     08/19/18 -  Cadence Cobb, PTA Physical Therapy Assistant PT    MA 10/19/18 -  Tabatha Lawrence, PT Physical Therapist PT     04/03/18 -  Jericho Ortiz, PT Physical Therapist PT    CG 02/04/19 -  Jackson Greer, PT Student PT Student PT                PT Recommendation and Plan  Therapy Frequency (PT Clinical Impression): daily  Plan of Care Reviewed With: patient  Progress: no change  Outcome Summary: Pt presented to PT today with continued reports of L foot pain with WBing. She performed seated exercises with no increases in pain. Bed to chair transfer attempted, pt unable to clear seat, although reports she was able to stand taller than yesterday. Max A x2 needed for this partial STS. She may benefit from continued skilled PT to address functional mobility deficits and reach functional goals.  Outcome Measures     Row Name 02/16/19 1000 02/15/19 1500 02/15/19 1400       How much help from another person do you currently need...    Turning from your back to your side while in flat bed without using bedrails?   3  -CH  3  -EE  --    Moving from lying on back to sitting on the side of a flat bed without bedrails?  3  -CH  3  -EE  --    Moving to and from a bed to a chair (including a wheelchair)?  1  -CH  1  -EE  --    Standing up from a chair using your arms (e.g., wheelchair, bedside chair)?  1  -CH  1  -EE  --    Climbing 3-5 steps with a railing?  1  -CH  1  -EE  --    To walk in hospital room?  1  -CH  1  -EE  --    AM-PAC 6 Clicks Score  10  -CH  10  -EE  --       How much help from another is currently needed...    Putting on and taking off regular lower body clothing?  --  --  1  -SG    Bathing (including washing, rinsing, and drying)  --  --  1  -SG    Toileting (which includes using toilet bed pan or urinal)  --  --  1  -SG    Putting on and taking off regular upper body clothing  --  --  2  -SG    Taking care of personal grooming (such as brushing teeth)  --  --  2  -SG    Eating meals  --  --  3  -SG    Score  --  --  10  -SG       Functional Assessment    Outcome Measure Options  AM-PAC 6 Clicks Basic Mobility (PT)  -CH  AM-PAC 6 Clicks Basic Mobility (PT)  -EE  AM-PAC 6 Clicks Daily Activity (OT)  -SG    Row Name 02/14/19 1600             How much help from another person do you currently need...    Turning from your back to your side while in flat bed without using bedrails?  3  -NELA (r) CG (t) NELA (c)      Moving from lying on back to sitting on the side of a flat bed without bedrails?  3  -NELA (r) CG (t) NELA (c)      Moving to and from a bed to a chair (including a wheelchair)?  1  -ENLA (r) CG (t) NELA (c)      Standing up from a chair using your arms (e.g., wheelchair, bedside chair)?  2  -NELA (r) CG (t) NELA (c)      Climbing 3-5 steps with a railing?  1  -NELA (r) CG (t) NELA (c)      To walk in hospital room?  1  -NELA (r) CG (t) NELA (c)      AM-PAC 6 Clicks Score  11  -NELA (r) CG (t)         Functional Assessment    Outcome Measure Options  AM-PAC 6 Clicks Basic Mobility (PT)  -NELA (r) CG (t) NELA (c)         User Key  (r) = Recorded By, (t) = Taken By, (c) = Cosigned By    Initials Name Provider Type    Jess Potts, OTR Occupational Therapist    Kae Handley, PT Physical Therapist    Kaley Zamarripa, PT Physical Therapist    Jericho Bone, PT Physical Therapist    Jackson Dozier, PT Student PT Student         Time Calculation:   PT Charges     Row Name 02/16/19 1032             Time Calculation    Start Time  1005  -CH      Stop Time  1022  -CH      Time Calculation (min)  17 min  -CH      PT Received On  02/16/19  -      PT - Next Appointment  02/17/19  -        User Key  (r) = Recorded By, (t) = Taken By, (c) = Cosigned By    Initials Name Provider Type    CH Jericho Ortiz, PT Physical Therapist        Therapy Suggested Charges     Code   Minutes Charges    28201 (CPT®) Hc Pt Neuromusc Re Education Ea 15 Min      76260 (CPT®) Hc Pt Ther Proc Ea 15 Min 15 1    25413 (CPT®) Hc Gait Training Ea 15 Min      34645 (CPT®) Hc Pt Therapeutic Act Ea 15 Min 10 1    52045 (CPT®) Hc Pt Manual Therapy Ea 15 Min      01779 (CPT®) Hc Pt Iontophoresis Ea 15 Min      52291 (CPT®) Hc Pt Elec Stim Ea-Per 15 Min      98824 (CPT®) Hc Pt Ultrasound Ea 15 Min      82394 (CPT®) Hc Pt Self Care/Mgmt/Train Ea 15 Min      02036 (CPT®) Hc Pt Prosthetic (S) Train Initial Encounter, Each 15 Min      21580 (CPT®) Hc Pt Orthotic(S)/Prosthetic(S) Encounter, Each 15 Min      11250 (CPT®) Hc Orthotic(S) Mgmt/Train Initial Encounter, Each 15min      Total  25 2        Therapy Charges for Today     Code Description Service Date Service Provider Modifiers Qty    78294459399 HC PT THER PROC EA 15 MIN 2/16/2019 Jericho Ortiz, PT GP 1    00863193538 HC PT THER SUPP EA 15 MIN 2/16/2019 Jericho Ortiz, PT GP 1          PT G-Codes  Outcome Measure Options: AM-PAC 6 Clicks Basic Mobility (PT)  AM-PAC 6 Clicks Score: 10  Score: 10    Jericho Ortiz PT  2/16/2019

## 2019-02-16 NOTE — PROGRESS NOTES
LOS: 31 days   Patient Care Team:  Robert Cortez MD as PCP - General (Family Medicine)  Deborah Agudelo MD as Surgeon (Orthopedic Surgery)    Chief Complaint: Follow-up for tissue aortic and mitral valve replacements, tricuspid valve repair, paroxysmal atrial fibrillation with RVR, acute diastolic and valvular CHF.    Interval History: Cardioverted 2/15 for atrial flutter with RVR, currently SR.  On amiodarone gtt, 0.5 mg/min.  BP trend /54-64 HR trend 50's-60's. Patient does complain of left foot pain, on the ball of her foot.      Vital Signs:  Temp:  [97.2 °F (36.2 °C)-97.9 °F (36.6 °C)] 97.9 °F (36.6 °C)  Heart Rate:  [53-74] 53  Resp:  [16-18] 18  BP: ()/(53-64) 104/54    Intake/Output Summary (Last 24 hours) at 2/16/2019 1151  Last data filed at 2/16/2019 0853  Gross per 24 hour   Intake 680 ml   Output --   Net 680 ml         Review of systems   GI:Denies abdominal pain and n/v/d  Cardiovascular: Denies chest pain and palpitations  Pulmonary: Denies shortness of breath and cough  Neurological: Denies dizziness and unilateral weakness        Physical Exam:   General Appearance:    No acute distress, alert and oriented x4   Lungs:     Clear to auscultation bilaterally anteriorly and laterally; respirations even and nonlabored     Heart:    RRR regular.    Abdomen:     Soft, non-tender, non-distended.    Extremities:   Trace edema. DP/PT 1+     Results Review:    Results from last 7 days   Lab Units 02/16/19  0334   SODIUM mmol/L 138   POTASSIUM mmol/L 3.6   CHLORIDE mmol/L 95*   CO2 mmol/L 23.3   BUN mg/dL 65*   CREATININE mg/dL 3.71*   GLUCOSE mg/dL 107*   CALCIUM mg/dL 9.5         Results from last 7 days   Lab Units 02/16/19  0334   WBC 10*3/mm3 8.72   HEMOGLOBIN g/dL 8.5*   HEMATOCRIT % 28.6*   PLATELETS 10*3/mm3 225     Results from last 7 days   Lab Units 02/16/19  0334 02/15/19  0515 02/14/19 1959   INR  2.26* 2.12* 1.97*   APTT seconds 42.7* 97.4* 81.1*     Current  medications    Current Facility-Administered Medications:   •  acetaminophen (TYLENOL) tablet 500 mg, 500 mg, Oral, Q4H, Yahaira Garza, MORGAN, 500 mg at 19 0816  •  albumin human 25 % IV SOLN 37.5 g, 37.5 g, Intravenous, PRN, Sheila Terrazas MD, 25 g at 19 1208  •  ALPRAZolam (XANAX) tablet 0.25 mg, 0.25 mg, Oral, 4x Daily PRN, Scott Segura MD  •  [COMPLETED] amiodarone in dextrose 5% (NEXTERONE) loading dose 150mg/100mL, 150 mg, Intravenous, Once, 150 mg at 19 1039 **FOLLOWED BY** [] amiodarone (NEXTERONE) 360 mg/200 mL (1.8 mg/mL) infusion, 1 mg/min, Intravenous, Continuous, Last Rate: 33.3 mL/hr at 19 1229, 1 mg/min at 19 1229 **FOLLOWED BY** amiodarone (NEXTERONE) 360 mg/200 mL (1.8 mg/mL) infusion, 0.5 mg/min, Intravenous, Continuous, Alicia Schmitz APRN, Last Rate: 16.67 mL/hr at 02/15/19 2153, 0.5 mg/min at 02/15/19 2153  •  aspirin chewable tablet 81 mg, 81 mg, Oral, Daily, Scar Gaxiola MD, 81 mg at 19 0816  •  bisacodyl (DULCOLAX) EC tablet 10 mg, 10 mg, Oral, Daily PRN, Scar Gaxiola MD, 10 mg at 02/10/19 2116  •  bisacodyl (DULCOLAX) suppository 10 mg, 10 mg, Rectal, Daily PRN, Scar Gaxiola MD, 10 mg at 190  •  bumetanide (BUMEX) tablet 4 mg, 4 mg, Oral, Daily, Maria Fernanda Maldonado MD, 4 mg at 02/15/19 1551  •  bupivacaine PF 30 mL, lidocaine 1 % 30 mL mixture, , , PRN, Satish Stahl MD, 18 mL at 19 1001  •  cetaphil lotion, , Topical, Q12H, Zonia Lopez MD  •  cyclobenzaprine (FLEXERIL) tablet 10 mg, 10 mg, Oral, Q8H PRN, Scar Gaxiola MD  •  dextrose (D50W) 25 g/ 50mL Intravenous Solution 25 g, 25 g, Intravenous, Q15 Min PRN, Oscar Rodriguez MD  •  dextrose (GLUTOSE) oral gel 15 g, 15 g, Oral, Q15 Min PRN, Oscar Rodriguez MD  •  epoetin lisha (EPOGEN,PROCRIT) injection 10,000 Units, 10,000 Units, Intravenous, Once per day on , Erica, Maria Fernanda Rosas MD, 10,000 Units at 19  0836  •  escitalopram (LEXAPRO) tablet 10 mg, 10 mg, Oral, Daily, Oscar Rodriguez MD, 10 mg at 02/15/19 1551  •  famotidine (PEPCID) tablet 20 mg, 20 mg, Oral, Daily, Wallace Falcon MD, 20 mg at 02/15/19 1551  •  glucagon (human recombinant) (GLUCAGEN DIAGNOSTIC) injection 1 mg, 1 mg, Subcutaneous, PRN, Oscar Rodriguez MD  •  hydrocortisone 1 % cream 1 application, 1 application, Topical, Q6H, Fran Tilley MD, 1 application at 02/16/19 0622  •  ipratropium-albuterol (DUO-NEB) nebulizer solution 3 mL, 3 mL, Nebulization, BID - RT, Yahaira Garza APRN, 3 mL at 02/16/19 0819  •  ipratropium-albuterol (DUO-NEB) nebulizer solution 3 mL, 3 mL, Nebulization, Q6H PRN, Yahaira Garza APRN  •  magic mouthwash oral supsension 5 mL, 5 mL, Swish & Spit, Q4H PRN, Yahaira Garza APRN  •  metoprolol tartrate (LOPRESSOR) tablet 25 mg, 25 mg, Oral, Q12H, Scott Segura MD, 25 mg at 02/15/19 2155  •  mupirocin (BACTROBAN) 2 % nasal ointment, , Each Nare, BID, Scar Gaxiola MD  •  ondansetron (ZOFRAN) injection 4 mg, 4 mg, Intravenous, Q6H PRN, Scar Gaxiola MD, 4 mg at 02/11/19 2022  •  promethazine (PHENERGAN) tablet 12.5 mg, 12.5 mg, Oral, Q6H PRN **OR** promethazine (PHENERGAN) injection 12.5 mg, 12.5 mg, Intravenous, Q6H PRN, Scar Gaxiola MD  •  sodium chloride 0.9 % flush 30 mL, 30 mL, Intravenous, Once PRN, Scar Gaxiola MD  •  sodium chloride 0.9 % infusion, 30 mL/hr, Intravenous, Continuous PRN, Scar Gaxiola MD, Last Rate: 30 mL/hr at 01/28/19 1215, 30 mL/hr at 01/28/19 1215  •  sodium chloride 0.9 % infusion, 9 mL/hr, Intravenous, Continuous, Protzer, Casandra Sun MD, Last Rate: 9 mL/hr at 02/08/19 0850, 9 mL/hr at 02/08/19 0850  •  sodium chloride 7 % nebulizer solution nebulizer solution 4 mL, 4 mL, Nebulization, BID - RT, Yahaira Garza APRN, 4 mL at 02/16/19 0824  •  warfarin (COUMADIN) tablet 1 mg, 1 mg, Oral, Daily, Scott Segura MD, 1 mg at  02/15/19 1843    ECG    2/15/19 atrial flutter rate 100's, RBBB      2/14/19 atrial flutter with RVR RBBB        1/29/19 SR chantal rate 50's RBBB          Echo  1/30/19  Interpretation Summary     · Limited echo for function and pericardial effusion  · Left ventricular wall thickness is consistent with mild-to-moderate concentric hypertrophy.  · Left ventricular systolic function is hyperdynamic (EF > 70).  · Right ventricular cavity is mildly dilated.  · Left atrial cavity size is moderately dilated.  · There is no evidence of pericardial effusion.       Cardiac catheterization  1/22/19  Conclusions:   1.  Mild luminal irregularities proximal to mid LAD.  Otherwise normal coronary angiography  2.  Elevated left and right-sided filling pressure  3.  Moderate aortic valve stenosis.  4.  Moderate pulmonary hypertension        I reviewed the patient's new clinical results.        Assessment:  1. Acute valvular and diastolic CHF  2. Severe aortic stenosis, severe mitral stenosis secondary to calcification, and severe tricuspid regurgitation  3. Normal EF 60-65%  4. Paroxysmal atrial fibrillation with RVR -status post cardioversion 1/24/2019 and subsequent reversion to atrial fibrillation.  5. Moderate pulmonary hypertension (mean PA pressure 32)  6. Status post tissue AVR, tissue MVR, tricuspid valve repair (Martha suture repair), left cryomaze, and PENNIE ligation on 1/28/2019 (Dr. Gaxiola).  7. Small bilateral pulmonary emboli  8. Oliguric acute kidney injury post-operatively - on hemodialysis and status post tunneled catheter placement on 2/8/2019.  9. Bifascicular block (RBBB and LAFB)  10. Candida intertrigo (skin folds); left axillary fungal infection (treated)  11. Osteoarthritis with immobility  12. Left wrist thrombophlebitis from IV infiltration  13. Undiagnosed OCTAVIANO  14. Morbid obesity   15. Anemia of chronic disease  16. Severe deconditioning   17. Junctional bradycardia and asystole post-op - resolved  18. Atrial  flutter, type unknown - status post DCCV on 2/15/2019      Plan:  -Rapid atrial flutter - cardioverted successfully 2/15/19. Currently SR rate 50's.  Will stop amiodarone gtt and switch to PO. Recheck ECG tomorrow am. Monitor QTc.     -Continue metoprolol 25 mg bid. BP trend /54-64 HR trend 50's-60's.    -INR-2.26 on 2/16  - continue Coumadin 1 mg per day - she went up very quickly previously, amio will perpetuate this.  Continue to monitor.       -HD planned 2/16 for volume control.  On Bumex 4 mg po qday per Nephrology.    -plan to go to rehab Monday if stable.    -Left foot pain-patient c/o pain to ball of left foot when pressed.  Able to work with physical therapy without added difficulty, pulses palpable, extremity warm, no cyanosis or rubor noted, denies claudication.  Will continue to monitor and consider xray if pain continues.  She states this began 2 days ago.     Tabatha Edwards, APRN  02/16/19  11:51 AM

## 2019-02-16 NOTE — PROGRESS NOTES
" LOS: 31 days   Patient Care Team:  Robert Cortez MD as PCP - General (Family Medicine)  Deborah Agudelo MD as Surgeon (Orthopedic Surgery)    Chief Complaint: post op fu    Subjective:  Symptoms:  No shortness of breath or chest pain.    Diet:  Adequate intake.  No nausea or vomiting.    Activity level: Impaired due to weakness.    Pain:  She reports no pain.          Vital Signs  Temp:  [97.2 °F (36.2 °C)-97.9 °F (36.6 °C)] 97.9 °F (36.6 °C)  Heart Rate:  [] 53  Resp:  [16-22] 18  BP: ()/(53-88) 104/54  Body mass index is 65.95 kg/m².    Intake/Output Summary (Last 24 hours) at 2/16/2019 0848  Last data filed at 2/16/2019 0745  Gross per 24 hour   Intake 440 ml   Output --   Net 440 ml     I/O this shift:  In: 200 [P.O.:200]  Out: -           02/13/19  0502 02/14/19  0640 02/15/19  0500   Weight: (!) 171 kg (377 lb 10.4 oz) (!) 194 kg (427 lb 11.1 oz) (!) 191 kg (421 lb 1.3 oz)         Objective:  General Appearance:  Comfortable.    Vital signs: (most recent): Blood pressure 104/54, pulse 53, temperature 97.9 °F (36.6 °C), temperature source Oral, resp. rate 18, height 170.2 cm (67\"), weight (!) 191 kg (421 lb 1.3 oz), SpO2 99 %.    Output: Producing urine and producing stool.    Lungs:  Normal effort and normal respiratory rate.    Heart: Normal rate.  Regular rhythm.  (NSR, rate 62)  Extremities: There is no dependent edema.    Neurological: Patient is alert and oriented to person, place and time.    Skin:  Warm and dry.  (Sternum stable)            Results Review:        WBC WBC   Date Value Ref Range Status   02/16/2019 8.72 3.40 - 10.80 10*3/mm3 Final   02/15/2019 9.18 3.40 - 10.80 10*3/mm3 Final   02/14/2019 9.96 3.40 - 10.80 10*3/mm3 Final   02/14/2019 11.38 (H) 3.40 - 10.80 10*3/mm3 Final      HGB Hemoglobin   Date Value Ref Range Status   02/16/2019 8.5 (L) 12.0 - 15.9 g/dL Final   02/15/2019 9.0 (L) 12.0 - 15.9 g/dL Final   02/14/2019 9.0 (L) 12.0 - 15.9 g/dL Final   02/14/2019 9.6 (L) " 12.0 - 15.9 g/dL Final      HCT Hematocrit   Date Value Ref Range Status   02/16/2019 28.6 (L) 34.0 - 46.6 % Final   02/15/2019 30.1 (L) 34.0 - 46.6 % Final   02/14/2019 30.8 (L) 34.0 - 46.6 % Final   02/14/2019 32.1 (L) 34.0 - 46.6 % Final      Platelets Platelets   Date Value Ref Range Status   02/16/2019 225 140 - 450 10*3/mm3 Final   02/15/2019 231 140 - 450 10*3/mm3 Final   02/14/2019 198 140 - 450 10*3/mm3 Final   02/14/2019 215 140 - 450 10*3/mm3 Final        PT/INR:    Protime   Date Value Ref Range Status   02/16/2019 24.5 (H) 11.7 - 14.2 Seconds Final   02/15/2019 23.4 (H) 11.7 - 14.2 Seconds Final   02/14/2019 22.1 (H) 11.7 - 14.2 Seconds Final   02/14/2019 22.8 (H) 11.7 - 14.2 Seconds Final   02/14/2019 22.7 (H) 11.7 - 14.2 Seconds Final   /  INR   Date Value Ref Range Status   02/16/2019 2.26 (H) 0.90 - 1.10 Final   02/15/2019 2.12 (H) 0.90 - 1.10 Final   02/14/2019 1.97 (H) 0.90 - 1.10 Final   02/14/2019 2.06 (H) 0.90 - 1.10 Final   02/14/2019 2.04 (H) 0.90 - 1.10 Final       Sodium Sodium   Date Value Ref Range Status   02/16/2019 138 136 - 145 mmol/L Final   02/15/2019 136 136 - 145 mmol/L Final   02/14/2019 137 136 - 145 mmol/L Final      Potassium Potassium   Date Value Ref Range Status   02/16/2019 3.6 3.5 - 5.2 mmol/L Final   02/15/2019 3.7 3.5 - 5.2 mmol/L Final   02/14/2019 4.2 3.5 - 5.2 mmol/L Final      Chloride Chloride   Date Value Ref Range Status   02/16/2019 95 (L) 98 - 107 mmol/L Final   02/15/2019 92 (L) 98 - 107 mmol/L Final   02/14/2019 95 (L) 98 - 107 mmol/L Final      Bicarbonate CO2   Date Value Ref Range Status   02/16/2019 23.3 22.0 - 29.0 mmol/L Final   02/15/2019 25.2 22.0 - 29.0 mmol/L Final   02/14/2019 26.8 22.0 - 29.0 mmol/L Final      BUN BUN   Date Value Ref Range Status   02/16/2019 65 (H) 8 - 23 mg/dL Final   02/15/2019 51 (H) 8 - 23 mg/dL Final   02/14/2019 33 (H) 8 - 23 mg/dL Final      Creatinine Creatinine   Date Value Ref Range Status   02/16/2019 3.71 (H) 0.57 -  1.00 mg/dL Final   02/15/2019 2.84 (H) 0.57 - 1.00 mg/dL Final   02/14/2019 2.21 (H) 0.57 - 1.00 mg/dL Final      Calcium Calcium   Date Value Ref Range Status   02/16/2019 9.5 8.6 - 10.5 mg/dL Final   02/15/2019 9.6 8.6 - 10.5 mg/dL Final   02/14/2019 9.9 8.6 - 10.5 mg/dL Final      Magnesium No results found for: MG         acetaminophen 500 mg Oral Q4H   aspirin 81 mg Oral Daily   bumetanide 4 mg Oral Daily   cetaphil  Topical Q12H   epoetin lisha 10,000 Units Intravenous Once per day on Mon Wed Fri   escitalopram 10 mg Oral Daily   famotidine 20 mg Oral Daily   hydrocortisone 1 application Topical Q6H   ipratropium-albuterol 3 mL Nebulization BID - RT   metoprolol tartrate 25 mg Oral Q12H   mupirocin  Each Nare BID   sodium chloride 4 mL Nebulization BID - RT   warfarin 1 mg Oral Daily       amiodarone 0.5 mg/min Last Rate: 0.5 mg/min (02/15/19 2153)   sodium chloride 30 mL/hr Last Rate: 30 mL/hr (01/28/19 1215)   sodium chloride 9 mL/hr Last Rate: 9 mL/hr (02/08/19 0850)           Patient Active Problem List   Diagnosis Code   • Tear of rotator cuff M75.100   • Hypertension I10   • Generalized anxiety disorder F41.1   • Hyperlipidemia E78.5   • IFG (impaired fasting glucose) R73.01   • Heart murmur, systolic R01.1   • Shoulder pain, bilateral M25.511, M25.512   • Pain of both shoulder joints M25.511, M25.512   • Chronic pain of both shoulders M25.511, G89.29, M25.512   • Acute congestive heart failure (CMS/HCC) I50.9   • Obesity, morbid, BMI 50 or higher (CMS/HCC) E66.01   • Fungal skin infection B36.9   • Cellulitis of lower extremity L03.119   • Aortic valve stenosis I35.0   • Tricuspid valve insufficiency I07.1   • Mitral valve stenosis I05.0       Assessment & Plan    -Severe AS, MS, TV regurgitation s/p AVR/MVR (tissue),TV repair, MAZE PENNIE ligation-- POD#18 (Pagni)  -NICM, non obstructive dx by cath----EF improved to 60%  -RBBB  -HTN---controlled  -Morbid obesity with arthritis-complicating mobility and all  aspects of care  -Hypoventilation sx, probable OCTAVIANO-----chronic CO2 retain by ABG, pulmonary following  -Possible PE, NO LE DVT------nosebleeds on heparin  -Preop new a.fib----s/p cardioversion 1/24/19, reverted to a.fib again  -Left arm thrombophlebitis, infiltration  -LAVERNE----tunnel cath in place, HD per renal  -Leukocytosis-----  -preop anemia, post op expected ABL  -Post op junctional bradycardia-----back in a.flutter/a.fib with rate 60-80    CV yesterday, NSR with PACs today.  HD today.  Plan for rehab on Monday.     MORGAN Cook  02/16/19  8:48 AM

## 2019-02-17 LAB
ALBUMIN SERPL-MCNC: 3.5 G/DL (ref 3.5–5.2)
ANION GAP SERPL CALCULATED.3IONS-SCNC: 14.4 MMOL/L
APTT PPP: 43.6 SECONDS (ref 22.7–35.4)
BASOPHILS # BLD AUTO: 0.04 10*3/MM3 (ref 0–0.2)
BASOPHILS NFR BLD AUTO: 0.5 % (ref 0–1.5)
BUN BLD-MCNC: 25 MG/DL (ref 8–23)
BUN/CREAT SERPL: 11 (ref 7–25)
CALCIUM SPEC-SCNC: 9.8 MG/DL (ref 8.6–10.5)
CHLORIDE SERPL-SCNC: 99 MMOL/L (ref 98–107)
CO2 SERPL-SCNC: 24.6 MMOL/L (ref 22–29)
CREAT BLD-MCNC: 2.28 MG/DL (ref 0.57–1)
DEPRECATED RDW RBC AUTO: 56.6 FL (ref 37–54)
EOSINOPHIL # BLD AUTO: 0.28 10*3/MM3 (ref 0–0.4)
EOSINOPHIL NFR BLD AUTO: 3.7 % (ref 0.3–6.2)
ERYTHROCYTE [DISTWIDTH] IN BLOOD BY AUTOMATED COUNT: 17.1 % (ref 12.3–15.4)
GFR SERPL CREATININE-BSD FRML MDRD: 21 ML/MIN/1.73
GLUCOSE BLD-MCNC: 102 MG/DL (ref 65–99)
GLUCOSE BLDC GLUCOMTR-MCNC: 90 MG/DL (ref 70–130)
HCT VFR BLD AUTO: 30.8 % (ref 34–46.6)
HGB BLD-MCNC: 9.3 G/DL (ref 12–15.9)
IMM GRANULOCYTES # BLD AUTO: 0.3 10*3/MM3 (ref 0–0.05)
IMM GRANULOCYTES NFR BLD AUTO: 4 % (ref 0–0.5)
INR PPP: 2.16 (ref 0.9–1.1)
LYMPHOCYTES # BLD AUTO: 0.9 10*3/MM3 (ref 0.7–3.1)
LYMPHOCYTES NFR BLD AUTO: 12 % (ref 19.6–45.3)
MCH RBC QN AUTO: 28.6 PG (ref 26.6–33)
MCHC RBC AUTO-ENTMCNC: 30.2 G/DL (ref 31.5–35.7)
MCV RBC AUTO: 94.8 FL (ref 79–97)
MONOCYTES # BLD AUTO: 0.86 10*3/MM3 (ref 0.1–0.9)
MONOCYTES NFR BLD AUTO: 11.5 % (ref 5–12)
NEUTROPHILS # BLD AUTO: 5.09 10*3/MM3 (ref 1.4–7)
NEUTROPHILS NFR BLD AUTO: 68.3 % (ref 42.7–76)
NRBC BLD AUTO-RTO: 0 /100 WBC (ref 0–0)
NT-PROBNP SERPL-MCNC: 5563 PG/ML (ref 5–900)
PHOSPHATE SERPL-MCNC: 3.4 MG/DL (ref 2.5–4.5)
PLATELET # BLD AUTO: 183 10*3/MM3 (ref 140–450)
PMV BLD AUTO: 9.6 FL (ref 6–12)
POTASSIUM BLD-SCNC: 4 MMOL/L (ref 3.5–5.2)
PROTHROMBIN TIME: 23.7 SECONDS (ref 11.7–14.2)
RBC # BLD AUTO: 3.25 10*6/MM3 (ref 3.77–5.28)
SODIUM BLD-SCNC: 138 MMOL/L (ref 136–145)
TROPONIN T SERPL-MCNC: 0.61 NG/ML (ref 0–0.03)
WBC NRBC COR # BLD: 7.47 10*3/MM3 (ref 3.4–10.8)

## 2019-02-17 PROCEDURE — 85610 PROTHROMBIN TIME: CPT | Performed by: NURSE PRACTITIONER

## 2019-02-17 PROCEDURE — 94799 UNLISTED PULMONARY SVC/PX: CPT

## 2019-02-17 PROCEDURE — 80069 RENAL FUNCTION PANEL: CPT | Performed by: INTERNAL MEDICINE

## 2019-02-17 PROCEDURE — 93010 ELECTROCARDIOGRAM REPORT: CPT | Performed by: INTERNAL MEDICINE

## 2019-02-17 PROCEDURE — 99232 SBSQ HOSP IP/OBS MODERATE 35: CPT | Performed by: NURSE PRACTITIONER

## 2019-02-17 PROCEDURE — 84484 ASSAY OF TROPONIN QUANT: CPT | Performed by: NURSE PRACTITIONER

## 2019-02-17 PROCEDURE — 85730 THROMBOPLASTIN TIME PARTIAL: CPT | Performed by: NURSE PRACTITIONER

## 2019-02-17 PROCEDURE — 85025 COMPLETE CBC W/AUTO DIFF WBC: CPT | Performed by: INTERNAL MEDICINE

## 2019-02-17 PROCEDURE — 83880 ASSAY OF NATRIURETIC PEPTIDE: CPT | Performed by: NURSE PRACTITIONER

## 2019-02-17 PROCEDURE — 82962 GLUCOSE BLOOD TEST: CPT

## 2019-02-17 PROCEDURE — 99024 POSTOP FOLLOW-UP VISIT: CPT | Performed by: NURSE PRACTITIONER

## 2019-02-17 PROCEDURE — 97110 THERAPEUTIC EXERCISES: CPT

## 2019-02-17 PROCEDURE — 93005 ELECTROCARDIOGRAM TRACING: CPT | Performed by: NURSE PRACTITIONER

## 2019-02-17 PROCEDURE — 93005 ELECTROCARDIOGRAM TRACING: CPT | Performed by: INTERNAL MEDICINE

## 2019-02-17 RX ORDER — ALBUMIN (HUMAN) 12.5 G/50ML
12.5 SOLUTION INTRAVENOUS AS NEEDED
Status: ACTIVE | OUTPATIENT
Start: 2019-02-17 | End: 2019-02-17

## 2019-02-17 RX ORDER — ALBUMIN (HUMAN) 12.5 G/50ML
12.5 SOLUTION INTRAVENOUS AS NEEDED
Status: DISCONTINUED | OUTPATIENT
Start: 2019-02-17 | End: 2019-02-18 | Stop reason: HOSPADM

## 2019-02-17 RX ORDER — NITROGLYCERIN 0.4 MG/1
0.4 TABLET SUBLINGUAL
Status: DISCONTINUED | OUTPATIENT
Start: 2019-02-17 | End: 2019-02-18 | Stop reason: HOSPADM

## 2019-02-17 RX ORDER — MANNITOL 250 MG/ML
25 INJECTION, SOLUTION INTRAVENOUS AS NEEDED
Status: DISCONTINUED | OUTPATIENT
Start: 2019-02-17 | End: 2019-02-17

## 2019-02-17 RX ADMIN — ESCITALOPRAM 10 MG: 10 TABLET, FILM COATED ORAL at 08:21

## 2019-02-17 RX ADMIN — HYDROCORTISONE 1 APPLICATION: 1 CREAM TOPICAL at 21:53

## 2019-02-17 RX ADMIN — HYDROCORTISONE 1 APPLICATION: 1 CREAM TOPICAL at 12:55

## 2019-02-17 RX ADMIN — FAMOTIDINE 20 MG: 20 TABLET, FILM COATED ORAL at 08:21

## 2019-02-17 RX ADMIN — ACETAMINOPHEN 500 MG: 500 TABLET, FILM COATED ORAL at 08:22

## 2019-02-17 RX ADMIN — ACETAMINOPHEN 500 MG: 500 TABLET, FILM COATED ORAL at 04:45

## 2019-02-17 RX ADMIN — SODIUM CHLORIDE 4 ML: 7 NEBU SOLN,3 % NEBU at 20:27

## 2019-02-17 RX ADMIN — ACETAMINOPHEN 500 MG: 500 TABLET, FILM COATED ORAL at 21:53

## 2019-02-17 RX ADMIN — IPRATROPIUM BROMIDE AND ALBUTEROL SULFATE 3 ML: 2.5; .5 SOLUTION RESPIRATORY (INHALATION) at 20:26

## 2019-02-17 RX ADMIN — ACETAMINOPHEN 500 MG: 500 TABLET, FILM COATED ORAL at 12:55

## 2019-02-17 RX ADMIN — MUPIROCIN 10 APPLICATION: 20 OINTMENT TOPICAL at 08:22

## 2019-02-17 RX ADMIN — AMIODARONE HYDROCHLORIDE 200 MG: 200 TABLET ORAL at 21:53

## 2019-02-17 RX ADMIN — HYDROCORTISONE 1 APPLICATION: 1 CREAM TOPICAL at 05:14

## 2019-02-17 RX ADMIN — WARFARIN SODIUM 1 MG: 1 TABLET ORAL at 17:54

## 2019-02-17 RX ADMIN — ACETAMINOPHEN 500 MG: 500 TABLET, FILM COATED ORAL at 00:15

## 2019-02-17 RX ADMIN — BUMETANIDE 4 MG: 2 TABLET ORAL at 08:21

## 2019-02-17 RX ADMIN — MUPIROCIN 10 APPLICATION: 20 OINTMENT TOPICAL at 21:53

## 2019-02-17 RX ADMIN — EMOLLIENT - LOTION: LOTION at 12:55

## 2019-02-17 RX ADMIN — HYDROCORTISONE 1 APPLICATION: 1 CREAM TOPICAL at 00:15

## 2019-02-17 RX ADMIN — Medication 81 MG: at 08:21

## 2019-02-17 RX ADMIN — ACETAMINOPHEN 500 MG: 500 TABLET, FILM COATED ORAL at 17:54

## 2019-02-17 RX ADMIN — SODIUM CHLORIDE 4 ML: 7 NEBU SOLN,3 % NEBU at 08:41

## 2019-02-17 RX ADMIN — METOPROLOL TARTRATE 25 MG: 25 TABLET ORAL at 08:21

## 2019-02-17 RX ADMIN — HYDROCORTISONE 1 APPLICATION: 1 CREAM TOPICAL at 17:54

## 2019-02-17 RX ADMIN — METOPROLOL TARTRATE 25 MG: 25 TABLET ORAL at 21:53

## 2019-02-17 RX ADMIN — IPRATROPIUM BROMIDE AND ALBUTEROL SULFATE 3 ML: 2.5; .5 SOLUTION RESPIRATORY (INHALATION) at 08:34

## 2019-02-17 RX ADMIN — AMIODARONE HYDROCHLORIDE 200 MG: 200 TABLET ORAL at 08:21

## 2019-02-17 RX ADMIN — EMOLLIENT - LOTION 1 APPLICATION: LOTION at 22:06

## 2019-02-17 NOTE — NURSING NOTE
Patient refused to be in recliner this morning stating that she needed to have a bowel movement. Bed pan offered to patient and encouraged; patient refused bed pan as well and said that she wanted to relax in her brief in bed to have her bowl movement. Patient educated.

## 2019-02-17 NOTE — PLAN OF CARE
Problem: Patient Care Overview  Goal: Plan of Care Review  Outcome: Ongoing (interventions implemented as appropriate)   02/17/19 5032   Plan of Care Review   Progress no change   OTHER   Outcome Summary Continue treatment plan.

## 2019-02-17 NOTE — PLAN OF CARE
Problem: Patient Care Overview  Goal: Plan of Care Review  Outcome: Ongoing (interventions implemented as appropriate)   02/17/19 1137   Coping/Psychosocial   Plan of Care Reviewed With patient   Plan of Care Review   Progress improving   OTHER   Outcome Summary Patient participated well with skilled PT. Able to stand from chair twice but unable to fully bring buttock forward and stand erect. Will continue to progress patient towards goals.

## 2019-02-17 NOTE — PROGRESS NOTES
" LOS: 32 days   Patient Care Team:  Robert Cortez MD as PCP - General (Family Medicine)  Deborah Agudelo MD as Surgeon (Orthopedic Surgery)    Chief Complaint: post op fu    Subjective:  Symptoms:  No shortness of breath or chest pain.    Diet:  Adequate intake.  No nausea or vomiting.    Activity level: Impaired due to weakness.    Pain:  She reports no pain.          Vital Signs  Temp:  [97.6 °F (36.4 °C)-97.8 °F (36.6 °C)] 97.6 °F (36.4 °C)  Heart Rate:  [62-69] 68  Resp:  [16-18] 16  BP: (107-114)/(59-74) 112/59  Body mass index is 45.98 kg/m².    Intake/Output Summary (Last 24 hours) at 2/17/2019 1038  Last data filed at 2/16/2019 1154  Gross per 24 hour   Intake 120 ml   Output --   Net 120 ml     No intake/output data recorded.          02/14/19  0640 02/15/19  0500 02/17/19  0503   Weight: (!) 194 kg (427 lb 11.1 oz) (!) 191 kg (421 lb 1.3 oz) 133 kg (293 lb 9.6 oz) (RN aware)         Objective:  General Appearance:  Comfortable.    Vital signs: (most recent): Blood pressure 112/59, pulse 68, temperature 97.6 °F (36.4 °C), temperature source Oral, resp. rate 16, height 170.2 cm (67\"), weight 133 kg (293 lb 9.6 oz), SpO2 94 %.    Lungs:  Normal effort and normal respiratory rate.    Heart: Normal rate.  Regular rhythm.  S1 normal and S2 normal.    Neurological: Patient is alert and oriented to person, place and time.    Skin:  Warm and dry.  (Sternum stable)            Results Review:        WBC WBC   Date Value Ref Range Status   02/17/2019 7.47 3.40 - 10.80 10*3/mm3 Final   02/16/2019 8.72 3.40 - 10.80 10*3/mm3 Final   02/15/2019 9.18 3.40 - 10.80 10*3/mm3 Final   02/14/2019 9.96 3.40 - 10.80 10*3/mm3 Final      HGB Hemoglobin   Date Value Ref Range Status   02/17/2019 9.3 (L) 12.0 - 15.9 g/dL Final   02/16/2019 8.5 (L) 12.0 - 15.9 g/dL Final   02/15/2019 9.0 (L) 12.0 - 15.9 g/dL Final   02/14/2019 9.0 (L) 12.0 - 15.9 g/dL Final      HCT Hematocrit   Date Value Ref Range Status   02/17/2019 30.8 (L) " 34.0 - 46.6 % Final   02/16/2019 28.6 (L) 34.0 - 46.6 % Final   02/15/2019 30.1 (L) 34.0 - 46.6 % Final   02/14/2019 30.8 (L) 34.0 - 46.6 % Final      Platelets Platelets   Date Value Ref Range Status   02/17/2019 183 140 - 450 10*3/mm3 Final   02/16/2019 225 140 - 450 10*3/mm3 Final   02/15/2019 231 140 - 450 10*3/mm3 Final   02/14/2019 198 140 - 450 10*3/mm3 Final        PT/INR:    Protime   Date Value Ref Range Status   02/17/2019 23.7 (H) 11.7 - 14.2 Seconds Final   02/16/2019 24.5 (H) 11.7 - 14.2 Seconds Final   02/15/2019 23.4 (H) 11.7 - 14.2 Seconds Final   02/14/2019 22.1 (H) 11.7 - 14.2 Seconds Final   02/14/2019 22.8 (H) 11.7 - 14.2 Seconds Final   /  INR   Date Value Ref Range Status   02/17/2019 2.16 (H) 0.90 - 1.10 Final   02/16/2019 2.26 (H) 0.90 - 1.10 Final   02/15/2019 2.12 (H) 0.90 - 1.10 Final   02/14/2019 1.97 (H) 0.90 - 1.10 Final   02/14/2019 2.06 (H) 0.90 - 1.10 Final       Sodium Sodium   Date Value Ref Range Status   02/17/2019 138 136 - 145 mmol/L Final   02/16/2019 138 136 - 145 mmol/L Final   02/15/2019 136 136 - 145 mmol/L Final      Potassium Potassium   Date Value Ref Range Status   02/17/2019 4.0 3.5 - 5.2 mmol/L Final   02/16/2019 3.6 3.5 - 5.2 mmol/L Final   02/15/2019 3.7 3.5 - 5.2 mmol/L Final      Chloride Chloride   Date Value Ref Range Status   02/17/2019 99 98 - 107 mmol/L Final   02/16/2019 95 (L) 98 - 107 mmol/L Final   02/15/2019 92 (L) 98 - 107 mmol/L Final      Bicarbonate CO2   Date Value Ref Range Status   02/17/2019 24.6 22.0 - 29.0 mmol/L Final   02/16/2019 23.3 22.0 - 29.0 mmol/L Final   02/15/2019 25.2 22.0 - 29.0 mmol/L Final      BUN BUN   Date Value Ref Range Status   02/17/2019 25 (H) 8 - 23 mg/dL Final   02/16/2019 65 (H) 8 - 23 mg/dL Final   02/15/2019 51 (H) 8 - 23 mg/dL Final      Creatinine Creatinine   Date Value Ref Range Status   02/17/2019 2.28 (H) 0.57 - 1.00 mg/dL Final   02/16/2019 3.71 (H) 0.57 - 1.00 mg/dL Final   02/15/2019 2.84 (H) 0.57 - 1.00  mg/dL Final      Calcium Calcium   Date Value Ref Range Status   02/17/2019 9.8 8.6 - 10.5 mg/dL Final   02/16/2019 9.5 8.6 - 10.5 mg/dL Final   02/15/2019 9.6 8.6 - 10.5 mg/dL Final      Magnesium No results found for: MG         acetaminophen 500 mg Oral Q4H   amiodarone 200 mg Oral Q12H   aspirin 81 mg Oral Daily   bumetanide 4 mg Oral Daily   cetaphil  Topical Q12H   epoetin lisha 10,000 Units Intravenous Once per day on Mon Wed Fri   escitalopram 10 mg Oral Daily   famotidine 20 mg Oral Daily   hydrocortisone 1 application Topical Q6H   ipratropium-albuterol 3 mL Nebulization BID - RT   metoprolol tartrate 25 mg Oral Q12H   mupirocin  Each Nare BID   sodium chloride 4 mL Nebulization BID - RT   warfarin 1 mg Oral Daily       sodium chloride 30 mL/hr Last Rate: 30 mL/hr (01/28/19 1215)   sodium chloride 9 mL/hr Last Rate: 9 mL/hr (02/08/19 0850)           Patient Active Problem List   Diagnosis Code   • Tear of rotator cuff M75.100   • Hypertension I10   • Generalized anxiety disorder F41.1   • Hyperlipidemia E78.5   • IFG (impaired fasting glucose) R73.01   • Heart murmur, systolic R01.1   • Shoulder pain, bilateral M25.511, M25.512   • Pain of both shoulder joints M25.511, M25.512   • Chronic pain of both shoulders M25.511, G89.29, M25.512   • Acute congestive heart failure (CMS/HCC) I50.9   • Obesity, morbid, BMI 50 or higher (CMS/HCC) E66.01   • Fungal skin infection B36.9   • Cellulitis of lower extremity L03.119   • Aortic valve stenosis I35.0   • Tricuspid valve insufficiency I07.1   • Mitral valve stenosis I05.0       Assessment & Plan    -Severe AS, MS, TV regurgitation s/p AVR/MVR (tissue),TV repair, MAZE PENNIE ligation-- POD#20 (Pagni)  -NICM, non obstructive dx by cath----EF improved to 60%  -RBBB  -HTN---controlled  -Morbid obesity with arthritis-complicating mobility and all aspects of care  -Hypoventilation sx, probable OCTAVIANO-----chronic CO2 retain by ABG, pulmonary following  -Possible PE, NO LE  DVT------nosebleeds on heparin  -Preop new a.fib----s/p cardioversion 1/24/19, reverted to a.fib again  -Left arm thrombophlebitis, infiltration  -LAVERNE----tunnel cath in place, HD per renal  -Leukocytosis-----  -preop anemia, post op expected ABL  -Post op junctional bradycardia    Went into a.fib yesterday during dialysis, converted without intervention.  Plan for rehab on Monday if all agree.      Alicia Schmitz, APRN  02/17/19  10:38 AM

## 2019-02-17 NOTE — THERAPY TREATMENT NOTE
Acute Care - Physical Therapy Treatment Note  [facility]     Patient Name: Claudia Arnold  : 1945  MRN: [id number]  Today's Date: 2019  Onset of Illness/Injury or Date of Surgery: 19          Admit Date: 2019    Visit Dx:    ICD-10-CM ICD-9-CM   1. Acute congestive heart failure, unspecified heart failure type (CMS/HCC) I50.9 428.0   2. Generalized weakness R53.1 780.79   3. Aortic valve stenosis, etiology of cardiac valve disease unspecified I35.0 424.1   4. Tricuspid valve insufficiency, unspecified etiology I07.1 397.0   5. Mitral valve stenosis, unspecified etiology I05.0 394.0   6. Acute renal failure, unspecified acute renal failure type (CMS/[facility]) N17.9 584.9   7. S/P AVR (aortic valve replacement) Z95.2 V43.3   8. Atrial flutter, unspecified type (CMS/[facility]) I48.92 427.32     Patient Active Problem List   Diagnosis   • Tear of rotator cuff   • Hypertension   • Generalized anxiety disorder   • Hyperlipidemia   • IFG (impaired fasting glucose)   • Heart murmur, systolic   • Shoulder pain, bilateral   • Pain of both shoulder joints   • Chronic pain of both shoulders   • Acute congestive heart failure (CMS/[facility])   • Obesity, morbid, BMI 50 or higher (CMS/[facility])   • Fungal skin infection   • Cellulitis of lower extremity   • Aortic valve stenosis   • Tricuspid valve insufficiency   • Mitral valve stenosis       Therapy Treatment    Rehabilitation Treatment Summary     Row Name 19 1100             Treatment Time/Intention    Discipline  physical therapist  -AL      Document Type  therapy note (daily note)  -AL      Subjective Information  complains of;weakness  -AL      Mode of Treatment  physical therapy  -AL      Patient/Family Observations  Sitting up in chair with daughter present.  -AL      Therapy Frequency (PT Clinical Impression)  daily  -AL      Patient Effort  good  -AL      Recorded by [AL] Savannah Arnold, PT 19 1137      Row Name 19 1100             Cognitive  Assessment/Intervention- PT/OT    Affect/Mental Status (Cognitive)  WFL  -AL      Orientation Status (Cognition)  oriented x 4  -AL      Follows Commands (Cognition)  WFL  -AL      Cognitive Function (Cognitive)  WFL  -AL      Recorded by [AL] Savannah Arnold, PT 02/17/19 1137      Row Name 02/17/19 1100             Cognitive Assessment Intervention- SLP    Cognitive Function (Cognition)  WFL  -AL      Recorded by [AL] Savannah Arnold, PT 02/17/19 1137      Row Name 02/17/19 1100             Bed Mobility Assessment/Treatment    Bed Mobility Assessment/Treatment  supine-sit;sit-supine  -AL      Supine-Sit [place] (Bed Mobility)  not tested up in chair.  -AL      Sit-Supine [place] (Bed Mobility)  not tested up in chair.  -AL      Recorded by [AL] Savannah Arnold, PT 02/17/19 1137      Row Name 02/17/19 1100             Transfer Assessment/Treatment    Transfer Assessment/Treatment  sit-stand transfer;stand-sit transfer  -AL      Maintains Weight-bearing Status (Transfers)  able to maintain  -AL      Comment (Transfers)  Sttod 2x, but unablet o get fully erect, would not bring her buttock forward.  -AL      Recorded by [AL] Savannah Arnold, PT 02/17/19 1137      Row Name 02/17/19 1100             Sit-Stand Transfer    Sit-Stand [place] (Transfers)  maximum assist (25% patient effort);2 person assist;verbal cues;nonverbal cues (demo/gesture)  -AL      Assistive Device (Sit-Stand Transfers)  walker, front-wheeled  -AL      Recorded by [AL] Savannah Arnold, PT 02/17/19 1137      Row Name 02/17/19 1100             Stand-Sit Transfer    Stand-Sit [place] (Transfers)  maximum assist (25% patient effort);2 person assist;verbal cues;nonverbal cues (demo/gesture)  -AL      Assistive Device (Stand-Sit Transfers)  walker, front-wheeled  -AL      Recorded by [AL] Savannah Arnold, PT 02/17/19 1137      Row Name 02/17/19 1100             Gait/Stairs Assessment/Training    [place] Level (Gait)  not tested not appropriate at  this time.  -AL      Recorded by [AL] Savannah Arnold, PT 02/17/19 1137      Row Name 02/17/19 1100             Positioning and Restraints    Pre-Treatment Position  sitting in chair/recliner  -AL      Post Treatment Position  chair  -AL      In Chair  sitting;call light within reach  -AL      Recorded by [AL] Savannah Arnold, PT 02/17/19 1137      Row Name 02/17/19 1100             Pain Assessment    Additional Documentation  Pain Scale: Numbers Pre/Post-Treatment (Group)  -AL      Recorded by [AL] Savannah Arnold, PT 02/17/19 1137      Row Name 02/17/19 1100             Pain Scale: Numbers Pre/Post-Treatment    Pain Scale: Numbers, Pretreatment  0/10 - no pain  -AL      Pain Scale: Numbers, Post-Treatment  0/10 - no pain  -AL      Recorded by [AL] Savannah Arnold, PT 02/17/19 1137      Row Name                Wound 01/28/19 1138 chest incision    Wound - Properties Group Date first assessed: 01/28/19 [SR] Time first assessed: 1138 [SR] Location: chest [SR] Type: incision [SR] Recorded by:  [SR] Keisha Gandara RN 01/28/19 1138    Row Name                Wound 01/28/19 1215 coccyx unspecified    Wound - Properties Group Date first assessed: 01/28/19 [MB] Time first assessed: 1215 [MB] Present On Admission : yes;picture taken [MB] Location: coccyx [MB] Type: unspecified [MB] Recorded by:  [MB] Julissa Chatterjee, RN 01/28/19 1541    Row Name                Wound 01/31/19 2300 Right gluteal pressure injury    Wound - Properties Group Date first assessed: 01/31/19 [NW] Time first assessed: 2300 [NW] Present On Admission : no [NW] Side: Right [NW] Location: gluteal [NW] Type: pressure injury [NW] Stage, Pressure Injury: deep tissue injury [NW] Recorded by:  [NW] Priscilla Willams, RN 02/01/19 0414    Row Name                Wound 01/31/19 2000 Left posterior ear pressure injury    Wound - Properties Group Date first assessed: 01/31/19 [NW] Time first assessed: 2000 [NW] Side: Left [NW] Orientation: posterior [NW] Location: ear [NW2] Type:  pressure injury [NW] Stage, Pressure Injury: Stage 2;medical device related [NW] Recorded by:  [NW] Priscilla Willams RN 02/01/19 0416 [NW2] Priscilla Willams RN 02/01/19 0417    Row Name                Wound 02/08/19 1039 Other (See comments) chest incision    Wound - Properties Group Date first assessed: 02/08/19 [MC] Time first assessed: 1039 [MC] Side: Other (See comments) [MC] Location: chest [MC] Type: incision [MC] Recorded by:  [[place]] Kenya Boudreaux RN 02/08/19 1039    Row Name                Wound 02/08/19 1053 Left neck incision    Wound - Properties Group Date first assessed: 02/08/19 [MC] Time first assessed: 1053 [MC] Side: Left [MC] Location: neck [MC] Type: incision [MC] Recorded by:  [[place]] Kenya Boudreaux RN 02/08/19 1053    Row Name 02/17/19 1100             Plan of Care Review    Plan of Care Reviewed With  patient  -AL      Recorded by [AL] Savannah Arnold, PT 02/17/19 1137      Row Name 02/17/19 1100             Outcome Summary/Treatment Plan (PT)    Daily Summary of Progress (PT)  progress towards functional goals is fair  -AL      Plan for Continued Treatment (PT)  Focus on standing from a chair with full erect posture.  -AL      Anticipated Discharge Disposition (PT)  skilled nursing facility  -AL      Recorded by [AL] Savannah Arnold, PT 02/17/19 1137        User Key  (r) = Recorded By, (t) = Taken By, (c) = Cosigned By    Initials Name Effective Dates Discipline    Julissa Sweet, RN 06/16/16 -  Nurse    Savannah Armas, PT 04/03/18 -  PT    Keisha Fournier RN 06/16/16 -  Nurse    Kenya Qureshi RN 02/23/18 -  Nurse    Priscilla Jensen RN 03/05/18 -  Nurse          Wound 01/28/19 1138 chest incision (Active)   Dressing Appearance open to air 2/17/2019  8:00 AM   Closure Approximated 2/17/2019  8:00 AM   Base clean;dry;pink 2/17/2019  8:00 AM   Periwound intact;dry;pink 2/17/2019  8:00 AM   Periwound Temperature warm 2/17/2019  8:00 AM   Drainage Amount none 2/17/2019  8:00 AM   Care,  Wound cleansed with 2/17/2019  8:00 AM   Dressing Care, Wound open to air 2/17/2019  8:00 AM       Wound 01/28/19 1215 coccyx unspecified (Active)   Dressing Appearance open to air 2/17/2019 12:10 AM   Closure Open to air 2/16/2019 12:26 PM   Base clean;dry;pink;purple 2/16/2019 12:26 PM   Periwound intact;dry;pink 2/16/2019 12:26 PM   Periwound Temperature warm 2/16/2019 12:26 PM   Drainage Amount none 2/17/2019 12:10 AM   Care, Wound barrier applied 2/16/2019  8:00 PM       Wound 01/31/19 2300 Right gluteal pressure injury (Active)   Dressing Appearance open to air 2/17/2019  8:00 AM   Closure Open to air 2/17/2019  8:00 AM   Base maroon/purple;clean 2/17/2019  8:00 AM   Periwound intact;pink;dry 2/17/2019  8:00 AM   Periwound Temperature warm 2/17/2019  8:00 AM   Drainage Amount none 2/17/2019  8:00 AM   Care, Wound barrier applied 2/17/2019  8:00 AM   Dressing Care, Wound open to air 2/17/2019  8:00 AM   Periwound Care, Wound barrier ointment applied 2/17/2019  8:00 AM       Wound 01/31/19 2000 Left posterior ear pressure injury (Active)   Dressing Appearance dry;intact;open to air 2/17/2019  8:00 AM   Closure Open to air 2/17/2019  8:00 AM   Base pink;clean;dry 2/17/2019  8:00 AM   Periwound intact 2/17/2019  8:00 AM   Periwound Temperature warm 2/17/2019  8:00 AM   Drainage Amount none 2/17/2019  8:00 AM   Dressing Care, Wound open to air 2/17/2019  8:00 AM       Wound 02/08/19 1039 Other (See comments) chest incision (Active)   Dressing Appearance no drainage;dry;intact 2/17/2019  8:00 AM   Closure SOPHIA 2/16/2019 12:26 PM   Base clean;dry;pink 2/17/2019  8:00 AM   Periwound intact;dry;pink 2/17/2019  8:00 AM   Periwound Temperature warm 2/17/2019  8:00 AM   Drainage Amount none 2/17/2019  8:00 AM       Wound 02/08/19 1053 Left neck incision (Active)   Dressing Appearance open to air;intact;dry 2/17/2019  8:00 AM   Closure Open to air 2/17/2019  8:00 AM   Base clean;dry;pink 2/17/2019  8:00 AM   Periwound  ecchymotic 2/17/2019  8:00 AM   Periwound Temperature warm 2/17/2019  8:00 AM   Drainage Amount none 2/17/2019  8:00 AM   Dressing Care, Wound open to air 2/17/2019  8:00 AM           Physical Therapy Education     Title: PT OT SLP Therapies (In Progress)     Topic: Physical Therapy (Done)     Point: Mobility training (Done)     Learning Progress Summary           Patient Acceptance, E, VU,NR by AL at 2/17/2019 11:37 AM    Acceptance, E, VU by CH at 2/16/2019 10:29 AM    Acceptance, E,TB,D, VU,NR by EE at 2/15/2019  3:14 PM    Acceptance, E,TB, VU,NR by CG at 2/14/2019  4:40 PM    Acceptance, E,TB,D, VU,NR by CH1 at 2/12/2019 11:32 AM    Acceptance, E,TB,D, VU,NR by CH1 at 2/11/2019  9:53 AM    Acceptance, E,D, VU,DU,NR by  at 2/10/2019 10:10 AM    Acceptance, E,TB,D, VU,NR by CH1 at 2/7/2019 12:44 PM    Acceptance, E, VU,NR by EF at 2/6/2019 10:19 AM    Acceptance, E, VU,NR by MA at 2/5/2019 11:09 AM    Acceptance, E,TB,D, VU,NR by CH1 at 2/4/2019 11:34 AM    Acceptance, E,D, VU,NR by SM at 2/3/2019  4:47 PM    Acceptance, E,D, DU,NR by LC at 2/2/2019 10:36 AM    Comment:  safety during sit to stand, benefits of activity    Acceptance, E, NR by MA at 1/31/2019 12:08 PM    Acceptance, E, NR by MA at 1/30/2019 10:23 AM    Acceptance, E,TB, VU by KH at 1/27/2019  9:10 AM    Acceptance, E, VU,NR by MA at 1/25/2019  3:54 PM    Acceptance, E,TB,D, VU,NR by CH1 at 1/23/2019  3:14 PM    Acceptance, TB,E, VU,DU by CW at 1/18/2019  4:37 PM    Acceptance, E, NR by EM at 1/17/2019 12:15 PM   Family Acceptance, TB,E, VU,DU by CW at 1/18/2019  4:37 PM                   Point: Home exercise program (Done)     Learning Progress Summary           Patient Acceptance, E, VU by CH at 2/16/2019 10:29 AM    Acceptance, E,TB,D, VU,NR by EE at 2/15/2019  3:14 PM    Acceptance, E,TB, VU,NR by CG at 2/14/2019  4:40 PM    Acceptance, E,TB,D, VU,NR by CH1 at 2/12/2019 11:32 AM    Acceptance, E,TB,D, VU,NR by CH1 at 2/11/2019  9:53 AM     Acceptance, E,D, VU,DU,NR by EH at 2/10/2019 10:10 AM    Acceptance, E,TB,D, VU,NR by CH1 at 2/7/2019 12:44 PM    Acceptance, E, VU,NR by PATRICIO at 2/6/2019 10:19 AM    Acceptance, E, VU,NR by MA at 2/5/2019 11:09 AM    Acceptance, E,TB,D, VU,NR by CH1 at 2/4/2019 11:34 AM    Acceptance, E,D, VU,NR by SM at 2/3/2019  4:47 PM    Acceptance, E,D, DU,NR by DARRYN at 2/2/2019 10:36 AM    Comment:  safety during sit to stand, benefits of activity    Acceptance, E,TB,D, VU,NR by CHELSEA1 at 2/1/2019 10:51 AM    Acceptance, E, NR by MA at 1/31/2019 12:08 PM    Acceptance, E, NR by MA at 1/30/2019 10:23 AM    Acceptance, E,TB, VU by YANETH at 1/27/2019  9:10 AM    Acceptance, E, VU,NR by MA at 1/25/2019  3:54 PM    Acceptance, E,TB,D, VU,NR by BIENVENIDO at 1/23/2019  3:14 PM    Acceptance, TB,E, VU,DU by CW at 1/18/2019  4:37 PM   Family Acceptance, TB,E, VU,DU by CW at 1/18/2019  4:37 PM                   Point: Body mechanics (Done)     Learning Progress Summary           Patient Acceptance, E, VU,NR by AL at 2/17/2019 11:37 AM    Acceptance, E, VU by CH at 2/16/2019 10:29 AM    Acceptance, E,TB,D, VU,NR by EE at 2/15/2019  3:14 PM    Acceptance, E,TB, VU,NR by CG at 2/14/2019  4:40 PM    Acceptance, E,TB,D, VU,NR by CH1 at 2/12/2019 11:32 AM    Acceptance, E,TB,D, VU,NR by CH1 at 2/11/2019  9:53 AM    Acceptance, E,D, VU,DU,NR by EH at 2/10/2019 10:10 AM    Acceptance, E,TB,D, VU,NR by CH1 at 2/7/2019 12:44 PM    Acceptance, E, VU,NR by PATRICIO at 2/6/2019 10:19 AM    Acceptance, E, VU,NR by MA at 2/5/2019 11:09 AM    Acceptance, E,TB,D, VU,NR by CH1 at 2/4/2019 11:34 AM    Acceptance, E,D, VU,NR by SM at 2/3/2019  4:47 PM    Acceptance, E,D, DU,NR by DARRYN at 2/2/2019 10:36 AM    Comment:  safety during sit to stand, benefits of activity    Acceptance, E, NR by MA at 1/31/2019 12:08 PM    Acceptance, E, NR by MA at 1/30/2019 10:23 AM    Acceptance, E,TB, VU by YANETH at 1/27/2019  9:10 AM    Acceptance, E, VU,NR by MA at 1/25/2019  3:54 PM    Acceptance,  E,TB,D, VU,NR by CH1 at 1/23/2019  3:14 PM    Acceptance, TB,E, VU,DU by CW at 1/18/2019  4:37 PM   Family Acceptance, TB,E, VU,DU by CW at 1/18/2019  4:37 PM                   Point: Precautions (Done)     Learning Progress Summary           Patient Acceptance, E, VU,NR by AL at 2/17/2019 11:37 AM    Acceptance, E, VU by CH at 2/16/2019 10:29 AM    Acceptance, E,TB,D, VU,NR by EE at 2/15/2019  3:14 PM    Acceptance, E,TB, VU,NR by CG at 2/14/2019  4:40 PM    Acceptance, E,TB,D, VU,NR by CH1 at 2/12/2019 11:32 AM    Acceptance, E,TB,D, VU,NR by CH1 at 2/11/2019  9:53 AM    Acceptance, E,D, VU,DU,NR by  at 2/10/2019 10:10 AM    Acceptance, E,TB,D, VU,NR by CH1 at 2/7/2019 12:44 PM    Acceptance, E, VU,NR by PATRICIO at 2/6/2019 10:19 AM    Acceptance, E, VU,NR by MA at 2/5/2019 11:09 AM    Acceptance, E,TB,D, VU,NR by CH1 at 2/4/2019 11:34 AM    Acceptance, E,D, VU,NR by SM at 2/3/2019  4:47 PM    Acceptance, E,D, DU,NR by DARRYN at 2/2/2019 10:36 AM    Comment:  safety during sit to stand, benefits of activity    Acceptance, E, NR by MA at 1/31/2019 12:08 PM    Acceptance, E, NR by MA at 1/30/2019 10:23 AM    Acceptance, E,TB, VU by YANETH at 1/27/2019  9:10 AM    Acceptance, E, VU,NR by MA at 1/25/2019  3:54 PM    Acceptance, E,TB,D, VU,NR by CH1 at 1/23/2019  3:14 PM    Acceptance, TB,E, VU,DU by CW at 1/18/2019  4:37 PM   Family Acceptance, TB,E, VU,DU by CW at 1/18/2019  4:37 PM                               User Key     Initials Effective Dates Name Provider Type Discipline    EF 06/08/18 -  Heike Anthony, PT Physical Therapist PT    CH1 04/03/18 -  Kae Aldrich, PT Physical Therapist PT    EE 04/03/18 -  Kaley Norton, PT Physical Therapist PT    AL 04/03/18 -  Savannah Arnold, PT Physical Therapist PT    EM 04/03/18 -  Heike Strauss, PT Physical Therapist PT    SM 03/07/18 -  Ksenia Lopez, PTA Physical Therapy Assistant PT    KH 06/22/16 -  Zuleyma Mancilla, PT Physical Therapist PT    LC 08/02/16 -   Khurram Littlejohn, PT DPT Physical Therapist PT    CW 03/07/18 -  Jose Ott, PTA Physical Therapy Assistant PT    EH 08/19/18 -  Cadence Cobb, PTA Physical Therapy Assistant PT    MA 10/19/18 -  Tabatha Lawrence, PT Physical Therapist PT    CH 04/03/18 -  Jericho Ortiz, PT Physical Therapist PT    CG 02/04/19 -  Jackson Greer, PT Student PT Student PT                PT Recommendation and Plan  Anticipated Discharge Disposition (PT): skilled nursing facility  Therapy Frequency (PT Clinical Impression): daily  Outcome Summary/Treatment Plan (PT)  Daily Summary of Progress (PT): progress towards functional goals is fair  Plan for Continued Treatment (PT): Focus on standing from a chair with full erect posture.  Anticipated Discharge Disposition (PT): skilled nursing facility  Plan of Care Reviewed With: patient  Progress: improving  Outcome Summary: Patient participated well with skilled PT. Able to stand from chair twice but unable to fully bring buttock forward and stand erect. Will continue to progress patient towards goals.  Outcome Measures     Row Name 02/17/19 1100 02/16/19 1000 02/15/19 1500       How much help from another person do you currently need...    Turning from your back to your side while in flat bed without using bedrails?  2  -AL  3  -CH  3  -EE    Moving from lying on back to sitting on the side of a flat bed without bedrails?  2  -AL  3  -CH  3  -EE    Moving to and from a bed to a chair (including a wheelchair)?  1  -AL  1  -CH  1  -EE    Standing up from a chair using your arms (e.g., wheelchair, bedside chair)?  1  -AL  1  -CH  1  -EE    Climbing 3-5 steps with a railing?  1  -AL  1  -CH  1  -EE    To walk in hospital room?  1  -AL  1  -CH  1  -EE    AM-PAC 6 Clicks Score  8  -AL  10  -CH  10  -EE       Functional Assessment    Outcome Measure Options  AM-PAC 6 Clicks Basic Mobility (PT)  -AL  AM-PAC 6 Clicks Basic Mobility (PT)  -CH  AM-PAC 6 Clicks Basic Mobility (PT)  -EE     Row Name 02/15/19 1400 02/14/19 1600          How much help from another person do you currently need...    Turning from your back to your side while in flat bed without using bedrails?  --  3  -NELA (r) CG (t) NELA (c)     Moving from lying on back to sitting on the side of a flat bed without bedrails?  --  3  -NELA (r) CG (t) NELA (c)     Moving to and from a bed to a chair (including a wheelchair)?  --  1  -NELA (r) CG (t) NELA (c)     Standing up from a chair using your arms (e.g., wheelchair, bedside chair)?  --  2  -NELA (r) CG (t) NELA (c)     Climbing 3-5 steps with a railing?  --  1  -NELA (r) CG (t) NELA (c)     To walk in hospital room?  --  1  -NELA (r) CG (t) NELA (c)     AM-PAC 6 Clicks Score  --  11  -NELA (r) CG (t)        How much help from another is currently needed...    Putting on and taking off regular lower body clothing?  1  -SG  --     Bathing (including washing, rinsing, and drying)  1  -SG  --     Toileting (which includes using toilet bed pan or urinal)  1  -SG  --     Putting on and taking off regular upper body clothing  2  -SG  --     Taking care of personal grooming (such as brushing teeth)  2  -SG  --     Eating meals  3  -SG  --     Score  10  -SG  --        Functional Assessment    Outcome Measure Options  AM-PAC 6 Clicks Daily Activity (OT)  -SG  AM-PAC 6 Clicks Basic Mobility (PT)  -NELA (r) CG (t) NELA (c)       User Key  (r) = Recorded By, (t) = Taken By, (c) = Cosigned By    Initials Name Provider Type    Jess Potts, OTR Occupational Therapist    Kae Handley, PT Physical Therapist    Kaley Zamarripa, PT Physical Therapist    Savannah Armas, PT Physical Therapist    Jericho Bone, PT Physical Therapist    Jackson Dozier, PT Student PT Student         Time Calculation:   PT Charges     Row Name 02/17/19 1139             Time Calculation    Start Time  1100  -AL      Stop Time  1111  -AL      Time Calculation (min)  11 min  -AL      PT Received On  02/17/19   -AL      PT - Next Appointment  02/18/19  -AL        User Key  (r) = Recorded By, (t) = Taken By, (c) = Cosigned By    Initials Name Provider Type    Savannah Armas, PT Physical Therapist        Therapy Suggested Charges     Code   Minutes Charges    14633 (CPT®) Hc Pt Neuromusc Re Education Ea 15 Min      11954 (CPT®) Hc Pt Ther Proc Ea 15 Min 15 1    38108 (CPT®) Hc Gait Training Ea 15 Min      74490 (CPT®) Hc Pt Therapeutic Act Ea 15 Min 10 1    23455 (CPT®) Hc Pt Manual Therapy Ea 15 Min      69348 (CPT®) Hc Pt Iontophoresis Ea 15 Min      01279 (CPT®) Hc Pt Elec Stim Ea-Per 15 Min      41334 (CPT®) Hc Pt Ultrasound Ea 15 Min      13097 (CPT®) Hc Pt Self Care/Mgmt/Train Ea 15 Min      32948 (CPT®) Hc Pt Prosthetic (S) Train Initial Encounter, Each 15 Min      96387 (CPT®) Hc Pt Orthotic(S)/Prosthetic(S) Encounter, Each 15 Min      52272 (CPT®) Hc Orthotic(S) Mgmt/Train Initial Encounter, Each 15min      Total  25 2        Therapy Charges for Today     Code Description Service Date Service Provider Modifiers Qty    47236293356 HC PT THER PROC EA 15 MIN 2/17/2019 Savannah Arnold, PT GP 1    98131018158 HC PT THER SUPP EA 15 MIN 2/17/2019 Savannah Arnold, PT GP 1          PT G-Codes  Outcome Measure Options: AM-PAC 6 Clicks Basic Mobility (PT)  AM-PAC 6 Clicks Score: 8  Score: 10    Savannah Arnold, PT  2/17/2019

## 2019-02-17 NOTE — PLAN OF CARE
Problem: Fall Risk (Adult)  Goal: Absence of Fall  Outcome: Ongoing (interventions implemented as appropriate)   02/17/19 0342   Fall Risk (Adult)   Absence of Fall making progress toward outcome       Problem: Patient Care Overview  Goal: Plan of Care Review  Outcome: Ongoing (interventions implemented as appropriate)   02/17/19 0342   Coping/Psychosocial   Plan of Care Reviewed With patient   Plan of Care Review   Progress improving   OTHER   Outcome Summary VS as charted. Pain managed with routhine PO APAP. Dressing changed to Rt. upper arm PICC and tolerated well. Requires assist of two to be cleaned up and repositioned in the bed. WIll con't to monitor. Patient has returned to SR with rare PVC's      Goal: Individualization and Mutuality  Outcome: Ongoing (interventions implemented as appropriate)   02/07/19 0454   Individualization   Patient Specific Interventions education       Problem: Anxiety (Adult)  Goal: Reduction/Resolution  Outcome: Ongoing (interventions implemented as appropriate)   02/17/19 0342   Anxiety (Adult)   Reduction/Resolution making progress toward outcome       Problem: Breathing Pattern Ineffective (Adult)  Goal: Effective Oxygenation/Ventilation  Outcome: Ongoing (interventions implemented as appropriate)   02/17/19 0342   Breathing Pattern Ineffective (Adult)   Effective Oxygenation/Ventilation making progress toward outcome     Goal: Anxiety/Fear Reduction  Outcome: Ongoing (interventions implemented as appropriate)   02/17/19 0342   Breathing Pattern Ineffective (Adult)   Anxiety/Fear Reduction making progress toward outcome       Problem: Activity Intolerance (Adult)  Goal: Activity Tolerance  Outcome: Ongoing (interventions implemented as appropriate)   02/17/19 0342   Activity Intolerance (Adult)   Activity Tolerance making progress toward outcome       Problem: Skin Injury Risk (Adult)  Goal: Skin Health and Integrity  Outcome: Ongoing (interventions implemented as  appropriate)   02/17/19 0342   Skin Injury Risk (Adult)   Skin Health and Integrity making progress toward outcome       Problem: Cardiac Surgery (Adult)  Goal: Signs and Symptoms of Listed Potential Problems Will be Absent, Minimized or Managed (Cardiac Surgery)  Outcome: Ongoing (interventions implemented as appropriate)   02/15/19 0557 02/17/19 0342   Goal/Outcome Evaluation   Problems Assessed (Cardiac Surgery) all --    Problems Present (Cardiac Surgery) --  situational response;functional deficit       Problem: Cardiac: Heart Failure (Adult)  Goal: Signs and Symptoms of Listed Potential Problems Will be Absent, Minimized or Managed (Cardiac: Heart Failure)  Outcome: Ongoing (interventions implemented as appropriate)   02/17/19 0342   Goal/Outcome Evaluation   Problems Assessed (Heart Failure) all   Problems Present (Heart Failure) dysrhythmia/arrhythmia

## 2019-02-17 NOTE — PROGRESS NOTES
"   LOS: 32 days    Patient Care Team:  Robert Cortez MD as PCP - General (Family Medicine)  Deborah Agudelo MD as Surgeon (Orthopedic Surgery)    Chief Complaint:    Chief Complaint   Patient presents with   • Shortness of Breath   • Leg Swelling     Follow UP LAVERNE  Subjective     Interval History:  Feeling much better today, had dialysis yesterday without any difficulty.  Denies any chest pain or shortness of air, no orthopnea PND, no nausea or vomiting, no dysuria or gross hematuria.        Objective     Vital Signs  Temp:  [97.6 °F (36.4 °C)-97.8 °F (36.6 °C)] 97.6 °F (36.4 °C)  Heart Rate:  [64-69] 65  Resp:  [16-18] 18  BP: (103-114)/(59-74) 103/64    Flowsheet Rows      First Filed Value   Admission Height  170.2 cm (67\") Documented at 01/16/2019 0910   Admission Weight  145 kg (320 lb)  (Abnormal)  Documented at 01/16/2019 0921          No intake/output data recorded.  I/O last 3 completed shifts:  In: 560 [P.O.:560]  Out: -   No intake or output data in the 24 hours ending 02/17/19 1502    Physical Exam:  General Appearance: alert, oriented x 3, no acute distress, obese  Skin: warm and dry  HEENT: Nonicteric sclerae, oral mucosa normal  Neck: supple, no JVD, trachea midline, RIJ TDC  Lungs: Clear to auscultation anteriorly.   Heart: RRR, normal S1 and S2, no S3, no rub  Abdomen: soft, non-tender,  +bs  Extremities: Trace pedal edema, no cyanosis or clubbing. Multiple ecchymoses, arms with nodular hematomas.   Neuro: normal speech and mental status       Results Review:    Results from last 7 days   Lab Units 02/17/19  0513 02/16/19  0334 02/15/19  0515  02/13/19  0452   SODIUM mmol/L 138 138 136   < > 140   POTASSIUM mmol/L 4.0 3.6 3.7   < > 4.1   CHLORIDE mmol/L 99 95* 92*   < > 98   CO2 mmol/L 24.6 23.3 25.2   < > 27.1   BUN mg/dL 25* 65* 51*   < > 46*   CREATININE mg/dL 2.28* 3.71* 2.84*   < > 2.27*   CALCIUM mg/dL 9.8 9.5 9.6   < > 9.8   BILIRUBIN mg/dL  --   --   --   --  0.5   ALK PHOS U/L  --   --  "  --   --  81   ALT (SGPT) U/L  --   --   --   --  <5   AST (SGOT) U/L  --   --   --   --  9   GLUCOSE mg/dL 102* 107* 217*   < > 107*    < > = values in this interval not displayed.       Estimated Creatinine Clearance: 31.3 mL/min (A) (by C-G formula based on SCr of 2.28 mg/dL (H)).    Results from last 7 days   Lab Units 02/17/19  0513 02/16/19  0334 02/15/19  0515   PHOSPHORUS mg/dL 3.4 4.9* 4.3             Results from last 7 days   Lab Units 02/17/19  0514 02/16/19  0334 02/15/19  0515 02/14/19  1241 02/14/19  0503   WBC 10*3/mm3 7.47 8.72 9.18 9.96 11.38*   HEMOGLOBIN g/dL 9.3* 8.5* 9.0* 9.0* 9.6*   PLATELETS 10*3/mm3 183 225 231 198 215       Results from last 7 days   Lab Units 02/17/19  0513 02/16/19  0334 02/15/19  0515 02/14/19  1959 02/14/19  1241   INR  2.16* 2.26* 2.12* 1.97* 2.06*         Imaging Results (last 24 hours)     ** No results found for the last 24 hours. **          acetaminophen 500 mg Oral Q4H   amiodarone 200 mg Oral Q12H   aspirin 81 mg Oral Daily   bumetanide 4 mg Oral Daily   cetaphil  Topical Q12H   epoetin lisha 10,000 Units Intravenous Once per day on Mon Wed Fri   escitalopram 10 mg Oral Daily   famotidine 20 mg Oral Daily   hydrocortisone 1 application Topical Q6H   ipratropium-albuterol 3 mL Nebulization BID - RT   metoprolol tartrate 25 mg Oral Q12H   mupirocin  Each Nare BID   sodium chloride 4 mL Nebulization BID - RT   warfarin 1 mg Oral Daily       sodium chloride 30 mL/hr Last Rate: 30 mL/hr (01/28/19 1215)   sodium chloride 9 mL/hr Last Rate: 9 mL/hr (02/08/19 0850)       Medication Review:   Current Facility-Administered Medications   Medication Dose Route Frequency Provider Last Rate Last Dose   • acetaminophen (TYLENOL) tablet 500 mg  500 mg Oral Q4H Yahaira Garza APRN   500 mg at 02/17/19 1255   • albumin human 25 % IV SOLN 37.5 g  37.5 g Intravenous PRN Sheila Terrazas MD   25 g at 02/09/19 1208   • ALPRAZolam (XANAX) tablet 0.25 mg  0.25 mg Oral 4x Daily PRN  Scott Segura MD       • amiodarone (PACERONE) tablet 200 mg  200 mg Oral Q12H Tabatha Edwards APRN   200 mg at 02/17/19 0821   • aspirin chewable tablet 81 mg  81 mg Oral Daily Scar Gaxiola MD   81 mg at 02/17/19 0821   • bisacodyl (DULCOLAX) EC tablet 10 mg  10 mg Oral Daily PRN Scar Gaxiola MD   10 mg at 02/10/19 2116   • bisacodyl (DULCOLAX) suppository 10 mg  10 mg Rectal Daily PRN Scar Gaxiola MD   10 mg at 02/03/19 2120   • bumetanide (BUMEX) tablet 4 mg  4 mg Oral Daily Maria Fernanda Maldonado MD   4 mg at 02/17/19 0821   • bupivacaine PF 30 mL, lidocaine 1 % 30 mL mixture    PRN Satish Stahl MD   18 mL at 02/08/19 1001   • cetaphil lotion   Topical Q12H Zonia Lopez MD       • cyclobenzaprine (FLEXERIL) tablet 10 mg  10 mg Oral Q8H PRN Scar Gaxiola MD       • dextrose (D50W) 25 g/ 50mL Intravenous Solution 25 g  25 g Intravenous Q15 Min PRN Oscar Rodriguez MD       • dextrose (GLUTOSE) oral gel 15 g  15 g Oral Q15 Min PRN Oscar Rodriguez MD       • epoetin lisha (EPOGEN,PROCRIT) injection 10,000 Units  10,000 Units Intravenous Once per day on Mon Wed Fri Maria Fernanda Maldonado MD   10,000 Units at 02/16/19 1643   • escitalopram (LEXAPRO) tablet 10 mg  10 mg Oral Daily Oscar Rodriguez MD   10 mg at 02/17/19 0821   • famotidine (PEPCID) tablet 20 mg  20 mg Oral Daily Wallace Falcon MD   20 mg at 02/17/19 0821   • glucagon (human recombinant) (GLUCAGEN DIAGNOSTIC) injection 1 mg  1 mg Subcutaneous PRN Oscar Rodriguez MD       • hydrocortisone 1 % cream 1 application  1 application Topical Q6H Fran Tilley MD   1 application at 02/17/19 1255   • ipratropium-albuterol (DUO-NEB) nebulizer solution 3 mL  3 mL Nebulization BID - RT Yahaira Garza APRN   3 mL at 02/17/19 0834   • ipratropium-albuterol (DUO-NEB) nebulizer solution 3 mL  3 mL Nebulization Q6H PRN Yahaira Garza APRN       • magic mouthwash oral supsension 5 mL  5 mL  Swish & Spit Q4H PRN Yahaira Garza APRN       • metoprolol tartrate (LOPRESSOR) tablet 25 mg  25 mg Oral Q12H Scott Segura MD   25 mg at 02/17/19 0821   • mupirocin (BACTROBAN) 2 % nasal ointment   Each Nare BID Scar Gaxiola MD   10 application at 02/17/19 0822   • nitroglycerin (NITROSTAT) SL tablet 0.4 mg  0.4 mg Sublingual Q5 Min PRN Tabatha Edwards APRN       • ondansetron (ZOFRAN) injection 4 mg  4 mg Intravenous Q6H PRN Scar Gaxiola MD   4 mg at 02/11/19 2022   • promethazine (PHENERGAN) tablet 12.5 mg  12.5 mg Oral Q6H PRN Scar Gaxiola MD        Or   • promethazine (PHENERGAN) injection 12.5 mg  12.5 mg Intravenous Q6H PRN Scar Gaxiola MD       • sodium chloride 0.9 % flush 30 mL  30 mL Intravenous Once PRN Scar Gaxiola MD       • sodium chloride 0.9 % infusion  30 mL/hr Intravenous Continuous PRN Scar Gaxiola MD 30 mL/hr at 01/28/19 1215 30 mL/hr at 01/28/19 1215   • sodium chloride 0.9 % infusion  9 mL/hr Intravenous Continuous ProtzerCasandra MD 9 mL/hr at 02/08/19 0850 9 mL/hr at 02/08/19 0850   • sodium chloride 7 % nebulizer solution nebulizer solution 4 mL  4 mL Nebulization BID - RT Yahaira Garza APRN   4 mL at 02/17/19 0841   • warfarin (COUMADIN) tablet 1 mg  1 mg Oral Daily Scott Segura MD   1 mg at 02/16/19 1956       Assessment/Plan   1. Oliguric LAVERNE - ATN post AVR/MVR.  HD-dep since 1/30;  Urine not recorded due to frequent stools and incontinence. Dialyzed today.  Waste products rise between treatments, but main issue is volume. Will monitor labs and urine output at [facility] for recovery.  Creatinine today at 2.28 electrolytes within acceptable range  2. SP AVR, MVR, TV repair, left cryo MAZE PENNIE ligation.   3. Anemia.  Received IV event of  4. Probable OCTAVIANO  5. GERD - on PPI   6.  Paroxysmal atrial fibrillation  7.  Anemia, hemoglobin is 9.3        Plan  1.  The patient could be discharged from the renal standpoint tomorrow to  the [facility] and she will undergo dialysis on Tuesday 2.  Surveillance lab              Christopher Jin MD  02/17/19  3:02 PM

## 2019-02-17 NOTE — PROGRESS NOTES
LOS: 32 days   Patient Care Team:  Robert Cortez MD as PCP - General (Family Medicine)  Deborah Agudelo MD as Surgeon (Orthopedic Surgery)    Chief Complaint: Follow-up for tissue aortic and mitral valve replacements, tricuspid valve repair, paroxysmal atrial fibrillation with RVR, acute diastolic and valvular CHF.    Interval History: Cardioverted 2/15 for atrial flutter with RVR, currently SR, lthough overnight had a brief run of atrial fibrillation with controlled rate which she converted from on her own.  Amiodarone gtt discontinued and PO started 2/16.  HD 2/16, creatinine 2/17 is 2.28.  Patient states she had some chest pressure in HD yesterday, which went away, but has now returned and is slowly improving.  BP trend 107-114/59-74 HR trend 60's. Foot pain improved.      Vital Signs:  Temp:  [97.6 °F (36.4 °C)-97.8 °F (36.6 °C)] 97.6 °F (36.4 °C)  Heart Rate:  [62-69] 68  Resp:  [16-18] 16  BP: (107-114)/(59-74) 112/59    Intake/Output Summary (Last 24 hours) at 2/17/2019 1017  Last data filed at 2/16/2019 1154  Gross per 24 hour   Intake 120 ml   Output --   Net 120 ml         Review of systems   GI:Denies abdominal pain and n/v/d  Cardiovascular: Denies palpitations; positive for chest pressure  Pulmonary: Denies shortness of breath and cough  Neurological: Denies dizziness and unilateral weakness        Physical Exam:   General Appearance:    No acute distress, alert and oriented x4; daughter at bedside   Lungs:     Clear to auscultation bilaterally anteriorly and laterally; respirations even and nonlabored     Heart:    RRR regular.    Abdomen:     Soft, non-tender, non-distended.    Extremities:   Trace edema. DP/PT 1+     Results Review:    Results from last 7 days   Lab Units 02/17/19  0513   SODIUM mmol/L 138   POTASSIUM mmol/L 4.0   CHLORIDE mmol/L 99   CO2 mmol/L 24.6   BUN mg/dL 25*   CREATININE mg/dL 2.28*   GLUCOSE mg/dL 102*   CALCIUM mg/dL 9.8     Results from last 7 days   Lab Units  02/17/19  0932   TROPONIN T ng/mL 0.611*     Results from last 7 days   Lab Units 02/17/19  0514   WBC 10*3/mm3 7.47   HEMOGLOBIN g/dL 9.3*   HEMATOCRIT % 30.8*   PLATELETS 10*3/mm3 183     Results from last 7 days   Lab Units 02/17/19  0513 02/16/19  0334 02/15/19  0515   INR  2.16* 2.26* 2.12*   APTT seconds 43.6* 42.7* 97.4*     Current medications    Current Facility-Administered Medications:   •  acetaminophen (TYLENOL) tablet 500 mg, 500 mg, Oral, Q4H, Yahaira Garza, MORGAN, 500 mg at 02/17/19 0822  •  albumin human 25 % IV SOLN 37.5 g, 37.5 g, Intravenous, PRN, Sheila Terrazas MD, 25 g at 02/09/19 1208  •  ALPRAZolam (XANAX) tablet 0.25 mg, 0.25 mg, Oral, 4x Daily PRN, Scott Segura MD  •  amiodarone (PACERONE) tablet 200 mg, 200 mg, Oral, Q12H, Tabatha Edwards APRN, 200 mg at 02/17/19 0821  •  aspirin chewable tablet 81 mg, 81 mg, Oral, Daily, Scar Gaxiola MD, 81 mg at 02/17/19 0821  •  bisacodyl (DULCOLAX) EC tablet 10 mg, 10 mg, Oral, Daily PRN, Scar Gaxiola MD, 10 mg at 02/10/19 2116  •  bisacodyl (DULCOLAX) suppository 10 mg, 10 mg, Rectal, Daily PRN, Scar Gaxiola MD, 10 mg at 02/03/19 2120  •  bumetanide (BUMEX) tablet 4 mg, 4 mg, Oral, Daily, Maria Fernanda Maldonado MD, 4 mg at 02/17/19 0821  •  bupivacaine PF 30 mL, lidocaine 1 % 30 mL mixture, , , PRN, Satish Stahl MD, 18 mL at 02/08/19 1001  •  cetaphil lotion, , Topical, Q12H, Zonia Lopez MD  •  cyclobenzaprine (FLEXERIL) tablet 10 mg, 10 mg, Oral, Q8H PRN, Scar Gaxiola MD  •  dextrose (D50W) 25 g/ 50mL Intravenous Solution 25 g, 25 g, Intravenous, Q15 Min PRN, Oscar Rodriguez MD  •  dextrose (GLUTOSE) oral gel 15 g, 15 g, Oral, Q15 Min PRN, Oscar Rodriguez MD  •  epoetin lisha (EPOGEN,PROCRIT) injection 10,000 Units, 10,000 Units, Intravenous, Once per day on Mon Wed Fri, Erica, Maria Fernanda Rosas MD, 10,000 Units at 02/16/19 1643  •  escitalopram (LEXAPRO) tablet 10 mg, 10 mg, Oral, Daily,  Oscar Rodriguez MD, 10 mg at 02/17/19 0821  •  famotidine (PEPCID) tablet 20 mg, 20 mg, Oral, Daily, Wallace Falcon MD, 20 mg at 02/17/19 0821  •  glucagon (human recombinant) (GLUCAGEN DIAGNOSTIC) injection 1 mg, 1 mg, Subcutaneous, PRN, Oscar Rodriguze MD  •  hydrocortisone 1 % cream 1 application, 1 application, Topical, Q6H, Fran Tilley MD, 1 application at 02/17/19 0514  •  ipratropium-albuterol (DUO-NEB) nebulizer solution 3 mL, 3 mL, Nebulization, BID - RT, Yahaira Garza APRN, 3 mL at 02/17/19 0834  •  ipratropium-albuterol (DUO-NEB) nebulizer solution 3 mL, 3 mL, Nebulization, Q6H PRN, Yahaira Garza APRN  •  magic mouthwash oral supsension 5 mL, 5 mL, Swish & Spit, Q4H PRN, Yahaira Garza APRN  •  metoprolol tartrate (LOPRESSOR) tablet 25 mg, 25 mg, Oral, Q12H, Scott Segura MD, 25 mg at 02/17/19 0821  •  mupirocin (BACTROBAN) 2 % nasal ointment, , Each Nare, BID, Scar Gaxiola MD, 10 application at 02/17/19 0822  •  nitroglycerin (NITROSTAT) SL tablet 0.4 mg, 0.4 mg, Sublingual, Q5 Min PRN, Tabatha Edwards, APRN  •  ondansetron (ZOFRAN) injection 4 mg, 4 mg, Intravenous, Q6H PRN, Scar Gaxiola MD, 4 mg at 02/11/19 2022  •  promethazine (PHENERGAN) tablet 12.5 mg, 12.5 mg, Oral, Q6H PRN **OR** promethazine (PHENERGAN) injection 12.5 mg, 12.5 mg, Intravenous, Q6H PRN, Scar Gaxiola MD  •  sodium chloride 0.9 % flush 30 mL, 30 mL, Intravenous, Once PRN, Scar Gaxiola MD  •  sodium chloride 0.9 % infusion, 30 mL/hr, Intravenous, Continuous PRN, Scar Gaxiola MD, Last Rate: 30 mL/hr at 01/28/19 1215, 30 mL/hr at 01/28/19 1215  •  sodium chloride 0.9 % infusion, 9 mL/hr, Intravenous, Continuous, Protzer, Casandra Sun MD, Last Rate: 9 mL/hr at 02/08/19 0850, 9 mL/hr at 02/08/19 0850  •  sodium chloride 7 % nebulizer solution nebulizer solution 4 mL, 4 mL, Nebulization, BID - RT, Yahaira Garza APRN, 4 mL at 02/17/19 0841  •  warfarin  (COUMADIN) tablet 1 mg, 1 mg, Oral, Daily, Luna, Scott BORDEN MD, 1 mg at 02/16/19 1956    ECG    Telemetry  2/17/19 SR rate 60        2/17/19 in presence of chest pressure-SR with BBB rate 68            2/17/19 SR with RBBB rate 68        2/15/19 atrial flutter rate 100's, RBBB      2/14/19 atrial flutter with RVR RBBB        1/29/19 SR chantal rate 50's RBBB          Echo  1/30/19  Interpretation Summary     · Limited echo for function and pericardial effusion  · Left ventricular wall thickness is consistent with mild-to-moderate concentric hypertrophy.  · Left ventricular systolic function is hyperdynamic (EF > 70).  · Right ventricular cavity is mildly dilated.  · Left atrial cavity size is moderately dilated.  · There is no evidence of pericardial effusion.       Cardiac catheterization  1/22/19  Conclusions:   1.  Mild luminal irregularities proximal to mid LAD.  Otherwise normal coronary angiography  2.  Elevated left and right-sided filling pressure  3.  Moderate aortic valve stenosis.  4.  Moderate pulmonary hypertension        I reviewed the patient's new clinical results.        Assessment:  1. Acute valvular and diastolic CHF  2. Severe aortic stenosis, severe mitral stenosis secondary to calcification, and severe tricuspid regurgitation  3. Normal EF 60-65%  4. Paroxysmal atrial fibrillation with RVR -status post cardioversion 1/24/2019 and subsequent reversion to atrial fibrillation.  5. Moderate pulmonary hypertension (mean PA pressure 32)  6. Status post tissue AVR, tissue MVR, tricuspid valve repair (Martha suture repair), left cryomaze, and PENNIE ligation on 1/28/2019 (Dr. Gaxiola).  7. Small bilateral pulmonary emboli  8. Oliguric acute kidney injury post-operatively - on hemodialysis and status post tunneled catheter placement on 2/8/2019.  9. Bifascicular block (RBBB and LAFB)  10. Candida intertrigo (skin folds); left axillary fungal infection (treated)  11. Osteoarthritis with immobility  12. Left  wrist thrombophlebitis from IV infiltration  13. Undiagnosed OCTAVIANO  14. Morbid obesity   15. Anemia of chronic disease  16. Severe deconditioning   17. Junctional bradycardia and asystole post-op - resolved  18. Atrial flutter, type unknown - status post DCCV on 2/15/2019      Cardioversion  2/15/19  Interpretation Summary     · Post cardioversion the patient displayed a sinus rhythm.  · The cardioversion was successful.           Plan:  -Rapid atrial flutter - cardioverted successfully 2/15/19. Currently SR rate 60's.  Brief run of atrial fibrillation overnight.  Tolerating PO amiodarone well. Recheck ECG tomorrow am. Monitor QTc.     -Continue metoprolol 25 mg bid. BP trend 107-114/59-74 HR trend 60's.    -INR-2.16 on 2/17  - continue Coumadin 1 mg per day - she went up very quickly previously, amio will perpetuate this.  Continue to monitor.       -HD 2/16 for volume control.  On Bumex 4 mg po qday per Nephrology. No adventitious lung sounds or shortness of breath at this time.     -plan to go to rehab Monday if stable.    -Left foot pain-patient c/o pain to ball of left foot when pressed.  Able to work with physical therapy without added difficulty, pulses palpable, extremity warm, no cyanosis or rubor noted, denies claudication.  Will continue to monitor and consider xray if pain continues.  She states this began 2 days ago. Improved 2/17    -Intermittent chest pressure-Repeat ECG showed no change from this morning.  Troponin slightly elevated at 0.611, BNP elevated at 5.5k.  Offered patient ntg, she declined stating that the pressure is relieving.  Discussed plan of care with MD, plan to repeat ECG and troponin in am, and still to go to rehab Monday if stable.  Plan of care discussed with patient and daughter.  She is agreeable.     Tabatha Edwards, MORGAN  02/17/19  10:17 AM

## 2019-02-18 VITALS
DIASTOLIC BLOOD PRESSURE: 65 MMHG | BODY MASS INDEX: 45.99 KG/M2 | HEART RATE: 58 BPM | HEIGHT: 67 IN | WEIGHT: 293 LBS | RESPIRATION RATE: 16 BRPM | OXYGEN SATURATION: 90 % | TEMPERATURE: 98.6 F | SYSTOLIC BLOOD PRESSURE: 119 MMHG

## 2019-02-18 LAB
ALBUMIN SERPL-MCNC: 3.6 G/DL (ref 3.5–5.2)
ANION GAP SERPL CALCULATED.3IONS-SCNC: 16.3 MMOL/L
APTT PPP: 42 SECONDS (ref 22.7–35.4)
BASOPHILS # BLD AUTO: 0.04 10*3/MM3 (ref 0–0.2)
BASOPHILS NFR BLD AUTO: 0.5 % (ref 0–1.5)
BUN BLD-MCNC: 40 MG/DL (ref 8–23)
BUN/CREAT SERPL: 11.2 (ref 7–25)
CALCIUM SPEC-SCNC: 9.7 MG/DL (ref 8.6–10.5)
CHLORIDE SERPL-SCNC: 99 MMOL/L (ref 98–107)
CO2 SERPL-SCNC: 22.7 MMOL/L (ref 22–29)
CREAT BLD-MCNC: 3.56 MG/DL (ref 0.57–1)
DEPRECATED RDW RBC AUTO: 61 FL (ref 37–54)
EOSINOPHIL # BLD AUTO: 0.28 10*3/MM3 (ref 0–0.4)
EOSINOPHIL NFR BLD AUTO: 3.5 % (ref 0.3–6.2)
ERYTHROCYTE [DISTWIDTH] IN BLOOD BY AUTOMATED COUNT: 17.5 % (ref 12.3–15.4)
GFR SERPL CREATININE-BSD FRML MDRD: 13 ML/MIN/1.73
GFR SERPL CREATININE-BSD FRML MDRD: ABNORMAL ML/MIN/1.73
GLUCOSE BLD-MCNC: 102 MG/DL (ref 65–99)
HCT VFR BLD AUTO: 31.3 % (ref 34–46.6)
HGB BLD-MCNC: 9.2 G/DL (ref 12–15.9)
IMM GRANULOCYTES # BLD AUTO: 0.32 10*3/MM3 (ref 0–0.05)
IMM GRANULOCYTES NFR BLD AUTO: 4 % (ref 0–0.5)
INR PPP: 2.31 (ref 0.9–1.1)
LYMPHOCYTES # BLD AUTO: 1.08 10*3/MM3 (ref 0.7–3.1)
LYMPHOCYTES NFR BLD AUTO: 13.4 % (ref 19.6–45.3)
MCH RBC QN AUTO: 28.8 PG (ref 26.6–33)
MCHC RBC AUTO-ENTMCNC: 29.4 G/DL (ref 31.5–35.7)
MCV RBC AUTO: 97.8 FL (ref 79–97)
MONOCYTES # BLD AUTO: 0.88 10*3/MM3 (ref 0.1–0.9)
MONOCYTES NFR BLD AUTO: 10.9 % (ref 5–12)
NEUTROPHILS # BLD AUTO: 5.44 10*3/MM3 (ref 1.4–7)
NEUTROPHILS NFR BLD AUTO: 67.7 % (ref 42.7–76)
NRBC BLD AUTO-RTO: 0 /100 WBC (ref 0–0)
PHOSPHATE SERPL-MCNC: 4.6 MG/DL (ref 2.5–4.5)
PLATELET # BLD AUTO: 205 10*3/MM3 (ref 140–450)
PMV BLD AUTO: 10.2 FL (ref 6–12)
POTASSIUM BLD-SCNC: 3.9 MMOL/L (ref 3.5–5.2)
PROTHROMBIN TIME: 25 SECONDS (ref 11.7–14.2)
RBC # BLD AUTO: 3.2 10*6/MM3 (ref 3.77–5.28)
SODIUM BLD-SCNC: 138 MMOL/L (ref 136–145)
TROPONIN T SERPL-MCNC: 0.61 NG/ML (ref 0–0.03)
WBC NRBC COR # BLD: 8.04 10*3/MM3 (ref 3.4–10.8)

## 2019-02-18 PROCEDURE — 85610 PROTHROMBIN TIME: CPT | Performed by: NURSE PRACTITIONER

## 2019-02-18 PROCEDURE — 94799 UNLISTED PULMONARY SVC/PX: CPT

## 2019-02-18 PROCEDURE — 97110 THERAPEUTIC EXERCISES: CPT

## 2019-02-18 PROCEDURE — 99024 POSTOP FOLLOW-UP VISIT: CPT | Performed by: THORACIC SURGERY (CARDIOTHORACIC VASCULAR SURGERY)

## 2019-02-18 PROCEDURE — 85730 THROMBOPLASTIN TIME PARTIAL: CPT | Performed by: NURSE PRACTITIONER

## 2019-02-18 PROCEDURE — 99239 HOSP IP/OBS DSCHRG MGMT >30: CPT | Performed by: INTERNAL MEDICINE

## 2019-02-18 PROCEDURE — 93005 ELECTROCARDIOGRAM TRACING: CPT | Performed by: NURSE PRACTITIONER

## 2019-02-18 PROCEDURE — 80069 RENAL FUNCTION PANEL: CPT | Performed by: INTERNAL MEDICINE

## 2019-02-18 PROCEDURE — 93010 ELECTROCARDIOGRAM REPORT: CPT | Performed by: INTERNAL MEDICINE

## 2019-02-18 PROCEDURE — 84484 ASSAY OF TROPONIN QUANT: CPT | Performed by: NURSE PRACTITIONER

## 2019-02-18 PROCEDURE — 85025 COMPLETE CBC W/AUTO DIFF WBC: CPT | Performed by: NURSE PRACTITIONER

## 2019-02-18 RX ORDER — VIT E ACET/GLY/DIMETH/WATER
LOTION (ML) TOPICAL EVERY 12 HOURS SCHEDULED
Start: 2019-02-18 | End: 2020-02-12

## 2019-02-18 RX ORDER — AMIODARONE HYDROCHLORIDE 200 MG/1
200 TABLET ORAL EVERY 12 HOURS SCHEDULED
Qty: 60 TABLET | Refills: 1 | Status: SHIPPED | OUTPATIENT
Start: 2019-02-18 | End: 2019-05-17

## 2019-02-18 RX ORDER — BUMETANIDE 2 MG/1
4 TABLET ORAL DAILY
Qty: 60 TABLET | Refills: 2 | Status: SHIPPED | OUTPATIENT
Start: 2019-02-19 | End: 2019-05-13 | Stop reason: SDUPTHER

## 2019-02-18 RX ORDER — WARFARIN SODIUM 1 MG/1
1 TABLET ORAL NIGHTLY
Qty: 30 TABLET | Refills: 2 | Status: SHIPPED | OUTPATIENT
Start: 2019-02-18 | End: 2019-05-13 | Stop reason: SDUPTHER

## 2019-02-18 RX ORDER — FAMOTIDINE 20 MG/1
20 TABLET, FILM COATED ORAL DAILY
Qty: 30 TABLET | Refills: 2 | Status: SHIPPED | OUTPATIENT
Start: 2019-02-19 | End: 2019-05-08 | Stop reason: SDUPTHER

## 2019-02-18 RX ORDER — DIAPER,BRIEF,INFANT-TODD,DISP
1 EACH MISCELLANEOUS EVERY 6 HOURS SCHEDULED
Qty: 1.5 G | Refills: 0 | Status: SHIPPED | OUTPATIENT
Start: 2019-02-18 | End: 2020-02-12

## 2019-02-18 RX ADMIN — ACETAMINOPHEN 500 MG: 500 TABLET, FILM COATED ORAL at 11:00

## 2019-02-18 RX ADMIN — Medication 81 MG: at 08:49

## 2019-02-18 RX ADMIN — FAMOTIDINE 20 MG: 20 TABLET, FILM COATED ORAL at 08:48

## 2019-02-18 RX ADMIN — METOPROLOL TARTRATE 25 MG: 25 TABLET ORAL at 08:48

## 2019-02-18 RX ADMIN — BUMETANIDE 4 MG: 2 TABLET ORAL at 08:48

## 2019-02-18 RX ADMIN — MUPIROCIN: 20 OINTMENT TOPICAL at 09:03

## 2019-02-18 RX ADMIN — SODIUM CHLORIDE 4 ML: 7 NEBU SOLN,3 % NEBU at 08:35

## 2019-02-18 RX ADMIN — HYDROCORTISONE 1 APPLICATION: 1 CREAM TOPICAL at 12:06

## 2019-02-18 RX ADMIN — WARFARIN SODIUM 1 MG: 1 TABLET ORAL at 17:27

## 2019-02-18 RX ADMIN — ACETAMINOPHEN 500 MG: 500 TABLET, FILM COATED ORAL at 17:17

## 2019-02-18 RX ADMIN — HYDROCORTISONE 1 APPLICATION: 1 CREAM TOPICAL at 05:29

## 2019-02-18 RX ADMIN — HYDROCORTISONE 1 APPLICATION: 1 CREAM TOPICAL at 17:17

## 2019-02-18 RX ADMIN — AMIODARONE HYDROCHLORIDE 200 MG: 200 TABLET ORAL at 08:49

## 2019-02-18 RX ADMIN — EMOLLIENT - LOTION: LOTION at 08:51

## 2019-02-18 RX ADMIN — ESCITALOPRAM 10 MG: 10 TABLET, FILM COATED ORAL at 08:49

## 2019-02-18 RX ADMIN — IPRATROPIUM BROMIDE AND ALBUTEROL SULFATE 3 ML: 2.5; .5 SOLUTION RESPIRATORY (INHALATION) at 08:35

## 2019-02-18 RX ADMIN — ACETAMINOPHEN 500 MG: 500 TABLET, FILM COATED ORAL at 05:29

## 2019-02-18 NOTE — PROGRESS NOTES
"   LOS: 33 days    Patient Care Team:  Robert Cortez MD as PCP - General (Family Medicine)  Deborah Agudelo MD as Surgeon (Orthopedic Surgery)    Chief Complaint:    Chief Complaint   Patient presents with   • Shortness of Breath   • Leg Swelling     Follow UP LAVERNE  Subjective     Interval History:  Feeling much better today.  Denies any chest pain or shortness of air, no orthopnea PND, no nausea or vomiting, no dysuria or gross hematuria.        Objective     Vital Signs  Temp:  [97.3 °F (36.3 °C)-97.6 °F (36.4 °C)] 97.5 °F (36.4 °C)  Heart Rate:  [60-67] 60  Resp:  [16-18] 18  BP: (103-120)/(54-66) 120/66    Flowsheet Rows      First Filed Value   Admission Height  170.2 cm (67\") Documented at 01/16/2019 0910   Admission Weight  145 kg (320 lb)  (Abnormal)  Documented at 01/16/2019 0921          No intake/output data recorded.  I/O last 3 completed shifts:  In: 480 [P.O.:480]  Out: -     Intake/Output Summary (Last 24 hours) at 2/18/2019 0914  Last data filed at 2/17/2019 1151  Gross per 24 hour   Intake 240 ml   Output --   Net 240 ml       Physical Exam:  General Appearance: alert, oriented x 3, no acute distress, obese  Skin: warm and dry  HEENT: Nonicteric sclerae, oral mucosa normal  Neck: supple, no JVD, trachea midline, RIJ TDC  Lungs: Clear to auscultation anteriorly.   Heart: RRR, normal S1 and S2, no S3, no rub  Abdomen: soft, non-tender,  +bs  Extremities: Trace pedal edema, no cyanosis or clubbing. Multiple ecchymoses, arms with nodular hematomas.   Neuro: normal speech and mental status       Results Review:    Results from last 7 days   Lab Units 02/18/19  0541 02/17/19  0513 02/16/19  0334  02/13/19  0452   SODIUM mmol/L 138 138 138   < > 140   POTASSIUM mmol/L 3.9 4.0 3.6   < > 4.1   CHLORIDE mmol/L 99 99 95*   < > 98   CO2 mmol/L 22.7 24.6 23.3   < > 27.1   BUN mg/dL 40* 25* 65*   < > 46*   CREATININE mg/dL 3.56* 2.28* 3.71*   < > 2.27*   CALCIUM mg/dL 9.7 9.8 9.5   < > 9.8   BILIRUBIN mg/dL  " --   --   --   --  0.5   ALK PHOS U/L  --   --   --   --  81   ALT (SGPT) U/L  --   --   --   --  <5   AST (SGOT) U/L  --   --   --   --  9   GLUCOSE mg/dL 102* 102* 107*   < > 107*    < > = values in this interval not displayed.       Estimated Creatinine Clearance: 20 mL/min (A) (by C-G formula based on SCr of 3.56 mg/dL (H)).    Results from last 7 days   Lab Units 02/18/19  0541 02/17/19  0513 02/16/19  0334   PHOSPHORUS mg/dL 4.6* 3.4 4.9*             Results from last 7 days   Lab Units 02/18/19  0541 02/17/19  0514 02/16/19  0334 02/15/19  0515 02/14/19  1241   WBC 10*3/mm3 8.04 7.47 8.72 9.18 9.96   HEMOGLOBIN g/dL 9.2* 9.3* 8.5* 9.0* 9.0*   PLATELETS 10*3/mm3 205 183 225 231 198       Results from last 7 days   Lab Units 02/18/19  0541 02/17/19  0513 02/16/19  0334 02/15/19  0515 02/14/19  1959   INR  2.31* 2.16* 2.26* 2.12* 1.97*         Imaging Results (last 24 hours)     ** No results found for the last 24 hours. **          acetaminophen 500 mg Oral Q4H   amiodarone 200 mg Oral Q12H   aspirin 81 mg Oral Daily   bumetanide 4 mg Oral Daily   cetaphil  Topical Q12H   epoetin lisha 10,000 Units Intravenous Once per day on Mon Wed Fri   escitalopram 10 mg Oral Daily   famotidine 20 mg Oral Daily   hydrocortisone 1 application Topical Q6H   ipratropium-albuterol 3 mL Nebulization BID - RT   metoprolol tartrate 25 mg Oral Q12H   mupirocin  Each Nare BID   sodium chloride 4 mL Nebulization BID - RT   warfarin 1 mg Oral Daily       sodium chloride 30 mL/hr Last Rate: 30 mL/hr (01/28/19 1215)   sodium chloride 9 mL/hr Last Rate: 9 mL/hr (02/08/19 0850)       Medication Review:   Current Facility-Administered Medications   Medication Dose Route Frequency Provider Last Rate Last Dose   • acetaminophen (TYLENOL) tablet 500 mg  500 mg Oral Q4H Yahaira Garza APRN   500 mg at 02/18/19 0529   • albumin human 25 % IV SOLN 12.5 g  12.5 g Intravenous PRN Wallace Falcon MD       • albumin human 25 % IV SOLN  37.5 g  37.5 g Intravenous PRN Sheila Terrazas MD   25 g at 02/09/19 1208   • ALPRAZolam (XANAX) tablet 0.25 mg  0.25 mg Oral 4x Daily PRN Scott Segura MD       • amiodarone (PACERONE) tablet 200 mg  200 mg Oral Q12H Tabatha Edwards APRN   200 mg at 02/18/19 0849   • aspirin chewable tablet 81 mg  81 mg Oral Daily Scar Gaxiola MD   81 mg at 02/18/19 0849   • bisacodyl (DULCOLAX) EC tablet 10 mg  10 mg Oral Daily PRN Scar Gaxiola MD   10 mg at 02/10/19 2116   • bisacodyl (DULCOLAX) suppository 10 mg  10 mg Rectal Daily PRN Scar Gaxiola MD   10 mg at 02/03/19 2120   • bumetanide (BUMEX) tablet 4 mg  4 mg Oral Daily Maria Fernanda Maldonado MD   4 mg at 02/18/19 0848   • bupivacaine PF 30 mL, lidocaine 1 % 30 mL mixture    PRN Satish Stahl MD   18 mL at 02/08/19 1001   • cetaphil lotion   Topical Q12H Zonia Lopez MD       • cyclobenzaprine (FLEXERIL) tablet 10 mg  10 mg Oral Q8H PRN Scar Gaxiola MD       • dextrose (D50W) 25 g/ 50mL Intravenous Solution 25 g  25 g Intravenous Q15 Min PRN Oscar Rodriguez MD       • dextrose (GLUTOSE) oral gel 15 g  15 g Oral Q15 Min PRN Oscar Rodriguez MD       • epoetin lisha (EPOGEN,PROCRIT) injection 10,000 Units  10,000 Units Intravenous Once per day on Mon Wed Fri Maria Fernanda Maldonado MD   10,000 Units at 02/16/19 1643   • escitalopram (LEXAPRO) tablet 10 mg  10 mg Oral Daily Oscar Rodriguez MD   10 mg at 02/18/19 0849   • famotidine (PEPCID) tablet 20 mg  20 mg Oral Daily Wallace Falcon MD   20 mg at 02/18/19 0848   • glucagon (human recombinant) (GLUCAGEN DIAGNOSTIC) injection 1 mg  1 mg Subcutaneous PRN Oscar Rodriguez MD       • hydrocortisone 1 % cream 1 application  1 application Topical Q6H Fran Tilley MD   1 application at 02/18/19 0529   • ipratropium-albuterol (DUO-NEB) nebulizer solution 3 mL  3 mL Nebulization BID - RT Yahaira Garza APRN   3 mL at 02/18/19 0835   •  ipratropium-albuterol (DUO-NEB) nebulizer solution 3 mL  3 mL Nebulization Q6H PRN Yahaira Garza APRN       • magic mouthwash oral supsension 5 mL  5 mL Swish & Spit Q4H PRN Yahaira Garza APRN       • metoprolol tartrate (LOPRESSOR) tablet 25 mg  25 mg Oral Q12H Scott Segura MD   25 mg at 02/18/19 0848   • mupirocin (BACTROBAN) 2 % nasal ointment   Each Nare BID Scar Gaxiola MD       • nitroglycerin (NITROSTAT) SL tablet 0.4 mg  0.4 mg Sublingual Q5 Min PRN Bambi Odom MD       • ondansetron (ZOFRAN) injection 4 mg  4 mg Intravenous Q6H PRN Scar Gaxiola MD   4 mg at 02/11/19 2022   • promethazine (PHENERGAN) tablet 12.5 mg  12.5 mg Oral Q6H PRN Scar Gaxiola MD        Or   • promethazine (PHENERGAN) injection 12.5 mg  12.5 mg Intravenous Q6H PRN Scar Gaxiola MD       • sodium chloride 0.9 % flush 30 mL  30 mL Intravenous Once PRN Scar Gaxiola MD       • sodium chloride 0.9 % infusion  30 mL/hr Intravenous Continuous PRN Scar Gaxiola MD 30 mL/hr at 01/28/19 1215 30 mL/hr at 01/28/19 1215   • sodium chloride 0.9 % infusion  9 mL/hr Intravenous Continuous ProtzerCasandra MD 9 mL/hr at 02/08/19 0850 9 mL/hr at 02/08/19 0850   • sodium chloride 7 % nebulizer solution nebulizer solution 4 mL  4 mL Nebulization BID - RT Yahaira Garza APRN   4 mL at 02/18/19 0835   • warfarin (COUMADIN) tablet 1 mg  1 mg Oral Daily Scott Segura MD   1 mg at 02/17/19 6074       Assessment/Plan   1. Oliguric LAVERNE - ATN post AVR/MVR.  HD-dep since 1/30;  Urine not recorded due to frequent stools and incontinence. Dialyzed today.  Waste products rise between treatments, but main issue is volume. Will monitor labs and urine output at [facility] for recovery.  Creatinine today at 3.56 electrolytes within acceptable range  2. SP AVR, MVR, TV repair, left cryo MAZE PENNIE ligation.   3. Anemia.  Received IV event of  4. Probable OCTAVIANO  5. GERD - on PPI   6.  Paroxysmal atrial  fibrillation  7.  Anemia, hemoglobin is 9.2        Plan   The patient could be discharged from the renal standpoint todayto the [place] and she will undergo dialysis on Tuesday.  I discussed the case with the patient and her family at the bedside                Christopher Jin MD  02/18/19  9:14 AM

## 2019-02-18 NOTE — DISCHARGE INSTR - ACTIVITY
Continue to use your incentive spirometer for an additional 2 weeks.  Continue to wear your ANITRA hose for an additional 2 weeks. You may remove them at night.  Walk 10 minutes at a time at least 3 times a day.  Do not drive for 2 weeks and no longer taking narcotics. Ride in the backseat for 2 weeks.  Do not lift, push or pull greater than 10 pounds for 6 weeks.  You may shower, but do not submerge your incisions until your surgeon approves (i.e., no baths, pools, hot tubs, etc.).  Clean your incision daily in the shower with Dial or Ivory soap. Do not put any additional lotions, creams, or any other substance on your incision without your surgeon's approval.  Call your surgeon’s office at [phone] for any drainage, increased redness, or fever over 100.5.  Weigh yourself daily and report a weight gain of 2 pounds or more in 24 hours to your cardiologist. Record your daily weights.  Take your temperature blood pressure daily. Keep a record of your readings along with your daily weights to report to your cardiologist at your next follow-up appointment.

## 2019-02-18 NOTE — PLAN OF CARE
Problem: Patient Care Overview  Goal: Plan of Care Review  Outcome: Ongoing (interventions implemented as appropriate)   02/18/19 0696   Coping/Psychosocial   Plan of Care Reviewed With patient   Plan of Care Review   Progress improving   OTHER   Outcome Summary VSS. Continuing to encourage increased mobility. Pt did get up in the chair yesterday with the assist of the lift. Pt is using I/S and flutter. No other c/o this shift; pt slept well. Plan for d/c to [facility] today.

## 2019-02-18 NOTE — PROGRESS NOTES
Continued Stay Note  [facility]     Patient Name: Claudia Arnold  MRN: [id number]  Today's Date: 2/18/2019    Admit Date: 1/16/2019    Discharge Plan     Row Name 02/18/19 1317       Plan    Plan  2/18/19- [facility] ([facility]) via [facility] at 7:30 PM.    Patient/Family in Agreement with Plan  yes    Plan Comments  2/18 [facility] w/ [facility] on Tues, Thur & Sat.  Pt discharging today.  Per niharika Martin Pt will go to [facility]: [facility].  CCP called [facility] [phone] and S/W Alonso and Pt transport to rehab is set up for 7:30 PM this evening.  Packet given to DARCIE Jacobsen to call report.  DARCIE FRANCE/CCP        Discharge Codes    No documentation.       Expected Discharge Date and Time     Expected Discharge Date Expected Discharge Time    Feb 18, 2019             Maki Lu RN

## 2019-02-18 NOTE — PROGRESS NOTES
LOS: 33 days   Patient Care Team:  Robert Cortez MD as PCP - General (Family Medicine)  Deborah Agudelo MD as Surgeon (Orthopedic Surgery)    Chief Complaint:   Post-op follow-up, s/p open heart    Subjective  Sitting up in bed. Feeling better. Ready to go to rehab.     Vital Signs  Temp:  [97.3 °F (36.3 °C)-97.6 °F (36.4 °C)] 97.5 °F (36.4 °C)  Heart Rate:  [60-67] 60  Resp:  [16-18] 18  BP: (103-120)/(54-66) 120/66  Body mass index is 45.98 kg/m².    Intake/Output Summary (Last 24 hours) at 2/18/2019 1122  Last data filed at 2/17/2019 1151  Gross per 24 hour   Intake 240 ml   Output --   Net 240 ml     No intake/output data recorded.          02/14/19  0640 02/15/19  0500 02/17/19  0503   Weight: (!) 194 kg (427 lb 11.1 oz) (!) 191 kg (421 lb 1.3 oz) 133 kg (293 lb 9.6 oz) (RN aware)         Objective    Physical Exam:   General Appearance: awake and alert, no acute distress   Lungs: respirations regular and respirations unlabored   Heart: regular rhythm & normal rate and normal S1, S2   Abdomen: soft or nontender, + bowel sounds    Skin: sternal incision clean, dry, intact   Neuro: alert and oriented, no focal deficits.     Results Review:        WBC WBC   Date Value Ref Range Status   02/18/2019 8.04 3.40 - 10.80 10*3/mm3 Final   02/17/2019 7.47 3.40 - 10.80 10*3/mm3 Final   02/16/2019 8.72 3.40 - 10.80 10*3/mm3 Final      HGB Hemoglobin   Date Value Ref Range Status   02/18/2019 9.2 (L) 12.0 - 15.9 g/dL Final   02/17/2019 9.3 (L) 12.0 - 15.9 g/dL Final   02/16/2019 8.5 (L) 12.0 - 15.9 g/dL Final      HCT Hematocrit   Date Value Ref Range Status   02/18/2019 31.3 (L) 34.0 - 46.6 % Final   02/17/2019 30.8 (L) 34.0 - 46.6 % Final   02/16/2019 28.6 (L) 34.0 - 46.6 % Final      Platelets Platelets   Date Value Ref Range Status   02/18/2019 205 140 - 450 10*3/mm3 Final   02/17/2019 183 140 - 450 10*3/mm3 Final   02/16/2019 225 140 - 450 10*3/mm3 Final        PT/INR:    Protime   Date Value Ref Range Status    02/18/2019 25.0 (H) 11.7 - 14.2 Seconds Final   02/17/2019 23.7 (H) 11.7 - 14.2 Seconds Final   02/16/2019 24.5 (H) 11.7 - 14.2 Seconds Final   /  INR   Date Value Ref Range Status   02/18/2019 2.31 (H) 0.90 - 1.10 Final   02/17/2019 2.16 (H) 0.90 - 1.10 Final   02/16/2019 2.26 (H) 0.90 - 1.10 Final       Sodium Sodium   Date Value Ref Range Status   02/18/2019 138 136 - 145 mmol/L Final   02/17/2019 138 136 - 145 mmol/L Final   02/16/2019 138 136 - 145 mmol/L Final      Potassium Potassium   Date Value Ref Range Status   02/18/2019 3.9 3.5 - 5.2 mmol/L Final   02/17/2019 4.0 3.5 - 5.2 mmol/L Final   02/16/2019 3.6 3.5 - 5.2 mmol/L Final      Chloride Chloride   Date Value Ref Range Status   02/18/2019 99 98 - 107 mmol/L Final   02/17/2019 99 98 - 107 mmol/L Final   02/16/2019 95 (L) 98 - 107 mmol/L Final      Bicarbonate CO2   Date Value Ref Range Status   02/18/2019 22.7 22.0 - 29.0 mmol/L Final   02/17/2019 24.6 22.0 - 29.0 mmol/L Final   02/16/2019 23.3 22.0 - 29.0 mmol/L Final      BUN BUN   Date Value Ref Range Status   02/18/2019 40 (H) 8 - 23 mg/dL Final   02/17/2019 25 (H) 8 - 23 mg/dL Final   02/16/2019 65 (H) 8 - 23 mg/dL Final      Creatinine Creatinine   Date Value Ref Range Status   02/18/2019 3.56 (H) 0.57 - 1.00 mg/dL Final   02/17/2019 2.28 (H) 0.57 - 1.00 mg/dL Final   02/16/2019 3.71 (H) 0.57 - 1.00 mg/dL Final      Calcium Calcium   Date Value Ref Range Status   02/18/2019 9.7 8.6 - 10.5 mg/dL Final   02/17/2019 9.8 8.6 - 10.5 mg/dL Final   02/16/2019 9.5 8.6 - 10.5 mg/dL Final      Magnesium No results found for: MG         acetaminophen 500 mg Oral Q4H   amiodarone 200 mg Oral Q12H   aspirin 81 mg Oral Daily   bumetanide 4 mg Oral Daily   cetaphil  Topical Q12H   epoetin lisha 10,000 Units Intravenous Once per day on Mon Wed Fri   escitalopram 10 mg Oral Daily   famotidine 20 mg Oral Daily   hydrocortisone 1 application Topical Q6H   ipratropium-albuterol 3 mL Nebulization BID - RT    metoprolol tartrate 25 mg Oral Q12H   mupirocin  Each Nare BID   sodium chloride 4 mL Nebulization BID - RT   warfarin 1 mg Oral Daily       sodium chloride 30 mL/hr Last Rate: 30 mL/hr (01/28/19 1215)   sodium chloride 9 mL/hr Last Rate: 9 mL/hr (02/08/19 0850)           Patient Active Problem List   Diagnosis Code   • Tear of rotator cuff M75.100   • Hypertension I10   • Generalized anxiety disorder F41.1   • Hyperlipidemia E78.5   • IFG (impaired fasting glucose) R73.01   • Heart murmur, systolic R01.1   • Shoulder pain, bilateral M25.511, M25.512   • Pain of both shoulder joints M25.511, M25.512   • Chronic pain of both shoulders M25.511, G89.29, M25.512   • Acute congestive heart failure (CMS/HCC) I50.9   • Obesity, morbid, BMI 50 or higher (CMS/HCC) E66.01   • Fungal skin infection B36.9   • Cellulitis of lower extremity L03.119   • Aortic valve stenosis I35.0   • Tricuspid valve insufficiency I07.1   • Mitral valve stenosis I05.0       Assessment & Plan    -Severe AS, MS, TV regurgitation s/p AVR/MVR (tissue),TV repair, MAZE PENNIE ligation-- POD#21 (Pagni)  -NICM, non obstructive dx by cath----EF improved to 60%  -RBBB  -HTN---controlled  -Morbid obesity with arthritis-complicating mobility and all aspects of care  -Hypoventilation sx, probable OCTAVIANO-----chronic CO2 retain by ABG, pulmonary following  -Possible PE, NO LE DVT------nosebleeds on heparin  -Preop new a.fib----s/p cardioversion 1/24/19 and again on 2/15/19, on coumadin   -Left arm thrombophlebitis, infiltration  -LAVERNE----tunnel cath in place, HD per renal  -Leukocytosis-----resolved   -preop anemia, post op expected ABL  -Post op junctional bradycardia then afib     Ok for d/c to rehab from [facility] standpoint. Follow-up in office on 3/19/19 at 1:30PM.     Reagan Vargas PA-C  02/18/19  11:22 AM

## 2019-02-18 NOTE — PROGRESS NOTES
Continued Stay Note  [facility]     Patient Name: Claudia Arnold  MRN: [id number]  Today's Date: 2/18/2019    Admit Date: 1/16/2019    Discharge Plan     Row Name 02/18/19 1420       Plan    Plan Comments  CCP faxed Pt d/c summary to [facility] [phone] @ 2:22 PM.  DARCIE Jacobsen has packet to call report.  DARCIE FRANCE/NATACHA    Row Name 02/18/19 0611       Plan    Plan  2/18/19- [facility] ([facility] Room 203) via [facility] at 7:30 PM.    Patient/Family in Agreement with Plan  yes    Plan Comments  2/18 [facility] w/ [facility] on Tues, Thur & Sat.  Pt discharging today.  Per niharika Martin Pt will go to [facility]: [facility].  CCP called [facility] [phone] and S/W Alonso and Pt transport to rehab is set up for 7:30 PM this evening.  Packet given to DARCIE Jacobsen to call report.  DARCIE FRANCE/CCP        Discharge Codes    No documentation.       Expected Discharge Date and Time     Expected Discharge Date Expected Discharge Time    Feb 18, 2019             Maki Lu RN

## 2019-02-18 NOTE — THERAPY TREATMENT NOTE
Acute Care - Physical Therapy Treatment Note  [facility]     Patient Name: Claudia Arnold  : 1945  MRN: [id number]  Today's Date: 2019  Onset of Illness/Injury or Date of Surgery: 19          Admit Date: 2019    Visit Dx:    ICD-10-CM ICD-9-CM   1. Acute congestive heart failure, unspecified heart failure type (CMS/[facility]) I50.9 428.0   2. Generalized weakness R53.1 780.79   3. Aortic valve stenosis, etiology of cardiac valve disease unspecified I35.0 424.1   4. Tricuspid valve insufficiency, unspecified etiology I07.1 397.0   5. Mitral valve stenosis, unspecified etiology I05.0 394.0   6. Acute renal failure, unspecified acute renal failure type (CMS/[facility]) N17.9 584.9   7. S/P AVR (aortic valve replacement) Z95.2 V43.3   8. Atrial flutter, unspecified type (CMS/[facility]) I48.92 427.32     Patient Active Problem List   Diagnosis   • Tear of rotator cuff   • Hypertension   • Generalized anxiety disorder   • Hyperlipidemia   • IFG (impaired fasting glucose)   • Heart murmur, systolic   • Shoulder pain, bilateral   • Pain of both shoulder joints   • Chronic pain of both shoulders   • Acute congestive heart failure (CMS/HCC)   • Obesity, morbid, BMI 50 or higher (CMS/[facility])   • Fungal skin infection   • Cellulitis of lower extremity   • Aortic valve stenosis   • Tricuspid valve insufficiency   • Mitral valve stenosis       Therapy Treatment    Rehabilitation Treatment Summary     Row Name 19 1106             Treatment Time/Intention    Discipline  physical therapist  -CHELSEA,ANGELIKA,CH2      Document Type  therapy note (daily note)  -CHELSEA,ANGELIKA,CH2      Subjective Information  no complaints  -CHELSEA,ANGELIKA,CH2      Mode of Treatment  physical therapy  -CHELSEA,CG,CH2      Patient/Family Observations  Pt supine in bed with no signs of acute distress at rest, family present  -CHELSEA,ANGELIKA,CH2      Patient Effort  good  -CHELSEA,ANGELIKA,CH2      Existing Precautions/Restrictions  cardiac;fall;sternal  -CHELSEA,ANGELIKA,CH2      Recorded by [CHELSEA,ANGELIKA,CH2]  Kae Aldrich PT (r) Jackson Greer, PT Student (t) Kae Aldrich, PT (c) 02/18/19 1152      Row Name 02/18/19 1106             Cognitive Assessment/Intervention- PT/OT    Orientation Status (Cognition)  oriented x 4  -CH,CG,CH2      Follows Commands (Cognition)  WFL  -CH,CG,CH2      Personal Safety Interventions  gait belt;fall prevention program maintained;nonskid shoes/slippers when out of bed  -CH,CG,CH2      Recorded by [CH,CG,CH2] Kae Aldrich, PT (r) Jackson Greer, PT Student (t) Kae Aldrich, PT (c) 02/18/19 1152      Row Name 02/18/19 1106             Safety Issues, Functional Mobility    Impairments Affecting Function (Mobility)  endurance/activity tolerance;strength  -CH,CG,CH2      Recorded by [CH,CG,CH2] Kae Aldrich, PT (r) Jackson Greer, PT Student (t) Kae Aldrich, PT (c) 02/18/19 1152      Row Name 02/18/19 1106             Bed Mobility Assessment/Treatment    Bed Mobility Assessment/Treatment  supine-sit;sit-supine  -CH,CG,CH2      Recorded by [CH,CG,CH2] Kae Aldrich, PT (r) Jackson Greer, PT Student (t) Kae Aldrich, PT (c) 02/18/19 1152      Row Name                Wound 01/28/19 1138 chest incision    Wound - Properties Group Date first assessed: 01/28/19 [SR] Time first assessed: 1138 [SR] Location: chest [SR] Type: incision [SR] Recorded by:  [SR] Keisha Gandara RN 01/28/19 1138    Row Name                Wound 01/28/19 1215 coccyx unspecified    Wound - Properties Group Date first assessed: 01/28/19 [MB] Time first assessed: 1215 [MB] Present On Admission : yes;picture taken [MB] Location: coccyx [MB] Type: unspecified [MB] Recorded by:  [MB] Julissa Chatterjee RN 01/28/19 1541    Row Name                Wound 01/31/19 2300 Right gluteal pressure injury    Wound - Properties Group Date first assessed: 01/31/19 [NW] Time first assessed: 2300 [NW] Present On Admission : no [NW] Side: Right [NW] Location: gluteal [NW] Type:  pressure injury [NW] Stage, Pressure Injury: deep tissue injury [NW] Recorded by:  [NW] Priscilla Willams RN 02/01/19 0414    Row Name                Wound 01/31/19 2000 Left posterior ear pressure injury    Wound - Properties Group Date first assessed: 01/31/19 [NW] Time first assessed: 2000 [NW] Side: Left [NW] Orientation: posterior [NW] Location: ear [NW2] Type: pressure injury [NW] Stage, Pressure Injury: Stage 2;medical device related [NW] Recorded by:  [NW] Priscilla Willams RN 02/01/19 0416 [NW2] Priscilla Willams RN 02/01/19 0417    Row Name                Wound 02/08/19 1039 Other (See comments) chest incision    Wound - Properties Group Date first assessed: 02/08/19 [MC] Time first assessed: 1039 [MC] Side: Other (See comments) [MC] Location: chest [MC] Type: incision [MC] Recorded by:  [[place]] Kenya Boudreaux RN 02/08/19 1039    Row Name                Wound 02/08/19 1053 Left neck incision    Wound - Properties Group Date first assessed: 02/08/19 [MC] Time first assessed: 1053 [MC] Side: Left [MC] Location: neck [MC] Type: incision [MC] Recorded by:  [[place]] Kenya Boudreaux RN 02/08/19 1053      User Key  (r) = Recorded By, (t) = Taken By, (c) = Cosigned By    Initials Name Effective Dates Discipline    CH Kae Aldrich, PT 04/03/18 -  PT    Julissa Sweet RN 06/16/16 -  Nurse    Keisha Fournier RN 06/16/16 -  Nurse    Kenya Qureshi RN 02/23/18 -  Nurse    Priscilla Jensen RN 03/05/18 -  Nurse    Jackson Dozier, CARMELLA Student 02/04/19 -  PT          Wound 01/28/19 1138 chest incision (Active)   Dressing Appearance open to air 2/18/2019  8:53 AM   Closure Approximated 2/18/2019  8:53 AM   Base clean;dry;pink 2/18/2019  8:53 AM   Periwound intact;dry;pink 2/18/2019  8:53 AM   Periwound Temperature warm 2/18/2019  8:53 AM   Drainage Amount none 2/18/2019  8:53 AM       Wound 01/28/19 1215 coccyx unspecified (Active)   Dressing Appearance open to air 2/18/2019  8:53 AM   Closure Open to  air 2/18/2019  8:53 AM   Base clean;dry;pink;purple 2/18/2019  8:53 AM       Wound 01/31/19 2300 Right gluteal pressure injury (Active)   Dressing Appearance open to air 2/18/2019  8:53 AM   Closure Open to air 2/18/2019  8:53 AM   Base maroon/purple;clean 2/18/2019  8:53 AM   Periwound intact;pink;dry 2/18/2019  8:53 AM   Periwound Temperature warm 2/18/2019  8:53 AM   Drainage Amount none 2/18/2019  8:53 AM       Wound 01/31/19 2000 Left posterior ear pressure injury (Active)   Dressing Appearance dry;intact;open to air 2/18/2019  8:53 AM   Closure Open to air 2/18/2019  8:53 AM   Base pink;clean;dry 2/18/2019  8:53 AM   Periwound intact 2/18/2019  8:53 AM   Periwound Temperature warm 2/18/2019  8:53 AM   Drainage Amount none 2/18/2019  8:53 AM       Wound 02/08/19 1039 Other (See comments) chest incision (Active)   Dressing Appearance no drainage;dry;intact 2/18/2019  8:53 AM   Base clean;dry;pink 2/18/2019  8:53 AM   Periwound intact;dry;pink 2/18/2019  8:53 AM   Periwound Temperature warm 2/18/2019  8:53 AM   Drainage Amount none 2/18/2019  8:53 AM       Wound 02/08/19 1053 Left neck incision (Active)   Dressing Appearance open to air;intact;dry 2/18/2019  8:53 AM   Closure Open to air 2/18/2019  8:53 AM   Base clean;dry;pink 2/18/2019  8:53 AM   Periwound ecchymotic 2/18/2019  8:53 AM   Periwound Temperature warm 2/18/2019  8:53 AM   Drainage Amount none 2/18/2019  8:53 AM           Physical Therapy Education     Title: PT OT SLP Therapies (In Progress)     Topic: Physical Therapy (Done)     Point: Mobility training (Done)     Learning Progress Summary           Patient Acceptance, E, VU,NR,DU by CG at 2/18/2019 11:31 AM    Acceptance, E, VU,NR by AL at 2/17/2019 11:37 AM    Acceptance, E, VU by CH at 2/16/2019 10:29 AM    Acceptance, E,TB,D, VU,NR by EE at 2/15/2019  3:14 PM    Acceptance, E,TB, VU,NR by CG at 2/14/2019  4:40 PM    Acceptance, E,TB,D, VU,NR by CH1 at 2/12/2019 11:32 AM    Acceptance, E,TB,D,  VU,NR by CH1 at 2/11/2019  9:53 AM    Acceptance, E,D, VU,DU,NR by EH at 2/10/2019 10:10 AM    Acceptance, E,TB,D, VU,NR by CH1 at 2/7/2019 12:44 PM    Acceptance, E, VU,NR by EF at 2/6/2019 10:19 AM    Acceptance, E, VU,NR by MA at 2/5/2019 11:09 AM    Acceptance, E,TB,D, VU,NR by CH1 at 2/4/2019 11:34 AM    Acceptance, E,D, VU,NR by SM at 2/3/2019  4:47 PM    Acceptance, E,D, DU,NR by LC at 2/2/2019 10:36 AM    Comment:  safety during sit to stand, benefits of activity    Acceptance, E, NR by MA at 1/31/2019 12:08 PM    Acceptance, E, NR by MA at 1/30/2019 10:23 AM    Acceptance, E,TB, VU by  at 1/27/2019  9:10 AM    Acceptance, E, VU,NR by MA at 1/25/2019  3:54 PM    Acceptance, E,TB,D, VU,NR by CH1 at 1/23/2019  3:14 PM    Acceptance, TB,E, VU,DU by CW at 1/18/2019  4:37 PM    Acceptance, E, NR by EM at 1/17/2019 12:15 PM   Family Acceptance, TB,E, VU,DU by CW at 1/18/2019  4:37 PM                   Point: Home exercise program (Done)     Learning Progress Summary           Patient Acceptance, E, VU,NR,DU by CG at 2/18/2019 11:31 AM    Acceptance, E, VU by CH at 2/16/2019 10:29 AM    Acceptance, E,TB,D, VU,NR by EE at 2/15/2019  3:14 PM    Acceptance, E,TB, VU,NR by CG at 2/14/2019  4:40 PM    Acceptance, E,TB,D, VU,NR by CH1 at 2/12/2019 11:32 AM    Acceptance, E,TB,D, VU,NR by CH1 at 2/11/2019  9:53 AM    Acceptance, E,D, VU,DU,NR by EH at 2/10/2019 10:10 AM    Acceptance, E,TB,D, VU,NR by CH1 at 2/7/2019 12:44 PM    Acceptance, E, VU,NR by  at 2/6/2019 10:19 AM    Acceptance, E, VU,NR by MA at 2/5/2019 11:09 AM    Acceptance, E,TB,D, VU,NR by CH1 at 2/4/2019 11:34 AM    Acceptance, E,D, VU,NR by  at 2/3/2019  4:47 PM    Acceptance, E,D, DU,NR by  at 2/2/2019 10:36 AM    Comment:  safety during sit to stand, benefits of activity    Acceptance, E,TB,D, VU,NR by [facility] at 2/1/2019 10:51 AM    Acceptance, E, NR by MA at 1/31/2019 12:08 PM    Acceptance, E, NR by MA at 1/30/2019 10:23 AM    Acceptance, E,TB,  VU by YANETH at 1/27/2019  9:10 AM    Acceptance, E, VU,NR by MA at 1/25/2019  3:54 PM    Acceptance, E,TB,D, VU,NR by CH1 at 1/23/2019  3:14 PM    Acceptance, TB,E, VU,DU by CW at 1/18/2019  4:37 PM   Family Acceptance, TB,E, VU,DU by CW at 1/18/2019  4:37 PM                   Point: Body mechanics (Done)     Learning Progress Summary           Patient Acceptance, E, VU,NR,DU by CG at 2/18/2019 11:31 AM    Acceptance, E, VU,NR by AL at 2/17/2019 11:37 AM    Acceptance, E, VU by CH at 2/16/2019 10:29 AM    Acceptance, E,TB,D, VU,NR by EE at 2/15/2019  3:14 PM    Acceptance, E,TB, VU,NR by CG at 2/14/2019  4:40 PM    Acceptance, E,TB,D, VU,NR by CH1 at 2/12/2019 11:32 AM    Acceptance, E,TB,D, VU,NR by CH1 at 2/11/2019  9:53 AM    Acceptance, E,D, VU,DU,NR by  at 2/10/2019 10:10 AM    Acceptance, E,TB,D, VU,NR by CH1 at 2/7/2019 12:44 PM    Acceptance, E, VU,NR by  at 2/6/2019 10:19 AM    Acceptance, E, VU,NR by MA at 2/5/2019 11:09 AM    Acceptance, E,TB,D, VU,NR by CH1 at 2/4/2019 11:34 AM    Acceptance, E,D, VU,NR by  at 2/3/2019  4:47 PM    Acceptance, E,D, DU,NR by  at 2/2/2019 10:36 AM    Comment:  safety during sit to stand, benefits of activity    Acceptance, E, NR by MA at 1/31/2019 12:08 PM    Acceptance, E, NR by MA at 1/30/2019 10:23 AM    Acceptance, E,TB, VU by YANETH at 1/27/2019  9:10 AM    Acceptance, E, VU,NR by MA at 1/25/2019  3:54 PM    Acceptance, E,TB,D, VU,NR by CH1 at 1/23/2019  3:14 PM    Acceptance, TB,E, VU,DU by CW at 1/18/2019  4:37 PM   Family Acceptance, TB,E, VU,DU by CW at 1/18/2019  4:37 PM                   Point: Precautions (Done)     Learning Progress Summary           Patient Acceptance, E, VU,NR,DU by CG at 2/18/2019 11:31 AM    Acceptance, E, VU,NR by AL at 2/17/2019 11:37 AM    Acceptance, E, VU by CH at 2/16/2019 10:29 AM    Acceptance, E,TB,D, VU,NR by EE at 2/15/2019  3:14 PM    Acceptance, E,TB, VU,NR by CG at 2/14/2019  4:40 PM    Acceptance, E,TB,D, VU,NR by CH1 at  2/12/2019 11:32 AM    Acceptance, E,TB,D, VU,NR by CH1 at 2/11/2019  9:53 AM    Acceptance, E,D, VU,DU,NR by  at 2/10/2019 10:10 AM    Acceptance, E,TB,D, VU,NR by CH1 at 2/7/2019 12:44 PM    Acceptance, E, VU,NR by  at 2/6/2019 10:19 AM    Acceptance, E, VU,NR by MA at 2/5/2019 11:09 AM    Acceptance, E,TB,D, VU,NR by CH1 at 2/4/2019 11:34 AM    Acceptance, E,D, VU,NR by  at 2/3/2019  4:47 PM    Acceptance, E,D, DU,NR by  at 2/2/2019 10:36 AM    Comment:  safety during sit to stand, benefits of activity    Acceptance, E, NR by MA at 1/31/2019 12:08 PM    Acceptance, E, NR by MA at 1/30/2019 10:23 AM    Acceptance, E,TB, VU by  at 1/27/2019  9:10 AM    Acceptance, E, VU,NR by MA at 1/25/2019  3:54 PM    Acceptance, E,TB,D, VU,NR by CH1 at 1/23/2019  3:14 PM    Acceptance, TB,E, VU,DU by CW at 1/18/2019  4:37 PM   Family Acceptance, TB,E, VU,DU by CW at 1/18/2019  4:37 PM                               User Key     Initials Effective Dates Name Provider Type Discipline    EF 06/08/18 -  Heike Anthony, PT Physical Therapist PT    CH1 04/03/18 -  Kae Aldrich, PT Physical Therapist PT    EE 04/03/18 -  Kaley Norton, PT Physical Therapist PT    AL 04/03/18 -  Savannah Arnold, PT Physical Therapist PT    EM 04/03/18 -  Heike Strauss, PT Physical Therapist PT    SM 03/07/18 -  Ksenia Lopez, PTA Physical Therapy Assistant PT    KH 06/22/16 -  Zuleyma Mancilla, PT Physical Therapist PT    LC 08/02/16 -  Khurram Littlejohn, PT DPT Physical Therapist PT    CW 03/07/18 -  Tru, Jose P, PTA Physical Therapy Assistant PT    EH 08/19/18 -  Cadence Cobb, PTA Physical Therapy Assistant PT    MA 10/19/18 -  Tabatha Lawrence, PT Physical Therapist PT    CH 04/03/18 -  Jericho Ortiz, PT Physical Therapist PT    CG 02/04/19 -  Jackson Greer, PT Student PT Student PT                PT Recommendation and Plan  Anticipated Discharge Disposition (PT): skilled nursing facility  Outcome Summary/Treatment  Plan (PT)  Anticipated Discharge Disposition (PT): skilled nursing facility  Plan of Care Reviewed With: patient  Outcome Summary: Pt tolerated therapy session and participated readily today. Pt cleared the bed in 4 attemps of STS w/ max Ax2 and rolling walker. She continues to express fear about standing or falling forward but has showed improvements in bed mobility and attempts to stand. Skilled PT is still indiacted to address strength, functional mobility and activity tolerance.  Outcome Measures     Row Name 02/18/19 1100 02/17/19 1100 02/16/19 1000       How much help from another person do you currently need...    Turning from your back to your side while in flat bed without using bedrails?  3  -CH (r) CG (t) CH (c)  2  -AL  3  -NELA    Moving from lying on back to sitting on the side of a flat bed without bedrails?  3  -CH (r) CG (t) CH (c)  2  -AL  3  -NELA    Moving to and from a bed to a chair (including a wheelchair)?  1  -CH (r) CG (t) CH (c)  1  -AL  1  -NELA    Standing up from a chair using your arms (e.g., wheelchair, bedside chair)?  1  -CH (r) CG (t) CH (c)  1  -AL  1  -NELA    Climbing 3-5 steps with a railing?  1  -CH (r) CG (t) CH (c)  1  -AL  1  -NELA    To walk in hospital room?  1  -CH (r) CG (t) CH (c)  1  -AL  1  -NELA    AM-PAC 6 Clicks Score  10  -CH (r) CG (t)  8  -AL  10  -NELA       Functional Assessment    Outcome Measure Options  AM-PAC 6 Clicks Basic Mobility (PT)  -CH (r) CG (t) CH (c)  AM-PAC 6 Clicks Basic Mobility (PT)  -AL  AM-PAC 6 Clicks Basic Mobility (PT)  -NELA    Row Name 02/15/19 1500 02/15/19 1400          How much help from another person do you currently need...    Turning from your back to your side while in flat bed without using bedrails?  3  -EE  --     Moving from lying on back to sitting on the side of a flat bed without bedrails?  3  -EE  --     Moving to and from a bed to a chair (including a wheelchair)?  1  -EE  --     Standing up from a chair using your arms  (e.g., wheelchair, bedside chair)?  1  -EE  --     Climbing 3-5 steps with a railing?  1  -EE  --     To walk in hospital room?  1  -EE  --     AM-PAC 6 Clicks Score  10  -EE  --        How much help from another is currently needed...    Putting on and taking off regular lower body clothing?  --  1  -SG     Bathing (including washing, rinsing, and drying)  --  1  -SG     Toileting (which includes using toilet bed pan or urinal)  --  1  -SG     Putting on and taking off regular upper body clothing  --  2  -SG     Taking care of personal grooming (such as brushing teeth)  --  2  -SG     Eating meals  --  3  -SG     Score  --  10  -SG        Functional Assessment    Outcome Measure Options  AM-PAC 6 Clicks Basic Mobility (PT)  -EE  AM-PAC 6 Clicks Daily Activity (OT)  -SG       User Key  (r) = Recorded By, (t) = Taken By, (c) = Cosigned By    Initials Name Provider Type    Jess Potts, OTR Occupational Therapist    Kae Martinez, PT Physical Therapist    Kaley Zamarripa, PT Physical Therapist    Savannah Armas, PT Physical Therapist    Jericho Pierre, PT Physical Therapist    Jackson Dozier, PT Student PT Student         Time Calculation:   PT Charges     Row Name 02/18/19 1135             Time Calculation    Start Time  1048  -CH (r) CG (t) CH (c)      Stop Time  1106  -CH (r) CG (t) CH (c)      Time Calculation (min)  18 min  -CH (r) CG (t)      PT Received On  02/18/19  -CH (r) CG (t) CH (c)      PT - Next Appointment  02/19/19  -CH (r) CG (t) CH (c)         Time Calculation- PT    Total Timed Code Minutes- PT  18 minute(s)  -CH (r) CG (t) CH (c)        User Key  (r) = Recorded By, (t) = Taken By, (c) = Cosigned By    Initials Name Provider Type    Kae Martinez, PT Physical Therapist    Jackson Dozier, PT Student PT Student        Therapy Suggested Charges     Code   Minutes Charges    18823 (CPT®) Hc Pt Neuromusc Re Education Ea 15 Min      54301 (CPT®) Hc Pt Ther  Proc Ea 15 Min 15 1    99941 (CPT®) Hc Gait Training Ea 15 Min      56477 (CPT®) Hc Pt Therapeutic Act Ea 15 Min 10 1    32455 (CPT®) Hc Pt Manual Therapy Ea 15 Min      02353 (CPT®) Hc Pt Iontophoresis Ea 15 Min      06355 (CPT®) Hc Pt Elec Stim Ea-Per 15 Min      79248 (CPT®) Hc Pt Ultrasound Ea 15 Min      77732 (CPT®) Hc Pt Self Care/Mgmt/Train Ea 15 Min      89800 (CPT®) Hc Pt Prosthetic (S) Train Initial Encounter, Each 15 Min      97293 (CPT®) Hc Pt Orthotic(S)/Prosthetic(S) Encounter, Each 15 Min      48063 (CPT®) Hc Orthotic(S) Mgmt/Train Initial Encounter, Each 15min      Total  25 2        Therapy Charges for Today     Code Description Service Date Service Provider Modifiers Qty    95215856614 HC PT THER PROC EA 15 MIN 2/18/2019 Jackson Greer, PT Student GP 1    67915402628 HC PT THER SUPP EA 15 MIN 2/18/2019 Jackson Greer, PT Student GP 1          PT G-Codes  Outcome Measure Options: AM-PAC 6 Clicks Basic Mobility (PT)  AM-PAC 6 Clicks Score: 10  Score: 10    Jackson Greer PT Student  2/18/2019

## 2019-02-18 NOTE — DISCHARGE SUMMARY
Date of Admission: 1/16/2019    Date of Discharge:  2/18/2019    Discharge Diagnoses:   1. Acute valvular and diastolic CHF  2. Severe aortic stenosis, severe mitral stenosis secondary to calcification, and severe tricuspid regurgitation  3. Normal EF 60-65%  4. Paroxysmal atrial fibrillation with RVR -status post cardioversion 1/24/2019 and subsequent reversion to atrial fibrillation.  5. Moderate pulmonary hypertension (mean PA pressure 32)  6. Status post tissue AVR, tissue MVR, tricuspid valve repair (Martha suture repair), left cryomaze, and PENNIE ligation on 1/28/2019 (Dr. Gaxiola).  7. Small bilateral pulmonary emboli  8. Oliguric acute kidney injury post-operatively - on hemodialysis and status post tunneled catheter placement on 2/8/2019.  9. Bifascicular block (RBBB and LAFB)  10. Candida intertrigo (skin folds); left axillary fungal infection (treated)  11. Osteoarthritis with immobility  12. Left wrist thrombophlebitis from IV infiltration  13. Undiagnosed OCTAVIANO  14. Morbid obesity   15. Anemia of chronic disease  16. Severe deconditioning   17. Junctional bradycardia and asystole post-op - resolved  18. Atrial flutter, type unknown - status post DCCV on 2/15/2019      Procedures Performed:  1.  Echocardiogram on 1/17/2019  2.  Lower extremity venous Doppler on 1/20/2019  3.  Left and right heart catheterization on 1/22/2019  4.  Transesophageal echocardiogram with direct-current cardioversion on 1/24/2019  5.  Carotid Doppler ultrasound on 1/25/2019  6.  MRI and MRA of the brain and neck on 1/20/2019  7. Status post tissue AVR, tissue MVR, tricuspid valve repair (Martha suture repair), left cryomaze, and PENNIE ligation on 1/28/2019 (Dr. Gaxiola).  8. Status post tunneled dialysis catheter placement on 2/8/2019.  9.  Transesophageal echocardiogram with direct-current cardioversion on 2/15/2019         Consults:  1. Zonia Lopez MD (Internal Medicine)  2. Leonides Martinez MD (Infectious Disease)  3. Iker  MD Neil (Neurology)  4. Jonathan Qureshi MD (Pulmonology)  5. Scar Gaxiola MD (Cardiovascular Surgery)  6. Valente Falcon MD (Nephrology)  7. Kathia Agudelo MD (Vascular Surgery)      Hospital Course:     This is a pleasant 73 year-old white female who presented to the emergency department on 1/16/2019 with progressive and severe dyspnea, volume overload, and significant peripheral edema.  She was found to have severe calcific disease of the aortic valve and mitral apparatus.  This resulted in both severe aortic stenosis and severe mitral stenosis.  She was also noted to have severe tricuspid regurgitation.  Her ejection fraction was intact at 60-65%.  She was noted to have Candida intertrigo in her skin folds, as well as a significant left axillary fungal infection.  Dr. Martinez from infectious disease saw the patient, and these were treated with antifungal agents.    She was diuresed extensively after admission, but continued to have significant shortness of breath.  She did have a CT angiogram which showed questionable small bilateral pulmonary emboli.  However, she was felt to be mainly in congestive heart failure.  She then went into rapid atrial fibrillation, and decompensated further.  She was transferred to the ICU in stable for several days.  Her rate was difficult to control, and she did undergo a transesophageal echocardiogram with direct-current cardioversion on 1/24/2019.  She converted to sinus rhythm, but went back into atrial fibrillation almost immediately.  A right and left heart catheterization was also performed on 1/22/2019.  She had minimal luminal irregularities in the proximal mid LAD, but otherwise normal coronary arteries.  She had moderate pulmonary hypertension with a mean PA pressure of 32 mmHg.    The patient ultimately underwent a tissue AVR, tissue MVR, tricuspid valve repair (Martha suture repair), left cryomaze, and PENNIE ligation on 1/28/2019 by Dr. Gaxiola.  She developed  oliguric acute kidney injury postoperatively, and ultimately required hemodialysis.  Unfortunately, her renal function did not recover, which necessitated placement of a tunneled dialysis catheter.  She has been continued on hemodialysis since the surgery, and will need hemodialysis as an outpatient as well.    She did develop junctional bradycardia and asystole postoperatively, which persisted for several days after surgery.  However, this eventually resolved, and she went back into atrial fibrillation.  Towards the end of her hospital stay, she had rapid atrial flutter which was largely unresponsive to medications.  She underwent another LEE with cardioversion on 2/15/2019.  She was discharged on amiodarone at 200 mg twice a day, along with metoprolol 25 mg twice a day.  She was in sinus rhythm at the time of discharge.    She was also discharged on Coumadin.  Her INR went up very quickly while on Coumadin, and she was only on 1 mg per day at the time of discharge. She will need an INR checked tomorrow.  She was very deconditioned, and ultimately required rehabilitation.  She was discharged to rehabilitation on 2/18/2019.      Discharge Medications:     Discharge Medications      New Medications      Instructions Start Date   amiodarone 200 MG tablet  Commonly known as:  PACERONE   200 mg, Oral, Every 12 Hours Scheduled      cetaphil lotion   Topical, Every 12 Hours Scheduled      epoetin lisha 52210 UNIT/ML injection  Commonly known as:  EPOGEN,PROCRIT   10,000 Units, Subcutaneous, 3 Times Weekly      famotidine 20 MG tablet  Commonly known as:  PEPCID   20 mg, Oral, Daily      hydrocortisone 1 % cream   1 application, Topical, Every 6 Hours Scheduled      metoprolol tartrate 25 MG tablet  Commonly known as:  LOPRESSOR   25 mg, Oral, Every 12 Hours Scheduled      warfarin 1 MG tablet  Commonly known as:  COUMADIN   1 mg, Oral, Nightly         Changes to Medications      Instructions Start Date   bumetanide 2 MG  tablet  Commonly known as:  BUMEX  What changed:    · medication strength  · how much to take   4 mg, Oral, Daily         Continue These Medications      Instructions Start Date   acetaminophen 325 MG tablet  Commonly known as:  TYLENOL   650 mg, Oral, 3 Times Daily, Takes tid at 0600, 1400, 2200      escitalopram 10 MG tablet  Commonly known as:  LEXAPRO   10 mg, Oral, Daily      NON FORMULARY   Gets hydrocortisone shots in L shoulder every three months         Stop These Medications    aspirin 81 MG EC tablet     atorvastatin 20 MG tablet  Commonly known as:  LIPITOR     calcium carbonate 600 MG tablet  Commonly known as:  OS-DEBORAH     losartan 25 MG tablet  Commonly known as:  COZAAR     nisoldipine 8.5 MG 24 hr tablet  Commonly known as:  SULAR          General Appearance:    No acute distress, alert and oriented x4   Lungs:     Clear to auscultation bilaterally     Heart:    RRR, II/VI SM RUSB    Abdomen:     Soft, non-tender, non-distended.    Extremities:   Trace edema.           Follow-up:  Future Appointments   Date Time Provider Department Center   3/14/2019  1:30 PM Alicia Schmitz APRN [facility] None     Additional Instructions for the Follow-ups that You Need to Schedule     Discharge Follow-up with Specified Provider: MORGAN Topete; 2 Weeks   As directed      To:  MORGAN Topete    Follow Up:  2 Weeks         Discharge Follow-up with Specified Provider: Dr. Segura; 1 Month   As directed      To:  Dr. Segura    Follow Up:  1 Month               INR to be checked tomorrow     Time Spent on Discharge: Greater than 30 minutes       Scott Segura MD  02/18/19  12:59 PM

## 2019-02-18 NOTE — PLAN OF CARE
Problem: Patient Care Overview  Goal: Plan of Care Review  Outcome: Ongoing (interventions implemented as appropriate)   02/18/19 1132   Coping/Psychosocial   Plan of Care Reviewed With patient   OTHER   Outcome Summary Pt tolerated therapy session and participated readily today. Pt cleared the bed in 4 attemps of STS w/ max Ax2 and rolling walker. She continues to express fear about standing or falling forward but has showed improvements in bed mobility and attempts to stand. Skilled PT is still indiacted to address strength, functional mobility and activity tolerance.

## 2019-02-18 NOTE — PROGRESS NOTES
[facility]    Physicians Statement of Medical Necessity for Ambulance Transportation    It is medically necessary for:    Patient Name: Claudia Arnold    Insurance Information:  MEDICARE AB    To be transported by ambulance:     From (if nursing facility, specify level of care: skilled, retirement, etc): [facility] acute care    To (specify level of care if nursing facility): [facility], skilled bed/[facility]     Date of Service: 2/18/2019    For dialysis patients state date dialysis began: 1/30/19    Diagnosis: CHF, LAVERNE- (Hemodialysis T, Th & Sa), S/P AVR & MVR.      Past Medical/Surgical History:  Past Medical History:   Diagnosis Date   • A-fib (CMS/[facility])     Meds   • Arthritis     w/Difficult Mobility   • Bilateral lower extremity edema     Legs   • CAD (coronary artery disease)     Affecting LAD    • Cardiomyopathy (CMS/[facility])    • CHF (congestive heart failure) (CMS/[facility])    • Chronic fatigue    • Generalized anxiety disorder    • Hernia, inguinal     Hx Repair   • History of echocardiogram 1/17/19-[facility]    The L Ventricular Cavity is Mild-to-Moderately Dilated; Left Ventricular Wall Thickness is Consistent w/Mild Concentric Hypertrophy; Left Atrial Cavity Size is Moderate-to-Severely Dilated; Severe AVS; Severe MVS; Moderate TVR Noted   • Hyperlipidemia     Controlled w/Meds   • Hypertension     Controlled w/Meds   • Hypokinesis 01/22/2019    Apical Noted on Cardiac Cath   • IFG (impaired fasting glucose)    • LAD stenosis 01/22/2019    Mid LAD Irregularities Noted on Cardiac Cath    • Left atrial dilatation 01/17/2019    Moderate-Severe Noted on Echo   • Left ventricular dilatation 01/17/2019    Noted on Echo/LEE   • Mild concentric left ventricular hypertrophy 01/17/2019    Noted on Echo/LEE   • Mitral annular calcification 01/17/2019    Severe Noted on Echo   • Moderate tricuspid valve regurgitation 01/24/2019    Noted on LEE   • Morbid obesity (CMS/[facility])    •  NSTEMI (non-ST elevated myocardial infarction) (CMS/[facility])    • OCTAVIANO (obstructive sleep apnea)    • Osteoarthritis    • Pulmonary hypertension (CMS/[facility]) 01/22/2019    Noted on Cardiac Cath   • Right bundle branch block 01/24/2019    Noted on LEE   • Severe aortic stenosis 01/22/2019    Noted on Cardiac Cath & LEE on 01/24/19; S/p AVR on 01/28/19 by Dr. Gaxiola   • Severe mitral valve stenosis 01/24/2019    Noted on LEE; S/p MVR on 01/28/19 by Dr. Gaxiola   • Shoulder pain, bilateral    • Skin yeast infection     Folds Under Skin--Nystatin/Diflucan      Past Surgical History:   Procedure Laterality Date   • AORTIC VALVE REPAIR/REPLACEMENT MITRAL VALVE REPAIR/REPLACEMENT N/A 1/28/2019    Procedure: LEE STERNOTOMY AORTIC VALVE REPLACEMENT, MITRAL VALVE REPLACEMENT, TRICUSPID VALVE REPAIR, MAZE PROCEDURE, CLOSURE OF LEFT ATRIAL APPENDAGE  AND PRP;  Surgeon: cSar Gaxiola MD;  Location: [facility] OR;  Service: Cardiothoracic   • CARDIAC CATHETERIZATION N/A 1/22/2019    Procedure: Coronary angiography;  Surgeon: Rick Talavera MD;  Location: [facility] CATH INVASIVE LOCATION;  Service: Cardiology   • CARDIAC CATHETERIZATION N/A 1/22/2019    Procedure: Left Heart Cath;  Surgeon: Rick Talavera MD;  [facility];  Service: Cardiology   • CARDIAC CATHETERIZATION N/A 1/22/2019    Procedure: Right Heart Cath;  Surgeon: Rick Talavera MD;  Location: [facility] CATH INVASIVE LOCATION;  Service: Cardiology   • CARDIAC CATHETERIZATION N/A 1/22/2019    Procedure: Left ventriculography;  Surgeon: Rick Talavera MD;  Location: [facility] CATH INVASIVE LOCATION;  Service: Cardiology   • COLONOSCOPY     • HERNIA REPAIR     • INSERTION HEMODIALYSIS CATHETER N/A 2/8/2019    Procedure: tunnel CATHETER PLACEMENT WITH FLUROSCOPY;  Surgeon: Satish Stahl MD;  Location: [facility] MAIN OR;  Service: Vascular   • REPLACEMENT TOTAL KNEE Bilateral 2007/2009        Current Objective Medical  Evidence(including physical exam finding to support reason for limitations):    Immobilization syndrome  Bedridden  Requires oxygen  Unable to sit at 90 degree angle    Other:     Physician Signature:           (RN,NP,PA,CAN, Discharge Planner) Date/Time:      Printed Name:    __________________________________    [facility]   Phone: [phone] Phone: [phone]   Fax: [phone] Fax: [phone]

## 2019-02-19 NOTE — PROGRESS NOTES
Case Management Discharge Note    Final Note: Pt d/c to [facility] via [facility] 2/18/19.    Destination - Selection Complete      Service Provider Request Status Selected Services Address Phone Number Fax Number    [facility] Selected Skilled Nursing [address] [address] [phone]      Durable Medical Equipment      No service has been selected for the patient.      Dialysis/Infusion - Selection Complete      Service Provider Request Status Selected Services Address Phone Number Fax Number    [facility] Selected Dialysis [address] [address] [phone]      Home Medical Care      No service has been selected for the patient.      Community Resources      No service has been selected for the patient.        [facility]: [facility]    Final Discharge Disposition Code: 03 - skilled nursing facility (SNF)

## 2019-02-21 ENCOUNTER — TELEPHONE (OUTPATIENT)
Dept: CARDIOLOGY | Facility: CLINIC | Age: 74
End: 2019-02-21

## 2019-02-21 NOTE — TELEPHONE ENCOUNTER
02/21/19   Ibeth RN with [facility] called stating the Doctor at [facility] asked her to call to see if it is safe to decrease her Metoprolol and to add Midodrine for low B/P.   They state her Hr is Ok in 60's and her B/P has been too low to even finish her Dialysis and get her fluids off.     Verbally addressed with Dr Segura.   He states OK to decrease or even stop Metoprolol because she is on Amiodarone.  He also stated it is OK to start Midodrine PRN.     I called  Ibeth back to give her the  Message on medication instructions.   Phani RICE

## 2019-02-25 LAB — FUNGUS WND CULT: NORMAL

## 2019-03-09 PROCEDURE — 85610 PROTHROMBIN TIME: CPT

## 2019-03-10 ENCOUNTER — LAB REQUISITION (OUTPATIENT)
Dept: LAB | Facility: HOSPITAL | Age: 74
End: 2019-03-10

## 2019-03-10 DIAGNOSIS — Z00.00 ROUTINE GENERAL MEDICAL EXAMINATION AT A HEALTH CARE FACILITY: ICD-10-CM

## 2019-03-10 LAB
INR PPP: 2.37 (ref 0.9–1.1)
PROTHROMBIN TIME: 25.5 SECONDS (ref 11.7–14.2)

## 2019-03-11 LAB
MYCOBACTERIUM SPEC CULT: NORMAL
NIGHT BLUE STAIN TISS: NORMAL

## 2019-03-12 ENCOUNTER — TELEPHONE (OUTPATIENT)
Dept: CARDIOLOGY | Facility: CLINIC | Age: 74
End: 2019-03-12

## 2019-03-12 NOTE — TELEPHONE ENCOUNTER
I Called Ibeth was unable to reach. I left detailed message that pt is Ok to start Medrol Dose Pack.   I asked her to call back with any questions or concerns.   Phani RICE

## 2019-03-12 NOTE — TELEPHONE ENCOUNTER
03/12/19   Ibeth from [place] called the Physician there was waiting to place pt on a Medrol dose pack? She wanted to know if this OK from a cardiac standpoint?   Please advise  Saqib call back # [phone]    Thanks Phani oliver

## 2019-03-13 ENCOUNTER — OFFICE VISIT (OUTPATIENT)
Dept: CARDIOLOGY | Facility: CLINIC | Age: 74
End: 2019-03-13

## 2019-03-13 VITALS
WEIGHT: 293 LBS | BODY MASS INDEX: 45.99 KG/M2 | HEIGHT: 67 IN | HEART RATE: 67 BPM | SYSTOLIC BLOOD PRESSURE: 116 MMHG | OXYGEN SATURATION: 97 % | DIASTOLIC BLOOD PRESSURE: 64 MMHG

## 2019-03-13 DIAGNOSIS — I35.0 NONRHEUMATIC AORTIC VALVE STENOSIS: Primary | ICD-10-CM

## 2019-03-13 DIAGNOSIS — I48.0 PAROXYSMAL ATRIAL FIBRILLATION (HCC): ICD-10-CM

## 2019-03-13 DIAGNOSIS — R63.0 DECREASED APPETITE: ICD-10-CM

## 2019-03-13 DIAGNOSIS — Z95.2 S/P AVR: ICD-10-CM

## 2019-03-13 DIAGNOSIS — Z98.890 S/P MVR (MITRAL VALVE REPAIR): ICD-10-CM

## 2019-03-13 DIAGNOSIS — R29.898 SEVERE MUSCLE DECONDITIONING: ICD-10-CM

## 2019-03-13 DIAGNOSIS — I34.2 NON-RHEUMATIC MITRAL VALVE STENOSIS: ICD-10-CM

## 2019-03-13 PROCEDURE — 99215 OFFICE O/P EST HI 40 MIN: CPT | Performed by: NURSE PRACTITIONER

## 2019-03-13 RX ORDER — BUSPIRONE HYDROCHLORIDE 10 MG/1
10 TABLET ORAL 3 TIMES DAILY
COMMUNITY
End: 2019-05-08 | Stop reason: SDUPTHER

## 2019-03-13 RX ORDER — TRAMADOL HYDROCHLORIDE 50 MG/1
50 TABLET ORAL EVERY 6 HOURS PRN
Status: ON HOLD | COMMUNITY
End: 2021-01-11

## 2019-03-13 RX ORDER — ONDANSETRON 4 MG/1
4 TABLET, FILM COATED ORAL EVERY 8 HOURS PRN
COMMUNITY
End: 2019-05-08 | Stop reason: SDUPTHER

## 2019-03-13 NOTE — PROGRESS NOTES
[facility]      Patient Name: Claudia Arnold  :1945  Age: 73 y.o.  Primary Cardiologist: Zan Segura MD  Encounter Provider:  MORGAN Butts      Chief Complaint:   Chief Complaint   Patient presents with   • Follow-up     1 week Hospital         HPI  Claudia Arnold is a 73 y.o. female with a history significant for hypertension, hyperlipidemia, congestive heart failure, aortic valve stenosis, mitral valve stenosis, obesity, obstructive sleep apnea, coronary artery disease.  Patient was hospitalized -19.  Patient was admitted for severe dyspnea, volume overload load and significant peripheral edema.  She is found to have severe calcific disease of the aortic valve and mitral apparatus.  This resulted in both severe aortic stenosis and severe mitral stenosis.  Patient was diuresed extensively after admission but continued to have significant shortness of breath.  She had a CT angiogram which revealed a questionable small bilateral pulmonary emboli.  She developed rapid atrial fibrillation and decompensated further.  Heart rate was very difficult to control and patient did undergo a LEE directed cardioversion on 19.  She converted to sinus rhythm, unfortunately went back into atrial fibrillation almost immediately.  Left and right heart catheterization were performed on 19 which revealed minimal luminal irregularities in the proximal mid LAD, but otherwise normal coronary arteries.  The patient ultimately underwent a tissue aortic valve replacement, tissue mitral valve replacement and tricuspid valve repair, left cryo-maze and PENNIE ligation on 19 by Dr. Gaxiola.  Unfortunately, patient's renal failure did not improve and she required dialysis.  Patient was discharged on Coumadin for anticoagulation.  She was very deconditioned and discharged to a rehab facility.    Patient presents today for one-week post hospital visit.  Patient is currently at rehab at  [facility].  She reports that she is not yet able to ambulate, however she has been able to stand with assistance.  She is getting daily physical therapy as well as occupational therapy.  She reports that she is still on hemodialysis with hopes that she may be able to stop this soon.  She reports that she is being managed closely by nephrology.  Her biggest complaints today are some left chronic shoulder pain that she typically receives steroid injections for as well as problems with the right big toe.  She reports that therapy has evaluated this and that steroids have been recommended.  Per Dr. Segura's telephone encounter yesterday he is okay with patient to receive a Medrol Dosepak.  Patient also states that she is having decreased appetite and nausea.  She reports that she is not wanting to eat very much and states that she does not have any taste to the foods that she is eating.  She reports that she feels full very quickly and is having a hard time taking in enough calories.  She is being followed by a dietitian.  She does enjoy Premier protein shakes and states that she is drinking these once a day.    The following portions of the patient's history were reviewed and updated as appropriate: allergies, current medications, past family history, past medical history, past social history, past surgical history and problem list.    Current Outpatient Medications on File Prior to Visit   Medication Sig   • acetaminophen (TYLENOL) 325 MG tablet Take 650 mg by mouth 3 (Three) Times a Day. Takes tid at 0600, 1400, 2200   • amiodarone (PACERONE) 200 MG tablet Take 1 tablet by mouth Every 12 (Twelve) Hours.   • bumetanide (BUMEX) 2 MG tablet Take 2 tablets by mouth Daily.   • busPIRone (BUSPAR) 10 MG tablet Take 10 mg by mouth 3 (Three) Times a Day.   • cetaphil (CETAPHIL) lotion Apply  topically to the appropriate area as directed Every 12 (Twelve) Hours.   • epoetin lisha (EPOGEN,PROCRIT) 89984 UNIT/ML injection  "Inject 1 mL under the skin into the appropriate area as directed 3 (Three) Times a Week.   • escitalopram (LEXAPRO) 10 MG tablet Take 1 tablet by mouth Daily.   • famotidine (PEPCID) 20 MG tablet Take 1 tablet by mouth Daily.   • hydrocortisone 1 % cream Apply 1 application topically to the appropriate area as directed Every 6 (Six) Hours.   • metoprolol tartrate (LOPRESSOR) 25 MG tablet Take 1 tablet by mouth Every 12 (Twelve) Hours.   • NON FORMULARY Gets hydrocortisone shots in L shoulder every three months   • ondansetron (ZOFRAN) 4 MG tablet Take 4 mg by mouth Every 8 (Eight) Hours As Needed for Nausea or Vomiting.   • traMADol (ULTRAM) 50 MG tablet Take 50 mg by mouth Every 6 (Six) Hours As Needed for Moderate Pain .   • warfarin (COUMADIN) 1 MG tablet Take 1 tablet by mouth Every Night.     No current facility-administered medications on file prior to visit.          Review of Systems   Constitution: Positive for malaise/fatigue.   Cardiovascular: Negative for chest pain and leg swelling.   Respiratory: Negative for shortness of breath.    Skin: Positive for itching.   Musculoskeletal: Positive for joint pain.   Gastrointestinal: Positive for nausea.   Neurological: Positive for dizziness, light-headedness and numbness.   Psychiatric/Behavioral: The patient is nervous/anxious.    All other systems reviewed and are negative.      OBJECTIVE:   Vital Signs  Vitals:    03/13/19 1128   BP: 116/64   Pulse: 67   SpO2: 97%     Estimated body mass index is 45.89 kg/m² as calculated from the following:    Height as of this encounter: 170.2 cm (67\").    Weight as of this encounter: 133 kg (293 lb).    Physical Exam   Constitutional: She is oriented to person, place, and time. Vital signs are normal. She appears well-developed and well-nourished. She is active.   Eyes: Conjunctivae are normal.   Neck: Carotid bruit is not present.   Cardiovascular: Normal rate, regular rhythm and normal heart sounds.   Pulmonary/Chest: " Breath sounds normal.   Abdominal: Normal appearance.   Musculoskeletal:   No cyanosis, clubbing, edema  Normal ROM   Neurological: She is alert and oriented to person, place, and time. GCS eye subscore is 4. GCS verbal subscore is 5. GCS motor subscore is 6.   Skin: Skin is warm, dry and intact.   Psychiatric: She has a normal mood and affect. Her speech is normal and behavior is normal. Judgment and thought content normal. Cognition and memory are normal.       Procedures    Cardiac Procedures:  1. Bilateral lower extremity venous duplex scan 1/17/19.  Normal  2. Echocardiogram 1/17/19.  Left ventricular cavity is mild to moderately dilated.  Mild LVH.  Grade 1 diastolic dysfunction.  Normal right ventricular cavity size and systolic function noted.  Left atrial cavity size is moderate to severely dilated.  Severe aortic stenosis with peak gradient 96 mmHg and mean gradient 54 mmHg.  The calculated aortic valve area is 0.84 cm².  Severe mitral annular calcification.  Severe mitral valve stenosis with a peak gradient 22 mmHg and mean gradient 11 mmHg.  Mild to moderate tricuspid valve regurgitation.  Estimated RVSP greater than 55 mmHg.  3. Lower extremity venous duplex scan 1/21/19.  Normal  4. Cardiac catheterization 1/22/19.  Mild luminal irregularities proximal to the mid LAD.  Otherwise normal coronary angiography.  Elevated left and right-sided filling pressures.  Moderate aortic valve stenosis.  Moderate pulmonary hypertension.  5. LEE 1/24/19.  Left ventricular systolic function is low normal.  Severe aortic valve stenosis.  Moderate mitral valve stenosis is present.  Severe tricuspid valve regurgitation is present.  Left atrial cavity size is severely dilated.  Right ventricular cavity is mildly dilated.  6. Cardioversion 1/24/19.  Successful  7. Left upper extremity venous duplex scan 1/27/19 acute left upper extremity superficial thrombophlebitis noted in the cephalic.  8. Tissue aortic valve replacement  with 23 mm magna pericardial paracentesis, mitral valve replacement with 25 mm [place] porcine prosthesis, left cryo-Maze procedure and left AAA ligation on 1/28/19.  9. Limited echocardiogram 1/30/19.  Mild to moderate LVH.  EF greater than 70.  Right ventricular cavity is mildly dilated.  Left atrial cavity is moderately dilated.  No evidence of pericardial effusion.  10. LEE 2/15/19.  Wall motion was not well visualized due to artifact from the bioprosthetic aortic and mitral valves.  Left atrial cavity is dilated.  Bioprosthetic mitral valve.  Mitral valve mean gradient 11 mmHg.  Moderate tricuspid valve regurgitation present.  Estimated RVSP 35-45 mmHg.  Bioprosthetic aortic valve present.  There still appeared to be flow with the left atrial appendage.  There was no left atrial appendage thrombus.  11. Cardioversion 2/15/19.  Successful.        ASSESSMENT:      Diagnosis Plan   1. Nonrheumatic aortic valve stenosis     2. S/P AVR     3. Non-rheumatic mitral valve stenosis     4. S/P MVR (mitral valve repair)     5. Paroxysmal atrial fibrillation (CMS/HCC)     6. Decreased appetite     7. Severe muscle deconditioning           PLAN OF CARE:     1. Severe aortic valve stenosis status post aortic valve replacement.  Patient status post aortic valve replacement on 1/28/19.  Patient is currently at the [facility] for rehab.  She denies any shortness of breath, pedal edema.  2. Severe mitral valve stenosis status post mitral valve replacement.  Patient status post mitral valve replacement on 1/28/19.  Patient is currently at [facility] for rehab.  She denies shortness of breath or pedal edema.  3. Paroxysmal atrial fibrillation.  During hospitalization patient did develop atrial fibrillation which required cardioversion on 2 separate occasions.  Beta-blockers were not used secondary to episodes of severe bradycardia.  She currently has rate controlled with amiodarone.  She is anticoagulated with Coumadin.   She denies any chest pain, shortness of breath, heart palpitations.  We will continue with same regimen.  4. Decreased appetite.  Patient is largest complaint today is decreased appetite.  She states that she is only able to eat small amounts of food at the time.  She also notes that she does not have any taste buds and feels nauseous most of the time.  She is currently on Pepcid.  She denies any swallowing difficulties.  Patient, daughter and I had long discussion about possible depression being tight and to patient's appetite.  She does notes that she has been obese for most of her life and because of this surgery she has lost a significant amount of weight.  She states that she may subconsciously fear gaining weight.  We discussed the benefits of eating healthy and making sure she is taking adequate protein especially since she is now on dialysis.  I also mentions that she may want to discuss with her primary care physician the possibility of an appetite stimulator.  If she continues to have problems with feeling full most of the time she may require a gastroenterology referral.  5. Muscle deconditioning.  Since the postoperative phase patient has had severe muscle deconditioning.  She is receiving physical therapy as well as occupational therapy daily at the [facility].  She is able to stand with assistance but is very fearful that she will fall.  She has yet to be able to resume ambulation.  We will continue with current therapies.  6. Follow-up with Dr. Segura in 1 month as previously scheduled.      Atrial Fibrillation and Atrial Flutter  Assessment  • The patient has paroxysmal atrial fibrillation  • This is valvular in etiology  • The transient or reversible cause of the patient's arrhythmia is cardiac surgery in the past three months  • The patient's CHADS2-VASc score is 4  • A [id number] score of 2 or more is considered a high risk for a thromboembolic event  • Warfarin prescribed    Plan  •  Attempt to maintain sinus rhythm  • Continue amiodarone for rhythm control      Thank you for allowing me to participate in the care of your patient,      Sincerely,   MORGAN Butts  [facility]  03/13/19  12:20 PM    **Cari Disclaimer:**  Much of this encounter note is an electronic transcription/translation of spoken language to printed text. The electronic translation of spoken language may permit erroneous, or at times, nonsensical words or phrases to be inadvertently transcribed. Although I have reviewed the note for such errors, some may still exist.

## 2019-03-22 ENCOUNTER — OFFICE VISIT (OUTPATIENT)
Dept: CARDIAC SURGERY | Facility: CLINIC | Age: 74
End: 2019-03-22

## 2019-03-22 VITALS
WEIGHT: 257 LBS | HEART RATE: 65 BPM | HEIGHT: 67 IN | SYSTOLIC BLOOD PRESSURE: 93 MMHG | RESPIRATION RATE: 20 BRPM | OXYGEN SATURATION: 96 % | DIASTOLIC BLOOD PRESSURE: 65 MMHG | TEMPERATURE: 97.7 F | BODY MASS INDEX: 40.34 KG/M2

## 2019-03-22 DIAGNOSIS — Z95.2 S/P MVR (MITRAL VALVE REPLACEMENT): Primary | ICD-10-CM

## 2019-03-22 PROCEDURE — 99024 POSTOP FOLLOW-UP VISIT: CPT | Performed by: NURSE PRACTITIONER

## 2019-03-27 ENCOUNTER — TELEPHONE (OUTPATIENT)
Dept: NEUROSURGERY | Facility: CLINIC | Age: 74
End: 2019-03-27

## 2019-03-27 PROBLEM — Z95.2 S/P MVR (MITRAL VALVE REPLACEMENT): Status: ACTIVE | Noted: 2019-03-27

## 2019-03-27 NOTE — TELEPHONE ENCOUNTER
I called and LVM at the [facility] for them to call pack regarding this patient to if she had received her new patient packet that was mailed out on 03/18/19. We have not received their packet and if we do not receive it by 04/19/19 by 5 pm, we will have to cancel their appointment. We ask that all new patient packets be back within 3 business days of their scheduled appointment. Please advise    Phone number for [facility] [phone]

## 2019-03-27 NOTE — TELEPHONE ENCOUNTER
I called and spoke with Guillermina at [facility] and I told her that I was going to fax paperwork because they have not received anything in the mail.       Patient is in [facility], room 203.    Fax number: [phone]

## 2019-03-28 NOTE — PROGRESS NOTES
"CARDIOVASCULAR SURGERY FOLLOW-UP PROGRESS NOTE  Chief Complaint: Postop follow-up        HPI:   Dear Robert Hurd MD and colleagues:    It was nice to see Claudia Arnold in follow up 7 weeks after surgery.  As you know, she is a 73 y.o. female with severe aortic stenosis and severe mitral stenosis with tricuspid regurgitation who underwent tissue AVR, tissue MVR, TV repair, left cryo-maze with PENNIE ligation on 1/28/2019 with Dr. Gaxiola. She had a very long recovery process postoperative, she is still undergoing dialysis treatments but her kidney function is slowly improving.  She is still in rehab, but she is gaining strength fairly quickly and is anticipating going home soon. She comes in today complaining of nothing.  Her activity level has been good.  From a surgical standpoint, the sternal incision is well approximated without erythema, edema, or drainage.  The sternum is stable to palpation, and the patient denies any popping or clicking with deep inspiration or coughing.      Physical Exam:         BP 93/65 (BP Location: Right arm, Patient Position: Sitting, Cuff Size: Adult)   Pulse 65   Temp 97.7 °F (36.5 °C) (Oral)   Resp 20   Ht 170.2 cm (67\")   Wt 117 kg (257 lb)   SpO2 96%   BMI 40.25 kg/m²   Heart:  normal prosthetic valve sounds  Lungs:  clear to auscultation bilaterally  Extremities:  no edema  Incision(s):  mid chest healing well, sternum stable    Assessment/Plan:     S/P AVR, MVR, tricuspid valve repair and Maze procedure. She looks great.    Slow post-op rehabilitation progress    Follow-up as scheduled with cardiology  Follow-up as scheduled with PCP  Follow-up with CT surgery prn    No restrictions of activity.    Return to clinic if any signs or symptoms of infection or sternal instability develop       Thank you for allowing me to participate in the care of your patient.    Regards,  MORGAN Cook      "

## 2019-04-04 ENCOUNTER — LAB REQUISITION (OUTPATIENT)
Dept: LAB | Facility: HOSPITAL | Age: 74
End: 2019-04-04

## 2019-04-04 DIAGNOSIS — Z00.00 ROUTINE GENERAL MEDICAL EXAMINATION AT A HEALTH CARE FACILITY: ICD-10-CM

## 2019-04-04 LAB
ALBUMIN SERPL-MCNC: 3.6 G/DL (ref 3.5–5.2)
ALBUMIN/GLOB SERPL: 1.7 G/DL
ALP SERPL-CCNC: 75 U/L (ref 39–117)
ALT SERPL W P-5'-P-CCNC: 10 U/L (ref 1–33)
ANION GAP SERPL CALCULATED.3IONS-SCNC: 11.5 MMOL/L
AST SERPL-CCNC: 12 U/L (ref 1–32)
BASOPHILS # BLD AUTO: 0.03 10*3/MM3 (ref 0–0.2)
BASOPHILS NFR BLD AUTO: 0.4 % (ref 0–1.5)
BILIRUB SERPL-MCNC: 0.4 MG/DL (ref 0.2–1.2)
BUN BLD-MCNC: 13 MG/DL (ref 8–23)
BUN/CREAT SERPL: 8.2 (ref 7–25)
CALCIUM SPEC-SCNC: 8.7 MG/DL (ref 8.6–10.5)
CHLORIDE SERPL-SCNC: 99 MMOL/L (ref 98–107)
CO2 SERPL-SCNC: 30.5 MMOL/L (ref 22–29)
CREAT BLD-MCNC: 1.58 MG/DL (ref 0.57–1)
DEPRECATED RDW RBC AUTO: 55.9 FL (ref 37–54)
EOSINOPHIL # BLD AUTO: 0.1 10*3/MM3 (ref 0–0.4)
EOSINOPHIL NFR BLD AUTO: 1.4 % (ref 0.3–6.2)
ERYTHROCYTE [DISTWIDTH] IN BLOOD BY AUTOMATED COUNT: 15.9 % (ref 12.3–15.4)
GFR SERPL CREATININE-BSD FRML MDRD: 32 ML/MIN/1.73
GLOBULIN UR ELPH-MCNC: 2.1 GM/DL
GLUCOSE BLD-MCNC: 137 MG/DL (ref 65–99)
HCT VFR BLD AUTO: 33.9 % (ref 34–46.6)
HGB BLD-MCNC: 10.6 G/DL (ref 12–15.9)
IMM GRANULOCYTES # BLD AUTO: 0.08 10*3/MM3 (ref 0–0.05)
IMM GRANULOCYTES NFR BLD AUTO: 1.2 % (ref 0–0.5)
LYMPHOCYTES # BLD AUTO: 1.21 10*3/MM3 (ref 0.7–3.1)
LYMPHOCYTES NFR BLD AUTO: 17.4 % (ref 19.6–45.3)
MCH RBC QN AUTO: 29.7 PG (ref 26.6–33)
MCHC RBC AUTO-ENTMCNC: 31.3 G/DL (ref 31.5–35.7)
MCV RBC AUTO: 95 FL (ref 79–97)
MONOCYTES # BLD AUTO: 0.64 10*3/MM3 (ref 0.1–0.9)
MONOCYTES NFR BLD AUTO: 9.2 % (ref 5–12)
NEUTROPHILS # BLD AUTO: 4.89 10*3/MM3 (ref 1.4–7)
NEUTROPHILS NFR BLD AUTO: 70.4 % (ref 42.7–76)
NRBC BLD AUTO-RTO: 0.1 /100 WBC (ref 0–0)
PLATELET # BLD AUTO: 189 10*3/MM3 (ref 140–450)
PMV BLD AUTO: 10.7 FL (ref 6–12)
POTASSIUM BLD-SCNC: 3.5 MMOL/L (ref 3.5–5.2)
PROT SERPL-MCNC: 5.7 G/DL (ref 6–8.5)
RBC # BLD AUTO: 3.57 10*6/MM3 (ref 3.77–5.28)
SODIUM BLD-SCNC: 141 MMOL/L (ref 136–145)
WBC NRBC COR # BLD: 6.95 10*3/MM3 (ref 3.4–10.8)

## 2019-04-04 PROCEDURE — 85025 COMPLETE CBC W/AUTO DIFF WBC: CPT

## 2019-04-04 PROCEDURE — 80053 COMPREHEN METABOLIC PANEL: CPT

## 2019-04-08 ENCOUNTER — OFFICE VISIT (OUTPATIENT)
Dept: CARDIOLOGY | Facility: CLINIC | Age: 74
End: 2019-04-08

## 2019-04-08 VITALS
BODY MASS INDEX: 39.39 KG/M2 | WEIGHT: 251 LBS | HEART RATE: 70 BPM | HEIGHT: 67 IN | SYSTOLIC BLOOD PRESSURE: 108 MMHG | DIASTOLIC BLOOD PRESSURE: 62 MMHG | OXYGEN SATURATION: 93 %

## 2019-04-08 DIAGNOSIS — Z95.3 STATUS POST AORTIC VALVE REPLACEMENT WITH TISSUE VALVE: Primary | ICD-10-CM

## 2019-04-08 DIAGNOSIS — I50.32 CHRONIC DIASTOLIC CONGESTIVE HEART FAILURE (HCC): ICD-10-CM

## 2019-04-08 DIAGNOSIS — I45.2 BIFASCICULAR BLOCK: ICD-10-CM

## 2019-04-08 DIAGNOSIS — N18.6 ESRD (END STAGE RENAL DISEASE) (HCC): ICD-10-CM

## 2019-04-08 DIAGNOSIS — I48.0 PAROXYSMAL ATRIAL FIBRILLATION (HCC): ICD-10-CM

## 2019-04-08 DIAGNOSIS — I48.92 ATRIAL FLUTTER, UNSPECIFIED TYPE (HCC): ICD-10-CM

## 2019-04-08 DIAGNOSIS — Z98.890 STATUS POST TRICUSPID VALVE REPAIR: ICD-10-CM

## 2019-04-08 DIAGNOSIS — Z95.3 STATUS POST MITRAL VALVE REPLACEMENT WITH TISSUE VALVE: ICD-10-CM

## 2019-04-08 PROCEDURE — 99214 OFFICE O/P EST MOD 30 MIN: CPT | Performed by: INTERNAL MEDICINE

## 2019-04-10 NOTE — PROGRESS NOTES
Date of Office Visit: 2019  Encounter Provider: Scott Segura MD  Place of Service: [facility]  Patient Name: Claudia Arnold  :1945    Chief complaint: Follow-up for tissue aortic valve replacement, tissue mitral valve   replacement, tricuspid valve repair, paroxysmal atrial fibrillation and flutter,   bifascicular block, and chronic diastolic CHF.    History of Present Illness:    I again had the pleasure of seeing the patient in cardiology office on 2019.  She is a   very pleasant 73 year-old white female with a history of valvular disease, paroxysmal   atrial fibrillation, paroxysmal atrial flutter, bifascicular block, and chronic diastolic CHF   who presents for follow-up.  The patient presented to the emergency department on   2019 with progressive and severe dyspnea, volume overload, and significant   peripheral edema.  A subsequent echocardiogram showed severe calcific disease of   the aortic valve and the mitral apparatus.  This resulted in both severe aortic stenosis   and severe mitral stenosis.  She was also noted to have severe tricuspid regurgitation.    Her ejection fraction was normal at 60-65%.  She also had Candida intertrigo in her   skin folds, as well as a significant left axillary fungal infection.  She was treated   appropriately with antifungal agents.  She was diuresed extensively.  A CT angiogram   of the chest showed questionable small bilateral pulmonary emboli.  She did go into   rapid atrial fibrillation shortly into her hospital stay, and continued to have issues with   paroxysmal atrial fibrillation throughout her hospital stay.  A right and left heart   catheterization on 2019 showed minimal luminal irregularities in the LAD, but   otherwise normal coronary arteries.  She had moderate pulmonary hypertension with   a mean PA pressure of 32 mmHg.    The patient ultimately underwent tissue aortic valve replacement  (23 mm Magna   pericardial valve), tissue mitral valve replacement (25 mm Epic St. Mike porcine   valve), and tricuspid valve repair (Martha suture repair).  She also had a left cryo-maze   and left atrial appendage ligation at that time.  She developed oliguric acute kidney   injury postoperatively, and ultimately required hemodialysis.  Unfortunately, she did   not recover her kidney function prior to discharge, and remained on dialysis.  She   also had junctional bradycardia and asystole postoperatively, but resolved several   days after the surgery.  Postoperatively, she had issues with rapid atrial fibrillation   and rapid atrial flutter.  The atrial flutter was difficult to control, and it was not clear   if it was typical or atypical.  She underwent a LEE with direct-current cardioversion   on 2/15/2019.  She was loaded with amiodarone, and discharged on amiodarone as   well.  Her blood pressure could not tolerate beta-blockers as she was relatively   hypotensive.  She also was placed on Coumadin prior to discharge.  She ultimately   was discharged to rehab on 2/18/2019.    The patient presents today for follow-up.  She actually has done remarkably well in   the last several weeks.  She is still in rehab, and has very slowly made progress.    However, she is able to take a few steps on her own now, and she is still working   with physical therapy.  She does make urine, although she is still on hemodialysis   3 times a week.  She does state that she has been nauseated recently and her   appetite has been poor.  She has not had any shortness of breath, chest pain, or   palpitations.  She is in sinus rhythm today.    Past Medical History:   Diagnosis Date   • A-fib (CMS/[facility])     Meds   • Arthritis     w/Difficult Mobility   • Bilateral lower extremity edema     Legs   • CAD (coronary artery disease)     Affecting LAD    • Cardiomyopathy (CMS/[facility])    • CHF (congestive heart failure) (CMS/[facility])    • Chronic fatigue     • Generalized anxiety disorder    • Hernia, inguinal     Hx Repair   • History of echocardiogram 1/17/19-[facility]    The L Ventricular Cavity is Mild-to-Moderately Dilated; Left Ventricular Wall Thickness is Consistent w/Mild Concentric Hypertrophy; Left Atrial Cavity Size is Moderate-to-Severely Dilated; Severe AVS; Severe MVS; Moderate TVR Noted   • Hyperlipidemia     Controlled w/Meds   • Hypertension     Controlled w/Meds   • Hypokinesis 01/22/2019    Apical Noted on Cardiac Cath   • IFG (impaired fasting glucose)    • LAD stenosis 01/22/2019    Mid LAD Irregularities Noted on Cardiac Cath    • Left atrial dilatation 01/17/2019    Moderate-Severe Noted on Echo   • Left ventricular dilatation 01/17/2019    Noted on Echo/LEE   • Mild concentric left ventricular hypertrophy 01/17/2019    Noted on Echo/LEE   • Mitral annular calcification 01/17/2019    Severe Noted on Echo   • Moderate tricuspid valve regurgitation 01/24/2019    Noted on LEE   • Morbid obesity (CMS/[facility])    • NSTEMI (non-ST elevated myocardial infarction) (CMS/[facility])    • OCTAVIANO (obstructive sleep apnea)    • Osteoarthritis    • Pulmonary hypertension (CMS/[facility]) 01/22/2019    Noted on Cardiac Cath   • Right bundle branch block 01/24/2019    Noted on LEE   • Severe aortic stenosis 01/22/2019    Noted on Cardiac Cath & LEE on 01/24/19; S/p AVR on 01/28/19 by Dr. Gaxiola   • Severe mitral valve stenosis 01/24/2019    Noted on LEE; S/p MVR on 01/28/19 by Dr. Gaxiola   • Shoulder pain, bilateral    • Skin yeast infection     Folds Under Skin--Nystatin/Diflucan       Past Surgical History:   Procedure Laterality Date   • AORTIC VALVE REPAIR/REPLACEMENT MITRAL VALVE REPAIR/REPLACEMENT N/A 1/28/2019    Procedure: LEE STERNOTOMY AORTIC VALVE REPLACEMENT, MITRAL VALVE REPLACEMENT, TRICUSPID VALVE REPAIR, MAZE PROCEDURE, CLOSURE OF LEFT ATRIAL APPENDAGE  AND PRP;  Surgeon: Scar Gaxiola MD;  Location: [facility] OR;  Service: Cardiothoracic   • CARDIAC CATHETERIZATION  N/A 1/22/2019    Procedure: Coronary angiography;  Surgeon: Rick Talavera MD;  Location: [facility] CATH INVASIVE LOCATION;  Service: Cardiology   • CARDIAC CATHETERIZATION N/A 1/22/2019    Procedure: Left Heart Cath;  Surgeon: Rick Talavera MD;  Location:  [facility] CATH INVASIVE LOCATION;  Service: Cardiology   • CARDIAC CATHETERIZATION N/A 1/22/2019    Procedure: Right Heart Cath;  Surgeon: Rick Talavera MD;  [facility];  Service: Cardiology   • CARDIAC CATHETERIZATION N/A 1/22/2019    Procedure: Left ventriculography;  Surgeon: Rick Talavera MD;  [facility];  Service: Cardiology   • COLONOSCOPY     • HERNIA REPAIR     • INSERTION HEMODIALYSIS CATHETER N/A 2/8/2019    Procedure: tunnel CATHETER PLACEMENT WITH FLUROSCOPY;  Surgeon: Satish Stahl MD;  Location: [facility] MAIN OR;  Service: Vascular   • REPLACEMENT TOTAL KNEE Bilateral 2007/2009       Current Outpatient Medications on File Prior to Visit   Medication Sig Dispense Refill   • acetaminophen (TYLENOL) 325 MG tablet Take 650 mg by mouth 3 (Three) Times a Day. Takes tid at 0600, 1400, 2200     • amiodarone (PACERONE) 200 MG tablet Take 1 tablet by mouth Every 12 (Twelve) Hours. 60 tablet 1   • bumetanide (BUMEX) 2 MG tablet Take 2 tablets by mouth Daily. 60 tablet 2   • busPIRone (BUSPAR) 10 MG tablet Take 10 mg by mouth 3 (Three) Times a Day.     • cetaphil (CETAPHIL) lotion Apply  topically to the appropriate area as directed Every 12 (Twelve) Hours.     • epoetin lisha (EPOGEN,PROCRIT) 80295 UNIT/ML injection Inject 1 mL under the skin into the appropriate area as directed 3 (Three) Times a Week.     • escitalopram (LEXAPRO) 10 MG tablet Take 1 tablet by mouth Daily. 90 tablet 1   • famotidine (PEPCID) 20 MG tablet Take 1 tablet by mouth Daily. 30 tablet 2   • hydrocortisone 1 % cream Apply 1 application topically to the appropriate area as directed Every 6 (Six)  "Hours. 1.5 g 0   • NON FORMULARY Gets hydrocortisone shots in L shoulder every three months     • ondansetron (ZOFRAN) 4 MG tablet Take 4 mg by mouth Every 8 (Eight) Hours As Needed for Nausea or Vomiting.     • traMADol (ULTRAM) 50 MG tablet Take 50 mg by mouth Every 6 (Six) Hours As Needed for Moderate Pain .     • warfarin (COUMADIN) 1 MG tablet Take 1 tablet by mouth Every Night. 30 tablet 2     No current facility-administered medications on file prior to visit.      Allergies as of 04/08/2019   • (No Known Allergies)     Social History     Socioeconomic History   • Marital status:      Spouse name: Not on file   • Number of children: Not on file   • Years of education: Not on file   • Highest education level: Not on file   Tobacco Use   • Smoking status: Never Smoker   • Smokeless tobacco: Never Used   Substance and Sexual Activity   • Alcohol use: Yes     Comment: Occ   • Drug use: No   • Sexual activity: Defer     Birth control/protection: Post-menopausal     Family History   Problem Relation Age of Onset   • Hypertension Other    • Diabetes Other    • Heart disease Neg Hx    • Stroke Neg Hx    • Arthritis Neg Hx    • Breast cancer Neg Hx        Review of Systems   Constitution: Positive for decreased appetite.   Gastrointestinal: Positive for nausea.   Neurological: Positive for numbness.   All other systems reviewed and are negative.     Objective:     Vitals:    04/08/19 1311   BP: 108/62   Pulse: 70   SpO2: 93%   Weight: 114 kg (251 lb)   Height: 170.2 cm (67.01\")     Body mass index is 39.3 kg/m².    Physical Exam   Constitutional: She is oriented to person, place, and time. She appears well-developed and well-nourished.   HENT:   Head: Normocephalic and atraumatic.   Eyes: Conjunctivae are normal.   Neck: Neck supple.   Cardiovascular: Normal rate and regular rhythm. Exam reveals no gallop and no friction rub.   Murmur heard.   Systolic murmur is present with a grade of 2/6 at the upper right " sternal border.  Pulmonary/Chest: Effort normal and breath sounds normal.   Abdominal: Soft. There is no tenderness.   Musculoskeletal:   Trace edema of the lower extremities bilaterally   Neurological: She is alert and oriented to person, place, and time.   Skin: Skin is warm.   Psychiatric: She has a normal mood and affect. Her behavior is normal.     Lab Review:   Procedures    Cardiac Procedures:  1.  Echocardiogram on 1/17/2019: The left ventricle was mildly to moderately dilated.  The   ejection fraction was 60-65%.  The left atrium was moderately to severely enlarged.  There   was severe aortic stenosis.  There was severe mitral annular calcification and severe   mitral stenosis.  There was mild to moderate tricuspid regurgitation.  2.  Left and right heart catheterization on 1/22/2019: There were mild luminal irregularities   in the proximal to mid LAD, and the coronary arteries were otherwise normal.  There was   moderate pulmonary hypertension with a mean PA pressure of 32 mmHg.  The wedge   pressure was 21 mmHg.  3.  Status post tissue aortic valve replacement, tissue mitral valve replacement, tricuspid   valve repair, left cryo-maze, and left atrial appendage ligation by Dr. Gaxiola on 1/28/2019.    The aortic valve replacement is a 23 mm Magna pericardial prosthesis.  The mitral valve   replacement is a 25 mm Epic St. Mike porcine prosthesis.  The tricuspid valve repair was   a tricuspid bicuspidization (Martha Suture repair).    4.  LEE with direct-current cardioversion on 2/15/2019: The left ventricle was not well   visualized.  The aortic valve and mitral valve replacements were normal.  There was   moderate tricuspid regurgitation through the tricuspid repair.  The patient was   successfully converted from atrial flutter to sinus rhythm.    Assessment:       Diagnosis Plan   1. Status post aortic valve replacement with tissue valve  Adult Transthoracic Echo Complete W/ Cont if Necessary Per Protocol   2.  Status post mitral valve replacement with tissue valve  Adult Transthoracic Echo Complete W/ Cont if Necessary Per Protocol   3. Status post tricuspid valve repair  Adult Transthoracic Echo Complete W/ Cont if Necessary Per Protocol   4. Paroxysmal atrial fibrillation (CMS/HCC)  Adult Transthoracic Echo Complete W/ Cont if Necessary Per Protocol   5. Bifascicular block     6. Atrial flutter, unspecified type (CMS/HCC)     7. ESRD (end stage renal disease) (CMS/HCC)     8. Chronic diastolic congestive heart failure (CMS/HCC)       Plan:       Again, the patient had a very prolonged and difficult hospital stay as summarized above.    She is still in rehab, but is making progress.  She is able to take a few steps on her own,   and she will continue to work with physical therapy.  She is in sinus rhythm today.    Unfortunately, her blood pressure will not tolerate beta-blockers.  I am going to leave her   on amiodarone for about another month.  She did have multiple episodes of rapid atrial   fibrillation and flutter while in the hospital, including a cardioversion on 2/15/2019.  She is   going to remain on Coumadin for a minimum of 6 months, and likely long-term afterwards.    She actually looks very well compensated from heart failure standpoint.  She is going to   continue on the Bumex.  She does make some urine, but mainly has volume taken off   during hemodialysis.  Her left ventricle was not seen on her LEE very well on 2/15/2019.    I am going to get a formal postoperative echocardiogram in 1 month at the time she has   follow-up.  I did advise  the patient and her son that she will need antibiotic prophylaxis for   dental appointments, including dental cleanings.  I also will likely set her up with a home   sleep study at some point when she has completed rehab given the atrial fibrillation.    I will have her follow-up with MORGAN Topete in 1 month with an echocardiogram   on the same day.  At that  point, her amiodarone can likely be discontinued if she is in   sinus rhythm.  I also feel of the amiodarone may be partially to blame for the recent   nausea.  Again, she will remain on Coumadin for a minimum of 6 months.    Atrial Fibrillation and Atrial Flutter  Assessment  • The patient has paroxysmal atrial fibrillation  • This is valvular in etiology  • The patient's CHADS2-VASc score is 3  • A [id number] score of 2 or more is considered a high risk for a thromboembolic event  • Warfarin prescribed    Plan  • Attempt to maintain sinus rhythm  • Continue warfarin for antithrombotic therapy, bleeding issues discussed  • Continue amiodarone for rhythm control    Subjective - Objective  • The average duration of atrial fibrillation episodes is <48 hours      Heart Failure  Assessment  • NYHA class II - There is slight limitation of physical activity. The patient is comfortable at rest, but physical activity results in fatigue, palpitations or shortness of breath.  • ACE inhibitor not prescribed for medical reasons  • Beta blocker not prescribed for medical reasons  • Diuretics prescribed  • Left ventricular function is normal by qualitative assessment    Plan  • The patient has received heart failure education on the following topics: dietary sodium restriction, medication instructions, minimizing or avoiding NSAID use, symptom management, physical activity, weight monitoring and minimizing alcohol intake  • The heart failure care plan was discussed with the patient today including: continuing the current program and up-titrating HF medications  •  The patient was not counseled about ICD or CRT-D implantation

## 2019-05-01 ENCOUNTER — OFFICE VISIT (OUTPATIENT)
Dept: NEUROSURGERY | Facility: CLINIC | Age: 74
End: 2019-05-01

## 2019-05-01 VITALS
HEART RATE: 67 BPM | WEIGHT: 251 LBS | BODY MASS INDEX: 39.39 KG/M2 | HEIGHT: 67 IN | SYSTOLIC BLOOD PRESSURE: 107 MMHG | DIASTOLIC BLOOD PRESSURE: 72 MMHG

## 2019-05-01 DIAGNOSIS — M62.3 IMMOBILITY SYNDROME: ICD-10-CM

## 2019-05-01 DIAGNOSIS — I87.2 VENOUS (PERIPHERAL) INSUFFICIENCY: ICD-10-CM

## 2019-05-01 DIAGNOSIS — R20.2 RIGHT LEG PARESTHESIAS: ICD-10-CM

## 2019-05-01 DIAGNOSIS — R29.898 WEAKNESS OF RIGHT FOOT: Primary | ICD-10-CM

## 2019-05-01 PROCEDURE — 99214 OFFICE O/P EST MOD 30 MIN: CPT | Performed by: NURSE PRACTITIONER

## 2019-05-08 ENCOUNTER — OFFICE VISIT (OUTPATIENT)
Dept: FAMILY MEDICINE CLINIC | Facility: CLINIC | Age: 74
End: 2019-05-08

## 2019-05-08 VITALS
WEIGHT: 250 LBS | HEART RATE: 70 BPM | RESPIRATION RATE: 16 BRPM | BODY MASS INDEX: 39.24 KG/M2 | TEMPERATURE: 98.5 F | SYSTOLIC BLOOD PRESSURE: 90 MMHG | HEIGHT: 67 IN | DIASTOLIC BLOOD PRESSURE: 55 MMHG

## 2019-05-08 DIAGNOSIS — I50.31 ACUTE DIASTOLIC CONGESTIVE HEART FAILURE (HCC): Primary | ICD-10-CM

## 2019-05-08 DIAGNOSIS — I05.0 MITRAL VALVE STENOSIS, UNSPECIFIED ETIOLOGY: ICD-10-CM

## 2019-05-08 DIAGNOSIS — K59.04 CHRONIC IDIOPATHIC CONSTIPATION: ICD-10-CM

## 2019-05-08 DIAGNOSIS — I48.0 PAROXYSMAL ATRIAL FIBRILLATION (HCC): ICD-10-CM

## 2019-05-08 DIAGNOSIS — I35.0 AORTIC VALVE STENOSIS, ETIOLOGY OF CARDIAC VALVE DISEASE UNSPECIFIED: ICD-10-CM

## 2019-05-08 DIAGNOSIS — Z99.2 STAGE 5 CHRONIC KIDNEY DISEASE ON CHRONIC DIALYSIS (HCC): ICD-10-CM

## 2019-05-08 DIAGNOSIS — F41.1 GENERALIZED ANXIETY DISORDER: ICD-10-CM

## 2019-05-08 DIAGNOSIS — Z09 HOSPITAL DISCHARGE FOLLOW-UP: ICD-10-CM

## 2019-05-08 DIAGNOSIS — N18.6 STAGE 5 CHRONIC KIDNEY DISEASE ON CHRONIC DIALYSIS (HCC): ICD-10-CM

## 2019-05-08 DIAGNOSIS — L98.9 SKIN LESION: ICD-10-CM

## 2019-05-08 DIAGNOSIS — L02.91 ABSCESS: ICD-10-CM

## 2019-05-08 DIAGNOSIS — I27.20 PULMONARY HYPERTENSION (HCC): ICD-10-CM

## 2019-05-08 DIAGNOSIS — K21.9 GASTROESOPHAGEAL REFLUX DISEASE WITHOUT ESOPHAGITIS: ICD-10-CM

## 2019-05-08 PROCEDURE — 99215 OFFICE O/P EST HI 40 MIN: CPT | Performed by: FAMILY MEDICINE

## 2019-05-08 RX ORDER — FAMOTIDINE 20 MG/1
20 TABLET, FILM COATED ORAL DAILY
Qty: 90 TABLET | Refills: 3 | Status: SHIPPED | OUTPATIENT
Start: 2019-05-08 | End: 2020-02-12

## 2019-05-08 RX ORDER — ONDANSETRON 4 MG/1
4 TABLET, FILM COATED ORAL EVERY 8 HOURS PRN
Qty: 90 TABLET | Refills: 3 | Status: SHIPPED | OUTPATIENT
Start: 2019-05-08 | End: 2021-01-25

## 2019-05-08 RX ORDER — DOCUSATE SODIUM 100 MG/1
CAPSULE, LIQUID FILLED ORAL
Refills: 0 | COMMUNITY
Start: 2019-04-30 | End: 2019-05-08

## 2019-05-08 RX ORDER — DOCUSATE SODIUM 100 MG/1
100 CAPSULE, LIQUID FILLED ORAL 2 TIMES DAILY
Qty: 180 CAPSULE | Refills: 3 | Status: SHIPPED | OUTPATIENT
Start: 2019-05-08 | End: 2020-02-12

## 2019-05-08 RX ORDER — WARFARIN SODIUM 2 MG/1
TABLET ORAL DAILY
Refills: 0 | COMMUNITY
Start: 2019-04-30 | End: 2019-05-13 | Stop reason: SDUPTHER

## 2019-05-08 RX ORDER — ESCITALOPRAM OXALATE 10 MG/1
10 TABLET ORAL DAILY
Qty: 90 TABLET | Refills: 3 | Status: SHIPPED | OUTPATIENT
Start: 2019-05-08 | End: 2020-02-12 | Stop reason: SDUPTHER

## 2019-05-08 RX ORDER — BUSPIRONE HYDROCHLORIDE 10 MG/1
10 TABLET ORAL 3 TIMES DAILY
Status: CANCELLED | OUTPATIENT
Start: 2019-05-08

## 2019-05-08 RX ORDER — BUSPIRONE HYDROCHLORIDE 10 MG/1
10 TABLET ORAL 3 TIMES DAILY
Qty: 270 TABLET | Refills: 3 | Status: SHIPPED | OUTPATIENT
Start: 2019-05-08 | End: 2020-02-12 | Stop reason: SDUPTHER

## 2019-05-08 RX ORDER — CEPHALEXIN 500 MG/1
500 CAPSULE ORAL 2 TIMES DAILY
Qty: 20 CAPSULE | Refills: 1 | Status: SHIPPED | OUTPATIENT
Start: 2019-05-08 | End: 2020-02-11

## 2019-05-08 NOTE — PROGRESS NOTES
Subjective   Claudia Arnold is a 73 y.o. female.     CC: Hospital F/U for Prolonged Hospital Stay for CHF (among others)    History of Present Illness     Pt returns today after recent hospitalization. That visit was as follows:    Date of Admission: 1/16/2019     Date of Discharge:  2/18/2019     Discharge Diagnoses:   1. Acute valvular and diastolic CHF  2. Severe aortic stenosis, severe mitral stenosis secondary to calcification, and severe tricuspid regurgitation  3. Normal EF 60-65%  4. Paroxysmal atrial fibrillation with RVR -status post cardioversion 1/24/2019 and subsequent reversion to atrial fibrillation.  5. Moderate pulmonary hypertension (mean PA pressure 32)  6. Status post tissue AVR, tissue MVR, tricuspid valve repair (Martha suture repair), left cryomaze, and PENNIE ligation on 1/28/2019 (Dr. Gaxiola).  7. Small bilateral pulmonary emboli  8. Oliguric acute kidney injury post-operatively - on hemodialysis and status post tunneled catheter placement on 2/8/2019.  9. Bifascicular block (RBBB and LAFB)  10. Candida intertrigo (skin folds); left axillary fungal infection (treated)  11. Osteoarthritis with immobility  12. Left wrist thrombophlebitis from IV infiltration  13. Undiagnosed OCTAVIANO  14. Morbid obesity   15. Anemia of chronic disease  16. Severe deconditioning   17. Junctional bradycardia and asystole post-op - resolved  18. Atrial flutter, type unknown - status post DCCV on 2/15/2019        Procedures Performed:  1.  Echocardiogram on 1/17/2019  2.  Lower extremity venous Doppler on 1/20/2019  3.  Left and right heart catheterization on 1/22/2019  4.  Transesophageal echocardiogram with direct-current cardioversion on 1/24/2019  5.  Carotid Doppler ultrasound on 1/25/2019  6.  MRI and MRA of the brain and neck on 1/20/2019  7. Status post tissue AVR, tissue MVR, tricuspid valve repair (Martha suture repair), left cryomaze, and PENNIE ligation on 1/28/2019 (Dr. Gaxiola).  8. Status post tunneled dialysis catheter  placement on 2/8/2019.  9.  Transesophageal echocardiogram with direct-current cardioversion on 2/15/2019        Consults:  1. Zonia Lopez MD (Internal Medicine)  2. Leonides Martinez MD (Infectious Disease)  3. Iker Trejo MD (Neurology)  4. Jonathan Qureshi MD (Pulmonology)  5. Scar Gaxiola MD (Cardiovascular Surgery)  6. Valente Falcon MD (Nephrology)  7. Kathia Agudelo MD (Vascular Surgery)        Hospital Course:      This is a pleasant 73 year-old white female who presented to the emergency department on 1/16/2019 with progressive and severe dyspnea, volume overload, and significant peripheral edema.  She was found to have severe calcific disease of the aortic valve and mitral apparatus.  This resulted in both severe aortic stenosis and severe mitral stenosis.  She was also noted to have severe tricuspid regurgitation.  Her ejection fraction was intact at 60-65%.  She was noted to have Candida intertrigo in her skin folds, as well as a significant left axillary fungal infection.  Dr. Martinez from infectious disease saw the patient, and these were treated with antifungal agents.     She was diuresed extensively after admission, but continued to have significant shortness of breath.  She did have a CT angiogram which showed questionable small bilateral pulmonary emboli.  However, she was felt to be mainly in congestive heart failure.  She then went into rapid atrial fibrillation, and decompensated further.  She was transferred to the ICU in stable for several days.  Her rate was difficult to control, and she did undergo a transesophageal echocardiogram with direct-current cardioversion on 1/24/2019.  She converted to sinus rhythm, but went back into atrial fibrillation almost immediately.  A right and left heart catheterization was also performed on 1/22/2019.  She had minimal luminal irregularities in the proximal mid LAD, but otherwise normal coronary arteries.  She had moderate pulmonary  hypertension with a mean PA pressure of 32 mmHg.     The patient ultimately underwent a tissue AVR, tissue MVR, tricuspid valve repair (Martha suture repair), left cryomaze, and PENNIE ligation on 1/28/2019 by Dr. Gaxiola.  She developed oliguric acute kidney injury postoperatively, and ultimately required hemodialysis.  Unfortunately, her renal function did not recover, which necessitated placement of a tunneled dialysis catheter.  She has been continued on hemodialysis since the surgery, and will need hemodialysis as an outpatient as well.     She did develop junctional bradycardia and asystole postoperatively, which persisted for several days after surgery.  However, this eventually resolved, and she went back into atrial fibrillation.  Towards the end of her hospital stay, she had rapid atrial flutter which was largely unresponsive to medications.  She underwent another LEE with cardioversion on 2/15/2019.  She was discharged on amiodarone at 200 mg twice a day, along with metoprolol 25 mg twice a day.  She was in sinus rhythm at the time of discharge.     She was also discharged on Coumadin.  Her INR went up very quickly while on Coumadin, and she was only on 1 mg per day at the time of discharge. She will need an INR checked tomorrow.  She was very deconditioned, and ultimately required rehabilitation.  She was discharged to rehabilitation on 2/18/2019.        Discharge Medications:             Discharge Medications             New Medications      Instructions Start Date   amiodarone 200 MG tablet  Commonly known as:  PACERONE    200 mg, Oral, Every 12 Hours Scheduled        cetaphil lotion    Topical, Every 12 Hours Scheduled        epoetin lisha 98183 UNIT/ML injection  Commonly known as:  EPOGEN,PROCRIT    10,000 Units, Subcutaneous, 3 Times Weekly        famotidine 20 MG tablet  Commonly known as:  PEPCID    20 mg, Oral, Daily        hydrocortisone 1 % cream    1 application, Topical, Every 6 Hours Scheduled         metoprolol tartrate 25 MG tablet  Commonly known as:  LOPRESSOR    25 mg, Oral, Every 12 Hours Scheduled        warfarin 1 MG tablet  Commonly known as:  COUMADIN    1 mg, Oral, Nightly                      Changes to Medications      Instructions Start Date   bumetanide 2 MG tablet  Commonly known as:  BUMEX  What changed:    · medication strength  · how much to take    4 mg, Oral, Daily                      Continue These Medications      Instructions Start Date   acetaminophen 325 MG tablet  Commonly known as:  TYLENOL    650 mg, Oral, 3 Times Daily, Takes tid at 0600, 1400, 2200        escitalopram 10 MG tablet  Commonly known as:  LEXAPRO    10 mg, Oral, Daily        NON FORMULARY    Gets hydrocortisone shots in L shoulder every three months            Stop These Medications    aspirin 81 MG EC tablet      atorvastatin 20 MG tablet  Commonly known as:  LIPITOR      calcium carbonate 600 MG tablet  Commonly known as:  OS-DEBORAH      losartan 25 MG tablet  Commonly known as:  COZAAR      nisoldipine 8.5 MG 24 hr tablet  Commonly known as:  SULAR       Current outpatient and discharge medications have been reconciled for the patient.  Reviewed by: Robert Cortez MD    She saw neurosurgery last week and is being worked up on that end for weakness and is being considered for inpatient rehab.  She also has recurring vulvar abscesses and needs a referral.    The following portions of the patient's history were reviewed and updated as appropriate: allergies, current medications, past family history, past medical history, past social history, past surgical history and problem list.    Review of Systems   Constitutional: Negative for activity change, chills, fatigue and fever.   Respiratory: Negative for cough and chest tightness.    Cardiovascular: Negative for chest pain and palpitations.   Gastrointestinal: Negative for abdominal pain and nausea.   Endocrine: Negative for cold intolerance.   Psychiatric/Behavioral:  "Negative for behavioral problems and dysphoric mood.       BP 90/55   Pulse 70   Temp 98.5 °F (36.9 °C) (Oral)   Resp 16   Ht 170.2 cm (67\")   Wt 113 kg (250 lb)   BMI 39.16 kg/m²     Objective   Physical Exam   Constitutional: She appears well-developed and well-nourished.   Neck: Neck supple. No thyromegaly present.   Cardiovascular: Normal rate and regular rhythm.   No murmur heard.  Pulmonary/Chest: Effort normal and breath sounds normal.   Abdominal: Bowel sounds are normal. There is no tenderness.   Neurological: She is alert.   Psychiatric: She has a normal mood and affect. Her behavior is normal.   Nursing note and vitals reviewed.      Assessment/Plan   Claudia was seen today for hypertension, hyperlipidemia, anxiety, heartburn and heart surgery.    Diagnoses and all orders for this visit:    Acute diastolic congestive heart failure (CMS/HCC)    Mitral valve stenosis, unspecified etiology    Aortic valve stenosis, etiology of cardiac valve disease unspecified    Paroxysmal atrial fibrillation (CMS/HCC)    Pulmonary hypertension (CMS/HCC)    Stage 5 chronic kidney disease on chronic dialysis (CMS/HCC)    Hospital discharge follow-up    Skin lesion  -     hydrocortisone 2.5 % cream; Apply  topically to the appropriate area as directed 2 (Two) Times a Day.    Generalized anxiety disorder  -     escitalopram (LEXAPRO) 10 MG tablet; Take 1 tablet by mouth Daily.  -     busPIRone (BUSPAR) 10 MG tablet; Take 1 tablet by mouth 3 (Three) Times a Day.    Gastroesophageal reflux disease without esophagitis  -     famotidine (PEPCID) 20 MG tablet; Take 1 tablet by mouth Daily.  -     ondansetron (ZOFRAN) 4 MG tablet; Take 1 tablet by mouth Every 8 (Eight) Hours As Needed for Nausea or Vomiting.    Abscess  Comments:  vulvar    Orders:  -     cephalexin (KEFLEX) 500 MG capsule; Take 1 capsule by mouth 2 (Two) Times a Day.  -     Ambulatory Referral to Gynecology    Chronic idiopathic constipation  -     docusate " sodium (COLACE) 100 MG capsule; Take 1 capsule by mouth 2 (Two) Times a Day.    Other orders  -     Cancel: busPIRone (BUSPAR) 10 MG tablet; Take 1 tablet by mouth 3 (Three) Times a Day.

## 2019-05-08 NOTE — PROGRESS NOTES
Subjective   Patient ID: Claudia Arnold is a 73 y.o. female is here today for follow-up after having a Lumber CT that was ordered for right great toe and foot weakness and numbness on the top of her right foot and right calf. Patient reports no change in her symptoms since her last visit.    Ms. Arnold is here for follow-up with a new CT lumbar spine without contrast.  The patient initially was ordered an MRI of the lumbar spine but she refused it.  She opted for the CT scan instead.  When I saw her at the last office visit she had just been discharged from subacute rehab following valve replacement surgery and a long, complicated hospital course.  She feels that she has made some progress with her mobility since her last office visit.  She is working with home health physical therapy.  She also has some assistance that she pays privately to come in and help her.  States that she is now able to walk about 35 feet with a walker.  She states that she is having no pain in her right leg her main concern was the weakness in the great toe in the right foot.  She is still going to dialysis 3 times a week.  She will be seeing vascular surgery in the next week or so.  The vascular surgeon will evaluate her for a dialysis shunt catheter as well as vascular issues in her lower extremities.      Extremity Weakness    The pain is present in the right foot, right toes and right lower leg (Primarily paresthesias/tingiling in the lateral R knee, R lateral calf and numbness R foot, and toes.). This is a recurrent problem. The current episode started more than 1 month ago. The quality of the pain is described as burning. The pain is at a severity of 3/10. The pain is moderate. Associated symptoms include an inability to bear weight, joint swelling, numbness and tingling. The symptoms are aggravated by standing and activity. Her past medical history is significant for osteoarthritis.       The following portions of the patient's history  were reviewed and updated as appropriate: allergies, current medications, past family history, past medical history, past social history, past surgical history and problem list.    Review of Systems   Respiratory: Negative for chest tightness and shortness of breath.    Cardiovascular: Negative for chest pain.   Musculoskeletal: Positive for extremity weakness.   Neurological: Positive for tingling, weakness and numbness.   All other systems reviewed and are negative.      Objective   Physical Exam   Constitutional: She is oriented to person, place, and time. She appears well-developed and well-nourished. She is cooperative. No distress.   HENT:   Head: Normocephalic and atraumatic.   Eyes: Conjunctivae are normal. Right eye exhibits no discharge. Left eye exhibits no discharge.   Neck: Normal range of motion. Neck supple.   Cardiovascular: Normal rate.   Erythema in both feet. Temperature is cool in both feet.  Significant yellow, toenail thickening bilaterally.   Pulmonary/Chest: Effort normal. No respiratory distress.   Abdominal: Soft. She exhibits no distension. There is no tenderness.   Musculoskeletal: Normal range of motion. She exhibits no edema.   Neurological: She is alert and oriented to person, place, and time. She displays normal reflexes. No sensory deficit. She exhibits normal muscle tone. Coordination abnormal. GCS eye subscore is 4. GCS verbal subscore is 5. GCS motor subscore is 6.   The patient continues to have R EHL 3-/5 and R tibialis anterior 4/5 weakness. SLR negative. Negative Parker's; negative clonus.    Skin: Skin is warm and dry. No rash noted. She is not diaphoretic.   Psychiatric: She has a normal mood and affect. Thought content normal.   Vitals reviewed.    Neurologic Exam     Mental Status   Oriented to person, place, and time.       Assessment/Plan   Independent Review of Radiographic Studies:      I have independently reviewed CT images of the lumbar spine without contrast dated  May 13, 2019 today in the office.  The CT shows multilevel degenerative changes.  There is severe degenerative changes noted at L4-5 secondary to facet arthropathy and severe narrowing of the thecal sac.  There is right greater than left lateral recess narrowing and left greater than right foraminal narrowing at L4-5 both of which could be compromising the L4 and L5 nerve roots.  There is degenerative anterolisthesis at L5-S1 there is severe right foraminal narrowing secondary to disc bulging.  There is some compromise on the right L5 nerve root.    Medical Decision Making:      Ms. Arnold returns the office today for reevaluation with the above CT scan of the lumbar spine.  The CT does show significant canal stenosis and some evidence of probable right L4 and right L5 nerve compromise.     I discussed the surgical options with the patient.  I stated that we would prefer to do a lumbar spine myelogram to further clarify the significance of the lumbar stenosis however that would require contrast and given her severe renal failure we would have to get clearance from her nephrologist first.  We also discussed her cardiac status.  The patient has recently had a very difficult, long hospital stay after valve replacement surgery.  She is on chronic Coumadin and of course that would have to be discontinued before we could consider myelography and even lumbar surgery.    The patient states that she feels that she is doing fine as she is right now.  She is having no pain and she feels that she can live with the right foot weakness as it is.  She would prefer to continue working with therapy and try to make as much progress as she can.  She wants to continue focusing on her current medical issues and work toward functional independence as best she can rather than disrupt any progress she is made up to now with another surgery.  I completely agree with that. If her leg weakness worsens or she begins to develop pain symptoms,  Ms. Arnold will give us a call otherwise we will see her as needed from here.    Claudia was seen today for follow-up and extremity weakness.    Diagnoses and all orders for this visit:    Limb weakness    Spinal stenosis, lumbar region, without neurogenic claudication      Return if symptoms worsen or fail to improve.

## 2019-05-13 ENCOUNTER — OFFICE VISIT (OUTPATIENT)
Dept: CARDIOLOGY | Facility: CLINIC | Age: 74
End: 2019-05-13

## 2019-05-13 ENCOUNTER — HOSPITAL ENCOUNTER (OUTPATIENT)
Dept: CT IMAGING | Facility: HOSPITAL | Age: 74
Discharge: HOME | End: 2019-05-13

## 2019-05-13 ENCOUNTER — HOSPITAL ENCOUNTER (OUTPATIENT)
Dept: CARDIOLOGY | Facility: HOSPITAL | Age: 74
Discharge: HOME | End: 2019-05-13
Admitting: INTERNAL MEDICINE

## 2019-05-13 VITALS
WEIGHT: 250 LBS | SYSTOLIC BLOOD PRESSURE: 107 MMHG | DIASTOLIC BLOOD PRESSURE: 72 MMHG | HEIGHT: 67 IN | BODY MASS INDEX: 39.24 KG/M2 | HEART RATE: 68 BPM

## 2019-05-13 VITALS
HEIGHT: 67 IN | WEIGHT: 250 LBS | OXYGEN SATURATION: 99 % | BODY MASS INDEX: 39.24 KG/M2 | SYSTOLIC BLOOD PRESSURE: 108 MMHG | HEART RATE: 63 BPM | DIASTOLIC BLOOD PRESSURE: 60 MMHG

## 2019-05-13 DIAGNOSIS — Z95.3 STATUS POST MITRAL VALVE REPLACEMENT WITH TISSUE VALVE: ICD-10-CM

## 2019-05-13 DIAGNOSIS — R20.2 RIGHT LEG PARESTHESIAS: ICD-10-CM

## 2019-05-13 DIAGNOSIS — R29.898 SEVERE MUSCLE DECONDITIONING: ICD-10-CM

## 2019-05-13 DIAGNOSIS — I05.0 MITRAL VALVE STENOSIS, UNSPECIFIED ETIOLOGY: ICD-10-CM

## 2019-05-13 DIAGNOSIS — Z95.3 STATUS POST AORTIC VALVE REPLACEMENT WITH TISSUE VALVE: ICD-10-CM

## 2019-05-13 DIAGNOSIS — I35.0 AORTIC VALVE STENOSIS, ETIOLOGY OF CARDIAC VALVE DISEASE UNSPECIFIED: ICD-10-CM

## 2019-05-13 DIAGNOSIS — I48.0 PAROXYSMAL ATRIAL FIBRILLATION (HCC): Primary | ICD-10-CM

## 2019-05-13 DIAGNOSIS — R29.898 WEAKNESS OF RIGHT FOOT: ICD-10-CM

## 2019-05-13 DIAGNOSIS — Z98.890 STATUS POST TRICUSPID VALVE REPAIR: ICD-10-CM

## 2019-05-13 DIAGNOSIS — M62.3 IMMOBILITY SYNDROME: ICD-10-CM

## 2019-05-13 DIAGNOSIS — I48.0 PAROXYSMAL ATRIAL FIBRILLATION (HCC): ICD-10-CM

## 2019-05-13 LAB
AORTIC DIMENSIONLESS INDEX: 0.6 (DI)
BH CV ECHO MEAS - ACS: 2 CM
BH CV ECHO MEAS - AO MAX PG (FULL): 10.4 MMHG
BH CV ECHO MEAS - AO MAX PG: 17.6 MMHG
BH CV ECHO MEAS - AO MEAN PG (FULL): 5.1 MMHG
BH CV ECHO MEAS - AO MEAN PG: 8.7 MMHG
BH CV ECHO MEAS - AO ROOT AREA (BSA CORRECTED): 1.8
BH CV ECHO MEAS - AO ROOT AREA: 12.9 CM^2
BH CV ECHO MEAS - AO ROOT DIAM: 4.1 CM
BH CV ECHO MEAS - AO V2 MAX: 209.6 CM/SEC
BH CV ECHO MEAS - AO V2 MEAN: 127.3 CM/SEC
BH CV ECHO MEAS - AO V2 VTI: 41.2 CM
BH CV ECHO MEAS - AVA(I,A): 1.8 CM^2
BH CV ECHO MEAS - AVA(I,D): 1.8 CM^2
BH CV ECHO MEAS - AVA(V,A): 1.8 CM^2
BH CV ECHO MEAS - AVA(V,D): 1.8 CM^2
BH CV ECHO MEAS - BSA(HAYCOCK): 2.4 M^2
BH CV ECHO MEAS - BSA: 2.2 M^2
BH CV ECHO MEAS - BZI_BMI: 39.2 KILOGRAMS/M^2
BH CV ECHO MEAS - BZI_METRIC_HEIGHT: 170.2 CM
BH CV ECHO MEAS - BZI_METRIC_WEIGHT: 113.4 KG
BH CV ECHO MEAS - EDV(MOD-SP2): 81 ML
BH CV ECHO MEAS - EDV(MOD-SP4): 88 ML
BH CV ECHO MEAS - EDV(TEICH): 110.6 ML
BH CV ECHO MEAS - EF(CUBED): 92.6 %
BH CV ECHO MEAS - EF(MOD-BP): 65 %
BH CV ECHO MEAS - EF(MOD-SP2): 63 %
BH CV ECHO MEAS - EF(MOD-SP4): 68.2 %
BH CV ECHO MEAS - EF(TEICH): 87.9 %
BH CV ECHO MEAS - ESV(MOD-SP2): 30 ML
BH CV ECHO MEAS - ESV(MOD-SP4): 28 ML
BH CV ECHO MEAS - ESV(TEICH): 13.4 ML
BH CV ECHO MEAS - FS: 58 %
BH CV ECHO MEAS - IVS/LVPW: 1.3
BH CV ECHO MEAS - IVSD: 1.4 CM
BH CV ECHO MEAS - LAT PEAK E' VEL: 8 CM/SEC
BH CV ECHO MEAS - LV DIASTOLIC VOL/BSA (35-75): 39.6 ML/M^2
BH CV ECHO MEAS - LV MASS(C)D: 220.8 GRAMS
BH CV ECHO MEAS - LV MASS(C)DI: 99.3 GRAMS/M^2
BH CV ECHO MEAS - LV MAX PG: 7.2 MMHG
BH CV ECHO MEAS - LV MEAN PG: 3.6 MMHG
BH CV ECHO MEAS - LV SYSTOLIC VOL/BSA (12-30): 12.6 ML/M^2
BH CV ECHO MEAS - LV V1 MAX: 133.8 CM/SEC
BH CV ECHO MEAS - LV V1 MEAN: 82.1 CM/SEC
BH CV ECHO MEAS - LV V1 VTI: 25.7 CM
BH CV ECHO MEAS - LVIDD: 4.9 CM
BH CV ECHO MEAS - LVIDS: 2 CM
BH CV ECHO MEAS - LVLD AP2: 7.8 CM
BH CV ECHO MEAS - LVLD AP4: 7.6 CM
BH CV ECHO MEAS - LVLS AP2: 7.2 CM
BH CV ECHO MEAS - LVLS AP4: 6.4 CM
BH CV ECHO MEAS - LVOT AREA (M): 2.8 CM^2
BH CV ECHO MEAS - LVOT AREA: 2.8 CM^2
BH CV ECHO MEAS - LVOT DIAM: 1.9 CM
BH CV ECHO MEAS - LVPWD: 1 CM
BH CV ECHO MEAS - MED PEAK E' VEL: 5 CM/SEC
BH CV ECHO MEAS - MV A DUR: 0.13 SEC
BH CV ECHO MEAS - MV A MAX VEL: 109.9 CM/SEC
BH CV ECHO MEAS - MV DEC SLOPE: 453.1 CM/SEC^2
BH CV ECHO MEAS - MV DEC TIME: 0.46 SEC
BH CV ECHO MEAS - MV E MAX VEL: 191 CM/SEC
BH CV ECHO MEAS - MV E/A: 1.7
BH CV ECHO MEAS - MV MAX PG: 18.9 MMHG
BH CV ECHO MEAS - MV MEAN PG: 7.7 MMHG
BH CV ECHO MEAS - MV P1/2T MAX VEL: 201.6 CM/SEC
BH CV ECHO MEAS - MV P1/2T: 130.3 MSEC
BH CV ECHO MEAS - MV V2 MAX: 217.2 CM/SEC
BH CV ECHO MEAS - MV V2 MEAN: 131.9 CM/SEC
BH CV ECHO MEAS - MV V2 VTI: 72.9 CM
BH CV ECHO MEAS - MVA P1/2T LCG: 1.1 CM^2
BH CV ECHO MEAS - MVA(P1/2T): 1.7 CM^2
BH CV ECHO MEAS - MVA(VTI): 1 CM^2
BH CV ECHO MEAS - PA MAX PG (FULL): 2.8 MMHG
BH CV ECHO MEAS - PA MAX PG: 4.6 MMHG
BH CV ECHO MEAS - PA V2 MAX: 107.2 CM/SEC
BH CV ECHO MEAS - PULM A REVS DUR: 0.11 SEC
BH CV ECHO MEAS - PULM A REVS VEL: 18.9 CM/SEC
BH CV ECHO MEAS - PULM DIAS VEL: 21.4 CM/SEC
BH CV ECHO MEAS - PULM S/D: 1.9
BH CV ECHO MEAS - PULM SYS VEL: 40.3 CM/SEC
BH CV ECHO MEAS - PVA(V,A): 2.1 CM^2
BH CV ECHO MEAS - PVA(V,D): 2.1 CM^2
BH CV ECHO MEAS - QP/QS: 0.63
BH CV ECHO MEAS - RV MAX PG: 1.8 MMHG
BH CV ECHO MEAS - RV MEAN PG: 0.76 MMHG
BH CV ECHO MEAS - RV V1 MAX: 67.9 CM/SEC
BH CV ECHO MEAS - RV V1 MEAN: 37.2 CM/SEC
BH CV ECHO MEAS - RV V1 VTI: 13.6 CM
BH CV ECHO MEAS - RVOT AREA: 3.4 CM^2
BH CV ECHO MEAS - RVOT DIAM: 2.1 CM
BH CV ECHO MEAS - SI(AO): 239 ML/M^2
BH CV ECHO MEAS - SI(CUBED): 47.8 ML/M^2
BH CV ECHO MEAS - SI(LVOT): 33 ML/M^2
BH CV ECHO MEAS - SI(MOD-SP2): 22.9 ML/M^2
BH CV ECHO MEAS - SI(MOD-SP4): 27 ML/M^2
BH CV ECHO MEAS - SI(TEICH): 43.7 ML/M^2
BH CV ECHO MEAS - SUP REN AO DIAM: 1.7 CM
BH CV ECHO MEAS - SV(AO): 531.4 ML
BH CV ECHO MEAS - SV(CUBED): 106.2 ML
BH CV ECHO MEAS - SV(LVOT): 73.3 ML
BH CV ECHO MEAS - SV(MOD-SP2): 51 ML
BH CV ECHO MEAS - SV(MOD-SP4): 60 ML
BH CV ECHO MEAS - SV(RVOT): 46.2 ML
BH CV ECHO MEAS - SV(TEICH): 97.2 ML
BH CV ECHO MEAS - TAPSE (>1.6): 3 CM2
BH CV ECHO MEASUREMENTS AVERAGE E/E' RATIO: 29.38
BH CV XLRA - RV BASE: 3.7 CM
BH CV XLRA - TDI S': 9 CM/SEC
LEFT ATRIUM VOLUME INDEX: 37 ML/M2
MAXIMAL PREDICTED HEART RATE: 147 BPM
STRESS TARGET HR: 125 BPM
TV MEAN GRADIENT: 2 MMHG

## 2019-05-13 PROCEDURE — 93306 TTE W/DOPPLER COMPLETE: CPT | Performed by: INTERNAL MEDICINE

## 2019-05-13 PROCEDURE — 72131 CT LUMBAR SPINE W/O DYE: CPT

## 2019-05-13 PROCEDURE — 93306 TTE W/DOPPLER COMPLETE: CPT

## 2019-05-13 PROCEDURE — 93000 ELECTROCARDIOGRAM COMPLETE: CPT | Performed by: NURSE PRACTITIONER

## 2019-05-13 PROCEDURE — 99214 OFFICE O/P EST MOD 30 MIN: CPT | Performed by: NURSE PRACTITIONER

## 2019-05-13 RX ORDER — BUMETANIDE 2 MG/1
4 TABLET ORAL DAILY
Qty: 60 TABLET | Refills: 2 | Status: SHIPPED | OUTPATIENT
Start: 2019-05-13 | End: 2019-08-15 | Stop reason: SDUPTHER

## 2019-05-13 RX ORDER — WARFARIN SODIUM 1 MG/1
1 TABLET ORAL NIGHTLY
Qty: 30 TABLET | Refills: 2 | Status: SHIPPED | OUTPATIENT
Start: 2019-05-13 | End: 2020-05-29

## 2019-05-13 RX ORDER — WARFARIN SODIUM 2 MG/1
2 TABLET ORAL DAILY
Qty: 90 TABLET | Refills: 3 | Status: SHIPPED | OUTPATIENT
Start: 2019-05-13 | End: 2020-02-12

## 2019-05-13 NOTE — PROGRESS NOTES
[facility]      Patient Name: Claudia Arnold  :1945  Age: 73 y.o.  Primary Cardiologist: Zan Segura MD  Encounter Provider:  MORGAN Butts      Chief Complaint:   Chief Complaint   Patient presents with   • Follow-up     Echo today         HPI  Claudia Arnold is a 73 y.o. female with a history significant for hypertension, hyperlipidemia, congestive heart failure, aortic valve stenosis, mitral valve stenosis, obesity, obstructive sleep apnea, coronary artery disease.  Patient was hospitalized -19.  Patient was admitted for severe dyspnea, volume overload load and significant peripheral edema.  She is found to have severe calcific disease of the aortic valve and mitral apparatus.  This resulted in both severe aortic stenosis and severe mitral stenosis.  Patient was diuresed extensively after admission but continued to have significant shortness of breath.  She had a CT angiogram which revealed a questionable small bilateral pulmonary emboli.  She developed rapid atrial fibrillation and decompensated further.  Heart rate was very difficult to control and patient did undergo a LEE directed cardioversion on 19.  She converted to sinus rhythm, unfortunately went back into atrial fibrillation almost immediately.  Left and right heart catheterization were performed on 19 which revealed minimal luminal irregularities in the proximal mid LAD, but otherwise normal coronary arteries.  The patient ultimately underwent a tissue aortic valve replacement, tissue mitral valve replacement and tricuspid valve repair, left cryo-maze and PENNIE ligation on 19 by Dr. Gaxiola.  Unfortunately, patient's renal failure did not improve and she required dialysis.  Patient was discharged on Coumadin for anticoagulation.  She was very deconditioned and discharged to a rehab facility.    Patient presents today for a one-month reevaluation.  Patient states that she is doing much  better than at last visit.  She reports that she was discharged back home and has the help of home health aides as well as physical therapy with [facility].  She reports that she is continuing to improve on her mobility.  She states that her appetite is slowly improving as well.  She denies any complaints such as chest pain, shortness of breath, palpitations, lightheadedness, fatigue.  States that she is is slowly making her way to recovery.    The following portions of the patient's history were reviewed and updated as appropriate: allergies, current medications, past family history, past medical history, past social history, past surgical history and problem list.    Current Outpatient Medications on File Prior to Visit   Medication Sig   • acetaminophen (TYLENOL) 325 MG tablet Take 650 mg by mouth 3 (Three) Times a Day. Takes tid at 0600, 1400, 2200   • amiodarone (PACERONE) 200 MG tablet Take 1 tablet by mouth Every 12 (Twelve) Hours.   • bumetanide (BUMEX) 2 MG tablet Take 2 tablets by mouth Daily.   • busPIRone (BUSPAR) 10 MG tablet Take 1 tablet by mouth 3 (Three) Times a Day.   • cephalexin (KEFLEX) 500 MG capsule Take 1 capsule by mouth 2 (Two) Times a Day.   • cetaphil (CETAPHIL) lotion Apply  topically to the appropriate area as directed Every 12 (Twelve) Hours.   • docusate sodium (COLACE) 100 MG capsule Take 1 capsule by mouth 2 (Two) Times a Day.   • escitalopram (LEXAPRO) 10 MG tablet Take 1 tablet by mouth Daily.   • famotidine (PEPCID) 20 MG tablet Take 1 tablet by mouth Daily.   • hydrocortisone 1 % cream Apply 1 application topically to the appropriate area as directed Every 6 (Six) Hours.   • hydrocortisone 2.5 % cream Apply  topically to the appropriate area as directed 2 (Two) Times a Day.   • ondansetron (ZOFRAN) 4 MG tablet Take 1 tablet by mouth Every 8 (Eight) Hours As Needed for Nausea or Vomiting.   • traMADol (ULTRAM) 50 MG tablet Take 50 mg by mouth Every 6 (Six) Hours As Needed for  "Moderate Pain .   • warfarin (COUMADIN) 1 MG tablet Take 1 tablet by mouth Every Night.   • warfarin (COUMADIN) 2 MG tablet Take  by mouth Daily.     No current facility-administered medications on file prior to visit.          Review of Systems   Constitution: Negative for malaise/fatigue.   Cardiovascular: Negative for chest pain and leg swelling.   Respiratory: Negative for shortness of breath.    Neurological: Negative for light-headedness.   All other systems reviewed and are negative.      OBJECTIVE:   Vital Signs  Vitals:    05/13/19 1401   BP: 108/60   Pulse: 63   SpO2: 99%     Estimated body mass index is 39.16 kg/m² as calculated from the following:    Height as of this encounter: 170.2 cm (67\").    Weight as of this encounter: 113 kg (250 lb).    Physical Exam   Constitutional: She is oriented to person, place, and time. Vital signs are normal. She appears well-developed and well-nourished. She is active.   Eyes: Conjunctivae are normal.   Neck: Carotid bruit is not present.   Cardiovascular: Normal rate, regular rhythm and normal heart sounds.   Pulmonary/Chest: Breath sounds normal.   Abdominal: Normal appearance.   Musculoskeletal:   No cyanosis, clubbing, edema  Normal ROM   Neurological: She is alert and oriented to person, place, and time. GCS eye subscore is 4. GCS verbal subscore is 5. GCS motor subscore is 6.   Skin: Skin is warm, dry and intact.   Psychiatric: She has a normal mood and affect. Her speech is normal and behavior is normal. Judgment and thought content normal. Cognition and memory are normal.         ECG 12 Lead  Date/Time: 5/13/2019 2:32 PM  Performed by: Shiloh Gonzales APRN  Authorized by: Shiloh Gonzales APRN   Comparison: compared with previous ECG from 2/18/2019  Rhythm: sinus rhythm  Rate: normal  BPM: 62  Conduction: right bundle branch block  QRS axis: left  Other findings: non-specific ST-T wave changes    Clinical impression: abnormal EKG            Cardiac " Procedures:  1. Bilateral lower extremity venous duplex scan 1/17/19.  Normal  2. Echocardiogram 1/17/19.  Left ventricular cavity is mild to moderately dilated.  Mild LVH.  Grade 1 diastolic dysfunction.  Normal right ventricular cavity size and systolic function noted.  Left atrial cavity size is moderate to severely dilated.  Severe aortic stenosis with peak gradient 96 mmHg and mean gradient 54 mmHg.  The calculated aortic valve area is 0.84 cm².  Severe mitral annular calcification.  Severe mitral valve stenosis with a peak gradient 22 mmHg and mean gradient 11 mmHg.  Mild to moderate tricuspid valve regurgitation.  Estimated RVSP greater than 55 mmHg.  3. Lower extremity venous duplex scan 1/21/19.  Normal  4. Cardiac catheterization 1/22/19.  Mild luminal irregularities proximal to the mid LAD.  Otherwise normal coronary angiography.  Elevated left and right-sided filling pressures.  Moderate aortic valve stenosis.  Moderate pulmonary hypertension.  5. LEE 1/24/19.  Left ventricular systolic function is low normal.  Severe aortic valve stenosis.  Moderate mitral valve stenosis is present.  Severe tricuspid valve regurgitation is present.  Left atrial cavity size is severely dilated.  Right ventricular cavity is mildly dilated.  6. Cardioversion 1/24/19.  Successful  7. Left upper extremity venous duplex scan 1/27/19 acute left upper extremity superficial thrombophlebitis noted in the cephalic.  8. Tissue aortic valve replacement with 23 mm magna pericardial paracentesis, mitral valve replacement with 25 mm Saint Mike porcine prosthesis, left cryo-Maze procedure and left AAA ligation on 1/28/19.  9. Limited echocardiogram 1/30/19.  Mild to moderate LVH.  EF greater than 70.  Right ventricular cavity is mildly dilated.  Left atrial cavity is moderately dilated.  No evidence of pericardial effusion.  10. LEE 2/15/19.  Wall motion was not well visualized due to artifact from the bioprosthetic aortic and mitral  valves.  Left atrial cavity is dilated.  Bioprosthetic mitral valve.  Mitral valve mean gradient 11 mmHg.  Moderate tricuspid valve regurgitation present.  Estimated RVSP 35-45 mmHg.  Bioprosthetic aortic valve present.  There still appeared to be flow with the left atrial appendage.  There was no left atrial appendage thrombus.  11. Cardioversion 2/15/19.  Successful.  12. Echocardiogram 5/13/2019.  EF 65%.  Moderate LVH.  Left atrial cavity size is moderately dilated.  Mild dilation of the aortic root.  Prosthetic aortic valve is grossly normal.  Saint Mike bioprosthetic mitral valve is grossly normal.        ASSESSMENT:      Diagnosis Plan   1. Paroxysmal atrial fibrillation (CMS/[facility])  Ambulatory Referral to Anticoagulation Monitoring   2. Aortic valve stenosis, etiology of cardiac valve disease unspecified     3. Mitral valve stenosis, unspecified etiology     4. Severe muscle deconditioning           PLAN OF CARE:     1. Severe aortic valve stenosis status post aortic valve replacement.  Patient status post aortic valve replacement on 1/28/19.   She denies any shortness of breath, pedal edema.  2. Severe mitral valve stenosis status post mitral valve replacement.  Patient status post mitral valve replacement on 1/28/19. .  She denies shortness of breath or pedal edema.  3. Paroxysmal atrial fibrillation.  Patient ECG today is to show normal sinus rhythm.  At this point amiodarone can be stopped.  Patient is anticoagulated with Coumadin.  Her INR is checked at dialysis, however she is unsure who is dosing her Coumadin.  I have placed a referral to the anticoagulation monitoring clinic to assist in Coumadin dose management.  4. Muscle deconditioning.  Patient is in physical therapy with [facility].  Strength is gradually improving.  5. Follow-up with Dr. Segura in 4 months.  Sooner for problems or palpitations.      Atrial Fibrillation and Atrial Flutter  Assessment  • The  patient has paroxysmal atrial fibrillation  • This is valvular in etiology  • The transient or reversible cause of the patient's arrhythmia is cardiac surgery in the past three months  • The patient's CHADS2-VASc score is 4  • A [id number] score of 2 or more is considered a high risk for a thromboembolic event  • Warfarin prescribed    Plan  • Attempt to maintain sinus rhythm  • Continue amiodarone for rhythm control      Thank you for allowing me to participate in the care of your patient,      Sincerely,   MORGAN Butts  [facility]  05/13/19  2:13 PM    **Cari Disclaimer:**  Much of this encounter note is an electronic transcription/translation of spoken language to printed text. The electronic translation of spoken language may permit erroneous, or at times, nonsensical words or phrases to be inadvertently transcribed. Although I have reviewed the note for such errors, some may still exist.

## 2019-05-14 ENCOUNTER — TELEPHONE (OUTPATIENT)
Dept: OBSTETRICS AND GYNECOLOGY | Age: 74
End: 2019-05-14

## 2019-05-14 NOTE — TELEPHONE ENCOUNTER
This is usually an ACUTE issue and patient needs to be seen asap and I do not have anything this week - please clarify.

## 2019-05-14 NOTE — TELEPHONE ENCOUNTER
S/w referring Dr Robert Cortez to clarify. According to Dr Cortez, pt has recurring labia cysts that may have gotten infected which turned into an abscess. Pt was treated with Keflex antibiotic and reports pt was alert, no pain just showed her concerned labia & was siting comfortably as she isn't needing to be seen urgently per Dr Cortez.      Sending back to Dr Howell to review, please advise.

## 2019-05-14 NOTE — TELEPHONE ENCOUNTER
LMTC to sched new pt gyn appt w Dr Howell. Please ask if pt is in wheelchair, if so appt needs 30 mins & offer next avaiable new pt appt at 11 or 130.

## 2019-05-14 NOTE — TELEPHONE ENCOUNTER
Referral received for NGYN pt needing to est care for an vulvar abscess.    [facility].  Medicare    Referred by:  Robert Cortez    Requesting Dr Howell

## 2019-05-17 ENCOUNTER — OFFICE VISIT (OUTPATIENT)
Dept: NEUROSURGERY | Facility: CLINIC | Age: 74
End: 2019-05-17

## 2019-05-17 VITALS
BODY MASS INDEX: 39.24 KG/M2 | WEIGHT: 250 LBS | SYSTOLIC BLOOD PRESSURE: 105 MMHG | DIASTOLIC BLOOD PRESSURE: 65 MMHG | HEART RATE: 67 BPM | HEIGHT: 67 IN

## 2019-05-17 DIAGNOSIS — M48.061 SPINAL STENOSIS, LUMBAR REGION, WITHOUT NEUROGENIC CLAUDICATION: ICD-10-CM

## 2019-05-17 DIAGNOSIS — R29.898 LIMB WEAKNESS: Primary | ICD-10-CM

## 2019-05-17 PROCEDURE — 99213 OFFICE O/P EST LOW 20 MIN: CPT | Performed by: NURSE PRACTITIONER

## 2019-05-31 ENCOUNTER — TELEPHONE (OUTPATIENT)
Dept: NEUROSURGERY | Facility: CLINIC | Age: 74
End: 2019-05-31

## 2019-06-03 ENCOUNTER — TELEPHONE (OUTPATIENT)
Dept: CARDIOLOGY | Facility: CLINIC | Age: 74
End: 2019-06-03

## 2019-06-03 NOTE — TELEPHONE ENCOUNTER
06/03/19   Mari from Dr MIQUEL Stahl office called for surgery clearance for a LT Arm Brachilcephalic Fistula on 6/26/19 at [facility].   They are requesting to have her hold her Coumadin for 5 days or however you would like to management that>   Please advise   Mari call back # [phone]   Thanks Phani RICE

## 2019-06-04 NOTE — TELEPHONE ENCOUNTER
I called Mari surgery scheduler at Dr Stahl's office. I let  Her know pt is cleared and can hold Coumadin for 4 days.   I asked her to call back with a fax number so I can fax clearance letter to her.   Phani RICE

## 2019-06-04 NOTE — TELEPHONE ENCOUNTER
She is clear at moderate risk.  Can you send a clearance letter?  I would hold the Coumadin for 4 days prior.  Can you please let the patient know?  Thanks     TIANNA

## 2019-06-14 LAB — INR PPP: 2.23

## 2019-06-17 ENCOUNTER — OFFICE VISIT (OUTPATIENT)
Dept: ORTHOPEDIC SURGERY | Facility: CLINIC | Age: 74
End: 2019-06-17

## 2019-06-17 VITALS — HEIGHT: 67 IN | WEIGHT: 248 LBS | BODY MASS INDEX: 38.92 KG/M2 | TEMPERATURE: 97.6 F

## 2019-06-17 DIAGNOSIS — M19.90 ARTHRITIS: Primary | ICD-10-CM

## 2019-06-17 PROCEDURE — 20610 DRAIN/INJ JOINT/BURSA W/O US: CPT | Performed by: ORTHOPAEDIC SURGERY

## 2019-06-17 PROCEDURE — 99212 OFFICE O/P EST SF 10 MIN: CPT | Performed by: ORTHOPAEDIC SURGERY

## 2019-06-17 RX ADMIN — METHYLPREDNISOLONE ACETATE 80 MG: 80 INJECTION, SUSPENSION INTRA-ARTICULAR; INTRALESIONAL; INTRAMUSCULAR; SOFT TISSUE at 15:53

## 2019-06-17 NOTE — PROGRESS NOTES
Patient: Claudia Arnold  YOB: 1945  Date of Service: 6/17/2019    Chief Complaints:   Chief Complaint   Patient presents with   • Right Shoulder - Follow-up, Pain   • Left Shoulder - Follow-up, Pain   Bilateral shoulder pain  Subjective:    History of Present Illness: Pt is seen in the office today with complaints of Chief Complaint   Patient presents with   • Right Shoulder - Follow-up, Pain   • Left Shoulder - Follow-up, Pain   .    This patient of seen in the past for bilateral shoulder arthritis who has had a lot of health issues are recently with bypass subsequent kidney failure is currently on dialysis 30-day long hospital stay and a weight loss of about 60 pounds.  Her shoulders are bothering her    Allergies: No Known Allergies    Medications:   Home Medications:  Current Outpatient Medications on File Prior to Visit   Medication Sig   • acetaminophen (TYLENOL) 325 MG tablet Take 650 mg by mouth 3 (Three) Times a Day. Takes tid at 0600, 1400, 2200   • bumetanide (BUMEX) 2 MG tablet Take 2 tablets by mouth Daily.   • busPIRone (BUSPAR) 10 MG tablet Take 1 tablet by mouth 3 (Three) Times a Day.   • cephalexin (KEFLEX) 500 MG capsule Take 1 capsule by mouth 2 (Two) Times a Day.   • cetaphil (CETAPHIL) lotion Apply  topically to the appropriate area as directed Every 12 (Twelve) Hours.   • docusate sodium (COLACE) 100 MG capsule Take 1 capsule by mouth 2 (Two) Times a Day.   • escitalopram (LEXAPRO) 10 MG tablet Take 1 tablet by mouth Daily.   • famotidine (PEPCID) 20 MG tablet Take 1 tablet by mouth Daily.   • hydrocortisone 1 % cream Apply 1 application topically to the appropriate area as directed Every 6 (Six) Hours.   • hydrocortisone 2.5 % cream Apply  topically to the appropriate area as directed 2 (Two) Times a Day.   • ondansetron (ZOFRAN) 4 MG tablet Take 1 tablet by mouth Every 8 (Eight) Hours As Needed for Nausea or Vomiting.   • traMADol (ULTRAM) 50 MG tablet Take 50 mg by mouth  Every 6 (Six) Hours As Needed for Moderate Pain .   • warfarin (COUMADIN) 1 MG tablet Take 1 tablet by mouth Every Night.   • warfarin (COUMADIN) 2 MG tablet Take 1 tablet by mouth Daily.     No current facility-administered medications on file prior to visit.      Current Medications:  Scheduled Meds:  Continuous Infusions:  No current facility-administered medications for this visit.   PRN Meds:.    I have reviewed the patient's medical history in detail and updated the computerized patient record.  Review and summarization of old records include:    Past Medical History:   Diagnosis Date   • A-fib (CMS/[facility])     Meds   • Arthritis     w/Difficult Mobility   • Bilateral lower extremity edema     Legs   • CAD (coronary artery disease)     Affecting LAD    • Cardiomyopathy (CMS/[facility])    • CHF (congestive heart failure) (CMS/[facility])    • Chronic fatigue    • Generalized anxiety disorder    • Hernia, inguinal     Hx Repair   • History of echocardiogram 1/17/19-[facility]    The L Ventricular Cavity is Mild-to-Moderately Dilated; Left Ventricular Wall Thickness is Consistent w/Mild Concentric Hypertrophy; Left Atrial Cavity Size is Moderate-to-Severely Dilated; Severe AVS; Severe MVS; Moderate TVR Noted   • Hyperlipidemia     Controlled w/Meds   • Hypertension     Controlled w/Meds   • Hypokinesis 01/22/2019    Apical Noted on Cardiac Cath   • IFG (impaired fasting glucose)    • LAD stenosis 01/22/2019    Mid LAD Irregularities Noted on Cardiac Cath    • Left atrial dilatation 01/17/2019    Moderate-Severe Noted on Echo   • Left ventricular dilatation 01/17/2019    Noted on Echo/LEE   • Mild concentric left ventricular hypertrophy 01/17/2019    Noted on Echo/LEE   • Mitral annular calcification 01/17/2019    Severe Noted on Echo   • Moderate tricuspid valve regurgitation 01/24/2019    Noted on LEE   • Morbid obesity (CMS/[facility])    • NSTEMI (non-ST elevated myocardial infarction) (CMS/[facility])    • OCTAVIANO (obstructive sleep apnea)    •  Osteoarthritis    • Pulmonary hypertension (CMS/[facility]) 01/22/2019    Noted on Cardiac Cath   • Right bundle branch block 01/24/2019    Noted on LEE   • Severe aortic stenosis 01/22/2019    Noted on Cardiac Cath & LEE on 01/24/19; S/p AVR on 01/28/19 by Dr. Gaxiola   • Severe mitral valve stenosis 01/24/2019    Noted on LEE; S/p MVR on 01/28/19 by Dr. Gaxiola   • Shoulder pain, bilateral    • Skin yeast infection     Folds Under Skin--Nystatin/Diflucan        Past Surgical History:   Procedure Laterality Date   • AORTIC VALVE REPAIR/REPLACEMENT MITRAL VALVE REPAIR/REPLACEMENT N/A 1/28/2019    Procedure: LEE STERNOTOMY AORTIC VALVE REPLACEMENT, MITRAL VALVE REPLACEMENT, TRICUSPID VALVE REPAIR, MAZE PROCEDURE, CLOSURE OF LEFT ATRIAL APPENDAGE  AND PRP;  Surgeon: Scar Gaxiola MD;  Location[facility] MA[facility] OR;  Service: Cardiothoracic   • CARDIAC CATHETERIZATION N/A 1/22/2019    Procedure: Coronary angiography;  Surgeon: Rick Talavera MD;  [facility]VASIVE LOC[facility];  Service: Cardiology   • CARDIAC CATHETERIZATION N/A 1/22/2019    Procedure: Left Heart Cath;  Surgeon: Rick Talavera MD;  [facility];  Service: Cardiology   • CARDIAC CATHETERIZATION N/A 1/22/2019    Procedure: Right Heart Cath;  Surgeon: Rick Talavera MD;  [facility];  Service: Cardiology   • CARDIAC CATHETERIZATION N/A 1/22/2019    Procedure: Left ventriculography;  Surgeon: Rick Talavera MD;  [facility]ASIVE LOC[facility];  Service: Cardiology   • COLONOSCOPY     • HERNIA REPAIR     • INSERTION HEMODIALYSIS CATHETER N/A 2/8/2019    Procedure: tunnel CATHETER PLACEMENT WITH FLUROSCOPY;  Surgeon: Satish Stahl MD;  [facility];  Service: Vascular   • REPLACEMENT TOTAL KNEE Bilateral 2007/2009        Social History     Occupational History   • Occupation: retired   Tobacco Use   • Smoking status: Never Smoker   • Smokeless  tobacco: Never Used   Substance and Sexual Activity   • Alcohol use: Yes     Comment: Occ   • Drug use: No   • Sexual activity: Defer     Birth control/protection: Post-menopausal    Social History     Social History Narrative   • Not on file        Family History   Problem Relation Age of Onset   • Hypertension Other    • Diabetes Other    • Heart disease Neg Hx    • Stroke Neg Hx    • Arthritis Neg Hx    • Breast cancer Neg Hx        ROS: 14 point review of systems was performed and was negative except for documented findings in HPI and today's encounter.     Allergies: No Known Allergies  Constitutional:  Denies fever, shaking or chills   Eyes:  Denies change in visual acuity   HENT:  Denies nasal congestion or sore throat   Respiratory:  Denies cough or shortness of breath   Cardiovascular:  Denies chest pain or severe LE edema   GI:  Denies abdominal pain, nausea, vomiting, bloody stools or diarrhea   Musculoskeletal:  Numbness, tingling, or loss of motor function only as noted above in history of present illness.  : Denies painful urination or hematuria  Integument:  Denies rash, lesion or ulceration   Neurologic:  Denies headache or focal weakness  Endocrine:  Denies lymphadenopathy  Psych:  Denies confusion or change in mental status   Hem:  Denies active bleeding      Physical Exam: 73 y.o. female  Wt Readings from Last 3 Encounters:   05/17/19 113 kg (250 lb)   05/13/19 113 kg (250 lb)   05/13/19 113 kg (250 lb)       There is no height or weight on file to calculate BMI.  No height and weight on file for this encounter.  There were no vitals filed for this visit.  Vital signs reviewed.   General Appearance:    Alert, cooperative, in no acute distress                  Eyes: conjunctiva clear  ENT: external ears and nose atraumatic  CV: no peripheral edema  Resp: normal respiratory effort  Skin: no rashes or wounds; normal turgor  Psych: mood and affect appropriate  Lymph: no nodes appreciated  Neuro:  gross sensation intact  Vascular:  Palpable peripheral pulse in noted extremity    Ortho exam    Physical exam the left and right shoulder reveals no overlying skin changes no lymphedema lymphadenopathy the patient can actively flex to about 150 passively I get them to 160 abduction is similar external rotation is 40 internal rotation to there buttock.  Rotator cuff strength is 4+ over 5 with isometric strength testing no overlying skin changes.  Patient has reasonable cervical range of motion for their age no radicular symptoms and a normal elbow exam.  There are good distal pulses.           X-rays from August show degenerative changes  Assessment:   Bilateral shoulder pain with degenerative changes she is had a lot of things going on a lot of new medical problems including coronary artery bypass grafting kidney failure he gets reasonable to inject the shoulders  Plan:   Follow up as indicated.  Deborah Agudelo M.D.      Large Joint Arthrocentesis: L glenohumeral  Date/Time: 6/17/2019 3:53 PM  Consent given by: patient  Site marked: site marked  Timeout: Immediately prior to procedure a time out was called to verify the correct patient, procedure, equipment, support staff and site/side marked as required   Supporting Documentation  Indications: pain   Procedure Details  Location: shoulder - L glenohumeral  Preparation: Patient was prepped and draped in the usual sterile fashion  Needle gauge: 21.  Approach: posterior  Medications administered: 80 mg methylPREDNISolone acetate 80 MG/ML; 2 mL lidocaine (cardiac)  Patient tolerance: patient tolerated the procedure well with no immediate complications    Large Joint Arthrocentesis: R glenohumeral  Date/Time: 6/17/2019 3:53 PM  Consent given by: patient  Site marked: site marked  Timeout: Immediately prior to procedure a time out was called to verify the correct patient, procedure, equipment, support staff and site/side marked as required   Supporting  Documentation  Indications: pain   Procedure Details  Location: shoulder - R glenohumeral  Preparation: Patient was prepped and draped in the usual sterile fashion  Needle gauge: 21.  Approach: posterior  Medications administered: 2 mL lidocaine (cardiac); 80 mg methylPREDNISolone acetate 80 MG/ML  Patient tolerance: patient tolerated the procedure well with no immediate complications

## 2019-06-18 ENCOUNTER — ANTICOAGULATION VISIT (OUTPATIENT)
Dept: PHARMACY | Facility: HOSPITAL | Age: 74
End: 2019-06-18

## 2019-06-18 ENCOUNTER — TELEPHONE (OUTPATIENT)
Dept: PHARMACY | Facility: HOSPITAL | Age: 74
End: 2019-06-18

## 2019-06-18 DIAGNOSIS — I48.0 PAROXYSMAL ATRIAL FIBRILLATION (HCC): ICD-10-CM

## 2019-06-18 RX ORDER — METHYLPREDNISOLONE ACETATE 80 MG/ML
80 INJECTION, SUSPENSION INTRA-ARTICULAR; INTRALESIONAL; INTRAMUSCULAR; SOFT TISSUE
Status: COMPLETED | OUTPATIENT
Start: 2019-06-17 | End: 2019-06-17

## 2019-06-18 NOTE — PROGRESS NOTES
Anticoagulation Clinic Progress Note    Anticoagulation Summary  As of 2019    INR goal:   2.0-3.0   TTR:   --   INR used for dosin.23 (2019)   Warfarin maintenance plan:   2 mg every day   Weekly warfarin total:   14 mg   Plan last modified:   Celeste Rogers RPH (2019)   Next INR check:   2019   Target end date:       Indications    Paroxysmal atrial fibrillation (CMS/HCC) [I48.0]             Anticoagulation Episode Summary     INR check location:       Preferred lab:       Send INR reminders to:    [facility]  POOL    Comments:   Lab drawn at dialysis ([facility])      Anticoagulation Care Providers     Provider Role Specialty Phone number    Shiloh Gonzales, MORGAN Referring Cardiology [phone]          Clinic Interview:      INR History:  Anticoagulation Monitoring 2019   INR 2.23   INR Date 2019   INR Goal 2.0-3.0   Last Week Total 14 mg   Next Week Total 8 mg   Sun Hold (); Otherwise 2 mg   Mon Hold ()   Tue Hold (); Otherwise 2 mg   Wed 2 mg   Thu 2 mg   Fri 2 mg   Sat Hold (); Otherwise 2 mg   Visit Report -       Plan:  1. INR is Therapeutic today- see above in Anticoagulation Summary.   Will instruct Claudia Arnold to Continue their warfarin regimen- see above in Anticoagulation Summary.  She will be holding for 4 days prior to procedure next week.   2. Follow up in 2 weeks  3. They have been instructed to call if any changes in medications, doses, concerns, etc. Patient expresses understanding and has no further questions at this time.    Celeste Rogers RPH

## 2019-06-19 ENCOUNTER — APPOINTMENT (OUTPATIENT)
Dept: PREADMISSION TESTING | Facility: HOSPITAL | Age: 74
End: 2019-06-19

## 2019-06-19 ENCOUNTER — HOSPITAL ENCOUNTER (OUTPATIENT)
Dept: GENERAL RADIOLOGY | Facility: HOSPITAL | Age: 74
Discharge: HOME | End: 2019-06-19
Admitting: SURGERY

## 2019-06-19 VITALS
RESPIRATION RATE: 20 BRPM | DIASTOLIC BLOOD PRESSURE: 79 MMHG | OXYGEN SATURATION: 95 % | BODY MASS INDEX: 38.92 KG/M2 | SYSTOLIC BLOOD PRESSURE: 116 MMHG | HEART RATE: 63 BPM | TEMPERATURE: 97.6 F | HEIGHT: 67 IN | WEIGHT: 248 LBS

## 2019-06-19 LAB
ALBUMIN SERPL-MCNC: 3.8 G/DL (ref 3.5–5.2)
ALBUMIN/GLOB SERPL: 1.3 G/DL
ALP SERPL-CCNC: 63 U/L (ref 39–117)
ALT SERPL W P-5'-P-CCNC: 7 U/L (ref 1–33)
ANION GAP SERPL CALCULATED.3IONS-SCNC: 17.2 MMOL/L
APTT PPP: 30.3 SECONDS (ref 22.7–35.4)
AST SERPL-CCNC: 10 U/L (ref 1–32)
BILIRUB SERPL-MCNC: 0.4 MG/DL (ref 0.2–1.2)
BUN BLD-MCNC: 36 MG/DL (ref 8–23)
BUN/CREAT SERPL: 13.2 (ref 7–25)
CALCIUM SPEC-SCNC: 9.6 MG/DL (ref 8.6–10.5)
CHLORIDE SERPL-SCNC: 101 MMOL/L (ref 98–107)
CO2 SERPL-SCNC: 22.8 MMOL/L (ref 22–29)
CREAT BLD-MCNC: 2.72 MG/DL (ref 0.57–1)
DEPRECATED RDW RBC AUTO: 56.9 FL (ref 37–54)
ERYTHROCYTE [DISTWIDTH] IN BLOOD BY AUTOMATED COUNT: 15.6 % (ref 12.3–15.4)
GFR SERPL CREATININE-BSD FRML MDRD: 17 ML/MIN/1.73
GLOBULIN UR ELPH-MCNC: 2.9 GM/DL
GLUCOSE BLD-MCNC: 180 MG/DL (ref 65–99)
HCT VFR BLD AUTO: 37.5 % (ref 34–46.6)
HGB BLD-MCNC: 11.7 G/DL (ref 12–15.9)
MCH RBC QN AUTO: 31 PG (ref 26.6–33)
MCHC RBC AUTO-ENTMCNC: 31.2 G/DL (ref 31.5–35.7)
MCV RBC AUTO: 99.5 FL (ref 79–97)
PLATELET # BLD AUTO: 202 10*3/MM3 (ref 140–450)
PMV BLD AUTO: 10.3 FL (ref 6–12)
POTASSIUM BLD-SCNC: 3.7 MMOL/L (ref 3.5–5.2)
PROT SERPL-MCNC: 6.7 G/DL (ref 6–8.5)
RBC # BLD AUTO: 3.77 10*6/MM3 (ref 3.77–5.28)
SODIUM BLD-SCNC: 141 MMOL/L (ref 136–145)
WBC NRBC COR # BLD: 8.17 10*3/MM3 (ref 3.4–10.8)

## 2019-06-19 PROCEDURE — 80053 COMPREHEN METABOLIC PANEL: CPT | Performed by: SURGERY

## 2019-06-19 PROCEDURE — 71046 X-RAY EXAM CHEST 2 VIEWS: CPT

## 2019-06-19 PROCEDURE — 36415 COLL VENOUS BLD VENIPUNCTURE: CPT

## 2019-06-19 PROCEDURE — 85027 COMPLETE CBC AUTOMATED: CPT | Performed by: SURGERY

## 2019-06-19 PROCEDURE — 85730 THROMBOPLASTIN TIME PARTIAL: CPT | Performed by: SURGERY

## 2019-06-19 RX ORDER — TRAMADOL HYDROCHLORIDE 50 MG/1
50 TABLET ORAL EVERY 6 HOURS PRN
Refills: 0 | OUTPATIENT
Start: 2019-06-19

## 2019-06-19 NOTE — DISCHARGE INSTRUCTIONS
Take the following medications the morning of surgery with a small sip of water:  NONE    ARRIVAL TIME 6:30 AM    General Instructions:  • Do not eat solid food after midnight the night before surgery.  • You may drink clear liquids day of surgery but must stop at least one hour before your hospital arrival time.  • It is beneficial for you to have a clear drink that contains carbohydrates the day of surgery.  We suggest a 12 to 20 ounce bottle of Gatorade or Powerade for non-diabetic patients or a 12 to 20 ounce bottle of G2 or Powerade Zero for diabetic patients. (Pediatric patients, are not advised to drink a 12 to 20 ounce carbohydrate drink)    Clear liquids are liquids you can see through.  Nothing red in color.     Plain water                               Sports drinks  Sodas                                   Gelatin (Jell-O)  Fruit juices without pulp such as white grape juice and apple juice  Popsicles that contain no fruit or yogurt  Tea or coffee (no cream or milk added)  Gatorade / Powerade  G2 / Powerade Zero    • Infants may have breast milk up to four hours before surgery.  • Infants drinking formula may drink formula up to six hours before surgery.   • Patients who avoid smoking, chewing tobacco and alcohol for 4 weeks prior to surgery have a reduced risk of post-operative complications.  Quit smoking as many days before surgery as you can.  • Do not smoke, use chewing tobacco or drink alcohol the day of surgery.   • If applicable bring your C-PAP/ BI-PAP machine.  • Bring any papers given to you in the doctor’s office.  • Wear clean comfortable clothes and socks.  • Do not wear contact lenses, false eyelashes or make-up.  Bring a case for your glasses.   • Bring crutches or walker if applicable.  • Remove all piercings.  Leave jewelry and any other valuables at home.  • Hair extensions with metal clips must be removed prior to surgery.  • The Pre-Admission Testing nurse will instruct you to bring  medications if unable to obtain an accurate list in Pre-Admission Testing.        If you were given a blood bank ID arm band remember to bring it with you the day of surgery.    Preventing a Surgical Site Infection:  • For 2 to 3 days before surgery, avoid shaving with a razor because the razor can irritate skin and make it easier to develop an infection.    • Any areas of open skin can increase the risk of a post-operative wound infection by allowing bacteria to enter and travel throughout the body.  Notify your surgeon if you have any skin wounds / rashes even if it is not near the expected surgical site.  The area will need assessed to determine if surgery should be delayed until it is healed.  • The night prior to surgery sleep in a clean bed with clean clothing.  Do not allow pets to sleep with you.  • Shower on the morning of surgery using a fresh bar of anti-bacterial soap (such as Dial) and clean washcloth.  Dry with a clean towel and dress in clean clothing.  • Ask your surgeon if you will be receiving antibiotics prior to surgery.  • Make sure you, your family, and all healthcare providers clean their hands with soap and water or an alcohol based hand  before caring for you or your wound.    Day of surgery:  Upon arrival, a Pre-op nurse and Anesthesiologist will review your health history, obtain vital signs, and answer questions you may have.  The only belongings needed at this time will be a list of your home medications and if applicable your C-PAP/BI-PAP machine.  If you are staying overnight your family can leave the rest of your belongings in the car and bring them to your room later.  A Pre-op nurse will start an IV and you may receive medication in preparation for surgery, including something to help you relax.  Your family will be able to see you in the Pre-op area.  While you are in surgery your family should notify the waiting room  if they leave the waiting room area and  provide a contact phone number.    Please be aware that surgery does come with discomfort.  We want to make every effort to control your discomfort so please discuss any uncontrolled symptoms with your nurse.   Your doctor will most likely have prescribed pain medications.      If you are going home after surgery you will receive individualized written care instructions before being discharged.  A responsible adult must drive you to and from the hospital on the day of your surgery and stay with you for 24 hours.    If you are staying overnight following surgery, you will be transported to your hospital room following the recovery period.  [facility] has all private rooms.    You have received a list of surgical assistants for your reference.  If you have any questions please call Pre-Admission Testing at [phone].  Deductibles and co-payments are collected on the day of service. Please be prepared to pay the required co-pay, deductible or deposit on the day of service as defined by your plan.

## 2019-06-19 NOTE — TELEPHONE ENCOUNTER
She needs to get this from 1 of her other physicians sorry she has too many other medical problems going on

## 2019-06-26 ENCOUNTER — HOSPITAL ENCOUNTER (OUTPATIENT)
Facility: HOSPITAL | Age: 74
Setting detail: HOSPITAL OUTPATIENT SURGERY
Discharge: HOME | End: 2019-06-26
Attending: SURGERY | Admitting: SURGERY

## 2019-06-26 ENCOUNTER — ANESTHESIA (OUTPATIENT)
Dept: PERIOP | Facility: HOSPITAL | Age: 74
End: 2019-06-26

## 2019-06-26 ENCOUNTER — ANESTHESIA EVENT (OUTPATIENT)
Dept: PERIOP | Facility: HOSPITAL | Age: 74
End: 2019-06-26

## 2019-06-26 VITALS
OXYGEN SATURATION: 96 % | TEMPERATURE: 97.8 F | RESPIRATION RATE: 16 BRPM | DIASTOLIC BLOOD PRESSURE: 75 MMHG | HEIGHT: 67 IN | HEART RATE: 64 BPM | WEIGHT: 246 LBS | BODY MASS INDEX: 38.61 KG/M2 | SYSTOLIC BLOOD PRESSURE: 127 MMHG

## 2019-06-26 PROBLEM — Z99.2 DEPENDENCE ON RENAL DIALYSIS (HCC): Status: ACTIVE | Noted: 2019-06-26

## 2019-06-26 LAB
ANION GAP SERPL CALCULATED.3IONS-SCNC: 10.7 MMOL/L
BUN BLD-MCNC: 31 MG/DL (ref 8–23)
BUN/CREAT SERPL: 13.8 (ref 7–25)
CALCIUM SPEC-SCNC: 9.8 MG/DL (ref 8.6–10.5)
CHLORIDE SERPL-SCNC: 103 MMOL/L (ref 98–107)
CO2 SERPL-SCNC: 27.3 MMOL/L (ref 22–29)
CREAT BLD-MCNC: 2.24 MG/DL (ref 0.57–1)
GFR SERPL CREATININE-BSD FRML MDRD: 21 ML/MIN/1.73
GLUCOSE BLD-MCNC: 105 MG/DL (ref 65–99)
INR PPP: 1.11 (ref 0.9–1.1)
POTASSIUM BLD-SCNC: 3.9 MMOL/L (ref 3.5–5.2)
PROTHROMBIN TIME: 14 SECONDS (ref 11.7–14.2)
SODIUM BLD-SCNC: 141 MMOL/L (ref 136–145)

## 2019-06-26 PROCEDURE — 25010000003 CEFAZOLIN PER 500 MG: Performed by: SURGERY

## 2019-06-26 PROCEDURE — 85610 PROTHROMBIN TIME: CPT | Performed by: SURGERY

## 2019-06-26 PROCEDURE — 25010000002 HEPARIN (PORCINE) PER 1000 UNITS: Performed by: SURGERY

## 2019-06-26 PROCEDURE — 80048 BASIC METABOLIC PNL TOTAL CA: CPT | Performed by: SURGERY

## 2019-06-26 PROCEDURE — 25010000002 MIDAZOLAM PER 1 MG: Performed by: ANESTHESIOLOGY

## 2019-06-26 PROCEDURE — 25010000003 CEFAZOLIN IN DEXTROSE 2-4 GM/100ML-% SOLUTION: Performed by: SURGERY

## 2019-06-26 PROCEDURE — 25010000002 PROTAMINE SULFATE PER 10 MG: Performed by: ANESTHESIOLOGY

## 2019-06-26 PROCEDURE — 25010000002 PROPOFOL 10 MG/ML EMULSION: Performed by: ANESTHESIOLOGY

## 2019-06-26 PROCEDURE — 25010000002 HEPARIN (PORCINE) PER 1000 UNITS: Performed by: ANESTHESIOLOGY

## 2019-06-26 RX ORDER — FLUMAZENIL 0.1 MG/ML
0.2 INJECTION INTRAVENOUS AS NEEDED
Status: CANCELLED | OUTPATIENT
Start: 2019-06-26

## 2019-06-26 RX ORDER — NALOXONE HCL 0.4 MG/ML
0.2 VIAL (ML) INJECTION AS NEEDED
Status: CANCELLED | OUTPATIENT
Start: 2019-06-26

## 2019-06-26 RX ORDER — HEPARIN SODIUM 1000 [USP'U]/ML
INJECTION, SOLUTION INTRAVENOUS; SUBCUTANEOUS AS NEEDED
Status: DISCONTINUED | OUTPATIENT
Start: 2019-06-26 | End: 2019-06-26 | Stop reason: SURG

## 2019-06-26 RX ORDER — MIDAZOLAM HYDROCHLORIDE 1 MG/ML
2 INJECTION INTRAMUSCULAR; INTRAVENOUS
Status: DISCONTINUED | OUTPATIENT
Start: 2019-06-26 | End: 2019-06-26 | Stop reason: HOSPADM

## 2019-06-26 RX ORDER — FENTANYL CITRATE 50 UG/ML
50 INJECTION, SOLUTION INTRAMUSCULAR; INTRAVENOUS
Status: CANCELLED | OUTPATIENT
Start: 2019-06-26

## 2019-06-26 RX ORDER — HYDROCODONE BITARTRATE AND ACETAMINOPHEN 7.5; 325 MG/1; MG/1
1 TABLET ORAL ONCE AS NEEDED
Status: CANCELLED | OUTPATIENT
Start: 2019-06-26

## 2019-06-26 RX ORDER — OXYCODONE AND ACETAMINOPHEN 7.5; 325 MG/1; MG/1
1 TABLET ORAL ONCE AS NEEDED
Status: CANCELLED | OUTPATIENT
Start: 2019-06-26

## 2019-06-26 RX ORDER — LIDOCAINE HYDROCHLORIDE 10 MG/ML
0.5 INJECTION, SOLUTION EPIDURAL; INFILTRATION; INTRACAUDAL; PERINEURAL ONCE AS NEEDED
Status: DISCONTINUED | OUTPATIENT
Start: 2019-06-26 | End: 2019-06-26 | Stop reason: HOSPADM

## 2019-06-26 RX ORDER — PROMETHAZINE HYDROCHLORIDE 25 MG/ML
12.5 INJECTION, SOLUTION INTRAMUSCULAR; INTRAVENOUS ONCE AS NEEDED
Status: CANCELLED | OUTPATIENT
Start: 2019-06-26

## 2019-06-26 RX ORDER — PROMETHAZINE HYDROCHLORIDE 25 MG/1
25 TABLET ORAL ONCE AS NEEDED
Status: CANCELLED | OUTPATIENT
Start: 2019-06-26

## 2019-06-26 RX ORDER — FAMOTIDINE 10 MG/ML
20 INJECTION, SOLUTION INTRAVENOUS ONCE
Status: COMPLETED | OUTPATIENT
Start: 2019-06-26 | End: 2019-06-26

## 2019-06-26 RX ORDER — HYDROMORPHONE HYDROCHLORIDE 1 MG/ML
0.5 INJECTION, SOLUTION INTRAMUSCULAR; INTRAVENOUS; SUBCUTANEOUS
Status: CANCELLED | OUTPATIENT
Start: 2019-06-26

## 2019-06-26 RX ORDER — PROTAMINE SULFATE 10 MG/ML
INJECTION, SOLUTION INTRAVENOUS AS NEEDED
Status: DISCONTINUED | OUTPATIENT
Start: 2019-06-26 | End: 2019-06-26 | Stop reason: SURG

## 2019-06-26 RX ORDER — DIPHENHYDRAMINE HCL 25 MG
25 CAPSULE ORAL
Status: CANCELLED | OUTPATIENT
Start: 2019-06-26

## 2019-06-26 RX ORDER — DIPHENHYDRAMINE HYDROCHLORIDE 50 MG/ML
12.5 INJECTION INTRAMUSCULAR; INTRAVENOUS
Status: CANCELLED | OUTPATIENT
Start: 2019-06-26

## 2019-06-26 RX ORDER — EPHEDRINE SULFATE 50 MG/ML
5 INJECTION, SOLUTION INTRAVENOUS ONCE AS NEEDED
Status: CANCELLED | OUTPATIENT
Start: 2019-06-26

## 2019-06-26 RX ORDER — MIDAZOLAM HYDROCHLORIDE 1 MG/ML
1 INJECTION INTRAMUSCULAR; INTRAVENOUS
Status: DISCONTINUED | OUTPATIENT
Start: 2019-06-26 | End: 2019-06-26 | Stop reason: HOSPADM

## 2019-06-26 RX ORDER — HYDRALAZINE HYDROCHLORIDE 20 MG/ML
5 INJECTION INTRAMUSCULAR; INTRAVENOUS
Status: CANCELLED | OUTPATIENT
Start: 2019-06-26

## 2019-06-26 RX ORDER — ACETAMINOPHEN 325 MG/1
650 TABLET ORAL ONCE AS NEEDED
Status: CANCELLED | OUTPATIENT
Start: 2019-06-26

## 2019-06-26 RX ORDER — SODIUM CHLORIDE 0.9 % (FLUSH) 0.9 %
1-10 SYRINGE (ML) INJECTION AS NEEDED
Status: DISCONTINUED | OUTPATIENT
Start: 2019-06-26 | End: 2019-06-26 | Stop reason: HOSPADM

## 2019-06-26 RX ORDER — PROPOFOL 10 MG/ML
VIAL (ML) INTRAVENOUS AS NEEDED
Status: DISCONTINUED | OUTPATIENT
Start: 2019-06-26 | End: 2019-06-26 | Stop reason: SURG

## 2019-06-26 RX ORDER — PROPOFOL 10 MG/ML
VIAL (ML) INTRAVENOUS CONTINUOUS PRN
Status: DISCONTINUED | OUTPATIENT
Start: 2019-06-26 | End: 2019-06-26 | Stop reason: SURG

## 2019-06-26 RX ORDER — ONDANSETRON 2 MG/ML
4 INJECTION INTRAMUSCULAR; INTRAVENOUS ONCE AS NEEDED
Status: CANCELLED | OUTPATIENT
Start: 2019-06-26

## 2019-06-26 RX ORDER — CEFAZOLIN SODIUM 2 G/100ML
2 INJECTION, SOLUTION INTRAVENOUS ONCE
Status: COMPLETED | OUTPATIENT
Start: 2019-06-26 | End: 2019-06-26

## 2019-06-26 RX ORDER — PROMETHAZINE HYDROCHLORIDE 25 MG/1
25 SUPPOSITORY RECTAL ONCE AS NEEDED
Status: CANCELLED | OUTPATIENT
Start: 2019-06-26

## 2019-06-26 RX ORDER — LABETALOL HYDROCHLORIDE 5 MG/ML
5 INJECTION, SOLUTION INTRAVENOUS
Status: CANCELLED | OUTPATIENT
Start: 2019-06-26

## 2019-06-26 RX ORDER — PROMETHAZINE HYDROCHLORIDE 25 MG/ML
6.25 INJECTION, SOLUTION INTRAMUSCULAR; INTRAVENOUS
Status: CANCELLED | OUTPATIENT
Start: 2019-06-26

## 2019-06-26 RX ORDER — SODIUM CHLORIDE 9 MG/ML
9 INJECTION, SOLUTION INTRAVENOUS CONTINUOUS
Status: DISCONTINUED | OUTPATIENT
Start: 2019-06-26 | End: 2019-06-26 | Stop reason: HOSPADM

## 2019-06-26 RX ORDER — FENTANYL CITRATE 50 UG/ML
50 INJECTION, SOLUTION INTRAMUSCULAR; INTRAVENOUS
Status: DISCONTINUED | OUTPATIENT
Start: 2019-06-26 | End: 2019-06-26 | Stop reason: HOSPADM

## 2019-06-26 RX ORDER — SODIUM CHLORIDE, SODIUM LACTATE, POTASSIUM CHLORIDE, CALCIUM CHLORIDE 600; 310; 30; 20 MG/100ML; MG/100ML; MG/100ML; MG/100ML
9 INJECTION, SOLUTION INTRAVENOUS CONTINUOUS
Status: DISCONTINUED | OUTPATIENT
Start: 2019-06-26 | End: 2019-06-26 | Stop reason: HOSPADM

## 2019-06-26 RX ORDER — HYDROCODONE BITARTRATE AND ACETAMINOPHEN 5; 325 MG/1; MG/1
2 TABLET ORAL EVERY 6 HOURS PRN
Qty: 10 TABLET | Refills: 0 | Status: ON HOLD | OUTPATIENT
Start: 2019-06-26 | End: 2021-01-11

## 2019-06-26 RX ADMIN — CEFAZOLIN SODIUM 2 G: 2 INJECTION, SOLUTION INTRAVENOUS at 08:33

## 2019-06-26 RX ADMIN — PROPOFOL 25 MCG/KG/MIN: 10 INJECTION, EMULSION INTRAVENOUS at 08:35

## 2019-06-26 RX ADMIN — MIDAZOLAM 1 MG: 1 INJECTION INTRAMUSCULAR; INTRAVENOUS at 07:55

## 2019-06-26 RX ADMIN — PROPOFOL 20 MG: 10 INJECTION, EMULSION INTRAVENOUS at 08:49

## 2019-06-26 RX ADMIN — FAMOTIDINE 20 MG: 10 INJECTION INTRAVENOUS at 07:47

## 2019-06-26 RX ADMIN — PROTAMINE SULFATE 30 MG: 10 INJECTION, SOLUTION INTRAVENOUS at 09:49

## 2019-06-26 RX ADMIN — PROPOFOL 40 MG: 10 INJECTION, EMULSION INTRAVENOUS at 08:33

## 2019-06-26 RX ADMIN — HEPARIN SODIUM 3000 UNITS: 1000 INJECTION, SOLUTION INTRAVENOUS; SUBCUTANEOUS at 09:22

## 2019-06-26 RX ADMIN — SODIUM CHLORIDE: 9 INJECTION, SOLUTION INTRAVENOUS at 08:29

## 2019-06-26 NOTE — ANESTHESIA POSTPROCEDURE EVALUATION
Patient: Claudia Arnold    Procedure Summary     Date:  06/26/19 Room / Location:  [facility] OR  / [facility] MAIN OR    Anesthesia Start:  0826 Anesthesia Stop:  1007    Procedure:  LEFT ARM BRACHIAL CEPHALIC FISTULA (Left ) Diagnosis:      Surgeon:  Satish Stahl MD Provider:  Isaias Mcbride MD    Anesthesia Type:  MAC ASA Status:  4          Anesthesia Type: MAC  Last vitals  BP   145/72 (06/26/19 1006)   Temp   36.6 °C (97.8 °F) (06/26/19 1006)   Pulse   70 (06/26/19 1006)   Resp   16 (06/26/19 1006)     SpO2   96 % (06/26/19 1006)     Post Anesthesia Care and Evaluation    Patient location during evaluation: PACU  Patient participation: complete - patient participated  Level of consciousness: awake and alert  Pain management: adequate  Airway patency: patent  Anesthetic complications: No anesthetic complications    Cardiovascular status: acceptable  Respiratory status: acceptable  Hydration status: acceptable    Comments: ---------------------------               06/26/19                      1006         ---------------------------   BP:          145/72         Pulse:         70           Resp:          16           Temp:   36.6 °C (97.8 °F)   SpO2:          96%         ---------------------------

## 2019-06-26 NOTE — ANESTHESIA PREPROCEDURE EVALUATION
Anesthesia Evaluation     Patient summary reviewed and Nursing notes reviewed   NPO Solid Status: > 8 hours  NPO Liquid Status: > 2 hours           Airway   Mallampati: III  TM distance: >3 FB  Neck ROM: full  Possible difficult intubation and Large neck circumference  Dental - normal exam     Pulmonary - normal exam   (+) sleep apnea,   Cardiovascular - normal exam  Exercise tolerance: poor (<4 METS)    ECG reviewed  Rhythm: regular    (+) hypertension, valvular problems/murmurs AS, past MI , CAD, CABG, dysrhythmias Atrial Fib, CHF, hyperlipidemia,     ROS comment: · aortic valve area 0.84 ·     Left ventricular systolic function is low normal.  · Severe aortic valve stenosis is present.  · Moderate mitral valve stenosis is present  · Severe tricuspid valve regurgitation is present.  · Left atrial cavity size is severely dilated.  · Right ventricular cavity is mildly dilated.     Sinus rhythm  Atrial premature complex  Prolonged TN interval  RBBB and LAFB  Pacemaker activity is not currently demonstrated    Neuro/Psych  (+) psychiatric history,     GI/Hepatic/Renal/Endo    (+) obesity, morbid obesity,      Musculoskeletal     Abdominal  - normal exam    Bowel sounds: normal.   Substance History - negative use     OB/GYN negative ob/gyn ROS         Other   (+) arthritis                       Anesthesia Plan    ASA 4     MAC     Anesthetic plan, all risks, benefits, and alternatives have been provided, discussed and informed consent has been obtained with: patient (Daughter).    Plan discussed with attending.

## 2019-06-28 ENCOUNTER — TELEPHONE (OUTPATIENT)
Dept: FAMILY MEDICINE CLINIC | Facility: CLINIC | Age: 74
End: 2019-06-28

## 2019-07-18 LAB — INR PPP: 2.37

## 2019-07-23 ENCOUNTER — ANTICOAGULATION VISIT (OUTPATIENT)
Dept: PHARMACY | Facility: HOSPITAL | Age: 74
End: 2019-07-23

## 2019-07-23 DIAGNOSIS — I48.0 PAROXYSMAL ATRIAL FIBRILLATION (HCC): ICD-10-CM

## 2019-07-25 NOTE — PROGRESS NOTES
Anticoagulation Clinic Progress Note    Anticoagulation Summary  As of 2019    INR goal:   2.0-3.0   TTR:   21.2 % (1 mo)   INR used for dosin.37 (2019)   Warfarin maintenance plan:   2 mg every day   Weekly warfarin total:   14 mg   No change documented:   Celeste Rogers RPH   Plan last modified:   Celeste Rogers RPH (2019)   Next INR check:   8/15/2019   Target end date:       Indications    Paroxysmal atrial fibrillation (CMS/HCC) [I48.0]             Anticoagulation Episode Summary     INR check location:       Preferred lab:       Send INR reminders to:   [facility]  POOL    Comments:   Lab drawn at dialysis ([facility])      Anticoagulation Care Providers     Provider Role Specialty Phone number    Shiloh Gonzales APRN Referring Cardiology [phone]          Clinic Interview:  No pertinent clinical findings have been reported.    INR History:  Anticoagulation Monitoring 2019   INR 2.23 2.37   INR Date 2019   INR Goal 2.0-3.0 2.0-3.0   Trend - Same   Last Week Total 14 mg 14 mg   Next Week Total 8 mg 14 mg   Sun Hold (); Otherwise 2 mg 2 mg   Mon Hold () 2 mg   Tue Hold (); Otherwise 2 mg 2 mg   Wed 2 mg 2 mg   Thu 2 mg 2 mg   Fri 2 mg 2 mg   Sat Hold (); Otherwise 2 mg 2 mg   Visit Report - -       Plan:  1. INR is therapeutic - see above in Anticoagulation Summary.    Claudia Arnold to continue their warfarin regimen- see above in Anticoagulation Summary.  2. Follow up in 1 month at dialysis  3. Left VM for her to call us for results. They have been instructed to call if any changes in medications, doses, concerns, etc. Patient expresses understanding and has no further questions at this time.    Celeste Rogers RPH

## 2019-07-29 ENCOUNTER — ANTICOAGULATION VISIT (OUTPATIENT)
Dept: PHARMACY | Facility: HOSPITAL | Age: 74
End: 2019-07-29

## 2019-07-29 DIAGNOSIS — I48.0 PAROXYSMAL ATRIAL FIBRILLATION (HCC): ICD-10-CM

## 2019-07-29 NOTE — PROGRESS NOTES
Today, received INR >13.50 (collected 7/25; fax received 7/29/19 8:21 am). Question if could be contamination from heparin bolus surrounding dialysis, as INR on 7/18 was 2.37. Contacted pt - denies s/sx of bleeding. No acute changes identified that could have led to severely elevated INR. Contacted dialysis center at [facility] (P: [phone]); Provider on site already advised holding warfarin until INR recheck. [employer] did not  INR blood draw on 7/27; INR to be checked during dialysis tomorrow (7/30). Defer to provider on site for acute warfarin management (currently holding), as they receive INR results in a much more timely manner than the [facility]. Will f/u upon receiving tomorrow's INR result.

## 2019-07-30 LAB — INR PPP: 1.37

## 2019-08-02 ENCOUNTER — ANTICOAGULATION VISIT (OUTPATIENT)
Dept: PHARMACY | Facility: HOSPITAL | Age: 74
End: 2019-08-02

## 2019-08-02 DIAGNOSIS — I48.0 PAROXYSMAL ATRIAL FIBRILLATION (HCC): ICD-10-CM

## 2019-08-02 NOTE — PROGRESS NOTES
Anticoagulation Clinic Progress Note    Anticoagulation Summary  As of 2019    INR goal:   2.0-3.0   TTR:   25.7 % (1.4 mo)   INR used for dosin.37! (2019)   Warfarin maintenance plan:   2 mg every day   Weekly warfarin total:   14 mg   No change documented:   Brandy Cleveland RPH   Plan last modified:   Celeste Rogers RPH (2019)   Next INR check:   2019   Target end date:       Indications    Paroxysmal atrial fibrillation (CMS/HCC) [I48.0]             Anticoagulation Episode Summary     INR check location:       Preferred lab:       Send INR reminders to:    [facility]  POOL    Comments:   Lab drawn at dialysis ([facility])      Anticoagulation Care Providers     Provider Role Specialty Phone number    Shiloh Gonzales APRN Referring Cardiology [phone]          Drug interactions: has remained unchanged.  Diet: has remained unchanged.    Clinic Interview:  Patient Findings     Positives:   Missed doses    Negatives:   Signs/symptoms of thrombosis, Signs/symptoms of bleeding,   Laboratory test error suspected, Change in health, Change in alcohol use,   Change in activity, Upcoming invasive procedure, Emergency department   visit, Upcoming dental procedure, Extra doses, Change in medications,   Change in diet/appetite, Hospital admission, Bruising, Other complaints    Comments:   Warfarin held d/t elevated INR at previous draw. Held from    - . Spoke with patient and dialysis center told patient to resume   warfarin on . Taking 2mg daily now and agree with continuing this since   last INR 3 days prior. Told her to have center check INR on Tues at   latest.      Clinical Outcomes     Negatives:   Major bleeding event, Thromboembolic event,   Anticoagulation-related hospital admission, Anticoagulation-related ED   visit, Anticoagulation-related fatality    Comments:   Warfarin held d/t elevated INR at previous draw. Held from    - . Spoke with patient and  dialysis center told patient to resume   warfarin on 8/1. Taking 2mg daily now and agree with continuing this since   last INR 3 days prior. Told her to have center check INR on Tues at   latest.        INR History:  Anticoagulation Monitoring 6/18/2019 7/23/2019 8/2/2019   INR 2.23 2.37 1.37   INR Date 6/14/2019 7/18/2019 7/30/2019   INR Goal 2.0-3.0 2.0-3.0 2.0-3.0   Trend - Same Same   Last Week Total 14 mg 14 mg 2 mg   Next Week Total 8 mg 14 mg 14 mg   Sun Hold (6/23); Otherwise 2 mg 2 mg 2 mg   Mon Hold (6/24) 2 mg 2 mg   Tue Hold (6/25); Otherwise 2 mg 2 mg -   Wed 2 mg 2 mg -   Thu 2 mg 2 mg -   Fri 2 mg 2 mg 2 mg   Sat Hold (6/22); Otherwise 2 mg 2 mg 2 mg   Visit Report - - -   Some recent data might be hidden       Plan:  1. INR is Subtherapeutic today- see above in Anticoagulation Summary.   Will instruct Claudia GABRIELLE Arnold to Continue their warfarin regimen- see above in Anticoagulation Summary.  2. Follow up in 4 days  3. Pt has agreed to only be called if INR out of range. They have been instructed to call if any changes in medications, doses, concerns, etc. Patient expresses understanding and has no further questions at this time.    Brandy Cleveland [facility]

## 2019-08-15 LAB — INR PPP: 1.69

## 2019-08-15 RX ORDER — BUMETANIDE 2 MG/1
4 TABLET ORAL DAILY
Qty: 60 TABLET | Refills: 6 | Status: SHIPPED | OUTPATIENT
Start: 2019-08-15 | End: 2020-03-09

## 2019-08-19 ENCOUNTER — ANTICOAGULATION VISIT (OUTPATIENT)
Dept: PHARMACY | Facility: HOSPITAL | Age: 74
End: 2019-08-19

## 2019-08-19 DIAGNOSIS — I48.0 PAROXYSMAL ATRIAL FIBRILLATION (HCC): ICD-10-CM

## 2019-08-19 NOTE — PROGRESS NOTES
Anticoagulation Clinic Progress Note    Anticoagulation Summary  As of 2019    INR goal:   2.0-3.0   TTR:   18.6 % (1.9 mo)   INR used for dosin.69! (8/15/2019)   Warfarin maintenance plan:   2 mg every day   Weekly warfarin total:   14 mg   Plan last modified:   Celeste Rogers RPH (2019)   Next INR check:   2019   Target end date:       Indications    Paroxysmal atrial fibrillation (CMS/HCC) [I48.0]             Anticoagulation Episode Summary     INR check location:       Preferred lab:       [facility]    Comments:   Lab drawn at dialysis ([facility])      Anticoagulation Care Providers     Provider Role Specialty Phone number    Shiloh Gonzales APRN Referring Cardiology [phone]          Clinic Interview:      INR History:  Anticoagulation Monitoring 2019   INR 2.37 1.37 1.69   INR Date 2019 2019 8/15/2019   INR Goal 2.0-3.0 2.0-3.0 2.0-3.0   Trend Same Same Same   Last Week Total 14 mg 2 mg 14 mg   Next Week Total 14 mg 14 mg 15 mg   Sun 2 mg 2 mg 2 mg   Mon 2 mg 2 mg 3 mg (); Otherwise 2 mg   Tue 2 mg - 2 mg   Wed 2 mg - 2 mg   Thu 2 mg - 2 mg   Fri 2 mg 2 mg 2 mg   Sat 2 mg 2 mg 2 mg   Visit Report - - -   Some recent data might be hidden       Plan:  1. INR is Subtherapeutic today- see above in Anticoagulation Summary.   Will instruct Claudia GABRIELLE Arnold to take warfarin 3mg dose today only then continue their warfarin regimen- see above in Anticoagulation Summary.  2. Follow up in 2 weeks  3. Spoke with pt on phone today.They have been instructed to call if any changes in medications, doses, concerns, etc. Patient expresses understanding and has no further questions at this time.    Celeste Rogers RPH

## 2019-08-23 LAB — INR PPP: 2.48

## 2019-08-26 ENCOUNTER — ANTICOAGULATION VISIT (OUTPATIENT)
Dept: PHARMACY | Facility: HOSPITAL | Age: 74
End: 2019-08-26

## 2019-08-26 DIAGNOSIS — I48.0 PAROXYSMAL ATRIAL FIBRILLATION (HCC): ICD-10-CM

## 2019-08-26 NOTE — PROGRESS NOTES
Anticoagulation Clinic Progress Note    Anticoagulation Summary  As of 2019    INR goal:   2.0-3.0   TTR:   23.2 % (2.2 mo)   INR used for dosin.48 (2019)   Warfarin maintenance plan:   2 mg every day   Weekly warfarin total:   14 mg   No change documented:   Jalen Temple RPH   Plan last modified:   Celeste Rogers RPH (2019)   Next INR check:   2019   Target end date:       Indications    Paroxysmal atrial fibrillation (CMS/HCC) [I48.0]             Anticoagulation Episode Summary     INR check location:       Preferred lab:       Send INR reminders to:   [facility]  POOL    Comments:   Lab drawn at dialysis ([facility])      Anticoagulation Care Providers     Provider Role Specialty Phone number    Shiloh Gonzales APRN Referring Cardiology [phone]            Clinic Interview:  Patient Findings     Negatives:   Signs/symptoms of thrombosis, Signs/symptoms of bleeding,   Laboratory test error suspected, Change in health, Change in alcohol use,   Change in activity, Upcoming invasive procedure, Emergency department   visit, Upcoming dental procedure, Missed doses, Extra doses, Change in   medications, Change in diet/appetite, Hospital admission, Bruising, Other   complaints      Clinical Outcomes     Negatives:   Major bleeding event, Thromboembolic event,   Anticoagulation-related hospital admission, Anticoagulation-related ED   visit, Anticoagulation-related fatality        INR History:  Anticoagulation Monitoring 2019   INR 1.37 1.69 2.48   INR Date 2019 8/15/2019 2019   INR Goal 2.0-3.0 2.0-3.0 2.0-3.0   Trend Same Same Same   Last Week Total 2 mg 14 mg 15 mg   Next Week Total 14 mg 15 mg 14 mg   Sun 2 mg 2 mg 2 mg   Mon 2 mg 3 mg (); Otherwise 2 mg 2 mg   Tue - 2 mg 2 mg   Wed - 2 mg 2 mg   Thu - 2 mg 2 mg   Fri 2 mg 2 mg 2 mg   Sat 2 mg 2 mg 2 mg   Visit Report - - -   Some recent data might be hidden       Plan:  1. INR is  Therapeutic today- see above in Anticoagulation Summary.   Will instruct Claudia Arnold to Continue their warfarin regimen- see above in Anticoagulation Summary.  2. Follow up in 1 week  3. They have been instructed to call if any changes in medications, doses, concerns, etc. Patient expresses understanding and has no further questions at this time.    Jalen Temple [facility]

## 2019-09-06 LAB — INR PPP: 13.35

## 2019-09-09 ENCOUNTER — ANTICOAGULATION VISIT (OUTPATIENT)
Dept: PHARMACY | Facility: HOSPITAL | Age: 74
End: 2019-09-09

## 2019-09-09 DIAGNOSIS — I48.0 PAROXYSMAL ATRIAL FIBRILLATION (HCC): ICD-10-CM

## 2019-09-10 LAB — INR PPP: 2.01

## 2019-09-10 NOTE — PROGRESS NOTES
Anticoagulation Clinic Progress Note    Anticoagulation Summary  As of 2019    INR goal:   2.0-3.0   TTR:   19.3 % (2.8 mo)   INR used for dosin.35! (2019)   Warfarin maintenance plan:   2 mg every day   Weekly warfarin total:   14 mg   Plan last modified:   Celeste Rogers RPH (2019)   Next INR check:   2019   Target end date:       Indications    Paroxysmal atrial fibrillation (CMS/HCC) [I48.0]             Anticoagulation Episode Summary     INR check location:       Preferred lab:       Send INR reminders to:   [facility]  POOL    Comments:   Lab drawn at dialysis ([facility])      Anticoagulation Care Providers     Provider Role Specialty Phone number    Shiloh Gonzales APRN Referring Cardiology [phone]          Clinic Interview:      INR History:  Anticoagulation Monitoring 2019   INR 1.69 2.48 13.35   INR Date 8/15/2019 2019 2019   INR Goal 2.0-3.0 2.0-3.0 2.0-3.0   Trend Same Same Same   Last Week Total 14 mg 15 mg 14 mg   Next Week Total 15 mg 14 mg 12 mg   Sun 2 mg 2 mg 2 mg   Mon 3 mg (); Otherwise 2 mg 2 mg Hold ()   Tue 2 mg 2 mg 2 mg   Wed 2 mg 2 mg 2 mg   Thu 2 mg 2 mg 2 mg   Fri 2 mg 2 mg 2 mg   Sat 2 mg 2 mg 2 mg   Visit Report - - -   Some recent data might be hidden       Plan:  1. INR is Supratherapeutic last week but likely due to error drawing lab at dialysis. This week rechecked and INR was back in range.- see above in Anticoagulation Summary.   Will instruct Claudia Arnold to Continue their warfarin regimen- see above in Anticoagulation Summary.  She had held one dose while waiting to recheck lab result.  2. Follow up in 1 week in dialysis  3. Spoke with pt on phone today.They have been instructed to call if any changes in medications, doses, concerns, etc. Patient expresses understanding and has no further questions at this time.    Celeste Rogers RPH

## 2019-09-16 ENCOUNTER — OFFICE VISIT (OUTPATIENT)
Dept: ORTHOPEDIC SURGERY | Facility: CLINIC | Age: 74
End: 2019-09-16

## 2019-09-16 VITALS — WEIGHT: 238 LBS | TEMPERATURE: 98.7 F | HEIGHT: 67 IN | BODY MASS INDEX: 37.35 KG/M2

## 2019-09-16 DIAGNOSIS — G89.29 CHRONIC PAIN OF BOTH SHOULDERS: Primary | ICD-10-CM

## 2019-09-16 DIAGNOSIS — M19.019 PRIMARY LOCALIZED OSTEOARTHROSIS OF SHOULDER REGION, UNSPECIFIED LATERALITY: ICD-10-CM

## 2019-09-16 DIAGNOSIS — M25.511 CHRONIC PAIN OF BOTH SHOULDERS: Primary | ICD-10-CM

## 2019-09-16 DIAGNOSIS — M23.8X1 JOINT LAXITY OF RIGHT KNEE: ICD-10-CM

## 2019-09-16 DIAGNOSIS — M25.512 CHRONIC PAIN OF BOTH SHOULDERS: Primary | ICD-10-CM

## 2019-09-16 PROCEDURE — 99213 OFFICE O/P EST LOW 20 MIN: CPT | Performed by: ORTHOPAEDIC SURGERY

## 2019-09-16 PROCEDURE — 20610 DRAIN/INJ JOINT/BURSA W/O US: CPT | Performed by: ORTHOPAEDIC SURGERY

## 2019-09-16 PROCEDURE — 73030 X-RAY EXAM OF SHOULDER: CPT | Performed by: ORTHOPAEDIC SURGERY

## 2019-09-16 RX ADMIN — METHYLPREDNISOLONE ACETATE 80 MG: 80 INJECTION, SUSPENSION INTRA-ARTICULAR; INTRALESIONAL; INTRAMUSCULAR; SOFT TISSUE at 15:50

## 2019-09-16 NOTE — PROGRESS NOTES
Patient: lCaudia Arnold  YOB: 1945  Date of Service: 9/16/2019    Chief Complaints:   Chief Complaint   Patient presents with   • Right Shoulder - Follow-up, Pain   • Left Shoulder - Follow-up, Pain     Bilateral shoulder pain and right knee weakness  Subjective:    History of Present Illness: Pt is seen in the office today with complaints of Chief Complaint   Patient presents with   • Right Shoulder - Follow-up, Pain   • Left Shoulder - Follow-up, Pain   .  Patient is here for bilateral shoulder injection she has known degenerative changes health issues seem to be calming down have her therapist is concerned about hyperextension of the right knee she is status post knee replacement many years ago and now when she walks she has hyperextension her therapist wanted us to look at it        Allergies: No Known Allergies    Medications:   Home Medications:  Current Outpatient Medications on File Prior to Visit   Medication Sig   • acetaminophen (TYLENOL) 325 MG tablet Take 650 mg by mouth 3 (Three) Times a Day. Takes tid at 0600, 1400, 2200   • bumetanide (BUMEX) 2 MG tablet TAKE 2 TABLETS BY MOUTH DAILY   • busPIRone (BUSPAR) 10 MG tablet Take 1 tablet by mouth 3 (Three) Times a Day. (Patient taking differently: Take 10 mg by mouth 2 (Two) Times a Day.)   • cephalexin (KEFLEX) 500 MG capsule Take 1 capsule by mouth 2 (Two) Times a Day.   • cetaphil (CETAPHIL) lotion Apply  topically to the appropriate area as directed Every 12 (Twelve) Hours.   • docusate sodium (COLACE) 100 MG capsule Take 1 capsule by mouth 2 (Two) Times a Day.   • escitalopram (LEXAPRO) 10 MG tablet Take 1 tablet by mouth Daily. (Patient taking differently: Take 10 mg by mouth Every Morning.)   • famotidine (PEPCID) 20 MG tablet Take 1 tablet by mouth Daily. (Patient taking differently: Take 20 mg by mouth As Needed.)   • HYDROcodone-acetaminophen (NORCO) 5-325 MG per tablet Take 2 tablets by mouth Every 6 (Six) Hours As Needed for  Moderate Pain .   • hydrocortisone 1 % cream Apply 1 application topically to the appropriate area as directed Every 6 (Six) Hours.   • hydrocortisone 2.5 % cream Apply  topically to the appropriate area as directed 2 (Two) Times a Day. (Patient taking differently: Apply 1 application topically to the appropriate area as directed As Needed.)   • ondansetron (ZOFRAN) 4 MG tablet Take 1 tablet by mouth Every 8 (Eight) Hours As Needed for Nausea or Vomiting.   • traMADol (ULTRAM) 50 MG tablet Take 50 mg by mouth Every 6 (Six) Hours As Needed for Moderate Pain .   • warfarin (COUMADIN) 1 MG tablet Take 1 tablet by mouth Every Night.   • warfarin (COUMADIN) 2 MG tablet Take 1 tablet by mouth Daily. (Patient taking differently: Take 2 mg by mouth Every Night. STOPPED 6/22/19 PER DR ANDERSON)     No current facility-administered medications on file prior to visit.      Current Medications:  Scheduled Meds:  Continuous Infusions:  No current facility-administered medications for this visit.   PRN Meds:.    I have reviewed the patient's medical history in detail and updated the computerized patient record.  Review and summarization of old records include:    Past Medical History:   Diagnosis Date   • A-fib (CMS/[facility])     Meds   • Arthritis     w/Difficult Mobility   • Bilateral lower extremity edema     Legs   • CAD (coronary artery disease)     Affecting LAD    • Cardiomyopathy (CMS/[facility])    • CHF (congestive heart failure) (CMS/[facility])    • Chronic fatigue    • Dialysis patient (CMS/[facility])     TUESDAY THURSDAY SATURDAY   • Generalized anxiety disorder    • GERD (gastroesophageal reflux disease)    • Hernia, inguinal     Hx Repair   • History of echocardiogram 1/17/19-[facility]    The L Ventricular Cavity is Mild-to-Moderately Dilated; Left Ventricular Wall Thickness is Consistent w/Mild Concentric Hypertrophy; Left Atrial Cavity Size is Moderate-to-Severely Dilated; Severe AVS; Severe MVS; Moderate TVR Noted   • History of  transfusion    • Hyperlipidemia     Controlled w/Meds   • Hypertension     Controlled w/Meds   • Hypokinesis 01/22/2019    Apical Noted on Cardiac Cath   • IFG (impaired fasting glucose)    • LAD stenosis 01/22/2019    Mid LAD Irregularities Noted on Cardiac Cath    • Left atrial dilatation 01/17/2019    Moderate-Severe Noted on Echo   • Left ventricular dilatation 01/17/2019    Noted on Echo/LEE   • Mild concentric left ventricular hypertrophy 01/17/2019    Noted on Echo/LEE   • Mitral annular calcification 01/17/2019    Severe Noted on Echo   • Moderate tricuspid valve regurgitation 01/24/2019    Noted on LEE   • Morbid obesity (CMS/[facility])    • NSTEMI (non-ST elevated myocardial infarction) (CMS/[facility])    • OCTAVIANO (obstructive sleep apnea)    • Osteoarthritis    • Pulmonary hypertension (CMS/[facility]) 01/22/2019    Noted on Cardiac Cath   • Right bundle branch block 01/24/2019    Noted on LEE   • Severe aortic stenosis 01/22/2019    Noted on Cardiac Cath & LEE on 01/24/19; S/p AVR on 01/28/19 by Dr. Gaxiola   • Severe mitral valve stenosis 01/24/2019    Noted on LEE; S/p MVR on 01/28/19 by Dr. Gaxiola   • Shoulder pain, bilateral    • Skin yeast infection     Folds Under Skin--Nystatin/Diflucan        Past Surgical History:   Procedure Laterality Date   • AORTIC VALVE REPAIR/REPLACEMENT MITRAL VALVE REPAIR/REPLACEMENT N/A 1/28/2019    Procedure: LEE STERNOTOMY AORTIC VALVE REPLACEMENT, MITRAL VALVE REPLACEMENT, TRICUSPID VALVE REPAIR, MAZE PROCEDURE, CLOSURE OF LEFT ATRIAL APPENDAGE  AND PRP;  Surgeon: Scar Gaxiola MD;  Location: [facility] OR;  Service: Cardiothoracic   • ARTERIOVENOUS FISTULA/SHUNT SURGERY Left 6/26/2019    Procedure: LEFT ARM BRACHIAL CEPHALIC FISTULA;  Surgeon: Satish Stahl MD;  Location: [facility] OR;  Service: Vascular   • CARDIAC CATHETERIZATION N/A 1/22/2019    Procedure: Coronary angiography;  Surgeon: Rick Talavera MD;  Location: [facility] CATH INVASIVE LOCATION;  Service:  Cardiology   • CARDIAC CATHETERIZATION N/A 1/22/2019    Procedure: Left Heart Cath;  Surgeon: Rick Talavera MD;  Location:  [facility] CATH INVASIVE LOCATION;  Service: Cardiology   • CARDIAC CATHETERIZATION N/A 1/22/2019    Procedure: Right Heart Cath;  Surgeon: Rick Talavera MD;  Location:  [facility] CATH INVASIVE LOCATION;  Service: Cardiology   • CARDIAC CATHETERIZATION N/A 1/22/2019    Procedure: Left ventriculography;  Surgeon: Rick Talavera MD;  Location:  [facility] CATH INVASIVE LOCATION;  Service: Cardiology   • CARDIAC SURGERY     • COLONOSCOPY     • HERNIA REPAIR     • INSERTION HEMODIALYSIS CATHETER N/A 2/8/2019    Procedure: tunnel CATHETER PLACEMENT WITH FLUROSCOPY;  Surgeon: Satish Stahl MD;  Location: [facility] MAIN OR;  Service: Vascular   • REPLACEMENT TOTAL KNEE Bilateral 2007/2009        Social History     Occupational History   • Occupation: retired   Tobacco Use   • Smoking status: Never Smoker   • Smokeless tobacco: Never Used   Substance and Sexual Activity   • Alcohol use: Yes     Comment: Occ   • Drug use: No   • Sexual activity: Defer     Birth control/protection: Post-menopausal    Social History     Social History Narrative   • Not on file        Family History   Problem Relation Age of Onset   • Hypertension Other    • Diabetes Other    • Heart disease Neg Hx    • Stroke Neg Hx    • Arthritis Neg Hx    • Breast cancer Neg Hx    • Malig Hyperthermia Neg Hx        ROS: 14 point review of systems was performed and was negative except for documented findings in HPI and today's encounter.     Allergies: No Known Allergies  Constitutional:  Denies fever, shaking or chills   Eyes:  Denies change in visual acuity   HENT:  Denies nasal congestion or sore throat   Respiratory:  Denies cough or shortness of breath   Cardiovascular:  Denies chest pain or severe LE edema   GI:  Denies abdominal pain, nausea, vomiting, bloody stools or diarrhea   Musculoskeletal:  Numbness,  tingling, or loss of motor function only as noted above in history of present illness.  : Denies painful urination or hematuria  Integument:  Denies rash, lesion or ulceration   Neurologic:  Denies headache or focal weakness  Endocrine:  Denies lymphadenopathy  Psych:  Denies confusion or change in mental status   Hem:  Denies active bleeding      Physical Exam: 74 y.o. female  Wt Readings from Last 3 Encounters:   09/16/19 108 kg (238 lb)   06/26/19 112 kg (246 lb)   06/19/19 112 kg (248 lb)       Body mass index is 37.28 kg/m².  Facility age limit for growth percentiles is 20 years.  Vitals:    09/16/19 1548   Temp: 98.7 °F (37.1 °C)     Vital signs reviewed.   General Appearance:    Alert, cooperative, in no acute distress                  Eyes: conjunctiva clear  ENT: external ears and nose atraumatic  CV: no peripheral edema  Resp: normal respiratory effort  Skin: no rashes or wounds; normal turgor  Psych: mood and affect appropriate  Lymph: no nodes appreciated  Neuro: gross sensation intact  Vascular:  Palpable peripheral pulse in noted extremity    Ortho exam    Physical exam the left and right shoulder reveals no overlying skin changes no lymphedema lymphadenopathy the patient can actively flex to about 150 passively I get them to 160 abduction is similar external rotation is 40 internal rotation to there buttock.  Rotator cuff strength is 4+ over 5 with isometric strength testing no overlying skin changes.  Patient has reasonable cervical range of motion for their age no radicular symptoms and a normal elbow exam.  There are good distal pulses.    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  Ice, elevate, and rest as needed.  Discussed conservative measures of pain control including ice, bracing.  Also talked about the importance of strengthening and maintaining ideal body weight    Deborah Agudelo M.D.        Large Joint Arthrocentesis: L glenohumeral  Date/Time: 9/16/2019 3:50 PM  Consent given by: patient  Site marked: site marked  Timeout: Immediately prior to procedure a time out was called to verify the correct patient, procedure, equipment, support staff and site/side marked as required   Supporting Documentation  Indications: pain   Procedure Details  Location: shoulder - L glenohumeral  Preparation: Patient was prepped and draped in the usual sterile fashion  Needle gauge: 21.  Approach: posterior  Medications administered: 80 mg methylPREDNISolone acetate 80 MG/ML; 4 mL lidocaine (cardiac)  Patient tolerance: patient tolerated the procedure well with no immediate complications    Large Joint Arthrocentesis: R glenohumeral  Date/Time: 9/16/2019 3:50 PM  Consent given by: patient  Site marked: site marked  Timeout: Immediately prior to procedure a time out was called to verify the correct patient, procedure, equipment, support staff and site/side marked as required   Supporting Documentation  Indications: pain   Procedure Details  Location: shoulder - R glenohumeral  Preparation: Patient was prepped and draped in the usual sterile fashion  Needle gauge: 21.  Approach: posterior  Medications administered: 80 mg methylPREDNISolone  acetate 80 MG/ML; 4 mL lidocaine (cardiac)  Patient tolerance: patient tolerated the procedure well with no immediate complications

## 2019-09-17 RX ORDER — METHYLPREDNISOLONE ACETATE 80 MG/ML
80 INJECTION, SUSPENSION INTRA-ARTICULAR; INTRALESIONAL; INTRAMUSCULAR; SOFT TISSUE
Status: COMPLETED | OUTPATIENT
Start: 2019-09-16 | End: 2019-09-16

## 2019-09-19 LAB — INR PPP: 1.94

## 2019-09-23 ENCOUNTER — ANTICOAGULATION VISIT (OUTPATIENT)
Dept: PHARMACY | Facility: HOSPITAL | Age: 74
End: 2019-09-23

## 2019-09-23 DIAGNOSIS — I48.0 PAROXYSMAL ATRIAL FIBRILLATION (HCC): ICD-10-CM

## 2019-09-23 NOTE — PROGRESS NOTES
Anticoagulation Clinic Progress Note    Anticoagulation Summary  As of 2019    INR goal:   2.0-3.0   TTR:   18.8 % (3.1 mo)   INR used for dosin.94! (2019)   Warfarin maintenance plan:   2 mg every day   Weekly warfarin total:   14 mg   No change documented:   Tana Keller RP   Plan last modified:   Celeste Rogers RPH (2019)   Next INR check:   2019   Target end date:       Indications    Paroxysmal atrial fibrillation (CMS/HCC) [I48.0]             Anticoagulation Episode Summary     INR check location:       Preferred lab:       Send INR reminders to:    [facility]  POOL    Comments:   Lab drawn at dialysis ([email or web address])      Anticoagulation Care Providers     Provider Role Specialty Phone number    Shiloh Gonzales APRN Referring Cardiology [phone]          Clinic Interview:  No pertinent clinical findings have been reported.    INR History:  Anticoagulation Monitoring 2019   INR 2.48 13.35 1.94   INR Date 2019   INR Goal 2.0-3.0 2.0-3.0 2.0-3.0   Trend Same Same Same   Last Week Total 15 mg 14 mg 14 mg   Next Week Total 14 mg 12 mg 14 mg   Sun 2 mg 2 mg -   Mon 2 mg Hold () 2 mg   Tue 2 mg 2 mg 2 mg   Wed 2 mg 2 mg 2 mg   Thu 2 mg 2 mg -   Fri 2 mg 2 mg -   Sat 2 mg 2 mg -   Visit Report - - -   Some recent data might be hidden       Plan:  1. INR is 1.94  today- see above in Anticoagulation Summary.   Spoke with  Claudia Arnold to continue their warfarin regimen - see above in Anticoagulation Summary.  2. Follow up in 1 week at dialysis  3. Pt has agreed to only be called if INR out of range. They have been instructed to call if any changes in medications, doses, concerns, etc. Patient expresses understanding and has no further questions at this time.    Tana Keller [facility]

## 2019-09-27 LAB — INR PPP: 2.01

## 2019-09-30 ENCOUNTER — ANTICOAGULATION VISIT (OUTPATIENT)
Dept: PHARMACY | Facility: HOSPITAL | Age: 74
End: 2019-09-30

## 2019-09-30 DIAGNOSIS — I48.0 PAROXYSMAL ATRIAL FIBRILLATION (HCC): ICD-10-CM

## 2019-09-30 NOTE — PROGRESS NOTES
Anticoagulation Clinic Progress Note    Anticoagulation Summary  As of 2019    INR goal:   2.0-3.0   TTR:   18.5 % (3.3 mo)   INR used for dosin.01 (2019)   Warfarin maintenance plan:   2 mg every day   Weekly warfarin total:   14 mg   No change documented:   Jalen Temple RPH   Plan last modified:   Celeste Rogers RPH (2019)   Next INR check:   10/3/2019   Target end date:       Indications    Paroxysmal atrial fibrillation (CMS/HCC) [I48.0]             Anticoagulation Episode Summary     INR check location:       Preferred lab:       Send INR reminders to:    [facility]  POOL    Comments:   Lab drawn at dialysis ([facility])      Anticoagulation Care Providers     Provider Role Specialty Phone number    Shiloh Gonzales APRN Referring Cardiology [phone]          Clinic Interview:  Patient Findings     Positives:   Change in medications    Negatives:   Signs/symptoms of thrombosis, Signs/symptoms of bleeding,   Laboratory test error suspected, Change in health, Change in alcohol use,   Change in activity, Upcoming invasive procedure, Emergency department   visit, Upcoming dental procedure, Missed doses, Extra doses, Change in   diet/appetite, Hospital admission, Bruising, Other complaints    Comments:   Starting probiotic today.      Clinical Outcomes     Negatives:   Major bleeding event, Thromboembolic event,   Anticoagulation-related hospital admission, Anticoagulation-related ED   visit, Anticoagulation-related fatality    Comments:   Starting probiotic today.        INR History:  Anticoagulation Monitoring 2019   INR 13.35 1.94 2.01   INR Date 2019   INR Goal 2.0-3.0 2.0-3.0 2.0-3.0   Trend Same Same Same   Last Week Total 14 mg 14 mg 14 mg   Next Week Total 12 mg 14 mg 14 mg   Sun 2 mg - -   Mon Hold () 2 mg 2 mg   Tue 2 mg 2 mg 2 mg   Wed 2 mg 2 mg 2 mg   Thu 2 mg - -   Fri 2 mg - -   Sat 2 mg - -   Visit Report - -  -   Some recent data might be hidden       Plan:  1. INR is Therapeutic today- see above in Anticoagulation Summary.   Will instruct Claudia Arnold to Continue their warfarin regimen- see above in Anticoagulation Summary.  2. Follow up in 1 week  3. They have been instructed to call if any changes in medications, doses, concerns, etc. Patient expresses understanding and has no further questions at this time.    Jalen Temple, [facility]

## 2019-10-03 DIAGNOSIS — L98.9 SKIN LESION: ICD-10-CM

## 2019-10-10 LAB — INR PPP: 1.94

## 2019-10-14 ENCOUNTER — ANTICOAGULATION VISIT (OUTPATIENT)
Dept: PHARMACY | Facility: HOSPITAL | Age: 74
End: 2019-10-14

## 2019-10-14 DIAGNOSIS — I48.0 PAROXYSMAL ATRIAL FIBRILLATION (HCC): ICD-10-CM

## 2019-10-14 NOTE — PROGRESS NOTES
Anticoagulation Clinic Progress Note    Anticoagulation Summary  As of 10/14/2019    INR goal:   2.0-3.0   TTR:   17.9 % (3.8 mo)   INR used for dosin.94! (10/10/2019)   Warfarin maintenance plan:   2 mg every day   Weekly warfarin total:   14 mg   Plan last modified:   Celeste Rogers RPH (2019)   Next INR check:   10/22/2019   Target end date:       Indications    Paroxysmal atrial fibrillation (CMS/HCC) [I48.0]             Anticoagulation Episode Summary     INR check location:       Preferred lab:       [facility]    Comments:   Lab drawn at dialysis ([facility])      Anticoagulation Care Providers     Provider Role Specialty Phone number    Shiloh Gonzales APRN Referring Cardiology [phone]          Clinic Interview:  Patient Findings     Negatives:   Signs/symptoms of thrombosis, Signs/symptoms of bleeding,   Laboratory test error suspected, Change in health, Change in alcohol use,   Change in activity, Upcoming invasive procedure, Emergency department   visit, Upcoming dental procedure, Missed doses, Extra doses, Change in   medications, Change in diet/appetite, Hospital admission, Bruising, Other   complaints      Clinical Outcomes     Negatives:   Major bleeding event, Thromboembolic event,   Anticoagulation-related hospital admission, Anticoagulation-related ED   visit, Anticoagulation-related fatality        INR History:  Anticoagulation Monitoring 2019 2019 10/14/2019   INR 1.94 2.01 1.94   INR Date 2019 2019 10/10/2019   INR Goal 2.0-3.0 2.0-3.0 2.0-3.0   Trend Same Same Same   Last Week Total 14 mg 14 mg 14 mg   Next Week Total 14 mg 14 mg 14 mg   Sun - - 2 mg   Mon 2 mg 2 mg 2 mg   Tue 2 mg 2 mg 2 mg   Wed 2 mg 2 mg 2 mg   Thu - - 2 mg   Fri - - 2 mg   Sat - - 2 mg   Visit Report - - -   Some recent data might be hidden       Plan:  1. INR is Subtherapeutic today- see above in Anticoagulation Summary.   Will instruct  Claudia Arnold to Continue their warfarin regimen- see above in Anticoagulation Summary.  2. Follow up in ~1 weeks; check with dialysis on 10/22  3.  Pt has agreed to only be called if INR out of range. They have been instructed to call if any changes in medications, doses, concerns, etc. Patient expresses understanding and has no further questions at this time.    Can Adkins, [facility]

## 2019-10-17 LAB — INR PPP: 1.78

## 2019-10-18 ENCOUNTER — ANTICOAGULATION VISIT (OUTPATIENT)
Dept: PHARMACY | Facility: HOSPITAL | Age: 74
End: 2019-10-18

## 2019-10-18 DIAGNOSIS — I48.0 PAROXYSMAL ATRIAL FIBRILLATION (HCC): ICD-10-CM

## 2019-10-18 NOTE — PROGRESS NOTES
Anticoagulation Clinic Progress Note    Anticoagulation Summary  As of 10/18/2019    INR goal:   2.0-3.0   TTR:   16.9 % (4 mo)   INR used for dosin.78! (10/17/2019)   Warfarin maintenance plan:   2 mg every day   Weekly warfarin total:   14 mg   Plan last modified:   Celeste Rogers RPH (2019)   Next INR check:      Priority:   Maintenance   Target end date:       Indications    Paroxysmal atrial fibrillation (CMS/HCC) [I48.0]             Anticoagulation Episode Summary     INR check location:       Preferred lab:       Send INR reminders to:    [facility]  PO[facility]    Comments:   Lab drawn at dialysis ([facility])      Anticoagulation Care Providers     Provider Role Specialty Phone number    Shiloh Gonzales APRN Referring Cardiology [phone]          Drug interactions: has remained unchanged.  Diet: has remained unchanged.    Clinic Interview:      INR History:  Anticoagulation Monitoring 2019 10/14/2019 10/18/2019   INR 2.01 1.94 1.78   INR Date 2019 10/10/2019 10/17/2019   INR Goal 2.0-3.0 2.0-3.0 2.0-3.0   Trend Same Same Same   Last Week Total 14 mg 14 mg 14 mg   Next Week Total 14 mg 14 mg 16 mg   Sun - 2 mg 2 mg   Mon 2 mg 2 mg 2 mg   Tue 2 mg 2 mg 2 mg   Wed 2 mg 2 mg 2 mg   Thu - 2 mg 2 mg   Fri - 2 mg 4 mg (10/18)   Sat - 2 mg 2 mg   Visit Report - - -   Some recent data might be hidden       Plan:  1. INR is Subtherapeutic today- see above in Anticoagulation Summary.   Will instruct Claudia Arnold to Continue their warfarin regimen- see above in Anticoagulation Summary.  Boost 4 mg today.  Depending on next INR may need to consider adding a 3 mg dose once weekly as patient has been consistently borderline low on 2 mg nightly.  2. Follow up in 1 weeks  3. Pt has agreed to only be called if INR out of range. They have been instructed to call if any changes in medications, doses, concerns, etc. Patient expresses understanding and has no further questions at this  time.    Sergio Rider III, [facility]

## 2019-10-21 ENCOUNTER — OFFICE VISIT (OUTPATIENT)
Dept: FAMILY MEDICINE CLINIC | Facility: CLINIC | Age: 74
End: 2019-10-21

## 2019-10-21 VITALS
SYSTOLIC BLOOD PRESSURE: 111 MMHG | DIASTOLIC BLOOD PRESSURE: 68 MMHG | WEIGHT: 238 LBS | TEMPERATURE: 97.7 F | HEART RATE: 74 BPM | BODY MASS INDEX: 37.35 KG/M2 | RESPIRATION RATE: 16 BRPM | HEIGHT: 67 IN

## 2019-10-21 DIAGNOSIS — L97.519 ULCER OF TOE OF RIGHT FOOT, UNSPECIFIED ULCER STAGE (HCC): Primary | ICD-10-CM

## 2019-10-21 PROCEDURE — 99213 OFFICE O/P EST LOW 20 MIN: CPT | Performed by: FAMILY MEDICINE

## 2019-10-21 NOTE — PROGRESS NOTES
"Subjective   Claudia Arnold is a 74 y.o. female.     CC: Toe Issue    History of Present Illness     Pt comes in today c/o a roughly 1 month issue of a \"sore\" of the top of the right great toe. Since prior surgery, she reports that toe has persistently dropped (has had w/u). No f/c. No d/c.    The following portions of the patient's history were reviewed and updated as appropriate: allergies, current medications, past family history, past medical history, past social history, past surgical history and problem list.    Review of Systems   Constitutional: Negative for chills and fever.   Skin:        Toe sore       /68   Pulse 74   Temp 97.7 °F (36.5 °C) (Oral)   Resp 16   Ht 170.2 cm (67\")   Wt 108 kg (238 lb)   BMI 37.28 kg/m²     Objective   Physical Exam   Constitutional: She appears well-developed and well-nourished.   Skin:   Small, noninfected ulcer/punctum noted on the dorsal PIP joint right great toe.   Psychiatric: She has a normal mood and affect.       Assessment/Plan   Claudia was seen today for great toe.    Diagnoses and all orders for this visit:    Ulcer of toe of right foot, unspecified ulcer stage (CMS/[facility])  -     Ambulatory Referral to Podiatry               "

## 2019-10-25 LAB — INR PPP: 2.24

## 2019-10-28 ENCOUNTER — ANTICOAGULATION VISIT (OUTPATIENT)
Dept: PHARMACY | Facility: HOSPITAL | Age: 74
End: 2019-10-28

## 2019-10-28 DIAGNOSIS — I48.0 PAROXYSMAL ATRIAL FIBRILLATION (HCC): ICD-10-CM

## 2019-10-28 NOTE — PROGRESS NOTES
Anticoagulation Clinic Progress Note    Anticoagulation Summary  As of 10/28/2019    INR goal:   2.0-3.0   TTR:   18.8 % (4.3 mo)   INR used for dosin.24 (10/24/2019)   Warfarin maintenance plan:   2 mg every day   Weekly warfarin total:   14 mg   No change documented:   Celeste Rogers RPH   Plan last modified:   Celeste Rogers RPH (2019)   Next INR check:   10/31/2019   Priority:   Maintenance   Target end date:       Indications    Paroxysmal atrial fibrillation (CMS/HCC) [I48.0]             Anticoagulation Episode Summary     INR check location:       Preferred lab:       Send INR reminders to:    [facility]  POOL    Comments:   Lab drawn at dialysis ([facility])      Anticoagulation Care Providers     Provider Role Specialty Phone number    Shiloh Gonzales APRN Referring Cardiology [phone]          Clinic Interview:  No pertinent clinical findings have been reported.    INR History:  Anticoagulation Monitoring 10/14/2019 10/18/2019 10/28/2019   INR 1.94 1.78 2.24   INR Date 10/10/2019 10/17/2019 10/24/2019   INR Goal 2.0-3.0 2.0-3.0 2.0-3.0   Trend Same Same Same   Last Week Total 14 mg 14 mg 14 mg   Next Week Total 14 mg 16 mg 14 mg   Sun 2 mg 2 mg -   Mon 2 mg 2 mg 2 mg   Tue 2 mg 2 mg 2 mg   Wed 2 mg 2 mg 2 mg   Thu 2 mg - -   Fri 2 mg 4 mg (10/18) -   Sat 2 mg 2 mg -   Visit Report - - -   Some recent data might be hidden       Plan:  1. INR is therapeutic today- see above in Anticoagulation Summary.    Claudia Arnold to continue their warfarin regimen- see above in Anticoagulation Summary.  2. Follow up in 1 week  3. Spoke with pt today. They have been instructed to call if any changes in medications, doses, concerns, etc. Patient expresses understanding and has no further questions at this time.    Celeste Rogers RPH

## 2019-12-23 ENCOUNTER — TELEPHONE (OUTPATIENT)
Dept: PHARMACY | Facility: HOSPITAL | Age: 74
End: 2019-12-23

## 2019-12-23 ENCOUNTER — CLINICAL SUPPORT (OUTPATIENT)
Dept: ORTHOPEDIC SURGERY | Facility: CLINIC | Age: 74
End: 2019-12-23

## 2019-12-23 VITALS — BODY MASS INDEX: 37.35 KG/M2 | HEIGHT: 67 IN | TEMPERATURE: 98.2 F | WEIGHT: 238 LBS

## 2019-12-23 DIAGNOSIS — M19.90 ARTHRITIS: Primary | ICD-10-CM

## 2019-12-23 PROCEDURE — 20610 DRAIN/INJ JOINT/BURSA W/O US: CPT | Performed by: ORTHOPAEDIC SURGERY

## 2019-12-23 RX ADMIN — METHYLPREDNISOLONE ACETATE 80 MG: 80 INJECTION, SUSPENSION INTRA-ARTICULAR; INTRALESIONAL; INTRAMUSCULAR; SOFT TISSUE at 12:28

## 2019-12-23 RX ADMIN — METHYLPREDNISOLONE ACETATE 80 MG: 80 INJECTION, SUSPENSION INTRA-ARTICULAR; INTRALESIONAL; INTRAMUSCULAR; SOFT TISSUE at 12:29

## 2019-12-23 NOTE — PROGRESS NOTES
Patient: Claudia Arnold  YOB: 1945  Date of Service: 12/23/2019    Chief Complaints: Bilateral shoulder pain subjective:    History of Present Illness: Pt is seen in the office today with complaints of bilateral shoulder pain she has degenerative changes she understands her options at this point in time she is not against surgery candidate      Allergies: No Known Allergies    Medications:   Home Medications:  Current Outpatient Medications on File Prior to Visit   Medication Sig   • acetaminophen (TYLENOL) 325 MG tablet Take 650 mg by mouth 3 (Three) Times a Day. Takes tid at 0600, 1400, 2200   • bumetanide (BUMEX) 2 MG tablet TAKE 2 TABLETS BY MOUTH DAILY   • busPIRone (BUSPAR) 10 MG tablet Take 1 tablet by mouth 3 (Three) Times a Day. (Patient taking differently: Take 10 mg by mouth 2 (Two) Times a Day.)   • cephalexin (KEFLEX) 500 MG capsule Take 1 capsule by mouth 2 (Two) Times a Day.   • cetaphil (CETAPHIL) lotion Apply  topically to the appropriate area as directed Every 12 (Twelve) Hours.   • docusate sodium (COLACE) 100 MG capsule Take 1 capsule by mouth 2 (Two) Times a Day.   • escitalopram (LEXAPRO) 10 MG tablet Take 1 tablet by mouth Daily. (Patient taking differently: Take 10 mg by mouth Every Morning.)   • famotidine (PEPCID) 20 MG tablet Take 1 tablet by mouth Daily. (Patient taking differently: Take 20 mg by mouth As Needed.)   • HYDROcodone-acetaminophen (NORCO) 5-325 MG per tablet Take 2 tablets by mouth Every 6 (Six) Hours As Needed for Moderate Pain .   • hydrocortisone 1 % cream Apply 1 application topically to the appropriate area as directed Every 6 (Six) Hours.   • hydrocortisone 2.5 % cream APPLY TOPICALLY TO THE     APPROPRIATE AREA AS        DIRECTED TWICE DAILY   • ondansetron (ZOFRAN) 4 MG tablet Take 1 tablet by mouth Every 8 (Eight) Hours As Needed for Nausea or Vomiting.   • traMADol (ULTRAM) 50 MG tablet Take 50 mg by mouth Every 6 (Six) Hours As Needed for Moderate  Pain .   • warfarin (COUMADIN) 1 MG tablet Take 1 tablet by mouth Every Night.   • warfarin (COUMADIN) 2 MG tablet Take 1 tablet by mouth Daily. (Patient taking differently: Take 2 mg by mouth Every Night. STOPPED 6/22/19 PER DR ANDERSON)     No current facility-administered medications on file prior to visit.      Current Medications:  Scheduled Meds:  Continuous Infusions:  No current facility-administered medications for this visit.   PRN Meds:.    I have reviewed the patient's medical history in detail and updated the computerized patient record.  Review and summarization of old records include:    Past Medical History:   Diagnosis Date   • A-fib (CMS/[facility])     Meds   • Arthritis     w/Difficult Mobility   • Bilateral lower extremity edema     Legs   • CAD (coronary artery disease)     Affecting LAD    • Cardiomyopathy (CMS/[facility])    • CHF (congestive heart failure) (CMS/[facility])    • Chronic fatigue    • Dialysis patient (CMS/[facility])     TUESDAY THURSDAY SATURDAY   • Generalized anxiety disorder    • GERD (gastroesophageal reflux disease)    • Hernia, inguinal     Hx Repair   • History of echocardiogram 1/17/19-[facility]    The L Ventricular Cavity is Mild-to-Moderately Dilated; Left Ventricular Wall Thickness is Consistent w/Mild Concentric Hypertrophy; Left Atrial Cavity Size is Moderate-to-Severely Dilated; Severe AVS; Severe MVS; Moderate TVR Noted   • History of transfusion    • Hyperlipidemia     Controlled w/Meds   • Hypertension     Controlled w/Meds   • Hypokinesis 01/22/2019    Apical Noted on Cardiac Cath   • IFG (impaired fasting glucose)    • LAD stenosis 01/22/2019    Mid LAD Irregularities Noted on Cardiac Cath    • Left atrial dilatation 01/17/2019    Moderate-Severe Noted on Echo   • Left ventricular dilatation 01/17/2019    Noted on Echo/LEE   • Mild concentric left ventricular hypertrophy 01/17/2019    Noted on Echo/LEE   • Mitral annular calcification 01/17/2019    Severe Noted on Echo   • Moderate  tricuspid valve regurgitation 01/24/2019    Noted on LEE   • Morbid obesity (CMS/[facility])    • NSTEMI (non-ST elevated myocardial infarction) (CMS/[facility])    • OCTAVIANO (obstructive sleep apnea)    • Osteoarthritis    • Pulmonary hypertension (CMS/[facility]) 01/22/2019    Noted on Cardiac Cath   • Right bundle branch block 01/24/2019    Noted on LEE   • Severe aortic stenosis 01/22/2019    Noted on Cardiac Cath & LEE on 01/24/19; S/p AVR on 01/28/19 by Dr. Gaxiola   • Severe mitral valve stenosis 01/24/2019    Noted on LEE; S/p MVR on 01/28/19 by Dr. Gaxiola   • Shoulder pain, bilateral    • Skin yeast infection     Folds Under Skin--Nystatin/Diflucan        Past Surgical History:   Procedure Laterality Date   • AORTIC VALVE REPAIR/REPLACEMENT MITRAL VALVE REPAIR/REPLACEMENT N/A 1/28/2019    Procedure: LEE STERNOTOMY AORTIC VALVE REPLACEMENT, MITRAL VALVE REPLACEMENT, TRICUSPID VALVE REPAIR, MAZE PROCEDURE, CLOSURE OF LEFT ATRIAL APPENDAGE  AND PRP;  Surgeon: Scar Gaxiola MD;  Location: [facility] MAIN OR;  Service: Cardiothoracic   • ARTERIOVENOUS FISTULA/SHUNT SURGERY Left 6/26/2019    Procedure: LEFT ARM BRACHIAL CEPHALIC FISTULA;  Surgeon: Satish Stahl MD;  Location: [facility] MAIN OR;  Service: Vascular   • CARDIAC CATHETERIZATION N/A 1/22/2019    Procedure: Coronary angiography;  Surgeon: Rick Talavera MD;  Location: [facility] CATH INVASIVE LOCATION;  Service: Cardiology   • CARDIAC CATHETERIZATION N/A 1/22/2019    Procedure: Left Heart Cath;  Surgeon: Rick Talavera MD;  Location: [facility] CATH INVASIVE LOCATION;  Service: Cardiology   • CARDIAC CATHETERIZATION N/A 1/22/2019    Procedure: Right Heart Cath;  Surgeon: Rick Talavera MD;  Location: [facility] CATH INVASIVE LOCATION;  Service: Cardiology   • CARDIAC CATHETERIZATION N/A 1/22/2019    Procedure: Left ventriculography;  Surgeon: Rick Talavera MD;  Location: [facility] CATH INVASIVE LOCATION;  Service: Cardiology   • CARDIAC SURGERY     •  COLONOSCOPY     • HERNIA REPAIR     • INSERTION HEMODIALYSIS CATHETER N/A 2/8/2019    Procedure: tunnel CATHETER PLACEMENT WITH FLUROSCOPY;  Surgeon: Satish Stahl MD;  Location: [facility] OR;  Service: Vascular   • REPLACEMENT TOTAL KNEE Bilateral 2007/2009        Social History     Occupational History   • Occupation: retired   Tobacco Use   • Smoking status: Never Smoker   • Smokeless tobacco: Never Used   Substance and Sexual Activity   • Alcohol use: Yes     Comment: Occ   • Drug use: No   • Sexual activity: Defer     Birth control/protection: Post-menopausal    Social History     Social History Narrative   • Not on file        Family History   Problem Relation Age of Onset   • Hypertension Other    • Diabetes Other    • Heart disease Neg Hx    • Stroke Neg Hx    • Arthritis Neg Hx    • Breast cancer Neg Hx    • Malig Hyperthermia Neg Hx        ROS: 14 point review of systems was performed and was negative except for documented findings in HPI and today's encounter.     Allergies: No Known Allergies  Constitutional:  Denies fever, shaking or chills   Eyes:  Denies change in visual acuity   HENT:  Denies nasal congestion or sore throat   Respiratory:  Denies cough or shortness of breath   Cardiovascular:  Denies chest pain or severe LE edema   GI:  Denies abdominal pain, nausea, vomiting, bloody stools or diarrhea   Musculoskeletal:  Numbness, tingling, or loss of motor function only as noted above in history of present illness.  : Denies painful urination or hematuria  Integument:  Denies rash, lesion or ulceration   Neurologic:  Denies headache or focal weakness  Endocrine:  Denies lymphadenopathy  Psych:  Denies confusion or change in mental status   Hem:  Denies active bleeding      Physical Exam: 74 y.o. female  Wt Readings from Last 3 Encounters:   10/21/19 108 kg (238 lb)   09/16/19 108 kg (238 lb)   06/26/19 112 kg (246 lb)       There is no height or weight on file to calculate BMI.  No  height and weight on file for this encounter.  There were no vitals filed for this visit.  Vital signs reviewed.   General Appearance:    Alert, cooperative, in no acute distress                  Eyes: conjunctiva clear  ENT: external ears and nose atraumatic  CV: no peripheral edema  Resp: normal respiratory effort  Skin: no rashes or wounds; normal turgor  Psych: mood and affect appropriate  Lymph: no nodes appreciated  Neuro: gross sensation intact  Vascular:  Palpable peripheral pulse in noted extremity    Ortho exam        Exam is unchanged         Assessment: Bilateral shoulder DJD    Plan: Injections she is not a surgical candidate  Follow up as indicated.  Ice, elevate, and rest as needed.  Deborah Agudelo M.D.        Large Joint Arthrocentesis: L glenohumeral  Date/Time: 12/23/2019 12:28 PM  Consent given by: patient  Site marked: site marked  Timeout: Immediately prior to procedure a time out was called to verify the correct patient, procedure, equipment, support staff and site/side marked as required   Supporting Documentation  Indications: pain   Procedure Details  Location: shoulder - L glenohumeral  Preparation: Patient was prepped and draped in the usual sterile fashion  Needle size: 22 G  Approach: posterior  Medications administered: 4 mL lidocaine (cardiac); 80 mg methylPREDNISolone acetate 80 MG/ML  Patient tolerance: patient tolerated the procedure well with no immediate complications    Large Joint Arthrocentesis: R glenohumeral  Date/Time: 12/23/2019 12:29 PM  Consent given by: patient  Site marked: site marked  Timeout: Immediately prior to procedure a time out was called to verify the correct patient, procedure, equipment, support staff and site/side marked as required   Supporting Documentation  Indications: pain   Procedure Details  Location: shoulder - R glenohumeral  Preparation: Patient was prepped and draped in the usual sterile fashion  Needle size: 22 G  Approach: posterior  Medications  administered: 4 mL lidocaine (cardiac); 80 mg methylPREDNISolone acetate 80 MG/ML  Patient tolerance: patient tolerated the procedure well with no immediate complications

## 2019-12-24 LAB — INR PPP: 1.15

## 2019-12-24 RX ORDER — METHYLPREDNISOLONE ACETATE 80 MG/ML
80 INJECTION, SUSPENSION INTRA-ARTICULAR; INTRALESIONAL; INTRAMUSCULAR; SOFT TISSUE
Status: COMPLETED | OUTPATIENT
Start: 2019-12-23 | End: 2019-12-23

## 2019-12-27 ENCOUNTER — ANTICOAGULATION VISIT (OUTPATIENT)
Dept: PHARMACY | Facility: HOSPITAL | Age: 74
End: 2019-12-27

## 2019-12-27 DIAGNOSIS — I48.0 PAROXYSMAL ATRIAL FIBRILLATION (HCC): ICD-10-CM

## 2019-12-27 LAB — INR PPP: 1.31

## 2019-12-30 ENCOUNTER — ANTICOAGULATION VISIT (OUTPATIENT)
Dept: PHARMACY | Facility: HOSPITAL | Age: 74
End: 2019-12-30

## 2019-12-30 DIAGNOSIS — I48.0 PAROXYSMAL ATRIAL FIBRILLATION (HCC): ICD-10-CM

## 2019-12-31 NOTE — PROGRESS NOTES
Anticoagulation Clinic Progress Note    Anticoagulation Summary  As of 2019    INR goal:   2.0-3.0   TTR:   19.3 % (6.4 mo)   INR used for dosin.31! (2019)   Warfarin maintenance plan:   3 mg every Tue; 2 mg all other days   Weekly warfarin total:   15 mg   Plan last modified:   Ceelste Rogers RPH (2019)   Next INR check:   1/3/2020   Priority:   Maintenance   Target end date:       Indications    Paroxysmal atrial fibrillation (CMS/HCC) [I48.0]             Anticoagulation Episode Summary     INR check location:       Preferred lab:       Send INR reminders to:   [facility]  POOL    Comments:   Lab drawn at dialysis ([facility])      Anticoagulation Care Providers     Provider Role Specialty Phone number    Shiloh Gonzales APRN Referring Cardiology [phone]          Clinic Interview:      INR History:  Anticoagulation Monitoring 10/28/2019 2019 2019   INR 2.24 1.15 1.31   INR Date 10/24/2019 2019 2019   INR Goal 2.0-3.0 2.0-3.0 2.0-3.0   Trend Same Same Up   Last Week Total 14 mg 14 mg 14 mg   Next Week Total 14 mg 14 mg 15 mg   Sun - 2 mg -   Mon 2 mg 2 mg 2 mg   Tue 2 mg 2 mg 3 mg ()   Wed 2 mg 2 mg 2 mg   Thu - 2 mg 2 mg   Fri - 2 mg -   Sat - 2 mg -   Visit Report - - -   Some recent data might be hidden       Plan:  1. INR is Subtherapeutic last week at dialysis- see above in Anticoagulation Summary.   Will instruct Claudia Arnold to Change their warfarin regimen- see above in Anticoagulation Summary.  2. Follow up in 1 week--lab in dialysis .  3. Spoke with patient today. They have been instructed to call if any changes in medications, doses, concerns, etc. Patient expresses understanding and has no further questions at this time.    Celeste Rogers RPH

## 2020-01-09 ENCOUNTER — PATIENT OUTREACH (OUTPATIENT)
Dept: CASE MANAGEMENT | Facility: OTHER | Age: 75
End: 2020-01-09

## 2020-01-09 ENCOUNTER — ANTICOAGULATION VISIT (OUTPATIENT)
Dept: PHARMACY | Facility: HOSPITAL | Age: 75
End: 2020-01-09

## 2020-01-09 DIAGNOSIS — I48.0 PAROXYSMAL ATRIAL FIBRILLATION (HCC): ICD-10-CM

## 2020-01-09 LAB — INR PPP: 1.88

## 2020-01-09 NOTE — PROGRESS NOTES
Anticoagulation Clinic Progress Note    Anticoagulation Summary  As of 2020    INR goal:   2.0-3.0   TTR:   18.3 % (6.8 mo)   INR used for dosin.88! (2020)   Warfarin maintenance plan:   3 mg every Tue; 2 mg all other days   Weekly warfarin total:   15 mg   Plan last modified:   Can Adkins, JENNIFER (2020)   Next INR check:   2020   Priority:   Maintenance   Target end date:       Indications    Paroxysmal atrial fibrillation (CMS/HCC) [I48.0]             Anticoagulation Episode Summary     INR check location:       Preferred lab:       Send INR reminders to:    [facility]  POOL    Comments:   Lab drawn at dialysis ([facility])      Anticoagulation Care Providers     Provider Role Specialty Phone number    Shiloh Gonzales APRN Referring Cardiology [phone]          Clinic Interview:  Patient Findings     Negatives:   Signs/symptoms of thrombosis, Signs/symptoms of bleeding,   Laboratory test error suspected, Change in health, Change in alcohol use,   Change in activity, Upcoming invasive procedure, Emergency department   visit, Upcoming dental procedure, Missed doses, Extra doses, Change in   medications, Change in diet/appetite, Hospital admission, Bruising, Other   complaints      Clinical Outcomes     Negatives:   Major bleeding event, Thromboembolic event,   Anticoagulation-related hospital admission, Anticoagulation-related ED   visit, Anticoagulation-related fatality        INR History:  Anticoagulation Monitoring 2019   INR 1.15 1.31 1.88   INR Date 2019   INR Goal 2.0-3.0 2.0-3.0 2.0-3.0   Trend Same Up Same   Last Week Total 14 mg 14 mg 15 mg   Next Week Total 14 mg 15 mg 15 mg   Sun 2 mg - 2 mg   Mon 2 mg 2 mg 2 mg   Tue 2 mg 3 mg () -   Wed 2 mg 2 mg -   Thu 2 mg 2 mg 2 mg   Fri 2 mg - 2 mg   Sat 2 mg - 2 mg   Visit Report - - -   Some recent data might be hidden       Plan:  1. INR is Subtherapeutic  today- see above in Anticoagulation Summary.   Will instruct Claudia Arnold to Continue their warfarin regimen; pt just switched to current warfarin regimen and recommend adjusting to 3 mg on Tuesday/Friday, 2 mg the rest of the week should her INR be subtherapeutic when checking next Tuesday. - see above in Anticoagulation Summary.  2. Follow up in 1 week  3.  Pt has agreed to only be called if INR out of range. They have been instructed to call if any changes in medications, doses, concerns, etc. Patient expresses understanding and has no further questions at this time.    Can Adkins [facility]

## 2020-01-09 NOTE — OUTREACH NOTE
LM for pt to call and schedule AWV.     Raymundo Andrews, [facility]  Health and    Phone: [phone]

## 2020-01-20 ENCOUNTER — ANTICOAGULATION VISIT (OUTPATIENT)
Dept: PHARMACY | Facility: HOSPITAL | Age: 75
End: 2020-01-20

## 2020-01-20 DIAGNOSIS — I48.0 PAROXYSMAL ATRIAL FIBRILLATION (HCC): ICD-10-CM

## 2020-01-20 LAB — INR PPP: 2.15

## 2020-01-20 NOTE — PROGRESS NOTES
Anticoagulation Clinic Progress Note    Anticoagulation Summary  As of 2020    INR goal:   2.0-3.0   TTR:   19.5 % (7 mo)   INR used for dosin.15 (2020)   Warfarin maintenance plan:   3 mg every Tue; 2 mg all other days   Weekly warfarin total:   15 mg   Plan last modified:   Can Adkins, [facility] (2020)   Next INR check:   2020   Priority:   High   Target end date:       Indications    Paroxysmal atrial fibrillation (CMS/HCC) [I48.0]             Anticoagulation Episode Summary     INR check location:       Preferred lab:       Send INR reminders to:   [facility] ANTICOAG  POOL    Comments:   Lab drawn at dialysis ([facility])      Anticoagulation Care Providers     Provider Role Specialty Phone number    Shiloh Gonzales APRN Referring Cardiology [phone]          Clinic Interview:  Patient Findings     Positives:   Change in medications    Negatives:   Signs/symptoms of thrombosis, Signs/symptoms of bleeding,   Laboratory test error suspected, Change in health, Change in alcohol use,   Change in activity, Upcoming invasive procedure, Emergency department   visit, Upcoming dental procedure, Missed doses, Extra doses, Change in   diet/appetite, Hospital admission, Bruising, Other complaints    Comments:   Started cephalexin on  and finished abx already      Clinical Outcomes     Negatives:   Major bleeding event, Thromboembolic event,   Anticoagulation-related hospital admission, Anticoagulation-related ED   visit, Anticoagulation-related fatality    Comments:   Started cephalexin on  and finished abx already        INR History:  Anticoagulation Monitoring 2019   INR 1.31 1.88 2.15   INR Date 2019   INR Goal 2.0-3.0 2.0-3.0 2.0-3.0   Trend Up Same Same   Last Week Total 14 mg 15 mg 15 mg   Next Week Total 15 mg 15 mg 15 mg   Sun - 2 mg -   Mon 2 mg 2 mg 2 mg   Tue 3 mg () - -   Wed 2 mg - -   Thu 2 mg 2 mg -   Fri -  2 mg -   Sat - 2 mg -   Visit Report - - -   Some recent data might be hidden       Plan:  1. INR is Therapeutic today- see above in Anticoagulation Summary.   Will instruct Claudia Arnold to Continue their warfarin regimen; the pt reported she had been given cephalexin for a UTI on 1/14 but had already finished the abx- see above in Anticoagulation Summary.  2. Follow up in 1 day (seeing as pt INR resulted on 1/14 and did not get fax until today)   3.  Pt has agreed to only be called if INR out of range. They have been instructed to call if any changes in medications, doses, concerns, etc. Patient expresses understanding and has no further questions at this time.    Can Adkins, [facility]

## 2020-01-22 LAB — INR PPP: 2.04

## 2020-01-24 ENCOUNTER — ANTICOAGULATION VISIT (OUTPATIENT)
Dept: PHARMACY | Facility: HOSPITAL | Age: 75
End: 2020-01-24

## 2020-01-24 DIAGNOSIS — I48.0 PAROXYSMAL ATRIAL FIBRILLATION (HCC): ICD-10-CM

## 2020-01-24 NOTE — PROGRESS NOTES
Anticoagulation Clinic Progress Note    Anticoagulation Summary  As of 2020    INR goal:   2.0-3.0   TTR:   22.5 % (7.3 mo)   INR used for dosin.04 (2020)   Warfarin maintenance plan:   3 mg every Tue; 2 mg all other days   Weekly warfarin total:   15 mg   Plan last modified:   Can Adkins [facility] (2020)   Next INR check:   2020   Priority:   High   Target end date:       Indications    Paroxysmal atrial fibrillation (CMS/HCC) [I48.0]             Anticoagulation Episode Summary     INR check location:       Preferred lab:       Send INR reminders to:    [facility]  POOL    Comments:   Lab drawn at dialysis ([facility])      Anticoagulation Care Providers     Provider Role Specialty Phone number    Shiloh Gonzales, MORGAN Referring Cardiology [phone]          Clinic Interview:      INR History:  Anticoagulation Monitoring 2020   INR 1.88 2.15 2.04   INR Date 2020   INR Goal 2.0-3.0 2.0-3.0 2.0-3.0   Trend Same Same Same   Last Week Total 15 mg 15 mg 15 mg   Next Week Total 15 mg 15 mg 15 mg   Sun 2 mg - 2 mg   Mon 2 mg 2 mg 2 mg   Tue - - 3 mg   Wed - - -   Thu 2 mg - -   Fri 2 mg - 2 mg   Sat 2 mg - 2 mg   Visit Report - - -   Some recent data might be hidden       Plan:  1. INR is Therapeutic today- see above in Anticoagulation Summary.   Will instruct Claudia GABRIELLE Arnold to Continue their warfarin regimen- see above in Anticoagulation Summary.  2. Follow up in 1 week  3. Spoke with pt today.They have been instructed to call if any changes in medications, doses, concerns, etc. Patient expresses understanding and has no further questions at this time.    Celeste Rogers [facility]

## 2020-01-29 LAB — INR PPP: 1.96

## 2020-01-30 ENCOUNTER — ANTICOAGULATION VISIT (OUTPATIENT)
Dept: PHARMACY | Facility: HOSPITAL | Age: 75
End: 2020-01-30

## 2020-01-30 DIAGNOSIS — I48.0 PAROXYSMAL ATRIAL FIBRILLATION (HCC): ICD-10-CM

## 2020-01-30 NOTE — PROGRESS NOTES
Anticoagulation Clinic Progress Note    Anticoagulation Summary  As of 2020    INR goal:   2.0-3.0   TTR:   23.3 % (7.5 mo)   INR used for dosin.96! (2020)   Warfarin maintenance plan:   3 mg every Tue; 2 mg all other days   Weekly warfarin total:   15 mg   Plan last modified:   Can Adkins [facility] (2020)   Next INR check:   2020   Priority:   High   Target end date:       Indications    Paroxysmal atrial fibrillation (CMS/HCC) [I48.0]             Anticoagulation Episode Summary     INR check location:       Preferred lab:       Send INR reminders to:    [facility]  POOL    Comments:   Lab drawn at dialysis ([facility])      Anticoagulation Care Providers     Provider Role Specialty Phone number    Shiloh Gonzales APRN Referring Cardiology [phone]          Clinic Interview:      INR History:  Anticoagulation Monitoring 2020   INR 2.15 2.04 1.96   INR Date 2020   INR Goal 2.0-3.0 2.0-3.0 2.0-3.0   Trend Same Same Same   Last Week Total 15 mg 15 mg 15 mg   Next Week Total 15 mg 15 mg 15 mg   Sun - 2 mg 2 mg   Mon 2 mg 2 mg 2 mg   Tue - 3 mg 3 mg   Wed - 2 mg -   Thu - 2 mg 2 mg   Fri - 2 mg 2 mg   Sat - 2 mg 2 mg   Visit Report - - -   Some recent data might be hidden       Plan:  1. INR is Therapeutic today- see above in Anticoagulation Summary.   Will instruct Claudia Arnold to Continue their warfarin regimen- see above in Anticoagulation Summary.  2. Follow up in 1 weeks  3. Left VM to call us with any updates. They have been instructed to call if any changes in medications, doses, concerns, etc. .    Celeste Rogers [facility]

## 2020-02-12 ENCOUNTER — OFFICE VISIT (OUTPATIENT)
Dept: FAMILY MEDICINE CLINIC | Facility: CLINIC | Age: 75
End: 2020-02-12

## 2020-02-12 VITALS
RESPIRATION RATE: 16 BRPM | WEIGHT: 238 LBS | TEMPERATURE: 97.9 F | DIASTOLIC BLOOD PRESSURE: 56 MMHG | HEIGHT: 67 IN | HEART RATE: 78 BPM | BODY MASS INDEX: 37.35 KG/M2 | SYSTOLIC BLOOD PRESSURE: 103 MMHG

## 2020-02-12 DIAGNOSIS — I10 ESSENTIAL HYPERTENSION: ICD-10-CM

## 2020-02-12 DIAGNOSIS — R73.01 IFG (IMPAIRED FASTING GLUCOSE): ICD-10-CM

## 2020-02-12 DIAGNOSIS — L98.9 SKIN LESION: ICD-10-CM

## 2020-02-12 DIAGNOSIS — F41.1 GENERALIZED ANXIETY DISORDER: ICD-10-CM

## 2020-02-12 DIAGNOSIS — Z00.00 MEDICARE ANNUAL WELLNESS VISIT, SUBSEQUENT: Primary | ICD-10-CM

## 2020-02-12 LAB — INR PPP: 1.58

## 2020-02-12 PROCEDURE — G0439 PPPS, SUBSEQ VISIT: HCPCS | Performed by: FAMILY MEDICINE

## 2020-02-12 PROCEDURE — 99214 OFFICE O/P EST MOD 30 MIN: CPT | Performed by: FAMILY MEDICINE

## 2020-02-12 RX ORDER — FAMOTIDINE 20 MG/1
20 TABLET, FILM COATED ORAL DAILY
Qty: 90 TABLET | Refills: 3 | Status: CANCELLED | OUTPATIENT
Start: 2020-02-12

## 2020-02-12 RX ORDER — ESCITALOPRAM OXALATE 10 MG/1
10 TABLET ORAL DAILY
Qty: 90 TABLET | Refills: 3 | Status: SHIPPED | OUTPATIENT
Start: 2020-02-12 | End: 2021-05-26 | Stop reason: SDUPTHER

## 2020-02-12 RX ORDER — DOCUSATE SODIUM 100 MG/1
100 CAPSULE, LIQUID FILLED ORAL 2 TIMES DAILY
Qty: 180 CAPSULE | Refills: 3 | Status: CANCELLED | OUTPATIENT
Start: 2020-02-12

## 2020-02-12 RX ORDER — BUSPIRONE HYDROCHLORIDE 10 MG/1
10 TABLET ORAL 3 TIMES DAILY
Qty: 270 TABLET | Refills: 3 | Status: ON HOLD | OUTPATIENT
Start: 2020-02-12 | End: 2021-01-18 | Stop reason: SDUPTHER

## 2020-02-12 NOTE — PROGRESS NOTES
Subjective   Claudia Arnold is a 74 y.o. female.     History of Present Illness     Chief Complaint:   Chief Complaint   Patient presents with   • medicare wellness   • Depression     med refill -   - no labs - meds reviewed with pt today        Claudia Arnold 74 y.o. female who presents today for Medical Management of the below listed issues and medication refills.  she has a problem list of   Patient Active Problem List   Diagnosis   • Tear of rotator cuff   • Hypertension   • Generalized anxiety disorder   • Hyperlipidemia   • IFG (impaired fasting glucose)   • Heart murmur, systolic   • Shoulder pain, bilateral   • Pain of both shoulder joints   • Chronic pain of both shoulders   • Acute congestive heart failure (CMS/HCC)   • Obesity, morbid, BMI 50 or higher (CMS/[facility])   • Fungal skin infection   • Cellulitis of lower extremity   • Aortic valve stenosis   • Tricuspid valve insufficiency   • Mitral valve stenosis   • S/P MVR (mitral valve replacement)   • Paroxysmal atrial fibrillation (CMS/HCC)   • Pulmonary hypertension (CMS/HCC)   • Stage 5 chronic kidney disease on chronic dialysis (CMS/[facility])   • Gastroesophageal reflux disease without esophagitis   • Chronic idiopathic constipation   • Dependence on renal dialysis (CMS/[facility])   • Chronic kidney disease, stage 2 (mild)   • Renovascular hypertension   .  Since the last visit, she has overall felt well.  she has been compliant with   Current Outpatient Medications:   •  hydrocortisone 2.5 % cream, Apply  topically to the appropriate area as directed See Admin Instructions. Apply topically to the affected area twice daily as directed, Disp: 60 g, Rfl: 3  •  acetaminophen (TYLENOL) 325 MG tablet, Take 650 mg by mouth 3 (Three) Times a Day. Takes tid at 0600, 1400, 2200, Disp: , Rfl:   •  aspirin 81 MG tablet, Take  by mouth., Disp: , Rfl:   •  bumetanide (BUMEX) 2 MG tablet, TAKE 2 TABLETS BY MOUTH DAILY, Disp: 60 tablet, Rfl: 6  •  busPIRone (BUSPAR) 10 MG tablet,  "Take 1 tablet by mouth 3 (Three) Times a Day., Disp: 270 tablet, Rfl: 3  •  escitalopram (LEXAPRO) 10 MG tablet, Take 1 tablet by mouth Daily., Disp: 90 tablet, Rfl: 3  •  HYDROcodone-acetaminophen (NORCO) 5-325 MG per tablet, Take 2 tablets by mouth Every 6 (Six) Hours As Needed for Moderate Pain ., Disp: 10 tablet, Rfl: 0  •  ondansetron (ZOFRAN) 4 MG tablet, Take 1 tablet by mouth Every 8 (Eight) Hours As Needed for Nausea or Vomiting., Disp: 90 tablet, Rfl: 3  •  traMADol (ULTRAM) 50 MG tablet, Take 50 mg by mouth Every 6 (Six) Hours As Needed for Moderate Pain ., Disp: , Rfl:   •  warfarin (COUMADIN) 1 MG tablet, Take 1 tablet by mouth Every Night., Disp: 30 tablet, Rfl: 2.  she denies medication side effects.    All of the other chronic condition(s) listed above are stable w/o issues.    /56   Pulse 78   Temp 97.9 °F (36.6 °C) (Oral)   Resp 16   Ht 170.2 cm (67\")   Wt 108 kg (238 lb)   BMI 37.28 kg/m²     Results for orders placed or performed in visit on 01/30/20   Protime-INR   Result Value Ref Range    INR 1.96      Pt continues to decline c-scopes and mammograms.    The following portions of the patient's history were reviewed and updated as appropriate: allergies, current medications, past family history, past medical history, past social history, past surgical history and problem list.    Review of Systems   Constitutional: Negative for activity change, chills, fatigue and fever.   Respiratory: Negative for cough and chest tightness.    Cardiovascular: Negative for chest pain and palpitations.   Gastrointestinal: Negative for abdominal pain and nausea.   Endocrine: Negative for cold intolerance.   Psychiatric/Behavioral: Negative for behavioral problems and dysphoric mood.       Objective   Physical Exam   Constitutional: She appears well-developed and well-nourished.   Neck: Neck supple. No thyromegaly present.   Cardiovascular: Normal rate and regular rhythm.   Murmur heard.   Systolic " murmur is present with a grade of 2/6.  Pulmonary/Chest: Effort normal and breath sounds normal.   Abdominal: Bowel sounds are normal. There is no tenderness.   Neurological: She is alert.   Psychiatric: She has a normal mood and affect. Her behavior is normal.   Nursing note and vitals reviewed.      Assessment/Plan   Claudia was seen today for medicare wellness and depression.    Diagnoses and all orders for this visit:    Medicare annual wellness visit, subsequent    Generalized anxiety disorder  -     escitalopram (LEXAPRO) 10 MG tablet; Take 1 tablet by mouth Daily.  -     busPIRone (BUSPAR) 10 MG tablet; Take 1 tablet by mouth 3 (Three) Times a Day.    IFG (impaired fasting glucose)  -     Hemoglobin A1c    Essential hypertension  -     Comprehensive metabolic panel  -     Lipid panel  -     CBC and Differential  -     TSH    Skin lesion  -     hydrocortisone 2.5 % cream; Apply  topically to the appropriate area as directed See Admin Instructions. Apply topically to the affected area twice daily as directed

## 2020-02-12 NOTE — PATIENT INSTRUCTIONS
Medicare Wellness  Personal Prevention Plan of Service     Date of Office Visit:  2020  Encounter Provider:  Robert Cortez MD  Place of Service:  [facility]  Patient Name: Claudia Arnold  :  1945    As part of the Medicare Wellness portion of your visit today, we are providing you with this personalized preventive plan of services (PPPS). This plan is based upon recommendations of the [place] Preventive Services Task Force (USPSTF) and the Advisory Committee on Immunization Practices (ACIP).    This lists the preventive care services that should be considered, and provides dates of when you are due. Items listed as completed are up-to-date and do not require any further intervention.    Health Maintenance   Topic Date Due   • PNEUMOCOCCAL VACCINE (65+ HIGH RISK) (1 of 2 - PCV13) 2010   • COLONOSCOPY  2020   • LIPID PANEL  2020   • TDAP/TD VACCINES (1 - Tdap) 2020 (Originally 1956)   • MEDICARE ANNUAL WELLNESS  2021   • INFLUENZA VACCINE  Completed   • HEPATITIS C SCREENING  Discontinued   • DIABETIC FOOT EXAM  Discontinued   • MAMMOGRAM  Discontinued   • HEMOGLOBIN A1C  Discontinued   • DIABETIC EYE EXAM  Discontinued   • URINE MICROALBUMIN  Discontinued   • ZOSTER VACCINE  Discontinued       No orders of the defined types were placed in this encounter.      Return in about 1 year (around 2021) for Recheck.

## 2020-02-12 NOTE — PROGRESS NOTES
The ABCs of the Annual Wellness Visit  Subsequent Medicare Wellness Visit    Chief Complaint   Patient presents with   • medicare wellness   • Depression     med refill -   - no labs - meds reviewed with pt today        Subjective   History of Present Illness:  Claudia Arnold is a 74 y.o. female who presents for a Subsequent Medicare Wellness Visit.    HEALTH RISK ASSESSMENT    Recent Hospitalizations:  No hospitalization(s) within the last year.    Current Medical Providers:  Patient Care Team:  Robert Cortez MD as PCP - General (Family Medicine)  Deborah Agudelo MD as Surgeon (Orthopedic Surgery)  Scott Segura MD as Consulting Physician (Cardiology)  Scar Gaxiola MD as Surgeon (Cardiothoracic Surgery)  Kathy Osuna MD as Consulting Physician (Nephrology)  Dora Astorga APRN as Nurse Practitioner (Neurosurgery)  Satish Stahl MD as Consulting Physician (Vascular Surgery)  Scar Gaxiola MD as Surgeon (Cardiothoracic Surgery)    Smoking Status:  Social History     Tobacco Use   Smoking Status Never Smoker   Smokeless Tobacco Never Used       Alcohol Consumption:  Social History     Substance and Sexual Activity   Alcohol Use Yes    Comment: Occ       Depression Screen:   PHQ-2/PHQ-9 Depression Screening 2/12/2020   Little interest or pleasure in doing things 0   Feeling down, depressed, or hopeless 0   Trouble falling or staying asleep, or sleeping too much -   Feeling tired or having little energy -   Poor appetite or overeating -   Feeling bad about yourself - or that you are a failure or have let yourself or your family down -   Trouble concentrating on things, such as reading the newspaper or watching television -   Moving or speaking so slowly that other people could have noticed. Or the opposite - being so fidgety or restless that you have been moving around a lot more than usual -   Thoughts that you would be better off dead, or of hurting yourself in some way -    Total Score 0       Fall Risk Screen:  LUIS ANTONIO Fall Risk Assessment was completed, and patient is at LOW risk for falls.Assessment completed on:2/12/2020    Health Habits and Functional and Cognitive Screening:  Functional & Cognitive Status 2/12/2020   Do you have difficulty preparing food and eating? No   Do you have difficulty bathing yourself, getting dressed or grooming yourself? No   Do you have difficulty using the toilet? No   Do you have difficulty moving around from place to place? No   Do you have trouble with steps or getting out of a bed or a chair? No   Current Diet Well Balanced Diet   Dental Exam Not up to date   Eye Exam Not up to date   Exercise (times per week) 0 times per week   Current Exercise Activities Include None   Do you need help using the phone?  No   Are you deaf or do you have serious difficulty hearing?  No   Do you need help with transportation? Yes   Do you need help shopping? Yes   Do you need help preparing meals?  Yes   Do you need help with housework?  Yes   Do you need help with laundry? Yes   Do you need help taking your medications? Yes   Do you need help managing money? Yes   Do you ever drive or ride in a car without wearing a seat belt? No   Have you felt unusual stress, anger or loneliness in the last month? No   Who do you live with? Spouse   If you need help, do you have trouble finding someone available to you? Yes   Have you been bothered in the last four weeks by sexual problems? No   Do you have difficulty concentrating, remembering or making decisions? No         Does the patient have evidence of cognitive impairment? No    Asprin use counseling:Does not need ASA (and currently is not on it)    Age-appropriate Screening Schedule:  Refer to the list below for future screening recommendations based on patient's age, sex and/or medical conditions. Orders for these recommended tests are listed in the plan section. The patient has been provided with a written  plan.    Health Maintenance   Topic Date Due   • PNEUMOCOCCAL VACCINE (65+ HIGH RISK) (1 of 2 - PCV13) 07/30/2010   • COLONOSCOPY  01/01/2020   • LIPID PANEL  01/17/2020   • TDAP/TD VACCINES (1 - Tdap) 02/12/2020 (Originally 7/30/1956)   • INFLUENZA VACCINE  Completed   • DIABETIC FOOT EXAM  Discontinued   • MAMMOGRAM  Discontinued   • HEMOGLOBIN A1C  Discontinued   • DIABETIC EYE EXAM  Discontinued   • URINE MICROALBUMIN  Discontinued   • ZOSTER VACCINE  Discontinued          The following portions of the patient's history were reviewed and updated as appropriate: allergies, current medications, past family history, past medical history, past social history, past surgical history and problem list.    Outpatient Medications Prior to Visit   Medication Sig Dispense Refill   • busPIRone (BUSPAR) 10 MG tablet Take 1 tablet by mouth 3 (Three) Times a Day. (Patient taking differently: Take 10 mg by mouth 2 (Two) Times a Day.) 270 tablet 3   • docusate sodium (COLACE) 100 MG capsule Take 1 capsule by mouth 2 (Two) Times a Day. 180 capsule 3   • escitalopram (LEXAPRO) 10 MG tablet Take 1 tablet by mouth Daily. (Patient taking differently: Take 10 mg by mouth Every Morning.) 90 tablet 3   • famotidine (PEPCID) 20 MG tablet Take 1 tablet by mouth Daily. (Patient taking differently: Take 20 mg by mouth As Needed.) 90 tablet 3   • hydrocortisone 2.5 % cream APPLY TOPICALLY TO THE     APPROPRIATE AREA AS        DIRECTED TWICE DAILY 28.35 g 3   • acetaminophen (TYLENOL) 325 MG tablet Take 650 mg by mouth 3 (Three) Times a Day. Takes tid at 0600, 1400, 2200     • aspirin 81 MG tablet Take  by mouth.     • bumetanide (BUMEX) 2 MG tablet TAKE 2 TABLETS BY MOUTH DAILY 60 tablet 6   • HYDROcodone-acetaminophen (NORCO) 5-325 MG per tablet Take 2 tablets by mouth Every 6 (Six) Hours As Needed for Moderate Pain . 10 tablet 0   • ondansetron (ZOFRAN) 4 MG tablet Take 1 tablet by mouth Every 8 (Eight) Hours As Needed for Nausea or  Vomiting. 90 tablet 3   • traMADol (ULTRAM) 50 MG tablet Take 50 mg by mouth Every 6 (Six) Hours As Needed for Moderate Pain .     • warfarin (COUMADIN) 1 MG tablet Take 1 tablet by mouth Every Night. 30 tablet 2   • cetaphil (CETAPHIL) lotion Apply  topically to the appropriate area as directed Every 12 (Twelve) Hours.     • hydrocortisone 1 % cream Apply 1 application topically to the appropriate area as directed Every 6 (Six) Hours. 1.5 g 0   • warfarin (COUMADIN) 2 MG tablet Take 1 tablet by mouth Daily. (Patient taking differently: Take 2 mg by mouth Every Night. STOPPED 6/22/19 PER DR ANDERSON) 90 tablet 3     No facility-administered medications prior to visit.        Patient Active Problem List   Diagnosis   • Tear of rotator cuff   • Hypertension   • Generalized anxiety disorder   • Hyperlipidemia   • IFG (impaired fasting glucose)   • Heart murmur, systolic   • Shoulder pain, bilateral   • Pain of both shoulder joints   • Chronic pain of both shoulders   • Acute congestive heart failure (CMS/HCC)   • Obesity, morbid, BMI 50 or higher (CMS/HCC)   • Fungal skin infection   • Cellulitis of lower extremity   • Aortic valve stenosis   • Tricuspid valve insufficiency   • Mitral valve stenosis   • S/P MVR (mitral valve replacement)   • Paroxysmal atrial fibrillation (CMS/HCC)   • Pulmonary hypertension (CMS/HCC)   • Stage 5 chronic kidney disease on chronic dialysis (CMS/HCC)   • Gastroesophageal reflux disease without esophagitis   • Chronic idiopathic constipation   • Dependence on renal dialysis (CMS/HCC)   • Chronic kidney disease, stage 2 (mild)   • Renovascular hypertension       Advanced Care Planning:  ACP discussion was held with the patient during this visit.    Review of Systems    Compared to one year ago, the patient feels her physical health is worse.  Compared to one year ago, the patient feels her mental health is the same.    Reviewed chart for potential of high risk medication in the elderly:  "yes  Reviewed chart for potential of harmful drug interactions in the elderly:yes    Objective         Vitals:    02/12/20 0953   BP: 103/56   Pulse: 78   Resp: 16   Temp: 97.9 °F (36.6 °C)   TempSrc: Oral   Weight: 108 kg (238 lb)   Height: 170.2 cm (67\")   PainSc: 0-No pain       Body mass index is 37.28 kg/m².  Discussed the patient's BMI with her. The BMI is above average; BMI management plan is completed.    Physical Exam          Assessment/Plan   Medicare Risks and Personalized Health Plan  CMS Preventative Services Quick Reference  Cardiovascular risk    The above risks/problems have been discussed with the patient.  Pertinent information has been shared with the patient in the After Visit Summary.  Follow up plans and orders are seen below in the Assessment/Plan Section.    Diagnoses and all orders for this visit:    1. Medicare annual wellness visit, subsequent (Primary)    2. Generalized anxiety disorder  -     escitalopram (LEXAPRO) 10 MG tablet; Take 1 tablet by mouth Daily.  Dispense: 90 tablet; Refill: 3  -     busPIRone (BUSPAR) 10 MG tablet; Take 1 tablet by mouth 3 (Three) Times a Day.  Dispense: 270 tablet; Refill: 3    3. IFG (impaired fasting glucose)  -     Hemoglobin A1c    4. Essential hypertension  -     Comprehensive metabolic panel  -     Lipid panel  -     CBC and Differential  -     TSH    5. Skin lesion  -     hydrocortisone 2.5 % cream; Apply  topically to the appropriate area as directed See Admin Instructions. Apply topically to the affected area twice daily as directed  Dispense: 60 g; Refill: 3    Other orders  -     Cancel: docusate sodium (COLACE) 100 MG capsule; Take 1 capsule by mouth 2 (Two) Times a Day.  Dispense: 180 capsule; Refill: 3  -     Cancel: famotidine (PEPCID) 20 MG tablet; Take 1 tablet by mouth Daily.  Dispense: 90 tablet; Refill: 3      Follow Up:  Return in about 1 year (around 2/12/2021) for Recheck.     An After Visit Summary and PPPS were given to the " patient.

## 2020-02-13 LAB
ALBUMIN SERPL-MCNC: 3.9 G/DL (ref 3.5–5.2)
ALBUMIN/GLOB SERPL: 1.6 G/DL
ALP SERPL-CCNC: 74 U/L (ref 39–117)
ALT SERPL-CCNC: 7 U/L (ref 1–33)
AST SERPL-CCNC: 11 U/L (ref 1–32)
BASOPHILS # BLD AUTO: 0.06 10*3/MM3 (ref 0–0.2)
BASOPHILS NFR BLD AUTO: 0.8 % (ref 0–1.5)
BILIRUB SERPL-MCNC: 0.3 MG/DL (ref 0.2–1.2)
BUN SERPL-MCNC: 34 MG/DL (ref 8–23)
BUN/CREAT SERPL: 13.8 (ref 7–25)
CALCIUM SERPL-MCNC: 9.7 MG/DL (ref 8.6–10.5)
CHLORIDE SERPL-SCNC: 99 MMOL/L (ref 98–107)
CHOLEST SERPL-MCNC: 179 MG/DL (ref 0–200)
CO2 SERPL-SCNC: 26.3 MMOL/L (ref 22–29)
CREAT SERPL-MCNC: 2.46 MG/DL (ref 0.57–1)
EOSINOPHIL # BLD AUTO: 0.13 10*3/MM3 (ref 0–0.4)
EOSINOPHIL NFR BLD AUTO: 1.7 % (ref 0.3–6.2)
ERYTHROCYTE [DISTWIDTH] IN BLOOD BY AUTOMATED COUNT: 12.8 % (ref 12.3–15.4)
GLOBULIN SER CALC-MCNC: 2.5 GM/DL
GLUCOSE SERPL-MCNC: 106 MG/DL (ref 65–99)
HBA1C MFR BLD: 5.8 % (ref 4.8–5.6)
HCT VFR BLD AUTO: 32.7 % (ref 34–46.6)
HDLC SERPL-MCNC: 51 MG/DL (ref 40–60)
HGB BLD-MCNC: 11.2 G/DL (ref 12–15.9)
IMM GRANULOCYTES # BLD AUTO: 0.1 10*3/MM3 (ref 0–0.05)
IMM GRANULOCYTES NFR BLD AUTO: 1.3 % (ref 0–0.5)
LDLC SERPL CALC-MCNC: 112 MG/DL (ref 0–100)
LYMPHOCYTES # BLD AUTO: 1.25 10*3/MM3 (ref 0.7–3.1)
LYMPHOCYTES NFR BLD AUTO: 16.3 % (ref 19.6–45.3)
MCH RBC QN AUTO: 31.8 PG (ref 26.6–33)
MCHC RBC AUTO-ENTMCNC: 34.3 G/DL (ref 31.5–35.7)
MCV RBC AUTO: 92.9 FL (ref 79–97)
MONOCYTES # BLD AUTO: 0.83 10*3/MM3 (ref 0.1–0.9)
MONOCYTES NFR BLD AUTO: 10.8 % (ref 5–12)
NEUTROPHILS # BLD AUTO: 5.29 10*3/MM3 (ref 1.7–7)
NEUTROPHILS NFR BLD AUTO: 69.1 % (ref 42.7–76)
NRBC BLD AUTO-RTO: 0 /100 WBC (ref 0–0.2)
PLATELET # BLD AUTO: 238 10*3/MM3 (ref 140–450)
POTASSIUM SERPL-SCNC: 4 MMOL/L (ref 3.5–5.2)
PROT SERPL-MCNC: 6.4 G/DL (ref 6–8.5)
RBC # BLD AUTO: 3.52 10*6/MM3 (ref 3.77–5.28)
SODIUM SERPL-SCNC: 140 MMOL/L (ref 136–145)
TRIGL SERPL-MCNC: 78 MG/DL (ref 0–150)
TSH SERPL DL<=0.005 MIU/L-ACNC: 0.97 UIU/ML (ref 0.27–4.2)
VLDLC SERPL CALC-MCNC: 15.6 MG/DL
WBC # BLD AUTO: 7.66 10*3/MM3 (ref 3.4–10.8)

## 2020-02-14 ENCOUNTER — ANTICOAGULATION VISIT (OUTPATIENT)
Dept: PHARMACY | Facility: HOSPITAL | Age: 75
End: 2020-02-14

## 2020-02-14 DIAGNOSIS — I48.0 PAROXYSMAL ATRIAL FIBRILLATION (HCC): ICD-10-CM

## 2020-02-14 NOTE — PROGRESS NOTES
Anticoagulation Clinic Progress Note    Anticoagulation Summary  As of 2020    INR goal:   2.0-3.0   TTR:   22.1 % (7.9 mo)   INR used for dosin.58! (2020)   Warfarin maintenance plan:   3 mg every Sun, Tue, Fri; 2 mg all other days   Weekly warfarin total:   17 mg   Plan last modified:   Sergio Rider III, [facility] (2020)   Next INR check:      Priority:   High   Target end date:       Indications    Paroxysmal atrial fibrillation (CMS/HCC) [I48.0]             Anticoagulation Episode Summary     INR check location:       Preferred lab:       Send INR reminders to:    [facility]  POOL    Comments:   Lab drawn at dialysis ([facility])      Anticoagulation Care Providers     Provider Role Specialty Phone number    Shiloh Gonzales APRN Referring Cardiology [phone]          Drug interactions: has remained unchanged.  Diet: has remained unchanged.    Clinic Interview:  Patient Findings     Negatives:   Signs/symptoms of thrombosis, Signs/symptoms of bleeding,   Laboratory test error suspected, Change in health, Change in alcohol use,   Change in activity, Upcoming invasive procedure, Emergency department   visit, Upcoming dental procedure, Missed doses, Extra doses, Change in   medications, Change in diet/appetite, Hospital admission, Bruising, Other   complaints      Clinical Outcomes     Negatives:   Major bleeding event, Thromboembolic event,   Anticoagulation-related hospital admission, Anticoagulation-related ED   visit, Anticoagulation-related fatality        INR History:  Anticoagulation Monitoring 2020   INR 2.04 1.96 1.58   INR Date 2020   INR Goal 2.0-3.0 2.0-3.0 2.0-3.0   Trend Same Same Up   Last Week Total 15 mg 15 mg 15 mg   Next Week Total 15 mg 15 mg 18 mg   Sun 2 mg 2 mg 3 mg   Mon 2 mg 2 mg 2 mg   Tue 3 mg 3 mg 3 mg   Wed 2 mg - 2 mg   Thu 2 mg 2 mg 2 mg   Fri 2 mg 2 mg 4 mg ()   Sat 2 mg 2 mg 2 mg   Visit  Report - - -   Some recent data might be hidden       Plan:  1. INR is Subtherapeutic today- see above in Anticoagulation Summary.   Will instruct Claudia GABRIELLE Arnold to Increase their warfarin regimen- see above in Anticoagulation Summary.  Boost 4 mg today.  2. Follow up on Tuesday during dialysis.  3. Pt has agreed to only be called if INR out of range. They have been instructed to call if any changes in medications, doses, concerns, etc. Patient expresses understanding and has no further questions at this time.    Sergio Rider III, [facility]

## 2020-02-25 LAB — INR PPP: 1.85

## 2020-02-27 ENCOUNTER — ANTICOAGULATION VISIT (OUTPATIENT)
Dept: PHARMACY | Facility: HOSPITAL | Age: 75
End: 2020-02-27

## 2020-02-27 DIAGNOSIS — I48.0 PAROXYSMAL ATRIAL FIBRILLATION (HCC): ICD-10-CM

## 2020-02-27 NOTE — PROGRESS NOTES
Anticoagulation Clinic Progress Note    Anticoagulation Summary  As of 2020    INR goal:   2.0-3.0   TTR:   20.8 % (8.4 mo)   INR used for dosin.85! (2020)   Warfarin maintenance plan:   3 mg every Tue, Fri; 2 mg all other days   Weekly warfarin total:   16 mg   Plan last modified:   Jalen Temple RPH (2020)   Next INR check:   3/3/2020   Priority:   High   Target end date:       Indications    Paroxysmal atrial fibrillation (CMS/HCC) [I48.0]             Anticoagulation Episode Summary     INR check location:       Preferred lab:       Send INR reminders to:   [facility]  POOL    Comments:   Lab drawn at dialysis ([facility])      Anticoagulation Care Providers     Provider Role Specialty Phone number    Shiloh Gonzales APRN Referring Cardiology [phone]            Clinic Interview:  Patient Findings     Positives:   Other complaints    Negatives:   Signs/symptoms of thrombosis, Signs/symptoms of bleeding,   Laboratory test error suspected, Change in health, Change in alcohol use,   Change in activity, Upcoming invasive procedure, Emergency department   visit, Upcoming dental procedure, Missed doses, Extra doses, Change in   medications, Change in diet/appetite, Hospital admission, Bruising    Comments:   Reports taking 3 mg only on the first ; Denies ever   taking 3 mg on Fri.      Clinical Outcomes     Negatives:   Major bleeding event, Thromboembolic event,   Anticoagulation-related hospital admission, Anticoagulation-related ED   visit, Anticoagulation-related fatality    Comments:   Reports taking 3 mg only on the first ; Denies ever   taking 3 mg on Fri.        INR History:  Anticoagulation Monitoring 2020   INR 1.96 1.58 1.85   INR Date 2020   INR Goal 2.0-3.0 2.0-3.0 2.0-3.0   Trend Same Up Down   Last Week Total 15 mg 15 mg 15 mg   Next Week Total 15 mg 18 mg 16 mg   Sun 2 mg 3 mg 2 mg   Mon 2 mg 2 mg 2  mg   Tue 3 mg - -   Wed - - -   Thu 2 mg - 2 mg   Fri 2 mg 4 mg (2/14) 3 mg   Sat 2 mg 2 mg 2 mg   Visit Report - - -   Some recent data might be hidden       Plan:  1. INR is Subtherapeutic today- see above in Anticoagulation Summary.   Will instruct Claudia Arnold to Increase their warfarin regimen (3 mg Tues/Fri, 2 mg rest of wk) - see above in Anticoagulation Summary.  2. Follow up in 1 week  3. They have been instructed to call if any changes in medications, doses, concerns, etc. Patient expresses understanding and has no further questions at this time.    Jalen Temple [facility]

## 2020-03-04 ENCOUNTER — TELEPHONE (OUTPATIENT)
Dept: FAMILY MEDICINE CLINIC | Facility: CLINIC | Age: 75
End: 2020-03-04

## 2020-03-04 LAB — INR PPP: 1.7

## 2020-03-05 ENCOUNTER — ANTICOAGULATION VISIT (OUTPATIENT)
Dept: PHARMACY | Facility: HOSPITAL | Age: 75
End: 2020-03-05

## 2020-03-05 DIAGNOSIS — I48.0 PAROXYSMAL ATRIAL FIBRILLATION (HCC): ICD-10-CM

## 2020-03-05 NOTE — PROGRESS NOTES
Anticoagulation Clinic Progress Note    Anticoagulation Summary  As of 3/5/2020    INR goal:   2.0-3.0   TTR:   20.3 % (8.6 mo)   INR used for dosin.70! (3/3/2020)   Warfarin maintenance plan:   3 mg every Tue, Fri; 2 mg all other days   Weekly warfarin total:   16 mg   No change documented:   Jalen Temple RPH   Plan last modified:   Jalen Temple RPH (2020)   Next INR check:   3/10/2020   Priority:   High   Target end date:       Indications    Paroxysmal atrial fibrillation (CMS/HCC) [I48.0]             Anticoagulation Episode Summary     INR check location:       Preferred lab:       Send INR reminders to:    [facility] [employer]  POOL    Comments:   Lab drawn at dialysis ([facility])      Anticoagulation Care Providers     Provider Role Specialty Phone number    Shiloh Gonzales APRN Referring Cardiology [phone]            Clinic Interview:  Patient Findings     Positives:   Change in medications    Negatives:   Signs/symptoms of thrombosis, Signs/symptoms of bleeding,   Laboratory test error suspected, Change in health, Change in alcohol use,   Change in activity, Upcoming invasive procedure, Emergency department   visit, Upcoming dental procedure, Missed doses, Extra doses, Change in   diet/appetite, Hospital admission, Bruising, Other complaints    Comments:   Reports eythromycin administered alongside dialysis Tues and   Thurs this wk; unclear length of abx course.      Clinical Outcomes     Negatives:   Major bleeding event, Thromboembolic event,   Anticoagulation-related hospital admission, Anticoagulation-related ED   visit, Anticoagulation-related fatality    Comments:   Reports eythromycin administered alongside dialysis Tues and   Thurs this wk; unclear length of abx course.        INR History:  Anticoagulation Monitoring 2020 2020 3/5/2020   INR 1.58 1.85 1.70   INR Date 2020 2020 3/3/2020   INR Goal 2.0-3.0 2.0-3.0 2.0-3.0   Trend Up Down Same   Last Week  Total 15 mg 15 mg 16 mg   Next Week Total 18 mg 16 mg 16 mg   Sun 3 mg 2 mg 2 mg   Mon 2 mg 2 mg 2 mg   Tue - - -   Wed - - -   Thu - 2 mg 2 mg   Fri 4 mg (2/14) 3 mg 3 mg   Sat 2 mg 2 mg 2 mg   Visit Report - - -   Some recent data might be hidden       Plan:  1. INR is Subtherapeutic today- see above in Anticoagulation Summary.   Will instruct Claudia GABRIELLE Arnold to Continue their warfarin regimen- see above in Anticoagulation Summary.  2. Follow up in 1 week  3. They have been instructed to call if any changes in medications, doses, concerns, etc. Patient expresses understanding and has no further questions at this time.    Jalen Temple RP

## 2020-03-09 RX ORDER — BUMETANIDE 2 MG/1
4 TABLET ORAL DAILY
Qty: 60 TABLET | Refills: 3 | Status: SHIPPED | OUTPATIENT
Start: 2020-03-09 | End: 2020-07-06

## 2020-03-17 LAB — INR PPP: 1.5

## 2020-03-19 ENCOUNTER — ANTICOAGULATION VISIT (OUTPATIENT)
Dept: PHARMACY | Facility: HOSPITAL | Age: 75
End: 2020-03-19

## 2020-03-19 DIAGNOSIS — I48.0 PAROXYSMAL ATRIAL FIBRILLATION (HCC): ICD-10-CM

## 2020-03-19 NOTE — PROGRESS NOTES
Anticoagulation Clinic Progress Note    Anticoagulation Summary  As of 3/19/2020    INR goal:   2.0-3.0   TTR:   19.2 % (9.1 mo)   INR used for dosin.50! (3/17/2020)   Warfarin maintenance plan:   3 mg every Tue, Thu, Sat; 2 mg all other days   Weekly warfarin total:   17 mg   Plan last modified:   Jalen Temple RPH (3/19/2020)   Next INR check:   3/26/2020   Priority:   High   Target end date:       Indications    Paroxysmal atrial fibrillation (CMS/HCC) [I48.0]             Anticoagulation Episode Summary     INR check location:       Preferred lab:       Send INR reminders to:    [facility] ANDERSON  POOL    Comments:   Lab drawn at dialysis ([facility])      Anticoagulation Care Providers     Provider Role Specialty Phone number    Shiloh Gonzales APRN Referring Cardiology [phone]            Clinic Interview:  Patient Findings     Negatives:   Signs/symptoms of thrombosis, Signs/symptoms of bleeding,   Laboratory test error suspected, Change in health, Change in alcohol use,   Change in activity, Upcoming invasive procedure, Emergency department   visit, Upcoming dental procedure, Missed doses, Extra doses, Change in   medications, Change in diet/appetite, Hospital admission, Bruising, Other   complaints      Clinical Outcomes     Negatives:   Major bleeding event, Thromboembolic event,   Anticoagulation-related hospital admission, Anticoagulation-related ED   visit, Anticoagulation-related fatality        INR History:  Anticoagulation Monitoring 2020 3/5/2020 3/19/2020   INR 1.85 1.70 1.50   INR Date 2020 3/3/2020 3/17/2020   INR Goal 2.0-3.0 2.0-3.0 2.0-3.0   Trend Down Same Up   Last Week Total 15 mg 16 mg 16 mg   Next Week Total 16 mg 16 mg 17 mg   Sun 2 mg 2 mg 2 mg   Mon 2 mg 2 mg 2 mg   Tue - - 3 mg   Wed - - 2 mg   Thu 2 mg 2 mg 3 mg   Fri 3 mg 3 mg 2 mg   Sat 2 mg 2 mg 3 mg   Visit Report - - -   Some recent data might be hidden       Plan:  1. INR is Subtherapeutic today-  see above in Anticoagulation Summary.   Will instruct Claudia GABRIELLE Arnold to Increase their warfarin regimen- see above in Anticoagulation Summary.  2. Follow up in 1 week w/ dialysis  3. They have been instructed to call if any changes in medications, doses, concerns, etc. Patient expresses understanding and has no further questions at this time.    Jalen Temple [facility]

## 2020-03-24 ENCOUNTER — TELEPHONE (OUTPATIENT)
Dept: ORTHOPEDIC SURGERY | Facility: CLINIC | Age: 75
End: 2020-03-24

## 2020-03-24 NOTE — TELEPHONE ENCOUNTER
PATIENTS DAUGHTER WANTS HER SEEN ASAP FOR INJECTIONS. SAID SHE CANNOT WAIT UNTIL MAY. PLEASE ADVISE IF ANYTHING ELSE SHE CAN DO?

## 2020-03-24 NOTE — TELEPHONE ENCOUNTER
There is a possibility we can see patients for injections in 1 month if that is the case we can see her then.  We are being asked to see only emergent and postop's and eliminate nonurgent appointments in patients with comorbid problems that increases their risk of viral transmission

## 2020-03-31 NOTE — TELEPHONE ENCOUNTER
Spoke to patient and let her know an RX for trazodone was sent in to the pharmacy here for her.  Patient verbalized understanding and had no further questions.   Spoke with daughter.  Placed her on schedule for 1 month out in the hopes that we will be able to inject then.

## 2020-04-08 LAB — INR PPP: 1.97

## 2020-04-10 ENCOUNTER — ANTICOAGULATION VISIT (OUTPATIENT)
Dept: PHARMACY | Facility: HOSPITAL | Age: 75
End: 2020-04-10

## 2020-04-10 DIAGNOSIS — I48.0 PAROXYSMAL ATRIAL FIBRILLATION (HCC): ICD-10-CM

## 2020-04-10 NOTE — PROGRESS NOTES
Anticoagulation Clinic Progress Note    Anticoagulation Summary  As of 4/10/2020    INR goal:   2.0-3.0   TTR:   17.9 % (9.8 mo)   INR used for dosin.97! (2020)   Warfarin maintenance plan:   2 mg every Mon, Wed, Fri; 3 mg all other days   Weekly warfarin total:   18 mg   Plan last modified:   Ayla Landry RPH (4/10/2020)   Next INR check:   2020   Priority:   High   Target end date:       Indications    Paroxysmal atrial fibrillation (CMS/HCC) [I48.0]             Anticoagulation Episode Summary     INR check location:       Preferred lab:       Send INR reminders to:   LUIS ANDERSON  POOL    Comments:   Lab drawn at dialysis ([facility])      Anticoagulation Care Providers     Provider Role Specialty Phone number    Shiloh Gonzales APRN Referring Cardiology [phone]          Clinic Interview:  Patient Findings     Negatives:   Signs/symptoms of thrombosis, Signs/symptoms of bleeding,   Laboratory test error suspected, Change in health, Change in alcohol use,   Change in activity, Upcoming invasive procedure, Emergency department   visit, Upcoming dental procedure, Missed doses, Extra doses, Change in   medications, Change in diet/appetite, Hospital admission, Bruising, Other   complaints      Clinical Outcomes     Negatives:   Major bleeding event, Thromboembolic event,   Anticoagulation-related hospital admission, Anticoagulation-related ED   visit, Anticoagulation-related fatality        INR History:  Anticoagulation Monitoring 3/5/2020 3/19/2020 4/10/2020   INR 1.70 1.50 1.97   INR Date 3/3/2020 3/17/2020 2020   INR Goal 2.0-3.0 2.0-3.0 2.0-3.0   Trend Same Up Up   Last Week Total 16 mg 16 mg 17 mg   Next Week Total 16 mg 17 mg 18 mg   Sun 2 mg 2 mg 3 mg   Mon 2 mg 2 mg 2 mg   Tue - 3 mg -   Wed - 2 mg -   Thu 2 mg 3 mg -   Fri 3 mg 2 mg 2 mg   Sat 2 mg 3 mg 3 mg   Visit Report - - -   Some recent data might be hidden       Plan:  1. INR is Subtherapeutic today- see above  in Anticoagulation Summary.   Will instruct Claudia GABRIELLE Arnold to Increase their warfarin regimen- see above in Anticoagulation Summary.  2. Follow up in 1 week with dialysis  3. They have been instructed to call if any changes in medications, doses, concerns, etc. Patient expresses understanding and has no further questions at this time.    [facility], [facility]

## 2020-04-28 DIAGNOSIS — K21.9 GASTROESOPHAGEAL REFLUX DISEASE WITHOUT ESOPHAGITIS: ICD-10-CM

## 2020-04-28 RX ORDER — FAMOTIDINE 20 MG/1
TABLET, FILM COATED ORAL
Qty: 90 TABLET | Refills: 3 | OUTPATIENT
Start: 2020-04-28

## 2020-05-05 LAB — INR PPP: 2.08

## 2020-05-08 ENCOUNTER — ANTICOAGULATION VISIT (OUTPATIENT)
Dept: PHARMACY | Facility: HOSPITAL | Age: 75
End: 2020-05-08

## 2020-05-08 DIAGNOSIS — I48.0 PAROXYSMAL ATRIAL FIBRILLATION (HCC): ICD-10-CM

## 2020-05-08 NOTE — PROGRESS NOTES
Anticoagulation Clinic Progress Note    Anticoagulation Summary  As of 2020    INR goal:   2.0-3.0   TTR:   22.6 % (10.7 mo)   INR used for dosin.08 (2020)   Warfarin maintenance plan:   2 mg every Mon, Wed, Fri; 3 mg all other days   Weekly warfarin total:   18 mg   Plan last modified:   Ayla Landry RPH (4/10/2020)   Next INR check:   2020   Priority:   High   Target end date:       Indications    Paroxysmal atrial fibrillation (CMS/HCC) [I48.0]             Anticoagulation Episode Summary     INR check location:       Preferred lab:       Send INR reminders to:    [facility] ANDERSON  POOL    Comments:   Lab drawn at dialysis ([facility])      Anticoagulation Care Providers     Provider Role Specialty Phone number    Shiloh Gonzales APRN Referring Cardiology [phone]          Clinic Interview:      INR History:  Anticoagulation Monitoring 3/19/2020 4/10/2020 2020   INR 1.50 1.97 2.08   INR Date 3/17/2020 2020 2020   INR Goal 2.0-3.0 2.0-3.0 2.0-3.0   Trend Up Up Same   Last Week Total 16 mg 17 mg 18 mg   Next Week Total 17 mg 18 mg 18 mg   Sun 2 mg 3 mg 3 mg   Mon 2 mg 2 mg 2 mg   Tue 3 mg - 3 mg   Wed 2 mg - 2 mg   Thu 3 mg - 3 mg   Fri 2 mg 2 mg 2 mg   Sat 3 mg 3 mg 3 mg   Visit Report - - -   Some recent data might be hidden       Plan:  1. INR is Therapeutic today- see above in Anticoagulation Summary.   Will instruct Claudia GABRIELLE Arnold to Continue their warfarin regimen- see above in Anticoagulation Summary.  2. Follow up in 2 weeks  3.Spoke with pt today. They have been instructed to call if any changes in medications, doses, concerns, etc. Patient expresses understanding and has no further questions at this time.    Celeste Rogers [facility]

## 2020-05-10 DIAGNOSIS — L98.9 SKIN LESION: ICD-10-CM

## 2020-05-11 ENCOUNTER — ANTICOAGULATION VISIT (OUTPATIENT)
Dept: PHARMACY | Facility: HOSPITAL | Age: 75
End: 2020-05-11

## 2020-05-11 DIAGNOSIS — I48.0 PAROXYSMAL ATRIAL FIBRILLATION (HCC): ICD-10-CM

## 2020-05-13 RX ORDER — WARFARIN SODIUM 2 MG/1
2 TABLET ORAL DAILY
Qty: 90 TABLET | Refills: 3 | Status: SHIPPED | OUTPATIENT
Start: 2020-05-13 | End: 2020-08-26 | Stop reason: SDUPTHER

## 2020-05-14 DIAGNOSIS — K21.9 GASTROESOPHAGEAL REFLUX DISEASE WITHOUT ESOPHAGITIS: ICD-10-CM

## 2020-05-14 RX ORDER — FAMOTIDINE 20 MG/1
TABLET, FILM COATED ORAL
Qty: 90 TABLET | Refills: 3 | OUTPATIENT
Start: 2020-05-14

## 2020-05-29 RX ORDER — WARFARIN SODIUM 1 MG/1
1 TABLET ORAL NIGHTLY
Qty: 30 TABLET | Refills: 2 | Status: SHIPPED | OUTPATIENT
Start: 2020-05-29 | End: 2020-08-26 | Stop reason: SDUPTHER

## 2020-06-24 ENCOUNTER — TELEPHONE (OUTPATIENT)
Dept: PHARMACY | Facility: HOSPITAL | Age: 75
End: 2020-06-24

## 2020-06-30 LAB — INR PPP: 2.92

## 2020-07-06 ENCOUNTER — ANTICOAGULATION VISIT (OUTPATIENT)
Dept: PHARMACY | Facility: HOSPITAL | Age: 75
End: 2020-07-06

## 2020-07-06 DIAGNOSIS — I48.0 PAROXYSMAL ATRIAL FIBRILLATION (HCC): Primary | ICD-10-CM

## 2020-07-06 DIAGNOSIS — I48.0 PAROXYSMAL ATRIAL FIBRILLATION (HCC): ICD-10-CM

## 2020-07-06 RX ORDER — BUMETANIDE 2 MG/1
4 TABLET ORAL DAILY
Qty: 60 TABLET | Refills: 0 | Status: SHIPPED | OUTPATIENT
Start: 2020-07-06 | End: 2020-08-06

## 2020-07-06 NOTE — PROGRESS NOTES
Anticoagulation Clinic Progress Note    Anticoagulation Summary  As of 2020    INR goal:   2.0-3.0   TTR:   34.1 % (1 y)   INR used for dosin.92 (2020)   Warfarin maintenance plan:   2 mg every Mon, Wed, Fri; 3 mg all other days   Weekly warfarin total:   18 mg   Plan last modified:   Ayla Landry RP (4/10/2020)   Next INR check:   2020   Priority:   High   Target end date:       Indications    Paroxysmal atrial fibrillation (CMS/HCC) [I48.0]             Anticoagulation Episode Summary     INR check location:       Preferred lab:       Send INR reminders to:   [facility]  POOL    Comments:   Lab drawn at dialysis ([facility])      Anticoagulation Care Providers     Provider Role Specialty Phone number    Shiloh Gonzales APRN Referring Cardiology [phone]          Clinic Interview:      INR History:  Anticoagulation Monitoring 4/10/2020 2020 2020   INR 1.97 2.08 2.92   INR Date 2020   INR Goal 2.0-3.0 2.0-3.0 2.0-3.0   Trend Up Same Same   Last Week Total 17 mg 18 mg 18 mg   Next Week Total 18 mg 18 mg 18 mg   Sun 3 mg 3 mg 3 mg   Mon 2 mg 2 mg 2 mg   Tue - 3 mg 3 mg   Wed - 2 mg 2 mg   Thu - 3 mg 3 mg   Fri 2 mg 2 mg 2 mg   Sat 3 mg 3 mg 3 mg   Visit Report - - -   Some recent data might be hidden       Plan:  1. INR is Therapeutic - see above in Anticoagulation Summary.   Will instruct Claudia GABRIELLE Arnold to Continue their warfarin regimen- see above in Anticoagulation Summary.  2. Follow up in 2 weeks  3. Spoke with pt today. They have been instructed to call if any changes in medications, doses, concerns, etc. Patient expresses understanding and has no further questions at this time.    Celeste Rogers [facility]

## 2020-07-13 ENCOUNTER — CLINICAL SUPPORT (OUTPATIENT)
Dept: ORTHOPEDIC SURGERY | Facility: CLINIC | Age: 75
End: 2020-07-13

## 2020-07-13 VITALS — HEIGHT: 67 IN | BODY MASS INDEX: 37.28 KG/M2 | TEMPERATURE: 98 F

## 2020-07-13 DIAGNOSIS — M19.012 PRIMARY OSTEOARTHRITIS OF BOTH SHOULDERS: Primary | ICD-10-CM

## 2020-07-13 DIAGNOSIS — M19.011 PRIMARY OSTEOARTHRITIS OF BOTH SHOULDERS: Primary | ICD-10-CM

## 2020-07-13 PROCEDURE — 20610 DRAIN/INJ JOINT/BURSA W/O US: CPT | Performed by: ORTHOPAEDIC SURGERY

## 2020-07-13 RX ORDER — ROPINIROLE 0.25 MG/1
0.25 TABLET, FILM COATED ORAL NIGHTLY
COMMUNITY
Start: 2020-05-15

## 2020-07-13 RX ADMIN — TRIAMCINOLONE ACETONIDE 80 MG: 40 INJECTION, SUSPENSION INTRA-ARTICULAR; INTRAMUSCULAR at 09:01

## 2020-07-13 RX ADMIN — TRIAMCINOLONE ACETONIDE 80 MG: 40 INJECTION, SUSPENSION INTRA-ARTICULAR; INTRAMUSCULAR at 08:59

## 2020-07-13 NOTE — PROGRESS NOTES
Patient: Claudia Arnold  YOB: 1945  Date of Service: 7/13/2020    Chief Complaints: Bilateral shoulder pain    Subjective:    History of Present Illness: Pt is seen in the office today with complaints of bilateral shoulder pain she has known degenerative changes she is not an operative candidate.          Allergies: No Known Allergies    Medications:   Home Medications:  Current Outpatient Medications on File Prior to Visit   Medication Sig   • acetaminophen (TYLENOL) 325 MG tablet Take 650 mg by mouth 3 (Three) Times a Day. Takes tid at 0600, 1400, 2200   • aspirin 81 MG tablet Take  by mouth.   • bumetanide (BUMEX) 2 MG tablet TAKE 2 TABLETS BY MOUTH DAILY   • busPIRone (BUSPAR) 10 MG tablet Take 1 tablet by mouth 3 (Three) Times a Day.   • escitalopram (LEXAPRO) 10 MG tablet Take 1 tablet by mouth Daily.   • HYDROcodone-acetaminophen (NORCO) 5-325 MG per tablet Take 2 tablets by mouth Every 6 (Six) Hours As Needed for Moderate Pain .   • hydrocortisone 2.5 % cream Apply  topically to the appropriate area as directed See Admin Instructions. Apply topically to the affected area twice daily as directed   • ondansetron (ZOFRAN) 4 MG tablet Take 1 tablet by mouth Every 8 (Eight) Hours As Needed for Nausea or Vomiting.   • traMADol (ULTRAM) 50 MG tablet Take 50 mg by mouth Every 6 (Six) Hours As Needed for Moderate Pain .   • warfarin (COUMADIN) 1 MG tablet TAKE 1 TABLET BY MOUTH EVERY NIGHT   • warfarin (COUMADIN) 2 MG tablet TAKE 1 TABLET BY MOUTH DAILY     No current facility-administered medications on file prior to visit.      Current Medications:  Scheduled Meds:  Continuous Infusions:  No current facility-administered medications for this visit.   PRN Meds:.    I have reviewed the patient's medical history in detail and updated the computerized patient record.  Review and summarization of old records include:    Past Medical History:   Diagnosis Date   • A-fib (CMS/[facility])     Meds   • Arthritis      w/Difficult Mobility   • Bilateral lower extremity edema     Legs   • CAD (coronary artery disease)     Affecting LAD    • Cardiomyopathy (CMS/HCC)    • CHF (congestive heart failure) (CMS/HCC)    • Chronic fatigue    • Dialysis patient ([facility])     TUESDAY THURSDAY SATURDAY   • Generalized anxiety disorder    • GERD (gastroesophageal reflux disease)    • Hernia, inguinal     Hx Repair   • History of echocardiogram 1/17/19-[facility]    The L Ventricular Cavity is Mild-to-Moderately Dilated; Left Ventricular Wall Thickness is Consistent w/Mild Concentric Hypertrophy; Left Atrial Cavity Size is Moderate-to-Severely Dilated; Severe AVS; Severe MVS; Moderate TVR Noted   • History of transfusion    • Hyperlipidemia     Controlled w/Meds   • Hypertension     Controlled w/Meds   • Hypokinesis 01/22/2019    Apical Noted on Cardiac Cath   • IFG (impaired fasting glucose)    • LAD stenosis 01/22/2019    Mid LAD Irregularities Noted on Cardiac Cath    • Left atrial dilatation 01/17/2019    Moderate-Severe Noted on Echo   • Left ventricular dilatation 01/17/2019    Noted on Echo/LEE   • Mild concentric left ventricular hypertrophy 01/17/2019    Noted on Echo/LEE   • Mitral annular calcification 01/17/2019    Severe Noted on Echo   • Moderate tricuspid valve regurgitation 01/24/2019    Noted on LEE   • Morbid obesity (CMS/HCC)    • NSTEMI (non-ST elevated myocardial infarction) (CMS/HCC)    • OCTAVIANO (obstructive sleep apnea)    • Osteoarthritis    • Pulmonary hypertension (CMS/HCC) 01/22/2019    Noted on Cardiac Cath   • Right bundle branch block 01/24/2019    Noted on LEE   • Severe aortic stenosis 01/22/2019    Noted on Cardiac Cath & LEE on 01/24/19; S/p AVR on 01/28/19 by Dr. Gaxiola   • Severe mitral valve stenosis 01/24/2019    Noted on LEE; S/p MVR on 01/28/19 by Dr. Gaxiola   • Shoulder pain, bilateral    • Skin yeast infection     Folds Under Skin--Nystatin/Diflucan        Past Surgical History:   Procedure Laterality Date   •  AORTIC VALVE REPAIR/REPLACEMENT MITRAL VALVE REPAIR/REPLACEMENT N/A 1/28/2019    Procedure: LEE STERNOTOMY AORTIC VALVE REPLACEMENT, MITRAL VALVE REPLACEMENT, TRICUSPID VALVE REPAIR, MAZE PROCEDURE, CLOSURE OF LEFT ATRIAL APPENDAGE  AND PRP;  Surgeon: Scar Gaxiola MD;  Location: [facility] MAIN OR;  Service: Cardiothoracic   • ARTERIOVENOUS FISTULA/SHUNT SURGERY Left 6/26/2019    Procedure: LEFT ARM BRACHIAL CEPHALIC FISTULA;  Surgeon: Satish Stahl MD;  Location: [facility] MAIN OR;  Service: Vascular   • CARDIAC CATHETERIZATION N/A 1/22/2019    Procedure: Coronary angiography;  Surgeon: Rick Talavera MD;  [facility];  Service: Cardiology   • CARDIAC CATHETERIZATION N/A 1/22/2019    Procedure: Left Heart Cath;  Surgeon: Rick Talavera MD;  [facility];  Service: Cardiology   • CARDIAC CATHETERIZATION N/A 1/22/2019    Procedure: Right Heart Cath;  Surgeon: Rick Talavera MD;  [facility];  Service: Cardiology   • CARDIAC CATHETERIZATION N/A 1/22/2019    Procedure: Left ventriculography;  Surgeon: Rick Talavera MD;  [facility];  Service: Cardiology   • CARDIAC SURGERY     • COLONOSCOPY     • HERNIA REPAIR     • INSERTION HEMODIALYSIS CATHETER N/A 2/8/2019    Procedure: tunnel CATHETER PLACEMENT WITH FLUROSCOPY;  Surgeon: Satish Stahl MD;  Location: [facility] MAIN OR;  Service: Vascular   • REPLACEMENT TOTAL KNEE Bilateral 2007/2009        Social History     Occupational History   • Occupation: retired   Tobacco Use   • Smoking status: Never Smoker   • Smokeless tobacco: Never Used   Substance and Sexual Activity   • Alcohol use: Yes     Comment: Occ   • Drug use: No   • Sexual activity: Defer     Birth control/protection: Post-menopausal    Social History     Social History Narrative   • Not on file        Family History   Problem Relation Age of Onset   •  Hypertension Other    • Diabetes Other    • Heart disease Neg Hx    • Stroke Neg Hx    • Arthritis Neg Hx    • Breast cancer Neg Hx    • Malig Hyperthermia Neg Hx        ROS: 14 point review of systems was performed and was negative except for documented findings in HPI and today's encounter.     Allergies: No Known Allergies  Constitutional:  Denies fever, shaking or chills   Eyes:  Denies change in visual acuity   HENT:  Denies nasal congestion or sore throat   Respiratory:  Denies cough or shortness of breath   Cardiovascular:  Denies chest pain or severe LE edema   GI:  Denies abdominal pain, nausea, vomiting, bloody stools or diarrhea   Musculoskeletal:  Numbness, tingling, or loss of motor function only as noted above in history of present illness.  : Denies painful urination or hematuria  Integument:  Denies rash, lesion or ulceration   Neurologic:  Denies headache or focal weakness  Endocrine:  Denies lymphadenopathy  Psych:  Denies confusion or change in mental status   Hem:  Denies active bleeding      Physical Exam: 74 y.o. female  Wt Readings from Last 3 Encounters:   02/12/20 108 kg (238 lb)   12/23/19 108 kg (238 lb)   10/21/19 108 kg (238 lb)       There is no height or weight on file to calculate BMI.  No height and weight on file for this encounter.  There were no vitals filed for this visit.  Vital signs reviewed.   General Appearance:    Alert, cooperative, in no acute distress                  Eyes: conjunctiva clear  ENT: external ears and nose atraumatic  CV: no peripheral edema  Resp: normal respiratory effort  Skin: no rashes or wounds; normal turgor  Psych: mood and affect appropriate  Lymph: no nodes appreciated  Neuro: gross sensation intact  Vascular:  Palpable peripheral pulse in noted extremity    Ortho exam  Physical exam the right and left shoulder reveals no overlying skin changes no lymphedema lymphadenopathy the patient can actively flex to about 150 passively I get them to 160  abduction is similar external rotation is 40 internal rotation to there buttock.  Rotator cuff strength is 4+ over 5 with isometric strength testing no overlying skin changes.  Patient has reasonable cervical range of motion for their age no radicular symptoms and a normal elbow exam.  There are good distal pulses.               Assessment: Bilateral shoulder DJD    Plan: Injections  Follow up as indicated.  Ice, elevate, and rest as needed.  Discussed conservative measures of pain control including ice, bracing.  Also talked about the importance of strengthening   Deborah Agudelo M.D.        Large Joint Arthrocentesis: L glenohumeral  Date/Time: 7/13/2020 8:59 AM  Consent given by: patient  Site marked: site marked  Timeout: Immediately prior to procedure a time out was called to verify the correct patient, procedure, equipment, support staff and site/side marked as required   Supporting Documentation  Indications: pain   Procedure Details  Location: shoulder - L glenohumeral  Preparation: Patient was prepped and draped in the usual sterile fashion  Needle size: 22 G  Approach: posterior  Medications administered: 4 mL lidocaine (cardiac); 80 mg triamcinolone acetonide 40 MG/ML  Patient tolerance: patient tolerated the procedure well with no immediate complications    Large Joint Arthrocentesis: R glenohumeral  Date/Time: 7/13/2020 9:01 AM  Consent given by: patient  Site marked: site marked  Timeout: Immediately prior to procedure a time out was called to verify the correct patient, procedure, equipment, support staff and site/side marked as required   Supporting Documentation  Indications: pain   Procedure Details  Location: shoulder - R glenohumeral  Preparation: Patient was prepped and draped in the usual sterile fashion  Needle size: 22 G  Approach: posterior  Medications administered: 4 mL lidocaine (cardiac); 80 mg triamcinolone acetonide 40 MG/ML  Patient tolerance: patient tolerated the procedure well with  no immediate complications

## 2020-07-14 RX ORDER — TRIAMCINOLONE ACETONIDE 40 MG/ML
80 INJECTION, SUSPENSION INTRA-ARTICULAR; INTRAMUSCULAR
Status: COMPLETED | OUTPATIENT
Start: 2020-07-13 | End: 2020-07-13

## 2020-07-15 ENCOUNTER — OFFICE VISIT (OUTPATIENT)
Dept: CARDIOLOGY | Facility: CLINIC | Age: 75
End: 2020-07-15

## 2020-07-15 VITALS
BODY MASS INDEX: 36.88 KG/M2 | SYSTOLIC BLOOD PRESSURE: 120 MMHG | DIASTOLIC BLOOD PRESSURE: 60 MMHG | WEIGHT: 235 LBS | HEIGHT: 67 IN

## 2020-07-15 DIAGNOSIS — I48.0 PAROXYSMAL ATRIAL FIBRILLATION (HCC): Primary | ICD-10-CM

## 2020-07-15 DIAGNOSIS — I05.0 MITRAL VALVE STENOSIS, UNSPECIFIED ETIOLOGY: ICD-10-CM

## 2020-07-15 DIAGNOSIS — I35.0 AORTIC VALVE STENOSIS, ETIOLOGY OF CARDIAC VALVE DISEASE UNSPECIFIED: ICD-10-CM

## 2020-07-15 DIAGNOSIS — N18.6 ESRD (END STAGE RENAL DISEASE) (HCC): ICD-10-CM

## 2020-07-15 DIAGNOSIS — Z95.2 S/P AVR: ICD-10-CM

## 2020-07-15 PROCEDURE — 99441 PR PHYS/QHP TELEPHONE EVALUATION 5-10 MIN: CPT | Performed by: NURSE PRACTITIONER

## 2020-07-15 NOTE — PROGRESS NOTES
"    [facility] CARDIOLOGY  [address]  [facility] [address]  Phone: [phone]      Patient Name: Claudia Arnold  :1945  Age: 74 y.o.  Primary Cardiologist: Zan Segura MD  Encounter Provider:  MORGAN Butts      Chief Complaint:   Chief Complaint   Patient presents with   • Follow-up     telephone         HPI  Claudia Arnold is a 74 y.o. female who presents today via telephone visit secondary to COVID pandemic. Patient presents today for semiannual follow-up.     Pt has a  history significant for hypertension, hyperlipidemia, congestive heart failure, aortic valve stenosis, mitral valve stenosis, obesity, obstructive sleep apnea, coronary artery disease.  .    Patient reports that she has done very well over the past 6 months.  She states that she has not had any episodes of atrial fibrillation.  Her heart rate has been controlled.  Blood pressure is stable and monitored at hemodialysis.  Patient is anticoagulated with Coumadin and not having any bleeding complications.  She denies any chest pain, shortness of breath, dyspnea with exertion, volume overload, increased fatigue.      The following portions of the patient's history were reviewed and updated as appropriate: allergies, current medications, past family history, past medical history, past social history, past surgical history and problem list.      Review of Systems   Constitution: Negative for malaise/fatigue.   Cardiovascular: Negative for chest pain and leg swelling.   Respiratory: Negative for shortness of breath.    Neurological: Negative for light-headedness.   All other systems reviewed and are negative.      OBJECTIVE:     Vitals:    07/15/20 0936   BP: 120/60   Weight: 107 kg (235 lb)   Height: 170.2 cm (67\")     Body mass index is 36.81 kg/m².    Physical Exam  Physical exam not performed secondary to the phone visit.    Procedures    Cardiac Procedures:  1. Bilateral lower " extremity venous duplex scan 1/17/19.  Normal  2. Echocardiogram 1/17/19.  Left ventricular cavity is mild to moderately dilated.  Mild LVH.  Grade 1 diastolic dysfunction.  Normal right ventricular cavity size and systolic function noted.  Left atrial cavity size is moderate to severely dilated.  Severe aortic stenosis with peak gradient 96 mmHg and mean gradient 54 mmHg.  The calculated aortic valve area is 0.84 cm².  Severe mitral annular calcification.  Severe mitral valve stenosis with a peak gradient 22 mmHg and mean gradient 11 mmHg.  Mild to moderate tricuspid valve regurgitation.  Estimated RVSP greater than 55 mmHg.  3. Lower extremity venous duplex scan 1/21/19.  Normal  4. Cardiac catheterization 1/22/19.  Mild luminal irregularities proximal to the mid LAD.  Otherwise normal coronary angiography.  Elevated left and right-sided filling pressures.  Moderate aortic valve stenosis.  Moderate pulmonary hypertension.  5. LEE 1/24/19.  Left ventricular systolic function is low normal.  Severe aortic valve stenosis.  Moderate mitral valve stenosis is present.  Severe tricuspid valve regurgitation is present.  Left atrial cavity size is severely dilated.  Right ventricular cavity is mildly dilated.  6. Cardioversion 1/24/19.  Successful  7. Left upper extremity venous duplex scan 1/27/19 acute left upper extremity superficial thrombophlebitis noted in the cephalic.  8. Tissue aortic valve replacement with 23 mm magna pericardial paracentesis, mitral valve replacement with 25 mm Saint Mike porcine prosthesis, left cryo-Maze procedure and left AAA ligation on 1/28/19.  9. Limited echocardiogram 1/30/19.  Mild to moderate LVH.  EF greater than 70.  Right ventricular cavity is mildly dilated.  Left atrial cavity is moderately dilated.  No evidence of pericardial effusion.  10. LEE 2/15/19.  Wall motion was not well visualized due to artifact from the bioprosthetic aortic and mitral valves.  Left atrial cavity is  dilated.  Bioprosthetic mitral valve.  Mitral valve mean gradient 11 mmHg.  Moderate tricuspid valve regurgitation present.  Estimated RVSP 35-45 mmHg.  Bioprosthetic aortic valve present.  There still appeared to be flow with the left atrial appendage.  There was no left atrial appendage thrombus.  11. Cardioversion 2/15/19.  Successful.  12. Echocardiogram 5/13/2019.  EF 65%.  Moderate LVH.  Left atrial cavity size is moderately dilated.  Mild dilation of the aortic root.  Prosthetic aortic valve is grossly normal.  Saint Mike bioprosthetic mitral valve is grossly normal.    ASSESSMENT:      Diagnosis Plan   1. Paroxysmal atrial fibrillation (CMS/[facility])     2. Aortic valve stenosis, etiology of cardiac valve disease unspecified     3. Mitral valve stenosis, unspecified etiology     4. ESRD (end stage renal disease) (CMS/[facility])     5. S/P AVR           PLAN OF CARE:     13. Severe aortic valve stenosis status post aortic valve replacement.  Patient status post aortic valve replacement on 1/28/19.   She denies any shortness of breath, pedal edema.  Valve replacement stable on echocardiogram 5/2019.  14. Severe mitral valve stenosis status post mitral valve replacement.  Patient status post mitral valve replacement on 1/28/19. .  She denies shortness of breath or pedal edema.  Valve replacement stable on echocardiogram 5/2019.  15. Paroxysmal atrial fibrillation.   Patient reports that she has not had any further episodes of atrial fibrillation.  She is anticoagulated with Coumadin and not having any bleeding complications.  Patient follows the anticoagulation monitoring clinic closely.  16. End-stage renal disease requiring dialysis.  Denies any shortness of breath, volume overload.  Blood pressure checked at dialysis appointments and is been stable.    This patient has consented to a telehealth visit via the phone. I spent  9 minutes in total including but not limited to the direct conversation with this patient. All  vitals recorded within this visit are reported by the patient.         Discharge Medications           Accurate as of July 15, 2020 10:57 AM. If you have any questions, ask your nurse or doctor.               Continue These Medications      Instructions Start Date   acetaminophen 325 MG tablet  Commonly known as:  TYLENOL   650 mg, Oral, 3 Times Daily, Takes tid at 0600, 1400, 2200      aspirin 81 MG tablet   Oral      bumetanide 2 MG tablet  Commonly known as:  BUMEX   4 mg, Oral, Daily      busPIRone 10 MG tablet  Commonly known as:  BUSPAR   10 mg, Oral, 3 Times Daily      escitalopram 10 MG tablet  Commonly known as:  LEXAPRO   10 mg, Oral, Daily      HYDROcodone-acetaminophen 5-325 MG per tablet  Commonly known as:  NORCO   2 tablets, Oral, Every 6 Hours PRN      hydrocortisone 2.5 % cream   Topical, See Admin Instructions, Apply topically to the affected area twice daily as directed      ondansetron 4 MG tablet  Commonly known as:  ZOFRAN   4 mg, Oral, Every 8 Hours PRN      rOPINIRole 0.25 MG tablet  Commonly known as:  REQUIP   No dose, route, or frequency recorded.      traMADol 50 MG tablet  Commonly known as:  ULTRAM   50 mg, Oral, Every 6 Hours PRN      warfarin 2 MG tablet  Commonly known as:  COUMADIN   2 mg, Oral, Daily      warfarin 1 MG tablet  Commonly known as:  COUMADIN   1 mg, Oral, Nightly             Thank you for allowing me to participate in the care of your patient,         MORGAN Butts  [facility]  07/15/20  10:56 AM      **Cari Disclaimer:**  Much of this encounter note is an electronic transcription/translation of spoken language to printed text. The electronic translation of spoken language may permit erroneous, or at times, nonsensical words or phrases to be inadvertently transcribed. Although I have reviewed the note for such errors, some may still exist.

## 2020-07-21 LAB — INR PPP: 2.07

## 2020-07-24 ENCOUNTER — ANTICOAGULATION VISIT (OUTPATIENT)
Dept: PHARMACY | Facility: HOSPITAL | Age: 75
End: 2020-07-24

## 2020-07-24 DIAGNOSIS — I48.0 PAROXYSMAL ATRIAL FIBRILLATION (HCC): ICD-10-CM

## 2020-07-24 NOTE — PROGRESS NOTES
Anticoagulation Clinic Progress Note    Anticoagulation Summary  As of 2020    INR goal:   2.0-3.0   TTR:   37.6 % (1.1 y)   INR used for dosin.07 (2020)   Warfarin maintenance plan:   2 mg every Mon, Wed, Fri; 3 mg all other days   Weekly warfarin total:   18 mg   No change documented:   Jalen Temple RPH   Plan last modified:   Ayla Landry RPH (4/10/2020)   Next INR check:   2020   Priority:   High   Target end date:       Indications    Paroxysmal atrial fibrillation (CMS/HCC) [I48.0]             Anticoagulation Episode Summary     INR check location:       Preferred lab:       Send INR reminders to:   [facility]  POOL    Comments:   Lab drawn at dialysis ([facility])      Anticoagulation Care Providers     Provider Role Specialty Phone number    Shiloh Gonzales APRN Referring Cardiology [phone]          Clinic Interview:  Patient Findings     Positives:   Change in medications    Negatives:   Signs/symptoms of thrombosis, Signs/symptoms of bleeding,   Laboratory test error suspected, Change in health, Change in alcohol use,   Change in activity, Upcoming invasive procedure, Emergency department   visit, Upcoming dental procedure, Missed doses, Extra doses, Change in   diet/appetite, Hospital admission, Bruising, Other complaints    Comments:   Cephalexin x 5 days for UTI; 2 days remain.      Clinical Outcomes     Negatives:   Major bleeding event, Thromboembolic event,   Anticoagulation-related hospital admission, Anticoagulation-related ED   visit, Anticoagulation-related fatality    Comments:   Cephalexin x 5 days for UTI; 2 days remain.        INR History:  Anticoagulation Monitoring 2020   INR 2.08 2.92 2.07   INR Date 2020   INR Goal 2.0-3.0 2.0-3.0 2.0-3.0   Trend Same Same Same   Last Week Total 18 mg 18 mg 18 mg   Next Week Total 18 mg 18 mg 18 mg   Sun 3 mg 3 mg 3 mg   Mon 2 mg 2 mg 2 mg   Tue 3 mg 3 mg -    Wed 2 mg 2 mg -   Thu 3 mg 3 mg -   Fri 2 mg 2 mg 2 mg   Sat 3 mg 3 mg 3 mg   Visit Report - - -   Some recent data might be hidden       Plan:  1. INR is Therapeutic today- see above in Anticoagulation Summary.   Will instruct Claudia GABRIELLE Arnold to Continue their warfarin regimen- see above in Anticoagulation Summary.  2. Follow up in 1 week  3. They have been instructed to call if any changes in medications, doses, concerns, etc. Patient expresses understanding and has no further questions at this time.    Jalen Temple [facility]

## 2020-07-28 LAB — INR PPP: 2.09

## 2020-07-31 ENCOUNTER — ANTICOAGULATION VISIT (OUTPATIENT)
Dept: PHARMACY | Facility: HOSPITAL | Age: 75
End: 2020-07-31

## 2020-07-31 DIAGNOSIS — I48.0 PAROXYSMAL ATRIAL FIBRILLATION (HCC): ICD-10-CM

## 2020-07-31 NOTE — PROGRESS NOTES
Anticoagulation Clinic Progress Note    Anticoagulation Summary  As of 2020    INR goal:   2.0-3.0   TTR:   38.6 % (1.1 y)   INR used for dosin.09 (2020)   Warfarin maintenance plan:   2 mg every Mon, Wed, Fri; 3 mg all other days   Weekly warfarin total:   18 mg   Plan last modified:   Ayla Landry RPH (4/10/2020)   Next INR check:   2020   Priority:   High   Target end date:       Indications    Paroxysmal atrial fibrillation (CMS/HCC) [I48.0]             Anticoagulation Episode Summary     INR check location:       Preferred lab:       Send INR reminders to:    [facility]  POOL    Comments:   Lab drawn at dialysis ([phone]       Anticoagulation Care Providers     Provider Role Specialty Phone number    Shiloh Gonzales APRN Referring Cardiology [phone]          Clinic Interview:      INR History:  Anticoagulation Monitoring 2020   INR 2.92 2.07 2.09   INR Date 2020   INR Goal 2.0-3.0 2.0-3.0 2.0-3.0   Trend Same Same Same   Last Week Total 18 mg 18 mg 18 mg   Next Week Total 18 mg 18 mg 18 mg   Sun 3 mg 3 mg 3 mg   Mon 2 mg 2 mg 2 mg   Tue 3 mg - 3 mg   Wed 2 mg - 2 mg   Thu 3 mg - 3 mg   Fri 2 mg 2 mg 2 mg   Sat 3 mg 3 mg 3 mg   Visit Report - - -   Some recent data might be hidden       Plan:  1. INR is Therapeutic today- see above in Anticoagulation Summary.   Will instruct Claudia Arnold to Continue their warfarin regimen- see above in Anticoagulation Summary.  2. Follow up in 1 week--at dialysis  3. Spoke with pt today.They have been instructed to call if any changes in medications, doses, concerns, etc. Patient expresses understanding and has no further questions at this time.    Celeste Rogers [facility]

## 2020-08-04 LAB — INR PPP: 2.75

## 2020-08-05 ENCOUNTER — ANTICOAGULATION VISIT (OUTPATIENT)
Dept: PHARMACY | Facility: HOSPITAL | Age: 75
End: 2020-08-05

## 2020-08-05 DIAGNOSIS — I48.0 PAROXYSMAL ATRIAL FIBRILLATION (HCC): ICD-10-CM

## 2020-08-05 NOTE — PROGRESS NOTES
Anticoagulation Clinic Progress Note    Anticoagulation Summary  As of 2020    INR goal:   2.0-3.0   TTR:   39.7 % (1.1 y)   INR used for dosin.75 (2020)   Warfarin maintenance plan:   2 mg every Mon, Wed, Fri; 3 mg all other days   Weekly warfarin total:   18 mg   Plan last modified:   Ayla Landry [facility] (4/10/2020)   Next INR check:   2020   Priority:   High   Target end date:       Indications    Paroxysmal atrial fibrillation (CMS/HCC) [I48.0]             Anticoagulation Episode Summary     INR check location:       Preferred lab:       Send INR reminders to:    [facility]  POOL    Comments:   Lab drawn at dialysis ([facility]) - [facility] [phone]       Anticoagulation Care Providers     Provider Role Specialty Phone number    Shiloh Gonzales APRN Referring Cardiology [phone]          Clinic Interview:      INR History:  Anticoagulation Monitoring 2020   INR 2.07 2.09 2.75   INR Date 2020   INR Goal 2.0-3.0 2.0-3.0 2.0-3.0   Trend Same Same Same   Last Week Total 18 mg 18 mg 18 mg   Next Week Total 18 mg 18 mg 18 mg   Sun 3 mg 3 mg 3 mg   Mon 2 mg 2 mg 2 mg   Tue - 3 mg -   Wed - 2 mg 2 mg   Thu - 3 mg 3 mg   Fri 2 mg 2 mg 2 mg   Sat 3 mg 3 mg 3 mg   Visit Report - - -   Some recent data might be hidden       Plan:  1. INR is Therapeutic today- see above in Anticoagulation Summary.   Will instruct Claudia GABRIELLE Arnold to Continue their warfarin regimen- see above in Anticoagulation Summary.  2. Follow up in 1 weeks  3. They have been instructed to call if any changes in medications, doses, concerns, etc. Patient expresses understanding and has no further questions at this time.    Rajwinder Shaw [facility]

## 2020-08-06 RX ORDER — BUMETANIDE 2 MG/1
4 TABLET ORAL DAILY
Qty: 60 TABLET | Refills: 5 | Status: SHIPPED | OUTPATIENT
Start: 2020-08-06 | End: 2021-01-26

## 2020-08-12 LAB — INR PPP: 2.71

## 2020-08-14 ENCOUNTER — ANTICOAGULATION VISIT (OUTPATIENT)
Dept: PHARMACY | Facility: HOSPITAL | Age: 75
End: 2020-08-14

## 2020-08-14 DIAGNOSIS — I48.0 PAROXYSMAL ATRIAL FIBRILLATION (HCC): ICD-10-CM

## 2020-08-14 NOTE — PROGRESS NOTES
Anticoagulation Clinic Progress Note    Anticoagulation Summary  As of 2020    INR goal:   2.0-3.0   TTR:   40.7 % (1.2 y)   INR used for dosin.71 (2020)   Warfarin maintenance plan:   2 mg every Mon, Wed, Fri; 3 mg all other days   Weekly warfarin total:   18 mg   No change documented:   Jalen Temple RPH   Plan last modified:   Ayla Landry RPH (4/10/2020)   Next INR check:   2020   Priority:   High   Target end date:       Indications    Paroxysmal atrial fibrillation (CMS/HCC) [I48.0]             Anticoagulation Episode Summary     INR check location:       Preferred lab:       Send INR reminders to:   [facility]  POOL    Comments:   Lab drawn at [facility] ([facility]) - [facility] [phone]       Anticoagulation Care Providers     Provider Role Specialty Phone number    Shiloh Gonzales APRN Referring Cardiology [phone]          Clinic Interview:  Patient Findings     Negatives:   Signs/symptoms of thrombosis, Signs/symptoms of bleeding,   Laboratory test error suspected, Change in health, Change in alcohol use,   Change in activity, Upcoming invasive procedure, Emergency department   visit, Upcoming dental procedure, Missed doses, Extra doses, Change in   medications, Change in diet/appetite, Hospital admission, Bruising, Other   complaints      Clinical Outcomes     Negatives:   Major bleeding event, Thromboembolic event,   Anticoagulation-related hospital admission, Anticoagulation-related ED   visit, Anticoagulation-related fatality        INR History:  Anticoagulation Monitoring 2020   INR 2.09 2.75 2.71   INR Date 2020   INR Goal 2.0-3.0 2.0-3.0 2.0-3.0   Trend Same Same Same   Last Week Total 18 mg 18 mg 18 mg   Next Week Total 18 mg 18 mg 18 mg   Sun 3 mg 3 mg 3 mg   Mon 2 mg 2 mg 2 mg   Tue 3 mg - -   Wed 2 mg 2 mg -   Thu 3 mg 3 mg -   Fri 2 mg 2 mg 2 mg   Sat 3 mg 3 mg 3 mg   Visit Report - - -   Some recent  data might be hidden       Plan:  1. INR is Therapeutic today- see above in Anticoagulation Summary.   Will instruct Claudia Arnold to Continue their warfarin regimen- see above in Anticoagulation Summary.  2. Follow up in 1 week  3. They have been instructed to call if any changes in medications, doses, concerns, etc. Patient expresses understanding and has no further questions at this time.    Jalen Temple [facility]

## 2020-08-26 RX ORDER — WARFARIN SODIUM 1 MG/1
1 TABLET ORAL NIGHTLY
Qty: 30 TABLET | Refills: 2 | OUTPATIENT
Start: 2020-08-26

## 2020-08-26 RX ORDER — WARFARIN SODIUM 2 MG/1
TABLET ORAL
Qty: 120 TABLET | Refills: 1 | Status: SHIPPED | OUTPATIENT
Start: 2020-08-26 | End: 2020-08-26

## 2020-08-26 RX ORDER — WARFARIN SODIUM 2 MG/1
TABLET ORAL
Qty: 120 TABLET | Refills: 1 | Status: SHIPPED | OUTPATIENT
Start: 2020-08-26 | End: 2021-04-30

## 2020-09-02 LAB — INR PPP: 3.68

## 2020-09-04 ENCOUNTER — ANTICOAGULATION VISIT (OUTPATIENT)
Dept: PHARMACY | Facility: HOSPITAL | Age: 75
End: 2020-09-04

## 2020-09-04 DIAGNOSIS — I48.0 PAROXYSMAL ATRIAL FIBRILLATION (HCC): ICD-10-CM

## 2020-09-04 NOTE — PROGRESS NOTES
Anticoagulation Clinic Progress Note    Anticoagulation Summary  As of 9/4/2020    INR goal:   2.0-3.0   TTR:   40.1 % (1.2 y)   INR used for dosing:   3.68! (9/2/2020)   Warfarin maintenance plan:   2 mg every Mon, Wed, Fri; 3 mg all other days   Weekly warfarin total:   18 mg   No change documented:   Lane Lara, [place]   Plan last modified:   Ayla Landry RPH (4/10/2020)   Next INR check:   9/8/2020   Priority:   High   Target end date:       Indications    Paroxysmal atrial fibrillation (CMS/HCC) [I48.0]             Anticoagulation Episode Summary     INR check location:       Preferred lab:       Send INR reminders to:   [facility]  POOL    Comments:   Lab drawn at [facility] ([facility]) - [facility] [phone]       Anticoagulation Care Providers     Provider Role Specialty Phone number    Shiloh Gonzales APRN Referring Cardiology [phone]          Clinic Interview:  Patient Findings     Positives:   Change in health, Change in diet/appetite    Negatives:   Signs/symptoms of thrombosis, Signs/symptoms of bleeding,   Laboratory test error suspected, Change in alcohol use, Change in   activity, Upcoming invasive procedure, Emergency department visit,   Upcoming dental procedure, Missed doses, Extra doses, Change in   medications, Hospital admission, Bruising, Other complaints    Comments:   Patient has had a stomach bug for about a week and reported   nausea and vomiting. She states that she is starting to feel better and   better each day.       Clinical Outcomes     Negatives:   Major bleeding event, Thromboembolic event,   Anticoagulation-related hospital admission, Anticoagulation-related ED   visit, Anticoagulation-related fatality    Comments:   Patient has had a stomach bug for about a week and reported   nausea and vomiting. She states that she is starting to feel better and   better each day.         INR History:  Anticoagulation Monitoring 8/5/2020 8/14/2020 9/4/2020   INR 2.75  2.71 3.68   INR Date 8/4/2020 8/11/2020 9/2/2020   INR Goal 2.0-3.0 2.0-3.0 2.0-3.0   Trend Same Same Same   Last Week Total 18 mg 18 mg 15 mg   Next Week Total 18 mg 18 mg 18 mg   Sun 3 mg 3 mg 3 mg   Mon 2 mg 2 mg 2 mg   Tue - - -   Wed 2 mg - -   Thu 3 mg - -   Fri 2 mg 2 mg 2 mg   Sat 3 mg 3 mg 3 mg   Visit Report - - -   Some recent data might be hidden       Plan:  1. INR is Supratherapeutic today- see above in Anticoagulation Summary.   Will instruct Claudia Arnold to Continue their warfarin regimen- see above in Anticoagulation Summary. Pt inadvertently missed 3 mg dose on 9/3/20 which is a blessing in disguise considering that I would have instructed her to hold a dose (~17% decrease in weekly regimen w/ held dose on 9/3) . Pt was instructed to resume current regimen.  2. Follow up in 1 week  3. Pt has agreed to only be called if INR out of range. They have been instructed to call if any changes in medications, doses, concerns, etc. Patient expresses understanding and has no further questions at this time.    Lane Lara, KoriD

## 2020-09-17 NOTE — PROGRESS NOTES
"Shoulder Injection subacromial      Patient: Claudia Arnold        YOB: 1945            Chief Complaints: Shoulder pain      History of Present Illness: Pt gets intermittent shoulder injections with good relief. Is here for repeat injection.      Physical Exam: 71 y.o. female  General Appearance:    Alert, cooperative, in no acute distress                   Vitals:    02/03/17 1254   Temp: 98 °F (36.7 °C)   Weight: (!) 330 lb (150 kg)   Height: 67\" (170.2 cm)      Patient is alert and read ×3 no acute distress appears her above-listed at height weight and age.  Affect is normal respiratory rate is normal unlabored. Heart rate regular rate rhythm, sclera, dentition and hearing are normal for the purpose of this exam.  Exam and complaints are unchanged.      Procedure:  Large Joint Arthrocentesis  Date/Time: 2/3/2017 12:58 PM  Consent given by: patient  Site marked: site marked  Timeout: Immediately prior to procedure a time out was called to verify the correct patient, procedure, equipment, support staff and site/side marked as required   Supporting Documentation  Indications: pain   Procedure Details  Location: shoulder - R subacromial bursa  Preparation: Patient was prepped and draped in the usual sterile fashion  Needle size: 25 G  Approach: posterior  Medications administered: 80 mg methylPREDNISolone acetate 80 MG/ML; 4 mL bupivacaine  Patient tolerance: patient tolerated the procedure well with no immediate complications    Large Joint Arthrocentesis  Date/Time: 2/3/2017 12:58 PM  Consent given by: patient  Site marked: site marked  Timeout: Immediately prior to procedure a time out was called to verify the correct patient, procedure, equipment, support staff and site/side marked as required   Supporting Documentation  Indications: pain   Procedure Details  Location: shoulder - L subacromial bursa  Preparation: Patient was prepped and draped in the usual sterile fashion  Needle size: 25 " [facility] - Outpatient Follow-up Note  Louis Greenberg 52 y o  male MRN: [id number]  Encounter: [id number]          ASSESSMENT AND PLAN:      1  Epigastric pain: he has been having worsening reflux and LUQ/epigastric pain that radiates into his chest for the past 2 weeks  He has been seen in the ED twice for the same issue  Symptoms are worse with alcohol which he has not done for the past 2 weeks  He is on carafate and pepcid and he thinks these are helping   - pantoprazole (PROTONIX) 40 mg tablet; Take 1 tablet (40 mg total) by mouth daily  Dispense: 30 tablet; Refill: 3  - EGD; Future  alcoholic cessation    2  Rectal bleeding: he admits to intermittent rectal bleeding after drinking alcohol, last saw blood in his stool 1 week ago  Will plan colonoscopy at time of EGD   - Colonoscopy; Future    Risks and benefits of EGD and colonoscopy were discussed including but not limited to bleeding, infection, perforation  He understands and agrees to proceed with procedure    ______________________________________________________________________    SUBJECTIVE:  Louis Greenberg is a 53 yo male with no significant past medical history who is here for epigastric and chest pain  He seen in the emergency room twice recently for these symptoms  He had a CT of the abdomen and pelvis that showed no acute intra-abdominal abnormality  He states that the pain has been worsening over the last 2 weeks it starts on his left upper quadrant and epigastric region and radiates into his chest and associated with acid reflux and vomiting  He was given Carafate and Pepcid in the emergency room and he thinks that this helps his symptoms  Symptoms are made worse by alcohol  He does drink heavy having shots after working drinking a bottle of liquor on the weekend  Fortunately his liver enzymes are within normal limits    He has never had EGD or colonoscopy in the past   No abdominal surgeries in the past   He G  Approach: posterior  Medications administered: 80 mg methylPREDNISolone acetate 80 MG/ML; 4 mL bupivacaine  Patient tolerance: patient tolerated the procedure well with no immediate complications                Assessment. Persistent shoulder pain with rotator cuff pathology          Plan: Is to proceed with injection    The subacromial space was injected under strict sterile technique is form on a Marcaine and Depo-Medrol this was done sterilely and tolerated tolerated  well                 denies illicit drug use  REVIEW OF SYSTEMS IS OTHERWISE NEGATIVE  Historical Information   History reviewed  No pertinent past medical history  History reviewed  No pertinent surgical history  Social History   Social History     Substance and Sexual Activity   Alcohol Use Not Currently     Social History     Substance and Sexual Activity   Drug Use Never     Social History     Tobacco Use   Smoking Status Current Some Day Smoker    Types: Cigarettes   Smokeless Tobacco Never Used     History reviewed  No pertinent family history  Meds/Allergies       Current Outpatient Medications:     famotidine (PEPCID) 20 mg tablet    ondansetron (ZOFRAN-ODT) 4 mg disintegrating tablet    sucralfate (CARAFATE) 1 g tablet    pantoprazole (PROTONIX) 40 mg tablet    Allergies   Allergen Reactions    Peanut Oil            Objective     Blood pressure 118/78, pulse 68, temperature 97 6 °F (36 4 °C), weight 79 6 kg (175 lb 6 4 oz)  There is no height or weight on file to calculate BMI  PHYSICAL EXAM:      General Appearance:   Alert, cooperative, no distress   HEENT:   Normocephalic, atraumatic, anicteric      Neck:  Supple, symmetrical, trachea midline   Lungs:   Clear to auscultation bilaterally; no rales, rhonchi or wheezing; respirations unlabored    Heart[de-identified]   Regular rate and rhythm; no murmur, rub, or gallop  Abdomen:   Soft, non-tender, non-distended; normal bowel sounds; no masses, no organomegaly    Genitalia:   Deferred    Rectal:   Deferred    Extremities:  No cyanosis, clubbing or edema    Pulses:  2+ and symmetric    Skin:  No jaundice, rashes, or lesions    Lymph nodes:  No palpable cervical lymphadenopathy        Lab Results:   No visits with results within 1 Day(s) from this visit     Latest known visit with results is:   Admission on 09/15/2020, Discharged on 09/15/2020   Component Date Value    Sodium 09/15/2020 135*    Potassium 09/15/2020 4 5     Chloride 09/15/2020 105     CO2 09/15/2020 28     ANION GAP 09/15/2020 2*    BUN 09/15/2020 13     Creatinine 09/15/2020 0 89     Glucose 09/15/2020 91     Calcium 09/15/2020 8 9     AST 09/15/2020 17     ALT 09/15/2020 34     Alkaline Phosphatase 09/15/2020 64     Total Protein 09/15/2020 7 2     Albumin 09/15/2020 3 7     Total Bilirubin 09/15/2020 0 24     eGFR 09/15/2020 100     WBC 09/15/2020 9 41     RBC 09/15/2020 5 30     Hemoglobin 09/15/2020 15 2     Hematocrit 09/15/2020 47 4     MCV 09/15/2020 89     MCH 09/15/2020 28 7     MCHC 09/15/2020 32 1     RDW 09/15/2020 13 6     MPV 09/15/2020 11 2     Platelets 80/22/0390 244     nRBC 09/15/2020 0     Neutrophils Relative 09/15/2020 65     Immat GRANS % 09/15/2020 0     Lymphocytes Relative 09/15/2020 24     Monocytes Relative 09/15/2020 7     Eosinophils Relative 09/15/2020 3     Basophils Relative 09/15/2020 1     Neutrophils Absolute 09/15/2020 6 19     Immature Grans Absolute 09/15/2020 0 01     Lymphocytes Absolute 09/15/2020 2 26     Monocytes Absolute 09/15/2020 0 63     Eosinophils Absolute 09/15/2020 0 27     Basophils Absolute 09/15/2020 0 05     Lipase 09/15/2020 111     Ventricular Rate 09/15/2020 64     Atrial Rate 09/15/2020 64     PA Interval 09/15/2020 148     QRSD Interval 09/15/2020 90     QT Interval 09/15/2020 384     QTC Interval 09/15/2020 396     P Axis 09/15/2020 45     QRS Axis 09/15/2020 10     T Wave Axis 09/15/2020 60     Ventricular Rate 09/15/2020 64     Atrial Rate 09/15/2020 64     PA Interval 09/15/2020 148     QRSD Interval 09/15/2020 90     QT Interval 09/15/2020 376     QTC Interval 09/15/2020 387     P Axis 09/15/2020 34     QRS Axis 09/15/2020 11     T Wave Axis 09/15/2020 59          Radiology Results:   Ct Abdomen Pelvis With Contrast    Result Date: 9/8/2020  Narrative: CT ABDOMEN AND PELVIS WITH IV CONTRAST INDICATION:   LUQ/epigastric pain x 2 weeks  COMPARISON:  None   TECHNIQUE:  CT examination of the abdomen and pelvis was performed  Axial, sagittal, and coronal 2D reformatted images were created from the source data and submitted for interpretation  Radiation dose length product (DLP) for this visit:  324 mGy-cm   This examination, like all CT scans performed in the [facility], was performed utilizing techniques to minimize radiation dose exposure, including the use of iterative reconstruction and automated exposure control  IV Contrast:  100 mL of iohexol (OMNIPAQUE) Enteric Contrast:  Enteric contrast was not administered  FINDINGS: ABDOMEN LOWER CHEST:  No clinically significant abnormality identified in the visualized lower chest  LIVER/BILIARY TREE:  Unremarkable  GALLBLADDER:  No calcified gallstones  No pericholecystic inflammatory change  SPLEEN:  Unremarkable  PANCREAS:  Unremarkable  ADRENAL GLANDS:  Unremarkable  KIDNEYS/URETERS:  Punctate nonobstructing intrarenal stone on the left  STOMACH AND BOWEL:  Unremarkable  APPENDIX:  No findings to suggest appendicitis  ABDOMINOPELVIC CAVITY:  No ascites  No pneumoperitoneum  No lymphadenopathy  VESSELS:  Unremarkable for patient's age  PELVIS REPRODUCTIVE ORGANS:  Unremarkable for patient's age  URINARY BLADDER:  Unremarkable  ABDOMINAL WALL/INGUINAL REGIONS:  Unremarkable  OSSEOUS STRUCTURES:  No acute fracture or destructive osseous lesion  Impression: No acute intra-abdominal abnormality  No free air, free fluid, mesenteric inflammatory process, or bowel wall thickening  Punctate nonobstructing intrarenal stone noted on the left

## 2020-09-22 LAB — INR PPP: 2.85

## 2020-09-24 ENCOUNTER — ANTICOAGULATION VISIT (OUTPATIENT)
Dept: PHARMACY | Facility: HOSPITAL | Age: 75
End: 2020-09-24

## 2020-09-24 DIAGNOSIS — I48.0 PAROXYSMAL ATRIAL FIBRILLATION (HCC): ICD-10-CM

## 2020-09-24 NOTE — PROGRESS NOTES
Anticoagulation Clinic Progress Note    Anticoagulation Summary  As of 2020    INR goal:  2.0-3.0   TTR:  39.2 % (1.3 y)   INR used for dosin.85 (2020)   Warfarin maintenance plan:  2 mg every Mon, Wed, Fri; 3 mg all other days   Weekly warfarin total:  18 mg   Plan last modified:  Ayla Landry RP (4/10/2020)   Next INR check:  2020   Priority:  High   Target end date:      Indications    Paroxysmal atrial fibrillation (CMS/HCC) [I48.0]             Anticoagulation Episode Summary     INR check location:      Preferred lab:      Send INR reminders to:   [facility]  POOL    Comments:  [phone]       Anticoagulation Care Providers     Provider Role Specialty Phone number    Shiloh Gonzales APRN Referring Cardiology [phone]          Clinic Interview:      INR History:  Anticoagulation Monitoring 2020   INR 2.71 3.68 2.85   INR Date 2020   INR Goal 2.0-3.0 2.0-3.0 2.0-3.0   Trend Same Same Same   Last Week Total 18 mg 15 mg 18 mg   Next Week Total 18 mg 18 mg 18 mg   Sun 3 mg 3 mg 3 mg   Mon 2 mg 2 mg 2 mg   Tue - - -   Wed - - -   Th - - 3 mg   Fri 2 mg 2 mg 2 mg   Sat 3 mg 3 mg 3 mg   Visit Report - - -   Some recent data might be hidden       Plan:  1. INR is Therapeutic today- see above in Anticoagulation Summary.   Will instruct Claudia Arnold to Continue their warfarin regimen- see above in Anticoagulation Summary.  2. Follow up in 1 weeks  3. They have been instructed to call if any changes in medications, doses, concerns, etc.    Rajwinder Shaw [facility]

## 2020-10-06 LAB — INR PPP: 2.55

## 2020-10-12 ENCOUNTER — CLINICAL SUPPORT (OUTPATIENT)
Dept: ORTHOPEDIC SURGERY | Facility: CLINIC | Age: 75
End: 2020-10-12

## 2020-10-12 VITALS — BODY MASS INDEX: 36.41 KG/M2 | TEMPERATURE: 97.5 F | HEIGHT: 67 IN | WEIGHT: 232 LBS

## 2020-10-12 DIAGNOSIS — M19.019 GLENOHUMERAL ARTHRITIS: Primary | ICD-10-CM

## 2020-10-12 PROCEDURE — 20610 DRAIN/INJ JOINT/BURSA W/O US: CPT | Performed by: ORTHOPAEDIC SURGERY

## 2020-10-12 RX ADMIN — METHYLPREDNISOLONE ACETATE 80 MG: 80 INJECTION, SUSPENSION INTRA-ARTICULAR; INTRALESIONAL; INTRAMUSCULAR; SOFT TISSUE at 08:26

## 2020-10-12 RX ADMIN — METHYLPREDNISOLONE ACETATE 80 MG: 80 INJECTION, SUSPENSION INTRA-ARTICULAR; INTRALESIONAL; INTRAMUSCULAR; SOFT TISSUE at 08:27

## 2020-10-12 NOTE — PROGRESS NOTES
Patient: Claudia Arnold  YOB: 1945  Date of Service: 10/12/2020    Chief Complaints: Bilateral shoulder pain    Subjective:    History of Present Illness: Pt is seen in the office today with complaints of bilateral shoulder pain she has known degenerative changes she gets intermittent injections.          Allergies: No Known Allergies    Medications:   Home Medications:  Current Outpatient Medications on File Prior to Visit   Medication Sig   • acetaminophen (TYLENOL) 325 MG tablet Take 650 mg by mouth 3 (Three) Times a Day. Takes tid at 0600, 1400, 2200   • aspirin 81 MG tablet Take  by mouth.   • bumetanide (BUMEX) 2 MG tablet TAKE 2 TABLETS BY MOUTH DAILY   • busPIRone (BUSPAR) 10 MG tablet Take 1 tablet by mouth 3 (Three) Times a Day.   • escitalopram (LEXAPRO) 10 MG tablet Take 1 tablet by mouth Daily.   • HYDROcodone-acetaminophen (NORCO) 5-325 MG per tablet Take 2 tablets by mouth Every 6 (Six) Hours As Needed for Moderate Pain .   • hydrocortisone 2.5 % cream Apply  topically to the appropriate area as directed See Admin Instructions. Apply topically to the affected area twice daily as directed   • ondansetron (ZOFRAN) 4 MG tablet Take 1 tablet by mouth Every 8 (Eight) Hours As Needed for Nausea or Vomiting.   • rOPINIRole (REQUIP) 0.25 MG tablet    • traMADol (ULTRAM) 50 MG tablet Take 50 mg by mouth Every 6 (Six) Hours As Needed for Moderate Pain .   • warfarin (COUMADIN) 2 MG tablet Take one tablet (2mg) by mouth on Mon, Wed, and Fri and take one and one-half tablets (3mg) by mouth all other days or as directed     No current facility-administered medications on file prior to visit.      Current Medications:  Scheduled Meds:  Continuous Infusions:No current facility-administered medications for this visit.     PRN Meds:.    I have reviewed the patient's medical history in detail and updated the computerized patient record.  Review and summarization of old records include:    Past Medical  History:   Diagnosis Date   • A-fib (CMS/[facility])     Meds   • Arthritis     w/Difficult Mobility   • Bilateral lower extremity edema     Legs   • CAD (coronary artery disease)     Affecting LAD    • Cardiomyopathy (CMS/[facility])    • CHF (congestive heart failure) (CMS/HCC)    • Chronic fatigue    • Dialysis patient (CMS/[facility]ly [facility])     TUESDAY THURSDAY SATURDAY   • Generalized anxiety disorder    • GERD (gastroesophageal reflux disease)    • Hernia, inguinal     Hx Repair   • History of echocardiogram 1/17/19-[facility]    The L Ventricular Cavity is Mild-to-Moderately Dilated; Left Ventricular Wall Thickness is Consistent w/Mild Concentric Hypertrophy; Left Atrial Cavity Size is Moderate-to-Severely Dilated; Severe AVS; Severe MVS; Moderate TVR Noted   • History of transfusion    • Hyperlipidemia     Controlled w/Meds   • Hypertension     Controlled w/Meds   • Hypokinesis 01/22/2019    Apical Noted on Cardiac Cath   • IFG (impaired fasting glucose)    • LAD stenosis 01/22/2019    Mid LAD Irregularities Noted on Cardiac Cath    • Left atrial dilatation 01/17/2019    Moderate-Severe Noted on Echo   • Left ventricular dilatation 01/17/2019    Noted on Echo/LEE   • Mild concentric left ventricular hypertrophy 01/17/2019    Noted on Echo/LEE   • Mitral annular calcification 01/17/2019    Severe Noted on Echo   • Moderate tricuspid valve regurgitation 01/24/2019    Noted on LEE   • Morbid obesity (CMS/HCC)    • NSTEMI (non-ST elevated myocardial infarction) (CMS/HCC)    • OCTAVIANO (obstructive sleep apnea)    • Osteoarthritis    • Pulmonary hypertension (CMS/[facility]) 01/22/2019    Noted on Cardiac Cath   • Right bundle branch block 01/24/2019    Noted on LEE   • Severe aortic stenosis 01/22/2019    Noted on Cardiac Cath & LEE on 01/24/19; S/p AVR on 01/28/19 by Dr. aGxiola   • Severe mitral valve stenosis 01/24/2019    Noted on LEE; S/p MVR on 01/28/19 by Dr. Gaxiola   • Shoulder pain, bilateral    • Skin yeast infection     Folds Under  Skin--Nystatin/Diflucan        Past Surgical History:   Procedure Laterality Date   • AORTIC VALVE REPAIR/REPLACEMENT MITRAL VALVE REPAIR/REPLACEMENT N/A 1/28/2019    Procedure: LEE STERNOTOMY AORTIC VALVE REPLACEMENT, MITRAL VALVE REPLACEMENT, TRICUSPID VALVE REPAIR, MAZE PROCEDURE, CLOSURE OF LEFT ATRIAL APPENDAGE  AND PRP;  Surgeon: Scar Gaxiola MD;  [facility];  Service: Cardiothoracic   • ARTERIOVENOUS FISTULA/SHUNT SURGERY Left 6/26/2019    Procedure: LEFT ARM BRACHIAL CEPHALIC FISTULA;  Surgeon: Satish Stahl MD;  [facility];  Service: Vascular   • CARDIAC CATHETERIZATION N/A 1/22/2019    Procedure: Coronary angiography;  Surgeon: Rick Talavera MD;  Location:  [facility] CATH [facility];  Service: Cardiology   • CARDIAC CATHETERIZATION N/A 1/22/2019    Procedure: Left Heart Cath;  Surgeon: Rick Talavera MD;  [facility];  Service: Cardiology   • CARDIAC CATHETERIZATION N/A 1/22/2019    Procedure: Right Heart Cath;  Surgeon: Rick Talavera MD;  [facility];  Service: Cardiology   • CARDIAC CATHETERIZATION N/A 1/22/2019    Procedure: Left ventriculography;  Surgeon: Rick Talavera MD;  [facility];  Service: Cardiology   • CARDIAC SURGERY     • COLONOSCOPY     • HERNIA REPAIR     • INSERTION HEMODIALYSIS CATHETER N/A 2/8/2019    Procedure: tunnel CATHETER PLACEMENT WITH FLUROSCOPY;  Surgeon: Satish Stahl MD;  Location: [facility] MAIN OR;  Service: Vascular   • REPLACEMENT TOTAL KNEE Bilateral 2007/2009        Social History     Occupational History   • Occupation: retired   Tobacco Use   • Smoking status: Never Smoker   • Smokeless tobacco: Never Used   Substance and Sexual Activity   • Alcohol use: Yes     Comment: Occ   • Drug use: No   • Sexual activity: Defer     Birth control/protection: Post-menopausal      Social History     Social  History Narrative   • Not on file        Family History   Problem Relation Age of Onset   • Hypertension Other    • Diabetes Other    • Heart disease Neg Hx    • Stroke Neg Hx    • Arthritis Neg Hx    • Breast cancer Neg Hx    • Malig Hyperthermia Neg Hx        ROS: 14 point review of systems was performed and was negative except for documented findings in HPI and today's encounter.     Allergies: No Known Allergies  Constitutional:  Denies fever, shaking or chills   Eyes:  Denies change in visual acuity   HENT:  Denies nasal congestion or sore throat   Respiratory:  Denies cough or shortness of breath   Cardiovascular:  Denies chest pain or severe LE edema   GI:  Denies abdominal pain, nausea, vomiting, bloody stools or diarrhea   Musculoskeletal:  Numbness, tingling, or loss of motor function only as noted above in history of present illness.  : Denies painful urination or hematuria  Integument:  Denies rash, lesion or ulceration   Neurologic:  Denies headache or focal weakness  Endocrine:  Denies lymphadenopathy  Psych:  Denies confusion or change in mental status   Hem:  Denies active bleeding      Physical Exam: 75 y.o. female  Wt Readings from Last 3 Encounters:   07/15/20 107 kg (235 lb)   02/12/20 108 kg (238 lb)   12/23/19 108 kg (238 lb)       There is no height or weight on file to calculate BMI.  No height and weight on file for this encounter.  There were no vitals filed for this visit.  Vital signs reviewed.   General Appearance:    Alert, cooperative, in no acute distress                    Ortho exam    Exam is unchanged         Prior x-rays show severe glenohumeral arthritis in both shoulders    Assessment: Bilateral glenohumeral arthritis    Plan: Injections  Follow up as indicated.  She is at this point not an operative candidate for an elective procedure as she is on chronic dialysis she does seem to get relief with injections every 3 to 4 months we will continue to do that  Ice, elevate, and  rest as needed.  Discussed conservative measures of pain control including ice,    Deborah Agudelo M.D.        Large Joint Arthrocentesis: L glenohumeral  Date/Time: 10/12/2020 8:26 AM  Consent given by: patient  Site marked: site marked  Timeout: Immediately prior to procedure a time out was called to verify the correct patient, procedure, equipment, support staff and site/side marked as required   Supporting Documentation  Indications: pain   Procedure Details  Location: shoulder - L glenohumeral  Preparation: Patient was prepped and draped in the usual sterile fashion  Needle size: 22 G  Approach: posterior  Medications administered: 4 mL lidocaine (cardiac); 80 mg methylPREDNISolone acetate 80 MG/ML  Patient tolerance: patient tolerated the procedure well with no immediate complications    Large Joint Arthrocentesis: R glenohumeral  Date/Time: 10/12/2020 8:27 AM  Consent given by: patient  Site marked: site marked  Timeout: Immediately prior to procedure a time out was called to verify the correct patient, procedure, equipment, support staff and site/side marked as required   Supporting Documentation  Indications: pain   Procedure Details  Location: shoulder - R glenohumeral  Preparation: Patient was prepped and draped in the usual sterile fashion  Needle size: 22 G  Approach: posterior  Medications administered: 4 mL lidocaine (cardiac); 80 mg methylPREDNISolone acetate 80 MG/ML  Patient tolerance: patient tolerated the procedure well with no immediate complications

## 2020-10-13 LAB — INR PPP: 2.34

## 2020-10-13 RX ORDER — METHYLPREDNISOLONE ACETATE 80 MG/ML
80 INJECTION, SUSPENSION INTRA-ARTICULAR; INTRALESIONAL; INTRAMUSCULAR; SOFT TISSUE
Status: COMPLETED | OUTPATIENT
Start: 2020-10-12 | End: 2020-10-12

## 2020-10-20 LAB — INR PPP: 2.86

## 2020-10-27 ENCOUNTER — TELEPHONE (OUTPATIENT)
Dept: PHARMACY | Facility: HOSPITAL | Age: 75
End: 2020-10-27

## 2020-10-27 LAB — INR PPP: 2.47

## 2020-10-28 NOTE — TELEPHONE ENCOUNTER
Ms. Arnold contacted clinic and agreed to have Dialysis forward her most recent weekly INR result (drawn yesterday).     She also voiced concern related to her heel intermittently turning purple. She indicates her podiatrist advised her it was nothing to be concerned about but that it is likely related to warfarin. Pt reports she frequently gets a bedsore on her heel from sleeping in a certain position. It is likely that the bruise that results is what she is noticing. She was advised to seek medical attention if the site ever becomes painful.

## 2020-11-03 ENCOUNTER — ANTICOAGULATION VISIT (OUTPATIENT)
Dept: PHARMACY | Facility: HOSPITAL | Age: 75
End: 2020-11-03

## 2020-11-03 DIAGNOSIS — I48.0 PAROXYSMAL ATRIAL FIBRILLATION (HCC): ICD-10-CM

## 2020-11-03 NOTE — PROGRESS NOTES
Anticoagulation Clinic Progress Note    Anticoagulation Summary  As of 11/3/2020    INR goal:  2.0-3.0   TTR:  43.5 % (1.4 y)   INR used for dosin.47 (10/27/2020)   Warfarin maintenance plan:  2 mg every Mon, Wed, Fri; 3 mg all other days   Weekly warfarin total:  18 mg   No change documented:  Chloe Demarco RPH   Plan last modified:  Ayla Landry RPH (4/10/2020)   Next INR check:  11/3/2020   Priority:  High   Target end date:      Indications    Paroxysmal atrial fibrillation (CMS/HCC) [I48.0]             Anticoagulation Episode Summary     INR check location:      Preferred lab:      Send INR reminders to:  [facility] [employer]  POOL    Comments:  Lab drawn at dialysis ([facility]) - [facility] [phone]       Anticoagulation Care Providers     Provider Role Specialty Phone number    Shiloh Gonzales APRN Referring Cardiology [phone]          Clinic Interview:      INR History:  Anticoagulation Monitoring 2020 2020 11/3/2020   INR 3.68 2.85 2.47   INR Date 2020 2020 10/27/2020   INR Goal 2.0-3.0 2.0-3.0 2.0-3.0   Trend Same Same Same   Last Week Total 15 mg 18 mg 18 mg   Next Week Total 18 mg 18 mg 18 mg   Sun 3 mg 3 mg -   Mon 2 mg 2 mg -   Tu - - -   Wed - - -   Thu - 3 mg -   Fri 2 mg 2 mg -   Sat 3 mg 3 mg -   Visit Report - - -   Some recent data might be hidden       Plan:  1. INR is Therapeutic today- see above in Anticoagulation Summary.   Will instruct Claudia GABRIELLE Arnold to Continue their warfarin regimen- see above in Anticoagulation Summary.  2. Follow up in 1 weeks  3. Pt has agreed to only be called if INR out of range. They have been instructed to call if any changes in medications, doses, concerns, etc. Patient expresses understanding and has no further questions at this time.    Chloe Demarco RPH

## 2020-11-03 NOTE — PROGRESS NOTES
Anticoagulation Clinic Progress Note    Anticoagulation Summary  As of 11/3/2020    INR goal:  2.0-3.0   TTR:  43.5 % (1.4 y)   INR used for dosin.47 (10/27/2020)   Warfarin maintenance plan:  2 mg every Mon, Wed, Fri; 3 mg all other days   Weekly warfarin total:  18 mg   No change documented:  Chloe Demarco RPH   Plan last modified:  Ayla Landry RPH (4/10/2020)   Next INR check:  11/3/2020   Priority:  High   Target end date:      Indications    Paroxysmal atrial fibrillation (CMS/HCC) [I48.0]             Anticoagulation Episode Summary     INR check location:      Preferred lab:      Send INR reminders to:  [facility] [employer]  POOL    Comments:  Lab drawn at dialysis ([facility]) - [facility] [phone]       Anticoagulation Care Providers     Provider Role Specialty Phone number    Shiloh Gonzales APRN Referring Cardiology [phone]          Clinic Interview: no changes in medications or diet. No upcoming procedures.      INR History:  Anticoagulation Monitoring 2020 2020 11/3/2020   INR 3.68 2.85 2.47   INR Date 2020 2020 10/27/2020   INR Goal 2.0-3.0 2.0-3.0 2.0-3.0   Trend Same Same Same   Last Week Total 15 mg 18 mg 18 mg   Next Week Total 18 mg 18 mg 18 mg   Sun 3 mg 3 mg -   Mon 2 mg 2 mg -   Tu - - -   Wed - - -   Thu - 3 mg -   Fri 2 mg 2 mg -   Sat 3 mg 3 mg -   Visit Report - - -   Some recent data might be hidden       Plan:  1. INR is Therapeutic today- see above in Anticoagulation Summary.   Will instruct Claudia Arnold to Continue their warfarin regimen- see above in Anticoagulation Summary.  2. Follow up in 1 week.  3. Pt has agreed to only be called if INR out of range. They have been instructed to call if any changes in medications, doses, concerns, etc. Patient expresses understanding and has no further questions at this time.    Chloe Demarco RPH

## 2020-12-09 LAB — INR PPP: 2.6

## 2020-12-11 ENCOUNTER — ANTICOAGULATION VISIT (OUTPATIENT)
Dept: PHARMACY | Facility: HOSPITAL | Age: 75
End: 2020-12-11

## 2020-12-11 DIAGNOSIS — I48.0 PAROXYSMAL ATRIAL FIBRILLATION (HCC): ICD-10-CM

## 2020-12-11 NOTE — PROGRESS NOTES
Anticoagulation Clinic Progress Note    Anticoagulation Summary  As of 2020    INR goal:  2.0-3.0   TTR:  48.0 % (1.5 y)   INR used for dosin.60 (2020)   Warfarin maintenance plan:  2 mg every Tue, Thu, Sat; 3 mg all other days   Weekly warfarin total:  18 mg   Plan last modified:  Jalen Temple RPH (2020)   Next INR check:  2020   Priority:  High   Target end date:      Indications    Paroxysmal atrial fibrillation (CMS/HCC) [I48.0]             Anticoagulation Episode Summary     INR check location:      Preferred lab:      Send INR reminders to:   [facility]  POOL    Comments:  Lab drawn at dialysis ([facility]) - [facility] [phone]       Anticoagulation Care Providers     Provider Role Specialty Phone number    Shiloh Gonzales APRN Referring Cardiology [phone]          Clinic Interview:  Patient Findings     Positives:  Other complaints    Negatives:  Signs/symptoms of thrombosis, Signs/symptoms of bleeding,   Laboratory test error suspected, Change in health, Change in alcohol use,   Change in activity, Upcoming invasive procedure, Emergency department   visit, Upcoming dental procedure, Missed doses, Extra doses, Change in   medications, Change in diet/appetite, Hospital admission, Bruising    Comments:  Reports dialysis days have changed, so now she takes 2 mg   Tues/Thurs/Sat.       Clinical Outcomes     Negatives:  Major bleeding event, Thromboembolic event,   Anticoagulation-related hospital admission, Anticoagulation-related ED   visit, Anticoagulation-related fatality    Comments:  Reports dialysis days have changed, so now she takes 2 mg   Tues/Thurs/Sat.         INR History:  Anticoagulation Monitoring 2020 11/3/2020 2020   INR 2.85 2.47 2.60   INR Date 2020 10/27/2020 2020   INR Goal 2.0-3.0 2.0-3.0 2.0-3.0   Trend Same Same Same   Last Week Total 18 mg 18 mg 18 mg   Next Week Total 18 mg 18 mg 18 mg   Sun 3 mg - 3 mg   Mon 2 mg -  3 mg   Tue - - 2 mg   Wed - - -   Thu 3 mg - -   Fri 2 mg - 3 mg   Sat 3 mg - 2 mg   Visit Report - - -   Some recent data might be hidden       Plan:  1. INR is Therapeutic today- see above in Anticoagulation Summary.   Will instruct Claudia GABRIELLE Arnold to Continue their warfarin regimen- see above in Anticoagulation Summary.  2. Follow up in 1 week  3. They have been instructed to call if any changes in medications, doses, concerns, etc. Patient expresses understanding and has no further questions at this time.    Jalen Temple [facility]

## 2020-12-16 LAB — INR PPP: 2.81

## 2020-12-18 ENCOUNTER — ANTICOAGULATION VISIT (OUTPATIENT)
Dept: PHARMACY | Facility: HOSPITAL | Age: 75
End: 2020-12-18

## 2020-12-18 DIAGNOSIS — I48.0 PAROXYSMAL ATRIAL FIBRILLATION (HCC): ICD-10-CM

## 2020-12-18 NOTE — PROGRESS NOTES
Anticoagulation Clinic Progress Note    Anticoagulation Summary  As of 2020    INR goal:  2.0-3.0   TTR:  48.6 % (1.5 y)   INR used for dosin.81 (2020)   Warfarin maintenance plan:  2 mg every Tue, Thu, Sat; 3 mg all other days   Weekly warfarin total:  18 mg   Plan last modified:  Jalen Temple RP (2020)   Next INR check:  2020   Priority:  High   Target end date:      Indications    Paroxysmal atrial fibrillation (CMS/HCC) [I48.0]             Anticoagulation Episode Summary     INR check location:      Preferred lab:      Send INR reminders to:   [facility]  POOL    Comments:  Lab drawn at dialysis ([facility]) - [employer] [phone]       Anticoagulation Care Providers     Provider Role Specialty Phone number    Shiloh Gonzales APRN Referring Cardiology [phone]          Clinic Interview:      INR History:  Anticoagulation Monitoring 11/3/2020 2020 2020   INR 2.47 2.60 2.81   INR Date 10/27/2020 2020 2020   INR Goal 2.0-3.0 2.0-3.0 2.0-3.0   Trend Same Same Same   Last Week Total 18 mg 18 mg 18 mg   Next Week Total 18 mg 18 mg 18 mg   Sun - 3 mg 3 mg   Mon - 3 mg 3 mg   Tue - 2 mg 2 mg   Wed - - -   Thu - - -   Fri - 3 mg 3 mg   Sat - 2 mg 2 mg   Visit Report - - -   Some recent data might be hidden       Plan:  1. INR is Therapeutic today- see above in Anticoagulation Summary.   Will instruct Claudia Arnold to Continue their warfarin regimen- see above in Anticoagulation Summary.  2. Follow up in 1 week at dialysis  3. Spoke with pt today. They have been instructed to call if any changes in medications, doses, concerns, etc. Patient expresses understanding and has no further questions at this time.    Celeste Rogers [facility]

## 2020-12-23 LAB — INR PPP: 2.91

## 2020-12-28 ENCOUNTER — ANTICOAGULATION VISIT (OUTPATIENT)
Dept: PHARMACY | Facility: HOSPITAL | Age: 75
End: 2020-12-28

## 2020-12-28 DIAGNOSIS — I48.0 PAROXYSMAL ATRIAL FIBRILLATION (HCC): ICD-10-CM

## 2020-12-28 NOTE — PROGRESS NOTES
Anticoagulation Clinic Progress Note    Anticoagulation Summary  As of 2020    INR goal:  2.0-3.0   TTR:  49.3 % (1.5 y)   INR used for dosin.91 (2020)   Warfarin maintenance plan:  2 mg every Tue, Thu, Sat; 3 mg all other days   Weekly warfarin total:  18 mg   No change documented:  Jalen Temple RPH   Plan last modified:  Jalen Temple RPH (2020)   Next INR check:  2020   Priority:  High   Target end date:      Indications    Paroxysmal atrial fibrillation (CMS/HCC) [I48.0]             Anticoagulation Episode Summary     INR check location:      Preferred lab:      Send INR reminders to:  [facility] [employer]  POOL    Comments:  Lab drawn at dialysis ([facility]) - [facility] [phone]       Anticoagulation Care Providers     Provider Role Specialty Phone number    Shiloh Gonzales APRN Referring Cardiology [phone]          Clinic Interview:  No pertinent clinical findings have been reported.    INR History:  Anticoagulation Monitoring 2020   INR 2.60 2.81 2.91   INR Date 2020   INR Goal 2.0-3.0 2.0-3.0 2.0-3.0   Trend Same Same Same   Last Week Total 18 mg 18 mg 18 mg   Next Week Total 18 mg 18 mg 18 mg   Sun 3 mg 3 mg -   Mon 3 mg 3 mg 3 mg   Tue 2 mg 2 mg 2 mg   Wed - - -   Thu - - -   Fri 3 mg 3 mg -   Sat 2 mg 2 mg -   Visit Report - - -   Some recent data might be hidden       Plan:  1. INR is therapeutic today- see above in Anticoagulation Summary.    Claudia Arnold to continue their warfarin regimen- see above in Anticoagulation Summary.  2. Follow up in 1 week  3. Left HIPAA-friendly VM w/ instructions. They have been instructed to call if any changes in medications, doses, concerns, etc.     Jalen Temple RPH

## 2020-12-30 LAB — INR PPP: 3.03

## 2021-01-01 ENCOUNTER — HOSPITAL ENCOUNTER (INPATIENT)
Facility: HOSPITAL | Age: 76
LOS: 4 days | Discharge: HOME HEALTH | End: 2021-12-10
Attending: EMERGENCY MEDICINE | Admitting: INTERNAL MEDICINE

## 2021-01-01 ENCOUNTER — ANTICOAGULATION VISIT (OUTPATIENT)
Dept: PHARMACY | Facility: HOSPITAL | Age: 76
End: 2021-01-01

## 2021-01-01 ENCOUNTER — TRANSITIONAL CARE MANAGEMENT TELEPHONE ENCOUNTER (OUTPATIENT)
Dept: CALL CENTER | Facility: HOSPITAL | Age: 76
End: 2021-01-01

## 2021-01-01 ENCOUNTER — OFFICE VISIT (OUTPATIENT)
Dept: ORTHOPEDIC SURGERY | Facility: CLINIC | Age: 76
End: 2021-01-01

## 2021-01-01 ENCOUNTER — TELEPHONE (OUTPATIENT)
Dept: GASTROENTEROLOGY | Facility: CLINIC | Age: 76
End: 2021-01-01

## 2021-01-01 ENCOUNTER — ANESTHESIA (OUTPATIENT)
Dept: GASTROENTEROLOGY | Facility: HOSPITAL | Age: 76
End: 2021-01-01

## 2021-01-01 ENCOUNTER — TELEPHONE (OUTPATIENT)
Dept: ORTHOPEDIC SURGERY | Facility: CLINIC | Age: 76
End: 2021-01-01

## 2021-01-01 ENCOUNTER — APPOINTMENT (OUTPATIENT)
Dept: GENERAL RADIOLOGY | Facility: HOSPITAL | Age: 76
End: 2021-01-01

## 2021-01-01 ENCOUNTER — ANESTHESIA EVENT (OUTPATIENT)
Dept: GASTROENTEROLOGY | Facility: HOSPITAL | Age: 76
End: 2021-01-01

## 2021-01-01 ENCOUNTER — READMISSION MANAGEMENT (OUTPATIENT)
Dept: CALL CENTER | Facility: HOSPITAL | Age: 76
End: 2021-01-01

## 2021-01-01 VITALS
BODY MASS INDEX: 33.06 KG/M2 | OXYGEN SATURATION: 92 % | SYSTOLIC BLOOD PRESSURE: 94 MMHG | TEMPERATURE: 97.8 F | RESPIRATION RATE: 16 BRPM | DIASTOLIC BLOOD PRESSURE: 76 MMHG | HEART RATE: 63 BPM | WEIGHT: 210.6 LBS | HEIGHT: 67 IN

## 2021-01-01 VITALS — HEIGHT: 67 IN | WEIGHT: 220 LBS | BODY MASS INDEX: 34.53 KG/M2 | TEMPERATURE: 97.3 F

## 2021-01-01 DIAGNOSIS — N18.6 ESRD (END STAGE RENAL DISEASE) (HCC): ICD-10-CM

## 2021-01-01 DIAGNOSIS — I48.0 PAROXYSMAL ATRIAL FIBRILLATION (HCC): Primary | ICD-10-CM

## 2021-01-01 DIAGNOSIS — I48.91 ATRIAL FIBRILLATION WITH RVR (HCC): Primary | ICD-10-CM

## 2021-01-01 DIAGNOSIS — Z79.01 ANTICOAGULATED ON WARFARIN: ICD-10-CM

## 2021-01-01 DIAGNOSIS — K31.811 GASTROINTESTINAL HEMORRHAGE ASSOCIATED WITH ANGIODYSPLASIA OF STOMACH AND DUODENUM: ICD-10-CM

## 2021-01-01 DIAGNOSIS — K92.2 ACUTE GI BLEEDING: ICD-10-CM

## 2021-01-01 DIAGNOSIS — I50.32 CHRONIC DIASTOLIC CHF (CONGESTIVE HEART FAILURE) (HCC): ICD-10-CM

## 2021-01-01 DIAGNOSIS — M19.019 GLENOHUMERAL ARTHRITIS: Primary | ICD-10-CM

## 2021-01-01 DIAGNOSIS — D50.0 IRON DEFICIENCY ANEMIA DUE TO CHRONIC BLOOD LOSS: ICD-10-CM

## 2021-01-01 DIAGNOSIS — N18.6 ESRD ON DIALYSIS (HCC): ICD-10-CM

## 2021-01-01 DIAGNOSIS — Z99.2 ESRD ON DIALYSIS (HCC): ICD-10-CM

## 2021-01-01 LAB
ABO GROUP BLD: NORMAL
ALBUMIN SERPL-MCNC: 3.8 G/DL (ref 3.5–5.2)
ALBUMIN SERPL-MCNC: 3.9 G/DL (ref 3.5–5.2)
ALBUMIN SERPL-MCNC: 4.3 G/DL (ref 3.5–5.2)
ALBUMIN/GLOB SERPL: 1.5 G/DL
ALBUMIN/GLOB SERPL: 1.6 G/DL
ALBUMIN/GLOB SERPL: 1.8 G/DL
ALP SERPL-CCNC: 79 U/L (ref 39–117)
ALP SERPL-CCNC: 89 U/L (ref 39–117)
ALP SERPL-CCNC: 93 U/L (ref 39–117)
ALT SERPL W P-5'-P-CCNC: 10 U/L (ref 1–33)
ALT SERPL W P-5'-P-CCNC: 11 U/L (ref 1–33)
ALT SERPL W P-5'-P-CCNC: 12 U/L (ref 1–33)
ANION GAP SERPL CALCULATED.3IONS-SCNC: 12 MMOL/L (ref 5–15)
ANION GAP SERPL CALCULATED.3IONS-SCNC: 13.2 MMOL/L (ref 5–15)
ANION GAP SERPL CALCULATED.3IONS-SCNC: 15.1 MMOL/L (ref 5–15)
ANION GAP SERPL CALCULATED.3IONS-SCNC: 15.3 MMOL/L (ref 5–15)
AST SERPL-CCNC: 12 U/L (ref 1–32)
AST SERPL-CCNC: 14 U/L (ref 1–32)
AST SERPL-CCNC: 14 U/L (ref 1–32)
BACTERIA SPEC AEROBE CULT: ABNORMAL
BACTERIA UR QL AUTO: ABNORMAL /HPF
BASOPHILS # BLD AUTO: 0.03 10*3/MM3 (ref 0–0.2)
BASOPHILS # BLD AUTO: 0.04 10*3/MM3 (ref 0–0.2)
BASOPHILS # BLD AUTO: 0.05 10*3/MM3 (ref 0–0.2)
BASOPHILS NFR BLD AUTO: 0.5 % (ref 0–1.5)
BASOPHILS NFR BLD AUTO: 0.6 % (ref 0–1.5)
BASOPHILS NFR BLD AUTO: 0.7 % (ref 0–1.5)
BILIRUB SERPL-MCNC: 0.4 MG/DL (ref 0–1.2)
BILIRUB SERPL-MCNC: 0.4 MG/DL (ref 0–1.2)
BILIRUB SERPL-MCNC: 0.5 MG/DL (ref 0–1.2)
BILIRUB UR QL STRIP: NEGATIVE
BLD GP AB SCN SERPL QL: NEGATIVE
BUN SERPL-MCNC: 19 MG/DL (ref 8–23)
BUN SERPL-MCNC: 21 MG/DL (ref 8–23)
BUN SERPL-MCNC: 28 MG/DL (ref 8–23)
BUN SERPL-MCNC: 42 MG/DL (ref 8–23)
BUN/CREAT SERPL: 11.4 (ref 7–25)
BUN/CREAT SERPL: 12.5 (ref 7–25)
BUN/CREAT SERPL: 12.6 (ref 7–25)
BUN/CREAT SERPL: 9.6 (ref 7–25)
CALCIUM SPEC-SCNC: 9.5 MG/DL (ref 8.6–10.5)
CALCIUM SPEC-SCNC: 9.5 MG/DL (ref 8.6–10.5)
CALCIUM SPEC-SCNC: 9.7 MG/DL (ref 8.6–10.5)
CALCIUM SPEC-SCNC: 9.7 MG/DL (ref 8.6–10.5)
CHLORIDE SERPL-SCNC: 100 MMOL/L (ref 98–107)
CHLORIDE SERPL-SCNC: 101 MMOL/L (ref 98–107)
CHLORIDE SERPL-SCNC: 97 MMOL/L (ref 98–107)
CHLORIDE SERPL-SCNC: 97 MMOL/L (ref 98–107)
CLARITY UR: ABNORMAL
CO2 SERPL-SCNC: 23.8 MMOL/L (ref 22–29)
CO2 SERPL-SCNC: 24.7 MMOL/L (ref 22–29)
CO2 SERPL-SCNC: 24.9 MMOL/L (ref 22–29)
CO2 SERPL-SCNC: 26 MMOL/L (ref 22–29)
COLOR UR: YELLOW
CREAT SERPL-MCNC: 1.66 MG/DL (ref 0.57–1)
CREAT SERPL-MCNC: 2.18 MG/DL (ref 0.57–1)
CREAT SERPL-MCNC: 2.24 MG/DL (ref 0.57–1)
CREAT SERPL-MCNC: 3.34 MG/DL (ref 0.57–1)
D-LACTATE SERPL-SCNC: 1.1 MMOL/L (ref 0.5–2)
DEPRECATED RDW RBC AUTO: 46.1 FL (ref 37–54)
DEPRECATED RDW RBC AUTO: 48.1 FL (ref 37–54)
DEPRECATED RDW RBC AUTO: 48.2 FL (ref 37–54)
DEPRECATED RDW RBC AUTO: 50.4 FL (ref 37–54)
EOSINOPHIL # BLD AUTO: 0.12 10*3/MM3 (ref 0–0.4)
EOSINOPHIL # BLD AUTO: 0.12 10*3/MM3 (ref 0–0.4)
EOSINOPHIL # BLD AUTO: 0.19 10*3/MM3 (ref 0–0.4)
EOSINOPHIL NFR BLD AUTO: 1.7 % (ref 0.3–6.2)
EOSINOPHIL NFR BLD AUTO: 1.8 % (ref 0.3–6.2)
EOSINOPHIL NFR BLD AUTO: 3.4 % (ref 0.3–6.2)
ERYTHROCYTE [DISTWIDTH] IN BLOOD BY AUTOMATED COUNT: 13.1 % (ref 12.3–15.4)
ERYTHROCYTE [DISTWIDTH] IN BLOOD BY AUTOMATED COUNT: 13.4 % (ref 12.3–15.4)
ERYTHROCYTE [DISTWIDTH] IN BLOOD BY AUTOMATED COUNT: 13.5 % (ref 12.3–15.4)
ERYTHROCYTE [DISTWIDTH] IN BLOOD BY AUTOMATED COUNT: 13.5 % (ref 12.3–15.4)
EXPIRATION DATE: ABNORMAL
FECAL OCCULT BLOOD SCREEN, POC: POSITIVE
GFR SERPL CREATININE-BSD FRML MDRD: 13 ML/MIN/1.73
GFR SERPL CREATININE-BSD FRML MDRD: 21 ML/MIN/1.73
GFR SERPL CREATININE-BSD FRML MDRD: 22 ML/MIN/1.73
GFR SERPL CREATININE-BSD FRML MDRD: 30 ML/MIN/1.73
GFR SERPL CREATININE-BSD FRML MDRD: ABNORMAL ML/MIN/{1.73_M2}
GLOBULIN UR ELPH-MCNC: 2.2 GM/DL
GLOBULIN UR ELPH-MCNC: 2.6 GM/DL
GLOBULIN UR ELPH-MCNC: 2.7 GM/DL
GLUCOSE SERPL-MCNC: 120 MG/DL (ref 65–99)
GLUCOSE SERPL-MCNC: 77 MG/DL (ref 65–99)
GLUCOSE SERPL-MCNC: 78 MG/DL (ref 65–99)
GLUCOSE SERPL-MCNC: 88 MG/DL (ref 65–99)
GLUCOSE UR STRIP-MCNC: NEGATIVE MG/DL
HBA1C MFR BLD: 5.6 % (ref 4.8–5.6)
HBV SURFACE AG SERPL QL IA: NORMAL
HCT VFR BLD AUTO: 28.3 % (ref 34–46.6)
HCT VFR BLD AUTO: 28.5 % (ref 34–46.6)
HCT VFR BLD AUTO: 28.6 % (ref 34–46.6)
HCT VFR BLD AUTO: 28.9 % (ref 34–46.6)
HCT VFR BLD AUTO: 29.5 % (ref 34–46.6)
HCT VFR BLD AUTO: 30.1 % (ref 34–46.6)
HCT VFR BLD AUTO: 30.6 % (ref 34–46.6)
HCT VFR BLD AUTO: 30.7 % (ref 34–46.6)
HCT VFR BLD AUTO: 30.7 % (ref 34–46.6)
HCT VFR BLD AUTO: 30.9 % (ref 34–46.6)
HCT VFR BLD AUTO: 30.9 % (ref 34–46.6)
HCT VFR BLD AUTO: 31.3 % (ref 34–46.6)
HCT VFR BLD AUTO: 33.4 % (ref 34–46.6)
HGB BLD-MCNC: 10 G/DL (ref 12–15.9)
HGB BLD-MCNC: 10.4 G/DL (ref 12–15.9)
HGB BLD-MCNC: 10.5 G/DL (ref 12–15.9)
HGB BLD-MCNC: 10.5 G/DL (ref 12–15.9)
HGB BLD-MCNC: 10.6 G/DL (ref 12–15.9)
HGB BLD-MCNC: 10.6 G/DL (ref 12–15.9)
HGB BLD-MCNC: 10.9 G/DL (ref 12–15.9)
HGB BLD-MCNC: 9.4 G/DL (ref 12–15.9)
HGB BLD-MCNC: 9.4 G/DL (ref 12–15.9)
HGB BLD-MCNC: 9.8 G/DL (ref 12–15.9)
HGB BLD-MCNC: 9.8 G/DL (ref 12–15.9)
HGB BLD-MCNC: 9.9 G/DL (ref 12–15.9)
HGB BLD-MCNC: 9.9 G/DL (ref 12–15.9)
HGB UR QL STRIP.AUTO: ABNORMAL
HYALINE CASTS UR QL AUTO: ABNORMAL /LPF
IMM GRANULOCYTES # BLD AUTO: 0.04 10*3/MM3 (ref 0–0.05)
IMM GRANULOCYTES # BLD AUTO: 0.06 10*3/MM3 (ref 0–0.05)
IMM GRANULOCYTES # BLD AUTO: 0.06 10*3/MM3 (ref 0–0.05)
IMM GRANULOCYTES NFR BLD AUTO: 0.7 % (ref 0–0.5)
IMM GRANULOCYTES NFR BLD AUTO: 0.9 % (ref 0–0.5)
IMM GRANULOCYTES NFR BLD AUTO: 0.9 % (ref 0–0.5)
INR PPP: 1.36 (ref 0.9–1.1)
INR PPP: 1.76 (ref 0.9–1.1)
INR PPP: 2.37 (ref 0.9–1.1)
INR PPP: 2.44
INR PPP: 2.65
INR PPP: 2.75
INR PPP: 2.95 (ref 0.9–1.1)
INR PPP: 3.1
KETONES UR QL STRIP: ABNORMAL
LAB AP CASE REPORT: NORMAL
LEUKOCYTE ESTERASE UR QL STRIP.AUTO: ABNORMAL
LYMPHOCYTES # BLD AUTO: 0.76 10*3/MM3 (ref 0.7–3.1)
LYMPHOCYTES # BLD AUTO: 0.88 10*3/MM3 (ref 0.7–3.1)
LYMPHOCYTES # BLD AUTO: 0.93 10*3/MM3 (ref 0.7–3.1)
LYMPHOCYTES NFR BLD AUTO: 11.4 % (ref 19.6–45.3)
LYMPHOCYTES NFR BLD AUTO: 12.6 % (ref 19.6–45.3)
LYMPHOCYTES NFR BLD AUTO: 16.5 % (ref 19.6–45.3)
Lab: 174
MAGNESIUM SERPL-MCNC: 2 MG/DL (ref 1.6–2.4)
MAGNESIUM SERPL-MCNC: 2.1 MG/DL (ref 1.6–2.4)
MCH RBC QN AUTO: 31.9 PG (ref 26.6–33)
MCH RBC QN AUTO: 32.9 PG (ref 26.6–33)
MCH RBC QN AUTO: 33.1 PG (ref 26.6–33)
MCH RBC QN AUTO: 33.1 PG (ref 26.6–33)
MCHC RBC AUTO-ENTMCNC: 32.2 G/DL (ref 31.5–35.7)
MCHC RBC AUTO-ENTMCNC: 32.9 G/DL (ref 31.5–35.7)
MCHC RBC AUTO-ENTMCNC: 33.9 G/DL (ref 31.5–35.7)
MCHC RBC AUTO-ENTMCNC: 34 G/DL (ref 31.5–35.7)
MCV RBC AUTO: 102 FL (ref 79–97)
MCV RBC AUTO: 96.9 FL (ref 79–97)
MCV RBC AUTO: 97.5 FL (ref 79–97)
MCV RBC AUTO: 97.8 FL (ref 79–97)
MONOCYTES # BLD AUTO: 0.81 10*3/MM3 (ref 0.1–0.9)
MONOCYTES # BLD AUTO: 0.84 10*3/MM3 (ref 0.1–0.9)
MONOCYTES # BLD AUTO: 0.85 10*3/MM3 (ref 0.1–0.9)
MONOCYTES NFR BLD AUTO: 11.6 % (ref 5–12)
MONOCYTES NFR BLD AUTO: 12.7 % (ref 5–12)
MONOCYTES NFR BLD AUTO: 14.9 % (ref 5–12)
NEGATIVE CONTROL: NEGATIVE
NEUTROPHILS NFR BLD AUTO: 3.6 10*3/MM3 (ref 1.7–7)
NEUTROPHILS NFR BLD AUTO: 4.84 10*3/MM3 (ref 1.7–7)
NEUTROPHILS NFR BLD AUTO: 5.04 10*3/MM3 (ref 1.7–7)
NEUTROPHILS NFR BLD AUTO: 64 % (ref 42.7–76)
NEUTROPHILS NFR BLD AUTO: 72.5 % (ref 42.7–76)
NEUTROPHILS NFR BLD AUTO: 72.6 % (ref 42.7–76)
NITRITE UR QL STRIP: NEGATIVE
NRBC BLD AUTO-RTO: 0 /100 WBC (ref 0–0.2)
NRBC BLD AUTO-RTO: 0.2 /100 WBC (ref 0–0.2)
NRBC BLD AUTO-RTO: 0.3 /100 WBC (ref 0–0.2)
PATH REPORT.ADDENDUM SPEC: NORMAL
PATH REPORT.FINAL DX SPEC: NORMAL
PATH REPORT.GROSS SPEC: NORMAL
PH UR STRIP.AUTO: <=5 [PH] (ref 5–8)
PLATELET # BLD AUTO: 180 10*3/MM3 (ref 140–450)
PLATELET # BLD AUTO: 193 10*3/MM3 (ref 140–450)
PLATELET # BLD AUTO: 203 10*3/MM3 (ref 140–450)
PLATELET # BLD AUTO: 203 10*3/MM3 (ref 140–450)
PMV BLD AUTO: 9.3 FL (ref 6–12)
PMV BLD AUTO: 9.5 FL (ref 6–12)
PMV BLD AUTO: 9.8 FL (ref 6–12)
PMV BLD AUTO: 9.9 FL (ref 6–12)
POSITIVE CONTROL: POSITIVE
POTASSIUM SERPL-SCNC: 3.6 MMOL/L (ref 3.5–5.2)
POTASSIUM SERPL-SCNC: 3.7 MMOL/L (ref 3.5–5.2)
POTASSIUM SERPL-SCNC: 4 MMOL/L (ref 3.5–5.2)
POTASSIUM SERPL-SCNC: 4.2 MMOL/L (ref 3.5–5.2)
PROT SERPL-MCNC: 6.1 G/DL (ref 6–8.5)
PROT SERPL-MCNC: 6.4 G/DL (ref 6–8.5)
PROT SERPL-MCNC: 7 G/DL (ref 6–8.5)
PROT UR QL STRIP: ABNORMAL
PROTHROMBIN TIME: 16.5 SECONDS (ref 11.7–14.2)
PROTHROMBIN TIME: 20.3 SECONDS (ref 11.7–14.2)
PROTHROMBIN TIME: 25.7 SECONDS (ref 11.7–14.2)
PROTHROMBIN TIME: 30.4 SECONDS (ref 11.7–14.2)
QT INTERVAL: 327 MS
RBC # BLD AUTO: 2.95 10*6/MM3 (ref 3.77–5.28)
RBC # BLD AUTO: 3.01 10*6/MM3 (ref 3.77–5.28)
RBC # BLD AUTO: 3.17 10*6/MM3 (ref 3.77–5.28)
RBC # BLD AUTO: 3.2 10*6/MM3 (ref 3.77–5.28)
RBC # UR STRIP: ABNORMAL /HPF
REF LAB TEST METHOD: ABNORMAL
RH BLD: POSITIVE
SARS-COV-2 RNA PNL SPEC NAA+PROBE: NOT DETECTED
SODIUM SERPL-SCNC: 137 MMOL/L (ref 136–145)
SODIUM SERPL-SCNC: 139 MMOL/L (ref 136–145)
SP GR UR STRIP: 1.02 (ref 1–1.03)
SQUAMOUS #/AREA URNS HPF: ABNORMAL /HPF
T&S EXPIRATION DATE: NORMAL
TROPONIN T SERPL-MCNC: 0.11 NG/ML (ref 0–0.03)
UROBILINOGEN UR QL STRIP: ABNORMAL
WBC # UR STRIP: ABNORMAL /HPF
WBC NRBC COR # BLD: 5.63 10*3/MM3 (ref 3.4–10.8)
WBC NRBC COR # BLD: 5.89 10*3/MM3 (ref 3.4–10.8)
WBC NRBC COR # BLD: 6.67 10*3/MM3 (ref 3.4–10.8)
WBC NRBC COR # BLD: 6.96 10*3/MM3 (ref 3.4–10.8)

## 2021-01-01 PROCEDURE — 80053 COMPREHEN METABOLIC PANEL: CPT | Performed by: NURSE PRACTITIONER

## 2021-01-01 PROCEDURE — 88342 IMHCHEM/IMCYTCHM 1ST ANTB: CPT | Performed by: INTERNAL MEDICINE

## 2021-01-01 PROCEDURE — 85610 PROTHROMBIN TIME: CPT | Performed by: NURSE PRACTITIONER

## 2021-01-01 PROCEDURE — 87635 SARS-COV-2 COVID-19 AMP PRB: CPT | Performed by: NURSE PRACTITIONER

## 2021-01-01 PROCEDURE — 0DB68ZX EXCISION OF STOMACH, VIA NATURAL OR ARTIFICIAL OPENING ENDOSCOPIC, DIAGNOSTIC: ICD-10-PCS | Performed by: INTERNAL MEDICINE

## 2021-01-01 PROCEDURE — 85018 HEMOGLOBIN: CPT | Performed by: INTERNAL MEDICINE

## 2021-01-01 PROCEDURE — 99232 SBSQ HOSP IP/OBS MODERATE 35: CPT | Performed by: NURSE PRACTITIONER

## 2021-01-01 PROCEDURE — 88305 TISSUE EXAM BY PATHOLOGIST: CPT | Performed by: INTERNAL MEDICINE

## 2021-01-01 PROCEDURE — 99222 1ST HOSP IP/OBS MODERATE 55: CPT | Performed by: INTERNAL MEDICINE

## 2021-01-01 PROCEDURE — 80048 BASIC METABOLIC PNL TOTAL CA: CPT | Performed by: INTERNAL MEDICINE

## 2021-01-01 PROCEDURE — 97530 THERAPEUTIC ACTIVITIES: CPT

## 2021-01-01 PROCEDURE — 80053 COMPREHEN METABOLIC PANEL: CPT | Performed by: INTERNAL MEDICINE

## 2021-01-01 PROCEDURE — 86850 RBC ANTIBODY SCREEN: CPT | Performed by: NURSE PRACTITIONER

## 2021-01-01 PROCEDURE — 93010 ELECTROCARDIOGRAM REPORT: CPT | Performed by: INTERNAL MEDICINE

## 2021-01-01 PROCEDURE — 99232 SBSQ HOSP IP/OBS MODERATE 35: CPT | Performed by: INTERNAL MEDICINE

## 2021-01-01 PROCEDURE — 85014 HEMATOCRIT: CPT | Performed by: INTERNAL MEDICINE

## 2021-01-01 PROCEDURE — 87186 SC STD MICRODIL/AGAR DIL: CPT | Performed by: INTERNAL MEDICINE

## 2021-01-01 PROCEDURE — 81001 URINALYSIS AUTO W/SCOPE: CPT | Performed by: INTERNAL MEDICINE

## 2021-01-01 PROCEDURE — 0DB98ZX EXCISION OF DUODENUM, VIA NATURAL OR ARTIFICIAL OPENING ENDOSCOPIC, DIAGNOSTIC: ICD-10-PCS | Performed by: INTERNAL MEDICINE

## 2021-01-01 PROCEDURE — 83036 HEMOGLOBIN GLYCOSYLATED A1C: CPT | Performed by: INTERNAL MEDICINE

## 2021-01-01 PROCEDURE — 87077 CULTURE AEROBIC IDENTIFY: CPT | Performed by: INTERNAL MEDICINE

## 2021-01-01 PROCEDURE — 99221 1ST HOSP IP/OBS SF/LOW 40: CPT | Performed by: INTERNAL MEDICINE

## 2021-01-01 PROCEDURE — 25010000002 PROPOFOL 10 MG/ML EMULSION: Performed by: ANESTHESIOLOGY

## 2021-01-01 PROCEDURE — 84484 ASSAY OF TROPONIN QUANT: CPT | Performed by: INTERNAL MEDICINE

## 2021-01-01 PROCEDURE — 45388 COLONOSCOPY W/ABLATION: CPT | Performed by: INTERNAL MEDICINE

## 2021-01-01 PROCEDURE — 85025 COMPLETE CBC W/AUTO DIFF WBC: CPT | Performed by: INTERNAL MEDICINE

## 2021-01-01 PROCEDURE — 25010000002 EPOETIN ALFA-EPBX 10000 UNIT/ML SOLUTION: Performed by: INTERNAL MEDICINE

## 2021-01-01 PROCEDURE — 83735 ASSAY OF MAGNESIUM: CPT | Performed by: NURSE PRACTITIONER

## 2021-01-01 PROCEDURE — 86901 BLOOD TYPING SEROLOGIC RH(D): CPT | Performed by: NURSE PRACTITIONER

## 2021-01-01 PROCEDURE — 0W3P8ZZ CONTROL BLEEDING IN GASTROINTESTINAL TRACT, VIA NATURAL OR ARTIFICIAL OPENING ENDOSCOPIC: ICD-10-PCS | Performed by: INTERNAL MEDICINE

## 2021-01-01 PROCEDURE — 85025 COMPLETE CBC W/AUTO DIFF WBC: CPT | Performed by: NURSE PRACTITIONER

## 2021-01-01 PROCEDURE — 87086 URINE CULTURE/COLONY COUNT: CPT | Performed by: INTERNAL MEDICINE

## 2021-01-01 PROCEDURE — 87340 HEPATITIS B SURFACE AG IA: CPT | Performed by: INTERNAL MEDICINE

## 2021-01-01 PROCEDURE — 86900 BLOOD TYPING SEROLOGIC ABO: CPT | Performed by: NURSE PRACTITIONER

## 2021-01-01 PROCEDURE — 99284 EMERGENCY DEPT VISIT MOD MDM: CPT

## 2021-01-01 PROCEDURE — 83605 ASSAY OF LACTIC ACID: CPT | Performed by: INTERNAL MEDICINE

## 2021-01-01 PROCEDURE — 93005 ELECTROCARDIOGRAM TRACING: CPT

## 2021-01-01 PROCEDURE — 43239 EGD BIOPSY SINGLE/MULTIPLE: CPT | Performed by: INTERNAL MEDICINE

## 2021-01-01 PROCEDURE — 71045 X-RAY EXAM CHEST 1 VIEW: CPT

## 2021-01-01 PROCEDURE — 97161 PT EVAL LOW COMPLEX 20 MIN: CPT

## 2021-01-01 PROCEDURE — 20610 DRAIN/INJ JOINT/BURSA W/O US: CPT | Performed by: ORTHOPAEDIC SURGERY

## 2021-01-01 PROCEDURE — 85027 COMPLETE CBC AUTOMATED: CPT | Performed by: INTERNAL MEDICINE

## 2021-01-01 PROCEDURE — 82270 OCCULT BLOOD FECES: CPT | Performed by: NURSE PRACTITIONER

## 2021-01-01 RX ORDER — MIDODRINE HYDROCHLORIDE 5 MG/1
5 TABLET ORAL
Status: DISCONTINUED | OUTPATIENT
Start: 2021-01-01 | End: 2021-01-01 | Stop reason: HOSPADM

## 2021-01-01 RX ORDER — SODIUM CHLORIDE 0.9 % (FLUSH) 0.9 %
10 SYRINGE (ML) INJECTION AS NEEDED
Status: DISCONTINUED | OUTPATIENT
Start: 2021-01-01 | End: 2021-01-01 | Stop reason: HOSPADM

## 2021-01-01 RX ORDER — SODIUM CHLORIDE 0.9 % (FLUSH) 0.9 %
10 SYRINGE (ML) INJECTION EVERY 12 HOURS SCHEDULED
Status: DISCONTINUED | OUTPATIENT
Start: 2021-01-01 | End: 2021-01-01 | Stop reason: HOSPADM

## 2021-01-01 RX ORDER — PHYTONADIONE 5 MG/1
2.5 TABLET ORAL ONCE
Status: COMPLETED | OUTPATIENT
Start: 2021-01-01 | End: 2021-01-01

## 2021-01-01 RX ORDER — LIDOCAINE HYDROCHLORIDE 20 MG/ML
INJECTION, SOLUTION INFILTRATION; PERINEURAL AS NEEDED
Status: DISCONTINUED | OUTPATIENT
Start: 2021-01-01 | End: 2021-01-01 | Stop reason: SURG

## 2021-01-01 RX ORDER — PANTOPRAZOLE SODIUM 40 MG/10ML
40 INJECTION, POWDER, LYOPHILIZED, FOR SOLUTION INTRAVENOUS ONCE
Status: COMPLETED | OUTPATIENT
Start: 2021-01-01 | End: 2021-01-01

## 2021-01-01 RX ORDER — PANTOPRAZOLE SODIUM 40 MG/10ML
40 INJECTION, POWDER, LYOPHILIZED, FOR SOLUTION INTRAVENOUS
Status: DISCONTINUED | OUTPATIENT
Start: 2021-01-01 | End: 2021-01-01

## 2021-01-01 RX ORDER — HYDROCORTISONE ACETATE 25 MG/1
25 SUPPOSITORY RECTAL 2 TIMES DAILY
Status: DISCONTINUED | OUTPATIENT
Start: 2021-01-01 | End: 2021-01-01 | Stop reason: HOSPADM

## 2021-01-01 RX ORDER — ACETAMINOPHEN 325 MG/1
650 TABLET ORAL EVERY 4 HOURS PRN
Status: DISCONTINUED | OUTPATIENT
Start: 2021-01-01 | End: 2021-01-01 | Stop reason: HOSPADM

## 2021-01-01 RX ORDER — PROMETHAZINE HYDROCHLORIDE 25 MG/1
25 SUPPOSITORY RECTAL ONCE AS NEEDED
Status: DISCONTINUED | OUTPATIENT
Start: 2021-01-01 | End: 2021-01-01 | Stop reason: HOSPADM

## 2021-01-01 RX ORDER — PANTOPRAZOLE SODIUM 40 MG/1
40 TABLET, DELAYED RELEASE ORAL
Status: DISCONTINUED | OUTPATIENT
Start: 2021-01-01 | End: 2021-01-01 | Stop reason: HOSPADM

## 2021-01-01 RX ORDER — PROMETHAZINE HYDROCHLORIDE 25 MG/1
25 TABLET ORAL ONCE AS NEEDED
Status: DISCONTINUED | OUTPATIENT
Start: 2021-01-01 | End: 2021-01-01 | Stop reason: HOSPADM

## 2021-01-01 RX ORDER — ONDANSETRON 2 MG/ML
4 INJECTION INTRAMUSCULAR; INTRAVENOUS EVERY 6 HOURS PRN
Status: DISCONTINUED | OUTPATIENT
Start: 2021-01-01 | End: 2021-01-01 | Stop reason: HOSPADM

## 2021-01-01 RX ORDER — WARFARIN SODIUM 2 MG/1
TABLET ORAL
Qty: 120 TABLET | Refills: 1 | Status: SHIPPED | OUTPATIENT
Start: 2021-01-01 | End: 2022-01-01 | Stop reason: SDUPTHER

## 2021-01-01 RX ORDER — BUSPIRONE HYDROCHLORIDE 10 MG/1
10 TABLET ORAL EVERY 12 HOURS SCHEDULED
Status: DISCONTINUED | OUTPATIENT
Start: 2021-01-01 | End: 2021-01-01 | Stop reason: HOSPADM

## 2021-01-01 RX ORDER — CHOLECALCIFEROL (VITAMIN D3) 125 MCG
5 CAPSULE ORAL NIGHTLY PRN
Status: DISCONTINUED | OUTPATIENT
Start: 2021-01-01 | End: 2021-01-01 | Stop reason: HOSPADM

## 2021-01-01 RX ORDER — ROPINIROLE 0.5 MG/1
0.25 TABLET, FILM COATED ORAL NIGHTLY
Status: DISCONTINUED | OUTPATIENT
Start: 2021-01-01 | End: 2021-01-01 | Stop reason: HOSPADM

## 2021-01-01 RX ORDER — ALBUMIN (HUMAN) 12.5 G/50ML
12.5 SOLUTION INTRAVENOUS AS NEEDED
Status: ACTIVE | OUTPATIENT
Start: 2021-01-01 | End: 2021-01-01

## 2021-01-01 RX ORDER — ACETAMINOPHEN 325 MG/1
650 TABLET ORAL 3 TIMES DAILY
Status: DISCONTINUED | OUTPATIENT
Start: 2021-01-01 | End: 2021-01-01 | Stop reason: HOSPADM

## 2021-01-01 RX ORDER — L.ACID,PARA/B.BIFIDUM/S.THERM 8B CELL
1 CAPSULE ORAL EVERY MORNING
Status: DISCONTINUED | OUTPATIENT
Start: 2021-01-01 | End: 2021-01-01 | Stop reason: HOSPADM

## 2021-01-01 RX ORDER — MULTIPLE VITAMINS W/ MINERALS TAB 9MG-400MCG
1 TAB ORAL DAILY
Status: DISCONTINUED | OUTPATIENT
Start: 2021-01-01 | End: 2021-01-01 | Stop reason: HOSPADM

## 2021-01-01 RX ORDER — PROPOFOL 10 MG/ML
VIAL (ML) INTRAVENOUS CONTINUOUS PRN
Status: DISCONTINUED | OUTPATIENT
Start: 2021-01-01 | End: 2021-01-01 | Stop reason: SURG

## 2021-01-01 RX ORDER — HYDROCORTISONE ACETATE 25 MG/1
25 SUPPOSITORY RECTAL 2 TIMES DAILY
Qty: 100 EACH | Refills: 0 | Status: ON HOLD | OUTPATIENT
Start: 2021-01-01 | End: 2022-01-01

## 2021-01-01 RX ORDER — ACETAMINOPHEN 160 MG/5ML
650 SOLUTION ORAL EVERY 4 HOURS PRN
Status: DISCONTINUED | OUTPATIENT
Start: 2021-01-01 | End: 2021-01-01 | Stop reason: HOSPADM

## 2021-01-01 RX ORDER — NITROGLYCERIN 0.4 MG/1
0.4 TABLET SUBLINGUAL
Status: DISCONTINUED | OUTPATIENT
Start: 2021-01-01 | End: 2021-01-01 | Stop reason: HOSPADM

## 2021-01-01 RX ORDER — METHYLPREDNISOLONE ACETATE 80 MG/ML
80 INJECTION, SUSPENSION INTRA-ARTICULAR; INTRALESIONAL; INTRAMUSCULAR; SOFT TISSUE
Status: COMPLETED | OUTPATIENT
Start: 2021-01-01 | End: 2021-01-01

## 2021-01-01 RX ORDER — PANTOPRAZOLE SODIUM 40 MG/1
40 TABLET, DELAYED RELEASE ORAL
Qty: 30 TABLET | Refills: 0 | Status: SHIPPED | OUTPATIENT
Start: 2021-01-01 | End: 2022-01-01

## 2021-01-01 RX ORDER — ACETAMINOPHEN 650 MG/1
650 SUPPOSITORY RECTAL EVERY 4 HOURS PRN
Status: DISCONTINUED | OUTPATIENT
Start: 2021-01-01 | End: 2021-01-01 | Stop reason: HOSPADM

## 2021-01-01 RX ORDER — ESCITALOPRAM OXALATE 10 MG/1
10 TABLET ORAL DAILY
Status: DISCONTINUED | OUTPATIENT
Start: 2021-01-01 | End: 2021-01-01 | Stop reason: HOSPADM

## 2021-01-01 RX ORDER — SODIUM CHLORIDE 9 MG/ML
30 INJECTION, SOLUTION INTRAVENOUS CONTINUOUS
Status: DISCONTINUED | OUTPATIENT
Start: 2021-01-01 | End: 2021-01-01

## 2021-01-01 RX ORDER — MIDODRINE HYDROCHLORIDE 5 MG/1
5 TABLET ORAL
Qty: 90 TABLET | Refills: 0 | Status: SHIPPED | OUTPATIENT
Start: 2021-01-01 | End: 2022-01-01 | Stop reason: HOSPADM

## 2021-01-01 RX ADMIN — BUSPIRONE HYDROCHLORIDE 10 MG: 10 TABLET ORAL at 22:00

## 2021-01-01 RX ADMIN — PHYTONADIONE 2.5 MG: 5 TABLET ORAL at 15:47

## 2021-01-01 RX ADMIN — ACETAMINOPHEN 650 MG: 325 TABLET, FILM COATED ORAL at 07:46

## 2021-01-01 RX ADMIN — SODIUM CHLORIDE, PRESERVATIVE FREE 10 ML: 5 INJECTION INTRAVENOUS at 07:44

## 2021-01-01 RX ADMIN — MIDODRINE HYDROCHLORIDE 5 MG: 5 TABLET ORAL at 09:17

## 2021-01-01 RX ADMIN — ACETAMINOPHEN 650 MG: 325 TABLET, FILM COATED ORAL at 17:27

## 2021-01-01 RX ADMIN — SODIUM CHLORIDE, PRESERVATIVE FREE 10 ML: 5 INJECTION INTRAVENOUS at 08:01

## 2021-01-01 RX ADMIN — METOPROLOL TARTRATE 2.5 MG: 1 INJECTION, SOLUTION INTRAVENOUS at 18:30

## 2021-01-01 RX ADMIN — PROPOFOL 200 MCG/KG/MIN: 10 INJECTION, EMULSION INTRAVENOUS at 10:06

## 2021-01-01 RX ADMIN — ACETAMINOPHEN 650 MG: 325 TABLET, FILM COATED ORAL at 20:22

## 2021-01-01 RX ADMIN — ACETAMINOPHEN 650 MG: 325 TABLET, FILM COATED ORAL at 17:08

## 2021-01-01 RX ADMIN — MIDODRINE HYDROCHLORIDE 5 MG: 5 TABLET ORAL at 18:02

## 2021-01-01 RX ADMIN — ESCITALOPRAM 10 MG: 10 TABLET, FILM COATED ORAL at 08:01

## 2021-01-01 RX ADMIN — ACETAMINOPHEN 650 MG: 325 TABLET, FILM COATED ORAL at 08:00

## 2021-01-01 RX ADMIN — SODIUM CHLORIDE, PRESERVATIVE FREE 10 ML: 5 INJECTION INTRAVENOUS at 20:23

## 2021-01-01 RX ADMIN — ESCITALOPRAM 10 MG: 10 TABLET, FILM COATED ORAL at 09:07

## 2021-01-01 RX ADMIN — PANTOPRAZOLE SODIUM 40 MG: 40 TABLET, DELAYED RELEASE ORAL at 06:50

## 2021-01-01 RX ADMIN — SODIUM CHLORIDE, PRESERVATIVE FREE 10 ML: 5 INJECTION INTRAVENOUS at 23:15

## 2021-01-01 RX ADMIN — LIDOCAINE HYDROCHLORIDE 60 MG: 20 INJECTION, SOLUTION INFILTRATION; PERINEURAL at 10:06

## 2021-01-01 RX ADMIN — BUSPIRONE HYDROCHLORIDE 10 MG: 10 TABLET ORAL at 07:45

## 2021-01-01 RX ADMIN — ACETAMINOPHEN 650 MG: 325 TABLET, FILM COATED ORAL at 09:17

## 2021-01-01 RX ADMIN — BUSPIRONE HYDROCHLORIDE 10 MG: 10 TABLET ORAL at 08:01

## 2021-01-01 RX ADMIN — METHYLPREDNISOLONE ACETATE 80 MG: 80 INJECTION, SUSPENSION INTRA-ARTICULAR; INTRALESIONAL; INTRAMUSCULAR; SOFT TISSUE at 13:15

## 2021-01-01 RX ADMIN — MIDODRINE HYDROCHLORIDE 5 MG: 5 TABLET ORAL at 17:08

## 2021-01-01 RX ADMIN — MULTIPLE VITAMINS W/ MINERALS TAB 1 TABLET: TAB at 09:17

## 2021-01-01 RX ADMIN — ACETAMINOPHEN 650 MG: 325 TABLET, FILM COATED ORAL at 22:00

## 2021-01-01 RX ADMIN — ESCITALOPRAM 10 MG: 10 TABLET, FILM COATED ORAL at 07:45

## 2021-01-01 RX ADMIN — MIDODRINE HYDROCHLORIDE 5 MG: 5 TABLET ORAL at 17:27

## 2021-01-01 RX ADMIN — ROPINIROLE HYDROCHLORIDE 0.25 MG: 0.5 TABLET, FILM COATED ORAL at 20:17

## 2021-01-01 RX ADMIN — PANTOPRAZOLE SODIUM 40 MG: 40 INJECTION, POWDER, FOR SOLUTION INTRAVENOUS at 18:28

## 2021-01-01 RX ADMIN — POLYETHYLENE GLYCOL 3350, SODIUM SULFATE ANHYDROUS, SODIUM BICARBONATE, SODIUM CHLORIDE, POTASSIUM CHLORIDE 2000 ML: 236; 22.74; 6.74; 5.86; 2.97 POWDER, FOR SOLUTION ORAL at 04:06

## 2021-01-01 RX ADMIN — SODIUM CHLORIDE, PRESERVATIVE FREE 10 ML: 5 INJECTION INTRAVENOUS at 09:08

## 2021-01-01 RX ADMIN — Medication 1 CAPSULE: at 06:50

## 2021-01-01 RX ADMIN — MIDODRINE HYDROCHLORIDE 5 MG: 5 TABLET ORAL at 13:18

## 2021-01-01 RX ADMIN — POLYETHYLENE GLYCOL 3350, SODIUM SULFATE ANHYDROUS, SODIUM BICARBONATE, SODIUM CHLORIDE, POTASSIUM CHLORIDE 2000 ML: 236; 22.74; 6.74; 5.86; 2.97 POWDER, FOR SOLUTION ORAL at 20:59

## 2021-01-01 RX ADMIN — METOPROLOL TARTRATE 25 MG: 25 TABLET, FILM COATED ORAL at 18:26

## 2021-01-01 RX ADMIN — ACETAMINOPHEN 650 MG: 325 TABLET, FILM COATED ORAL at 09:07

## 2021-01-01 RX ADMIN — BUSPIRONE HYDROCHLORIDE 10 MG: 10 TABLET ORAL at 09:07

## 2021-01-01 RX ADMIN — ACETAMINOPHEN 650 MG: 325 TABLET, FILM COATED ORAL at 15:13

## 2021-01-01 RX ADMIN — ACETAMINOPHEN 650 MG: 325 TABLET, FILM COATED ORAL at 23:15

## 2021-01-01 RX ADMIN — MULTIPLE VITAMINS W/ MINERALS TAB 1 TABLET: TAB at 07:45

## 2021-01-01 RX ADMIN — METHYLPREDNISOLONE ACETATE 80 MG: 80 INJECTION, SUSPENSION INTRA-ARTICULAR; INTRALESIONAL; INTRAMUSCULAR; SOFT TISSUE at 13:14

## 2021-01-01 RX ADMIN — SODIUM CHLORIDE 250 ML: 9 INJECTION, SOLUTION INTRAVENOUS at 17:00

## 2021-01-01 RX ADMIN — SODIUM CHLORIDE 30 ML/HR: 9 INJECTION, SOLUTION INTRAVENOUS at 09:38

## 2021-01-01 RX ADMIN — ACETAMINOPHEN 650 MG: 325 TABLET, FILM COATED ORAL at 20:17

## 2021-01-01 RX ADMIN — Medication 1 CAPSULE: at 06:31

## 2021-01-01 RX ADMIN — MULTIPLE VITAMINS W/ MINERALS TAB 1 TABLET: TAB at 09:07

## 2021-01-01 RX ADMIN — BUSPIRONE HYDROCHLORIDE 10 MG: 10 TABLET ORAL at 20:22

## 2021-01-01 RX ADMIN — MIDODRINE HYDROCHLORIDE 5 MG: 5 TABLET ORAL at 06:50

## 2021-01-01 RX ADMIN — MIDODRINE HYDROCHLORIDE 5 MG: 5 TABLET ORAL at 06:32

## 2021-01-01 RX ADMIN — BUSPIRONE HYDROCHLORIDE 10 MG: 10 TABLET ORAL at 09:17

## 2021-01-01 RX ADMIN — METOPROLOL TARTRATE 5 MG: 1 INJECTION, SOLUTION INTRAVENOUS at 16:08

## 2021-01-01 RX ADMIN — ACETAMINOPHEN 650 MG: 325 TABLET, FILM COATED ORAL at 11:36

## 2021-01-01 RX ADMIN — PANTOPRAZOLE SODIUM 40 MG: 40 INJECTION, POWDER, FOR SOLUTION INTRAVENOUS at 06:31

## 2021-01-01 RX ADMIN — Medication 5 MG: at 22:07

## 2021-01-01 RX ADMIN — MIDODRINE HYDROCHLORIDE 5 MG: 5 TABLET ORAL at 12:01

## 2021-01-01 RX ADMIN — MIDODRINE HYDROCHLORIDE 5 MG: 5 TABLET ORAL at 11:36

## 2021-01-01 RX ADMIN — SODIUM CHLORIDE, PRESERVATIVE FREE 10 ML: 5 INJECTION INTRAVENOUS at 22:00

## 2021-01-01 RX ADMIN — METOPROLOL TARTRATE 25 MG: 25 TABLET, FILM COATED ORAL at 07:45

## 2021-01-01 RX ADMIN — EPOETIN ALFA-EPBX 10000 UNITS: 10000 INJECTION, SOLUTION INTRAVENOUS; SUBCUTANEOUS at 09:51

## 2021-01-01 RX ADMIN — EPOETIN ALFA-EPBX 10000 UNITS: 10000 INJECTION, SOLUTION INTRAVENOUS; SUBCUTANEOUS at 15:02

## 2021-01-01 RX ADMIN — ROPINIROLE HYDROCHLORIDE 0.25 MG: 0.5 TABLET, FILM COATED ORAL at 23:14

## 2021-01-01 RX ADMIN — PANTOPRAZOLE SODIUM 40 MG: 40 INJECTION, POWDER, FOR SOLUTION INTRAVENOUS at 06:14

## 2021-01-01 RX ADMIN — METOPROLOL TARTRATE 25 MG: 25 TABLET, FILM COATED ORAL at 18:02

## 2021-01-01 RX ADMIN — METOPROLOL TARTRATE 2.5 MG: 1 INJECTION, SOLUTION INTRAVENOUS at 17:02

## 2021-01-01 RX ADMIN — SODIUM CHLORIDE, PRESERVATIVE FREE 10 ML: 5 INJECTION INTRAVENOUS at 20:17

## 2021-01-01 RX ADMIN — ESCITALOPRAM 10 MG: 10 TABLET, FILM COATED ORAL at 09:17

## 2021-01-01 RX ADMIN — BUSPIRONE HYDROCHLORIDE 10 MG: 10 TABLET ORAL at 20:17

## 2021-01-01 RX ADMIN — BUSPIRONE HYDROCHLORIDE 10 MG: 10 TABLET ORAL at 23:15

## 2021-01-01 RX ADMIN — Medication 1 CAPSULE: at 06:14

## 2021-01-01 RX ADMIN — ROPINIROLE HYDROCHLORIDE 0.25 MG: 0.5 TABLET, FILM COATED ORAL at 22:00

## 2021-01-01 RX ADMIN — MIDODRINE HYDROCHLORIDE 5 MG: 5 TABLET ORAL at 11:14

## 2021-01-01 RX ADMIN — MULTIPLE VITAMINS W/ MINERALS TAB 1 TABLET: TAB at 08:01

## 2021-01-01 RX ADMIN — ROPINIROLE HYDROCHLORIDE 0.25 MG: 0.5 TABLET, FILM COATED ORAL at 20:22

## 2021-01-01 RX ADMIN — SODIUM CHLORIDE, PRESERVATIVE FREE 10 ML: 5 INJECTION INTRAVENOUS at 09:17

## 2021-01-04 ENCOUNTER — ANTICOAGULATION VISIT (OUTPATIENT)
Dept: PHARMACY | Facility: HOSPITAL | Age: 76
End: 2021-01-04

## 2021-01-04 DIAGNOSIS — I48.0 PAROXYSMAL ATRIAL FIBRILLATION (HCC): ICD-10-CM

## 2021-01-04 NOTE — PROGRESS NOTES
Anticoagulation Clinic Progress Note    Anticoagulation Summary  As of 1/4/2021    INR goal:  2.0-3.0   TTR:  49.6 % (1.5 y)   INR used for dosing:  3.03 (12/30/2020)   Warfarin maintenance plan:  2 mg every Tue, Thu, Sat; 3 mg all other days   Weekly warfarin total:  18 mg   Plan last modified:  Jalen Temple [facility] (12/11/2020)   Next INR check:  1/6/2021   Priority:  High   Target end date:      Indications    Paroxysmal atrial fibrillation (CMS/HCC) [I48.0]             Anticoagulation Episode Summary     INR check location:      Preferred lab:      [facility]    Comments:  [phone]       Anticoagulation Care Providers     Provider Role Specialty Phone number    Shiloh Gonzales APRN Referring Cardiology [phone]          Clinic Interview:      INR History:  Anticoagulation Monitoring 12/18/2020 12/28/2020 1/4/2021   INR 2.81 2.91 3.03   INR Date 12/16/2020 12/23/2020 12/30/2020   INR Goal 2.0-3.0 2.0-3.0 2.0-3.0   Trend Same Same Same   Last Week Total 18 mg 18 mg 18 mg   Next Week Total 18 mg 18 mg 18 mg   Sun 3 mg - -   Mon 3 mg 3 mg 3 mg   Tue 2 mg 2 mg 2 mg   Wed - - -   Thu - - -   Fri 3 mg - -   Sat 2 mg - -   Visit Report - - -   Some recent data might be hidden       Plan:  1. INR is Therapeutic today- see above in Anticoagulation Summary.   Will instruct Claudia Arnold to Continue their warfarin regimen- see above in Anticoagulation Summary.  2. Follow up in 1 week  3. Spoke with pt today. They have been instructed to call if any changes in medications, doses, concerns, etc. Patient expresses understanding and has no further questions at this time.    Celeste Rogers [facility]

## 2021-01-11 ENCOUNTER — HOSPITAL ENCOUNTER (INPATIENT)
Facility: HOSPITAL | Age: 76
LOS: 7 days | Discharge: HOME | End: 2021-01-18
Attending: EMERGENCY MEDICINE | Admitting: INTERNAL MEDICINE

## 2021-01-11 DIAGNOSIS — F41.1 GENERALIZED ANXIETY DISORDER: ICD-10-CM

## 2021-01-11 DIAGNOSIS — K92.2 GASTROINTESTINAL HEMORRHAGE, UNSPECIFIED GASTROINTESTINAL HEMORRHAGE TYPE: Primary | ICD-10-CM

## 2021-01-11 DIAGNOSIS — K21.9 GASTROESOPHAGEAL REFLUX DISEASE WITHOUT ESOPHAGITIS: ICD-10-CM

## 2021-01-11 DIAGNOSIS — K92.1 GASTROINTESTINAL HEMORRHAGE WITH MELENA: ICD-10-CM

## 2021-01-11 PROBLEM — D68.318 HEMORRHAGIC DISORDER DUE TO CIRCULATING ANTICOAGULANTS (HCC): Status: ACTIVE | Noted: 2021-01-11

## 2021-01-11 PROBLEM — M25.511 CHRONIC PAIN OF BOTH SHOULDERS: Status: ACTIVE | Noted: 2021-01-11

## 2021-01-11 PROBLEM — M25.512 CHRONIC PAIN OF BOTH SHOULDERS: Status: ACTIVE | Noted: 2021-01-11

## 2021-01-11 PROBLEM — G89.29 CHRONIC PAIN OF BOTH SHOULDERS: Status: ACTIVE | Noted: 2021-01-11

## 2021-01-11 LAB
ABO GROUP BLD: NORMAL
ALBUMIN SERPL-MCNC: 3.5 G/DL (ref 3.5–5.2)
ALBUMIN/GLOB SERPL: 1.1 G/DL
ALP SERPL-CCNC: 76 U/L (ref 39–117)
ALT SERPL W P-5'-P-CCNC: 7 U/L (ref 1–33)
ANION GAP SERPL CALCULATED.3IONS-SCNC: 18.6 MMOL/L (ref 5–15)
AST SERPL-CCNC: 17 U/L (ref 1–32)
BASOPHILS # BLD AUTO: 0.05 10*3/MM3 (ref 0–0.2)
BASOPHILS NFR BLD AUTO: 0.6 % (ref 0–1.5)
BILIRUB SERPL-MCNC: 0.3 MG/DL (ref 0–1.2)
BLD GP AB SCN SERPL QL: NEGATIVE
BUN SERPL-MCNC: 74 MG/DL (ref 8–23)
BUN/CREAT SERPL: 22.1 (ref 7–25)
CALCIUM SPEC-SCNC: 9.5 MG/DL (ref 8.6–10.5)
CHLORIDE SERPL-SCNC: 101 MMOL/L (ref 98–107)
CO2 SERPL-SCNC: 18.4 MMOL/L (ref 22–29)
CREAT SERPL-MCNC: 3.35 MG/DL (ref 0.57–1)
D-LACTATE SERPL-SCNC: 1 MMOL/L (ref 0.5–2)
D-LACTATE SERPL-SCNC: 2.4 MMOL/L (ref 0.5–2)
D-LACTATE SERPL-SCNC: 3.3 MMOL/L (ref 0.5–2)
DEPRECATED RDW RBC AUTO: 43.4 FL (ref 37–54)
EOSINOPHIL # BLD AUTO: 0.11 10*3/MM3 (ref 0–0.4)
EOSINOPHIL NFR BLD AUTO: 1.4 % (ref 0.3–6.2)
ERYTHROCYTE [DISTWIDTH] IN BLOOD BY AUTOMATED COUNT: 12.7 % (ref 12.3–15.4)
EXPIRATION DATE: ABNORMAL
FECAL OCCULT BLOOD SCREEN, POC: POSITIVE
GFR SERPL CREATININE-BSD FRML MDRD: 13 ML/MIN/1.73
GFR SERPL CREATININE-BSD FRML MDRD: ABNORMAL ML/MIN/{1.73_M2}
GLOBULIN UR ELPH-MCNC: 3.1 GM/DL
GLUCOSE SERPL-MCNC: 150 MG/DL (ref 65–99)
HBV SURFACE AG SERPL QL IA: NORMAL
HCT VFR BLD AUTO: 26.7 % (ref 34–46.6)
HCT VFR BLD AUTO: 27.5 % (ref 34–46.6)
HGB BLD-MCNC: 8.5 G/DL (ref 12–15.9)
HGB BLD-MCNC: 8.9 G/DL (ref 12–15.9)
HOLD SPECIMEN: NORMAL
HOLD SPECIMEN: NORMAL
IMM GRANULOCYTES # BLD AUTO: 0.07 10*3/MM3 (ref 0–0.05)
IMM GRANULOCYTES NFR BLD AUTO: 0.9 % (ref 0–0.5)
INR PPP: 4.37 (ref 0.9–1.1)
LACTATE HOLD SPECIMEN: NORMAL
LYMPHOCYTES # BLD AUTO: 0.75 10*3/MM3 (ref 0.7–3.1)
LYMPHOCYTES NFR BLD AUTO: 9.6 % (ref 19.6–45.3)
Lab: 154
MCH RBC QN AUTO: 30 PG (ref 26.6–33)
MCHC RBC AUTO-ENTMCNC: 31.8 G/DL (ref 31.5–35.7)
MCV RBC AUTO: 94.3 FL (ref 79–97)
MONOCYTES # BLD AUTO: 0.75 10*3/MM3 (ref 0.1–0.9)
MONOCYTES NFR BLD AUTO: 9.6 % (ref 5–12)
NEGATIVE CONTROL: NEGATIVE
NEUTROPHILS NFR BLD AUTO: 6.12 10*3/MM3 (ref 1.7–7)
NEUTROPHILS NFR BLD AUTO: 77.9 % (ref 42.7–76)
NRBC BLD AUTO-RTO: 0 /100 WBC (ref 0–0.2)
PLATELET # BLD AUTO: 229 10*3/MM3 (ref 140–450)
PMV BLD AUTO: 9.5 FL (ref 6–12)
POSITIVE CONTROL: POSITIVE
POTASSIUM SERPL-SCNC: 4.6 MMOL/L (ref 3.5–5.2)
PROT SERPL-MCNC: 6.6 G/DL (ref 6–8.5)
PROTHROMBIN TIME: 41.5 SECONDS (ref 11.7–14.2)
QT INTERVAL: 403 MS
RBC # BLD AUTO: 2.83 10*6/MM3 (ref 3.77–5.28)
RH BLD: POSITIVE
SARS-COV-2 ORF1AB RESP QL NAA+PROBE: NOT DETECTED
SODIUM SERPL-SCNC: 138 MMOL/L (ref 136–145)
T&S EXPIRATION DATE: NORMAL
WBC # BLD AUTO: 7.85 10*3/MM3 (ref 3.4–10.8)
WHOLE BLOOD HOLD SPECIMEN: NORMAL
WHOLE BLOOD HOLD SPECIMEN: NORMAL

## 2021-01-11 PROCEDURE — 5A1D70Z PERFORMANCE OF URINARY FILTRATION, INTERMITTENT, LESS THAN 6 HOURS PER DAY: ICD-10-PCS | Performed by: INTERNAL MEDICINE

## 2021-01-11 PROCEDURE — 99222 1ST HOSP IP/OBS MODERATE 55: CPT | Performed by: INTERNAL MEDICINE

## 2021-01-11 PROCEDURE — 87340 HEPATITIS B SURFACE AG IA: CPT | Performed by: INTERNAL MEDICINE

## 2021-01-11 PROCEDURE — 83605 ASSAY OF LACTIC ACID: CPT | Performed by: INTERNAL MEDICINE

## 2021-01-11 PROCEDURE — 86901 BLOOD TYPING SEROLOGIC RH(D): CPT | Performed by: EMERGENCY MEDICINE

## 2021-01-11 PROCEDURE — 86900 BLOOD TYPING SEROLOGIC ABO: CPT | Performed by: EMERGENCY MEDICINE

## 2021-01-11 PROCEDURE — 93005 ELECTROCARDIOGRAM TRACING: CPT | Performed by: EMERGENCY MEDICINE

## 2021-01-11 PROCEDURE — 82270 OCCULT BLOOD FECES: CPT | Performed by: EMERGENCY MEDICINE

## 2021-01-11 PROCEDURE — 93010 ELECTROCARDIOGRAM REPORT: CPT | Performed by: INTERNAL MEDICINE

## 2021-01-11 PROCEDURE — 86900 BLOOD TYPING SEROLOGIC ABO: CPT

## 2021-01-11 PROCEDURE — 36415 COLL VENOUS BLD VENIPUNCTURE: CPT | Performed by: EMERGENCY MEDICINE

## 2021-01-11 PROCEDURE — 85018 HEMOGLOBIN: CPT | Performed by: INTERNAL MEDICINE

## 2021-01-11 PROCEDURE — 99284 EMERGENCY DEPT VISIT MOD MDM: CPT

## 2021-01-11 PROCEDURE — 80053 COMPREHEN METABOLIC PANEL: CPT | Performed by: EMERGENCY MEDICINE

## 2021-01-11 PROCEDURE — U0004 COV-19 TEST NON-CDC HGH THRU: HCPCS | Performed by: EMERGENCY MEDICINE

## 2021-01-11 PROCEDURE — 36415 COLL VENOUS BLD VENIPUNCTURE: CPT

## 2021-01-11 PROCEDURE — 86923 COMPATIBILITY TEST ELECTRIC: CPT

## 2021-01-11 PROCEDURE — 83605 ASSAY OF LACTIC ACID: CPT | Performed by: EMERGENCY MEDICINE

## 2021-01-11 PROCEDURE — 85014 HEMATOCRIT: CPT | Performed by: INTERNAL MEDICINE

## 2021-01-11 PROCEDURE — 86850 RBC ANTIBODY SCREEN: CPT | Performed by: EMERGENCY MEDICINE

## 2021-01-11 PROCEDURE — P9016 RBC LEUKOCYTES REDUCED: HCPCS

## 2021-01-11 PROCEDURE — 85025 COMPLETE CBC W/AUTO DIFF WBC: CPT | Performed by: EMERGENCY MEDICINE

## 2021-01-11 PROCEDURE — 85610 PROTHROMBIN TIME: CPT | Performed by: EMERGENCY MEDICINE

## 2021-01-11 RX ORDER — SODIUM CHLORIDE 0.9 % (FLUSH) 0.9 %
10 SYRINGE (ML) INJECTION AS NEEDED
Status: DISCONTINUED | OUTPATIENT
Start: 2021-01-11 | End: 2021-01-18 | Stop reason: HOSPADM

## 2021-01-11 RX ORDER — ESCITALOPRAM OXALATE 10 MG/1
10 TABLET ORAL DAILY
Status: DISCONTINUED | OUTPATIENT
Start: 2021-01-12 | End: 2021-01-18 | Stop reason: HOSPADM

## 2021-01-11 RX ORDER — NITROGLYCERIN 0.4 MG/1
0.4 TABLET SUBLINGUAL
Status: DISCONTINUED | OUTPATIENT
Start: 2021-01-11 | End: 2021-01-18 | Stop reason: HOSPADM

## 2021-01-11 RX ORDER — ALBUMIN (HUMAN) 12.5 G/50ML
12.5 SOLUTION INTRAVENOUS AS NEEDED
Status: ACTIVE | OUTPATIENT
Start: 2021-01-11 | End: 2021-01-12

## 2021-01-11 RX ORDER — PANTOPRAZOLE SODIUM 40 MG/10ML
80 INJECTION, POWDER, LYOPHILIZED, FOR SOLUTION INTRAVENOUS ONCE
Status: COMPLETED | OUTPATIENT
Start: 2021-01-11 | End: 2021-01-11

## 2021-01-11 RX ORDER — ROPINIROLE 0.5 MG/1
0.25 TABLET, FILM COATED ORAL NIGHTLY
Status: DISCONTINUED | OUTPATIENT
Start: 2021-01-11 | End: 2021-01-18 | Stop reason: HOSPADM

## 2021-01-11 RX ORDER — ACETAMINOPHEN 650 MG/1
650 SUPPOSITORY RECTAL EVERY 4 HOURS PRN
Status: DISCONTINUED | OUTPATIENT
Start: 2021-01-11 | End: 2021-01-18 | Stop reason: HOSPADM

## 2021-01-11 RX ORDER — ONDANSETRON 2 MG/ML
4 INJECTION INTRAMUSCULAR; INTRAVENOUS EVERY 6 HOURS PRN
Status: DISCONTINUED | OUTPATIENT
Start: 2021-01-11 | End: 2021-01-18 | Stop reason: HOSPADM

## 2021-01-11 RX ORDER — BUSPIRONE HYDROCHLORIDE 10 MG/1
10 TABLET ORAL 3 TIMES DAILY
Status: DISCONTINUED | OUTPATIENT
Start: 2021-01-11 | End: 2021-01-11

## 2021-01-11 RX ORDER — ACETAMINOPHEN 325 MG/1
650 TABLET ORAL EVERY 4 HOURS PRN
Status: DISCONTINUED | OUTPATIENT
Start: 2021-01-11 | End: 2021-01-18 | Stop reason: HOSPADM

## 2021-01-11 RX ORDER — ACETAMINOPHEN 160 MG/5ML
650 SOLUTION ORAL EVERY 4 HOURS PRN
Status: DISCONTINUED | OUTPATIENT
Start: 2021-01-11 | End: 2021-01-18 | Stop reason: HOSPADM

## 2021-01-11 RX ORDER — BUSPIRONE HYDROCHLORIDE 10 MG/1
10 TABLET ORAL EVERY 12 HOURS SCHEDULED
Status: DISCONTINUED | OUTPATIENT
Start: 2021-01-12 | End: 2021-01-18 | Stop reason: HOSPADM

## 2021-01-11 RX ORDER — SODIUM CHLORIDE 0.9 % (FLUSH) 0.9 %
10 SYRINGE (ML) INJECTION EVERY 12 HOURS SCHEDULED
Status: DISCONTINUED | OUTPATIENT
Start: 2021-01-11 | End: 2021-01-18 | Stop reason: HOSPADM

## 2021-01-11 RX ADMIN — SODIUM CHLORIDE, PRESERVATIVE FREE 10 ML: 5 INJECTION INTRAVENOUS at 22:02

## 2021-01-11 RX ADMIN — ROPINIROLE HYDROCHLORIDE 0.25 MG: 0.5 TABLET, FILM COATED ORAL at 21:36

## 2021-01-11 RX ADMIN — PANTOPRAZOLE SODIUM 8 MG/HR: 40 INJECTION, POWDER, FOR SOLUTION INTRAVENOUS at 18:43

## 2021-01-11 RX ADMIN — SODIUM CHLORIDE, PRESERVATIVE FREE 10 ML: 5 INJECTION INTRAVENOUS at 13:19

## 2021-01-11 RX ADMIN — ACETAMINOPHEN 650 MG: 325 TABLET, FILM COATED ORAL at 21:36

## 2021-01-11 RX ADMIN — PANTOPRAZOLE SODIUM 80 MG: 40 INJECTION, POWDER, FOR SOLUTION INTRAVENOUS at 11:13

## 2021-01-11 NOTE — ED TRIAGE NOTES
"Rectal bleed started sat. It happens when she sits down on toilet - she reports \"it gushes blood.\"  No other symptoms.  Was supposed to have dialysis today but was told to come to ER instead    Patient was placed in face mask during first look triage.  Patient was wearing a face mask throughout encounter.  I wore personal protective equipment throughout the encounter.  Hand hygiene was performed before and after patient encounter.     "

## 2021-01-11 NOTE — CONSULTS
Referring Provider: Silvia Fuller MD  Reason for Consultation: Management of patient with end-stage renal disease    Subjective     Chief complaint   Chief Complaint   Patient presents with   • Rectal Bleeding       History of present illness:    This is a very pleasant 75-year-old  female who presented to the emergency department with rectal bleed has maroon blood with clots has been bleeding for the past 2 days, complaining of being weak and lightheaded.  Patient has end-stage renal disease on maintenance hemodialysis every Monday, Wednesday and Friday under the care of my partner Dr. Kathy Osuna  She has history of aortic valve replacement and mitral valve replacement and to cuspid valve repair and maze procedure and left atrial appendage ligation in January 2019.  She has a history of anemia of chronic kidney disease, obstructive sleep apnea GERD, paroxysmal atrial fibrillation, coronary artery disease also history of cardiomyopathy and congestive heart failure, she had dyslipidemia.  Patient is feeling weak, no chest pain or shortness of air, is feeling cold, appetite has been stable, she had GI bleed as noted above, she has functional AV fistula in the left upper arm.    Past Medical History:   Diagnosis Date   • A-fib (CMS/[facility])     Meds   • Arthritis     w/Difficult Mobility   • Bilateral lower extremity edema     Legs   • CAD (coronary artery disease)     Affecting LAD    • Cardiomyopathy (CMS/[facility])    • CHF (congestive heart failure) (CMS/[facility])    • Chronic fatigue    • Dialysis patient (CMS/[facility])     TUESDAY THURSDAY SATURDAY   • Generalized anxiety disorder    • GERD (gastroesophageal reflux disease)    • Hernia, inguinal     Hx Repair   • History of echocardiogram 1/17/19-[facility]    The L Ventricular Cavity is Mild-to-Moderately Dilated; Left Ventricular Wall Thickness is Consistent w/Mild Concentric Hypertrophy; Left Atrial Cavity Size is Moderate-to-Severely Dilated; Severe AVS; Severe MVS;  Moderate TVR Noted   • History of transfusion    • Hyperlipidemia     Controlled w/Meds   • Hypertension     Controlled w/Meds   • Hypokinesis 01/22/2019    Apical Noted on Cardiac Cath   • IFG (impaired fasting glucose)    • LAD stenosis 01/22/2019    Mid LAD Irregularities Noted on Cardiac Cath    • Left atrial dilatation 01/17/2019    Moderate-Severe Noted on Echo   • Left ventricular dilatation 01/17/2019    Noted on Echo/LEE   • Mild concentric left ventricular hypertrophy 01/17/2019    Noted on Echo/LEE   • Mitral annular calcification 01/17/2019    Severe Noted on Echo   • Moderate tricuspid valve regurgitation 01/24/2019    Noted on LEE   • Morbid obesity (CMS/[facility])    • NSTEMI (non-ST elevated myocardial infarction) (CMS/[facility])    • OCTAVIANO (obstructive sleep apnea)    • Osteoarthritis    • Pulmonary hypertension (CMS/[facility]) 01/22/2019    Noted on Cardiac Cath   • Right bundle branch block 01/24/2019    Noted on LEE   • Severe aortic stenosis 01/22/2019    Noted on Cardiac Cath & LEE on 01/24/19; S/p AVR on 01/28/19 by Dr. Gaxiola   • Severe mitral valve stenosis 01/24/2019    Noted on LEE; S/p MVR on 01/28/19 by Dr. Gaxiola   • Shoulder pain, bilateral    • Skin yeast infection     Folds Under Skin--Nystatin/Diflucan     Past Surgical History:   Procedure Laterality Date   • AORTIC VALVE REPAIR/REPLACEMENT MITRAL VALVE REPAIR/REPLACEMENT N/A 1/28/2019    Procedure: LEE STERNOTOMY AORTIC VALVE REPLACEMENT, MITRAL VALVE REPLACEMENT, TRICUSPID VALVE REPAIR, MAZE PROCEDURE, CLOSURE OF LEFT ATRIAL APPENDAGE  AND PRP;  Surgeon: Scar Gaxiola MD;  Location: [facility] OR;  Service: Cardiothoracic   • ARTERIOVENOUS FISTULA/SHUNT SURGERY Left 6/26/2019    Procedure: LEFT ARM BRACHIAL CEPHALIC FISTULA;  Surgeon: Satish Stahl MD;  Location: [facility] OR;  Service: Vascular   • CARDIAC CATHETERIZATION N/A 1/22/2019    Procedure: Coronary angiography;  Surgeon: Rick Talavera MD;  Location: [facility]  "INVASIVE LOCATION;  Service: Cardiology   • CARDIAC CATHETERIZATION N/A 1/22/2019    Procedure: Left Heart Cath;  Surgeon: Rick Talavera MD;  [facility]:  [facility] INVASIVE [facility];  Service: Cardiology   • CARDIAC CATHETERIZATION N/A 1/22/2019    Procedure: Right Heart Cath;  Surgeon: Rick Talavera MD;  Location:  [facility] INV[facility];  Service: Cardiology   • CARDIAC CATHETERIZATION N/A 1/22/2019    Procedure: Left ventriculography;  Surgeon: Rick Talavera MD;  Location:  [facility] INV[facility];  Service: Cardiology   • CARDIAC SURGERY     • COLONOSCOPY     • HERNIA REPAIR     • INSERTION HEMODIALYSIS CATHETER N/A 2/8/2019    Procedure: tunnel CATHETER PLACEMENT WITH FLUROSCOPY;  Surgeon: Satish Stahl MD;  Location: [facility] MAIN OR;  Service: Vascular   • REPLACEMENT TOTAL KNEE Bilateral 2007/2009     Family History   Problem Relation Age of Onset   • Hypertension Other    • Diabetes Other    • Heart disease Neg Hx    • Stroke Neg Hx    • Arthritis Neg Hx    • Breast cancer Neg Hx    • Malig Hyperthermia Neg Hx        Social History     Tobacco Use   • Smoking status: Never Smoker   • Smokeless tobacco: Never Used   Substance Use Topics   • Alcohol use: Yes     Comment: Occ   • Drug use: No     (Not in a hospital admission)    Allergies:  Patient has no known allergies.    Review of Systems  14 points review of system was performed and it was negative other than what noted above in the HPI    Objective     Vital Signs  Temp:  [97 °F (36.1 °C)] 97 °F (36.1 °C)  Heart Rate:  [89-93] 89  Resp:  [18] 18  BP: (113-130)/(68-77) 130/77    Flowsheet Rows      First Filed Value   Admission Height  170.2 cm (67\") Documented at 01/11/2021 0958   Admission Weight  106 kg (233 lb) Documented at 01/11/2021 0958           No intake/output data recorded.  No intake/output data recorded.  No intake or output data in the 24 hours ending 01/11/21 1255    Physical Exam:  General " Appearance: alert, oriented x 3, no acute distress,   Skin: warm and dry  HEENT: pupils round and reactive to light, oral mucosa normal, nonicteric sclera  Neck: supple, no JVD, trachea midline, no cervical or supraclavicular lymphadenopathy  Lungs: CTA, unlabored breathing effort  Heart: RRR, normal S1 and S2, no S3, no rub  Abdomen: soft, nontender, normoactive bowels  : no palpable bladder,  Extremities: Trace ankle edema, no cyanosis or clubbing, she has functional AV fistula in the left upper extremity.  Neuro: normal speech and mental status       Results Review:  Results for orders placed or performed during the hospital encounter of 01/11/21   Comprehensive Metabolic Panel    Specimen: Blood   Result Value Ref Range    Glucose 150 (H) 65 - 99 mg/dL    BUN 74 (H) 8 - 23 mg/dL    Creatinine 3.35 (H) 0.57 - 1.00 mg/dL    Sodium 138 136 - 145 mmol/L    Potassium 4.6 3.5 - 5.2 mmol/L    Chloride 101 98 - 107 mmol/L    CO2 18.4 (L) 22.0 - 29.0 mmol/L    Calcium 9.5 8.6 - 10.5 mg/dL    Total Protein 6.6 6.0 - 8.5 g/dL    Albumin 3.50 3.50 - 5.20 g/dL    ALT (SGPT) 7 1 - 33 U/L    AST (SGOT) 17 1 - 32 U/L    Alkaline Phosphatase 76 39 - 117 U/L    Total Bilirubin 0.3 0.0 - 1.2 mg/dL    eGFR Non African Amer 13 (L) >60 mL/min/1.73    eGFR  African Amer      Globulin 3.1 gm/dL    A/G Ratio 1.1 g/dL    BUN/Creatinine Ratio 22.1 7.0 - 25.0    Anion Gap 18.6 (H) 5.0 - 15.0 mmol/L   Protime-INR    Specimen: Blood   Result Value Ref Range    Protime 41.5 (H) 11.7 - 14.2 Seconds    INR 4.37 (C) 0.90 - 1.10   Lactic Acid, Plasma    Specimen: Arm, Right; Blood   Result Value Ref Range    Lactate 2.4 (C) 0.5 - 2.0 mmol/L   CBC Auto Differential    Specimen: Blood   Result Value Ref Range    WBC 7.85 3.40 - 10.80 10*3/mm3    RBC 2.83 (L) 3.77 - 5.28 10*6/mm3    Hemoglobin 8.5 (L) 12.0 - 15.9 g/dL    Hematocrit 26.7 (L) 34.0 - 46.6 %    MCV 94.3 79.0 - 97.0 fL    MCH 30.0 26.6 - 33.0 pg    MCHC 31.8 31.5 - 35.7 g/dL    RDW  12.7 12.3 - 15.4 %    RDW-SD 43.4 37.0 - 54.0 fl    MPV 9.5 6.0 - 12.0 fL    Platelets 229 140 - 450 10*3/mm3    Neutrophil % 77.9 (H) 42.7 - 76.0 %    Lymphocyte % 9.6 (L) 19.6 - 45.3 %    Monocyte % 9.6 5.0 - 12.0 %    Eosinophil % 1.4 0.3 - 6.2 %    Basophil % 0.6 0.0 - 1.5 %    Immature Grans % 0.9 (H) 0.0 - 0.5 %    Neutrophils, Absolute 6.12 1.70 - 7.00 10*3/mm3    Lymphocytes, Absolute 0.75 0.70 - 3.10 10*3/mm3    Monocytes, Absolute 0.75 0.10 - 0.90 10*3/mm3    Eosinophils, Absolute 0.11 0.00 - 0.40 10*3/mm3    Basophils, Absolute 0.05 0.00 - 0.20 10*3/mm3    Immature Grans, Absolute 0.07 (H) 0.00 - 0.05 10*3/mm3    nRBC 0.0 0.0 - 0.2 /100 WBC   POC Occult Blood Stool    Specimen: Per Rectum; Stool   Result Value Ref Range    Fecal Occult Blood Positive (A) Negative    Lot Number [id number]     Expiration Date 06/30/2021     Positive Control Positive Positive    Negative Control Negative Negative   ECG 12 Lead   Result Value Ref Range    QT Interval 403 ms   Type & Screen    Specimen: Blood   Result Value Ref Range    ABO Type B     RH type Positive     Antibody Screen Negative     T&S Expiration Date 1/14/2021 11:59:59 PM    Light Blue Top   Result Value Ref Range    Extra Tube hold for add-on    Green Top (Gel)   Result Value Ref Range    Extra Tube Hold for add-ons.    Lavender Top   Result Value Ref Range    Extra Tube hold for add-on    Gold Top - SST   Result Value Ref Range    Extra Tube Hold for add-ons.      Imaging Results (Last 72 Hours)     ** No results found for the last 72 hours. **            sodium chloride, 10 mL, Intravenous, Q12H           Assessment/Plan   1.  End-stage renal disease on maintenance hemodialysis every Monday, Wednesday and Friday.  2.  GI bleed with maroon-colored bowel movement and clots  3.  Anemia of chronic kidney disease and acute blood loss  4.  Prior AVR, MVR and TV repair chronically anticoagulated  5.  Obstructive sleep apnea  6.  GERD.        Plan:  1.  I agree with  the present treatment  2.  Hemodialysis today and transfuse 1 unit of packed RBCs  3.  Surveillance labs    Thank you for asking me to see this patient in consultation  I discussed the patient's findings and my recommendations with the patient and also with Dr. Chris Jin MD  01/11/21  12:55 EST      Much of this encounter note is an electronic transcription/translation of spoken language to printed text. The electronic translation of spoken language may permit erroneous, or at times, nonsensical words or phrases to be inadvertently transcribed; Although I have reviewed the note for such errors, some may still exist

## 2021-01-11 NOTE — NURSING NOTE
Patient came to the hospital with her personal wheelchair and walker. RN placed tags on both to alert others that they belong to her. Please see that they go with the patient at discharge.

## 2021-01-11 NOTE — CONSULTS
[facility]  Initial Inpatient Consult Note    Referring Provider: Dr Perez    Reason for Consultation: Rectal bleeding    Subjective     History of present illness:    75 y.o. female presented to ER c/o passing clotted blood with BMs since Saturday.  No associated abdominal pain.  Blood is red to dark red.  Last episode was this morning in ER. She has hx of prior valve replacement (AVR and MVR) on warfarin, INR 4.6 on admission.  She has hx of ESRD on dialysis.  BP is currently 190/90.  HR is irregular but normal rate.  She reports very remote colonoscopy.      Past Medical History:  Past Medical History:   Diagnosis Date   • A-fib (CMS/[facility])     Meds   • Arthritis     w/Difficult Mobility   • Bilateral lower extremity edema     Legs   • CAD (coronary artery disease)     Affecting LAD    • Cardiomyopathy (CMS/[facility])    • CHF (congestive heart failure) (CMS/[facility])    • Chronic fatigue    • Dialysis patient (CMS/[facility])     TUESDAY THURSDAY SATURDAY   • Generalized anxiety disorder    • GERD (gastroesophageal reflux disease)    • Hernia, inguinal     Hx Repair   • History of echocardiogram 1/17/19-[facility]    The L Ventricular Cavity is Mild-to-Moderately Dilated; Left Ventricular Wall Thickness is Consistent w/Mild Concentric Hypertrophy; Left Atrial Cavity Size is Moderate-to-Severely Dilated; Severe AVS; Severe MVS; Moderate TVR Noted   • History of transfusion    • Hyperlipidemia     Controlled w/Meds   • Hypertension     Controlled w/Meds   • Hypokinesis 01/22/2019    Apical Noted on Cardiac Cath   • IFG (impaired fasting glucose)    • LAD stenosis 01/22/2019    Mid LAD Irregularities Noted on Cardiac Cath    • Left atrial dilatation 01/17/2019    Moderate-Severe Noted on Echo   • Left ventricular dilatation 01/17/2019    Noted on Echo/LEE   • Mild concentric left ventricular hypertrophy 01/17/2019    Noted on Echo/LEE   • Mitral annular calcification 01/17/2019    Severe Noted on Echo   •  Moderate tricuspid valve regurgitation 01/24/2019    Noted on LEE   • Morbid obesity (CMS/[facility])    • NSTEMI (non-ST elevated myocardial infarction) (CMS/[facility])    • OCTAVIANO (obstructive sleep apnea)    • Osteoarthritis    • Pulmonary hypertension (CMS/[facility]) 01/22/2019    Noted on Cardiac Cath   • Right bundle branch block 01/24/2019    Noted on LEE   • Severe aortic stenosis 01/22/2019    Noted on Cardiac Cath & LEE on 01/24/19; S/p AVR on 01/28/19 by Dr. Gaxiola   • Severe mitral valve stenosis 01/24/2019    Noted on LEE; S/p MVR on 01/28/19 by Dr. Gaxiola   • Shoulder pain, bilateral    • Skin yeast infection     Folds Under Skin--Nystatin/Diflucan     Past Surgical History:  Past Surgical History:   Procedure Laterality Date   • AORTIC VALVE REPAIR/REPLACEMENT MITRAL VALVE REPAIR/REPLACEMENT N/A 1/28/2019    Procedure: LEE STERNOTOMY AORTIC VALVE REPLACEMENT, MITRAL VALVE REPLACEMENT, TRICUSPID VALVE REPAIR, MAZE PROCEDURE, CLOSURE OF LEFT ATRIAL APPENDAGE  AND PRP;  Surgeon: Scar Gaxiola MD;  Location: [facility] MAIN OR;  Service: Cardiothoracic   • ARTERIOVENOUS FISTULA/SHUNT SURGERY Left 6/26/2019    Procedure: LEFT ARM BRACHIAL CEPHALIC FISTULA;  Surgeon: Satish Stahl MD;  Location: [facility] MAIN OR;  Service: Vascular   • CARDIAC CATHETERIZATION N/A 1/22/2019    Procedure: Coronary angiography;  Surgeon: Rick Talavera MD;  Location: [facility] CATH INVASIVE LOCATION;  Service: Cardiology   • CARDIAC CATHETERIZATION N/A 1/22/2019    Procedure: Left Heart Cath;  Surgeon: Rick Talavera MD;  Location: [facility] CATH INVASIVE LOCATION;  Service: Cardiology   • CARDIAC CATHETERIZATION N/A 1/22/2019    Procedure: Right Heart Cath;  Surgeon: Rick Talavera MD;  Location: [facility] CATH INVASIVE LOCATION;  Service: Cardiology   • CARDIAC CATHETERIZATION N/A 1/22/2019    Procedure: Left ventriculography;  Surgeon: Rick Talavera MD;  Location: [facility] CATH INVASIVE LOCATION;  Service: Cardiology    • CARDIAC SURGERY     • COLONOSCOPY     • HERNIA REPAIR     • INSERTION HEMODIALYSIS CATHETER N/A 2/8/2019    Procedure: tunnel CATHETER PLACEMENT WITH FLUROSCOPY;  Surgeon: Satish Stahl MD;  Location: [facility];  Service: Vascular   • REPLACEMENT TOTAL KNEE Bilateral 2007/2009      Social History:   Social History     Tobacco Use   • Smoking status: Never Smoker   • Smokeless tobacco: Never Used   Substance Use Topics   • Alcohol use: Yes     Comment: Occ      Family History:  Family History   Problem Relation Age of Onset   • Hypertension Other    • Diabetes Other    • Heart disease Neg Hx    • Stroke Neg Hx    • Arthritis Neg Hx    • Breast cancer Neg Hx    • Malig Hyperthermia Neg Hx        Home Meds:  Medications Prior to Admission   Medication Sig Dispense Refill Last Dose   • acetaminophen (TYLENOL) 325 MG tablet Take 650 mg by mouth 3 (Three) Times a Day. Takes tid at 2 am, 1 pm, and hS   1/11/2021 at Unknown time   • bumetanide (BUMEX) 2 MG tablet TAKE 2 TABLETS BY MOUTH DAILY (Patient taking differently: Take 2 mg by mouth Daily.) 60 tablet 5 1/11/2021 at Unknown time   • busPIRone (BUSPAR) 10 MG tablet Take 1 tablet by mouth 3 (Three) Times a Day. (Patient taking differently: Take 10 mg by mouth 2 (two) times a day.) 270 tablet 3 1/11/2021 at Unknown time   • escitalopram (LEXAPRO) 10 MG tablet Take 1 tablet by mouth Daily. 90 tablet 3 1/11/2021 at Unknown time   • hydrocortisone 2.5 % cream Apply  topically to the appropriate area as directed See Admin Instructions. Apply topically to the affected area twice daily as directed 60 g 3 1/11/2021 at Unknown time   • warfarin (COUMADIN) 2 MG tablet Take one tablet (2mg) by mouth on Mon, Wed, and Fri and take one and one-half tablets (3mg) by mouth all other days or as directed 120 tablet 1 1/10/2021 at Unknown time   • aspirin 81 MG tablet Take  by mouth.   More than a month at Unknown time   • ondansetron (ZOFRAN) 4 MG tablet Take 1 tablet  by mouth Every 8 (Eight) Hours As Needed for Nausea or Vomiting. 90 tablet 3 More than a month at Unknown time   • rOPINIRole (REQUIP) 0.25 MG tablet    More than a month at Unknown time     Current Meds:   [START ON 1/12/2021] busPIRone, 10 mg, Oral, Q12H  [START ON 1/12/2021] escitalopram, 10 mg, Oral, Daily  rOPINIRole, 0.25 mg, Oral, Nightly  sodium chloride, 10 mL, Intravenous, Q12H      Allergies:  No Known Allergies  Review of Systems  All systems were reviewed and negative except for:  Gastrointestinal: positive for  bright red blood per rectum     Objective     Vital Signs  Temp:  [97 °F (36.1 °C)-97.6 °F (36.4 °C)] 97.6 °F (36.4 °C)  Heart Rate:  [83-96] 83  Resp:  [18-24] 24  BP: (113-130)/(64-77) 130/64  Physical Exam:  General Appearance:     Alert, cooperative, in no acute distress   Head:    Normocephalic, without obvious abnormality, atraumatic   Eyes:            Lids and lashes normal, conjunctivae and sclerae normal, no icterus   Throat:   No oral lesions, no thrush, oral mucosa moist   Neck:   No adenopathy, supple, trachea midline, no thyromegaly, no carotid bruit, no JVD   Lungs:     Clear to auscultation,respirations regular, even and            unlabored    Heart:    Regular rhythm and normal rate, normal S1 and S2, no        murmur, no gallop, no rub, mechanical click noted   Chest Wall:    No abnormalities observed   Abdomen:     Normal bowel sounds, no masses, no organomegaly, soft     nontender, nondistended, no guarding, no rebound                 tenderness   Rectal:     Deferred   Extremities:   no edema, no cyanosis, no redness   Skin:   No bleeding, bruising or rash   Lymph nodes:   No palpable adenopathy   Psychiatric:  Judgement and insight: normal   Orientation to person place and time: normal   Mood and affect: normal   Results Review:   I reviewed the patient's new clinical results.    Results from last 7 days   Lab Units 01/11/21  1613 01/11/21  0946   WBC 10*3/mm3  --  7.85    HEMOGLOBIN g/dL 8.9* 8.5*   HEMATOCRIT % 27.5* 26.7*   PLATELETS 10*3/mm3  --  229     Results from last 7 days   Lab Units 01/11/21  0946   SODIUM mmol/L 138   POTASSIUM mmol/L 4.6   CHLORIDE mmol/L 101   CO2 mmol/L 18.4*   BUN mg/dL 74*   CREATININE mg/dL 3.35*   CALCIUM mg/dL 9.5   BILIRUBIN mg/dL 0.3   ALK PHOS U/L 76   ALT (SGPT) U/L 7   AST (SGOT) U/L 17   GLUCOSE mg/dL 150*     Results from last 7 days   Lab Units 01/11/21  0946   INR  4.37*     No results found for: LIPASE    Radiology:  No orders to display       Assessment/Plan   Assessment:   1.  Rectal bleeding  2.  Anemia, chronic  3.  ESRD on HD  4.  AVR/MVR on warfarin    Plan:   The patient will eventually require colonoscopy once her INR is more amenable.  It appears primary team is not planning to reverse but let drift down for now.  Once INR less than 2 we can begin preparation for colonoscopy.  If has hemodynamic instability or large volume bleeding she may need FFP and more urgent endoscopy.      I discussed the patients findings and my recommendations with patient.         Mikey Zacarias M.D.  [facility]  [address] [place] [address]  Office: [phone]

## 2021-01-11 NOTE — ED PROVIDER NOTES
EMERGENCY DEPARTMENT ENCOUNTER    CHIEF COMPLAINT  Chief Complaint: Rectal bleeding  History given by: Patient  History limited by: Nothing  Room Number: S410/1  PMD: Robert Cortez MD  Cardiologist: Dr. Zan Segura  Nephrologist: Dr. Diana Osuna  Vascular surgeon: Dr. David Stahl    HPI:  Pt is a 75 y.o. female presents complaining of 3-day history of rectal bleeding.  Patient reports she has had 6 episodes of passing clots and blood when sitting on the toilet to have a bowel movement over the past 3 days.  Patient reports the toilet is completely filled with blood with each episode.  Patient denies lightheadedness, syncope, chest pain, shortness of air but does report she felt nauseated this morning and had some dry heaves without emesis.  Patient denies abdominal pain, changes in urinary habits, cough, fever, loss of sense of taste or smell, swelling of extremities.  Patient reports she urinates 4-5 times daily.  Patient reports her she took her last Coumadin dose last night.  She is a Monday Wednesday Friday dialysis patient with a full dialysis done on Friday (3 days prior to arrival).  Patient reports she has a history of atrial fibrillation and valve replacement but is unaware as to whether she has mechanical heart valve.    Duration: 3 days  Associated Symptoms: Nausea  Aggravating Factors: Nothing  Alleviating Factors: Nothing  Treatment before arrival: Regular medications    Upon review the patient's chart it is noted:  Patient with a history of hypertension, congestive heart failure, aortic and mitral valve stenosis, obstructive sleep apnea, pulmonary hypertension, and coronary artery disease, atrial fibrillation, renal failure on hemodialysis  Echo 1/17/2019, right ventricular pressures greater than 55 mmHg  Cardiac cath 1/22/2019, minimal luminal irregularities proximal LAD otherwise normal coronaries, moderate aortic valve stenosis  1/28/2019 tissue aortic valve replacement, bioprosthetic  porcine mitral valve replacement,  1/30/2019, echo shows EF greater than 70%  5/13/2019, EF 65%  Recurrent cardioversions in January and February 2019    PAST MEDICAL HISTORY  Active Ambulatory Problems     Diagnosis Date Noted   • Tear of rotator cuff 04/28/2016   • Hypertension 05/24/2016   • Generalized anxiety disorder 05/24/2016   • Hyperlipidemia 05/24/2016   • IFG (impaired fasting glucose) 05/24/2016   • Heart murmur, systolic 05/24/2016   • Shoulder pain, bilateral 08/01/2016   • Pain of both shoulder joints 11/01/2016   • Chronic pain of both shoulders 02/03/2017   • Acute congestive heart failure (CMS/[facility]) 01/16/2019   • Obesity, morbid, BMI 50 or higher (CMS/[facility]) 01/16/2019   • Fungal skin infection 01/16/2019   • Cellulitis of lower extremity 01/17/2019   • Aortic valve stenosis 01/16/2019   • Tricuspid valve insufficiency 01/16/2019   • Mitral valve stenosis 01/16/2019   • S/P MVR (mitral valve replacement) 03/27/2019   • Paroxysmal atrial fibrillation (CMS/[facility]) 05/08/2019   • Pulmonary hypertension (CMS/[facility]) 05/08/2019   • Stage 5 chronic kidney disease on chronic dialysis (CMS/[facility]) 05/08/2019   • Gastroesophageal reflux disease without esophagitis 05/08/2019   • Chronic idiopathic constipation 05/08/2019   • Dependence on renal dialysis (CMS/[facility]) 06/26/2019   • Chronic kidney disease, stage 2 (mild) 08/09/2013   • Renovascular hypertension 08/09/2013     Resolved Ambulatory Problems     Diagnosis Date Noted   • No Resolved Ambulatory Problems     Past Medical History:   Diagnosis Date   • A-fib (CMS/[facility])    • Arthritis    • Bilateral lower extremity edema    • CAD (coronary artery disease)    • Cardiomyopathy (CMS/[facility])    • CHF (congestive heart failure) (CMS/[facility])    • Chronic fatigue    • Dialysis patient (CMS/[facility])    • GERD (gastroesophageal reflux disease)    • Hernia, inguinal    • History of echocardiogram 1/17/19-[facility]   • History of transfusion    • Hypokinesis 01/22/2019   • LAD stenosis  01/22/2019   • Left atrial dilatation 01/17/2019   • Left ventricular dilatation 01/17/2019   • Mild concentric left ventricular hypertrophy 01/17/2019   • Mitral annular calcification 01/17/2019   • Moderate tricuspid valve regurgitation 01/24/2019   • Morbid obesity (CMS/[facility])    • NSTEMI (non-ST elevated myocardial infarction) (CMS/[facility])    • OCTAVIANO (obstructive sleep apnea)    • Osteoarthritis    • Right bundle branch block 01/24/2019   • Severe aortic stenosis 01/22/2019   • Severe mitral valve stenosis 01/24/2019   • Skin yeast infection        PAST SURGICAL HISTORY  Past Surgical History:   Procedure Laterality Date   • AORTIC VALVE REPAIR/REPLACEMENT MITRAL VALVE REPAIR/REPLACEMENT N/A 1/28/2019    Procedure: LEE STERNOTOMY AORTIC VALVE REPLACEMENT, MITRAL VALVE REPLACEMENT, TRICUSPID VALVE REPAIR, MAZE PROCEDURE, CLOSURE OF LEFT ATRIAL APPENDAGE  AND PRP;  Surgeon: Scar Gaxiola MD;  Location: [facility] MAIN OR;  Service: Cardiothoracic   • ARTERIOVENOUS FISTULA/SHUNT SURGERY Left 6/26/2019    Procedure: LEFT ARM BRACHIAL CEPHALIC FISTULA;  Surgeon: Satish Stahl MD;  Location: [facility] MAIN OR;  Service: Vascular   • CARDIAC CATHETERIZATION N/A 1/22/2019    Procedure: Coronary angiography;  Surgeon: Rick Talavera MD;  Location[facility] CATH INVASIVE LOC[facility];  Service: Cardiology   • CARDIAC CATHETERIZATION N/A 1/22/2019    Procedure: Left Heart Cath;  Surgeon: Rick Talavera MD;  Location[facility] CATH [facility]VASIVE LOC[facility];  Service: Cardiology   • CARDIAC CATHETERIZATION N/A 1/22/2019    Procedure: Right Heart Cath;  Surgeon: Rick Talavera MD;  [facility] CATH [facility]AS[facility];  Service: Cardiology   • CARDIAC CATHETERIZATION N/A 1/22/2019    Procedure: Left ventriculography;  Surgeon: Rick Talavera MD;  Location: [facility] CATH INVASIVE LOCATION;  Service: Cardiology   • CARDIAC SURGERY     • COLONOSCOPY     • HERNIA REPAIR     • INSERTION HEMODIALYSIS CATHETER N/A  2/8/2019    Procedure: tunnel CATHETER PLACEMENT WITH FLUROSCOPY;  Surgeon: Satish Stahl MD;  Location: [facility] MAIN OR;  Service: Vascular   • REPLACEMENT TOTAL KNEE Bilateral 2007/2009       FAMILY HISTORY  Family History   Problem Relation Age of Onset   • Hypertension Other    • Diabetes Other    • Heart disease Neg Hx    • Stroke Neg Hx    • Arthritis Neg Hx    • Breast cancer Neg Hx    • Malig Hyperthermia Neg Hx        SOCIAL HISTORY  Social History     Socioeconomic History   • Marital status:      Spouse name: Not on file   • Number of children: 2   • Years of education: 12   • Highest education level: High school graduate   Occupational History   • Occupation: retired   Social Needs   • Financial resource strain: Patient refused   • Food insecurity     Worry: Patient refused     Inability: Patient refused   • Transportation needs     Medical: Patient refused     Non-medical: Patient refused   Tobacco Use   • Smoking status: Never Smoker   • Smokeless tobacco: Never Used   Substance and Sexual Activity   • Alcohol use: Yes     Comment: Occ   • Drug use: No   • Sexual activity: Defer     Birth control/protection: Post-menopausal       ALLERGIES  Patient has no known allergies.    REVIEW OF SYSTEMS  Review of Systems   Constitutional: Negative for chills and fever.   HENT: Negative for rhinorrhea and sore throat.    Eyes: Negative for visual disturbance.   Respiratory: Negative for cough and shortness of breath.    Cardiovascular: Negative for chest pain, palpitations and leg swelling.   Gastrointestinal: Positive for blood in stool and nausea. Negative for abdominal pain, diarrhea and vomiting.   Endocrine: Negative.    Genitourinary: Negative for decreased urine volume, dysuria and frequency.   Musculoskeletal: Negative for neck pain.   Skin: Negative for rash.   Neurological: Negative for syncope and headaches.   Psychiatric/Behavioral: Negative.    All other systems reviewed and are  negative.      PHYSICAL EXAM  ED Triage Vitals [01/11/21 0910]   Temp Heart Rate Resp BP SpO2   97 °F (36.1 °C) 93 18 -- 94 %      Temp src Heart Rate Source Patient Position BP Location FiO2 (%)   -- -- -- -- --       Physical Exam   Constitutional: She is oriented to person, place, and time.  Non-toxic appearance. She appears distressed (mild).   HENT:   Head: Normocephalic and atraumatic.   Eyes: EOM are normal.   Neck: Normal range of motion. Neck supple.   Cardiovascular: Normal rate, regular rhythm, normal heart sounds and intact distal pulses.   No murmur heard.  Pulses:       Posterior tibial pulses are 2+ on the right side and 2+ on the left side.   Pulmonary/Chest: Effort normal and breath sounds normal. No respiratory distress.   Abdominal: Soft. Bowel sounds are normal. There is no abdominal tenderness. There is no rebound and no guarding.   Genitourinary:    Genitourinary Comments: Dark blood clots noted on the perineum that are heme positive, patient does have some external hemorrhoids that are soft, nonthrombosed no active bleeding from hemorrhoids noted on exam, digital rectal exam not done secondary to able to obtain stool specimen for Hemoccult without digital exam.     Musculoskeletal: Normal range of motion.         General: No edema.   Neurological: She is alert and oriented to person, place, and time.   Skin: Skin is warm and dry.   Left upper extremity AV fistula, palpable thrill strong   Psychiatric: Affect normal.   Nursing note and vitals reviewed.      LAB RESULTS  Lab Results (last 24 hours)     Procedure Component Value Units Date/Time    CBC & Differential [[id number]]  (Abnormal) Collected: 01/11/21 0946    Specimen: Blood Updated: 01/11/21 1023    Narrative:      The following orders were created for panel order CBC & Differential.  Procedure                               Abnormality         Status                     ---------                               -----------         ------                      CBC Auto Differential[[id number]]        Abnormal            Final result                 Please view results for these tests on the individual orders.    Comprehensive Metabolic Panel [[id number]]  (Abnormal) Collected: 01/11/21 0946    Specimen: Blood Updated: 01/11/21 1026     Glucose 150 mg/dL      BUN 74 mg/dL      Creatinine 3.35 mg/dL      Sodium 138 mmol/L      Potassium 4.6 mmol/L      Comment: Slight hemolysis detected by analyzer. Results may be affected.        Chloride 101 mmol/L      CO2 18.4 mmol/L      Calcium 9.5 mg/dL      Total Protein 6.6 g/dL      Albumin 3.50 g/dL      ALT (SGPT) 7 U/L      AST (SGOT) 17 U/L      Comment: Slight hemolysis detected by analyzer. Results may be affected.        Alkaline Phosphatase 76 U/L      Total Bilirubin 0.3 mg/dL      eGFR Non African Amer 13 mL/min/1.73      Comment: <15 Indicative of kidney failure.        eGFR   Amer --     Comment: <15 Indicative of kidney failure.        Globulin 3.1 gm/dL      A/G Ratio 1.1 g/dL      BUN/Creatinine Ratio 22.1     Anion Gap 18.6 mmol/L     Narrative:      GFR Normal >60  Chronic Kidney Disease <60  Kidney Failure <15      Protime-INR [[id number]]  (Abnormal) Collected: 01/11/21 0946    Specimen: Blood Updated: 01/11/21 1053     Protime 41.5 Seconds      INR 4.37    CBC Auto Differential [[id number]]  (Abnormal) Collected: 01/11/21 0946    Specimen: Blood Updated: 01/11/21 1023     WBC 7.85 10*3/mm3      RBC 2.83 10*6/mm3      Hemoglobin 8.5 g/dL      Hematocrit 26.7 %      MCV 94.3 fL      MCH 30.0 pg      MCHC 31.8 g/dL      RDW 12.7 %      RDW-SD 43.4 fl      MPV 9.5 fL      Platelets 229 10*3/mm3      Neutrophil % 77.9 %      Lymphocyte % 9.6 %      Monocyte % 9.6 %      Eosinophil % 1.4 %      Basophil % 0.6 %      Immature Grans % 0.9 %      Neutrophils, Absolute 6.12 10*3/mm3      Lymphocytes, Absolute 0.75 10*3/mm3      Monocytes, Absolute 0.75 10*3/mm3      Eosinophils, Absolute 0.11  10*3/mm3      Basophils, Absolute 0.05 10*3/mm3      Immature Grans, Absolute 0.07 10*3/mm3      nRBC 0.0 /100 WBC     COVID PRE-OP / PRE-PROCEDURE SCREENING ORDER (NO ISOLATION) - Swab, Nasopharynx [[id number]] Collected: 01/11/21 1005    Specimen: Swab from Nasopharynx Updated: 01/11/21 1120    Narrative:      The following orders were created for panel order COVID PRE-OP / PRE-PROCEDURE SCREENING ORDER (NO ISOLATION) - Swab, Nasopharynx.  Procedure                               Abnormality         Status                     ---------                               -----------         ------                     COVID-19,APTIMA PANTHER,...[[id number]]                      In process                   Please view results for these tests on the individual orders.    COVID-19,APTIMA PANTHER,[facility], NP/OP SWAB IN UTM/VTM/[facility] TRANSPORT MEDIA,24 HR TAT - Swab, Nasopharynx [[id number]] Collected: 01/11/21 1005    Specimen: Swab from Nasopharynx Updated: 01/11/21 1120    Lactic Acid, Plasma [[id number]]  (Abnormal) Collected: 01/11/21 1011    Specimen: Blood from Arm, Right Updated: 01/11/21 1042     Lactate 2.4 mmol/L     Lactic Acid, Reflex Timer (This will reflex a repeat order 3-3:15 hours after ordered.) [[id number]] Collected: 01/11/21 1011    Specimen: Blood from Arm, Right Updated: 01/11/21 1345     Hold Tube Hold for add-ons.     Comment: Auto resulted.       POC Occult Blood Stool [[id number]]  (Abnormal) Collected: 01/11/21 1151    Specimen: Stool from Per Rectum Updated: 01/11/21 1152     Fecal Occult Blood Positive     Lot Number [id number]     Expiration Date 06/30/2021     Positive Control Positive     Negative Control Negative    Lactic Acid, Reflex [[id number]] Collected: 01/11/21 1436    Specimen: Blood Updated: 01/11/21 1456          I ordered the above labs and reviewed the results          PROCEDURES  Procedures      PROGRESS AND CONSULTS  ED Course as of Jan 11 1459   Mon Jan 11, 2021   1052 INR 4.3 per RN     [TO]   1136 This with Misty with A who is aware of patient's presentation, labs, history of GI bleed over the past 3 days, stable blood pressures with dark red clots in the ER today, with INR of 4.3 and she agrees to admit to Dr. Garret Perez for further evaluation and treatment.  Per our discussion I will hold on anticoagulation reversal pending further evaluation.    [TO]   1138 Discussed with Dr. Christopher Jin, on-call for Diana Osuna who is aware of patient's presentation, history of dialysis due today without signs of hypoxia or hyperkalemia.  He is aware patient is getting admitted for GI bleed and will see in consult as needed    [TO]      ED Course User Index  [TO] Silvia Fuller MD     EKG          EKG time: 09 24  Rhythm/Rate: Sinus rhythm, rate in the 90s  P waves and VA: Normal P waves, normal HEIDI's  QRS, axis: Right bundle branch block  ST and T waves: Nonspecific ST/T wave findings    Interpreted Contemporaneously by me, independently viewed  Minimally changed compared to prior 2/18/2019, now with mild increased rate        MEDICAL DECISION MAKING  Results were reviewed/discussed with the patient and they were also made aware of online access. Pt also made aware that some labs, such as cultures, will not be resulted during ER visit and follow up with PMD is necessary.       MDM       DIAGNOSIS  Final diagnoses:   Gastrointestinal hemorrhage, unspecified gastrointestinal hemorrhage type       DISPOSITION  ADMISSION    Discussed treatment plan and reason for admission with pt/family and admitting physician.  Pt/family voiced understanding of the plan for admission for further testing/treatment as needed.         Latest Documented Vital Signs:  As of 14:59 EST  BP- 130/77 HR- 89 Temp- 97 °F (36.1 °C) O2 sat- 95%    --  Patient was wearing facemask when I entered the room and throughout our encounter. Full protective equipment was worn throughout this patient encounter including a face mask, eye  protection and gloves. Hand hygiene was performed before donning protective equipment and after removal when leaving the room.      Silvia Fuller MD  01/11/21 5929

## 2021-01-11 NOTE — H&P
Patient Name:  Claudia Arnold  YOB: 1945  MRN:  [id number]  Admit Date:  1/11/2021  Patient Care Team:  Robert Cortez MD as PCP - General (Family Medicine)  Deborah Agudelo MD as Surgeon (Orthopedic Surgery)  Scott Segura MD as Consulting Physician (Cardiology)  Scar Gaxiola MD as Surgeon (Cardiothoracic Surgery)  Kathy Osuna MD as Consulting Physician (Nephrology)  Dora Astorga APRN as Nurse Practitioner (Neurosurgery)  Satish Stahl MD as Consulting Physician (Vascular Surgery)  Scar Gaxiola MD as Surgeon (Cardiothoracic Surgery)      Subjective   History Present Illness     Chief Complaint   Patient presents with   • Rectal Bleeding     History of Present Illness   Ms. Arnold is a 75 y.o. non-smoker with a history of ESRD on HD, CAD, pulmonary HTN, prior MVR/AVR (bioprosthetic), PAF on chronic anticoagulation, HTN, HLD and chronic diastolic HF that presents to [facility] complaining of rectal bleeding. The patient states she was in her normal state of health until Saturday. She states she often has issues with her bowels feeling urgency and then have issues with constipation. She went to use the restroom on Saturday and had a BM. However, when she looked in the toilet she noticed blood. The blood was bright red with associated clots. She didn't think too much of this as she was thinking it could be hemorrhoidal. She states she put cream on her bottom for this, but continued to have bloody stools on Sunday.   She states she had around 3 stools on Saturday and 2 stools on Sunday. All with bright red blood and clots. She states these were painless and she did not have any abdominal pain or cramping. She denies any fever or chills. She has not been nauseated or vomiting. She denies any near syncope or associated dizziness or lightheadedness. She did have some mild shaking this morning, but thinks this was secondary to her bilateral  arthritic shoulder pain. She denies any chest pain, dyspnea, cough, fever or chills.    She has never had anything like this before and does not use NSAIDs. She goes to HD every Monday, Wednesday and Friday. She denies any prior gastric ulcers, diverticulosis/litis or any other GI abnormalities. She is unsure if she has ever had an EGD, but has had a LEE. She thinks her last colonoscopy was several years ago and negative for any acute findings. She is unsure of the doctor that did this. She is chronically on warfarin therapy for Afib and denies any recent missed or increased doses of her blood thinner.    In the ED, lab work showed a creatinine of 3.35, stable electrolytes, WBC 7.85, hemoglobin 8.5 and INR 4.37. Lactic was 2.4 and COVID19 pending. She has been hemodynamically stable and has been admitted for further care at this time. She was given a one time injection of Protonix in the ED.     Review of Systems   Constitutional: Negative for activity change, appetite change, chills, fatigue and fever.   HENT: Negative for congestion and ear pain.    Eyes: Negative for pain and discharge.   Respiratory: Negative for cough, choking, chest tightness and shortness of breath.    Cardiovascular: Negative for chest pain and leg swelling.   Gastrointestinal: Positive for blood in stool and diarrhea. Negative for abdominal distention, abdominal pain, nausea and vomiting.   Endocrine: Negative for cold intolerance and heat intolerance.   Genitourinary: Negative for difficulty urinating and dysuria.   Musculoskeletal: Positive for gait problem (baseline). Negative for arthralgias and back pain.   Skin: Negative for color change and pallor.   Neurological: Negative for dizziness, syncope and light-headedness.   Psychiatric/Behavioral: Negative for confusion. The patient is not nervous/anxious and is not hyperactive.       Personal History     Past Medical History:   Diagnosis Date   • A-fib (CMS/[facility])     Meds   • Arthritis      w/Difficult Mobility   • Bilateral lower extremity edema     Legs   • CAD (coronary artery disease)     Affecting LAD    • Cardiomyopathy (CMS/HCC)    • CHF (congestive heart failure) (CMS/HCC)    • Chronic fatigue    • [place] patient [place] [place]     TUESDAY THURSDAY SATURDAY   • Generalized anxiety disorder    • GERD (gastroesophageal reflux disease)    • Hernia, inguinal     Hx Repair   • History of echocardiogram 1/17/19-[facility]    The L Ventricular Cavity is Mild-to-Moderately Dilated; Left Ventricular Wall Thickness is Consistent w/Mild Concentric Hypertrophy; Left Atrial Cavity Size is Moderate-to-Severely Dilated; Severe AVS; Severe MVS; Moderate TVR Noted   • History of transfusion    • Hyperlipidemia     Controlled w/Meds   • Hypertension     Controlled w/Meds   • Hypokinesis 01/22/2019    Apical Noted on Cardiac Cath   • IFG (impaired fasting glucose)    • LAD stenosis 01/22/2019    Mid LAD Irregularities Noted on Cardiac Cath    • Left atrial dilatation 01/17/2019    Moderate-Severe Noted on Echo   • Left ventricular dilatation 01/17/2019    Noted on Echo/LEE   • Mild concentric left ventricular hypertrophy 01/17/2019    Noted on Echo/LEE   • Mitral annular calcification 01/17/2019    Severe Noted on Echo   • Moderate tricuspid valve regurgitation 01/24/2019    Noted on LEE   • Morbid obesity (CMS/HCC)    • NSTEMI (non-ST elevated myocardial infarction) (CMS/HCC)    • OCTAVIANO (obstructive sleep apnea)    • Osteoarthritis    • Pulmonary hypertension (CMS/HCC) 01/22/2019    Noted on Cardiac Cath   • Right bundle branch block 01/24/2019    Noted on LEE   • Severe aortic stenosis 01/22/2019    Noted on Cardiac Cath & LEE on 01/24/19; S/p AVR on 01/28/19 by Dr. Gaxiola   • Severe mitral valve stenosis 01/24/2019    Noted on LEE; S/p MVR on 01/28/19 by Dr. Gaxiola   • Shoulder pain, bilateral    • Skin yeast infection     Folds Under Skin--Nystatin/Diflucan     Past Surgical History:   Procedure Laterality Date   •  AORTIC VALVE REPAIR/REPLACEMENT MITRAL VALVE REPAIR/REPLACEMENT N/A 1/28/2019    Procedure: LEE STERNOTOMY AORTIC VALVE REPLACEMENT, MITRAL VALVE REPLACEMENT, TRICUSPID VALVE REPAIR, MAZE PROCEDURE, CLOSURE OF LEFT ATRIAL APPENDAGE  AND PRP;  Surgeon: Scar Gaxiola MD;  Location: [facility] MAIN OR;  Service: Cardiothoracic   • ARTERIOVENOUS FISTULA/SHUNT SURGERY Left 6/26/2019    Procedure: LEFT ARM BRACHIAL CEPHALIC FISTULA;  Surgeon: Satish Stahl MD;  Location: [facility];  Service: Vascular   • CARDIAC CATHETERIZATION N/A 1/22/2019    Procedure: Coronary angiography;  Surgeon: Rick Talavera MD;  Location:  [facility] CATH INVASIVE LOCATION;  Service: Cardiology   • CARDIAC CATHETERIZATION N/A 1/22/2019    Procedure: Left Heart Cath;  Surgeon: Rick Talavera MD;  Location: [facility] CATH [facility]ASIVE LOC[facility];  Service: Cardiology   • CARDIAC CATHETERIZATION N/A 1/22/2019    Procedure: Right Heart Cath;  Surgeon: Rick Talavera MD;  Location: [facility] CATH [facility]ASIVE LOC[facility];  Service: Cardiology   • CARDIAC CATHETERIZATION N/A 1/22/2019    Procedure: Left ventriculography;  Surgeon: Rick Talavera MD;  Location: [facility] CATH [facility]VASIVE LOC[facility];  Service: Cardiology   • CARDIAC SURGERY     • COLONOSCOPY     • HERNIA REPAIR     • INSERTION HEMODIALYSIS CATHETER N/A 2/8/2019    Procedure: tunnel CATHETER PLACEMENT WITH FLUROSCOPY;  Surgeon: Satish Stahl MD;  Location: [facility] MAIN OR;  Service: Vascular   • REPLACEMENT TOTAL KNEE Bilateral 2007/2009     Family History   Problem Relation Age of Onset   • Hypertension Other    • Diabetes Other    • Heart disease Neg Hx    • Stroke Neg Hx    • Arthritis Neg Hx    • Breast cancer Neg Hx    • Malig Hyperthermia Neg Hx      Social History     Tobacco Use   • Smoking status: Never Smoker   • Smokeless tobacco: Never Used   Substance Use Topics   • Alcohol use: Yes     Comment: Occ   • Drug use: No     Medications Prior to Admission    Medication Sig Dispense Refill Last Dose   • acetaminophen (TYLENOL) 325 MG tablet Take 650 mg by mouth 3 (Three) Times a Day. Takes tid at 0600, 1400, 2200   1/11/2021 at Unknown time   • bumetanide (BUMEX) 2 MG tablet TAKE 2 TABLETS BY MOUTH DAILY 60 tablet 5 1/11/2021 at Unknown time   • busPIRone (BUSPAR) 10 MG tablet Take 1 tablet by mouth 3 (Three) Times a Day. 270 tablet 3 1/11/2021 at Unknown time   • hydrocortisone 2.5 % cream Apply  topically to the appropriate area as directed See Admin Instructions. Apply topically to the affected area twice daily as directed 60 g 3 1/11/2021 at Unknown time   • warfarin (COUMADIN) 2 MG tablet Take one tablet (2mg) by mouth on Mon, Wed, and Fri and take one and one-half tablets (3mg) by mouth all other days or as directed 120 tablet 1 1/10/2021 at Unknown time   • aspirin 81 MG tablet Take  by mouth.   More than a month at Unknown time   • escitalopram (LEXAPRO) 10 MG tablet Take 1 tablet by mouth Daily. 90 tablet 3    • HYDROcodone-acetaminophen (NORCO) 5-325 MG per tablet Take 2 tablets by mouth Every 6 (Six) Hours As Needed for Moderate Pain . 10 tablet 0 More than a month at Unknown time   • ondansetron (ZOFRAN) 4 MG tablet Take 1 tablet by mouth Every 8 (Eight) Hours As Needed for Nausea or Vomiting. 90 tablet 3 More than a month at Unknown time   • rOPINIRole (REQUIP) 0.25 MG tablet       • traMADol (ULTRAM) 50 MG tablet Take 50 mg by mouth Every 6 (Six) Hours As Needed for Moderate Pain .        Allergies:  No Known Allergies    Objective    Objective     Vital Signs  Temp:  [97 °F (36.1 °C)] 97 °F (36.1 °C)  Heart Rate:  [89-93] 89  Resp:  [18] 18  BP: (113-130)/(68-77) 130/77  SpO2:  [94 %-97 %] 95 %  on   ;      Body mass index is 36.49 kg/m².    Physical Exam  Vitals signs and nursing note reviewed.   Constitutional:       General: She is not in acute distress.     Appearance: She is obese. She is not toxic-appearing.   HENT:      Head: Normocephalic and  atraumatic.      Right Ear: External ear normal.      Left Ear: External ear normal.      Nose: Nose normal.   Eyes:      General:         Right eye: No discharge.         Left eye: No discharge.      Conjunctiva/sclera: Conjunctivae normal.   Neck:      Musculoskeletal: Normal range of motion and neck supple.   Cardiovascular:      Rate and Rhythm: Normal rate and regular rhythm.      Pulses: Normal pulses.      Heart sounds: Normal heart sounds.      Comments: Episodes of irregularity on bedside monitor.   Pulmonary:      Effort: Pulmonary effort is normal. No respiratory distress.      Comments: Clear, but diminished throughout  Abdominal:      General: Bowel sounds are normal. There is no distension.      Palpations: Abdomen is soft.      Tenderness: There is no abdominal tenderness.   Musculoskeletal: Normal range of motion.         General: Swelling (trace BLE) present.   Skin:     General: Skin is warm and dry.      Findings: No bruising.   Neurological:      Mental Status: She is alert and oriented to person, place, and time.      Sensory: No sensory deficit.      Motor: Weakness (baseline) present.      Coordination: Coordination normal.   Psychiatric:         Mood and Affect: Mood normal.         Behavior: Behavior normal.        Results Review:  I reviewed the patient's new clinical results.  I reviewed the patient's new imaging results and agree with the interpretation.  I reviewed the patient's other test results and agree with the interpretation  I personally viewed and interpreted the patient's EKG/Telemetry data  Discussed with ED provider.    Lab Results (last 24 hours)     Procedure Component Value Units Date/Time    CBC & Differential [[id number]]  (Abnormal) Collected: 01/11/21 0946    Specimen: Blood Updated: 01/11/21 1023    Narrative:      The following orders were created for panel order CBC & Differential.  Procedure                               Abnormality         Status                      ---------                               -----------         ------                     CBC Auto Differential[[id number]]        Abnormal            Final result                 Please view results for these tests on the individual orders.    Comprehensive Metabolic Panel [[id number]]  (Abnormal) Collected: 01/11/21 0946    Specimen: Blood Updated: 01/11/21 1026     Glucose 150 mg/dL      BUN 74 mg/dL      Creatinine 3.35 mg/dL      Sodium 138 mmol/L      Potassium 4.6 mmol/L      Comment: Slight hemolysis detected by analyzer. Results may be affected.        Chloride 101 mmol/L      CO2 18.4 mmol/L      Calcium 9.5 mg/dL      Total Protein 6.6 g/dL      Albumin 3.50 g/dL      ALT (SGPT) 7 U/L      AST (SGOT) 17 U/L      Comment: Slight hemolysis detected by analyzer. Results may be affected.        Alkaline Phosphatase 76 U/L      Total Bilirubin 0.3 mg/dL      eGFR Non African Amer 13 mL/min/1.73      Comment: <15 Indicative of kidney failure.        eGFR   Amer --     Comment: <15 Indicative of kidney failure.        Globulin 3.1 gm/dL      A/G Ratio 1.1 g/dL      BUN/Creatinine Ratio 22.1     Anion Gap 18.6 mmol/L     Narrative:      GFR Normal >60  Chronic Kidney Disease <60  Kidney Failure <15      Protime-INR [[id number]]  (Abnormal) Collected: 01/11/21 0946    Specimen: Blood Updated: 01/11/21 1053     Protime 41.5 Seconds      INR 4.37    CBC Auto Differential [[id number]]  (Abnormal) Collected: 01/11/21 0946    Specimen: Blood Updated: 01/11/21 1023     WBC 7.85 10*3/mm3      RBC 2.83 10*6/mm3      Hemoglobin 8.5 g/dL      Hematocrit 26.7 %      MCV 94.3 fL      MCH 30.0 pg      MCHC 31.8 g/dL      RDW 12.7 %      RDW-SD 43.4 fl      MPV 9.5 fL      Platelets 229 10*3/mm3      Neutrophil % 77.9 %      Lymphocyte % 9.6 %      Monocyte % 9.6 %      Eosinophil % 1.4 %      Basophil % 0.6 %      Immature Grans % 0.9 %      Neutrophils, Absolute 6.12 10*3/mm3      Lymphocytes, Absolute 0.75 10*3/mm3       Monocytes, Absolute 0.75 10*3/mm3      Eosinophils, Absolute 0.11 10*3/mm3      Basophils, Absolute 0.05 10*3/mm3      Immature Grans, Absolute 0.07 10*3/mm3      nRBC 0.0 /100 WBC     COVID PRE-OP / PRE-PROCEDURE SCREENING ORDER (NO ISOLATION) - Swab, Nasopharynx [[id number]] Collected: 01/11/21 1005    Specimen: Swab from Nasopharynx Updated: 01/11/21 1120    Narrative:      The following orders were created for panel order COVID PRE-OP / PRE-PROCEDURE SCREENING ORDER (NO ISOLATION) - Swab, Nasopharynx.  Procedure                               Abnormality         Status                     ---------                               -----------         ------                     COVID-19,APTIMA PANTHER,...[[id number]]                      In process                   Please view results for these tests on the individual orders.    COVID-19,APTIMA PANTHER,[facility]-HOUSE, NP/OP SWAB IN UTM/VTM/[facility]ALINE TRANSPORT MEDIA,24 HR TAT - Swab, Nasopharynx [[id number]] Collected: 01/11/21 1005    Specimen: Swab from Nasopharynx Updated: 01/11/21 1120    Lactic Acid, Plasma [[id number]]  (Abnormal) Collected: 01/11/21 1011    Specimen: Blood from Arm, Right Updated: 01/11/21 1042     Lactate 2.4 mmol/L     Lactic Acid, Reflex Timer (This will reflex a repeat order 3-3:15 hours after ordered.) [[id number]] Collected: 01/11/21 1011    Specimen: Blood from Arm, Right Updated: 01/11/21 1345     Hold Tube Hold for add-ons.     Comment: Auto resulted.       POC Occult Blood Stool [[id number]]  (Abnormal) Collected: 01/11/21 1151    Specimen: Stool from Per Rectum Updated: 01/11/21 1152     Fecal Occult Blood Positive     Lot Number [id number]     Expiration Date 06/30/2021     Positive Control Positive     Negative Control Negative          Imaging Results (Last 24 Hours)     ** No results found for the last 24 hours. **          Results for orders placed during the hospital encounter of 05/13/19   Adult Transthoracic Echo Complete W/ Cont if Necessary  Per Protocol    Narrative · Calculated EF = 65%.  · Left ventricular systolic function is normal.  · Left ventricular wall thickness is consistent with moderate concentric   hypertrophy.  · Left atrial cavity size is moderately dilated.  · Mild dilation of the aortic root is present.  · There is a prosthetic aortic valve.  · The prosthetic valve is grossly normal.  · There is a [employer] Mike bioprosthetic mitral valve present. The prosthetic   valve is grossly normal.          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 (CMS/[facility]) [D68.318] Yes   • Chronic pain of both shoulders [M25.511, G89.29, M25.512] Yes   • CAD (coronary artery disease) [I25.10] Yes     Affecting LAD      • S/P AVR (aortic valve replacement) [Z95.2] Not Applicable   • Chronic diastolic CHF (congestive heart failure) (CMS/[facility]) [I50.32] Yes   • Dependence on renal dialysis (CMS/[facility]) [Z99.2] Not Applicable   • Paroxysmal atrial fibrillation (CMS/[facility]) [I48.0] Yes   • Stage 5 chronic kidney disease on chronic dialysis (CMS/[facility]) [N18.6, Z99.2] Not Applicable   • Gastroesophageal reflux disease without esophagitis [K21.9] Yes   • Pulmonary hypertension (CMS/[facility]) [I27.20] Yes   • S/P MVR (mitral valve replacement) [Z95.2] Not Applicable   • Hyperlipidemia [E78.5] Yes   • Hypertension [I10] Yes     GI bleed  -Continue PPI, but add protonix gtt.  -Monitor stools. Serial H/H.  -1 unit PRBC has been prepared and plans to transfuse with HD per Renal.  -Gastroenterology consulted. Hold  warfarin.   -CLD for now.     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  -INR supratherapeutic. Will allow to fall naturally as she is currently hemodynamically stable. Hold warfarin and follow PT/INR.  -Follow hemoglobins.    CAD/S/P AVR/MVR/chronic diastolic heart failure  -Monitor for cardiac complaints. Hold ASA and warfarin for now with acute bleeding.  -Cardiologist is Dr. Segura. Will consult if issues arise.  -Daily weight. Strict intake and output. Hold Bumex for now with bleeding.  HD for fluid removal per Renal.    ESRD on HD  -Nephrology consulted. M, W, F schedule.  -Monitor labs.   -Blood with HD today as above.    HTN  -BP stable. Continue to monitor. Hold Bumex with blood loss.    Chronic pain of both shoulders  -Denies use of opioids as listed on med rec.  -Will continue Tylenol as needed for pain.     · I discussed the patients findings and my recommendations with patient and Dr. Perez.    VTE Prophylaxis - SCDs.  Code Status - Full code. Confirmed with patient.       MORGAN Bella  [facility]  01/11/21  14:18 EST

## 2021-01-12 LAB
ALBUMIN SERPL-MCNC: 3.5 G/DL (ref 3.5–5.2)
ANION GAP SERPL CALCULATED.3IONS-SCNC: 11.6 MMOL/L (ref 5–15)
ANION GAP SERPL CALCULATED.3IONS-SCNC: 12.2 MMOL/L (ref 5–15)
BASOPHILS # BLD AUTO: 0.02 10*3/MM3 (ref 0–0.2)
BASOPHILS NFR BLD AUTO: 0.3 % (ref 0–1.5)
BH BB BLOOD EXPIRATION DATE: NORMAL
BH BB BLOOD TYPE BARCODE: 7300
BH BB DISPENSE STATUS: NORMAL
BH BB PRODUCT CODE: NORMAL
BH BB UNIT NUMBER: NORMAL
BUN SERPL-MCNC: 26 MG/DL (ref 8–23)
BUN SERPL-MCNC: 38 MG/DL (ref 8–23)
BUN/CREAT SERPL: 16.8 (ref 7–25)
BUN/CREAT SERPL: 20.3 (ref 7–25)
CALCIUM SPEC-SCNC: 9.1 MG/DL (ref 8.6–10.5)
CALCIUM SPEC-SCNC: 9.1 MG/DL (ref 8.6–10.5)
CHLORIDE SERPL-SCNC: 96 MMOL/L (ref 98–107)
CHLORIDE SERPL-SCNC: 96 MMOL/L (ref 98–107)
CO2 SERPL-SCNC: 27.8 MMOL/L (ref 22–29)
CO2 SERPL-SCNC: 28.4 MMOL/L (ref 22–29)
CREAT SERPL-MCNC: 1.55 MG/DL (ref 0.57–1)
CREAT SERPL-MCNC: 1.87 MG/DL (ref 0.57–1)
CROSSMATCH INTERPRETATION: NORMAL
DEPRECATED RDW RBC AUTO: 43.8 FL (ref 37–54)
EOSINOPHIL # BLD AUTO: 0.05 10*3/MM3 (ref 0–0.4)
EOSINOPHIL NFR BLD AUTO: 0.6 % (ref 0.3–6.2)
ERYTHROCYTE [DISTWIDTH] IN BLOOD BY AUTOMATED COUNT: 13.1 % (ref 12.3–15.4)
GFR SERPL CREATININE-BSD FRML MDRD: 26 ML/MIN/1.73
GFR SERPL CREATININE-BSD FRML MDRD: 33 ML/MIN/1.73
GLUCOSE SERPL-MCNC: 106 MG/DL (ref 65–99)
GLUCOSE SERPL-MCNC: 121 MG/DL (ref 65–99)
HCT VFR BLD AUTO: 25 % (ref 34–46.6)
HCT VFR BLD AUTO: 25.4 % (ref 34–46.6)
HCT VFR BLD AUTO: 26.1 % (ref 34–46.6)
HCT VFR BLD AUTO: 27.2 % (ref 34–46.6)
HCT VFR BLD AUTO: 27.2 % (ref 34–46.6)
HGB BLD-MCNC: 8.2 G/DL (ref 12–15.9)
HGB BLD-MCNC: 8.4 G/DL (ref 12–15.9)
HGB BLD-MCNC: 8.9 G/DL (ref 12–15.9)
HGB BLD-MCNC: 9 G/DL (ref 12–15.9)
HGB BLD-MCNC: 9 G/DL (ref 12–15.9)
IMM GRANULOCYTES # BLD AUTO: 0.04 10*3/MM3 (ref 0–0.05)
IMM GRANULOCYTES NFR BLD AUTO: 0.5 % (ref 0–0.5)
INR PPP: 4.82 (ref 0.9–1.1)
LYMPHOCYTES # BLD AUTO: 0.7 10*3/MM3 (ref 0.7–3.1)
LYMPHOCYTES NFR BLD AUTO: 8.8 % (ref 19.6–45.3)
MCH RBC QN AUTO: 30.6 PG (ref 26.6–33)
MCHC RBC AUTO-ENTMCNC: 33.1 G/DL (ref 31.5–35.7)
MCV RBC AUTO: 92.5 FL (ref 79–97)
MONOCYTES # BLD AUTO: 0.83 10*3/MM3 (ref 0.1–0.9)
MONOCYTES NFR BLD AUTO: 10.4 % (ref 5–12)
NEUTROPHILS NFR BLD AUTO: 6.32 10*3/MM3 (ref 1.7–7)
NEUTROPHILS NFR BLD AUTO: 79.4 % (ref 42.7–76)
NRBC BLD AUTO-RTO: 0 /100 WBC (ref 0–0.2)
PHOSPHATE SERPL-MCNC: 2.5 MG/DL (ref 2.5–4.5)
PLATELET # BLD AUTO: 206 10*3/MM3 (ref 140–450)
PMV BLD AUTO: 9.3 FL (ref 6–12)
POTASSIUM SERPL-SCNC: 3.3 MMOL/L (ref 3.5–5.2)
POTASSIUM SERPL-SCNC: 3.9 MMOL/L (ref 3.5–5.2)
PROTHROMBIN TIME: 44.8 SECONDS (ref 11.7–14.2)
RBC # BLD AUTO: 2.94 10*6/MM3 (ref 3.77–5.28)
SODIUM SERPL-SCNC: 136 MMOL/L (ref 136–145)
SODIUM SERPL-SCNC: 136 MMOL/L (ref 136–145)
UNIT  ABO: NORMAL
UNIT  RH: NORMAL
WBC # BLD AUTO: 7.96 10*3/MM3 (ref 3.4–10.8)

## 2021-01-12 PROCEDURE — 85610 PROTHROMBIN TIME: CPT | Performed by: NURSE PRACTITIONER

## 2021-01-12 PROCEDURE — 85014 HEMATOCRIT: CPT | Performed by: INTERNAL MEDICINE

## 2021-01-12 PROCEDURE — 80069 RENAL FUNCTION PANEL: CPT | Performed by: INTERNAL MEDICINE

## 2021-01-12 PROCEDURE — 99221 1ST HOSP IP/OBS SF/LOW 40: CPT | Performed by: INTERNAL MEDICINE

## 2021-01-12 PROCEDURE — 36415 COLL VENOUS BLD VENIPUNCTURE: CPT | Performed by: INTERNAL MEDICINE

## 2021-01-12 PROCEDURE — 99232 SBSQ HOSP IP/OBS MODERATE 35: CPT | Performed by: INTERNAL MEDICINE

## 2021-01-12 PROCEDURE — 85018 HEMOGLOBIN: CPT | Performed by: INTERNAL MEDICINE

## 2021-01-12 PROCEDURE — 80048 BASIC METABOLIC PNL TOTAL CA: CPT | Performed by: NURSE PRACTITIONER

## 2021-01-12 PROCEDURE — 85025 COMPLETE CBC W/AUTO DIFF WBC: CPT | Performed by: INTERNAL MEDICINE

## 2021-01-12 RX ORDER — TRAMADOL HYDROCHLORIDE 50 MG/1
25 TABLET ORAL EVERY 8 HOURS PRN
Status: DISCONTINUED | OUTPATIENT
Start: 2021-01-12 | End: 2021-01-18 | Stop reason: HOSPADM

## 2021-01-12 RX ORDER — PHYTONADIONE 5 MG/1
5 TABLET ORAL ONCE
Status: COMPLETED | OUTPATIENT
Start: 2021-01-12 | End: 2021-01-12

## 2021-01-12 RX ADMIN — ACETAMINOPHEN 650 MG: 325 TABLET, FILM COATED ORAL at 02:41

## 2021-01-12 RX ADMIN — SODIUM CHLORIDE, PRESERVATIVE FREE 10 ML: 5 INJECTION INTRAVENOUS at 08:28

## 2021-01-12 RX ADMIN — PANTOPRAZOLE SODIUM 8 MG/HR: 40 INJECTION, POWDER, FOR SOLUTION INTRAVENOUS at 03:23

## 2021-01-12 RX ADMIN — TRAMADOL HYDROCHLORIDE 25 MG: 50 TABLET ORAL at 13:53

## 2021-01-12 RX ADMIN — PANTOPRAZOLE SODIUM 8 MG/HR: 40 INJECTION, POWDER, FOR SOLUTION INTRAVENOUS at 09:31

## 2021-01-12 RX ADMIN — PANTOPRAZOLE SODIUM 8 MG/HR: 40 INJECTION, POWDER, FOR SOLUTION INTRAVENOUS at 19:48

## 2021-01-12 RX ADMIN — ACETAMINOPHEN 650 MG: 325 TABLET, FILM COATED ORAL at 08:26

## 2021-01-12 RX ADMIN — ROPINIROLE HYDROCHLORIDE 0.25 MG: 0.5 TABLET, FILM COATED ORAL at 20:07

## 2021-01-12 RX ADMIN — BUSPIRONE HYDROCHLORIDE 10 MG: 10 TABLET ORAL at 20:07

## 2021-01-12 RX ADMIN — PHYTONADIONE 5 MG: 5 TABLET ORAL at 17:32

## 2021-01-12 RX ADMIN — SODIUM CHLORIDE, PRESERVATIVE FREE 10 ML: 5 INJECTION INTRAVENOUS at 20:07

## 2021-01-12 RX ADMIN — ESCITALOPRAM 10 MG: 10 TABLET, FILM COATED ORAL at 08:26

## 2021-01-12 RX ADMIN — BUSPIRONE HYDROCHLORIDE 10 MG: 10 TABLET ORAL at 08:26

## 2021-01-12 RX ADMIN — TRAMADOL HYDROCHLORIDE 25 MG: 50 TABLET ORAL at 22:37

## 2021-01-12 RX ADMIN — PANTOPRAZOLE SODIUM 8 MG/HR: 40 INJECTION, POWDER, FOR SOLUTION INTRAVENOUS at 15:40

## 2021-01-12 NOTE — CONSULTS
Cardiology History & Physical / Consultation      Patient Name: Claudia Arnold  Age/Sex: 75 y.o. female  : 1945  MRN: [id number]    Date of Admission: 2021  Date of Encounter Visit: 21  Encounter Provider: Rui Paiz MD  Referring Provider: Eric Chapa*  Place of Service: [facility] CARDIOLOGY  Patient Care Team:  Robert Cortez MD as PCP - General (Family Medicine)  Deborah Agudelo MD as Surgeon (Orthopedic Surgery)  Scott Segura MD as Consulting Physician (Cardiology)  Scar Gaxiola MD as Surgeon (Cardiothoracic Surgery)  Kathy Osuna MD as Consulting Physician (Nephrology)  Dora Astorga APRN as Nurse Practitioner (Neurosurgery)  Satish Stahl MD as Consulting Physician (Vascular Surgery)  Scar Gaxiola MD as Surgeon (Cardiothoracic Surgery)          Subjective:     Chief Complaint: Rectal Bleeding    Reason for consultation: Recommendations for anticoagulants/reversal with GI Bleed    History of Present Illness:  Claudia Arnold is a 75 y.o. female who is followed in our practice by Dr. Segura and was last seen in telephone visit by his nurse practitioner Shiloh in 2020. She has a complex medical history including: hypertension, hyperlipidemia, ESRD on dialysis /Thu/Sat, obesity, OCTAVIANO, GERD, and arthritis. Cardiac history significant for: PAF anticoagulated on Coumadin, s/p successful cardioversion in 2019, aortic and mitral valve stenosis s/p tissue aortic valve replacement with 23 mm magna pericardial paracentesis, mitral valve replacement with 25 mm [employer] Mike porcine prosthesis, left cryo-Maze procedure and left AAA ligation on 19, cardiomyopathy and CHF with last Echo in May 2019 showed EF 65%, moderate LVH, left atrial size moderately dilated, mild dilation of aortic root, prosthetic aortic valve normal, bioprosthetic mitral valve normal.     She presented to the  [facility] ED with complaints of rectal bleeding for the past 3 days. She reports 6 episodes of passing clots and blood when sitting on toilet to have a bowel movement, even completely filling the toilet with blood with each episode. She reported some associated nausea. ED workup showed: EKG with sinus rhythm, rate in the 90's; hemepositive stool, hgb 8.5/HCT 26.7, INR 4.37. Repeat hgb 8.9, so she was given 2U PRBC. Her anticoagulants were stopped and she was admitted for further evaluation by GI and nephrology.     We are being consulted for management of her anticoagulants and the possibility of reversing those agents. Her repeat INR is 4.82 despite stopping both her coumadin and aspirin. She continues to have bright red alexis bleeding from the rectum. She is planned to have dialysis tomorrow.     Previous Cardiac Testing:   Echocardiogram 05/13/2019:  Interpretation Summary    · Calculated EF = 65%.  · Left ventricular systolic function is normal.  · Left ventricular wall thickness is consistent with moderate concentric hypertrophy.  · Left atrial cavity size is moderately dilated.  · Mild dilation of the aortic root is present.  · There is a prosthetic aortic valve.  · The prosthetic valve is grossly normal.  · There is a [employer] Mike bioprosthetic mitral valve present. The prosthetic valve is grossly normal.       Cardiac Catheterization 01/22/2019:  Conclusions:   1.  Mild luminal irregularities proximal to mid LAD.  Otherwise normal coronary angiography  2.  Elevated left and right-sided filling pressure  3.  Moderate aortic valve stenosis.  4.  Moderate pulmonary hypertension    Past Medical History:  Past Medical History:   Diagnosis Date   • A-fib (CMS/[facility])     Meds   • Arthritis     w/Difficult Mobility   • Bilateral lower extremity edema     Legs   • CAD (coronary artery disease)     Affecting LAD    • Cardiomyopathy (CMS/[facility])    • CHF (congestive heart failure) (CMS/HCC)    • Chronic fatigue    • Dialysis patient  (CMS/[facility])     TUESDAY THURSDAY SATURDAY   • Generalized anxiety disorder    • GERD (gastroesophageal reflux disease)    • Hernia, inguinal     Hx Repair   • History of echocardiogram 1/17/19-[facility]    The L Ventricular Cavity is Mild-to-Moderately Dilated; Left Ventricular Wall Thickness is Consistent w/Mild Concentric Hypertrophy; Left Atrial Cavity Size is Moderate-to-Severely Dilated; Severe AVS; Severe MVS; Moderate TVR Noted   • History of transfusion    • Hyperlipidemia     Controlled w/Meds   • Hypertension     Controlled w/Meds   • Hypokinesis 01/22/2019    Apical Noted on Cardiac Cath   • IFG (impaired fasting glucose)    • LAD stenosis 01/22/2019    Mid LAD Irregularities Noted on Cardiac Cath    • Left atrial dilatation 01/17/2019    Moderate-Severe Noted on Echo   • Left ventricular dilatation 01/17/2019    Noted on Echo/LEE   • Mild concentric left ventricular hypertrophy 01/17/2019    Noted on Echo/LEE   • Mitral annular calcification 01/17/2019    Severe Noted on Echo   • Moderate tricuspid valve regurgitation 01/24/2019    Noted on LEE   • Morbid obesity (CMS/HCC)    • NSTEMI (non-ST elevated myocardial infarction) (CMS/HCC)    • OCTAVIANO (obstructive sleep apnea)    • Osteoarthritis    • Pulmonary hypertension (CMS/[facility]) 01/22/2019    Noted on Cardiac Cath   • Right bundle branch block 01/24/2019    Noted on LEE   • Severe aortic stenosis 01/22/2019    Noted on Cardiac Cath & LEE on 01/24/19; S/p AVR on 01/28/19 by Dr. Gaxiola   • Severe mitral valve stenosis 01/24/2019    Noted on ELE; S/p MVR on 01/28/19 by Dr. Gaxiola   • Shoulder pain, bilateral    • Skin yeast infection     Folds Under Skin--Nystatin/Diflucan       Past Surgical History:   Procedure Laterality Date   • AORTIC VALVE REPAIR/REPLACEMENT MITRAL VALVE REPAIR/REPLACEMENT N/A 1/28/2019    Procedure: LEE STERNOTOMY AORTIC VALVE REPLACEMENT, MITRAL VALVE REPLACEMENT, TRICUSPID VALVE REPAIR, MAZE PROCEDURE, CLOSURE OF LEFT ATRIAL APPENDAGE  AND  PRP;  Surgeon: Scar Gaxiola MD;  Location:  [facility] MAIN OR;  Service: Cardiothoracic   • ARTERIOVENOUS FISTULA/SHUNT SURGERY Left 6/26/2019    Procedure: LEFT ARM BRACHIAL CEPHALIC FISTULA;  Surgeon: Satish Stahl MD;  [facility] MA[facility];  Service: Vascular   • CARDIAC CATHETERIZATION N/A 1/22/2019    Procedure: Coronary angiography;  Surgeon: Rick Talavera MD;  Location:  [facility] CATH INVASIVE LOCATION;  Service: Cardiology   • CARDIAC CATHETERIZATION N/A 1/22/2019    Procedure: Left Heart Cath;  Surgeon: Rick Talavera MD;  Location:  [facility] CATH INVASIVE LOCATION;  Service: Cardiology   • CARDIAC CATHETERIZATION N/A 1/22/2019    Procedure: Right Heart Cath;  Surgeon: Rick Talavera MD;  Location:  [facility] CATH INVASIVE LOCATION;  Service: Cardiology   • CARDIAC CATHETERIZATION N/A 1/22/2019    Procedure: Left ventriculography;  Surgeon: Rick Talavera MD;  [facility] C[facility];  Service: Cardiology   • CARDIAC SURGERY     • COLONOSCOPY     • HERNIA REPAIR     • INSERTION HEMODIALYSIS CATHETER N/A 2/8/2019    Procedure: tunnel CATHETER PLACEMENT WITH FLUROSCOPY;  Surgeon: Satish Stahl MD;  [facility];  Service: Vascular   • REPLACEMENT TOTAL KNEE Bilateral 2007/2009       Home Medications:   Medications Prior to Admission   Medication Sig Dispense Refill Last Dose   • acetaminophen (TYLENOL) 325 MG tablet Take 650 mg by mouth 3 (Three) Times a Day. Takes tid at 2 am, 1 pm, and hS   1/11/2021 at Unknown time   • bumetanide (BUMEX) 2 MG tablet TAKE 2 TABLETS BY MOUTH DAILY (Patient taking differently: Take 2 mg by mouth Daily.) 60 tablet 5 1/11/2021 at Unknown time   • busPIRone (BUSPAR) 10 MG tablet Take 1 tablet by mouth 3 (Three) Times a Day. (Patient taking differently: Take 10 mg by mouth 2 (two) times a day.) 270 tablet 3 1/11/2021 at Unknown time   • escitalopram (LEXAPRO) 10 MG tablet Take 1 tablet by mouth Daily. 90  tablet 3 1/11/2021 at Unknown time   • hydrocortisone 2.5 % cream Apply  topically to the appropriate area as directed See Admin Instructions. Apply topically to the affected area twice daily as directed 60 g 3 1/11/2021 at Unknown time   • warfarin (COUMADIN) 2 MG tablet Take one tablet (2mg) by mouth on Mon, Wed, and Fri and take one and one-half tablets (3mg) by mouth all other days or as directed 120 tablet 1 1/10/2021 at Unknown time   • aspirin 81 MG tablet Take  by mouth.   More than a month at Unknown time   • ondansetron (ZOFRAN) 4 MG tablet Take 1 tablet by mouth Every 8 (Eight) Hours As Needed for Nausea or Vomiting. 90 tablet 3 More than a month at Unknown time   • rOPINIRole (REQUIP) 0.25 MG tablet    More than a month at Unknown time       Allergies:  No Known Allergies    Past Social History:  Social History     Socioeconomic History   • Marital status:      Spouse name: Not on file   • Number of children: 2   • Years of education: 12   • Highest education level: High school graduate   Occupational History   • Occupation: retired   Social Needs   • Financial resource strain: Patient refused   • Food insecurity     Worry: Patient refused     Inability: Patient refused   • Transportation needs     Medical: Patient refused     Non-medical: Patient refused   Tobacco Use   • Smoking status: Never Smoker   • Smokeless tobacco: Never Used   Substance and Sexual Activity   • Alcohol use: Yes     Comment: Occ   • Drug use: No   • Sexual activity: Defer     Birth control/protection: Post-menopausal       Past Family History: History reviewed. No pertinent family history.   Family History   Problem Relation Age of Onset   • Hypertension Other    • Diabetes Other    • Heart disease Neg Hx    • Stroke Neg Hx    • Arthritis Neg Hx    • Breast cancer Neg Hx    • Malig Hyperthermia Neg Hx        Review of Systems   All other systems reviewed and are negative.          Objective:     Objective:  Temp:  [97.8 °F  (36.6 °C)-98.2 °F (36.8 °C)] 98 °F (36.7 °C)  Heart Rate:  [78-94] 78  Resp:  [18-22] 18  BP: (109-127)/(56-77) 112/56    Intake/Output Summary (Last 24 hours) at 1/12/2021 1624  Last data filed at 1/12/2021 0500  Gross per 24 hour   Intake 510 ml   Output 1500 ml   Net -990 ml     Body mass index is 36.07 kg/m².      01/11/21  0958 01/11/21  1430 01/12/21  0500   Weight: 106 kg (233 lb) 106 kg (234 lb) 104 kg (230 lb 6.1 oz)           Physical Exam:   Vitals signs reviewed.   Constitutional:       Appearance: Well-developed.   Eyes:      Conjunctiva/sclera: Conjunctivae normal.   HENT:      Head: Normocephalic.   Neck:      Musculoskeletal: Normal range of motion.   Pulmonary:      Breath sounds: Normal breath sounds.   Cardiovascular:      Normal rate. Regular rhythm.   Abdominal:      General: Bowel sounds are normal.      Palpations: Abdomen is soft.   Musculoskeletal: Normal range of motion.   Skin:     General: Skin is warm and dry.   Neurological:      Mental Status: Alert and oriented to person, place, and time.   Psychiatric:         Behavior: Behavior normal.          Labs:   Lab Review:     Results from last 7 days   Lab Units 01/12/21  0756 01/12/21  0000 01/11/21  0946   SODIUM mmol/L 136 136 138   POTASSIUM mmol/L 3.9 3.3* 4.6   CHLORIDE mmol/L 96* 96* 101   CO2 mmol/L 27.8 28.4 18.4*   BUN mg/dL 38* 26* 74*   CREATININE mg/dL 1.87* 1.55* 3.35*   GLUCOSE mg/dL 106* 121* 150*   CALCIUM mg/dL 9.1 9.1 9.5   AST (SGOT) U/L  --   --  17   ALT (SGPT) U/L  --   --  7         Results from last 7 days   Lab Units 01/12/21  0756 01/12/21  0000   WBC 10*3/mm3  --  7.96   HEMOGLOBIN g/dL 8.9* 9.0*  9.0*   HEMATOCRIT % 26.1* 27.2*  27.2*   PLATELETS 10*3/mm3  --  206     Results from last 7 days   Lab Units 01/12/21  0756 01/11/21  0946   INR  4.82* 4.37*                                 PREVIOUS EKG 05/13/2019:          Admission EKG 01/11/2021:             Assessment:       GI bleed    Hypertension     Hyperlipidemia    S/P MVR (mitral valve replacement)    Paroxysmal atrial fibrillation (CMS/HCC)    Pulmonary hypertension (CMS/HCC)    Stage 5 chronic kidney disease on chronic dialysis (CMS/HCC)    Gastroesophageal reflux disease without esophagitis    Dependence on renal dialysis (CMS/HCC)    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 (CMS/HCC)    CAD (coronary artery disease)    S/P AVR (aortic valve replacement)    Chronic diastolic CHF (congestive heart failure) (CMS/HCC)    Chronic pain of both shoulders        Plan:     1.  Patient has bioprosthetic valves.  Patient is on Coumadin not for a valve issue but for paroxysmal atrial fibrillation.  Okay to give p.o. vitamin K risk of thrombosis is very low.  2.  Paroxysmal atrial fibrillation she currently is in normal sinus rhythm.  Again low risk for reversal with p.o. vitamin K so I ordered it.  3.  Bright red blood per rectum plan is to do scope patient is stable from a heart standpoint.  4.  Pulmonary hypertension  5.  We will follow.    Thank you for allowing me to participate in the care of Claudia Arnold. Feel free to contact me directly with any further questions or concerns.    Rui Paiz MD  [facility]  01/12/21  16:24 EST

## 2021-01-12 NOTE — CONSULTS
"Adult Nutrition  Assessment/PES    Patient Name:  Claudia Arnold  YOB: 1945  MRN: [id number]  Admit Date:  1/11/2021    Assessment Date:  1/12/2021    Comments:  Nutrition consult per nurse admission screen.  Admitted with GI bleed.  Currently on clear liquid diet.  Plans for colonoscopy once INR improved.    Patient with past medical history including ESRD on HD, CAD, HTN, HLD, CHF, GERD, MI, OCTAVIANO.    RD to continue to follow as diet is advanced.    RD working remotely due to covid-19 pandemic. Assessment completed based on review of electronic medical record. RD may be reached via secure chat or email.     Reason for Assessment     Row Name 01/12/21 0851          Reason for Assessment    Reason For Assessment  nurse/nurse practitioner consult     Diagnosis  gastrointestinal disease;renal disease;cardiac disease;pulmonary disease;other (see comments);psychosocial ESRD on HD, PAF, AVR, MVR, CAD, pulmonary HTN, HTN, HLD, CHF, arthritis, CMY, anxiety, GERD, hernia, MI, OCTAVIANO, OA; adm with GI bleed         Nutrition/Diet History     Row Name 01/12/21 0859          Nutrition/Diet History    Typical Food/Fluid Intake  currently on CLD         Anthropometrics     Row Name 01/12/21 0859 01/12/21 0500       Anthropometrics    Height  170.2 cm (67.01\")  --    Weight  --  104 kg (230 lb 6.1 oz)       Admit Weight    Admit Weight  -- 230# 1/12  --       Ideal Body Weight (IBW)    Ideal Body Weight (IBW) (kg)  61.88  --       Body Mass Index (BMI)    BMI Assessment  BMI 35-39.9: obesity grade II 35.83  --        Labs/Tests/Procedures/Meds     Row Name 01/12/21 0900          Labs/Procedures/Meds    Lab Results Reviewed  reviewed, pertinent     Lab Results Comments  Gluc, Creat, BUN, GFR, Hgb, Hct        Diagnostic Tests/Procedures    Diagnostic Test/Procedure Reviewed  reviewed, pertinent     Diagnostic Test/Procedures Comments  needs colonoscopy        Medications    Pertinent Medications Reviewed  reviewed, " "pertinent     Pertinent Medications Comments  protonix drip         Physical Findings     Row Name 01/12/21 0900          Physical Findings    Overall Physical Appearance  obese     Skin  other (see comments);edema B=19, bruised; trace/mild edema         Estimated/Assessed Needs     Row Name 01/12/21 0901 01/12/21 0859       Calculation Measurements    Weight Used For Calculations  61.2 kg (135 lb) IBW  --    Height  --  170.2 cm (67.01\")       Estimated/Assessed Needs       KCAL/KG    KCAL/KG  30 Kcal/Kg (kcal);35 Kcal/Kg (kcal)  --    30 Kcal/Kg (kcal)  1837.08  --    35 Kcal/Kg (kcal)  2143.26  --       Protein Requirements    Weight Used For Protein Calculations  61.2 kg (135 lb) IBW  --    Est Protein Requirement Amount (gms/kg)  1.5 gm protein  --    Estimated Protein Requirements (gms/day)  91.85  --       Fluid Requirements    Fluid Requirements (mL/day)  1837  --        Nutrition Prescription Ordered     Row Name 01/12/21 0902          Nutrition Prescription PO    Current PO Diet  Clear Liquid         Problem/Interventions:  Problem 1     Row Name 01/12/21 0902          Nutrition Diagnoses Problem 1    Problem 1  Inadequate Intake/Infusion     Inadequate Intake Type  Oral     Macronutrient  Kcal;Protein     Etiology (related to)  MNT for Treatment/Condition;Medical Diagnosis     Gastrointestinal  Gastrointestinal bleed     Signs/Symptoms (evidenced by)  Clear Liquid Diet;Report/Observation         Intervention Goal     Row Name 01/12/21 0902          Intervention Goal    General  Maintain nutrition;Reduce/improve symptoms;Disease management/therapy     PO  Initiate feeding     Weight  Appropriate weight loss         Nutrition Intervention     Row Name 01/12/21 0903          Nutrition Intervention    RD/Tech Action  Follow Tx progress;Care plan reviewd;Await begin PO         Education/Evaluation     Row Name 01/12/21 0903          Education    Education  Will Instruct as appropriate        Monitor/Evaluation "    Monitor  Per protocol;I&O;Pertinent labs;Weight;Symptoms           Electronically signed by:  Sylvia Monk RD  01/12/21 09:05 EST

## 2021-01-12 NOTE — PROGRESS NOTES
"   LOS: 1 day    Patient Care Team:  Robert Cortez MD as PCP - General (Family Medicine)  Deborah Agudelo MD as Surgeon (Orthopedic Surgery)  Scott Segura MD as Consulting Physician (Cardiology)  Scar Gaxiola MD as Surgeon (Cardiothoracic Surgery)  Kathy Osuna MD as Consulting Physician (Nephrology)  Dora Astorga APRN as Nurse Practitioner (Neurosurgery)  Satish Stahl MD as Consulting Physician (Vascular Surgery)  Scar Gaxiola MD as Surgeon (Cardiothoracic Surgery)    Chief Complaint:    Chief Complaint   Patient presents with   • Rectal Bleeding     Follow-up end-stage renal disease  Subjective     Interval History:   The patient is feeling the same continue to have lower GI bleed, and has been evaluated by GI will be needing colonoscopy when her INR is at an acceptable level.  She had dialysis yesterday without any difficulties and received blood transfusion.  No chest pain or shortness of air, no nausea or vomiting.      Review of Systems:   As noted above    Objective     Vital Signs  Temp:  [97.6 °F (36.4 °C)-98.2 °F (36.8 °C)] 98.2 °F (36.8 °C)  Heart Rate:  [83-96] 90  Resp:  [18-24] 20  BP: (109-130)/(59-77) 127/70    Flowsheet Rows      First Filed Value   Admission Height  170.2 cm (67\") Documented at 01/11/2021 0958   Admission Weight  106 kg (233 lb) Documented at 01/11/2021 0958          No intake/output data recorded.  I/O last 3 completed shifts:  In: 510 [P.O.:210; Blood:300]  Out: 1500 [Other:1500]    Intake/Output Summary (Last 24 hours) at 1/12/2021 1049  Last data filed at 1/12/2021 0500  Gross per 24 hour   Intake 510 ml   Output 1500 ml   Net -990 ml       Physical Exam:  General Appearance: alert, oriented x 3, no acute distress,   Skin: warm and dry  HEENT: pupils round and reactive to light, oral mucosa normal, nonicteric sclera  Neck: supple, no JVD, trachea midline  Lungs: CTA, unlabored breathing effort  Heart: RRR, normal S1 and S2, no S3, " no rub  Abdomen: soft, nontender, normoactive bowels  : no palpable bladder,  Extremities: no edema, cyanosis or clubbing, functional AV fistula in the left upper  Neuro: normal speech and mental status       Results Review:    Results from last 7 days   Lab Units 01/12/21  0756 01/12/21  0000 01/11/21  0946   SODIUM mmol/L 136 136 138   POTASSIUM mmol/L 3.9 3.3* 4.6   CHLORIDE mmol/L 96* 96* 101   CO2 mmol/L 27.8 28.4 18.4*   BUN mg/dL 38* 26* 74*   CREATININE mg/dL 1.87* 1.55* 3.35*   CALCIUM mg/dL 9.1 9.1 9.5   BILIRUBIN mg/dL  --   --  0.3   ALK PHOS U/L  --   --  76   ALT (SGPT) U/L  --   --  7   AST (SGOT) U/L  --   --  17   GLUCOSE mg/dL 106* 121* 150*       Estimated Creatinine Clearance: 32.4 mL/min (A) (by C-G formula based on SCr of 1.87 mg/dL (H)).    Results from last 7 days   Lab Units 01/12/21  0000   PHOSPHORUS mg/dL 2.5             Results from last 7 days   Lab Units 01/12/21  0756 01/12/21  0000 01/11/21  1613 01/11/21  0946   WBC 10*3/mm3  --  7.96  --  7.85   HEMOGLOBIN g/dL 8.9* 9.0*  9.0* 8.9* 8.5*   PLATELETS 10*3/mm3  --  206  --  229       Results from last 7 days   Lab Units 01/12/21  0756 01/11/21  0946   INR  4.82* 4.37*         Imaging Results (Last 24 Hours)     ** No results found for the last 24 hours. **        busPIRone, 10 mg, Oral, Q12H  escitalopram, 10 mg, Oral, Daily  rOPINIRole, 0.25 mg, Oral, Nightly  sodium chloride, 10 mL, Intravenous, Q12H      pantoprazole, 8 mg/hr, Last Rate: 8 mg/hr (01/12/21 0931)        Medication Review:   Current Facility-Administered Medications   Medication Dose Route Frequency Provider Last Rate Last Admin   • acetaminophen (TYLENOL) tablet 650 mg  650 mg Oral Q4H PRN Misty Summers APRN   650 mg at 01/12/21 0826    Or   • acetaminophen (TYLENOL) 160 MG/5ML solution 650 mg  650 mg Oral Q4H PRN Misty Summers APRN        Or   • acetaminophen (TYLENOL) suppository 650 mg  650 mg Rectal Q4H PRN Misty Summers APRN       • albumin human 25 %  IV SOLN 12.5 g  12.5 g Intravenous PRN Christopher Jin MD       • busPIRone (BUSPAR) tablet 10 mg  10 mg Oral Q12H Misty Summers APRN   10 mg at 01/12/21 0826   • escitalopram (LEXAPRO) tablet 10 mg  10 mg Oral Daily Misty Summers APRN   10 mg at 01/12/21 0826   • nitroglycerin (NITROSTAT) SL tablet 0.4 mg  0.4 mg Sublingual Q5 Min PRN Misty Summers APRN       • ondansetron (ZOFRAN) injection 4 mg  4 mg Intravenous Q6H PRN Misty Summers APRN       • pantoprazole (PROTONIX) 40 mg in 100mL NS IVPB  8 mg/hr Intravenous Continuous Eric Perez MD 20 mL/hr at 01/12/21 0931 8 mg/hr at 01/12/21 0931   • rOPINIRole (REQUIP) tablet 0.25 mg  0.25 mg Oral Nightly Misty Summers APRN   0.25 mg at 01/11/21 2136   • sodium chloride 0.9 % bolus 1,000 mL  1,000 mL Intravenous PRN Christopher Jin MD       • sodium chloride 0.9 % flush 10 mL  10 mL Intravenous PRN Silvia Fuller MD       • sodium chloride 0.9 % flush 10 mL  10 mL Intravenous Q12H Misty Summers APRN   10 mL at 01/12/21 0828   • sodium chloride 0.9 % flush 10 mL  10 mL Intravenous PRN Misty Summers APRN           Assessment/Plan   1.  End-stage renal disease on maintenance hemodialysis every Monday, Wednesday and Friday.  Electrolytes within normal range, volumes are within acceptable range.  2.  GI bleed with maroon-colored bowel movement and clots  3.  Anemia of chronic kidney disease and acute blood loss, hemoglobin today 8.9  4.  Prior AVR, MVR and TV repair chronically anticoagulated, INR today still elevated 4.82  5.  Obstructive sleep apnea  6.  GERD.        Plan:  1.  Continue the same treatment  2.  Hemodialysis tomorrow  3.  Surveillance lab        Christopher Jin MD  01/12/21  10:49 EST

## 2021-01-12 NOTE — PROGRESS NOTES
"    Name: Claudia Arnold ADMIT: 2021   : 1945  PCP: Robert Cortez MD    MRN: [id number] LOS: 1 days   AGE/SEX: 75 y.o. female  ROOM: [facility]     Subjective   Subjective   Feels somewhat clammy this morning. Afebrile. Denies nausea. Tolerated HD yesterday without issues. She did not see stool this morning but was told still evidence of bleeding.    Review of Systems   Constitutional:        \"clammy\"   HENT: Negative.    Respiratory: Negative.    Cardiovascular: Negative.    Gastrointestinal: Positive for blood in stool.   Genitourinary: Negative.    Musculoskeletal: Negative.    Skin: Negative.    Neurological: Negative.    Psychiatric/Behavioral: Negative.         Objective   Objective   Vital Signs  Temp:  [97.8 °F (36.6 °C)-98.2 °F (36.8 °C)] 98 °F (36.7 °C)  Heart Rate:  [78-94] 78  Resp:  [18-24] 18  BP: (109-130)/(56-77) 112/56  SpO2:  [90 %-97 %] 93 %  on   ;   Device (Oxygen Therapy): room air  Body mass index is 36.07 kg/m².  Physical Exam  Vitals signs and nursing note reviewed.   Constitutional:       General: She is not in acute distress.     Appearance: She is obese.   HENT:      Head: Normocephalic.   Eyes:      Conjunctiva/sclera: Conjunctivae normal.   Neck:      Musculoskeletal: Normal range of motion and neck supple.   Cardiovascular:      Rate and Rhythm: Normal rate and regular rhythm.   Pulmonary:      Effort: Pulmonary effort is normal. No respiratory distress.      Breath sounds: Normal breath sounds.   Abdominal:      General: Bowel sounds are normal. There is no distension.      Palpations: Abdomen is soft.   Musculoskeletal:      Right lower leg: No edema.      Left lower leg: No edema.   Skin:     General: Skin is warm and dry.   Neurological:      Mental Status: She is alert and oriented to person, place, and time.   Psychiatric:         Mood and Affect: Mood normal.         Results Review     I reviewed the patient's new clinical results.  Results from last 7 days   Lab Units " 01/12/21  0756 01/12/21  0000 01/11/21  1613 01/11/21  0946   WBC 10*3/mm3  --  7.96  --  7.85   HEMOGLOBIN g/dL 8.9* 9.0*  9.0* 8.9* 8.5*   PLATELETS 10*3/mm3  --  206  --  229     Results from last 7 days   Lab Units 01/12/21  0756 01/12/21  0000 01/11/21  0946   SODIUM mmol/L 136 136 138   POTASSIUM mmol/L 3.9 3.3* 4.6   CHLORIDE mmol/L 96* 96* 101   CO2 mmol/L 27.8 28.4 18.4*   BUN mg/dL 38* 26* 74*   CREATININE mg/dL 1.87* 1.55* 3.35*   GLUCOSE mg/dL 106* 121* 150*   Estimated Creatinine Clearance: 32.4 mL/min (A) (by C-G formula based on SCr of 1.87 mg/dL (H)).  Results from last 7 days   Lab Units 01/12/21  0000 01/11/21  0946   ALBUMIN g/dL 3.50 3.50   BILIRUBIN mg/dL  --  0.3   ALK PHOS U/L  --  76   AST (SGOT) U/L  --  17   ALT (SGPT) U/L  --  7     Results from last 7 days   Lab Units 01/12/21  0756 01/12/21  0000 01/11/21  0946   CALCIUM mg/dL 9.1 9.1 9.5   ALBUMIN g/dL  --  3.50 3.50   PHOSPHORUS mg/dL  --  2.5  --      Results from last 7 days   Lab Units 01/11/21  1843 01/11/21  1436 01/11/21  1011   LACTATE mmol/L 1.0 3.3* 2.4*     COVID19   Date Value Ref Range Status   01/11/2021 Not Detected Not Detected - Ref. Range Final     No results found for: HGBA1C, POCGLU    XR Shoulder 2+ View Bilateral  Ordering physician's impression is located in the Encounter Note dated 09/16/19. X-ray performed in the DR room.    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 (CMS/HCC) [D68.318]  Yes   • Chronic pain of both shoulders [M25.511, G89.29, M25.512]  Yes   • CAD (coronary artery disease) [I25.10]  Yes   • S/P AVR (aortic valve  replacement) [Z95.2]  Not Applicable   • Chronic diastolic CHF (congestive heart failure) (CMS/[facility]) [I50.32]  Yes   • Dependence on renal dialysis (CMS/[facility]) [Z99.2]  Not Applicable   • Paroxysmal atrial fibrillation (CMS/[facility]) [I48.0]  Yes   • Stage 5 chronic kidney disease on chronic dialysis (CMS/[facility]) [N18.6, Z99.2]  Not Applicable   • Gastroesophageal reflux disease without esophagitis [K21.9]  Yes   • Pulmonary hypertension (CMS/[facility]) [I27.20]  Yes   • S/P MVR (mitral valve replacement) [Z95.2]  Not Applicable   • Hyperlipidemia [E78.5]  Yes   • Hypertension [I10]  Yes      Resolved Hospital Problems   No resolved problems to display.       75 y.o. female admitted with GI bleed.    GI bleed  -Continue protonix gtt  -Monitor stools. Hgb overall stable  -1 unit PRBC yesterday  -Warfarin on hold.   -Appreciate GI assistance  -CLD for now.      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  -INR remains supratherapeutic.   -Consult Cardiology to see if INR can be reversed     CAD/S/P AVR/MVR/chronic diastolic heart failure  -Monitor for cardiac complaints. Hold ASA and warfarin for now with acute bleeding.  -Cardiologist is Dr. Segura  -Daily weight. Strict intake and output. Hold Bumex for now with bleeding.  HD for fluid removal per Renal.     ESRD on HD  -Nephrology consulted. M, W, F schedule.  -Monitor labs.   -Nephrology following     HTN  -BP stable. Continue to monitor. Hold Bumex with blood loss.     Chronic pain of both shoulders  -Denies use of opioids as listed on med rec.  -Will continue Tylenol as needed for pain.      · SCDs for DVT prophylaxis.  · Full code.  · Discussed with patient, nursing staff and Dr. Perez.  · Anticipate discharge TBD.        MORGAN Huston  [facility]  01/12/21  14:41 EST    Patient was wearing facemask when I entered the room and throughout our encounter.  I wore protective equipment throughout this patient encounter including  a face mask, gloves and protective eyewear.  Hand hygiene was performed before donning protective equipment and after removal when leaving the room.

## 2021-01-12 NOTE — PROGRESS NOTES
[facility]  Inpatient Progress Note    Reason for Follow Up:  Rectal bleeding    Subjective     Interval History:   Patient reports he had further rectal bleeding with bowel movement this morning.  She denies any abdominal pain.  She received a unit of blood during dialysis yesterday and her hemoglobin is stable this morning.  Her INR remains elevated actually is up a little bit compared to yesterday.      Current Facility-Administered Medications:   •  acetaminophen (TYLENOL) tablet 650 mg, 650 mg, Oral, Q4H PRN, 650 mg at 01/12/21 0826 **OR** acetaminophen (TYLENOL) 160 MG/5ML solution 650 mg, 650 mg, Oral, Q4H PRN **OR** acetaminophen (TYLENOL) suppository 650 mg, 650 mg, Rectal, Q4H PRN, Misty Summers APRN  •  albumin human 25 % IV SOLN 12.5 g, 12.5 g, Intravenous, PRN, Christopher Jin MD  •  busPIRone (BUSPAR) tablet 10 mg, 10 mg, Oral, Q12H, Misty Summers APRN, 10 mg at 01/12/21 0826  •  escitalopram (LEXAPRO) tablet 10 mg, 10 mg, Oral, Daily, Misty Summers APRN, 10 mg at 01/12/21 0826  •  nitroglycerin (NITROSTAT) SL tablet 0.4 mg, 0.4 mg, Sublingual, Q5 Min PRN, Misty Summers APRN  •  ondansetron (ZOFRAN) injection 4 mg, 4 mg, Intravenous, Q6H PRN, Misty Summers APRN  •  pantoprazole (PROTONIX) 40 mg in 100mL NS IVPB, 8 mg/hr, Intravenous, Continuous, Eric Perez MD, Last Rate: 20 mL/hr at 01/12/21 0931, 8 mg/hr at 01/12/21 0931  •  rOPINIRole (REQUIP) tablet 0.25 mg, 0.25 mg, Oral, Nightly, Misty Summers APRN, 0.25 mg at 01/11/21 2136  •  sodium chloride 0.9 % bolus 1,000 mL, 1,000 mL, Intravenous, PRN, Christopher Jin MD  •  sodium chloride 0.9 % flush 10 mL, 10 mL, Intravenous, PRN, Silvia Fuller MD  •  sodium chloride 0.9 % flush 10 mL, 10 mL, Intravenous, Q12H, Misty Summers APRN, 10 mL at 01/12/21 0828  •  sodium chloride 0.9 % flush 10 mL, 10 mL, Intravenous, PRN, Misty Summers, MORGAN  Review of Systems:    All systems were reviewed and  negative except for:  Gastrointestinal: positive for  bright red blood per rectum    Objective     Vital Signs  Temp:  [97.6 °F (36.4 °C)-98.2 °F (36.8 °C)] 98.2 °F (36.8 °C)  Heart Rate:  [83-96] 90  Resp:  [18-24] 20  BP: (109-130)/(59-77) 127/70  Body mass index is 36.07 kg/m².    Intake/Output Summary (Last 24 hours) at 1/12/2021 1253  Last data filed at 1/12/2021 0500  Gross per 24 hour   Intake 510 ml   Output 1500 ml   Net -990 ml     No intake/output data recorded.     Physical Exam:   General: patient awake, alert and cooperative   Abdomen: soft, nontender, nondistended; normal bowel sounds   Rectal: deferred   Extremities: no rash or edema   Psychiatric: Normal mood and behavior; memory intact     Results Review:     I reviewed the patient's new clinical results.    Results from last 7 days   Lab Units 01/12/21  0756 01/12/21  0000 01/11/21  1613 01/11/21  0946   WBC 10*3/mm3  --  7.96  --  7.85   HEMOGLOBIN g/dL 8.9* 9.0*  9.0* 8.9* 8.5*   HEMATOCRIT % 26.1* 27.2*  27.2* 27.5* 26.7*   PLATELETS 10*3/mm3  --  206  --  229     Results from last 7 days   Lab Units 01/12/21  0756 01/12/21  0000 01/11/21  0946   SODIUM mmol/L 136 136 138   POTASSIUM mmol/L 3.9 3.3* 4.6   CHLORIDE mmol/L 96* 96* 101   CO2 mmol/L 27.8 28.4 18.4*   BUN mg/dL 38* 26* 74*   CREATININE mg/dL 1.87* 1.55* 3.35*   CALCIUM mg/dL 9.1 9.1 9.5   BILIRUBIN mg/dL  --   --  0.3   ALK PHOS U/L  --   --  76   ALT (SGPT) U/L  --   --  7   AST (SGOT) U/L  --   --  17   GLUCOSE mg/dL 106* 121* 150*     Results from last 7 days   Lab Units 01/12/21  0756 01/11/21  0946   INR  4.82* 4.37*     No results found for: LIPASE    Radiology:  [unfilled]      Assessment/Plan   Assessment:   1. Rectal bleeding  2.  Anemia  3.  ESRD on HD  4.  AVR/MVR (bioprosthetic) on warfarin (held)    Plan:   Continues to have some anal rectal bleeding but remains hemodynamically stable.  Unfortunately her INR is increasing despite discontinuation of her warfarin.   Will defer to the primary team whether she may require some element of reversal or if they want to continue to let this drift down given the current trend this could take a few days.  GI will continue to follow.    I discussed the patients findings and my recommendations with patient.         Mikey Zacarias M.D.  [facility]  [address] [place] [address]  Office: [phone]

## 2021-01-13 LAB
ALBUMIN SERPL-MCNC: 3.4 G/DL (ref 3.5–5.2)
ANION GAP SERPL CALCULATED.3IONS-SCNC: 15 MMOL/L (ref 5–15)
BASOPHILS # BLD AUTO: 0.01 10*3/MM3 (ref 0–0.2)
BASOPHILS NFR BLD AUTO: 0.2 % (ref 0–1.5)
BUN SERPL-MCNC: 61 MG/DL (ref 8–23)
BUN/CREAT SERPL: 21.6 (ref 7–25)
CALCIUM SPEC-SCNC: 9.3 MG/DL (ref 8.6–10.5)
CHLORIDE SERPL-SCNC: 100 MMOL/L (ref 98–107)
CO2 SERPL-SCNC: 24 MMOL/L (ref 22–29)
CREAT SERPL-MCNC: 2.82 MG/DL (ref 0.57–1)
DEPRECATED RDW RBC AUTO: 43.4 FL (ref 37–54)
EOSINOPHIL # BLD AUTO: 0.17 10*3/MM3 (ref 0–0.4)
EOSINOPHIL NFR BLD AUTO: 2.6 % (ref 0.3–6.2)
ERYTHROCYTE [DISTWIDTH] IN BLOOD BY AUTOMATED COUNT: 13 % (ref 12.3–15.4)
GFR SERPL CREATININE-BSD FRML MDRD: 16 ML/MIN/1.73
GLUCOSE SERPL-MCNC: 103 MG/DL (ref 65–99)
HCT VFR BLD AUTO: 25.2 % (ref 34–46.6)
HCT VFR BLD AUTO: 25.2 % (ref 34–46.6)
HCT VFR BLD AUTO: 26.2 % (ref 34–46.6)
HGB BLD-MCNC: 8.5 G/DL (ref 12–15.9)
HGB BLD-MCNC: 8.5 G/DL (ref 12–15.9)
HGB BLD-MCNC: 8.8 G/DL (ref 12–15.9)
IMM GRANULOCYTES # BLD AUTO: 0.04 10*3/MM3 (ref 0–0.05)
IMM GRANULOCYTES NFR BLD AUTO: 0.6 % (ref 0–0.5)
INR PPP: 2.98 (ref 0.9–1.1)
LYMPHOCYTES # BLD AUTO: 1.05 10*3/MM3 (ref 0.7–3.1)
LYMPHOCYTES NFR BLD AUTO: 16 % (ref 19.6–45.3)
MAGNESIUM SERPL-MCNC: 1.9 MG/DL (ref 1.6–2.4)
MCH RBC QN AUTO: 31.5 PG (ref 26.6–33)
MCHC RBC AUTO-ENTMCNC: 33.7 G/DL (ref 31.5–35.7)
MCV RBC AUTO: 93.3 FL (ref 79–97)
MONOCYTES # BLD AUTO: 1.05 10*3/MM3 (ref 0.1–0.9)
MONOCYTES NFR BLD AUTO: 16 % (ref 5–12)
NEUTROPHILS NFR BLD AUTO: 4.24 10*3/MM3 (ref 1.7–7)
NEUTROPHILS NFR BLD AUTO: 64.6 % (ref 42.7–76)
NRBC BLD AUTO-RTO: 0.2 /100 WBC (ref 0–0.2)
PHOSPHATE SERPL-MCNC: 4.6 MG/DL (ref 2.5–4.5)
PLATELET # BLD AUTO: 214 10*3/MM3 (ref 140–450)
PMV BLD AUTO: 9.6 FL (ref 6–12)
POTASSIUM SERPL-SCNC: 3.4 MMOL/L (ref 3.5–5.2)
POTASSIUM SERPL-SCNC: 3.8 MMOL/L (ref 3.5–5.2)
PROTHROMBIN TIME: 30.7 SECONDS (ref 11.7–14.2)
QT INTERVAL: 434 MS
RBC # BLD AUTO: 2.7 10*6/MM3 (ref 3.77–5.28)
SODIUM SERPL-SCNC: 139 MMOL/L (ref 136–145)
TROPONIN T SERPL-MCNC: 0.08 NG/ML (ref 0–0.03)
WBC # BLD AUTO: 6.56 10*3/MM3 (ref 3.4–10.8)

## 2021-01-13 PROCEDURE — 84132 ASSAY OF SERUM POTASSIUM: CPT | Performed by: INTERNAL MEDICINE

## 2021-01-13 PROCEDURE — 93010 ELECTROCARDIOGRAM REPORT: CPT | Performed by: INTERNAL MEDICINE

## 2021-01-13 PROCEDURE — 80069 RENAL FUNCTION PANEL: CPT | Performed by: INTERNAL MEDICINE

## 2021-01-13 PROCEDURE — 99232 SBSQ HOSP IP/OBS MODERATE 35: CPT | Performed by: INTERNAL MEDICINE

## 2021-01-13 PROCEDURE — 83735 ASSAY OF MAGNESIUM: CPT | Performed by: INTERNAL MEDICINE

## 2021-01-13 PROCEDURE — 99233 SBSQ HOSP IP/OBS HIGH 50: CPT | Performed by: INTERNAL MEDICINE

## 2021-01-13 PROCEDURE — 25010000002 DIGOXIN PER 500 MCG: Performed by: NURSE PRACTITIONER

## 2021-01-13 PROCEDURE — 85610 PROTHROMBIN TIME: CPT | Performed by: NURSE PRACTITIONER

## 2021-01-13 PROCEDURE — 36415 COLL VENOUS BLD VENIPUNCTURE: CPT | Performed by: INTERNAL MEDICINE

## 2021-01-13 PROCEDURE — 93005 ELECTROCARDIOGRAM TRACING: CPT | Performed by: INTERNAL MEDICINE

## 2021-01-13 PROCEDURE — 25010000002 DIGOXIN PER 500 MCG: Performed by: INTERNAL MEDICINE

## 2021-01-13 PROCEDURE — 85014 HEMATOCRIT: CPT | Performed by: INTERNAL MEDICINE

## 2021-01-13 PROCEDURE — 84484 ASSAY OF TROPONIN QUANT: CPT | Performed by: INTERNAL MEDICINE

## 2021-01-13 PROCEDURE — 85018 HEMOGLOBIN: CPT | Performed by: INTERNAL MEDICINE

## 2021-01-13 PROCEDURE — 85025 COMPLETE CBC W/AUTO DIFF WBC: CPT | Performed by: INTERNAL MEDICINE

## 2021-01-13 RX ORDER — DIGOXIN 0.25 MG/ML
250 INJECTION INTRAMUSCULAR; INTRAVENOUS ONCE
Status: COMPLETED | OUTPATIENT
Start: 2021-01-13 | End: 2021-01-13

## 2021-01-13 RX ORDER — ALBUMIN (HUMAN) 12.5 G/50ML
12.5 SOLUTION INTRAVENOUS AS NEEDED
Status: ACTIVE | OUTPATIENT
Start: 2021-01-13 | End: 2021-01-13

## 2021-01-13 RX ORDER — POTASSIUM CHLORIDE 750 MG/1
40 TABLET, FILM COATED, EXTENDED RELEASE ORAL ONCE
Status: COMPLETED | OUTPATIENT
Start: 2021-01-13 | End: 2021-01-13

## 2021-01-13 RX ORDER — PHYTONADIONE 5 MG/1
5 TABLET ORAL ONCE
Status: COMPLETED | OUTPATIENT
Start: 2021-01-13 | End: 2021-01-13

## 2021-01-13 RX ORDER — LIDOCAINE AND PRILOCAINE 25; 25 MG/G; MG/G
CREAM TOPICAL AS NEEDED
Status: DISCONTINUED | OUTPATIENT
Start: 2021-01-13 | End: 2021-01-18 | Stop reason: HOSPADM

## 2021-01-13 RX ADMIN — POTASSIUM CHLORIDE 40 MEQ: 750 TABLET, EXTENDED RELEASE ORAL at 02:02

## 2021-01-13 RX ADMIN — DIGOXIN 250 MCG: 0.25 INJECTION INTRAMUSCULAR; INTRAVENOUS at 14:27

## 2021-01-13 RX ADMIN — BUSPIRONE HYDROCHLORIDE 10 MG: 10 TABLET ORAL at 20:04

## 2021-01-13 RX ADMIN — PANTOPRAZOLE SODIUM 8 MG/HR: 40 INJECTION, POWDER, FOR SOLUTION INTRAVENOUS at 08:37

## 2021-01-13 RX ADMIN — TRAMADOL HYDROCHLORIDE 25 MG: 50 TABLET ORAL at 22:27

## 2021-01-13 RX ADMIN — PANTOPRAZOLE SODIUM 8 MG/HR: 40 INJECTION, POWDER, FOR SOLUTION INTRAVENOUS at 20:41

## 2021-01-13 RX ADMIN — BUSPIRONE HYDROCHLORIDE 10 MG: 10 TABLET ORAL at 08:37

## 2021-01-13 RX ADMIN — ESCITALOPRAM 10 MG: 10 TABLET, FILM COATED ORAL at 08:37

## 2021-01-13 RX ADMIN — TRAMADOL HYDROCHLORIDE 25 MG: 50 TABLET ORAL at 08:41

## 2021-01-13 RX ADMIN — PHYTONADIONE 5 MG: 5 TABLET ORAL at 18:13

## 2021-01-13 RX ADMIN — SODIUM CHLORIDE, PRESERVATIVE FREE 10 ML: 5 INJECTION INTRAVENOUS at 08:37

## 2021-01-13 RX ADMIN — ROPINIROLE HYDROCHLORIDE 0.25 MG: 0.5 TABLET, FILM COATED ORAL at 20:04

## 2021-01-13 RX ADMIN — PANTOPRAZOLE SODIUM 8 MG/HR: 40 INJECTION, POWDER, FOR SOLUTION INTRAVENOUS at 04:03

## 2021-01-13 RX ADMIN — DIGOXIN 250 MCG: 0.25 INJECTION INTRAMUSCULAR; INTRAVENOUS at 19:51

## 2021-01-13 RX ADMIN — PANTOPRAZOLE SODIUM 8 MG/HR: 40 INJECTION, POWDER, FOR SOLUTION INTRAVENOUS at 14:27

## 2021-01-13 RX ADMIN — SODIUM CHLORIDE, PRESERVATIVE FREE 10 ML: 5 INJECTION INTRAVENOUS at 19:51

## 2021-01-13 NOTE — PROGRESS NOTES
[facility] [place]  Inpatient Progress Note    Reason for Follow Up:  Rectal bleeding    Subjective     Interval History:   Still a little blood with AM BM. Hb stable.  INR slowly coming down.       Current Facility-Administered Medications:   •  acetaminophen (TYLENOL) tablet 650 mg, 650 mg, Oral, Q4H PRN, 650 mg at 01/12/21 0826 **OR** acetaminophen (TYLENOL) 160 MG/5ML solution 650 mg, 650 mg, Oral, Q4H PRN **OR** acetaminophen (TYLENOL) suppository 650 mg, 650 mg, Rectal, Q4H PRN, Misty Summers APRN  •  albumin human 25 % IV SOLN 12.5 g, 12.5 g, Intravenous, PRN, Christopher Jin MD  •  busPIRone (BUSPAR) tablet 10 mg, 10 mg, Oral, Q12H, Misty Summers APRN, 10 mg at 01/13/21 0837  •  escitalopram (LEXAPRO) tablet 10 mg, 10 mg, Oral, Daily, Misty Summers APRN, 10 mg at 01/13/21 0837  •  lidocaine-prilocaine (EMLA) 2.5-2.5 % cream, , Topical, PRN, Christopher Jin MD  •  nitroglycerin (NITROSTAT) SL tablet 0.4 mg, 0.4 mg, Sublingual, Q5 Min PRN, Misty Summers APRN  •  ondansetron (ZOFRAN) injection 4 mg, 4 mg, Intravenous, Q6H PRN, Misty Summers APRN  •  pantoprazole (PROTONIX) 40 mg in 100mL NS IVPB, 8 mg/hr, Intravenous, Continuous, Eric Perez MD, Last Rate: 20 mL/hr at 01/13/21 0837, 8 mg/hr at 01/13/21 0837  •  rOPINIRole (REQUIP) tablet 0.25 mg, 0.25 mg, Oral, Nightly, Misty Summers APRN, 0.25 mg at 01/12/21 2007  •  sodium chloride 0.9 % bolus 1,000 mL, 1,000 mL, Intravenous, PRN, Christopher Jin MD  •  sodium chloride 0.9 % flush 10 mL, 10 mL, Intravenous, PRN, Silvia Fuller MD  •  sodium chloride 0.9 % flush 10 mL, 10 mL, Intravenous, Q12H, Misty uSmmers APRN, 10 mL at 01/13/21 0837  •  sodium chloride 0.9 % flush 10 mL, 10 mL, Intravenous, PRN, Misty Summers APRN  •  traMADol (ULTRAM) tablet 25 mg, 25 mg, Oral, Q8H PRN, Eric Perez MD, 25 mg at 01/13/21 0841  Review of Systems:   GI; rectal bleeding    Objective     Vital  Signs  Temp:  [97.7 °F (36.5 °C)-98.3 °F (36.8 °C)] 97.7 °F (36.5 °C)  Heart Rate:  [78-87] 78  Resp:  [18] 18  BP: (110-131)/(56-78) 131/78  Body mass index is 35.44 kg/m².    Intake/Output Summary (Last 24 hours) at 1/13/2021 1017  Last data filed at 1/13/2021 0742  Gross per 24 hour   Intake 238 ml   Output --   Net 238 ml     I/O this shift:  In: 120 [P.O.:120]  Out: -      Physical Exam:   General: patient awake, alert and cooperative   Abdomen: soft, nontender, nondistended; normal bowel sounds   Rectal: deferred   Extremities: no rash or edema   Psychiatric: Normal mood and behavior; memory intact   No change from 1/12    Results Review:     I reviewed the patient's new clinical results.    Results from last 7 days   Lab Units 01/13/21  0601 01/12/21  2347 01/12/21  1551  01/12/21  0000  01/11/21  0946   WBC 10*3/mm3 6.56  --   --   --  7.96  --  7.85   HEMOGLOBIN g/dL 8.5*  8.5* 8.2* 8.4*   < > 9.0*  9.0*   < > 8.5*   HEMATOCRIT % 25.2*  25.2* 25.0* 25.4*   < > 27.2*  27.2*   < > 26.7*   PLATELETS 10*3/mm3 214  --   --   --  206  --  229    < > = values in this interval not displayed.     Results from last 7 days   Lab Units 01/13/21  0601 01/13/21  0034 01/12/21  0756 01/12/21  0000 01/11/21  0946   SODIUM mmol/L 139  --  136 136 138   POTASSIUM mmol/L 3.8 3.4* 3.9 3.3* 4.6   CHLORIDE mmol/L 100  --  96* 96* 101   CO2 mmol/L 24.0  --  27.8 28.4 18.4*   BUN mg/dL 61*  --  38* 26* 74*   CREATININE mg/dL 2.82*  --  1.87* 1.55* 3.35*   CALCIUM mg/dL 9.3  --  9.1 9.1 9.5   BILIRUBIN mg/dL  --   --   --   --  0.3   ALK PHOS U/L  --   --   --   --  76   ALT (SGPT) U/L  --   --   --   --  7   AST (SGOT) U/L  --   --   --   --  17   GLUCOSE mg/dL 103*  --  106* 121* 150*     Results from last 7 days   Lab Units 01/13/21  0601 01/12/21  0756 01/11/21  0946   INR  2.98* 4.82* 4.37*     No results found for: LIPASE    Radiology:  [unfilled]      Assessment/Plan   Assessment:   1. Rectal bleeding  2.  Anemia  3.   ESRD on HD  4.  AVR/MVR (bioprosthetic) on warfarin (held)    Plan:   Hb remains stable.  INR slowly improving.   Once INR closer to 2 we will being preparations for colonoscopy.     GI will continue to follow.    I discussed the patients findings and my recommendations with patient.         Mikey Zacarias M.D.  [facility]  [address] [place] [address]  Office: [phone]

## 2021-01-13 NOTE — PROGRESS NOTES
Hospital Follow Up    LOS:  LOS: 2 days   Patient Name: Claudia Arnold  Age/Sex: 75 y.o. female  : 1945  MRN: [id number]    Day of Service: 21   Length of Stay: 2  Encounter Provider: Rui Paiz MD  Place of Service: [facility] CARDIOLOGY  Patient Care Team:  Robert Cortez MD as PCP - General (Family Medicine)  Deborah Agudelo MD as Surgeon (Orthopedic Surgery)  Scott Segura MD as Consulting Physician (Cardiology)  Scar Gaxiola MD as Surgeon (Cardiothoracic Surgery)  Kathy Osuna MD as Consulting Physician (Nephrology)  Dora Astorga APRN as Nurse Practitioner (Neurosurgery)  Satish Stahl MD as Consulting Physician (Vascular Surgery)  Scar Gaxiola MD as Surgeon (Cardiothoracic Surgery)    Subjective:     Chief Complaint: Atrial fibrillation    Interval History: Patient was undergoing dialysis today when I was contacted because her heart rate was up.  Upon starting dialysis her heart rate was actually in the seventies eighties but then increased to the 150 range.  Her blood pressure is down a little bit there were stopping to take off fluid.  I was asked if she should continue dialysis showing a 3 cm left I thought she was hemodynamically stable enough to complete her dialysis session.    Objective:     Objective:  Temp:  [97.5 °F (36.4 °C)-98.3 °F (36.8 °C)] 97.5 °F (36.4 °C)  Heart Rate:  [73-87] 73  Resp:  [16-18] 16  BP: (105-131)/(53-78) 105/53     Intake/Output Summary (Last 24 hours) at 2021 1308  Last data filed at 2021 1128  Gross per 24 hour   Intake 238 ml   Output --   Net 238 ml     Body mass index is 35.44 kg/m².      21  1430 21  0500 21  0500   Weight: 106 kg (234 lb) 104 kg (230 lb 6.1 oz) 103 kg (226 lb 4.8 oz)     Weight change: -3.039 kg (-6 lb 11.2 oz)      Physical Exam:   General : Alert, cooperative, in no acute distress.  Neuro: Alert,cooperative and  oriented.  Lungs:  Normal respiratory effort and rate.  CV: Irregular tachycardic.  Extr: No edema or cyanosis, moves all extremities.    Lab Review:   Results from last 7 days   Lab Units 01/13/21  0601 01/13/21  0034 01/12/21  0756  01/11/21  0946   SODIUM mmol/L 139  --  136   < > 138   POTASSIUM mmol/L 3.8 3.4* 3.9   < > 4.6   CHLORIDE mmol/L 100  --  96*   < > 101   CO2 mmol/L 24.0  --  27.8   < > 18.4*   BUN mg/dL 61*  --  38*   < > 74*   CREATININE mg/dL 2.82*  --  1.87*   < > 3.35*   GLUCOSE mg/dL 103*  --  106*   < > 150*   CALCIUM mg/dL 9.3  --  9.1   < > 9.5   AST (SGOT) U/L  --   --   --   --  17   ALT (SGPT) U/L  --   --   --   --  7    < > = values in this interval not displayed.     Results from last 7 days   Lab Units 01/13/21  0035   TROPONIN T ng/mL 0.084*     Results from last 7 days   Lab Units 01/13/21  0601 01/12/21  2347  01/12/21  0000   WBC 10*3/mm3 6.56  --   --  7.96   HEMOGLOBIN g/dL 8.5*  8.5* 8.2*   < > 9.0*  9.0*   HEMATOCRIT % 25.2*  25.2* 25.0*   < > 27.2*  27.2*   PLATELETS 10*3/mm3 214  --   --  206    < > = values in this interval not displayed.     Results from last 7 days   Lab Units 01/13/21  0601 01/12/21  0756   INR  2.98* 4.82*     Results from last 7 days   Lab Units 01/13/21  0034   MAGNESIUM mg/dL 1.9           Invalid input(s): LDLCALC            Current Medications:   Scheduled Meds:busPIRone, 10 mg, Oral, Q12H  digoxin, 250 mcg, Intravenous, Once  escitalopram, 10 mg, Oral, Daily  rOPINIRole, 0.25 mg, Oral, Nightly  sodium chloride, 10 mL, Intravenous, Q12H      Continuous Infusions:pantoprazole, 8 mg/hr, Last Rate: 8 mg/hr (01/13/21 0837)        Allergies:  No Known Allergies    Assessment:       GI bleed    Hypertension    Hyperlipidemia    S/P MVR (mitral valve replacement)    Paroxysmal atrial fibrillation (CMS/HCC)    Pulmonary hypertension (CMS/HCC)    Stage 5 chronic kidney disease on chronic dialysis (CMS/HCC)    Gastroesophageal reflux disease without  esophagitis    Dependence on renal dialysis (CMS/HCC)    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 (CMS/HCC)    CAD (coronary artery disease)    S/P AVR (aortic valve replacement)    Chronic diastolic CHF (congestive heart failure) (CMS/HCC)    Chronic pain of both shoulders        Plan:   1.  Bioprosthetic heart valves.  2.  Paroxysmal atrial fibrillation.  Unfortunately she flipped in atrial fibrillation during dialysis today.  We will give her 1 dose of digoxin and follow her closely.  3.  Bright red blood per rectum.  Patient still needs to be scoped.  INR has improved I will give some more vitamin K even in the setting of brief atrial fibrillation she still remains low risk.  4.  End-stage renal disease on hemodialysis Monday Wednesday Friday.  5.  Anemia of chronic disease  6.  We will follow along treat with IV digoxin and see if her heart rate improves or she converts back.        Rui Paiz MD  01/13/21  13:08 EST

## 2021-01-13 NOTE — NURSING NOTE
Spoke with MORGAN Rebolledo, and notified of 14 beat run VT. Reviewed labs: K+ 3.4, Mag 1.9, and Troponin 0.084. See chart for order.

## 2021-01-13 NOTE — PROGRESS NOTES
Name: Claudia Arnold ADMIT: 2021   : 1945  PCP: Robert Cortez MD    MRN: [id number] LOS: 2 days   AGE/SEX: 75 y.o. female  ROOM: [facility]     Subjective   Subjective     Patient is lying on the bed in no major distress.  Denies nausea, vomiting abdominal pain, chest pain.  She complains of ongoing rectal bleed.       Objective   Objective   Vital Signs  Temp:  [97.3 °F (36.3 °C)-98.3 °F (36.8 °C)] 97.6 °F (36.4 °C)  Heart Rate:  [] 145  Resp:  [16-18] 18  BP: (105-131)/(50-78) 108/50  SpO2:  [91 %-93 %] 91 %  on   ;   Device (Oxygen Therapy): room air  Body mass index is 35.44 kg/m².  Physical Exam   HEENT:  Atraumatic, normocephalic.  PERRLA.  Extraocular movements intact.  Conjunctivae pink.  Sclerae, no icterus.  Mucous membranes dry.  Oropharynx is  clear.  NECK:  Supple.  No JVD.  HEART:  Irregular rate and rhythm.  Normal S1, S2.   LUNGS:  Fairly clear to auscultation anteriorly.  No wheezes.  No crackles.  ABDOMEN:   Soft, nontender.  Bowel sounds present.  No rebound.  No  guarding.  EXTREMITIES:  No cyanosis, clubbing, or edema.  Palpable pedal pulses.  NEURO:  Grossly nonfocal.  No facial asymmetry.  Good strength in all 4  extremities.      Results Review     I reviewed the patient's new clinical results.  Results from last 7 days   Lab Units 21  0601 21  2347 21  1551 21  0756 21  0000  21  0946   WBC 10*3/mm3 6.56  --   --   --  7.96  --  7.85   HEMOGLOBIN g/dL 8.5*  8.5* 8.2* 8.4* 8.9* 9.0*  9.0*   < > 8.5*   PLATELETS 10*3/mm3 214  --   --   --  206  --  229    < > = values in this interval not displayed.     Results from last 7 days   Lab Units 21  0601 21  0034 21  0756 21  0000 21  0946   SODIUM mmol/L 139  --  136 136 138   POTASSIUM mmol/L 3.8 3.4* 3.9 3.3* 4.6   CHLORIDE mmol/L 100  --  96* 96* 101   CO2 mmol/L 24.0  --  27.8 28.4 18.4*   BUN mg/dL 61*  --  38* 26* 74*   CREATININE mg/dL 2.82*  --  1.87*  1.55* 3.35*   GLUCOSE mg/dL 103*  --  106* 121* 150*   Estimated Creatinine Clearance: 21.3 mL/min (A) (by C-G formula based on SCr of 2.82 mg/dL (H)).  Results from last 7 days   Lab Units 01/13/21  0601 01/12/21  0000 01/11/21  0946   ALBUMIN g/dL 3.40* 3.50 3.50   BILIRUBIN mg/dL  --   --  0.3   ALK PHOS U/L  --   --  76   AST (SGOT) U/L  --   --  17   ALT (SGPT) U/L  --   --  7     Results from last 7 days   Lab Units 01/13/21  0601 01/13/21  0034 01/12/21  0756 01/12/21  0000 01/11/21  0946   CALCIUM mg/dL 9.3  --  9.1 9.1 9.5   ALBUMIN g/dL 3.40*  --   --  3.50 3.50   MAGNESIUM mg/dL  --  1.9  --   --   --    PHOSPHORUS mg/dL 4.6*  --   --  2.5  --      Results from last 7 days   Lab Units 01/11/21  1843 01/11/21  1436 01/11/21  1011   LACTATE mmol/L 1.0 3.3* 2.4*     COVID19   Date Value Ref Range Status   01/11/2021 Not Detected Not Detected - Ref. Range Final     No results found for: HGBA1C, POCGLU    XR Shoulder 2+ View Bilateral  Ordering physician's impression is located in the Encounter Note dated 09/16/19. X-ray performed in the DR room.    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 (CMS/HCC) [D68.318]  Yes   • Chronic pain of both shoulders [M25.511, G89.29, M25.512]  Yes   • CAD (coronary artery disease) [I25.10]  Yes   • S/P AVR (aortic valve replacement) [Z95.2]  Not Applicable   • Chronic diastolic CHF (congestive heart failure) (CMS/[facility]) [I50.32]  Yes   • Dependence on renal dialysis (CMS/[facility]) [Z99.2]  Not Applicable   • Paroxysmal atrial fibrillation (CMS/[facility]) [I48.0]  Yes   • Stage 5 chronic  kidney disease on chronic dialysis (CMS/[facility]) [N18.6, Z99.2]  Not Applicable   • Gastroesophageal reflux disease without esophagitis [K21.9]  Yes   • Pulmonary hypertension (CMS/[facility]) [I27.20]  Yes   • S/P MVR (mitral valve replacement) [Z95.2]  Not Applicable   • Hyperlipidemia [E78.5]  Yes   • Hypertension [I10]  Yes      Resolved Hospital Problems   No resolved problems to display.       1. Acute GI bleed, check H and H every 8 hours and transfuse as needed.  GI is following and patient is on Protonix.  She did receive vitamin K earlier today and is on chronic anticoagulation therapy.  2. Afib with RVR /bioprosthetic heart valves, patient did receive digoxin earlier today. Appreciate cardiology input.  3. End-stage renal disease, continue with Monday Wednesday and Friday hemodialysis.  4. History of depression and anxiety, continue with Lexapro and BuSpar.    5. On SCDs for DVT prophylaxis.    6. Code status is full code.          Nathan Meyer MD  [facility]  01/13/21  15:47 EST     I wore protective equipment throughout this patient encounter including a face mask, gloves and protective eyewear.  Hand hygiene was performed before donning protective equipment and after removal when leaving the room.

## 2021-01-13 NOTE — NURSING NOTE
Spoke with Dr. Pop regarding bedside report that Dr. Dumas had spoke with dayshift RN about possibly performing c-scope on 1/13. Reviewed history of present illness and recent INR levels. No new orders received, will continue to monitor INR and determine plan when INR is lower.

## 2021-01-13 NOTE — PROGRESS NOTES
"   LOS: 2 days    Patient Care Team:  Robert Cortez MD as PCP - General (Family Medicine)  Deborah Agudelo MD as Surgeon (Orthopedic Surgery)  Scott Segura MD as Consulting Physician (Cardiology)  Scar Gaxiola MD as Surgeon (Cardiothoracic Surgery)  Kathy Osuna MD as Consulting Physician (Nephrology)  Dora Astorga APRN as Nurse Practitioner (Neurosurgery)  Satish Stahl MD as Consulting Physician (Vascular Surgery)  Scar Gaxiola MD as Surgeon (Cardiothoracic Surgery)    Chief Complaint:    Chief Complaint   Patient presents with   • Rectal Bleeding     Follow-up end-stage renal disease  Subjective     Interval History:   The patient is feeling the same continue to have  GI bleed, and has been evaluated by GI will be needing colonoscopy when her INR is at an acceptable level.  She had dialysis yesterday without any difficulties and received blood transfusion.  No chest pain or shortness of air, no nausea or vomiting.      Review of Systems:   As noted above    Objective     Vital Signs  Temp:  [97.7 °F (36.5 °C)-98.3 °F (36.8 °C)] 97.7 °F (36.5 °C)  Heart Rate:  [78-87] 78  Resp:  [18] 18  BP: (110-131)/(56-78) 131/78    Flowsheet Rows      First Filed Value   Admission Height  170.2 cm (67\") Documented at 01/11/2021 0958   Admission Weight  106 kg (233 lb) Documented at 01/11/2021 0958          I/O this shift:  In: 120 [P.O.:120]  Out: -   I/O last 3 completed shifts:  In: 628 [P.O.:328; Blood:300]  Out: 1500 [Other:1500]    Intake/Output Summary (Last 24 hours) at 1/13/2021 0922  Last data filed at 1/13/2021 0742  Gross per 24 hour   Intake 238 ml   Output --   Net 238 ml       Physical Exam:  General Appearance: alert, oriented x 3, no acute distress,   Skin: warm and dry  HEENT: pupils round and reactive to light, oral mucosa normal, nonicteric sclera  Neck: supple, no JVD, trachea midline  Lungs: CTA, unlabored breathing effort  Heart: RRR, normal S1 and S2, no S3, " no rub  Abdomen: soft, nontender, normoactive bowels  : no palpable bladder,  Extremities: no edema, cyanosis or clubbing, functional AV fistula in the left upper  Neuro: normal speech and mental status       Results Review:    Results from last 7 days   Lab Units 01/13/21  0601 01/13/21  0034 01/12/21  0756 01/12/21  0000 01/11/21  0946   SODIUM mmol/L 139  --  136 136 138   POTASSIUM mmol/L 3.8 3.4* 3.9 3.3* 4.6   CHLORIDE mmol/L 100  --  96* 96* 101   CO2 mmol/L 24.0  --  27.8 28.4 18.4*   BUN mg/dL 61*  --  38* 26* 74*   CREATININE mg/dL 2.82*  --  1.87* 1.55* 3.35*   CALCIUM mg/dL 9.3  --  9.1 9.1 9.5   BILIRUBIN mg/dL  --   --   --   --  0.3   ALK PHOS U/L  --   --   --   --  76   ALT (SGPT) U/L  --   --   --   --  7   AST (SGOT) U/L  --   --   --   --  17   GLUCOSE mg/dL 103*  --  106* 121* 150*       Estimated Creatinine Clearance: 21.3 mL/min (A) (by C-G formula based on SCr of 2.82 mg/dL (H)).    Results from last 7 days   Lab Units 01/13/21  0601 01/13/21  0034 01/12/21  0000   MAGNESIUM mg/dL  --  1.9  --    PHOSPHORUS mg/dL 4.6*  --  2.5             Results from last 7 days   Lab Units 01/13/21  0601 01/12/21  2347 01/12/21  1551 01/12/21  0756 01/12/21  0000  01/11/21  0946   WBC 10*3/mm3 6.56  --   --   --  7.96  --  7.85   HEMOGLOBIN g/dL 8.5*  8.5* 8.2* 8.4* 8.9* 9.0*  9.0*   < > 8.5*   PLATELETS 10*3/mm3 214  --   --   --  206  --  229    < > = values in this interval not displayed.       Results from last 7 days   Lab Units 01/13/21  0601 01/12/21  0756 01/11/21  0946   INR  2.98* 4.82* 4.37*         Imaging Results (Last 24 Hours)     ** No results found for the last 24 hours. **        busPIRone, 10 mg, Oral, Q12H  escitalopram, 10 mg, Oral, Daily  rOPINIRole, 0.25 mg, Oral, Nightly  sodium chloride, 10 mL, Intravenous, Q12H      pantoprazole, 8 mg/hr, Last Rate: 8 mg/hr (01/13/21 0837)        Medication Review:   Current Facility-Administered Medications   Medication Dose Route Frequency  Provider Last Rate Last Admin   • acetaminophen (TYLENOL) tablet 650 mg  650 mg Oral Q4H PRN Misty Summers APRN   650 mg at 01/12/21 0826    Or   • acetaminophen (TYLENOL) 160 MG/5ML solution 650 mg  650 mg Oral Q4H PRN Misty Summers APRN        Or   • acetaminophen (TYLENOL) suppository 650 mg  650 mg Rectal Q4H PRN Misty Summers APRN       • albumin human 25 % IV SOLN 12.5 g  12.5 g Intravenous PRN Christopher Jin MD       • busPIRone (BUSPAR) tablet 10 mg  10 mg Oral Q12H Misty Summers APRN   10 mg at 01/13/21 0837   • escitalopram (LEXAPRO) tablet 10 mg  10 mg Oral Daily Misty Summers APRN   10 mg at 01/13/21 0837   • lidocaine-prilocaine (EMLA) 2.5-2.5 % cream   Topical PRN Christopher Jin MD       • nitroglycerin (NITROSTAT) SL tablet 0.4 mg  0.4 mg Sublingual Q5 Min PRN Misty Summers APRN       • ondansetron (ZOFRAN) injection 4 mg  4 mg Intravenous Q6H PRN Misty Summers APRN       • pantoprazole (PROTONIX) 40 mg in 100mL NS IVPB  8 mg/hr Intravenous Continuous Eric Perez MD 20 mL/hr at 01/13/21 0837 8 mg/hr at 01/13/21 0837   • rOPINIRole (REQUIP) tablet 0.25 mg  0.25 mg Oral Nightly Misty Summers APRN   0.25 mg at 01/12/21 2007   • sodium chloride 0.9 % bolus 1,000 mL  1,000 mL Intravenous PRN Christopher Jin MD       • sodium chloride 0.9 % flush 10 mL  10 mL Intravenous PRN Silvia Fuller MD       • sodium chloride 0.9 % flush 10 mL  10 mL Intravenous Q12H Misty Summers APRN   10 mL at 01/13/21 0837   • sodium chloride 0.9 % flush 10 mL  10 mL Intravenous PRN Misty Summers APRN       • traMADol (ULTRAM) tablet 25 mg  25 mg Oral Q8H PRN Eric Perez MD   25 mg at 01/13/21 0841       Assessment/Plan   1.  End-stage renal disease on maintenance hemodialysis every Monday, Wednesday and Friday.  Creatinine 2.82, electrolytes within acceptable range  2.  GI bleed with maroon-colored bowel movement and clots  3.  Anemia of chronic kidney disease  and acute blood loss, hemoglobin today 8.5  4.  Prior AVR, MVR and TV repair chronically anticoagulated, INR today improved down to 2.9  5.  Obstructive sleep apnea  6.  GERD.        Plan:  1.  Continue the same treatment  2.  Hemodialysis today  3.  Surveillance labs        Christopher Jin MD  01/13/21  09:22 EST

## 2021-01-13 NOTE — PROGRESS NOTES
Discharge Planning Assessment  [facility]     Patient Name: Claudia Arnold  MRN: [id number]  Today's Date: 1/13/2021    Admit Date: 1/11/2021    Discharge Needs Assessment     Row Name 01/13/21 1606       Living Environment    Lives With  spouse    Name(s) of Who Lives With Patient  spouse, Magnsu Arnold [phone]    Current Living Arrangements  home/apartment/condo    Primary Care Provided by  self    Provides Primary Care For  no one    Family Caregiver if Needed  spouse;child(brooklynn), adult    Family Caregiver Names  spouse, Magnus Arnold [phone] and dtr, julio cesar Cobos [phone]    Able to Return to Prior Arrangements  yes       Resource/Environmental Concerns    Transportation Concerns  car, none       Transition Planning    Patient/Family Anticipates Transition to  home with family    Patient/Family Anticipated Services at Transition  none    Transportation Anticipated  family or friend will provide       Discharge Needs Assessment    Current Outpatient/Agency/Support Group  outpatient hemodialysis    Equipment Currently Used at Home  wheelchair;walker, rolling;ramp;shower chair;bath bench    Concerns to be Addressed  denies needs/concerns at this time    Anticipated Changes Related to Illness  none    Provided Post Acute Provider List?  Refused    Provided Post Acute Provider Quality & Resource List?  Refused    Patient's Choice of Community Agency(s)  patient declines home health or rehab needs at this time and has been to [facility] and has used [facility]         Discharge Plan     Row Name 01/13/21 1609       Plan    Plan  Home with family    Final Note  Facesheet information was verified with patient at bedside.  IMM letter is documented.  Patient lives in a house with spouse.  She has a WC ramp to enter home.  She is current with [facility] at [facility] and uses [facility] for WC transport.  She will use them to transport at [place] and has her WC in her room.  She has a shower chair and bath bench  and a walker also.  She prefers to use [facility] at [place].  her PCP is verified in [facility].  She declines referrals for rehab at this time.  She states she plans home with family and has no rehab needs.......................Precious Barrett RN        Continued Care and Services - Admitted Since 1/11/2021     Dialysis/Infusion Coordination complete    Service Provider Request Status Selected Services Address Phone Fax Patient Preferred    [facility]   Selected Dialysis [address] [address] [phone] --       Internal Comment last updated by Precious Barrett RN 1/13/2021 1614    [facility] established chair                           Demographic Summary     Row Name 01/13/21 1604       General Information    Admission Type  inpatient    Arrived From  home    Required Notices Provided  Important Message from Medicare    Referral Source  admission list    Preferred Language  English       Contact Information    Permission Granted to Share Info With  ;family/designee    Contact Information Comments  spouse, Magnus Arnold [phone]        Functional Status     Row Name 01/13/21 1605       Functional Status    Usual Activity Tolerance  moderate    Current Activity Tolerance  moderate       Functional Status, IADL    Medications  independent    Meal Preparation  independent    Housekeeping  assistive person    Laundry  assistive person    Shopping  completely dependent       Mental Status    General Appearance WDL  WDL       Mental Status Summary    Recent Changes in Mental Status/Cognitive Functioning  no changes       Employment/    Employment Status  retired        Psychosocial    No documentation.       Abuse/Neglect    No documentation.       Legal    No documentation.       Substance Abuse    No documentation.       Patient Forms    No documentation.           Precious Barrett RN

## 2021-01-13 NOTE — PROGRESS NOTES
Discharge Planning Assessment  [facility]     Patient Name: Claudia Arnold  MRN: [id number]  Today's Date: 1/13/2021    Admit Date: 1/11/2021    Discharge Needs Assessment     Row Name 01/13/21 1606       Living Environment    Lives With  spouse    Name(s) of Who Lives With Patient  spouse, Magnus Arnold [phone]    Current Living Arrangements  home/apartment/condo    Primary Care Provided by  self    Provides Primary Care For  no one    Family Caregiver if Needed  spouse;child(brooklynn), adult    Family Caregiver Names  spouse, Magnus Arnold [phone] and dtr, julio cesar Cobos [phone]    Able to Return to Prior Arrangements  yes       Resource/Environmental Concerns    Transportation Concerns  car, none       Transition Planning    Patient/Family Anticipates Transition to  home with family    Patient/Family Anticipated Services at Transition  none    Transportation Anticipated  family or friend will provide       Discharge Needs Assessment    Current Outpatient/Agency/Support Group  outpatient hemodialysis    Equipment Currently Used at Home  wheelchair;walker, rolling;ramp;shower chair;bath bench    Concerns to be Addressed  denies needs/concerns at this time    Anticipated Changes Related to Illness  none    Provided Post Acute Provider List?  Refused    Provided Post Acute Provider Quality & Resource List?  Refused    Patient's Choice of Community Agency(s)  patient declines home health or rehab needs at this time and has been to [facility] and has used [facility]         Discharge Plan     Row Name 01/13/21 1609       Plan    Plan  Home with family    Final Note  Facesheet information was verified with patient at bedside.  IMM letter is documented.  Patient lives in a house with spouse.  She has a WC ramp to enter home.  She is current with [facility] at [facility] and uses [facility] for WC transport.  She will use them to transport at [place] and has her WC in her room.  She has a shower chair and bath bench  and a walker also.  She [place] to use [facility] [place].  her PCP is verified in [facility].  She declines referrals for rehab at this time.  She states she plans home with family and has no rehab needs.......................Precious Barrett RN        Continued Care and Services - Admitted Since 1/11/2021    Coordination has not been started for this encounter.         Demographic Summary     Row Name 01/13/21 1604       General Information    Admission Type  inpatient    Arrived From  home    Required Notices Provided  Important Message from Medicare    Referral Source  admission list    Preferred Language  English       Contact Information    Permission Granted to Share Info With  ;family/designee    Contact Information Comments  spouse, Magnus Arnold [phone]        Functional Status     Row Name 01/13/21 1605       Functional Status    Usual Activity Tolerance  moderate    Current Activity Tolerance  moderate       Functional Status, IADL    Medications  independent    Meal Preparation  independent    Housekeeping  assistive person    Laundry  assistive person    Shopping  completely dependent       Mental Status    General Appearance WDL  WDL       Mental Status Summary    Recent Changes in Mental Status/Cognitive Functioning  no changes       Employment/    Employment Status  retired        Psychosocial    No documentation.       Abuse/Neglect    No documentation.       Legal    No documentation.       Substance Abuse    No documentation.       Patient Forms    No documentation.           Precious Barrett RN

## 2021-01-13 NOTE — DISCHARGE PLACEMENT REQUEST
"Manoj Arnoldlori ANTHONY (75 y.o. Female)     Date of Birth Social Security Number Address Home Phone MRN    1945  [id number]  [place] [address] [phone] [id number]    Latter day Marital Status          None        Admission Date Admission Type Admitting Provider Attending Provider Department, Room/Bed    1/11/21 Emergency Eric Perez MD Reddy, Rahul Kandada, MD [address], [address]1    Discharge Date Discharge Disposition Discharge Destination                       Attending Provider: Nathan Meyer MD    Allergies: No Known Allergies    Isolation: None   Infection: None   Code Status: CPR    Ht: 170.2 cm (67.01\")   Wt: 103 kg (226 lb 4.8 oz)    Admission Cmt: None   Principal Problem: GI bleed [K92.2]                 Active Insurance as of 1/11/2021     Primary Coverage     Payor Plan Insurance Group Employer/Plan Group    MEDICARE MEDICARE A & B      Payor Plan Address Payor Plan Phone Number Payor Plan Fax Number Effective Dates    [address] [phone]  7/1/2010 - None Entered    [facility] [id number]       Subscriber Name Subscriber Birth Date Member ID       JOSE CARLOS ARNOLD GABRIELLE 1945 [id number]           Secondary Coverage     Payor Plan Insurance Group Employer/Plan Group    [employer] COMMERCIAL AET[employer] [id number]     Payor Plan Address Payor Plan Phone Number Payor Plan Fax Number Effective Dates    [address] [phone]  1/1/2013 - None Entered    [facility] [id number]       Subscriber Name Subscriber Birth Date Member ID       KRISHAN ARNOLD 10/25/1943 [id number]                 Emergency Contacts      (Rel.) Home Phone Work Phone Mobile Phone    Mary Coboscia (Daughter) [phone] -- [phone]    Krishan Arnold (Spouse) [phone] -- --    MONSE ARNOLD (Son) -- -- [phone]            "

## 2021-01-14 LAB
ALBUMIN SERPL-MCNC: 3.5 G/DL (ref 3.5–5.2)
ANION GAP SERPL CALCULATED.3IONS-SCNC: 13.8 MMOL/L (ref 5–15)
BASOPHILS # BLD AUTO: 0.03 10*3/MM3 (ref 0–0.2)
BASOPHILS NFR BLD AUTO: 0.3 % (ref 0–1.5)
BUN SERPL-MCNC: 31 MG/DL (ref 8–23)
BUN/CREAT SERPL: 12.8 (ref 7–25)
CALCIUM SPEC-SCNC: 9.1 MG/DL (ref 8.6–10.5)
CHLORIDE SERPL-SCNC: 100 MMOL/L (ref 98–107)
CO2 SERPL-SCNC: 22.2 MMOL/L (ref 22–29)
CREAT SERPL-MCNC: 2.42 MG/DL (ref 0.57–1)
DEPRECATED RDW RBC AUTO: 44.2 FL (ref 37–54)
EOSINOPHIL # BLD AUTO: 0.14 10*3/MM3 (ref 0–0.4)
EOSINOPHIL NFR BLD AUTO: 1.6 % (ref 0.3–6.2)
ERYTHROCYTE [DISTWIDTH] IN BLOOD BY AUTOMATED COUNT: 12.9 % (ref 12.3–15.4)
GFR SERPL CREATININE-BSD FRML MDRD: 19 ML/MIN/1.73
GLUCOSE SERPL-MCNC: 87 MG/DL (ref 65–99)
HCT VFR BLD AUTO: 24.6 % (ref 34–46.6)
HCT VFR BLD AUTO: 27.1 % (ref 34–46.6)
HCT VFR BLD AUTO: 27.5 % (ref 34–46.6)
HCT VFR BLD AUTO: 27.5 % (ref 34–46.6)
HGB BLD-MCNC: 8.2 G/DL (ref 12–15.9)
HGB BLD-MCNC: 8.7 G/DL (ref 12–15.9)
HGB BLD-MCNC: 9 G/DL (ref 12–15.9)
HGB BLD-MCNC: 9 G/DL (ref 12–15.9)
IMM GRANULOCYTES # BLD AUTO: 0.05 10*3/MM3 (ref 0–0.05)
IMM GRANULOCYTES NFR BLD AUTO: 0.6 % (ref 0–0.5)
INR PPP: 1.53 (ref 0.9–1.1)
LYMPHOCYTES # BLD AUTO: 0.88 10*3/MM3 (ref 0.7–3.1)
LYMPHOCYTES NFR BLD AUTO: 10.2 % (ref 19.6–45.3)
MCH RBC QN AUTO: 30.8 PG (ref 26.6–33)
MCHC RBC AUTO-ENTMCNC: 32.7 G/DL (ref 31.5–35.7)
MCV RBC AUTO: 94.2 FL (ref 79–97)
MONOCYTES # BLD AUTO: 0.93 10*3/MM3 (ref 0.1–0.9)
MONOCYTES NFR BLD AUTO: 10.8 % (ref 5–12)
NEUTROPHILS NFR BLD AUTO: 6.56 10*3/MM3 (ref 1.7–7)
NEUTROPHILS NFR BLD AUTO: 76.5 % (ref 42.7–76)
NRBC BLD AUTO-RTO: 0 /100 WBC (ref 0–0.2)
PHOSPHATE SERPL-MCNC: 4 MG/DL (ref 2.5–4.5)
PLATELET # BLD AUTO: 209 10*3/MM3 (ref 140–450)
PMV BLD AUTO: 9.5 FL (ref 6–12)
POTASSIUM SERPL-SCNC: 3.6 MMOL/L (ref 3.5–5.2)
PROTHROMBIN TIME: 18.1 SECONDS (ref 11.7–14.2)
QT INTERVAL: 362 MS
QT INTERVAL: 393 MS
QT INTERVAL: 396 MS
RBC # BLD AUTO: 2.92 10*6/MM3 (ref 3.77–5.28)
SODIUM SERPL-SCNC: 136 MMOL/L (ref 136–145)
WBC # BLD AUTO: 8.59 10*3/MM3 (ref 3.4–10.8)

## 2021-01-14 PROCEDURE — 93005 ELECTROCARDIOGRAM TRACING: CPT | Performed by: INTERNAL MEDICINE

## 2021-01-14 PROCEDURE — 99232 SBSQ HOSP IP/OBS MODERATE 35: CPT | Performed by: NURSE PRACTITIONER

## 2021-01-14 PROCEDURE — 93005 ELECTROCARDIOGRAM TRACING: CPT | Performed by: NURSE PRACTITIONER

## 2021-01-14 PROCEDURE — 85014 HEMATOCRIT: CPT | Performed by: INTERNAL MEDICINE

## 2021-01-14 PROCEDURE — 80069 RENAL FUNCTION PANEL: CPT | Performed by: INTERNAL MEDICINE

## 2021-01-14 PROCEDURE — 25010000002 AMIODARONE IN DEXTROSE 5% 360-4.14 MG/200ML-% SOLUTION: Performed by: NURSE PRACTITIONER

## 2021-01-14 PROCEDURE — 25010000002 DIGOXIN PER 500 MCG: Performed by: INTERNAL MEDICINE

## 2021-01-14 PROCEDURE — 85018 HEMOGLOBIN: CPT | Performed by: INTERNAL MEDICINE

## 2021-01-14 PROCEDURE — 85025 COMPLETE CBC W/AUTO DIFF WBC: CPT | Performed by: INTERNAL MEDICINE

## 2021-01-14 PROCEDURE — 85610 PROTHROMBIN TIME: CPT | Performed by: NURSE PRACTITIONER

## 2021-01-14 PROCEDURE — 99221 1ST HOSP IP/OBS SF/LOW 40: CPT | Performed by: NURSE PRACTITIONER

## 2021-01-14 PROCEDURE — 25010000002 AMIODARONE IN DEXTROSE 5% 150-4.21 MG/100ML-% SOLUTION: Performed by: NURSE PRACTITIONER

## 2021-01-14 PROCEDURE — 93010 ELECTROCARDIOGRAM REPORT: CPT | Performed by: INTERNAL MEDICINE

## 2021-01-14 RX ORDER — DIGOXIN 0.25 MG/ML
125 INJECTION INTRAMUSCULAR; INTRAVENOUS ONCE
Status: COMPLETED | OUTPATIENT
Start: 2021-01-14 | End: 2021-01-14

## 2021-01-14 RX ADMIN — BUSPIRONE HYDROCHLORIDE 10 MG: 10 TABLET ORAL at 08:58

## 2021-01-14 RX ADMIN — PANTOPRAZOLE SODIUM 8 MG/HR: 40 INJECTION, POWDER, FOR SOLUTION INTRAVENOUS at 13:47

## 2021-01-14 RX ADMIN — PANTOPRAZOLE SODIUM 8 MG/HR: 40 INJECTION, POWDER, FOR SOLUTION INTRAVENOUS at 01:57

## 2021-01-14 RX ADMIN — ACETAMINOPHEN 650 MG: 325 TABLET, FILM COATED ORAL at 09:03

## 2021-01-14 RX ADMIN — TRAMADOL HYDROCHLORIDE 25 MG: 50 TABLET ORAL at 22:45

## 2021-01-14 RX ADMIN — AMIODARONE HYDROCHLORIDE 0.5 MG/MIN: 1.8 INJECTION, SOLUTION INTRAVENOUS at 22:46

## 2021-01-14 RX ADMIN — DIGOXIN 125 MCG: 0.25 INJECTION INTRAMUSCULAR; INTRAVENOUS at 08:53

## 2021-01-14 RX ADMIN — PANTOPRAZOLE SODIUM 8 MG/HR: 40 INJECTION, POWDER, FOR SOLUTION INTRAVENOUS at 19:20

## 2021-01-14 RX ADMIN — AMIODARONE HYDROCHLORIDE 150 MG: 1.5 INJECTION, SOLUTION INTRAVENOUS at 16:18

## 2021-01-14 RX ADMIN — PANTOPRAZOLE SODIUM 8 MG/HR: 40 INJECTION, POWDER, FOR SOLUTION INTRAVENOUS at 07:13

## 2021-01-14 RX ADMIN — AMIODARONE HYDROCHLORIDE 1 MG/MIN: 1.8 INJECTION, SOLUTION INTRAVENOUS at 16:28

## 2021-01-14 RX ADMIN — ROPINIROLE HYDROCHLORIDE 0.25 MG: 0.5 TABLET, FILM COATED ORAL at 21:27

## 2021-01-14 RX ADMIN — SODIUM CHLORIDE, PRESERVATIVE FREE 10 ML: 5 INJECTION INTRAVENOUS at 22:08

## 2021-01-14 RX ADMIN — ESCITALOPRAM 10 MG: 10 TABLET, FILM COATED ORAL at 08:58

## 2021-01-14 RX ADMIN — BUSPIRONE HYDROCHLORIDE 10 MG: 10 TABLET ORAL at 21:27

## 2021-01-14 RX ADMIN — SODIUM CHLORIDE, PRESERVATIVE FREE 10 ML: 5 INJECTION INTRAVENOUS at 08:58

## 2021-01-14 NOTE — PROGRESS NOTES
"    Patient Name: Claudia Arnold  :1945  75 y.o.      Patient Care Team:  Robert Cortez MD as PCP - General (Family Medicine)  Deborah Agudelo MD as Surgeon (Orthopedic Surgery)  Scott Segura MD as Consulting Physician (Cardiology)  Scar Gaxiola MD as Surgeon (Cardiothoracic Surgery)  Kathy Osuna MD as Consulting Physician (Nephrology)  Dora Astorga APRN as Nurse Practitioner (Neurosurgery)  Satish Stahl MD as Consulting Physician (Vascular Surgery)  Scar Gaxiola MD as Surgeon (Cardiothoracic Surgery)    Chief Complaint: follow up atrial fibrillation    Interval History: had AF yesterday during dialysis. Received digoxin. Back into SR yesterday evening. Now back in AF with RVR. BP low normal. She feels a little woozy when she stands up. She does not feel her heart racing.        Objective   Vital Signs  Temp:  [97.3 °F (36.3 °C)-98.6 °F (37 °C)] 98.6 °F (37 °C)  Heart Rate:  [] 112  Resp:  [16-18] 18  BP: ()/(50-90) 105/80    Intake/Output Summary (Last 24 hours) at 2021 1259  Last data filed at 2021 1126  Gross per 24 hour   Intake 900 ml   Output 600 ml   Net 300 ml     Flowsheet Rows      First Filed Value   Admission Height  170.2 cm (67\") Documented at 2021 0958   Admission Weight  106 kg (233 lb) Documented at 2021 0958          Physical Exam:   General Appearance:    Alert, cooperative, in no acute distress   Lungs:     Clear to auscultation.  Normal respiratory effort and rate.      Heart:    irregular rhythm and normal rate, normal S1 and S2, no murmurs, gallops or rubs.     Chest Wall:    No abnormalities observed   Abdomen:     Soft, nontender, positive bowel sounds.     Extremities:   no cyanosis, clubbing or edema.  No marked joint deformities.  Adequate musculoskeletal strength.       Results Review:    Results from last 7 days   Lab Units 21  0753   SODIUM mmol/L 136   POTASSIUM mmol/L 3.6   CHLORIDE mmol/L " 100   CO2 mmol/L 22.2   BUN mg/dL 31*   CREATININE mg/dL 2.42*   GLUCOSE mg/dL 87   CALCIUM mg/dL 9.1     Results from last 7 days   Lab Units 01/13/21  0035   TROPONIN T ng/mL 0.084*     Results from last 7 days   Lab Units 01/14/21  0753   WBC 10*3/mm3 8.59   HEMOGLOBIN g/dL 9.0*  9.0*   HEMATOCRIT % 27.5*  27.5*   PLATELETS 10*3/mm3 209     Results from last 7 days   Lab Units 01/14/21  0753 01/13/21  0601 01/12/21  0756   INR  1.53* 2.98* 4.82*     Results from last 7 days   Lab Units 01/13/21  0034   MAGNESIUM mg/dL 1.9                   Medication Review:   amiodarone, 150 mg, Intravenous, Once  busPIRone, 10 mg, Oral, Q12H  escitalopram, 10 mg, Oral, Daily  rOPINIRole, 0.25 mg, Oral, Nightly  sodium chloride, 10 mL, Intravenous, Q12H         amiodarone, 1 mg/min    Followed by  amiodarone, 0.5 mg/min  pantoprazole, 8 mg/hr, Last Rate: 8 mg/hr (01/14/21 0713)        Assessment/Plan    1. Paroxysmal atrial fibrillation - in and out of AF over last 24 hrs. Has received several doses of digoxin. Hesitant to continue to use digoxin given underlying kidney disease. Will try amiodarone as onset <48 hrs, risk is low. Anticoagulation is on hold for GIB. Discussed with Dr. Paiz.     2. Bright red blood per rectum - s/p vitamin K. INR appropriate for scopes. GI plans to wait until Saturday to allow time for prep.     3. Bioprosthetic heart valves    4. ESRD on HD    5. Anemia of chronic disease.     MORGAN Arreguin  [facility]  01/14/21  12:59 EST

## 2021-01-14 NOTE — PROGRESS NOTES
Name: Claudia Arnold ADMIT: 2021   : 1945  PCP: Robert Cortez MD    MRN: [id number] LOS: 3 days   AGE/SEX: 75 y.o. female  ROOM: [facility]     Subjective   Subjective      Patient is lying in bed in no major distress.  No new events overnight.  Denies nausea, vomiting abdominal pain, chest pain.       Objective   Objective   Vital Signs  Temp:  [97.3 °F (36.3 °C)-98.6 °F (37 °C)] 98.6 °F (37 °C)  Heart Rate:  [] 78  Resp:  [18] 18  BP: ()/(52-90) 128/59  SpO2:  [91 %-97 %] 97 %  on   ;   Device (Oxygen Therapy): room air  Body mass index is 35.92 kg/m².  Physical Exam   HEENT:  Atraumatic, normocephalic.  PERRLA.  Extraocular movements intact.  Conjunctivae pink.  Sclerae, no icterus.  Mucous membranes dry.  Oropharynx is  clear.  NECK:  Supple.  No JVD.  HEART:  Irregular rate and rhythm.  Normal S1, S2.   LUNGS:  Fairly clear to auscultation anteriorly.  No wheezes.  No crackles.  ABDOMEN:   Soft, nontender.  Bowel sounds present.  No rebound.  No  guarding.  EXTREMITIES:  No cyanosis, clubbing, or edema.  Palpable pedal pulses.  NEURO:  Grossly nonfocal.  No facial asymmetry.  Good strength in all 4  extremities.      Results Review     I reviewed the patient's new clinical results.  Results from last 7 days   Lab Units 21  1556 21  0753 21  0006 21  1617 21  0601  21  0000  21  0946   WBC 10*3/mm3  --  8.59  --   --  6.56  --  7.96  --  7.85   HEMOGLOBIN g/dL 8.7* 9.0*  9.0* 8.2* 8.8* 8.5*  8.5*   < > 9.0*  9.0*   < > 8.5*   PLATELETS 10*3/mm3  --  209  --   --  214  --  206  --  229    < > = values in this interval not displayed.     Results from last 7 days   Lab Units 21  0753 21  0601 21  0034 21  0756 21  0000   SODIUM mmol/L 136 139  --  136 136   POTASSIUM mmol/L 3.6 3.8 3.4* 3.9 3.3*   CHLORIDE mmol/L 100 100  --  96* 96*   CO2 mmol/L 22.2 24.0  --  27.8 28.4   BUN mg/dL 31* 61*  --  38* 26*   CREATININE  mg/dL 2.42* 2.82*  --  1.87* 1.55*   GLUCOSE mg/dL 87 103*  --  106* 121*   Estimated Creatinine Clearance: 24.9 mL/min (A) (by C-G formula based on SCr of 2.42 mg/dL (H)).  Results from last 7 days   Lab Units 01/14/21  0753 01/13/21  0601 01/12/21  0000 01/11/21  0946   ALBUMIN g/dL 3.50 3.40* 3.50 3.50   BILIRUBIN mg/dL  --   --   --  0.3   ALK PHOS U/L  --   --   --  76   AST (SGOT) U/L  --   --   --  17   ALT (SGPT) U/L  --   --   --  7     Results from last 7 days   Lab Units 01/14/21  0753 01/13/21  0601 01/13/21  0034 01/12/21  0756 01/12/21  0000 01/11/21  0946   CALCIUM mg/dL 9.1 9.3  --  9.1 9.1 9.5   ALBUMIN g/dL 3.50 3.40*  --   --  3.50 3.50   MAGNESIUM mg/dL  --   --  1.9  --   --   --    PHOSPHORUS mg/dL 4.0 4.6*  --   --  2.5  --      Results from last 7 days   Lab Units 01/11/21  1843 01/11/21  1436 01/11/21  1011   LACTATE mmol/L 1.0 3.3* 2.4*     COVID19   Date Value Ref Range Status   01/11/2021 Not Detected Not Detected - Ref. Range Final     No results found for: HGBA1C, POCGLU    XR Shoulder 2+ View Bilateral  Ordering physician's impression is located in the Encounter Note dated 09/16/19. X-ray performed in the DR room.    Scheduled Medications  busPIRone, 10 mg, Oral, Q12H  escitalopram, 10 mg, Oral, Daily  rOPINIRole, 0.25 mg, Oral, Nightly  sodium chloride, 10 mL, Intravenous, Q12H    Infusions  amiodarone, 1 mg/min    Followed by  amiodarone, 0.5 mg/min  pantoprazole, 8 mg/hr, Last Rate: 8 mg/hr (01/14/21 1347)    Diet  Diet Clear Liquid       Assessment/Plan     Active Hospital Problems    Diagnosis  POA   • **GI bleed [K92.2]  Yes   • 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 (CMS/[facility]) [D68.318]  Yes   • Chronic pain of both shoulders [M25.511, G89.29, M25.512]  Yes   • CAD (coronary artery disease) [I25.10]  Yes   • S/P AVR (aortic valve replacement) [Z95.2]  Not Applicable   • Chronic diastolic CHF (congestive heart failure) (CMS/[facility]) [I50.32]  Yes   • Dependence on  renal dialysis (CMS/[facility]) [Z99.2]  Not Applicable   • Paroxysmal atrial fibrillation (CMS/[facility]) [I48.0]  Yes   • Stage 5 chronic kidney disease on chronic dialysis (CMS/[facility]) [N18.6, Z99.2]  Not Applicable   • Gastroesophageal reflux disease without esophagitis [K21.9]  Yes   • Pulmonary hypertension (CMS/[facility]) [I27.20]  Yes   • S/P MVR (mitral valve replacement) [Z95.2]  Not Applicable   • Hyperlipidemia [E78.5]  Yes   • Hypertension [I10]  Yes      Resolved Hospital Problems   No resolved problems to display.       1. Acute GI bleed, check H and H every 8 hours and transfuse as needed.  GI is following and patient is on Protonix.  She did receive vitamin K earlier during this hospital stay and is on chronic anticoagulation therapy which is on hold.  2. Afib with RVR /bioprosthetic heart valves, patient did receive digoxin earlier today. Appreciate cardiology input.  3. End-stage renal disease, continue with Monday Wednesday and Friday hemodialysis.  4. History of depression and anxiety, continue with Lexapro and BuSpar.    5. On SCDs for DVT prophylaxis.    6. Code status is full code.          Nathan Meyer MD  [facility]  01/14/21  16:23 EST     I wore protective equipment throughout this patient encounter including a face mask, gloves and protective eyewear.  Hand hygiene was performed before donning protective equipment and after removal when leaving the room.

## 2021-01-14 NOTE — PROGRESS NOTES
Gastroenterology   Inpatient Progress Note    Reason for Follow Up:  Rectal bleeding    Subjective  Interval History:   No rectal bleeding or blood in stool overnight.    Hemoglobin improved to 9.    INR improved, 1.53 this morning.    Patient denies abdominal pain, cramping, bloating.    Patient went into RVR and has had several doses of digoxin.    She continues on clear liquid diet for possible colonoscopy tomorrow.        Current Facility-Administered Medications:   •  acetaminophen (TYLENOL) tablet 650 mg, 650 mg, Oral, Q4H PRN, 650 mg at 01/14/21 0903 **OR** acetaminophen (TYLENOL) 160 MG/5ML solution 650 mg, 650 mg, Oral, Q4H PRN **OR** acetaminophen (TYLENOL) suppository 650 mg, 650 mg, Rectal, Q4H PRN, Misty Summers APRN  •  busPIRone (BUSPAR) tablet 10 mg, 10 mg, Oral, Q12H, Misty Summers APRN, 10 mg at 01/14/21 0858  •  escitalopram (LEXAPRO) tablet 10 mg, 10 mg, Oral, Daily, Misty Summers APRN, 10 mg at 01/14/21 0858  •  lidocaine-prilocaine (EMLA) 2.5-2.5 % cream, , Topical, PRN, Christopher Jin MD  •  metoprolol tartrate (LOPRESSOR) injection 5 mg, 5 mg, Intravenous, Q4H PRN, Ana Cristina Callaway APRN  •  nitroglycerin (NITROSTAT) SL tablet 0.4 mg, 0.4 mg, Sublingual, Q5 Min PRN, Misty Summers APRN  •  ondansetron (ZOFRAN) injection 4 mg, 4 mg, Intravenous, Q6H PRN, Misty Summers APRN  •  pantoprazole (PROTONIX) 40 mg in 100mL NS IVPB, 8 mg/hr, Intravenous, Continuous, Eric Perez MD, Last Rate: 20 mL/hr at 01/14/21 0713, 8 mg/hr at 01/14/21 0713  •  rOPINIRole (REQUIP) tablet 0.25 mg, 0.25 mg, Oral, Nightly, Misty Summers APRN, 0.25 mg at 01/13/21 2004  •  sodium chloride 0.9 % flush 10 mL, 10 mL, Intravenous, PRN, Silvia Fuller MD  •  sodium chloride 0.9 % flush 10 mL, 10 mL, Intravenous, Q12H, Misty Summers APRN, 10 mL at 01/14/21 0858  •  sodium chloride 0.9 % flush 10 mL, 10 mL, Intravenous, PRN, Misty Summers APRN, 10 mL at 01/13/21 1951  •  traMADol (ULTRAM)  tablet 25 mg, 25 mg, Oral, Q8H PRN, Eric Perez MD, 25 mg at 01/13/21 2227  Review of Systems:               The following systems were reviewed and negative;  gastrointestinal    Objective     Vital Signs  Temp:  [97.3 °F (36.3 °C)-97.6 °F (36.4 °C)] 97.5 °F (36.4 °C)  Heart Rate:  [] 101  Resp:  [16-18] 18  BP: ()/(50-90) 103/71  Body mass index is 35.92 kg/m².                  Physical Exam:              General: patient awake, alert and cooperative, obese              Eyes: no scleral icterus              Skin: warm and dry, not jaundiced              Abdomen: soft, nontender, nondistended; normal bowel sounds              Psychiatric: Appropriate affect and behavior                Results Review:                I reviewed the patient's new clinical results.    Results from last 7 days   Lab Units 01/14/21  0753 01/14/21  0006 01/13/21  1617 01/13/21  0601  01/12/21  0000   WBC 10*3/mm3 8.59  --   --  6.56  --  7.96   HEMOGLOBIN g/dL 9.0*  9.0* 8.2* 8.8* 8.5*  8.5*   < > 9.0*  9.0*   HEMATOCRIT % 27.5*  27.5* 24.6* 26.2* 25.2*  25.2*   < > 27.2*  27.2*   PLATELETS 10*3/mm3 209  --   --  214  --  206    < > = values in this interval not displayed.     Results from last 7 days   Lab Units 01/14/21  0753 01/13/21  0601 01/13/21  0034 01/12/21  0756  01/11/21  0946   SODIUM mmol/L 136 139  --  136   < > 138   POTASSIUM mmol/L 3.6 3.8 3.4* 3.9   < > 4.6   CHLORIDE mmol/L 100 100  --  96*   < > 101   CO2 mmol/L 22.2 24.0  --  27.8   < > 18.4*   BUN mg/dL 31* 61*  --  38*   < > 74*   CREATININE mg/dL 2.42* 2.82*  --  1.87*   < > 3.35*   CALCIUM mg/dL 9.1 9.3  --  9.1   < > 9.5   BILIRUBIN mg/dL  --   --   --   --   --  0.3   ALK PHOS U/L  --   --   --   --   --  76   ALT (SGPT) U/L  --   --   --   --   --  7   AST (SGOT) U/L  --   --   --   --   --  17   GLUCOSE mg/dL 87 103*  --  106*   < > 150*    < > = values in this interval not displayed.     Results from last 7 days   Lab Units  01/14/21  0753 01/13/21  0601 01/12/21  0756   INR  1.53* 2.98* 4.82*     No results found for: LIPASE    Radiology:  No orders to display       Assessment/Plan     Patient Active Problem List   Diagnosis   • Tear of rotator cuff   • Hypertension   • Generalized anxiety disorder   • Hyperlipidemia   • IFG (impaired fasting glucose)   • Heart murmur, systolic   • Shoulder pain, bilateral   • Pain of both shoulder joints   • Chronic pain of both shoulders   • Acute congestive heart failure (CMS/[facility])   • Obesity, morbid, BMI 50 or higher (CMS/[facility])   • Fungal skin infection   • Cellulitis of lower extremity   • Aortic valve stenosis   • Tricuspid valve insufficiency   • Mitral valve stenosis   • S/P MVR (mitral valve replacement)   • Paroxysmal atrial fibrillation (CMS/[facility])   • Pulmonary hypertension (CMS/[facility])   • Stage 5 chronic kidney disease on chronic dialysis (CMS/[facility])   • Gastroesophageal reflux disease without esophagitis   • Chronic idiopathic constipation   • Dependence on renal dialysis (CMS/[facility])   • Chronic kidney disease, stage 2 (mild)   • Renovascular hypertension   • GI bleed   • 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 (CMS/[facility])   • CAD (coronary artery disease)   • S/P AVR (aortic valve replacement)   • Chronic diastolic CHF (congestive heart failure) (CMS/[facility])   • Chronic pain of both shoulders       Assessment:  1. Rectal bleeding, stable  2. Anemia  3. End-stage renal disease on hemodialysis  4. On warfarin for AVR and MVR valve replacement    These problems are new to me  Plan:  · INR, normal  · Currently in RVR, starting amiodarone drip  · No evidence of blood per rectum and hemoglobin is stable at this time. Will postpone bowel prep today and based on cardiology clearance and status, consider bowel prep tomorrow.  Patient will remain on a clear liquid diet today and tomorrow with tentative plans for colonoscopy on Friday.      I discussed the patients findings and my recommendations  with patient, nursing staff and Dr Thompson.           Jerri MORA  [facility] [place] [id number]  Office: [phone]

## 2021-01-14 NOTE — PROGRESS NOTES
"   LOS: 3 days    Patient Care Team:  Robert Cortez MD as PCP - General (Family Medicine)  Deborah Agudelo MD as Surgeon (Orthopedic Surgery)  Scott Segura MD as Consulting Physician (Cardiology)  Scar Gaxiola MD as Surgeon (Cardiothoracic Surgery)  Kathy Osuna MD as Consulting Physician (Nephrology)  Dora Astorga APRN as Nurse Practitioner (Neurosurgery)  Satish Stahl MD as Consulting Physician (Vascular Surgery)  Scar Gaxiola MD as Surgeon (Cardiothoracic Surgery)    Chief Complaint:    Chief Complaint   Patient presents with   • Rectal Bleeding     Follow-up end-stage renal disease  Subjective     Interval History:   The patient had dialysis yesterday but she developed atrial fibrillation with rapid ventricular response she was converted to normal sinus rhythm late last night but she is back into A. fib right now plan for colonoscopy tomorrow continue to have GI bleed.  Denies any chest pain or shortness of air, no nausea or vomiting.    Review of Systems:   As noted above    Objective     Vital Signs  Temp:  [97.3 °F (36.3 °C)-97.6 °F (36.4 °C)] 97.5 °F (36.4 °C)  Heart Rate:  [] 117  Resp:  [16-18] 18  BP: ()/(50-90) 97/67    Flowsheet Rows      First Filed Value   Admission Height  170.2 cm (67\") Documented at 01/11/2021 0958   Admission Weight  106 kg (233 lb) Documented at 01/11/2021 0958          No intake/output data recorded.  I/O last 3 completed shifts:  In: 838 [P.O.:838]  Out: 600 [Urine:100; Other:500]    Intake/Output Summary (Last 24 hours) at 1/14/2021 0748  Last data filed at 1/14/2021 0523  Gross per 24 hour   Intake 600 ml   Output 600 ml   Net 0 ml       Physical Exam:  General Appearance: alert, oriented x 3, no acute distress,   Skin: warm and dry  HEENT: pupils round and reactive to light, oral mucosa normal, nonicteric sclera  Neck: supple, no JVD, trachea midline  Lungs: CTA, unlabored breathing effort  Heart: Irregularly " irregular, no rub  Abdomen: soft, nontender, normoactive bowels  : no palpable bladder,  Extremities: no edema, cyanosis or clubbing, functional AV fistula in the left upper  Neuro: normal speech and mental status       Results Review:    Results from last 7 days   Lab Units 01/13/21  0601 01/13/21  0034 01/12/21  0756 01/12/21  0000 01/11/21  0946   SODIUM mmol/L 139  --  136 136 138   POTASSIUM mmol/L 3.8 3.4* 3.9 3.3* 4.6   CHLORIDE mmol/L 100  --  96* 96* 101   CO2 mmol/L 24.0  --  27.8 28.4 18.4*   BUN mg/dL 61*  --  38* 26* 74*   CREATININE mg/dL 2.82*  --  1.87* 1.55* 3.35*   CALCIUM mg/dL 9.3  --  9.1 9.1 9.5   BILIRUBIN mg/dL  --   --   --   --  0.3   ALK PHOS U/L  --   --   --   --  76   ALT (SGPT) U/L  --   --   --   --  7   AST (SGOT) U/L  --   --   --   --  17   GLUCOSE mg/dL 103*  --  106* 121* 150*       Estimated Creatinine Clearance: 21.4 mL/min (A) (by C-G formula based on SCr of 2.82 mg/dL (H)).    Results from last 7 days   Lab Units 01/13/21  0601 01/13/21  0034 01/12/21  0000   MAGNESIUM mg/dL  --  1.9  --    PHOSPHORUS mg/dL 4.6*  --  2.5             Results from last 7 days   Lab Units 01/14/21  0006 01/13/21  1617 01/13/21  0601 01/12/21  2347 01/12/21  1551  01/12/21  0000  01/11/21  0946   WBC 10*3/mm3  --   --  6.56  --   --   --  7.96  --  7.85   HEMOGLOBIN g/dL 8.2* 8.8* 8.5*  8.5* 8.2* 8.4*   < > 9.0*  9.0*   < > 8.5*   PLATELETS 10*3/mm3  --   --  214  --   --   --  206  --  229    < > = values in this interval not displayed.       Results from last 7 days   Lab Units 01/13/21  0601 01/12/21  0756 01/11/21  0946   INR  2.98* 4.82* 4.37*         Imaging Results (Last 24 Hours)     ** No results found for the last 24 hours. **        busPIRone, 10 mg, Oral, Q12H  escitalopram, 10 mg, Oral, Daily  rOPINIRole, 0.25 mg, Oral, Nightly  sodium chloride, 10 mL, Intravenous, Q12H      pantoprazole, 8 mg/hr, Last Rate: 8 mg/hr (01/14/21 0713)        Medication Review:   Current  Facility-Administered Medications   Medication Dose Route Frequency Provider Last Rate Last Admin   • acetaminophen (TYLENOL) tablet 650 mg  650 mg Oral Q4H PRN Misty Summers APRN   650 mg at 01/12/21 0826    Or   • acetaminophen (TYLENOL) 160 MG/5ML solution 650 mg  650 mg Oral Q4H PRN Misty Summers, MORGAN        Or   • acetaminophen (TYLENOL) suppository 650 mg  650 mg Rectal Q4H PRN Misty Summers APRN       • busPIRone (BUSPAR) tablet 10 mg  10 mg Oral Q12H Misty Summers APRN   10 mg at 01/13/21 2004   • escitalopram (LEXAPRO) tablet 10 mg  10 mg Oral Daily Misty Summers APRN   10 mg at 01/13/21 0837   • lidocaine-prilocaine (EMLA) 2.5-2.5 % cream   Topical PRN Christopher Jin MD       • metoprolol tartrate (LOPRESSOR) injection 5 mg  5 mg Intravenous Q4H PRN Ana Cristina Callaway APRN       • nitroglycerin (NITROSTAT) SL tablet 0.4 mg  0.4 mg Sublingual Q5 Min PRN Misty Summers APRN       • ondansetron (ZOFRAN) injection 4 mg  4 mg Intravenous Q6H PRN Misty Summers APRN       • pantoprazole (PROTONIX) 40 mg in 100mL NS IVPB  8 mg/hr Intravenous Continuous Eric Perez MD 20 mL/hr at 01/14/21 0713 8 mg/hr at 01/14/21 0713   • rOPINIRole (REQUIP) tablet 0.25 mg  0.25 mg Oral Nightly Misty Summers APRN   0.25 mg at 01/13/21 2004   • sodium chloride 0.9 % bolus 1,000 mL  1,000 mL Intravenous PRN Christopher Jin MD       • sodium chloride 0.9 % flush 10 mL  10 mL Intravenous PRN Silvia Fuller MD       • sodium chloride 0.9 % flush 10 mL  10 mL Intravenous Q12H Misty Summers APRN   10 mL at 01/13/21 0837   • sodium chloride 0.9 % flush 10 mL  10 mL Intravenous PRN Misty Summers APRN   10 mL at 01/13/21 1951   • traMADol (ULTRAM) tablet 25 mg  25 mg Oral Q8H PRN Eric Perez MD   25 mg at 01/13/21 2227       Assessment/Plan   1.  End-stage renal disease on maintenance hemodialysis every Monday, Wednesday and Friday.  Her labs from this morning still pending  2.  GI  bleed with maroon-colored bowel movement and clots  3.  Anemia of chronic kidney disease and acute blood loss, hemoglobin today 8.2  4.  Prior AVR, MVR and TV repair chronically anticoagulated,   5.  Obstructive sleep apnea  6.  GERD.  7.  Atrial fibrillation with rapid ventricular response during dialysis yesterday converted to normal sinus rhythm after she was treated with digoxin, but she is back into atrial fibrillation this morning cardiology is following.        Plan:  1.  Continue the same treatment  2.  Hemodialysis tomorrow  3.  Surveillance labs        Christopher Jin MD  01/14/21  07:48 EST

## 2021-01-15 ENCOUNTER — PREP FOR SURGERY (OUTPATIENT)
Dept: OTHER | Facility: HOSPITAL | Age: 76
End: 2021-01-15

## 2021-01-15 DIAGNOSIS — K92.2 GI BLEED: Primary | ICD-10-CM

## 2021-01-15 PROBLEM — K92.1 GASTROINTESTINAL HEMORRHAGE WITH MELENA: Status: ACTIVE | Noted: 2021-01-15

## 2021-01-15 LAB
ALBUMIN SERPL-MCNC: 3.3 G/DL (ref 3.5–5.2)
ANION GAP SERPL CALCULATED.3IONS-SCNC: 13.1 MMOL/L (ref 5–15)
BASOPHILS # BLD AUTO: 0.04 10*3/MM3 (ref 0–0.2)
BASOPHILS NFR BLD AUTO: 0.5 % (ref 0–1.5)
BUN SERPL-MCNC: 37 MG/DL (ref 8–23)
BUN/CREAT SERPL: 13.2 (ref 7–25)
CALCIUM SPEC-SCNC: 8.8 MG/DL (ref 8.6–10.5)
CHLORIDE SERPL-SCNC: 101 MMOL/L (ref 98–107)
CO2 SERPL-SCNC: 21.9 MMOL/L (ref 22–29)
CREAT SERPL-MCNC: 2.8 MG/DL (ref 0.57–1)
DEPRECATED RDW RBC AUTO: 45.3 FL (ref 37–54)
EOSINOPHIL # BLD AUTO: 0.19 10*3/MM3 (ref 0–0.4)
EOSINOPHIL NFR BLD AUTO: 2.2 % (ref 0.3–6.2)
ERYTHROCYTE [DISTWIDTH] IN BLOOD BY AUTOMATED COUNT: 13.2 % (ref 12.3–15.4)
GFR SERPL CREATININE-BSD FRML MDRD: 16 ML/MIN/1.73
GLUCOSE SERPL-MCNC: 84 MG/DL (ref 65–99)
HCT VFR BLD AUTO: 23.5 % (ref 34–46.6)
HCT VFR BLD AUTO: 23.5 % (ref 34–46.6)
HCT VFR BLD AUTO: 25.7 % (ref 34–46.6)
HCT VFR BLD AUTO: 26 % (ref 34–46.6)
HGB BLD-MCNC: 7.9 G/DL (ref 12–15.9)
HGB BLD-MCNC: 7.9 G/DL (ref 12–15.9)
HGB BLD-MCNC: 8.4 G/DL (ref 12–15.9)
HGB BLD-MCNC: 8.5 G/DL (ref 12–15.9)
IMM GRANULOCYTES # BLD AUTO: 0.04 10*3/MM3 (ref 0–0.05)
IMM GRANULOCYTES NFR BLD AUTO: 0.5 % (ref 0–0.5)
INR PPP: 1.39 (ref 0.9–1.1)
LYMPHOCYTES # BLD AUTO: 0.96 10*3/MM3 (ref 0.7–3.1)
LYMPHOCYTES NFR BLD AUTO: 11 % (ref 19.6–45.3)
MCH RBC QN AUTO: 31.5 PG (ref 26.6–33)
MCHC RBC AUTO-ENTMCNC: 33.6 G/DL (ref 31.5–35.7)
MCV RBC AUTO: 93.6 FL (ref 79–97)
MONOCYTES # BLD AUTO: 0.93 10*3/MM3 (ref 0.1–0.9)
MONOCYTES NFR BLD AUTO: 10.7 % (ref 5–12)
NEUTROPHILS NFR BLD AUTO: 6.57 10*3/MM3 (ref 1.7–7)
NEUTROPHILS NFR BLD AUTO: 75.1 % (ref 42.7–76)
NRBC BLD AUTO-RTO: 0 /100 WBC (ref 0–0.2)
PHOSPHATE SERPL-MCNC: 4.2 MG/DL (ref 2.5–4.5)
PLATELET # BLD AUTO: 210 10*3/MM3 (ref 140–450)
PMV BLD AUTO: 9.3 FL (ref 6–12)
POTASSIUM SERPL-SCNC: 3.3 MMOL/L (ref 3.5–5.2)
POTASSIUM SERPL-SCNC: 3.7 MMOL/L (ref 3.5–5.2)
PROTHROMBIN TIME: 16.9 SECONDS (ref 11.7–14.2)
QT INTERVAL: 375 MS
RBC # BLD AUTO: 2.51 10*6/MM3 (ref 3.77–5.28)
SODIUM SERPL-SCNC: 136 MMOL/L (ref 136–145)
WBC # BLD AUTO: 8.73 10*3/MM3 (ref 3.4–10.8)

## 2021-01-15 PROCEDURE — 85018 HEMOGLOBIN: CPT | Performed by: INTERNAL MEDICINE

## 2021-01-15 PROCEDURE — 93005 ELECTROCARDIOGRAM TRACING: CPT | Performed by: NURSE PRACTITIONER

## 2021-01-15 PROCEDURE — 25010000002 AMIODARONE IN DEXTROSE 5% 360-4.14 MG/200ML-% SOLUTION: Performed by: NURSE PRACTITIONER

## 2021-01-15 PROCEDURE — 80069 RENAL FUNCTION PANEL: CPT | Performed by: INTERNAL MEDICINE

## 2021-01-15 PROCEDURE — 84132 ASSAY OF SERUM POTASSIUM: CPT | Performed by: NURSE PRACTITIONER

## 2021-01-15 PROCEDURE — 36415 COLL VENOUS BLD VENIPUNCTURE: CPT | Performed by: INTERNAL MEDICINE

## 2021-01-15 PROCEDURE — 93010 ELECTROCARDIOGRAM REPORT: CPT | Performed by: INTERNAL MEDICINE

## 2021-01-15 PROCEDURE — 99232 SBSQ HOSP IP/OBS MODERATE 35: CPT | Performed by: NURSE PRACTITIONER

## 2021-01-15 PROCEDURE — 85014 HEMATOCRIT: CPT | Performed by: INTERNAL MEDICINE

## 2021-01-15 PROCEDURE — 85610 PROTHROMBIN TIME: CPT | Performed by: NURSE PRACTITIONER

## 2021-01-15 PROCEDURE — 99231 SBSQ HOSP IP/OBS SF/LOW 25: CPT | Performed by: NURSE PRACTITIONER

## 2021-01-15 PROCEDURE — 85025 COMPLETE CBC W/AUTO DIFF WBC: CPT | Performed by: INTERNAL MEDICINE

## 2021-01-15 RX ORDER — AMOXICILLIN 250 MG
4 CAPSULE ORAL ONCE
Status: COMPLETED | OUTPATIENT
Start: 2021-01-15 | End: 2021-01-15

## 2021-01-15 RX ORDER — POLYETHYLENE GLYCOL 3350 17 G/17G
1 POWDER, FOR SOLUTION ORAL ONCE
Status: COMPLETED | OUTPATIENT
Start: 2021-01-15 | End: 2021-01-15

## 2021-01-15 RX ORDER — POTASSIUM CHLORIDE 1.5 G/1.77G
20 POWDER, FOR SOLUTION ORAL ONCE
Status: COMPLETED | OUTPATIENT
Start: 2021-01-15 | End: 2021-01-15

## 2021-01-15 RX ADMIN — SODIUM CHLORIDE, PRESERVATIVE FREE 10 ML: 5 INJECTION INTRAVENOUS at 22:14

## 2021-01-15 RX ADMIN — BUSPIRONE HYDROCHLORIDE 10 MG: 10 TABLET ORAL at 08:13

## 2021-01-15 RX ADMIN — PANTOPRAZOLE SODIUM 8 MG/HR: 40 INJECTION, POWDER, FOR SOLUTION INTRAVENOUS at 09:55

## 2021-01-15 RX ADMIN — ROPINIROLE HYDROCHLORIDE 0.25 MG: 0.5 TABLET, FILM COATED ORAL at 22:14

## 2021-01-15 RX ADMIN — POTASSIUM CHLORIDE 20 MEQ: 1.5 POWDER, FOR SOLUTION ORAL at 03:08

## 2021-01-15 RX ADMIN — PANTOPRAZOLE SODIUM 8 MG/HR: 40 INJECTION, POWDER, FOR SOLUTION INTRAVENOUS at 15:14

## 2021-01-15 RX ADMIN — TRAMADOL HYDROCHLORIDE 25 MG: 50 TABLET ORAL at 11:08

## 2021-01-15 RX ADMIN — PANTOPRAZOLE SODIUM 8 MG/HR: 40 INJECTION, POWDER, FOR SOLUTION INTRAVENOUS at 22:14

## 2021-01-15 RX ADMIN — ACETAMINOPHEN 650 MG: 325 TABLET, FILM COATED ORAL at 08:13

## 2021-01-15 RX ADMIN — PANTOPRAZOLE SODIUM 8 MG/HR: 40 INJECTION, POWDER, FOR SOLUTION INTRAVENOUS at 03:08

## 2021-01-15 RX ADMIN — TRAMADOL HYDROCHLORIDE 25 MG: 50 TABLET ORAL at 22:14

## 2021-01-15 RX ADMIN — AMIODARONE HYDROCHLORIDE 0.5 MG/MIN: 1.8 INJECTION, SOLUTION INTRAVENOUS at 11:08

## 2021-01-15 RX ADMIN — ESCITALOPRAM 10 MG: 10 TABLET, FILM COATED ORAL at 08:13

## 2021-01-15 RX ADMIN — AMIODARONE HYDROCHLORIDE 0.5 MG/MIN: 1.8 INJECTION, SOLUTION INTRAVENOUS at 22:14

## 2021-01-15 RX ADMIN — DOCUSATE SODIUM 50MG AND SENNOSIDES 8.6MG 4 TABLET: 8.6; 5 TABLET, FILM COATED ORAL at 16:49

## 2021-01-15 RX ADMIN — BUSPIRONE HYDROCHLORIDE 10 MG: 10 TABLET ORAL at 22:14

## 2021-01-15 RX ADMIN — SODIUM CHLORIDE, PRESERVATIVE FREE 10 ML: 5 INJECTION INTRAVENOUS at 08:15

## 2021-01-15 RX ADMIN — POLYETHYLENE GLYCOL 3350 1 BOTTLE: 17 POWDER, FOR SOLUTION ORAL at 16:52

## 2021-01-15 NOTE — PROGRESS NOTES
"   LOS: 4 days    Patient Care Team:  Robert Cortez MD as PCP - General (Family Medicine)  Deborah Agudelo MD as Surgeon (Orthopedic Surgery)  Scott Segura MD as Consulting Physician (Cardiology)  Scar Gaxiola MD as Surgeon (Cardiothoracic Surgery)  Kathy Osuna MD as Consulting Physician (Nephrology)  Dora Astorga APRN as Nurse Practitioner (Neurosurgery)  Satish Stahl MD as Consulting Physician (Vascular Surgery)  Scar Gaxiola MD as Surgeon (Cardiothoracic Surgery)    Chief Complaint:    Chief Complaint   Patient presents with   • Rectal Bleeding     Follow-up end-stage renal disease  Subjective     Interval History:   The patient had the prep for colonoscopy, her potassium was low earlier received potassium replacement.  Denies chest pain or shortness of air, complaining of being weak and fatigued, she stated that her GI bleed may have slowed down or stopped    Review of Systems:   As noted above    Objective     Vital Signs  Temp:  [97.7 °F (36.5 °C)-98.6 °F (37 °C)] 97.9 °F (36.6 °C)  Heart Rate:  [] 88  Resp:  [18] 18  BP: (101-132)/(50-80) 101/79    Flowsheet Rows      First Filed Value   Admission Height  170.2 cm (67\") Documented at 01/11/2021 0958   Admission Weight  106 kg (233 lb) Documented at 01/11/2021 0958          No intake/output data recorded.  I/O last 3 completed shifts:  In: 1130 [P.O.:1130]  Out: 100 [Urine:100]    Intake/Output Summary (Last 24 hours) at 1/15/2021 0941  Last data filed at 1/15/2021 0208  Gross per 24 hour   Intake 710 ml   Output --   Net 710 ml       Physical Exam:  General Appearance: alert, oriented x 3, no acute distress,   Skin: warm and dry  HEENT: pupils round and reactive to light, oral mucosa normal, nonicteric sclera  Neck: supple, no JVD, trachea midline  Lungs: CTA, unlabored breathing effort  Heart: Irregularly irregular, no rub  Abdomen: soft, nontender, normoactive bowels  : no palpable " bladder,  Extremities: no edema, cyanosis or clubbing, functional AV fistula in the left upper  Neuro: normal speech and mental status       Results Review:    Results from last 7 days   Lab Units 01/15/21  0817 01/15/21  0020 01/14/21  0753 01/13/21  0601 01/11/21  0946   SODIUM mmol/L  --  136 136 139   < > 138   POTASSIUM mmol/L 3.7 3.3* 3.6 3.8   < > 4.6   CHLORIDE mmol/L  --  101 100 100   < > 101   CO2 mmol/L  --  21.9* 22.2 24.0   < > 18.4*   BUN mg/dL  --  37* 31* 61*   < > 74*   CREATININE mg/dL  --  2.80* 2.42* 2.82*   < > 3.35*   CALCIUM mg/dL  --  8.8 9.1 9.3   < > 9.5   BILIRUBIN mg/dL  --   --   --   --   --  0.3   ALK PHOS U/L  --   --   --   --   --  76   ALT (SGPT) U/L  --   --   --   --   --  7   AST (SGOT) U/L  --   --   --   --   --  17   GLUCOSE mg/dL  --  84 87 103*   < > 150*    < > = values in this interval not displayed.       Estimated Creatinine Clearance: 21.4 mL/min (A) (by C-G formula based on SCr of 2.8 mg/dL (H)).    Results from last 7 days   Lab Units 01/15/21  0020 01/14/21 0753 01/13/21 0601 01/13/21  0034   MAGNESIUM mg/dL  --   --   --  1.9   PHOSPHORUS mg/dL 4.2 4.0 4.6*  --              Results from last 7 days   Lab Units 01/15/21  0817 01/15/21  0020 01/14/21  1556 01/14/21 0753 01/14/21  0006  01/13/21  0601 01/12/21  0000  01/11/21  0946   WBC 10*3/mm3  --  8.73  --  8.59  --   --  6.56  --  7.96  --  7.85   HEMOGLOBIN g/dL 8.5* 7.9*  7.9* 8.7* 9.0*  9.0* 8.2*   < > 8.5*  8.5*   < > 9.0*  9.0*   < > 8.5*   PLATELETS 10*3/mm3  --  210  --  209  --   --  214  --  206  --  229    < > = values in this interval not displayed.       Results from last 7 days   Lab Units 01/15/21  0020 01/14/21  0753 01/13/21  0601 01/12/21  0756 01/11/21  0946   INR  1.39* 1.53* 2.98* 4.82* 4.37*         Imaging Results (Last 24 Hours)     ** No results found for the last 24 hours. **        busPIRone, 10 mg, Oral, Q12H  escitalopram, 10 mg, Oral, Daily  rOPINIRole, 0.25 mg, Oral,  Nightly  sodium chloride, 10 mL, Intravenous, Q12H      amiodarone, 0.5 mg/min, Last Rate: 0.5 mg/min (01/14/21 2246)  pantoprazole, 8 mg/hr, Last Rate: 8 mg/hr (01/15/21 0308)        Medication Review:   Current Facility-Administered Medications   Medication Dose Route Frequency Provider Last Rate Last Admin   • acetaminophen (TYLENOL) tablet 650 mg  650 mg Oral Q4H PRN Misty Summers APRN   650 mg at 01/15/21 0813    Or   • acetaminophen (TYLENOL) 160 MG/5ML solution 650 mg  650 mg Oral Q4H PRN Misty Summers APRN        Or   • acetaminophen (TYLENOL) suppository 650 mg  650 mg Rectal Q4H PRN Misty Summers APRN       • amiodarone 360 mg in 200 mL D5W infusion  0.5 mg/min Intravenous Continuous Belia Mcdonough APRN 16.67 mL/hr at 01/14/21 2246 0.5 mg/min at 01/14/21 2246   • busPIRone (BUSPAR) tablet 10 mg  10 mg Oral Q12H Misty Summers APRN   10 mg at 01/15/21 0813   • escitalopram (LEXAPRO) tablet 10 mg  10 mg Oral Daily Misty Summers APRN   10 mg at 01/15/21 0813   • lidocaine-prilocaine (EMLA) 2.5-2.5 % cream   Topical PRN Christopher Jin MD       • metoprolol tartrate (LOPRESSOR) injection 5 mg  5 mg Intravenous Q4H PRN Ana Cristina Callaway APRN       • nitroglycerin (NITROSTAT) SL tablet 0.4 mg  0.4 mg Sublingual Q5 Min PRN Misty Summers APRN       • ondansetron (ZOFRAN) injection 4 mg  4 mg Intravenous Q6H PRN Misty Summers APRN       • pantoprazole (PROTONIX) 40 mg in 100mL NS IVPB  8 mg/hr Intravenous Continuous Eric Perez MD 20 mL/hr at 01/15/21 0308 8 mg/hr at 01/15/21 0308   • rOPINIRole (REQUIP) tablet 0.25 mg  0.25 mg Oral Nightly Misty Summers APRN   0.25 mg at 01/14/21 2127   • sodium chloride 0.9 % flush 10 mL  10 mL Intravenous PRN Silvia Fuller MD       • sodium chloride 0.9 % flush 10 mL  10 mL Intravenous Q12H Misty Summers APRN   10 mL at 01/15/21 0815   • sodium chloride 0.9 % flush 10 mL  10 mL Intravenous PRN Misty Summers APRN   10 mL at 01/13/21 1951    • traMADol (ULTRAM) tablet 25 mg  25 mg Oral Q8H PRN Eric Perez MD   25 mg at 01/14/21 3420       Assessment/Plan   1.  End-stage renal disease on maintenance hemodialysis every Monday, Wednesday and Friday.  Potassium earlier was 3.3 and recheck 3.7  2.  GI bleed with maroon-colored bowel movement and clots  3.  Anemia of chronic kidney disease and acute blood loss, hemoglobin today 8.5  4.  Prior AVR, MVR and TV repair chronically anticoagulated,   5.  Obstructive sleep apnea  6.  GERD.  7.  Atrial fibrillation with rapid ventricular response during dialysis on 1/13/2021 converted to normal sinus rhythm after she was treated with digoxin, but she is back into atrial fibrillation cardiology is following.        Plan:  1.  Continue the same treatment  2.  Hemodialysis today and change to 4K bath  3.  Surveillance labs    I discussed the plan with the dialysis staff    Christopher Jin MD  01/15/21  09:41 EST

## 2021-01-15 NOTE — PROGRESS NOTES
"Adult Nutrition  Assessment/PES    Patient Name:  Claudia Arnold  YOB: 1945  MRN: [id number]  Admit Date:  1/11/2021    Assessment Date:  1/15/2021    Comments:  Nutrition F/U:  Remains on clear liquid diet. 50-75% at meals.  Episode of tarry stool overnight, noted.  Plans for colonoscopy and EGD tomorrow per GI. Will add Boost Breeze BID for increased nutrition while on clear liquids.     RD to continue to follow clinical course.      Reason for Assessment     Row Name 01/15/21 1507          Reason for Assessment    Reason For Assessment  follow-up protocol         Nutrition/Diet History     Row Name 01/15/21 1507          Nutrition/Diet History    Typical Food/Fluid Intake  Remains on CLD.  Tarry stool overnight.  EGD and colonoscopy planned for tomorrow     Factors Affecting Nutritional Intake  altered gastrointestinal function         Anthropometrics     Row Name 01/15/21 1501          Anthropometrics    Height  170.2 cm (67.01\")        Admit Weight    Admit Weight  103 kg (227 lb 1.2 oz) 1/15        Ideal Body Weight (IBW)    Ideal Body Weight (IBW) (kg)  61.88        Body Mass Index (BMI)    BMI Assessment  BMI 35-39.9: obesity grade II         Labs/Tests/Procedures/Meds     Row Name 01/15/21 1508          Labs/Procedures/Meds    Lab Results Reviewed  reviewed, pertinent     Lab Results Comments  K: 3.3, BUN: 37, Cr: 2.8        Diagnostic Tests/Procedures    Diagnostic Test/Procedure Reviewed  reviewed     Diagnostic Test/Procedures Comments  Colonoscopy tomorrow        Medications    Pertinent Medications Reviewed  reviewed, pertinent     Pertinent Medications Comments  Protonix drip,         Physical Findings     Row Name 01/15/21 1503          Physical Findings    Overall Physical Appearance  obese     Skin  other (see comments) B:18         Estimated/Assessed Needs     Row Name 01/15/21 1502          Calculation Measurements    Height  170.2 cm (67.01\")         Nutrition Prescription Ordered "     Row Name 01/15/21 1509          Nutrition Prescription PO    Current PO Diet  Clear Liquid         Evaluation of Received Nutrient/Fluid Intake     Row Name 01/15/21 1509          PO Evaluation    Number of Meals  3     % PO Intake  50-75         Problem/Interventions:    Intervention Goal     Row Name 01/15/21 1510          Intervention Goal    General  Maintain nutrition;Reduce/improve symptoms;Disease management/therapy     PO  Initiate feeding     Weight  Appropriate weight loss         Nutrition Intervention     Row Name 01/15/21 1510          Nutrition Intervention    RD/Tech Action  Follow Tx progress;Care plan reviewd;Recommend/ordered     Recommended/Ordered  Supplement         Nutrition Prescription     Row Name 01/15/21 1510          Nutrition Prescription PO    PO Prescription  Begin/change supplement     Supplement  Boost Breeze     Supplement Frequency  2 times a day     New PO Prescription Ordered?  Yes         Education/Evaluation     Row Name 01/15/21 1510          Education    Education  Will Instruct as appropriate        Monitor/Evaluation    Monitor  Per protocol     Education Follow-up  Reinforce PRN           Electronically signed by:  Tabatha Cary RD  01/15/21 15:11 EST

## 2021-01-15 NOTE — PROGRESS NOTES
Gastroenterology   Inpatient Progress Note    Reason for Follow Up:  Rectal bleeding, anemia, on warfarin with elevated INR at the time of bleed    Subjective  Interval History:   Patient is in dialysis.    She continues in atrial fibrillation but per cardiology, okay for colonoscopy tomorrow.  On amiodarone drip, stable.    INR improved, 1.39 today.    Patient denies abdominal pain, cramping, bloating.    She continues on clear liquid diet.    She had an episode of dark black tarry stool overnight.  Globin dropped from 0.7-7.9 but is back up to 8.5.  She has not had recent EGD.    Current Facility-Administered Medications:   •  acetaminophen (TYLENOL) tablet 650 mg, 650 mg, Oral, Q4H PRN, 650 mg at 01/15/21 0813 **OR** acetaminophen (TYLENOL) 160 MG/5ML solution 650 mg, 650 mg, Oral, Q4H PRN **OR** acetaminophen (TYLENOL) suppository 650 mg, 650 mg, Rectal, Q4H PRN, Misty Summers APRN  •  [COMPLETED] amiodarone in dextrose 5% (NEXTERONE) loading dose 150mg/100mL, 150 mg, Intravenous, Once, 150 mg at 21 1618 **FOLLOWED BY** [] amiodarone 360 mg in 200 mL D5W infusion, 1 mg/min, Intravenous, Continuous, Last Rate: 33.3 mL/hr at 21 1628, 1 mg/min at 21 1628 **FOLLOWED BY** amiodarone 360 mg in 200 mL D5W infusion, 0.5 mg/min, Intravenous, Continuous, Belia Mcdonough APRN, Last Rate: 16.67 mL/hr at 01/15/21 1108, 0.5 mg/min at 01/15/21 1108  •  busPIRone (BUSPAR) tablet 10 mg, 10 mg, Oral, Q12H, Misty Summers APRN, 10 mg at 01/15/21 0813  •  escitalopram (LEXAPRO) tablet 10 mg, 10 mg, Oral, Daily, Misty Summers APRN, 10 mg at 01/15/21 0813  •  lidocaine-prilocaine (EMLA) 2.5-2.5 % cream, , Topical, PRN, Christopher Jin MD  •  metoprolol tartrate (LOPRESSOR) injection 5 mg, 5 mg, Intravenous, Q4H PRN, Ana Cristina Callaway APRN  •  nitroglycerin (NITROSTAT) SL tablet 0.4 mg, 0.4 mg, Sublingual, Q5 Min PRN, Misty Summers APRN  •  ondansetron (ZOFRAN) injection 4 mg, 4 mg, Intravenous,  Q6H PRN, Misty Summers APRN  •  pantoprazole (PROTONIX) 40 mg in 100mL NS IVPB, 8 mg/hr, Intravenous, Continuous, Eric Perez MD, Last Rate: 20 mL/hr at 01/15/21 0955, 8 mg/hr at 01/15/21 0955  •  rOPINIRole (REQUIP) tablet 0.25 mg, 0.25 mg, Oral, Nightly, Misty Summers APRN, 0.25 mg at 01/14/21 2127  •  sodium chloride 0.9 % flush 10 mL, 10 mL, Intravenous, PRN, Silvia Fuller MD  •  sodium chloride 0.9 % flush 10 mL, 10 mL, Intravenous, Q12H, Misty Summers APRN, 10 mL at 01/15/21 0815  •  sodium chloride 0.9 % flush 10 mL, 10 mL, Intravenous, PRN, Misty Summers APRN, 10 mL at 01/13/21 1951  •  traMADol (ULTRAM) tablet 25 mg, 25 mg, Oral, Q8H PRN, Eric Perez MD, 25 mg at 01/15/21 1108  Review of Systems:               Dark stool overnight.  Gastroenterology.    Objective     Vital Signs  Temp:  [97.3 °F (36.3 °C)-97.9 °F (36.6 °C)] 97.5 °F (36.4 °C)  Heart Rate:  [65-95] 67  Resp:  [16-18] 16  BP: (101-132)/(50-79) 109/70  Body mass index is 35.59 kg/m².                  Physical Exam:              General: patient awake, alert and cooperative, obese              Eyes: no scleral icterus              Skin: warm and dry, not jaundiced              Abdomen: soft, nontender, nondistended; normal bowel sounds              Psychiatric: Appropriate affect and behavior                Results Review:                I reviewed the patient's new clinical results.    Results from last 7 days   Lab Units 01/15/21  0817 01/15/21  0020 01/14/21  1556 01/14/21  0753  01/13/21  0601   WBC 10*3/mm3  --  8.73  --  8.59  --  6.56   HEMOGLOBIN g/dL 8.5* 7.9*  7.9* 8.7* 9.0*  9.0*   < > 8.5*  8.5*   HEMATOCRIT % 25.7* 23.5*  23.5* 27.1* 27.5*  27.5*   < > 25.2*  25.2*   PLATELETS 10*3/mm3  --  210  --  209  --  214    < > = values in this interval not displayed.     Results from last 7 days   Lab Units 01/15/21  0817 01/15/21  0020 01/14/21  0753 01/13/21  0601  01/11/21  0946   SODIUM mmol/L   --  136 136 139   < > 138   POTASSIUM mmol/L 3.7 3.3* 3.6 3.8   < > 4.6   CHLORIDE mmol/L  --  101 100 100   < > 101   CO2 mmol/L  --  21.9* 22.2 24.0   < > 18.4*   BUN mg/dL  --  37* 31* 61*   < > 74*   CREATININE mg/dL  --  2.80* 2.42* 2.82*   < > 3.35*   CALCIUM mg/dL  --  8.8 9.1 9.3   < > 9.5   BILIRUBIN mg/dL  --   --   --   --   --  0.3   ALK PHOS U/L  --   --   --   --   --  76   ALT (SGPT) U/L  --   --   --   --   --  7   AST (SGOT) U/L  --   --   --   --   --  17   GLUCOSE mg/dL  --  84 87 103*   < > 150*    < > = values in this interval not displayed.     Results from last 7 days   Lab Units 01/15/21  0020 01/14/21  0753 01/13/21  0601   INR  1.39* 1.53* 2.98*     No results found for: LIPASE    Radiology:  No orders to display       Assessment/Plan     Patient Active Problem List   Diagnosis   • Tear of rotator cuff   • Hypertension   • Generalized anxiety disorder   • Hyperlipidemia   • IFG (impaired fasting glucose)   • Heart murmur, systolic   • Shoulder pain, bilateral   • Pain of both shoulder joints   • Chronic pain of both shoulders   • Acute congestive heart failure (CMS/[facility])   • Obesity, morbid, BMI 50 or higher (CMS/[facility])   • Fungal skin infection   • Cellulitis of lower extremity   • Aortic valve stenosis   • Tricuspid valve insufficiency   • Mitral valve stenosis   • S/P MVR (mitral valve replacement)   • Paroxysmal atrial fibrillation (CMS/[facility])   • Pulmonary hypertension (CMS/[facility])   • Stage 5 chronic kidney disease on chronic dialysis (CMS/[facility])   • Gastroesophageal reflux disease without esophagitis   • Chronic idiopathic constipation   • Dependence on renal dialysis (CMS/[facility])   • Chronic kidney disease, stage 2 (mild)   • Renovascular hypertension   • GI bleed   • 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 (CMS/[facility])   • CAD (coronary artery disease)   • S/P AVR (aortic valve replacement)   • Chronic diastolic CHF (congestive heart failure) (CMS/[facility])   • Chronic pain of both  shoulders       Assessment:  1. Rectal bleeding, bright red, no evidence of recurrence however she had melanic stools last night  2. Anemia  3. End-stage renal disease on hemodialysis  4. On warfarin for AVR and MVR valve replacement, warfarin currently on hold, INR normalized      Plan:  · Episode of tarry stool overnight.  Will add EGD to upcoming colonoscopy, orders placed.  · Given melanic stools, will add EGD.  Per cardiology, okay for EGD and colonoscopy tomorrow, orders placed.  · Bowel prep preference discussed with nephrology and orders placed accordingly.    I discussed the patients findings and my recommendations with patient and nursing staff.           Jerri MORA  [facility] [id number] [place] [address]  Office: [phone]

## 2021-01-15 NOTE — NURSING NOTE
CWOCN consult for gluteal skin.  Patient with purple discoloration noted to bilateral gluteal skin; skin is blanchable with no open areas noted.  Zinc barrier applied.  Etiology not clear but I will order a low air loss mattress to protect skin.  Patient agreeable. Staff RN notified of plan.

## 2021-01-15 NOTE — PROGRESS NOTES
"    Patient Name: Claudia Arnold  :1945  75 y.o.      Patient Care Team:  Robert Cortez MD as PCP - General (Family Medicine)  Deborah Agudelo MD as Surgeon (Orthopedic Surgery)  Scott Segura MD as Consulting Physician (Cardiology)  Scar Gaxiola MD as Surgeon (Cardiothoracic Surgery)  Kathy Osuna MD as Consulting Physician (Nephrology)  Dora Astorga APRN as Nurse Practitioner (Neurosurgery)  Satish Stahl MD as Consulting Physician (Vascular Surgery)  Scar Gaxiola MD as Surgeon (Cardiothoracic Surgery)    Chief Complaint: follow up atrial fibrillation    Interval History: remains in AF but Hr better. On amiodarone drip. I saw her on dialysis. She is miserable. Her fistula infiltrated. She has a lot of pain from this.     Objective   Vital Signs  Temp:  [97.7 °F (36.5 °C)-98.6 °F (37 °C)] 97.9 °F (36.6 °C)  Heart Rate:  [] 88  Resp:  [18] 18  BP: (101-132)/(50-80) 101/79    Intake/Output Summary (Last 24 hours) at 1/15/2021 1141  Last data filed at 1/15/2021 0810  Gross per 24 hour   Intake 590 ml   Output --   Net 590 ml     Flowsheet Rows      First Filed Value   Admission Height  170.2 cm (67\") Documented at 2021 0958   Admission Weight  106 kg (233 lb) Documented at 2021 0958          Physical Exam:   General Appearance:    Alert, cooperative, in no acute distress   Lungs:     Clear to auscultation.  Normal respiratory effort and rate.      Heart:    irregular rhythm and normal rate, normal S1 and S2, no murmurs, gallops or rubs.     Chest Wall:    No abnormalities observed   Abdomen:     Soft, nontender, positive bowel sounds.     Extremities:   no cyanosis, clubbing or edema.  No marked joint deformities.  Adequate musculoskeletal strength.       Results Review:    Results from last 7 days   Lab Units 01/15/21  0817 01/15/21  0020   SODIUM mmol/L  --  136   POTASSIUM mmol/L 3.7 3.3*   CHLORIDE mmol/L  --  101   CO2 mmol/L  --  21.9*   BUN " mg/dL  --  37*   CREATININE mg/dL  --  2.80*   GLUCOSE mg/dL  --  84   CALCIUM mg/dL  --  8.8     Results from last 7 days   Lab Units 01/13/21  0035   TROPONIN T ng/mL 0.084*     Results from last 7 days   Lab Units 01/15/21  0817 01/15/21  0020   WBC 10*3/mm3  --  8.73   HEMOGLOBIN g/dL 8.5* 7.9*  7.9*   HEMATOCRIT % 25.7* 23.5*  23.5*   PLATELETS 10*3/mm3  --  210     Results from last 7 days   Lab Units 01/15/21  0020 01/14/21  0753 01/13/21  0601   INR  1.39* 1.53* 2.98*     Results from last 7 days   Lab Units 01/13/21  0034   MAGNESIUM mg/dL 1.9                   Medication Review:   busPIRone, 10 mg, Oral, Q12H  escitalopram, 10 mg, Oral, Daily  rOPINIRole, 0.25 mg, Oral, Nightly  sodium chloride, 10 mL, Intravenous, Q12H         amiodarone, 0.5 mg/min, Last Rate: 0.5 mg/min (01/15/21 1108)  pantoprazole, 8 mg/hr, Last Rate: 8 mg/hr (01/15/21 0955)        Assessment/Plan    1. Paroxysmal atrial fibrillation - in and out of AF but mostly AF. HR better today.  Received several doses of digoxin. Hesitant to continue to use digoxin given underlying kidney disease.  Anticoagulation is on hold for GIB. Continue Iv amiodarone. Ok for scopes from cardiac standpoint.     2. Bright red blood per rectum - s/p vitamin K. INR appropriate for scopes. GI plans for prep today and scope tomorrow.     3. Bioprosthetic heart valves    4. ESRD on HD    5. Anemia of chronic disease.     MORGAN Arreguin  [facility]  01/15/21  11:41 EST

## 2021-01-15 NOTE — PROGRESS NOTES
Name: Claudia Arnold ADMIT: 2021   : 1945  PCP: Robert Cortez MD    MRN: [id number] LOS: 4 days   AGE/SEX: 75 y.o. female  ROOM: [facility]     Subjective   Subjective      Patient is lying in bed in no major distress.  No new events overnight.  Denies nausea, vomiting abdominal pain, chest pain.       Objective   Objective   Vital Signs  Temp:  [97.3 °F (36.3 °C)-97.9 °F (36.6 °C)] 97.5 °F (36.4 °C)  Heart Rate:  [65-88] 67  Resp:  [16-18] 16  BP: (101-132)/(52-79) 109/70  SpO2:  [94 %-97 %] 97 %  on   ;   Device (Oxygen Therapy): room air  Body mass index is 35.59 kg/m².  Physical Exam   HEENT:  Atraumatic, normocephalic.  PERRLA.  Extraocular movements intact.  Conjunctivae pink.  Sclerae, no icterus.  Mucous membranes dry.  Oropharynx is  clear.  NECK:  Supple.  No JVD.  HEART:  Irregular rate and rhythm.  Normal S1, S2.   LUNGS:  Fairly clear to auscultation anteriorly.  No wheezes.  No crackles.  ABDOMEN:   Soft, nontender.  Bowel sounds present.  No rebound.  No  guarding.  EXTREMITIES:  No cyanosis, clubbing, or edema.  Palpable pedal pulses.  NEURO:  Grossly nonfocal.  No facial asymmetry.  Good strength in all 4  extremities.      Results Review     I reviewed the patient's new clinical results.  Results from last 7 days   Lab Units 01/15/21  1554 01/15/21  0817 01/15/21  0020 21  1556 21  0753  21  0601  21  0000   WBC 10*3/mm3  --   --  8.73  --  8.59  --  6.56  --  7.96   HEMOGLOBIN g/dL 8.4* 8.5* 7.9*  7.9* 8.7* 9.0*  9.0*   < > 8.5*  8.5*   < > 9.0*  9.0*   PLATELETS 10*3/mm3  --   --  210  --  209  --  214  --  206    < > = values in this interval not displayed.     Results from last 7 days   Lab Units 01/15/21  0817 01/15/21  0020 21  0753 21  0601  21  0756   SODIUM mmol/L  --  136 136 139  --  136   POTASSIUM mmol/L 3.7 3.3* 3.6 3.8   < > 3.9   CHLORIDE mmol/L  --  101 100 100  --  96*   CO2 mmol/L  --  21.9* 22.2 24.0  --  27.8   BUN  mg/dL  --  37* 31* 61*  --  38*   CREATININE mg/dL  --  2.80* 2.42* 2.82*  --  1.87*   GLUCOSE mg/dL  --  84 87 103*  --  106*    < > = values in this interval not displayed.   Estimated Creatinine Clearance: 21.4 mL/min (A) (by C-G formula based on SCr of 2.8 mg/dL (H)).  Results from last 7 days   Lab Units 01/15/21  0020 01/14/21  0753 01/13/21  0601 01/12/21  0000 01/11/21  0946   ALBUMIN g/dL 3.30* 3.50 3.40* 3.50 3.50   BILIRUBIN mg/dL  --   --   --   --  0.3   ALK PHOS U/L  --   --   --   --  76   AST (SGOT) U/L  --   --   --   --  17   ALT (SGPT) U/L  --   --   --   --  7     Results from last 7 days   Lab Units 01/15/21  0020 01/14/21  0753 01/13/21  0601 01/13/21  0034 01/12/21  0756 01/12/21  0000   CALCIUM mg/dL 8.8 9.1 9.3  --  9.1 9.1   ALBUMIN g/dL 3.30* 3.50 3.40*  --   --  3.50   MAGNESIUM mg/dL  --   --   --  1.9  --   --    PHOSPHORUS mg/dL 4.2 4.0 4.6*  --   --  2.5     Results from last 7 days   Lab Units 01/11/21  1843 01/11/21  1436 01/11/21  1011   LACTATE mmol/L 1.0 3.3* 2.4*     COVID19   Date Value Ref Range Status   01/11/2021 Not Detected Not Detected - Ref. Range Final     No results found for: HGBA1C, POCGLU    XR Shoulder 2+ View Bilateral  Ordering physician's impression is located in the Encounter Note dated 09/16/19. X-ray performed in the DR room.    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 (CMS/HCC) [D68.318]  Yes   • Chronic  pain of both shoulders [M25.511, G89.29, M25.512]  Yes   • CAD (coronary artery disease) [I25.10]  Yes   • S/P AVR (aortic valve replacement) [Z95.2]  Not Applicable   • Chronic diastolic CHF (congestive heart failure) (CMS/[facility]) [I50.32]  Yes   • Dependence on renal dialysis (CMS/[facility]) [Z99.2]  Not Applicable   • Paroxysmal atrial fibrillation (CMS/[facility]) [I48.0]  Yes   • Stage 5 chronic kidney disease on chronic dialysis (CMS/[facility]) [N18.6, Z99.2]  Not Applicable   • Gastroesophageal reflux disease without esophagitis [K21.9]  Yes   • Pulmonary hypertension (CMS/[facility]) [I27.20]  Yes   • S/P MVR (mitral valve replacement) [Z95.2]  Not Applicable   • Hyperlipidemia [E78.5]  Yes   • Hypertension [I10]  Yes      Resolved Hospital Problems   No resolved problems to display.       1. Acute GI bleed , initially was bright red blood and now has started stools.  On Protonix and will be continued.  H and H is stable.  GI did evaluate and is planning for EGD and colonoscopy tomorrow.  2. Afib with RVR /bioprosthetic heart valves, patient did receive digoxin earlier today. Appreciate cardiology input.  3. End-stage renal disease, continue with Monday Wednesday and Friday hemodialysis.  4. History of depression and anxiety, continue with Lexapro and BuSpar.    5. On SCDs for DVT prophylaxis.    6. Code status is full code.          Nathan Meyer MD  [facility]  01/15/21  17:03 EST     I wore protective equipment throughout this patient encounter including a face mask, gloves and protective eyewear.  Hand hygiene was performed before donning protective equipment and after removal when leaving the room.

## 2021-01-16 ENCOUNTER — ANESTHESIA EVENT (OUTPATIENT)
Dept: GASTROENTEROLOGY | Facility: HOSPITAL | Age: 76
End: 2021-01-16

## 2021-01-16 ENCOUNTER — ANESTHESIA (OUTPATIENT)
Dept: GASTROENTEROLOGY | Facility: HOSPITAL | Age: 76
End: 2021-01-16

## 2021-01-16 LAB
ALBUMIN SERPL-MCNC: 3.4 G/DL (ref 3.5–5.2)
ANION GAP SERPL CALCULATED.3IONS-SCNC: 14.6 MMOL/L (ref 5–15)
BASOPHILS # BLD AUTO: 0.03 10*3/MM3 (ref 0–0.2)
BASOPHILS NFR BLD AUTO: 0.4 % (ref 0–1.5)
BUN SERPL-MCNC: 19 MG/DL (ref 8–23)
BUN/CREAT SERPL: 8.9 (ref 7–25)
CALCIUM SPEC-SCNC: 9.2 MG/DL (ref 8.6–10.5)
CHLORIDE SERPL-SCNC: 104 MMOL/L (ref 98–107)
CO2 SERPL-SCNC: 19.4 MMOL/L (ref 22–29)
CREAT SERPL-MCNC: 2.13 MG/DL (ref 0.57–1)
DEPRECATED RDW RBC AUTO: 44.5 FL (ref 37–54)
EOSINOPHIL # BLD AUTO: 0.1 10*3/MM3 (ref 0–0.4)
EOSINOPHIL NFR BLD AUTO: 1.2 % (ref 0.3–6.2)
ERYTHROCYTE [DISTWIDTH] IN BLOOD BY AUTOMATED COUNT: 13.1 % (ref 12.3–15.4)
GFR SERPL CREATININE-BSD FRML MDRD: 23 ML/MIN/1.73
GLUCOSE SERPL-MCNC: 89 MG/DL (ref 65–99)
HCT VFR BLD AUTO: 25.2 % (ref 34–46.6)
HCT VFR BLD AUTO: 26.4 % (ref 34–46.6)
HCT VFR BLD AUTO: 27 % (ref 34–46.6)
HCT VFR BLD AUTO: 27 % (ref 34–46.6)
HGB BLD-MCNC: 8.4 G/DL (ref 12–15.9)
HGB BLD-MCNC: 8.5 G/DL (ref 12–15.9)
HGB BLD-MCNC: 8.8 G/DL (ref 12–15.9)
HGB BLD-MCNC: 8.8 G/DL (ref 12–15.9)
IMM GRANULOCYTES # BLD AUTO: 0.06 10*3/MM3 (ref 0–0.05)
IMM GRANULOCYTES NFR BLD AUTO: 0.7 % (ref 0–0.5)
INR PPP: 1.3 (ref 0.9–1.1)
LYMPHOCYTES # BLD AUTO: 0.71 10*3/MM3 (ref 0.7–3.1)
LYMPHOCYTES NFR BLD AUTO: 8.7 % (ref 19.6–45.3)
MCH RBC QN AUTO: 30.9 PG (ref 26.6–33)
MCHC RBC AUTO-ENTMCNC: 32.6 G/DL (ref 31.5–35.7)
MCV RBC AUTO: 94.7 FL (ref 79–97)
MONOCYTES # BLD AUTO: 0.8 10*3/MM3 (ref 0.1–0.9)
MONOCYTES NFR BLD AUTO: 9.8 % (ref 5–12)
NEUTROPHILS NFR BLD AUTO: 6.43 10*3/MM3 (ref 1.7–7)
NEUTROPHILS NFR BLD AUTO: 79.2 % (ref 42.7–76)
NRBC BLD AUTO-RTO: 0 /100 WBC (ref 0–0.2)
PHOSPHATE SERPL-MCNC: 3.1 MG/DL (ref 2.5–4.5)
PLATELET # BLD AUTO: 209 10*3/MM3 (ref 140–450)
PMV BLD AUTO: 9.8 FL (ref 6–12)
POTASSIUM SERPL-SCNC: 4.1 MMOL/L (ref 3.5–5.2)
PROTHROMBIN TIME: 16 SECONDS (ref 11.7–14.2)
QT INTERVAL: 452 MS
RBC # BLD AUTO: 2.85 10*6/MM3 (ref 3.77–5.28)
SARS-COV-2 RNA RESP QL NAA+PROBE: NOT DETECTED
SODIUM SERPL-SCNC: 138 MMOL/L (ref 136–145)
WBC # BLD AUTO: 8.13 10*3/MM3 (ref 3.4–10.8)

## 2021-01-16 PROCEDURE — 87081 CULTURE SCREEN ONLY: CPT | Performed by: INTERNAL MEDICINE

## 2021-01-16 PROCEDURE — U0003 INFECTIOUS AGENT DETECTION BY NUCLEIC ACID (DNA OR RNA); SEVERE ACUTE RESPIRATORY SYNDROME CORONAVIRUS 2 (SARS-COV-2) (CORONAVIRUS DISEASE [COVID-19]), AMPLIFIED PROBE TECHNIQUE, MAKING USE OF HIGH THROUGHPUT TECHNOLOGIES AS DESCRIBED BY CMS-2020-01-R: HCPCS | Performed by: INTERNAL MEDICINE

## 2021-01-16 PROCEDURE — 36415 COLL VENOUS BLD VENIPUNCTURE: CPT | Performed by: INTERNAL MEDICINE

## 2021-01-16 PROCEDURE — 80069 RENAL FUNCTION PANEL: CPT | Performed by: INTERNAL MEDICINE

## 2021-01-16 PROCEDURE — 43239 EGD BIOPSY SINGLE/MULTIPLE: CPT | Performed by: INTERNAL MEDICINE

## 2021-01-16 PROCEDURE — 93010 ELECTROCARDIOGRAM REPORT: CPT | Performed by: INTERNAL MEDICINE

## 2021-01-16 PROCEDURE — 0DB98ZX EXCISION OF DUODENUM, VIA NATURAL OR ARTIFICIAL OPENING ENDOSCOPIC, DIAGNOSTIC: ICD-10-PCS | Performed by: INTERNAL MEDICINE

## 2021-01-16 PROCEDURE — 85025 COMPLETE CBC W/AUTO DIFF WBC: CPT | Performed by: INTERNAL MEDICINE

## 2021-01-16 PROCEDURE — 25010000002 PROPOFOL 10 MG/ML EMULSION: Performed by: ANESTHESIOLOGY

## 2021-01-16 PROCEDURE — 85610 PROTHROMBIN TIME: CPT | Performed by: NURSE PRACTITIONER

## 2021-01-16 PROCEDURE — 85018 HEMOGLOBIN: CPT | Performed by: INTERNAL MEDICINE

## 2021-01-16 PROCEDURE — 93005 ELECTROCARDIOGRAM TRACING: CPT | Performed by: NURSE PRACTITIONER

## 2021-01-16 PROCEDURE — 0DB68ZX EXCISION OF STOMACH, VIA NATURAL OR ARTIFICIAL OPENING ENDOSCOPIC, DIAGNOSTIC: ICD-10-PCS | Performed by: INTERNAL MEDICINE

## 2021-01-16 PROCEDURE — 85014 HEMATOCRIT: CPT | Performed by: INTERNAL MEDICINE

## 2021-01-16 PROCEDURE — 0DJD8ZZ INSPECTION OF LOWER INTESTINAL TRACT, VIA NATURAL OR ARTIFICIAL OPENING ENDOSCOPIC: ICD-10-PCS | Performed by: INTERNAL MEDICINE

## 2021-01-16 PROCEDURE — 25010000002 ONDANSETRON PER 1 MG: Performed by: NURSE PRACTITIONER

## 2021-01-16 PROCEDURE — 88305 TISSUE EXAM BY PATHOLOGIST: CPT | Performed by: INTERNAL MEDICINE

## 2021-01-16 PROCEDURE — 25010000002 AMIODARONE IN DEXTROSE 5% 360-4.14 MG/200ML-% SOLUTION: Performed by: NURSE PRACTITIONER

## 2021-01-16 PROCEDURE — 45378 DIAGNOSTIC COLONOSCOPY: CPT | Performed by: INTERNAL MEDICINE

## 2021-01-16 RX ORDER — SODIUM CHLORIDE 9 MG/ML
1000 INJECTION, SOLUTION INTRAVENOUS CONTINUOUS
Status: ACTIVE | OUTPATIENT
Start: 2021-01-16 | End: 2021-01-18

## 2021-01-16 RX ORDER — LIDOCAINE HYDROCHLORIDE 20 MG/ML
INJECTION, SOLUTION INFILTRATION; PERINEURAL AS NEEDED
Status: DISCONTINUED | OUTPATIENT
Start: 2021-01-16 | End: 2021-01-16 | Stop reason: SURG

## 2021-01-16 RX ORDER — PROPOFOL 10 MG/ML
VIAL (ML) INTRAVENOUS CONTINUOUS PRN
Status: DISCONTINUED | OUTPATIENT
Start: 2021-01-16 | End: 2021-01-16 | Stop reason: SURG

## 2021-01-16 RX ORDER — PANTOPRAZOLE SODIUM 40 MG/1
40 TABLET, DELAYED RELEASE ORAL
Status: DISCONTINUED | OUTPATIENT
Start: 2021-01-17 | End: 2021-01-18 | Stop reason: HOSPADM

## 2021-01-16 RX ORDER — SODIUM CHLORIDE 0.9 % (FLUSH) 0.9 %
10 SYRINGE (ML) INJECTION AS NEEDED
Status: DISCONTINUED | OUTPATIENT
Start: 2021-01-16 | End: 2021-01-16

## 2021-01-16 RX ORDER — LIDOCAINE HYDROCHLORIDE 10 MG/ML
0.5 INJECTION, SOLUTION INFILTRATION; PERINEURAL ONCE AS NEEDED
Status: DISCONTINUED | OUTPATIENT
Start: 2021-01-16 | End: 2021-01-16

## 2021-01-16 RX ORDER — PROPOFOL 10 MG/ML
VIAL (ML) INTRAVENOUS AS NEEDED
Status: DISCONTINUED | OUTPATIENT
Start: 2021-01-16 | End: 2021-01-16 | Stop reason: SURG

## 2021-01-16 RX ORDER — GLYCOPYRROLATE 0.2 MG/ML
INJECTION INTRAMUSCULAR; INTRAVENOUS AS NEEDED
Status: DISCONTINUED | OUTPATIENT
Start: 2021-01-16 | End: 2021-01-16 | Stop reason: SURG

## 2021-01-16 RX ADMIN — PROPOFOL 100 MG: 10 INJECTION, EMULSION INTRAVENOUS at 11:32

## 2021-01-16 RX ADMIN — METOPROLOL TARTRATE 12.5 MG: 25 TABLET, FILM COATED ORAL at 21:41

## 2021-01-16 RX ADMIN — TRAMADOL HYDROCHLORIDE 25 MG: 50 TABLET ORAL at 14:22

## 2021-01-16 RX ADMIN — ONDANSETRON HYDROCHLORIDE 4 MG: 2 SOLUTION INTRAMUSCULAR; INTRAVENOUS at 11:32

## 2021-01-16 RX ADMIN — PROPOFOL 50 MCG/KG/MIN: 10 INJECTION, EMULSION INTRAVENOUS at 11:32

## 2021-01-16 RX ADMIN — AMIODARONE HYDROCHLORIDE 0.5 MG/MIN: 1.8 INJECTION, SOLUTION INTRAVENOUS at 10:14

## 2021-01-16 RX ADMIN — SODIUM CHLORIDE, PRESERVATIVE FREE 10 ML: 5 INJECTION INTRAVENOUS at 21:42

## 2021-01-16 RX ADMIN — ROPINIROLE HYDROCHLORIDE 0.25 MG: 0.5 TABLET, FILM COATED ORAL at 21:41

## 2021-01-16 RX ADMIN — METOPROLOL TARTRATE 12.5 MG: 25 TABLET, FILM COATED ORAL at 14:18

## 2021-01-16 RX ADMIN — BUSPIRONE HYDROCHLORIDE 10 MG: 10 TABLET ORAL at 08:34

## 2021-01-16 RX ADMIN — AMIODARONE HYDROCHLORIDE 0.5 MG/MIN: 1.8 INJECTION, SOLUTION INTRAVENOUS at 21:41

## 2021-01-16 RX ADMIN — BUSPIRONE HYDROCHLORIDE 10 MG: 10 TABLET ORAL at 21:41

## 2021-01-16 RX ADMIN — GLYCOPYRROLATE 0.2 MG: 0.2 INJECTION INTRAMUSCULAR; INTRAVENOUS at 11:32

## 2021-01-16 RX ADMIN — TRAMADOL HYDROCHLORIDE 25 MG: 50 TABLET ORAL at 22:45

## 2021-01-16 RX ADMIN — LIDOCAINE HYDROCHLORIDE 60 MG: 20 INJECTION, SOLUTION INFILTRATION; PERINEURAL at 11:33

## 2021-01-16 RX ADMIN — SODIUM CHLORIDE 1000 ML: 9 INJECTION, SOLUTION INTRAVENOUS at 10:43

## 2021-01-16 RX ADMIN — ESCITALOPRAM 10 MG: 10 TABLET, FILM COATED ORAL at 08:34

## 2021-01-16 NOTE — PROGRESS NOTES
"   LOS: 5 days    Patient Care Team:  Robert Cortez MD as PCP - General (Family Medicine)  Deborah Agudelo MD as Surgeon (Orthopedic Surgery)  Scott Segura MD as Consulting Physician (Cardiology)  Scar Gaxiola MD as Surgeon (Cardiothoracic Surgery)  Kathy Osuna MD as Consulting Physician (Nephrology)  Dora Astorga APRN as Nurse Practitioner (Neurosurgery)  Satish Stahl MD as Consulting Physician (Vascular Surgery)  Scar Gaxiola MD as Surgeon (Cardiothoracic Surgery)    Chief Complaint:    Chief Complaint   Patient presents with   • Rectal Bleeding     Follow-up end-stage renal disease  Subjective     Interval History:   She had infiltration on dialysis yesterday, had to stop about 30mins early.   Denies chest pain or shortness of air, complaining of being weak and fatigued, she notes plans for colonoscopy today.    Review of Systems:   As noted above    Objective     Vital Signs  Temp:  [97.3 °F (36.3 °C)-98.3 °F (36.8 °C)] 97.7 °F (36.5 °C)  Heart Rate:  [65-84] 72  Resp:  [16-20] 20  BP: (109-146)/(52-84) 126/66    Flowsheet Rows      First Filed Value   Admission Height  170.2 cm (67\") Documented at 01/11/2021 0958   Admission Weight  106 kg (233 lb) Documented at 01/11/2021 0958          No intake/output data recorded.  I/O last 3 completed shifts:  In: 1160 [P.O.:1160]  Out: -     Intake/Output Summary (Last 24 hours) at 1/16/2021 0905  Last data filed at 1/16/2021 0201  Gross per 24 hour   Intake 810 ml   Output --   Net 810 ml       Physical Exam:  General Appearance: alert, oriented x 3, no acute distress,   Skin: warm and dry  HEENT: pupils round and reactive to light, oral mucosa normal, nonicteric sclera  Neck: supple, no JVD, trachea midline  Lungs: CTA, unlabored breathing effort  Heart: Irregularly irregular, no rub  Abdomen: soft, nontender, normoactive bowels  : no palpable bladder,  Extremities: no edema, cyanosis or clubbing, functional AV fistula " in the left upper.  Noted hematoma  Neuro: normal speech and mental status       Results Review:    Results from last 7 days   Lab Units 01/16/21  0037 01/15/21  0817 01/15/21  0020 01/14/21  0753  01/11/21  0946   SODIUM mmol/L 138  --  136 136   < > 138   POTASSIUM mmol/L 4.1 3.7 3.3* 3.6   < > 4.6   CHLORIDE mmol/L 104  --  101 100   < > 101   CO2 mmol/L 19.4*  --  21.9* 22.2   < > 18.4*   BUN mg/dL 19  --  37* 31*   < > 74*   CREATININE mg/dL 2.13*  --  2.80* 2.42*   < > 3.35*   CALCIUM mg/dL 9.2  --  8.8 9.1   < > 9.5   BILIRUBIN mg/dL  --   --   --   --   --  0.3   ALK PHOS U/L  --   --   --   --   --  76   ALT (SGPT) U/L  --   --   --   --   --  7   AST (SGOT) U/L  --   --   --   --   --  17   GLUCOSE mg/dL 89  --  84 87   < > 150*    < > = values in this interval not displayed.       Estimated Creatinine Clearance: 28 mL/min (A) (by C-G formula based on SCr of 2.13 mg/dL (H)).    Results from last 7 days   Lab Units 01/16/21  0037 01/15/21  0020 01/14/21 0753 01/13/21  0034   MAGNESIUM mg/dL  --   --   --   --  1.9   PHOSPHORUS mg/dL 3.1 4.2 4.0   < >  --     < > = values in this interval not displayed.             Results from last 7 days   Lab Units 01/16/21  0745 01/16/21  0037 01/15/21  1554 01/15/21  0817 01/15/21  0020  01/14/21  0753  01/13/21  0601  01/12/21  0000   WBC 10*3/mm3  --  8.13  --   --  8.73  --  8.59  --  6.56  --  7.96   HEMOGLOBIN g/dL 8.4* 8.8*  8.8* 8.4* 8.5* 7.9*  7.9*   < > 9.0*  9.0*   < > 8.5*  8.5*   < > 9.0*  9.0*   PLATELETS 10*3/mm3  --  209  --   --  210  --  209  --  214  --  206    < > = values in this interval not displayed.       Results from last 7 days   Lab Units 01/16/21  0037 01/15/21  0020 01/14/21  0753 01/13/21  0601 01/12/21  0756   INR  1.30* 1.39* 1.53* 2.98* 4.82*         Imaging Results (Last 24 Hours)     ** No results found for the last 24 hours. **        busPIRone, 10 mg, Oral, Q12H  escitalopram, 10 mg, Oral, Daily  rOPINIRole, 0.25 mg, Oral,  Nightly  sodium chloride, 10 mL, Intravenous, Q12H      amiodarone, 0.5 mg/min, Last Rate: 0.5 mg/min (01/15/21 2214)  pantoprazole, 8 mg/hr, Last Rate: 8 mg/hr (01/15/21 2214)        Medication Review:   Current Facility-Administered Medications   Medication Dose Route Frequency Provider Last Rate Last Admin   • acetaminophen (TYLENOL) tablet 650 mg  650 mg Oral Q4H PRN Misty Summers APRN   650 mg at 01/15/21 0813    Or   • acetaminophen (TYLENOL) 160 MG/5ML solution 650 mg  650 mg Oral Q4H PRN Misty Summers APRN        Or   • acetaminophen (TYLENOL) suppository 650 mg  650 mg Rectal Q4H PRN Misty Summers APRN       • amiodarone 360 mg in 200 mL D5W infusion  0.5 mg/min Intravenous Continuous Belia Mcdonough APRN 16.67 mL/hr at 01/15/21 2214 0.5 mg/min at 01/15/21 2214   • busPIRone (BUSPAR) tablet 10 mg  10 mg Oral Q12H Misty Summers APRN   10 mg at 01/16/21 0834   • escitalopram (LEXAPRO) tablet 10 mg  10 mg Oral Daily Misty Summers APRN   10 mg at 01/16/21 0834   • lidocaine-prilocaine (EMLA) 2.5-2.5 % cream   Topical PRN Christopher Jin MD       • metoprolol tartrate (LOPRESSOR) injection 5 mg  5 mg Intravenous Q4H PRN Ana Cristina Callaway APRN       • nitroglycerin (NITROSTAT) SL tablet 0.4 mg  0.4 mg Sublingual Q5 Min PRN Misty Summers APRN       • ondansetron (ZOFRAN) injection 4 mg  4 mg Intravenous Q6H PRN Misty Summers APRN       • pantoprazole (PROTONIX) 40 mg in 100mL NS IVPB  8 mg/hr Intravenous Continuous Eric Perez MD 20 mL/hr at 01/15/21 2214 8 mg/hr at 01/15/21 2214   • rOPINIRole (REQUIP) tablet 0.25 mg  0.25 mg Oral Nightly Misty Summers APRN   0.25 mg at 01/15/21 2214   • sodium chloride 0.9 % flush 10 mL  10 mL Intravenous PRN Silvia Fuller MD       • sodium chloride 0.9 % flush 10 mL  10 mL Intravenous Q12H Misty Summers APRN   10 mL at 01/15/21 2214   • sodium chloride 0.9 % flush 10 mL  10 mL Intravenous PRN Misty Summers APRN   10 mL at 01/13/21 1951    • traMADol (ULTRAM) tablet 25 mg  25 mg Oral Q8H PRN Eric Perez MD   25 mg at 01/15/21 7762       Assessment/Plan   1.  End-stage renal disease on maintenance hemodialysis every Monday, Wednesday and Friday.  Continue same.  2.  GI bleed with maroon-colored bowel movement and clots.  Plans for cscope today.  3.  Anemia of chronic kidney disease and acute blood loss,   4.  Prior AVR, MVR and TV repair chronically anticoagulated,   5.  Obstructive sleep apnea  6.  GERD.  7.  Atrial fibrillation with rapid ventricular response during dialysis on 1/13/2021 converted to normal sinus rhythm after she was treated with digoxin, but she is back into atrial fibrillation cardiology is following.      Jalen Ramirez MD  01/16/21  09:05 EST

## 2021-01-16 NOTE — ANESTHESIA POSTPROCEDURE EVALUATION
"Patient: Claudia Arnold    Procedure Summary     Date: 01/16/21 Room / [facility]:  [facility]    Anesthesia Start: 1130 Anesthesia Stop: 1203    Procedures:       COLONOSCOPY (N/A )      ESOPHAGOGASTRODUODENOSCOPY with biopsies (N/A Esophagus) Diagnosis:       Esophagitis      Gastritis      Duodenitis      Diverticulosis      External thrombosed hemorrhoids      Tortuous colon      (Gastrointestinal hemorrhage, unspecified gastrointestinal hemorrhage type [K92.2])      (Gastroesophageal reflux disease without esophagitis [K21.9])      (Gastrointestinal hemorrhage with melena [K92.1])    Surgeon: Wallace Hernandez MD Provider: Jeovany Jacobo MD    Anesthesia Type: MAC ASA Status: 4          Anesthesia Type: MAC    Vitals  Vitals Value Taken Time   /44 01/16/21 1213   Temp     Pulse 110 01/16/21 1213   Resp 18 01/16/21 1213   SpO2 98 % 01/16/21 1213           Post Anesthesia Care and Evaluation    Patient location during evaluation: PACU  Patient participation: complete - patient participated  Level of consciousness: awake  Pain score: 0  Pain management: adequate  Airway patency: patent  Anesthetic complications: No anesthetic complications  PONV Status: none  Cardiovascular status: acceptable  Respiratory status: acceptable  Hydration status: acceptable    Comments: /73 (BP Location: Right arm, Patient Position: Sitting)   Pulse 112   Temp 36.5 °C (97.7 °F) (Oral)   Resp 18   Ht 170.2 cm (67\")   Wt 102 kg (225 lb)   SpO2 94%   BMI 35.24 kg/m²       "

## 2021-01-16 NOTE — PROGRESS NOTES
Name: Claudia Arnold ADMIT: 2021   : 1945  PCP: Robert Cortez MD    MRN: [id number] LOS: 5 days   AGE/SEX: 75 y.o. female  ROOM: [facility]     Subjective   Subjective             Objective   Objective   Vital Signs  Temp:  [97.7 °F (36.5 °C)-98.3 °F (36.8 °C)] 98.3 °F (36.8 °C)  Heart Rate:  [] 107  Resp:  [16-20] 18  BP: (103-146)/(44-84) 122/73  SpO2:  [93 %-99 %] 94 %  on   ;   Device (Oxygen Therapy): room air  Body mass index is 35.24 kg/m².  Physical Exam   HEENT:  Atraumatic, normocephalic.  PERRLA.  Extraocular movements intact.  Conjunctivae pink.  Sclerae, no icterus.  Mucous membranes dry.  Oropharynx is  clear.  NECK:  Supple.  No JVD.  HEART:  Irregular rate and rhythm.  Normal S1, S2.   LUNGS:  Fairly clear to auscultation anteriorly.  No wheezes.  No crackles.  ABDOMEN:   Soft, nontender.  Bowel sounds present.  No rebound.  No  guarding.  EXTREMITIES:  No cyanosis, clubbing, or edema.  Palpable pedal pulses.  NEURO:  Grossly nonfocal.  No facial asymmetry.  Good strength in all 4  extremities.      Results Review     I reviewed the patient's new clinical results.  Results from last 7 days   Lab Units 21  0745 21  0037 01/15/21  1554 01/15/21  0817 01/15/21  0020  21  0753  21  0601   WBC 10*3/mm3  --  8.13  --   --  8.73  --  8.59  --  6.56   HEMOGLOBIN g/dL 8.4* 8.8*  8.8* 8.4* 8.5* 7.9*  7.9*   < > 9.0*  9.0*   < > 8.5*  8.5*   PLATELETS 10*3/mm3  --  209  --   --  210  --  209  --  214    < > = values in this interval not displayed.     Results from last 7 days   Lab Units 21  0037 01/15/21  0817 01/15/21  0020 21  0753 21  0601   SODIUM mmol/L 138  --  136 136 139   POTASSIUM mmol/L 4.1 3.7 3.3* 3.6 3.8   CHLORIDE mmol/L 104  --  101 100 100   CO2 mmol/L 19.4*  --  21.9* 22.2 24.0   BUN mg/dL 19  --  37* 31* 61*   CREATININE mg/dL 2.13*  --  2.80* 2.42* 2.82*   GLUCOSE mg/dL 89  --  84 87 103*   Estimated Creatinine Clearance: 28  mL/min (A) (by C-G formula based on SCr of 2.13 mg/dL (H)).  Results from last 7 days   Lab Units 01/16/21  0037 01/15/21  0020 01/14/21  0753 01/13/21  0601  01/11/21  0946   ALBUMIN g/dL 3.40* 3.30* 3.50 3.40*   < > 3.50   BILIRUBIN mg/dL  --   --   --   --   --  0.3   ALK PHOS U/L  --   --   --   --   --  76   AST (SGOT) U/L  --   --   --   --   --  17   ALT (SGPT) U/L  --   --   --   --   --  7    < > = values in this interval not displayed.     Results from last 7 days   Lab Units 01/16/21  0037 01/15/21  0020 01/14/21  0753 01/13/21  0601 01/13/21  0034   CALCIUM mg/dL 9.2 8.8 9.1 9.3  --    ALBUMIN g/dL 3.40* 3.30* 3.50 3.40*  --    MAGNESIUM mg/dL  --   --   --   --  1.9   PHOSPHORUS mg/dL 3.1 4.2 4.0 4.6*  --      Results from last 7 days   Lab Units 01/11/21  1843 01/11/21  1436 01/11/21  1011   LACTATE mmol/L 1.0 3.3* 2.4*     COVID19   Date Value Ref Range Status   01/16/2021 Not Detected Not Detected - Ref. Range Final   01/11/2021 Not Detected Not Detected - Ref. Range Final     No results found for: HGBA1C, POCGLU    XR Shoulder 2+ View Bilateral  Ordering physician's impression is located in the Encounter Note dated 09/16/19. X-ray performed in the DR room.    Scheduled Medications  busPIRone, 10 mg, Oral, Q12H  escitalopram, 10 mg, Oral, Daily  metoprolol tartrate, 12.5 mg, Oral, Q12H  [START ON 1/17/2021] pantoprazole, 40 mg, Oral, Q AM  rOPINIRole, 0.25 mg, Oral, Nightly  sodium chloride, 10 mL, Intravenous, Q12H    Infusions  amiodarone, 0.5 mg/min, Last Rate: 0.5 mg/min (01/16/21 1014)  sodium chloride, 1,000 mL, Last Rate: 1,000 mL (01/16/21 1043)    Diet  Diet Regular; GI Soft, Renal       Assessment/Plan     Active Hospital Problems    Diagnosis  POA   • **GI bleed [K92.2]  Yes   • Gastrointestinal hemorrhage [K92.2]  Unknown   • Gastrointestinal hemorrhage with melena [K92.1]  Unknown   • 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 (CMS/HCC) [D68.318]  Yes   • Chronic pain of both  shoulders [M25.511, G89.29, M25.512]  Yes   • CAD (coronary artery disease) [I25.10]  Yes   • S/P AVR (aortic valve replacement) [Z95.2]  Not Applicable   • Chronic diastolic CHF (congestive heart failure) (CMS/[facility]) [I50.32]  Yes   • Dependence on renal dialysis (CMS/[facility]) [Z99.2]  Not Applicable   • Paroxysmal atrial fibrillation (CMS/[facility]) [I48.0]  Yes   • Stage 5 chronic kidney disease on chronic dialysis (CMS/[facility]) [N18.6, Z99.2]  Not Applicable   • Gastroesophageal reflux disease without esophagitis [K21.9]  Yes   • Pulmonary hypertension (CMS/[facility]) [I27.20]  Yes   • S/P MVR (mitral valve replacement) [Z95.2]  Not Applicable   • Hyperlipidemia [E78.5]  Yes   • Hypertension [I10]  Yes      Resolved Hospital Problems   No resolved problems to display.       1. Acute GI bleed , initially was bright red blood and now has started stools.  On Protonix and will be continued.  H and H is stable.   Did undergo EGD and colonoscopy today.  2. Afib with RVR /bioprosthetic heart valves, patient did receive digoxin earlier today. Appreciate cardiology input.  3. End-stage renal disease, continue with Monday Wednesday and Friday hemodialysis.  4. History of depression and anxiety, continue with Lexapro and BuSpar.    5. On SCDs for DVT prophylaxis.    6. Code status is full code.          Nathan Meyer MD  [facility]  01/16/21  15:27 EST     I wore protective equipment throughout this patient encounter including a face mask, gloves and protective eyewear.  Hand hygiene was performed before donning protective equipment and after removal when leaving the room.

## 2021-01-16 NOTE — PROGRESS NOTES
The patient was in endoscopy during rounds.  I noted the findings (gastritis, duodenitis, diverticulosis, hemorrhoids, but no active bleeding).  I reviewed her labs (stable Hgb, 8.4) and VS.  Her SBP is consistently in the 100s and her HR is consistently  bpm.      No further digoxin due to ESRD.  Challenge with a low dose of metoprolol, 12.5mg, switch IV to oral amiodarone tomorrow.  Continue to hold warfarin due to GIB.

## 2021-01-16 NOTE — ANESTHESIA PREPROCEDURE EVALUATION
Anesthesia Evaluation     Patient summary reviewed and Nursing notes reviewed                Airway   Mallampati: I  TM distance: >3 FB  Neck ROM: full  No difficulty expected  Dental - normal exam     Pulmonary - normal exam   (+) sleep apnea,   Cardiovascular - normal exam    (+) hypertension, valvular problems/murmurs AS, past MI , CAD, dysrhythmias Paroxysmal Atrial Fib, CHF , hyperlipidemia,       Neuro/Psych  (+) psychiatric history Anxiety,     GI/Hepatic/Renal/Endo    (+) obesity,  GERD, GI bleeding , renal disease,   (-) morbid obesity    Musculoskeletal     Abdominal  - normal exam    Bowel sounds: normal.   Substance History - negative use     OB/GYN negative ob/gyn ROS         Other   arthritis,                      Anesthesia Plan    ASA 4     MAC       Anesthetic plan, all risks, benefits, and alternatives have been provided, discussed and informed consent has been obtained with: patient.

## 2021-01-17 LAB
ANION GAP SERPL CALCULATED.3IONS-SCNC: 12.3 MMOL/L (ref 5–15)
BASOPHILS # BLD AUTO: 0.03 10*3/MM3 (ref 0–0.2)
BASOPHILS NFR BLD AUTO: 0.4 % (ref 0–1.5)
BUN SERPL-MCNC: 28 MG/DL (ref 8–23)
BUN/CREAT SERPL: 8.3 (ref 7–25)
CALCIUM SPEC-SCNC: 9 MG/DL (ref 8.6–10.5)
CHLORIDE SERPL-SCNC: 102 MMOL/L (ref 98–107)
CO2 SERPL-SCNC: 21.7 MMOL/L (ref 22–29)
CREAT SERPL-MCNC: 3.36 MG/DL (ref 0.57–1)
DEPRECATED RDW RBC AUTO: 45.2 FL (ref 37–54)
EOSINOPHIL # BLD AUTO: 0.18 10*3/MM3 (ref 0–0.4)
EOSINOPHIL NFR BLD AUTO: 2.3 % (ref 0.3–6.2)
ERYTHROCYTE [DISTWIDTH] IN BLOOD BY AUTOMATED COUNT: 13.1 % (ref 12.3–15.4)
GFR SERPL CREATININE-BSD FRML MDRD: 13 ML/MIN/1.73
GFR SERPL CREATININE-BSD FRML MDRD: ABNORMAL ML/MIN/{1.73_M2}
GLUCOSE SERPL-MCNC: 105 MG/DL (ref 65–99)
HCT VFR BLD AUTO: 25.3 % (ref 34–46.6)
HCT VFR BLD AUTO: 26.4 % (ref 34–46.6)
HCT VFR BLD AUTO: 26.8 % (ref 34–46.6)
HGB BLD-MCNC: 8.3 G/DL (ref 12–15.9)
HGB BLD-MCNC: 8.6 G/DL (ref 12–15.9)
HGB BLD-MCNC: 8.8 G/DL (ref 12–15.9)
IMM GRANULOCYTES # BLD AUTO: 0.06 10*3/MM3 (ref 0–0.05)
IMM GRANULOCYTES NFR BLD AUTO: 0.8 % (ref 0–0.5)
INR PPP: 1.28 (ref 0.9–1.1)
LYMPHOCYTES # BLD AUTO: 0.64 10*3/MM3 (ref 0.7–3.1)
LYMPHOCYTES NFR BLD AUTO: 8.1 % (ref 19.6–45.3)
MCH RBC QN AUTO: 31.2 PG (ref 26.6–33)
MCHC RBC AUTO-ENTMCNC: 32.8 G/DL (ref 31.5–35.7)
MCV RBC AUTO: 95 FL (ref 79–97)
MONOCYTES # BLD AUTO: 0.76 10*3/MM3 (ref 0.1–0.9)
MONOCYTES NFR BLD AUTO: 9.6 % (ref 5–12)
NEUTROPHILS NFR BLD AUTO: 6.27 10*3/MM3 (ref 1.7–7)
NEUTROPHILS NFR BLD AUTO: 78.8 % (ref 42.7–76)
NRBC BLD AUTO-RTO: 0 /100 WBC (ref 0–0.2)
PLATELET # BLD AUTO: 223 10*3/MM3 (ref 140–450)
PMV BLD AUTO: 9.7 FL (ref 6–12)
POTASSIUM SERPL-SCNC: 3.5 MMOL/L (ref 3.5–5.2)
PROTHROMBIN TIME: 15.8 SECONDS (ref 11.7–14.2)
QT INTERVAL: 507 MS
RBC # BLD AUTO: 2.82 10*6/MM3 (ref 3.77–5.28)
SODIUM SERPL-SCNC: 136 MMOL/L (ref 136–145)
UREASE TISS QL: NEGATIVE
WBC # BLD AUTO: 7.94 10*3/MM3 (ref 3.4–10.8)

## 2021-01-17 PROCEDURE — 85018 HEMOGLOBIN: CPT | Performed by: INTERNAL MEDICINE

## 2021-01-17 PROCEDURE — 36415 COLL VENOUS BLD VENIPUNCTURE: CPT | Performed by: NURSE PRACTITIONER

## 2021-01-17 PROCEDURE — 80048 BASIC METABOLIC PNL TOTAL CA: CPT | Performed by: INTERNAL MEDICINE

## 2021-01-17 PROCEDURE — 93010 ELECTROCARDIOGRAM REPORT: CPT | Performed by: INTERNAL MEDICINE

## 2021-01-17 PROCEDURE — 99232 SBSQ HOSP IP/OBS MODERATE 35: CPT | Performed by: INTERNAL MEDICINE

## 2021-01-17 PROCEDURE — 85014 HEMATOCRIT: CPT | Performed by: INTERNAL MEDICINE

## 2021-01-17 PROCEDURE — 93005 ELECTROCARDIOGRAM TRACING: CPT | Performed by: NURSE PRACTITIONER

## 2021-01-17 PROCEDURE — 85025 COMPLETE CBC W/AUTO DIFF WBC: CPT | Performed by: INTERNAL MEDICINE

## 2021-01-17 PROCEDURE — 85610 PROTHROMBIN TIME: CPT | Performed by: NURSE PRACTITIONER

## 2021-01-17 RX ORDER — HYDROCORTISONE ACETATE 25 MG/1
25 SUPPOSITORY RECTAL NIGHTLY
Status: DISCONTINUED | OUTPATIENT
Start: 2021-01-17 | End: 2021-01-18 | Stop reason: HOSPADM

## 2021-01-17 RX ADMIN — TRAMADOL HYDROCHLORIDE 25 MG: 50 TABLET ORAL at 22:05

## 2021-01-17 RX ADMIN — BUSPIRONE HYDROCHLORIDE 10 MG: 10 TABLET ORAL at 08:50

## 2021-01-17 RX ADMIN — BUSPIRONE HYDROCHLORIDE 10 MG: 10 TABLET ORAL at 22:06

## 2021-01-17 RX ADMIN — METOPROLOL TARTRATE 12.5 MG: 25 TABLET, FILM COATED ORAL at 22:06

## 2021-01-17 RX ADMIN — SODIUM CHLORIDE, PRESERVATIVE FREE 10 ML: 5 INJECTION INTRAVENOUS at 08:50

## 2021-01-17 RX ADMIN — HYDROCORTISONE ACETATE 25 MG: 25 SUPPOSITORY RECTAL at 22:07

## 2021-01-17 RX ADMIN — TRAMADOL HYDROCHLORIDE 25 MG: 50 TABLET ORAL at 08:49

## 2021-01-17 RX ADMIN — ROPINIROLE HYDROCHLORIDE 0.25 MG: 0.5 TABLET, FILM COATED ORAL at 22:05

## 2021-01-17 RX ADMIN — METOPROLOL TARTRATE 12.5 MG: 25 TABLET, FILM COATED ORAL at 08:49

## 2021-01-17 RX ADMIN — PANTOPRAZOLE SODIUM 40 MG: 40 TABLET, DELAYED RELEASE ORAL at 06:23

## 2021-01-17 RX ADMIN — SODIUM CHLORIDE, PRESERVATIVE FREE 10 ML: 5 INJECTION INTRAVENOUS at 22:11

## 2021-01-17 RX ADMIN — ESCITALOPRAM 10 MG: 10 TABLET, FILM COATED ORAL at 08:50

## 2021-01-17 NOTE — PROGRESS NOTES
[facility]  Inpatient Progress Note    Reason for Follow Up: Rectal bleeding, anemia    Subjective     Interval History:   1/16 EGD with Dr. Hernandez showing esophagitis, gastritis, duodenitis; colonoscopy with diverticulosis and hemorrhoids.  Patient denies bleeding, tolerating diet.    Current Facility-Administered Medications:   •  acetaminophen (TYLENOL) tablet 650 mg, 650 mg, Oral, Q4H PRN, 650 mg at 01/15/21 0813 **OR** acetaminophen (TYLENOL) 160 MG/5ML solution 650 mg, 650 mg, Oral, Q4H PRN **OR** acetaminophen (TYLENOL) suppository 650 mg, 650 mg, Rectal, Q4H PRN, Misty Summers APRN  •  busPIRone (BUSPAR) tablet 10 mg, 10 mg, Oral, Q12H, Misty Summers APRN, 10 mg at 01/17/21 0850  •  escitalopram (LEXAPRO) tablet 10 mg, 10 mg, Oral, Daily, Misty Summers APRN, 10 mg at 01/17/21 0850  •  hydrocortisone (ANUSOL-HC) suppository 25 mg, 25 mg, Rectal, Nightly, Ksenia Buchanan MD  •  lidocaine-prilocaine (EMLA) 2.5-2.5 % cream, , Topical, PRN, Christopher Jin MD  •  metoprolol tartrate (LOPRESSOR) injection 5 mg, 5 mg, Intravenous, Q4H PRN, Ana Cristina Callaway APRN  •  metoprolol tartrate (LOPRESSOR) tablet 12.5 mg, 12.5 mg, Oral, Q12H, Gabo Ivan MD, 12.5 mg at 01/17/21 0849  •  nitroglycerin (NITROSTAT) SL tablet 0.4 mg, 0.4 mg, Sublingual, Q5 Min PRN, Misty Summers APRN  •  ondansetron (ZOFRAN) injection 4 mg, 4 mg, Intravenous, Q6H PRN, Misty Summers APRN, 4 mg at 01/16/21 1132  •  pantoprazole (PROTONIX) EC tablet 40 mg, 40 mg, Oral, Q AM, Wallace Hernandez MD, 40 mg at 01/17/21 0623  •  rOPINIRole (REQUIP) tablet 0.25 mg, 0.25 mg, Oral, Nightly, Misty Summers, APRN, 0.25 mg at 01/16/21 2141  •  sodium chloride 0.9 % flush 10 mL, 10 mL, Intravenous, PRN, Silvia Fuller MD  •  sodium chloride 0.9 % flush 10 mL, 10 mL, Intravenous, Q12H, Misty Summers, APRN, 10 mL at 01/17/21 0850  •  sodium chloride 0.9 % flush 10 mL, 10 mL, Intravenous, PRN, Misty Summers,  APRN, 10 mL at 01/13/21 1951  •  sodium chloride 0.9 % infusion 1,000 mL, 1,000 mL, Intravenous, Continuous, Wallace Hernandez MD, Last Rate: 25 mL/hr at 01/16/21 1043, Currently Infusing at 01/16/21 1131  •  traMADol (ULTRAM) tablet 25 mg, 25 mg, Oral, Q8H PRN, Eric Perez MD, 25 mg at 01/17/21 0849  Review of Systems:   All systems reviewed and negative except for: Constitution: Fatigue    Objective     Vital Signs  Temp:  [97.7 °F (36.5 °C)-98.3 °F (36.8 °C)] 97.7 °F (36.5 °C)  Heart Rate:  [] 60  Resp:  [12-20] 20  BP: ()/(44-79) 114/64  Body mass index is 35.66 kg/m².    Intake/Output Summary (Last 24 hours) at 1/17/2021 0959  Last data filed at 1/17/2021 0849  Gross per 24 hour   Intake 360 ml   Output --   Net 360 ml     I/O this shift:  In: 240 [P.O.:240]  Out: -      Physical Exam:   General: patient awake, alert and cooperative, no distress but chronically ill-appearing   Eyes: Normal lids and lashes, no scleral icterus   Neck: supple, normal ROM   Skin: warm and dry, not jaundiced   Cardiovascular: regular rhythm and rate, no murmurs auscultated   Pulm: clear to auscultation bilaterally, regular and unlabored   Abdomen: soft, obese, nontender, nondistended; normal bowel sounds   Rectal: deferred   Extremities: no rash or edema   Psychiatric: Normal mood and behavior; memory intact     Results Review:     I reviewed the patient's new clinical results.    Results from last 7 days   Lab Units 01/17/21  0803 01/16/21  2355 01/16/21  1544  01/16/21  0037  01/15/21  0020   WBC 10*3/mm3 7.94  --   --   --  8.13  --  8.73   HEMOGLOBIN g/dL 8.8* 8.3* 8.5*   < > 8.8*  8.8*   < > 7.9*  7.9*   HEMATOCRIT % 26.8* 25.3* 26.4*   < > 27.0*  27.0*   < > 23.5*  23.5*   PLATELETS 10*3/mm3 223  --   --   --  209  --  210    < > = values in this interval not displayed.     Results from last 7 days   Lab Units 01/17/21  0803 01/16/21  0037 01/15/21  0817 01/15/21  0020  01/11/21  0946    SODIUM mmol/L 136 138  --  136   < > 138   POTASSIUM mmol/L 3.5 4.1 3.7 3.3*   < > 4.6   CHLORIDE mmol/L 102 104  --  101   < > 101   CO2 mmol/L 21.7* 19.4*  --  21.9*   < > 18.4*   BUN mg/dL 28* 19  --  37*   < > 74*   CREATININE mg/dL 3.36* 2.13*  --  2.80*   < > 3.35*   CALCIUM mg/dL 9.0 9.2  --  8.8   < > 9.5   BILIRUBIN mg/dL  --   --   --   --   --  0.3   ALK PHOS U/L  --   --   --   --   --  76   ALT (SGPT) U/L  --   --   --   --   --  7   AST (SGOT) U/L  --   --   --   --   --  17   GLUCOSE mg/dL 105* 89  --  84   < > 150*    < > = values in this interval not displayed.     Results from last 7 days   Lab Units 01/17/21  0803 01/16/21  0037 01/15/21  0020   INR  1.28* 1.30* 1.39*     No results found for: LIPASE    Radiology:  FL Small Bowel Follow Through Single-Contrast    (Results Pending)       Assessment/Plan     Patient Active Problem List   Diagnosis   • Tear of rotator cuff   • Hypertension   • Generalized anxiety disorder   • Hyperlipidemia   • IFG (impaired fasting glucose)   • Heart murmur, systolic   • Shoulder pain, bilateral   • Pain of both shoulder joints   • Chronic pain of both shoulders   • Acute congestive heart failure (CMS/[facility])   • Obesity, morbid, BMI 50 or higher (CMS/[facility])   • Fungal skin infection   • Cellulitis of lower extremity   • Aortic valve stenosis   • Tricuspid valve insufficiency   • Mitral valve stenosis   • S/P MVR (mitral valve replacement)   • Paroxysmal atrial fibrillation (CMS/[facility])   • Pulmonary hypertension (CMS/[facility])   • Stage 5 chronic kidney disease on chronic dialysis (CMS/[facility])   • Gastroesophageal reflux disease without esophagitis   • Chronic idiopathic constipation   • Dependence on renal dialysis (CMS/[facility])   • Chronic kidney disease, stage 2 (mild)   • Renovascular hypertension   • GI bleed   • 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 (CMS/[facility])   • CAD (coronary artery disease)   • S/P AVR (aortic valve replacement)   • Chronic diastolic CHF  (congestive heart failure) (CMS/HCC)   • Chronic pain of both shoulders   • Gastrointestinal hemorrhage   • Gastrointestinal hemorrhage with melena       Impression  1.  Rectal bleeding: Suspected secondary to hemorrhoids given findings on her colonoscopy     2.  Normocytic anemia: Acute on chronic issue,  hemoglobin stable, check iron studies    3.  History of aortic and mitral valve replacement: On warfarin, currently held    4.  ESRD    Plan  Anusol for hemorrhoids  She will need to be on a daily fiber supplementation for the hemorrhoids such as daily Metamucil, Benefiber, FiberCon  Okay to start warfarin from GI standpoint  Small bowel follow-through in anticipation of small bowel capsule study in the outpatient setting  Monitor stools, follow hemoglobin  I have sent a message to my office to arrange for outpatient follow-up    I discussed the patients findings and my recommendations with patient.    All necessary PPE, including face mask and eye protection, were worn during this encounter.  Hand sanitization was performed both before and after the patient interaction.    Over 25 minutes was spent reviewing the patient's chart and records, in discussion with the patient, in examination of the patient, and in discussion with members of the patient's medical team.    Ksenia Buchanan MD

## 2021-01-17 NOTE — PROGRESS NOTES
"   LOS: 6 days    Patient Care Team:  Robert Cortez MD as PCP - General (Family Medicine)  Deborah Agudelo MD as Surgeon (Orthopedic Surgery)  Scott Segura MD as Consulting Physician (Cardiology)  Scar Gaxiola MD as Surgeon (Cardiothoracic Surgery)  Kathy Osuna MD as Consulting Physician (Nephrology)  Dora Astorga APRN as Nurse Practitioner (Neurosurgery)  Satish Stahl MD as Consulting Physician (Vascular Surgery)  Scar Gaxiola MD as Surgeon (Cardiothoracic Surgery)    Chief Complaint:    Chief Complaint   Patient presents with   • Rectal Bleeding     Follow-up end-stage renal disease  Subjective     Interval History:   No acute complaints.   Denies chest pain or shortness of air, complaining of being weak and fatigued, she notes     Review of Systems:   As noted above    Objective     Vital Signs  Temp:  [97.7 °F (36.5 °C)-98.3 °F (36.8 °C)] 97.7 °F (36.5 °C)  Heart Rate:  [] 58  Resp:  [12-20] 20  BP: ()/(44-79) 114/64    Flowsheet Rows      First Filed Value   Admission Height  170.2 cm (67\") Documented at 01/11/2021 0958   Admission Weight  106 kg (233 lb) Documented at 01/11/2021 0958          No intake/output data recorded.  I/O last 3 completed shifts:  In: 600 [P.O.:600]  Out: -     Intake/Output Summary (Last 24 hours) at 1/17/2021 0821  Last data filed at 1/16/2021 2140  Gross per 24 hour   Intake 120 ml   Output --   Net 120 ml       Physical Exam:  General Appearance: alert, oriented x 3, no acute distress,   Skin: warm and dry  HEENT: pupils round and reactive to light, oral mucosa normal, nonicteric sclera  Neck: supple, no JVD, trachea midline  Lungs: CTA, unlabored breathing effort  Heart: Irregularly irregular, no rub  Abdomen: soft, nontender, normoactive bowels  : no palpable bladder,  Extremities: no edema, cyanosis or clubbing, functional AV fistula in the left upper.  Noted hematoma  Neuro: normal speech and mental status "       Results Review:    Results from last 7 days   Lab Units 01/16/21  0037 01/15/21  0817 01/15/21  0020 01/14/21  0753  01/11/21  0946   SODIUM mmol/L 138  --  136 136   < > 138   POTASSIUM mmol/L 4.1 3.7 3.3* 3.6   < > 4.6   CHLORIDE mmol/L 104  --  101 100   < > 101   CO2 mmol/L 19.4*  --  21.9* 22.2   < > 18.4*   BUN mg/dL 19  --  37* 31*   < > 74*   CREATININE mg/dL 2.13*  --  2.80* 2.42*   < > 3.35*   CALCIUM mg/dL 9.2  --  8.8 9.1   < > 9.5   BILIRUBIN mg/dL  --   --   --   --   --  0.3   ALK PHOS U/L  --   --   --   --   --  76   ALT (SGPT) U/L  --   --   --   --   --  7   AST (SGOT) U/L  --   --   --   --   --  17   GLUCOSE mg/dL 89  --  84 87   < > 150*    < > = values in this interval not displayed.       Estimated Creatinine Clearance: 28.2 mL/min (A) (by C-G formula based on SCr of 2.13 mg/dL (H)).    Results from last 7 days   Lab Units 01/16/21  0037 01/15/21  0020 01/14/21 0753 01/13/21  0034   MAGNESIUM mg/dL  --   --   --   --  1.9   PHOSPHORUS mg/dL 3.1 4.2 4.0   < >  --     < > = values in this interval not displayed.             Results from last 7 days   Lab Units 01/16/21  2355 01/16/21  1544 01/16/21  0745 01/16/21  0037 01/15/21  1554  01/15/21  0020  01/14/21  0753  01/13/21  0601  01/12/21  0000   WBC 10*3/mm3  --   --   --  8.13  --   --  8.73  --  8.59  --  6.56  --  7.96   HEMOGLOBIN g/dL 8.3* 8.5* 8.4* 8.8*  8.8* 8.4*   < > 7.9*  7.9*   < > 9.0*  9.0*   < > 8.5*  8.5*   < > 9.0*  9.0*   PLATELETS 10*3/mm3  --   --   --  209  --   --  210  --  209  --  214  --  206    < > = values in this interval not displayed.       Results from last 7 days   Lab Units 01/16/21  0037 01/15/21  0020 01/14/21  0753 01/13/21  0601 01/12/21  0756   INR  1.30* 1.39* 1.53* 2.98* 4.82*         Imaging Results (Last 24 Hours)     ** No results found for the last 24 hours. **        busPIRone, 10 mg, Oral, Q12H  escitalopram, 10 mg, Oral, Daily  metoprolol tartrate, 12.5 mg, Oral, Q12H  pantoprazole,  40 mg, Oral, Q AM  rOPINIRole, 0.25 mg, Oral, Nightly  sodium chloride, 10 mL, Intravenous, Q12H      sodium chloride, 1,000 mL, Last Rate: 1,000 mL (01/16/21 1043)        Medication Review:   Current Facility-Administered Medications   Medication Dose Route Frequency Provider Last Rate Last Admin   • acetaminophen (TYLENOL) tablet 650 mg  650 mg Oral Q4H PRN Misty Summers APRN   650 mg at 01/15/21 0813    Or   • acetaminophen (TYLENOL) 160 MG/5ML solution 650 mg  650 mg Oral Q4H PRN Misty Summers, MORGAN        Or   • acetaminophen (TYLENOL) suppository 650 mg  650 mg Rectal Q4H PRN Misty Summers APRN       • busPIRone (BUSPAR) tablet 10 mg  10 mg Oral Q12H Misty Summers APRN   10 mg at 01/16/21 2141   • escitalopram (LEXAPRO) tablet 10 mg  10 mg Oral Daily Misty Summers APRN   10 mg at 01/16/21 0834   • lidocaine-prilocaine (EMLA) 2.5-2.5 % cream   Topical PRN Christopher Jin MD       • metoprolol tartrate (LOPRESSOR) injection 5 mg  5 mg Intravenous Q4H PRN Ana Cristina Callaway APRN       • metoprolol tartrate (LOPRESSOR) tablet 12.5 mg  12.5 mg Oral Q12H Gabo Ivan MD   12.5 mg at 01/16/21 2141   • nitroglycerin (NITROSTAT) SL tablet 0.4 mg  0.4 mg Sublingual Q5 Min PRN Misty Summers APRN       • ondansetron (ZOFRAN) injection 4 mg  4 mg Intravenous Q6H PRN Misty Summers APRN   4 mg at 01/16/21 1132   • pantoprazole (PROTONIX) EC tablet 40 mg  40 mg Oral Q AM Wallace Hernandez MD   40 mg at 01/17/21 0623   • rOPINIRole (REQUIP) tablet 0.25 mg  0.25 mg Oral Nightly Misty Summers APRN   0.25 mg at 01/16/21 2141   • sodium chloride 0.9 % flush 10 mL  10 mL Intravenous PRN Silvia Fuller MD       • sodium chloride 0.9 % flush 10 mL  10 mL Intravenous Q12H Misty Summers APRN   10 mL at 01/16/21 2142   • sodium chloride 0.9 % flush 10 mL  10 mL Intravenous PRN Misty Summers APRN   10 mL at 01/13/21 1951   • sodium chloride 0.9 % infusion 1,000 mL  1,000 mL Intravenous Continuous Mary,  Wallace MONZON MD 25 mL/hr at 01/16/21 1043 Currently Infusing at 01/16/21 1131   • traMADol (ULTRAM) tablet 25 mg  25 mg Oral Q8H PRN Eric Perez MD   25 mg at 01/16/21 4785       Assessment/Plan   1.  End-stage renal disease on maintenance hemodialysis every Monday, Wednesday and Friday.  Continue same.  2.  GI bleed with maroon-colored bowel movement and clots.  No active bleed on cscope.  3.  Anemia of chronic kidney disease and acute blood loss,   4.  Prior AVR, MVR and TV repair chronically anticoagulated,   5.  Obstructive sleep apnea  6.  GERD.  7.  Atrial fibrillation with rapid ventricular response during dialysis on 1/13/2021 converted to normal sinus rhythm after she was treated with digoxin.      Jalen Ramirez MD  01/17/21  08:21 EST

## 2021-01-17 NOTE — PROGRESS NOTES
"   LOS: 6 days   Patient Care Team:  Robert Cortez MD as PCP - General (Family Medicine)  Deborah Agudelo MD as Surgeon (Orthopedic Surgery)  Scott Segura MD as Consulting Physician (Cardiology)  Scar Gaxiola MD as Surgeon (Cardiothoracic Surgery)  Kathy Osuna MD as Consulting Physician (Nephrology)  oDra Astorga APRN as Nurse Practitioner (Neurosurgery)  Satish Stahl MD as Consulting Physician (Vascular Surgery)  Scar Gaxiola MD as Surgeon (Cardiothoracic Surgery)    Chief Complaint: PAF     Interval History: No CP/SOA. Converted to NSR/SB.       Objective   Vital Signs  Temp:  [97.5 °F (36.4 °C)-98.1 °F (36.7 °C)] 97.5 °F (36.4 °C)  Heart Rate:  [] 61  Resp:  [12-20] 20  BP: ()/(49-79) 92/49    Intake/Output Summary (Last 24 hours) at 1/17/2021 1426  Last data filed at 1/17/2021 0849  Gross per 24 hour   Intake 360 ml   Output --   Net 360 ml       Last Weight and Admission Weight        01/17/21  0609   Weight: 103 kg (227 lb 11.2 oz)     Flowsheet Rows      First Filed Value   Admission Height  170.2 cm (67\") Documented at 01/11/2021 0958   Admission Weight  106 kg (233 lb) Documented at 01/11/2021 0958                  Physical Exam  Vitals signs reviewed.   Constitutional:       Comments: obese   HENT:      Head: Normocephalic.      Nose: Nose normal.   Eyes:      Conjunctiva/sclera: Conjunctivae normal.   Neck:      Musculoskeletal: Normal range of motion.      Comments: Cannot assess JVD due to body habitus  Cardiovascular:      Rate and Rhythm: Regular rhythm. Bradycardia present. Occasional extrasystoles are present.     Heart sounds: Murmur present. Systolic murmur present with a grade of 2/6.   Pulmonary:      Effort: Pulmonary effort is normal.      Breath sounds: Normal breath sounds.   Abdominal:      Palpations: Abdomen is soft.      Tenderness: There is abdominal tenderness.      Comments: Cannot feel organs or aorta   Musculoskeletal: " Normal range of motion.   Skin:     General: Skin is warm and dry.      Findings: No erythema.   Neurological:      General: No focal deficit present.      Mental Status: She is alert.      Cranial Nerves: No cranial nerve deficit.   Psychiatric:         Mood and Affect: Mood normal.         Behavior: Behavior normal.         Results Review:      Results from last 7 days   Lab Units 01/17/21  0803 01/16/21  0037 01/15/21  0817 01/15/21  0020   SODIUM mmol/L 136 138  --  136   POTASSIUM mmol/L 3.5 4.1 3.7 3.3*   CHLORIDE mmol/L 102 104  --  101   CO2 mmol/L 21.7* 19.4*  --  21.9*   BUN mg/dL 28* 19  --  37*   CREATININE mg/dL 3.36* 2.13*  --  2.80*   GLUCOSE mg/dL 105* 89  --  84   CALCIUM mg/dL 9.0 9.2  --  8.8     Results from last 7 days   Lab Units 01/13/21  0035   TROPONIN T ng/mL 0.084*     Results from last 7 days   Lab Units 01/17/21  0803 01/16/21  2355 01/16/21  1544  01/16/21  0037  01/15/21  0020   WBC 10*3/mm3 7.94  --   --   --  8.13  --  8.73   HEMOGLOBIN g/dL 8.8* 8.3* 8.5*   < > 8.8*  8.8*   < > 7.9*  7.9*   HEMATOCRIT % 26.8* 25.3* 26.4*   < > 27.0*  27.0*   < > 23.5*  23.5*   PLATELETS 10*3/mm3 223  --   --   --  209  --  210    < > = values in this interval not displayed.     Results from last 7 days   Lab Units 01/17/21  0803 01/16/21  0037 01/15/21  0020   INR  1.28* 1.30* 1.39*         Results from last 7 days   Lab Units 01/13/21  0034   MAGNESIUM mg/dL 1.9           I reviewed the patient's new clinical results.  I personally viewed and interpreted the patient's EKG/Telemetry data        Medication Review:   busPIRone, 10 mg, Oral, Q12H  escitalopram, 10 mg, Oral, Daily  hydrocortisone, 25 mg, Rectal, Nightly  metoprolol tartrate, 12.5 mg, Oral, Q12H  pantoprazole, 40 mg, Oral, Q AM  rOPINIRole, 0.25 mg, Oral, Nightly  sodium chloride, 10 mL, Intravenous, Q12H        sodium chloride, 1,000 mL, Last Rate: 1,000 mL (01/16/21 1043)        Assessment/Plan     1. UGIB --  gastritis/duodenitis/hemorrhoids  2. PAF  3. History of bioprosthetic AVR/MVR  4. Chronic dCHF  5. ESRD    She's in NSR/SB with PACs.  Her QT was 507ms this morning.  I stopped amiodarone.  Continue low dose BB.  Resume AC when okay with all.     Okay for DC from cardiac standpoint.     Gabo Ivan MD  01/17/21  14:26 EST

## 2021-01-17 NOTE — PROGRESS NOTES
Name: Claudia Arnold ADMIT: 2021   : 1945  PCP: Robert Cortez MD    MRN: [id number] LOS: 6 days   AGE/SEX: 75 y.o. female  ROOM: [facility]     Subjective   Subjective      Patient is lying in the bed and has no complaints.  Appears somewhat weak and lethargic today.  Denies nausea, vomiting abdominal pain, chest pain.         Objective   Objective   Vital Signs  Temp:  [97.5 °F (36.4 °C)-98.1 °F (36.7 °C)] 97.5 °F (36.4 °C)  Heart Rate:  [] 61  Resp:  [12-20] 20  BP: ()/(49-79) 92/49  SpO2:  [95 %-97 %] 95 %  on   ;   Device (Oxygen Therapy): room air  Body mass index is 35.66 kg/m².  Physical Exam   HEENT:  Atraumatic, normocephalic.  PERRLA.  Extraocular movements intact.  Conjunctivae pink.  Sclerae, no icterus.  Mucous membranes dry.  Oropharynx is  clear.  NECK:  Supple.  No JVD.  HEART:  Irregular rate and rhythm.  Normal S1, S2.   LUNGS:  Fairly clear to auscultation anteriorly.  No wheezes.  No crackles.  ABDOMEN:   Soft, nontender.  Bowel sounds present.  No rebound.  No  guarding.  EXTREMITIES:  No cyanosis, clubbing, or edema.  Palpable pedal pulses.  NEURO:  Grossly nonfocal.  No facial asymmetry.  Good strength in all 4  extremities.      Results Review     I reviewed the patient's new clinical results.  Results from last 7 days   Lab Units 21  1607 21  0803 21  2355 21  1544  21  0037  01/15/21  0020  21  0753   WBC 10*3/mm3  --  7.94  --   --   --  8.13  --  8.73  --  8.59   HEMOGLOBIN g/dL 8.6* 8.8* 8.3* 8.5*   < > 8.8*  8.8*   < > 7.9*  7.9*   < > 9.0*  9.0*   PLATELETS 10*3/mm3  --  223  --   --   --  209  --  210  --  209    < > = values in this interval not displayed.     Results from last 7 days   Lab Units 21  0803 21  0037 01/15/21  0817 01/15/21  0020 21  0753   SODIUM mmol/L 136 138  --  136 136   POTASSIUM mmol/L 3.5 4.1 3.7 3.3* 3.6   CHLORIDE mmol/L 102 104  --  101 100   CO2 mmol/L 21.7* 19.4*  --  21.9*  22.2   BUN mg/dL 28* 19  --  37* 31*   CREATININE mg/dL 3.36* 2.13*  --  2.80* 2.42*   GLUCOSE mg/dL 105* 89  --  84 87   Estimated Creatinine Clearance: 17.9 mL/min (A) (by C-G formula based on SCr of 3.36 mg/dL (H)).  Results from last 7 days   Lab Units 01/16/21  0037 01/15/21  0020 01/14/21  0753 01/13/21  0601 01/11/21  0946   ALBUMIN g/dL 3.40* 3.30* 3.50 3.40*   < > 3.50   BILIRUBIN mg/dL  --   --   --   --   --  0.3   ALK PHOS U/L  --   --   --   --   --  76   AST (SGOT) U/L  --   --   --   --   --  17   ALT (SGPT) U/L  --   --   --   --   --  7    < > = values in this interval not displayed.     Results from last 7 days   Lab Units 01/17/21  0803 01/16/21  0037 01/15/21  0020 01/14/21  0753 01/13/21  0601 01/13/21  0034   CALCIUM mg/dL 9.0 9.2 8.8 9.1 9.3  --    ALBUMIN g/dL  --  3.40* 3.30* 3.50 3.40*  --    MAGNESIUM mg/dL  --   --   --   --   --  1.9   PHOSPHORUS mg/dL  --  3.1 4.2 4.0 4.6*  --      Results from last 7 days   Lab Units 01/11/21  1843 01/11/21  1436 01/11/21  1011   LACTATE mmol/L 1.0 3.3* 2.4*     COVID19   Date Value Ref Range Status   01/16/2021 Not Detected Not Detected - Ref. Range Final   01/11/2021 Not Detected Not Detected - Ref. Range Final     No results found for: HGBA1C, POCGLU    XR Shoulder 2+ View Bilateral  Ordering physician's impression is located in the Encounter Note dated 09/16/19. X-ray performed in the DR room.    Scheduled Medications  busPIRone, 10 mg, Oral, Q12H  escitalopram, 10 mg, Oral, Daily  hydrocortisone, 25 mg, Rectal, Nightly  metoprolol tartrate, 12.5 mg, Oral, Q12H  pantoprazole, 40 mg, Oral, Q AM  rOPINIRole, 0.25 mg, Oral, Nightly  sodium chloride, 10 mL, Intravenous, Q12H    Infusions  sodium chloride, 1,000 mL, Last Rate: 1,000 mL (01/16/21 1043)    Diet  Diet Regular; GI Soft, Renal  NPO Diet       Assessment/Plan     Active Hospital Problems    Diagnosis  POA   • **GI bleed [K92.2]  Yes   • Gastrointestinal hemorrhage [K92.2]  Unknown   •  Gastrointestinal hemorrhage with melena [K92.1]  Unknown   • 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 (CMS/[facility]) [D68.318]  Yes   • Chronic pain of both shoulders [M25.511, G89.29, M25.512]  Yes   • CAD (coronary artery disease) [I25.10]  Yes   • S/P AVR (aortic valve replacement) [Z95.2]  Not Applicable   • Chronic diastolic CHF (congestive heart failure) (CMS/[facility]) [I50.32]  Yes   • Dependence on renal dialysis (CMS/[facility]) [Z99.2]  Not Applicable   • Paroxysmal atrial fibrillation (CMS/[facility]) [I48.0]  Yes   • Stage 5 chronic kidney disease on chronic dialysis (CMS/[facility]) [N18.6, Z99.2]  Not Applicable   • Gastroesophageal reflux disease without esophagitis [K21.9]  Yes   • Pulmonary hypertension (CMS/[facility]) [I27.20]  Yes   • S/P MVR (mitral valve replacement) [Z95.2]  Not Applicable   • Hyperlipidemia [E78.5]  Yes   • Hypertension [I10]  Yes      Resolved Hospital Problems   No resolved problems to display.       1. Acute GI bleed , initially was bright red blood and now has started stools.  On Protonix and will be continued.  H and H is stable.   Underwent EGD and colonoscopy during this hospital stay.  EGD revealed gastritis and duodenitis.  Colonoscopy revealed diverticulosis, thrombosed external hemorrhoids, internal hemorrhoids but there is no evidence of any active bleeding that was noted.  Patient is due to undergo small-bowel follow-through tomorrow.  2. Afib with RVR /bioprosthetic heart valves,   Patient did receive digoxin to achieve better rate control and currently is on p.o. metoprolol.  Appreciate cardiology input.  3. End-stage renal disease, continue with Monday Wednesday and Friday hemodialysis.  4. History of depression and anxiety, continue with Lexapro and BuSpar.    5. On SCDs for DVT prophylaxis.    6. Code status is full code.          Nathan Meyer MD  [facility]  01/17/21  16:50 EST     I wore protective equipment throughout this patient encounter  including a face mask, gloves and protective eyewear.  Hand hygiene was performed before donning protective equipment and after removal when leaving the room.

## 2021-01-18 ENCOUNTER — APPOINTMENT (OUTPATIENT)
Dept: GENERAL RADIOLOGY | Facility: HOSPITAL | Age: 76
End: 2021-01-18

## 2021-01-18 ENCOUNTER — READMISSION MANAGEMENT (OUTPATIENT)
Dept: CALL CENTER | Facility: HOSPITAL | Age: 76
End: 2021-01-18

## 2021-01-18 VITALS
DIASTOLIC BLOOD PRESSURE: 53 MMHG | HEART RATE: 67 BPM | SYSTOLIC BLOOD PRESSURE: 111 MMHG | BODY MASS INDEX: 35.66 KG/M2 | TEMPERATURE: 97.5 F | RESPIRATION RATE: 16 BRPM | OXYGEN SATURATION: 97 % | WEIGHT: 227.2 LBS | HEIGHT: 67 IN

## 2021-01-18 LAB
ANION GAP SERPL CALCULATED.3IONS-SCNC: 16.4 MMOL/L (ref 5–15)
BASOPHILS # BLD AUTO: 0.04 10*3/MM3 (ref 0–0.2)
BASOPHILS NFR BLD AUTO: 0.4 % (ref 0–1.5)
BUN SERPL-MCNC: 37 MG/DL (ref 8–23)
BUN/CREAT SERPL: 7.9 (ref 7–25)
CALCIUM SPEC-SCNC: 9.2 MG/DL (ref 8.6–10.5)
CHLORIDE SERPL-SCNC: 98 MMOL/L (ref 98–107)
CO2 SERPL-SCNC: 21.6 MMOL/L (ref 22–29)
CREAT SERPL-MCNC: 4.7 MG/DL (ref 0.57–1)
DEPRECATED RDW RBC AUTO: 43.6 FL (ref 37–54)
EOSINOPHIL # BLD AUTO: 0.14 10*3/MM3 (ref 0–0.4)
EOSINOPHIL NFR BLD AUTO: 1.5 % (ref 0.3–6.2)
ERYTHROCYTE [DISTWIDTH] IN BLOOD BY AUTOMATED COUNT: 13.1 % (ref 12.3–15.4)
GFR SERPL CREATININE-BSD FRML MDRD: 9 ML/MIN/1.73
GFR SERPL CREATININE-BSD FRML MDRD: ABNORMAL ML/MIN/{1.73_M2}
GLUCOSE SERPL-MCNC: 86 MG/DL (ref 65–99)
HCT VFR BLD AUTO: 25.5 % (ref 34–46.6)
HGB BLD-MCNC: 8.2 G/DL (ref 12–15.9)
HGB BLD-MCNC: 8.4 G/DL (ref 12–15.9)
HGB BLD-MCNC: 8.4 G/DL (ref 12–15.9)
IMM GRANULOCYTES # BLD AUTO: 0.07 10*3/MM3 (ref 0–0.05)
IMM GRANULOCYTES NFR BLD AUTO: 0.8 % (ref 0–0.5)
INR PPP: 1.2 (ref 0.9–1.1)
LYMPHOCYTES # BLD AUTO: 0.92 10*3/MM3 (ref 0.7–3.1)
LYMPHOCYTES NFR BLD AUTO: 10.1 % (ref 19.6–45.3)
MCH RBC QN AUTO: 30.4 PG (ref 26.6–33)
MCHC RBC AUTO-ENTMCNC: 32.9 G/DL (ref 31.5–35.7)
MCV RBC AUTO: 92.4 FL (ref 79–97)
MONOCYTES # BLD AUTO: 0.76 10*3/MM3 (ref 0.1–0.9)
MONOCYTES NFR BLD AUTO: 8.4 % (ref 5–12)
NEUTROPHILS NFR BLD AUTO: 7.15 10*3/MM3 (ref 1.7–7)
NEUTROPHILS NFR BLD AUTO: 78.8 % (ref 42.7–76)
NRBC BLD AUTO-RTO: 0 /100 WBC (ref 0–0.2)
PLATELET # BLD AUTO: 258 10*3/MM3 (ref 140–450)
PMV BLD AUTO: 9.7 FL (ref 6–12)
POTASSIUM SERPL-SCNC: 3.4 MMOL/L (ref 3.5–5.2)
PROTHROMBIN TIME: 15 SECONDS (ref 11.7–14.2)
RBC # BLD AUTO: 2.76 10*6/MM3 (ref 3.77–5.28)
SODIUM SERPL-SCNC: 136 MMOL/L (ref 136–145)
WBC # BLD AUTO: 9.08 10*3/MM3 (ref 3.4–10.8)

## 2021-01-18 PROCEDURE — 99232 SBSQ HOSP IP/OBS MODERATE 35: CPT | Performed by: INTERNAL MEDICINE

## 2021-01-18 PROCEDURE — 36415 COLL VENOUS BLD VENIPUNCTURE: CPT | Performed by: NURSE PRACTITIONER

## 2021-01-18 PROCEDURE — 85610 PROTHROMBIN TIME: CPT | Performed by: NURSE PRACTITIONER

## 2021-01-18 PROCEDURE — 85025 COMPLETE CBC W/AUTO DIFF WBC: CPT | Performed by: INTERNAL MEDICINE

## 2021-01-18 PROCEDURE — 85018 HEMOGLOBIN: CPT | Performed by: INTERNAL MEDICINE

## 2021-01-18 PROCEDURE — 80048 BASIC METABOLIC PNL TOTAL CA: CPT | Performed by: INTERNAL MEDICINE

## 2021-01-18 PROCEDURE — 74250 X-RAY XM SM INT 1CNTRST STD: CPT

## 2021-01-18 PROCEDURE — 85014 HEMATOCRIT: CPT | Performed by: INTERNAL MEDICINE

## 2021-01-18 RX ORDER — BUSPIRONE HYDROCHLORIDE 10 MG/1
10 TABLET ORAL 2 TIMES DAILY
Start: 2021-01-18 | End: 2021-05-27 | Stop reason: SDUPTHER

## 2021-01-18 RX ORDER — ALBUMIN (HUMAN) 12.5 G/50ML
12.5 SOLUTION INTRAVENOUS AS NEEDED
Status: DISCONTINUED | OUTPATIENT
Start: 2021-01-18 | End: 2021-01-18 | Stop reason: HOSPADM

## 2021-01-18 RX ORDER — PANTOPRAZOLE SODIUM 40 MG/1
40 TABLET, DELAYED RELEASE ORAL DAILY
Qty: 30 TABLET | Refills: 0 | Status: SHIPPED | OUTPATIENT
Start: 2021-01-18 | End: 2021-02-17

## 2021-01-18 RX ORDER — HYDROCORTISONE ACETATE 25 MG/1
25 SUPPOSITORY RECTAL NIGHTLY
Qty: 6 SUPPOSITORY | Refills: 0 | Status: SHIPPED | OUTPATIENT
Start: 2021-01-18 | End: 2021-01-24

## 2021-01-18 RX ADMIN — BARIUM SULFATE 366 ML: 960 POWDER, FOR SUSPENSION ORAL at 08:30

## 2021-01-18 RX ADMIN — ESCITALOPRAM 10 MG: 10 TABLET, FILM COATED ORAL at 10:04

## 2021-01-18 RX ADMIN — PANTOPRAZOLE SODIUM 40 MG: 40 TABLET, DELAYED RELEASE ORAL at 06:00

## 2021-01-18 RX ADMIN — SODIUM CHLORIDE, PRESERVATIVE FREE 10 ML: 5 INJECTION INTRAVENOUS at 10:04

## 2021-01-18 RX ADMIN — BUSPIRONE HYDROCHLORIDE 10 MG: 10 TABLET ORAL at 10:04

## 2021-01-18 RX ADMIN — TRAMADOL HYDROCHLORIDE 25 MG: 50 TABLET ORAL at 06:06

## 2021-01-18 NOTE — DISCHARGE SUMMARY
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 (CMS/[facility]) [D68.318]  Yes   • Chronic pain of both shoulders [M25.511, G89.29, M25.512]  Yes   • CAD (coronary artery disease) [I25.10]  Yes   • S/P AVR (aortic valve replacement) [Z95.2]  Not Applicable   • Chronic diastolic CHF (congestive heart failure) (CMS/[facility]) [I50.32]  Yes   • Dependence on renal dialysis (CMS/[facility]) [Z99.2]  Not Applicable   • Paroxysmal atrial fibrillation (CMS/[facility]) [I48.0]  Yes   • Stage 5 chronic kidney disease on chronic dialysis (CMS/[facility]) [N18.6, Z99.2]  Not Applicable   • Gastroesophageal reflux disease without esophagitis [K21.9]  Yes   • Pulmonary hypertension (CMS/[facility]) [I27.20]  Yes   • S/P MVR (mitral valve replacement) [Z95.2]  Not Applicable   • Hyperlipidemia [E78.5]  Yes   • Hypertension [I10]  Yes      Resolved Hospital Problems   No resolved problems to display.       DETAILS OF HOSPITAL STAY     Pertinent Test Results and Procedures Performed    Small bowel follow-through was unremarkable    Hospital Course  This is a 75-year-old female who was admitted for rectal bleeding and underwent EGD and colonoscopy which revealed duodenitis/gastritis/diverticulosis/ thrombosed external hemorrhoid and internal hemorrhoids.  The source of bleeding was felt to be hemorrhoidal and she was placed on Anusol suppositories.  H and H has remained stable.  She underwent a small bowel follow-through today and GI will plan to see her in the outpatient setting and consider capsule endoscopy at that time.  She also developed some atrial fibrillation with RVR.  Cardiology was consulted.  She was given amiodarone and is now back in sinus  rhythm.  She will stay on low-dose beta-blocker.  GI and cardiology have cleared her to go back on Coumadin which she will restart upon discharge today.  She also has end-stage renal disease and maintained her normal dialysis schedule.  She will receive dialysis today and now that she is medically stable will be discharged thereafter with plans to follow-up at her typical dialysis unit and with GI in the outpatient setting.       Physical Exam at Discharge:  General: No acute distress, AAOx3  HEENT: EOMI, PERRL  Cardiovascular: +s1 and s2, RRR  Lungs: No rhonchi or wheezing  Abdomen: soft, nontender    Consults:   Consults     Date and Time Order Name Status Description    1/12/2021 1244 Inpatient Cardiology Consult Completed     1/11/2021 1131 Inpatient Gastroenterology Consult Completed     1/11/2021 1108 Nephrology (on -call MD unless specified) Completed     1/11/2021 1108 [facility] (on-call MD unless specified) Details Completed             Condition on Discharge: Stable, improved    Discharge Disposition  Home or Self Care    Discharge Medications     Discharge Medications      New Medications      Instructions Start Date   hydrocortisone 25 MG suppository  Commonly known as: ANUSOL-HC   25 mg, Rectal, Nightly      metoprolol tartrate 25 MG tablet  Commonly known as: LOPRESSOR   12.5 mg, Oral, Every 12 Hours Scheduled      pantoprazole 40 MG EC tablet  Commonly known as: PROTONIX   40 mg, Oral, Daily         Changes to Medications      Instructions Start Date   bumetanide 2 MG tablet  Commonly known as: BUMEX  What changed: how much to take   4 mg, Oral, Daily         Continue These Medications      Instructions Start Date   acetaminophen 325 MG tablet  Commonly known as: TYLENOL   650 mg, Oral, 3 Times Daily, Takes tid at 2 am, 1 pm, and hS      aspirin 81 MG tablet   Oral      busPIRone 10 MG tablet  Commonly known as: BUSPAR   10 mg, Oral, 2 times daily      escitalopram 10 MG tablet  Commonly known as: LEXAPRO    10 mg, Oral, Daily      hydrocortisone 2.5 % cream   Topical, See Admin Instructions, Apply topically to the affected area twice daily as directed      ondansetron 4 MG tablet  Commonly known as: ZOFRAN   4 mg, Oral, Every 8 Hours PRN      rOPINIRole 0.25 MG tablet  Commonly known as: REQUIP   No dose, route, or frequency recorded.      warfarin 2 MG tablet  Commonly known as: COUMADIN   Take one tablet (2mg) by mouth on Mon, Wed, and Fri and take one and one-half tablets (3mg) by mouth all other days or as directed             Discharge Diet:   Diet Instructions     Diet: Regular, Renal      Discharge Diet:  Regular  Renal             Activity at Discharge:   Activity Instructions     Activity as Tolerated            Follow-up Appointments  Future Appointments   Date Time Provider Department Center   1/19/2021  1:00 PM Deborah Agudelo MD [facility]   1/25/2021  8:00 AM Scott Segura MD [facility] None     Additional Instructions for the Follow-ups that You Need to Schedule     Discharge Follow-up with PCP   As directed       Currently Documented PCP:    Robert Cortez MD    PCP Phone Number:    [phone]     Follow Up Details: 1 week         Discharge Follow-up with Specialty: [facility].  They will call you with an appointment   As directed      Specialty: [facility].  They will call you with an appointment               Test Results Pending at Discharge  Pending Labs     Order Current Status    Tissue Pathology Exam In process          I have examined and discussed discharge planning with the patient today.    I wore full protective equipment throughout the patient encounter including eye protection and facemask.  Hand hygiene was performed before donning protective equipment and after removal when leaving the room.     Eric Perez MD  01/18/21  11:26 EST    Time: Discharge greater than 30 min

## 2021-01-18 NOTE — PROGRESS NOTES
"   LOS: 7 days    Patient Care Team:  Robert Cortez MD as PCP - General (Family Medicine)  Deborah Agudelo MD as Surgeon (Orthopedic Surgery)  Scott Segura MD as Consulting Physician (Cardiology)  Scar Gaxiola MD as Surgeon (Cardiothoracic Surgery)  Kathy Osuna MD as Consulting Physician (Nephrology)  Dora Astorga APRN as Nurse Practitioner (Neurosurgery)  Satish Stahl MD as Consulting Physician (Vascular Surgery)  Scar Gaxiola MD as Surgeon (Cardiothoracic Surgery)    Chief Complaint:    Chief Complaint   Patient presents with   • Rectal Bleeding     Follow-up end-stage renal disease  Subjective     Interval History:   Patient has no further GI bleeding and it was thought to be related to hemorrhoidal bleed based on her colonoscopy, no chest pain or shortness of air, no orthopnea or PND, no nausea or vomiting.    Review of Systems:   As noted above    Objective     Vital Signs  Temp:  [97.4 °F (36.3 °C)-97.6 °F (36.4 °C)] 97.6 °F (36.4 °C)  Heart Rate:  [59-62] 59  Resp:  [16-20] 16  BP: ()/(49-67) 115/67    Flowsheet Rows      First Filed Value   Admission Height  170.2 cm (67\") Documented at 01/11/2021 0958   Admission Weight  106 kg (233 lb) Documented at 01/11/2021 0958          I/O this shift:  In: 240 [P.O.:240]  Out: -   I/O last 3 completed shifts:  In: 360 [P.O.:360]  Out: -     Intake/Output Summary (Last 24 hours) at 1/18/2021 1049  Last data filed at 1/18/2021 1004  Gross per 24 hour   Intake 240 ml   Output --   Net 240 ml       Physical Exam:  General Appearance: alert, oriented x 3, no acute distress,   Skin: warm and dry  HEENT: pupils round and reactive to light, oral mucosa normal, nonicteric sclera  Neck: supple, no JVD, trachea midline  Lungs: CTA, unlabored breathing effort  Heart: Irregularly irregular, no rub  Abdomen: soft, nontender, normoactive bowels  : no palpable bladder,  Extremities: no edema, cyanosis or clubbing, functional AV " fistula in the left upper, there is large infiltration medial to the AV fistula  Neuro: normal speech and mental status       Results Review:    Results from last 7 days   Lab Units 01/17/21  0803 01/16/21  0037 01/15/21  0817 01/15/21  0020   SODIUM mmol/L 136 138  --  136   POTASSIUM mmol/L 3.5 4.1 3.7 3.3*   CHLORIDE mmol/L 102 104  --  101   CO2 mmol/L 21.7* 19.4*  --  21.9*   BUN mg/dL 28* 19  --  37*   CREATININE mg/dL 3.36* 2.13*  --  2.80*   CALCIUM mg/dL 9.0 9.2  --  8.8   GLUCOSE mg/dL 105* 89  --  84       Estimated Creatinine Clearance: 17.9 mL/min (A) (by C-G formula based on SCr of 3.36 mg/dL (H)).    Results from last 7 days   Lab Units 01/16/21  0037 01/15/21  0020 01/14/21  0753  01/13/21  0034   MAGNESIUM mg/dL  --   --   --   --  1.9   PHOSPHORUS mg/dL 3.1 4.2 4.0   < >  --     < > = values in this interval not displayed.             Results from last 7 days   Lab Units 01/17/21  2350 01/17/21  1607 01/17/21  0803 01/16/21  2355 01/16/21  1544  01/16/21  0037  01/15/21  0020  01/14/21  0753  01/13/21  0601   WBC 10*3/mm3  --   --  7.94  --   --   --  8.13  --  8.73  --  8.59  --  6.56   HEMOGLOBIN g/dL 8.2* 8.6* 8.8* 8.3* 8.5*   < > 8.8*  8.8*   < > 7.9*  7.9*   < > 9.0*  9.0*   < > 8.5*  8.5*   PLATELETS 10*3/mm3  --   --  223  --   --   --  209  --  210  --  209  --  214    < > = values in this interval not displayed.       Results from last 7 days   Lab Units 01/17/21  0803 01/16/21  0037 01/15/21  0020 01/14/21  0753 01/13/21  0601   INR  1.28* 1.30* 1.39* 1.53* 2.98*         Imaging Results (Last 24 Hours)     Procedure Component Value Units Date/Time    FL Small Bowel Follow Through Single-Contrast [[id number]] Collected: 01/18/21 1015     Updated: 01/18/21 1015    Narrative:      SMALL BOWEL SERIES 01/18/2021     HISTORY: Anemia. GI bleed. No explanation on upper endoscopy or  colonoscopy.     FINDINGS:  images of the abdomen show no acute abnormalities.     Patient ingested the  contrast for the small bowel series without  difficulty. Stomach appears normal in size, position and contour.  Contrast passes easily into the duodenum.     Contrast passes through the small bowel and reaches the colon after  approximately 60 minutes and is better seen on the 75 minute image.     Small bowel mucosa appears normal with no strictures, bowel dilatation  or filling defects.     Terminal ileum appears normal.       Impression:      No abnormalities identified on this small bowel series.           FLUOROSCOPY TIME: 37 seconds 13 images.           busPIRone, 10 mg, Oral, Q12H  escitalopram, 10 mg, Oral, Daily  hydrocortisone, 25 mg, Rectal, Nightly  metoprolol tartrate, 12.5 mg, Oral, Q12H  pantoprazole, 40 mg, Oral, Q AM  rOPINIRole, 0.25 mg, Oral, Nightly  sodium chloride, 10 mL, Intravenous, Q12H           Medication Review:   Current Facility-Administered Medications   Medication Dose Route Frequency Provider Last Rate Last Admin   • acetaminophen (TYLENOL) tablet 650 mg  650 mg Oral Q4H PRN Misty Summers APRN   650 mg at 01/15/21 0813    Or   • acetaminophen (TYLENOL) 160 MG/5ML solution 650 mg  650 mg Oral Q4H PRN Misty Summers APRN        Or   • acetaminophen (TYLENOL) suppository 650 mg  650 mg Rectal Q4H PRN Misty Summers APRN       • albumin human 25 % IV SOLN 12.5 g  12.5 g Intravenous PRN Chritsopher Jin MD       • busPIRone (BUSPAR) tablet 10 mg  10 mg Oral Q12H Misty Summers APRN   10 mg at 01/18/21 1004   • escitalopram (LEXAPRO) tablet 10 mg  10 mg Oral Daily iMsty Summers APRN   10 mg at 01/18/21 1004   • hydrocortisone (ANUSOL-HC) suppository 25 mg  25 mg Rectal Nightly Ksenia Buchanan MD   25 mg at 01/17/21 2207   • lidocaine-prilocaine (EMLA) 2.5-2.5 % cream   Topical PRN Christopher Jin MD       • metoprolol tartrate (LOPRESSOR) injection 5 mg  5 mg Intravenous Q4H PRN Ana Cristina Callaway APRN       • metoprolol tartrate (LOPRESSOR) tablet 12.5 mg  12.5 mg Oral Q12H  Gabo Ivan MD   Stopped at 01/18/21 0936   • nitroglycerin (NITROSTAT) SL tablet 0.4 mg  0.4 mg Sublingual Q5 Min PRN Misty Summers APRN       • ondansetron (ZOFRAN) injection 4 mg  4 mg Intravenous Q6H PRN Misty Summers APRN   4 mg at 01/16/21 1132   • pantoprazole (PROTONIX) EC tablet 40 mg  40 mg Oral Q AM Wallace Hernandez MD   40 mg at 01/18/21 0600   • rOPINIRole (REQUIP) tablet 0.25 mg  0.25 mg Oral Nightly Misty Summers APRN   0.25 mg at 01/17/21 2205   • sodium chloride 0.9 % bolus 1,000 mL  1,000 mL Intravenous PRN Christopher Jin MD       • sodium chloride 0.9 % flush 10 mL  10 mL Intravenous PRN Silvia Fuller MD       • sodium chloride 0.9 % flush 10 mL  10 mL Intravenous Q12H Misty Summers APRN   10 mL at 01/18/21 1004   • sodium chloride 0.9 % flush 10 mL  10 mL Intravenous PRN Misty Summers APRN   10 mL at 01/13/21 1951   • traMADol (ULTRAM) tablet 25 mg  25 mg Oral Q8H PRN Eric Perez MD   25 mg at 01/18/21 0606       Assessment/Plan   1.  End-stage renal disease on maintenance hemodialysis every Monday, Wednesday and Friday.  Appears to be euvolemic with plan for dialysis today  2.  GI bleed with maroon-colored bowel movement and clots  3.  Anemia of chronic kidney disease and acute blood loss, hemoglobin today 8.2  4.  Prior AVR, MVR and TV repair chronically anticoagulated,   5.  Obstructive sleep apnea  6.  GERD.  7.  Atrial fibrillation with rapid ventricular response during dialysis on 1/13/2021 converted to normal sinus rhythm after she was treated with digoxin, but she is back into atrial fibrillation cardiology is following.        Plan:  1.  Continue the same treatment  2.  Hemodialysis today  3.  Surveillance labs      Christopher Jin MD  01/18/21  10:49 EST

## 2021-01-18 NOTE — PROGRESS NOTES
[facility]  Inpatient Progress Note    Reason for Follow Up:  Rectal bleeding, anemia    Subjective     Interval History:   No more rectal bleeding. She just returned from having her SBFT. Hgb 8.2 (8.8 yesterday).     Current Facility-Administered Medications:   •  acetaminophen (TYLENOL) tablet 650 mg, 650 mg, Oral, Q4H PRN, 650 mg at 01/15/21 0813 **OR** acetaminophen (TYLENOL) 160 MG/5ML solution 650 mg, 650 mg, Oral, Q4H PRN **OR** acetaminophen (TYLENOL) suppository 650 mg, 650 mg, Rectal, Q4H PRN, Misty Summers APRN  •  albumin human 25 % IV SOLN 12.5 g, 12.5 g, Intravenous, PRN, Christopher Jin MD  •  busPIRone (BUSPAR) tablet 10 mg, 10 mg, Oral, Q12H, Misty Summers APRN, 10 mg at 01/18/21 1004  •  escitalopram (LEXAPRO) tablet 10 mg, 10 mg, Oral, Daily, Misty Summers APRN, 10 mg at 01/18/21 1004  •  hydrocortisone (ANUSOL-HC) suppository 25 mg, 25 mg, Rectal, Nightly, Ksenia Buchanan MD, 25 mg at 01/17/21 2207  •  lidocaine-prilocaine (EMLA) 2.5-2.5 % cream, , Topical, PRN, Christopher Jin MD  •  metoprolol tartrate (LOPRESSOR) injection 5 mg, 5 mg, Intravenous, Q4H PRN, Ana Cristina Callaway APRN  •  metoprolol tartrate (LOPRESSOR) tablet 12.5 mg, 12.5 mg, Oral, Q12H, Gabo Ivan MD, Stopped at 01/18/21 0936  •  nitroglycerin (NITROSTAT) SL tablet 0.4 mg, 0.4 mg, Sublingual, Q5 Min PRN, Misty Summers APRN  •  ondansetron (ZOFRAN) injection 4 mg, 4 mg, Intravenous, Q6H PRN, Misty Summers APRN, 4 mg at 01/16/21 1132  •  pantoprazole (PROTONIX) EC tablet 40 mg, 40 mg, Oral, Q AM, Wallace Hernandez MD, 40 mg at 01/18/21 0600  •  rOPINIRole (REQUIP) tablet 0.25 mg, 0.25 mg, Oral, Nightly, Misty Summers, MORGAN, 0.25 mg at 01/17/21 2205  •  sodium chloride 0.9 % bolus 1,000 mL, 1,000 mL, Intravenous, PRN, Christopher Jin MD  •  sodium chloride 0.9 % flush 10 mL, 10 mL, Intravenous, PRN, Silvia Fuller MD  •  sodium chloride 0.9 % flush 10 mL, 10 mL,  Intravenous, Q12H, Misty Summers APRN, 10 mL at 01/18/21 1004  •  sodium chloride 0.9 % flush 10 mL, 10 mL, Intravenous, PRN, Misty Summers APRN, 10 mL at 01/13/21 1951  •  traMADol (ULTRAM) tablet 25 mg, 25 mg, Oral, Q8H PRN, Eric Perez MD, 25 mg at 01/18/21 0606  Review of Systems:    The following systems were reviewed and negative;  constitution, ENT, respiratory, cardiovascular, genitourinary, musculoskeletal and neurological    Objective     Vital Signs  Temp:  [97.4 °F (36.3 °C)-97.6 °F (36.4 °C)] 97.6 °F (36.4 °C)  Heart Rate:  [59-62] 59  Resp:  [16-20] 16  BP: ()/(49-67) 115/67  Body mass index is 35.58 kg/m².  No intake or output data in the 24 hours ending 01/18/21 1006  No intake/output data recorded.     Physical Exam:   General: patient awake, alert and cooperative   Eyes: Normal lids and lashes, no scleral icterus   Neck: supple, normal ROM   Skin: warm and dry, not jaundiced   Cardiovascular: regular rhythm and rate, no murmurs auscultated   Pulm: clear to auscultation bilaterally, regular and unlabored   Abdomen: soft, nontender, nondistended; normal bowel sounds   Extremities: no rash or edema   Psychiatric: Normal mood and behavior; memory intact     Results Review:     I reviewed the patient's new clinical results.    Results from last 7 days   Lab Units 01/17/21  2350 01/17/21  1607 01/17/21  0803  01/16/21  0037  01/15/21  0020   WBC 10*3/mm3  --   --  7.94  --  8.13  --  8.73   HEMOGLOBIN g/dL 8.2* 8.6* 8.8*   < > 8.8*  8.8*   < > 7.9*  7.9*   HEMATOCRIT % 25.5* 26.4* 26.8*   < > 27.0*  27.0*   < > 23.5*  23.5*   PLATELETS 10*3/mm3  --   --  223  --  209  --  210    < > = values in this interval not displayed.     Results from last 7 days   Lab Units 01/17/21  0803 01/16/21  0037 01/15/21  0817 01/15/21  0020   SODIUM mmol/L 136 138  --  136   POTASSIUM mmol/L 3.5 4.1 3.7 3.3*   CHLORIDE mmol/L 102 104  --  101   CO2 mmol/L 21.7* 19.4*  --  21.9*   BUN mg/dL 28*  19  --  37*   CREATININE mg/dL 3.36* 2.13*  --  2.80*   CALCIUM mg/dL 9.0 9.2  --  8.8   GLUCOSE mg/dL 105* 89  --  84     Results from last 7 days   Lab Units 01/17/21  0803 01/16/21  0037 01/15/21  0020   INR  1.28* 1.30* 1.39*     No results found for: LIPASE    Radiology:  FL Small Bowel Follow Through Single-Contrast    (Results Pending)       Assessment/Plan     Patient Active Problem List   Diagnosis   • Tear of rotator cuff   • Hypertension   • Generalized anxiety disorder   • Hyperlipidemia   • IFG (impaired fasting glucose)   • Heart murmur, systolic   • Shoulder pain, bilateral   • Pain of both shoulder joints   • Chronic pain of both shoulders   • Acute congestive heart failure (CMS/HCC)   • Obesity, morbid, BMI 50 or higher (CMS/[facility])   • Fungal skin infection   • Cellulitis of lower extremity   • Aortic valve stenosis   • Tricuspid valve insufficiency   • Mitral valve stenosis   • S/P MVR (mitral valve replacement)   • Paroxysmal atrial fibrillation (CMS/[facility])   • Pulmonary hypertension (CMS/[facility])   • Stage 5 chronic kidney disease on chronic dialysis (CMS/[facility])   • Gastroesophageal reflux disease without esophagitis   • Chronic idiopathic constipation   • Dependence on renal dialysis (CMS/[facility])   • Chronic kidney disease, stage 2 (mild)   • Renovascular hypertension   • GI bleed   • 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  Rectal bleeding: Suspected secondary to hemorrhoids given findings on her colonoscopy      2.  Normocytic anemia: Acute on chronic issue,  hemoglobin stable, check iron studies. Await results of SBFT.     3.  History of aortic and mitral valve replacement: On warfarin,  currently held     4.  ESRD     Plan  Anusol for hemorrhoids  She will need to be on a daily fiber supplementation for the hemorrhoids such as daily Metamucil, Benefiber, FiberCon  Okay to start warfarin from GI standpoint  Small bowel follow-through in anticipation of small bowel capsule study in the outpatient setting  Monitor stools, follow hemoglobin  I have sent a message to my office to arrange for outpatient follow-up     I discussed the patients findings and my recommendations with patient.     All necessary PPE, including face mask and eye protection, were worn during this encounter.  Hand sanitization was performed both before and after the patient interaction.    I discussed the patients findings and my recommendations with patient and nursing staff.    Alex Zepeda MD

## 2021-01-18 NOTE — OUTREACH NOTE
Prep Survey      Responses   [facility] facility patient discharged from?  Distant   Is LACE score < 7 ?  No   Emergency Room discharge w/ pulse ox?  No   Eligibility  [facility]   Date of Admission  01/11/21   Date of Discharge  01/18/21   Discharge Disposition  Home or Self Care   Discharge diagnosis  GI bleed  Dependence on renal dialysis    Does the patient have one of the following disease processes/diagnoses(primary or secondary)?  Other   Does the patient have Home health ordered?  No   Is there a DME ordered?  No   Prep survey completed?  Yes          Rajwinder Kemp RN

## 2021-01-19 ENCOUNTER — TRANSITIONAL CARE MANAGEMENT TELEPHONE ENCOUNTER (OUTPATIENT)
Dept: CALL CENTER | Facility: HOSPITAL | Age: 76
End: 2021-01-19

## 2021-01-19 ENCOUNTER — CLINICAL SUPPORT (OUTPATIENT)
Dept: ORTHOPEDIC SURGERY | Facility: CLINIC | Age: 76
End: 2021-01-19

## 2021-01-19 VITALS — WEIGHT: 228 LBS | BODY MASS INDEX: 35.79 KG/M2 | TEMPERATURE: 97.7 F | HEIGHT: 67 IN

## 2021-01-19 DIAGNOSIS — M19.019 GLENOHUMERAL ARTHRITIS: Primary | ICD-10-CM

## 2021-01-19 LAB
LAB AP CASE REPORT: NORMAL
PATH REPORT.FINAL DX SPEC: NORMAL
PATH REPORT.GROSS SPEC: NORMAL

## 2021-01-19 PROCEDURE — 20610 DRAIN/INJ JOINT/BURSA W/O US: CPT | Performed by: ORTHOPAEDIC SURGERY

## 2021-01-19 RX ORDER — METHYLPREDNISOLONE ACETATE 80 MG/ML
80 INJECTION, SUSPENSION INTRA-ARTICULAR; INTRALESIONAL; INTRAMUSCULAR; SOFT TISSUE
Status: COMPLETED | OUTPATIENT
Start: 2021-01-19 | End: 2021-01-19

## 2021-01-19 RX ADMIN — METHYLPREDNISOLONE ACETATE 80 MG: 80 INJECTION, SUSPENSION INTRA-ARTICULAR; INTRALESIONAL; INTRAMUSCULAR; SOFT TISSUE at 08:16

## 2021-01-19 RX ADMIN — METHYLPREDNISOLONE ACETATE 80 MG: 80 INJECTION, SUSPENSION INTRA-ARTICULAR; INTRALESIONAL; INTRAMUSCULAR; SOFT TISSUE at 08:22

## 2021-01-19 NOTE — PROGRESS NOTES
Patient: Claudia Arnold  YOB: 1945  Date of Service: 1/19/2021    Chief Complaints: Bilateral shoulder pain    Subjective:    History of Present Illness: Pt is seen in the office today with complaints of bilateral shoulder pain she has known degenerative changes she gets intermittent injections she is not a great operative candidate, she is on chronic dialysis and has had several stents in the hospital and actually left yesterday with a several day stay for work-up of rectal bleeding she states this was all found to be hemorrhoids.          Allergies: No Known Allergies    Medications:   Home Medications:  Current Outpatient Medications on File Prior to Visit   Medication Sig   • acetaminophen (TYLENOL) 325 MG tablet Take 650 mg by mouth 3 (Three) Times a Day. Takes tid at 2 am, 1 pm, and hS   • aspirin 81 MG tablet Take  by mouth.   • bumetanide (BUMEX) 2 MG tablet TAKE 2 TABLETS BY MOUTH DAILY (Patient taking differently: Take 2 mg by mouth Daily.)   • busPIRone (BUSPAR) 10 MG tablet Take 1 tablet by mouth 2 (two) times a day.   • escitalopram (LEXAPRO) 10 MG tablet Take 1 tablet by mouth Daily.   • hydrocortisone (ANUSOL-HC) 25 MG suppository Insert 1 suppository into the rectum Every Night for 6 doses.   • hydrocortisone 2.5 % cream Apply  topically to the appropriate area as directed See Admin Instructions. Apply topically to the affected area twice daily as directed   • metoprolol tartrate (LOPRESSOR) 25 MG tablet Take 0.5 tablets by mouth Every 12 (Twelve) Hours for 30 days.   • ondansetron (ZOFRAN) 4 MG tablet Take 1 tablet by mouth Every 8 (Eight) Hours As Needed for Nausea or Vomiting.   • pantoprazole (PROTONIX) 40 MG EC tablet Take 1 tablet by mouth Daily for 30 days.   • rOPINIRole (REQUIP) 0.25 MG tablet    • warfarin (COUMADIN) 2 MG tablet Take one tablet (2mg) by mouth on Mon, Wed, and Fri and take one and one-half tablets (3mg) by mouth all other days or as directed     Current  Facility-Administered Medications on File Prior to Visit   Medication   • [COMPLETED] barium sulfate (E-Z-PAQUE) 96 % oral suspension reconstituted suspension 366 mL   • [DISCONTINUED] acetaminophen (TYLENOL) 160 MG/5ML solution 650 mg   • [DISCONTINUED] acetaminophen (TYLENOL) suppository 650 mg   • [DISCONTINUED] acetaminophen (TYLENOL) tablet 650 mg   • [DISCONTINUED] albumin human 25 % IV SOLN 12.5 g   • [DISCONTINUED] busPIRone (BUSPAR) tablet 10 mg   • [DISCONTINUED] escitalopram (LEXAPRO) tablet 10 mg   • [DISCONTINUED] hydrocortisone (ANUSOL-HC) suppository 25 mg   • [DISCONTINUED] lidocaine-prilocaine (EMLA) 2.5-2.5 % cream   • [DISCONTINUED] metoprolol tartrate (LOPRESSOR) injection 5 mg   • [DISCONTINUED] metoprolol tartrate (LOPRESSOR) tablet 12.5 mg   • [DISCONTINUED] nitroglycerin (NITROSTAT) SL tablet 0.4 mg   • [DISCONTINUED] ondansetron (ZOFRAN) injection 4 mg   • [DISCONTINUED] pantoprazole (PROTONIX) EC tablet 40 mg   • [DISCONTINUED] rOPINIRole (REQUIP) tablet 0.25 mg   • [DISCONTINUED] sodium chloride 0.9 % bolus 1,000 mL   • [DISCONTINUED] sodium chloride 0.9 % flush 10 mL   • [DISCONTINUED] sodium chloride 0.9 % flush 10 mL   • [DISCONTINUED] sodium chloride 0.9 % flush 10 mL   • [DISCONTINUED] traMADol (ULTRAM) tablet 25 mg     Current Medications:  Scheduled Meds:  Continuous Infusions:No current facility-administered medications for this visit.     PRN Meds:.    I have reviewed the patient's medical history in detail and updated the computerized patient record.  Review and summarization of old records include:    Past Medical History:   Diagnosis Date   • A-fib (CMS/[facility])     Meds   • Arthritis     w/Difficult Mobility   • Bilateral lower extremity edema     Legs   • CAD (coronary artery disease)     Affecting LAD    • Cardiomyopathy (CMS/[facility])    • CHF (congestive heart failure) (CMS/[facility])    • Chronic fatigue    • Dialysis patient (CMS/[facility])     TUESDAY THURSDAY SATURDAY   • Generalized  anxiety disorder    • GERD (gastroesophageal reflux disease)    • Hernia, inguinal     Hx Repair   • History of echocardiogram 1/17/19-[facility]    The L Ventricular Cavity is Mild-to-Moderately Dilated; Left Ventricular Wall Thickness is Consistent w/Mild Concentric Hypertrophy; Left Atrial Cavity Size is Moderate-to-Severely Dilated; Severe AVS; Severe MVS; Moderate TVR Noted   • History of transfusion    • Hyperlipidemia     Controlled w/Meds   • Hypertension     Controlled w/Meds   • Hypokinesis 01/22/2019    Apical Noted on Cardiac Cath   • IFG (impaired fasting glucose)    • LAD stenosis 01/22/2019    Mid LAD Irregularities Noted on Cardiac Cath    • Left atrial dilatation 01/17/2019    Moderate-Severe Noted on Echo   • Left ventricular dilatation 01/17/2019    Noted on Echo/LEE   • Mild concentric left ventricular hypertrophy 01/17/2019    Noted on Echo/LEE   • Mitral annular calcification 01/17/2019    Severe Noted on Echo   • Moderate tricuspid valve regurgitation 01/24/2019    Noted on LEE   • Morbid obesity (CMS/HCC)    • NSTEMI (non-ST elevated myocardial infarction) (CMS/HCC)    • OCTAVIANO (obstructive sleep apnea)    • Osteoarthritis    • Pulmonary hypertension (CMS/HCC) 01/22/2019    Noted on Cardiac Cath   • Right bundle branch block 01/24/2019    Noted on LEE   • Severe aortic stenosis 01/22/2019    Noted on Cardiac Cath & LEE on 01/24/19; S/p AVR on 01/28/19 by Dr. Gaxiola   • Severe mitral valve stenosis 01/24/2019    Noted on LEE; S/p MVR on 01/28/19 by Dr. Gaxiola   • Shoulder pain, bilateral    • Skin yeast infection     Folds Under Skin--Nystatin/Diflucan        Past Surgical History:   Procedure Laterality Date   • AORTIC VALVE REPAIR/REPLACEMENT MITRAL VALVE REPAIR/REPLACEMENT N/A 1/28/2019    Procedure: LEE STERNOTOMY AORTIC VALVE REPLACEMENT, MITRAL VALVE REPLACEMENT, TRICUSPID VALVE REPAIR, MAZE PROCEDURE, CLOSURE OF LEFT ATRIAL APPENDAGE  AND PRP;  Surgeon: Scar Gaxiola MD;  Location: [facility]  OR;  Service: Cardiothoracic   • ARTERIOVENOUS FISTULA/SHUNT SURGERY Left 6/26/2019    Procedure: LEFT ARM BRACHIAL CEPHALIC FISTULA;  Surgeon: Satish Stahl MD;  Location: [facility] MAIN OR;  Service: Vascular   • CARDIAC CATHETERIZATION N/A 1/22/2019    Procedure: Coronary angiography;  Surgeon: Rick Talavera MD;  [facility];  Service: Cardiology   • CARDIAC CATHETERIZATION N/A 1/22/2019    Procedure: Left Heart Cath;  Surgeon: Rick Talavera MD;  Location: [facility] CATH INVASIVE LOCATION;  Service: Cardiology   • CARDIAC CATHETERIZATION N/A 1/22/2019    Procedure: Right Heart Cath;  Surgeon: Rick Talavera MD;  Location: [facility] CATH INVASIVE LOCATION;  Service: Cardiology   • CARDIAC CATHETERIZATION N/A 1/22/2019    Procedure: Left ventriculography;  Surgeon: Rick Talavera MD;  Location: [facility] CATH INVASIVE LOCATION;  Service: Cardiology   • CARDIAC SURGERY     • COLONOSCOPY     • COLONOSCOPY N/A 1/16/2021    Procedure: COLONOSCOPY;  Surgeon: Wallace Hernandez MD;  Location: [facility] ENDOSCOPY;  Service: Gastroenterology;  Laterality: N/A;  pre- anemia, rectal bleeding  post-- hemorrhoids, diverticulosis   • ENDOSCOPY N/A 1/16/2021    Procedure: ESOPHAGOGASTRODUODENOSCOPY with biopsies;  Surgeon: Wallace Hernandez MD;  Location: [facility] ENDOSCOPY;  Service: Gastroenterology;  Laterality: N/A;  pre-- anemia  post-- esophagitis, gastritis, duodenitis   • HERNIA REPAIR     • INSERTION HEMODIALYSIS CATHETER N/A 2/8/2019    Procedure: tunnel CATHETER PLACEMENT WITH FLUROSCOPY;  Surgeon: Satish Stahl MD;  Location: [facility] MAIN OR;  Service: Vascular   • REPLACEMENT TOTAL KNEE Bilateral 2007/2009        Social History     Occupational History   • Occupation: retired   Tobacco Use   • Smoking status: Never Smoker   • Smokeless tobacco: Never Used   Substance and Sexual Activity   • Alcohol use: Yes     Comment: Occ   • Drug use: No   •  Sexual activity: Defer     Birth control/protection: Post-menopausal      Social History     Social History Narrative   • Not on file        Family History   Problem Relation Age of Onset   • Hypertension Other    • Diabetes Other    • Heart disease Neg Hx    • Stroke Neg Hx    • Arthritis Neg Hx    • Breast cancer Neg Hx    • Malig Hyperthermia Neg Hx        ROS: 14 point review of systems was performed and was negative except for documented findings in HPI and today's encounter.     Allergies: No Known Allergies  Constitutional:  Denies fever, shaking or chills   Eyes:  Denies change in visual acuity   HENT:  Denies nasal congestion or sore throat   Respiratory:  Denies cough or shortness of breath   Cardiovascular:  Denies chest pain or severe LE edema   GI:  Denies abdominal pain, nausea, vomiting, bloody stools or diarrhea   Musculoskeletal:  Numbness, tingling, or loss of motor function only as noted above in history of present illness.  : Denies painful urination or hematuria  Integument:  Denies rash, lesion or ulceration   Neurologic:  Denies headache or focal weakness  Endocrine:  Denies lymphadenopathy  Psych:  Denies confusion or change in mental status   Hem:  Denies active bleeding      Physical Exam: 75 y.o. female  Wt Readings from Last 3 Encounters:   01/18/21 103 kg (227 lb 3.2 oz)   10/12/20 105 kg (232 lb)   07/15/20 107 kg (235 lb)       There is no height or weight on file to calculate BMI.  No height and weight on file for this encounter.  There were no vitals filed for this visit.  Vital signs reviewed.   General Appearance:    Alert, cooperative, in no acute distress                    Ortho exam    Exam is unchanged         .time    Assessment: Bilateral shoulder DJD    Plan: Injections  Follow up as indicated.  Ice, elevate, and rest as needed.  Discussed conservative measures of pain control including ice, bracing.  Also talked about the importance of strengthening     Deborah Agudelo  M.D.    Large Joint Arthrocentesis: R glenohumeral  Date/Time: 1/19/2021 8:16 AM  Consent given by: patient  Site marked: site marked  Timeout: Immediately prior to procedure a time out was called to verify the correct patient, procedure, equipment, support staff and site/side marked as required   Supporting Documentation  Indications: pain   Procedure Details  Location: shoulder - R glenohumeral  Preparation: Patient was prepped and draped in the usual sterile fashion  Needle size: 22 G  Approach: posterior  Medications administered: 4 mL lidocaine (cardiac); 80 mg methylPREDNISolone acetate 80 MG/ML  Patient tolerance: patient tolerated the procedure well with no immediate complications    Large Joint Arthrocentesis: L glenohumeral  Date/Time: 1/19/2021 8:22 AM  Consent given by: patient  Site marked: site marked  Timeout: Immediately prior to procedure a time out was called to verify the correct patient, procedure, equipment, support staff and site/side marked as required   Supporting Documentation  Indications: pain   Procedure Details  Location: shoulder - L glenohumeral  Preparation: Patient was prepped and draped in the usual sterile fashion  Needle size: 22 G  Approach: posterior  Medications administered: 4 mL lidocaine (cardiac); 80 mg methylPREDNISolone acetate 80 MG/ML  Patient tolerance: patient tolerated the procedure well with no immediate complications

## 2021-01-19 NOTE — OUTREACH NOTE
Call Center TCM Note      Responses   [facility] patient discharged from?  [place]   Does the patient have one of the following disease processes/diagnoses(primary or secondary)?  Other   TCM attempt successful?  Yes   Discharge diagnosis  GI bleed  Dependence on renal dialysis    Meds reviewed with patient/caregiver?  Yes   Is the patient having any side effects they believe may be caused by any medication additions or changes?  No   Does the patient have all medications ordered at discharge?  Yes   Is the patient taking all medications as directed (includes completed medication regime)?  Yes   Does the patient have a primary care provider?   Yes   Does the patient have an appointment with their PCP within 7 days of discharge?  No   Comments regarding PCP  Robert Cortez MD  Pt states she is due for annual in 02/2021, and will schedule this, but declines TCM FWP.    Has the patient kept scheduled appointments due by today?  N/A   Has home health visited the patient within 72 hours of discharge?  N/A   Psychosocial issues?  No   Did the patient receive a copy of their discharge instructions?  Yes   Nursing interventions  Reviewed instructions with patient   What is the patient's perception of their health status since discharge?  Improving   Is the patient/caregiver able to teach back signs and symptoms related to disease process for when to call PCP?  Yes   Is the patient/caregiver able to teach back signs and symptoms related to disease process for when to call 911?  Yes   Is the patient/caregiver able to teach back the hierarchy of who to call/visit for symptoms/problems? PCP, Specialist, Home health nurse, Urgent Care, ED, 911  Yes   If the patient is a current smoker, are they able to teach back resources for cessation?  Not a smoker   TCM call completed?  Yes   Wrap up additional comments  Pt doing ok, very tired. No further bleeding, new meds in place. Pt will see Cardio MD on 01/25/2021. Pt states  she is due for annual in 02/2021, and will schedule this, but declines TCM FWP.           Rajwinder Aviles MA    1/19/2021, 14:42 EST

## 2021-01-20 LAB — INR PPP: 1.2

## 2021-01-22 ENCOUNTER — ANTICOAGULATION VISIT (OUTPATIENT)
Dept: PHARMACY | Facility: HOSPITAL | Age: 76
End: 2021-01-22

## 2021-01-22 DIAGNOSIS — I48.0 PAROXYSMAL ATRIAL FIBRILLATION (HCC): ICD-10-CM

## 2021-01-22 NOTE — PROGRESS NOTES
Anticoagulation Clinic Progress Note    Anticoagulation Summary  As of 2021    INR goal:  2.0-3.0   TTR:  48.5 % (1.6 y)   INR used for dosin.20 (2021)   Warfarin maintenance plan:  2 mg every Tue, Thu, Sat; 3 mg all other days   Weekly warfarin total:  18 mg   No change documented:  Nolvia Streeter [facility]   Plan last modified:  Jalen Temple RP (2020)   Next INR check:  2021   Priority:  High   Target end date:      Indications    Paroxysmal atrial fibrillation (CMS/HCC) [I48.0]             Anticoagulation Episode Summary     INR check location:      Preferred lab:      Send INR reminders to:  [facility]  POOL    Comments:  Lab drawn at [facility] [phone]       Anticoagulation Care Providers     Provider Role Specialty Phone number    Shiloh Gonzales APRN Referring Cardiology [phone]          Clinic Interview:  Patient Findings     Positives:  Hospital admission    Negatives:  Signs/symptoms of thrombosis, Signs/symptoms of bleeding,   Laboratory test error suspected, Change in health, Change in alcohol use,   Change in activity, Upcoming invasive procedure, Emergency department   visit, Upcoming dental procedure, Missed doses, Extra doses, Change in   medications, Change in diet/appetite, Bruising, Other complaints    Comments:  Recent hosp admission GIB      Clinical Outcomes     Positives:  Major bleeding event    Negatives:  Thromboembolic event, Anticoagulation-related hospital   admission, Anticoagulation-related ED visit, Anticoagulation-related   fatality    Comments:  Recent hosp admission GIB        INR History:  Anticoagulation Monitoring 2020   INR 2.91 3.03 1.20   INR Date 2020   INR Goal 2.0-3.0 2.0-3.0 2.0-3.0   Trend Same Same Same   Last Week Total 18 mg 18 mg 10 mg   Next Week Total 18 mg 18 mg 18 mg   Sun - - 3 mg   Mon 3 mg 3 mg 3 mg   Tue 2 mg 2 mg 2 mg   Wed - - -      Thu - - -   Fri - - 3 mg   Sat - - 2 mg   Visit Report - - -   Some recent data might be hidden       Plan:  1. INR is Subtherapeutic today- see above in Anticoagulation Summary.   Will instruct Claudia ANTOHNY Arnold to Continue their warfarin regimen- see above in Anticoagulation Summary.  2. Follow up in 1 week  3. Pt has agreed to only be called if INR out of range. They have been instructed to call if any changes in medications, doses, concerns, etc. Patient expresses understanding and has no further questions at this time.    Nolvia Streeter, [facility]

## 2021-01-25 ENCOUNTER — OFFICE VISIT (OUTPATIENT)
Dept: CARDIOLOGY | Facility: CLINIC | Age: 76
End: 2021-01-25

## 2021-01-25 VITALS
SYSTOLIC BLOOD PRESSURE: 94 MMHG | DIASTOLIC BLOOD PRESSURE: 58 MMHG | OXYGEN SATURATION: 98 % | HEIGHT: 67 IN | HEART RATE: 73 BPM | BODY MASS INDEX: 35.7 KG/M2

## 2021-01-25 DIAGNOSIS — Z98.890 STATUS POST TRICUSPID VALVE REPAIR: ICD-10-CM

## 2021-01-25 DIAGNOSIS — I45.2 BIFASCICULAR BLOCK: ICD-10-CM

## 2021-01-25 DIAGNOSIS — Z95.3 STATUS POST AORTIC VALVE REPLACEMENT WITH TISSUE VALVE: Primary | ICD-10-CM

## 2021-01-25 DIAGNOSIS — I48.3 TYPICAL ATRIAL FLUTTER (HCC): ICD-10-CM

## 2021-01-25 DIAGNOSIS — I50.32 CHRONIC DIASTOLIC CONGESTIVE HEART FAILURE (HCC): ICD-10-CM

## 2021-01-25 DIAGNOSIS — I48.0 PAROXYSMAL ATRIAL FIBRILLATION (HCC): ICD-10-CM

## 2021-01-25 DIAGNOSIS — Z95.3 STATUS POST MITRAL VALVE REPLACEMENT WITH TISSUE VALVE: ICD-10-CM

## 2021-01-25 PROCEDURE — 99214 OFFICE O/P EST MOD 30 MIN: CPT | Performed by: INTERNAL MEDICINE

## 2021-01-25 RX ORDER — MIDODRINE HYDROCHLORIDE 2.5 MG/1
2.5 TABLET ORAL
Qty: 90 TABLET | Refills: 6 | Status: SHIPPED | OUTPATIENT
Start: 2021-01-25 | End: 2021-08-17

## 2021-01-25 NOTE — PROGRESS NOTES
Date of Office Visit:  2021  Encounter Provider: Scott Segura MD  Place of Service: [facility] CARDIOLOGY  Patient Name: Claudia Arnold  :1945    Chief complaint: Follow-up for tissue aortic valve replacement, tissue mitral valve   replacement, tricuspid valve repair, paroxysmal atrial fibrillation and flutter,   bifascicular block, and chronic diastolic CHF.    History of Present Illness:    I again had the pleasure of seeing the patient in cardiology office on 2021.  She is a   very pleasant 75 year-old white female with a history of valvular disease, paroxysmal   atrial fibrillation, paroxysmal atrial flutter, bifascicular block, and chronic diastolic CHF   who presents for follow-up.  The patient presented to the emergency department on   2019 with progressive and severe dyspnea, volume overload, and significant   peripheral edema.  A subsequent echocardiogram showed severe calcific disease of   the aortic valve and the mitral apparatus.  This resulted in both severe aortic stenosis   and severe mitral stenosis.  She was also noted to have severe tricuspid regurgitation.    Her ejection fraction was normal at 60-65%.  She also had Candida intertrigo in her   skin folds, as well as a significant left axillary fungal infection.  She was treated   appropriately with antifungal agents.  She was diuresed extensively.  A CT angiogram   of the chest showed questionable small bilateral pulmonary emboli.  She did go into   rapid atrial fibrillation shortly into her hospital stay, and continued to have issues with   paroxysmal atrial fibrillation throughout her hospital stay.  A right and left heart   catheterization on 2019 showed minimal luminal irregularities in the LAD, but   otherwise normal coronary arteries.  She had moderate pulmonary hypertension with   a mean PA pressure of 32 mmHg.    The patient ultimately underwent tissue aortic valve replacement  "(23 mm Magna   pericardial valve), tissue mitral valve replacement (25 mm Epic St. Mike porcine   valve), and tricuspid valve repair (Martha suture repair).  She also had a left cryo-maze   and left atrial appendage ligation at that time.  She developed oliguric acute kidney   injury postoperatively, and ultimately required hemodialysis.  Unfortunately, she did   not recover her kidney function prior to discharge, and remained on dialysis.  She   also had junctional bradycardia and asystole postoperatively, but resolved several   days after the surgery.  Postoperatively, she had issues with rapid atrial fibrillation   and rapid atrial flutter.  The atrial flutter was difficult to control, and it was not clear   if it was typical or atypical.  She underwent a LEE with direct-current cardioversion   on 2/15/2019.  She was loaded with amiodarone, and discharged on amiodarone as   well.  Her blood pressure could not tolerate beta-blockers as she was relatively   hypotensive.  She also was placed on Coumadin prior to discharge.  She ultimately   was discharged to rehab on 2/18/2019.    The patient presents today for follow-up.  She recently was hospitalized just several   days ago with rectal bleeding.  She underwent an EGD and colonoscopy which   showed duodenitis, gastritis, diverticulosis, and thrombosed external and internal   hemorrhoids.  It was ultimately felt that the bleeding source was very likely from the   hemorrhoids.  She did develop some atrial fibrillation with RVR while she was   hospitalized, but converted on amiodarone.  She is now back on warfarin.  She has   not had any bleeding since discharge.  However, she has been hypotensive, and her   metoprolol was actually discontinued.  She is still running pressures in the 90s.  She   does feel weak and \"shaky\".  She also has felt lightheaded at times.  She just   stopped the metoprolol 3 days ago, but the hypotension has persisted.  She has not   had any " chest pain.  Her shortness of breath is at baseline.    Past Medical History:   Diagnosis Date   • A-fib (CMS/[facility])     Meds   • Arthritis     w/Difficult Mobility   • Bilateral lower extremity edema     Legs   • CAD (coronary artery disease)     Affecting LAD    • Cardiomyopathy (CMS/[facility])    • CHF (congestive heart failure) (CMS/[facility])    • Chronic fatigue    • Dialysis patient (CMS/[facility])     TUESDAY THURSDAY SATURDAY   • Generalized anxiety disorder    • GERD (gastroesophageal reflux disease)    • Hernia, inguinal     Hx Repair   • History of echocardiogram 1/17/19-[facility]    The L Ventricular Cavity is Mild-to-Moderately Dilated; Left Ventricular Wall Thickness is Consistent w/Mild Concentric Hypertrophy; Left Atrial Cavity Size is Moderate-to-Severely Dilated; Severe AVS; Severe MVS; Moderate TVR Noted   • History of transfusion    • Hyperlipidemia     Controlled w/Meds   • Hypertension     Controlled w/Meds   • Hypokinesis 01/22/2019    Apical Noted on Cardiac Cath   • IFG (impaired fasting glucose)    • LAD stenosis 01/22/2019    Mid LAD Irregularities Noted on Cardiac Cath    • Left atrial dilatation 01/17/2019    Moderate-Severe Noted on Echo   • Left ventricular dilatation 01/17/2019    Noted on Echo/LEE   • Mild concentric left ventricular hypertrophy 01/17/2019    Noted on Echo/LEE   • Mitral annular calcification 01/17/2019    Severe Noted on Echo   • Moderate tricuspid valve regurgitation 01/24/2019    Noted on LEE   • Morbid obesity (CMS/[facility])    • NSTEMI (non-ST elevated myocardial infarction) (CMS/[facility])    • OCTAVIANO (obstructive sleep apnea)    • Osteoarthritis    • Pulmonary hypertension (CMS/[facility]) 01/22/2019    Noted on Cardiac Cath   • Right bundle branch block 01/24/2019    Noted on LEE   • Severe aortic stenosis 01/22/2019    Noted on Cardiac Cath & LEE on 01/24/19; S/p AVR on 01/28/19 by Dr. Gaxiola   • Severe mitral valve stenosis 01/24/2019    Noted on LEE; S/p MVR on 01/28/19 by Dr. Gaxiola   • Shoulder  pain, bilateral    • Skin yeast infection     Folds Under Skin--Nystatin/Diflucan       Past Surgical History:   Procedure Laterality Date   • AORTIC VALVE REPAIR/REPLACEMENT MITRAL VALVE REPAIR/REPLACEMENT N/A 1/28/2019    Procedure: LEE STERNOTOMY AORTIC VALVE REPLACEMENT, MITRAL VALVE REPLACEMENT, TRICUSPID VALVE REPAIR, MAZE PROCEDURE, CLOSURE OF LEFT ATRIAL APPENDAGE  AND PRP;  Surgeon: Scar Gaxiola MD;  Location: [facility] MAIN OR;  Service: Cardiothoracic   • ARTERIOVENOUS FISTULA/SHUNT SURGERY Left 6/26/2019    Procedure: LEFT ARM BRACHIAL CEPHALIC FISTULA;  Surgeon: Satish Stahl MD;  Location: [facility] MAIN OR;  Service: Vascular   • CARDIAC CATHETERIZATION N/A 1/22/2019    Procedure: Coronary angiography;  Surgeon: Rick Talavera MD;  Location: [facility] CATH INVASIVE LOCATION;  Service: Cardiology   • CARDIAC CATHETERIZATION N/A 1/22/2019    Procedure: Left Heart Cath;  Surgeon: Rick Talavera MD;  Location: [facility] CATH INVASIVE LOCATION;  Service: Cardiology   • CARDIAC CATHETERIZATION N/A 1/22/2019    Procedure: Right Heart Cath;  Surgeon: Rick Talavera MD;  Location: [facility] CATH INVASIVE LOCATION;  Service: Cardiology   • CARDIAC CATHETERIZATION N/A 1/22/2019    Procedure: Left ventriculography;  Surgeon: Rick Talavera MD;  Location: [facility] CATH INVASIVE LOCATION;  Service: Cardiology   • CARDIAC SURGERY     • COLONOSCOPY     • COLONOSCOPY N/A 1/16/2021    Procedure: COLONOSCOPY;  Surgeon: Wallace Hernandez MD;  Location: [facility] ENDOSCOPY;  Service: Gastroenterology;  Laterality: N/A;  pre- anemia, rectal bleeding  post-- hemorrhoids, diverticulosis   • ENDOSCOPY N/A 1/16/2021    Procedure: ESOPHAGOGASTRODUODENOSCOPY with biopsies;  Surgeon: Wallace Hernandez MD;  Location: [facility] ENDOSCOPY;  Service: Gastroenterology;  Laterality: N/A;  pre-- anemia  post-- esophagitis, gastritis, duodenitis   • HERNIA REPAIR     • INSERTION HEMODIALYSIS CATHETER N/A  2019    Procedure: tunnel CATHETER PLACEMENT WITH FLUROSCOPY;  Surgeon: Satish Stahl MD;  Location: [facility] OR;  Service: Vascular   • REPLACEMENT TOTAL KNEE Bilateral        Current Outpatient Medications on File Prior to Visit   Medication Sig Dispense Refill   • acetaminophen (TYLENOL) 325 MG tablet Take 650 mg by mouth 3 (Three) Times a Day. Takes tid at 2 am, 1 pm, and hS     • aspirin 81 MG tablet Take  by mouth.     • bumetanide (BUMEX) 2 MG tablet TAKE 2 TABLETS BY MOUTH DAILY (Patient taking differently: Take 2 mg by mouth Daily.) 60 tablet 5   • busPIRone (BUSPAR) 10 MG tablet Take 1 tablet by mouth 2 (two) times a day.     • [employer] MELATONIN PO Take  by mouth.     • escitalopram (LEXAPRO) 10 MG tablet Take 1 tablet by mouth Daily. 90 tablet 3   • hydrocortisone 2.5 % cream Apply  topically to the appropriate area as directed See Admin Instructions. Apply topically to the affected area twice daily as directed 60 g 3   • pantoprazole (PROTONIX) 40 MG EC tablet Take 1 tablet by mouth Daily for 30 days. 30 tablet 0   • rOPINIRole (REQUIP) 0.25 MG tablet      • warfarin (COUMADIN) 2 MG tablet Take one tablet (2mg) by mouth on Mon, Wed, and Fri and take one and one-half tablets (3mg) by mouth all other days or as directed 120 tablet 1   • [] hydrocortisone (ANUSOL-HC) 25 MG suppository Insert 1 suppository into the rectum Every Night for 6 doses. 6 suppository 0   • [DISCONTINUED] metoprolol tartrate (LOPRESSOR) 25 MG tablet Take 0.5 tablets by mouth Every 12 (Twelve) Hours for 30 days. 30 tablet 0   • [DISCONTINUED] ondansetron (ZOFRAN) 4 MG tablet Take 1 tablet by mouth Every 8 (Eight) Hours As Needed for Nausea or Vomiting. 90 tablet 3     No current facility-administered medications on file prior to visit.      Allergies as of 2021   • (No Known Allergies)     Social History     Socioeconomic History   • Marital status:      Spouse name: Not on file   • Number of  "children: 2   • Years of education: 12   • Highest education level: High school graduate   Occupational History   • Occupation: retired   Social Needs   • Financial resource strain: Patient refused   • Food insecurity     Worry: Patient refused     Inability: Patient refused   • Transportation needs     Medical: Patient refused     Non-medical: Patient refused   Tobacco Use   • Smoking status: Never Smoker   • Smokeless tobacco: Never Used   Substance and Sexual Activity   • Alcohol use: Yes     Comment: Occ   • Drug use: No   • Sexual activity: Defer     Birth control/protection: Post-menopausal     Family History   Problem Relation Age of Onset   • Hypertension Other    • Diabetes Other    • Heart disease Neg Hx    • Stroke Neg Hx    • Arthritis Neg Hx    • Breast cancer Neg Hx    • Malig Hyperthermia Neg Hx        Review of Systems   Constitution: Positive for decreased appetite and malaise/fatigue.   Cardiovascular: Positive for dyspnea on exertion.   Gastrointestinal: Positive for hematochezia, melena and nausea.   Neurological: Positive for tremors.   All other systems reviewed and are negative.     Objective:     Vitals:    01/25/21 0806   BP: 94/58   Pulse: 73   SpO2: 98%   Height: 170.2 cm (67.01\")     Body mass index is 35.7 kg/m².    Physical Exam   Constitutional: She is oriented to person, place, and time. She appears well-developed and well-nourished.   HENT:   Head: Normocephalic and atraumatic.   Eyes: Conjunctivae are normal.   Neck: Neck supple.   Cardiovascular: Normal rate and regular rhythm. Exam reveals no gallop and no friction rub.   Murmur heard.   Systolic murmur is present with a grade of 2/6 at the upper right sternal border.  Pulmonary/Chest: Effort normal and breath sounds normal.   Abdominal: Soft. There is no abdominal tenderness.   Musculoskeletal:      Comments: Trace edema of the lower extremities bilaterally   Neurological: She is alert and oriented to person, place, and time. "   Skin: Skin is warm.   Psychiatric: She has a normal mood and affect. Her behavior is normal.     Lab Review:   Procedures    Cardiac Procedures:  1.  Echocardiogram on 1/17/2019: The left ventricle was mildly to moderately dilated.  The   ejection fraction was 60-65%.  The left atrium was moderately to severely enlarged.  There   was severe aortic stenosis.  There was severe mitral annular calcification and severe   mitral stenosis.  There was mild to moderate tricuspid regurgitation.  2.  Left and right heart catheterization on 1/22/2019: There were mild luminal irregularities   in the proximal to mid LAD, and the coronary arteries were otherwise normal.  There was   moderate pulmonary hypertension with a mean PA pressure of 32 mmHg.  The wedge   pressure was 21 mmHg.  3.  Status post tissue aortic valve replacement, tissue mitral valve replacement, tricuspid   valve repair, left cryo-maze, and left atrial appendage ligation by Dr. Gaxiola on 1/28/2019.    The aortic valve replacement is a 23 mm Magna pericardial prosthesis.  The mitral valve   replacement is a 25 mm Epic St. Mike porcine prosthesis.  The tricuspid valve repair was   a tricuspid bicuspidization (Martha Suture repair).    4.  LEE with direct-current cardioversion on 2/15/2019: The left ventricle was not well   visualized.  The aortic valve and mitral valve replacements were normal.  There was   moderate tricuspid regurgitation through the tricuspid repair.  The patient was   successfully converted from atrial flutter to sinus rhythm.    Assessment:       Diagnosis Plan   1. Status post aortic valve replacement with tissue valve  Adult Transthoracic Echo Complete w/ Color, Spectral and Contrast if Necessary Per Protocol   2. Status post mitral valve replacement with tissue valve  Adult Transthoracic Echo Complete w/ Color, Spectral and Contrast if Necessary Per Protocol   3. Status post tricuspid valve repair  Adult Transthoracic Echo Complete w/ Color,  Spectral and Contrast if Necessary Per Protocol   4. Paroxysmal atrial fibrillation (CMS/HCC)  Adult Transthoracic Echo Complete w/ Color, Spectral and Contrast if Necessary Per Protocol   5. Typical atrial flutter (CMS/HCC)     6. Chronic diastolic congestive heart failure (CMS/HCC)     7. Bifascicular block       Plan:       She is still very weak, and she feels shaky.  She is also lightheaded at times.  Her metoprolol was added during her hospital stay, and was just discontinued 3 days ago.  However, I feel she is going to need better blood pressure for dialysis as well.  I am going to place her on midodrine 2.5 mg 3 times a day.  I feel this will give her better blood pressure, and she will likely feel better in general.  She is really not on any other medications besides Bumex which could cause her blood pressure to drop.  I doubt that the Bumex has much effect on her as she is on hemodialysis.  She is back on warfarin, and has not had any recurrent rectal bleeding recently.  She did have some rapid atrial fibrillation while in the hospital, although she converted on amiodarone.  She is not on amiodarone currently.  I am going to have her follow-up in 4 months with an echocardiogram on the same day to reevaluate her valvular anatomy.  Her murmur is consistent with her previous murmur, and is likely from the tissue aortic valve replacement.  She will call with any issues in the meantime.

## 2021-01-26 ENCOUNTER — READMISSION MANAGEMENT (OUTPATIENT)
Dept: CALL CENTER | Facility: HOSPITAL | Age: 76
End: 2021-01-26

## 2021-01-26 ENCOUNTER — TELEPHONE (OUTPATIENT)
Dept: GASTROENTEROLOGY | Facility: CLINIC | Age: 76
End: 2021-01-26

## 2021-01-26 RX ORDER — BUMETANIDE 2 MG/1
2 TABLET ORAL DAILY
Qty: 90 TABLET | Refills: 3 | Status: SHIPPED | OUTPATIENT
Start: 2021-01-26 | End: 2021-01-01 | Stop reason: HOSPADM

## 2021-01-26 NOTE — TELEPHONE ENCOUNTER
Would just say there was some inflammation, no concerning features noted.   As for Barretts we can discuss whether surveillance is appropriate as she is not the most medically fit individual. BDB.      **Call to pt  Advise per path report that duodenal bx with some inflammation, no concerning feature.  Stomach body with mild chronic inflammation.  GE junction with focal changes of Beaver's.      F/u appt with Dr Zacarias rescheduled for 3/22 to 3/23 @ 3pm - (pt has dialysis M-W-F).  Fallon Stahl RN.

## 2021-01-26 NOTE — TELEPHONE ENCOUNTER
----- Message from Wallace MONZON MD sent at 1/19/2021  4:25 PM EST -----  Regarding: Biopsy results  Okay to call results, please forward information to Dr. Zacarias who saw the patient in consultation.  If iron deficiency persists she may warrant capsule enteroscopy.  ----- Message -----  From: Lab, Background User  Sent: 1/19/2021  11:40 AM EST  To: Wallace MONZON MD

## 2021-01-26 NOTE — OUTREACH NOTE
Medical Week 2 Survey      Responses   [facility] patient discharged from?  [place]   Does the patient have one of the following disease processes/diagnoses(primary or secondary)?  Other   Week 2 attempt successful?  Yes   Call start time  1428   Discharge diagnosis  GI bleed  Dependence on renal dialysis    Call end time  1429   Meds reviewed with patient/caregiver?  Yes   Is the patient taking all medications as directed (includes completed medication regime)?  Yes   Comments regarding appointments  Cardiology appt on 1/25/21,  Dialysis on M, W, and F   Has the patient kept scheduled appointments due by today?  Yes   What is the patient's perception of their health status since discharge?  Improving   Week 2 Call Completed?  Yes          Sayra Chu RN

## 2021-01-27 LAB — INR PPP: 3.74

## 2021-01-29 ENCOUNTER — ANTICOAGULATION VISIT (OUTPATIENT)
Dept: PHARMACY | Facility: HOSPITAL | Age: 76
End: 2021-01-29

## 2021-01-29 DIAGNOSIS — I48.0 PAROXYSMAL ATRIAL FIBRILLATION (HCC): ICD-10-CM

## 2021-01-29 NOTE — PROGRESS NOTES
Anticoagulation Clinic Progress Note    Anticoagulation Summary  As of 1/29/2021    INR goal:  2.0-3.0   TTR:  48.4 % (1.6 y)   INR used for dosing:  3.74 (1/27/2021)   Warfarin maintenance plan:  2 mg every Tue, Thu, Sat; 3 mg all other days   Weekly warfarin total:  18 mg   Plan last modified:  Jalen Temple RP (12/11/2020)   Next INR check:  2/3/2021   Priority:  High   Target end date:      Indications    Paroxysmal atrial fibrillation (CMS/HCC) [I48.0]             Anticoagulation Episode Summary     INR check location:      Preferred lab:      Send INR reminders to:   [facility]  POOL    Comments:  Lab drawn at dialysis ([facility]) - [facility] [phone]       Anticoagulation Care Providers     Provider Role Specialty Phone number    Shiloh Gonzales APRN Referring Cardiology [phone]          Clinic Interview:  Patient Findings     Negatives:  Signs/symptoms of thrombosis, Signs/symptoms of bleeding,   Laboratory test error suspected, Change in health, Change in alcohol use,   Change in activity, Upcoming invasive procedure, Emergency department   visit, Upcoming dental procedure, Missed doses, Extra doses, Change in   medications, Change in diet/appetite, Hospital admission, Bruising, Other   complaints    Comments:  Recent hemorrhoidal GIB.       Clinical Outcomes     Negatives:  Major bleeding event, Thromboembolic event,   Anticoagulation-related hospital admission, Anticoagulation-related ED   visit, Anticoagulation-related fatality    Comments:  Recent hemorrhoidal GIB.         INR History:  Anticoagulation Monitoring 1/4/2021 1/22/2021 1/29/2021   INR 3.03 1.20 3.74   INR Date 12/30/2020 1/20/2021 1/27/2021   INR Goal 2.0-3.0 2.0-3.0 2.0-3.0   Trend Same Same Same   Last Week Total 18 mg 10 mg 18 mg   Next Week Total 18 mg 18 mg 11 mg   Sun - 3 mg 2 mg (1/31)   Mon 3 mg 3 mg 2 mg (2/1)   Tue 2 mg 2 mg 2 mg   Wed - - -   Thu - - -   Fri - 3 mg Hold (1/29)   Sat - 2 mg 1 mg (1/30)      Visit Report - - -   Some recent data might be hidden       Plan:  1. INR was Supratherapeutic 1/27/21 - see above in Anticoagulation Summary.   Will instruct Claudia GABRIELLE Arnold to Change their warfarin regimen- see above in Anticoagulation Summary.  2. Follow up in 1 week  3. They have been instructed to call if any changes in medications, doses, concerns, etc. Patient expresses understanding and has no further questions at this time.    Jalen Temple RP

## 2021-02-03 ENCOUNTER — READMISSION MANAGEMENT (OUTPATIENT)
Dept: CALL CENTER | Facility: HOSPITAL | Age: 76
End: 2021-02-03

## 2021-02-03 NOTE — OUTREACH NOTE
Medical Week 3 Survey      Responses   [facility] patient discharged from?  [place]   Does the patient have one of the following disease processes/diagnoses(primary or secondary)?  Other   Week 3 attempt successful?  No   Unsuccessful attempts  Attempt 1          Paula Heaton RN

## 2021-02-05 ENCOUNTER — READMISSION MANAGEMENT (OUTPATIENT)
Dept: CALL CENTER | Facility: HOSPITAL | Age: 76
End: 2021-02-05

## 2021-02-05 NOTE — OUTREACH NOTE
Medical Week 3 Survey      Responses   [facility] [place] patient discharged from?  [place]   Does the patient have one of the following disease processes/diagnoses(primary or secondary)?  Other   Week 3 attempt successful?  Yes   Call start time  1402   Call end time  1407   Meds reviewed with patient/caregiver?  Yes   Is the patient having any side effects they believe may be caused by any medication additions or changes?  No   Does the patient have all medications ordered at discharge?  Yes   Is the patient taking all medications as directed (includes completed medication regime)?  Yes   Comments regarding appointments  She has seen the Cardiologist   Does the patient have a primary care provider?   Yes   Does the patient have an appointment with their PCP within 7 days of discharge?  Greater than 7 days   Has the patient kept scheduled appointments due by today?  Yes   Did the patient receive a copy of their discharge instructions?  Yes   What is the patient's perception of their health status since discharge?  Improving   Is the patient/caregiver able to teach back signs and symptoms related to disease process for when to call PCP?  Yes   Is the patient/caregiver able to teach back signs and symptoms related to disease process for when to call 911?  Yes   Is the patient/caregiver able to teach back the hierarchy of who to call/visit for symptoms/problems? PCP, Specialist, Home health nurse, Urgent Care, ED, 911  Yes   If the patient is a current smoker, are they able to teach back resources for cessation?  Not a smoker   Week 3 Call Completed?  Yes   Wrap up additional comments  She is doing well.           Savannah Ceballos RN

## 2021-02-13 ENCOUNTER — READMISSION MANAGEMENT (OUTPATIENT)
Dept: CALL CENTER | Facility: HOSPITAL | Age: 76
End: 2021-02-13

## 2021-02-13 NOTE — OUTREACH NOTE
Medical Week 4 Survey      Responses   [facility] patient discharged from?  [place]   Does the patient have one of the following disease processes/diagnoses(primary or secondary)?  Other   Week 4 attempt successful?  Yes   Call start time  1607   Call end time  1608   Discharge diagnosis  GI bleed  Dependence on renal dialysis    Meds reviewed with patient/caregiver?  Yes   Is the patient having any side effects they believe may be caused by any medication additions or changes?  No   Is the patient taking all medications as directed (includes completed medication regime)?  Yes   Has the patient kept scheduled appointments due by today?  Yes   Is the patient still receiving Home Health Services?  N/A   Psychosocial issues?  No   What is the patient's perception of their health status since discharge?  Improving   Is the patient/caregiver able to teach back signs and symptoms related to disease process for when to call PCP?  Yes   Is the patient/caregiver able to teach back signs and symptoms related to disease process for when to call 911?  Yes   Is the patient/caregiver able to teach back the hierarchy of who to call/visit for symptoms/problems? PCP, Specialist, Home health nurse, Urgent Care, ED, 911  Yes   If the patient is a current smoker, are they able to teach back resources for cessation?  Not a smoker   Week 4 Call Completed?  Yes   Would the patient like one additional call?  No   Graduated  Yes   Is the patient interested in additional calls from an ambulatory ?  NOTE:  applies to high risk patients requiring additional follow-up.  No   Did the patient feel the follow up calls were helpful during their recovery period?  Yes   Was the number of calls appropriate?  Yes          Gracie Forman RN

## 2021-02-17 LAB — INR PPP: 2.44

## 2021-02-19 ENCOUNTER — ANTICOAGULATION VISIT (OUTPATIENT)
Dept: PHARMACY | Facility: HOSPITAL | Age: 76
End: 2021-02-19

## 2021-02-19 DIAGNOSIS — I48.0 PAROXYSMAL ATRIAL FIBRILLATION (HCC): ICD-10-CM

## 2021-02-19 NOTE — PROGRESS NOTES
Anticoagulation Clinic Progress Note    Anticoagulation Summary  As of 2021    INR goal:  2.0-3.0   TTR:  48.2 % (1.6 y)   INR used for dosin.44 (2021)   Warfarin maintenance plan:  2 mg every day   Weekly warfarin total:  14 mg   Plan last modified:  Jalen Temple RPH (2021)   Next INR check:  2021   Priority:  High   Target end date:      Indications    Paroxysmal atrial fibrillation (CMS/HCC) [I48.0]             Anticoagulation Episode Summary     INR check location:      Preferred lab:      Send INR reminders to:  [facility]  POOL    Comments:  Lab drawn at dialysis ([facility]) - [facility] [phone]       Anticoagulation Care Providers     Provider Role Specialty Phone number    Shiloh Gonzales APRN Referring Cardiology [phone]          Clinic Interview:  Patient Findings     Positives:  Other complaints    Negatives:  Signs/symptoms of thrombosis, Signs/symptoms of bleeding,   Laboratory test error suspected, Change in health, Change in alcohol use,   Change in activity, Upcoming invasive procedure, Emergency department   visit, Upcoming dental procedure, Missed doses, Extra doses, Change in   medications, Change in diet/appetite, Hospital admission, Bruising    Comments:  Reports continuing 2 mg daily as last instructed.      Clinical Outcomes     Negatives:  Major bleeding event, Thromboembolic event,   Anticoagulation-related hospital admission, Anticoagulation-related ED   visit, Anticoagulation-related fatality    Comments:  Reports continuing 2 mg daily as last instructed.        INR History:  Anticoagulation Monitoring 2021   INR 1.20 3.74 2.44   INR Date 2021   INR Goal 2.0-3.0 2.0-3.0 2.0-3.0   Trend Same Same Down   Last Week Total 10 mg 18 mg 14 mg   Next Week Total 18 mg 11 mg 14 mg   Sun 3 mg 2 mg () 2 mg   Mon 3 mg 2 mg () 2 mg   Tue 2 mg 2 mg 2 mg   Wed - - -   Th - - -   Fri 3 mg Hold  (1/29) 2 mg   Sat 2 mg 1 mg (1/30) 2 mg   Visit Report - - -   Some recent data might be hidden       Plan:  1. INR is Therapeutic today- see above in Anticoagulation Summary.   Will instruct Claudia Arnold to Continue their warfarin regimen as reported (2 mg daily) - see above in Anticoagulation Summary.  2. Follow up in 1 week -- We have not received an INR result for the past few weeks; pt is agreeable to reminding [facility] to always send the result even if in range.   3. They have been instructed to call if any changes in medications, doses, concerns, etc. Patient expresses understanding and has no further questions at this time.    Jalen Temple [facility]

## 2021-02-22 DIAGNOSIS — F41.1 GENERALIZED ANXIETY DISORDER: ICD-10-CM

## 2021-02-22 RX ORDER — BUSPIRONE HYDROCHLORIDE 10 MG/1
TABLET ORAL
Qty: 270 TABLET | Refills: 3 | OUTPATIENT
Start: 2021-02-22

## 2021-03-03 LAB — INR PPP: 1.7

## 2021-03-05 ENCOUNTER — ANTICOAGULATION VISIT (OUTPATIENT)
Dept: PHARMACY | Facility: HOSPITAL | Age: 76
End: 2021-03-05

## 2021-03-05 DIAGNOSIS — I48.0 PAROXYSMAL ATRIAL FIBRILLATION (HCC): ICD-10-CM

## 2021-03-05 NOTE — PROGRESS NOTES
Anticoagulation Clinic Progress Note    Anticoagulation Summary  As of 3/5/2021    INR goal:  2.0-3.0   TTR:  48.5 % (1.7 y)   INR used for dosin.70 (3/3/2021)   Warfarin maintenance plan:  3 mg every Fri; 2 mg all other days   Weekly warfarin total:  15 mg   Plan last modified:  Celeste Rogers RPH (3/5/2021)   Next INR check:  3/12/2021   Priority:  High   Target end date:      Indications    Paroxysmal atrial fibrillation (CMS/HCC) [I48.0]             Anticoagulation Episode Summary     INR check location:      Preferred lab:      Send INR reminders to:   [facility]  POOL    Comments:  Lab drawn at dialysis ([facility]) - [facility] [phone]       Anticoagulation Care Providers     Provider Role Specialty Phone number    Shiloh Gonzales APRN Referring Cardiology [phone]          Clinic Interview:  Patient Findings     Negatives:  Signs/symptoms of thrombosis, Signs/symptoms of bleeding,   Laboratory test error suspected, Change in health, Change in alcohol use,   Change in activity, Upcoming invasive procedure, Emergency department   visit, Upcoming dental procedure, Missed doses, Extra doses, Change in   medications, Change in diet/appetite, Hospital admission, Bruising, Other   complaints    INR History:  Anticoagulation Monitoring 2021 2021 3/5/2021   INR 3.74 2.44 1.70   INR Date 2021 2021 3/3/2021   INR Goal 2.0-3.0 2.0-3.0 2.0-3.0   Trend Same Down Up   Last Week Total 18 mg 14 mg 14 mg   Next Week Total 11 mg 14 mg 15 mg   Sun 2 mg () 2 mg 2 mg   Mon 2 mg () 2 mg 2 mg   Tue 2 mg 2 mg 2 mg   Wed - - 2 mg   Thu - - 2 mg   Fri Hold () 2 mg 3 mg   Sat 1 mg () 2 mg 2 mg   Visit Report - - -   Some recent data might be hidden       Plan:  1. INR is Subtherapeutic today- see above in Anticoagulation Summary.   Will instruct Claudia GABRIELLE Arnold to Increase their warfarin regimen- see above in Anticoagulation Summary.  2. Follow up in 1 week  3. Spoke with pt  today.They have been instructed to call if any changes in medications, doses, concerns, etc. Patient expresses understanding and has no further questions at this time.    Celeste Rogers [facility]

## 2021-03-17 LAB — INR PPP: 1.67

## 2021-03-22 ENCOUNTER — ANTICOAGULATION VISIT (OUTPATIENT)
Dept: PHARMACY | Facility: HOSPITAL | Age: 76
End: 2021-03-22

## 2021-03-22 DIAGNOSIS — I48.0 PAROXYSMAL ATRIAL FIBRILLATION (HCC): ICD-10-CM

## 2021-03-22 NOTE — PROGRESS NOTES
Anticoagulation Clinic Progress Note    Anticoagulation Summary  As of 3/22/2021    INR goal:  2.0-3.0   TTR:  47.4 % (1.7 y)   INR used for dosin.67 (3/17/2021)   Warfarin maintenance plan:  3 mg every Mon, Fri; 2 mg all other days   Weekly warfarin total:  16 mg   Plan last modified:  Celeste Rogers RPH (3/22/2021)   Next INR check:  3/31/2021   Priority:  High   Target end date:      Indications    Paroxysmal atrial fibrillation (CMS/HCC) [I48.0]             Anticoagulation Episode Summary     INR check location:      Preferred lab:      Send INR reminders to:   [facility]  POOL    Comments:  [phone]       Anticoagulation Care Providers     Provider Role Specialty Phone number    Shiloh Gonzales APRN Referring Cardiology [phone]          INR History:  Anticoagulation Monitoring 2021 3/5/2021 3/22/2021   INR 2.44 1.70 1.67   INR Date 2021 3/3/2021 3/17/2021   INR Goal 2.0-3.0 2.0-3.0 2.0-3.0   Trend Down Up Up   Last Week Total 14 mg 14 mg 15 mg   Next Week Total 14 mg 15 mg 16 mg   Sun 2 mg 2 mg 2 mg   Mon 2 mg 2 mg 3 mg   Tue 2 mg 2 mg 2 mg   Wed - 2 mg 2 mg   Thu - 2 mg 2 mg   Fri 2 mg 3 mg 3 mg   Sat 2 mg 2 mg 2 mg   Visit Report - - -   Some recent data might be hidden       Plan:  1. INR is Subtherapeutic today- see above in Anticoagulation Summary.   Will instruct Claudia GABRIELLE Arnold to Increase their warfarin regimen- see above in Anticoagulation Summary.  2. Follow up in 1.5 weeks  3. Spoke with pt today. They have been instructed to call if any changes in medications, doses, concerns, etc. Patient expresses understanding and has no further questions at this time.    Celeste Rogers RPH

## 2021-03-23 ENCOUNTER — OFFICE VISIT (OUTPATIENT)
Dept: GASTROENTEROLOGY | Facility: CLINIC | Age: 76
End: 2021-03-23

## 2021-03-23 VITALS — BODY MASS INDEX: 35.47 KG/M2 | TEMPERATURE: 97.5 F | HEIGHT: 67 IN | WEIGHT: 226 LBS

## 2021-03-23 DIAGNOSIS — D64.9 ANEMIA, UNSPECIFIED TYPE: Primary | ICD-10-CM

## 2021-03-23 PROCEDURE — 99213 OFFICE O/P EST LOW 20 MIN: CPT | Performed by: INTERNAL MEDICINE

## 2021-03-23 RX ORDER — HEPARIN SODIUM 1000 [USP'U]/ML
INJECTION INTRAVENOUS; SUBCUTANEOUS
COMMUNITY
Start: 2020-12-04 | End: 2021-01-01

## 2021-03-23 RX ORDER — B,C/FERROUS FUM/FA/D3/ZINC OX 8MG-800MCG
1 TABLET ORAL DAILY
COMMUNITY
Start: 2021-02-12 | End: 2022-01-01

## 2021-03-23 NOTE — PROGRESS NOTES
Chief Complaint   Patient presents with   • ED/ Hospital follow up     Subjective     HPI  Claudia Arnold is a 75 y.o. female who presents today for office follow up.     She was recently seen during hospitalization at this facility for anemia and rectal bleeding.  She underwent bidirectional endoscopy with Dr. Hernandez on 1/16.  EGE showed esophagitis, gastritis, duodenitis; colonoscopy with diverticulosis and hemorrhoids.  This was then followed by a small bowel follow-through which was unremarkable.  She had no further bleeding episodes while hospitalized.  She was eventually restarted back on her warfarin and has done well since hospital discharge.  She is not had any evidence of further bleeding.  She denies any abdominal pain.  She continues with dialysis for her ESRD.  She does receive iron infusions occasionally during dialysis.  She has not required blood transfusion.      Objective   Vitals:    03/23/21 1446   Temp: 97.5 °F (36.4 °C)       Physical Exam  Vitals reviewed.   Constitutional:       Appearance: She is well-developed.   HENT:      Head: Normocephalic and atraumatic.   Skin:     General: Skin is warm and dry.   Neurological:      Mental Status: She is alert and oriented to person, place, and time.   Psychiatric:         Behavior: Behavior normal.         Thought Content: Thought content normal.         Judgment: Judgment normal.       The following data was reviewed by: Mikey Zacarias MD on 03/23/2021:  Common labs    Common Labsle 1/16/21 1/16/21 1/16/21 1/16/21 1/16/21 1/16/21 1/17/21 1/17/21 1/17/21 1/17/21 1/18/21 1/18/21 1/18/21    0037 0037 0037 0745 1544 2355 0803 0803 1607 2350 1133 1133 1133   Glucose   89     105 (A)     86   BUN   19     28 (A)     37 (A)   Creatinine   2.13 (A)     3.36 (A)     4.70 (A)   eGFR Non African Am   23 (A)     13 (A)     9 (A)   eGFR  Am                Sodium   138     136     136   Potassium   4.1     3.5     3.4 (A)   Chloride   104     102      98   Calcium   9.2     9.0     9.2   Albumin   3.40 (A)             WBC  8.13     7.94    9.08     Hemoglobin 8.8 (A) 8.8 (A)  8.4 (A) 8.5 (A) 8.3 (A) 8.8 (A)  8.6 (A) 8.2 (A) 8.4 (A) 8.4 (A)    Hematocrit 27.0 (A) 27.0 (A)  25.2 (A) 26.4 (A) 25.3 (A) 26.8 (A)  26.4 (A) 25.5 (A) 25.5 (A) 25.5 (A)    Platelets  209     223    258     (A) Abnormal value       Comments are available for some flowsheets but are not being displayed.           Data reviewed: GI studies EGD and colonoscopy from 1/2021     Assessment/Plan   Assessment:     1. Anemia, unspecified type      Plan:   This patient is status post EGD colonoscopy with overall reassuring findings.  Her small bowel follow-through showed no evidence of large small bowel lesions.  We discussed the possibility of performing a capsule endoscopy although it is lower yield in the absence of active bleeding and the patient would prefer not to undergo any additional diagnostic testing at this time.  We will that the very least recheck her CBC today to ensure that her anemia is at least stable if not hopefully improving.  She continue to receive IV iron as necessary during dialysis.  Should she have any evidence of recurrent overt bleeding then capsule endoscopy will certainly be necessary.  This was discussed today in detail with the patient and her daughter and they are agreeable with this plan.            Mikey Zacarias M.D.  [facility]  [address] [place] [address]  Office: [phone]

## 2021-03-24 LAB
ERYTHROCYTE [DISTWIDTH] IN BLOOD BY AUTOMATED COUNT: 14 % (ref 12.3–15.4)
HCT VFR BLD AUTO: 32.9 % (ref 34–46.6)
HGB BLD-MCNC: 11.1 G/DL (ref 12–15.9)
MCH RBC QN AUTO: 31.8 PG (ref 26.6–33)
MCHC RBC AUTO-ENTMCNC: 33.7 G/DL (ref 31.5–35.7)
MCV RBC AUTO: 94.3 FL (ref 79–97)
PLATELET # BLD AUTO: 197 10*3/MM3 (ref 140–450)
RBC # BLD AUTO: 3.49 10*6/MM3 (ref 3.77–5.28)
WBC # BLD AUTO: 5.98 10*3/MM3 (ref 3.4–10.8)

## 2021-03-30 DIAGNOSIS — F41.1 GENERALIZED ANXIETY DISORDER: ICD-10-CM

## 2021-03-30 RX ORDER — BUSPIRONE HYDROCHLORIDE 10 MG/1
10 TABLET ORAL 2 TIMES DAILY
Start: 2021-03-30

## 2021-03-31 LAB — INR PPP: 1.76

## 2021-04-05 ENCOUNTER — ANTICOAGULATION VISIT (OUTPATIENT)
Dept: PHARMACY | Facility: HOSPITAL | Age: 76
End: 2021-04-05

## 2021-04-05 DIAGNOSIS — I48.0 PAROXYSMAL ATRIAL FIBRILLATION (HCC): ICD-10-CM

## 2021-04-05 NOTE — PROGRESS NOTES
Anticoagulation Clinic Progress Note    Anticoagulation Summary  As of 2021    INR goal:  2.0-3.0   TTR:  46.4 % (1.8 y)   INR used for dosin.76 (3/31/2021)   Warfarin maintenance plan:  3 mg every Mon, Wed, Fri; 2 mg all other days   Weekly warfarin total:  17 mg   Plan last modified:  Jalen Temple RPH (2021)   Next INR check:  2021   Priority:  High   Target end date:      Indications    Paroxysmal atrial fibrillation (CMS/HCC) [I48.0]             Anticoagulation Episode Summary     INR check location:      Preferred lab:      Send INR reminders to:   [facility]  POOL    Comments:  Lab drawn at dialysis ([facility]) - [facility] [phone]       Anticoagulation Care Providers     Provider Role Specialty Phone number    Shiloh Gonzales APRN Referring Cardiology [phone]          Clinic Interview:  Patient Findings     Negatives:  Signs/symptoms of thrombosis, Signs/symptoms of bleeding,   Laboratory test error suspected, Change in health, Change in alcohol use,   Change in activity, Upcoming invasive procedure, Emergency department   visit, Upcoming dental procedure, Missed doses, Extra doses, Change in   medications, Change in diet/appetite, Hospital admission, Bruising, Other   complaints      Clinical Outcomes     Negatives:  Major bleeding event, Thromboembolic event,   Anticoagulation-related hospital admission, Anticoagulation-related ED   visit, Anticoagulation-related fatality        INR History:  Anticoagulation Monitoring 3/5/2021 3/22/2021 2021   INR 1.70 1.67 1.76   INR Date 3/3/2021 3/17/2021 3/31/2021   INR Goal 2.0-3.0 2.0-3.0 2.0-3.0   Trend Up Up Up   Last Week Total 14 mg 15 mg 16 mg   Next Week Total 15 mg 16 mg 17 mg   Sun 2 mg 2 mg -   Mon 2 mg 3 mg 3 mg   Tue 2 mg 2 mg 2 mg   Wed 2 mg 2 mg -   Thu 2 mg 2 mg -   Fri 3 mg 3 mg -   Sat 2 mg 2 mg -   Visit Report - - -   Some recent data might be hidden       Plan:  1. INR is Subtherapeutic today- see above  in Anticoagulation Summary.   Will instruct Claudia ANTHONY Arnold to Increase their warfarin regimen- see above in Anticoagulation Summary.  2. Follow up in 2 days  3. They have been instructed to call if any changes in medications, doses, concerns, etc. Patient expresses understanding and has no further questions at this time.    Jalen Temple [facility]

## 2021-04-07 LAB — INR PPP: 1.67

## 2021-04-12 ENCOUNTER — ANTICOAGULATION VISIT (OUTPATIENT)
Dept: PHARMACY | Facility: HOSPITAL | Age: 76
End: 2021-04-12

## 2021-04-12 DIAGNOSIS — I48.0 PAROXYSMAL ATRIAL FIBRILLATION (HCC): ICD-10-CM

## 2021-04-12 NOTE — PROGRESS NOTES
Anticoagulation Clinic Progress Note    Anticoagulation Summary  As of 2021    INR goal:  2.0-3.0   TTR:  45.9 % (1.8 y)   INR used for dosin.67 (2021)   Warfarin maintenance plan:  3 mg every Mon, Wed, Fri; 2 mg all other days   Weekly warfarin total:  17 mg   No change documented:  Robert Merritt, PharmD   Plan last modified:  Jalen Temple RPH (2021)   Next INR check:  2021   Priority:  High   Target end date:      Indications    Paroxysmal atrial fibrillation (CMS/HCC) [I48.0]             Anticoagulation Episode Summary     INR check location:      Preferred lab:      Send INR reminders to:  [facility]  POOL    Comments:  Lab drawn at dialysis ([facility]) - [facility] [phone]       Anticoagulation Care Providers     Provider Role Specialty Phone number    Shiloh Gonzales APRN Referring Cardiology [phone]        Clinic Interview:  Patient Findings     Positives:  Bruising    Negatives:  Signs/symptoms of thrombosis, Signs/symptoms of bleeding,   Laboratory test error suspected, Change in health, Change in alcohol use,   Change in activity, Upcoming invasive procedure, Emergency department   visit, Upcoming dental procedure, Missed doses, Extra doses, Change in   medications, Change in diet/appetite, Hospital admission, Other complaints      Comments:  Arm fistula site black and blue.  Nephrologist (Dr. Osuna)   aware and told to elevate until seen next on Wednesday the 14th.        Clinical Outcomes     Negatives:  Major bleeding event, Thromboembolic event,   Anticoagulation-related hospital admission, Anticoagulation-related ED   visit, Anticoagulation-related fatality    Comments:  Arm fistula site black and blue.  Nephrologist (Dr. Osuna)   aware and told to elevate until seen next on Wednesday the 14th.     INR History:  Anticoagulation Monitoring 3/22/2021 2021 2021   INR 1.67 1.76 1.67   INR Date 3/17/2021 3/31/2021 2021   INR Goal 2.0-3.0  2.0-3.0 2.0-3.0   Trend Up Up Same   Last Week Total 15 mg 16 mg 17 mg   Next Week Total 16 mg 17 mg 17 mg   Sun 2 mg - -   Mon 3 mg 3 mg 3 mg   Tue 2 mg 2 mg 2 mg   Wed 2 mg - -   Thu 2 mg - -   Fri 3 mg - -   Sat 2 mg - -   Visit Report - - -   Some recent data might be hidden     Plan:  1. INR is Subtherapeutic today- see above in Anticoagulation Summary.   Will instruct Claudia GABRIELLE Arnold to Continue their warfarin regimen- see above in Anticoagulation Summary.  Noted that most recent INR value likely not reflective of recent dose increase.  Follow up in 2 days with INR at next HD session.    2. Pt has agreed to only be called if INR out of range. They have been instructed to call if any changes in medications, doses, concerns, etc. Patient expresses understanding and has no further questions at this time.    Robert Merritt, PharmD

## 2021-04-14 LAB — INR PPP: 1.75

## 2021-04-14 PROCEDURE — 20610 DRAIN/INJ JOINT/BURSA W/O US: CPT | Performed by: ORTHOPAEDIC SURGERY

## 2021-04-14 RX ADMIN — METHYLPREDNISOLONE ACETATE 80 MG: 80 INJECTION, SUSPENSION INTRA-ARTICULAR; INTRALESIONAL; INTRAMUSCULAR; SOFT TISSUE at 15:22

## 2021-04-14 RX ADMIN — METHYLPREDNISOLONE ACETATE 80 MG: 80 INJECTION, SUSPENSION INTRA-ARTICULAR; INTRALESIONAL; INTRAMUSCULAR; SOFT TISSUE at 15:21

## 2021-04-14 NOTE — PROGRESS NOTES
Patient: Claudia Arnold  YOB: 1945  Date of Service: 4/14/2021    Chief Complaints: bilateral shoulder pain     Subjective:    History of Present Illness: Pt is seen in the office today with complaints of bilateral shoulder pain she has known DJD gets intermittent injections she is not an operative candidate she is on chronic dialysis.          Allergies: No Known Allergies    Medications:   Home Medications:  Current Outpatient Medications on File Prior to Visit   Medication Sig   • acetaminophen (TYLENOL) 325 MG tablet Take 650 mg by mouth 3 (Three) Times a Day. Takes tid at 2 am, 1 pm, and hS   • bumetanide (BUMEX) 2 MG tablet Take 1 tablet by mouth Daily.   • busPIRone (BUSPAR) 10 MG tablet Take 1 tablet by mouth 2 (two) times a day.   • [facility] MELATONIN PO Take  by mouth.   • escitalopram (LEXAPRO) 10 MG tablet Take 1 tablet by mouth Daily.   • Heparin Sodium, Porcine, (heparin, porcine,) 1000 units/mL injection Heparin Sodium (Porcine) 1,000 Units/mL Systemic   • hydrocortisone 2.5 % cream Apply  topically to the appropriate area as directed See Admin Instructions. Apply topically to the affected area twice daily as directed   • Iron Sucrose (VENOFER IV) 50 mg 1 (One) Time Per Week.   • Methoxy PEG-Epoetin Beta (MIRCERA IJ) 60 mcg Every 14 (Fourteen) Days.   • midodrine (PROAMATINE) 2.5 MG tablet Take 1 tablet by mouth 3 (Three) Times a Day Before Meals.   • ProRenal + D tablet tablet TAKE 1 TABLET BY MOUTH EVERY DAY (ON DIALYSIS DAYS, TAKE AFTER DIALYSIS TREATMENT)   • rOPINIRole (REQUIP) 0.25 MG tablet    • VITAMIN D PO Take 0.75 mcg by mouth.   • warfarin (COUMADIN) 2 MG tablet Take one tablet (2mg) by mouth on Mon, Wed, and Fri and take one and one-half tablets (3mg) by mouth all other days or as directed     No current facility-administered medications on file prior to visit.     Current Medications:  Scheduled Meds:  Continuous Infusions:No current facility-administered medications for this  visit.    PRN Meds:.    I have reviewed the patient's medical history in detail and updated the computerized patient record.  Review and summarization of old records include:    Past Medical History:   Diagnosis Date   • A-fib (CMS/[facility])     Meds   • Arthritis     w/Difficult Mobility   • Bilateral lower extremity edema     Legs   • CAD (coronary artery disease)     Affecting LAD    • Cardiomyopathy (CMS/[facility])    • CHF (congestive heart failure) (CMS/[facility])    • Chronic fatigue    • Chronic kidney disease    • Dialysis patient (CMS/[facility])     TUESDAY THURSDAY SATURDAY   • Generalized anxiety disorder    • GERD (gastroesophageal reflux disease)    • Hernia, inguinal     Hx Repair   • History of echocardiogram 1/17/19-[facility]    The L Ventricular Cavity is Mild-to-Moderately Dilated; Left Ventricular Wall Thickness is Consistent w/Mild Concentric Hypertrophy; Left Atrial Cavity Size is Moderate-to-Severely Dilated; Severe AVS; Severe MVS; Moderate TVR Noted   • History of transfusion    • Hyperlipidemia     Controlled w/Meds   • Hypertension     Controlled w/Meds   • Hypokinesis 01/22/2019    Apical Noted on Cardiac Cath   • IFG (impaired fasting glucose)    • LAD stenosis 01/22/2019    Mid LAD Irregularities Noted on Cardiac Cath    • Left atrial dilatation 01/17/2019    Moderate-Severe Noted on Echo   • Left ventricular dilatation 01/17/2019    Noted on Echo/LEE   • Mild concentric left ventricular hypertrophy 01/17/2019    Noted on Echo/ELE   • Mitral annular calcification 01/17/2019    Severe Noted on Echo   • Moderate tricuspid valve regurgitation 01/24/2019    Noted on LEE   • Morbid obesity (CMS/[facility])    • NSTEMI (non-ST elevated myocardial infarction) (CMS/[facility])    • OCTAVIANO (obstructive sleep apnea)    • Osteoarthritis    • Pulmonary hypertension (CMS/[facility]) 01/22/2019    Noted on Cardiac Cath   • Right bundle branch block 01/24/2019    Noted on LEE   • Severe aortic stenosis 01/22/2019    Noted on Cardiac Cath & LEE on  01/24/19; S/p AVR on 01/28/19 by Dr. Gaxiola   • Severe mitral valve stenosis 01/24/2019    Noted on LEE; S/p MVR on 01/28/19 by Dr. Gaxiola   • Shoulder pain, bilateral    • Skin yeast infection     Folds Under Skin--Nystatin/Diflucan        Past Surgical History:   Procedure Laterality Date   • AORTIC VALVE REPAIR/REPLACEMENT MITRAL VALVE REPAIR/REPLACEMENT N/A 1/28/2019    Procedure: LEE STERNOTOMY AORTIC VALVE REPLACEMENT, MITRAL VALVE REPLACEMENT, TRICUSPID VALVE REPAIR, MAZE PROCEDURE, CLOSURE OF LEFT ATRIAL APPENDAGE  AND PRP;  Surgeon: Scar Gaxiola MD;  Location: [facility] MAIN OR;  Service: Cardiothoracic   • ARTERIOVENOUS FISTULA/SHUNT SURGERY Left 6/26/2019    Procedure: LEFT ARM BRACHIAL CEPHALIC FISTULA;  Surgeon: Satish Stahl MD;  Location: [facility] MAIN OR;  Service: Vascular   • CARDIAC CATHETERIZATION N/A 1/22/2019    Procedure: Coronary angiography;  Surgeon: Rick Talavera MD;  Location: [facility] CATH INVASIVE LOCATION;  Service: Cardiology   • CARDIAC CATHETERIZATION N/A 1/22/2019    Procedure: Left Heart Cath;  Surgeon: Rick Talavera MD;  Location: [facility] CATH INVASIVE LOCATION;  Service: Cardiology   • CARDIAC CATHETERIZATION N/A 1/22/2019    Procedure: Right Heart Cath;  Surgeon: Rick Talavera MD;  Location: [facility] CATH INVASIVE LOCATION;  Service: Cardiology   • CARDIAC CATHETERIZATION N/A 1/22/2019    Procedure: Left ventriculography;  Surgeon: Rick Talavera MD;  Location: [facility] CATH INVASIVE LOCATION;  Service: Cardiology   • CARDIAC SURGERY     • COLONOSCOPY     • COLONOSCOPY N/A 1/16/2021    Procedure: COLONOSCOPY;  Surgeon: Wallace Hernandez MD;  Location: [facility] ENDOSCOPY;  Service: Gastroenterology;  Laterality: N/A;  pre- anemia, rectal bleeding  post-- hemorrhoids, diverticulosis   • ENDOSCOPY N/A 1/16/2021    Procedure: ESOPHAGOGASTRODUODENOSCOPY with biopsies;  Surgeon: Wallace Hernandez MD;  Location: [facility] ENDOSCOPY;  Service:  Gastroenterology;  Laterality: N/A;  pre-- anemia  post-- esophagitis, gastritis, duodenitis   • HERNIA REPAIR     • INSERTION HEMODIALYSIS CATHETER N/A 2/8/2019    Procedure: tunnel CATHETER PLACEMENT WITH FLUROSCOPY;  Surgeon: Satish Stahl MD;  Location: [facility] MAIN OR;  Service: Vascular   • REPLACEMENT TOTAL KNEE Bilateral 2007/2009        Social History     Occupational History   • Occupation: retired   Tobacco Use   • Smoking status: Never Smoker   • Smokeless tobacco: Never Used   Substance and Sexual Activity   • Alcohol use: Yes     Alcohol/week: 1.0 standard drinks     Types: 1 Cans of beer per week     Comment: 1 monthly   • Drug use: No   • Sexual activity: Defer     Birth control/protection: Post-menopausal      Social History     Social History Narrative   • Not on file        Family History   Problem Relation Age of Onset   • Hypertension Other    • Diabetes Other    • Heart disease Neg Hx    • Stroke Neg Hx    • Arthritis Neg Hx    • Breast cancer Neg Hx    • Malig Hyperthermia Neg Hx        ROS: 14 point review of systems was performed and was negative except for documented findings in HPI and today's encounter.     Allergies: No Known Allergies  Constitutional:  Denies fever, shaking or chills   Eyes:  Denies change in visual acuity   HENT:  Denies nasal congestion or sore throat   Respiratory:  Denies cough or shortness of breath   Cardiovascular:  Denies chest pain or severe LE edema   GI:  Denies abdominal pain, nausea, vomiting, bloody stools or diarrhea   Musculoskeletal:  Numbness, tingling, or loss of motor function only as noted above in history of present illness.  : Denies painful urination or hematuria  Integument:  Denies rash, lesion or ulceration   Neurologic:  Denies headache or focal weakness  Endocrine:  Denies lymphadenopathy  Psych:  Denies confusion or change in mental status   Hem:  Denies active bleeding      Physical Exam: 75 y.o. female  Wt Readings from Last 3  Encounters:   03/23/21 103 kg (226 lb)   01/19/21 103 kg (228 lb)   01/18/21 103 kg (227 lb 3.2 oz)       There is no height or weight on file to calculate BMI.  No height and weight on file for this encounter.  There were no vitals filed for this visit.  Vital signs reviewed.   General Appearance:    Alert, cooperative, in no acute distress                    Ortho exam  Physical exam the right and left shoulder reveals no overlying skin changes no lymphedema lymphadenopathy the patient can actively flex to about 150 passively I get them to 160 abduction is similar external rotation is 40 internal rotation to there buttock.  Rotator cuff strength is 4+ over 5 with isometric strength testing no overlying skin changes.  Patient has reasonable cervical range of motion for their age no radicular symptoms and a normal elbow exam.  There are good distal pulses.           X-rays AP scapular Y and actually lateral both shoulders were taken to evaluate her symptoms and compared to previous films she has severe glenohumeral arthritis with complete loss of joint space bilaterally some mild progression    Assessment: Bilateral shoulder DJD  Large Joint Arthrocentesis: L glenohumeral  Date/Time: 4/14/2021 3:21 PM  Consent given by: patient  Site marked: site marked  Timeout: Immediately prior to procedure a time out was called to verify the correct patient, procedure, equipment, support staff and site/side marked as required   Supporting Documentation  Indications: pain and joint swelling   Procedure Details  Location: shoulder - L glenohumeral  Preparation: Patient was prepped and draped in the usual sterile fashion  Needle size: 22 G  Approach: anterolateral  Medications administered: 80 mg methylPREDNISolone acetate 80 MG/ML; 4 mL lidocaine (cardiac)  Patient tolerance: patient tolerated the procedure well with no immediate complications    Large Joint Arthrocentesis: R glenohumeral  Date/Time: 4/14/2021 3:22 PM  Consent  given by: patient  Site marked: site marked  Timeout: Immediately prior to procedure a time out was called to verify the correct patient, procedure, equipment, support staff and site/side marked as required   Supporting Documentation  Indications: pain and joint swelling   Procedure Details  Location: shoulder - R glenohumeral  Preparation: Patient was prepped and draped in the usual sterile fashion  Needle size: 22 G  Approach: anterolateral  Medications administered: 80 mg methylPREDNISolone acetate 80 MG/ML; 4 mL lidocaine (cardiac)  Patient tolerance: patient tolerated the procedure well with no immediate complications          Plan: Injection she is not an operative candidate for an elective procedure  Follow up as indicated.  Ice, elevate, and rest as needed.  Discussed conservative measures of pain control including ice, bracing.   Deborah Agudelo M.D.

## 2021-04-16 ENCOUNTER — ANTICOAGULATION VISIT (OUTPATIENT)
Dept: PHARMACY | Facility: HOSPITAL | Age: 76
End: 2021-04-16

## 2021-04-16 ENCOUNTER — TELEPHONE (OUTPATIENT)
Dept: GASTROENTEROLOGY | Facility: CLINIC | Age: 76
End: 2021-04-16

## 2021-04-16 DIAGNOSIS — I48.0 PAROXYSMAL ATRIAL FIBRILLATION (HCC): Primary | ICD-10-CM

## 2021-04-16 NOTE — TELEPHONE ENCOUNTER
----- Message from Mikey Zacarias MD sent at 4/12/2021  6:41 AM EDT -----  Hb is much improved on her most recent CBC  Continue to f/u with PCP for close monitoring  RTC if any evidence of GI bleeding

## 2021-04-16 NOTE — PROGRESS NOTES
Anticoagulation Clinic Progress Note    Anticoagulation Summary  As of 2021    INR goal:  2.0-3.0   TTR:  45.4 % (1.8 y)   INR used for dosin.75 (2021)   Warfarin maintenance plan:  2 mg every Sun, Tue, Thu; 3 mg all other days   Weekly warfarin total:  18 mg   Plan last modified:  Jalen Temple RPH (2021)   Next INR check:  2021   Priority:  High   Target end date:      Indications    Paroxysmal atrial fibrillation (CMS/HCC) [I48.0]             Anticoagulation Episode Summary     INR check location:      Preferred lab:      Send INR reminders to:   [facility]  POOL    Comments:  Lab drawn at dialysis ([facility]) - [facility] [phone]       Anticoagulation Care Providers     Provider Role Specialty Phone number    Shiloh Gonzales APRN Referring Cardiology [phone]          Clinic Interview:  Patient Findings     Negatives:  Signs/symptoms of thrombosis, Signs/symptoms of bleeding,   Laboratory test error suspected, Change in health, Change in alcohol use,   Change in activity, Upcoming invasive procedure, Emergency department   visit, Upcoming dental procedure, Missed doses, Extra doses, Change in   medications, Change in diet/appetite, Hospital admission, Bruising, Other   complaints      Clinical Outcomes     Negatives:  Major bleeding event, Thromboembolic event,   Anticoagulation-related hospital admission, Anticoagulation-related ED   visit, Anticoagulation-related fatality        INR History:  Anticoagulation Monitoring 2021   INR 1.76 1.67 1.75   INR Date 3/31/2021 2021 2021   INR Goal 2.0-3.0 2.0-3.0 2.0-3.0   Trend Up Same Up   Last Week Total 16 mg 17 mg 17 mg   Next Week Total 17 mg 17 mg 18 mg   Sun - - 2 mg   Mon 3 mg 3 mg 3 mg   Tue 2 mg 2 mg 2 mg   Wed - - -   Thu - - -   Fri - - 3 mg   Sat - - 3 mg   Visit Report - - -   Some recent data might be hidden       Plan:  1. INR was Subtherapeutic 21 - see above in  Anticoagulation Summary.   Will instruct Claudia ANTHONY Catalina to Increase their warfarin regimen- see above in Anticoagulation Summary.  2. Follow up in 1 week  3. They have been instructed to call if any changes in medications, doses, concerns, etc. Patient expresses understanding and has no further questions at this time.    Jalen Temple [facility]

## 2021-04-20 ENCOUNTER — OFFICE VISIT (OUTPATIENT)
Dept: ORTHOPEDIC SURGERY | Facility: CLINIC | Age: 76
End: 2021-04-20

## 2021-04-20 DIAGNOSIS — M19.019 GLENOHUMERAL ARTHRITIS: ICD-10-CM

## 2021-04-20 DIAGNOSIS — R52 PAIN: Primary | ICD-10-CM

## 2021-04-20 PROCEDURE — 73030 X-RAY EXAM OF SHOULDER: CPT | Performed by: ORTHOPAEDIC SURGERY

## 2021-04-20 RX ORDER — METHYLPREDNISOLONE ACETATE 80 MG/ML
80 INJECTION, SUSPENSION INTRA-ARTICULAR; INTRALESIONAL; INTRAMUSCULAR; SOFT TISSUE
Status: COMPLETED | OUTPATIENT
Start: 2021-04-14 | End: 2021-04-14

## 2021-04-30 RX ORDER — WARFARIN SODIUM 2 MG/1
TABLET ORAL
Qty: 120 TABLET | Refills: 0 | Status: SHIPPED | OUTPATIENT
Start: 2021-04-30 | End: 2021-07-27

## 2021-05-12 ENCOUNTER — ANTICOAGULATION VISIT (OUTPATIENT)
Dept: PHARMACY | Facility: HOSPITAL | Age: 76
End: 2021-05-12

## 2021-05-12 DIAGNOSIS — I48.0 PAROXYSMAL ATRIAL FIBRILLATION (HCC): Primary | ICD-10-CM

## 2021-05-12 LAB — INR PPP: 2.58

## 2021-05-12 NOTE — PROGRESS NOTES
Anticoagulation Clinic Progress Note  Anticoagulation Summary  As of 2021    INR goal:  2.0-3.0   TTR:  46.2 % (1.9 y)   INR used for dosin.58 (2021)   Warfarin maintenance plan:  2 mg every Sun, Tue, Thu; 3 mg all other days   Weekly warfarin total:  18 mg   No change documented:  Robert Merritt, PharmD   Plan last modified:  Jalen Temple RP (2021)   Next INR check:  2021   Priority:  High   Target end date:      Indications    Paroxysmal atrial fibrillation (CMS/HCC) [I48.0]             Anticoagulation Episode Summary     INR check location:      Preferred lab:      Send INR reminders to:  [facility]  POOL    Comments:  Lab drawn at [facility] ([facility]) - [facility] [phone]       Anticoagulation Care Providers     Provider Role Specialty Phone number    Shiloh Gonzales APRN Referring Cardiology [phone]        Clinic Interview:  Patient Findings     Positives:  Change in medications    Negatives:  Signs/symptoms of thrombosis, Signs/symptoms of bleeding,   Laboratory test error suspected, Change in health, Change in alcohol use,   Change in activity, Upcoming invasive procedure, Emergency department   visit, Upcoming dental procedure, Missed doses, Extra doses, Change in   diet/appetite, Hospital admission, Bruising, Other complaints    Comments:  Day 2 of 5 day course of Keflex for UTI      Clinical Outcomes     Negatives:  Major bleeding event, Thromboembolic event,   Anticoagulation-related hospital admission, Anticoagulation-related ED   visit, Anticoagulation-related fatality    Comments:  Day 2 of 5 day course of Keflex for UTI      INR History:  Anticoagulation Monitoring 2021   INR 1.67 1.75 2.58   INR Date 2021   INR Goal 2.0-3.0 2.0-3.0 2.0-3.0   Trend Same Up Same   Last Week Total 17 mg 17 mg 18 mg   Next Week Total 17 mg 18 mg 18 mg   Sun - 2 mg -   Mon 3 mg 3 mg -   Tue 2 mg 2 mg -   Wed - - -   Thu -  - -   Fri - 3 mg -   Sat - 3 mg -   Visit Report - - -   Some recent data might be hidden     Plan:  1. INR is Therapeutic today- see above in Anticoagulation Summary.   Will instruct Claudia Arnold to Continue their warfarin regimen- see above in Anticoagulation Summary.  Of note, INR result today was collected 5/5 and sent to [facility] on 5/12.  Asked patient to re-enforce with [facility] labs that INR need to be sent when available.    2. Labs were collected with  today 5/12.   3. They have been instructed to call if any changes in medications, doses, concerns, etc. Patient expresses understanding and has no further questions at this time.    Robert Merritt, KoriD

## 2021-05-20 ENCOUNTER — OFFICE VISIT (OUTPATIENT)
Dept: CARDIOLOGY | Facility: CLINIC | Age: 76
End: 2021-05-20

## 2021-05-20 ENCOUNTER — HOSPITAL ENCOUNTER (OUTPATIENT)
Dept: CARDIOLOGY | Facility: HOSPITAL | Age: 76
Discharge: HOME | End: 2021-05-20
Admitting: INTERNAL MEDICINE

## 2021-05-20 ENCOUNTER — TRANSCRIBE ORDERS (OUTPATIENT)
Dept: CARDIOLOGY | Facility: CLINIC | Age: 76
End: 2021-05-20

## 2021-05-20 VITALS
DIASTOLIC BLOOD PRESSURE: 82 MMHG | HEART RATE: 87 BPM | SYSTOLIC BLOOD PRESSURE: 132 MMHG | WEIGHT: 226 LBS | HEIGHT: 67 IN | BODY MASS INDEX: 35.47 KG/M2

## 2021-05-20 VITALS
DIASTOLIC BLOOD PRESSURE: 66 MMHG | OXYGEN SATURATION: 96 % | HEIGHT: 67 IN | HEART RATE: 78 BPM | BODY MASS INDEX: 35.4 KG/M2 | SYSTOLIC BLOOD PRESSURE: 122 MMHG

## 2021-05-20 DIAGNOSIS — Z95.3 STATUS POST MITRAL VALVE REPLACEMENT WITH TISSUE VALVE: Primary | ICD-10-CM

## 2021-05-20 DIAGNOSIS — I48.0 PAROXYSMAL ATRIAL FIBRILLATION (HCC): ICD-10-CM

## 2021-05-20 DIAGNOSIS — Z95.3 STATUS POST MITRAL VALVE REPLACEMENT WITH TISSUE VALVE: ICD-10-CM

## 2021-05-20 DIAGNOSIS — I36.1 NONRHEUMATIC TRICUSPID VALVE REGURGITATION: ICD-10-CM

## 2021-05-20 DIAGNOSIS — I45.2 BIFASCICULAR BLOCK: ICD-10-CM

## 2021-05-20 DIAGNOSIS — I48.3 TYPICAL ATRIAL FLUTTER (HCC): ICD-10-CM

## 2021-05-20 DIAGNOSIS — Z98.890 STATUS POST TRICUSPID VALVE REPAIR: ICD-10-CM

## 2021-05-20 DIAGNOSIS — Z95.3 STATUS POST AORTIC VALVE REPLACEMENT WITH TISSUE VALVE: ICD-10-CM

## 2021-05-20 DIAGNOSIS — Z13.6 SCREENING FOR CARDIOVASCULAR CONDITION: Primary | ICD-10-CM

## 2021-05-20 DIAGNOSIS — I51.7 RIGHT VENTRICULAR ENLARGEMENT: ICD-10-CM

## 2021-05-20 DIAGNOSIS — Z01.810 PREPROCEDURAL CARDIOVASCULAR EXAMINATION: ICD-10-CM

## 2021-05-20 DIAGNOSIS — I50.32 CHRONIC DIASTOLIC CONGESTIVE HEART FAILURE (HCC): ICD-10-CM

## 2021-05-20 LAB — INR PPP: 2.82

## 2021-05-20 PROCEDURE — 93306 TTE W/DOPPLER COMPLETE: CPT

## 2021-05-20 PROCEDURE — 93306 TTE W/DOPPLER COMPLETE: CPT | Performed by: INTERNAL MEDICINE

## 2021-05-20 PROCEDURE — 99214 OFFICE O/P EST MOD 30 MIN: CPT | Performed by: INTERNAL MEDICINE

## 2021-05-21 ENCOUNTER — ANTICOAGULATION VISIT (OUTPATIENT)
Dept: PHARMACY | Facility: HOSPITAL | Age: 76
End: 2021-05-21

## 2021-05-21 ENCOUNTER — TRANSCRIBE ORDERS (OUTPATIENT)
Dept: SLEEP MEDICINE | Facility: HOSPITAL | Age: 76
End: 2021-05-21

## 2021-05-21 DIAGNOSIS — Z01.818 OTHER SPECIFIED PRE-OPERATIVE EXAMINATION: Primary | ICD-10-CM

## 2021-05-21 DIAGNOSIS — I48.0 PAROXYSMAL ATRIAL FIBRILLATION (HCC): Primary | ICD-10-CM

## 2021-05-21 LAB
AORTIC ARCH: 3.4 CM
AORTIC DIMENSIONLESS INDEX: 0.6 (DI)
ASCENDING AORTA: 3.5 CM
BH CV ECHO AV AORTIC VALVE AT ACCEL TIME CALCULATED: NORMAL MSEC
BH CV ECHO MEAS - AO ACC TIME: 0.08 SEC
BH CV ECHO MEAS - AO MAX PG (FULL): 31.4 MMHG
BH CV ECHO MEAS - AO MAX PG: 39 MMHG
BH CV ECHO MEAS - AO MEAN PG (FULL): 15.3 MMHG
BH CV ECHO MEAS - AO MEAN PG: 20 MMHG
BH CV ECHO MEAS - AO ROOT AREA (BSA CORRECTED): 1.7
BH CV ECHO MEAS - AO ROOT AREA: 10.4 CM^2
BH CV ECHO MEAS - AO ROOT DIAM: 3.6 CM
BH CV ECHO MEAS - AO V2 MAX: 313.6 CM/SEC
BH CV ECHO MEAS - AO V2 MEAN: 204.6 CM/SEC
BH CV ECHO MEAS - AO V2 VTI: 53.5 CM
BH CV ECHO MEAS - ASC AORTA: 3.5 CM
BH CV ECHO MEAS - AT: 82 SEC
BH CV ECHO MEAS - AVA(I,A): 1.5 CM^2
BH CV ECHO MEAS - AVA(I,D): 1.5 CM^2
BH CV ECHO MEAS - AVA(V,A): 1.2 CM^2
BH CV ECHO MEAS - AVA(V,D): 1.2 CM^2
BH CV ECHO MEAS - BSA(HAYCOCK): 2.2 M^2
BH CV ECHO MEAS - BSA: 2.1 M^2
BH CV ECHO MEAS - BZI_BMI: 35.4 KILOGRAMS/M^2
BH CV ECHO MEAS - BZI_METRIC_HEIGHT: 170.2 CM
BH CV ECHO MEAS - BZI_METRIC_WEIGHT: 102.5 KG
BH CV ECHO MEAS - EDV(MOD-SP2): 86 ML
BH CV ECHO MEAS - EDV(MOD-SP4): 104 ML
BH CV ECHO MEAS - EDV(TEICH): 91.2 ML
BH CV ECHO MEAS - EF(CUBED): 63.4 %
BH CV ECHO MEAS - EF(MOD-BP): 61 %
BH CV ECHO MEAS - EF(MOD-SP2): 59.3 %
BH CV ECHO MEAS - EF(MOD-SP4): 62.5 %
BH CV ECHO MEAS - EF(TEICH): 55.1 %
BH CV ECHO MEAS - ESV(MOD-SP2): 35 ML
BH CV ECHO MEAS - ESV(MOD-SP4): 39 ML
BH CV ECHO MEAS - ESV(TEICH): 41 ML
BH CV ECHO MEAS - FS: 28.5 %
BH CV ECHO MEAS - IVS/LVPW: 1
BH CV ECHO MEAS - IVSD: 1.3 CM
BH CV ECHO MEAS - LAT PEAK E' VEL: 5.2 CM/SEC
BH CV ECHO MEAS - LV DIASTOLIC VOL/BSA (35-75): 48.8 ML/M^2
BH CV ECHO MEAS - LV MASS(C)D: 227 GRAMS
BH CV ECHO MEAS - LV MASS(C)DI: 106.6 GRAMS/M^2
BH CV ECHO MEAS - LV MAX PG: 8 MMHG
BH CV ECHO MEAS - LV MEAN PG: 4.7 MMHG
BH CV ECHO MEAS - LV SYSTOLIC VOL/BSA (12-30): 18.3 ML/M^2
BH CV ECHO MEAS - LV V1 MAX: 141 CM/SEC
BH CV ECHO MEAS - LV V1 MEAN: 102.5 CM/SEC
BH CV ECHO MEAS - LV V1 VTI: 29.8 CM
BH CV ECHO MEAS - LVIDD: 4.5 CM
BH CV ECHO MEAS - LVIDS: 3.2 CM
BH CV ECHO MEAS - LVLD AP2: 7.3 CM
BH CV ECHO MEAS - LVLD AP4: 7.5 CM
BH CV ECHO MEAS - LVLS AP2: 6.7 CM
BH CV ECHO MEAS - LVLS AP4: 6.7 CM
BH CV ECHO MEAS - LVOT AREA (M): 2.8 CM^2
BH CV ECHO MEAS - LVOT AREA: 2.7 CM^2
BH CV ECHO MEAS - LVOT DIAM: 1.9 CM
BH CV ECHO MEAS - LVPWD: 1.3 CM
BH CV ECHO MEAS - MED PEAK E' VEL: 3.6 CM/SEC
BH CV ECHO MEAS - MV A DUR: 0.12 SEC
BH CV ECHO MEAS - MV A MAX VEL: 122.1 CM/SEC
BH CV ECHO MEAS - MV DEC SLOPE: 814.8 CM/SEC^2
BH CV ECHO MEAS - MV DEC TIME: 0.4 SEC
BH CV ECHO MEAS - MV E MAX VEL: 247 CM/SEC
BH CV ECHO MEAS - MV E/A: 2
BH CV ECHO MEAS - MV MAX PG: 36 MMHG
BH CV ECHO MEAS - MV MEAN PG: 17 MMHG
BH CV ECHO MEAS - MV P1/2T MAX VEL: 290.5 CM/SEC
BH CV ECHO MEAS - MV P1/2T: 104.4 MSEC
BH CV ECHO MEAS - MV V2 MAX: 300 CM/SEC
BH CV ECHO MEAS - MV V2 MEAN: 196.4 CM/SEC
BH CV ECHO MEAS - MV V2 VTI: 85.1 CM
BH CV ECHO MEAS - MVA P1/2T LCG: 0.76 CM^2
BH CV ECHO MEAS - MVA(P1/2T): 2.1 CM^2
BH CV ECHO MEAS - MVA(VTI): 0.96 CM^2
BH CV ECHO MEAS - PA ACC TIME: 0.1 SEC
BH CV ECHO MEAS - PA MAX PG (FULL): 2.5 MMHG
BH CV ECHO MEAS - PA MAX PG: 3.6 MMHG
BH CV ECHO MEAS - PA PR(ACCEL): 33.8 MMHG
BH CV ECHO MEAS - PA V2 MAX: 95 CM/SEC
BH CV ECHO MEAS - PULM A REVS DUR: 0.12 SEC
BH CV ECHO MEAS - PULM A REVS VEL: 16.9 CM/SEC
BH CV ECHO MEAS - PULM DIAS VEL: 37.9 CM/SEC
BH CV ECHO MEAS - PULM S/D: 1.1
BH CV ECHO MEAS - PULM SYS VEL: 40.5 CM/SEC
BH CV ECHO MEAS - PVA(V,A): 2.7 CM^2
BH CV ECHO MEAS - PVA(V,D): 2.7 CM^2
BH CV ECHO MEAS - QP/QS: 0.58
BH CV ECHO MEAS - RAP SYSTOLE: 15 MMHG
BH CV ECHO MEAS - RV MAX PG: 1.1 MMHG
BH CV ECHO MEAS - RV MEAN PG: 0.56 MMHG
BH CV ECHO MEAS - RV V1 MAX: 53.6 CM/SEC
BH CV ECHO MEAS - RV V1 MEAN: 35 CM/SEC
BH CV ECHO MEAS - RV V1 VTI: 9.8 CM
BH CV ECHO MEAS - RVOT AREA: 4.8 CM^2
BH CV ECHO MEAS - RVOT DIAM: 2.5 CM
BH CV ECHO MEAS - RVSP: 61 MMHG
BH CV ECHO MEAS - SI(AO): 261.8 ML/M^2
BH CV ECHO MEAS - SI(CUBED): 26.7 ML/M^2
BH CV ECHO MEAS - SI(LVOT): 38.3 ML/M^2
BH CV ECHO MEAS - SI(MOD-SP2): 23.9 ML/M^2
BH CV ECHO MEAS - SI(MOD-SP4): 30.5 ML/M^2
BH CV ECHO MEAS - SI(TEICH): 23.6 ML/M^2
BH CV ECHO MEAS - SUP REN AO DIAM: 1.8 CM
BH CV ECHO MEAS - SV(AO): 557.7 ML
BH CV ECHO MEAS - SV(CUBED): 56.9 ML
BH CV ECHO MEAS - SV(LVOT): 81.5 ML
BH CV ECHO MEAS - SV(MOD-SP2): 51 ML
BH CV ECHO MEAS - SV(MOD-SP4): 65 ML
BH CV ECHO MEAS - SV(RVOT): 47.4 ML
BH CV ECHO MEAS - SV(TEICH): 50.3 ML
BH CV ECHO MEAS - TAPSE (>1.6): 1.6 CM
BH CV ECHO MEAS - TR MAX VEL: 338 CM/SEC
BH CV ECHO MEASUREMENTS AVERAGE E/E' RATIO: 56.14
BH CV XLRA - RV BASE: 3.8 CM
BH CV XLRA - RV LENGTH: 7.5 CM
BH CV XLRA - RV MID: 4.4 CM
BH CV XLRA - TDI S': 9.4 CM/SEC
LEFT ATRIUM VOLUME INDEX: 74 ML/M2
MAXIMAL PREDICTED HEART RATE: 145 BPM
SINUS: 2.5 CM
STJ: 3.2 CM
STRESS TARGET HR: 123 BPM
TV MEAN GRADIENT: 5 MMHG
TV PEAK GRADIENT: 8 MMHG
TV VTI: 37 CM

## 2021-05-21 NOTE — PROGRESS NOTES
Anticoagulation Clinic Progress Note  Anticoagulation Summary  As of 2021    INR goal:  2.0-3.0   TTR:  47.2 % (1.9 y)   INR used for dosin.82 (2021)   Warfarin maintenance plan:  2 mg every Sun, Tue, Thu; 3 mg all other days   Weekly warfarin total:  18 mg   No change documented:  Robert Merritt, PharmD   Plan last modified:  Jalen Temple RP (2021)   Next INR check:  2021   Priority:  High   Target end date:      Indications    Paroxysmal atrial fibrillation (CMS/HCC) [I48.0]             Anticoagulation Episode Summary     INR check location:      Preferred lab:      Send INR reminders to:  [facility]  POOL    Comments:  Lab drawn at [facility] ([facility]) - [facility] [phone]       Anticoagulation Care Providers     Provider Role Specialty Phone number    Shiloh Gonzales APRN Referring Cardiology [phone]        Clinic Interview:  Patient Findings     Negatives:  Signs/symptoms of thrombosis, Signs/symptoms of bleeding,   Laboratory test error suspected, Change in health, Change in alcohol use,   Change in activity, Upcoming invasive procedure, Emergency department   visit, Upcoming dental procedure, Missed doses, Extra doses, Change in   medications, Change in diet/appetite, Hospital admission, Bruising, Other   complaints      Clinical Outcomes     Negatives:  Major bleeding event, Thromboembolic event,   Anticoagulation-related hospital admission, Anticoagulation-related ED   visit, Anticoagulation-related fatality      INR History:  Anticoagulation Monitoring 2021   INR 1.75 2.58 2.82   INR Date 2021   INR Goal 2.0-3.0 2.0-3.0 2.0-3.0   Trend Up Same Same   Last Week Total 17 mg 18 mg 18 mg   Next Week Total 18 mg 18 mg 18 mg   Sun 2 mg - 2 mg   Mon 3 mg - 3 mg   Tue 2 mg - 2 mg   Wed - - -   Thu - - -   Fri 3 mg - 3 mg   Sat 3 mg - 3 mg   Visit Report - - -   Some recent data might be hidden     Plan:  1. INR  is Therapeutic today- see above in Anticoagulation Summary.   Will instruct Claudia Arnold to Continue their warfarin regimen- see above in Anticoagulation Summary.  2. Follow up in 1 week with weekly labs at dialysis  3. They have been instructed to call if any changes in medications, doses, concerns, etc. Patient expresses understanding and has no further questions at this time.    Robert Merritt, PharmD

## 2021-05-21 NOTE — PROGRESS NOTES
Date of Office Visit:  2021  Encounter Provider: Scott Segura MD  Place of Service: [facility] CARDIOLOGY  Patient Name: Claudia Arnold  :1945    Chief complaint: Follow-up for tissue aortic valve replacement, tissue mitral valve   replacement, tricuspid valve repair, paroxysmal atrial fibrillation and flutter,   bifascicular block, and chronic diastolic CHF.    History of Present Illness:    I again had the pleasure of seeing the patient in cardiology office on 2021.  She is a   very pleasant 75 year-old white female with a history of valvular disease, paroxysmal   atrial fibrillation, paroxysmal atrial flutter, bifascicular block, and chronic diastolic CHF   who presents for follow-up.  The patient presented to the emergency department on   2019 with progressive and severe dyspnea, volume overload, and significant   peripheral edema.  A subsequent echocardiogram showed severe calcific disease of   the aortic valve and the mitral apparatus.  This resulted in both severe aortic stenosis   and severe mitral stenosis.  She was also noted to have severe tricuspid regurgitation.    Her ejection fraction was normal at 60-65%.  She also had Candida intertrigo in her   skin folds, as well as a significant left axillary fungal infection.  She was treated   appropriately with antifungal agents.  She was diuresed extensively.  A CT angiogram   of the chest showed questionable small bilateral pulmonary emboli.  She did go into   rapid atrial fibrillation shortly into her hospital stay, and continued to have issues with   paroxysmal atrial fibrillation throughout her hospital stay.  A right and left heart   catheterization on 2019 showed minimal luminal irregularities in the LAD, but   otherwise normal coronary arteries.  She had moderate pulmonary hypertension with   a mean PA pressure of 32 mmHg.    The patient ultimately underwent tissue aortic valve replacement  (23 mm Magna   pericardial valve), tissue mitral valve replacement (25 mm Epic St. Mike porcine   valve), and tricuspid valve repair (Martha suture repair).  She also had a left cryo-maze   and left atrial appendage ligation at that time.  She developed oliguric acute kidney   injury postoperatively, and ultimately required hemodialysis.  Unfortunately, she did   not recover her kidney function prior to discharge, and remained on dialysis.  She   also had junctional bradycardia and asystole postoperatively, but resolved several   days after the surgery.  Postoperatively, she had issues with rapid atrial fibrillation   and rapid atrial flutter.  The atrial flutter was difficult to control, and it was not clear   if it was typical or atypical.  She underwent a LEE with direct-current cardioversion   on 2/15/2019.  She was loaded with amiodarone, and discharged on amiodarone as   well.  Her blood pressure could not tolerate beta-blockers as she was relatively   hypotensive.  She also was placed on Coumadin prior to discharge.  She ultimately   was discharged to rehab on 2/18/2019.    The patient was hospitalized in early 2021 with rectal bleeding.  She underwent an   EGD and colonoscopy which showed duodenitis, gastritis, diverticulosis, and   thrombosed external and internal hemorrhoids.  It was ultimately felt that the   bleeding source was very likely from the hemorrhoids.  She did develop some   atrial fibrillation with rapid response while hospitalized, but converted on   amiodarone.  She was eventually transitioned back to warfarin.    The patient presents today for follow-up.  For the most part, she has been doing  fairly well from a clinical standpoint.  She is still getting hemodialysis on Monday,   Wednesday, and Friday.  She has not had any significant shortness of breath or   congestive heart failure symptoms.  She denied any chest pain.  She did have   some findings on her echocardiogram from today which were  somewhat   concerning (discussed below).        Past Medical History:   Diagnosis Date   • A-fib (CMS/[facility])     Meds   • Arthritis     w/Difficult Mobility   • Bilateral lower extremity edema     Legs   • CAD (coronary artery disease)     Affecting LAD    • Cardiomyopathy (CMS/[facility])    • CHF (congestive heart failure) (CMS/[facility])    • Chronic fatigue    • Chronic kidney disease    • Dialysis patient (CMS/[facility])     TUESDAY THURSDAY SATURDAY   • Generalized anxiety disorder    • GERD (gastroesophageal reflux disease)    • Hernia, inguinal     Hx Repair   • History of echocardiogram 1/17/19-[facility]    The L Ventricular Cavity is Mild-to-Moderately Dilated; Left Ventricular Wall Thickness is Consistent w/Mild Concentric Hypertrophy; Left Atrial Cavity Size is Moderate-to-Severely Dilated; Severe AVS; Severe MVS; Moderate TVR Noted   • History of transfusion    • Hyperlipidemia     Controlled w/Meds   • Hypertension     Controlled w/Meds   • Hypokinesis 01/22/2019    Apical Noted on Cardiac Cath   • IFG (impaired fasting glucose)    • LAD stenosis 01/22/2019    Mid LAD Irregularities Noted on Cardiac Cath    • Left atrial dilatation 01/17/2019    Moderate-Severe Noted on Echo   • Left ventricular dilatation 01/17/2019    Noted on Echo/LEE   • Mild concentric left ventricular hypertrophy 01/17/2019    Noted on Echo/LEE   • Mitral annular calcification 01/17/2019    Severe Noted on Echo   • Moderate tricuspid valve regurgitation 01/24/2019    Noted on LEE   • Morbid obesity (CMS/[facility])    • NSTEMI (non-ST elevated myocardial infarction) (CMS/[facility])    • OCTAVIANO (obstructive sleep apnea)    • Osteoarthritis    • Pulmonary hypertension (CMS/[facility]) 01/22/2019    Noted on Cardiac Cath   • Right bundle branch block 01/24/2019    Noted on LEE   • Severe aortic stenosis 01/22/2019    Noted on Cardiac Cath & LEE on 01/24/19; S/p AVR on 01/28/19 by Dr. Gaxiola   • Severe mitral valve stenosis 01/24/2019    Noted on LEE; S/p MVR on 01/28/19 by   Khalida   • Shoulder pain, bilateral    • Skin yeast infection     Folds Under Skin--Nystatin/Diflucan       Past Surgical History:   Procedure Laterality Date   • AORTIC VALVE REPAIR/REPLACEMENT MITRAL VALVE REPAIR/REPLACEMENT N/A 1/28/2019    Procedure: LEE STERNOTOMY AORTIC VALVE REPLACEMENT, MITRAL VALVE REPLACEMENT, TRICUSPID VALVE REPAIR, MAZE PROCEDURE, CLOSURE OF LEFT ATRIAL APPENDAGE  AND PRP;  Surgeon: Scar Gaxiola MD;  Location: [facility] MAIN OR;  Service: Cardiothoracic   • ARTERIOVENOUS FISTULA/SHUNT SURGERY Left 6/26/2019    Procedure: LEFT ARM BRACHIAL CEPHALIC FISTULA;  Surgeon: Satish Stahl MD;  Location: [facility] MAIN OR;  Service: Vascular   • CARDIAC CATHETERIZATION N/A 1/22/2019    Procedure: Coronary angiography;  Surgeon: Rick Talavera MD;  Location: [facility] CATH INVASIVE LOCATION;  Service: Cardiology   • CARDIAC CATHETERIZATION N/A 1/22/2019    Procedure: Left Heart Cath;  Surgeon: Rick Talavera MD;  Location: [facility] CATH INVASIVE LOCATION;  Service: Cardiology   • CARDIAC CATHETERIZATION N/A 1/22/2019    Procedure: Right Heart Cath;  Surgeon: Rick Talavera MD;  Location: [facility] CATH INVASIVE LOCATION;  Service: Cardiology   • CARDIAC CATHETERIZATION N/A 1/22/2019    Procedure: Left ventriculography;  Surgeon: Rick Talavera MD;  Location: [facility] CATH INVASIVE LOCATION;  Service: Cardiology   • CARDIAC SURGERY     • COLONOSCOPY     • COLONOSCOPY N/A 1/16/2021    Procedure: COLONOSCOPY;  Surgeon: Wallace Hernandez MD;  Location: [facility] ENDOSCOPY;  Service: Gastroenterology;  Laterality: N/A;  pre- anemia, rectal bleeding  post-- hemorrhoids, diverticulosis   • ENDOSCOPY N/A 1/16/2021    Procedure: ESOPHAGOGASTRODUODENOSCOPY with biopsies;  Surgeon: Wallace Hernandez MD;  Location: [facility] ENDOSCOPY;  Service: Gastroenterology;  Laterality: N/A;  pre-- anemia  post-- esophagitis, gastritis, duodenitis   • HERNIA REPAIR     • INSERTION  HEMODIALYSIS CATHETER N/A 2/8/2019    Procedure: tunnel CATHETER PLACEMENT WITH FLUROSCOPY;  Surgeon: Satish Stahl MD;  Location: [facility];  Service: Vascular   • REPLACEMENT TOTAL KNEE Bilateral 2007/2009       Current Outpatient Medications on File Prior to Visit   Medication Sig Dispense Refill   • acetaminophen (TYLENOL) 325 MG tablet Take 650 mg by mouth 3 (Three) Times a Day. Takes tid at 2 am, 1 pm, and hS     • bumetanide (BUMEX) 2 MG tablet Take 1 tablet by mouth Daily. 90 tablet 3   • busPIRone (BUSPAR) 10 MG tablet Take 1 tablet by mouth 2 (two) times a day.     • [employer] MELATONIN PO Take  by mouth.     • escitalopram (LEXAPRO) 10 MG tablet Take 1 tablet by mouth Daily. 90 tablet 3   • Heparin Sodium, Porcine, (heparin, porcine,) 1000 units/mL injection Heparin Sodium (Porcine) 1,000 Units/mL Systemic     • hydrocortisone 2.5 % cream Apply  topically to the appropriate area as directed See Admin Instructions. Apply topically to the affected area twice daily as directed 60 g 3   • Iron Sucrose (VENOFER IV) 50 mg 1 (One) Time Per Week.     • Methoxy PEG-Epoetin Beta (MIRCERA IJ) 60 mcg Every 14 (Fourteen) Days.     • midodrine (PROAMATINE) 2.5 MG tablet Take 1 tablet by mouth 3 (Three) Times a Day Before Meals. 90 tablet 6   • ProRenal + D tablet tablet TAKE 1 TABLET BY MOUTH EVERY DAY (ON DIALYSIS DAYS, TAKE AFTER DIALYSIS TREATMENT)     • rOPINIRole (REQUIP) 0.25 MG tablet      • VITAMIN D PO Take 0.75 mcg by mouth.     • warfarin (COUMADIN) 2 MG tablet Take 1 tablet (2 mg) by mouth every Sunday, Tuesday, and Thursday; Take 1 and 1/2 tablets (3 mg) all other days of the week or as directed 120 tablet 0     No current facility-administered medications on file prior to visit.     Allergies as of 05/20/2021   • (No Known Allergies)     Social History     Socioeconomic History   • Marital status:      Spouse name: Not on file   • Number of children: 2   • Years of education: 12   • Highest  "education level: High school graduate   Tobacco Use   • Smoking status: Never Smoker   • Smokeless tobacco: Never Used   Substance and Sexual Activity   • Alcohol use: Yes     Alcohol/week: 1.0 standard drinks     Types: 1 Cans of beer per week     Comment: 1 monthly   • Drug use: No   • Sexual activity: Defer     Birth control/protection: Post-menopausal     Family History   Problem Relation Age of Onset   • Hypertension Other    • Diabetes Other    • Heart disease Neg Hx    • Stroke Neg Hx    • Arthritis Neg Hx    • Breast cancer Neg Hx    • Malig Hyperthermia Neg Hx        Review of Systems   Constitutional: Positive for malaise/fatigue.   Cardiovascular: Positive for dyspnea on exertion.   Neurological: Positive for tremors.   All other systems reviewed and are negative.     Objective:     Vitals:    05/20/21 1445   BP: 122/66   Pulse: 78   SpO2: 96%   Height: 170.2 cm (67\")     Body mass index is 35.4 kg/m².    Physical Exam  Constitutional:       Appearance: She is well-developed.   HENT:      Head: Normocephalic and atraumatic.   Eyes:      Conjunctiva/sclera: Conjunctivae normal.   Cardiovascular:      Rate and Rhythm: Normal rate and regular rhythm.      Heart sounds: Murmur heard.   Systolic murmur is present with a grade of 2/6 at the upper right sternal border and lower left sternal border.   No friction rub. No gallop.    Pulmonary:      Effort: Pulmonary effort is normal.      Breath sounds: Normal breath sounds.   Abdominal:      Palpations: Abdomen is soft.      Tenderness: There is no abdominal tenderness.   Musculoskeletal:      Cervical back: Neck supple.      Comments: Trace edema of the lower extremities bilaterally   Skin:     General: Skin is warm.   Neurological:      Mental Status: She is alert and oriented to person, place, and time.   Psychiatric:         Behavior: Behavior normal.       Lab Review:   Procedures    Cardiac Procedures:  1.  Echocardiogram on 1/17/2019: The left ventricle " was mildly to moderately dilated.  The   ejection fraction was 60-65%.  The left atrium was moderately to severely enlarged.  There   was severe aortic stenosis.  There was severe mitral annular calcification and severe   mitral stenosis.  There was mild to moderate tricuspid regurgitation.  2.  Left and right heart catheterization on 1/22/2019: There were mild luminal irregularities   in the proximal to mid LAD, and the coronary arteries were otherwise normal.  There was   moderate pulmonary hypertension with a mean PA pressure of 32 mmHg.  The wedge   pressure was 21 mmHg.  3.  Status post tissue aortic valve replacement, tissue mitral valve replacement, tricuspid   valve repair, left cryo-maze, and left atrial appendage ligation by Dr. Gaxiola on 1/28/2019.    The aortic valve replacement is a 23 mm Magna pericardial prosthesis.  The mitral valve   replacement is a 25 mm Epic St. Mike porcine prosthesis.  The tricuspid valve repair was   a tricuspid bicuspidization (Martha Suture repair).    4.  LEE with direct-current cardioversion on 2/15/2019: The left ventricle was not well   visualized.  The aortic valve and mitral valve replacements were normal.  There was   moderate tricuspid regurgitation through the tricuspid repair.  The patient was   successfully converted from atrial flutter to sinus rhythm.  5.  Echocardiogram on 5/20/2021: The ejection fraction was 60 to 65%.  There was mild   to moderate LVH.  The right ventricle was moderately enlarged and moderately reduced   in function.  The left atrium was severely enlarged.  The tissue aortic valve replacement   had elevated gradients with a peak gradient of 39 mmHg and mean gradient 20 mmHg.    The tissue mitral valve replacement had elevated gradients with a peak gradient of 36   mmHg and mean gradient of 17 mmHg.  There was severe tricuspid regurgitation with a   calculated RVSP of 61 mmHg.    Assessment:       Diagnosis Plan   1. Status post mitral valve  replacement with tissue valve  Adult Transesophageal Echo (LEE) W/ Cont if Necessary Per Protocol   2. Status post tricuspid valve repair  Adult Transesophageal Echo (LEE) W/ Cont if Necessary Per Protocol   3. Status post aortic valve replacement with tissue valve  Adult Transesophageal Echo (LEE) W/ Cont if Necessary Per Protocol   4. Nonrheumatic tricuspid valve regurgitation  Adult Transesophageal Echo (LEE) W/ Cont if Necessary Per Protocol   5. Right ventricular enlargement  Adult Transesophageal Echo (LEE) W/ Cont if Necessary Per Protocol   6. Paroxysmal atrial fibrillation (CMS/HCC)     7. Typical atrial flutter (CMS/HCC)     8. Chronic diastolic congestive heart failure (CMS/HCC)     9. Bifascicular block       Plan:       Unfortunately, she had several findings on the echocardiogram from today which were concerning.  The gradients across her tissue mitral valve replacement were grossly elevated with a peak of 36 mmHg and a mean of 17 mmHg.  There was also severe turbulence, suggestive of severe bioprosthetic mitral stenosis.  She does have an AV fistula, and the shunting of the blood can sometimes cause elevated gradients, although I could not see the valve well.  Additionally, she now has severe tricuspid regurgitation through her valve repair.  This may be from pulmonary hypertension if there is an issue with the mitral valve replacement.  She is not going to be a candidate for any type of redo valve surgery.  I would like to get a better assessment of the mitral valve as this may aid in her management.  I have suggested a LEE, and she is in agreement with this.  This will be scheduled within the next several weeks.    Her blood pressure is much better since starting on the midodrine at 2.5 mg 3 times a day.  Her volume status is managed by nephrology.  She is on the warfarin without any recent bleeding.  She denied any recent GI bleeding since earlier this year when she was admitted with likely  hemorrhoidal bleeding.  I will see her back in the office in 4 months.  Further plans will be made pending the results of the LEE.

## 2021-05-26 NOTE — PROGRESS NOTES
Subjective   Claudia Arnold is a 75 y.o. female.     History of Present Illness     Chief Complaint:   Chief Complaint   Patient presents with   • medicare wellness   • Anxiety       Claudia Arnold 75 y.o. female who presents today for Medical Management of the below listed issues and medication refills.  she has a problem list of   Patient Active Problem List   Diagnosis   • Tear of rotator cuff   • Hypertension   • Generalized anxiety disorder   • Hyperlipidemia   • IFG (impaired fasting glucose)   • Heart murmur, systolic   • Shoulder pain, bilateral   • Pain of both shoulder joints   • Chronic pain of both shoulders   • Acute congestive heart failure (CMS/[facility])   • Obesity, morbid, BMI 50 or higher (CMS/[facility])   • Fungal skin infection   • Cellulitis of lower extremity   • Aortic valve stenosis   • Tricuspid valve insufficiency   • Mitral valve stenosis   • S/P MVR (mitral valve replacement)   • Paroxysmal atrial fibrillation (CMS/[facility])   • Pulmonary hypertension (CMS/[facility])   • Stage 5 chronic kidney disease on chronic dialysis (CMS/[facility])   • Gastroesophageal reflux disease without esophagitis   • Chronic idiopathic constipation   • Dependence on renal dialysis (CMS/[facility])   • Chronic kidney disease, stage 2 (mild)   • Renovascular hypertension   • GI bleed   • 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 (CMS/[facility])   • CAD (coronary artery disease)   • S/P AVR (aortic valve replacement)   • Chronic diastolic CHF (congestive heart failure) (CMS/[facility])   • Chronic pain of both shoulders   • Gastrointestinal hemorrhage   • Gastrointestinal hemorrhage with melena   • Immobility syndrome   .  Since the last visit, she has overall felt well.  she has been compliant with   Current Outpatient Medications:   •  acetaminophen (TYLENOL) 325 MG tablet, Take 650 mg by mouth 3 (Three) Times a Day. Takes tid at 2 am, 1 pm, and hS, Disp: , Rfl:   •  bumetanide (BUMEX) 2 MG tablet, Take 1 tablet by mouth Daily., Disp: 90 tablet,  "Rfl: 3  •  busPIRone (BUSPAR) 10 MG tablet, Take 1 tablet by mouth 2 (two) times a day., Disp: 180 tablet, Rfl: 3  •  cephalexin (KEFLEX) 250 MG capsule, Take 250 mg by mouth 2 (Two) Times a Day., Disp: , Rfl:   •  [employer] MELATONIN PO, Take  by mouth., Disp: , Rfl:   •  escitalopram (LEXAPRO) 10 MG tablet, Take 1 tablet by mouth Daily., Disp: 90 tablet, Rfl: 3  •  Heparin Sodium, Porcine, (heparin, porcine,) 1000 units/mL injection, Heparin Sodium (Porcine) 1,000 Units/mL Systemic, Disp: , Rfl:   •  hydrocortisone 2.5 % cream, Apply  topically to the appropriate area as directed See Admin Instructions. Apply topically to the affected area twice daily as directed, Disp: 60 g, Rfl: 3  •  Iron Sucrose (VENOFER IV), 50 mg 1 (One) Time Per Week., Disp: , Rfl:   •  Methoxy PEG-Epoetin Beta (MIRCERA IJ), 60 mcg Every 14 (Fourteen) Days., Disp: , Rfl:   •  midodrine (PROAMATINE) 2.5 MG tablet, Take 1 tablet by mouth 3 (Three) Times a Day Before Meals., Disp: 90 tablet, Rfl: 6  •  ProRenal + D tablet tablet, TAKE 1 TABLET BY MOUTH EVERY DAY (ON DIALYSIS DAYS, TAKE AFTER DIALYSIS TREATMENT), Disp: , Rfl:   •  rOPINIRole (REQUIP) 0.25 MG tablet, , Disp: , Rfl:   •  VITAMIN D PO, Take 0.75 mcg by mouth., Disp: , Rfl:   •  warfarin (COUMADIN) 2 MG tablet, Take 1 tablet (2 mg) by mouth every Sunday, Tuesday, and Thursday; Take 1 and 1/2 tablets (3 mg) all other days of the week or as directed, Disp: 120 tablet, Rfl: 0.  she denies medication side effects.    All of the other chronic condition(s) listed above are stable w/o issues.    /63   Pulse 70   Temp 97.6 °F (36.4 °C) (Oral)   Resp 16   Ht 170.2 cm (67\")   Wt 102 kg (225 lb)   BMI 35.24 kg/m²     Results for orders placed or performed in visit on 05/21/21   Protime-INR    Specimen: Blood   Result Value Ref Range    INR 2.82            The following portions of the patient's history were reviewed and updated as appropriate: allergies, current medications, past family " history, past medical history, past social history, past surgical history and problem list.    Review of Systems   Constitutional: Negative for activity change, chills and fever.   Respiratory: Negative for cough.    Cardiovascular: Negative for chest pain.   Psychiatric/Behavioral: Negative for dysphoric mood.       Objective   Physical Exam  Constitutional:       General: She is not in acute distress.     Appearance: She is well-developed.   Cardiovascular:      Rate and Rhythm: Normal rate and regular rhythm.      Heart sounds: Murmur heard.   Systolic murmur is present with a grade of 2/6.     Pulmonary:      Effort: Pulmonary effort is normal.      Breath sounds: Normal breath sounds.   Neurological:      Mental Status: She is alert and oriented to person, place, and time.   Psychiatric:         Behavior: Behavior normal.         Thought Content: Thought content normal.         Assessment/Plan   Diagnoses and all orders for this visit:    1. Medicare annual wellness visit, subsequent (Primary)    2. Generalized anxiety disorder  -     Discontinue: escitalopram (LEXAPRO) 10 MG tablet; Take 1 tablet by mouth Daily.  Dispense: 14 tablet; Refill: 3  -     busPIRone (BUSPAR) 10 MG tablet; Take 1 tablet by mouth 2 (two) times a day.  Dispense: 180 tablet; Refill: 3  -     escitalopram (LEXAPRO) 10 MG tablet; Take 1 tablet by mouth Daily.  Dispense: 90 tablet; Refill: 3    3. Skin lesion  -     hydrocortisone 2.5 % cream; Apply  topically to the appropriate area as directed See Admin Instructions. Apply topically to the affected area twice daily as directed  Dispense: 60 g; Refill: 3    4. Immobility syndrome    Pt's manual wheelchair is wearing out and wrote script for a new one for pt.

## 2021-05-27 ENCOUNTER — OFFICE VISIT (OUTPATIENT)
Dept: FAMILY MEDICINE CLINIC | Facility: CLINIC | Age: 76
End: 2021-05-27

## 2021-05-27 VITALS
DIASTOLIC BLOOD PRESSURE: 63 MMHG | WEIGHT: 225 LBS | HEIGHT: 67 IN | TEMPERATURE: 97.6 F | HEART RATE: 70 BPM | BODY MASS INDEX: 35.31 KG/M2 | RESPIRATION RATE: 16 BRPM | SYSTOLIC BLOOD PRESSURE: 116 MMHG

## 2021-05-27 DIAGNOSIS — M62.3 IMMOBILITY SYNDROME: ICD-10-CM

## 2021-05-27 DIAGNOSIS — Z00.00 MEDICARE ANNUAL WELLNESS VISIT, SUBSEQUENT: Primary | ICD-10-CM

## 2021-05-27 DIAGNOSIS — F41.1 GENERALIZED ANXIETY DISORDER: ICD-10-CM

## 2021-05-27 DIAGNOSIS — L98.9 SKIN LESION: ICD-10-CM

## 2021-05-27 PROCEDURE — 99214 OFFICE O/P EST MOD 30 MIN: CPT | Performed by: FAMILY MEDICINE

## 2021-05-27 PROCEDURE — G0439 PPPS, SUBSEQ VISIT: HCPCS | Performed by: FAMILY MEDICINE

## 2021-05-27 RX ORDER — ESCITALOPRAM OXALATE 10 MG/1
10 TABLET ORAL DAILY
Qty: 90 TABLET | Refills: 3 | Status: SHIPPED | OUTPATIENT
Start: 2021-05-27 | End: 2022-01-01

## 2021-05-27 RX ORDER — ESCITALOPRAM OXALATE 10 MG/1
10 TABLET ORAL DAILY
Qty: 14 TABLET | Refills: 3 | Status: SHIPPED | OUTPATIENT
Start: 2021-05-27 | End: 2021-05-27 | Stop reason: SDUPTHER

## 2021-05-27 RX ORDER — BUSPIRONE HYDROCHLORIDE 10 MG/1
10 TABLET ORAL 2 TIMES DAILY
Qty: 180 TABLET | Refills: 3 | Status: SHIPPED | OUTPATIENT
Start: 2021-05-27 | End: 2022-01-01 | Stop reason: SDUPTHER

## 2021-05-27 RX ORDER — CEPHALEXIN 250 MG/1
250 CAPSULE ORAL 2 TIMES DAILY
COMMUNITY
Start: 2021-05-20 | End: 2021-08-13 | Stop reason: HOSPADM

## 2021-05-27 NOTE — PROGRESS NOTES
The ABCs of the Annual Wellness Visit  Subsequent Medicare Wellness Visit    Chief Complaint   Patient presents with   • medicare wellness   • Anxiety       Subjective   History of Present Illness:  Claudia Arnold is a 75 y.o. female who presents for a Subsequent Medicare Wellness Visit.    HEALTH RISK ASSESSMENT    Recent Hospitalizations:  Recently treated at the following:  [facility]    Current Medical Providers:  Patient Care Team:  Robert Cortez MD as PCP - General (Family Medicine)  Deborah Agudelo MD as Surgeon (Orthopedic Surgery)  Scott Segura MD as Consulting Physician (Cardiology)  Scar Gaxiola MD as Surgeon (Cardiothoracic Surgery)  Kathy Osuna MD as Consulting Physician (Nephrology)  Dora Astorga APRN as Nurse Practitioner (Neurosurgery)  Satish Stahl MD as Consulting Physician (Vascular Surgery)  Scar Gaxiola MD as Surgeon (Cardiothoracic Surgery)    Smoking Status:  Social History     Tobacco Use   Smoking Status Never Smoker   Smokeless Tobacco Never Used       Alcohol Consumption:  Social History     Substance and Sexual Activity   Alcohol Use Yes   • Alcohol/week: 1.0 standard drinks   • Types: 1 Cans of beer per week    Comment: 1 monthly       Depression Screen:   PHQ-2/PHQ-9 Depression Screening 5/27/2021   Little interest or pleasure in doing things 0   Feeling down, depressed, or hopeless 0   Trouble falling or staying asleep, or sleeping too much 0   Feeling tired or having little energy 0   Poor appetite or overeating 0   Feeling bad about yourself - or that you are a failure or have let yourself or your family down 0   Trouble concentrating on things, such as reading the newspaper or watching television 0   Moving or speaking so slowly that other people could have noticed. Or the opposite - being so fidgety or restless that you have been moving around a lot more than usual 0   Thoughts that you would be better off dead, or of  hurting yourself in some way 0   Total Score 0   If you checked off any problems, how difficult have these problems made it for you to do your work, take care of things at home, or get along with other people? Not difficult at all       Fall Risk Screen:  LUIS ANTONIO Fall Risk Assessment has not been completed.    Health Habits and Functional and Cognitive Screening:  Functional & Cognitive Status 5/27/2021   Do you have difficulty preparing food and eating? No   Do you have difficulty bathing yourself, getting dressed or grooming yourself? No   Do you have difficulty using the toilet? No   Do you have difficulty moving around from place to place? No   Do you have trouble with steps or getting out of a bed or a chair? No   Current Diet Well Balanced Diet   Dental Exam Not up to date   Eye Exam Not up to date   Exercise (times per week) 0 times per week   Current Exercises Include No Regular Exercise   Current Exercise Activities Include -   Do you need help using the phone?  No   Are you deaf or do you have serious difficulty hearing?  No   Do you need help with transportation? Yes   Do you need help shopping? Yes   Do you need help preparing meals?  Yes   Do you need help with housework?  Yes   Do you need help with laundry? Yes   Do you need help taking your medications? Yes   Do you need help managing money? Yes   Do you ever drive or ride in a car without wearing a seat belt? No   Have you felt unusual stress, anger or loneliness in the last month? No   Who do you live with? Spouse   If you need help, do you have trouble finding someone available to you? Yes   Have you been bothered in the last four weeks by sexual problems? No   Do you have difficulty concentrating, remembering or making decisions? No         Does the patient have evidence of cognitive impairment? No    Asprin use counseling:Does not need ASA (and currently is not on it)    Age-appropriate Screening Schedule:  Refer to the list below for future  screening recommendations based on patient's age, sex and/or medical conditions. Orders for these recommended tests are listed in the plan section. The patient has been provided with a written plan.    Health Maintenance   Topic Date Due   • DXA SCAN  Never done   • TDAP/TD VACCINES (1 - Tdap) Never done   • LIPID PANEL  02/12/2021   • INFLUENZA VACCINE  08/01/2021   • DIABETIC FOOT EXAM  Discontinued   • HEMOGLOBIN A1C  Discontinued   • DIABETIC EYE EXAM  Discontinued   • URINE MICROALBUMIN  Discontinued   • ZOSTER VACCINE  Discontinued          The following portions of the patient's history were reviewed and updated as appropriate: allergies, current medications, past family history, past medical history, past social history, past surgical history and problem list.    Outpatient Medications Prior to Visit   Medication Sig Dispense Refill   • acetaminophen (TYLENOL) 325 MG tablet Take 650 mg by mouth 3 (Three) Times a Day. Takes tid at 2 am, 1 pm, and hS     • bumetanide (BUMEX) 2 MG tablet Take 1 tablet by mouth Daily. 90 tablet 3   • cephalexin (KEFLEX) 250 MG capsule Take 250 mg by mouth 2 (Two) Times a Day.     • [facility] MELATONIN PO Take  by mouth.     • Heparin Sodium, Porcine, (heparin, porcine,) 1000 units/mL injection Heparin Sodium (Porcine) 1,000 Units/mL Systemic     • Iron Sucrose (VENOFER IV) 50 mg 1 (One) Time Per Week.     • Methoxy PEG-Epoetin Beta (MIRCERA IJ) 60 mcg Every 14 (Fourteen) Days.     • midodrine (PROAMATINE) 2.5 MG tablet Take 1 tablet by mouth 3 (Three) Times a Day Before Meals. 90 tablet 6   • ProRenal + D tablet tablet TAKE 1 TABLET BY MOUTH EVERY DAY (ON DIALYSIS DAYS, TAKE AFTER DIALYSIS TREATMENT)     • rOPINIRole (REQUIP) 0.25 MG tablet      • VITAMIN D PO Take 0.75 mcg by mouth.     • warfarin (COUMADIN) 2 MG tablet Take 1 tablet (2 mg) by mouth every Sunday, Tuesday, and Thursday; Take 1 and 1/2 tablets (3 mg) all other days of the week or as directed 120 tablet 0   • busPIRone  (BUSPAR) 10 MG tablet Take 1 tablet by mouth 2 (two) times a day.     • escitalopram (LEXAPRO) 10 MG tablet Take 1 tablet by mouth Daily. 90 tablet 3   • hydrocortisone 2.5 % cream Apply  topically to the appropriate area as directed See Admin Instructions. Apply topically to the affected area twice daily as directed 60 g 3     No facility-administered medications prior to visit.       Patient Active Problem List   Diagnosis   • Tear of rotator cuff   • Hypertension   • Generalized anxiety disorder   • Hyperlipidemia   • IFG (impaired fasting glucose)   • Heart murmur, systolic   • Shoulder pain, bilateral   • Pain of both shoulder joints   • Chronic pain of both shoulders   • Acute congestive heart failure (CMS/HCC)   • Obesity, morbid, BMI 50 or higher (CMS/[facility])   • Fungal skin infection   • Cellulitis of lower extremity   • Aortic valve stenosis   • Tricuspid valve insufficiency   • Mitral valve stenosis   • S/P MVR (mitral valve replacement)   • Paroxysmal atrial fibrillation (CMS/[facility])   • Pulmonary hypertension (CMS/[facility])   • Stage 5 chronic kidney disease on chronic dialysis (CMS/[facility])   • Gastroesophageal reflux disease without esophagitis   • Chronic idiopathic constipation   • Dependence on renal dialysis (CMS/[facility])   • Chronic kidney disease, stage 2 (mild)   • Renovascular hypertension   • GI bleed   • 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 Patient has an advance directive in EMR which is still valid.     Review of Systems    Compared to one year ago, the patient feels her physical health is the same.  Compared to one year ago,  "the patient feels her mental health is the same.    Reviewed chart for potential of high risk medication in the elderly: yes  Reviewed chart for potential of harmful drug interactions in the elderly:yes    Objective         Vitals:    05/27/21 1355   BP: 116/63   Pulse: 70   Resp: 16   Temp: 97.6 °F (36.4 °C)   TempSrc: Oral   Weight: 102 kg (225 lb)   Height: 170.2 cm (67\")   PainSc:   4       Body mass index is 35.24 kg/m².  Discussed the patient's BMI with her. The BMI is above average; no BMI management plan is appropriate..    Physical Exam          Assessment/Plan   Medicare Risks and Personalized Health Plan  CMS Preventative Services Quick Reference  Cardiovascular risk    The above risks/problems have been discussed with the patient.  Pertinent information has been shared with the patient in the After Visit Summary.  Follow up plans and orders are seen below in the Assessment/Plan Section.    Diagnoses and all orders for this visit:    1. Medicare annual wellness visit, subsequent (Primary)    2. Generalized anxiety disorder  -     Discontinue: escitalopram (LEXAPRO) 10 MG tablet; Take 1 tablet by mouth Daily.  Dispense: 14 tablet; Refill: 3  -     busPIRone (BUSPAR) 10 MG tablet; Take 1 tablet by mouth 2 (two) times a day.  Dispense: 180 tablet; Refill: 3  -     escitalopram (LEXAPRO) 10 MG tablet; Take 1 tablet by mouth Daily.  Dispense: 90 tablet; Refill: 3    3. Skin lesion  -     hydrocortisone 2.5 % cream; Apply  topically to the appropriate area as directed See Admin Instructions. Apply topically to the affected area twice daily as directed  Dispense: 60 g; Refill: 3    4. Immobility syndrome      Follow Up:  No follow-ups on file.     An After Visit Summary and PPPS were given to the patient.               "

## 2021-05-27 NOTE — PATIENT INSTRUCTIONS
Medicare Wellness  Personal Prevention Plan of Service     Date of Office Visit:  2021  Encounter Provider:  Robert Cortez MD  Place of Service:  [facility]  Patient Name: Claudia Arnold  :  1945    As part of the Medicare Wellness portion of your visit today, we are providing you with this personalized preventive plan of services (PPPS). This plan is based upon recommendations of the [place] Preventive Services Task Force (USPSTF) and the Advisory Committee on Immunization Practices (ACIP).    This lists the preventive care services that should be considered, and provides dates of when you are due. Items listed as completed are up-to-date and do not require any further intervention.    Health Maintenance   Topic Date Due   • DXA SCAN  Never done   • TDAP/TD VACCINES (1 - Tdap) Never done   • LIPID PANEL  2021   • INFLUENZA VACCINE  2021   • ANNUAL WELLNESS VISIT  2022   • COLORECTAL CANCER SCREENING  2031   • COVID-19 Vaccine  Completed   • Pneumococcal Vaccine 65+  Completed   • Hepatitis B  Aged Out   • HEPATITIS C SCREENING  Discontinued   • DIABETIC FOOT EXAM  Discontinued   • HEMOGLOBIN A1C  Discontinued   • DIABETIC EYE EXAM  Discontinued   • URINE MICROALBUMIN  Discontinued   • ZOSTER VACCINE  Discontinued       No orders of the defined types were placed in this encounter.      Return in about 1 year (around 2022) for Recheck.

## 2021-06-02 ENCOUNTER — ANTICOAGULATION VISIT (OUTPATIENT)
Dept: PHARMACY | Facility: HOSPITAL | Age: 76
End: 2021-06-02

## 2021-06-02 ENCOUNTER — LAB (OUTPATIENT)
Dept: LAB | Facility: HOSPITAL | Age: 76
End: 2021-06-02

## 2021-06-02 DIAGNOSIS — Z01.810 PREPROCEDURAL CARDIOVASCULAR EXAMINATION: ICD-10-CM

## 2021-06-02 DIAGNOSIS — Z13.6 SCREENING FOR CARDIOVASCULAR CONDITION: ICD-10-CM

## 2021-06-02 DIAGNOSIS — I48.0 PAROXYSMAL ATRIAL FIBRILLATION (HCC): Primary | ICD-10-CM

## 2021-06-02 DIAGNOSIS — I48.0 PAROXYSMAL ATRIAL FIBRILLATION (HCC): ICD-10-CM

## 2021-06-02 DIAGNOSIS — Z01.818 OTHER SPECIFIED PRE-OPERATIVE EXAMINATION: ICD-10-CM

## 2021-06-02 LAB
ANION GAP SERPL CALCULATED.3IONS-SCNC: 11.3 MMOL/L (ref 5–15)
BASOPHILS # BLD AUTO: 0.04 10*3/MM3 (ref 0–0.2)
BASOPHILS NFR BLD AUTO: 0.7 % (ref 0–1.5)
BUN SERPL-MCNC: 11 MG/DL (ref 8–23)
BUN/CREAT SERPL: 7.6 (ref 7–25)
CALCIUM SPEC-SCNC: 9.6 MG/DL (ref 8.6–10.5)
CHLORIDE SERPL-SCNC: 100 MMOL/L (ref 98–107)
CO2 SERPL-SCNC: 30.7 MMOL/L (ref 22–29)
CREAT SERPL-MCNC: 1.45 MG/DL (ref 0.57–1)
DEPRECATED RDW RBC AUTO: 51.3 FL (ref 37–54)
EOSINOPHIL # BLD AUTO: 0.18 10*3/MM3 (ref 0–0.4)
EOSINOPHIL NFR BLD AUTO: 3 % (ref 0.3–6.2)
ERYTHROCYTE [DISTWIDTH] IN BLOOD BY AUTOMATED COUNT: 14.8 % (ref 12.3–15.4)
GFR SERPL CREATININE-BSD FRML MDRD: 35 ML/MIN/1.73
GLUCOSE SERPL-MCNC: 81 MG/DL (ref 65–99)
HCT VFR BLD AUTO: 35.2 % (ref 34–46.6)
HGB BLD-MCNC: 11.8 G/DL (ref 12–15.9)
IMM GRANULOCYTES # BLD AUTO: 0.03 10*3/MM3 (ref 0–0.05)
IMM GRANULOCYTES NFR BLD AUTO: 0.5 % (ref 0–0.5)
INR PPP: 2.49
INR PPP: 2.76 (ref 0.9–1.1)
LYMPHOCYTES # BLD AUTO: 0.82 10*3/MM3 (ref 0.7–3.1)
LYMPHOCYTES NFR BLD AUTO: 13.8 % (ref 19.6–45.3)
MCH RBC QN AUTO: 31.5 PG (ref 26.6–33)
MCHC RBC AUTO-ENTMCNC: 33.5 G/DL (ref 31.5–35.7)
MCV RBC AUTO: 93.9 FL (ref 79–97)
MONOCYTES # BLD AUTO: 0.64 10*3/MM3 (ref 0.1–0.9)
MONOCYTES NFR BLD AUTO: 10.8 % (ref 5–12)
NEUTROPHILS NFR BLD AUTO: 4.22 10*3/MM3 (ref 1.7–7)
NEUTROPHILS NFR BLD AUTO: 71.2 % (ref 42.7–76)
NRBC BLD AUTO-RTO: 0 /100 WBC (ref 0–0.2)
PLATELET # BLD AUTO: 231 10*3/MM3 (ref 140–450)
PMV BLD AUTO: 9.4 FL (ref 6–12)
POTASSIUM SERPL-SCNC: 3.5 MMOL/L (ref 3.5–5.2)
PROTHROMBIN TIME: 28.9 SECONDS (ref 11.7–14.2)
RBC # BLD AUTO: 3.75 10*6/MM3 (ref 3.77–5.28)
SODIUM SERPL-SCNC: 142 MMOL/L (ref 136–145)
WBC # BLD AUTO: 5.93 10*3/MM3 (ref 3.4–10.8)

## 2021-06-02 PROCEDURE — C9803 HOPD COVID-19 SPEC COLLECT: HCPCS

## 2021-06-02 PROCEDURE — 85025 COMPLETE CBC W/AUTO DIFF WBC: CPT

## 2021-06-02 PROCEDURE — 36415 COLL VENOUS BLD VENIPUNCTURE: CPT

## 2021-06-02 PROCEDURE — 80048 BASIC METABOLIC PNL TOTAL CA: CPT

## 2021-06-02 PROCEDURE — 85610 PROTHROMBIN TIME: CPT

## 2021-06-02 PROCEDURE — U0005 INFEC AGEN DETEC AMPLI PROBE: HCPCS

## 2021-06-02 PROCEDURE — U0004 COV-19 TEST NON-CDC HGH THRU: HCPCS

## 2021-06-02 NOTE — PROGRESS NOTES
Anticoagulation Clinic Progress Note    Anticoagulation Summary  As of 2021    INR goal:  2.0-3.0   TTR:  48.3 % (1.9 y)   INR used for dosin.76 (2021)   Warfarin maintenance plan:  2 mg every Sun, Tue, Thu; 3 mg all other days   Weekly warfarin total:  18 mg   Plan last modified:  Jalen Temple [facility] (2021)   Next INR check:  2021   Priority:  High   Target end date:      Indications    Paroxysmal atrial fibrillation (CMS/HCC) [I48.0]             Anticoagulation Episode Summary     INR check location:      Preferred lab:      [facility]    Comments:  Lab drawn at dialysis ([facility]) - [facility] [phone]       Anticoagulation Care Providers     Provider Role Specialty Phone number    Shiloh Gonzales APRN Referring Cardiology [phone]          Clinic Interview:  No pertinent clinical findings have been reported.    INR History:  Anticoagulation Monitoring 2021   INR 2.58 2.82 2.76   INR Date 2021   INR Goal 2.0-3.0 2.0-3.0 2.0-3.0   Trend Same Same Same   Last Week Total 18 mg 18 mg 18 mg   Next Week Total 18 mg 18 mg 18 mg   Sun - 2 mg 2 mg   Mon - 3 mg 3 mg   Tue - 2 mg 2 mg   Wed - - 3 mg   Thu - - 2 mg   Fri - 3 mg 3 mg   Sat - 3 mg 3 mg   Visit Report - - -   Some recent data might be hidden       Plan:  1. INR is therapeutic today- see above in Anticoagulation Summary.    Claudia Arnold to continue their warfarin regimen- see above in Anticoagulation Summary.  2. Follow up in 1 week   3. Pt has agreed to only be called if INR out of range. They have been instructed to call if any changes in medications, doses, concerns, etc. Patient expresses understanding and has no further questions at this time.    Can Adkins [facility]

## 2021-06-03 LAB — SARS-COV-2 ORF1AB RESP QL NAA+PROBE: NOT DETECTED

## 2021-06-04 ENCOUNTER — HOSPITAL ENCOUNTER (OUTPATIENT)
Dept: CARDIOLOGY | Facility: HOSPITAL | Age: 76
Discharge: HOME | End: 2021-06-04
Admitting: INTERNAL MEDICINE

## 2021-06-04 VITALS
WEIGHT: 225 LBS | DIASTOLIC BLOOD PRESSURE: 67 MMHG | SYSTOLIC BLOOD PRESSURE: 123 MMHG | RESPIRATION RATE: 18 BRPM | BODY MASS INDEX: 35.31 KG/M2 | OXYGEN SATURATION: 94 % | HEART RATE: 79 BPM | HEIGHT: 67 IN

## 2021-06-04 DIAGNOSIS — Z95.3 STATUS POST AORTIC VALVE REPLACEMENT WITH TISSUE VALVE: ICD-10-CM

## 2021-06-04 DIAGNOSIS — I36.1 NONRHEUMATIC TRICUSPID VALVE REGURGITATION: ICD-10-CM

## 2021-06-04 DIAGNOSIS — Z95.3 STATUS POST MITRAL VALVE REPLACEMENT WITH TISSUE VALVE: ICD-10-CM

## 2021-06-04 DIAGNOSIS — I51.7 RIGHT VENTRICULAR ENLARGEMENT: ICD-10-CM

## 2021-06-04 DIAGNOSIS — Z98.890 STATUS POST TRICUSPID VALVE REPAIR: ICD-10-CM

## 2021-06-04 PROCEDURE — 93320 DOPPLER ECHO COMPLETE: CPT | Performed by: INTERNAL MEDICINE

## 2021-06-04 PROCEDURE — 99153 MOD SED SAME PHYS/QHP EA: CPT

## 2021-06-04 PROCEDURE — 25010000002 MIDAZOLAM PER 1 MG: Performed by: INTERNAL MEDICINE

## 2021-06-04 PROCEDURE — 93312 ECHO TRANSESOPHAGEAL: CPT

## 2021-06-04 PROCEDURE — 93325 DOPPLER ECHO COLOR FLOW MAPG: CPT | Performed by: INTERNAL MEDICINE

## 2021-06-04 PROCEDURE — 93325 DOPPLER ECHO COLOR FLOW MAPG: CPT

## 2021-06-04 PROCEDURE — 99152 MOD SED SAME PHYS/QHP 5/>YRS: CPT

## 2021-06-04 PROCEDURE — 63710000001 LIDOCAINE VISCOUS HCL 2 % SOLUTION: Performed by: INTERNAL MEDICINE

## 2021-06-04 PROCEDURE — 93320 DOPPLER ECHO COMPLETE: CPT

## 2021-06-04 PROCEDURE — A9270 NON-COVERED ITEM OR SERVICE: HCPCS | Performed by: INTERNAL MEDICINE

## 2021-06-04 PROCEDURE — 25010000002 FENTANYL CITRATE (PF) 50 MCG/ML SOLUTION: Performed by: INTERNAL MEDICINE

## 2021-06-04 PROCEDURE — 93312 ECHO TRANSESOPHAGEAL: CPT | Performed by: INTERNAL MEDICINE

## 2021-06-04 RX ORDER — LIDOCAINE HYDROCHLORIDE 20 MG/ML
SOLUTION OROPHARYNGEAL
Status: COMPLETED | OUTPATIENT
Start: 2021-06-04 | End: 2021-06-04

## 2021-06-04 RX ORDER — FENTANYL CITRATE 50 UG/ML
INJECTION, SOLUTION INTRAMUSCULAR; INTRAVENOUS
Status: COMPLETED | OUTPATIENT
Start: 2021-06-04 | End: 2021-06-04

## 2021-06-04 RX ORDER — SODIUM CHLORIDE 9 MG/ML
INJECTION, SOLUTION INTRAVENOUS
Status: COMPLETED | OUTPATIENT
Start: 2021-06-04 | End: 2021-06-04

## 2021-06-04 RX ORDER — MIDAZOLAM HYDROCHLORIDE 1 MG/ML
INJECTION INTRAMUSCULAR; INTRAVENOUS
Status: COMPLETED | OUTPATIENT
Start: 2021-06-04 | End: 2021-06-04

## 2021-06-04 RX ADMIN — MIDAZOLAM HYDROCHLORIDE 1 MG: 1 INJECTION, SOLUTION INTRAMUSCULAR; INTRAVENOUS at 08:30

## 2021-06-04 RX ADMIN — SODIUM CHLORIDE 50 ML/HR: 9 INJECTION, SOLUTION INTRAVENOUS at 08:08

## 2021-06-04 RX ADMIN — MIDAZOLAM HYDROCHLORIDE 1 MG: 1 INJECTION, SOLUTION INTRAMUSCULAR; INTRAVENOUS at 08:16

## 2021-06-04 RX ADMIN — LIDOCAINE HYDROCHLORIDE 10 ML: 20 SOLUTION ORAL; TOPICAL at 08:08

## 2021-06-04 RX ADMIN — FENTANYL CITRATE 25 MCG: 50 INJECTION, SOLUTION INTRAMUSCULAR; INTRAVENOUS at 08:14

## 2021-06-04 RX ADMIN — MIDAZOLAM HYDROCHLORIDE 2 MG: 1 INJECTION, SOLUTION INTRAMUSCULAR; INTRAVENOUS at 08:12

## 2021-06-05 LAB
BH CV ECHO MEAS - BSA(HAYCOCK): 2.2 M^2
BH CV ECHO MEAS - BSA: 2.1 M^2
BH CV ECHO MEAS - BZI_BMI: 35.2 KILOGRAMS/M^2
BH CV ECHO MEAS - BZI_METRIC_HEIGHT: 170.2 CM
BH CV ECHO MEAS - BZI_METRIC_WEIGHT: 102.1 KG
BH CV ECHO MEAS - MV MAX PG: 32.5 MMHG
BH CV ECHO MEAS - MV MEAN PG: 14 MMHG
BH CV ECHO MEAS - MV V2 MAX: 285 CM/SEC
BH CV ECHO MEAS - MV V2 MEAN: 180 CM/SEC
BH CV ECHO MEAS - MV V2 VTI: 74 CM
BH CV ECHO MEAS - RVSP: 48 MMHG
BH CV ECHO MEAS - TR MAX VEL: 316 CM/SEC
BH CV VAS BP LEFT ARM: NORMAL MMHG
MAXIMAL PREDICTED HEART RATE: 145 BPM
STRESS TARGET HR: 123 BPM

## 2021-06-07 ENCOUNTER — ANTICOAGULATION VISIT (OUTPATIENT)
Dept: PHARMACY | Facility: HOSPITAL | Age: 76
End: 2021-06-07

## 2021-06-07 DIAGNOSIS — I48.0 PAROXYSMAL ATRIAL FIBRILLATION (HCC): Primary | ICD-10-CM

## 2021-06-10 LAB — INR PPP: 2.49

## 2021-06-11 ENCOUNTER — ANTICOAGULATION VISIT (OUTPATIENT)
Dept: PHARMACY | Facility: HOSPITAL | Age: 76
End: 2021-06-11

## 2021-06-11 DIAGNOSIS — I48.0 PAROXYSMAL ATRIAL FIBRILLATION (HCC): Primary | ICD-10-CM

## 2021-06-11 NOTE — PROGRESS NOTES
Anticoagulation Clinic Progress Note  Anticoagulation Summary  As of 2021    INR goal:  2.0-3.0   TTR:  48.8 % (2 y)   INR used for dosin.49 (2021)   Warfarin maintenance plan:  2 mg every Sun, Tue, Thu; 3 mg all other days   Weekly warfarin total:  18 mg   No change documented:  Robert Merritt, PharmD   Plan last modified:  Jalen Temple RP (2021)   Next INR check:  2021   Priority:  High   Target end date:      Indications    Paroxysmal atrial fibrillation (CMS/HCC) [I48.0]             Anticoagulation Episode Summary     INR check location:      Preferred lab:      Send INR reminders to:  [facility]  POOL    Comments:  Lab drawn at [facility] ([facility]) - [facility] [phone]       Anticoagulation Care Providers     Provider Role Specialty Phone number    Shiloh Gonzales APRN Referring Cardiology [phone]        Clinic Interview:  Patient Findings     Negatives:  Signs/symptoms of thrombosis, Signs/symptoms of bleeding,   Laboratory test error suspected, Change in health, Change in alcohol use,   Change in activity, Upcoming invasive procedure, Emergency department   visit, Upcoming dental procedure, Missed doses, Extra doses, Change in   medications, Change in diet/appetite, Hospital admission, Bruising, Other   complaints      Clinical Outcomes     Negatives:  Major bleeding event, Thromboembolic event,   Anticoagulation-related hospital admission, Anticoagulation-related ED   visit, Anticoagulation-related fatality      INR History:  Anticoagulation Monitoring 2021   INR 2.82 2.76 - 2.49   INR Date 2021 - 2021   INR Goal 2.0-3.0 2.0-3.0 2.0-3.0 2.0-3.0   Trend Same Same - Same   Last Week Total 18 mg 18 mg - 18 mg   Next Week Total 18 mg 18 mg - 18 mg   Sun 2 mg 2 mg - 2 mg   Mon 3 mg 3 mg - 3 mg   Tue 2 mg 2 mg - 2 mg   Wed - 3 mg - -   Thu - 2 mg - -   Fri 3 mg 3 mg - 3 mg   Sat 3 mg 3 mg - 3 mg   Visit Report -  - - -   Some recent data might be hidden     Plan:  1. INR is Therapeutic today- see above in Anticoagulation Summary.   Will instruct Claudia Arnold to Continue their warfarin regimen- see above in Anticoagulation Summary.  2. Follow up in 1 week  3. They have been instructed to call if any changes in medications, doses, concerns, etc. Patient expresses understanding and has no further questions at this time.    Robert Merritt, KoriD

## 2021-06-16 LAB — INR PPP: 2.7

## 2021-06-18 ENCOUNTER — ANTICOAGULATION VISIT (OUTPATIENT)
Dept: PHARMACY | Facility: HOSPITAL | Age: 76
End: 2021-06-18

## 2021-06-18 DIAGNOSIS — I48.0 PAROXYSMAL ATRIAL FIBRILLATION (HCC): Primary | ICD-10-CM

## 2021-06-18 NOTE — PROGRESS NOTES
Anticoagulation Clinic Progress Note    Anticoagulation Summary  As of 2021    INR goal:  2.0-3.0   TTR:  49.3 % (2 y)   INR used for dosin.70 (2021)   Warfarin maintenance plan:  2 mg every Sun, Tue, Thu; 3 mg all other days   Weekly warfarin total:  18 mg   No change documented:  Lane Lara, PharmD   Plan last modified:  Jalen Temple RPH (2021)   Next INR check:  2021   Priority:  High   Target end date:      Indications    Paroxysmal atrial fibrillation (CMS/HCC) [I48.0]             Anticoagulation Episode Summary     INR check location:      Preferred lab:      Send INR reminders to:  [facility]  POOL    Comments:  Lab drawn at [facility] ([facility]) - [facility] [phone]       Anticoagulation Care Providers     Provider Role Specialty Phone number    Shiloh Gonzales APRN Referring Cardiology [phone]          Clinic Interview:  Patient Findings     Negatives:  Signs/symptoms of thrombosis, Signs/symptoms of bleeding,   Laboratory test error suspected, Change in health, Change in alcohol use,   Change in activity, Upcoming invasive procedure, Emergency department   visit, Upcoming dental procedure, Missed doses, Extra doses, Change in   medications, Change in diet/appetite, Hospital admission, Bruising, Other   complaints      Clinical Outcomes     Negatives:  Major bleeding event, Thromboembolic event,   Anticoagulation-related hospital admission, Anticoagulation-related ED   visit, Anticoagulation-related fatality        INR History:  Anticoagulation Monitoring 2021   INR 2.76 - 2.49 2.70   INR Date 2021 - 2021   INR Goal 2.0-3.0 2.0-3.0 2.0-3.0 2.0-3.0   Trend Same - Same Same   Last Week Total 18 mg - 18 mg 18 mg   Next Week Total 18 mg - 18 mg 18 mg   Sun 2 mg - 2 mg 2 mg   Mon 3 mg - 3 mg 3 mg   Tue 2 mg - 2 mg 2 mg   Wed 3 mg - - -   Thu 2 mg - - -   Fri 3 mg - 3 mg 3 mg   Sat 3 mg - 3 mg 3 mg   Visit  Report - - - -   Some recent data might be hidden       Plan:  1. INR is Therapeutic today- see above in Anticoagulation Summary.   Will instruct Claudia Arnold to Continue their warfarin regimen- see above in Anticoagulation Summary.  2. Follow up in 1 week  3. They have been instructed to call if any changes in medications, doses, concerns, etc. Patient expresses understanding and has no further questions at this time.    Lane Lara, KoriD

## 2021-06-23 ENCOUNTER — TELEPHONE (OUTPATIENT)
Dept: CARDIOLOGY | Facility: HOSPITAL | Age: 76
End: 2021-06-23

## 2021-06-23 NOTE — TELEPHONE ENCOUNTER
I strongly suspect this was from the dialysis itself and not actually from her heart.  I would just continue to monitor this for now.  Thanks     TIANNA

## 2021-06-23 NOTE — TELEPHONE ENCOUNTER
"Dr. Segura,     Ms. Arnold called because at dialysis her HR went from around 70 up to 128 about an hour into the treatment. They stopped dialysis and when she left there just recently her HR was at 100.  She is completely asymptomatic and they felt this may be due to the dialysis rates and pressure.  Her BP was \"normal\" at the time but she didn't know the reading. She doesn't monitor vitals at home.    I advised her to call here or go to the ED if she had palpitations and racing HR longer than an hour, if she had dizziness, diaphoresis, CP or SOA. She verbalized understanding.    Any recommendations for  Her?    Thank you,  Jerri Toscano RN  [facility]  Triage    "

## 2021-07-01 LAB — INR PPP: 2.53

## 2021-07-05 ENCOUNTER — ANTICOAGULATION VISIT (OUTPATIENT)
Dept: PHARMACY | Facility: HOSPITAL | Age: 76
End: 2021-07-05

## 2021-07-05 DIAGNOSIS — I48.0 PAROXYSMAL ATRIAL FIBRILLATION (HCC): Primary | ICD-10-CM

## 2021-07-06 NOTE — PROGRESS NOTES
Anticoagulation Clinic Progress Note    Anticoagulation Summary  As of 2021    INR goal:  2.0-3.0   TTR:  50.3 % (2 y)   INR used for dosin.53 (2021)   Warfarin maintenance plan:  2 mg every Sun, Tue, Thu; 3 mg all other days   Weekly warfarin total:  18 mg   No change documented:  Jalen Temple RPH   Plan last modified:  Jalen Temple RPH (2021)   Next INR check:  2021   Priority:  High   Target end date:      Indications    Paroxysmal atrial fibrillation (CMS/HCC) [I48.0]             Anticoagulation Episode Summary     INR check location:      Preferred lab:      Send INR reminders to:  [facility]  POOL    Comments:  Lab drawn at dialysis ([email or web address]) - [facility] [phone]       Anticoagulation Care Providers     Provider Role Specialty Phone number    Shiloh Gonzales APRN Referring Cardiology [phone]          Clinic Interview:  Patient Findings     Negatives:  Signs/symptoms of thrombosis, Signs/symptoms of bleeding,   Laboratory test error suspected, Change in health, Change in alcohol use,   Change in activity, Upcoming invasive procedure, Emergency department   visit, Upcoming dental procedure, Missed doses, Extra doses, Change in   medications, Change in diet/appetite, Hospital admission, Bruising, Other   complaints      Clinical Outcomes     Negatives:  Major bleeding event, Thromboembolic event,   Anticoagulation-related hospital admission, Anticoagulation-related ED   visit, Anticoagulation-related fatality        INR History:  Anticoagulation Monitoring 2021   INR 2.49 2.70 2.53   INR Date 2021   INR Goal 2.0-3.0 2.0-3.0 2.0-3.0   Trend Same Same Same   Last Week Total 18 mg 18 mg 18 mg   Next Week Total 18 mg 18 mg 18 mg   Sun 2 mg 2 mg -   Mon 3 mg 3 mg 3 mg   Tue 2 mg 2 mg 2 mg   Wed - - -   Thu - - -   Fri 3 mg 3 mg -   Sat 3 mg 3 mg -   Visit Report - - -   Some recent data might be hidden       Plan:  1.  INR was Therapeutic 6/30/21 - see above in Anticoagulation Summary.   Will instruct Claudia Arnold to Continue their warfarin regimen- see above in Anticoagulation Summary.  2. Follow up 1 week from last INR  3. They have been instructed to call if any changes in medications, doses, concerns, etc. Patient expresses understanding and has no further questions at this time.    Jalen Temple [facility]

## 2021-07-07 LAB — INR PPP: 2.32

## 2021-07-09 ENCOUNTER — ANTICOAGULATION VISIT (OUTPATIENT)
Dept: PHARMACY | Facility: HOSPITAL | Age: 76
End: 2021-07-09

## 2021-07-09 DIAGNOSIS — I48.0 PAROXYSMAL ATRIAL FIBRILLATION (HCC): Primary | ICD-10-CM

## 2021-07-09 NOTE — PROGRESS NOTES
Anticoagulation Clinic Progress Note    Anticoagulation Summary  As of 2021    INR goal:  2.0-3.0   TTR:  50.7 % (2 y)   INR used for dosin.32 (2021)   Warfarin maintenance plan:  2 mg every Sun, Tue, Thu; 3 mg all other days   Weekly warfarin total:  18 mg   No change documented:  Nolvia Streeter RP   Plan last modified:  Jalen Temple RPH (2021)   Next INR check:  2021   Priority:  High   Target end date:      Indications    Paroxysmal atrial fibrillation (CMS/HCC) [I48.0]             Anticoagulation Episode Summary     INR check location:      Preferred lab:      Send INR reminders to:  [facility]  POOL    Comments:  Lab drawn at dialysis ([facility]) - [facility] [phone]       Anticoagulation Care Providers     Provider Role Specialty Phone number    Shiloh Gonzales APRN Referring Cardiology [phone]          Clinic Interview:      INR History:  Anticoagulation Monitoring 2021   INR 2.70 2.53 2.32   INR Date 2021   INR Goal 2.0-3.0 2.0-3.0 2.0-3.0   Trend Same Same Same   Last Week Total 18 mg 18 mg 18 mg   Next Week Total 18 mg 18 mg 18 mg   Sun 2 mg - 2 mg   Mon 3 mg 3 mg 3 mg   Tue 2 mg 2 mg 2 mg   Wed - - -   Thu - - -   Fri 3 mg - 3 mg   Sat 3 mg - 3 mg   Visit Report - - -   Some recent data might be hidden       Plan:  1. INR is Therapeutic today- see above in Anticoagulation Summary.   Will instruct Claudia Arnold to Continue their warfarin regimen- see above in Anticoagulation Summary.  2. Follow up in 1 week  3. Pt has agreed to only be called if INR out of range. They have been instructed to call if any changes in medications, doses, concerns, etc. Patient expresses understanding and has no further questions at this time.    Nolvia Streeter RPH

## 2021-07-14 LAB — INR PPP: 2.52

## 2021-07-16 ENCOUNTER — ANTICOAGULATION VISIT (OUTPATIENT)
Dept: PHARMACY | Facility: HOSPITAL | Age: 76
End: 2021-07-16

## 2021-07-16 DIAGNOSIS — I48.0 PAROXYSMAL ATRIAL FIBRILLATION (HCC): Primary | ICD-10-CM

## 2021-07-27 ENCOUNTER — TELEPHONE (OUTPATIENT)
Dept: GASTROENTEROLOGY | Facility: CLINIC | Age: 76
End: 2021-07-27

## 2021-07-27 ENCOUNTER — OFFICE VISIT (OUTPATIENT)
Dept: ORTHOPEDIC SURGERY | Facility: CLINIC | Age: 76
End: 2021-07-27

## 2021-07-27 VITALS — TEMPERATURE: 97.5 F | BODY MASS INDEX: 35.31 KG/M2 | WEIGHT: 225 LBS | HEIGHT: 67 IN

## 2021-07-27 DIAGNOSIS — M19.019 PRIMARY LOCALIZED OSTEOARTHROSIS OF SHOULDER REGION, UNSPECIFIED LATERALITY: Primary | ICD-10-CM

## 2021-07-27 PROCEDURE — 20610 DRAIN/INJ JOINT/BURSA W/O US: CPT | Performed by: ORTHOPAEDIC SURGERY

## 2021-07-27 RX ORDER — WARFARIN SODIUM 2 MG/1
TABLET ORAL
Qty: 120 TABLET | Refills: 0 | Status: SHIPPED | OUTPATIENT
Start: 2021-07-27 | End: 2021-01-01

## 2021-07-27 RX ADMIN — LIDOCAINE HYDROCHLORIDE 4 ML: 20 INJECTION, SOLUTION EPIDURAL; INFILTRATION; INTRACAUDAL; PERINEURAL at 13:08

## 2021-07-27 RX ADMIN — METHYLPREDNISOLONE ACETATE 80 MG: 80 INJECTION, SUSPENSION INTRA-ARTICULAR; INTRALESIONAL; INTRAMUSCULAR; SOFT TISSUE at 13:08

## 2021-07-27 NOTE — TELEPHONE ENCOUNTER
----- Message from Sonali Matthews sent at 7/26/2021  3:11 PM EDT -----  Regarding: CALLBACK  Contact: [phone]  PT called in asking can she speak with someone regarding rectal bleeding from her hemorrhoids. PT asking for medication. Can someone reach out to PT? Thank you!

## 2021-07-27 NOTE — PROGRESS NOTES
Bilateral shoulder pain    Patient presents complaining of bilateral shoulder pain she has known degenerative changes she is not a great elective surgery candidate she is on chronic dialysis she still gets relief from the injections and understands her options    Her exam is unchanged from last visit    Previous x-rays show glenohumeral arthritis    Assessment plan glenohumeral arthritis bilaterally.  She not a great operative candidate but understands arthroplasty is a surgery but not on think any of us would recommend that at this time.  We will continue to do injections as long as she is getting relief.  She understands she will see Bimal at next visit  Large Joint Arthrocentesis: L glenohumeral  Date/Time: 7/27/2021 1:08 PM  Consent given by: patient  Site marked: site marked  Timeout: Immediately prior to procedure a time out was called to verify the correct patient, procedure, equipment, support staff and site/side marked as required   Supporting Documentation  Indications: pain   Procedure Details  Location: shoulder - L glenohumeral  Preparation: Patient was prepped and draped in the usual sterile fashion  Needle size: 22 G  Approach: posterior  Medications administered: 80 mg methylPREDNISolone acetate 80 MG/ML; 4 mL lidocaine PF 2% 2 %  Patient tolerance: patient tolerated the procedure well with no immediate complications    Large Joint Arthrocentesis: R glenohumeral  Date/Time: 7/27/2021 1:08 PM  Consent given by: patient  Site marked: site marked  Timeout: Immediately prior to procedure a time out was called to verify the correct patient, procedure, equipment, support staff and site/side marked as required   Supporting Documentation  Indications: pain   Procedure Details  Location: shoulder - R glenohumeral  Preparation: Patient was prepped and draped in the usual sterile fashion  Needle size: 22 G  Approach: posterior  Medications administered: 80 mg methylPREDNISolone acetate 80 MG/ML; 4 mL lidocaine  PF 2% 2 %  Patient tolerance: patient tolerated the procedure well with no immediate complications

## 2021-07-27 NOTE — TELEPHONE ENCOUNTER
Returned patient's phone call. She states she has had bleeding from her hemorrhoids. States at first she had a lot of bright red blood she was passing. She states she has started using Prep H suppositories and the bleeding is less. She states when she does bleed its usually in the mornings only.   Advised an update will be sent to FELICITAS Patterson. She verb understanding.

## 2021-07-28 RX ORDER — LIDOCAINE HYDROCHLORIDE 20 MG/ML
4 INJECTION, SOLUTION EPIDURAL; INFILTRATION; INTRACAUDAL; PERINEURAL
Status: COMPLETED | OUTPATIENT
Start: 2021-07-27 | End: 2021-07-27

## 2021-07-28 RX ORDER — METHYLPREDNISOLONE ACETATE 80 MG/ML
80 INJECTION, SUSPENSION INTRA-ARTICULAR; INTRALESIONAL; INTRAMUSCULAR; SOFT TISSUE
Status: COMPLETED | OUTPATIENT
Start: 2021-07-27 | End: 2021-07-27

## 2021-07-28 RX ORDER — HYDROCORTISONE ACETATE 25 MG/1
25 SUPPOSITORY RECTAL NIGHTLY
Qty: 14 SUPPOSITORY | Refills: 0 | Status: SHIPPED | OUTPATIENT
Start: 2021-07-28 | End: 2021-08-13 | Stop reason: HOSPADM

## 2021-07-28 NOTE — TELEPHONE ENCOUNTER
Looks like you were considering a capsule if she had any issues with bleeding.  CBC 1 month ago stable with a hemoglobin of 11.8.  She is calling in with complaints of rectal bleeding.  Internal hemorrhoids seen on colonoscopy in January.  Do you want updated hemoglobin?  Trial of Anusol suppositories?

## 2021-07-28 NOTE — TELEPHONE ENCOUNTER
Returned patient call. No answer. Left message advising patient has a prescription a [facility] pharmacy. Advised to call back with questions.

## 2021-07-28 NOTE — TELEPHONE ENCOUNTER
Please inform the patient that we would like to offer her Anusol suppositories to relieve rectal bleeding.  Prescription has been sent.

## 2021-08-08 ENCOUNTER — HOSPITAL ENCOUNTER (INPATIENT)
Facility: HOSPITAL | Age: 76
LOS: 3 days | Discharge: HOME | End: 2021-08-13
Attending: EMERGENCY MEDICINE | Admitting: INTERNAL MEDICINE

## 2021-08-08 DIAGNOSIS — Z98.890 STATUS POST HEMORRHOIDECTOMY: ICD-10-CM

## 2021-08-08 DIAGNOSIS — Z79.01 ANTICOAGULATED: ICD-10-CM

## 2021-08-08 DIAGNOSIS — K92.1 HEMATOCHEZIA: Primary | ICD-10-CM

## 2021-08-08 DIAGNOSIS — K64.9 HEMORRHOIDS, UNSPECIFIED HEMORRHOID TYPE: ICD-10-CM

## 2021-08-08 DIAGNOSIS — Z87.19 STATUS POST HEMORRHOIDECTOMY: ICD-10-CM

## 2021-08-08 LAB
ABO GROUP BLD: NORMAL
ALBUMIN SERPL-MCNC: 4.4 G/DL (ref 3.5–5.2)
ALBUMIN/GLOB SERPL: 2.1 G/DL
ALP SERPL-CCNC: 84 U/L (ref 39–117)
ALT SERPL W P-5'-P-CCNC: 14 U/L (ref 1–33)
ANION GAP SERPL CALCULATED.3IONS-SCNC: 14.2 MMOL/L (ref 5–15)
AST SERPL-CCNC: 15 U/L (ref 1–32)
BASOPHILS # BLD AUTO: 0.04 10*3/MM3 (ref 0–0.2)
BASOPHILS NFR BLD AUTO: 0.5 % (ref 0–1.5)
BILIRUB SERPL-MCNC: 0.4 MG/DL (ref 0–1.2)
BLD GP AB SCN SERPL QL: NEGATIVE
BUN SERPL-MCNC: 49 MG/DL (ref 8–23)
BUN/CREAT SERPL: 16.3 (ref 7–25)
CALCIUM SPEC-SCNC: 9.7 MG/DL (ref 8.6–10.5)
CHLORIDE SERPL-SCNC: 102 MMOL/L (ref 98–107)
CO2 SERPL-SCNC: 26.8 MMOL/L (ref 22–29)
CREAT SERPL-MCNC: 3.01 MG/DL (ref 0.57–1)
DEPRECATED RDW RBC AUTO: 44.5 FL (ref 37–54)
EOSINOPHIL # BLD AUTO: 0.14 10*3/MM3 (ref 0–0.4)
EOSINOPHIL NFR BLD AUTO: 1.7 % (ref 0.3–6.2)
ERYTHROCYTE [DISTWIDTH] IN BLOOD BY AUTOMATED COUNT: 12.5 % (ref 12.3–15.4)
EXPIRATION DATE: ABNORMAL
FECAL OCCULT BLOOD SCREEN, POC: POSITIVE
GFR SERPL CREATININE-BSD FRML MDRD: 15 ML/MIN/1.73
GLOBULIN UR ELPH-MCNC: 2.1 GM/DL
GLUCOSE SERPL-MCNC: 131 MG/DL (ref 65–99)
HCT VFR BLD AUTO: 37.1 % (ref 34–46.6)
HGB BLD-MCNC: 12 G/DL (ref 12–15.9)
IMM GRANULOCYTES # BLD AUTO: 0.04 10*3/MM3 (ref 0–0.05)
IMM GRANULOCYTES NFR BLD AUTO: 0.5 % (ref 0–0.5)
INR PPP: 2.22 (ref 0.9–1.1)
INR PPP: 2.24 (ref 0.9–1.1)
LYMPHOCYTES # BLD AUTO: 1 10*3/MM3 (ref 0.7–3.1)
LYMPHOCYTES NFR BLD AUTO: 12.2 % (ref 19.6–45.3)
Lab: 174
MCH RBC QN AUTO: 31.7 PG (ref 26.6–33)
MCHC RBC AUTO-ENTMCNC: 32.3 G/DL (ref 31.5–35.7)
MCV RBC AUTO: 98.1 FL (ref 79–97)
MONOCYTES # BLD AUTO: 0.88 10*3/MM3 (ref 0.1–0.9)
MONOCYTES NFR BLD AUTO: 10.7 % (ref 5–12)
NEGATIVE CONTROL: NEGATIVE
NEUTROPHILS NFR BLD AUTO: 6.13 10*3/MM3 (ref 1.7–7)
NEUTROPHILS NFR BLD AUTO: 74.4 % (ref 42.7–76)
NRBC BLD AUTO-RTO: 0 /100 WBC (ref 0–0.2)
PLATELET # BLD AUTO: 213 10*3/MM3 (ref 140–450)
PMV BLD AUTO: 9.7 FL (ref 6–12)
POSITIVE CONTROL: POSITIVE
POTASSIUM SERPL-SCNC: 3.9 MMOL/L (ref 3.5–5.2)
PROT SERPL-MCNC: 6.5 G/DL (ref 6–8.5)
PROTHROMBIN TIME: 24.4 SECONDS (ref 11.7–14.2)
PROTHROMBIN TIME: 24.5 SECONDS (ref 11.7–14.2)
RBC # BLD AUTO: 3.78 10*6/MM3 (ref 3.77–5.28)
RH BLD: POSITIVE
SARS-COV-2 ORF1AB RESP QL NAA+PROBE: NOT DETECTED
SODIUM SERPL-SCNC: 143 MMOL/L (ref 136–145)
T&S EXPIRATION DATE: NORMAL
WBC # BLD AUTO: 8.23 10*3/MM3 (ref 3.4–10.8)

## 2021-08-08 PROCEDURE — 99214 OFFICE O/P EST MOD 30 MIN: CPT | Performed by: INTERNAL MEDICINE

## 2021-08-08 PROCEDURE — G0378 HOSPITAL OBSERVATION PER HR: HCPCS

## 2021-08-08 PROCEDURE — 85610 PROTHROMBIN TIME: CPT | Performed by: PHYSICIAN ASSISTANT

## 2021-08-08 PROCEDURE — 86901 BLOOD TYPING SEROLOGIC RH(D): CPT | Performed by: PHYSICIAN ASSISTANT

## 2021-08-08 PROCEDURE — 82270 OCCULT BLOOD FECES: CPT | Performed by: PHYSICIAN ASSISTANT

## 2021-08-08 PROCEDURE — 99284 EMERGENCY DEPT VISIT MOD MDM: CPT

## 2021-08-08 PROCEDURE — 85610 PROTHROMBIN TIME: CPT | Performed by: INTERNAL MEDICINE

## 2021-08-08 PROCEDURE — U0005 INFEC AGEN DETEC AMPLI PROBE: HCPCS | Performed by: PHYSICIAN ASSISTANT

## 2021-08-08 PROCEDURE — 80053 COMPREHEN METABOLIC PANEL: CPT | Performed by: PHYSICIAN ASSISTANT

## 2021-08-08 PROCEDURE — U0004 COV-19 TEST NON-CDC HGH THRU: HCPCS | Performed by: PHYSICIAN ASSISTANT

## 2021-08-08 PROCEDURE — 86900 BLOOD TYPING SEROLOGIC ABO: CPT | Performed by: PHYSICIAN ASSISTANT

## 2021-08-08 PROCEDURE — 86850 RBC ANTIBODY SCREEN: CPT | Performed by: PHYSICIAN ASSISTANT

## 2021-08-08 PROCEDURE — 85025 COMPLETE CBC W/AUTO DIFF WBC: CPT | Performed by: PHYSICIAN ASSISTANT

## 2021-08-08 RX ORDER — HYDROCORTISONE ACETATE 25 MG/1
25 SUPPOSITORY RECTAL NIGHTLY
Status: DISCONTINUED | OUTPATIENT
Start: 2021-08-08 | End: 2021-08-08

## 2021-08-08 RX ORDER — ACETAMINOPHEN 160 MG/5ML
650 SOLUTION ORAL EVERY 4 HOURS PRN
Status: DISCONTINUED | OUTPATIENT
Start: 2021-08-08 | End: 2021-08-10

## 2021-08-08 RX ORDER — ACETAMINOPHEN 650 MG/1
650 SUPPOSITORY RECTAL EVERY 4 HOURS PRN
Status: DISCONTINUED | OUTPATIENT
Start: 2021-08-08 | End: 2021-08-10

## 2021-08-08 RX ORDER — BISACODYL 10 MG
10 SUPPOSITORY, RECTAL RECTAL DAILY PRN
Status: DISCONTINUED | OUTPATIENT
Start: 2021-08-08 | End: 2021-08-08

## 2021-08-08 RX ORDER — AMOXICILLIN 250 MG
2 CAPSULE ORAL 2 TIMES DAILY
Status: DISCONTINUED | OUTPATIENT
Start: 2021-08-08 | End: 2021-08-08

## 2021-08-08 RX ORDER — SODIUM CHLORIDE 0.9 % (FLUSH) 0.9 %
10 SYRINGE (ML) INJECTION AS NEEDED
Status: DISCONTINUED | OUTPATIENT
Start: 2021-08-08 | End: 2021-08-13 | Stop reason: HOSPADM

## 2021-08-08 RX ORDER — HYDROCORTISONE ACETATE 25 MG/1
25 SUPPOSITORY RECTAL 2 TIMES DAILY
Status: DISCONTINUED | OUTPATIENT
Start: 2021-08-08 | End: 2021-08-12

## 2021-08-08 RX ORDER — ACETAMINOPHEN 325 MG/1
650 TABLET ORAL EVERY 4 HOURS PRN
Status: DISCONTINUED | OUTPATIENT
Start: 2021-08-08 | End: 2021-08-10

## 2021-08-08 RX ORDER — SODIUM CHLORIDE 0.9 % (FLUSH) 0.9 %
10 SYRINGE (ML) INJECTION EVERY 12 HOURS SCHEDULED
Status: DISCONTINUED | OUTPATIENT
Start: 2021-08-08 | End: 2021-08-13 | Stop reason: HOSPADM

## 2021-08-08 RX ORDER — NITROGLYCERIN 0.4 MG/1
0.4 TABLET SUBLINGUAL
Status: DISCONTINUED | OUTPATIENT
Start: 2021-08-08 | End: 2021-08-13 | Stop reason: HOSPADM

## 2021-08-08 RX ORDER — BUSPIRONE HYDROCHLORIDE 10 MG/1
10 TABLET ORAL EVERY 12 HOURS SCHEDULED
Status: DISCONTINUED | OUTPATIENT
Start: 2021-08-08 | End: 2021-08-13 | Stop reason: HOSPADM

## 2021-08-08 RX ORDER — CALCIUM CARBONATE 200(500)MG
2 TABLET,CHEWABLE ORAL 3 TIMES DAILY PRN
Status: DISCONTINUED | OUTPATIENT
Start: 2021-08-08 | End: 2021-08-13 | Stop reason: HOSPADM

## 2021-08-08 RX ORDER — POLYETHYLENE GLYCOL 3350 17 G/17G
17 POWDER, FOR SOLUTION ORAL DAILY
Status: DISCONTINUED | OUTPATIENT
Start: 2021-08-08 | End: 2021-08-10

## 2021-08-08 RX ORDER — PANTOPRAZOLE SODIUM 40 MG/10ML
40 INJECTION, POWDER, LYOPHILIZED, FOR SOLUTION INTRAVENOUS EVERY 12 HOURS SCHEDULED
Status: DISCONTINUED | OUTPATIENT
Start: 2021-08-08 | End: 2021-08-09

## 2021-08-08 RX ORDER — MIDODRINE HYDROCHLORIDE 2.5 MG/1
2.5 TABLET ORAL
Status: DISCONTINUED | OUTPATIENT
Start: 2021-08-08 | End: 2021-08-13 | Stop reason: HOSPADM

## 2021-08-08 RX ORDER — BUMETANIDE 2 MG/1
2 TABLET ORAL DAILY
Status: DISCONTINUED | OUTPATIENT
Start: 2021-08-08 | End: 2021-08-10

## 2021-08-08 RX ORDER — ACETAMINOPHEN 325 MG/1
650 TABLET ORAL 3 TIMES DAILY
Status: DISCONTINUED | OUTPATIENT
Start: 2021-08-08 | End: 2021-08-13 | Stop reason: HOSPADM

## 2021-08-08 RX ORDER — BISACODYL 5 MG/1
5 TABLET, DELAYED RELEASE ORAL DAILY PRN
Status: DISCONTINUED | OUTPATIENT
Start: 2021-08-08 | End: 2021-08-08

## 2021-08-08 RX ORDER — POLYETHYLENE GLYCOL 3350 17 G/17G
17 POWDER, FOR SOLUTION ORAL DAILY PRN
Status: DISCONTINUED | OUTPATIENT
Start: 2021-08-08 | End: 2021-08-08

## 2021-08-08 RX ORDER — ONDANSETRON 2 MG/ML
4 INJECTION INTRAMUSCULAR; INTRAVENOUS EVERY 6 HOURS PRN
Status: DISCONTINUED | OUTPATIENT
Start: 2021-08-08 | End: 2021-08-13 | Stop reason: HOSPADM

## 2021-08-08 RX ORDER — ESCITALOPRAM OXALATE 10 MG/1
10 TABLET ORAL DAILY
Status: DISCONTINUED | OUTPATIENT
Start: 2021-08-08 | End: 2021-08-13 | Stop reason: HOSPADM

## 2021-08-08 RX ADMIN — PANTOPRAZOLE SODIUM 40 MG: 40 INJECTION, POWDER, FOR SOLUTION INTRAVENOUS at 21:06

## 2021-08-08 RX ADMIN — BUSPIRONE HYDROCHLORIDE 10 MG: 10 TABLET ORAL at 21:06

## 2021-08-08 RX ADMIN — ACETAMINOPHEN 650 MG: 325 TABLET, FILM COATED ORAL at 16:12

## 2021-08-08 RX ADMIN — ACETAMINOPHEN 650 MG: 325 TABLET, FILM COATED ORAL at 21:06

## 2021-08-08 RX ADMIN — BUMETANIDE 2 MG: 2 TABLET ORAL at 16:13

## 2021-08-08 RX ADMIN — ESCITALOPRAM 10 MG: 10 TABLET, FILM COATED ORAL at 16:12

## 2021-08-08 RX ADMIN — HYDROCORTISONE ACETATE 25 MG: 25 SUPPOSITORY RECTAL at 21:05

## 2021-08-08 RX ADMIN — POLYETHYLENE GLYCOL 3350 17 G: 17 POWDER, FOR SOLUTION ORAL at 19:53

## 2021-08-08 RX ADMIN — MIDODRINE HYDROCHLORIDE 2.5 MG: 2.5 TABLET ORAL at 16:54

## 2021-08-08 NOTE — H&P
[facility]  HISTORY AND PHYSICAL      Name:  Claudia Arnold   Age:  76 y.o.  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 fib on chronic warfarin, essential hypertension, hyperlipidemia, chronic diastolic CHF who had been admitted in 2021 with GI bleeding.  At that time she had EGD and colonoscopy which showed duodenitis, gastritis, diverticulosis, thrombosed external hemorrhoids, internal hemorrhoids.  Gastroenterology felt that the source of bleeding was hemorrhoidal.  She was placed on Anusol suppositories.  Capsule endoscopy was be considered as an outpatient.    Patient returns with 2 weeks of off-and-on bleeding.  She called gastroenterology office, received suppository which she has taken for the past 9 days.  She claims that she has significant bleeding in the form of bright red blood per rectum.  She feels that this is severe, and nothing is making it better.  She continues on warfarin.  Her INR is therapeutic at 2.22.  She came into the emergency room because of the bleeding.  Her hemoglobin is stable at 12.0.  She last received hemodialysis on Friday.  She has no other symptoms.  She denies chest pressure, shortness of breath, lightheadedness, nausea, vomiting, pain.  She is admitted for evaluation.    Review Of Systems:     General ROS: negative  Psychological ROS: negative  Ophthalmic ROS: negative  ENT ROS: negative  Allergy and Immunology ROS: negative  Hematological and Lymphatic ROS: negative  Endocrine ROS: negative  Breast ROS:  negative  Respiratory ROS: negative  Cardiovascular ROS: negative  Gastrointestinal ROS: positive for - blood in stools  Genito-Urinary ROS: negative  Musculoskeletal ROS: negative  Neurological ROS: negative  Dermatological ROS: negative       Past Medical History:    Past Medical History:   Diagnosis Date   • A-fib (CMS/HCC)     Meds   • Arthritis     w/Difficult Mobility   • Bilateral lower extremity edema     Legs   • CAD (coronary artery disease)     Affecting LAD    • Cardiomyopathy (CMS/[facility])    • CHF (congestive heart failure) (CMS/HCC)    • Chronic fatigue    • Chronic kidney disease    • Dialysis patient ([facility] [place])     TUESDAY THURSDAY SATURDAY   • Generalized anxiety disorder    • GERD (gastroesophageal reflux disease)    • Hernia, inguinal     Hx Repair   • History of echocardiogram 1/17/19-[facility]    The L Ventricular Cavity is Mild-to-Moderately Dilated; Left Ventricular Wall Thickness is Consistent w/Mild Concentric Hypertrophy; Left Atrial Cavity Size is Moderate-to-Severely Dilated; Severe AVS; Severe MVS; Moderate TVR Noted   • History of transfusion    • Hyperlipidemia     Controlled w/Meds   • Hypertension     Controlled w/Meds   • Hypokinesis 01/22/2019    Apical Noted on Cardiac Cath   • IFG (impaired fasting glucose)    • LAD stenosis 01/22/2019    Mid LAD Irregularities Noted on Cardiac Cath    • Left atrial dilatation 01/17/2019    Moderate-Severe Noted on Echo   • Left ventricular dilatation 01/17/2019    Noted on Echo/LEE   • Mild concentric left ventricular hypertrophy 01/17/2019    Noted on Echo/LEE   • Mitral annular calcification 01/17/2019    Severe Noted on Echo   • Moderate tricuspid valve regurgitation 01/24/2019    Noted on LEE   • Morbid obesity (CMS/HCC)    • NSTEMI (non-ST elevated myocardial infarction) (CMS/HCC)    • OCTAVIANO (obstructive sleep apnea)    • Osteoarthritis    • Pulmonary hypertension (CMS/HCC) 01/22/2019    Noted on Cardiac Cath   • Right bundle branch block 01/24/2019     Noted on LEE   • Severe aortic stenosis 01/22/2019    Noted on Cardiac Cath & LEE on 01/24/19; S/p AVR on 01/28/19 by Dr. Gaxiola   • Severe mitral valve stenosis 01/24/2019    Noted on LEE; S/p MVR on 01/28/19 by Dr. Gaxiola   • Shoulder pain, bilateral    • Skin yeast infection     Folds Under Skin--Nystatin/Diflucan        Past Surgical history:    Past Surgical History:   Procedure Laterality Date   • AORTIC VALVE REPAIR/REPLACEMENT MITRAL VALVE REPAIR/REPLACEMENT N/A 1/28/2019    Procedure: LEE STERNOTOMY AORTIC VALVE REPLACEMENT, MITRAL VALVE REPLACEMENT, TRICUSPID VALVE REPAIR, MAZE PROCEDURE, CLOSURE OF LEFT ATRIAL APPENDAGE  AND PRP;  Surgeon: Scar Gaxiola MD;  Location: [facility] MAIN OR;  Service: Cardiothoracic   • ARTERIOVENOUS FISTULA/SHUNT SURGERY Left 6/26/2019    Procedure: LEFT ARM BRACHIAL CEPHALIC FISTULA;  Surgeon: Satish Stahl MD;  Location: [facility] MAIN OR;  Service: Vascular   • CARDIAC CATHETERIZATION N/A 1/22/2019    Procedure: Coronary angiography;  Surgeon: Rick Talavera MD;  Location: [facility] CATH INVASIVE LOCATION;  Service: Cardiology   • CARDIAC CATHETERIZATION N/A 1/22/2019    Procedure: Left Heart Cath;  Surgeon: Rick Talavera MD;  Location: [facility] CATH INVASIVE LOCATION;  Service: Cardiology   • CARDIAC CATHETERIZATION N/A 1/22/2019    Procedure: Right Heart Cath;  Surgeon: Rick Talavera MD;  Location: [facility] CATH INVASIVE LOCATION;  Service: Cardiology   • CARDIAC CATHETERIZATION N/A 1/22/2019    Procedure: Left ventriculography;  Surgeon: Rick Talavera MD;  Location: [facility] CATH INVASIVE LOCATION;  Service: Cardiology   • CARDIAC SURGERY     • COLONOSCOPY     • COLONOSCOPY N/A 1/16/2021    Procedure: COLONOSCOPY;  Surgeon: Wallace Hernandez MD;  Location: [facility] ENDOSCOPY;  Service: Gastroenterology;  Laterality: N/A;  pre- anemia, rectal bleeding  post-- hemorrhoids, diverticulosis   • ENDOSCOPY N/A 1/16/2021    Procedure:  ESOPHAGOGASTRODUODENOSCOPY with biopsies;  Surgeon: Wallace Hernandez MD;  Location: [facility] ENDOSCOPY;  Service: Gastroenterology;  Laterality: N/A;  pre-- anemia  post-- esophagitis, gastritis, duodenitis   • HERNIA REPAIR     • INSERTION HEMODIALYSIS CATHETER N/A 2/8/2019    Procedure: tunnel CATHETER PLACEMENT WITH FLUROSCOPY;  Surgeon: Satish Stahl MD;  Location: [facility] MAIN OR;  Service: Vascular   • REPLACEMENT TOTAL KNEE Bilateral 2007/2009          Social History:    Social History     Socioeconomic History   • Marital status:      Spouse name: Not on file   • Number of children: 2   • Years of education: 12   • Highest education level: High school graduate   Tobacco Use   • Smoking status: Never Smoker   • Smokeless tobacco: Never Used   Substance and Sexual Activity   • Alcohol use: Yes     Alcohol/week: 1.0 standard drinks     Types: 1 Cans of beer per week     Comment: 1 monthly   • Drug use: No   • Sexual activity: Defer     Birth control/protection: Post-menopausal        Family History:    Family History   Problem Relation Age of Onset   • Hypertension Other    • Diabetes Other    • Heart disease Neg Hx    • Stroke Neg Hx    • Arthritis Neg Hx    • Breast cancer Neg Hx    • Malig Hyperthermia Neg Hx         Allergies:      Patient has no known allergies.    Home Medications:    Prior to Admission Medications     Prescriptions Last Dose Informant Patient Reported? Taking?    acetaminophen (TYLENOL) 325 MG tablet  Self Yes No    Take 650 mg by mouth 3 (Three) Times a Day. Takes tid at 2 am, 1 pm, and hS    bumetanide (BUMEX) 2 MG tablet   No No    Take 1 tablet by mouth Daily.    busPIRone (BUSPAR) 10 MG tablet   No No    Take 1 tablet by mouth 2 (two) times a day.    cephalexin (KEFLEX) 250 MG capsule   Yes No    Take 250 mg by mouth 2 (Two) Times a Day.    [facility] MELATONIN PO   Yes No    Take  by mouth.    escitalopram (LEXAPRO) 10 MG tablet   No No    Take 1 tablet by mouth Daily.     Heparin Sodium, Porcine, (heparin, porcine,) 1000 units/mL injection   Yes No    Heparin Sodium (Porcine) 1,000 Units/mL Systemic    hydrocortisone (ANUSOL-HC) 25 MG suppository   No No    Insert 1 suppository into the rectum Every Night for 14 days.    hydrocortisone 2.5 % cream   No No    Apply  topically to the appropriate area as directed See Admin Instructions. Apply topically to the affected area twice daily as directed    Iron Sucrose (VENOFER IV)   Yes No    50 mg 1 (One) Time Per Week.    Methoxy PEG-Epoetin Beta (MIRCERA IJ)   Yes No    60 mcg Every 14 (Fourteen) Days.    midodrine (PROAMATINE) 2.5 MG tablet   No No    Take 1 tablet by mouth 3 (Three) Times a Day Before Meals.    ProRenal + D tablet tablet   Yes No    TAKE 1 TABLET BY MOUTH EVERY DAY (ON DIALYSIS DAYS, TAKE AFTER DIALYSIS TREATMENT)    rOPINIRole (REQUIP) 0.25 MG tablet   Yes No    VITAMIN D PO   Yes No    Take 0.75 mcg by mouth.    warfarin (COUMADIN) 2 MG tablet   No No    TAKE 1 TABLET BY MOUTH EVERY SUNDAY, TUESDAY, AND THURSDAY; TAKE 1 AND 1/2 TABLETS ALL OTHER DAYS OF THE WEEK OR AS DIRECTED             Hospital Scheduled Meds:    acetaminophen, 650 mg, Oral, TID  bumetanide, 2 mg, Oral, Daily  busPIRone, 10 mg, Oral, Q12H  escitalopram, 10 mg, Oral, Daily  hydrocortisone, 25 mg, Rectal, Nightly  midodrine, 2.5 mg, Oral, TID AC  pantoprazole, 40 mg, Intravenous, Q12H  senna-docusate sodium, 2 tablet, Oral, BID  sodium chloride, 10 mL, Intravenous, Q12H              Vital Signs:    Temp:  [97.3 °F (36.3 °C)-97.8 °F (36.6 °C)] 97.8 °F (36.6 °C)  Heart Rate:  [77-96] 80  Resp:  [15-16] 16  BP: (114-150)/(74-91) 134/74        08/08/21  1025 08/08/21  1330   Weight: 102 kg (225 lb) 104 kg (228 lb 3.2 oz)       Body mass index is 35.74 kg/m².    Physical Exam:      General Appearance:    Alert, cooperative, in no acute distress   Head:    Normocephalic, without obvious abnormality, atraumatic   Eyes:            Lids and lashes normal,  conjunctivae and sclerae normal, no   icterus, no pallor, corneas clear, PERRLA   Ears:    Ears appear intact with no abnormalities noted   Throat:   No oral lesions, no thrush, oral mucosa moist   Neck:   No adenopathy, supple, trachea midline, no thyromegaly, no     carotid bruit, no JVD   Back:     No kyphosis present, no scoliosis present, no skin lesions,       erythema or scars, no tenderness to percussion or                   palpation,   range of motion normal   Lungs:     Clear to auscultation,respirations regular, even and                   unlabored    Heart:    Regular rhythm and normal rate, normal S1 and S2, no            murmur, no gallop, no rub, no click   Breast Exam:    Deferred   Abdomen:     Normal bowel sounds, no masses, no organomegaly, soft        non-tender, non-distended, no guarding, no rebound                 tenderness   Genitalia:    Deferred   Extremities:   Moves all extremities well, no edema, no cyanosis, no              redness   Pulses:   Pulses palpable and equal bilaterally   Skin:   No bleeding, bruising or rash   Lymph nodes:   No palpable adenopathy   Neurologic:   Cranial nerves 2 - 12 grossly intact, sensation intact, DTR        present and equal bilaterally           Labs:    Results from last 7 days   Lab Units 08/08/21  1025   WBC 10*3/mm3 8.23   HEMOGLOBIN g/dL 12.0   HEMATOCRIT % 37.1   MCV fL 98.1*   MCHC g/dL 32.3   PLATELETS 10*3/mm3 213   INR  2.22*         Results from last 7 days   Lab Units 08/08/21  1025   SODIUM mmol/L 143   POTASSIUM mmol/L 3.9   CHLORIDE mmol/L 102   CO2 mmol/L 26.8   BUN mg/dL 49*   CREATININE mg/dL 3.01*   EGFR IF NONAFRICN AM mL/min/1.73 15*   CALCIUM mg/dL 9.7   GLUCOSE mg/dL 131*   ALBUMIN g/dL 4.40   BILIRUBIN mg/dL 0.4   ALK PHOS U/L 84   AST (SGOT) U/L 15   ALT (SGPT) U/L 14   Estimated Creatinine Clearance: 19.7 mL/min (A) (by C-G formula based on SCr of 3.01 mg/dL (H)).  No results found for: AMMONIA          Lab Results    Component Value Date    HGBA1C 5.80 (H) 02/12/2020     Lab Results   Component Value Date    TSH 0.975 02/12/2020     No results found for: PREGTESTUR, PREGSERUM, HCG, HCGQUANT  Pain Management Panel    There is no flowsheet data to display.       No results found for: BLOODCX  No results found for: URINECX  No results found for: WOUNDCX  No results found for: STOOLCX        Radiology:    Imaging Results (Last 7 Days)     ** No results found for the last 168 hours. **          Assessment:    Active Problems:    Hematochezia      1.  Acute GI bleeding, suspected to be hemorrhoidal.  2.  End-stage renal disease on hemodialysis.  3.  Paroxysmal atrial fibrillation on warfarin  4.  Coronary artery disease, stable  5.  History of bioprosthetic AVR/MVR  6.  Essential hypertension  7.  Chronic pain of both shoulders.    Plan:     We will place on IV Protonix 40 mg every 12 hours at present, though suspect this is hemorrhoidal.  Consult gastroenterology.  If this turns out to be hemorrhoidal bleeding again, patient may need surgical intervention.  Hold warfarin at present.  Consult nephrology for dialysis.  Continue home medications.  Check lab work in the a.m.  No need for transfusion at present.  Anticipate dialysis tomorrow.  Placed on clear liquids.  Further recommendations will depend on the clinical course.    Jj Harmon DO  08/08/21  15:26 EDT

## 2021-08-08 NOTE — PLAN OF CARE
Goal Outcome Evaluation:              Outcome Summary: Pt arrived on 4e from er today. Pt admitted for rectal bleeding.  No stick in Left arm, av fistula. Pt up to [facility], said she had to have a bm and passed blood. No other complaints will continue to monitor.

## 2021-08-08 NOTE — CONSULTS
[facility]  Initial Inpatient Consult Note    Referring Provider: A    Reason for Consultation: Rectal bleeding    Subjective     History of present illness:    76 y.o. female patient of Dr. Mikey Zacarias, that we are asked to see for GI bleeding.    Patient has had rectal bleeding for several days.  Actually contacted the office on 727 with reports of rectal bleeding from hemorrhoids.  She has had previous endoscopic evaluation for bleeding in January 2021.  EGD at that time showed mild gastritis and duodenitis, colonoscopy showed internal hemorrhoids, thrombosed external hemorrhoids and diverticulosis.  She is anticoagulated on Coumadin due to history of A. fib.  She had iron deficiency anemia at that time as well as rectal bleeding.  Rectal bleeding was thought to be related to hemorrhoids and it was thought that if she had significant other bleeding or continued anemia to consider small bowel evaluation.    INR today is 2.2.  Her hemoglobin is normal at 12.  Stool is positive for occult blood.    She reports that the bleeding was off and on and then yesterday it became pretty significant.  She reports that it was just dripping out of her after a bowel movement.  She has been taking a stool softener.  She still reports that it is difficult to initiate a bowel movement and she sometimes has to strain.  She is had no abdominal pain.  No black or tarry stools.  She has been compliant with Anusol suppositories-she reports that she has used 9    Past Medical History:  Past Medical History:   Diagnosis Date   • A-fib (CMS/[facility])     Meds   • Arthritis     w/Difficult Mobility   • Bilateral lower extremity edema     Legs   • CAD (coronary artery disease)     Affecting LAD    • Cardiomyopathy (CMS/[facility])    • CHF (congestive heart failure) (CMS/[facility])    • Chronic fatigue    • Chronic kidney disease    • Dialysis patient (CMS/[facility])     TUESDAY THURSDAY SATURDAY   • Generalized anxiety disorder    •  GERD (gastroesophageal reflux disease)    • Hernia, inguinal     Hx Repair   • History of echocardiogram 1/17/19-[facility]    The L Ventricular Cavity is Mild-to-Moderately Dilated; Left Ventricular Wall Thickness is Consistent w/Mild Concentric Hypertrophy; Left Atrial Cavity Size is Moderate-to-Severely Dilated; Severe AVS; Severe MVS; Moderate TVR Noted   • History of transfusion    • Hyperlipidemia     Controlled w/Meds   • Hypertension     Controlled w/Meds   • Hypokinesis 01/22/2019    Apical Noted on Cardiac Cath   • IFG (impaired fasting glucose)    • LAD stenosis 01/22/2019    Mid LAD Irregularities Noted on Cardiac Cath    • Left atrial dilatation 01/17/2019    Moderate-Severe Noted on Echo   • Left ventricular dilatation 01/17/2019    Noted on Echo/LEE   • Mild concentric left ventricular hypertrophy 01/17/2019    Noted on Echo/LEE   • Mitral annular calcification 01/17/2019    Severe Noted on Echo   • Moderate tricuspid valve regurgitation 01/24/2019    Noted on LEE   • Morbid obesity (CMS/HCC)    • NSTEMI (non-ST elevated myocardial infarction) (CMS/[facility])    • OCTAVIANO (obstructive sleep apnea)    • Osteoarthritis    • Pulmonary hypertension (CMS/HCC) 01/22/2019    Noted on Cardiac Cath   • Right bundle branch block 01/24/2019    Noted on LEE   • Severe aortic stenosis 01/22/2019    Noted on Cardiac Cath & LEE on 01/24/19; S/p AVR on 01/28/19 by Dr. Gaxiola   • Severe mitral valve stenosis 01/24/2019    Noted on LEE; S/p MVR on 01/28/19 by Dr. Gaxiola   • Shoulder pain, bilateral    • Skin yeast infection     Folds Under Skin--Nystatin/Diflucan     Past Surgical History:  Past Surgical History:   Procedure Laterality Date   • AORTIC VALVE REPAIR/REPLACEMENT MITRAL VALVE REPAIR/REPLACEMENT N/A 1/28/2019    Procedure: LEE STERNOTOMY AORTIC VALVE REPLACEMENT, MITRAL VALVE REPLACEMENT, TRICUSPID VALVE REPAIR, MAZE PROCEDURE, CLOSURE OF LEFT ATRIAL APPENDAGE  AND PRP;  Surgeon: Scar Gaxiola MD;  Location: [facility]  OR;  Service: Cardiothoracic   • ARTERIOVENOUS FISTULA/SHUNT SURGERY Left 6/26/2019    Procedure: LEFT ARM BRACHIAL CEPHALIC FISTULA;  Surgeon: Satish Stahl MD;  Location: [facility] MAIN OR;  Service: Vascular   • CARDIAC CATHETERIZATION N/A 1/22/2019    Procedure: Coronary angiography;  Surgeon: Rick Talavera MD;  Location[facility] CATH INVASIVE LOC[facility];  Service: Cardiology   • CARDIAC CATHETERIZATION N/A 1/22/2019    Procedure: Left Heart Cath;  Surgeon: Rick Talavera MD;  Location: [facility] CATH INVASIVE LOCATION;  Service: Cardiology   • CARDIAC CATHETERIZATION N/A 1/22/2019    Procedure: Right Heart Cath;  Surgeon: Rick Talavera MD;  Location: [facility] CATH INVASIVE LOCATION;  Service: Cardiology   • CARDIAC CATHETERIZATION N/A 1/22/2019    Procedure: Left ventriculography;  Surgeon: Rick Talavera MD;  Location: [facility] CATH INVASIVE LOCATION;  Service: Cardiology   • CARDIAC SURGERY     • COLONOSCOPY     • COLONOSCOPY N/A 1/16/2021    Procedure: COLONOSCOPY;  Surgeon: Wallace Hernandez MD;  Location: [facility] ENDOSCOPY;  Service: Gastroenterology;  Laterality: N/A;  pre- anemia, rectal bleeding  post-- hemorrhoids, diverticulosis   • ENDOSCOPY N/A 1/16/2021    Procedure: ESOPHAGOGASTRODUODENOSCOPY with biopsies;  Surgeon: Wallace Hernandez MD;  Location: [facility] ENDOSCOPY;  Service: Gastroenterology;  Laterality: N/A;  pre-- anemia  post-- esophagitis, gastritis, duodenitis   • HERNIA REPAIR     • INSERTION HEMODIALYSIS CATHETER N/A 2/8/2019    Procedure: tunnel CATHETER PLACEMENT WITH FLUROSCOPY;  Surgeon: Satish Stahl MD;  Location: [facility] MAIN OR;  Service: Vascular   • REPLACEMENT TOTAL KNEE Bilateral 2007/2009      Social History:   Social History     Tobacco Use   • Smoking status: Never Smoker   • Smokeless tobacco: Never Used   Substance Use Topics   • Alcohol use: Yes     Alcohol/week: 1.0 standard drinks     Types: 1 Cans of beer per week      Comment: 1 monthly      Family History:  Family History   Problem Relation Age of Onset   • Hypertension Other    • Diabetes Other    • Heart disease Neg Hx    • Stroke Neg Hx    • Arthritis Neg Hx    • Breast cancer Neg Hx    • Malig Hyperthermia Neg Hx        Home Meds:  Medications Prior to Admission   Medication Sig Dispense Refill Last Dose   • acetaminophen (TYLENOL) 325 MG tablet Take 650 mg by mouth 3 (Three) Times a Day. Takes tid at 2 am, 1 pm, and hS      • bumetanide (BUMEX) 2 MG tablet Take 1 tablet by mouth Daily. 90 tablet 3    • busPIRone (BUSPAR) 10 MG tablet Take 1 tablet by mouth 2 (two) times a day. 180 tablet 3    • cephalexin (KEFLEX) 250 MG capsule Take 250 mg by mouth 2 (Two) Times a Day.      • [employer] MELATONIN PO Take  by mouth.      • escitalopram (LEXAPRO) 10 MG tablet Take 1 tablet by mouth Daily. 90 tablet 3    • Heparin Sodium, Porcine, (heparin, porcine,) 1000 units/mL injection Heparin Sodium (Porcine) 1,000 Units/mL Systemic      • hydrocortisone (ANUSOL-HC) 25 MG suppository Insert 1 suppository into the rectum Every Night for 14 days. 14 suppository 0    • hydrocortisone 2.5 % cream Apply  topically to the appropriate area as directed See Admin Instructions. Apply topically to the affected area twice daily as directed 60 g 3    • Iron Sucrose (VENOFER IV) 50 mg 1 (One) Time Per Week.      • Methoxy PEG-Epoetin Beta (MIRCERA IJ) 60 mcg Every 14 (Fourteen) Days.      • midodrine (PROAMATINE) 2.5 MG tablet Take 1 tablet by mouth 3 (Three) Times a Day Before Meals. 90 tablet 6    • ProRenal + D tablet tablet TAKE 1 TABLET BY MOUTH EVERY DAY (ON DIALYSIS DAYS, TAKE AFTER DIALYSIS TREATMENT)      • rOPINIRole (REQUIP) 0.25 MG tablet       • VITAMIN D PO Take 0.75 mcg by mouth.      • warfarin (COUMADIN) 2 MG tablet TAKE 1 TABLET BY MOUTH EVERY SUNDAY, TUESDAY, AND THURSDAY; TAKE 1 AND 1/2 TABLETS ALL OTHER DAYS OF THE WEEK OR AS DIRECTED 120 tablet 0      Current Meds:   acetaminophen, 650  mg, Oral, TID  bumetanide, 2 mg, Oral, Daily  busPIRone, 10 mg, Oral, Q12H  escitalopram, 10 mg, Oral, Daily  hydrocortisone, 25 mg, Rectal, Nightly  midodrine, 2.5 mg, Oral, TID AC  pantoprazole, 40 mg, Intravenous, Q12H  sodium chloride, 10 mL, Intravenous, Q12H      Allergies:  No Known Allergies  Review of Systems  Pertinent items are noted in HPI, all other systems reviewed and negative     Objective     Vital Signs  Temp:  [97.3 °F (36.3 °C)-97.8 °F (36.6 °C)] 97.8 °F (36.6 °C)  Heart Rate:  [77-96] 77  Resp:  [15-16] 16  BP: (114-150)/(72-91) 133/72  Physical Exam:  General Appearance:    Alert, cooperative, in no acute distress   Head:    Normocephalic, without obvious abnormality, atraumatic   Eyes:          conjunctivae and sclerae normal, no   icterus   Throat:   no thrush, oral mucosa moist   Neck:   Supple, no adenopathy   Lungs:     Clear to auscultation bilaterally       Chest Wall:    No abnormalities observed   Abdomen:     Soft, nondistended, nontende    Extremities:   no edema, no redness   rectal:  Firm solid stool in the vault, bright red blood on examining finger, sternal hemorrhoids noted without thrombosis, no palpable mass on STAR   Psychiatric:  normal mood and insight     Results Review:   I reviewed the patient's new clinical results.    Results from last 7 days   Lab Units 08/08/21  1025   WBC 10*3/mm3 8.23   HEMOGLOBIN g/dL 12.0   HEMATOCRIT % 37.1   PLATELETS 10*3/mm3 213     Results from last 7 days   Lab Units 08/08/21  1025   SODIUM mmol/L 143   POTASSIUM mmol/L 3.9   CHLORIDE mmol/L 102   CO2 mmol/L 26.8   BUN mg/dL 49*   CREATININE mg/dL 3.01*   CALCIUM mg/dL 9.7   BILIRUBIN mg/dL 0.4   ALK PHOS U/L 84   ALT (SGPT) U/L 14   AST (SGOT) U/L 15   GLUCOSE mg/dL 131*     Results from last 7 days   Lab Units 08/08/21  1025   INR  2.22*     No results found for: LIPASE    Radiology:  No orders to display       Assessment/Plan   Patient Active Problem List   Diagnosis   • Tear of rotator  cuff   • Hypertension   • Generalized anxiety disorder   • Hyperlipidemia   • IFG (impaired fasting glucose)   • Heart murmur, systolic   • Shoulder pain, bilateral   • Pain of both shoulder joints   • Chronic pain of both shoulders   • Acute congestive heart failure (CMS/HCC)   • Obesity, morbid, BMI 50 or higher (CMS/[facility])   • Fungal skin infection   • Cellulitis of lower extremity   • Aortic valve stenosis   • Tricuspid valve insufficiency   • Mitral valve stenosis   • S/P MVR (mitral valve replacement)   • Paroxysmal atrial fibrillation (CMS/[facility])   • Pulmonary hypertension (CMS/[facility])   • Stage 5 chronic kidney disease on chronic dialysis (CMS/[facility])   • Gastroesophageal reflux disease without esophagitis   • Chronic idiopathic constipation   • Dependence on renal dialysis (CMS/[facility])   • Chronic kidney disease, stage 2 (mild)   • Renovascular hypertension   • GI bleed   • 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 (CMS/[facility])   • CAD (coronary artery disease)   • S/P AVR (aortic valve replacement)   • Chronic diastolic CHF (congestive heart failure) (CMS/[facility])   • Chronic pain of both shoulders   • Gastrointestinal hemorrhage   • Gastrointestinal hemorrhage with melena   • Immobility syndrome   • Hematochezia       Assessment:  1. Rectal bleeding-known hemorrhoids, not anemic.  EGD and colonoscopy in January 2021  2. A. fib-anticoagulated on Coumadin      Plan:  · Rectal exam with no blood in the stool but firm stool in the vault, bright red blood on examining finger.  External hemorrhoids but no thrombosis.  Recommend continued treatment with Anusol suppositories.  · We will stop stool softener and start MiraLAX instead as she still has difficulty initiating a bowel movement is having to strain to some extent  · Thankfully her hemoglobin is normal-recheck in the morning  · Okay to have a regular diet      I discussed the patients findings and my recommendations with patient.    Shiloh Pop,  MD

## 2021-08-08 NOTE — CONSULTS
Referring Provider: Dr. Jj Harmon  Reason for Consultation: ESRD    Subjective     Chief complaint   Chief Complaint   Patient presents with   • Rectal Bleeding       History of present illness:    76-year-old white woman with ESRD on MWF hemodialysis at [place] via [facility] AVF followed by my partner, Dr. Kathy Osuna. Other pertinent medical history includes valvular heart disease s/p AVR and MVR, paroxysmal Afib on warfarin, and history of GI bleeding. She was admitted for bleeding hemorrhoids in January 2021 and required transfusion of 1 units PRBCs. She had been complaining of rectal bleeding at the HD unit for the past 2 weeks and advised to contact her GI physician, who prescribed suppositories; her hgb had remained stable. She presented to ED today with worsening bright red bleeding with stools. Labs showed hgb 12.0, INR 2.2, stable renal function and compensated electrolytes. She is being admitted for further evaluation. We've been asked to see for management of her hemodialysis.     She states she had an increased amount of bleeding this morning with the toilet full of dark red blood. Appetite has been good.  Describes fairly hard stools.  No N/V/D; no abd pain. She is frustrated that she continues to have bleeding issues. Breathing is comfortable; no orthopnea. She still produces substantial urine on bumetanide. No leg swelling. No urinary complaints. No recent issues with dialysis; no cramping or low blood pressures. She states heparin was stopped due to recent bleeding issues.      Past Medical History:   Diagnosis Date   • A-fib (CMS/[facility])     Meds   • Arthritis     w/Difficult Mobility   • Bilateral lower extremity edema     Legs   • CAD (coronary artery disease)     Affecting LAD    • Cardiomyopathy (CMS/[facility])    • CHF (congestive heart failure) (CMS/[facility])    • Chronic fatigue    • Chronic kidney disease    • Dialysis patient (CMS/[facility])     TUESDAY THURSDAY SATURDAY   • Generalized anxiety  disorder    • GERD (gastroesophageal reflux disease)    • Hernia, inguinal     Hx Repair   • History of echocardiogram 1/17/19-[facility]    The L Ventricular Cavity is Mild-to-Moderately Dilated; Left Ventricular Wall Thickness is Consistent w/Mild Concentric Hypertrophy; Left Atrial Cavity Size is Moderate-to-Severely Dilated; Severe AVS; Severe MVS; Moderate TVR Noted   • History of transfusion    • Hyperlipidemia     Controlled w/Meds   • Hypertension     Controlled w/Meds   • Hypokinesis 01/22/2019    Apical Noted on Cardiac Cath   • IFG (impaired fasting glucose)    • LAD stenosis 01/22/2019    Mid LAD Irregularities Noted on Cardiac Cath    • Left atrial dilatation 01/17/2019    Moderate-Severe Noted on Echo   • Left ventricular dilatation 01/17/2019    Noted on Echo/LEE   • Mild concentric left ventricular hypertrophy 01/17/2019    Noted on Echo/LEE   • Mitral annular calcification 01/17/2019    Severe Noted on Echo   • Moderate tricuspid valve regurgitation 01/24/2019    Noted on LEE   • Morbid obesity (CMS/HCC)    • NSTEMI (non-ST elevated myocardial infarction) (CMS/HCC)    • OCTAVIANO (obstructive sleep apnea)    • Osteoarthritis    • Pulmonary hypertension (CMS/HCC) 01/22/2019    Noted on Cardiac Cath   • Right bundle branch block 01/24/2019    Noted on LEE   • Severe aortic stenosis 01/22/2019    Noted on Cardiac Cath & LEE on 01/24/19; S/p AVR on 01/28/19 by Dr. Gaxiola   • Severe mitral valve stenosis 01/24/2019    Noted on LEE; S/p MVR on 01/28/19 by Dr. Gaxiola   • Shoulder pain, bilateral    • Skin yeast infection     Folds Under Skin--Nystatin/Diflucan     Past Surgical History:   Procedure Laterality Date   • AORTIC VALVE REPAIR/REPLACEMENT MITRAL VALVE REPAIR/REPLACEMENT N/A 1/28/2019    Procedure: LEE STERNOTOMY AORTIC VALVE REPLACEMENT, MITRAL VALVE REPLACEMENT, TRICUSPID VALVE REPAIR, MAZE PROCEDURE, CLOSURE OF LEFT ATRIAL APPENDAGE  AND PRP;  Surgeon: Scar Gaxiola MD;  Location: [facility] OR;   Service: Cardiothoracic   • ARTERIOVENOUS FISTULA/SHUNT SURGERY Left 6/26/2019    Procedure: LEFT ARM BRACHIAL CEPHALIC FISTULA;  Surgeon: Satish Stahl MD;  Location: [facility] MAIN OR;  Service: Vascular   • CARDIAC CATHETERIZATION N/A 1/22/2019    Procedure: Coronary angiography;  Surgeon: Rick Talavera MD;  [facility];  Service: Cardiology   • CARDIAC CATHETERIZATION N/A 1/22/2019    Procedure: Left Heart Cath;  Surgeon: Rick Talavera MD;  [facility];  Service: Cardiology   • CARDIAC CATHETERIZATION N/A 1/22/2019    Procedure: Right Heart Cath;  Surgeon: Rick Talavera MD;  Location: [facility] CATH INVASIVE LOCATION;  Service: Cardiology   • CARDIAC CATHETERIZATION N/A 1/22/2019    Procedure: Left ventriculography;  Surgeon: Rick Talavera MD;  Location: [facility] CATH INVASIVE LOCATION;  Service: Cardiology   • CARDIAC SURGERY     • COLONOSCOPY     • COLONOSCOPY N/A 1/16/2021    Procedure: COLONOSCOPY;  Surgeon: Wallace Hernandez MD;  Location: [facility] ENDOSCOPY;  Service: Gastroenterology;  Laterality: N/A;  pre- anemia, rectal bleeding  post-- hemorrhoids, diverticulosis   • ENDOSCOPY N/A 1/16/2021    Procedure: ESOPHAGOGASTRODUODENOSCOPY with biopsies;  Surgeon: Wallace Hernandez MD;  Location: [facility] ENDOSCOPY;  Service: Gastroenterology;  Laterality: N/A;  pre-- anemia  post-- esophagitis, gastritis, duodenitis   • HERNIA REPAIR     • INSERTION HEMODIALYSIS CATHETER N/A 2/8/2019    Procedure: tunnel CATHETER PLACEMENT WITH FLUROSCOPY;  Surgeon: Satish Stahl MD;  Location: [facility] MAIN OR;  Service: Vascular   • REPLACEMENT TOTAL KNEE Bilateral 2007/2009     Family History   Problem Relation Age of Onset   • Hypertension Other    • Diabetes Other    • Heart disease Neg Hx    • Stroke Neg Hx    • Arthritis Neg Hx    • Breast cancer Neg Hx    • Malig Hyperthermia Neg Hx      Social History     Tobacco  Use   • Smoking status: Never Smoker   • Smokeless tobacco: Never Used   Substance Use Topics   • Alcohol use: Yes     Alcohol/week: 1.0 standard drinks     Types: 1 Cans of beer per week     Comment: 1 monthly   • Drug use: No     Medications Prior to Admission   Medication Sig Dispense Refill Last Dose   • acetaminophen (TYLENOL) 325 MG tablet Take 650 mg by mouth 3 (Three) Times a Day. Takes tid at 2 am, 1 pm, and hS      • bumetanide (BUMEX) 2 MG tablet Take 1 tablet by mouth Daily. 90 tablet 3    • busPIRone (BUSPAR) 10 MG tablet Take 1 tablet by mouth 2 (two) times a day. 180 tablet 3    • cephalexin (KEFLEX) 250 MG capsule Take 250 mg by mouth 2 (Two) Times a Day.      • [employer] MELATONIN PO Take  by mouth.      • escitalopram (LEXAPRO) 10 MG tablet Take 1 tablet by mouth Daily. 90 tablet 3    • Heparin Sodium, Porcine, (heparin, porcine,) 1000 units/mL injection Heparin Sodium (Porcine) 1,000 Units/mL Systemic      • hydrocortisone (ANUSOL-HC) 25 MG suppository Insert 1 suppository into the rectum Every Night for 14 days. 14 suppository 0    • hydrocortisone 2.5 % cream Apply  topically to the appropriate area as directed See Admin Instructions. Apply topically to the affected area twice daily as directed 60 g 3    • Iron Sucrose (VENOFER IV) 50 mg 1 (One) Time Per Week.      • Methoxy PEG-Epoetin Beta (MIRCERA IJ) 60 mcg Every 14 (Fourteen) Days.      • midodrine (PROAMATINE) 2.5 MG tablet Take 1 tablet by mouth 3 (Three) Times a Day Before Meals. 90 tablet 6    • ProRenal + D tablet tablet TAKE 1 TABLET BY MOUTH EVERY DAY (ON DIALYSIS DAYS, TAKE AFTER DIALYSIS TREATMENT)      • rOPINIRole (REQUIP) 0.25 MG tablet       • VITAMIN D PO Take 0.75 mcg by mouth.      • warfarin (COUMADIN) 2 MG tablet TAKE 1 TABLET BY MOUTH EVERY SUNDAY, TUESDAY, AND THURSDAY; TAKE 1 AND 1/2 TABLETS ALL OTHER DAYS OF THE WEEK OR AS DIRECTED 120 tablet 0      Allergies:  Patient has no known allergies.    Review of Systems  A  "comprehensive review of systems was negative except for:   Gastrointestinal: positive for melena and hard stools    Objective     Vital Signs  Temp:  [97.3 °F (36.3 °C)-97.8 °F (36.6 °C)] 97.8 °F (36.6 °C)  Heart Rate:  [77-96] 80  Resp:  [15-16] 16  BP: (114-150)/(74-91) 134/74    Flowsheet Rows      First Filed Value   Admission Height  170.2 cm (67\") Documented at 08/08/2021 1025   Admission Weight  102 kg (225 lb) Documented at 08/08/2021 1025           No intake/output data recorded.  No intake/output data recorded.  No intake or output data in the 24 hours ending 08/08/21 1558    Physical Exam:  NAD; pleasant; oriented; looks stated age; morbidly obese; chronically ill  MMM; AT/NC   No eye discharge; no scleral icterus  No JVD; no carotid bruits  CTA bilat; not labored on room air  Irregularly irregular, III/VI murmur, no rub  Soft, NT, ND, BS+  Trace BLE edema  YUNIOR AVF with +bruit/thrill  + clubbing  No asterixis  Moves all extremities  Mood and affect normal      Results Review:  Results from last 7 days   Lab Units 08/08/21  1025   SODIUM mmol/L 143   POTASSIUM mmol/L 3.9   CHLORIDE mmol/L 102   CO2 mmol/L 26.8   BUN mg/dL 49*   CREATININE mg/dL 3.01*   CALCIUM mg/dL 9.7   BILIRUBIN mg/dL 0.4   ALK PHOS U/L 84   ALT (SGPT) U/L 14   AST (SGOT) U/L 15   GLUCOSE mg/dL 131*       Estimated Creatinine Clearance: 19.7 mL/min (A) (by C-G formula based on SCr of 3.01 mg/dL (H)).          Results from last 7 days   Lab Units 08/08/21  1025   WBC 10*3/mm3 8.23   HEMOGLOBIN g/dL 12.0   PLATELETS 10*3/mm3 213       Results from last 7 days   Lab Units 08/08/21  1025   INR  2.22*       Active Medications  acetaminophen, 650 mg, Oral, TID  bumetanide, 2 mg, Oral, Daily  busPIRone, 10 mg, Oral, Q12H  escitalopram, 10 mg, Oral, Daily  hydrocortisone, 25 mg, Rectal, Nightly  midodrine, 2.5 mg, Oral, TID AC  pantoprazole, 40 mg, Intravenous, Q12H  sodium chloride, 10 mL, Intravenous, Q12H           Assessment/Plan "   Assessment  1. ESRD on MWF at [place] via [facility]F. Last hemodialysis was 8/6/21 and 1L fluid was removed. Electrolytes and volume both fine.   2. Rectal bleeding; has history of bleeding internal and external hemorrhoids. She follows with Dr. Zacarias; EGD and colonoscopy in January 2021 showed esophagitis, duodenitis, diverticulosis, and hemorrhoids. Capsule endoscopy was recommended if she had further bleeding. Hgb is stable.  3. Paroxysmal Afib anticoagulated with wafarin; INR is therapeutic. History of cryomaze and left atrial appendage ligation with valvular replacement in January 2019. She also required cardioversion in February 2019.  4. Valvular heart disease s/p AVR and MVR in January 2019. LEE in May 2021 was concerning for severe bioprosthetic mitral stenosis, severe TR, and moderate pulmonary hypertension. She has follow-up with Dr. Segura next month.  5. Chronic diastolic heart failure; compensated by fluid removal with dialysis and 2 mg bumetanide daily.  6. Hypotension on chronic midodrine 2.5 mg TID.  7. Immobility related to osteoarthritis and not a candidate for surgery. She receives steroids injections for pain control. She uses both a walker and wheelchair at times to ambulate.        Hematochezia      Plan  1. HD tomorrow for 3.5 hours without heparin  2. GI evaluation underway  3. Needs more aggressive treatment of her constipation/hard stools    I discussed the patient's findings and my recommendations with the patient  Marti KumarMORGAN  08/08/21  15:58 EDT    I examined this patient, reviewed the data, and personally edited this note.  I agree with the assessment and plan as outlined above.  --Valentin Gabriel MD  08/08/21  17:34 EDT

## 2021-08-08 NOTE — ED PROVIDER NOTES
I supervised care provided by the midlevel provider.   We have discussed this patient's history, physical exam, and treatment plan.  I have reviewed the note and personally saw and examined the patient and agree with the plan of care.   I have seen and evaluated this patient.  Spouse is in the room as well.  She has had rectal bleeding off and on for 10 days.  The past 24 hours it is increased in its amount in severity.  Is been usually occurring after bowel movements and she says that is more bright red and not black or dark red.  Denies any abdominal pain at all.  She is getting heparin during her hemodialysis and she is on Coumadin for atrial fibrillation.  Denies any fevers or chills.  Denies chest pain or shortness of breath.    GENERAL: Elderly female that is chronically ill.  Not distressed  HENT: nares patent  Head/neck/ face are symmetric without gross deformity or swelling  EYES: no scleral icterus  CV: regular rhythm, regular rate with intact distal pulses.  Soft systolic murmur 1 out of 6.  RESPIRATORY: normal effort and no respiratory distress.  Clear to auscultation  ABDOMEN: soft and non-tender.  Morbidly obese.  Normal bowel sounds  MUSCULOSKELETAL: no deformity.  Patient has 1+ edema to lower extremities bilaterally and symmetric.  To the distal portion of her feet and toes there is some cyanosis.  No coolness.  Patient states this is chronic and is not new or unchanged.  NEURO: alert and appropriate, moves all extremities, follows commands  SKIN: warm, dry    Vital signs and nursing notes reviewed.    Plan I discussed the case with Jose Maher who did the rectal exam.  She does have lots of hemorrhoids and has some bright red bleeding.  He thinks that there could be some bleeding coming from the hemorrhoids but uncertain of possibly another source.  She does have diverticulosis.  She is anticoagulated on Coumadin and takes heparin for dialysis.  All questions answered    We are currently under a  pandemic from the COVID19 infection.  The patient presented to the emergency department by ambulance or personal vehicle. I followed the current protocols required by Infection Control at [facility] in my evaluation and treatment of the patient. The patient was wearing a face mask during my evaluation and throughout my encounter. During my whole encounter with this patient I used appropriate personal protective equipment.  This equipment consisted of eye protection, facemask, gown, and gloves.  I applied this equipment before entering the room.      I see the patient was in the hospital in January 2021.  She was here for GI bleeding.  She is coagulopathic from warfarin.  In reviewing the hospital course she had scopes and they believed that the bleeding could have been from hemorrhoids.  She had duodenitis, gastritis, diverticulosis and thrombosed external hemorrhoids.  She also did have internal hemorrhoids.     Swapnil Anton MD  08/08/21 1940

## 2021-08-08 NOTE — ED PROVIDER NOTES
EMERGENCY DEPARTMENT ENCOUNTER    Room Number:  E448/1  Date of encounter:  8/8/2021  PCP: Robert Cortez MD  Historian: Patient, spouse      I used full protective equipment while examining this patient.  This includes face mask, gloves and protective eyewear.  I washed my hands before entering the room and immediately upon leaving the room      HPI:  Chief Complaint: Rectal bleeding  A complete HPI/ROS/PMH/PSH/SH/FH are unobtainable due to: Nothing    Context: Claudia Arnold is a 76 y.o. female who presents to the ED c/o 10-day history of bright red rectal bleeding.  Patient states this initially started intermittently however has become worse over the past several days.  She states the bleeding is bright red.  She states initially the bleeding only occurred with bowel movements however today she has been bleeding without passing stool.  Patient is on Coumadin for history of atrial fibrillation.  Patient denies any lightheadedness, dizziness, weakness.  She denies any constipation, diarrhea.  She denies any abdominal pain.  She has had some mild rectal discomfort.  Patient had an admission in January 2021 for similar complaints.  Her bleeding was ultimately thought to be secondary to hemorrhoids.  She did have an EGD and colonoscopy.  Patient did contact her GI doctor several days ago and was started on suppositories with minimal improvement.    Review of Medical Records  I reviewed admission from January 2021.     PAST MEDICAL HISTORY  Active Ambulatory Problems     Diagnosis Date Noted   • Tear of rotator cuff 04/28/2016   • Hypertension 05/24/2016   • Generalized anxiety disorder 05/24/2016   • Hyperlipidemia 05/24/2016   • IFG (impaired fasting glucose) 05/24/2016   • Heart murmur, systolic 05/24/2016   • Shoulder pain, bilateral 08/01/2016   • Pain of both shoulder joints 11/01/2016   • Chronic pain of both shoulders 02/03/2017   • Acute congestive heart failure (CMS/[facility]) 01/16/2019   • Obesity, morbid, BMI  50 or higher (CMS/HCC) 01/16/2019   • Fungal skin infection 01/16/2019   • Cellulitis of lower extremity 01/17/2019   • Aortic valve stenosis 01/16/2019   • Tricuspid valve insufficiency 01/16/2019   • Mitral valve stenosis 01/16/2019   • S/P MVR (mitral valve replacement) 03/27/2019   • Paroxysmal atrial fibrillation (CMS/HCC) 05/08/2019   • Pulmonary hypertension (CMS/HCC) 05/08/2019   • Stage 5 chronic kidney disease on chronic dialysis (CMS/HCC) 05/08/2019   • Gastroesophageal reflux disease without esophagitis 05/08/2019   • Chronic idiopathic constipation 05/08/2019   • Dependence on renal dialysis (CMS/HCC) 06/26/2019   • Chronic kidney disease, stage 2 (mild) 08/09/2013   • Renovascular hypertension 08/09/2013   • GI bleed 01/11/2021   • 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 (CMS/HCC) 01/11/2021   • CAD (coronary artery disease)    • S/P AVR (aortic valve replacement)    • Chronic diastolic CHF (congestive heart failure) (CMS/HCC)    • Chronic pain of both shoulders 01/11/2021   • Gastrointestinal hemorrhage 01/15/2021   • Gastrointestinal hemorrhage with melena 01/15/2021   • Immobility syndrome 05/27/2021     Resolved Ambulatory Problems     Diagnosis Date Noted   • No Resolved Ambulatory Problems     Past Medical History:   Diagnosis Date   • A-fib (CMS/HCC)    • Arthritis    • Bilateral lower extremity edema    • Cardiomyopathy (CMS/HCC)    • CHF (congestive heart failure) (CMS/HCC)    • Chronic fatigue    • Chronic kidney disease    • Dialysis patient (CMS/HCC)    • GERD (gastroesophageal reflux disease)    • Hernia, inguinal    • History of echocardiogram 1/17/19-[facility]   • History of transfusion    • Hypokinesis 01/22/2019   • LAD stenosis 01/22/2019   • Left atrial dilatation 01/17/2019   • Left ventricular dilatation 01/17/2019   • Mild concentric left ventricular hypertrophy 01/17/2019   • Mitral annular calcification 01/17/2019   • Moderate tricuspid valve regurgitation 01/24/2019    • Morbid obesity (CMS/[facility])    • NSTEMI (non-ST elevated myocardial infarction) (CMS/[facility])    • OCTAVIANO (obstructive sleep apnea)    • Osteoarthritis    • Right bundle branch block 01/24/2019   • Severe aortic stenosis 01/22/2019   • Severe mitral valve stenosis 01/24/2019   • Skin yeast infection          PAST SURGICAL HISTORY  Past Surgical History:   Procedure Laterality Date   • AORTIC VALVE REPAIR/REPLACEMENT MITRAL VALVE REPAIR/REPLACEMENT N/A 1/28/2019    Procedure: LEE STERNOTOMY AORTIC VALVE REPLACEMENT, MITRAL VALVE REPLACEMENT, TRICUSPID VALVE REPAIR, MAZE PROCEDURE, CLOSURE OF LEFT ATRIAL APPENDAGE  AND PRP;  Surgeon: Scar Gaxiola MD;  Location: [facility] MAIN OR;  Service: Cardiothoracic   • ARTERIOVENOUS FISTULA/SHUNT SURGERY Left 6/26/2019    Procedure: LEFT ARM BRACHIAL CEPHALIC FISTULA;  Surgeon: Satish Stahl MD;  Location: [facility] MAIN OR;  Service: Vascular   • CARDIAC CATHETERIZATION N/A 1/22/2019    Procedure: Coronary angiography;  Surgeon: Rick Talavera MD;  Location: [facility] CATH INVASIVE LOCATION;  Service: Cardiology   • CARDIAC CATHETERIZATION N/A 1/22/2019    Procedure: Left Heart Cath;  Surgeon: Rick Talavera MD;  Location: [facility] CATH INVASIVE LOCATION;  Service: Cardiology   • CARDIAC CATHETERIZATION N/A 1/22/2019    Procedure: Right Heart Cath;  Surgeon: Rick Talavera MD;  Location: [facility] CATH INVASIVE LOCATION;  Service: Cardiology   • CARDIAC CATHETERIZATION N/A 1/22/2019    Procedure: Left ventriculography;  Surgeon: Rick Talavera MD;  Location: [facility] CATH INVASIVE LOCATION;  Service: Cardiology   • CARDIAC SURGERY     • COLONOSCOPY     • COLONOSCOPY N/A 1/16/2021    Procedure: COLONOSCOPY;  Surgeon: Wallace Hernandez MD;  Location: [facility] ENDOSCOPY;  Service: Gastroenterology;  Laterality: N/A;  pre- anemia, rectal bleeding  post-- hemorrhoids, diverticulosis   • ENDOSCOPY N/A 1/16/2021    Procedure: ESOPHAGOGASTRODUODENOSCOPY with  biopsies;  Surgeon: Wallace Hernandez MD;  Location: [facility] ENDOSCOPY;  Service: Gastroenterology;  Laterality: N/A;  pre-- anemia  post-- esophagitis, gastritis, duodenitis   • HERNIA REPAIR     • INSERTION HEMODIALYSIS CATHETER N/A 2/8/2019    Procedure: tunnel CATHETER PLACEMENT WITH FLUROSCOPY;  Surgeon: Satish Stahl MD;  Location: [facility] MAIN OR;  Service: Vascular   • REPLACEMENT TOTAL KNEE Bilateral 2007/2009         FAMILY HISTORY  Family History   Problem Relation Age of Onset   • Hypertension Other    • Diabetes Other    • Heart disease Neg Hx    • Stroke Neg Hx    • Arthritis Neg Hx    • Breast cancer Neg Hx    • Malig Hyperthermia Neg Hx          SOCIAL HISTORY  Social History     Socioeconomic History   • Marital status:      Spouse name: Not on file   • Number of children: 2   • Years of education: 12   • Highest education level: High school graduate   Tobacco Use   • Smoking status: Never Smoker   • Smokeless tobacco: Never Used   Substance and Sexual Activity   • Alcohol use: Yes     Alcohol/week: 1.0 standard drinks     Types: 1 Cans of beer per week     Comment: 1 monthly   • Drug use: No   • Sexual activity: Defer     Birth control/protection: Post-menopausal         ALLERGIES  Patient has no known allergies.        REVIEW OF SYSTEMS  All systems reviewed and negative except for those discussed in HPI.       PHYSICAL EXAM    I have reviewed the triage vital signs and nursing notes.    ED Triage Vitals [08/08/21 1003]   Temp Heart Rate Resp BP SpO2   97.3 °F (36.3 °C) 96 15 114/85 95 %      Temp src Heart Rate Source Patient Position BP Location FiO2 (%)   Tympanic Monitor -- -- --       Physical Exam  GENERAL: Alert, oriented, not distressed  HENT: head atraumatic, no nuchal rigidity  EYES: no scleral icterus, EOMI  CV: regular rhythm, regular rate, 3/6 systolic  RESPIRATORY: normal effort, CTA  ABDOMEN: soft, nontender  MUSCULOSKELETAL: no deformity, FROM, no calf swelling or  tenderness  : Multiple enlarged hemorrhoids.  Small area of active bleeding noted to one hemorrhoid.  NEURO: alert, moves all extremities, follows commands  SKIN: warm, dry        LAB RESULTS  Recent Results (from the past 24 hour(s))   Comprehensive Metabolic Panel    Collection Time: 08/08/21 10:25 AM    Specimen: Blood   Result Value Ref Range    Glucose 131 (H) 65 - 99 mg/dL    BUN 49 (H) 8 - 23 mg/dL    Creatinine 3.01 (H) 0.57 - 1.00 mg/dL    Sodium 143 136 - 145 mmol/L    Potassium 3.9 3.5 - 5.2 mmol/L    Chloride 102 98 - 107 mmol/L    CO2 26.8 22.0 - 29.0 mmol/L    Calcium 9.7 8.6 - 10.5 mg/dL    Total Protein 6.5 6.0 - 8.5 g/dL    Albumin 4.40 3.50 - 5.20 g/dL    ALT (SGPT) 14 1 - 33 U/L    AST (SGOT) 15 1 - 32 U/L    Alkaline Phosphatase 84 39 - 117 U/L    Total Bilirubin 0.4 0.0 - 1.2 mg/dL    eGFR Non African Amer 15 (L) >60 mL/min/1.73    Globulin 2.1 gm/dL    A/G Ratio 2.1 g/dL    BUN/Creatinine Ratio 16.3 7.0 - 25.0    Anion Gap 14.2 5.0 - 15.0 mmol/L   Protime-INR    Collection Time: 08/08/21 10:25 AM    Specimen: Blood   Result Value Ref Range    Protime 24.4 (H) 11.7 - 14.2 Seconds    INR 2.22 (H) 0.90 - 1.10   Type & Screen    Collection Time: 08/08/21 10:25 AM    Specimen: Blood   Result Value Ref Range    ABO Type B     RH type Positive     Antibody Screen Negative     T&S Expiration Date 8/11/2021 11:59:59 PM    CBC Auto Differential    Collection Time: 08/08/21 10:25 AM    Specimen: Blood   Result Value Ref Range    WBC 8.23 3.40 - 10.80 10*3/mm3    RBC 3.78 3.77 - 5.28 10*6/mm3    Hemoglobin 12.0 12.0 - 15.9 g/dL    Hematocrit 37.1 34.0 - 46.6 %    MCV 98.1 (H) 79.0 - 97.0 fL    MCH 31.7 26.6 - 33.0 pg    MCHC 32.3 31.5 - 35.7 g/dL    RDW 12.5 12.3 - 15.4 %    RDW-SD 44.5 37.0 - 54.0 fl    MPV 9.7 6.0 - 12.0 fL    Platelets 213 140 - 450 10*3/mm3    Neutrophil % 74.4 42.7 - 76.0 %    Lymphocyte % 12.2 (L) 19.6 - 45.3 %    Monocyte % 10.7 5.0 - 12.0 %    Eosinophil % 1.7 0.3 - 6.2 %     Basophil % 0.5 0.0 - 1.5 %    Immature Grans % 0.5 0.0 - 0.5 %    Neutrophils, Absolute 6.13 1.70 - 7.00 10*3/mm3    Lymphocytes, Absolute 1.00 0.70 - 3.10 10*3/mm3    Monocytes, Absolute 0.88 0.10 - 0.90 10*3/mm3    Eosinophils, Absolute 0.14 0.00 - 0.40 10*3/mm3    Basophils, Absolute 0.04 0.00 - 0.20 10*3/mm3    Immature Grans, Absolute 0.04 0.00 - 0.05 10*3/mm3    nRBC 0.0 0.0 - 0.2 /100 WBC   POCT Occult Blood Stool    Collection Time: 08/08/21 11:14 AM    Specimen: Per Rectum; Stool   Result Value Ref Range    Fecal Occult Blood Positive (A) Negative    Lot Number [id number]     Expiration Date 04/30/2022     Positive Control Positive Positive    Negative Control Negative Negative   COVID-19,[facility]A PANTHER,[facility], NP/OP SWAB IN UTM/VTM/[facility] TRANSPORT MEDIA,24 HR TAT - Swab, Nasopharynx    Collection Time: 08/08/21 11:39 AM    Specimen: Nasopharynx; Swab   Result Value Ref Range    COVID19 Not Detected Not Detected - Ref. Range   Protime-INR    Collection Time: 08/08/21  4:44 PM    Specimen: Blood   Result Value Ref Range    Protime 24.5 (H) 11.7 - 14.2 Seconds    INR 2.24 (H) 0.90 - 1.10       Ordered the above labs and independently reviewed the results.      MEDICATIONS GIVEN IN ER    Medications   sodium chloride 0.9 % flush 10 mL (has no administration in time range)   acetaminophen (TYLENOL) tablet 650 mg (650 mg Oral Given 8/8/21 1612)   busPIRone (BUSPAR) tablet 10 mg (has no administration in time range)   bumetanide (BUMEX) tablet 2 mg (2 mg Oral Given 8/8/21 1613)   escitalopram (LEXAPRO) tablet 10 mg (10 mg Oral Given 8/8/21 1612)   midodrine (PROAMATINE) tablet 2.5 mg (2.5 mg Oral Given 8/8/21 1654)   sodium chloride 0.9 % flush 10 mL (has no administration in time range)   sodium chloride 0.9 % flush 10 mL (has no administration in time range)   nitroglycerin (NITROSTAT) SL tablet 0.4 mg (has no administration in time range)   acetaminophen (TYLENOL) tablet 650 mg (has no administration in time  range)     Or   acetaminophen (TYLENOL) 160 MG/5ML solution 650 mg (has no administration in time range)     Or   acetaminophen (TYLENOL) suppository 650 mg (has no administration in time range)   ondansetron (ZOFRAN) injection 4 mg (has no administration in time range)   calcium carbonate (TUMS) chewable tablet 500 mg (200 mg elemental) (has no administration in time range)   pantoprazole (PROTONIX) injection 40 mg (has no administration in time range)   hydrocortisone (ANUSOL-HC) suppository 25 mg (has no administration in time range)   polyethylene glycol (MIRALAX) packet 17 g (has no administration in time range)         PROGRESS, DATA ANALYSIS, CONSULTS, AND MEDICAL DECISION MAKING    All labs have been independently reviewed by me.  All radiology studies have been reviewed by me and discussed with radiologist dictating the report.   EKG's independently viewed and interpreted by me.  Discussion below represents my analysis of pertinent findings related to patient's condition, differential diagnosis, treatment plan and final disposition.    I have discussed case with Dr. Anton, emergency room physician.  He has performed his own bedside examination and agrees with treatment plan.    ED Course as of Aug 08 1855   Sun Aug 08, 2021   1020 Patient presents with several day history of bright red rectal bleeding.  Patient is anticoagulated on Coumadin.  No lightheadedness, dizziness, weakness.  Plan to check basic labs, rectal exam, reevaluate.    [EE]   1044 Hemoglobin: 12.0 [EE]   1044 WBC: 8.23 [EE]   1044 Platelets: 213 [EE]   1056 Patient in dialysis.   Creatinine(!): 3.01 [EE]   1108 INR(!): 2.22 [EE]   1130 Given the patient is on anticoagulants, I preferred admit the patient.  Patient does appear to have bleeding hemorrhoids, however may also have diverticular bleed or other condition.  Plan to monitor for observation and hemoglobin recheck.    [EE]   1148 I discussed case with Dr. Harmon, he agrees to  admit.    [EE]      ED Course User Index  [EE] Jose Maher PA       AS OF 18:55 EDT VITALS:    BP - 133/72  HR - 77  TEMP - 97.8 °F (36.6 °C) (Oral)  O2 SATS - 96%        DIAGNOSIS  Final diagnoses:   Hematochezia   Anticoagulated         DISPOSITION  Admitted           Jose Maher PA  08/08/21 1135

## 2021-08-08 NOTE — ED NOTES
"Per EMS, pt reports rectal bleeding from that is \"worse than normal.\" Pt reports she was hospitalized in January with this issue and \"the medication that I am on isn't working.\" Pt is on coumadin     Jeri Wasserman, RN  08/08/21 1002    "

## 2021-08-09 LAB
ALBUMIN SERPL-MCNC: 3.7 G/DL (ref 3.5–5.2)
ANION GAP SERPL CALCULATED.3IONS-SCNC: 13 MMOL/L (ref 5–15)
BASOPHILS # BLD AUTO: 0.05 10*3/MM3 (ref 0–0.2)
BASOPHILS NFR BLD AUTO: 0.7 % (ref 0–1.5)
BUN SERPL-MCNC: 49 MG/DL (ref 8–23)
BUN/CREAT SERPL: 17 (ref 7–25)
CALCIUM SPEC-SCNC: 9.4 MG/DL (ref 8.6–10.5)
CHLORIDE SERPL-SCNC: 104 MMOL/L (ref 98–107)
CO2 SERPL-SCNC: 25 MMOL/L (ref 22–29)
CREAT SERPL-MCNC: 2.88 MG/DL (ref 0.57–1)
DEPRECATED RDW RBC AUTO: 44.6 FL (ref 37–54)
EOSINOPHIL # BLD AUTO: 0.16 10*3/MM3 (ref 0–0.4)
EOSINOPHIL NFR BLD AUTO: 2.4 % (ref 0.3–6.2)
ERYTHROCYTE [DISTWIDTH] IN BLOOD BY AUTOMATED COUNT: 12.6 % (ref 12.3–15.4)
GFR SERPL CREATININE-BSD FRML MDRD: 16 ML/MIN/1.73
GLUCOSE SERPL-MCNC: 95 MG/DL (ref 65–99)
HBV SURFACE AG SERPL QL IA: NORMAL
HCT VFR BLD AUTO: 32.4 % (ref 34–46.6)
HGB BLD-MCNC: 10.9 G/DL (ref 12–15.9)
IMM GRANULOCYTES # BLD AUTO: 0.02 10*3/MM3 (ref 0–0.05)
IMM GRANULOCYTES NFR BLD AUTO: 0.3 % (ref 0–0.5)
INR PPP: 2.12 (ref 0.9–1.1)
LYMPHOCYTES # BLD AUTO: 0.81 10*3/MM3 (ref 0.7–3.1)
LYMPHOCYTES NFR BLD AUTO: 12.1 % (ref 19.6–45.3)
MAGNESIUM SERPL-MCNC: 2.3 MG/DL (ref 1.6–2.4)
MCH RBC QN AUTO: 32.5 PG (ref 26.6–33)
MCHC RBC AUTO-ENTMCNC: 33.6 G/DL (ref 31.5–35.7)
MCV RBC AUTO: 96.7 FL (ref 79–97)
MONOCYTES # BLD AUTO: 0.7 10*3/MM3 (ref 0.1–0.9)
MONOCYTES NFR BLD AUTO: 10.5 % (ref 5–12)
NEUTROPHILS NFR BLD AUTO: 4.95 10*3/MM3 (ref 1.7–7)
NEUTROPHILS NFR BLD AUTO: 74 % (ref 42.7–76)
NRBC BLD AUTO-RTO: 0 /100 WBC (ref 0–0.2)
PHOSPHATE SERPL-MCNC: 5.4 MG/DL (ref 2.5–4.5)
PLATELET # BLD AUTO: 183 10*3/MM3 (ref 140–450)
PMV BLD AUTO: 9.8 FL (ref 6–12)
POTASSIUM SERPL-SCNC: 3.9 MMOL/L (ref 3.5–5.2)
PROTHROMBIN TIME: 23.5 SECONDS (ref 11.7–14.2)
RBC # BLD AUTO: 3.35 10*6/MM3 (ref 3.77–5.28)
SODIUM SERPL-SCNC: 142 MMOL/L (ref 136–145)
WBC # BLD AUTO: 6.69 10*3/MM3 (ref 3.4–10.8)

## 2021-08-09 PROCEDURE — 99225 PR SBSQ OBSERVATION CARE/DAY 25 MINUTES: CPT | Performed by: INTERNAL MEDICINE

## 2021-08-09 PROCEDURE — 85025 COMPLETE CBC W/AUTO DIFF WBC: CPT | Performed by: INTERNAL MEDICINE

## 2021-08-09 PROCEDURE — 5A1D70Z PERFORMANCE OF URINARY FILTRATION, INTERMITTENT, LESS THAN 6 HOURS PER DAY: ICD-10-PCS | Performed by: INTERNAL MEDICINE

## 2021-08-09 PROCEDURE — 85610 PROTHROMBIN TIME: CPT | Performed by: INTERNAL MEDICINE

## 2021-08-09 PROCEDURE — 87340 HEPATITIS B SURFACE AG IA: CPT | Performed by: INTERNAL MEDICINE

## 2021-08-09 PROCEDURE — G0378 HOSPITAL OBSERVATION PER HR: HCPCS

## 2021-08-09 PROCEDURE — 83735 ASSAY OF MAGNESIUM: CPT | Performed by: INTERNAL MEDICINE

## 2021-08-09 PROCEDURE — 80069 RENAL FUNCTION PANEL: CPT | Performed by: INTERNAL MEDICINE

## 2021-08-09 RX ORDER — PANTOPRAZOLE SODIUM 40 MG/1
40 TABLET, DELAYED RELEASE ORAL
Status: DISCONTINUED | OUTPATIENT
Start: 2021-08-09 | End: 2021-08-13 | Stop reason: HOSPADM

## 2021-08-09 RX ORDER — ALBUMIN (HUMAN) 12.5 G/50ML
12.5 SOLUTION INTRAVENOUS AS NEEDED
Status: ACTIVE | OUTPATIENT
Start: 2021-08-09 | End: 2021-08-10

## 2021-08-09 RX ADMIN — MIDODRINE HYDROCHLORIDE 2.5 MG: 2.5 TABLET ORAL at 07:18

## 2021-08-09 RX ADMIN — HYDROCORTISONE ACETATE 25 MG: 25 SUPPOSITORY RECTAL at 08:46

## 2021-08-09 RX ADMIN — HYDROCORTISONE ACETATE 25 MG: 25 SUPPOSITORY RECTAL at 21:31

## 2021-08-09 RX ADMIN — ACETAMINOPHEN 650 MG: 325 TABLET, FILM COATED ORAL at 01:01

## 2021-08-09 RX ADMIN — MIDODRINE HYDROCHLORIDE 2.5 MG: 2.5 TABLET ORAL at 11:39

## 2021-08-09 RX ADMIN — BUSPIRONE HYDROCHLORIDE 10 MG: 10 TABLET ORAL at 08:45

## 2021-08-09 RX ADMIN — BUSPIRONE HYDROCHLORIDE 10 MG: 10 TABLET ORAL at 21:32

## 2021-08-09 RX ADMIN — MIDODRINE HYDROCHLORIDE 2.5 MG: 2.5 TABLET ORAL at 18:51

## 2021-08-09 RX ADMIN — ACETAMINOPHEN 650 MG: 325 TABLET, FILM COATED ORAL at 13:01

## 2021-08-09 RX ADMIN — PANTOPRAZOLE SODIUM 40 MG: 40 INJECTION, POWDER, FOR SOLUTION INTRAVENOUS at 08:46

## 2021-08-09 RX ADMIN — ACETAMINOPHEN 650 MG: 325 TABLET, FILM COATED ORAL at 21:31

## 2021-08-09 RX ADMIN — SODIUM CHLORIDE, PRESERVATIVE FREE 10 ML: 5 INJECTION INTRAVENOUS at 21:00

## 2021-08-09 RX ADMIN — SODIUM CHLORIDE, PRESERVATIVE FREE 10 ML: 5 INJECTION INTRAVENOUS at 08:46

## 2021-08-09 RX ADMIN — BUMETANIDE 2 MG: 2 TABLET ORAL at 08:45

## 2021-08-09 RX ADMIN — PANTOPRAZOLE SODIUM 40 MG: 40 TABLET, DELAYED RELEASE ORAL at 18:51

## 2021-08-09 RX ADMIN — POLYETHYLENE GLYCOL 3350 17 G: 17 POWDER, FOR SOLUTION ORAL at 21:32

## 2021-08-09 RX ADMIN — ESCITALOPRAM 10 MG: 10 TABLET, FILM COATED ORAL at 08:44

## 2021-08-09 NOTE — NURSING NOTE
1746  Hd without incident or c/o. Tolerated well. Removed 800 ml's as ordered. No meds ordered. No meds given. avf needles removed x 2. Hemostasis achieved. Pt. Stable, no c/o. Upon completion and return to room.

## 2021-08-09 NOTE — PROGRESS NOTES
[facility] Progress Note      Patient Name: Claudia Arnold  : 1945  MRN: [id number]  Primary Care Physician:  Robert Cortez MD  Date of admission: 2021    Subjective     Interval History:   Seen and examined on HD  Stools remain hard, but less so than yesterday  Rectal bleeding less today compared to yesterday  No shortness of breath on room air    Review of Systems:   As noted above    Objective     Vitals:   Temp:  [97.2 °F (36.2 °C)-98 °F (36.7 °C)] 98 °F (36.7 °C)  Heart Rate:  [71-86] 78  Resp:  [15-18] 18  BP: (124-156)/(63-89) 128/63    Intake/Output Summary (Last 24 hours) at 2021 1749  Last data filed at 2021 1700  Gross per 24 hour   Intake 0 ml   Output 800 ml   Net -800 ml       Physical Exam:    NAD; pleasant; oriented; looks stated age; morbidly obese; chronically ill  Qb 400, 133/84, HR 61, fluid removal goal 1.4 L  MMM; AT/NC   No eye discharge; no scleral icterus  No JVD; no carotid bruits  CTA bilat; not labored on room air  Irregularly irregular, III/VI murmur, no rub  Soft, NT, ND, BS+  Trace BLE edema  YUNIOR AVF with +bruit/thrill  + clubbing  No asterixis  Moves all extremities  Mood and affect normal    Scheduled Meds:     acetaminophen, 650 mg, Oral, TID  bumetanide, 2 mg, Oral, Daily  busPIRone, 10 mg, Oral, Q12H  escitalopram, 10 mg, Oral, Daily  hydrocortisone, 25 mg, Rectal, BID  midodrine, 2.5 mg, Oral, TID AC  pantoprazole, 40 mg, Oral, BID AC  polyethylene glycol, 17 g, Oral, Daily  sodium chloride, 10 mL, Intravenous, Q12H      IV Meds:        Results Reviewed:   I have personally reviewed the results from the time of this admission to 2021 17:49 EDT     Results from last 7 days   Lab Units 21  0535 21  1025   SODIUM mmol/L 142 143   POTASSIUM mmol/L 3.9 3.9   CHLORIDE mmol/L 104 102   CO2 mmol/L 25.0 26.8   BUN mg/dL 49* 49*   CREATININE mg/dL 2.88* 3.01*   CALCIUM mg/dL 9.4 9.7   BILIRUBIN mg/dL  --  0.4   ALK PHOS U/L   --  84   ALT (SGPT) U/L  --  14   AST (SGOT) U/L  --  15   GLUCOSE mg/dL 95 131*       Estimated Creatinine Clearance: 20.6 mL/min (A) (by C-G formula based on SCr of 2.88 mg/dL (H)).    Results from last 7 days   Lab Units 08/09/21  0535   MAGNESIUM mg/dL 2.3   PHOSPHORUS mg/dL 5.4*             Results from last 7 days   Lab Units 08/09/21  0535 08/08/21  1025   WBC 10*3/mm3 6.69 8.23   HEMOGLOBIN g/dL 10.9* 12.0   PLATELETS 10*3/mm3 183 213       Results from last 7 days   Lab Units 08/09/21  0535 08/08/21  1644 08/08/21  1025   INR  2.12* 2.24* 2.22*       Assessment / Plan     ASSESSMENT:  1. ESRD on MWF at [place] via YUNIOR AVF.  HD underway now.  Electrolytes and volume both fine.   2. Rectal bleeding; has history of bleeding internal and external hemorrhoids. She follows with Dr. Zacarias; EGD and colonoscopy in January 2021 showed esophagitis, duodenitis, diverticulosis, and hemorrhoids. Capsule endoscopy was recommended if she had further bleeding. Hgb has fallen compared to yesterday  3. Paroxysmal Afib anticoagulated with wafarin; INR is therapeutic. History of cryomaze and left atrial appendage ligation with valvular replacement in January 2019. She also required cardioversion in February 2019.  4. Valvular heart disease s/p AVR and MVR in January 2019. LEE in May 2021 was concerning for severe bioprosthetic mitral stenosis, severe TR, and moderate pulmonary hypertension. She has follow-up with Dr. Segura next month.  5. Chronic diastolic heart failure; compensated by fluid removal with dialysis and 2 mg bumetanide daily.  6. Hypotension on chronic midodrine 2.5 mg TID.  7. Immobility related to osteoarthritis and not a candidate for surgery. She receives steroids injections for pain control. She uses both a walker and wheelchair at times to ambulate.    PLAN:  1.  HD underway now with modest fluid removal  2.  GI evaluation underway; there is consideration for repeat colonoscopy later this week  if bleeding continues    Thank you for involving us in the care of Claudia GABRIELLE Arnold.  Please feel free to call with any questions.    Valentin Gabriel MD  08/09/21  17:49 EDT    [facility]  [phone]      Much of this encounter note is an electronic transcription/translation of spoken language to printed text. The electronic translation of spoken language may permit erroneous, or at times, nonsensical words or phrases to be inadvertently transcribed; Although I have reviewed the note for such errors, some may still exist.

## 2021-08-09 NOTE — DISCHARGE PLACEMENT REQUEST
"Jose Carlos Arnold (76 y.o. Female)     Date of Birth Social Security Number Address Home Phone MRN    1945  [id number]  [facility] [address] [phone] [id number]    Y[employer] Marital Status          None        Admission Date Admission Type Admitting Provider Attending Provider Department, Room/Bed    8/8/21 Emergency Jj Harmon DO Hinsberg, Francis J, DO [address], E448/1    Discharge Date Discharge Disposition Discharge Destination                       Attending Provider: Jj Harmon DO    Allergies: No Known Allergies    Isolation: None   Infection: None   Code Status: CPR    Ht: 170.2 cm (67\")   Wt: 104 kg (228 lb 3.2 oz)    Admission Cmt: None   Principal Problem: None                Active Insurance as of 8/8/2021     Primary Coverage     Payor Plan Insurance Group Employer/Plan Group    MEDICARE MEDICARE A & B      Payor Plan Address Payor Plan Phone Number Payor Plan Fax Number Effective Dates    [address] [phone]  7/1/2010 - None Entered    [facility] [id number]       Subscriber Name Subscriber Birth Date Member ID       ANDREWJOSE CARLOS ANTHONY 1945 [id number]           Secondary Coverage     Payor Plan Insurance Group Employer/Plan Group    [employer]ET[employer] COMMERCIAL AETNA [id number]     Payor Plan Address Payor Plan Phone Number Payor Plan Fax Number Effective Dates    [address] [phone]  1/1/2013 - None Entered    [facility] [id number]       Subscriber Name Subscriber Birth Date Member ID       KRISHAN ARNOLD 10/25/1943 [id number]                 Emergency Contacts      (Rel.) Home Phone Work Phone Mobile Phone    Leyla Cobos (Daughter) [phone] -- [phone]    Krishan Arnold (Spouse) [phone] -- --    MONSE ARNOLD (Son) -- -- [phone]              "

## 2021-08-09 NOTE — PLAN OF CARE
Goal Outcome Evaluation:  Plan of Care Reviewed With: patient        Progress: no change    Patient alert and oriented x4 able to make all needs known. VSS at this time. Bright red blood noted with BM.  Suppository given as ordered.  No s/s of distress noted at this time.  All safety measures in place.  Will continue to monitor.

## 2021-08-09 NOTE — PROGRESS NOTES
[facility]    PROGRESS NOTE    Name:  Claudia Arnold   Age:  76 y.o.  Sex:  female  :  1945  MRN:  [id number]   Visit Number:  [id number]  Admission Date:  2021  Date Of Service:  21  Primary Care Physician:  Robert Cortez MD     LOS: 0 days :  Patient Care Team:  Robert Cortez MD as PCP - General (Family Medicine)  Deborah Agudelo MD as Surgeon (Orthopedic Surgery)  Scott Segura MD as Consulting Physician (Cardiology)  Scar Gaxiola MD as Surgeon (Cardiothoracic Surgery)  Kathy Osuna MD as Consulting Physician (Nephrology)  Dora Astorga APRN as Nurse Practitioner (Neurosurgery)  Satish Stahl MD as Consulting Physician (Vascular Surgery)  Scar Gaxiola MD as Surgeon (Cardiothoracic Surgery):    History taken from:     patient chart family    Chief Complaint:      Bloody stools.    Subjective     Interval History:     Patient seen and examined again today.    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 fib on chronic warfarin, essential hypertension, hyperlipidemia, chronic diastolic CHF who comes in with 2 weeks of off-and-on bleeding.    She came into the emergency room because of the bleeding.  Her hemoglobin is now 10.9.  INR is still 2.12.  Gastroenterology has been consulted, they recommended Anusol and MiraLAX.  They may want to do colonoscopy once her INR is down.  Warfarin is being held.    Nephrology is consulted, she is due for hemodialysis .   This will be done today.     She has no other symptoms aside from the bleeding.  She denies chest pressure, shortness of breath, lightheadedness, nausea, vomiting, pain.    Spoke to the patient and her , answered all questions.    Review of Systems:     All systems were reviewed and negative except for:   Gastrointestinal: positive for  bright red blood per rectum    Objective     Vital Signs:    Temp:  [97.2 °F (36.2 °C)-97.8 °F (36.6 °C)] 97.6 °F (36.4 °C)  Heart Rate:  [73-86] 86  Resp:  [15-16] 16  BP: (125-156)/(72-89) 156/89    Physical Exam:    General: Alert and oriented x4, mild distress.  Heart: Regular rate and rhythm without murmur rub or thrill.  Lungs: Clear to auscultation bilaterally without use of accessory muscles respiration.  Abdomen: Soft/nontender/nondistended.  No HSM noted.  MSK: 5/5 strength in upper/lower extremities bilaterally.     Results Review:      I reviewed the patient's new clinical results.  I reviewed the patient's new imaging results and agree with the interpretation.  I reviewed the patient's other test results and agree with the interpretation    Labs:    Lab Results (last 24 hours)     Procedure Component Value Units Date/Time    Hepatitis B Surface Antigen [[id number]]  (Normal) Collected: 08/09/21 0949    Specimen: Blood Updated: 08/09/21 1054     Hepatitis B Surface Ag Non-Reactive    Renal Function Panel [[id number]]  (Abnormal) Collected: 08/09/21 0535    Specimen: Blood Updated: 08/09/21 0642     Glucose 95 mg/dL      BUN 49 mg/dL      Creatinine 2.88 mg/dL      Sodium 142 mmol/L      Potassium 3.9 mmol/L      Chloride 104 mmol/L      CO2 25.0 mmol/L      Calcium 9.4 mg/dL      Albumin 3.70 g/dL      Phosphorus 5.4 mg/dL      Anion Gap 13.0 mmol/L      BUN/Creatinine Ratio 17.0     eGFR Non African Amer 16 mL/min/1.73     Narrative:      GFR Normal >60  Chronic Kidney Disease <60  Kidney Failure <15      Magnesium [[id number]]  (Normal) Collected: 08/09/21 0535    Specimen: Blood Updated: 08/09/21 0642     Magnesium 2.3 mg/dL     Protime-INR [[id number]]  (Abnormal) Collected: 08/09/21 0535    Specimen: Blood Updated: 08/09/21 0630     Protime 23.5 Seconds      INR 2.12    CBC Auto Differential [[id number]]  (Abnormal) Collected: 08/09/21 0535    Specimen: Blood Updated:  08/09/21 0619     WBC 6.69 10*3/mm3      RBC 3.35 10*6/mm3      Hemoglobin 10.9 g/dL      Hematocrit 32.4 %      MCV 96.7 fL      MCH 32.5 pg      MCHC 33.6 g/dL      RDW 12.6 %      RDW-SD 44.6 fl      MPV 9.8 fL      Platelets 183 10*3/mm3      Neutrophil % 74.0 %      Lymphocyte % 12.1 %      Monocyte % 10.5 %      Eosinophil % 2.4 %      Basophil % 0.7 %      Immature Grans % 0.3 %      Neutrophils, Absolute 4.95 10*3/mm3      Lymphocytes, Absolute 0.81 10*3/mm3      Monocytes, Absolute 0.70 10*3/mm3      Eosinophils, Absolute 0.16 10*3/mm3      Basophils, Absolute 0.05 10*3/mm3      Immature Grans, Absolute 0.02 10*3/mm3      nRBC 0.0 /100 WBC     Protime-INR [[id number]]  (Abnormal) Collected: 08/08/21 1644    Specimen: Blood Updated: 08/08/21 1730     Protime 24.5 Seconds      INR 2.24           Radiology:    Imaging Results (Last 24 Hours)     ** No results found for the last 24 hours. **          Medication Review:     acetaminophen, 650 mg, Oral, TID  bumetanide, 2 mg, Oral, Daily  busPIRone, 10 mg, Oral, Q12H  escitalopram, 10 mg, Oral, Daily  hydrocortisone, 25 mg, Rectal, BID  midodrine, 2.5 mg, Oral, TID AC  pantoprazole, 40 mg, Intravenous, Q12H  polyethylene glycol, 17 g, Oral, Daily  sodium chloride, 10 mL, Intravenous, Q12H             Assessment/Plan     Active Problems:    Hematochezia      1.  Acute GI bleeding, suspected to be hemorrhoidal.  2.  End-stage renal disease on hemodialysis.  3.  Paroxysmal atrial fibrillation on warfarin  4.  Coronary artery disease, stable  5.  History of bioprosthetic AVR/MVR  6.  Essential hypertension  7.  Chronic pain of both shoulders.    Plan:    Monitor blood work.  Continue to hold warfarin.  Anusol and MiraLAX per GI.  Patient may need colonoscopy.  Dialysis today.  Further recommendations will depend on clinical course.    Jj Harmon DO  08/09/21  12:41 EDT

## 2021-08-09 NOTE — PROGRESS NOTES
[facility]  Inpatient Progress Note    Reason for Follow Up:  Rectal bleeding    Subjective     Interval History:   No pain today.  Did have a bowel movement this morning that had some blood.  She says it was a lot.  However, went 24 hours in between bowel movements.  Tolerating diet.      Current Facility-Administered Medications:   •  acetaminophen (TYLENOL) tablet 650 mg, 650 mg, Oral, Q4H PRN **OR** acetaminophen (TYLENOL) 160 MG/5ML solution 650 mg, 650 mg, Oral, Q4H PRN **OR** acetaminophen (TYLENOL) suppository 650 mg, 650 mg, Rectal, Q4H PRN, Jj Harmon, DO  •  acetaminophen (TYLENOL) tablet 650 mg, 650 mg, Oral, TID, Jj Harmon, DO, 650 mg at 08/09/21 0101  •  bumetanide (BUMEX) tablet 2 mg, 2 mg, Oral, Daily, Jj Harmon, DO, 2 mg at 08/09/21 0845  •  busPIRone (BUSPAR) tablet 10 mg, 10 mg, Oral, Q12H, Jj Harmon, DO, 10 mg at 08/09/21 0845  •  calcium carbonate (TUMS) chewable tablet 500 mg (200 mg elemental), 2 tablet, Oral, TID PRN, Jj Harmon, DO  •  escitalopram (LEXAPRO) tablet 10 mg, 10 mg, Oral, Daily, Jj Harmon, DO, 10 mg at 08/09/21 0844  •  hydrocortisone (ANUSOL-HC) suppository 25 mg, 25 mg, Rectal, BID, Shiloh Pop MD, 25 mg at 08/09/21 0846  •  midodrine (PROAMATINE) tablet 2.5 mg, 2.5 mg, Oral, TID AC, Jj Harmon, DO, 2.5 mg at 08/09/21 0718  •  nitroglycerin (NITROSTAT) SL tablet 0.4 mg, 0.4 mg, Sublingual, Q5 Min PRN, Jj Harmon, DO  •  ondansetron (ZOFRAN) injection 4 mg, 4 mg, Intravenous, Q6H PRN, Jj Harmon, DO  •  pantoprazole (PROTONIX) injection 40 mg, 40 mg, Intravenous, Q12H, Jj Harmon DO, 40 mg at 08/09/21 0846  •  polyethylene glycol (MIRALAX) packet 17 g, 17 g, Oral, Daily, Shiloh Pop MD, 17 g at 08/09/21 0844  •  [COMPLETED] Insert peripheral IV, , , Once **AND** sodium chloride 0.9 % flush 10 mL, 10 mL, Intravenous, PRN, Jose Maher PA  •   sodium chloride 0.9 % flush 10 mL, 10 mL, Intravenous, Q12H, Jj Harmon, , 10 mL at 08/09/21 0846  •  sodium chloride 0.9 % flush 10 mL, 10 mL, Intravenous, PRN, Jj Harmon,   Review of Systems:   All systems reviewed and negative except for: GI: Bright red blood per rectum      Objective     Vital Signs  Temp:  [97.3 °F (36.3 °C)-97.8 °F (36.6 °C)] 97.3 °F (36.3 °C)  Heart Rate:  [73-96] 73  Resp:  [15-16] 16  BP: (114-152)/(72-91) 130/82  Body mass index is 35.74 kg/m².    Intake/Output Summary (Last 24 hours) at 8/9/2021 0854  Last data filed at 8/8/2021 1944  Gross per 24 hour   Intake 120 ml   Output --   Net 120 ml     No intake/output data recorded.     Physical Exam:   General: patient awake, alert and cooperative   Eyes: Normal lids and lashes, no scleral icterus   Neck: supple, normal ROM   Skin: warm and dry, not jaundiced   Cardiovascular: regular rhythm and rate, no murmurs auscultated   Pulm: clear to auscultation bilaterally, regular and unlabored   Abdomen: soft, obese, nontender, nondistended; normal bowel sounds   Rectal: deferred   Extremities: no rash or edema   Psychiatric: Normal mood and behavior; memory intact     Results Review:     I reviewed the patient's new clinical results.    Results from last 7 days   Lab Units 08/09/21  0535 08/08/21  1025   WBC 10*3/mm3 6.69 8.23   HEMOGLOBIN g/dL 10.9* 12.0   HEMATOCRIT % 32.4* 37.1   PLATELETS 10*3/mm3 183 213     Results from last 7 days   Lab Units 08/09/21  0535 08/08/21  1025   SODIUM mmol/L 142 143   POTASSIUM mmol/L 3.9 3.9   CHLORIDE mmol/L 104 102   CO2 mmol/L 25.0 26.8   BUN mg/dL 49* 49*   CREATININE mg/dL 2.88* 3.01*   CALCIUM mg/dL 9.4 9.7   BILIRUBIN mg/dL  --  0.4   ALK PHOS U/L  --  84   ALT (SGPT) U/L  --  14   AST (SGOT) U/L  --  15   GLUCOSE mg/dL 95 131*     Results from last 7 days   Lab Units 08/09/21  0535 08/08/21  1644 08/08/21  1025   INR  2.12* 2.24* 2.22*     No results found for:  LIPASE    Radiology:  No orders to display       Assessment/Plan     Patient Active Problem List   Diagnosis   • Tear of rotator cuff   • Hypertension   • Generalized anxiety disorder   • Hyperlipidemia   • IFG (impaired fasting glucose)   • Heart murmur, systolic   • Shoulder pain, bilateral   • Pain of both shoulder joints   • Chronic pain of both shoulders   • Acute congestive heart failure (CMS/[facility])   • Obesity, morbid, BMI 50 or higher (CMS/[facility])   • Fungal skin infection   • Cellulitis of lower extremity   • Aortic valve stenosis   • Tricuspid valve insufficiency   • Mitral valve stenosis   • S/P MVR (mitral valve replacement)   • Paroxysmal atrial fibrillation (CMS/[facility])   • Pulmonary hypertension (CMS/[facility])   • Stage 5 chronic kidney disease on chronic dialysis (CMS/[facility])   • Gastroesophageal reflux disease without esophagitis   • Chronic idiopathic constipation   • Dependence on renal dialysis (CMS/[facility])   • Chronic kidney disease, stage 2 (mild)   • Renovascular hypertension   • GI bleed   • 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 (CMS/[facility])   • CAD (coronary artery disease)   • S/P AVR (aortic valve replacement)   • Chronic diastolic CHF (congestive heart failure) (CMS/[facility])   • Chronic pain of both shoulders   • Gastrointestinal hemorrhage   • Gastrointestinal hemorrhage with melena   • Immobility syndrome   • Hematochezia       Impression  1.  Rectal bleeding: Suspected hemorrhoidal given long periods of time in between episodes of bleeding (i.e., she went 24 hours between bowel movements which makes this less likely a diverticular bleed)    2.  Internal and external hemorrhoids : Suspected source for #1    3.  Anticoagulated for atrial fibrillation: Warfarin currently held, INR still greater than 2    4.  Acute blood loss anemia: With #1 and #2    Plan  Continue to monitor stools.  If she does have a pickup with frequency of GI bleeding, may need to consider repeating her colonoscopy when her  INR gets less than 1.8.  Also could consider evaluation by colorectal surgery for definitive hemorrhoidal management    Follow hemoglobin    Continue to hold warfarin for the short-term    Continue Anusol, MiraLAX    I discussed the patients findings and my recommendations with patient and nursing staff.    All necessary PPE, including face mask and eye protection, were worn during this encounter.  Hand sanitization was performed both before and after the patient interaction.    Over 25 minutes was spent reviewing the patient's chart and records, in discussion with the patient, in examination of the patient, and in discussion with members of the patient's medical team.    Ksenia Buchanan MD

## 2021-08-09 NOTE — PLAN OF CARE
Problem: Adult Inpatient Plan of Care  Goal: Plan of Care Review  Outcome: Ongoing, Progressing  Flowsheets (Taken 8/9/2021 1645)  Progress: no change  Plan of Care Reviewed With: patient  Outcome Summary: VSS. C/o mild shoulder pain, relieved with tylenol. +BM, formed and brown with blood in BSC bucket. Currently in dialysis. Will continue to monitor.

## 2021-08-10 ENCOUNTER — PREP FOR SURGERY (OUTPATIENT)
Dept: OTHER | Facility: HOSPITAL | Age: 76
End: 2021-08-10

## 2021-08-10 DIAGNOSIS — K92.1 HEMATOCHEZIA: Primary | ICD-10-CM

## 2021-08-10 LAB
ALBUMIN SERPL-MCNC: 3.6 G/DL (ref 3.5–5.2)
ANION GAP SERPL CALCULATED.3IONS-SCNC: 10.8 MMOL/L (ref 5–15)
BASOPHILS # BLD AUTO: 0.04 10*3/MM3 (ref 0–0.2)
BASOPHILS NFR BLD AUTO: 0.5 % (ref 0–1.5)
BUN SERPL-MCNC: 21 MG/DL (ref 8–23)
BUN/CREAT SERPL: 11.7 (ref 7–25)
CALCIUM SPEC-SCNC: 9.3 MG/DL (ref 8.6–10.5)
CHLORIDE SERPL-SCNC: 101 MMOL/L (ref 98–107)
CO2 SERPL-SCNC: 26.2 MMOL/L (ref 22–29)
CREAT SERPL-MCNC: 1.8 MG/DL (ref 0.57–1)
DEPRECATED RDW RBC AUTO: 45.5 FL (ref 37–54)
EOSINOPHIL # BLD AUTO: 0.17 10*3/MM3 (ref 0–0.4)
EOSINOPHIL NFR BLD AUTO: 2.3 % (ref 0.3–6.2)
ERYTHROCYTE [DISTWIDTH] IN BLOOD BY AUTOMATED COUNT: 12.4 % (ref 12.3–15.4)
GFR SERPL CREATININE-BSD FRML MDRD: 27 ML/MIN/1.73
GLUCOSE SERPL-MCNC: 102 MG/DL (ref 65–99)
HCT VFR BLD AUTO: 34 % (ref 34–46.6)
HGB BLD-MCNC: 10.8 G/DL (ref 12–15.9)
IMM GRANULOCYTES # BLD AUTO: 0.02 10*3/MM3 (ref 0–0.05)
IMM GRANULOCYTES NFR BLD AUTO: 0.3 % (ref 0–0.5)
INR PPP: 2.16 (ref 0.9–1.1)
LYMPHOCYTES # BLD AUTO: 0.86 10*3/MM3 (ref 0.7–3.1)
LYMPHOCYTES NFR BLD AUTO: 11.6 % (ref 19.6–45.3)
MAGNESIUM SERPL-MCNC: 2.1 MG/DL (ref 1.6–2.4)
MCH RBC QN AUTO: 31.9 PG (ref 26.6–33)
MCHC RBC AUTO-ENTMCNC: 31.8 G/DL (ref 31.5–35.7)
MCV RBC AUTO: 100.3 FL (ref 79–97)
MONOCYTES # BLD AUTO: 0.88 10*3/MM3 (ref 0.1–0.9)
MONOCYTES NFR BLD AUTO: 11.8 % (ref 5–12)
NEUTROPHILS NFR BLD AUTO: 5.47 10*3/MM3 (ref 1.7–7)
NEUTROPHILS NFR BLD AUTO: 73.5 % (ref 42.7–76)
NRBC BLD AUTO-RTO: 0 /100 WBC (ref 0–0.2)
PHOSPHATE SERPL-MCNC: 3.3 MG/DL (ref 2.5–4.5)
PLATELET # BLD AUTO: 164 10*3/MM3 (ref 140–450)
PMV BLD AUTO: 10.5 FL (ref 6–12)
POTASSIUM SERPL-SCNC: 3.8 MMOL/L (ref 3.5–5.2)
PROTHROMBIN TIME: 23.8 SECONDS (ref 11.7–14.2)
RBC # BLD AUTO: 3.39 10*6/MM3 (ref 3.77–5.28)
SODIUM SERPL-SCNC: 138 MMOL/L (ref 136–145)
WBC # BLD AUTO: 7.44 10*3/MM3 (ref 3.4–10.8)

## 2021-08-10 PROCEDURE — 80069 RENAL FUNCTION PANEL: CPT | Performed by: INTERNAL MEDICINE

## 2021-08-10 PROCEDURE — 83735 ASSAY OF MAGNESIUM: CPT | Performed by: INTERNAL MEDICINE

## 2021-08-10 PROCEDURE — 85610 PROTHROMBIN TIME: CPT | Performed by: INTERNAL MEDICINE

## 2021-08-10 PROCEDURE — 85025 COMPLETE CBC W/AUTO DIFF WBC: CPT | Performed by: INTERNAL MEDICINE

## 2021-08-10 PROCEDURE — 99222 1ST HOSP IP/OBS MODERATE 55: CPT | Performed by: COLON & RECTAL SURGERY

## 2021-08-10 PROCEDURE — 99225 PR SBSQ OBSERVATION CARE/DAY 25 MINUTES: CPT | Performed by: NURSE PRACTITIONER

## 2021-08-10 RX ORDER — CEFAZOLIN SODIUM 2 G/100ML
2 INJECTION, SOLUTION INTRAVENOUS ONCE
Status: CANCELLED | OUTPATIENT
Start: 2021-08-12 | End: 2021-08-10

## 2021-08-10 RX ORDER — SODIUM CHLORIDE 0.9 % (FLUSH) 0.9 %
3-10 SYRINGE (ML) INJECTION AS NEEDED
Status: CANCELLED | OUTPATIENT
Start: 2021-08-12

## 2021-08-10 RX ORDER — ROPINIROLE 0.5 MG/1
0.25 TABLET, FILM COATED ORAL NIGHTLY
Status: DISCONTINUED | OUTPATIENT
Start: 2021-08-10 | End: 2021-08-13 | Stop reason: HOSPADM

## 2021-08-10 RX ORDER — PHYTONADIONE 5 MG/1
2.5 TABLET ORAL ONCE
Status: COMPLETED | OUTPATIENT
Start: 2021-08-10 | End: 2021-08-10

## 2021-08-10 RX ORDER — POLYETHYLENE GLYCOL 3350 17 G/17G
17 POWDER, FOR SOLUTION ORAL
Status: DISCONTINUED | OUTPATIENT
Start: 2021-08-10 | End: 2021-08-12

## 2021-08-10 RX ORDER — SODIUM CHLORIDE 0.9 % (FLUSH) 0.9 %
3 SYRINGE (ML) INJECTION EVERY 12 HOURS SCHEDULED
Status: CANCELLED | OUTPATIENT
Start: 2021-08-12

## 2021-08-10 RX ORDER — UREA 10 %
3 LOTION (ML) TOPICAL NIGHTLY PRN
Status: DISCONTINUED | OUTPATIENT
Start: 2021-08-10 | End: 2021-08-13 | Stop reason: HOSPADM

## 2021-08-10 RX ADMIN — ACETAMINOPHEN 650 MG: 325 TABLET, FILM COATED ORAL at 02:04

## 2021-08-10 RX ADMIN — ROPINIROLE HYDROCHLORIDE 0.25 MG: 0.5 TABLET, FILM COATED ORAL at 20:53

## 2021-08-10 RX ADMIN — ACETAMINOPHEN 650 MG: 325 TABLET, FILM COATED ORAL at 12:05

## 2021-08-10 RX ADMIN — ACETAMINOPHEN 650 MG: 325 TABLET, FILM COATED ORAL at 20:53

## 2021-08-10 RX ADMIN — BUSPIRONE HYDROCHLORIDE 10 MG: 10 TABLET ORAL at 20:53

## 2021-08-10 RX ADMIN — MIDODRINE HYDROCHLORIDE 2.5 MG: 2.5 TABLET ORAL at 12:05

## 2021-08-10 RX ADMIN — BUSPIRONE HYDROCHLORIDE 10 MG: 10 TABLET ORAL at 08:34

## 2021-08-10 RX ADMIN — Medication 3 MG: at 20:53

## 2021-08-10 RX ADMIN — PHYTONADIONE 2.5 MG: 5 TABLET ORAL at 18:36

## 2021-08-10 RX ADMIN — PANTOPRAZOLE SODIUM 40 MG: 40 TABLET, DELAYED RELEASE ORAL at 06:53

## 2021-08-10 RX ADMIN — HYDROCORTISONE ACETATE 25 MG: 25 SUPPOSITORY RECTAL at 08:34

## 2021-08-10 RX ADMIN — SODIUM CHLORIDE, PRESERVATIVE FREE 10 ML: 5 INJECTION INTRAVENOUS at 20:54

## 2021-08-10 RX ADMIN — PANTOPRAZOLE SODIUM 40 MG: 40 TABLET, DELAYED RELEASE ORAL at 17:17

## 2021-08-10 RX ADMIN — BUMETANIDE 2 MG: 2 TABLET ORAL at 08:34

## 2021-08-10 RX ADMIN — SODIUM CHLORIDE, PRESERVATIVE FREE 10 ML: 5 INJECTION INTRAVENOUS at 08:34

## 2021-08-10 RX ADMIN — MIDODRINE HYDROCHLORIDE 2.5 MG: 2.5 TABLET ORAL at 06:53

## 2021-08-10 RX ADMIN — POLYETHYLENE GLYCOL 3350 17 G: 17 POWDER, FOR SOLUTION ORAL at 18:36

## 2021-08-10 RX ADMIN — MIDODRINE HYDROCHLORIDE 2.5 MG: 2.5 TABLET ORAL at 17:17

## 2021-08-10 RX ADMIN — POLYETHYLENE GLYCOL 3350 17 G: 17 POWDER, FOR SOLUTION ORAL at 08:34

## 2021-08-10 RX ADMIN — ESCITALOPRAM 10 MG: 10 TABLET, FILM COATED ORAL at 08:34

## 2021-08-10 NOTE — PROGRESS NOTES
[facility]    PROGRESS NOTE    Name:  Claudia Arnold   Age:  76 y.o.  Sex:  female  :  1945  MRN:  [id number]   Visit Number:  [id number]  Admission Date:  2021  Date Of Service:  08/10/21  Primary Care Physician:  Robert Cortez MD     LOS: 0 days :  Patient Care Team:  Robert Cortez MD as PCP - General (Family Medicine)  Deborah Agudelo MD as Surgeon (Orthopedic Surgery)  Scott Segura MD as Consulting Physician (Cardiology)  Scar Gaxiola MD as Surgeon (Cardiothoracic Surgery)  Kathy Osuna MD as Consulting Physician (Nephrology)  Dora Astorga APRN as Nurse Practitioner (Neurosurgery)  Satish Stahl MD as Consulting Physician (Vascular Surgery)  Scar Gaxiola MD as Surgeon (Cardiothoracic Surgery):    History taken from:     patient chart family    Chief Complaint:      Bloody stools.    Subjective     Interval History:     Patient seen and examined again today.    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 fib on chronic warfarin, essential hypertension, hyperlipidemia, chronic diastolic CHF who comes in with 2 weeks of off-and-on bleeding.    She came into the emergency room because of the bleeding.  Her hemoglobin is now 10.9.  INR is still 2.12.  Gastroenterology has been consulted, they recommended Anusol and MiraLAX.      Nephrology is consulted, she is due for hemodialysis .   They are following.     She has no other symptoms aside from the bleeding.  She denies chest pressure, shortness of breath, lightheadedness, nausea, vomiting, pain.    Gastroenterology is considering sigmoidoscopy.  They also have consulted colorectal surgery for possible intervention.  Continue to hold warfarin.  INR is 2.16 today.    Review of Systems:     All systems were reviewed and negative  except for:  Gastrointestinal: positive for  bright red blood per rectum    Objective     Vital Signs:    Temp:  [97.4 °F (36.3 °C)-98 °F (36.7 °C)] 97.4 °F (36.3 °C)  Heart Rate:  [71-81] 81  Resp:  [16-18] 18  BP: (115-133)/(63-79) 115/66    Physical Exam:    General: Alert and oriented x4, mild distress.  Heart: Regular rate and rhythm without murmur rub or thrill.  Lungs: Clear to auscultation bilaterally without use of accessory muscles respiration.  Abdomen: Soft/nontender/nondistended.  No HSM noted.  MSK: 5/5 strength in upper/lower extremities bilaterally.     Results Review:      I reviewed the patient's new clinical results.  I reviewed the patient's new imaging results and agree with the interpretation.  I reviewed the patient's other test results and agree with the interpretation    Labs:    Lab Results (last 24 hours)     Procedure Component Value Units Date/Time    Renal Function Panel [[id number]]  (Abnormal) Collected: 08/10/21 0303    Specimen: Blood Updated: 08/10/21 0531     Glucose 102 mg/dL      BUN 21 mg/dL      Creatinine 1.80 mg/dL      Sodium 138 mmol/L      Potassium 3.8 mmol/L      Chloride 101 mmol/L      CO2 26.2 mmol/L      Calcium 9.3 mg/dL      Albumin 3.60 g/dL      Phosphorus 3.3 mg/dL      Anion Gap 10.8 mmol/L      BUN/Creatinine Ratio 11.7     eGFR Non African Amer 27 mL/min/1.73     Narrative:      GFR Normal >60  Chronic Kidney Disease <60  Kidney Failure <15      Magnesium [[id number]]  (Normal) Collected: 08/10/21 0303    Specimen: Blood Updated: 08/10/21 0530     Magnesium 2.1 mg/dL     Protime-INR [[id number]]  (Abnormal) Collected: 08/10/21 0303    Specimen: Blood Updated: 08/10/21 0518     Protime 23.8 Seconds      INR 2.16    CBC & Differential [[id number]]  (Abnormal) Collected: 08/10/21 0303    Specimen: Blood Updated: 08/10/21 0511    Narrative:      The following orders were created for panel order CBC & Differential.  Procedure                               Abnormality          Status                     ---------                               -----------         ------                     CBC Auto Differential[[id number]]        Abnormal            Final result                 Please view results for these tests on the individual orders.    CBC Auto Differential [[id number]]  (Abnormal) Collected: 08/10/21 0303    Specimen: Blood Updated: 08/10/21 0511     WBC 7.44 10*3/mm3      RBC 3.39 10*6/mm3      Hemoglobin 10.8 g/dL      Hematocrit 34.0 %      .3 fL      MCH 31.9 pg      MCHC 31.8 g/dL      RDW 12.4 %      RDW-SD 45.5 fl      MPV 10.5 fL      Platelets 164 10*3/mm3      Neutrophil % 73.5 %      Lymphocyte % 11.6 %      Monocyte % 11.8 %      Eosinophil % 2.3 %      Basophil % 0.5 %      Immature Grans % 0.3 %      Neutrophils, Absolute 5.47 10*3/mm3      Lymphocytes, Absolute 0.86 10*3/mm3      Monocytes, Absolute 0.88 10*3/mm3      Eosinophils, Absolute 0.17 10*3/mm3      Basophils, Absolute 0.04 10*3/mm3      Immature Grans, Absolute 0.02 10*3/mm3      nRBC 0.0 /100 WBC            Radiology:    Imaging Results (Last 24 Hours)     ** No results found for the last 24 hours. **          Medication Review:     acetaminophen, 650 mg, Oral, TID  busPIRone, 10 mg, Oral, Q12H  escitalopram, 10 mg, Oral, Daily  hydrocortisone, 25 mg, Rectal, BID  midodrine, 2.5 mg, Oral, TID AC  pantoprazole, 40 mg, Oral, BID AC  polyethylene glycol, 17 g, Oral, Daily  rOPINIRole, 0.25 mg, Oral, Nightly  sodium chloride, 10 mL, Intravenous, Q12H             Assessment/Plan     Active Problems:    Hematochezia      1.  Acute GI bleeding, suspected to be hemorrhoidal.  2.  End-stage renal disease on hemodialysis.  3.  Paroxysmal atrial fibrillation on warfarin  4.  Coronary artery disease, stable  5.  History of bioprosthetic AVR/MVR  6.  Essential hypertension  7.  Chronic pain of both shoulders.    Plan:    Monitor blood work.  Continue to hold warfarin.  Anusol and MiraLAX per GI.  Colorectal  surgery consulted.  Dialysis today.  Further recommendations will depend on clinical course.    Jj Harmon,   08/10/21  14:04 EDT

## 2021-08-10 NOTE — PROGRESS NOTES
[facility] Progress Note      Patient Name: Claudia Arnold  : 1945  MRN: [id number]  Primary Care Physician:  Robert Cortez MD  Date of admission: 2021    Subjective     Interval History:   Follow up ESRD. Tolerated dialysis yesterday.  Takes midodrine daily at home.  Still with rectal bleeding. No stool this am, just blood, but less.  Stool still hard. Getting BID anusol suppositories. Complains of insomnia, restless legs on requip at home .  Denies soa .    Review of Systems:   As noted above    Objective     Vitals:   Temp:  [97.4 °F (36.3 °C)-98 °F (36.7 °C)] 97.6 °F (36.4 °C)  Heart Rate:  [71-86] 71  Resp:  [16-18] 18  BP: (123-156)/(63-89) 133/77    Intake/Output Summary (Last 24 hours) at 8/10/2021 1011  Last data filed at 8/10/2021 0400  Gross per 24 hour   Intake 0 ml   Output 800 ml   Net -800 ml       Physical Exam:    General Appearance: alert, oriented x 3, no acute distress .  Very mild dyspnea after standing for weight .  Skin: warm and dry  HEENT: oral mucosa normal, nonicteric sclera  Neck: supple, no JVD  Lungs: Clear to auscultation, no wheezing .  Heart: RRR, normal S1 and S2  Abdomen: soft, nontender, nondistended. + bs.   : no palpable bladder  Extremities LUE AVF patent .  Trace lower ext edema. Venous stasis changes.   Neuro: normal speech and mental status     Scheduled Meds:     acetaminophen, 650 mg, Oral, TID  busPIRone, 10 mg, Oral, Q12H  escitalopram, 10 mg, Oral, Daily  hydrocortisone, 25 mg, Rectal, BID  midodrine, 2.5 mg, Oral, TID AC  pantoprazole, 40 mg, Oral, BID AC  polyethylene glycol, 17 g, Oral, Daily  rOPINIRole, 0.25 mg, Oral, Nightly  sodium chloride, 10 mL, Intravenous, Q12H      IV Meds:        Results Reviewed:   I have personally reviewed the results from the time of this admission to 8/10/2021 10:11 EDT     Results from last 7 days   Lab Units 08/10/21  0303 21  0535 21  1025   SODIUM mmol/L 138 142 143    POTASSIUM mmol/L 3.8 3.9 3.9   CHLORIDE mmol/L 101 104 102   CO2 mmol/L 26.2 25.0 26.8   BUN mg/dL 21 49* 49*   CREATININE mg/dL 1.80* 2.88* 3.01*   CALCIUM mg/dL 9.3 9.4 9.7   BILIRUBIN mg/dL  --   --  0.4   ALK PHOS U/L  --   --  84   ALT (SGPT) U/L  --   --  14   AST (SGOT) U/L  --   --  15   GLUCOSE mg/dL 102* 95 131*       Estimated Creatinine Clearance: 31.3 mL/min (A) (by C-G formula based on SCr of 1.8 mg/dL (H)).    Results from last 7 days   Lab Units 08/10/21  0303 08/09/21  0535   MAGNESIUM mg/dL 2.1 2.3   PHOSPHORUS mg/dL 3.3 5.4*             Results from last 7 days   Lab Units 08/10/21  0303 08/09/21  0535 08/08/21  1025   WBC 10*3/mm3 7.44 6.69 8.23   HEMOGLOBIN g/dL 10.8* 10.9* 12.0   PLATELETS 10*3/mm3 164 183 213       Results from last 7 days   Lab Units 08/10/21  0303 08/09/21  0535 08/08/21  1644 08/08/21  1025   INR  2.16* 2.12* 2.24* 2.22*       Assessment / Plan     ASSESSMENT:  1. ESRD.  Dialysis MWF.  Challenge dry weight a little.  2. Hematochezia.  Still with hard stool and rectal bleeding. GI following. On anusol suppositories and miralax.  3. Aemia hg stable today .  4. Restless legs.  Resume home requip .  5. PAF on coumadin. SP cryomaze PENNIE ligation, AVR, MVR 1/2019.   6. Chronic diastolic heart failure .  7. Immobility due to osteoarthritis .    PLAN:  1. Dialysis tomorrow.  2. Resume requip 0.25 HS  3. PRN melatonin for insomnia .    Maria Fernanda Maldonado MD  08/10/21  10:11 EDT    [facility]  [phone]

## 2021-08-10 NOTE — CONSULTS
Claudia Arnold is a 76 y.o. female who is seen as a consult at the request of No ref. provider found for Rectal Bleeding.      HPI:    RB x2 weeks. Intermittent constipation and having harder stools recently. Bleeding even while voiding without BM. Blood fills toilet, but generally resolves after 1-2 minutes. Believes symptoms to be secondary to hemorrhoids. Tried PrepH and suppositories without significant improvement. Called GI office 07/28/2021 and given Rx for Anusol Suppositories, but continued to experience RB. Presented to the [facility] ED 08/08/2021. Started on Miralax. Softer stools today.  Anticoagulated with Coumadin secondary to A-fib. Pt reports similar symptoms in 01/2021. A colonoscopy performed at that time revealed diverticulosis of the descending and sigmoid colon, thrombosed external hemorrhoids, and enlarged internal hemorrhoids.         Past Medical History:   Diagnosis Date   • A-fib (CMS/[facility])     Meds   • Arthritis     w/Difficult Mobility   • Bilateral lower extremity edema     Legs   • CAD (coronary artery disease)     Affecting LAD    • Cardiomyopathy (CMS/[facility])    • CHF (congestive heart failure) (CMS/[facility])    • Chronic fatigue    • Chronic kidney disease    • Dialysis patient (CMS/[facility])     TUESDAY THURSDAY SATURDAY   • Generalized anxiety disorder    • GERD (gastroesophageal reflux disease)    • Hernia, inguinal     Hx Repair   • History of echocardiogram 1/17/19-[facility]    The L Ventricular Cavity is Mild-to-Moderately Dilated; Left Ventricular Wall Thickness is Consistent w/Mild Concentric Hypertrophy; Left Atrial Cavity Size is Moderate-to-Severely Dilated; Severe AVS; Severe MVS; Moderate TVR Noted   • History of transfusion    • Hyperlipidemia     Controlled w/Meds   • Hypertension     Controlled w/Meds   • Hypokinesis 01/22/2019    Apical Noted on Cardiac Cath   • IFG (impaired fasting glucose)    • LAD stenosis 01/22/2019    Mid LAD Irregularities Noted on Cardiac Cath    • Left atrial  dilatation 01/17/2019    Moderate-Severe Noted on Echo   • Left ventricular dilatation 01/17/2019    Noted on Echo/LEE   • Mild concentric left ventricular hypertrophy 01/17/2019    Noted on Echo/LEE   • Mitral annular calcification 01/17/2019    Severe Noted on Echo   • Moderate tricuspid valve regurgitation 01/24/2019    Noted on LEE   • Morbid obesity (CMS/[facility])    • NSTEMI (non-ST elevated myocardial infarction) (CMS/[facility])    • OCTAVIANO (obstructive sleep apnea)    • Osteoarthritis    • Pulmonary hypertension (CMS/[facility]) 01/22/2019    Noted on Cardiac Cath   • Right bundle branch block 01/24/2019    Noted on LEE   • Severe aortic stenosis 01/22/2019    Noted on Cardiac Cath & LEE on 01/24/19; S/p AVR on 01/28/19 by Dr. Gaxiola   • Severe mitral valve stenosis 01/24/2019    Noted on LEE; S/p MVR on 01/28/19 by Dr. Gaxiola   • Shoulder pain, bilateral    • Skin yeast infection     Folds Under Skin--Nystatin/Diflucan       Past Surgical History:   Procedure Laterality Date   • AORTIC VALVE REPAIR/REPLACEMENT MITRAL VALVE REPAIR/REPLACEMENT N/A 1/28/2019    Procedure: LEE STERNOTOMY AORTIC VALVE REPLACEMENT, MITRAL VALVE REPLACEMENT, TRICUSPID VALVE REPAIR, MAZE PROCEDURE, CLOSURE OF LEFT ATRIAL APPENDAGE  AND PRP;  Surgeon: Scar Gaxiola MD;  Location: [facility] OR;  Service: Cardiothoracic   • ARTERIOVENOUS FISTULA/SHUNT SURGERY Left 6/26/2019    Procedure: LEFT ARM BRACHIAL CEPHALIC FISTULA;  Surgeon: Satish Stahl MD;  Location: [facility] OR;  Service: Vascular   • CARDIAC CATHETERIZATION N/A 1/22/2019    Procedure: Coronary angiography;  Surgeon: Rick Talavera MD;  Location: [facility] CATH INVASIVE LOCATION;  Service: Cardiology   • CARDIAC CATHETERIZATION N/A 1/22/2019    Procedure: Left Heart Cath;  Surgeon: Rick Talavera MD;  Location: [facility] CATH INVASIVE LOCATION;  Service: Cardiology   • CARDIAC CATHETERIZATION N/A 1/22/2019    Procedure: Right Heart Cath;  Surgeon: Rick Talavera  MD SALMA;  Location: [facility] CATH INVASIVE LOCATION;  Service: Cardiology   • CARDIAC CATHETERIZATION N/A 1/22/2019    Procedure: Left ventriculography;  Surgeon: Rick Talavera MD;  Location: [facility] CATH INVASIVE LOCATION;  Service: Cardiology   • CARDIAC SURGERY     • COLONOSCOPY     • COLONOSCOPY N/A 1/16/2021    Procedure: COLONOSCOPY;  Surgeon: Wallace Hernandez MD;  Location: [facility] ENDOSCOPY;  Service: Gastroenterology;  Laterality: N/A;  pre- anemia, rectal bleeding  post-- hemorrhoids, diverticulosis   • ENDOSCOPY N/A 1/16/2021    Procedure: ESOPHAGOGASTRODUODENOSCOPY with biopsies;  Surgeon: Wallace Hernandez MD;  Location: [facility] ENDOSCOPY;  Service: Gastroenterology;  Laterality: N/A;  pre-- anemia  post-- esophagitis, gastritis, duodenitis   • HERNIA REPAIR     • INSERTION HEMODIALYSIS CATHETER N/A 2/8/2019    Procedure: tunnel CATHETER PLACEMENT WITH FLUROSCOPY;  Surgeon: Satish Stahl MD;  Location: [facility] MAIN OR;  Service: Vascular   • REPLACEMENT TOTAL KNEE Bilateral 2007/2009       Social History:   reports that she has never smoked. She has never used smokeless tobacco. She reports current alcohol use of about 1.0 standard drinks of alcohol per week. She reports that she does not use drugs.      Marriage status:     Family History   Problem Relation Age of Onset   • Hypertension Other    • Diabetes Other    • Heart disease Neg Hx    • Stroke Neg Hx    • Arthritis Neg Hx    • Breast cancer Neg Hx    • Malig Hyperthermia Neg Hx          Current Facility-Administered Medications:   •  acetaminophen (TYLENOL) tablet 650 mg, 650 mg, Oral, TID, Jj Harmon DO, 650 mg at 08/10/21 1205  •  albumin human 25 % IV SOLN 12.5 g, 12.5 g, Intravenous, PRN, Marti Kumar, MORGAN  •  busPIRone (BUSPAR) tablet 10 mg, 10 mg, Oral, Q12H, Jj Harmon DO, 10 mg at 08/10/21 0834  •  calcium carbonate (TUMS) chewable tablet 500 mg (200 mg elemental), 2 tablet,  Oral, TID PRN, Jj Harmon DO  •  escitalopram (LEXAPRO) tablet 10 mg, 10 mg, Oral, Daily, Jj Harmon DO, 10 mg at 08/10/21 0834  •  hydrocortisone (ANUSOL-HC) suppository 25 mg, 25 mg, Rectal, BID, Shiloh Pop MD, 25 mg at 08/10/21 0834  •  melatonin tablet 3 mg, 3 mg, Oral, Nightly PRN, Maria Fernanda Maldonado MD  •  midodrine (PROAMATINE) tablet 2.5 mg, 2.5 mg, Oral, TID AC, Jj Harmon DO, 2.5 mg at 08/10/21 1205  •  nitroglycerin (NITROSTAT) SL tablet 0.4 mg, 0.4 mg, Sublingual, Q5 Min PRN, Jj Harmon DO  •  ondansetron (ZOFRAN) injection 4 mg, 4 mg, Intravenous, Q6H PRN, Jj Harmon DO  •  pantoprazole (PROTONIX) EC tablet 40 mg, 40 mg, Oral, BID AC, Jj Harmon DO, 40 mg at 08/10/21 0653  •  phytonadione (MEPHYTON, VITAMIN K) tablet 2.5 mg, 2.5 mg, Oral, Once, Pennie Rider MD  •  polyethylene glycol (MIRALAX) packet 17 g, 17 g, Oral, Daily, Shiloh Pop MD, 17 g at 08/10/21 0834  •  rOPINIRole (REQUIP) tablet 0.25 mg, 0.25 mg, Oral, Nightly, Maria Fernanda Maldonado MD  •  [COMPLETED] Insert peripheral IV, , , Once **AND** sodium chloride 0.9 % flush 10 mL, 10 mL, Intravenous, PRN, Jose Maher PA  •  sodium chloride 0.9 % flush 10 mL, 10 mL, Intravenous, Q12H, Jj Harmon DO, 10 mL at 08/10/21 0834  •  sodium chloride 0.9 % flush 10 mL, 10 mL, Intravenous, PRN, Jj Harmon DO    Allergy  Patient has no known allergies.    Review of Systems   Constitutional: Negative for decreased appetite and weight gain.   HENT: Negative for congestion, hearing loss and hoarse voice.    Eyes: Negative for blurred vision, discharge and visual disturbance.   Cardiovascular: Positive for irregular heartbeat and leg swelling. Negative for chest pain and cyanosis.   Respiratory: Negative for cough, shortness of breath, sleep disturbances due to breathing and snoring.    Endocrine: Negative for cold intolerance and heat intolerance.    Hematologic/Lymphatic: Does not bruise/bleed easily.   Skin: Negative for itching, poor wound healing and skin cancer.   Musculoskeletal: Negative for arthritis, back pain, joint pain and joint swelling.   Gastrointestinal: Positive for constipation. Negative for abdominal pain, change in bowel habit and bowel incontinence.   Genitourinary: Negative for bladder incontinence, dysuria and hematuria.   Neurological: Negative for brief paralysis, excessive daytime sleepiness, dizziness, focal weakness, headaches, light-headedness and weakness.   Psychiatric/Behavioral: Negative for altered mental status and hallucinations. The patient does not have insomnia.    Allergic/Immunologic: Negative for HIV exposure and persistent infections.       Vitals:    08/10/21 1348   BP: 115/66   Pulse: 81   Resp: 18   Temp: 97.4 °F (36.3 °C)   SpO2:      Body mass index is 32.45 kg/m².    Physical Exam  Exam conducted with a chaperone present.   Constitutional:       General: She is not in acute distress.     Appearance: She is well-developed.   HENT:      Head: Normocephalic and atraumatic.      Nose: Nose normal.   Eyes:      Conjunctiva/sclera: Conjunctivae normal.      Pupils: Pupils are equal, round, and reactive to light.   Neck:      Trachea: No tracheal deviation.   Pulmonary:      Effort: Pulmonary effort is normal. No respiratory distress.      Breath sounds: Normal breath sounds.   Abdominal:      General: Bowel sounds are normal. There is no distension.      Palpations: Abdomen is soft.   Genitourinary:     Comments: Perianal exam: Enlarged internal and external hemorrhoids   Musculoskeletal:         General: No deformity. Normal range of motion.      Cervical back: Normal range of motion.   Skin:     General: Skin is warm and dry.   Neurological:      Mental Status: She is alert and oriented to person, place, and time.      Cranial Nerves: No cranial nerve deficit.      Coordination: Coordination normal.      Gait: Gait  normal.   Psychiatric:         Behavior: Behavior normal.         Judgment: Judgment normal.       Review of Medical Record:  I reviewed Colonoscopy from 01/16/2021  - Diverticulosis of descending and sigmoid colon  - Thrombosed External Hemorrhoid  - Enlarged Internal Hemorrhoids    Assessment:  1. Hematochezia    2. Anticoagulated        Plan:  - Continue to hold Coumadin. PO Vitamin K. Will check INR tomorrow  - Negative Covid Test  - I recommend hemorroidectomy. Discussed risk including bleeding, infection, injury to sphincters; benefits; and alternatives.  Discussed in detail expected recovery, possible urinary retention, time off activities, and healing. Patient expresses understanding and wishes to proceed. Will plan for hemorrhoidectomy on 08/12/2021    Scribed for Pennie Rider MD. By Marilu Rodrigues PA-C   8/10/2021  18:05 EDT  This patient was evaluated by me, recommendations made, documentation reviewed, edited, and revised by me, Pennie Rider MD

## 2021-08-10 NOTE — CASE MANAGEMENT/SOCIAL WORK
Discharge Planning Assessment  [facility]     Patient Name: Claudia Arnold  MRN: [id number]  Today's Date: 8/10/2021    Admit Date: 8/8/2021    Discharge Needs Assessment     Row Name 08/10/21 1235       Living Environment    Lives With  spouse    Name(s) of Who Lives With Patient  Magnus Arnold, spouse    Current Living Arrangements  home/apartment/condo    Primary Care Provided by  self    Provides Primary Care For  no one, unable/limited ability to care for self    Family Caregiver if Needed  spouse    Family Caregiver Names  Magnus Arnold, spouse    Quality of Family Relationships  involved;supportive    Able to Return to Prior Arrangements  yes       Resource/Environmental Concerns    Resource/Environmental Concerns  none       Transition Planning    Patient/Family Anticipates Transition to  home with family    Patient/Family Anticipated Services at Transition  none    Transportation Anticipated  family or friend will provide       Discharge Needs Assessment    Readmission Within the Last 30 Days  no previous admission in last 30 days    Current Outpatient/Agency/Support Group  outpatient hemodialysis    Equipment Currently Used at Home  wheelchair;walker, rolling    Concerns to be Addressed  care coordination/care conferences;discharge planning    Anticipated Changes Related to Illness  none    Equipment Needed After Discharge  none    Provided Post Acute Provider List?  Refused    Refused Provider List Comment  Denies any needs at this time.    Current Discharge Risk  chronically ill        Discharge Plan     Row Name 08/10/21 1241       Plan    Plan  Home with spouse. Continue HD MWF at [facility] as PTA. Uses [facility] for transportation    Patient/Family in Agreement with Plan  yes    Plan Comments  Met with pt. at bedside. Explained roll of . Face sheet and pharmacy verified. Pt lives with Magnus Arnold, spouse. There are 3 steps to enter home.  DME equipment includes a walker and WC.  Pt is  independent ADLs. Pt has been to [facility] and has used [facility]. Denies need for SNF or HH at D/C. Pt has HD MWF at [facility]. Pt states she uses [facility] to transport her to and from HD. Pt’s PCP is Dr. ISABELLA Cortez. Pt enrolled in meds to bed. Pt does not drive. Explained that CCP would follow to assess for discharge needs.  Phani Lara RN-BC        Continued Care and Services - Admitted Since 8/8/2021    Coordination has not been started for this encounter.         Demographic Summary     Row Name 08/10/21 1234       General Information    Admission Type  observation    Arrived From  emergency department    Required Notices Provided  Observation Status Notice    Reason for Consult  discharge planning;care coordination/care conference    Preferred Language  English     Used During This Interaction  no        Functional Status     Row Name 08/10/21 1234       Functional Status    Usual Activity Tolerance  moderate    Current Activity Tolerance  moderate       Functional Status, IADL    Medications  assistive equipment    Meal Preparation  assistive equipment    Housekeeping  assistive equipment    Laundry  assistive equipment    Shopping  assistive equipment       Mental Status    General Appearance WDL  WDL       Mental Status Summary    Recent Changes in Mental Status/Cognitive Functioning  no changes                Phani Lara, RN

## 2021-08-10 NOTE — PLAN OF CARE
Problem: Adult Inpatient Plan of Care  Goal: Plan of Care Review  Outcome: Ongoing, Progressing  Flowsheets (Taken 8/10/2021 1821)  Progress: no change  Plan of Care Reviewed With: patient  Outcome Summary: VSS. Colorectal and cardiology consulted. Possibly having colorectal intervention. Vitamin K given. Dialysis tomorrow. Brett continue to monitor.

## 2021-08-10 NOTE — PROGRESS NOTES
"Gastroenterology   Inpatient Progress Note    Reason for Follow Up:  Rectal bleeding    Subjective  Interval History:   Patient with large amount of rectal bleeding last night without a bowel movement and rectal bleeding this morning, smaller amount but still described as \"a lot\".  No stool this morning just blood.  She did have a bowel movement yesterday with some mild straining also blood with that bowel movement.  Denies abdominal pain, nausea, vomiting, rectal pain.    Rectal bleeding started 10 days ago and has persisted despite suppository use twice daily and the addition of laxative.    Current Facility-Administered Medications:   •  acetaminophen (TYLENOL) tablet 650 mg, 650 mg, Oral, Q4H PRN **OR** acetaminophen (TYLENOL) 160 MG/5ML solution 650 mg, 650 mg, Oral, Q4H PRN **OR** acetaminophen (TYLENOL) suppository 650 mg, 650 mg, Rectal, Q4H PRN, Jj Harmon DO  •  acetaminophen (TYLENOL) tablet 650 mg, 650 mg, Oral, TID, Jj Harmon DO, 650 mg at 08/10/21 0204  •  albumin human 25 % IV SOLN 12.5 g, 12.5 g, Intravenous, PRN, Marti Kumar APRN  •  bumetanide (BUMEX) tablet 2 mg, 2 mg, Oral, Daily, Jj Harmon DO, 2 mg at 08/09/21 0845  •  busPIRone (BUSPAR) tablet 10 mg, 10 mg, Oral, Q12H, Jj Harmon DO, 10 mg at 08/09/21 2132  •  calcium carbonate (TUMS) chewable tablet 500 mg (200 mg elemental), 2 tablet, Oral, TID PRN, Jj Harmon DO  •  escitalopram (LEXAPRO) tablet 10 mg, 10 mg, Oral, Daily, Jj Harmon DO, 10 mg at 08/09/21 0844  •  hydrocortisone (ANUSOL-HC) suppository 25 mg, 25 mg, Rectal, BID, Shiloh Pop MD, 25 mg at 08/09/21 2131  •  midodrine (PROAMATINE) tablet 2.5 mg, 2.5 mg, Oral, TID AC, Jj Harmon DO, 2.5 mg at 08/10/21 0653  •  nitroglycerin (NITROSTAT) SL tablet 0.4 mg, 0.4 mg, Sublingual, Q5 Min PRN, Jj Harmon,   •  ondansetron (ZOFRAN) injection 4 mg, 4 mg, Intravenous, Q6H PRN, Faustino, " Jj NICKERSON DO  •  pantoprazole (PROTONIX) EC tablet 40 mg, 40 mg, Oral, BID AC, Jj Harmon DO, 40 mg at 08/10/21 0653  •  polyethylene glycol (MIRALAX) packet 17 g, 17 g, Oral, Daily, Shiloh Pop MD, 17 g at 08/09/21 2132  •  [COMPLETED] Insert peripheral IV, , , Once **AND** sodium chloride 0.9 % flush 10 mL, 10 mL, Intravenous, PRN, Jose Maher PA  •  sodium chloride 0.9 % flush 10 mL, 10 mL, Intravenous, Q12H, Jj Harmon DO, 10 mL at 08/09/21 2100  •  sodium chloride 0.9 % flush 10 mL, 10 mL, Intravenous, PRN, Jj Harmon DO  Review of Systems:               Gastroenterology positive for rectal bleeding    Objective     Vital Signs  Temp:  [97.2 °F (36.2 °C)-98 °F (36.7 °C)] 97.6 °F (36.4 °C)  Heart Rate:  [71-86] 81  Resp:  [16-18] 18  BP: (123-156)/(63-89) 133/77  Body mass index is 35.74 kg/m².                  Physical Exam:              General: patient awake, alert and cooperative              Eyes: no scleral icterus              Skin: warm and dry, not jaundiced              Abdomen: Obese, soft, nontender, nondistended; normal bowel sounds              Psychiatric: Appropriate affect and behavior                Results Review:                I reviewed the patient's new clinical results.    Results from last 7 days   Lab Units 08/10/21  0303 08/09/21  0535 08/08/21  1025   WBC 10*3/mm3 7.44 6.69 8.23   HEMOGLOBIN g/dL 10.8* 10.9* 12.0   HEMATOCRIT % 34.0 32.4* 37.1   PLATELETS 10*3/mm3 164 183 213     Results from last 7 days   Lab Units 08/10/21  0303 08/09/21  0535 08/08/21  1025   SODIUM mmol/L 138 142 143   POTASSIUM mmol/L 3.8 3.9 3.9   CHLORIDE mmol/L 101 104 102   CO2 mmol/L 26.2 25.0 26.8   BUN mg/dL 21 49* 49*   CREATININE mg/dL 1.80* 2.88* 3.01*   CALCIUM mg/dL 9.3 9.4 9.7   BILIRUBIN mg/dL  --   --  0.4   ALK PHOS U/L  --   --  84   ALT (SGPT) U/L  --   --  14   AST (SGOT) U/L  --   --  15   GLUCOSE mg/dL 102* 95 131*     Results from last 7 days   Lab  Units 08/10/21  0303 08/09/21  0535 08/08/21  1644   INR  2.16* 2.12* 2.24*       Assessment/Plan     Patient Active Problem List   Diagnosis   • Tear of rotator cuff   • Hypertension   • Generalized anxiety disorder   • Hyperlipidemia   • IFG (impaired fasting glucose)   • Heart murmur, systolic   • Shoulder pain, bilateral   • Pain of both shoulder joints   • Chronic pain of both shoulders   • Acute congestive heart failure (CMS/[facility])   • Obesity, morbid, BMI 50 or higher (CMS/[facility])   • Fungal skin infection   • Cellulitis of lower extremity   • Aortic valve stenosis   • Tricuspid valve insufficiency   • Mitral valve stenosis   • S/P MVR (mitral valve replacement)   • Paroxysmal atrial fibrillation (CMS/[facility])   • Pulmonary hypertension (CMS/[facility])   • Stage 5 chronic kidney disease on chronic dialysis (CMS/[facility])   • Gastroesophageal reflux disease without esophagitis   • Chronic idiopathic constipation   • Dependence on renal dialysis (CMS/[facility])   • Chronic kidney disease, stage 2 (mild)   • Renovascular hypertension   • GI bleed   • 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 (CMS/[facility])   • CAD (coronary artery disease)   • S/P AVR (aortic valve replacement)   • Chronic diastolic CHF (congestive heart failure) (CMS/[facility])   • Chronic pain of both shoulders   • Gastrointestinal hemorrhage   • Gastrointestinal hemorrhage with melena   • Immobility syndrome   • Hematochezia       Assessment:  1. Rectal bleeding with internal and external hemorrhoids colonoscopy January 2021  2. Acute blood loss anemia secondary to #1  3. Anticoagulated on Coumadin, currently held, INR still greater than 2    These problems are new to me.  Plan:  Continue MiraLAX and Anusol  Continue to monitor hemoglobin   Short-term continue to hold warfarin  She is interested in colorectal surgery evaluation and possible flexible sigmoidoscopy given ongoing rectal bleeding despite twice daily suppository therapy and need for anticoagulation,  Coumadin currently being held.  Will place referral for colorectal surgery.      I discussed the patients findings and my recommendations with patient, family and nursing staff.           Jerri MORA  [facility] [place] [id number]  Office: [phone]

## 2021-08-10 NOTE — CASE MANAGEMENT/SOCIAL WORK
Continued Stay Note  [facility]     Patient Name: Claudia Arnold  MRN: [id number]  Today's Date: 8/10/2021    Admit Date: 8/8/2021    Discharge Plan     Row Name 08/10/21 1241       Plan    Plan  Home with spouse. Continue HD MWF at [facility] as PTA. Uses [facility] for transportation    Patient/Family in Agreement with Plan  yes    Plan Comments  Met with pt. at bedside. Explained roll of . Face sheet and pharmacy verified. Pt lives with Magnus Arnold, spouse. There are 3 steps to enter home.  DME equipment includes a walker and WC.  Pt is independent ADLs. Pt has been to [facility] and has used [facility]. Denies need for SNF or HH at D/C. Pt has HD MWF at [facility]. Pt states she uses [facility] to transport her to and from HD. Pt’s PCP is Dr. ISABELLA Cortez. Pt enrolled in meds to bed. Pt does not drive. Explained that CCP would follow to assess for discharge needs.  Phani Lara RN-BC        Discharge Codes    No documentation.             Phani Lara RN

## 2021-08-11 LAB
ALBUMIN SERPL-MCNC: 3.5 G/DL (ref 3.5–5.2)
ANION GAP SERPL CALCULATED.3IONS-SCNC: 12.3 MMOL/L (ref 5–15)
BACTERIA UR QL AUTO: ABNORMAL /HPF
BILIRUB UR QL STRIP: NEGATIVE
BUN SERPL-MCNC: 31 MG/DL (ref 8–23)
BUN/CREAT SERPL: 11.3 (ref 7–25)
CALCIUM SPEC-SCNC: 9.2 MG/DL (ref 8.6–10.5)
CHLORIDE SERPL-SCNC: 105 MMOL/L (ref 98–107)
CLARITY UR: ABNORMAL
CO2 SERPL-SCNC: 24.7 MMOL/L (ref 22–29)
COLOR UR: YELLOW
CREAT SERPL-MCNC: 2.75 MG/DL (ref 0.57–1)
DEPRECATED RDW RBC AUTO: 46.3 FL (ref 37–54)
ERYTHROCYTE [DISTWIDTH] IN BLOOD BY AUTOMATED COUNT: 12.4 % (ref 12.3–15.4)
GFR SERPL CREATININE-BSD FRML MDRD: 17 ML/MIN/1.73
GLUCOSE SERPL-MCNC: 100 MG/DL (ref 65–99)
GLUCOSE UR STRIP-MCNC: NEGATIVE MG/DL
HCT VFR BLD AUTO: 35.1 % (ref 34–46.6)
HGB BLD-MCNC: 11.1 G/DL (ref 12–15.9)
HGB UR QL STRIP.AUTO: ABNORMAL
HYALINE CASTS UR QL AUTO: ABNORMAL /LPF
INR PPP: 1.35 (ref 0.9–1.1)
KETONES UR QL STRIP: NEGATIVE
LEUKOCYTE ESTERASE UR QL STRIP.AUTO: ABNORMAL
MCH RBC QN AUTO: 32.1 PG (ref 26.6–33)
MCHC RBC AUTO-ENTMCNC: 31.6 G/DL (ref 31.5–35.7)
MCV RBC AUTO: 101.4 FL (ref 79–97)
NITRITE UR QL STRIP: NEGATIVE
PH UR STRIP.AUTO: 6 [PH] (ref 5–8)
PHOSPHATE SERPL-MCNC: 4.7 MG/DL (ref 2.5–4.5)
PLATELET # BLD AUTO: 156 10*3/MM3 (ref 140–450)
PMV BLD AUTO: 10.2 FL (ref 6–12)
POTASSIUM SERPL-SCNC: 4.1 MMOL/L (ref 3.5–5.2)
PROT UR QL STRIP: ABNORMAL
PROTHROMBIN TIME: 16.5 SECONDS (ref 11.7–14.2)
QT INTERVAL: 463 MS
RBC # BLD AUTO: 3.46 10*6/MM3 (ref 3.77–5.28)
RBC # UR: ABNORMAL /HPF
REF LAB TEST METHOD: ABNORMAL
SARS-COV-2 ORF1AB RESP QL NAA+PROBE: NOT DETECTED
SODIUM SERPL-SCNC: 142 MMOL/L (ref 136–145)
SP GR UR STRIP: 1.01 (ref 1–1.03)
SQUAMOUS #/AREA URNS HPF: ABNORMAL /HPF
UROBILINOGEN UR QL STRIP: ABNORMAL
WBC # BLD AUTO: 6.63 10*3/MM3 (ref 3.4–10.8)
WBC UR QL AUTO: ABNORMAL /HPF

## 2021-08-11 PROCEDURE — 99221 1ST HOSP IP/OBS SF/LOW 40: CPT | Performed by: NURSE PRACTITIONER

## 2021-08-11 PROCEDURE — 85027 COMPLETE CBC AUTOMATED: CPT | Performed by: INTERNAL MEDICINE

## 2021-08-11 PROCEDURE — U0005 INFEC AGEN DETEC AMPLI PROBE: HCPCS | Performed by: COLON & RECTAL SURGERY

## 2021-08-11 PROCEDURE — 87186 SC STD MICRODIL/AGAR DIL: CPT | Performed by: INTERNAL MEDICINE

## 2021-08-11 PROCEDURE — 99232 SBSQ HOSP IP/OBS MODERATE 35: CPT | Performed by: INTERNAL MEDICINE

## 2021-08-11 PROCEDURE — U0004 COV-19 TEST NON-CDC HGH THRU: HCPCS | Performed by: COLON & RECTAL SURGERY

## 2021-08-11 PROCEDURE — 93010 ELECTROCARDIOGRAM REPORT: CPT | Performed by: INTERNAL MEDICINE

## 2021-08-11 PROCEDURE — 87077 CULTURE AEROBIC IDENTIFY: CPT | Performed by: INTERNAL MEDICINE

## 2021-08-11 PROCEDURE — 81001 URINALYSIS AUTO W/SCOPE: CPT | Performed by: INTERNAL MEDICINE

## 2021-08-11 PROCEDURE — 93005 ELECTROCARDIOGRAM TRACING: CPT | Performed by: NURSE PRACTITIONER

## 2021-08-11 PROCEDURE — 85610 PROTHROMBIN TIME: CPT | Performed by: NURSE PRACTITIONER

## 2021-08-11 PROCEDURE — 80069 RENAL FUNCTION PANEL: CPT | Performed by: INTERNAL MEDICINE

## 2021-08-11 PROCEDURE — 87086 URINE CULTURE/COLONY COUNT: CPT | Performed by: INTERNAL MEDICINE

## 2021-08-11 PROCEDURE — 99231 SBSQ HOSP IP/OBS SF/LOW 25: CPT | Performed by: COLON & RECTAL SURGERY

## 2021-08-11 RX ADMIN — PANTOPRAZOLE SODIUM 40 MG: 40 TABLET, DELAYED RELEASE ORAL at 06:41

## 2021-08-11 RX ADMIN — SODIUM CHLORIDE, PRESERVATIVE FREE 10 ML: 5 INJECTION INTRAVENOUS at 21:32

## 2021-08-11 RX ADMIN — Medication 3 MG: at 21:31

## 2021-08-11 RX ADMIN — BUSPIRONE HYDROCHLORIDE 10 MG: 10 TABLET ORAL at 21:31

## 2021-08-11 RX ADMIN — ROPINIROLE HYDROCHLORIDE 0.25 MG: 0.5 TABLET, FILM COATED ORAL at 21:31

## 2021-08-11 RX ADMIN — BUSPIRONE HYDROCHLORIDE 10 MG: 10 TABLET ORAL at 08:08

## 2021-08-11 RX ADMIN — SODIUM CHLORIDE, PRESERVATIVE FREE 10 ML: 5 INJECTION INTRAVENOUS at 08:08

## 2021-08-11 RX ADMIN — ACETAMINOPHEN 650 MG: 325 TABLET, FILM COATED ORAL at 16:01

## 2021-08-11 RX ADMIN — ACETAMINOPHEN 650 MG: 325 TABLET, FILM COATED ORAL at 21:31

## 2021-08-11 RX ADMIN — MIDODRINE HYDROCHLORIDE 2.5 MG: 2.5 TABLET ORAL at 06:41

## 2021-08-11 RX ADMIN — PANTOPRAZOLE SODIUM 40 MG: 40 TABLET, DELAYED RELEASE ORAL at 17:34

## 2021-08-11 RX ADMIN — ACETAMINOPHEN 650 MG: 325 TABLET, FILM COATED ORAL at 01:53

## 2021-08-11 RX ADMIN — MIDODRINE HYDROCHLORIDE 2.5 MG: 2.5 TABLET ORAL at 17:34

## 2021-08-11 RX ADMIN — POLYETHYLENE GLYCOL 3350 17 G: 17 POWDER, FOR SOLUTION ORAL at 08:07

## 2021-08-11 RX ADMIN — POLYETHYLENE GLYCOL 3350 17 G: 17 POWDER, FOR SOLUTION ORAL at 17:34

## 2021-08-11 RX ADMIN — ESCITALOPRAM 10 MG: 10 TABLET, FILM COATED ORAL at 08:08

## 2021-08-11 NOTE — PROGRESS NOTES
Progress Note    CRS    S: minor blood    On dialysis  O:  Temp:  [97.4 °F (36.3 °C)-98.2 °F (36.8 °C)] 97.4 °F (36.3 °C)  Heart Rate:  [70-77] 77  Resp:  [16] 16  BP: (120-139)/(71-87) 139/87      Intake/Output Summary (Last 24 hours) at 8/11/2021 1437  Last data filed at 8/11/2021 0017  Gross per 24 hour   Intake 0 ml   Output --   Net 0 ml     abd soft      Results from last 7 days   Lab Units 08/11/21  0557   WBC 10*3/mm3 6.63   HEMOGLOBIN g/dL 11.1*   HEMATOCRIT % 35.1   PLATELETS 10*3/mm3 156     Results from last 7 days   Lab Units 08/11/21  0557   SODIUM mmol/L 142   POTASSIUM mmol/L 4.1   CHLORIDE mmol/L 105   CO2 mmol/L 24.7   BUN mg/dL 31*   CREATININE mg/dL 2.75*   GLUCOSE mg/dL 100*   CALCIUM mg/dL 9.2   PHOSPHORUS mg/dL 4.7*       Results from last 7 days   Lab Units 08/10/21  0303   MAGNESIUM mg/dL 2.1       Results from last 7 days   Lab Units 08/11/21  0914   INR  1.35*       A/P: 76 y.o. female with rectal bleeding on dialysis and anticoag  Cards oked for surgery  inr better  Discussed surgery and pt had opportunity to ask ?.  Pt agreed to proceed

## 2021-08-11 NOTE — PLAN OF CARE
Problem: Adult Inpatient Plan of Care  Goal: Plan of Care Review  Outcome: Ongoing, Progressing  Flowsheets (Taken 8/11/2021 6172)  Progress: no change  Plan of Care Reviewed With: patient  Outcome Summary: VSS. Had dialysis, 1L off. Hemorrhoidectomy scheduled for tomorrow, NPO at midnight. Consent signed and on chart. Cardiology cleared. Urinalysis and pre-procedure covid swab sent. Will continue to monitor.

## 2021-08-11 NOTE — PROGRESS NOTES
[facility] Progress Note      Patient Name: Claudia Arnold  : 1945  MRN: [id number]  Primary Care Physician:  Robert Cortez MD  Date of admission: 2021    Subjective     Interval History:   Follow up ESRD. Plan for hemorrhoidectomy tomorrow.  Stools very soft from laxative. Less bleeding.  Tolerating clear liquids.   Not soa. Having urinary incontinence. On bumex at home, but uop only about a cup a day at most.  Has history of frequent UTI and symptoms usually incontinence.      Review of Systems:   As noted above    Objective     Vitals:   Temp:  [97.4 °F (36.3 °C)-98.2 °F (36.8 °C)] 97.4 °F (36.3 °C)  Heart Rate:  [70-81] 77  Resp:  [16-18] 16  BP: (115-139)/(66-87) 139/87    Intake/Output Summary (Last 24 hours) at 2021 0955  Last data filed at 2021 0017  Gross per 24 hour   Intake 0 ml   Output --   Net 0 ml       Physical Exam:    General Appearance: alert, oriented x 3, no acute distress .  Sitting up in chair.   Skin: warm and dry  HEENT: oral mucosa normal, nonicteric sclera  Neck: supple, no JVD  Lungs: Clear to auscultation, no wheezing .  Heart: RRR, normal S1 and S2  Abdomen: soft, nontender, nondistended. + bs.   : no palpable bladder  Extremities LUE AVF patent .  Trace lower ext edema. Venous stasis changes.   Neuro: normal speech and mental status     Scheduled Meds:     acetaminophen, 650 mg, Oral, TID  busPIRone, 10 mg, Oral, Q12H  escitalopram, 10 mg, Oral, Daily  hydrocortisone, 25 mg, Rectal, BID  midodrine, 2.5 mg, Oral, TID AC  pantoprazole, 40 mg, Oral, BID AC  polyethylene glycol, 17 g, Oral, TID With Meals  rOPINIRole, 0.25 mg, Oral, Nightly  sodium chloride, 10 mL, Intravenous, Q12H      IV Meds:        Results Reviewed:   I have personally reviewed the results from the time of this admission to 2021 09:55 EDT     Results from last 7 days   Lab Units 21  0557 08/10/21  0303 21  0535 21  1025 21  1025    SODIUM mmol/L 142 138 142   < > 143   POTASSIUM mmol/L 4.1 3.8 3.9   < > 3.9   CHLORIDE mmol/L 105 101 104   < > 102   CO2 mmol/L 24.7 26.2 25.0   < > 26.8   BUN mg/dL 31* 21 49*   < > 49*   CREATININE mg/dL 2.75* 1.80* 2.88*   < > 3.01*   CALCIUM mg/dL 9.2 9.3 9.4   < > 9.7   BILIRUBIN mg/dL  --   --   --   --  0.4   ALK PHOS U/L  --   --   --   --  84   ALT (SGPT) U/L  --   --   --   --  14   AST (SGOT) U/L  --   --   --   --  15   GLUCOSE mg/dL 100* 102* 95   < > 131*    < > = values in this interval not displayed.       Estimated Creatinine Clearance: 20.5 mL/min (A) (by C-G formula based on SCr of 2.75 mg/dL (H)).    Results from last 7 days   Lab Units 08/11/21  0557 08/10/21  0303 08/09/21  0535   MAGNESIUM mg/dL  --  2.1 2.3   PHOSPHORUS mg/dL 4.7* 3.3 5.4*             Results from last 7 days   Lab Units 08/11/21  0557 08/10/21  0303 08/09/21  0535 08/08/21  1025   WBC 10*3/mm3 6.63 7.44 6.69 8.23   HEMOGLOBIN g/dL 11.1* 10.8* 10.9* 12.0   PLATELETS 10*3/mm3 156 164 183 213       Results from last 7 days   Lab Units 08/10/21  0303 08/09/21  0535 08/08/21  1644 08/08/21  1025   INR  2.16* 2.12* 2.24* 2.22*       Assessment / Plan     ASSESSMENT:  1. ESRD.  Dialysis MWF.  Challenge dry weight a little.  2. Hematochezia.  Hemorrhoidectomy tomorrow. On anusol suppositories and miralax.  3. Aemia Hg stable today .  4. Restless legs.  Resumed home requip .  5. PAF on coumadin. SP cryomaze PENNIE ligation, AVR, MVR 1/2019.   6. Chronic diastolic heart failure .  7. Immobility due to osteoarthritis .  8. Urinary incontinence. Check UA  PLAN:  1. Dialysis today  2. Check UA with macey for sample      Maria Fernanda Maldonado MD  08/11/21  09:55 EDT    [facility]  [phone]

## 2021-08-11 NOTE — PROGRESS NOTES
[facility]  Inpatient Progress Note    Reason for Follow Up:  Rectal bleeding    Subjective     Interval History:   No blood with her bowel movement this morning.  Planning for hemorrhoidectomy tomorrow with Dr. Rider      Current Facility-Administered Medications:   •  acetaminophen (TYLENOL) tablet 650 mg, 650 mg, Oral, TID, Jj Harmon DO, 650 mg at 08/11/21 0153  •  busPIRone (BUSPAR) tablet 10 mg, 10 mg, Oral, Q12H, Jj Harmon DO, 10 mg at 08/11/21 0808  •  calcium carbonate (TUMS) chewable tablet 500 mg (200 mg elemental), 2 tablet, Oral, TID PRN, Jj Harmon DO  •  escitalopram (LEXAPRO) tablet 10 mg, 10 mg, Oral, Daily, Jj Harmon DO, 10 mg at 08/11/21 0808  •  hydrocortisone (ANUSOL-HC) suppository 25 mg, 25 mg, Rectal, BID, Shiloh Pop MD, 25 mg at 08/10/21 0834  •  melatonin tablet 3 mg, 3 mg, Oral, Nightly PRN, Maria Fernanda Maldonado MD, 3 mg at 08/10/21 2053  •  midodrine (PROAMATINE) tablet 2.5 mg, 2.5 mg, Oral, TID JOHNNY, Jj Harmon DO, 2.5 mg at 08/11/21 0641  •  nitroglycerin (NITROSTAT) SL tablet 0.4 mg, 0.4 mg, Sublingual, Q5 Min PRN, Jj Harmon DO  •  ondansetron (ZOFRAN) injection 4 mg, 4 mg, Intravenous, Q6H PRN, Jj Harmon DO  •  pantoprazole (PROTONIX) EC tablet 40 mg, 40 mg, Oral, BID JOHNNY, Jj Harmon DO, 40 mg at 08/11/21 0641  •  polyethylene glycol (MIRALAX) packet 17 g, 17 g, Oral, TID With Meals, Pennie Rider MD, 17 g at 08/11/21 0807  •  rOPINIRole (REQUIP) tablet 0.25 mg, 0.25 mg, Oral, Nightly, Maria Fernanda Maldonado MD, 0.25 mg at 08/10/21 2053  •  [COMPLETED] Insert peripheral IV, , , Once **AND** sodium chloride 0.9 % flush 10 mL, 10 mL, Intravenous, PRN, Jose Maher PA  •  sodium chloride 0.9 % flush 10 mL, 10 mL, Intravenous, Q12H, Jj Harmon, , 10 mL at 08/11/21 0808  •  sodium chloride 0.9 % flush 10 mL, 10 mL, Intravenous, PRN, Jj Harmon, DO  Review  of Systems:   All systems reviewed and negative except for: GI: Bright red blood per rectum    Objective     Vital Signs  Temp:  [97.4 °F (36.3 °C)-98.2 °F (36.8 °C)] 97.4 °F (36.3 °C)  Heart Rate:  [70-81] 77  Resp:  [16-18] 16  BP: (115-139)/(66-87) 139/87  Body mass index is 32.45 kg/m².    Intake/Output Summary (Last 24 hours) at 8/11/2021 0901  Last data filed at 8/11/2021 0017  Gross per 24 hour   Intake 0 ml   Output --   Net 0 ml     No intake/output data recorded.     Physical Exam:   General: patient awake, alert and cooperative   Eyes: Normal lids and lashes, no scleral icterus   Neck: supple, normal ROM   Skin: warm and dry, not jaundiced   Cardiovascular: regular rhythm and rate, no murmurs auscultated   Pulm: clear to auscultation bilaterally, regular and unlabored   Abdomen: soft, obese, nontender, nondistended; normal bowel sounds   Rectal: deferred   Extremities: no rash or edema   Psychiatric: Normal mood and behavior; memory intact     Results Review:     I reviewed the patient's new clinical results.    Results from last 7 days   Lab Units 08/11/21  0557 08/10/21  0303 08/09/21  0535   WBC 10*3/mm3 6.63 7.44 6.69   HEMOGLOBIN g/dL 11.1* 10.8* 10.9*   HEMATOCRIT % 35.1 34.0 32.4*   PLATELETS 10*3/mm3 156 164 183     Results from last 7 days   Lab Units 08/11/21  0557 08/10/21  0303 08/09/21  0535 08/08/21  1025 08/08/21  1025   SODIUM mmol/L 142 138 142   < > 143   POTASSIUM mmol/L 4.1 3.8 3.9   < > 3.9   CHLORIDE mmol/L 105 101 104   < > 102   CO2 mmol/L 24.7 26.2 25.0   < > 26.8   BUN mg/dL 31* 21 49*   < > 49*   CREATININE mg/dL 2.75* 1.80* 2.88*   < > 3.01*   CALCIUM mg/dL 9.2 9.3 9.4   < > 9.7   BILIRUBIN mg/dL  --   --   --   --  0.4   ALK PHOS U/L  --   --   --   --  84   ALT (SGPT) U/L  --   --   --   --  14   AST (SGOT) U/L  --   --   --   --  15   GLUCOSE mg/dL 100* 102* 95   < > 131*    < > = values in this interval not displayed.     Results from last 7 days   Lab Units 08/10/21  8489  08/09/21  0535 08/08/21  1644   INR  2.16* 2.12* 2.24*     No results found for: LIPASE    Radiology:  No orders to display       Assessment/Plan     Patient Active Problem List   Diagnosis   • Tear of rotator cuff   • Hypertension   • Generalized anxiety disorder   • Hyperlipidemia   • IFG (impaired fasting glucose)   • Heart murmur, systolic   • Shoulder pain, bilateral   • Pain of both shoulder joints   • Chronic pain of both shoulders   • Acute congestive heart failure (CMS/[facility])   • Obesity, morbid, BMI 50 or higher (CMS/[facility])   • Fungal skin infection   • Cellulitis of lower extremity   • Aortic valve stenosis   • Tricuspid valve insufficiency   • Mitral valve stenosis   • S/P MVR (mitral valve replacement)   • Paroxysmal atrial fibrillation (CMS/[facility])   • Pulmonary hypertension (CMS/[facility])   • Stage 5 chronic kidney disease on chronic dialysis (CMS/[facility])   • Gastroesophageal reflux disease without esophagitis   • Chronic idiopathic constipation   • Dependence on renal dialysis (CMS/[facility])   • Chronic kidney disease, stage 2 (mild)   • Renovascular hypertension   • GI bleed   • 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 (CMS/[facility])   • CAD (coronary artery disease)   • S/P AVR (aortic valve replacement)   • Chronic diastolic CHF (congestive heart failure) (CMS/[facility])   • Chronic pain of both shoulders   • Gastrointestinal hemorrhage   • Gastrointestinal hemorrhage with melena   • Immobility syndrome   • Hematochezia       Impression  1.  Rectal bleeding: Suspected hemorrhoidal, Dr. Rider has evaluated and planning for hemorrhoidectomy tomorrow    2.  Internal and external hemorrhoids : Suspected source for #1    3.  Anticoagulated for atrial fibrillation: Warfarin held, INR still greater than 2, she is getting vitamin K    4.  Acute blood loss anemia: Mild and hemoglobin has been pretty stable    Plan  Continue to monitor stools.    Follow hemoglobin    Continue to hold warfarin for the short-term, resume when  okay from colorectal surgery standpoint    Continue Anusol, MiraLAX    I discussed the patients findings and my recommendations with patient and nursing staff.    All necessary PPE, including face mask and eye protection, were worn during this encounter.  Hand sanitization was performed both before and after the patient interaction.    Over 25 minutes was spent reviewing the patient's chart and records, in discussion with the patient, in examination of the patient, and in discussion with members of the patient's medical team.    Ksenia Buchanan MD

## 2021-08-11 NOTE — CONSULTS
Date of Hospital Visit:21  Encounter Provider: MORGAN Proctor  Place of Service: [facility] CARDIOLOGY  Patient Name: Claudia Arnold  :1945  Referral Provider: No ref. provider found    Chief complaint: Pre-Operative clearance, paroxysmal atrial fibrillation, valvular heart disease, chronic anticoagulation    History of Present Illness:     This is a 76-year-old female followed by Dr. Segura.  She has history of paroxysmal atrial fibrillation, atrial flutter status post LEE/cardioversion ( 2019) hyperlipidemia, end-stage renal disease on hemodialysis, coronary artery disease, and pulmonary hypertension chronic diastolic congestive heart failure, severe aortic valve stenosis status post aortic valve replacement, tricuspid valve repair and severe mitral valve stenoses mitral valve replacement. Open heart surgery was 2019.    She underwent EGD and colonoscopy 2021which showed duodenitis, gastritis, diverticulosis and thrombosed external and internal hemorrhoids.  Her hemorrhoids was felt to be culprit of GI bleed at that time.    Transthoracic echocardiogram May 2021 which showed normal left ventricular systolic function with an EF of 61-65%, mild to moderate concentric hypertrophy, moderately dilated right ventricular cavity with moderately reduced right ventricular systolic function.  The bioprosthetic aortic valve had mean gradients which were elevated, [place] bioprosthetic mitral valve also had elevated mean gradient.  There was severe tricuspid regurgitation RVSP of 61 mmHg.  Follow-up transesophageal echocardiogram showed normal left ventricular systolic function, mild to moderate concentric hypertrophy, moderately dilated right ventricular cavity with moderately reduced right ventricular systolic function.  Left atrial appendage was ligated.  Gradients were not well evaluated at the aortic valve.  The mitral valve clearly open very well and did not  appear stenotic.  Severe tricuspid regurgitation through the annuloplasty ring was confirmed.  RVSP was 48 mmHg.  There was moderate plaque in the descending thoracic aorta and aortic arch.        She presents again with hematochezia and hemorrhoidectomy has been recommended.  She was given vitamin K and I have been asked to see her for preoperative cardiac assessment.  Upon interview, patient denies any recent chest pain tightness or pressure.  She denies shortness of breath, swelling, near-syncope, syncope or lightheadedness.  She had an episode of tachycardia couple weeks ago after dialysis which resolved and has not recurred.  Past Medical History:   Diagnosis Date   • A-fib (CMS/[facility])     Meds   • Arthritis     w/Difficult Mobility   • Bilateral lower extremity edema     Legs   • CAD (coronary artery disease)     Affecting LAD    • Cardiomyopathy (CMS/[facility])    • CHF (congestive heart failure) (CMS/[facility])    • Chronic fatigue    • Chronic kidney disease    • Dialysis patient (CMS/[facility])     TUESDAY THURSDAY SATURDAY   • Generalized anxiety disorder    • GERD (gastroesophageal reflux disease)    • Hernia, inguinal     Hx Repair   • History of echocardiogram 1/17/19-[facility]    The L Ventricular Cavity is Mild-to-Moderately Dilated; Left Ventricular Wall Thickness is Consistent w/Mild Concentric Hypertrophy; Left Atrial Cavity Size is Moderate-to-Severely Dilated; Severe AVS; Severe MVS; Moderate TVR Noted   • History of transfusion    • Hyperlipidemia     Controlled w/Meds   • Hypertension     Controlled w/Meds   • Hypokinesis 01/22/2019    Apical Noted on Cardiac Cath   • IFG (impaired fasting glucose)    • LAD stenosis 01/22/2019    Mid LAD Irregularities Noted on Cardiac Cath    • Left atrial dilatation 01/17/2019    Moderate-Severe Noted on Echo   • Left ventricular dilatation 01/17/2019    Noted on Echo/LEE   • Mild concentric left ventricular hypertrophy 01/17/2019    Noted on Echo/LEE   • Mitral annular  calcification 01/17/2019    Severe Noted on Echo   • Moderate tricuspid valve regurgitation 01/24/2019    Noted on LEE   • Morbid obesity (CMS/[facility])    • NSTEMI (non-ST elevated myocardial infarction) (CMS/[facility])    • OCTAVIANO (obstructive sleep apnea)    • Osteoarthritis    • Pulmonary hypertension (CMS/[facility]) 01/22/2019    Noted on Cardiac Cath   • Right bundle branch block 01/24/2019    Noted on LEE   • Severe aortic stenosis 01/22/2019    Noted on Cardiac Cath & LEE on 01/24/19; S/p AVR on 01/28/19 by Dr. Gaxiola   • Severe mitral valve stenosis 01/24/2019    Noted on LEE; S/p MVR on 01/28/19 by Dr. Gaxiola   • Shoulder pain, bilateral    • Skin yeast infection     Folds Under Skin--Nystatin/Diflucan       Past Surgical History:   Procedure Laterality Date   • AORTIC VALVE REPAIR/REPLACEMENT MITRAL VALVE REPAIR/REPLACEMENT N/A 1/28/2019    Procedure: LEE STERNOTOMY AORTIC VALVE REPLACEMENT, MITRAL VALVE REPLACEMENT, TRICUSPID VALVE REPAIR, MAZE PROCEDURE, CLOSURE OF LEFT ATRIAL APPENDAGE  AND PRP;  Surgeon: Scar Gaxiola MD;  Location: [facility] OR;  Service: Cardiothoracic   • ARTERIOVENOUS FISTULA/SHUNT SURGERY Left 6/26/2019    Procedure: LEFT ARM BRACHIAL CEPHALIC FISTULA;  Surgeon: Satish Stahl MD;  Location: [facility] OR;  Service: Vascular   • CARDIAC CATHETERIZATION N/A 1/22/2019    Procedure: Coronary angiography;  Surgeon: Rick Talavera MD;  Location: [facility] CATH INVASIVE LOCATION;  Service: Cardiology   • CARDIAC CATHETERIZATION N/A 1/22/2019    Procedure: Left Heart Cath;  Surgeon: Rick Talavera MD;  Location: [facility] CATH INVASIVE LOCATION;  Service: Cardiology   • CARDIAC CATHETERIZATION N/A 1/22/2019    Procedure: Right Heart Cath;  Surgeon: Rick Talavera MD;  Location: [facility] CATH INVASIVE LOCATION;  Service: Cardiology   • CARDIAC CATHETERIZATION N/A 1/22/2019    Procedure: Left ventriculography;  Surgeon: Rick Talavera MD;  Location: [facility] INVASIVE  LOCATION;  Service: Cardiology   • CARDIAC SURGERY     • COLONOSCOPY     • COLONOSCOPY N/A 1/16/2021    Procedure: COLONOSCOPY;  Surgeon: Wallace Hernandez MD;  Location: [facility] ENDOSCOPY;  Service: Gastroenterology;  Laterality: N/A;  pre- anemia, rectal bleeding  post-- hemorrhoids, diverticulosis   • ENDOSCOPY N/A 1/16/2021    Procedure: ESOPHAGOGASTRODUODENOSCOPY with biopsies;  Surgeon: Wallace Hernandez MD;  Location: [facility] ENDOSCOPY;  Service: Gastroenterology;  Laterality: N/A;  pre-- anemia  post-- esophagitis, gastritis, duodenitis   • HERNIA REPAIR     • INSERTION HEMODIALYSIS CATHETER N/A 2/8/2019    Procedure: tunnel CATHETER PLACEMENT WITH FLUROSCOPY;  Surgeon: Satish Stahl MD;  Location: [facility] MAIN OR;  Service: Vascular   • REPLACEMENT TOTAL KNEE Bilateral 2007/2009       No current facility-administered medications on file prior to encounter.     Current Outpatient Medications on File Prior to Encounter   Medication Sig Dispense Refill   • acetaminophen (TYLENOL) 325 MG tablet Take 650 mg by mouth 3 (Three) Times a Day. Takes tid at 2 am, 1 pm, and hS     • bumetanide (BUMEX) 2 MG tablet Take 1 tablet by mouth Daily. 90 tablet 3   • busPIRone (BUSPAR) 10 MG tablet Take 1 tablet by mouth 2 (two) times a day. 180 tablet 3   • cephalexin (KEFLEX) 250 MG capsule Take 250 mg by mouth 2 (Two) Times a Day.     • [employer] MELATONIN PO Take  by mouth.     • escitalopram (LEXAPRO) 10 MG tablet Take 1 tablet by mouth Daily. 90 tablet 3   • Heparin Sodium, Porcine, (heparin, porcine,) 1000 units/mL injection Heparin Sodium (Porcine) 1,000 Units/mL Systemic     • hydrocortisone (ANUSOL-HC) 25 MG suppository Insert 1 suppository into the rectum Every Night for 14 days. 14 suppository 0   • hydrocortisone 2.5 % cream Apply  topically to the appropriate area as directed See Admin Instructions. Apply topically to the affected area twice daily as directed 60 g 3   • Iron Sucrose (VENOFER IV) 50 mg 1  (One) Time Per Week.     • Methoxy PEG-Epoetin Beta (MIRCERA IJ) 60 mcg Every 14 (Fourteen) Days.     • midodrine (PROAMATINE) 2.5 MG tablet Take 1 tablet by mouth 3 (Three) Times a Day Before Meals. 90 tablet 6   • ProRenal + D tablet tablet TAKE 1 TABLET BY MOUTH EVERY DAY (ON DIALYSIS DAYS, TAKE AFTER DIALYSIS TREATMENT)     • rOPINIRole (REQUIP) 0.25 MG tablet      • VITAMIN D PO Take 0.75 mcg by mouth.     • warfarin (COUMADIN) 2 MG tablet TAKE 1 TABLET BY MOUTH EVERY SUNDAY, TUESDAY, AND THURSDAY; TAKE 1 AND 1/2 TABLETS ALL OTHER DAYS OF THE WEEK OR AS DIRECTED 120 tablet 0       Social History     Socioeconomic History   • Marital status:      Spouse name: Not on file   • Number of children: 2   • Years of education: 12   • Highest education level: High school graduate   Tobacco Use   • Smoking status: Never Smoker   • Smokeless tobacco: Never Used   Substance and Sexual Activity   • Alcohol use: Yes     Alcohol/week: 1.0 standard drinks     Types: 1 Cans of beer per week     Comment: 1 monthly   • Drug use: No   • Sexual activity: Defer     Birth control/protection: Post-menopausal       Family History   Problem Relation Age of Onset   • Hypertension Other    • Diabetes Other    • Heart disease Neg Hx    • Stroke Neg Hx    • Arthritis Neg Hx    • Breast cancer Neg Hx    • Malig Hyperthermia Neg Hx         REVIEW OF SYSTEMS:   All ROS was performed and is Negative except as outlined in HPI     Objective:     Vitals:    08/10/21 1348 08/10/21 1925 08/10/21 2256 08/11/21 0722   BP: 115/66 123/71 120/73 139/87   BP Location: Left arm Left arm Right arm Right arm   Patient Position: Sitting Lying Lying Lying   Pulse: 81 70 74 77   Resp: 18 16 16 16   Temp: 97.4 °F (36.3 °C) 97.7 °F (36.5 °C) 98.2 °F (36.8 °C) 97.4 °F (36.3 °C)   TempSrc: Oral Oral Oral Oral   SpO2:  94% 91% 95%   Weight:       Height:         Body mass index is 32.45 kg/m².  Flowsheet Rows      First Filed Value   Admission Height  170.2  "cm (67\") Documented at 08/08/2021 1025   Admission Weight  102 kg (225 lb) Documented at 08/08/2021 1025          Physical Exam   Constitutional:       General: Not in acute distress.     Appearance: Well-developed. Not diaphoretic.   HENT:      Head: Normocephalic.   Pulmonary:      Effort: Pulmonary effort is normal. No respiratory distress.      Breath sounds: Examination of the right-lower field reveals decreased breath sounds. Examination of the left-lower field reveals decreased breath sounds. Decreased breath sounds present. No wheezing. No rhonchi. No rales.   Cardiovascular:      Normal rate. Regularly irregular rhythm.      Murmurs: There is a grade 2/6 systolic murmur at the LLSB.   Pulses:     Radial: 2+ bilaterally.  Skin:     General: Skin is warm and dry. There is no cyanosis.      Findings: No rash.   Neurological:      Mental Status: Alert and oriented to person, place, and time.   Psychiatric:         Behavior: Behavior normal.         Thought Content: Thought content normal.         Judgment: Judgment normal.     .     Lab Review:                Results from last 7 days   Lab Units 08/11/21  0557   SODIUM mmol/L 142   POTASSIUM mmol/L 4.1   CHLORIDE mmol/L 105   CO2 mmol/L 24.7   BUN mg/dL 31*   CREATININE mg/dL 2.75*   GLUCOSE mg/dL 100*   CALCIUM mg/dL 9.2         Results from last 7 days   Lab Units 08/11/21  0557   WBC 10*3/mm3 6.63   HEMOGLOBIN g/dL 11.1*   HEMATOCRIT % 35.1   PLATELETS 10*3/mm3 156     Results from last 7 days   Lab Units 08/10/21  0303 08/09/21  0535 08/08/21  1644   INR  2.16* 2.12* 2.24*         Results from last 7 days   Lab Units 08/10/21  0303   MAGNESIUM mg/dL 2.1             I personally viewed and interpreted the patient's EKG/Telemetry data-sinus rhythm with PACs and right bundle branch block     Assessment/ Plan     1.  76-year-old female in need of hemorrhoidectomy scheduled for tomorrow for hematochezia secondary to hemorrhoids  2. Coronary artery disease mild " of LAD on cath 01/2019  3. Severe aortic valve stenosis status post aortic valve replacement 01/2019.  LEE June 2021 confirm stable valves and normal left ventricular systolic function  4. Mitral valve stenosis status post mitral valve replacement  5. Status post tricuspid valve repair  6. History of atrial flutter status post cardioversion with paroxysmal atrial fibrillation status post left cryomaze and left atrial appendage ligation maintained on Coumadin  7. End-stage renal disease on hemodialysis - renal following  8. Pulmonary hypertension-RVSP 48 mmHg on LEE June 2021  9. Chronic RBBB with LAFB-we will check EKG and rule out ischemic changes  10.  Moderate plaque in the descending thoracic aorta and aortic arch on LEE June 2021    Reviewed EKG completed this morning.  No ischemic changes compared to prior.  No recent angina.  Preserved left ventricular systolic function on LEE June 2021 with stable valves.  She is cleared to proceed with hemorrhoidectomy tomorrow.  We will follow postop.  She received vitamin K last night low dose.  PT/INR is pending for this morning.

## 2021-08-11 NOTE — PROGRESS NOTES
[facility]    PROGRESS NOTE    Name:  Claudia Arnold   Age:  76 y.o.  Sex:  female  :  1945  MRN:  [id number]   Visit Number:  [id number]  Admission Date:  2021  Date Of Service:  21  Primary Care Physician:  Robert Cortez MD     LOS: 1 day :  Patient Care Team:  Robert Cortez MD as PCP - General (Family Medicine)  Deborah Agudelo MD as Surgeon (Orthopedic Surgery)  Scott Segura MD as Consulting Physician (Cardiology)  Scar Gaxiola MD as Surgeon (Cardiothoracic Surgery)  Kathy Osuna MD as Consulting Physician (Nephrology)  Dora Astorga APRN as Nurse Practitioner (Neurosurgery)  Satish Stahl MD as Consulting Physician (Vascular Surgery)  Scar Gaxiola MD as Surgeon (Cardiothoracic Surgery):    History taken from:     patient chart family    Chief Complaint:      Bloody stools.    Subjective     Interval History:     Patient seen and examined 21.    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 fib on chronic warfarin, essential hypertension, hyperlipidemia, chronic diastolic CHF who comes in with 2 weeks of off-and-on bleeding.    She came into the emergency room because of the bleeding.  Her hemoglobin is now 10.9.  INR is still 2.12.  Gastroenterology has been consulted, they recommended Anusol and MiraLAX.  Patient however continued to have bleeding.    Nephrology is consulted, she is due for hemodialysis .   They are following.  They have ordered a urinalysis due to urinary incontinence.  Cardiology was consulted, they cleared her to proceed with hemorrhoidectomy.     She has no other symptoms aside from the bleeding.  She denies chest pressure, shortness of breath, lightheadedness, nausea, vomiting, pain.    Colorectal surgery to do hemorrhoidectomy tomorrow.   Continue to hold warfarin.  Vitamin K given.  INR is 1.35 today.    Review of Systems:     All systems were reviewed and negative except for:  Gastrointestinal: positive for  bright red blood per rectum    Objective     Vital Signs:    Temp:  [97.4 °F (36.3 °C)-98.2 °F (36.8 °C)] 97.4 °F (36.3 °C)  Heart Rate:  [70-81] 77  Resp:  [16-18] 16  BP: (115-139)/(66-87) 139/87    Physical Exam:    General: Alert and oriented x4, mild distress.  Heart: Regular rate and rhythm without murmur rub or thrill.  Lungs: Clear to auscultation bilaterally without use of accessory muscles respiration.  Abdomen: Soft/nontender/nondistended.  No HSM noted.  MSK: 5/5 strength in upper/lower extremities bilaterally.     Results Review:      I reviewed the patient's new clinical results.  I reviewed the patient's new imaging results and agree with the interpretation.  I reviewed the patient's other test results and agree with the interpretation    Labs:    Lab Results (last 24 hours)     Procedure Component Value Units Date/Time    Protime-INR [[id number]]  (Abnormal) Collected: 08/11/21 0914    Specimen: Blood from Hand, Right Updated: 08/11/21 1008     Protime 16.5 Seconds      INR 1.35    Renal Function Panel [[id number]]  (Abnormal) Collected: 08/11/21 0557    Specimen: Blood Updated: 08/11/21 0741     Glucose 100 mg/dL      BUN 31 mg/dL      Creatinine 2.75 mg/dL      Sodium 142 mmol/L      Potassium 4.1 mmol/L      Chloride 105 mmol/L      CO2 24.7 mmol/L      Calcium 9.2 mg/dL      Albumin 3.50 g/dL      Phosphorus 4.7 mg/dL      Anion Gap 12.3 mmol/L      BUN/Creatinine Ratio 11.3     eGFR Non African Amer 17 mL/min/1.73     Narrative:      GFR Normal >60  Chronic Kidney Disease <60  Kidney Failure <15      CBC (No Diff) [[id number]]  (Abnormal) Collected: 08/11/21 0557    Specimen: Blood Updated: 08/11/21 0706     WBC 6.63 10*3/mm3      RBC 3.46 10*6/mm3      Hemoglobin 11.1 g/dL      Hematocrit 35.1 %      .4 fL      MCH  32.1 pg      MCHC 31.6 g/dL      RDW 12.4 %      RDW-SD 46.3 fl      MPV 10.2 fL      Platelets 156 10*3/mm3            Radiology:    Imaging Results (Last 24 Hours)     ** No results found for the last 24 hours. **          Medication Review:     acetaminophen, 650 mg, Oral, TID  busPIRone, 10 mg, Oral, Q12H  escitalopram, 10 mg, Oral, Daily  hydrocortisone, 25 mg, Rectal, BID  midodrine, 2.5 mg, Oral, TID AC  pantoprazole, 40 mg, Oral, BID AC  polyethylene glycol, 17 g, Oral, TID With Meals  rOPINIRole, 0.25 mg, Oral, Nightly  sodium chloride, 10 mL, Intravenous, Q12H             Assessment/Plan     Active Problems:    Hematochezia      1.  Acute GI bleeding, suspected to be hemorrhoidal.  2.  End-stage renal disease on hemodialysis.  3.  Paroxysmal atrial fibrillation on warfarin  4.  Coronary artery disease, stable  5.  History of bioprosthetic AVR/MVR  6.  Essential hypertension  7.  Chronic pain of both shoulders.    Plan:    Monitor blood work.  Continue to hold warfarin.  Anusol and MiraLAX per GI.  Colorectal surgery to hemorrhoidectomy tomorrow.  Dialysis today.  Urinalysis ordered by nephrology.  Further recommendations will depend on clinical course.    Jj Harmon DO  08/11/21  11:49 EDT

## 2021-08-12 ENCOUNTER — TELEPHONE (OUTPATIENT)
Dept: GASTROENTEROLOGY | Facility: CLINIC | Age: 76
End: 2021-08-12

## 2021-08-12 ENCOUNTER — ANESTHESIA EVENT (OUTPATIENT)
Dept: PERIOP | Facility: HOSPITAL | Age: 76
End: 2021-08-12

## 2021-08-12 ENCOUNTER — ANESTHESIA (OUTPATIENT)
Dept: PERIOP | Facility: HOSPITAL | Age: 76
End: 2021-08-12

## 2021-08-12 LAB
ALBUMIN SERPL-MCNC: 3.5 G/DL (ref 3.5–5.2)
ANION GAP SERPL CALCULATED.3IONS-SCNC: 12.5 MMOL/L (ref 5–15)
BUN SERPL-MCNC: 16 MG/DL (ref 8–23)
BUN/CREAT SERPL: 8.2 (ref 7–25)
CALCIUM SPEC-SCNC: 9.3 MG/DL (ref 8.6–10.5)
CHLORIDE SERPL-SCNC: 98 MMOL/L (ref 98–107)
CO2 SERPL-SCNC: 25.5 MMOL/L (ref 22–29)
CREAT SERPL-MCNC: 1.96 MG/DL (ref 0.57–1)
DEPRECATED RDW RBC AUTO: 44.6 FL (ref 37–54)
ERYTHROCYTE [DISTWIDTH] IN BLOOD BY AUTOMATED COUNT: 12.5 % (ref 12.3–15.4)
GFR SERPL CREATININE-BSD FRML MDRD: 25 ML/MIN/1.73
GLUCOSE SERPL-MCNC: 101 MG/DL (ref 65–99)
HCT VFR BLD AUTO: 33.3 % (ref 34–46.6)
HGB BLD-MCNC: 11.1 G/DL (ref 12–15.9)
INR PPP: 1.26 (ref 0.9–1.1)
MCH RBC QN AUTO: 32.6 PG (ref 26.6–33)
MCHC RBC AUTO-ENTMCNC: 33.3 G/DL (ref 31.5–35.7)
MCV RBC AUTO: 97.7 FL (ref 79–97)
PHOSPHATE SERPL-MCNC: 3.4 MG/DL (ref 2.5–4.5)
PLATELET # BLD AUTO: 153 10*3/MM3 (ref 140–450)
PMV BLD AUTO: 10 FL (ref 6–12)
POTASSIUM SERPL-SCNC: 3.6 MMOL/L (ref 3.5–5.2)
PROTHROMBIN TIME: 15.6 SECONDS (ref 11.7–14.2)
QT INTERVAL: 382 MS
RBC # BLD AUTO: 3.41 10*6/MM3 (ref 3.77–5.28)
SODIUM SERPL-SCNC: 136 MMOL/L (ref 136–145)
WBC # BLD AUTO: 6.52 10*3/MM3 (ref 3.4–10.8)

## 2021-08-12 PROCEDURE — 93005 ELECTROCARDIOGRAM TRACING: CPT | Performed by: STUDENT IN AN ORGANIZED HEALTH CARE EDUCATION/TRAINING PROGRAM

## 2021-08-12 PROCEDURE — 25010000002 NEOSTIGMINE 5 MG/10ML SOLUTION: Performed by: NURSE ANESTHETIST, CERTIFIED REGISTERED

## 2021-08-12 PROCEDURE — 25010000002 AMIODARONE IN DEXTROSE 5% 150-4.21 MG/100ML-% SOLUTION: Performed by: NURSE PRACTITIONER

## 2021-08-12 PROCEDURE — 46260 REMOVE IN/EX HEM GROUPS 2+: CPT | Performed by: COLON & RECTAL SURGERY

## 2021-08-12 PROCEDURE — 85610 PROTHROMBIN TIME: CPT | Performed by: NURSE PRACTITIONER

## 2021-08-12 PROCEDURE — 93005 ELECTROCARDIOGRAM TRACING: CPT | Performed by: NURSE PRACTITIONER

## 2021-08-12 PROCEDURE — 25010000002 FENTANYL CITRATE (PF) 50 MCG/ML SOLUTION: Performed by: NURSE ANESTHETIST, CERTIFIED REGISTERED

## 2021-08-12 PROCEDURE — 93010 ELECTROCARDIOGRAM REPORT: CPT | Performed by: INTERNAL MEDICINE

## 2021-08-12 PROCEDURE — 80069 RENAL FUNCTION PANEL: CPT | Performed by: INTERNAL MEDICINE

## 2021-08-12 PROCEDURE — 99232 SBSQ HOSP IP/OBS MODERATE 35: CPT | Performed by: INTERNAL MEDICINE

## 2021-08-12 PROCEDURE — 25010000002 DEXAMETHASONE PER 1 MG: Performed by: NURSE ANESTHETIST, CERTIFIED REGISTERED

## 2021-08-12 PROCEDURE — 85027 COMPLETE CBC AUTOMATED: CPT | Performed by: INTERNAL MEDICINE

## 2021-08-12 PROCEDURE — 25010000002 PROPOFOL 10 MG/ML EMULSION: Performed by: NURSE ANESTHETIST, CERTIFIED REGISTERED

## 2021-08-12 PROCEDURE — C1889 IMPLANT/INSERT DEVICE, NOC: HCPCS | Performed by: COLON & RECTAL SURGERY

## 2021-08-12 PROCEDURE — 06BY0ZC EXCISION OF HEMORRHOIDAL PLEXUS, OPEN APPROACH: ICD-10-PCS | Performed by: COLON & RECTAL SURGERY

## 2021-08-12 PROCEDURE — 88304 TISSUE EXAM BY PATHOLOGIST: CPT | Performed by: COLON & RECTAL SURGERY

## 2021-08-12 PROCEDURE — 25010000002 ONDANSETRON PER 1 MG: Performed by: NURSE ANESTHETIST, CERTIFIED REGISTERED

## 2021-08-12 PROCEDURE — 25010000003 CEFAZOLIN IN DEXTROSE 2-4 GM/100ML-% SOLUTION: Performed by: COLON & RECTAL SURGERY

## 2021-08-12 PROCEDURE — 25010000002 AMIODARONE IN DEXTROSE 5% 360-4.14 MG/200ML-% SOLUTION: Performed by: NURSE PRACTITIONER

## 2021-08-12 DEVICE — ABSORBABLE HEMOSTAT (OXIDIZED REGENERATED CELLULOSE, U.S.P.)
Type: IMPLANTABLE DEVICE | Site: RECTUM | Status: FUNCTIONAL
Brand: SURGICEL

## 2021-08-12 RX ORDER — EPHEDRINE SULFATE 50 MG/ML
5 INJECTION, SOLUTION INTRAVENOUS ONCE AS NEEDED
Status: DISCONTINUED | OUTPATIENT
Start: 2021-08-12 | End: 2021-08-12 | Stop reason: HOSPADM

## 2021-08-12 RX ORDER — PROPOFOL 10 MG/ML
VIAL (ML) INTRAVENOUS AS NEEDED
Status: DISCONTINUED | OUTPATIENT
Start: 2021-08-12 | End: 2021-08-12 | Stop reason: SURG

## 2021-08-12 RX ORDER — NALOXONE HCL 0.4 MG/ML
0.2 VIAL (ML) INJECTION AS NEEDED
Status: DISCONTINUED | OUTPATIENT
Start: 2021-08-12 | End: 2021-08-12 | Stop reason: HOSPADM

## 2021-08-12 RX ORDER — ROCURONIUM BROMIDE 10 MG/ML
INJECTION, SOLUTION INTRAVENOUS AS NEEDED
Status: DISCONTINUED | OUTPATIENT
Start: 2021-08-12 | End: 2021-08-12 | Stop reason: SURG

## 2021-08-12 RX ORDER — DEXAMETHASONE SODIUM PHOSPHATE 10 MG/ML
INJECTION INTRAMUSCULAR; INTRAVENOUS AS NEEDED
Status: DISCONTINUED | OUTPATIENT
Start: 2021-08-12 | End: 2021-08-12 | Stop reason: SURG

## 2021-08-12 RX ORDER — GLYCOPYRROLATE 0.2 MG/ML
INJECTION INTRAMUSCULAR; INTRAVENOUS AS NEEDED
Status: DISCONTINUED | OUTPATIENT
Start: 2021-08-12 | End: 2021-08-12 | Stop reason: SURG

## 2021-08-12 RX ORDER — HYDROMORPHONE HYDROCHLORIDE 1 MG/ML
0.5 INJECTION, SOLUTION INTRAMUSCULAR; INTRAVENOUS; SUBCUTANEOUS
Status: DISCONTINUED | OUTPATIENT
Start: 2021-08-12 | End: 2021-08-12 | Stop reason: HOSPADM

## 2021-08-12 RX ORDER — LIDOCAINE 50 MG/G
OINTMENT TOPICAL AS NEEDED
Status: DISCONTINUED | OUTPATIENT
Start: 2021-08-12 | End: 2021-08-13 | Stop reason: HOSPADM

## 2021-08-12 RX ORDER — FLUMAZENIL 0.1 MG/ML
0.2 INJECTION INTRAVENOUS AS NEEDED
Status: DISCONTINUED | OUTPATIENT
Start: 2021-08-12 | End: 2021-08-12 | Stop reason: HOSPADM

## 2021-08-12 RX ORDER — MAGNESIUM HYDROXIDE 1200 MG/15ML
LIQUID ORAL AS NEEDED
Status: DISCONTINUED | OUTPATIENT
Start: 2021-08-12 | End: 2021-08-12 | Stop reason: HOSPADM

## 2021-08-12 RX ORDER — CEFAZOLIN SODIUM 2 G/100ML
2 INJECTION, SOLUTION INTRAVENOUS ONCE
Status: COMPLETED | OUTPATIENT
Start: 2021-08-12 | End: 2021-08-12

## 2021-08-12 RX ORDER — NEOSTIGMINE METHYLSULFATE 0.5 MG/ML
INJECTION, SOLUTION INTRAVENOUS AS NEEDED
Status: DISCONTINUED | OUTPATIENT
Start: 2021-08-12 | End: 2021-08-12 | Stop reason: SURG

## 2021-08-12 RX ORDER — SODIUM CHLORIDE 0.9 % (FLUSH) 0.9 %
3 SYRINGE (ML) INJECTION EVERY 12 HOURS SCHEDULED
Status: DISCONTINUED | OUTPATIENT
Start: 2021-08-12 | End: 2021-08-12 | Stop reason: HOSPADM

## 2021-08-12 RX ORDER — POLYETHYLENE GLYCOL 3350 17 G/17G
17 POWDER, FOR SOLUTION ORAL 2 TIMES DAILY
Status: DISCONTINUED | OUTPATIENT
Start: 2021-08-12 | End: 2021-08-13 | Stop reason: HOSPADM

## 2021-08-12 RX ORDER — FENTANYL CITRATE 50 UG/ML
INJECTION, SOLUTION INTRAMUSCULAR; INTRAVENOUS AS NEEDED
Status: DISCONTINUED | OUTPATIENT
Start: 2021-08-12 | End: 2021-08-12 | Stop reason: SURG

## 2021-08-12 RX ORDER — SODIUM CHLORIDE 0.9 % (FLUSH) 0.9 %
3-10 SYRINGE (ML) INJECTION AS NEEDED
Status: DISCONTINUED | OUTPATIENT
Start: 2021-08-12 | End: 2021-08-12 | Stop reason: HOSPADM

## 2021-08-12 RX ORDER — ONDANSETRON 2 MG/ML
INJECTION INTRAMUSCULAR; INTRAVENOUS AS NEEDED
Status: DISCONTINUED | OUTPATIENT
Start: 2021-08-12 | End: 2021-08-12 | Stop reason: SURG

## 2021-08-12 RX ORDER — HYDRALAZINE HYDROCHLORIDE 20 MG/ML
5 INJECTION INTRAMUSCULAR; INTRAVENOUS
Status: DISCONTINUED | OUTPATIENT
Start: 2021-08-12 | End: 2021-08-12 | Stop reason: HOSPADM

## 2021-08-12 RX ORDER — DIPHENHYDRAMINE HCL 25 MG
25 CAPSULE ORAL
Status: DISCONTINUED | OUTPATIENT
Start: 2021-08-12 | End: 2021-08-12 | Stop reason: HOSPADM

## 2021-08-12 RX ORDER — FENTANYL CITRATE 50 UG/ML
50 INJECTION, SOLUTION INTRAMUSCULAR; INTRAVENOUS
Status: DISCONTINUED | OUTPATIENT
Start: 2021-08-12 | End: 2021-08-12 | Stop reason: HOSPADM

## 2021-08-12 RX ORDER — SODIUM CHLORIDE 9 MG/ML
INJECTION, SOLUTION INTRAVENOUS CONTINUOUS PRN
Status: DISCONTINUED | OUTPATIENT
Start: 2021-08-12 | End: 2021-08-12 | Stop reason: SURG

## 2021-08-12 RX ORDER — OXYCODONE HYDROCHLORIDE 5 MG/1
5 TABLET ORAL EVERY 4 HOURS PRN
Status: DISCONTINUED | OUTPATIENT
Start: 2021-08-12 | End: 2021-08-13 | Stop reason: HOSPADM

## 2021-08-12 RX ORDER — LIDOCAINE HYDROCHLORIDE 10 MG/ML
0.5 INJECTION, SOLUTION EPIDURAL; INFILTRATION; INTRACAUDAL; PERINEURAL ONCE AS NEEDED
Status: DISCONTINUED | OUTPATIENT
Start: 2021-08-12 | End: 2021-08-12 | Stop reason: HOSPADM

## 2021-08-12 RX ORDER — SODIUM CHLORIDE, SODIUM LACTATE, POTASSIUM CHLORIDE, CALCIUM CHLORIDE 600; 310; 30; 20 MG/100ML; MG/100ML; MG/100ML; MG/100ML
9 INJECTION, SOLUTION INTRAVENOUS CONTINUOUS
Status: DISCONTINUED | OUTPATIENT
Start: 2021-08-12 | End: 2021-08-12

## 2021-08-12 RX ORDER — DIPHENHYDRAMINE HYDROCHLORIDE 50 MG/ML
12.5 INJECTION INTRAMUSCULAR; INTRAVENOUS
Status: DISCONTINUED | OUTPATIENT
Start: 2021-08-12 | End: 2021-08-12 | Stop reason: HOSPADM

## 2021-08-12 RX ORDER — ONDANSETRON 2 MG/ML
4 INJECTION INTRAMUSCULAR; INTRAVENOUS ONCE AS NEEDED
Status: DISCONTINUED | OUTPATIENT
Start: 2021-08-12 | End: 2021-08-12 | Stop reason: HOSPADM

## 2021-08-12 RX ORDER — LIDOCAINE HYDROCHLORIDE 20 MG/ML
INJECTION, SOLUTION INFILTRATION; PERINEURAL AS NEEDED
Status: DISCONTINUED | OUTPATIENT
Start: 2021-08-12 | End: 2021-08-12 | Stop reason: SURG

## 2021-08-12 RX ORDER — LABETALOL HYDROCHLORIDE 5 MG/ML
5 INJECTION, SOLUTION INTRAVENOUS
Status: DISCONTINUED | OUTPATIENT
Start: 2021-08-12 | End: 2021-08-12 | Stop reason: HOSPADM

## 2021-08-12 RX ADMIN — GLYCOPYRROLATE 0.6 MG: 0.2 INJECTION INTRAMUSCULAR; INTRAVENOUS at 08:18

## 2021-08-12 RX ADMIN — OXYCODONE 5 MG: 5 TABLET ORAL at 20:12

## 2021-08-12 RX ADMIN — PANTOPRAZOLE SODIUM 40 MG: 40 TABLET, DELAYED RELEASE ORAL at 17:23

## 2021-08-12 RX ADMIN — PROPOFOL 140 MG: 10 INJECTION, EMULSION INTRAVENOUS at 07:33

## 2021-08-12 RX ADMIN — ACETAMINOPHEN 650 MG: 325 TABLET, FILM COATED ORAL at 21:57

## 2021-08-12 RX ADMIN — POLYETHYLENE GLYCOL 3350 17 G: 17 POWDER, FOR SOLUTION ORAL at 21:58

## 2021-08-12 RX ADMIN — NEOSTIGMINE METHYLSULFATE 3 MG: 0.5 INJECTION INTRAVENOUS at 08:18

## 2021-08-12 RX ADMIN — MIDODRINE HYDROCHLORIDE 2.5 MG: 2.5 TABLET ORAL at 12:42

## 2021-08-12 RX ADMIN — AMIODARONE HYDROCHLORIDE 1 MG/MIN: 1.8 INJECTION, SOLUTION INTRAVENOUS at 00:57

## 2021-08-12 RX ADMIN — AMIODARONE HYDROCHLORIDE 150 MG: 1.5 INJECTION, SOLUTION INTRAVENOUS at 00:38

## 2021-08-12 RX ADMIN — ACETAMINOPHEN 650 MG: 325 TABLET, FILM COATED ORAL at 12:43

## 2021-08-12 RX ADMIN — SODIUM CHLORIDE: 9 INJECTION, SOLUTION INTRAVENOUS at 08:27

## 2021-08-12 RX ADMIN — CEFAZOLIN SODIUM 2 G: 2 INJECTION, SOLUTION INTRAVENOUS at 07:17

## 2021-08-12 RX ADMIN — SUGAMMADEX 200 MG: 100 INJECTION, SOLUTION INTRAVENOUS at 08:26

## 2021-08-12 RX ADMIN — ROCURONIUM BROMIDE 40 MG: 50 INJECTION INTRAVENOUS at 07:33

## 2021-08-12 RX ADMIN — FENTANYL CITRATE 50 MCG: 50 INJECTION INTRAMUSCULAR; INTRAVENOUS at 07:31

## 2021-08-12 RX ADMIN — FENTANYL CITRATE 50 MCG: 50 INJECTION INTRAMUSCULAR; INTRAVENOUS at 07:57

## 2021-08-12 RX ADMIN — MIDODRINE HYDROCHLORIDE 2.5 MG: 2.5 TABLET ORAL at 17:24

## 2021-08-12 RX ADMIN — LIDOCAINE HYDROCHLORIDE 100 MG: 20 INJECTION, SOLUTION INFILTRATION; PERINEURAL at 07:31

## 2021-08-12 RX ADMIN — METRONIDAZOLE 500 MG: 500 INJECTION, SOLUTION INTRAVENOUS at 06:55

## 2021-08-12 RX ADMIN — ONDANSETRON 4 MG: 2 INJECTION INTRAMUSCULAR; INTRAVENOUS at 08:14

## 2021-08-12 RX ADMIN — DEXAMETHASONE SODIUM PHOSPHATE 8 MG: 10 INJECTION INTRAMUSCULAR; INTRAVENOUS at 07:41

## 2021-08-12 RX ADMIN — BUSPIRONE HYDROCHLORIDE 10 MG: 10 TABLET ORAL at 21:58

## 2021-08-12 RX ADMIN — SODIUM CHLORIDE, PRESERVATIVE FREE 10 ML: 5 INJECTION INTRAVENOUS at 21:58

## 2021-08-12 RX ADMIN — SODIUM CHLORIDE: 9 INJECTION, SOLUTION INTRAVENOUS at 07:24

## 2021-08-12 RX ADMIN — Medication 3 MG: at 21:58

## 2021-08-12 RX ADMIN — ROPINIROLE HYDROCHLORIDE 0.25 MG: 0.5 TABLET, FILM COATED ORAL at 21:57

## 2021-08-12 NOTE — ANESTHESIA PROCEDURE NOTES
Airway  Urgency: elective    Date/Time: 8/12/2021 7:37 AM  Airway not difficult    General Information and Staff    Patient location during procedure: OR  Anesthesiologist: Sonu Peña MD  CRNA: Benjamín Levine CRNA    Indications and Patient Condition  Indications for airway management: airway protection    Preoxygenated: yes  Mask difficulty assessment: 1 - vent by mask    Final Airway Details  Final airway type: endotracheal airway      Successful airway: ETT  Cuffed: yes   Successful intubation technique: direct laryngoscopy  Endotracheal tube insertion site: oral  Blade: Lopez  Blade size: 2  ETT size (mm): 7.0  Cormack-Lehane Classification: grade I - full view of glottis  Placement verified by: chest auscultation and capnometry   Measured from: lips  ETT/EBT  to lips (cm): 22  Number of attempts at approach: 1  Assessment: lips, teeth, and gum same as pre-op and atraumatic intubation    Additional Comments  Pre 02 100%, SIVI, DL x1, atraumatic intubation, BLBS, Positive ETC02.

## 2021-08-12 NOTE — PLAN OF CARE
Problem: Adult Inpatient Plan of Care  Goal: Plan of Care Review  Outcome: Ongoing, Progressing   Goal Outcome Evaluation:   Vss. Had hemorrhoidectomy this am, no drainage noted on 4x4's in rectum. C/o rectal pressure but no pain.Will d/c amiodarone gtt per order, pt now in nsr.No c/o n/v.To have dialysis in am.

## 2021-08-12 NOTE — PROGRESS NOTES
[facility] Progress Note      Patient Name: Claudia Arnold  : 1945  MRN: [id number]  Primary Care Physician:  Robert Cortez MD  Date of admission: 2021    Subjective     Interval History:   Follow up ESRD. SP internal and external hemorrhoidectomy this am. Feels rectal pressure.  Eating fair .       Review of Systems:   As noted above    Objective     Vitals:   Temp:  [97.4 °F (36.3 °C)-98.1 °F (36.7 °C)] 97.4 °F (36.3 °C)  Heart Rate:  [] 70  Resp:  [14-16] 16  BP: ()/(57-87) 112/62  Flow (L/min):  [2] 2    Intake/Output Summary (Last 24 hours) at 2021 1236  Last data filed at 2021 1010  Gross per 24 hour   Intake 550 ml   Output 1100 ml   Net -550 ml       Physical Exam:    General Appearance: alert, oriented x 3, no acute distress .    Skin: warm and dry  HEENT: oral mucosa normal, nonicteric sclera  Neck: supple, no JVD  Lungs: Clear to auscultation, no wheezing .  Heart: RRR, normal S1 and S2  Abdomen: soft, nontender, nondistended. + bs.   Extremities LUE AVF patent .  Trace lower ext edema. Venous stasis changes.   Neuro: normal speech and mental status     Scheduled Meds:     acetaminophen, 650 mg, Oral, TID  busPIRone, 10 mg, Oral, Q12H  escitalopram, 10 mg, Oral, Daily  midodrine, 2.5 mg, Oral, TID AC  pantoprazole, 40 mg, Oral, BID AC  polyethylene glycol, 17 g, Oral, BID  rOPINIRole, 0.25 mg, Oral, Nightly  sodium chloride, 10 mL, Intravenous, Q12H      IV Meds:   amiodarone, 0.5 mg/min, Last Rate: 0.5 mg/min (21 0724)  lactated ringers, 9 mL/hr        Results Reviewed:   I have personally reviewed the results from the time of this admission to 2021 12:36 EDT     Results from last 7 days   Lab Units 21  0614 21  0557 08/10/21  0303 21  0535 21  1025   SODIUM mmol/L 136 142 138   < > 143   POTASSIUM mmol/L 3.6 4.1 3.8   < > 3.9   CHLORIDE mmol/L 98 105 101   < > 102   CO2 mmol/L 25.5 24.7 26.2   < > 26.8    BUN mg/dL 16 31* 21   < > 49*   CREATININE mg/dL 1.96* 2.75* 1.80*   < > 3.01*   CALCIUM mg/dL 9.3 9.2 9.3   < > 9.7   BILIRUBIN mg/dL  --   --   --   --  0.4   ALK PHOS U/L  --   --   --   --  84   ALT (SGPT) U/L  --   --   --   --  14   AST (SGOT) U/L  --   --   --   --  15   GLUCOSE mg/dL 101* 100* 102*   < > 131*    < > = values in this interval not displayed.       Estimated Creatinine Clearance: 29.7 mL/min (A) (by C-G formula based on SCr of 1.96 mg/dL (H)).    Results from last 7 days   Lab Units 08/12/21 0614 08/11/21  0557 08/10/21  0303 08/09/21  0535 08/09/21  0535   MAGNESIUM mg/dL  --   --  2.1  --  2.3   PHOSPHORUS mg/dL 3.4 4.7* 3.3   < > 5.4*    < > = values in this interval not displayed.             Results from last 7 days   Lab Units 08/12/21  0614 08/11/21  0557 08/10/21  0303 08/09/21  0535 08/08/21  1025   WBC 10*3/mm3 6.52 6.63 7.44 6.69 8.23   HEMOGLOBIN g/dL 11.1* 11.1* 10.8* 10.9* 12.0   PLATELETS 10*3/mm3 153 156 164 183 213       Results from last 7 days   Lab Units 08/12/21 0614 08/11/21  0914 08/10/21  0303 08/09/21  0535 08/08/21  1644   INR  1.26* 1.35* 2.16* 2.12* 2.24*       Assessment / Plan     ASSESSMENT:  1. ESRD.  Dialysis tomorrow.  Challenge dry weight a little.  2. Hematochezia.  SP Hemorrhoidectomy this am.   3. Aemia Hg stable today .  4. Restless legs.  Resumed home requip .  5. PAF on coumadin. SP cryomaze PENNIE ligation, AVR, MVR 1/2019.   6. Chronic diastolic heart failure .  7. Immobility due to osteoarthritis .  8. Urinary incontinence, pyuria. Check Urine culture.   PLAN:  1. Dialysis tomorrow.   2. Check Urine culture .      Maria Fernanda Maldonado MD  08/12/21  12:36 EDT    [facility]  [phone]

## 2021-08-12 NOTE — PROGRESS NOTES
[facility]  Inpatient Progress Note    Reason for Follow Up:  Rectal bleeding    Subjective     Interval History:   Just back from her hemorrhoidectomy.  Still little groggy.  No complaints.  Hemoglobin is stable    Current Facility-Administered Medications:   •  [MAR Hold] acetaminophen (TYLENOL) tablet 650 mg, 650 mg, Oral, TID, Jj Harmon DO, 650 mg at 21  •  [COMPLETED] amiodarone in dextrose 5% (NEXTERONE) loading dose 150mg/100mL, 150 mg, Intravenous, Once, 150 mg at 21 0038 **FOLLOWED BY** [] amiodarone 360 mg in 200 mL D5W infusion, 1 mg/min, Intravenous, Continuous, Last Rate: 33.3 mL/hr at 21, 1 mg/min at 21 **FOLLOWED BY** amiodarone 360 mg in 200 mL D5W infusion, 0.5 mg/min, Intravenous, Continuous, Ayla Wheeler APRN, Last Rate: 16.67 mL/hr at 21, 0.5 mg/min at 21  •  [MAR Hold] busPIRone (BUSPAR) tablet 10 mg, 10 mg, Oral, Q12H, Jj Harmon DO, 10 mg at 21  •  [MAR Hold] calcium carbonate (TUMS) chewable tablet 500 mg (200 mg elemental), 2 tablet, Oral, TID PRN, Jj Harmon DO  •  [MAR Hold] escitalopram (LEXAPRO) tablet 10 mg, 10 mg, Oral, Daily, Jj Harmon DO, 10 mg at 21 0808  •  [MAR Hold] hydrocortisone (ANUSOL-HC) suppository 25 mg, 25 mg, Rectal, BID, Shiloh Pop MD, 25 mg at 08/10/21 0834  •  lactated ringers infusion, 9 mL/hr, Intravenous, Continuous, Sonu Peña MD  •  [MAR Hold] melatonin tablet 3 mg, 3 mg, Oral, Nightly PRN, Maria Fernanda Maldonado MD, 3 mg at 08/11/21 2131  •  [MAR Hold] midodrine (PROAMATINE) tablet 2.5 mg, 2.5 mg, Oral, TID AC, Jj Harmon, DO, 2.5 mg at 21 1734  •  [MAR Hold] nitroglycerin (NITROSTAT) SL tablet 0.4 mg, 0.4 mg, Sublingual, Q5 Min PRN, Jj Harmon, DO  •  [MAR Hold] ondansetron (ZOFRAN) injection 4 mg, 4 mg, Intravenous, Q6H PRN, Jj Harmon, DO  •  [MAR Hold]  pantoprazole (PROTONIX) EC tablet 40 mg, 40 mg, Oral, BID AC, Jj Harmon, , 40 mg at 08/11/21 1734  •  [MAR Hold] polyethylene glycol (MIRALAX) packet 17 g, 17 g, Oral, TID With Meals, Pennie Rider MD, 17 g at 08/11/21 1734  •  [MAR Hold] rOPINIRole (REQUIP) tablet 0.25 mg, 0.25 mg, Oral, Nightly, Maria Fernanda Maldonado MD, 0.25 mg at 08/11/21 2131  •  [COMPLETED] Insert peripheral IV, , , Once **AND** [MAR Hold] sodium chloride 0.9 % flush 10 mL, 10 mL, Intravenous, PRN, Jose Maher PA  •  [MAR Hold] sodium chloride 0.9 % flush 10 mL, 10 mL, Intravenous, Q12H, Jj Harmon DO, 10 mL at 08/11/21 2132  •  [MAR Hold] sodium chloride 0.9 % flush 10 mL, 10 mL, Intravenous, PRN, Jj Harmon DO  Review of Systems:   All systems reviewed and negative except for: Constitution: Grogginess following anesthesia    Objective     Vital Signs  Temp:  [97.4 °F (36.3 °C)-98.1 °F (36.7 °C)] 97.4 °F (36.3 °C)  Heart Rate:  [] 70  Resp:  [14-16] 16  BP: ()/(57-87) 112/62  Body mass index is 34.55 kg/m².    Intake/Output Summary (Last 24 hours) at 8/12/2021 0938  Last data filed at 8/12/2021 0911  Gross per 24 hour   Intake 550 ml   Output 1100 ml   Net -550 ml     I/O this shift:  In: 550 [I.V.:550]  Out: -      Physical Exam:   General: patient drowsy but arousable, no complaints, no distress   Eyes: Normal lids and lashes, no scleral icterus   Neck: supple, normal ROM   Skin: warm and dry, not jaundiced   Cardiovascular: regular rhythm and rate, no murmurs auscultated   Pulm: clear to auscultation bilaterally, regular and unlabored   Abdomen: soft, obese, nontender, nondistended; normal bowel sounds   Rectal: deferred   Extremities: no rash or edema   Psychiatric: Normal mood and behavior; memory intact     Results Review:     I reviewed the patient's new clinical results.    Results from last 7 days   Lab Units 08/12/21  0614 08/11/21  0557 08/10/21  0303   WBC 10*3/mm3 6.52 6.63 7.44    HEMOGLOBIN g/dL 11.1* 11.1* 10.8*   HEMATOCRIT % 33.3* 35.1 34.0   PLATELETS 10*3/mm3 153 156 164     Results from last 7 days   Lab Units 08/12/21  0614 08/11/21  0557 08/10/21  0303 08/09/21  0535 08/08/21  1025   SODIUM mmol/L 136 142 138   < > 143   POTASSIUM mmol/L 3.6 4.1 3.8   < > 3.9   CHLORIDE mmol/L 98 105 101   < > 102   CO2 mmol/L 25.5 24.7 26.2   < > 26.8   BUN mg/dL 16 31* 21   < > 49*   CREATININE mg/dL 1.96* 2.75* 1.80*   < > 3.01*   CALCIUM mg/dL 9.3 9.2 9.3   < > 9.7   BILIRUBIN mg/dL  --   --   --   --  0.4   ALK PHOS U/L  --   --   --   --  84   ALT (SGPT) U/L  --   --   --   --  14   AST (SGOT) U/L  --   --   --   --  15   GLUCOSE mg/dL 101* 100* 102*   < > 131*    < > = values in this interval not displayed.     Results from last 7 days   Lab Units 08/12/21  0614 08/11/21  0914 08/10/21  0303   INR  1.26* 1.35* 2.16*     No results found for: LIPASE    Radiology:  No orders to display       Assessment/Plan     Patient Active Problem List   Diagnosis   • Tear of rotator cuff   • Hypertension   • Generalized anxiety disorder   • Hyperlipidemia   • IFG (impaired fasting glucose)   • Heart murmur, systolic   • Shoulder pain, bilateral   • Pain of both shoulder joints   • Chronic pain of both shoulders   • Acute congestive heart failure (CMS/HCC)   • Obesity, morbid, BMI 50 or higher (CMS/[facility])   • Fungal skin infection   • Cellulitis of lower extremity   • Aortic valve stenosis   • Tricuspid valve insufficiency   • Mitral valve stenosis   • S/P MVR (mitral valve replacement)   • Paroxysmal atrial fibrillation (CMS/[facility])   • Pulmonary hypertension (CMS/[facility])   • Stage 5 chronic kidney disease on chronic dialysis (CMS/[facility])   • Gastroesophageal reflux disease without esophagitis   • Chronic idiopathic constipation   • Dependence on renal dialysis (CMS/[facility])   • Chronic kidney disease, stage 2 (mild)   • Renovascular hypertension   • GI bleed   • 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  (CMS/[facility])   • CAD (coronary artery disease)   • S/P AVR (aortic valve replacement)   • Chronic diastolic CHF (congestive heart failure) (CMS/[facility])   • Chronic pain of both shoulders   • Gastrointestinal hemorrhage   • Gastrointestinal hemorrhage with melena   • Immobility syndrome   • Hematochezia       Impression  1.  Rectal bleeding: Suspected hemorrhoidal, he is now status post internal and external hemorrhoidectomy    2.  Internal and external hemorrhoids : Suspected source for #1    3.  Anticoagulated for atrial fibrillation: Warfarin has been held.    4.  Acute blood loss anemia: Mild and hemoglobin has been pretty stable    Plan  Post hemorrhoidectomy care as per Dr Rider    Continue to hold warfarin for the short-term, resume when okay from colorectal surgery standpoint    Continue Miralax    GI will sign off but remain available as needed in this patient's care.  Thank you.      I discussed the patients findings and my recommendations with patient and nursing staff.    All necessary PPE, including face mask and eye protection, were worn during this encounter.  Hand sanitization was performed both before and after the patient interaction.    Over 25 minutes was spent reviewing the patient's chart and records, in discussion with the patient, in examination of the patient, and in discussion with members of the patient's medical team.    Ksenia Buchanan MD

## 2021-08-12 NOTE — TELEPHONE ENCOUNTER
----- Message from Ksenia Buchanan MD sent at 8/12/2021 10:50 AM EDT -----  Hosp follow up with Yesenia or Dr FLOREZ in 4-6 weeks.  Thx

## 2021-08-12 NOTE — NURSING NOTE
Cardiology contacted in regards to pt HR 110s-140s. Pt asymptomatic at this time. Orders received. Continue to monitor

## 2021-08-12 NOTE — ANESTHESIA POSTPROCEDURE EVALUATION
"Patient: Claudia Arnold    Procedure Summary     Date: 08/12/21 Room / Location: [facility] OR 06 / [facility] MAIN OR    Anesthesia Start: 0724 Anesthesia Stop: 0837    Procedure: HEMORRHOIDECTOMY x2 (N/A Anus) Diagnosis:       Hematochezia      (Hematochezia [K92.1])    Surgeons: Pennie Rider MD Provider: Sonu Peña MD    Anesthesia Type: general ASA Status: 4          Anesthesia Type: general    Vitals  Vitals Value Taken Time   /72 08/12/21 0846   Temp 36.7 °C (98.1 °F) 08/12/21 0834   Pulse 72 08/12/21 0903   Resp 16 08/12/21 0900   SpO2 100 % 08/12/21 0903   Vitals shown include unvalidated device data.        Post Anesthesia Care and Evaluation    Patient location during evaluation: bedside  Patient participation: complete - patient participated  Level of consciousness: awake and alert  Pain management: adequate  Airway patency: patent  Anesthetic complications: No anesthetic complications    Cardiovascular status: acceptable  Respiratory status: acceptable  Hydration status: acceptable    Comments: /87 (BP Location: Left arm, Patient Position: Lying)   Pulse 86   Temp 36.7 °C (98.1 °F) (Oral)   Resp 16   Ht 170.2 cm (67\")   Wt 100 kg (220 lb 9.6 oz)   SpO2 99%   BMI 34.55 kg/m²       "

## 2021-08-12 NOTE — ADDENDUM NOTE
Addendum  created 08/12/21 1247 by Sonu Peña MD    Attestation recorded in Intraprocedure, Intraprocedure Attestations filed

## 2021-08-12 NOTE — ANESTHESIA PREPROCEDURE EVALUATION
Anesthesia Evaluation     Patient summary reviewed and Nursing notes reviewed                Airway   Mallampati: I  TM distance: >3 FB  Neck ROM: full  No difficulty expected  Dental - normal exam     Pulmonary - normal exam   (+) sleep apnea,   Cardiovascular - normal exam    (+) hypertension, valvular problems/murmurs AS, past MI , CAD, dysrhythmias Paroxysmal Atrial Fib, CHF , hyperlipidemia,       Neuro/Psych  (+) psychiatric history Anxiety,     GI/Hepatic/Renal/Endo    (+) obesity,  GERD, GI bleeding , renal disease,   (-) morbid obesity    Musculoskeletal     Abdominal  - normal exam    Bowel sounds: normal.   Substance History - negative use     OB/GYN negative ob/gyn ROS         Other   arthritis,                        Anesthesia Plan    ASA 4     general       Anesthetic plan, all risks, benefits, and alternatives have been provided, discussed and informed consent has been obtained with: patient.

## 2021-08-12 NOTE — CASE MANAGEMENT/SOCIAL WORK
Continued Stay Note  [facility]     Patient Name: Claudia Arnold  MRN: [id number]  Today's Date: 8/12/2021    Admit Date: 8/8/2021    Discharge Plan     Row Name 08/12/21 1554       Plan    Plan  Home with spouse. Continue HD MWF at [facility] as PTA. Uses [facility] for transportation.    Patient/Family in Agreement with Plan  yes    Plan Comments  Pt had hemorrhoidectomy today. Plan for HD tomorrow. Phani Lara RN-BC        Discharge Codes    No documentation.       Expected Discharge Date and Time     Expected Discharge Date Expected Discharge Time    Aug 12, 2021             Phani Lara RN

## 2021-08-12 NOTE — OP NOTE
DATE OF PROCEDURE: 08/12/21     PREOPERATIVE DIAGNOSES: Internal and external hemorrhoids.      POSTOPERATIVE DIAGNOSES: Internal and external hemorrhoids.      PROCEDURES PERFORMED: Internal and external hemorrhoidectomy x 2      SURGEON: Pennie Rider MD     Surgical Assistant: Marilu Rodrigues PA-C     Estimated Blood Loss: 100ml    Specimens:   Order Name Source Comment Collection Info Order Time   TISSUE PATHOLOGY EXAM Hemorrhoid(s)  Collected By: Pennie Rider MD 8/12/2021  7:56 AM     Release to patient   Immediate             Specimens     ID Source Type Tests Collected By Collected At Frozen?    A Hemorrhoid(s) Tissue · TISSUE PATHOLOGY EXAM   Pennie Rider MD 8/12/21 0755 No    Description: left lateral hemorrhoid    B Hemorrhoid(s) Tissue · TISSUE PATHOLOGY EXAM   Pennie Rider MD 8/12/21 0806 No    Description: right posterior hemorrhoid           INDICATIONS: This is a 76 y.o. female  who comes in with enlarged hemorrhoids. she has failed conservative therapy and wishes to have them removed. she understands the risks, benefits, and alternatives, and wishes to proceed.      DESCRIPTION OF PROCEDURE: The patient was brought into the operating room, SCDs were placed, antibiotics were infused. After adequate general anesthesia was achieved, the patient was placed prone on the operating room table. Buttocks were effaced with tape, and she was prepped and draped in sterile fashion. Then 1% lidocaine with epinephrine and 0.25% Marcaine without was used as local infiltration and a perineum block. I did a circumferential exam of the anal canal using a lighted Hill-Davila and found enlarged internal and external hemorrhoids.     I did the left lateral, and then right posterior. I did them in the same fashion. I made an elliptical incision around the internal and external hemorrhoid. Sweeping the sphincter muscle out of the way, I then used the Enseal to take the hemorrhoid at its base. I used a 2-0  Vicryl in a running fashion to closed the incision.  I then did the others in the same fashion.  I ensured good hemostasis.     All instrument, lap counts, and needle counts were correct. The patient was stable throughout the entire case and was taken to recovery.

## 2021-08-12 NOTE — NURSING NOTE
Spoke to Geovanna Petty RN ([facility]) who is aware pt was in a-fib rvr this am before surgery and was started on amiodarone gtt. Pt now in nsr.

## 2021-08-12 NOTE — PROGRESS NOTES
Hospital Follow Up    LOS:  LOS: 2 days   Patient Name: Claudia Arnold  Age/Sex: 76 y.o. female  : 1945  MRN: [id number]    Day of Service: 21   Length of Stay: 2  Encounter Provider: Rui Paiz MD  Place of Service: [facility] CARDIOLOGY  Patient Care Team:  Robert Cortez MD as PCP - General (Family Medicine)  Deborah Agudelo MD as Surgeon (Orthopedic Surgery)  Scott Segura MD as Consulting Physician (Cardiology)  Scar Gaxiola MD as Surgeon (Cardiothoracic Surgery)  Kathy Osuna MD as Consulting Physician (Nephrology)  Dora Astorga APRN as Nurse Practitioner (Neurosurgery)  Satish Stahl MD as Consulting Physician (Vascular Surgery)  Scar Gaxiola MD as Surgeon (Cardiothoracic Surgery)    Subjective:     Chief Complaint: Paroxysmal atrial fibrillation, valvular heart disease    Interval History: Patient says her heart is good today but her bottom is on fire.  She denies chest discomfort shortness of breath.  Patient just got back to the operating room when I saw her.    Objective:     Objective:  Temp:  [97.4 °F (36.3 °C)-98.1 °F (36.7 °C)] 97.4 °F (36.3 °C)  Heart Rate:  [] 70  Resp:  [14-16] 16  BP: ()/(57-87) 112/62     Intake/Output Summary (Last 24 hours) at 2021 1153  Last data filed at 2021 0911  Gross per 24 hour   Intake 550 ml   Output 1100 ml   Net -550 ml     Body mass index is 34.55 kg/m².      21  1012 21  1552 21  0516   Weight: 102 kg (224 lb 11.2 oz) 101 kg (222 lb 7.1 oz) 100 kg (220 lb 9.6 oz)     Weight change: 7.938 kg (17 lb 8 oz)      Physical Exam:   General : Alert, cooperative, in no acute distress.  Neuro: Alert,cooperative and oriented.  Lungs: CTAB. Normal respiratory effort and rate.  CV: Regular rate and rhythm, normal S1 and S2, no murmurs, gallops or rubs.  ABD: Soft, nontender, nondistended. Positive bowel sounds.  Extr: No edema or cyanosis,  moves all extremities.    Lab Review:   Results from last 7 days   Lab Units 08/12/21  0614 08/11/21  0557 08/09/21  0535 08/08/21  1025   SODIUM mmol/L 136 142   < > 143   POTASSIUM mmol/L 3.6 4.1   < > 3.9   CHLORIDE mmol/L 98 105   < > 102   CO2 mmol/L 25.5 24.7   < > 26.8   BUN mg/dL 16 31*   < > 49*   CREATININE mg/dL 1.96* 2.75*   < > 3.01*   GLUCOSE mg/dL 101* 100*   < > 131*   CALCIUM mg/dL 9.3 9.2   < > 9.7   AST (SGOT) U/L  --   --   --  15   ALT (SGPT) U/L  --   --   --  14    < > = values in this interval not displayed.         Results from last 7 days   Lab Units 08/12/21  0614 08/11/21  0557   WBC 10*3/mm3 6.52 6.63   HEMOGLOBIN g/dL 11.1* 11.1*   HEMATOCRIT % 33.3* 35.1   PLATELETS 10*3/mm3 153 156     Results from last 7 days   Lab Units 08/12/21  0614 08/11/21  0914   INR  1.26* 1.35*     Results from last 7 days   Lab Units 08/10/21  0303 08/09/21  0535   MAGNESIUM mg/dL 2.1 2.3           Invalid input(s): LDLCALC            Current Medications:   Scheduled Meds:acetaminophen, 650 mg, Oral, TID  busPIRone, 10 mg, Oral, Q12H  escitalopram, 10 mg, Oral, Daily  midodrine, 2.5 mg, Oral, TID AC  pantoprazole, 40 mg, Oral, BID AC  polyethylene glycol, 17 g, Oral, BID  rOPINIRole, 0.25 mg, Oral, Nightly  sodium chloride, 10 mL, Intravenous, Q12H      Continuous Infusions:amiodarone, 0.5 mg/min, Last Rate: 0.5 mg/min (08/12/21 0724)  lactated ringers, 9 mL/hr        Allergies:  No Known Allergies    Assessment:       Hematochezia        Plan:   1.  Significant hemorrhoids status post hemorrhoidectomy.  2.  History of coronary artery disease no complaints from a cardiovascular standpoint.  3.  Status post mitral and aortic valve replacement as well as tricuspid valve repair.  4.  History of atrial flutter patient was status post left atrial appendage ligation she is currently on Coumadin as an outpatient.  Would resume this when okay from a surgical standpoint.  At this point it does not appear to be a  urgent need to resume anticoagulation.  5.  End-stage renal disease on hemodialysis  6.  Patient seems to have tolerated surgical procedure well.  Again I would resume anticoagulation when okay from a surgical standpoint without a significant rush to resume it.  Nothing else really to add from a cardiovascular standpoint we will see on an as-needed basis.        Rui Paiz MD  08/12/21  11:53 EDT

## 2021-08-12 NOTE — PROGRESS NOTES
Name: Claudia Arnold ADMIT: 2021   : 1945  PCP: Robert Cortez MD    MRN: [id number] LOS: 2 days   AGE/SEX: 76 y.o. female  ROOM: E4/     Subjective   Subjective   Went for internal and external hemorrhoidectomy this morning. She reports rectal pressure this afternoon. No gas or BM yet. She tried to eat a little earlier, but stopped because of discomfort with swallowing related to breathing tube.    Apparently went into RVR last night prior to surgery and was placed on an amio drip.     Objective   Objective   Vital Signs  Temp:  [97.4 °F (36.3 °C)-98.1 °F (36.7 °C)] 97.6 °F (36.4 °C)  Heart Rate:  [] 74  Resp:  [14-16] 16  BP: ()/(57-87) 143/79  SpO2:  [90 %-99 %] 91 %  on  Flow (L/min):  [2] 2;   Device (Oxygen Therapy): nasal cannula  Body mass index is 34.55 kg/m².  Physical Exam  Constitutional:       General: She is not in acute distress.     Appearance: She is obese.   Cardiovascular:      Rate and Rhythm: Normal rate and regular rhythm.      Heart sounds: Normal heart sounds.   Pulmonary:      Effort: Pulmonary effort is normal.      Breath sounds: Normal breath sounds.   Abdominal:      General: Bowel sounds are normal.      Palpations: Abdomen is soft.   Musculoskeletal:         General: No tenderness.      Right lower leg: No edema.      Left lower leg: No edema.   Neurological:      Mental Status: She is alert.   Psychiatric:         Mood and Affect: Mood normal.         Behavior: Behavior normal.         Results Review     I reviewed the patient's new clinical results.  Results from last 7 days   Lab Units 21  0614 21  0557 08/10/21  0303 21  0535   WBC 10*3/mm3 6.52 6.63 7.44 6.69   HEMOGLOBIN g/dL 11.1* 11.1* 10.8* 10.9*   PLATELETS 10*3/mm3 153 156 164 183     Results from last 7 days   Lab Units 21  0614 21  0557 08/10/21  0303 21  0535   SODIUM mmol/L 136 142 138 142   POTASSIUM mmol/L 3.6 4.1 3.8 3.9   CHLORIDE mmol/L 98 105 101  104   CO2 mmol/L 25.5 24.7 26.2 25.0   BUN mg/dL 16 31* 21 49*   CREATININE mg/dL 1.96* 2.75* 1.80* 2.88*   GLUCOSE mg/dL 101* 100* 102* 95   Estimated Creatinine Clearance: 29.7 mL/min (A) (by C-G formula based on SCr of 1.96 mg/dL (H)).  Results from last 7 days   Lab Units 08/12/21  0614 08/11/21  0557 08/10/21  0303 08/09/21  0535 08/08/21  1025 08/08/21  1025   ALBUMIN g/dL 3.50 3.50 3.60 3.70   < > 4.40   BILIRUBIN mg/dL  --   --   --   --   --  0.4   ALK PHOS U/L  --   --   --   --   --  84   AST (SGOT) U/L  --   --   --   --   --  15   ALT (SGPT) U/L  --   --   --   --   --  14    < > = values in this interval not displayed.     Results from last 7 days   Lab Units 08/12/21  0614 08/11/21  0557 08/10/21  0303 08/09/21  0535   CALCIUM mg/dL 9.3 9.2 9.3 9.4   ALBUMIN g/dL 3.50 3.50 3.60 3.70   MAGNESIUM mg/dL  --   --  2.1 2.3   PHOSPHORUS mg/dL 3.4 4.7* 3.3 5.4*       COVID19   Date Value Ref Range Status   08/11/2021 Not Detected Not Detected - Ref. Range Final   08/08/2021 Not Detected Not Detected - Ref. Range Final   06/02/2021 Not Detected Not Detected - Ref. Range Final   01/16/2021 Not Detected Not Detected - Ref. Range Final   01/11/2021 Not Detected Not Detected - Ref. Range Final     No results found for: HGBA1C, POCGLU    Adult Transesophageal Echo (LEE) W/ Cont if Necessary Per Protocol  · Left ventricular ejection fraction appears to be 61 - 65%.  · Left ventricular wall thickness is consistent with mild to moderate   concentric hypertrophy  · The right ventricular cavity is moderately dilated. Moderately reduced   right ventricular systolic function noted.  · The left atrial cavity is severely dilated.  · The left atrial appendage has been ligated, although there is still   bidirectional flow across a small orifice  · There is a bioprosthetic aortic valve replacement which appears to be   well-seated. The valve leaflets open well. The bioprosthetic aortic valve   could not be properly aligned in  the transgastric views to obtain   gradients.  · Gradients across the bioprosthetic mitral valve are grossly elevated   with a mean gradient of 14 mmHg. However, the valve leaflets clearly open   very well and do not appear stenotic.  · The tricuspid valve is status post an annuloplasty ring repair. There is   severe tricuspid regurgitation through the annuloplasty ring  · Calculated right ventricular systolic pressure from tricuspid   regurgitation is 48 mmHg.  · There are moderate plaques in the descending thoracic aorta and in the   aortic arch       Scheduled Medications  acetaminophen, 650 mg, Oral, TID  busPIRone, 10 mg, Oral, Q12H  escitalopram, 10 mg, Oral, Daily  midodrine, 2.5 mg, Oral, TID AC  pantoprazole, 40 mg, Oral, BID AC  polyethylene glycol, 17 g, Oral, BID  rOPINIRole, 0.25 mg, Oral, Nightly  sodium chloride, 10 mL, Intravenous, Q12H    Infusions  amiodarone, 0.5 mg/min, Last Rate: 0.5 mg/min (08/12/21 0724)    Diet  Diet Regular; GI Soft/[place], Consistent Carbohydrate, Low Potassium, Low Vitamin K       Assessment/Plan     Active Hospital Problems    Diagnosis  POA   • **Hematochezia [K92.1]  Yes   • S/P AVR (aortic valve replacement) [Z95.2]  Not Applicable   • CAD (coronary artery disease) [I25.10]  Yes   • Paroxysmal atrial fibrillation (CMS/HCC) [I48.0]  Yes   • Pulmonary hypertension (CMS/HCC) [I27.20]  Yes   • Gastroesophageal reflux disease without esophagitis [K21.9]  Yes   • ESRD (end stage renal disease) (CMS/HCC) [N18.6]  Yes   • S/P MVR (mitral valve replacement) [Z95.2]  Not Applicable   • Chronic pain of both shoulders [M25.511, G89.29, M25.512]  Yes   • Hypertension [I10]  Yes   • Hyperlipidemia [E78.5]  Yes      Resolved Hospital Problems   No resolved problems to display.       76 y.o. female admitted with Hematochezia.    · Hematochezia from hemorrhoids-s/p internal and external hemorrhoidectomy on 8/12 (today). GI recommends miralax. Await return of GI function.  · Paroxysmal  afib and aflutter s/p left atrial appendage ligation chronically on coumadin with RVR-coumadin held here for surgery. Resume anticoagulation when okay with colorectal surgery. Currently on an amiodarone drip for RVR which happened last night. She is in NSR at the moment. Will ask RN to clarify with cardiology if we can stop and if they'd like her to go on a beta blocker or not.  · ESRD on HD-nephrology is following. Plan for dialysis tomorrow.  · S/p bioprosthetic mitral and aortic valve replacements as well as tricuspid valve repair  · Coronary artery disease-no chest pain  · Essential hypertension-bp acceptable acutely  · Urinary incontinence with pyuria and hematuria on urinalysis-denies dysuria, frequency, urgency. Says that incontinence resolved. F/u urine culture. No abx at the moment.  · Chronic bilateral shoulder pain  · SCDs for DVT prophylaxis. Restart coumadin when okay with Colorectal surgery  · Full code.  · Discussed with patient and family.  · Anticipate discharge home with family when cleared by consultants.      Moises Wheeler MD  [facility]  08/12/21  14:54 EDT    I wore protective equipment throughout this patient encounter including a face mask, gloves and protective eyewear.  Hand hygiene was performed before donning protective equipment and after removal when leaving the room.

## 2021-08-13 ENCOUNTER — READMISSION MANAGEMENT (OUTPATIENT)
Dept: CALL CENTER | Facility: HOSPITAL | Age: 76
End: 2021-08-13

## 2021-08-13 VITALS
WEIGHT: 222 LBS | DIASTOLIC BLOOD PRESSURE: 63 MMHG | HEART RATE: 72 BPM | SYSTOLIC BLOOD PRESSURE: 114 MMHG | RESPIRATION RATE: 18 BRPM | TEMPERATURE: 97.6 F | OXYGEN SATURATION: 91 % | HEIGHT: 67 IN | BODY MASS INDEX: 34.84 KG/M2

## 2021-08-13 PROBLEM — K92.1 HEMATOCHEZIA: Status: RESOLVED | Noted: 2021-08-08 | Resolved: 2021-08-13

## 2021-08-13 LAB
ALBUMIN SERPL-MCNC: 3.7 G/DL (ref 3.5–5.2)
ANION GAP SERPL CALCULATED.3IONS-SCNC: 13.9 MMOL/L (ref 5–15)
BUN SERPL-MCNC: 27 MG/DL (ref 8–23)
BUN/CREAT SERPL: 9.3 (ref 7–25)
CALCIUM SPEC-SCNC: 9.2 MG/DL (ref 8.6–10.5)
CHLORIDE SERPL-SCNC: 99 MMOL/L (ref 98–107)
CO2 SERPL-SCNC: 24.1 MMOL/L (ref 22–29)
CREAT SERPL-MCNC: 2.91 MG/DL (ref 0.57–1)
CYTO UR: NORMAL
DEPRECATED RDW RBC AUTO: 46.3 FL (ref 37–54)
ERYTHROCYTE [DISTWIDTH] IN BLOOD BY AUTOMATED COUNT: 12.5 % (ref 12.3–15.4)
GFR SERPL CREATININE-BSD FRML MDRD: 16 ML/MIN/1.73
GLUCOSE SERPL-MCNC: 117 MG/DL (ref 65–99)
HCT VFR BLD AUTO: 32.3 % (ref 34–46.6)
HGB BLD-MCNC: 10.6 G/DL (ref 12–15.9)
INR PPP: 1.25 (ref 0.9–1.1)
LAB AP CASE REPORT: NORMAL
MCH RBC QN AUTO: 32.5 PG (ref 26.6–33)
MCHC RBC AUTO-ENTMCNC: 32.8 G/DL (ref 31.5–35.7)
MCV RBC AUTO: 99.1 FL (ref 79–97)
PATH REPORT.FINAL DX SPEC: NORMAL
PATH REPORT.GROSS SPEC: NORMAL
PHOSPHATE SERPL-MCNC: 6 MG/DL (ref 2.5–4.5)
PLATELET # BLD AUTO: 151 10*3/MM3 (ref 140–450)
PMV BLD AUTO: 10.8 FL (ref 6–12)
POTASSIUM SERPL-SCNC: 4.2 MMOL/L (ref 3.5–5.2)
PROTHROMBIN TIME: 15.5 SECONDS (ref 11.7–14.2)
QT INTERVAL: 471 MS
QT INTERVAL: 476 MS
RBC # BLD AUTO: 3.26 10*6/MM3 (ref 3.77–5.28)
SODIUM SERPL-SCNC: 137 MMOL/L (ref 136–145)
WBC # BLD AUTO: 9.41 10*3/MM3 (ref 3.4–10.8)

## 2021-08-13 PROCEDURE — 93010 ELECTROCARDIOGRAM REPORT: CPT | Performed by: INTERNAL MEDICINE

## 2021-08-13 PROCEDURE — 85027 COMPLETE CBC AUTOMATED: CPT | Performed by: INTERNAL MEDICINE

## 2021-08-13 PROCEDURE — 85610 PROTHROMBIN TIME: CPT | Performed by: COLON & RECTAL SURGERY

## 2021-08-13 PROCEDURE — 93005 ELECTROCARDIOGRAM TRACING: CPT | Performed by: NURSE PRACTITIONER

## 2021-08-13 PROCEDURE — 99232 SBSQ HOSP IP/OBS MODERATE 35: CPT | Performed by: INTERNAL MEDICINE

## 2021-08-13 PROCEDURE — 80069 RENAL FUNCTION PANEL: CPT | Performed by: INTERNAL MEDICINE

## 2021-08-13 RX ORDER — OXYCODONE HYDROCHLORIDE 5 MG/1
5 TABLET ORAL EVERY 4 HOURS PRN
Qty: 30 TABLET | Refills: 0 | Status: SHIPPED | OUTPATIENT
Start: 2021-08-13 | End: 2021-08-18

## 2021-08-13 RX ORDER — POLYETHYLENE GLYCOL 3350 17 G/17G
17 POWDER, FOR SOLUTION ORAL 2 TIMES DAILY
Qty: 1020 G | Refills: 0 | Status: SHIPPED | OUTPATIENT
Start: 2021-08-13 | End: 2021-09-12

## 2021-08-13 RX ORDER — LIDOCAINE 50 MG/G
OINTMENT TOPICAL AS NEEDED
Qty: 35.44 G | Refills: 0 | Status: SHIPPED | OUTPATIENT
Start: 2021-08-13 | End: 2021-09-12

## 2021-08-13 RX ADMIN — ACETAMINOPHEN 650 MG: 325 TABLET, FILM COATED ORAL at 12:54

## 2021-08-13 RX ADMIN — BUSPIRONE HYDROCHLORIDE 10 MG: 10 TABLET ORAL at 12:54

## 2021-08-13 RX ADMIN — MIDODRINE HYDROCHLORIDE 2.5 MG: 2.5 TABLET ORAL at 12:54

## 2021-08-13 RX ADMIN — MIDODRINE HYDROCHLORIDE 2.5 MG: 2.5 TABLET ORAL at 08:28

## 2021-08-13 RX ADMIN — MIDODRINE HYDROCHLORIDE 2.5 MG: 2.5 TABLET ORAL at 06:27

## 2021-08-13 RX ADMIN — POLYETHYLENE GLYCOL 3350 17 G: 17 POWDER, FOR SOLUTION ORAL at 12:55

## 2021-08-13 RX ADMIN — ACETAMINOPHEN 650 MG: 325 TABLET, FILM COATED ORAL at 02:11

## 2021-08-13 RX ADMIN — PANTOPRAZOLE SODIUM 40 MG: 40 TABLET, DELAYED RELEASE ORAL at 06:28

## 2021-08-13 RX ADMIN — ESCITALOPRAM 10 MG: 10 TABLET, FILM COATED ORAL at 12:54

## 2021-08-13 RX ADMIN — SODIUM CHLORIDE, PRESERVATIVE FREE 10 ML: 5 INJECTION INTRAVENOUS at 08:30

## 2021-08-13 RX ADMIN — OXYCODONE 5 MG: 5 TABLET ORAL at 07:53

## 2021-08-13 NOTE — PROGRESS NOTES
Progress Note    Pod 3      S: positive flatus, positive BM. positive ambulating. Pain controlled with Tylenol.     O:  Temp:  [97.5 °F (36.4 °C)-97.7 °F (36.5 °C)] 97.5 °F (36.4 °C)  Heart Rate:  [64-74] 66  Resp:  [16-18] 16  BP: (110-147)/(49-81) 110/49      Intake/Output Summary (Last 24 hours) at 8/13/2021 1233  Last data filed at 8/13/2021 1200  Gross per 24 hour   Intake 240 ml   Output 3000 ml   Net -2760 ml       Abd:   soft, non-distended  Incision: clean, dry      Results from last 7 days   Lab Units 08/13/21  0314   WBC 10*3/mm3 9.41   HEMOGLOBIN g/dL 10.6*   HEMATOCRIT % 32.3*   PLATELETS 10*3/mm3 151     Results from last 7 days   Lab Units 08/13/21  0314   SODIUM mmol/L 137   POTASSIUM mmol/L 4.2   CHLORIDE mmol/L 99   CO2 mmol/L 24.1   BUN mg/dL 27*   CREATININE mg/dL 2.91*   GLUCOSE mg/dL 117*   CALCIUM mg/dL 9.2   PHOSPHORUS mg/dL 6.0*       Results from last 7 days   Lab Units 08/10/21  0303   MAGNESIUM mg/dL 2.1         A/P: 76 y.o. female with s/p HEMORRHOIDECTOMY x2  - Continue Miralax  - Recommended OTC Lidocaine cream for any rectal discomfort post-discharge

## 2021-08-13 NOTE — PLAN OF CARE
Goal Outcome Evaluation:           Progress: no change  Outcome Summary: VSS, pt in afib at a controlled rate, no complaints of chest pain, dyspnea, nor chest pain. 4x4 to rectal OS, no drainage noted at begining of shift, will reasses as needed. Medicated x1 with percocet, otherwise pain free. Will continue to monitor.

## 2021-08-13 NOTE — DISCHARGE SUMMARY
Patient Name: Claudia Arnold  : 1945  MRN: [id number]    Date of Admission: 2021  Date of Discharge:  2021  Primary Care Physician: Robert Cortez MD      Chief Complaint:   Rectal Bleeding      Discharge Diagnoses     Active Hospital Problems    Diagnosis  POA   • S/P AVR (aortic valve replacement) [Z95.2]  Not Applicable   • CAD (coronary artery disease) [I25.10]  Yes   • Paroxysmal atrial fibrillation (CMS/HCC) [I48.0]  Yes   • Pulmonary hypertension (CMS/HCC) [I27.20]  Yes   • Gastroesophageal reflux disease without esophagitis [K21.9]  Yes   • ESRD (end stage renal disease) (CMS/HCC) [N18.6]  Yes   • S/P MVR (mitral valve replacement) [Z95.2]  Not Applicable   • Chronic pain of both shoulders [M25.511, G89.29, M25.512]  Yes   • Hypertension [I10]  Yes   • Hyperlipidemia [E78.5]  Yes      Resolved Hospital Problems    Diagnosis Date Resolved POA   • **Hematochezia [K92.1] 2021 Yes        Hospital Course     Ms. Arnold is a 76 y.o. female with a history of ESRD, chronic hemorrhoids, coronary artery disease, pulmonary hypertension, prior biosynthetic prosthesis for mitral and AV valves, paroxysmal A. fib on warfarin, essential hypertension, hyperlipidemia, chronic diastolic heart failure who presented to [facility] initially complaining of intermittent hematochezia.  Please see the admitting history and physical for further details.  She was found to have anemia and was admitted to the hospital for further evaluation and treatment.     Her warfarin was held, and nephrology, GI, cardiology, and colorectal surgery were consulted.  The patient had failed outpatient conservative measures for her hemorrhoids, and this was felt to be the source of bleeding.  After clearance from cardiology, the patient was taken for internal and external hemorrhoidectomy.  Nephrology followed for routine dialysis.     She tolerated the procedure well, and the following day after dialysis was  cleared for discharge. She was prescribed miralax, topical lidocaine, and prn pain medications to assist with her recovery. She is to follow up with Dr. Rider in 3 weeks.    Her course was complicated by an episode of afib with rvr which was treated with an amiodarone drip. She converted back to NSR while on the drip. Her warfarin was restarted at discharge.       Day of Discharge     Subjective:  Had dialysis today, which she tolerated well.  She says that her pain is well controlled with pain medication.  He has been cleared for discharge by colorectal surgery.  Other services have signed off.    Physical Exam:  Temp:  [97.5 °F (36.4 °C)-97.7 °F (36.5 °C)] 97.6 °F (36.4 °C)  Heart Rate:  [64-72] 72  Resp:  [16-18] 18  BP: (110-147)/(49-79) 114/63  Body mass index is 34.77 kg/m².  Physical Exam  Constitutional:       General: She is not in acute distress.     Appearance: She is obese.   Cardiovascular:      Rate and Rhythm: Normal rate and regular rhythm.      Heart sounds: Normal heart sounds.   Pulmonary:      Effort: Pulmonary effort is normal.      Breath sounds: Normal breath sounds.   Abdominal:      General: Bowel sounds are normal.      Palpations: Abdomen is soft.   Musculoskeletal:         General: No tenderness.      Right lower leg: No edema.      Left lower leg: No edema.   Neurological:      Mental Status: She is alert.   Psychiatric:         Mood and Affect: Mood normal.         Behavior: Behavior normal.         Consultants     Consult Orders (all) (From admission, onward)     Start     Ordered    08/10/21 1651  Inpatient Cardiology Consult  Once     Specialty:  Cardiology  Provider:  Scott Segura MD    08/10/21 1651    08/10/21 1214  Inpatient Colorectal Surgery Consult  Once     Specialty:  Colon and Rectal Surgery  Provider:  Pennie Rider MD    08/10/21 1215    08/08/21 1509  Inpatient Nephrology Consult  Once     Specialty:  Nephrology  Provider:  Valentin Gabriel MD     08/08/21 1508    08/08/21 1418  Inpatient Gastroenterology Consult  Once     Specialty:  Gastroenterology  Provider:  Mikey Zacarias MD    08/08/21 1418    08/08/21 1119  [facility] (on-call MD unless specified) Details  Once     Specialty:  Hospitalist  Provider:  (Not yet assigned)    08/08/21 1118              Procedures     Imaging Results (All)     None          Pertinent Labs     Results from last 7 days   Lab Units 08/13/21 0314 08/12/21 0614 08/11/21 0557 08/10/21  0303   WBC 10*3/mm3 9.41 6.52 6.63 7.44   HEMOGLOBIN g/dL 10.6* 11.1* 11.1* 10.8*   PLATELETS 10*3/mm3 151 153 156 164     Results from last 7 days   Lab Units 08/13/21 0314 08/12/21 0614 08/11/21 0557 08/10/21  0303   SODIUM mmol/L 137 136 142 138   POTASSIUM mmol/L 4.2 3.6 4.1 3.8   CHLORIDE mmol/L 99 98 105 101   CO2 mmol/L 24.1 25.5 24.7 26.2   BUN mg/dL 27* 16 31* 21   CREATININE mg/dL 2.91* 1.96* 2.75* 1.80*   GLUCOSE mg/dL 117* 101* 100* 102*   Estimated Creatinine Clearance: 20.1 mL/min (A) (by C-G formula based on SCr of 2.91 mg/dL (H)).  Results from last 7 days   Lab Units 08/13/21 0314 08/12/21 0614 08/11/21 0557 08/10/21  0303 08/09/21  0535 08/08/21  1025   ALBUMIN g/dL 3.70 3.50 3.50 3.60   < > 4.40   BILIRUBIN mg/dL  --   --   --   --   --  0.4   ALK PHOS U/L  --   --   --   --   --  84   AST (SGOT) U/L  --   --   --   --   --  15   ALT (SGPT) U/L  --   --   --   --   --  14    < > = values in this interval not displayed.     Results from last 7 days   Lab Units 08/13/21 0314 08/12/21 0614 08/11/21 0557 08/10/21  0303 08/09/21  0535 08/09/21  0535   CALCIUM mg/dL 9.2 9.3 9.2 9.3   < > 9.4   ALBUMIN g/dL 3.70 3.50 3.50 3.60   < > 3.70   MAGNESIUM mg/dL  --   --   --  2.1  --  2.3   PHOSPHORUS mg/dL 6.0* 3.4 4.7* 3.3   < > 5.4*    < > = values in this interval not displayed.               Invalid input(s): LDLCALC  Results from last 7 days   Lab Units 08/11/21  1600   URINECX  >100,000 CFU/mL Gram Negative Bacilli*        Test Results Pending at Discharge     Pending Labs     Order Current Status    Tissue Pathology Exam In process    Urine Culture - Urine, Urine, Catheter Preliminary result          Discharge Details        Discharge Medications      New Medications      Instructions Start Date   lidocaine 5 % ointment  Commonly known as: XYLOCAINE   Apply  topically to the appropriate area as directed As Needed for Mild Pain  or Moderate Pain      oxyCODONE 5 MG immediate release tablet  Commonly known as: ROXICODONE   5 mg, Oral, Every 4 Hours PRN      polyethylene glycol 17 GM/SCOOP powder  Commonly known as: MIRALAX   mix 1 capful (17 g) in liquid by mouth 2 (Two) Times a Day         Continue These Medications      Instructions Start Date   acetaminophen 325 MG tablet  Commonly known as: TYLENOL   650 mg, Oral, 3 Times Daily, Takes tid at 2 am, 1 pm, and hS      bumetanide 2 MG tablet  Commonly known as: BUMEX   2 mg, Oral, Daily      busPIRone 10 MG tablet  Commonly known as: BUSPAR   10 mg, Oral, 2 times daily      [employer] MELATONIN PO   Oral      escitalopram 10 MG tablet  Commonly known as: LEXAPRO   10 mg, Oral, Daily      heparin (porcine) 1000 units/mL injection   Heparin Sodium (Porcine) 1,000 Units/mL Systemic      midodrine 2.5 MG tablet  Commonly known as: PROAMATINE   2.5 mg, Oral, 3 Times Daily Before Meals      MIRCERA IJ   60 mcg, Every 14 Days      ProRenal + D tablet tablet  Generic drug: multivitamin with minerals   TAKE 1 TABLET BY MOUTH EVERY DAY (ON DIALYSIS DAYS, TAKE AFTER DIALYSIS TREATMENT)      rOPINIRole 0.25 MG tablet  Commonly known as: REQUIP   No dose, route, or frequency recorded.      VENOFER IV   50 mg, Weekly      VITAMIN D PO   0.75 mcg, Oral      warfarin 2 MG tablet  Commonly known as: COUMADIN   TAKE 1 TABLET BY MOUTH EVERY SUNDAY, TUESDAY, AND THURSDAY; TAKE 1 AND 1/2 TABLETS ALL OTHER DAYS OF THE WEEK OR AS DIRECTED         Stop These Medications    cephalexin 250 MG capsule  Commonly  known as: KEFLEX     hydrocortisone 2.5 % cream     hydrocortisone 25 MG suppository  Commonly known as: ANUSOL-HC            No Known Allergies      Discharge Disposition:  Home or Self Care    Discharge Diet:  Diet Order   Procedures   • Diet Regular; GI Soft/[place], Consistent Carbohydrate, Low Potassium, Low Vitamin K       Discharge Activity:   Activity Instructions     Activity as Tolerated            CODE STATUS:    Code Status and Medical Interventions:   Ordered at: 08/08/21 1418     Level Of Support Discussed With:    Patient     Code Status:    CPR     Medical Interventions (Level of Support Prior to Arrest):    Full       Future Appointments   Date Time Provider Department Center   8/30/2021  8:15 AM Pennie Rider MD [facility]  [facility]   9/16/2021  9:20 AM Scott Segura MD [id number]   9/23/2021 11:00 AM Yesenia Han APRN [facility] GE EA [facility]   11/2/2021  1:10 PM Deborah Agudelo MD [facility] L100 [facility]   5/31/2022 10:30 AM Robert Cortez MD [facility] PC [id number] [facility]     Additional Instructions for the Follow-ups that You Need to Schedule     Call MD With Problems / Concerns   As directed      Instructions: call your primary care physician if you experience chest pain, shortness of breath, abdominal pain, nausea, vomiting, fevers, sweats, chills, or worsening of your symptoms    Order Comments: Instructions: call your primary care physician if you experience chest pain, shortness of breath, abdominal pain, nausea, vomiting, fevers, sweats, chills, or worsening of your symptoms          Discharge Follow-up with PCP   As directed       Currently Documented PCP:    Robert Cortez MD    PCP Phone Number:    [phone]     Follow Up Details: 1-2 weeks         Discharge Follow-up with Specialty: Colorectal surgery, 3 weeks   As directed      Specialty: Colorectal surgery, 3 weeks           Follow-up Information     Robert Cortez MD .    Specialty: Family Medicine  Why: 1-2 weeks  Contact  information:  [address] [facility] [address]  [facility] [address]  [phone]                   Additional Instructions for the Follow-ups that You Need to Schedule     Call MD With Problems / Concerns   As directed      Instructions: call your primary care physician if you experience chest pain, shortness of breath, abdominal pain, nausea, vomiting, fevers, sweats, chills, or worsening of your symptoms    Order Comments: Instructions: call your primary care physician if you experience chest pain, shortness of breath, abdominal pain, nausea, vomiting, fevers, sweats, chills, or worsening of your symptoms          Discharge Follow-up with PCP   As directed       Currently Documented PCP:    Robert Cortez MD    PCP Phone Number:    [phone]     Follow Up Details: 1-2 weeks         Discharge Follow-up with Specialty: Colorectal surgery, 3 weeks   As directed      Specialty: Colorectal surgery, 3 weeks           Time Spent on Discharge:  Greater than 30 minutes      Moises Wheeler MD  [facility]  08/13/21  14:25 EDT

## 2021-08-13 NOTE — CASE MANAGEMENT/SOCIAL WORK
Case Management Discharge Note      Final Note: Home with spouse. Continue HD MWF at [facility] as PTA. Transport by private auto. Phani Lara RN-BC    Provided Post Acute Provider List?: Refused  Refused Provider List Comment: Denies any needs at this time.    Selected Continued Care - Admitted Since 8/8/2021     Destination    No services have been selected for the patient.              Durable Medical Equipment    No services have been selected for the patient.              Dialysis/Infusion    No services have been selected for the patient.              Home Medical Care    No services have been selected for the patient.              Therapy    No services have been selected for the patient.              Community Resources    No services have been selected for the patient.              Community & DME    No services have been selected for the patient.                  Transportation Services  Private: Car    Final Discharge Disposition Code: 01 - home or self-care

## 2021-08-13 NOTE — PROGRESS NOTES
Hospital Follow Up    LOS:  LOS: 3 days   Patient Name: Claudia Arnold  Age/Sex: 76 y.o. female  : 1945  MRN: [id number]    Day of Service: 21   Length of Stay: 3  Encounter Provider: Rui Paiz MD  Place of Service: [facility] CARDIOLOGY  Patient Care Team:  Robert Cortez MD as PCP - General (Family Medicine)  Deborah Agudelo MD as Surgeon (Orthopedic Surgery)  Scott Segura MD as Consulting Physician (Cardiology)  Scar Gaxiola MD as Surgeon (Cardiothoracic Surgery)  Kathy Osuna MD as Consulting Physician (Nephrology)  Dora Astorga APRN as Nurse Practitioner (Neurosurgery)  Satish Stahl MD as Consulting Physician (Vascular Surgery)  Scar Gaxiola MD as Surgeon (Cardiothoracic Surgery)    Subjective:     Chief Complaint: Paroxysmal atrial fibrillation/valvular heart disease    Interval History: Patient says she is doing much better today.  I saw her while she was in dialysis.  Monitor showed continued sinus rhythm with some PACs.    Objective:     Objective:  Temp:  [97.6 °F (36.4 °C)-97.7 °F (36.5 °C)] 97.6 °F (36.4 °C)  Heart Rate:  [64-74] 71  Resp:  [16-18] 16  BP: (123-147)/(71-81) 130/71     Intake/Output Summary (Last 24 hours) at 2021 1220  Last data filed at 2021 0740  Gross per 24 hour   Intake 240 ml   Output --   Net 240 ml     Body mass index is 34.77 kg/m².      21  1552 21  0516 21  0300   Weight: 101 kg (222 lb 7.1 oz) 100 kg (220 lb 9.6 oz) 101 kg (222 lb)     Weight change: -1.225 kg (-2 lb 11.2 oz)      Physical Exam:   General : Alert, cooperative, in no acute distress.  Neuro: Alert,cooperative and oriented.  Lungs: CTAB. Normal respiratory effort and rate.  CV: Regular rate and rhythm, normal S1 and S2, no murmurs, gallops or rubs.  ABD: Soft, nontender, nondistended. Positive bowel sounds.  Extr: No edema or cyanosis, moves all extremities.    Lab Review:   Results  from last 7 days   Lab Units 08/13/21  0314 08/12/21  0614 08/09/21  0535 08/08/21  1025   SODIUM mmol/L 137 136   < > 143   POTASSIUM mmol/L 4.2 3.6   < > 3.9   CHLORIDE mmol/L 99 98   < > 102   CO2 mmol/L 24.1 25.5   < > 26.8   BUN mg/dL 27* 16   < > 49*   CREATININE mg/dL 2.91* 1.96*   < > 3.01*   GLUCOSE mg/dL 117* 101*   < > 131*   CALCIUM mg/dL 9.2 9.3   < > 9.7   AST (SGOT) U/L  --   --   --  15   ALT (SGPT) U/L  --   --   --  14    < > = values in this interval not displayed.         Results from last 7 days   Lab Units 08/13/21  0314 08/12/21  0614   WBC 10*3/mm3 9.41 6.52   HEMOGLOBIN g/dL 10.6* 11.1*   HEMATOCRIT % 32.3* 33.3*   PLATELETS 10*3/mm3 151 153     Results from last 7 days   Lab Units 08/13/21  0314 08/12/21  0614   INR  1.25* 1.26*     Results from last 7 days   Lab Units 08/10/21  0303 08/09/21  0535   MAGNESIUM mg/dL 2.1 2.3           Invalid input(s): LDLCALC            Current Medications:   Scheduled Meds:acetaminophen, 650 mg, Oral, TID  busPIRone, 10 mg, Oral, Q12H  escitalopram, 10 mg, Oral, Daily  midodrine, 2.5 mg, Oral, TID AC  pantoprazole, 40 mg, Oral, BID AC  polyethylene glycol, 17 g, Oral, BID  rOPINIRole, 0.25 mg, Oral, Nightly  sodium chloride, 10 mL, Intravenous, Q12H      Continuous Infusions:     Allergies:  No Known Allergies    Assessment:       Hematochezia    Hypertension    Hyperlipidemia    Chronic pain of both shoulders    S/P MVR (mitral valve replacement)    Paroxysmal atrial fibrillation (CMS/HCC)    Pulmonary hypertension (CMS/HCC)    ESRD (end stage renal disease) (CMS/HCC)    Gastroesophageal reflux disease without esophagitis    CAD (coronary artery disease)    S/P AVR (aortic valve replacement)        Plan:     1.  Status post hemorrhoidectomy.  Patient is doing much better.  2.  History of coronary artery disease stable  3.  Status post mitral and aortic valve replacement.  4.  History of atrial flutter/atrial fibrillation.  She had a brief episode  perioperatively respond to amiodarone.  At this point I would just place her back on her anticoagulation when okay from a surgical standpoint.  5.  End-stage renal disease patient was getting dialysis today no issues with that.  6.  Patient stable from a cardiovascular standpoint we will see patient back in office.  She has appointment to see Dr. Segura in mid September I told her to keep that appointment.  Please call if any questions or issues should arise.      Rui Paiz MD  08/13/21  12:20 EDT

## 2021-08-13 NOTE — PROGRESS NOTES
Progress Note    Pod1    S: positive flatus,  positive BM. minimal pain .  Patient on dialysis  O:  Temp:  [97.5 °F (36.4 °C)-97.7 °F (36.5 °C)] 97.6 °F (36.4 °C)  Heart Rate:  [64-72] 72  Resp:  [16-18] 18  BP: (110-147)/(49-79) 114/63      Intake/Output Summary (Last 24 hours) at 8/13/2021 1321  Last data filed at 8/13/2021 1312  Gross per 24 hour   Intake 120 ml   Output 3000 ml   Net -2880 ml       Abd:   soft, non distended        Results from last 7 days   Lab Units 08/13/21  0314   WBC 10*3/mm3 9.41   HEMOGLOBIN g/dL 10.6*   HEMATOCRIT % 32.3*   PLATELETS 10*3/mm3 151     Results from last 7 days   Lab Units 08/13/21  0314   SODIUM mmol/L 137   POTASSIUM mmol/L 4.2   CHLORIDE mmol/L 99   CO2 mmol/L 24.1   BUN mg/dL 27*   CREATININE mg/dL 2.91*   GLUCOSE mg/dL 117*   CALCIUM mg/dL 9.2   PHOSPHORUS mg/dL 6.0*       Results from last 7 days   Lab Units 08/10/21  0303   MAGNESIUM mg/dL 2.1         A/P: 76 y.o. female with s/p HEMORRHOIDECTOMY x2, * Panel 2 does not exist *  Continue to take MiraLAX for soft stools  Okay to use lidocaine  Pain medicine as needed  Follow-up with me in 3 weeks for postop appointment  Okay to discharge from colorectal standpoint

## 2021-08-14 LAB — BACTERIA SPEC AEROBE CULT: ABNORMAL

## 2021-08-14 NOTE — OUTREACH NOTE
Prep Survey      Responses   [facility] facility patient discharged from?  [place]   Is LACE score < 7 ?  No   Emergency Room discharge w/ pulse ox?  No   Eligibility  [facility]   Date of Admission  08/08/21   Date of Discharge  08/13/21   Discharge Disposition  Home or Self Care   Discharge diagnosis  Hematochezia-hemorrhoidectomy this visit   Does the patient have one of the following disease processes/diagnoses(primary or secondary)?  Other   Does the patient have Home health ordered?  No   Is there a DME ordered?  No   Comments regarding appointments  HD MWF   Prep survey completed?  Yes          Dorinda Rider RN

## 2021-08-16 ENCOUNTER — TRANSITIONAL CARE MANAGEMENT TELEPHONE ENCOUNTER (OUTPATIENT)
Dept: CALL CENTER | Facility: HOSPITAL | Age: 76
End: 2021-08-16

## 2021-08-16 NOTE — OUTREACH NOTE
Call Center TCM Note      Responses   [facility] patient discharged from?  [place]   Does the patient have one of the following disease processes/diagnoses(primary or secondary)?  Other   TCM attempt successful?  Yes   Call start time  1117   Call end time  1122   Discharge diagnosis  Hematochezia-hemorrhoidectomy this visit   Is patient permission given to speak with other caregiver?  Yes   Person spoke with today (if not patient) and relationship  Magnus/    Meds reviewed with patient/caregiver?  Yes   Is the patient having any side effects they believe may be caused by any medication additions or changes?  No   Does the patient have all medications ordered at discharge?  Yes   Is the patient taking all medications as directed (includes completed medication regime)?  Yes   Does the patient have a primary care provider?   Yes   Does the patient have an appointment with their PCP within 7 days of discharge?  Yes   Comments regarding PCP  Robert Cortez MD   declines TCM  followup appt. States she sees surgeon on 8/30/2021   Has the patient kept scheduled appointments due by today?  N/A   Has home health visited the patient within 72 hours of discharge?  N/A   Psychosocial issues?  No   Did the patient receive a copy of their discharge instructions?  Yes   Nursing interventions  Reviewed instructions with patient   What is the patient's perception of their health status since discharge?  Improving   Is the patient/caregiver able to teach back signs and symptoms related to disease process for when to call PCP?  Yes   Is the patient/caregiver able to teach back signs and symptoms related to disease process for when to call 911?  Yes   Is the patient/caregiver able to teach back the hierarchy of who to call/visit for symptoms/problems? PCP, Specialist, Home health nurse, Urgent Care, ED, 911  Yes   If the patient is a current smoker, are they able to teach back resources for cessation?  Not a  smoker   TCM call completed?  Yes          Bc Thompson RN    8/16/2021, 11:22 EDT

## 2021-08-17 RX ORDER — MIDODRINE HYDROCHLORIDE 2.5 MG/1
TABLET ORAL
Qty: 90 TABLET | Refills: 6 | Status: ON HOLD | OUTPATIENT
Start: 2021-08-17 | End: 2021-01-01 | Stop reason: SDUPTHER

## 2021-08-18 LAB — INR PPP: 1.39

## 2021-08-20 ENCOUNTER — ANTICOAGULATION VISIT (OUTPATIENT)
Dept: PHARMACY | Facility: HOSPITAL | Age: 76
End: 2021-08-20

## 2021-08-20 DIAGNOSIS — I48.0 PAROXYSMAL ATRIAL FIBRILLATION (HCC): Primary | ICD-10-CM

## 2021-08-20 NOTE — PROGRESS NOTES
Anticoagulation Clinic Progress Note    Anticoagulation Summary  As of 2021    INR goal:  2.0-3.0   TTR:  52.9 % (2.1 y)   INR used for dosin.39 (2021)   Warfarin maintenance plan:  2 mg every Sun, Tue, Thu; 3 mg all other days   Weekly warfarin total:  18 mg   Plan last modified:  Jalen Temple, PharmD (2021)   Next INR check:  2021   Priority:  High   Target end date:      Indications    Paroxysmal atrial fibrillation (CMS/HCC) [I48.0]             Anticoagulation Episode Summary     INR check location:      Preferred lab:      Send INR reminders to:   [facility]  POOL    Comments:  Lab drawn at dialysis ([facility]) - [facility] [phone]       Anticoagulation Care Providers     Provider Role Specialty Phone number    Shiloh Gonzales APRN Referring Cardiology [phone]          Clinic Interview:  Patient Findings     Positives:  Missed doses    Negatives:  Signs/symptoms of thrombosis, Signs/symptoms of bleeding,   Laboratory test error suspected, Change in health, Change in alcohol use,   Change in activity, Upcoming invasive procedure, Emergency department   visit, Upcoming dental procedure, Extra doses, Change in medications,   Change in diet/appetite, Hospital admission, Bruising, Other complaints    Comments:  Patient held warfarin from  till . Resumed normal dose   on 8/15. Was holding due to hemorrhoids and was admitted to the hospital       Clinical Outcomes     Negatives:  Major bleeding event, Thromboembolic event,   Anticoagulation-related hospital admission, Anticoagulation-related ED   visit, Anticoagulation-related fatality    Comments:  Patient held warfarin from  till . Resumed normal dose   on 8/15. Was holding due to hemorrhoids and was admitted to the hospital         INR History:  Anticoagulation Monitoring 2021   INR 2.32 2.52 1.39   INR Date 2021   INR Goal 2.0-3.0 2.0-3.0 2.0-3.0   Trend  Same Same Same   Last Week Total 18 mg 18 mg 12 mg   Next Week Total 18 mg 18 mg 19 mg   Sun 2 mg 2 mg 2 mg   Mon 3 mg 3 mg -   Tue 2 mg 2 mg -   Wed - - -   Thu - - -   Fri 3 mg 3 mg 4 mg (8/20)   Sat 3 mg 3 mg 3 mg   Visit Report - - -   Some recent data might be hidden       Plan:  1. INR is Subtherapeutic today- see above in Anticoagulation Summary.   Will instruct Claudia GABRIELLE Arnold to Increase their warfarin regimen- see above in Anticoagulation Summary. Patient resumed warfarin on 8/15 , held from 8/8-8/14 due to hemorrhoids with bleeding. Patient was instructed to resume warfarin on 8/12, however, waited till 8/15.  Hesitant to increase too much with recent bleeding. INR was taken on 8/18 and resulted to medication management clinic on 8/20. INR has likely increased since 8/18 INR lab value.   2. Follow up in 3 days   3. They have been instructed to call if any changes in medications, doses, concerns, etc. Patient expresses understanding and has no further questions at this time.    Ruma Valles, [facility]

## 2021-08-25 LAB — INR PPP: 2.21

## 2021-08-30 ENCOUNTER — OFFICE VISIT (OUTPATIENT)
Dept: SURGERY | Facility: CLINIC | Age: 76
End: 2021-08-30

## 2021-08-30 VITALS
HEIGHT: 67 IN | SYSTOLIC BLOOD PRESSURE: 128 MMHG | BODY MASS INDEX: 34.53 KG/M2 | HEART RATE: 88 BPM | TEMPERATURE: 97.1 F | OXYGEN SATURATION: 95 % | WEIGHT: 220 LBS | DIASTOLIC BLOOD PRESSURE: 68 MMHG

## 2021-08-30 DIAGNOSIS — K62.5 RECTAL BLEEDING: Primary | ICD-10-CM

## 2021-08-30 PROCEDURE — 99024 POSTOP FOLLOW-UP VISIT: CPT | Performed by: COLON & RECTAL SURGERY

## 2021-08-30 RX ORDER — CEPHALEXIN 500 MG/1
500 CAPSULE ORAL 2 TIMES DAILY
COMMUNITY
Start: 2021-08-19 | End: 2021-08-30

## 2021-08-30 NOTE — PROGRESS NOTES
"Claudia Arnold is a 76 y.o. female in for follow up of Rectal bleeding    S/p hemorrhoidectomy x2 08/12/2021  Minimal rectal bleeding since procedure  Denies any rectal pain  Alternates between diarrhea and constipation. Takes OTC anti-diarrhea medication and SS prn.   Not taking any laxatives    /68 (BP Location: Left arm, Patient Position: Sitting, Cuff Size: Large Adult)   Pulse 88   Temp 97.1 °F (36.2 °C)   Ht 170.2 cm (67\")   Wt 99.8 kg (220 lb)   SpO2 95%   Breastfeeding No   BMI 34.46 kg/m²   Body mass index is 34.46 kg/m².      PE:  Physical Exam  Constitutional:       General: She is not in acute distress.     Appearance: She is well-developed.   HENT:      Head: Normocephalic and atraumatic.   Abdominal:      General: There is no distension.      Palpations: Abdomen is soft.   Genitourinary:     Comments: Perianal exam: Well healing incisions for recent hemorrhoidectomy.    Musculoskeletal:         General: Normal range of motion.   Neurological:      Mental Status: She is alert.   Psychiatric:         Thought Content: Thought content normal.         Assessment:   1. Rectal bleeding    -S/p hemorrhoidectomy x2 08/12/2021    Plan:  - Follow up prn for any new or worsening symptoms      Scribed for Pennie Rider MD by Marilu Rodrigues PA-C 8/30/2021  08:31 EDT   This patient was evaluated by me, recommendations made, documentation reviewed, edited, and revised by me, Pennie Rider MD        "

## 2021-08-31 ENCOUNTER — ANTICOAGULATION VISIT (OUTPATIENT)
Dept: PHARMACY | Facility: HOSPITAL | Age: 76
End: 2021-08-31

## 2021-08-31 DIAGNOSIS — I48.0 PAROXYSMAL ATRIAL FIBRILLATION (HCC): Primary | ICD-10-CM

## 2021-08-31 NOTE — PROGRESS NOTES
Anticoagulation Clinic Progress Note    Anticoagulation Summary  As of 2021    INR goal:  2.0-3.0   TTR:  52.7 % (2.1 y)   INR used for dosin.21 (2021)   Warfarin maintenance plan:  2 mg every Sun, Tue, Thu; 3 mg all other days   Weekly warfarin total:  18 mg   No change documented:  [facility]   Plan last modified:  Jalen Temple, PharmD (2021)   Next INR check:  2021   Priority:  High   Target end date:      Indications    Paroxysmal atrial fibrillation (CMS/HCC) [I48.0]             Anticoagulation Episode Summary     INR check location:      Preferred lab:      Send INR reminders to:  [facility]  POOL    Comments:  Lab drawn at [facility] ([facility]) - [facility] [phone]       Anticoagulation Care Providers     Provider Role Specialty Phone number    Shiloh Gonzales APRN Referring Cardiology [phone]          Clinic Interview:  No pertinent clinical findings have been reported.    INR History:  Anticoagulation Monitoring 2021   INR 2.52 1.39 2.21   INR Date 2021   INR Goal 2.0-3.0 2.0-3.0 2.0-3.0   Trend Same Same Same   Last Week Total 18 mg 12 mg 18 mg   Next Week Total 18 mg 19 mg 18 mg   Sun 2 mg 2 mg 2 mg   Mon 3 mg - 3 mg   Tue 2 mg - 2 mg   Wed - - 3 mg   Thu - - 2 mg   Fri 3 mg 4 mg () 3 mg   Sat 3 mg 3 mg 3 mg   Visit Report - - -   Some recent data might be hidden       Plan:  1. INR is therapeutic today- see above in Anticoagulation Summary.    Claudia ANTHONY Arnold to continue their warfarin regimen- see above in Anticoagulation Summary.  2. Follow up in 1 week  3. Pt has agreed to only be called if INR out of range. They have been instructed to call if any changes in medications, doses, concerns, etc. Patient expresses understanding and has no further questions at this time.    Tana Keller [facility]

## 2021-09-01 LAB — INR PPP: 2.51

## 2021-09-07 ENCOUNTER — ANTICOAGULATION VISIT (OUTPATIENT)
Dept: PHARMACY | Facility: HOSPITAL | Age: 76
End: 2021-09-07

## 2021-09-07 DIAGNOSIS — I48.0 PAROXYSMAL ATRIAL FIBRILLATION (HCC): Primary | ICD-10-CM

## 2021-09-07 NOTE — PROGRESS NOTES
Anticoagulation Clinic Progress Note    Anticoagulation Summary  As of 2021    INR goal:  2.0-3.0   TTR:  53.1 % (2.2 y)   INR used for dosin.51 (2021)   Warfarin maintenance plan:  2 mg every Sun, Tue, Thu; 3 mg all other days   Weekly warfarin total:  18 mg   No change documented:  Tana Keller [facility]   Plan last modified:  Jalen Temple, PharmD (2021)   Next INR check:  2021   Priority:  High   Target end date:      Indications    Paroxysmal atrial fibrillation (CMS/HCC) [I48.0]             Anticoagulation Episode Summary     INR check location:      Preferred lab:      Send INR reminders to:  [facility]  POOL    Comments:  Lab drawn at [facility] ([facility]) - [facility] [phone]       Anticoagulation Care Providers     Provider Role Specialty Phone number    Shiloh Gonzales APRN Referring Cardiology [phone]          Clinic Interview:  No pertinent clinical findings have been reported.    INR History:  Anticoagulation Monitoring 2021   INR 1.39 2.21 2.51   INR Date 2021   INR Goal 2.0-3.0 2.0-3.0 2.0-3.0   Trend Same Same Same   Last Week Total 12 mg 18 mg 18 mg   Next Week Total 19 mg 18 mg 18 mg   Sun 2 mg 2 mg 2 mg   Mon - 3 mg 3 mg   Tue - 2 mg 2 mg   Wed - 3 mg 3 mg   Thu - 2 mg 2 mg   Fri 4 mg () 3 mg 3 mg   Sat 3 mg 3 mg 3 mg   Visit Report - - -   Some recent data might be hidden       Plan:  1. INR is therapeutic today- see above in Anticoagulation Summary.    Claudia ANTHONY Aronld to continue their warfarin regimen- see above in Anticoagulation Summary.  2. Follow up in 1 week  3. Pt has agreed to only be called if INR out of range. They have been instructed to call if any changes in medications, doses, concerns, etc. Patient expresses understanding and has no further questions at this time.    Tana Keller [facility]

## 2021-09-16 ENCOUNTER — OFFICE VISIT (OUTPATIENT)
Dept: CARDIOLOGY | Facility: CLINIC | Age: 76
End: 2021-09-16

## 2021-09-16 VITALS
WEIGHT: 220 LBS | OXYGEN SATURATION: 98 % | SYSTOLIC BLOOD PRESSURE: 112 MMHG | DIASTOLIC BLOOD PRESSURE: 64 MMHG | HEART RATE: 81 BPM | HEIGHT: 67 IN | BODY MASS INDEX: 34.53 KG/M2

## 2021-09-16 DIAGNOSIS — Z95.3 STATUS POST MITRAL VALVE REPLACEMENT WITH TISSUE VALVE: ICD-10-CM

## 2021-09-16 DIAGNOSIS — I34.2 NONRHEUMATIC MITRAL VALVE STENOSIS: ICD-10-CM

## 2021-09-16 DIAGNOSIS — G47.30 SLEEP APNEA, UNSPECIFIED TYPE: Primary | ICD-10-CM

## 2021-09-16 DIAGNOSIS — I48.0 PAF (PAROXYSMAL ATRIAL FIBRILLATION) (HCC): ICD-10-CM

## 2021-09-16 DIAGNOSIS — I50.32 CHRONIC DIASTOLIC CONGESTIVE HEART FAILURE (HCC): ICD-10-CM

## 2021-09-16 DIAGNOSIS — Z95.3 STATUS POST AORTIC VALVE REPLACEMENT WITH TISSUE VALVE: ICD-10-CM

## 2021-09-16 DIAGNOSIS — I45.2 BIFASCICULAR BLOCK: ICD-10-CM

## 2021-09-16 DIAGNOSIS — Z98.890 STATUS POST TRICUSPID VALVE REPAIR: ICD-10-CM

## 2021-09-16 DIAGNOSIS — I48.3 TYPICAL ATRIAL FLUTTER (HCC): ICD-10-CM

## 2021-09-16 DIAGNOSIS — I51.7 RIGHT VENTRICULAR ENLARGEMENT: ICD-10-CM

## 2021-09-16 PROCEDURE — 99214 OFFICE O/P EST MOD 30 MIN: CPT | Performed by: INTERNAL MEDICINE

## 2021-10-04 NOTE — PROGRESS NOTES
Date of Office Visit:   2021  Encounter Provider: Scott Segura MD  Place of Service: [facility] CARDIOLOGY  Patient Name: Claudia Arnold  :1945    Chief complaint: Follow-up for tissue aortic valve replacement, tissue mitral valve   replacement, tricuspid valve repair, paroxysmal atrial fibrillation and flutter,   bifascicular block, right ventricular enlargement and dysfunction, and chronic   diastolic CHF.    History of Present Illness:    I again had the pleasure of seeing the patient in cardiology office on 2021.  She is a   very pleasant 76 year-old white female with a history of valvular disease, paroxysmal   atrial fibrillation, paroxysmal atrial flutter, bifascicular block, and chronic diastolic CHF   who presents for follow-up.  The patient presented to the emergency department on   2019 with progressive and severe dyspnea, volume overload, and significant   peripheral edema.  A subsequent echocardiogram showed severe calcific disease of   the aortic valve and the mitral apparatus.  This resulted in both severe aortic stenosis   and severe mitral stenosis.  She was also noted to have severe tricuspid regurgitation.    Her ejection fraction was normal at 60-65%.  She also had Candida intertrigo in her   skin folds, as well as a significant left axillary fungal infection.  She was treated   appropriately with antifungal agents.  She was diuresed extensively.  A CT angiogram   of the chest showed questionable small bilateral pulmonary emboli.  She did go into   rapid atrial fibrillation shortly into her hospital stay, and continued to have issues with   paroxysmal atrial fibrillation throughout her hospital stay.  A right and left heart   catheterization on 2019 showed minimal luminal irregularities in the LAD, but   otherwise normal coronary arteries.  She had moderate pulmonary hypertension with   a mean PA pressure of 32 mmHg.    The patient  ultimately underwent tissue aortic valve replacement (23 mm Magna   pericardial valve), tissue mitral valve replacement (25 mm Epic St. Mike porcine   valve), and tricuspid valve repair (Martha suture repair).  She also had a left cryo-maze   and left atrial appendage ligation at that time.  She developed oliguric acute kidney   injury postoperatively, and ultimately required hemodialysis.  Unfortunately, she did   not recover her kidney function prior to discharge, and remained on dialysis.  She   also had junctional bradycardia and asystole postoperatively, but resolved several   days after the surgery.  Postoperatively, she had issues with rapid atrial fibrillation   and rapid atrial flutter.  The atrial flutter was difficult to control, and it was not clear   if it was typical or atypical.  She underwent a LEE with direct-current cardioversion   on 2/15/2019.  She was loaded with amiodarone, and discharged on amiodarone as   well.  Her blood pressure could not tolerate beta-blockers as she was relatively   hypotensive.  She also was placed on Coumadin prior to discharge.  She ultimately   was discharged to rehab on 2/18/2019.    The patient was hospitalized in early 2021 with rectal bleeding.  She underwent an   EGD and colonoscopy which showed duodenitis, gastritis, diverticulosis, and   thrombosed external and internal hemorrhoids.  It was ultimately felt that the bleeding   source was very likely from the hemorrhoids.  She did develop some atrial fibrillation   with rapid response while hospitalized, but converted on amiodarone.  She was   eventually transitioned back to warfarin.    The patient had a routine echocardiogram on 5/20/2021.  At that time, she was noted   to have grossly elevated gradients across her mitral valve bioprosthesis with a peak   gradient of 36 mmHg and a mean gradient of 17 mmHg.  She also was noted to   have severe tricuspid regurgitation, with an RVSP of 61 mmHg.  Her ejection fraction    was 60 to 65%, although her right ventricle was moderately enlarged with moderately   reduced systolic function.  A follow-up LEE on 6/4/2021 confirmed elevated gradients   across the bioprosthetic mitral valve with a mean of 14.  However, the valve opened   well, and the leaflets did not appear to be stenotic.  Therefore, it was felt that her   dialysis shunt likely was the source of the elevated gradients.  Her tricuspid valve   annuloplasty repair did show severe tricuspid regurgitation through it, and her right   ventricle was also again confirmed to be moderately enlarged with moderately   reduced function.  Medical management was recommended at that time.    The patient presents today for follow-up.  She is accompanied by her daughter   today.  She is actually unchanged from her previous visit.  She still has some   baseline shortness of breath with moderate or more exertion, although this has not   changed and is not severe.  She still only has trace lower extremity edema as well.    She denied any new symptoms.  She denied any chest pain.      Past Medical History:   Diagnosis Date   • A-fib (Formerly [facility])     Meds   • Arthritis     w/Difficult Mobility   • Bacterial infection 03/2020   • Bifascicular block    • Bilateral lower extremity edema     Legs   • CAD (coronary artery disease)     Affecting LAD    • Cardiomyopathy (Formerly [facility])    • CHF (congestive heart failure) (Formerly [facility])     CHRONIC, DIASTOLIC   • Chronic fatigue    • Chronic kidney disease    • Cystocele, midline 09/2019   • Dialysis patient (Formerly [facility])     TUESDAY THURSDAY SATURDAY   • Edema 06/2021   • End stage renal failure on dialysis (Formerly [facility])     FOLLOWED BY DR. COLLINS SPANGLER   • Gastrointestinal hemorrhage 01/11/2021    ADMITTED TO [facility]   • Generalized anxiety disorder    • GERD (gastroesophageal reflux disease)    • Glenohumeral arthritis 04/2021   • Hematochezia 08/08/2021    ADMITTED TO [facility]   • Hemorrhoids    • Hernia, inguinal     Hx Repair   • History of  echocardiogram 1/17/19-[facility]    The L Ventricular Cavity is Mild-to-Moderately Dilated; Left Ventricular Wall Thickness is Consistent w/Mild Concentric Hypertrophy; Left Atrial Cavity Size is Moderate-to-Severely Dilated; Severe AVS; Severe MVS; Moderate TVR Noted   • History of transfusion     no adverse reaction   • Hyperlipidemia     Controlled w/Meds   • Hypertension     Controlled w/Meds   • Hypokalemia    • Hypokinesis 01/22/2019    Apical Noted on Cardiac Cath   • IFG (impaired fasting glucose)    • Immobility syndrome    • Iron deficiency anemia    • LAD stenosis 01/22/2019    Mid LAD Irregularities Noted on Cardiac Cath    • Left atrial dilatation 01/17/2019    Moderate-Severe Noted on Echo   • Left ventricular dilatation 01/17/2019    Noted on Echo/LEE   • Lumbar spondylosis    • Lumbar stenosis    • Mild concentric left ventricular hypertrophy 01/17/2019    Noted on Echo/LEE   • Mitral annular calcification 01/17/2019    Severe Noted on Echo   • Moderate tricuspid valve regurgitation 01/24/2019    Noted on LEE   • Morbid obesity (Formerly [facility])    • Nonrheumatic tricuspid valve regurgitation    • NSTEMI (non-ST elevated myocardial infarction) (Formerly [facility])    • OCTAVIANO (obstructive sleep apnea)    • Osteoarthritis    • Osteoarthritis of shoulder    • PAF (paroxysmal atrial fibrillation) (Formerly [facility])    • PNA (pneumonia)    • Pulmonary hypertension (Formerly [facility]) 01/22/2019    Noted on Cardiac Cath   • Right bundle branch block 01/24/2019    Noted on LEE   • Right ventricular enlargement 06/2021   • Rotator cuff tear 04/2016   • Secondary hyperparathyroidism (Formerly [facility])    • Severe aortic stenosis 01/22/2019    Noted on Cardiac Cath & LEE on 01/24/19; S/p AVR on 01/28/19 by Dr. Gaxiola   • Severe mitral valve stenosis 01/24/2019    Noted on LEE; S/p MVR on 01/28/19 by Dr. Gaxiola   • Severe muscle deconditioning    • Shoulder pain, bilateral    • Skin yeast infection     Folds Under Skin--Nystatin/Diflucan   • Tinea unguium 10/2020    BILATERAL   • Ulcer  of toe (HCC)     RIGHT FOOT   • Urge incontinence    • Vaginal atrophy    • Venous insufficiency    • Vulvar cyst 09/2019       Past Surgical History:   Procedure Laterality Date   • AORTIC VALVE REPAIR/REPLACEMENT MITRAL VALVE REPAIR/REPLACEMENT N/A 1/28/2019    Procedure: LEE STERNOTOMY AORTIC VALVE REPLACEMENT, MITRAL VALVE REPLACEMENT, TRICUSPID VALVE REPAIR, MAZE PROCEDURE, CLOSURE OF LEFT ATRIAL APPENDAGE  AND PRP;  Surgeon: Scar Gaxiola MD;  [facility];  Service: Cardiothoracic   • ARTERIOVENOUS FISTULA/SHUNT SURGERY Left 6/26/2019    Procedure: LEFT ARM BRACHIAL CEPHALIC FISTULA;  Surgeon: Satish Stahl MD;  [facility];  Service: Vascular   • CARDIAC CATHETERIZATION N/A 1/22/2019    Procedure: Coronary angiography;  Surgeon: Rick Talavera MD;  Location:  [facility] CATH INVASIVE LOCATION;  Service: Cardiology   • CARDIAC CATHETERIZATION N/A 1/22/2019    Procedure: Left Heart Cath;  Surgeon: Rick Talavera MD;  Location:  [facility] CATH [facility];  Service: Cardiology   • CARDIAC CATHETERIZATION N/A 1/22/2019    Procedure: Right Heart Cath;  Surgeon: Rick Talavera MD;  Location:  [facility] CATH [facility];  Service: Cardiology   • CARDIAC CATHETERIZATION N/A 1/22/2019    Procedure: Left ventriculography;  Surgeon: Rick Talavera MD;  [facility] C[facility];  Service: Cardiology   • CARDIAC SURGERY     • COLONOSCOPY N/A 1/16/2021    MULTIPLE DIVERTICULA, TORTUOUS COLON, HEMORRHOIDS, DR. JOSE TRAN AT [place]   • ENDOSCOPY N/A 1/16/2021    DUODENITIS, GASTRITIS, BENIGN DUOFENAL POLYP, DR. JOSE TRAN AT [facility]   • HEMORRHOIDECTOMY N/A 8/12/2021    Procedure: HEMORRHOIDECTOMY x2;  Surgeon: Pennie Rider MD;  Location[facility];  Service: General;  Laterality: N/A;   • HERNIA REPAIR Left     INGUINAL   • INSERT / REPLACE / VENOUS ACCESS CATHETER N/A 03/11/2020    TUNNEL CATHETER REMOVAL, DR. BLUE  MIGUEL ANGEL   • INSERTION HEMODIALYSIS CATHETER N/A 2/8/2019    Procedure: tunnel CATHETER PLACEMENT WITH FLUROSCOPY;  Surgeon: Satish Stahl MD;  Location: [facility];  Service: Vascular   • REPLACEMENT TOTAL KNEE Left 2007   • TOTAL KNEE ARTHROPLASTY Right 2009       Current Outpatient Medications on File Prior to Visit   Medication Sig Dispense Refill   • acetaminophen (TYLENOL) 325 MG tablet Take 650 mg by mouth 3 (Three) Times a Day. Takes tid at 2 am, 1 pm, and hS     • bumetanide (BUMEX) 2 MG tablet Take 1 tablet by mouth Daily. 90 tablet 3   • busPIRone (BUSPAR) 10 MG tablet Take 1 tablet by mouth 2 (two) times a day. 180 tablet 3   • [employer] MELATONIN PO Take  by mouth.     • escitalopram (LEXAPRO) 10 MG tablet Take 1 tablet by mouth Daily. 90 tablet 3   • Heparin Sodium, Porcine, (heparin, porcine,) 1000 units/mL injection Heparin Sodium (Porcine) 1,000 Units/mL Systemic     • Iron Sucrose (VENOFER IV) 50 mg 1 (One) Time Per Week.     • Methoxy PEG-Epoetin Beta (MIRCERA IJ) 60 mcg Every 14 (Fourteen) Days.     • midodrine (PROAMATINE) 2.5 MG tablet TAKE 1 TABLET BY MOUTH THREE TIMES DAILY BEFORE MEALS 90 tablet 6   • ProRenal + D tablet tablet TAKE 1 TABLET BY MOUTH EVERY DAY (ON DIALYSIS DAYS, TAKE AFTER DIALYSIS TREATMENT)     • rOPINIRole (REQUIP) 0.25 MG tablet      • VITAMIN D PO Take 0.75 mcg by mouth.     • warfarin (COUMADIN) 2 MG tablet TAKE 1 TABLET BY MOUTH EVERY SUNDAY, TUESDAY, AND THURSDAY; TAKE 1 AND 1/2 TABLETS ALL OTHER DAYS OF THE WEEK OR AS DIRECTED 120 tablet 0     No current facility-administered medications on file prior to visit.     Allergies as of 09/16/2021   • (No Known Allergies)     Social History     Socioeconomic History   • Marital status:      Spouse name: Not on file   • Number of children: 2   • Years of education: 12   • Highest education level: High school graduate   Tobacco Use   • Smoking status: Never Smoker   • Smokeless tobacco: Never Used   Substance  "and Sexual Activity   • Alcohol use: Yes     Alcohol/week: 1.0 standard drinks     Types: 1 Cans of beer per week     Comment: 1 monthly   • Drug use: No   • Sexual activity: Defer     Birth control/protection: Post-menopausal     Family History   Problem Relation Age of Onset   • Hypertension Other    • Diabetes Other    • Heart disease Neg Hx    • Stroke Neg Hx    • Arthritis Neg Hx    • Breast cancer Neg Hx    • Malig Hyperthermia Neg Hx        Review of Systems   Constitutional: Positive for malaise/fatigue.   Cardiovascular: Positive for dyspnea on exertion.   Neurological: Positive for tremors.   All other systems reviewed and are negative.     Objective:     Vitals:    09/16/21 0931   BP: 112/64   Pulse: 81   SpO2: 98%   Weight: 99.8 kg (220 lb)   Height: 170.2 cm (67.01\")     Body mass index is 34.45 kg/m².    Physical Exam  Constitutional:       Appearance: She is well-developed.   HENT:      Head: Normocephalic and atraumatic.   Eyes:      Conjunctiva/sclera: Conjunctivae normal.   Cardiovascular:      Rate and Rhythm: Normal rate and regular rhythm.      Heart sounds: Murmur heard.   Systolic murmur is present with a grade of 2/6 at the upper right sternal border and lower left sternal border.   No friction rub. No gallop.    Pulmonary:      Effort: Pulmonary effort is normal.      Breath sounds: Normal breath sounds.   Abdominal:      Palpations: Abdomen is soft.      Tenderness: There is no abdominal tenderness.   Musculoskeletal:      Cervical back: Neck supple.      Comments: Trace edema of the lower extremities bilaterally   Skin:     General: Skin is warm.   Neurological:      Mental Status: She is alert and oriented to person, place, and time.   Psychiatric:         Behavior: Behavior normal.       Lab Review:   Procedures    Cardiac Procedures:  1.  Echocardiogram on 1/17/2019: The left ventricle was mildly to moderately dilated.  The   ejection fraction was 60-65%.  The left atrium was moderately " to severely enlarged.  There   was severe aortic stenosis.  There was severe mitral annular calcification and severe   mitral stenosis.  There was mild to moderate tricuspid regurgitation.  2.  Left and right heart catheterization on 1/22/2019: There were mild luminal irregularities   in the proximal to mid LAD, and the coronary arteries were otherwise normal.  There was   moderate pulmonary hypertension with a mean PA pressure of 32 mmHg.  The wedge   pressure was 21 mmHg.  3.  Status post tissue aortic valve replacement, tissue mitral valve replacement, tricuspid   valve repair, left cryo-maze, and left atrial appendage ligation by Dr. Gaxiola on 1/28/2019.    The aortic valve replacement is a 23 mm Magna pericardial prosthesis.  The mitral valve   replacement is a 25 mm Epic St. Mike porcine prosthesis.  The tricuspid valve repair was   a tricuspid bicuspidization (Martha Suture repair).    4.  LEE with direct-current cardioversion on 2/15/2019: The left ventricle was not well   visualized.  The aortic valve and mitral valve replacements were normal.  There was   moderate tricuspid regurgitation through the tricuspid repair.  The patient was   successfully converted from atrial flutter to sinus rhythm.  5.  Echocardiogram on 5/20/2021: The ejection fraction was 60 to 65%.  There was mild   to moderate LVH.  The right ventricle was moderately enlarged and moderately reduced   in function.  The left atrium was severely enlarged.  The tissue aortic valve replacement   had elevated gradients with a peak gradient of 39 mmHg and mean gradient 20 mmHg.    The tissue mitral valve replacement had elevated gradients with a peak gradient of 36   mmHg and mean gradient of 17 mmHg.  There was severe tricuspid regurgitation with a   calculated RVSP of 61 mmHg.  6.  LEE on 6/4/2021: The ejection fraction was 60 to 65%.  There was mild to moderate   LVH.  The right ventricle is moderately enlarged and moderately reduced in function.     The left atrium was severely enlarged.  The left atrial appendage was ligated; however,   there was bidirectional flow across a small orifice.  The bioprosthetic aortic valve   replacement appeared to open well, but could not be aligned for pressure gradients. The   tissue mitral valve replacement had an elevated mean gradient of 14 mmHg, although   the leaflets opened well and did not appear to be stenotic.  The tricuspid annuloplasty   repair was intact, although there was severe tricuspid regurgitation.  The calculated   RVSP was 48 mmHg.  There were moderate plaques in the descending thoracic aorta   and aortic arch.    Assessment:       Diagnosis Plan   1. Sleep apnea, unspecified type  Home Sleep Study   2. Nonrheumatic mitral valve stenosis  Adult Transthoracic Echo Complete w/ Color, Spectral and Contrast if Necessary Per Protocol   3. Status post aortic valve replacement with tissue valve  Adult Transthoracic Echo Complete w/ Color, Spectral and Contrast if Necessary Per Protocol   4. Status post mitral valve replacement with tissue valve  Adult Transthoracic Echo Complete w/ Color, Spectral and Contrast if Necessary Per Protocol   5. Status post tricuspid valve repair  Adult Transthoracic Echo Complete w/ Color, Spectral and Contrast if Necessary Per Protocol   6. PAF (paroxysmal atrial fibrillation) (CMS/HCC)  Home Sleep Study   7. Typical atrial flutter (CMS/HCC)     8. Bifascicular block     9. Chronic diastolic congestive heart failure (CMS/HCC)  Adult Transthoracic Echo Complete w/ Color, Spectral and Contrast if Necessary Per Protocol   10. Right ventricular enlargement  Home Sleep Study     Plan:       I had a long discussion with the patient and her daughter today.  Although the pressure gradients across the bioprosthetic mitral valve replacement are elevated on both the transthoracic and transesophageal echocardiograms, the LEE showed that the leaflets open well and are clearly not stenotic.  I  suspect that the elevated gradients are actually from her dialysis shunt.  There is definitely no indication for any intervention to the mitral valve.  She does have severe tricuspid regurgitation and moderately reduced right ventricular function.  I suspect this is from pulmonary hypertension which is multifactorial.  She does snore, and we discussed her sleep habits in detail.  We decided that it was appropriate to check a home sleep study at this point.  If she has obstructive sleep apnea which can be treated, it may help all of these issues in general.  She is also not significantly short of breath, and she does not have severe lower extremity edema.    Her blood pressure is much better since starting on the midodrine at 2.5 mg 3 times a day.  Her volume status is managed by nephrology.  She is on the warfarin without any recent bleeding.  She did not have significant coronary artery disease on her preoperative heart catheterization in 2019.    I am going to have her return to the office in 4 months with an echocardiogram on the same day.

## 2021-10-05 ENCOUNTER — APPOINTMENT (OUTPATIENT)
Dept: SLEEP MEDICINE | Facility: HOSPITAL | Age: 76
End: 2021-10-05

## 2021-10-06 LAB — INR PPP: 2.4

## 2021-10-08 ENCOUNTER — ANTICOAGULATION VISIT (OUTPATIENT)
Dept: PHARMACY | Facility: HOSPITAL | Age: 76
End: 2021-10-08

## 2021-10-08 DIAGNOSIS — I48.0 PAROXYSMAL ATRIAL FIBRILLATION (HCC): Primary | ICD-10-CM

## 2021-10-08 NOTE — PROGRESS NOTES
Anticoagulation Clinic Progress Note    Anticoagulation Summary  As of 10/8/2021    INR goal:  2.0-3.0   TTR:  55.1 % (2.3 y)   INR used for dosin.40 (10/6/2021)   Warfarin maintenance plan:  2 mg every Sun, Tue, Thu; 3 mg all other days   Weekly warfarin total:  18 mg   No change documented:  Ruma Valles RPH   Plan last modified:  Jalen Temple, PharmD (2021)   Next INR check:  10/13/2021   Priority:  High   Target end date:      Indications    Paroxysmal atrial fibrillation (HCC) [I48.0]             Anticoagulation Episode Summary     INR check location:      Preferred lab:      Send INR reminders to:  [email or web address]  POOL    Comments:  Lab drawn at \[facility]\"" ([facility]) - [facility] [phone]       Anticoagulation Care Providers     Provider Role Specialty Phone number    Shiloh Gonzales APRN Referring Cardiology [phone]          Clinic Interview:  No pertinent clinical findings have been reported.    INR History:  Anticoagulation Monitoring 2021 2021 10/8/2021   INR 2.21 2.51 2.40   INR Date 2021 2021 10/6/2021   INR Goal 2.0-3.0 2.0-3.0 2.0-3.0   Trend Same Same Same   Last Week Total 18 mg 18 mg 18 mg   Next Week Total 18 mg 18 mg 18 mg   Sun 2 mg 2 mg 2 mg   Mon 3 mg 3 mg 3 mg   Tue 2 mg 2 mg 2 mg   Wed 3 mg 3 mg -   Thu 2 mg 2 mg -   Fri 3 mg 3 mg 3 mg   Sat 3 mg 3 mg 3 mg   Visit Report - - -   Some recent data might be hidden       Plan:  1. INR is therapeutic today- see above in Anticoagulation Summary.    Claudia GABRIELLE Arnold to continue their warfarin regimen- see above in Anticoagulation Summary.  2. Follow up in 1 week  3. Pt has agreed to only be called if INR out of range. They have been instructed to call if any changes in medications, doses, concerns, etc. Patient expresses understanding and has no further questions at this time.    Ruma Valles RPH

## 2021-10-13 LAB — INR PPP: 2.28

## 2021-10-18 NOTE — PROGRESS NOTES
Anticoagulation Clinic Progress Note    Anticoagulation Summary  As of 10/18/2021    INR goal:  2.0-3.0   TTR:  55.4 % (2.3 y)   INR used for dosin.28 (10/13/2021)   Warfarin maintenance plan:  2 mg every Sun, Tue, Thu; 3 mg all other days   Weekly warfarin total:  18 mg   No change documented:  Ruma Valles RPH   Plan last modified:  Jalen Temple, PharmD (2021)   Next INR check:  10/20/2021   Priority:  High   Target end date:      Indications    Paroxysmal atrial fibrillation (HCC) [I48.0]             Anticoagulation Episode Summary     INR check location:      Preferred lab:      Send INR reminders to:  [facility]  POOL    Comments:  Lab drawn at dialysis ([email or web address]) - [facility] [phone]       Anticoagulation Care Providers     Provider Role Specialty Phone number    Shiloh Gonzales APRN Referring Cardiology [phone]          Clinic Interview:  No pertinent clinical findings have been reported.    INR History:  Anticoagulation Monitoring 2021 10/8/2021 10/18/2021   INR 2.51 2.40 2.28   INR Date 2021 10/6/2021 10/13/2021   INR Goal 2.0-3.0 2.0-3.0 2.0-3.0   Trend Same Same Same   Last Week Total 18 mg 18 mg 18 mg   Next Week Total 18 mg 18 mg 18 mg   Sun 2 mg 2 mg -   Mon 3 mg 3 mg 3 mg   Tue 2 mg 2 mg 2 mg   Wed 3 mg - -   Thu 2 mg - -   Fri 3 mg 3 mg -   Sat 3 mg 3 mg -   Visit Report - - -   Some recent data might be hidden       Plan:  1. INR is therapeutic today- see above in Anticoagulation Summary.    Claudia Arnold to continue their warfarin regimen- see above in Anticoagulation Summary.  2. Follow up in 1 week  3. Pt has agreed to only be called if INR out of range. They have been instructed to call if any changes in medications, doses, concerns, etc. Patient expresses understanding and has no further questions at this time.    Ruma Valles RPH

## 2021-10-22 NOTE — PROGRESS NOTES
Anticoagulation Clinic Progress Note    Anticoagulation Summary  As of 10/22/2021    INR goal:  2.0-3.0   TTR:  55.8 % (2.3 y)   INR used for dosin.44 (10/20/2021)   Warfarin maintenance plan:  2 mg every Sun, Tue, Thu; 3 mg all other days   Weekly warfarin total:  18 mg   No change documented:  Ruma Valles RPH   Plan last modified:  Jalen Temple, PharmD (2021)   Next INR check:  10/27/2021   Priority:  High   Target end date:      Indications    Paroxysmal atrial fibrillation (HCC) [I48.0]             Anticoagulation Episode Summary     INR check location:      Preferred lab:      Send INR reminders to:  [facility]  POOL    Comments:  Lab drawn at dialysis ([facility]) - [facility] [phone]       Anticoagulation Care Providers     Provider Role Specialty Phone number    Shiloh Gonzales APRN Referring Cardiology [phone]          Clinic Interview:  No pertinent clinical findings have been reported.    INR History:  Anticoagulation Monitoring 10/8/2021 10/18/2021 10/22/2021   INR 2.40 2.28 2.44   INR Date 10/6/2021 10/13/2021 10/20/2021   INR Goal 2.0-3.0 2.0-3.0 2.0-3.0   Trend Same Same Same   Last Week Total 18 mg 18 mg 18 mg   Next Week Total 18 mg 18 mg 18 mg   Sun 2 mg - 2 mg   Mon 3 mg 3 mg 3 mg   Tue 2 mg 2 mg 2 mg   Wed - - -   Thu - - -   Fri 3 mg - 3 mg   Sat 3 mg - 3 mg   Visit Report - - -   Some recent data might be hidden       Plan:  1. INR is therapeutic today- see above in Anticoagulation Summary.    Claudia Arnold to continue their warfarin regimen- see above in Anticoagulation Summary.  2. Follow up in 1 week  3. Pt has agreed to only be called if INR out of range. They have been instructed to call if any changes in medications, doses, concerns, etc. Patient expresses understanding and has no further questions at this time.    Ruma Valles RPH

## 2021-10-29 NOTE — PROGRESS NOTES
Anticoagulation Clinic Progress Note    Anticoagulation Summary  As of 10/29/2021    INR goal:  2.0-3.0   TTR:  56.2 % (2.3 y)   INR used for dosin.65 (10/27/2021)   Warfarin maintenance plan:  2 mg every Sun, Tue, Thu; 3 mg all other days   Weekly warfarin total:  18 mg   No change documented:  Ruma Valles RPH   Plan last modified:  Jalen Temple, PharmD (2021)   Next INR check:  11/3/2021   Priority:  High   Target end date:      Indications    Paroxysmal atrial fibrillation (HCC) [I48.0]             Anticoagulation Episode Summary     INR check location:      Preferred lab:      Send INR reminders to:  [facility] [place] [facility]  POOL    Comments:  Lab drawn at dialysis ([facility]) - [facility] [phone]       Anticoagulation Care Providers     Provider Role Specialty Phone number    Shiloh Gonzales APRN Referring Cardiology [phone]          Clinic Interview:  No pertinent clinical findings have been reported.    INR History:  Anticoagulation Monitoring 10/18/2021 10/22/2021 10/29/2021   INR 2.28 2.44 2.65   INR Date 10/13/2021 10/20/2021 10/27/2021   INR Goal 2.0-3.0 2.0-3.0 2.0-3.0   Trend Same Same Same   Last Week Total 18 mg 18 mg 18 mg   Next Week Total 18 mg 18 mg 18 mg   Sun - 2 mg 2 mg   Mon 3 mg 3 mg 3 mg   Tue 2 mg 2 mg 2 mg   Wed - - -   Thu - - -   Fri - 3 mg 3 mg   Sat - 3 mg 3 mg   Visit Report - - -   Some recent data might be hidden       Plan:  1. INR is therapeutic today- see above in Anticoagulation Summary.    Claudia Arnold to continue their warfarin regimen- see above in Anticoagulation Summary.  2. Follow up in 1 week  3. Pt has agreed to only be called if INR out of range. They have been instructed to call if any changes in medications, doses, concerns, etc. Patient expresses understanding and has no further questions at this time.    Ruma Valles RPH

## 2021-11-01 NOTE — PROGRESS NOTES
Patient: Claudia Arnold  YOB: 1945  Date of Service: 11/1/2021    Chief Complaints: Bilateral shoulder pain    Subjective:    History of Present Illness: Pt is seen in the office today with complaints of bilateral shoulder pain she has known degenerative changes she gets intermittent injections and does well with those.  She is on chronic dialysis and not the best operative candidate we will continue conservative management.          Allergies: No Known Allergies    Medications:   Home Medications:  Current Outpatient Medications on File Prior to Visit   Medication Sig   • acetaminophen (TYLENOL) 325 MG tablet Take 650 mg by mouth 3 (Three) Times a Day. Takes tid at 2 am, 1 pm, and hS   • bumetanide (BUMEX) 2 MG tablet Take 1 tablet by mouth Daily.   • busPIRone (BUSPAR) 10 MG tablet Take 1 tablet by mouth 2 (two) times a day.   • [facility] MELATONIN PO Take  by mouth.   • escitalopram (LEXAPRO) 10 MG tablet Take 1 tablet by mouth Daily.   • Heparin Sodium, Porcine, (heparin, porcine,) 1000 units/mL injection Heparin Sodium (Porcine) 1,000 Units/mL Systemic   • Iron Sucrose (VENOFER IV) 50 mg 1 (One) Time Per Week.   • Methoxy PEG-Epoetin Beta (MIRCERA IJ) 60 mcg Every 14 (Fourteen) Days.   • midodrine (PROAMATINE) 2.5 MG tablet TAKE 1 TABLET BY MOUTH THREE TIMES DAILY BEFORE MEALS   • ProRenal + D tablet tablet TAKE 1 TABLET BY MOUTH EVERY DAY (ON DIALYSIS DAYS, TAKE AFTER DIALYSIS TREATMENT)   • rOPINIRole (REQUIP) 0.25 MG tablet    • VITAMIN D PO Take 0.75 mcg by mouth.   • warfarin (COUMADIN) 2 MG tablet TAKE 1 TABLET BY MOUTH EVERY SUNDAY, TUESDAY, AND THURSDAY, TAKE 1&1/2 TABLETS BY MOUTH ALL THE OTHER DAYS OF THE WEEK     No current facility-administered medications on file prior to visit.     Current Medications:  Scheduled Meds:  Continuous Infusions:No current facility-administered medications for this visit.    PRN Meds:.    I have reviewed the patient's medical history in detail and updated the  computerized patient record.  Review and summarization of old records include:    Past Medical History:   Diagnosis Date   • A-fib ([facility])     Meds   • Arthritis     w/Difficult Mobility   • Bacterial infection 03/2020   • Bifascicular block    • Bilateral lower extremity edema     Legs   • CAD (coronary artery disease)     Affecting LAD    • Cardiomyopathy ([facility])    • CHF (congestive heart failure) ([facility])     CHRONIC, DIASTOLIC   • Chronic fatigue    • Chronic kidney disease    • Cystocele, midline 09/2019   • Dialysis patient ([facility])     TUESDAY THURSDAY SATURDAY   • Edema 06/2021   • End stage renal failure on dialysis ([facility])     FOLLOWED BY DR. COLLINS SPANGLER   • Gastrointestinal hemorrhage 01/11/2021    ADMITTED TO [facility]   • Generalized anxiety disorder    • GERD (gastroesophageal reflux disease)    • Glenohumeral arthritis 04/2021   • Hematochezia 08/08/2021    ADMITTED TO [facility]   • Hemorrhoids    • Hernia, inguinal     Hx Repair   • History of echocardiogram 1/17/19-[facility]    The L Ventricular Cavity is Mild-to-Moderately Dilated; Left Ventricular Wall Thickness is Consistent w/Mild Concentric Hypertrophy; Left Atrial Cavity Size is Moderate-to-Severely Dilated; Severe AVS; Severe MVS; Moderate TVR Noted   • History of transfusion     no adverse reaction   • Hyperlipidemia     Controlled w/Meds   • Hypertension     Controlled w/Meds   • Hypokalemia    • Hypokinesis 01/22/2019    Apical Noted on Cardiac Cath   • IFG (impaired fasting glucose)    • Immobility syndrome    • Iron deficiency anemia    • LAD stenosis 01/22/2019    Mid LAD Irregularities Noted on Cardiac Cath    • Left atrial dilatation 01/17/2019    Moderate-Severe Noted on Echo   • Left ventricular dilatation 01/17/2019    Noted on Echo/LEE   • Lumbar spondylosis    • Lumbar stenosis    • Mild concentric left ventricular hypertrophy 01/17/2019    Noted on Echo/LEE   • Mitral annular calcification 01/17/2019    Severe Noted on Echo   • Moderate tricuspid  valve regurgitation 01/24/2019    Noted on LEE   • Morbid obesity ([facility])    • Nonrheumatic tricuspid valve regurgitation    • NSTEMI (non-ST elevated myocardial infarction) ([facility])    • OCTAVIANO (obstructive sleep apnea)    • Osteoarthritis    • Osteoarthritis of shoulder    • PAF (paroxysmal atrial fibrillation) ([facility])    • PNA (pneumonia)    • Pulmonary hypertension ([facility]) 01/22/2019    Noted on Cardiac Cath   • Right bundle branch block 01/24/2019    Noted on LEE   • Right ventricular enlargement 06/2021   • Rotator cuff tear 04/2016   • Secondary hyperparathyroidism ([facility])    • Severe aortic stenosis 01/22/2019    Noted on Cardiac Cath & LEE on 01/24/19; S/p AVR on 01/28/19 by Dr. Gaxiola   • Severe mitral valve stenosis 01/24/2019    Noted on LEE; S/p MVR on 01/28/19 by Dr. Gaxiola   • Severe muscle deconditioning    • Shoulder pain, bilateral    • Skin yeast infection     Folds Under Skin--Nystatin/Diflucan   • Tinea unguium 10/2020    BILATERAL   • Ulcer of toe ([facility])     RIGHT FOOT   • Urge incontinence    • Vaginal atrophy    • Venous insufficiency    • Vulvar cyst 09/2019        Past Surgical History:   Procedure Laterality Date   • AORTIC VALVE REPAIR/REPLACEMENT MITRAL VALVE REPAIR/REPLACEMENT N/A 1/28/2019    Procedure: LEE STERNOTOMY AORTIC VALVE REPLACEMENT, MITRAL VALVE REPLACEMENT, TRICUSPID VALVE REPAIR, MAZE PROCEDURE, CLOSURE OF LEFT ATRIAL APPENDAGE  AND PRP;  Surgeon: Scar Gaxiola MD;  Location: [facility] OR;  Service: Cardiothoracic   • ARTERIOVENOUS FISTULA/SHUNT SURGERY Left 6/26/2019    Procedure: LEFT ARM BRACHIAL CEPHALIC FISTULA;  Surgeon: Satish Stahl MD;  Location: [facility] OR;  Service: Vascular   • CARDIAC CATHETERIZATION N/A 1/22/2019    Procedure: Coronary angiography;  Surgeon: Rick Talavera MD;  Location: [facility] CATH INVASIVE LOCATION;  Service: Cardiology   • CARDIAC CATHETERIZATION N/A 1/22/2019    Procedure: Left Heart Cath;  Surgeon: Rick Talavera MD;   Location:  [facility] CATH INVASIVE LOCATION;  Service: Cardiology   • CARDIAC CATHETERIZATION N/A 1/22/2019    Procedure: Right Heart Cath;  Surgeon: Rick Talavera MD;  Location[facility] CATH INVASIVE LOCATION;  Service: Cardiology   • CARDIAC CATHETERIZATION N/A 1/22/2019    Procedure: Left ventriculography;  Surgeon: Rick Talavera MD;  [facility] CATH INVASIVE LOCATION;  Service: Cardiology   • CARDIAC SURGERY     • COLONOSCOPY N/A 1/16/2021    MULTIPLE DIVERTICULA, TORTUOUS COLON, HEMORRHOIDS, DR. JOSE TRAN AT [place]   • ENDOSCOPY N/A 1/16/2021    DUODENITIS, GASTRITIS, BENIGN DUOFENAL POLYP, DR. JOSE TRAN AT [facility]   • HEMORRHOIDECTOMY N/A 8/12/2021    Procedure: HEMORRHOIDECTOMY x2;  Surgeon: Pennie Rider MD;  Location[facility] OR;  Service: General;  Laterality: N/A;   • HERNIA REPAIR Left     INGUINAL   • INSERT / REPLACE / VENOUS ACCESS CATHETER N/A 03/11/2020    TUNNEL CATHETER REMOVAL, DR. SU MICHELLE   • INSERTION HEMODIALYSIS CATHETER N/A 2/8/2019    Procedure: tunnel CATHETER PLACEMENT WITH FLUROSCOPY;  Surgeon: Satish Stahl MD;  Location[facility] MAIN OR;  Service: Vascular   • REPLACEMENT TOTAL KNEE Left 2007   • TOTAL KNEE ARTHROPLASTY Right 2009        Social History     Occupational History   • Occupation: retired   Tobacco Use   • Smoking status: Never Smoker   • Smokeless tobacco: Never Used   Substance and Sexual Activity   • Alcohol use: Yes     Alcohol/week: 1.0 standard drink     Types: 1 Cans of beer per week     Comment: 1 monthly   • Drug use: No   • Sexual activity: Defer     Birth control/protection: Post-menopausal      Social History     Social History Narrative   • Not on file        Family History   Problem Relation Age of Onset   • Hypertension Other    • Diabetes Other    • Heart disease Neg Hx    • Stroke Neg Hx    • Arthritis Neg Hx    • Breast cancer Neg Hx    • Malig Hyperthermia Neg Hx        ROS: 14 point review of systems  was performed and was negative except for documented findings in HPI and today's encounter.     Allergies: No Known Allergies  Constitutional:  Denies fever, shaking or chills   Eyes:  Denies change in visual acuity   HENT:  Denies nasal congestion or sore throat   Respiratory:  Denies cough or shortness of breath   Cardiovascular:  Denies chest pain or severe LE edema   GI:  Denies abdominal pain, nausea, vomiting, bloody stools or diarrhea   Musculoskeletal:  Numbness, tingling, or loss of motor function only as noted above in history of present illness.  : Denies painful urination or hematuria  Integument:  Denies rash, lesion or ulceration   Neurologic:  Denies headache or focal weakness  Endocrine:  Denies lymphadenopathy  Psych:  Denies confusion or change in mental status   Hem:  Denies active bleeding      Physical Exam: 76 y.o. female  Wt Readings from Last 3 Encounters:   09/16/21 99.8 kg (220 lb)   08/30/21 99.8 kg (220 lb)   08/13/21 101 kg (222 lb)       There is no height or weight on file to calculate BMI.  No height and weight on file for this encounter.  There were no vitals filed for this visit.  Vital signs reviewed.   General Appearance:    Alert, cooperative, in no acute distress                    Ortho exam  Exam unchanged           .time    Assessment: Bilateral shoulder DJD    Plan: Injections she not a great operative candidate for an elective procedure and she does well with these injections follow up as indicated.  Ice, elevate, and rest as needed.  Discussed conservative measures of pain control including ice, bracing.  Also talked about the importance of strengthening and maintaining ideal body weight    Deborah Agudelo M.D.      Large Joint Arthrocentesis: L glenohumeral  Date/Time: 11/2/2021 1:14 PM  Consent given by: patient  Site marked: site marked  Timeout: Immediately prior to procedure a time out was called to verify the correct patient, procedure, equipment, support staff and  site/side marked as required   Supporting Documentation  Indications: pain   Procedure Details  Location: shoulder - L glenohumeral  Preparation: Patient was prepped and draped in the usual sterile fashion  Needle size: 22 G  Approach: posterior  Medications administered: 80 mg methylPREDNISolone acetate 80 MG/ML; 4 mL lidocaine (cardiac)  Patient tolerance: patient tolerated the procedure well with no immediate complications    Large Joint Arthrocentesis: R glenohumeral  Date/Time: 11/2/2021 1:15 PM  Consent given by: patient  Site marked: site marked  Timeout: Immediately prior to procedure a time out was called to verify the correct patient, procedure, equipment, support staff and site/side marked as required   Supporting Documentation  Indications: pain   Procedure Details  Location: shoulder - R glenohumeral  Preparation: Patient was prepped and draped in the usual sterile fashion  Needle size: 22 G  Approach: posterior  Medications administered: 80 mg methylPREDNISolone acetate 80 MG/ML; 4 mL lidocaine (cardiac)  Patient tolerance: patient tolerated the procedure well with no immediate complications

## 2021-11-04 NOTE — TELEPHONE ENCOUNTER
Caller: JOSE CARLOS MORALES    Relationship to patient: SELF    Best call back number: [phone]    Type of visit: INJ / CORTISONE    Requested date: TUES OR THURS    Additional notes: PATIENT WOULD LIKE A CALL BACK TO SCHEDULE BILATERAL SHOULDER INJECTIONS. PATIENT HAS REQUESTED A Tuesday OR Thursday.

## 2021-11-05 NOTE — PROGRESS NOTES
Anticoagulation Clinic Progress Note    Anticoagulation Summary  As of 2021    INR goal:  2.0-3.0   TTR:  56.5 % (2.3 y)   INR used for dosin.75 (11/3/2021)   Warfarin maintenance plan:  2 mg every Sun, Tue, Thu; 3 mg all other days   Weekly warfarin total:  18 mg   No change documented:  Ruma Valles RPH   Plan last modified:  Jalen Temple, PharmD (2021)   Next INR check:  11/10/2021   Priority:  High   Target end date:      Indications    Paroxysmal atrial fibrillation (HCC) [I48.0]             Anticoagulation Episode Summary     INR check location:      Preferred lab:      Send INR reminders to:  [facility]  POOL    Comments:  Lab drawn at dialysis ([facility]) - [facility] [phone]       Anticoagulation Care Providers     Provider Role Specialty Phone number    Shiloh Gonzales APRN Referring Cardiology [phone]          Clinic Interview:  No pertinent clinical findings have been reported.    INR History:  Anticoagulation Monitoring 10/22/2021 10/29/2021 2021   INR 2.44 2.65 2.75   INR Date 10/20/2021 10/27/2021 11/3/2021   INR Goal 2.0-3.0 2.0-3.0 2.0-3.0   Trend Same Same Same   Last Week Total 18 mg 18 mg 18 mg   Next Week Total 18 mg 18 mg 18 mg   Sun 2 mg 2 mg 2 mg   Mon 3 mg 3 mg 3 mg   Tue 2 mg 2 mg 2 mg   Wed - - -   Thu - - -   Fri 3 mg 3 mg 3 mg   Sat 3 mg 3 mg 3 mg   Visit Report - - -   Some recent data might be hidden       Plan:  1. INR is therapeutic today- see above in Anticoagulation Summary.    Claudia Arnold to continue their warfarin regimen- see above in Anticoagulation Summary.  2. Follow up in 1 week  3. Pt has agreed to only be called if INR out of range. They have been instructed to call if any changes in medications, doses, concerns, etc. Patient expresses understanding and has no further questions at this time.    Ruma Valles RPH

## 2021-12-06 PROBLEM — I48.91 ATRIAL FIBRILLATION WITH RVR (HCC): Status: ACTIVE | Noted: 2021-01-01

## 2021-12-06 NOTE — PROGRESS NOTES
Clinical Pharmacy Services: Medication History    Claudia Arnold is a 76 y.o. female presenting to [facility] for   Chief Complaint   Patient presents with   • Hypotension   • Rectal Bleeding       She  has a past medical history of A-fib ([facility]), Arthritis, Bacterial infection (03/2020), Bifascicular block, Bilateral lower extremity edema, CAD (coronary artery disease), Cardiomyopathy ([facility]), CHF (congestive heart failure) ([facility]), Chronic fatigue, Chronic kidney disease, Cystocele, midline (09/2019), Dialysis patient ([facility]), Edema (06/2021), End stage renal failure on dialysis ([facility]), Gastrointestinal hemorrhage (01/11/2021), Generalized anxiety disorder, GERD (gastroesophageal reflux disease), Glenohumeral arthritis (04/2021), Hematochezia (08/08/2021), Hemorrhoids, Hernia, inguinal, History of echocardiogram (1/17/19-[facility]), History of transfusion, Hyperlipidemia, Hypertension, Hypokalemia, Hypokinesis (01/22/2019), IFG (impaired fasting glucose), Immobility syndrome, Iron deficiency anemia, LAD stenosis (01/22/2019), Left atrial dilatation (01/17/2019), Left ventricular dilatation (01/17/2019), Lumbar spondylosis, Lumbar stenosis, Mild concentric left ventricular hypertrophy (01/17/2019), Mitral annular calcification (01/17/2019), Moderate tricuspid valve regurgitation (01/24/2019), Morbid obesity ([facility]), Nonrheumatic tricuspid valve regurgitation, NSTEMI (non-ST elevated myocardial infarction) ([facility]), OCTAVIANO (obstructive sleep apnea), Osteoarthritis, Osteoarthritis of shoulder, PAF (paroxysmal atrial fibrillation) ([facility]), PNA (pneumonia), Pulmonary hypertension ([facility]) (01/22/2019), Right bundle branch block (01/24/2019), Right ventricular enlargement (06/2021), Rotator cuff tear (04/2016), Secondary hyperparathyroidism ([facility]), Severe aortic stenosis (01/22/2019), Severe mitral valve stenosis (01/24/2019), Severe muscle deconditioning, Shoulder pain, bilateral, Skin yeast infection, Tinea unguium (10/2020), Ulcer  of toe (HCC), Urge incontinence, Vaginal atrophy, Venous insufficiency, and Vulvar cyst (09/2019).    Allergies as of 12/06/2021   • (No Known Allergies)       Medication information was obtained from: Patient  Pharmacy and Phone Number:     Prior to Admission Medications     Prescriptions Last Dose Informant Patient Reported? Taking?    acetaminophen (TYLENOL) 325 MG tablet 12/6/2021 Self Yes Yes    Take 650 mg by mouth 3 (Three) Times a Day. Takes tid at 2 am, 1 pm, and hS    bumetanide (BUMEX) 2 MG tablet 12/6/2021 Self No Yes    Take 1 tablet by mouth Daily.    busPIRone (BUSPAR) 10 MG tablet 12/6/2021 Self No Yes    Take 1 tablet by mouth 2 (two) times a day.    [facility] MELATONIN PO 12/5/2021 Self Yes Yes    Take 5 mg by mouth At Night As Needed.    escitalopram (LEXAPRO) 10 MG tablet 12/6/2021 Self No Yes    Take 1 tablet by mouth Daily.    Iron Sucrose (VENOFER IV)  Self Yes Yes    50 mg 1 (One) Time Per Week. Adjusts weekly in IV during dialysis    Methoxy PEG-Epoetin Beta (MIRCERA IJ)  Self Yes Yes    60 mcg Every 14 (Fourteen) Days. During dialysis    midodrine (PROAMATINE) 2.5 MG tablet 12/6/2021 Self No Yes    TAKE 1 TABLET BY MOUTH THREE TIMES DAILY BEFORE MEALS    Patient taking differently:  Take 2.5 mg by mouth 3 (Three) Times a Day Before Meals.    Probiotic Product (ALIGN PO) 12/6/2021 Self Yes Yes    Take 1 capsule by mouth Every Morning.    ProRenal + D tablet tablet 12/6/2021 Self Yes Yes    Take 1 tablet by mouth Daily.    rOPINIRole (REQUIP) 0.25 MG tablet 12/5/2021 Self Yes Yes    Take 0.25 mg by mouth Every Night.    warfarin (COUMADIN) 2 MG tablet 12/5/2021 Self No Yes    TAKE 1 TABLET BY MOUTH EVERY SUNDAY, TUESDAY, AND THURSDAY, TAKE 1&1/2 TABLETS BY MOUTH ALL THE OTHER DAYS OF THE WEEK    Patient taking differently:  Take 2 mg by mouth Every Night. TAKE 1 TABLET BY MOUTH EVERY SUNDAY, TUESDAY, AND THURSDAY, TAKE 1&1/2 TABLETS BY MOUTH ALL THE OTHER DAYS OF THE WEEK            Medication  notes: Patient stated she had one dose of Tylenol 325 mg, Buspar 10 mg, and Midodrine 2.5 mg today. Warfarin 2 mg on Sunday, Tuesday and Thursday, and Warfarin 3 mg on Monday, Wednesday, Friday and Saturday.    This medication list is complete to the best of my knowledge as of 12/6/2021    Please call if questions.    Yesenia Anna  Medication History Technician  [phone]    12/6/2021 18:21 EST

## 2021-12-06 NOTE — ED PROVIDER NOTES
I have supervised the care provided by the midlevel provider.    We have discussed this patient's history, physical exam, and treatment plan.   I have reviewed the note and have personally examined the patient and agree with the plan of care.  See attached attending note.  My personal findings are below:    Patient was at dialysis when she became hypotensive and was noted to be in A. fib.  She has a history of paroxysmal A. fib and is on warfarin.  Patient states she did complete her dialysis treatment.  She denies chest pain, shortness of breath, weakness, dizziness, or fainting.  She does report dark stool for the past few days and also noticed right red blood per rectum this morning.  Complains of mild abdominal discomfort.  She has a history of diverticulosis and hemorrhoids.    On exam: Awake and alert.  Heart is irregularly irregular and tachycardic.  Lungs are clear bilaterally.  Respiratory effort is normal.  Abdomen is soft and nontender.  Speech is clear and fluent.  Follows commands.  Moves all extremities.    Chest x-ray shows cardiomegaly and prominent pulmonary vasculature.  Hemoglobin is stable at 10.6.  Stool was heme positive.  INR and CMP are pending.  Patient is in A. fib with RVR.  She will be given a bolus of IV fluids and a low dose of IV Lopressor.    EKG          EKG time: 1430  Rhythm/Rate: Atrial fibrillation, rate 153  P waves and UT: Regular, varying  QRS, axis: IVCD  ST and T waves: Inverted T waves in the anterior leads    Interpreted Contemporaneously by me, independently viewed  EKG is changed compared to prior EKG done on 8/13/2021.  Sinus rhythm was present at that time.       Alonso Saab MD  12/06/21 [id number]

## 2021-12-06 NOTE — ED NOTES
"PPE per protocol utilized.  Nursing report ED to floor  Claudia Arnold  76 y.o.  female    HPI :   Chief Complaint   Patient presents with   • Hypotension   • Rectal Bleeding       Admitting doctor:   Gorge Boyd MD    Admitting diagnosis:   The primary encounter diagnosis was Atrial fibrillation with RVR ([facility]). Diagnoses of Acute GI bleeding, Anticoagulated on warfarin, and ESRD on dialysis ([facility]) were also pertinent to this visit.    Code status:   Current Code Status     Date Active Code Status Order ID Comments User Context       Prior    Advance Care Planning Activity          Allergies:   Patient has no known allergies.    Intake and Output    Intake/Output Summary (Last 24 hours) at 12/6/2021 1804  Last data filed at 12/6/2021 1730  Gross per 24 hour   Intake 500 ml   Output --   Net 500 ml       Weight:       12/06/21  1530   Weight: 97.5 kg (215 lb)       Most recent vitals:   Vitals:    12/06/21 1420 12/06/21 1530 12/06/21 1700   BP:  103/85 120/90   BP Location:  Right arm    Patient Position:  Sitting    Pulse: (!) 127  (!) 129   Resp: 18 14    Temp: 96.9 °F (36.1 °C)     SpO2: 99%  97%   Weight:  97.5 kg (215 lb)    Height:  170.2 cm (67\")        Active LDAs/IV Access:   Lines, Drains & Airways     Active LDAs     Name Placement date Placement time Site Days    Peripheral IV 12/06/21 1531 Right Forearm 12/06/21  1531  Forearm  less than 1                Labs (abnormal labs have a star):   Labs Reviewed   COMPREHENSIVE METABOLIC PANEL - Abnormal; Notable for the following components:       Result Value    Glucose 120 (*)     Creatinine 1.66 (*)     Chloride 97 (*)     eGFR Non African Amer 30 (*)     Anion Gap 15.1 (*)     All other components within normal limits    Narrative:     GFR Normal >60  Chronic Kidney Disease <60  Kidney Failure <15     PROTIME-INR - Abnormal; Notable for the following components:    Protime 30.4 (*)     INR 2.95 (*)     All other components within normal limits   CBC WITH AUTO " DIFFERENTIAL - Abnormal; Notable for the following components:    RBC 3.20 (*)     Hemoglobin 10.6 (*)     Hematocrit 31.3 (*)     MCV 97.8 (*)     MCH 33.1 (*)     Lymphocyte % 12.6 (*)     Immature Grans % 0.9 (*)     Immature Grans, Absolute 0.06 (*)     All other components within normal limits   POCT OCCULT BLOOD STOOL (ED ONLY) - Abnormal; Notable for the following components:    Fecal Occult Blood Positive (*)     All other components within normal limits   COVID-19,[facility] CEPHEID/CONSTANCE, NP SWAB IN TRANSPORT MEDIA 8-12 HR TAT - Normal    Narrative:     Fact sheet for providers: https://www.fda.gov/media/938201/download    Fact sheet for patients: https://www.fda.gov/media/144108/download    Test performed by PCR.   MAGNESIUM - Normal   COVID PRE-OP / PRE-PROCEDURE SCREENING ORDER (NO ISOLATION)    Narrative:     The following orders were created for panel order COVID PRE-OP / PRE-PROCEDURE SCREENING ORDER (NO ISOLATION) - Swab, Nasopharynx.  Procedure                               Abnormality         Status                     ---------                               -----------         ------                     COVID-19,[facility]...[[id number]]  Normal              Final result                 Please view results for these tests on the individual orders.   URINALYSIS W/ CULTURE IF INDICATED   TYPE AND SCREEN   CBC AND DIFFERENTIAL    Narrative:     The following orders were created for panel order CBC & Differential.  Procedure                               Abnormality         Status                     ---------                               -----------         ------                     CBC Auto Differential[[id number]]        Abnormal            Final result                 Please view results for these tests on the individual orders.       EKG:   ECG 12 Lead   Preliminary Result   HEART RATE= 153  bpm   RR Interval= 392  ms   CO Interval= 78  ms   P Horizontal Axis= -14  deg   P Front Axis=  -4  deg   QRSD Interval= 145  ms   QT Interval= 327  ms   QRS Axis= -73  deg   T Wave Axis= 58  deg   - ABNORMAL ECG -   Extreme tachycardia with wide complex, no further rhythm analysis    attempted   Electronically Signed By:    Date and Time of Study: 2021-12-06 14:30:42          Meds given in ED:   Medications   sodium chloride 0.9 % flush 10 mL (has no administration in time range)   metoprolol tartrate (LOPRESSOR) injection 2.5 mg (has no administration in time range)   metoprolol tartrate (LOPRESSOR) tablet 25 mg (has no administration in time range)   pantoprazole (PROTONIX) injection 40 mg (has no administration in time range)   sodium chloride 0.9 % bolus 250 mL (0 mL Intravenous Stopped 12/6/21 1730)   metoprolol tartrate (LOPRESSOR) injection 2.5 mg (2.5 mg Intravenous Given 12/6/21 1702)       Imaging results:  No radiology results for the last day    Ambulatory status:   - bedrest    Social issues:   Social History     Socioeconomic History   • Marital status:    • Number of children: 2   • Years of education: 12   • Highest education level: High school graduate   Tobacco Use   • Smoking status: Never Smoker   • Smokeless tobacco: Never Used   Vaping Use   • Vaping Use: Never used   Substance and Sexual Activity   • Alcohol use: Yes     Alcohol/week: 1.0 standard drink     Types: 1 Cans of beer per week     Comment: 1 monthly   • Drug use: No   • Sexual activity: Defer     Birth control/protection: Post-menopausal       NIH Stroke Scale:        Nursing report ED to floor:       Tabitha Gabriel RN  12/06/21 [id number]

## 2021-12-06 NOTE — ED PROVIDER NOTES
EMERGENCY DEPARTMENT ENCOUNTER    Room Number:  05/05  Date seen:  12/6/2021  Time seen: 15:03 EST  PCP: Robert Cortez MD  Historian: patient    HPI:  Chief complaint:atrial fib, hypotension, rectal bleeding  A complete HPI/ROS/PMH/PSH/SH/FH are unobtainable due to: n/a  Context:Claudia Arnold is a 76 y.o. female with h/o atrial fib on Warfarin, ESRD on dialysis who presents to the ED with c/o atrial fib recurrence noted today at dialysis with some lower than normal blood pressures.  It was not made better by IVF given at dialysis and is not made worse by anything.  She also has had a bit of abdominal discomfort described as cramping, but no focal abdominal pain.  She states for the past 3 days she has noticed some darkening of her stools and today she had some BRBPR when she had bowel movement.  Denies any rectal pain.  Does have h/o hemorrhoidectomy in August 2021 (Dr. Felicia Rider).  Denies fever/chills, shortness of breath or n/v. She is not on beta blocker for her atrial fib, see's Dr. Segura for Cardiology.  States she has been on dialysis for the past 2 years since her open heart surgery.      Patient was placed in face mask in first look. Patient was wearing facemask when I entered the room and throughout our encounter. I wore full protective equipment throughout this patient encounter including a N95 face mask, eye shield and gloves. Hand hygiene/washing of hands was performed before donning protective equipment and after removal when leaving the room.    MEDICAL RECORD REVIEW    ALLERGIES  Patient has no known allergies.    PAST MEDICAL HISTORY  Active Ambulatory Problems     Diagnosis Date Noted   • Tear of rotator cuff 04/28/2016   • Hypertension 05/24/2016   • Generalized anxiety disorder 05/24/2016   • Hyperlipidemia 05/24/2016   • IFG (impaired fasting glucose) 05/24/2016   • Heart murmur, systolic 05/24/2016   • Shoulder pain, bilateral 08/01/2016   • Pain of both shoulder joints 11/01/2016   •  Chronic pain of both shoulders 02/03/2017   • Acute congestive heart failure (HCC) 01/16/2019   • Obesity, morbid, BMI 50 or higher ([facility]) 01/16/2019   • Fungal skin infection 01/16/2019   • Cellulitis of lower extremity 01/17/2019   • Aortic valve stenosis 01/16/2019   • Tricuspid valve insufficiency 01/16/2019   • Mitral valve stenosis 01/16/2019   • S/P MVR (mitral valve replacement) 03/27/2019   • Paroxysmal atrial fibrillation ([facility]) 05/08/2019   • Pulmonary hypertension ([facility]) 05/08/2019   • ESRD (end stage renal disease) ([facility]) 05/08/2019   • Gastroesophageal reflux disease without esophagitis 05/08/2019   • Chronic idiopathic constipation 05/08/2019   • Dependence on renal dialysis ([facility]) 06/26/2019   • Chronic kidney disease, stage 2 (mild) 08/09/2013   • Renovascular hypertension 08/09/2013   • GI bleed 01/11/2021   • 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 ([facility]) 01/11/2021   • CAD (coronary artery disease)    • S/P AVR (aortic valve replacement)    • Chronic diastolic CHF (congestive heart failure) ([facility])    • Chronic pain of both shoulders 01/11/2021   • Gastrointestinal hemorrhage 01/15/2021   • Gastrointestinal hemorrhage with melena 01/15/2021   • Immobility syndrome 05/27/2021     Resolved Ambulatory Problems     Diagnosis Date Noted   • Hematochezia 08/08/2021     Past Medical History:   Diagnosis Date   • A-fib ([facility])    • Arthritis    • Bacterial infection 03/2020   • Bifascicular block    • Bilateral lower extremity edema    • Cardiomyopathy ([facility])    • CHF (congestive heart failure) ([facility])    • Chronic fatigue    • Chronic kidney disease    • Cystocele, midline 09/2019   • Dialysis patient ([facility])    • Edema 06/2021   • End stage renal failure on dialysis ([facility])    • GERD (gastroesophageal reflux disease)    • Glenohumeral arthritis 04/2021   • Hemorrhoids    • Hernia, inguinal    • History of echocardiogram 1/17/19-[facility]   • History of transfusion    • Hypokalemia    • Hypokinesis 01/22/2019    • Iron deficiency anemia    • LAD stenosis 01/22/2019   • Left atrial dilatation 01/17/2019   • Left ventricular dilatation 01/17/2019   • Lumbar spondylosis    • Lumbar stenosis    • Mild concentric left ventricular hypertrophy 01/17/2019   • Mitral annular calcification 01/17/2019   • Moderate tricuspid valve regurgitation 01/24/2019   • Morbid obesity ([facility])    • Nonrheumatic tricuspid valve regurgitation    • NSTEMI (non-ST elevated myocardial infarction) ([facility])    • OCTAVIANO (obstructive sleep apnea)    • Osteoarthritis    • Osteoarthritis of shoulder    • PAF (paroxysmal atrial fibrillation) ([facility])    • PNA (pneumonia)    • Right bundle branch block 01/24/2019   • Right ventricular enlargement 06/2021   • Rotator cuff tear 04/2016   • Secondary hyperparathyroidism ([facility])    • Severe aortic stenosis 01/22/2019   • Severe mitral valve stenosis 01/24/2019   • Severe muscle deconditioning    • Skin yeast infection    • Tinea unguium 10/2020   • Ulcer of toe ([facility])    • Urge incontinence    • Vaginal atrophy    • Venous insufficiency    • Vulvar cyst 09/2019       PAST SURGICAL HISTORY  Past Surgical History:   Procedure Laterality Date   • AORTIC VALVE REPAIR/REPLACEMENT MITRAL VALVE REPAIR/REPLACEMENT N/A 1/28/2019    Procedure: LEE STERNOTOMY AORTIC VALVE REPLACEMENT, MITRAL VALVE REPLACEMENT, TRICUSPID VALVE REPAIR, MAZE PROCEDURE, CLOSURE OF LEFT ATRIAL APPENDAGE  AND PRP;  Surgeon: Scar Gaxiola MD;  Location: [facility] OR;  Service: Cardiothoracic   • ARTERIOVENOUS FISTULA/SHUNT SURGERY Left 6/26/2019    Procedure: LEFT ARM BRACHIAL CEPHALIC FISTULA;  Surgeon: Satish Stahl MD;  Location: [facility] OR;  Service: Vascular   • CARDIAC CATHETERIZATION N/A 1/22/2019    Procedure: Coronary angiography;  Surgeon: Rick Talavera MD;  Location: [facility] CATH INVASIVE LOCATION;  Service: Cardiology   • CARDIAC CATHETERIZATION N/A 1/22/2019    Procedure: Left Heart Cath;  Surgeon: Rick Talavera  MD;  Location[facility]VASIVE LOC[facility];  Service: Cardiology   • CARDIAC CATHETERIZATION N/A 1/22/2019    Procedure: Right Heart Cath;  Surgeon: Rick Talavera MD;  [facility];  Service: Cardiology   • CARDIAC CATHETERIZATION N/A 1/22/2019    Procedure: Left ventriculography;  Surgeon: Rick Talavera MD;  [facility]ASIVE [facility];  Service: Cardiology   • CARDIAC SURGERY     • COLONOSCOPY N/A 1/16/2021    MULTIPLE DIVERTICULA, TORTUOUS COLON, HEMORRHOIDS, DR. JOSE TRAN AT [place]   • ENDOSCOPY N/A 1/16/2021    DUODENITIS, GASTRITIS, BENIGN DUOFENAL POLYP, DR. JOSE TRAN AT [facility]   • HEMORRHOIDECTOMY N/A 8/12/2021    Procedure: HEMORRHOIDECTOMY x2;  Surgeon: Pennie Rider MD;  Location: [facility] MAIN OR;  Service: General;  Laterality: N/A;   • HERNIA REPAIR Left     INGUINAL   • INSERT / REPLACE / VENOUS ACCESS CATHETER N/A 03/11/2020    TUNNEL CATHETER REMOVAL, DR. SU MICHELLE   • INSERTION HEMODIALYSIS CATHETER N/A 2/8/2019    Procedure: tunnel CATHETER PLACEMENT WITH FLUROSCOPY;  Surgeon: Satish Stahl MD;  [facility];  Service: Vascular   • REPLACEMENT TOTAL KNEE Left 2007   • TOTAL KNEE ARTHROPLASTY Right 2009       FAMILY HISTORY  Family History   Problem Relation Age of Onset   • Hypertension Other    • Diabetes Other    • Heart disease Neg Hx    • Stroke Neg Hx    • Arthritis Neg Hx    • Breast cancer Neg Hx    • Malig Hyperthermia Neg Hx        SOCIAL HISTORY  Social History     Socioeconomic History   • Marital status:    • Number of children: 2   • Years of education: 12   • Highest education level: High school graduate   Tobacco Use   • Smoking status: Never Smoker   • Smokeless tobacco: Never Used   Vaping Use   • Vaping Use: Never used   Substance and Sexual Activity   • Alcohol use: Yes     Alcohol/week: 1.0 standard drink     Types: 1 Cans of beer per week     Comment: 1 monthly   • Drug  use: No   • Sexual activity: Defer     Birth control/protection: Post-menopausal       REVIEW OF SYSTEMS  Review of Systems    All systems reviewed and negative except for those discussed in HPI.     PHYSICAL EXAM    ED Triage Vitals [12/06/21 1420]   Temp Heart Rate Resp BP SpO2   96.9 °F (36.1 °C) (!) 127 18 -- 99 %      Temp src Heart Rate Source Patient Position BP Location FiO2 (%)   -- -- -- -- --     Physical Exam    I have reviewed the triage vital signs and nursing notes.      GENERAL: not distressed  HENT: nares patent, mm moist  EYES: no scleral icterus  NECK: no ROM limitations  CV: afib with RVR, + murmur, no rubs, no gallups  RESPIRATORY: normal effort, CTAB  ABDOMEN: soft, rounded.  NO rebound/guarding or rigidity.  BS normal  : deferred  MUSCULOSKELETAL: no deformity; Fistula to LUE with positive thrill and audible bruit  NEURO: alert, moves all extremities, follows commands  SKIN: warm, dry    Critical Care  Performed by: Kaley Delgado APRN  Authorized by: Alonso Saab MD     Critical care provider statement:     Critical care time (minutes): 30.    Critical care was necessary to treat or prevent imminent or life-threatening deterioration of the following conditions:  Cardiac failure and circulatory failure    Critical care was time spent personally by me on the following activities:  Blood draw for specimens, development of treatment plan with patient or surrogate, discussions with consultants, evaluation of patient's response to treatment, examination of patient, obtaining history from patient or surrogate, ordering and performing treatments and interventions, ordering and review of laboratory studies, ordering and review of radiographic studies, pulse oximetry, re-evaluation of patient's condition and review of old charts      LAB RESULTS  Recent Results (from the past 24 hour(s))   ECG 12 Lead    Collection Time: 12/06/21  2:30 PM   Result Value Ref Range    QT Interval 327 ms    Comprehensive Metabolic Panel    Collection Time: 12/06/21  3:32 PM    Specimen: Blood   Result Value Ref Range    Glucose 120 (H) 65 - 99 mg/dL    BUN 19 8 - 23 mg/dL    Creatinine 1.66 (H) 0.57 - 1.00 mg/dL    Sodium 137 136 - 145 mmol/L    Potassium 3.6 3.5 - 5.2 mmol/L    Chloride 97 (L) 98 - 107 mmol/L    CO2 24.9 22.0 - 29.0 mmol/L    Calcium 9.7 8.6 - 10.5 mg/dL    Total Protein 7.0 6.0 - 8.5 g/dL    Albumin 4.30 3.50 - 5.20 g/dL    ALT (SGPT) 12 1 - 33 U/L    AST (SGOT) 14 1 - 32 U/L    Alkaline Phosphatase 93 39 - 117 U/L    Total Bilirubin 0.4 0.0 - 1.2 mg/dL    eGFR Non African Amer 30 (L) >60 mL/min/1.73    Globulin 2.7 gm/dL    A/G Ratio 1.6 g/dL    BUN/Creatinine Ratio 11.4 7.0 - 25.0    Anion Gap 15.1 (H) 5.0 - 15.0 mmol/L   Magnesium    Collection Time: 12/06/21  3:32 PM    Specimen: Blood   Result Value Ref Range    Magnesium 2.0 1.6 - 2.4 mg/dL   Type & Screen    Collection Time: 12/06/21  3:32 PM    Specimen: Blood   Result Value Ref Range    ABO Type B     RH type Positive     Antibody Screen Negative     T&S Expiration Date 12/9/2021 11:59:59 PM    Protime-INR    Collection Time: 12/06/21  3:32 PM    Specimen: Blood   Result Value Ref Range    Protime 30.4 (H) 11.7 - 14.2 Seconds    INR 2.95 (H) 0.90 - 1.10   CBC Auto Differential    Collection Time: 12/06/21  3:32 PM    Specimen: Blood   Result Value Ref Range    WBC 6.96 3.40 - 10.80 10*3/mm3    RBC 3.20 (L) 3.77 - 5.28 10*6/mm3    Hemoglobin 10.6 (L) 12.0 - 15.9 g/dL    Hematocrit 31.3 (L) 34.0 - 46.6 %    MCV 97.8 (H) 79.0 - 97.0 fL    MCH 33.1 (H) 26.6 - 33.0 pg    MCHC 33.9 31.5 - 35.7 g/dL    RDW 13.5 12.3 - 15.4 %    RDW-SD 48.2 37.0 - 54.0 fl    MPV 9.5 6.0 - 12.0 fL    Platelets 203 140 - 450 10*3/mm3    Neutrophil % 72.5 42.7 - 76.0 %    Lymphocyte % 12.6 (L) 19.6 - 45.3 %    Monocyte % 11.6 5.0 - 12.0 %    Eosinophil % 1.7 0.3 - 6.2 %    Basophil % 0.7 0.0 - 1.5 %    Immature Grans % 0.9 (H) 0.0 - 0.5 %    Neutrophils, Absolute  5.04 1.70 - 7.00 10*3/mm3    Lymphocytes, Absolute 0.88 0.70 - 3.10 10*3/mm3    Monocytes, Absolute 0.81 0.10 - 0.90 10*3/mm3    Eosinophils, Absolute 0.12 0.00 - 0.40 10*3/mm3    Basophils, Absolute 0.05 0.00 - 0.20 10*3/mm3    Immature Grans, Absolute 0.06 (H) 0.00 - 0.05 10*3/mm3    nRBC 0.0 0.0 - 0.2 /100 WBC   COVID-19,[facility] CEPHEID/CONSTANCE NP SWAB IN TRANSPORT MEDIA 8-12 HR TAT - Swab, Nasopharynx    Collection Time: 12/06/21  3:39 PM    Specimen: Nasopharynx; Swab   Result Value Ref Range    COVID19 Not Detected Not Detected - Ref. Range   POCT Occult Blood Stool    Collection Time: 12/06/21  3:46 PM    Specimen: Per Rectum; Stool   Result Value Ref Range    Fecal Occult Blood Positive (A) Negative    Lot Number [id number]     Expiration Date 04/30/2022     Positive Control Positive Positive    Negative Control Negative Negative         RADIOLOGY RESULTS  XR Chest 1 View    Result Date: 12/6/2021  XR CHEST 1 VW-  Clinical: Shortness of breath  COMPARISON 6/19/2019  FINDINGS: There are median sternotomy wires in position. Central venous catheter removed in the interim. Cardiac enlargement similar to the previous examination. Stable mediastinum and pasquale.  Central pulmonary vasculature is pronounced as before. No pleural effusion or acute consolidation has developed. The remainder is unremarkable.  This report was finalized on 12/6/2021 3:25 PM by Dr. Michele Cedeno M.D.           PROGRESS, DATA ANALYSIS, CONSULTS AND MEDICAL DECISION MAKING  All labs have been independently reviewed by me.  All radiology studies have been reviewed by me and discussed with radiologist dictating the report.  EKG's independently viewed and interpreted by me unless stated otherwise. Discussion below represents my analysis of pertinent findings related to patient's condition, differential diagnosis, treatment plan and final disposition.     ED Course as of 12/06/21 1856   Mon Dec 06, 2021   1546 Digital rectal exam performed with RN  "chaperone.  Grossly heme positive.  Liquid black stool noted around rectum.  I do not appreciate any hemorrhoids.  [EW]   1643 Creatinine(!): 1.66  Pt is ESRD on Dialysis   [EW]   1643 Hemoglobin(!): 10.6  Stable from 3 months ago [EW]   1643 I viewed CXR on PACS.  My interpretation is no acute infiltrates.  She does have some degree of pulmonary vascular congestion [EW]   1704 BP stable, still low 100's but she tolerated the 2.5 mg Metoprolol without problems.  I have ordered additional dose and oral dose.  I will also order a dose of  Protonix for GI Bleeding.  [EW]   1719 Discussed admission with Dr. Boyd.  I discussed afib with RVR, rectal bleeding and ESRD on dialysis [EW]   1750 Discussed patient with Dr. Jones.  [EW]      ED Course User Index  [EW] Kaley Delgado, APRN     DDX: atrial fib with RVR, rectal bleeding, anemia    MDM: Patient was given several doses of beta-blockers to help control her heart rate.  I will hold her INR due to the rectal bleeding.  She is not short of breath, has no chest pain and does not appear toxic.  She will be admitted for further management.    Reviewed pt's history and workup with Dr. Saab.  After a bedside evaluation, Dr. Saab agrees with the plan of care.    Based on the patient's lab findings and presenting symptoms, the doctor and I feel it is appropriate to admit the patient for further management, evaluation, and treatment.  I have discussed this with the admitting team.  I have also discussed this with the patient/family.  They are in agreement with admission.          Disposition vitals:  /74   Pulse 73   Temp 96.9 °F (36.1 °C)   Resp 14   Ht 170.2 cm (67\")   Wt 97.5 kg (215 lb)   SpO2 95%   BMI 33.67 kg/m²       DIAGNOSIS  Final diagnoses:   Atrial fibrillation with RVR (HCC)   Acute GI bleeding   Anticoagulated on warfarin   ESRD on dialysis (HCC)       Admission     Kaley Delgado APRN  12/06/21 1712       Kaley Delgado, MORGAN  12/06/21 " 1856

## 2021-12-06 NOTE — ED NOTES
Patient was at dialysis and they stated patient was hypotensive and in and out of a-fib. Hx of a-fib. Patient states she also has noticed rectal bleeding that started 3 days ago. Patient on blood thinners.      Aracelis Davey, RN  12/06/21 0250

## 2021-12-07 NOTE — PLAN OF CARE
Goal Outcome Evaluation:  Plan of Care Reviewed With: patient        Progress: improving  Outcome Summary: Patient alert oriented able to ambulate with walker and belt x1 admmited due to bood in stool possible scope when INR level corrected. today received Vit k PO tomorrow have lab draw in the morning in clear liquids tolerated well. tomorrow schedule Dialysis order  call to notify order.

## 2021-12-07 NOTE — NURSING NOTE
Spoke with [facility] rounding nurse about elevated troponin, states it is probably related to kidney function but will pass it on to Dr. Cox this morning.

## 2021-12-07 NOTE — NURSING NOTE
Wound/ostomy - patient noted to have purplish discoloration to bilateral buttocks, lower more ischial region, upon admission. The images obtained and placed in Epic were noted and the discoloration was much darker than how patient presents today. Patient reports she has had this discoloration for several months and she does not know what it is from. She comments that she bruises and they linger for months. No pain to buttocks present, no indication of pressure injury involvement and requiring any local care. Patient moves and turns self in bed.

## 2021-12-07 NOTE — PLAN OF CARE
Goal Outcome Evaluation:  Plan of Care Reviewed With: (P) patient           Outcome Summary: (P) Pt. is a 76 year old female admitted to [facility] for Afib with RVR and suspected GI bleed. PMH includes A fib and ESRD. Pt. lives with  in one story home with a ramp to enter, uses a FWW at baseline for safety. Pt. completed bed mobility with SBA, STS transfer with CGA and FWW, and ambulated 10 ft from bed to chair with CGA and FWW. Pt. demonstrates decreased activity tolerance, generalized weakness, and appears to be slightly below functional baseline. Skilled PT is necessary to address these deficits. Recommend home with assist, may benefit from HHPT.    Patient was intermittently wearing a face mask during this therapy encounter. Therapist used appropriate personal protective equipment including eye protection, mask, and gloves.  Mask used was standard procedure mask. Appropriate PPE was worn during the entire therapy session. Hand hygiene was completed before and after therapy session. Patient is not in enhanced droplet precautions.

## 2021-12-07 NOTE — NURSING NOTE
Patient state got her booster covid vaccine a month ago in [facility] home dialysis [place]

## 2021-12-07 NOTE — H&P
Internal medicine history and physical  INTERNAL MEDICINE   [facility]       Patient Identification:  Name: Claudia Arnold  Age: 76 y.o.  Sex: female  :  1945  MRN: [id number]                   Primary Care Physician: Robert Cortez MD                               Date of admission:2021    Chief Complaint: Noticing blood in the stool initially dark and subsequently bright since Friday.    History of Present Illness:   Patient is a 76-year-old female who has complicated past medical history including history of atrial fibrillation, chronic anticoagulation therapy with warfarin, history of end-stage renal disease on hemodialysis as well as recent hospitalization for GI bleed which was noted to be due to hemorrhoid resulting in hemorrhoidectomy in 2021 has done well since and was in her usual state of health when she noticed after a bowel movement that her stools were dark.  He did not have any other associated symptoms such as abdominal pain or dizziness.  According to the patient the stool initially dark and then subsequently started to become more in quantity and becoming more bright.  She did have a bloody bowel movement prior to going to her dialysis earlier today.  While she was at dialysis she mentioned this issue with them finished her dialysis and then she was wrapping it up her heart treatment very high and blood pressure went down.  This resulted in them sending her to the emergency room for further evaluation.  Patient was already planning to see her gastroenterologist after completion of dialysis.  Patient gets to dialysis using scheduled ride that she had and it was her ride that brought her to the emergency room instead of taking her home.  In the emergency room she was noted to be slightly hypotensive and was noted to have rapid ventricular response with underlying rhythm being atrial fibrillation.  Patient received few doses of IV Lopressor with improvement in her  heart rate and is being admitted for further care.  At present patient denies any fever chills nausea vomiting abdominal pain or decrease in appetite.      Past Medical History:  Past Medical History:   Diagnosis Date   • A-fib ([facility])     Meds   • Arthritis     w/Difficult Mobility   • Bacterial infection 03/2020   • Bifascicular block    • Bilateral lower extremity edema     Legs   • CAD (coronary artery disease)     Affecting LAD    • Cardiomyopathy ([facility])    • CHF (congestive heart failure) ([facility])     CHRONIC, DIASTOLIC   • Chronic fatigue    • Chronic kidney disease    • Cystocele, midline 09/2019   • Dialysis patient ([facility])     TUESDAY THURSDAY SATURDAY   • Edema 06/2021   • End stage renal failure on dialysis ([facility])     FOLLOWED BY DR. COLLINS SPANGLER   • Gastrointestinal hemorrhage 01/11/2021    ADMITTED TO [facility]   • Generalized anxiety disorder    • GERD (gastroesophageal reflux disease)    • Glenohumeral arthritis 04/2021   • Hematochezia 08/08/2021    ADMITTED TO [facility]   • Hemorrhoids    • Hernia, inguinal     Hx Repair   • History of echocardiogram 1/17/19-[facility]    The L Ventricular Cavity is Mild-to-Moderately Dilated; Left Ventricular Wall Thickness is Consistent w/Mild Concentric Hypertrophy; Left Atrial Cavity Size is Moderate-to-Severely Dilated; Severe AVS; Severe MVS; Moderate TVR Noted   • History of transfusion     no adverse reaction   • Hyperlipidemia     Controlled w/Meds   • Hypertension     Controlled w/Meds   • Hypokalemia    • Hypokinesis 01/22/2019    Apical Noted on Cardiac Cath   • IFG (impaired fasting glucose)    • Immobility syndrome    • Iron deficiency anemia    • LAD stenosis 01/22/2019    Mid LAD Irregularities Noted on Cardiac Cath    • Left atrial dilatation 01/17/2019    Moderate-Severe Noted on Echo   • Left ventricular dilatation 01/17/2019    Noted on Echo/LEE   • Lumbar spondylosis    • Lumbar stenosis    • Mild concentric left ventricular hypertrophy 01/17/2019    Noted on  Echo/LEE   • Mitral annular calcification 01/17/2019    Severe Noted on Echo   • Moderate tricuspid valve regurgitation 01/24/2019    Noted on LEE   • Morbid obesity ([facility])    • Nonrheumatic tricuspid valve regurgitation    • NSTEMI (non-ST elevated myocardial infarction) ([facility])    • OCTAVIANO (obstructive sleep apnea)    • Osteoarthritis    • Osteoarthritis of shoulder    • PAF (paroxysmal atrial fibrillation) ([facility])    • PNA (pneumonia)    • Pulmonary hypertension ([facility]) 01/22/2019    Noted on Cardiac Cath   • Right bundle branch block 01/24/2019    Noted on LEE   • Right ventricular enlargement 06/2021   • Rotator cuff tear 04/2016   • Secondary hyperparathyroidism ([facility])    • Severe aortic stenosis 01/22/2019    Noted on Cardiac Cath & LEE on 01/24/19; S/p AVR on 01/28/19 by Dr. Gaxiola   • Severe mitral valve stenosis 01/24/2019    Noted on LEE; S/p MVR on 01/28/19 by Dr. Gaxiola   • Severe muscle deconditioning    • Shoulder pain, bilateral    • Skin yeast infection     Folds Under Skin--Nystatin/Diflucan   • Tinea unguium 10/2020    BILATERAL   • Ulcer of toe ([facility])     RIGHT FOOT   • Urge incontinence    • Vaginal atrophy    • Venous insufficiency    • Vulvar cyst 09/2019     Past Surgical History:  Past Surgical History:   Procedure Laterality Date   • AORTIC VALVE REPAIR/REPLACEMENT MITRAL VALVE REPAIR/REPLACEMENT N/A 1/28/2019    Procedure: LEE STERNOTOMY AORTIC VALVE REPLACEMENT, MITRAL VALVE REPLACEMENT, TRICUSPID VALVE REPAIR, MAZE PROCEDURE, CLOSURE OF LEFT ATRIAL APPENDAGE  AND PRP;  Surgeon: Scar Gaxiola MD;  Location: [facility] OR;  Service: Cardiothoracic   • ARTERIOVENOUS FISTULA/SHUNT SURGERY Left 6/26/2019    Procedure: LEFT ARM BRACHIAL CEPHALIC FISTULA;  Surgeon: Satish Stahl MD;  Location: [facility] OR;  Service: Vascular   • CARDIAC CATHETERIZATION N/A 1/22/2019    Procedure: Coronary angiography;  Surgeon: Rick Talavera MD;  Location: [facility] INVASIVE LOCATION;  Service:  Cardiology   • CARDIAC CATHETERIZATION N/A 1/22/2019    Procedure: Left Heart Cath;  Surgeon: Rick Talavera MD;  Location:  [facility] CATH INVASIVE LOCATION;  Service: Cardiology   • CARDIAC CATHETERIZATION N/A 1/22/2019    Procedure: Right Heart Cath;  Surgeon: Rick Talavera MD;  Location:  [facility] CATH INVASIVE LOCATION;  Service: Cardiology   • CARDIAC CATHETERIZATION N/A 1/22/2019    Procedure: Left ventriculography;  Surgeon: Rick Talavera MD;  [facility];  Service: Cardiology   • CARDIAC SURGERY     • COLONOSCOPY N/A 1/16/2021    MULTIPLE DIVERTICULA, TORTUOUS COLON, HEMORRHOIDS, DR. JOSE TRAN AT [place]   • ENDOSCOPY N/A 1/16/2021    DUODENITIS, GASTRITIS, BENIGN DUOFENAL POLYP, DR. JOSE TRAN AT [facility]   • HEMORRHOIDECTOMY N/A 8/12/2021    Procedure: HEMORRHOIDECTOMY x2;  Surgeon: Pennie Rider MD;  Location: [facility] MAIN OR;  Service: General;  Laterality: N/A;   • HERNIA REPAIR Left     INGUINAL   • INSERT / REPLACE / VENOUS ACCESS CATHETER N/A 03/11/2020    TUNNEL CATHETER REMOVAL, DR. SU MICHELLE   • INSERTION HEMODIALYSIS CATHETER N/A 2/8/2019    Procedure: tunnel CATHETER PLACEMENT WITH FLUROSCOPY;  Surgeon: Satish Stahl MD;  Location: [facility] MAIN OR;  Service: Vascular   • REPLACEMENT TOTAL KNEE Left 2007   • TOTAL KNEE ARTHROPLASTY Right 2009      Home Meds:  (Not in a hospital admission)    Current Meds:     Current Facility-Administered Medications:   •  [COMPLETED] Insert peripheral IV, , , Once **AND** sodium chloride 0.9 % flush 10 mL, 10 mL, Intravenous, PRN, [place], MORGAN    Current Outpatient Medications:   •  acetaminophen (TYLENOL) 325 MG tablet, Take 650 mg by mouth 3 (Three) Times a Day. Takes tid at 2 am, 1 pm, and hS, Disp: , Rfl:   •  bumetanide (BUMEX) 2 MG tablet, Take 1 tablet by mouth Daily., Disp: 90 tablet, Rfl: 3  •  busPIRone (BUSPAR) 10 MG tablet, Take 1 tablet by mouth 2 (two) times a day.,  Disp: 180 tablet, Rfl: 3  •  [employer] MELATONIN PO, Take 5 mg by mouth At Night As Needed., Disp: , Rfl:   •  escitalopram (LEXAPRO) 10 MG tablet, Take 1 tablet by mouth Daily., Disp: 90 tablet, Rfl: 3  •  Iron Sucrose (VENOFER IV), 50 mg 1 (One) Time Per Week. Adjusts weekly in IV during dialysis, Disp: , Rfl:   •  Methoxy PEG-Epoetin Beta (MIRCERA IJ), 60 mcg Every 14 (Fourteen) Days. During dialysis, Disp: , Rfl:   •  midodrine (PROAMATINE) 2.5 MG tablet, TAKE 1 TABLET BY MOUTH THREE TIMES DAILY BEFORE MEALS (Patient taking differently: Take 2.5 mg by mouth 3 (Three) Times a Day Before Meals.), Disp: 90 tablet, Rfl: 6  •  Probiotic Product (ALIGN PO), Take 1 capsule by mouth Every Morning., Disp: , Rfl:   •  ProRenal + D tablet tablet, Take 1 tablet by mouth Daily., Disp: , Rfl:   •  rOPINIRole (REQUIP) 0.25 MG tablet, Take 0.25 mg by mouth Every Night., Disp: , Rfl:   •  warfarin (COUMADIN) 2 MG tablet, TAKE 1 TABLET BY MOUTH EVERY SUNDAY, TUESDAY, AND THURSDAY, TAKE 1&1/2 TABLETS BY MOUTH ALL THE OTHER DAYS OF THE WEEK (Patient taking differently: Take 2 mg by mouth Every Night. TAKE 1 TABLET BY MOUTH EVERY SUNDAY, TUESDAY, AND THURSDAY, TAKE 1&1/2 TABLETS BY MOUTH ALL THE OTHER DAYS OF THE WEEK), Disp: 120 tablet, Rfl: 1  Allergies:  No Known Allergies  Social History:   Social History     Tobacco Use   • Smoking status: Never Smoker   • Smokeless tobacco: Never Used   Substance Use Topics   • Alcohol use: Yes     Alcohol/week: 1.0 standard drink     Types: 1 Cans of beer per week     Comment: 1 monthly      Family History:  Family History   Problem Relation Age of Onset   • Hypertension Other    • Diabetes Other    • Heart disease Neg Hx    • Stroke Neg Hx    • Arthritis Neg Hx    • Breast cancer Neg Hx    • Malig Hyperthermia Neg Hx           Review of Systems  See history of present illness and past medical history.    Constitutional: Remarkable for no fever or chills with dizziness of fatigue or  "weakness  Cardiovascular: Remarkable for no chest pain or shortness of breath or palpitation  Respiratory: Remarkable for no cough or congestion  GI: Remarkable for what has been described in history of presenting illness patient denies any hematemesis melena or upper abdominal discomfort  : Remarkable for no burning urination frequency urgency  Musculoskeletal: Remarkable for no new joint aches and pain  Neurological: Remarkable for no loss of consciousness or continence remainder of ROS is negative.      Vitals:   /74   Pulse 73   Temp 96.9 °F (36.1 °C)   Resp 14   Ht 170.2 cm (67\")   Wt 97.5 kg (215 lb)   SpO2 95%   BMI 33.67 kg/m²   I/O:     Intake/Output Summary (Last 24 hours) at 12/6/2021 1914  Last data filed at 12/6/2021 1730  Gross per 24 hour   Intake 500 ml   Output --   Net 500 ml     Exam:  Patient is examined using the personal protective equipment as per guidelines from infection control for this particular patient as enacted.  Hand washing was performed before and after patient interaction.  General Appearance:   Alert cooperative pleasant female who does not appear to be in any acute distress   Head:    Normocephalic, without obvious abnormality, atraumatic   Eyes:    PERRL, conjunctiva/corneas clear, EOM's intact, both eyes   Ears:    Normal external ear canals, both ears   Nose:   Nares normal, septum midline, mucosa normal, no drainage    or sinus tenderness   Throat:   Lips, tongue, gums normal; oral mucosa pink and moist   Neck:   Supple, symmetrical, trachea midline, no adenopathy;     thyroid:  no enlargement/tenderness/nodules; no carotid    bruit or JVD   Back:     Symmetric, no curvature, ROM normal, no CVA tenderness   Lungs:     Clear to auscultation bilaterally, respirations unlabored   Chest Wall:    No tenderness or deformity    Heart:   Currently in sinus rhythm but earlier had irregularly irregular rhythm due to atrial fibrillation.   Abdomen:    Soft nontender "   Extremities:  Left arm AV fistula functional   Pulses:   Pulses palpable in all extremities; symmetric all extremities   Skin:  Ecchymotic changes noted   Neurologic:  Grossly nonfocal       Data Review:      I reviewed the patient's new clinical results.  Results from last 7 days   Lab Units 12/06/21  1532   WBC 10*3/mm3 6.96   HEMOGLOBIN g/dL 10.6*   PLATELETS 10*3/mm3 203     Results from last 7 days   Lab Units 12/06/21  1532   SODIUM mmol/L 137   POTASSIUM mmol/L 3.6   CHLORIDE mmol/L 97*   CO2 mmol/L 24.9   BUN mg/dL 19   CREATININE mg/dL 1.66*   CALCIUM mg/dL 9.7   GLUCOSE mg/dL 120*     ECG 12 Lead   Final Result   HEART RATE= 153  bpm   RR Interval= 392  ms   AZ Interval= 78  ms   P Horizontal Axis= -14  deg   P Front Axis= -4  deg   QRSD Interval= 145  ms   QT Interval= 327  ms   QRS Axis= -73  deg   T Wave Axis= 58  deg   - ABNORMAL ECG -   Atrial fibrillation with rapid V-rate   Right bundle branch block   No change from prior tracing   Electronically Signed By: Rick Talavera ([facility]) 06-Dec-2021 18:06:35   Date and Time of Study: 2021-12-06 14:30:42          Assessment:  Active Hospital Problems    Diagnosis  POA   • **Atrial fibrillation with RVR ([facility]) [I48.91]  Yes   • OCTAVIANO (obstructive sleep apnea) [G47.33]  Unknown   • Gastrointestinal hemorrhage with melena [K92.1]  Yes     Added automatically from request for surgery [id number]     • Chronic diastolic CHF (congestive heart failure) ([facility]) [I50.32]  Yes   • S/P AVR (aortic valve replacement) [Z95.2]  Not Applicable   • ESRD (end stage renal disease) ([facility]) [N18.6]  Yes   • Pulmonary hypertension ([facility]) [I27.20]  Yes   • S/P MVR (mitral valve replacement) [Z95.2]  Not Applicable   anemia      Medical decision making:  Atrial fibrillation with rapid ventricular response-improved with beta-blocker and elevation of her home medications-continue current regimen.  Hold anticoagulation therapy and admit patient to telemetry unit.  Cardiology  consultation  Lower GI bleed painless-patient recently was hospitalized with hematochezia and required hemorrhoidectomy in August 2021.  Patient claims that this is different and she denies any rectal pain or discomfort.-Plan is to check her serial H&H, hold her anticoagulation therapy and transfuse if her hemoglobin is 7 or less or if you become hemodynamically unstable.  Keep her n.p.o. and consult gastroenterology service.  Continue with IV Protonix started in the emergency room.  End-stage renal disease on hemodialysis-patient received dialysis today-plan for nephrology service.  History of chronic diastolic congestive heart failure and aortic valve replacement  Replacement-continue her current regimen.  Patient seems to be compensated.  Obstructive sleep apnea-allow her to use her CPAP device based on her home setting.  Provided with continuous pulse ox monitoring.  Anemia-multifactorial including  end-stage renal disease and may be an element of GI loss.  Plan is to hold anticoagulation therapy check basic anemia studies if her hemoglobin declines and transfuse as needed.    Gorge Boyd MD   12/6/2021  19:14 EST  Much of this encounter note is an electronic transcription/translation of spoken language to printed text. The electronic translation of spoken language may permit erroneous, or at times, nonsensical words or phrases to be inadvertently transcribed; Although I have reviewed the note for such errors, some may still exist

## 2021-12-07 NOTE — THERAPY EVALUATION
Patient Name: Claudia Arnold  : 1945    MRN: [id number]                              Today's Date: 2021       Admit Date: 2021    Visit Dx:     ICD-10-CM ICD-9-CM   1. Atrial fibrillation with RVR (Formerly [facility])  I48.91 427.31   2. Acute GI bleeding  K92.2 578.9   3. Anticoagulated on warfarin  Z79.01 V58.61   4. ESRD on dialysis (Formerly [facility])  N18.6 585.6    Z99.2 V45.11     Patient Active Problem List   Diagnosis   • Tear of rotator cuff   • Hypertension   • Generalized anxiety disorder   • Hyperlipidemia   • IFG (impaired fasting glucose)   • Heart murmur, systolic   • Shoulder pain, bilateral   • Pain of both shoulder joints   • Chronic pain of both shoulders   • Acute congestive heart failure ([facility]ly [facility])   • Obesity, morbid, BMI 50 or higher (Formerly [facility])   • Fungal skin infection   • Cellulitis of lower extremity   • Aortic valve stenosis   • Tricuspid valve insufficiency   • Mitral valve stenosis   • S/P MVR (mitral valve replacement)   • Paroxysmal atrial fibrillation ([facility]ly [facility])   • Pulmonary hypertension ([facility]ly [facility])   • ESRD (end stage renal disease) (Formerly [facility])   • Gastroesophageal reflux disease without esophagitis   • Chronic idiopathic constipation   • Dependence on renal dialysis ([facility]ly [facility])   • Chronic kidney disease, stage 2 (mild)   • Renovascular hypertension   • GI bleed   • 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 ([facility]ly [facility])   • CAD (coronary artery disease)   • S/P AVR (aortic valve replacement)   • Chronic diastolic CHF (congestive heart failure) ([facility]ly [facility])   • Chronic pain of both shoulders   • Gastrointestinal hemorrhage   • Gastrointestinal hemorrhage with melena   • Immobility syndrome   • Atrial fibrillation with RVR ([facility])   • OCTAVIANO (obstructive sleep apnea)   • Anemia     Past Medical History:   Diagnosis Date   • A-fib (Formerly [facility])     Meds   • Arthritis     w/Difficult Mobility   • Bacterial infection 2020   • Bifascicular block    • Bilateral lower extremity edema     Legs   • CAD (coronary artery disease)     Affecting LAD     • Cardiomyopathy ([facility])    • CHF (congestive heart failure) ([facility])     CHRONIC, DIASTOLIC   • Chronic fatigue    • Chronic kidney disease    • Cystocele, midline 09/2019   • Dialysis patient ([facility])     TUESDAY THURSDAY SATURDAY   • Edema 06/2021   • End stage renal failure on dialysis ([facility])     FOLLOWED BY DR. COLLINS SPANGLER   • Gastrointestinal hemorrhage 01/11/2021    ADMITTED TO [facility]   • Generalized anxiety disorder    • GERD (gastroesophageal reflux disease)    • Glenohumeral arthritis 04/2021   • Hematochezia 08/08/2021    ADMITTED TO [facility]   • Hemorrhoids    • Hernia, inguinal     Hx Repair   • History of echocardiogram 1/17/19-[facility]    The L Ventricular Cavity is Mild-to-Moderately Dilated; Left Ventricular Wall Thickness is Consistent w/Mild Concentric Hypertrophy; Left Atrial Cavity Size is Moderate-to-Severely Dilated; Severe AVS; Severe MVS; Moderate TVR Noted   • History of transfusion     no adverse reaction   • Hyperlipidemia     Controlled w/Meds   • Hypertension     Controlled w/Meds   • Hypokalemia    • Hypokinesis 01/22/2019    Apical Noted on Cardiac Cath   • IFG (impaired fasting glucose)    • Immobility syndrome    • Iron deficiency anemia    • LAD stenosis 01/22/2019    Mid LAD Irregularities Noted on Cardiac Cath    • Left atrial dilatation 01/17/2019    Moderate-Severe Noted on Echo   • Left ventricular dilatation 01/17/2019    Noted on Echo/LEE   • Lumbar spondylosis    • Lumbar stenosis    • Mild concentric left ventricular hypertrophy 01/17/2019    Noted on Echo/LEE   • Mitral annular calcification 01/17/2019    Severe Noted on Echo   • Moderate tricuspid valve regurgitation 01/24/2019    Noted on LEE   • Morbid obesity ([facility])    • Nonrheumatic tricuspid valve regurgitation    • NSTEMI (non-ST elevated myocardial infarction) ([facility])    • OCTAVIANO (obstructive sleep apnea)    • Osteoarthritis    • Osteoarthritis of shoulder    • PAF (paroxysmal atrial fibrillation) ([facility])    • PNA (pneumonia)    •  Pulmonary hypertension (HCC) 01/22/2019    Noted on Cardiac Cath   • Right bundle branch block 01/24/2019    Noted on LEE   • Right ventricular enlargement 06/2021   • Rotator cuff tear 04/2016   • Secondary hyperparathyroidism (HCC)    • Severe aortic stenosis 01/22/2019    Noted on Cardiac Cath & LEE on 01/24/19; S/p AVR on 01/28/19 by Dr. Gaxiola   • Severe mitral valve stenosis 01/24/2019    Noted on LEE; S/p MVR on 01/28/19 by Dr. Gaxiola   • Severe muscle deconditioning    • Shoulder pain, bilateral    • Skin yeast infection     Folds Under Skin--Nystatin/Diflucan   • Tinea unguium 10/2020    BILATERAL   • Ulcer of toe (HCC)     RIGHT FOOT   • Urge incontinence    • Vaginal atrophy    • Venous insufficiency    • Vulvar cyst 09/2019     Past Surgical History:   Procedure Laterality Date   • AORTIC VALVE REPAIR/REPLACEMENT MITRAL VALVE REPAIR/REPLACEMENT N/A 1/28/2019    Procedure: LEE STERNOTOMY AORTIC VALVE REPLACEMENT, MITRAL VALVE REPLACEMENT, TRICUSPID VALVE REPAIR, MAZE PROCEDURE, CLOSURE OF LEFT ATRIAL APPENDAGE  AND PRP;  Surgeon: Scar Gaxiola MD;  Location: [facility] OR;  Service: Cardiothoracic   • ARTERIOVENOUS FISTULA/SHUNT SURGERY Left 6/26/2019    Procedure: LEFT ARM BRACHIAL CEPHALIC FISTULA;  Surgeon: Satish Stahl MD;  Location: [facility] OR;  Service: Vascular   • CARDIAC CATHETERIZATION N/A 1/22/2019    Procedure: Coronary angiography;  Surgeon: Rick Talavera MD;  Location: [facility] CATH INVASIVE LOCATION;  Service: Cardiology   • CARDIAC CATHETERIZATION N/A 1/22/2019    Procedure: Left Heart Cath;  Surgeon: Rick Talavera MD;  Location: [facility] CATH INVASIVE LOCATION;  Service: Cardiology   • CARDIAC CATHETERIZATION N/A 1/22/2019    Procedure: Right Heart Cath;  Surgeon: Rick Talavera MD;  Location: [facility] CATH INVASIVE LOCATION;  Service: Cardiology   • CARDIAC CATHETERIZATION N/A 1/22/2019    Procedure: Left ventriculography;  Surgeon: Sadaf  Rick MARSH MD;  Location: [facility] CATH INVASIVE LOCATION;  Service: Cardiology   • CARDIAC SURGERY     • COLONOSCOPY N/A 1/16/2021    MULTIPLE DIVERTICULA, TORTUOUS COLON, HEMORRHOIDS, DR. JOSE TRAN AT [place]   • ENDOSCOPY N/A 1/16/2021    DUODENITIS, GASTRITIS, BENIGN DUOFENAL POLYP, DR. JOSE TRAN AT [facility]   • HEMORRHOIDECTOMY N/A 8/12/2021    Procedure: HEMORRHOIDECTOMY x2;  Surgeon: Pennie Rider MD;  Location: [facility] MAIN OR;  Service: General;  Laterality: N/A;   • HERNIA REPAIR Left     INGUINAL   • INSERT / REPLACE / VENOUS ACCESS CATHETER N/A 03/11/2020    TUNNEL CATHETER REMOVAL, DR. SU MICHELLE   • INSERTION HEMODIALYSIS CATHETER N/A 2/8/2019    Procedure: tunnel CATHETER PLACEMENT WITH FLUROSCOPY;  Surgeon: Satish Stahl MD;  Location: [facility] MAIN OR;  Service: Vascular   • REPLACEMENT TOTAL KNEE Left 2007   • TOTAL KNEE ARTHROPLASTY Right 2009      General Information     Row Name 12/07/21 0908          Physical Therapy Time and Intention    Document Type evaluation (P)   -KS     Mode of Treatment individual therapy; physical therapy (P)   -KS     Row Name 12/07/21 0908          General Information    Patient Profile Reviewed yes (P)   -KS     Prior Level of Function independent:; all household mobility; ADL's (P)   Uses FWW at baseline, also has w/c  -KS     Existing Precautions/Restrictions fall (P)   -KS     Barriers to Rehab none identified (P)   -KS     Row Name 12/07/21 0908          Living Environment    Lives With spouse (P)   -KS     Row Name 12/07/21 0908          Home Main Entrance    Number of Stairs, Main Entrance none (P)   -KS     Row Name 12/07/21 0908          Stairs Within Home, Primary    Number of Stairs, Within Home, Primary none (P)   -KS     Row Name 12/07/21 0908          Cognition    Orientation Status (Cognition) oriented x 3 (P)   -KS     Row Name 12/07/21 0908          Safety Issues, Functional Mobility    Impairments Affecting Function  (Mobility) endurance/activity tolerance; range of motion (ROM); strength; balance (P)   -KS           User Key  (r) = Recorded By, (t) = Taken By, (c) = Cosigned By    Initials Name Provider Type    Misti Robison PT Student PT Student               Mobility     Row Name 12/07/21 0911          Bed Mobility    Bed Mobility supine-sit (P)   -KS     Supine-Sit [place] (Bed Mobility) standby assist (P)   -KS     Assistive Device (Bed Mobility) head of bed elevated; bed rails (P)   -KS     Row Name 12/07/21 0911          Transfers    Comment (Transfers) Increased time to complete STS (P)   -KS     Row Name 12/07/21 0911          Sit-Stand Transfer    Sit-Stand [place] (Transfers) contact guard (P)   -KS     Assistive Device (Sit-Stand Transfers) walker, front-wheeled (P)   -KS     Row Name 12/07/21 0911          Gait/Stairs (Locomotion)    [place] Level (Gait) contact guard (P)   -KS     Assistive Device (Gait) walker, front-wheeled (P)   -KS     Distance in Feet (Gait) 10 ft (P)   -KS     Deviations/Abnormal Patterns (Gait) gait speed decreased; stride length decreased (P)   -KS     Bilateral Gait Deviations forward flexed posture (P)   -KS           User Key  (r) = Recorded By, (t) = Taken By, (c) = Cosigned By    Initials Name Provider Type    Misti Robison PT Student PT Student               Obj/Interventions     Row Name 12/07/21 0914          Range of Motion Comprehensive    General Range of Motion upper extremity range of motion deficits identified (P)   -KS     Comment, General Range of Motion Shoulder ROM deficit due to OA, all other ROM WFL. (P)   -KS     Row Name 12/07/21 0914          Strength Comprehensive (MMT)    Comment, General Manual Muscle Testing (MMT) Assessment Generalized weakness, no focal deficits identified (P)   -KS     Row Name 12/07/21 0914          Motor Skills    Therapeutic Exercise other (see comments) (P)   AP, LAQ, marches x 10 seated EOB  -KS     Row Name  12/07/21 0914          Balance    Balance Assessment sitting static balance; standing static balance; sitting dynamic balance; standing dynamic balance (P)   -KS     Static Sitting Balance WFL; unsupported; sitting, edge of bed (P)   -KS     Dynamic Sitting Balance WFL; sitting, edge of bed; unsupported (P)   -KS     Static Standing Balance WFL; supported; standing (P)   -KS     Dynamic Standing Balance WFL; standing; supported (P)   -KS     Row Name 12/07/21 0914          Sensory Assessment (Somatosensory)    Sensory Assessment (Somatosensory) other (see comments) (P)   Reports some intermittent B feet numbness and tingling  -KS           User Key  (r) = Recorded By, (t) = Taken By, (c) = Cosigned By    Initials Name Provider Type    Misti Robison PT Student PT Student               Goals/Plan     Row Name 12/07/21 0925          Bed Mobility Goal 1 (PT)    Activity/Assistive Device (Bed Mobility Goal 1, PT) bed mobility activities, all (P)   -KS     [place] Level/Cues Needed (Bed Mobility Goal 1, PT) modified independence (P)   -KS     Time Frame (Bed Mobility Goal 1, PT) 1 week (P)   -KS     Row Name 12/07/21 0925          Transfer Goal 1 (PT)    Activity/Assistive Device (Transfer Goal 1, PT) transfers, all (P)   -KS     [place] Level/Cues Needed (Transfer Goal 1, PT) standby assist (P)   -KS     Time Frame (Transfer Goal 1, PT) 1 week (P)   -KS     Row Name 12/07/21 0925          Gait Training Goal 1 (PT)    Activity/Assistive Device (Gait Training Goal 1, PT) gait (walking locomotion) (P)   -KS     [place] Level (Gait Training Goal 1, PT) contact guard assist (P)   -KS     Distance (Gait Training Goal 1, PT) 80 ft (P)   -KS     Time Frame (Gait Training Goal 1, PT) 1 week (P)   -KS           User Key  (r) = Recorded By, (t) = Taken By, (c) = Cosigned By    Initials Name Provider Type    Misti Robison PT Student PT Student               Clinical Impression     Row Name 12/07/21 0961     "      Pain    Additional Documentation Pain Scale: Numbers Pre/Post-Treatment (Group) (P)   -KS     Row Name 12/07/21 0917          Pain Scale: Numbers Pre/Post-Treatment    Pre/Posttreatment Pain Comment Pt. reports shoulder pain \"all the time\", not a new issue, did not rate on numeric scale (P)   -KS     Row Name 12/07/21 0917          Plan of Care Review    Plan of Care Reviewed With patient (P)   -KS     Outcome Summary Pt. is a 76 year old female admitted to [facility] for Afib with RVR and suspected GI bleed. PMH includes A fib and ESRD. Pt. lives with  in one story home with a ramp to enter, uses a FWW at baseline for safety. Pt. completed bed mobility with SBA, STS transfer with CGA and FWW, and ambulated 10 ft from bed to chair with CGA and FWW. Pt. demonstrates decreased activity tolerance, generalized weakness, and appears to be slightly below functional baseline. Skilled PT is necessary to address these deficits. Recommend home with assist, may benefit from HHPT. (P)   -KS     Row Name 12/07/21 0917          Therapy Assessment/Plan (PT)    Patient/Family Therapy Goals Statement (PT) Wants to be able to complete stand pivot transfer from w/c to chair without use of FWW. (P)   -KS     Rehab Potential (PT) good, to achieve stated therapy goals (P)   -KS     Criteria for Skilled Interventions Met (PT) yes (P)   -KS     Row Name 12/07/21 0917          Positioning and Restraints    Pre-Treatment Position in bed (P)   -KS     Post Treatment Position chair (P)   -KS     In Chair with nsg; sitting; call light within reach; encouraged to call for assist; exit alarm on (P)   -KS           User Key  (r) = Recorded By, (t) = Taken By, (c) = Cosigned By    Initials Name Provider Type    Misti Robison, PT Student PT Student               Outcome Measures     Row Name 12/07/21 0927 12/07/21 0910       How much help from another person do you currently need...    Turning from your back to your side while in flat bed " without using bedrails? 4 (P)   -KS 4  -MM    Moving from lying on back to sitting on the side of a flat bed without bedrails? 3 (P)   -KS 3  -MM    Moving to and from a bed to a chair (including a wheelchair)? 3 (P)   -KS 3  -MM    Standing up from a chair using your arms (e.g., wheelchair, bedside chair)? 3 (P)   -KS 3  -MM    Climbing 3-5 steps with a railing? 3 (P)   -KS 3  -MM    To walk in hospital room? 3 (P)   -KS 3  -MM    AM-PAC 6 Clicks Score (PT) 19 (P)   -KS 19  -MM    Row Name 12/07/21 0927          Functional Assessment    Outcome Measure Options AM-PAC 6 Clicks Basic Mobility (PT) (P)   -KS           User Key  (r) = Recorded By, (t) = Taken By, (c) = Cosigned By    Initials Name Provider Type    Naty Castillo, RN Registered Nurse    Misti Robison, PT Student PT Student                               PT Recommendation and Plan  Planned Therapy Interventions (PT): (P) balance training, bed mobility training, gait training, home exercise program, strengthening, patient/family education, transfer training  Plan of Care Reviewed With: (P) patient  Outcome Summary: (P) Pt. is a 76 year old female admitted to [facility] for Afib with RVR and suspected GI bleed. PMH includes A fib and ESRD. Pt. lives with  in one story home with a ramp to enter, uses a FWW at baseline for safety. Pt. completed bed mobility with SBA, STS transfer with CGA and FWW, and ambulated 10 ft from bed to chair with CGA and FWW. Pt. demonstrates decreased activity tolerance, generalized weakness, and appears to be slightly below functional baseline. Skilled PT is necessary to address these deficits. Recommend home with assist, may benefit from HHPT.     Time Calculation:    PT Charges     Row Name 12/07/21 0928             Time Calculation    Start Time 0853 (P)   -KS      Stop Time 0907 (P)   -KS      Time Calculation (min) 14 min (P)   -KS      PT Received On 12/07/21 (P)   -KS      PT - Next Appointment 12/09/21 (P)   -KS       PT Goal Re-Cert Due Date 12/14/21 (P)   -KS              Time Calculation- PT    Total Timed Code Minutes- PT 8 minute(s) (P)   -KS              Timed Charges    40974 - PT Therapeutic Activity Minutes 8 (P)   -KS              Total Minutes    Timed Charges Total Minutes 8 (P)   -KS       Total Minutes 8 (P)   -KS            User Key  (r) = Recorded By, (t) = Taken By, (c) = Cosigned By    Initials Name Provider Type    Misti Robison PT Student PT Student              Therapy Charges for Today     Code Description Service Date Service Provider Modifiers Qty    [id number]  PT THERAPEUTIC ACT EA 15 MIN 12/7/2021 Misti Clark PT Student GP 1    [id number] HC PT EVAL LOW COMPLEXITY 2 12/7/2021 Misti Clark PT Student GP 1          PT G-Codes  Outcome Measure Options: (P) AM-PAC 6 Clicks Basic Mobility (PT)  AM-PAC 6 Clicks Score (PT): (P) 19    Misti Clark PT Student  12/7/2021

## 2021-12-07 NOTE — CONSULTS
[facility] Consult Note      Patient Name: Claudia Arnold  : 1945  MRN: [id number]  Primary Care Physician:  Robert Cortez MD  Referring Physician: Gorge Boyd MD  Date of admission: 2021    Subjective     Reason for Consult:  ESRD    HPI:   Claudia Arnold is a 76 y.o. female with a medical history significant for end-stage renal disease, paroxysmal atrial fibrillation, severe aortic and mitral stenosis status post tissue valve replacement to both valves and diastolic CHF  Referred to the emergency room after her dialysis session on 2021 secondary to tachycardia, hypotension and bright red blood per rectum.  The patient has been seen by GI and cardiology.    Review of Systems:   14 point review of systems is otherwise negative except for mentioned above on HPI    Personal History     Past Medical History:   Diagnosis Date   • A-fib (Formerly [facility])     Meds   • Arthritis     w/Difficult Mobility   • Bacterial infection 2020   • Bifascicular block    • Bilateral lower extremity edema     Legs   • CAD (coronary artery disease)     Affecting LAD    • Cardiomyopathy (Formerly [facility])    • CHF (congestive heart failure) (Formerly [facility])     CHRONIC, DIASTOLIC   • Chronic fatigue    • Chronic kidney disease    • Cystocele, midline 2019   • Dialysis patient (Formerly [facility])        • Edema 2021   • End stage renal failure on dialysis (Formerly [facility])     FOLLOWED BY DR. COLLINS SPANGLER   • Gastrointestinal hemorrhage 2021    ADMITTED TO [facility]   • Generalized anxiety disorder    • GERD (gastroesophageal reflux disease)    • Glenohumeral arthritis 2021   • Hematochezia 2021    ADMITTED TO [facility]   • Hemorrhoids    • Hernia, inguinal     Hx Repair   • History of echocardiogram 19-[facility]    The L Ventricular Cavity is Mild-to-Moderately Dilated; Left Ventricular Wall Thickness is Consistent w/Mild Concentric Hypertrophy; Left Atrial Cavity Size is Moderate-to-Severely Dilated; Severe AVS;  Severe MVS; Moderate TVR Noted   • History of transfusion     no adverse reaction   • Hyperlipidemia     Controlled w/Meds   • Hypertension     Controlled w/Meds   • Hypokalemia    • Hypokinesis 01/22/2019    Apical Noted on Cardiac Cath   • IFG (impaired fasting glucose)    • Immobility syndrome    • Iron deficiency anemia    • LAD stenosis 01/22/2019    Mid LAD Irregularities Noted on Cardiac Cath    • Left atrial dilatation 01/17/2019    Moderate-Severe Noted on Echo   • Left ventricular dilatation 01/17/2019    Noted on Echo/LEE   • Lumbar spondylosis    • Lumbar stenosis    • Mild concentric left ventricular hypertrophy 01/17/2019    Noted on Echo/LEE   • Mitral annular calcification 01/17/2019    Severe Noted on Echo   • Moderate tricuspid valve regurgitation 01/24/2019    Noted on LEE   • Morbid obesity ([facility])    • Nonrheumatic tricuspid valve regurgitation    • NSTEMI (non-ST elevated myocardial infarction) ([facility])    • OCTAVIANO (obstructive sleep apnea)    • Osteoarthritis    • Osteoarthritis of shoulder    • PAF (paroxysmal atrial fibrillation) ([facility])    • PNA (pneumonia)    • Pulmonary hypertension ([facility]) 01/22/2019    Noted on Cardiac Cath   • Right bundle branch block 01/24/2019    Noted on LEE   • Right ventricular enlargement 06/2021   • Rotator cuff tear 04/2016   • Secondary hyperparathyroidism ([facility])    • Severe aortic stenosis 01/22/2019    Noted on Cardiac Cath & LEE on 01/24/19; S/p AVR on 01/28/19 by Dr. Gaxiola   • Severe mitral valve stenosis 01/24/2019    Noted on LEE; S/p MVR on 01/28/19 by Dr. Gaxiola   • Severe muscle deconditioning    • Shoulder pain, bilateral    • Skin yeast infection     Folds Under Skin--Nystatin/Diflucan   • Tinea unguium 10/2020    BILATERAL   • Ulcer of toe ([facility])     RIGHT FOOT   • Urge incontinence    • Vaginal atrophy    • Venous insufficiency    • Vulvar cyst 09/2019       Past Surgical History:   Procedure Laterality Date   • AORTIC VALVE REPAIR/REPLACEMENT MITRAL VALVE  REPAIR/REPLACEMENT N/A 1/28/2019    Procedure: LEE STERNOTOMY AORTIC VALVE REPLACEMENT, MITRAL VALVE REPLACEMENT, TRICUSPID VALVE REPAIR, MAZE PROCEDURE, CLOSURE OF LEFT ATRIAL APPENDAGE  AND PRP;  Surgeon: Scar Gaxiola MD;  [facility];  Service: Cardiothoracic   • ARTERIOVENOUS FISTULA/SHUNT SURGERY Left 6/26/2019    Procedure: LEFT ARM BRACHIAL CEPHALIC FISTULA;  Surgeon: Satish Stahl MD;  [facility];  Service: Vascular   • CARDIAC CATHETERIZATION N/A 1/22/2019    Procedure: Coronary angiography;  Surgeon: Rick Talavera MD;  Location:  [facility] CATH INVASIVE LOCATION;  Service: Cardiology   • CARDIAC CATHETERIZATION N/A 1/22/2019    Procedure: Left Heart Cath;  Surgeon: Rick Talavera MD;  [facility];  Service: Cardiology   • CARDIAC CATHETERIZATION N/A 1/22/2019    Procedure: Right Heart Cath;  Surgeon: Rick Talavera MD;  Location:  [facility] CATH INVASIVE LOCATION;  Service: Cardiology   • CARDIAC CATHETERIZATION N/A 1/22/2019    Procedure: Left ventriculography;  Surgeon: Rick Talavera MD;  [facility];  Service: Cardiology   • CARDIAC SURGERY     • COLONOSCOPY N/A 1/16/2021    MULTIPLE DIVERTICULA, TORTUOUS COLON, HEMORRHOIDS, DR. JOSE TRAN AT [place]   • ENDOSCOPY N/A 1/16/2021    DUODENITIS, GASTRITIS, BENIGN DUOFENAL POLYP, DR. JOSE TRAN AT [facility]   • HEMORRHOIDECTOMY N/A 8/12/2021    Procedure: HEMORRHOIDECTOMY x2;  Surgeon: Pennei Rider MD;  Location: [facility] MAIN OR;  Service: General;  Laterality: N/A;   • HERNIA REPAIR Left     INGUINAL   • INSERT / REPLACE / VENOUS ACCESS CATHETER N/A 03/11/2020    TUNNEL CATHETER REMOVAL, DR. SU MICHELLE   • INSERTION HEMODIALYSIS CATHETER N/A 2/8/2019    Procedure: tunnel CATHETER PLACEMENT WITH FLUROSCOPY;  Surgeon: Satish Stahl MD;  Location: [facility] MAIN OR;  Service: Vascular   • REPLACEMENT TOTAL KNEE Left  2007   • TOTAL KNEE ARTHROPLASTY Right 2009       Family History: family history includes Diabetes in an other family member; Hypertension in an other family member.    Social History:  reports that she has never smoked. She has never used smokeless tobacco. She reports current alcohol use of about 1.0 standard drink of alcohol per week. She reports that she does not use drugs.    Home Medications:  Prior to Admission medications    Medication Sig Start Date End Date Taking? Authorizing Provider   acetaminophen (TYLENOL) 325 MG tablet Take 650 mg by mouth 3 (Three) Times a Day. Takes tid at 2 am, 1 pm, and hS   Yes Presley Adhikari MD   bumetanide (BUMEX) 2 MG tablet Take 1 tablet by mouth Daily. 1/26/21  Yes Shiloh Gonzales APRN   busPIRone (BUSPAR) 10 MG tablet Take 1 tablet by mouth 2 (two) times a day. 5/27/21  Yes Robert Cortez MD   [facility] MELATONIN PO Take 5 mg by mouth At Night As Needed.   Yes Presley Adhikari MD   escitalopram (LEXAPRO) 10 MG tablet Take 1 tablet by mouth Daily. 5/27/21  Yes Robert Cortez MD   Iron Sucrose (VENOFER IV) 50 mg 1 (One) Time Per Week. Adjusts weekly in IV during dialysis 2/24/21 2/23/22 Yes Presley Adhikari MD   Methoxy PEG-Epoetin Beta (MIRCERA IJ) 60 mcg Every 14 (Fourteen) Days. During dialysis 1/25/21 1/24/22 Yes Presley Adhikari MD   midodrine (PROAMATINE) 2.5 MG tablet TAKE 1 TABLET BY MOUTH THREE TIMES DAILY BEFORE MEALS  Patient taking differently: Take 2.5 mg by mouth 3 (Three) Times a Day Before Meals. 8/17/21  Yes Scott Segura MD   Probiotic Product (ALIGN PO) Take 1 capsule by mouth Every Morning.   Yes Presley Adhikari MD   ProRenal + D tablet tablet Take 1 tablet by mouth Daily. 2/12/21  Yes Presley Adhikari MD   rOPINIRole (REQUIP) 0.25 MG tablet Take 0.25 mg by mouth Every Night. 5/15/20  Yes Presley Adhikari MD   warfarin (COUMADIN) 2 MG tablet TAKE 1 TABLET BY MOUTH EVERY SUNDAY, TUESDAY, AND THURSDAY, TAKE 1&1/2  TABLETS BY MOUTH ALL THE OTHER DAYS OF THE WEEK  Patient taking differently: Take 2 mg by mouth Every Night. TAKE 1 TABLET BY MOUTH EVERY SUNDAY, TUESDAY, AND THURSDAY, TAKE 1&1/2 TABLETS BY MOUTH ALL THE OTHER DAYS OF THE WEEK 10/27/21  Yes Shiloh Gonzales APRN       Allergies:  No Known Allergies    Objective     Vitals:   Temp:  [97.7 °F (36.5 °C)-98.6 °F (37 °C)] 97.8 °F (36.6 °C)  Heart Rate:  [] 64  Resp:  [18-20] 18  BP: (107-123)/(60-90) 118/70    Intake/Output Summary (Last 24 hours) at 12/7/2021 1543  Last data filed at 12/6/2021 1730  Gross per 24 hour   Intake 500 ml   Output --   Net 500 ml       Physical Exam:   Constitutional: Awake, alert, no acute distress.  HEENT: Sclera anicteric, no conjunctival injection  Neck: Supple, no thyromegaly, no lymphadenopathy, trachea at midline, no JVD  Respiratory: Clear to auscultation bilaterally, nonlabored respiration  Cardiovascular: IRIR, no rub  Gastrointestinal: Positive bowel sounds, abdomen is soft, nontender and nondistended  : Limited urine output  Musculoskeletal: No edema, no clubbing or cyanosis.  Left upper extremity AV fistula is present.  Psychiatric: Appropriate affect, cooperative  Neurologic: Oriented x3, moving all extremities, normal speech and mental status  Skin: Warm and dry       Scheduled Meds:     acetaminophen, 650 mg, Oral, TID  busPIRone, 10 mg, Oral, Q12H  escitalopram, 10 mg, Oral, Daily  lactobacillus acidophilus, 1 capsule, Oral, QAM  midodrine, 5 mg, Oral, TID AC  multivitamin with minerals, 1 tablet, Oral, Daily  pantoprazole, 40 mg, Intravenous, Q AM  phytonadione, 2.5 mg, Oral, Once  rOPINIRole, 0.25 mg, Oral, Nightly  sodium chloride, 10 mL, Intravenous, Q12H      IV Meds:        Results Reviewed:   I have personally reviewed the results from the time of this admission to 12/7/2021 15:43 EST     Lab Results   Component Value Date    GLUCOSE 88 12/07/2021    CALCIUM 9.7 12/07/2021     12/07/2021    K 4.2  12/07/2021    CO2 26.0 12/07/2021     12/07/2021    BUN 28 (H) 12/07/2021    CREATININE 2.24 (H) 12/07/2021    EGFRIFAFRI  01/18/2021      Comment:      <15 Indicative of kidney failure.    EGFRIFNONA 21 (L) 12/07/2021    BCR 12.5 12/07/2021    ANIONGAP 12.0 12/07/2021      Lab Results   Component Value Date    MG 2.0 12/06/2021    PHOS 6.0 (H) 08/13/2021    ALBUMIN 3.90 12/07/2021           Assessment / Plan     ASSESSMENT:  -End-stage renal disease.  Dialysis Monday Wednesday Friday.  Electrolytes and volume status are stable.  -Anemia in chronic kidney disease and GI bleed  -Hypertension, on midodrine  -Atrial fibrillation  -Gastrointestinal bleeding  -Chronic CHF      PLAN:  Hemodialysis tomorrow.  No heparin with dialysis  Add Procrit  Possible colonoscopy    Thank you for involving us in the care of Claudia GABRIELLE Arnold.  Please feel free to call with any questions.    Iker Palacios MD  12/07/21  15:43 EST    [facility]  [phone]      Much of this encounter note is an electronic transcription/translation of spoken language to printed text. The electronic translation of spoken language may permit erroneous, or at times, nonsensical words or phrases to be inadvertently transcribed; Although I have reviewed the note for such errors, some may still exist.

## 2021-12-07 NOTE — CONSULTS
Patient Name: Claudia rAnold  :1945  76 y.o.    Date of Admission: 2021  Encounter Provider: Kathy Cox MD  Date of Encounter Visit: 21  Place of Service: [facility] CARDIOLOGY  Referring Provider: Gorge Boyd MD  Patient Care Team:  Robert Cortez MD as PCP - General (Family Medicine)  Deborah Agudelo MD as Surgeon (Orthopedic Surgery)  Scott Segura MD as Consulting Physician (Cardiology)  Scar Gaxiola MD as Surgeon (Cardiothoracic Surgery)  Kathy Osuna MD as Consulting Physician (Nephrology)  Dora Astorga APRN as Nurse Practitioner (Neurosurgery)  Satish Stahl MD as Consulting Physician (Vascular Surgery)  Scar Gaxiola MD as Surgeon (Cardiothoracic Surgery)      Chief complaint: Afib/hypotension/rectal bleeding    Reason for consult: Afib    History of Present Illness:    This is a patient of Dr. Segura's with history of valve disease (severe aortic and mitral stenosis status post tissue valve replacements to both valves and a tricuspid valve repair.), paroxysmal A. Fib/flutter (status post left cryomaze and left atrial appendage ligation), bifascicular block, chronic diastolic heart failure, pulmonary embolism, end-stage renal disease on dialysis.    She was hospitalized in early  with some rectal bleeding ultimately determined to be most likely from hemorrhoids.  She did have some A. fib with RVR during that admission but converted on amiodarone and was transitioned back to warfarin.      LEE on  confirmed elevated gradients across the bioprosthetic mitral valve with a mean of 14 mmHg.  However the valve appeared normal and not stenotic and it was thought that her dialysis shunt was the source of the elevated gradients.  Despite her tricuspid valve repair she still had severe tricuspid regurgitation with moderate right ventricular enlargement and moderately reduced function.    She saw   Imelda in September 2021 and her blood pressure had improved since starting Midrin.  Plan was for her to follow back up in January 22 with an echo.  However she presented to [facility] on December 6 after dialysis with rectal bleeding and complaints of an elevated heart rate/low blood pressure during dialysis.  She had liquid black stools and was occult positive.  Hemoglobin stable at 10.6.  EKG with A. fib with RVR.  She was given IV metoprolol and her heart rate improved.    We were asked to see for A. fib with RVR.  Her heart rate has been controlled since she has been on the floor.  She is on a Protonix drip.  Hemoglobin 9.9 this morning.  Mildly elevated troponin with a creatinine of 2.24.    She was feeling great over the weekend and then she was at dialysis they noted that she was tachycardic and sent her to the emergency room.  She had been noticing some dark stools which got worse.  She has not had a bowel movement today.          Previous cardiac testing:     Transesophageal Echocardiogram 06/04/2021;  · Left ventricular ejection fraction appears to be 61 - 65%.  · Left ventricular wall thickness is consistent with mild to moderate concentric hypertrophy  · The right ventricular cavity is moderately dilated. Moderately reduced right ventricular systolic function noted.  · The left atrial cavity is severely dilated.  · The left atrial appendage has been ligated, although there is still bidirectional flow across a small orifice  · There is a bioprosthetic aortic valve replacement which appears to be well-seated. The valve leaflets open well. The bioprosthetic aortic valve could not be properly aligned in the transgastric views to obtain gradients.  · Gradients across the bioprosthetic mitral valve are grossly elevated with a mean gradient of 14 mmHg. However, the valve leaflets clearly open very well and do not appear stenotic.  · The tricuspid valve is status post an annuloplasty ring repair. There is  severe tricuspid regurgitation through the annuloplasty ring  · Calculated right ventricular systolic pressure from tricuspid regurgitation is 48 mmHg.  · There are moderate plaques in the descending thoracic aorta and in the aortic arch    Cardiac Cath 01/22/2019:  1.  Mild luminal irregularities proximal to mid LAD.  Otherwise normal coronary angiography  2.  Elevated left and right-sided filling pressure  3.  Moderate aortic valve stenosis.  4.  Moderate pulmonary hypertension      Past Medical History:   Diagnosis Date   • A-fib ([facility])     Meds   • Arthritis     w/Difficult Mobility   • Bacterial infection 03/2020   • Bifascicular block    • Bilateral lower extremity edema     Legs   • CAD (coronary artery disease)     Affecting LAD    • Cardiomyopathy ([facility])    • CHF (congestive heart failure) ([facility])     CHRONIC, DIASTOLIC   • Chronic fatigue    • Chronic kidney disease    • Cystocele, midline 09/2019   • Dialysis patient ([facility])     TUESDAY THURSDAY SATURDAY   • Edema 06/2021   • End stage renal failure on dialysis ([facility])     FOLLOWED BY DR. COLLINS SPANGLER   • Gastrointestinal hemorrhage 01/11/2021    ADMITTED TO [facility]   • Generalized anxiety disorder    • GERD (gastroesophageal reflux disease)    • Glenohumeral arthritis 04/2021   • Hematochezia 08/08/2021    ADMITTED TO [facility]   • Hemorrhoids    • Hernia, inguinal     Hx Repair   • History of echocardiogram 1/17/19-[facility]    The L Ventricular Cavity is Mild-to-Moderately Dilated; Left Ventricular Wall Thickness is Consistent w/Mild Concentric Hypertrophy; Left Atrial Cavity Size is Moderate-to-Severely Dilated; Severe AVS; Severe MVS; Moderate TVR Noted   • History of transfusion     no adverse reaction   • Hyperlipidemia     Controlled w/Meds   • Hypertension     Controlled w/Meds   • Hypokalemia    • Hypokinesis 01/22/2019    Apical Noted on Cardiac Cath   • IFG (impaired fasting glucose)    • Immobility syndrome    • Iron deficiency anemia    • LAD stenosis  01/22/2019    Mid LAD Irregularities Noted on Cardiac Cath    • Left atrial dilatation 01/17/2019    Moderate-Severe Noted on Echo   • Left ventricular dilatation 01/17/2019    Noted on Echo/LEE   • Lumbar spondylosis    • Lumbar stenosis    • Mild concentric left ventricular hypertrophy 01/17/2019    Noted on Echo/LEE   • Mitral annular calcification 01/17/2019    Severe Noted on Echo   • Moderate tricuspid valve regurgitation 01/24/2019    Noted on LEE   • Morbid obesity ([facility])    • Nonrheumatic tricuspid valve regurgitation    • NSTEMI (non-ST elevated myocardial infarction) ([facility])    • OCTAVIANO (obstructive sleep apnea)    • Osteoarthritis    • Osteoarthritis of shoulder    • PAF (paroxysmal atrial fibrillation) ([facility])    • PNA (pneumonia)    • Pulmonary hypertension ([facility]) 01/22/2019    Noted on Cardiac Cath   • Right bundle branch block 01/24/2019    Noted on LEE   • Right ventricular enlargement 06/2021   • Rotator cuff tear 04/2016   • Secondary hyperparathyroidism ([facility])    • Severe aortic stenosis 01/22/2019    Noted on Cardiac Cath & LEE on 01/24/19; S/p AVR on 01/28/19 by Dr. Gaxiola   • Severe mitral valve stenosis 01/24/2019    Noted on LEE; S/p MVR on 01/28/19 by Dr. Gaxiola   • Severe muscle deconditioning    • Shoulder pain, bilateral    • Skin yeast infection     Folds Under Skin--Nystatin/Diflucan   • Tinea unguium 10/2020    BILATERAL   • Ulcer of toe ([facility])     RIGHT FOOT   • Urge incontinence    • Vaginal atrophy    • Venous insufficiency    • Vulvar cyst 09/2019       Past Surgical History:   Procedure Laterality Date   • AORTIC VALVE REPAIR/REPLACEMENT MITRAL VALVE REPAIR/REPLACEMENT N/A 1/28/2019    Procedure: LEE STERNOTOMY AORTIC VALVE REPLACEMENT, MITRAL VALVE REPLACEMENT, TRICUSPID VALVE REPAIR, MAZE PROCEDURE, CLOSURE OF LEFT ATRIAL APPENDAGE  AND PRP;  Surgeon: Scar Gaxiola MD;  Location: [facility];  Service: Cardiothoracic   • ARTERIOVENOUS FISTULA/SHUNT SURGERY Left 6/26/2019     Procedure: LEFT ARM BRACHIAL CEPHALIC FISTULA;  Surgeon: Satish Stahl MD;  Location: [facility] MAIN OR;  Service: Vascular   • CARDIAC CATHETERIZATION N/A 1/22/2019    Procedure: Coronary angiography;  Surgeon: Rick Talavera MD;  Location:  [facility] CATH INVASIVE LOCATION;  Service: Cardiology   • CARDIAC CATHETERIZATION N/A 1/22/2019    Procedure: Left Heart Cath;  Surgeon: Rick Talavera MD;  [facility];  Service: Cardiology   • CARDIAC CATHETERIZATION N/A 1/22/2019    Procedure: Right Heart Cath;  Surgeon: Rick Talavera MD;  Location:  [facility] CATH INVASIVE LOCATION;  Service: Cardiology   • CARDIAC CATHETERIZATION N/A 1/22/2019    Procedure: Left ventriculography;  Surgeon: Rick Talavera MD;  [facility];  Service: Cardiology   • CARDIAC SURGERY     • COLONOSCOPY N/A 1/16/2021    MULTIPLE DIVERTICULA, TORTUOUS COLON, HEMORRHOIDS, DR. JOSE TRAN AT [place]   • ENDOSCOPY N/A 1/16/2021    DUODENITIS, GASTRITIS, BENIGN DUOFENAL POLYP, DR. JOSE TRAN AT [facility]   • HEMORRHOIDECTOMY N/A 8/12/2021    Procedure: HEMORRHOIDECTOMY x2;  Surgeon: Pennie Rider MD;  Location: [facility] MAIN OR;  Service: General;  Laterality: N/A;   • HERNIA REPAIR Left     INGUINAL   • INSERT / REPLACE / VENOUS ACCESS CATHETER N/A 03/11/2020    TUNNEL CATHETER REMOVAL, DR. SU MICHELLE   • INSERTION HEMODIALYSIS CATHETER N/A 2/8/2019    Procedure: tunnel CATHETER PLACEMENT WITH FLUROSCOPY;  Surgeon: Satish Stahl MD;  Location: [facility] MAIN OR;  Service: Vascular   • REPLACEMENT TOTAL KNEE Left 2007   • TOTAL KNEE ARTHROPLASTY Right 2009         Prior to Admission medications    Medication Sig Start Date End Date Taking? Authorizing Provider   acetaminophen (TYLENOL) 325 MG tablet Take 650 mg by mouth 3 (Three) Times a Day. Takes tid at 2 am, 1 pm, and hS   Yes Provider, MD Presley   bumetanide (BUMEX) 2 MG tablet Take 1  tablet by mouth Daily. 1/26/21  Yes Shiloh Gonzales APRN   busPIRone (BUSPAR) 10 MG tablet Take 1 tablet by mouth 2 (two) times a day. 5/27/21  Yes Robert Cortez MD   [facility] MELATONIN PO Take 5 mg by mouth At Night As Needed.   Yes ProviderPresley MD   escitalopram (LEXAPRO) 10 MG tablet Take 1 tablet by mouth Daily. 5/27/21  Yes Robert Cortez MD   Iron Sucrose (VENOFER IV) 50 mg 1 (One) Time Per Week. Adjusts weekly in IV during dialysis 2/24/21 2/23/22 Yes ProviderPresley MD   Methoxy PEG-Epoetin Beta (MIRCERA IJ) 60 mcg Every 14 (Fourteen) Days. During dialysis 1/25/21 1/24/22 Yes ProviderPresley MD   midodrine (PROAMATINE) 2.5 MG tablet TAKE 1 TABLET BY MOUTH THREE TIMES DAILY BEFORE MEALS  Patient taking differently: Take 2.5 mg by mouth 3 (Three) Times a Day Before Meals. 8/17/21  Yes Scott Segura MD   Probiotic Product (ALIGN PO) Take 1 capsule by mouth Every Morning.   Yes ProviderPresley MD   ProRenal + D tablet tablet Take 1 tablet by mouth Daily. 2/12/21  Yes Presley Adhikari MD   rOPINIRole (REQUIP) 0.25 MG tablet Take 0.25 mg by mouth Every Night. 5/15/20  Yes Presley Adhikari MD   warfarin (COUMADIN) 2 MG tablet TAKE 1 TABLET BY MOUTH EVERY SUNDAY, TUESDAY, AND THURSDAY, TAKE 1&1/2 TABLETS BY MOUTH ALL THE OTHER DAYS OF THE WEEK  Patient taking differently: Take 2 mg by mouth Every Night. TAKE 1 TABLET BY MOUTH EVERY SUNDAY, TUESDAY, AND THURSDAY, TAKE 1&1/2 TABLETS BY MOUTH ALL THE OTHER DAYS OF THE WEEK 10/27/21  Yes Shiloh Gonzales APRN       No Known Allergies    Social History     Socioeconomic History   • Marital status:    • Number of children: 2   • Years of education: 12   • Highest education level: High school graduate   Tobacco Use   • Smoking status: Never Smoker   • Smokeless tobacco: Never Used   Vaping Use   • Vaping Use: Never used   Substance and Sexual Activity   • Alcohol use: Yes     Alcohol/week: 1.0 standard drink     Types:  1 Cans of beer per week     Comment: 1 monthly   • Drug use: No   • Sexual activity: Defer     Birth control/protection: Post-menopausal       Family History   Problem Relation Age of Onset   • Hypertension Other    • Diabetes Other    • Heart disease Neg Hx    • Stroke Neg Hx    • Arthritis Neg Hx    • Breast cancer Neg Hx    • Malig Hyperthermia Neg Hx        REVIEW OF SYSTEMS:   All other systems reviewed and negative.        Objective:     Vitals:    12/07/21 0632 12/07/21 0700 12/07/21 0900 12/07/21 1201   BP: 114/73 109/60  123/71   BP Location: Right arm Right arm     Patient Position: Lying Lying     Pulse: 73 69 67 60   Resp:  20     Temp:  98.1 °F (36.7 °C)     TempSrc:  Oral     SpO2:  92% 92%    Weight:       Height:         Body mass index is 33.8 kg/m².    Intake/Output Summary (Last 24 hours) at 12/7/2021 1255  Last data filed at 12/6/2021 1730  Gross per 24 hour   Intake 500 ml   Output --   Net 500 ml       Constitutional: She is oriented to person, place, and time. She appears well-developed. She does not appear ill.   HENT:   Head: Normocephalic and atraumatic. Head is without contusion.   Right Ear: Hearing normal. No drainage.   Left Ear: Hearing normal. No drainage.   Nose: No nasal deformity. No epistaxis.   Eyes: Lids are normal. Right eye exhibits no exudate. Left eye exhibits no exudate.  Neck: No JVD present. Carotid bruit is not present. No tracheal deviation present. No thyroid mass and no thyromegaly present.   Cardiovascular: Irregularly irregular  Pulmonary/Chest: Effort normal and breath sounds normal.   Abdominal: Soft. Normal appearance and bowel sounds are normal. There is no tenderness.   Musculoskeletal: Normal range of motion.        Right shoulder: She exhibits no deformity.        Left shoulder: She exhibits no deformity.   Neurological: She is alert and oriented to person, place, and time. She has normal strength.   Skin: Skin is warm, dry and intact. No rash noted.    Psychiatric: She has a normal mood and affect. Her behavior is normal. Thought content normal.   Vitals reviewed      Lab Review:     Results from last 7 days   Lab Units 12/07/21  0431   SODIUM mmol/L 139   POTASSIUM mmol/L 4.2   CHLORIDE mmol/L 101   CO2 mmol/L 26.0   BUN mg/dL 28*   CREATININE mg/dL 2.24*   CALCIUM mg/dL 9.7   BILIRUBIN mg/dL 0.4   ALK PHOS U/L 79   ALT (SGPT) U/L 10   AST (SGOT) U/L 12   GLUCOSE mg/dL 88     Results from last 7 days   Lab Units 12/07/21  0431   TROPONIN T ng/mL 0.109*     Results from last 7 days   Lab Units 12/07/21  0812 12/07/21  0431 12/07/21  0431   WBC 10*3/mm3  --   --  5.63   HEMOGLOBIN g/dL 9.8*   < > 9.9*   HEMATOCRIT % 28.5*   < > 30.7*   PLATELETS 10*3/mm3  --   --  180    < > = values in this interval not displayed.     Results from last 7 days   Lab Units 12/06/21  1532   INR  2.95*     Results from last 7 days   Lab Units 12/06/21  1532   MAGNESIUM mg/dL 2.0         I personally viewed and interpreted the patient's EKG/Telemetry data.    Admission EKG 12/06/2021:      Previous EKG 08/13/2021:              Assessment and Plan:       1.  GI bleed.  2.  Atrial fibrillation with rapid ventricular response.  She has had a cryomaze in the past and a left atrial appendage ligation.  Her INR was almost 3.  Given she has had an appendage ligation I wonder if we can run her at a slightly lower INR like 1.5-2.5 after discharge if it is okay with GI to resume warfarin.  I will discuss with the INR clinic at discharge.  I am going to give her a little bit of vitamin K today to hopefully facilitate getting  GI work-up completed  3.  Chronic diastolic congestive heart failure.  4.  Severe aortic stenosis status post aortic valve replacement with a 23 mm magna pericardial valve.  5.  Severe mitral stenosis status post replacement with a 25 mm epic [place] porcine valve.  There was concerns about mitral stenosis but ultimately was felt to be secondary to the dialysis catheter  as the valve appeared to be functioning normally on transesophageal echo in June 2021.  6.  History of pulmonary embolism  7.  Tricuspid valve repair however based upon the last echo, there is still severe tricuspid regurgitation  8.  Hypotension.  On midodrine.  9.  End-stage renal disease on hemodialysis.  10.  Type II myocardial infarction secondary to end-stage renal disease.  Heart catheterization in January 2019 showed normal coronary arteries.    Kathy Cox MD  12/07/21  12:55 EST

## 2021-12-07 NOTE — CONSULTS
[facility]  Initial Inpatient Consult Note    Referring Provider: Dr. Robert Cortez    Reason for Consultation: GI bleed    Subjective     History of present illness:    76 y.o. female followed by Dr. Mikey Zacarias last seen in the office setting in March of this year.  Had undergone upper and lower endoscopies in January for issues with iron deficiency anemia and melena.  Findings included duodenitis, gastritis, and Beaver's changes.  Also noted to have diverticulosis and hemorrhoids.  She did undergo hemorrhoidectomy in August of this year.  Patient had recent appearance of dark stools and subsequently had bright red blood per rectum.  Recent dialysis administered with hypotension.  She denies any straining with stools, rectal or abdominal pain.  She is on Coumadin for atrial fibrillation.  This is being monitored by the cardiology service.    Past Medical History:  Past Medical History:   Diagnosis Date   • A-fib ([facility])     Meds   • Arthritis     w/Difficult Mobility   • Bacterial infection 03/2020   • Bifascicular block    • Bilateral lower extremity edema     Legs   • CAD (coronary artery disease)     Affecting LAD    • Cardiomyopathy ([facility])    • CHF (congestive heart failure) ([facility])     CHRONIC, DIASTOLIC   • Chronic fatigue    • Chronic kidney disease    • Cystocele, midline 09/2019   • Dialysis patient ([facility])     TUESDAY THURSDAY SATURDAY   • Edema 06/2021   • End stage renal failure on dialysis ([facility])     FOLLOWED BY DR. COLLINS SPANGLER   • Gastrointestinal hemorrhage 01/11/2021    ADMITTED TO [facility]   • Generalized anxiety disorder    • GERD (gastroesophageal reflux disease)    • Glenohumeral arthritis 04/2021   • Hematochezia 08/08/2021    ADMITTED TO [facility]   • Hemorrhoids    • Hernia, inguinal     Hx Repair   • History of echocardiogram 1/17/19-[facility]    The L Ventricular Cavity is Mild-to-Moderately Dilated; Left Ventricular Wall Thickness is Consistent w/Mild Concentric Hypertrophy;  Left Atrial Cavity Size is Moderate-to-Severely Dilated; Severe AVS; Severe MVS; Moderate TVR Noted   • History of transfusion     no adverse reaction   • Hyperlipidemia     Controlled w/Meds   • Hypertension     Controlled w/Meds   • Hypokalemia    • Hypokinesis 01/22/2019    Apical Noted on Cardiac Cath   • IFG (impaired fasting glucose)    • Immobility syndrome    • Iron deficiency anemia    • LAD stenosis 01/22/2019    Mid LAD Irregularities Noted on Cardiac Cath    • Left atrial dilatation 01/17/2019    Moderate-Severe Noted on Echo   • Left ventricular dilatation 01/17/2019    Noted on Echo/LEE   • Lumbar spondylosis    • Lumbar stenosis    • Mild concentric left ventricular hypertrophy 01/17/2019    Noted on Echo/LEE   • Mitral annular calcification 01/17/2019    Severe Noted on Echo   • Moderate tricuspid valve regurgitation 01/24/2019    Noted on LEE   • Morbid obesity ([facility])    • Nonrheumatic tricuspid valve regurgitation    • NSTEMI (non-ST elevated myocardial infarction) ([facility])    • OCTAVIANO (obstructive sleep apnea)    • Osteoarthritis    • Osteoarthritis of shoulder    • PAF (paroxysmal atrial fibrillation) ([facility])    • PNA (pneumonia)    • Pulmonary hypertension ([facility]) 01/22/2019    Noted on Cardiac Cath   • Right bundle branch block 01/24/2019    Noted on LEE   • Right ventricular enlargement 06/2021   • Rotator cuff tear 04/2016   • Secondary hyperparathyroidism ([facility])    • Severe aortic stenosis 01/22/2019    Noted on Cardiac Cath & LEE on 01/24/19; S/p AVR on 01/28/19 by Dr. Gaxiola   • Severe mitral valve stenosis 01/24/2019    Noted on LEE; S/p MVR on 01/28/19 by Dr. Gaxiola   • Severe muscle deconditioning    • Shoulder pain, bilateral    • Skin yeast infection     Folds Under Skin--Nystatin/Diflucan   • Tinea unguium 10/2020    BILATERAL   • Ulcer of toe ([facility])     RIGHT FOOT   • Urge incontinence    • Vaginal atrophy    • Venous insufficiency    • Vulvar cyst 09/2019     Past Surgical History:  Past Surgical  History:   Procedure Laterality Date   • AORTIC VALVE REPAIR/REPLACEMENT MITRAL VALVE REPAIR/REPLACEMENT N/A 1/28/2019    Procedure: LEE STERNOTOMY AORTIC VALVE REPLACEMENT, MITRAL VALVE REPLACEMENT, TRICUSPID VALVE REPAIR, MAZE PROCEDURE, CLOSURE OF LEFT ATRIAL APPENDAGE  AND PRP;  Surgeon: Scar Gaxiola MD;  [facility] MA[facility];  Service: Cardiothoracic   • ARTERIOVENOUS FISTULA/SHUNT SURGERY Left 6/26/2019    Procedure: LEFT ARM BRACHIAL CEPHALIC FISTULA;  Surgeon: Satish Stahl MD;  [facility];  Service: Vascular   • CARDIAC CATHETERIZATION N/A 1/22/2019    Procedure: Coronary angiography;  Surgeon: Rick Talavera MD;  [facility];  Service: Cardiology   • CARDIAC CATHETERIZATION N/A 1/22/2019    Procedure: Left Heart Cath;  Surgeon: Rick Talavera MD;  [facility];  Service: Cardiology   • CARDIAC CATHETERIZATION N/A 1/22/2019    Procedure: Right Heart Cath;  Surgeon: Rick Talavera MD;  [facility];  Service: Cardiology   • CARDIAC CATHETERIZATION N/A 1/22/2019    Procedure: Left ventriculography;  Surgeon: Rick Talavera MD;  [facility];  Service: Cardiology   • CARDIAC SURGERY     • COLONOSCOPY N/A 1/16/2021    MULTIPLE DIVERTICULA, TORTUOUS COLON, HEMORRHOIDS, DR. JOSE TRAN AT [place]   • ENDOSCOPY N/A 1/16/2021    DUODENITIS, GASTRITIS, BENIGN DUOFENAL POLYP, DR. JOSE TRAN AT [facility]   • HEMORRHOIDECTOMY N/A 8/12/2021    Procedure: HEMORRHOIDECTOMY x2;  Surgeon: Pennie Rider MD;  [facility] MA[facility];  Service: General;  Laterality: N/A;   • HERNIA REPAIR Left     INGUINAL   • INSERT / REPLACE / VENOUS ACCESS CATHETER N/A 03/11/2020    TUNNEL CATHETER REMOVAL, DR. SU MICHELLE   • INSERTION HEMODIALYSIS CATHETER N/A 2/8/2019    Procedure: tunnel CATHETER PLACEMENT WITH FLUROSCOPY;  Surgeon: Satish Stahl  MD Antonino;  Location: [facility] OR;  Service: Vascular   • REPLACEMENT TOTAL KNEE Left 2007   • TOTAL KNEE ARTHROPLASTY Right 2009      Social History:   Social History     Tobacco Use   • Smoking status: Never Smoker   • Smokeless tobacco: Never Used   Substance Use Topics   • Alcohol use: Yes     Alcohol/week: 1.0 standard drink     Types: 1 Cans of beer per week     Comment: 1 monthly      Family History:  Family History   Problem Relation Age of Onset   • Hypertension Other    • Diabetes Other    • Heart disease Neg Hx    • Stroke Neg Hx    • Arthritis Neg Hx    • Breast cancer Neg Hx    • Malig Hyperthermia Neg Hx        Home Meds:  Medications Prior to Admission   Medication Sig Dispense Refill Last Dose   • acetaminophen (TYLENOL) 325 MG tablet Take 650 mg by mouth 3 (Three) Times a Day. Takes tid at 2 am, 1 pm, and hS   12/6/2021 at Unknown time   • bumetanide (BUMEX) 2 MG tablet Take 1 tablet by mouth Daily. 90 tablet 3 12/6/2021 at Unknown time   • busPIRone (BUSPAR) 10 MG tablet Take 1 tablet by mouth 2 (two) times a day. 180 tablet 3 12/6/2021 at Unknown time   • [facility] MELATONIN PO Take 5 mg by mouth At Night As Needed.   12/5/2021 at Unknown time   • escitalopram (LEXAPRO) 10 MG tablet Take 1 tablet by mouth Daily. 90 tablet 3 12/6/2021 at Unknown time   • Iron Sucrose (VENOFER IV) 50 mg 1 (One) Time Per Week. Adjusts weekly in IV during dialysis      • Methoxy PEG-Epoetin Beta (MIRCERA IJ) 60 mcg Every 14 (Fourteen) Days. During dialysis      • midodrine (PROAMATINE) 2.5 MG tablet TAKE 1 TABLET BY MOUTH THREE TIMES DAILY BEFORE MEALS (Patient taking differently: Take 2.5 mg by mouth 3 (Three) Times a Day Before Meals.) 90 tablet 6 12/6/2021 at Unknown time   • Probiotic Product (ALIGN PO) Take 1 capsule by mouth Every Morning.   12/6/2021 at Unknown time   • ProRenal + D tablet tablet Take 1 tablet by mouth Daily.   12/6/2021 at Unknown time   • rOPINIRole (REQUIP) 0.25 MG tablet Take 0.25 mg by mouth  Every Night.   12/5/2021 at Unknown time   • warfarin (COUMADIN) 2 MG tablet TAKE 1 TABLET BY MOUTH EVERY SUNDAY, TUESDAY, AND THURSDAY, TAKE 1&1/2 TABLETS BY MOUTH ALL THE OTHER DAYS OF THE WEEK (Patient taking differently: Take 2 mg by mouth Every Night. TAKE 1 TABLET BY MOUTH EVERY SUNDAY, TUESDAY, AND THURSDAY, TAKE 1&1/2 TABLETS BY MOUTH ALL THE OTHER DAYS OF THE WEEK) 120 tablet 1 12/5/2021 at Unknown time     Current Meds:   acetaminophen, 650 mg, Oral, TID  busPIRone, 10 mg, Oral, Q12H  escitalopram, 10 mg, Oral, Daily  lactobacillus acidophilus, 1 capsule, Oral, QAM  midodrine, 5 mg, Oral, TID AC  multivitamin with minerals, 1 tablet, Oral, Daily  pantoprazole, 40 mg, Intravenous, Q AM  rOPINIRole, 0.25 mg, Oral, Nightly  sodium chloride, 10 mL, Intravenous, Q12H      Allergies:  No Known Allergies  Review of Systems  Pertinent items are noted in HPI, all other systems reviewed and negative     Objective     Vital Signs  Temp:  [96.9 °F (36.1 °C)-98.6 °F (37 °C)] 98.1 °F (36.7 °C)  Heart Rate:  [] 69  Resp:  [14-20] 20  BP: (103-121)/(60-90) 109/60  Physical Exam:  General Appearance:    Alert, cooperative, in no acute distress   Head:    Normocephalic, without obvious abnormality, atraumatic   Eyes:          conjunctivae and sclerae normal, no   icterus   Throat:   no thrush, oral mucosa moist   Neck:   Supple, no adenopathy   Lungs:     Clear to auscultation bilaterally    Heart:    Regular rhythm and normal rate    Chest Wall:    No abnormalities observed   Abdomen:     Soft, nondistended, nontender; normal bowel sounds   Extremities:   no edema, no redness   Skin:   No bruising or rash   Psychiatric:  normal mood and insight     Results Review:   I reviewed the patient's new clinical results.    Results from last 7 days   Lab Units 12/07/21  0812 12/07/21  0431 12/07/21  0004 12/06/21  1532 12/06/21  1532   WBC 10*3/mm3  --  5.63  --   --  6.96   HEMOGLOBIN g/dL 9.8* 9.9* 10.0*   < > 10.6*    HEMATOCRIT % 28.5* 30.7* 29.5*   < > 31.3*   PLATELETS 10*3/mm3  --  180  --   --  203    < > = values in this interval not displayed.     Results from last 7 days   Lab Units 12/07/21  0431 12/06/21  1532   SODIUM mmol/L 139 137   POTASSIUM mmol/L 4.2 3.6   CHLORIDE mmol/L 101 97*   CO2 mmol/L 26.0 24.9   BUN mg/dL 28* 19   CREATININE mg/dL 2.24* 1.66*   CALCIUM mg/dL 9.7 9.7   BILIRUBIN mg/dL 0.4 0.4   ALK PHOS U/L 79 93   ALT (SGPT) U/L 10 12   AST (SGOT) U/L 12 14   GLUCOSE mg/dL 88 120*     Results from last 7 days   Lab Units 12/06/21  1532   INR  2.95*     No results found for: LIPASE    Radiology:  XR Chest 1 View   Final Result          Assessment/Plan   Patient Active Problem List   Diagnosis   • Tear of rotator cuff   • Hypertension   • Generalized anxiety disorder   • Hyperlipidemia   • IFG (impaired fasting glucose)   • Heart murmur, systolic   • Shoulder pain, bilateral   • Pain of both shoulder joints   • Chronic pain of both shoulders   • Acute congestive heart failure ([facility])   • Obesity, morbid, BMI 50 or higher ([facility])   • Fungal skin infection   • Cellulitis of lower extremity   • Aortic valve stenosis   • Tricuspid valve insufficiency   • Mitral valve stenosis   • S/P MVR (mitral valve replacement)   • Paroxysmal atrial fibrillation ([facility])   • Pulmonary hypertension ([facility])   • ESRD (end stage renal disease) ([facility])   • Gastroesophageal reflux disease without esophagitis   • Chronic idiopathic constipation   • Dependence on renal dialysis ([facility])   • Chronic kidney disease, stage 2 (mild)   • Renovascular hypertension   • GI bleed   • 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 ([facility])   • CAD (coronary artery disease)   • S/P AVR (aortic valve replacement)   • Chronic diastolic CHF (congestive heart failure) ([facility])   • Chronic pain of both shoulders   • Gastrointestinal hemorrhage   • Gastrointestinal hemorrhage with melena   • Immobility syndrome   • Atrial fibrillation with RVR ([facility])   • OCTAVIANO  (obstructive sleep apnea)   • Anemia       Assessment:  1. Iron deficiency anemia: Previous work-up with negative findings, had consider capsule enteroscopy but this was not completed.  2. Bright red blood per rectum: Consistent with lower GI tract source  3. Anticoagulant therapy  4. End-stage renal disease on dialysis  5. Coronary artery disease  6. Status post AVR  7. Chronic diastolic CHF  8. Atrial fibrillation on anticoagulant    Plan:  · Cardiac clearance and advice on adjusting anticoagulant therapy  · Once coagulopathy corrected would entertain colonoscopic examination  · Clear liquid diet      I discussed the patients findings and my recommendations with patient.    Wallace Hernandez MD

## 2021-12-07 NOTE — PLAN OF CARE
Goal Outcome Evaluation:  Plan of Care Reviewed With: patient           Outcome Summary: Pt admitted due to Afib RVR after HD and reports of rectal bleeding. Remains in Afib, rate has been controlled. H&H q8h. UA collected. Purple area on gluteal, wound consulted. Pt with assist x1 and walker, PT consulted. NPO except for ice chips. IV protonix ordered. AM labs pending.

## 2021-12-07 NOTE — PROGRESS NOTES
"    Name: Claudia Arnold ADMIT: 2021   : 1945  PCP: Robert Cortez MD    MRN: [id number] LOS: 1 days   AGE/SEX: 76 y.o. female  ROOM: [facility]/     Subjective   Subjective   Resting in bed. Denies any complaints at this time. No nausea or vomiting. No abdominal pain. No chest pain or dyspnea. Did have \"chestnut\" colored stools two days ago and then some obvious bright red blood yesterday morning prior to HD. She states she was going to call GI after her HD appointment, but then had the low pressures and elevated HR. Denies any symptoms with this. Rate improved today. She states her edema is about baseline for her. She uses a walker at baseline with WC as needed.     Objective   Objective   Vital Signs  Temp:  [96.9 °F (36.1 °C)-98.6 °F (37 °C)] 97.8 °F (36.6 °C)  Heart Rate:  [] 64  Resp:  [14-20] 18  BP: (103-123)/(60-90) 118/70  SpO2:  [92 %-99 %] 93 %  on   ;   Device (Oxygen Therapy): room air  Body mass index is 33.8 kg/m².     Physical Exam  Vitals and nursing note reviewed.   Constitutional:       Appearance: She is obese. She is ill-appearing. She is not toxic-appearing.   HENT:      Head: Normocephalic and atraumatic.   Cardiovascular:      Rate and Rhythm: Normal rate. Rhythm irregular.      Pulses: Normal pulses.      Heart sounds: Normal heart sounds.   Pulmonary:      Effort: Pulmonary effort is normal. No respiratory distress.      Comments: Diminished throughout. On RA  Abdominal:      General: Bowel sounds are normal. There is no distension.      Palpations: Abdomen is soft.      Tenderness: There is no abdominal tenderness.   Musculoskeletal:         General: No tenderness. Swelling: BLE. Normal range of motion.      Cervical back: Normal range of motion and neck supple.   Skin:     General: Skin is warm and dry.      Findings: Bruising present.   Neurological:      Mental Status: She is alert and oriented to person, place, and time.      Sensory: No sensory deficit.      Motor: " Weakness present.      Coordination: Coordination normal.   Psychiatric:         Mood and Affect: Mood normal.         Behavior: Behavior normal.        Results Review:       I reviewed the patient's new clinical results.  Results from last 7 days   Lab Units 12/07/21  0812 12/07/21  0431 12/07/21  0004 12/06/21  1532   WBC 10*3/mm3  --  5.63  --  6.96   HEMOGLOBIN g/dL 9.8* 9.9* 10.0* 10.6*   PLATELETS 10*3/mm3  --  180  --  203     Results from last 7 days   Lab Units 12/07/21  0431 12/06/21  1532   SODIUM mmol/L 139 137   POTASSIUM mmol/L 4.2 3.6   CHLORIDE mmol/L 101 97*   CO2 mmol/L 26.0 24.9   BUN mg/dL 28* 19   CREATININE mg/dL 2.24* 1.66*   GLUCOSE mg/dL 88 120*   Estimated Creatinine Clearance: 25.7 mL/min (A) (by C-G formula based on SCr of 2.24 mg/dL (H)).  Results from last 7 days   Lab Units 12/07/21  0431 12/06/21  1532   ALBUMIN g/dL 3.90 4.30   BILIRUBIN mg/dL 0.4 0.4   ALK PHOS U/L 79 93   AST (SGOT) U/L 12 14   ALT (SGPT) U/L 10 12     Results from last 7 days   Lab Units 12/07/21  0431 12/06/21  1532   CALCIUM mg/dL 9.7 9.7   ALBUMIN g/dL 3.90 4.30   MAGNESIUM mg/dL  --  2.0     Results from last 7 days   Lab Units 12/07/21  0431   LACTATE mmol/L 1.1     Hemoglobin A1C   Date/Time Value Ref Range Status   12/07/2021 0431 5.60 4.80 - 5.60 % Final       acetaminophen, 650 mg, Oral, TID  busPIRone, 10 mg, Oral, Q12H  escitalopram, 10 mg, Oral, Daily  lactobacillus acidophilus, 1 capsule, Oral, QAM  midodrine, 5 mg, Oral, TID AC  multivitamin with minerals, 1 tablet, Oral, Daily  pantoprazole, 40 mg, Intravenous, Q AM  rOPINIRole, 0.25 mg, Oral, Nightly  sodium chloride, 10 mL, Intravenous, Q12H       Diet Clear Liquid       Assessment/Plan     Active Hospital Problems    Diagnosis  POA   • **Atrial fibrillation with RVR (HCC) [I48.91]  Yes   • OCTAVIANO (obstructive sleep apnea) [G47.33]  Unknown   • Anemia [D64.9]  Unknown   • Gastrointestinal hemorrhage with melena [K92.1]  Yes   • Chronic diastolic CHF  (congestive heart failure) ([facility]) [I50.32]  Yes   • S/P AVR (aortic valve replacement) [Z95.2]  Not Applicable   • CAD (coronary artery disease) [I25.10]  Yes   • ESRD (end stage renal disease) ([facility]) [N18.6]  Yes   • Pulmonary hypertension ([facility]) [I27.20]  Yes   • S/P MVR (mitral valve replacement) [Z95.2]  Not Applicable   • Hypertension [I10]  Yes   • Hyperlipidemia [E78.5]  Yes   • Generalized anxiety disorder [F41.1]  Yes      Resolved Hospital Problems   No resolved problems to display.     Ms. Arnold is a 76 year old female who presented to the hospital with atrial fibrillation with RVR as well as recent GI bleeding at home.     · Atrial fibrillation with RVR: Rate much improved with IV beta blocker therapy. Cardiology has been consulted. Her INR on admission was 2.9. Will repeat PT/INR. Holding warfarin for GI bleeding. she apparently has had cryomaze in the past and a left atrial appendage ligation- Cards considering lower goal for INR in future.  · GI bleed: Gastroenterology consulted. Monitor serial H/H. IV PPI. Plans for scopes at some point once cleared by Cards. Will allow INR to fall and defer vitamin K to Cards. Will allow CLD and make Npo after midnight.   · CAD/chronic diastolic HF/s/p AVF and MVR: Fluid removal with HD. Monitor daily weights and I/O. Cards following.  · ESRD: Nephrology to manage HD. Midodrine continued for pressure support.  · Anemia: Stable and near baseline. Monitor.  · HTN: BP stable. Monitor trends.  · MARVIN: Home regimen continued.  · Immobility syndrome/obesity: Baseline walker/WC bound. PT to see. Lifestyle modifications.    Discussed with patient.    VTE Prophylaxis - SCDs  Code Status - Full code  Disposition - Anticipate discharge TBD.      MORGAN Bella  [facility]  12/07/21  13:41 EST

## 2021-12-07 NOTE — DISCHARGE PLACEMENT REQUEST
"Manoj Arnoldlori ANTHONY (76 y.o. Female)             Date of Birth Social Security Number Address Home Phone MRN    1945  [id number]  [facility] [phone] [id number]    Zoroastrian Marital Status             None        Admission Date Admission Type Admitting Provider Attending Provider Department, Room/Bed    12/6/21 Emergency Gorge Boyd MD [place], Robert Russell, MD [address], [address]    Discharge Date Discharge Disposition Discharge Destination                         Attending Provider: Eric Perez MD    Allergies: No Known Allergies    Isolation: None   Infection: None   Code Status: CPR   Advance Care Planning Activity    Ht: 170.2 cm (67\")   Wt: 97.9 kg (215 lb 12.8 oz)    Admission Cmt: None   Principal Problem: Atrial fibrillation with RVR (HCC) [I48.91]                 Active Insurance as of 12/6/2021     Primary Coverage     Payor Plan Insurance Group Employer/Plan Group    MEDICARE MEDICARE A & B      Payor Plan Address Payor Plan Phone Number Payor Plan Fax Number Effective Dates    [address] [phone]  7/1/2010 - None Entered    [facility] [place] [address]       Subscriber Name Subscriber Birth Date Member ID       JOSE CARLOS ARNOLD GABRIELLE 1945 [id number]           Secondary Coverage     Payor Plan Insurance Group Employer/Plan Group    [employer] COMMERCIAL AETNA [id number]     Payor Plan Address Payor Plan Phone Number Payor Plan Fax Number Effective Dates    [address] [phone]  1/1/2013 - None Entered    [facility] [id number]       Subscriber Name Subscriber Birth Date Member ID       KRISHAN ARNOLD 10/25/1943 [id number]                 Emergency Contacts      (Rel.) Home Phone Work Phone Mobile Phone    CobosMary ortizcia (Daughter) [phone] -- [phone]    Krishan Arnold (Spouse) [phone] -- --    MONSE ARNOLD (Son) -- -- [phone]            "

## 2021-12-07 NOTE — PROGRESS NOTES
Discharge Planning Assessment  [facility]     Patient Name: Claudia Arnold  MRN: [id number]  Today's Date: 12/7/2021    Admit Date: 12/6/2021     Discharge Needs Assessment     Row Name 12/07/21 0946       Living Environment    Lives With spouse    Current Living Arrangements home/apartment/condo    Primary Care Provided by self       Resource/Environmental Concerns    Resource/Environmental Concerns none    Transportation Concerns car, none       Transition Planning    Patient/Family Anticipates Transition to home with family    Patient/Family Anticipated Services at Transition none    Transportation Anticipated family or friend will provide       Discharge Needs Assessment    Readmission Within the Last 30 Days no previous admission in last 30 days    Equipment Currently Used at Home walker, rolling; wheelchair    Concerns to be Addressed denies needs/concerns at this time    Anticipated Changes Related to Illness none    Equipment Needed After Discharge none    Discharge Facility/Level of Care Needs home with home health    Provided Post Acute Provider List? Refused    Provided Post Acute Provider Quality & Resource List? N/A    Patient's Choice of Community Agency(s) patient declines HH or rehab needs and information was not provided               Discharge Plan     Row Name 12/07/21 0949       Plan    Plan Home with current [facility] chair MWF with family assist    Plan Comments Met with the patient and verified her [facility] information. Verified her PCP and her pharmacy information.  Patient lives in a house with her emergency contact, spouse,Magnus Arnold [phone].  She has a WC and a walker that she uses.  She states that she uses [facility] transpor to HD.  She denies any dc needs.  Agrees with plan for CCP to follow and assist with any needs. .....................ED              Continued Care and Services - Admitted Since 12/6/2021     Dialysis/Infusion Coordination complete.    Service  Provider Request Status Selected Services Address Phone Fax Patient Preferred    [facility]   Selected Dialysis [address] [address] [phone] --       Internal Comment last updated by Precious Barrett RN 12/7/2021 0944    Current chair MW                            Demographic Summary     Row Name 12/07/21 0944       General Information    Admission Type observation    Arrived From emergency department    Required Notices Provided Important Message from Medicare    Referral Source admission list    Preferred Language English       Contact Information    Permission Granted to Share Info With ; family/designee    Contact Information Comments Spouse, Magnus Arnold [phone]               Functional Status     Row Name 12/07/21 0945       Functional Status    Usual Activity Tolerance moderate    Current Activity Tolerance moderate       Functional Status, IADL    Medications independent    Meal Preparation assistive equipment    Housekeeping assistive equipment    Laundry assistive equipment    Shopping assistive equipment and person       Mental Status    General Appearance WDL WDL       Mental Status Summary    Recent Changes in Mental Status/Cognitive Functioning no changes       Employment/    Employment Status retired               Psychosocial    No documentation.                Abuse/Neglect    No documentation.                Legal    No documentation.                Substance Abuse    No documentation.                Patient Forms    No documentation.                   Precious Barrett, RN

## 2021-12-08 NOTE — PLAN OF CARE
Problem: Adult Inpatient Plan of Care  Goal: Plan of Care Review  Outcome: Ongoing, Progressing  Flowsheets  Taken 12/8/2021 0533 by Sandhya Navarrete, RN  Progress: no change  Outcome Summary: BM x1 last evening, formed and dark w/red tinge. Up to BSC w/assist x1 and walker. Turns Q2. VSS, on RA. Tolerating CLD. Hgb remaining stable, awaiting AM labs. Needs scope. Will closely monitor.  Taken 12/7/2021 1629 by Nayt Patel, RN  Plan of Care Reviewed With: patient   Goal Outcome Evaluation:           Progress: no change  Outcome Summary: BM x1 last evening, formed and dark w/red tinge. Up to BSC w/assist x1 and walker. Turns Q2. VSS, on RA. Tolerating CLD. Hgb remaining stable, awaiting AM labs. Needs scope. Will closely monitor.

## 2021-12-08 NOTE — PROGRESS NOTES
[facility] Progress Note      Patient Name: Claudia Arnold  : 1945  MRN: [id number]  Primary Care Physician:  Robert Cortez MD  Date of admission: 2021    Subjective     Interval History:   No acute events overnight    Review of Systems:   Feels well.  No complaints.    Objective     Vitals:   Temp:  [97.6 °F (36.4 °C)-98.4 °F (36.9 °C)] 98.4 °F (36.9 °C)  Heart Rate:  [68-69] 68  Resp:  [18] 18  BP: (114-135)/(56-71) 135/65  No intake or output data in the 24 hours ending 21 1411    Physical Exam:     Seen on dialysis     General Appearance: alert, oriented x 3, no acute distress   Skin: warm and dry  HEENT: oral mucosa normal, nonicteric sclera  Neck: supple, no JVD  Lungs: CTA  Heart: RRR, normal S1 and S2  Abdomen: soft, nontender, nondistended  Extremities: no edema, cyanosis or clubbing.  Left upper extremity AV fistula  Neuro: normal speech and mental status     Scheduled Meds:     acetaminophen, 650 mg, Oral, TID  busPIRone, 10 mg, Oral, Q12H  epoetin lisha/lisha-epbx, 10,000 Units, Intravenous, Once per day on   escitalopram, 10 mg, Oral, Daily  lactobacillus acidophilus, 1 capsule, Oral, QAM  midodrine, 5 mg, Oral, TID AC  multivitamin with minerals, 1 tablet, Oral, Daily  pantoprazole, 40 mg, Intravenous, Q AM  polyethylene glycol, 2,000 mL, Oral, Q8H  rOPINIRole, 0.25 mg, Oral, Nightly  sodium chloride, 10 mL, Intravenous, Q12H      IV Meds:        Results Reviewed:   I have personally reviewed the results from the time of this admission to 2021 14:11 EST     Results from last 7 days   Lab Units 21  0759 21  0431 21  1532   SODIUM mmol/L 137 139 137   POTASSIUM mmol/L 4.0 4.2 3.6   CHLORIDE mmol/L 100 101 97*   CO2 mmol/L 23.8 26.0 24.9   BUN mg/dL 42* 28* 19   CREATININE mg/dL 3.34* 2.24* 1.66*   CALCIUM mg/dL 9.5 9.7 9.7   BILIRUBIN mg/dL  --  0.4 0.4   ALK PHOS U/L  --  79 93   ALT (SGPT) U/L  --  10 12   AST (SGOT) U/L   --  12 14   GLUCOSE mg/dL 78 88 120*       Estimated Creatinine Clearance: 17.2 mL/min (A) (by C-G formula based on SCr of 3.34 mg/dL (H)).    Results from last 7 days   Lab Units 12/06/21  1532   MAGNESIUM mg/dL 2.0             Results from last 7 days   Lab Units 12/08/21  0759 12/08/21  0014 12/07/21  1605 12/07/21  0812 12/07/21  0431 12/07/21  0004 12/06/21  1532   WBC 10*3/mm3 5.89  --   --   --  5.63  --  6.96   HEMOGLOBIN g/dL 9.4* 9.4* 9.9* 9.8* 9.9*   < > 10.6*   PLATELETS 10*3/mm3 193  --   --   --  180  --  203    < > = values in this interval not displayed.       Results from last 7 days   Lab Units 12/08/21  0759 12/07/21  1605 12/06/21  1532   INR  1.76* 2.37* 2.95*       Assessment / Plan     ASSESSMENT:  -End-stage renal disease.  Dialysis Monday Wednesday Friday. .  -Anemia in chronic kidney disease and GI bleed  -Hypertension, on midodrine  -Atrial fibrillation  -Gastrointestinal bleeding, seen by GI.  -Chronic CHF    PLAN:  EGD and colonoscopy tomorrow per GI    Thank you for involving us in the care of Claudia Arnold.  Please feel free to call with any questions.    Iker Palacios MD  12/08/21  14:11 [facility]    [facility]  [phone]      Much of this encounter note is an electronic transcription/translation of spoken language to printed text. The electronic translation of spoken language may permit erroneous, or at times, nonsensical words or phrases to be inadvertently transcribed; Although I have reviewed the note for such errors, some may still exist.

## 2021-12-08 NOTE — PROGRESS NOTES
"    Patient Name: Claudia Arnold  :1945  76 y.o.      Patient Care Team:  Robert Cortez MD as PCP - General (Family Medicine)  Deborah Agudelo MD as Surgeon (Orthopedic Surgery)  Scott Segura MD as Consulting Physician (Cardiology)  Scar Gaxiola MD as Surgeon (Cardiothoracic Surgery)  Kathy Osuna MD as Consulting Physician (Nephrology)  Dora Astorga APRN as Nurse Practitioner (Neurosurgery)  Satish Stahl MD as Consulting Physician (Vascular Surgery)  Scar Gaxiola MD as Surgeon (Cardiothoracic Surgery)    Chief Complaint: follow up [facility], atrial fibrillation, chronic anticoagulation    Interval History: I saw her on dialysis. She feels well. No cardiac complaints.       Objective   Vital Signs  Temp:  [97.6 °F (36.4 °C)-98.4 °F (36.9 °C)] 98.4 °F (36.9 °C)  Heart Rate:  [68-69] 68  Resp:  [18] 18  BP: (114-135)/(56-71) 135/65  No intake or output data in the 24 hours ending 21 1445  Flowsheet Rows      First Filed Value   Admission Height 170.2 cm (67\") Documented at 2021 1530   Admission Weight 97.5 kg (215 lb) Documented at 2021 1530          Physical Exam:   General Appearance:    Alert, cooperative, in no acute distress   Lungs:     Clear to auscultation.  Normal respiratory effort and rate.      Heart:    Regular rhythm and normal rate, normal S1 and S2, no murmurs, gallops or rubs.     Chest Wall:    No abnormalities observed   Abdomen:     Soft, nontender, positive bowel sounds.     Extremities:   no cyanosis, clubbing or edema.  No marked joint deformities.  Adequate musculoskeletal strength.       Results Review:    Results from last 7 days   Lab Units 21  0759   SODIUM mmol/L 137   POTASSIUM mmol/L 4.0   CHLORIDE mmol/L 100   CO2 mmol/L 23.8   BUN mg/dL 42*   CREATININE mg/dL 3.34*   GLUCOSE mg/dL 78   CALCIUM mg/dL 9.5     Results from last 7 days   Lab Units 21  0431   TROPONIN T ng/mL 0.109*     Results from last 7 days "   Lab Units 12/08/21  0759   WBC 10*3/mm3 5.89   HEMOGLOBIN g/dL 9.4*   HEMATOCRIT % 28.6*   PLATELETS 10*3/mm3 193     Results from last 7 days   Lab Units 12/08/21  0759 12/07/21  1605 12/06/21  1532   INR  1.76* 2.37* 2.95*     Results from last 7 days   Lab Units 12/06/21  1532   MAGNESIUM mg/dL 2.0                   Medication Review:   acetaminophen, 650 mg, Oral, TID  busPIRone, 10 mg, Oral, Q12H  epoetin lisha/lisha-epbx, 10,000 Units, Intravenous, Once per day on Mon Wed Fri  escitalopram, 10 mg, Oral, Daily  lactobacillus acidophilus, 1 capsule, Oral, QAM  midodrine, 5 mg, Oral, TID AC  multivitamin with minerals, 1 tablet, Oral, Daily  pantoprazole, 40 mg, Intravenous, Q AM  polyethylene glycol, 2,000 mL, Oral, Q8H  rOPINIRole, 0.25 mg, Oral, Nightly  sodium chloride, 10 mL, Intravenous, Q12H              Assessment/Plan   1. Acute blood loss anemia , concern for GI loss with heme positive stool  2. Chronic atrial fibrillation - she has had cryomaze and left atrial appendage ligation. INR was almost 3. She was given 2.5mg vitamin K. INR now 1.7  3. Chronic diastolic heart failure  4. End stage renal disease, on hemodialysis  5. Valvular heart disease with prior aortic valve replacement, mitral valve replacement, and tricuspid valve repair. Still with severe TR and elevated mitral valve gradients, however per LEE mitral valve is functioning normally. Elevated gradients due to dialysis access.   6. History of pulmonary embolism  7. Hypotension, on midodrine at baseline.  8. Elevated troponin. Secondary to end stage renal disease, cath Jan 2019 showed normal coronaries.     Proceed with scopes as indicated. INR 1.76 today. We will follow along.     MORGAN Arreguin  [facility]  12/08/21  14:45 EST  .

## 2021-12-08 NOTE — PROGRESS NOTES
[facility]  Inpatient Progress Note    Reason for Follow Up: Iron deficiency anemia bright red blood per rectum    Subjective     Interval History:   No further bleeding    Current Facility-Administered Medications:   •  acetaminophen (TYLENOL) tablet 650 mg, 650 mg, Oral, Q4H PRN **OR** acetaminophen (TYLENOL) 160 MG/5ML solution 650 mg, 650 mg, Oral, Q4H PRN **OR** acetaminophen (TYLENOL) suppository 650 mg, 650 mg, Rectal, Q4H PRN, Gorge Boyd MD  •  acetaminophen (TYLENOL) tablet 650 mg, 650 mg, Oral, TID, Gorge Boyd MD, 650 mg at 12/08/21 0917  •  albumin human 25 % IV SOLN 12.5 g, 12.5 g, Intravenous, PRN, Iker Palacios MD  •  busPIRone (BUSPAR) tablet 10 mg, 10 mg, Oral, Q12H, Gorge Boyd MD, 10 mg at 12/08/21 0917  •  epoetin lisha-epbx (RETACRIT) injection 10,000 Units, 10,000 Units, Intravenous, Once per day on Mon Wed Fri, Iker Palacios MD  •  escitalopram (LEXAPRO) tablet 10 mg, 10 mg, Oral, Daily, Gorge Boyd MD, 10 mg at 12/08/21 0917  •  lactobacillus acidophilus (RISAQUAD) capsule 1 capsule, 1 capsule, Oral, QAM, Gorge Boyd MD, 1 capsule at 12/08/21 0614  •  melatonin tablet 5 mg, 5 mg, Oral, Nightly PRN, Gorge Boyd MD, 5 mg at 12/07/21 2207  •  midodrine (PROAMATINE) tablet 5 mg, 5 mg, Oral, TID AC, Gorge Boyd MD, 5 mg at 12/08/21 1114  •  multivitamin with minerals 1 tablet, 1 tablet, Oral, Daily, Gorge Boyd MD, 1 tablet at 12/08/21 0917  •  nitroglycerin (NITROSTAT) SL tablet 0.4 mg, 0.4 mg, Sublingual, Q5 Min PRN, Gorge Boyd MD  •  ondansetron (ZOFRAN) injection 4 mg, 4 mg, Intravenous, Q6H PRN, Gorge Boyd MD  •  pantoprazole (PROTONIX) injection 40 mg, 40 mg, Intravenous, Q AM, Gorge Boyd MD, 40 mg at 12/08/21 0614  •  rOPINIRole (REQUIP) tablet 0.25 mg, 0.25 mg, Oral, Nightly, Gorge Boyd MD, 0.25 mg at 12/07/21 2200  •  [COMPLETED] Insert peripheral IV, , , Once **AND** sodium chloride 0.9 % flush 10  mL, 10 mL, Intravenous, PRN, Gorge Boyd MD  •  sodium chloride 0.9 % flush 10 mL, 10 mL, Intravenous, Q12H, Gorge Boyd MD, 10 mL at 12/08/21 0917  •  sodium chloride 0.9 % flush 10 mL, 10 mL, Intravenous, PRN, Gorge Boyd MD  Review of Systems:    There is weakness of fatigue all other systems reviewed and negative    Objective     Vital Signs  Temp:  [97.6 °F (36.4 °C)-98.4 °F (36.9 °C)] 98.4 °F (36.9 °C)  Heart Rate:  [68-69] 68  Resp:  [18] 18  BP: (114-135)/(56-71) 135/65  Body mass index is 33.71 kg/m².  No intake or output data in the 24 hours ending 12/08/21 1335  No intake/output data recorded.     Physical Exam:   General: patient awake, alert and cooperative   Eyes: Normal lids and lashes, no scleral icterus   Neck: supple, normal ROM   Skin: warm and dry, not jaundiced   Cardiovascular: regular rhythm and rate, no murmurs auscultated   Pulm: clear to auscultation bilaterally, regular and unlabored   Abdomen: soft, nontender, nondistended; normal bowel sounds   Extremities: no rash or edema   Psychiatric: Normal mood and behavior; memory intact     Results Review:     I reviewed the patient's new clinical results.    Results from last 7 days   Lab Units 12/08/21  0759 12/08/21  0014 12/07/21  1605 12/07/21  0812 12/07/21  0431 12/07/21  0004 12/06/21  1532   WBC 10*3/mm3 5.89  --   --   --  5.63  --  6.96   HEMOGLOBIN g/dL 9.4* 9.4* 9.9*   < > 9.9*   < > 10.6*   HEMATOCRIT % 28.6* 28.3* 28.9*   < > 30.7*   < > 31.3*   PLATELETS 10*3/mm3 193  --   --   --  180  --  203    < > = values in this interval not displayed.     Results from last 7 days   Lab Units 12/08/21  0759 12/07/21  0431 12/06/21  1532   SODIUM mmol/L 137 139 137   POTASSIUM mmol/L 4.0 4.2 3.6   CHLORIDE mmol/L 100 101 97*   CO2 mmol/L 23.8 26.0 24.9   BUN mg/dL 42* 28* 19   CREATININE mg/dL 3.34* 2.24* 1.66*   CALCIUM mg/dL 9.5 9.7 9.7   BILIRUBIN mg/dL  --  0.4 0.4   ALK PHOS U/L  --  79 93   ALT (SGPT) U/L  --  10 12   AST (SGOT)  U/L  --  12 14   GLUCOSE mg/dL 78 88 120*     Results from last 7 days   Lab Units 12/08/21  0759 12/07/21  1605 12/06/21  1532   INR  1.76* 2.37* 2.95*     No results found for: LIPASE    Radiology:  XR Chest 1 View   Final Result          Assessment/Plan     Patient Active Problem List   Diagnosis   • Tear of rotator cuff   • Hypertension   • Generalized anxiety disorder   • Hyperlipidemia   • IFG (impaired fasting glucose)   • Heart murmur, systolic   • Shoulder pain, bilateral   • Pain of both shoulder joints   • Chronic pain of both shoulders   • Acute congestive heart failure (HCC)   • Obesity, morbid, BMI 50 or higher ([facility])   • Fungal skin infection   • Cellulitis of lower extremity   • Aortic valve stenosis   • Tricuspid valve insufficiency   • Mitral valve stenosis   • S/P MVR (mitral valve replacement)   • Paroxysmal atrial fibrillation ([facility])   • Pulmonary hypertension ([facility])   • ESRD (end stage renal disease) ([facility])   • Gastroesophageal reflux disease without esophagitis   • Chronic idiopathic constipation   • Dependence on renal dialysis ([facility])   • Chronic kidney disease, stage 2 (mild)   • Renovascular hypertension   • GI bleed   • 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 ([facility])   • CAD (coronary artery disease)   • S/P AVR (aortic valve replacement)   • Chronic diastolic CHF (congestive heart failure) ([facility])   • Chronic pain of both shoulders   • Gastrointestinal hemorrhage   • Gastrointestinal hemorrhage with melena   • Immobility syndrome   • Atrial fibrillation with RVR ([facility])   • OCTAVIANO (obstructive sleep apnea)   • Anemia         Assessment:  1. Iron deficiency anemia: Previous work-up with negative findings, had consider capsule enteroscopy but this was not completed.  2. Bright red blood per rectum and melena: can Not rule out upper or lower tract source  3. Anticoagulant therapy  4. End-stage renal disease on dialysis  5. Coronary artery disease  6. Status post AVR  7. Chronic diastolic  CHF  8. Atrial fibrillation on anticoagulant     Plan:  · Cardiac clearance obtained, INR is 1.7, proceed with EGD and colonoscopy tomorrow  · Routine a.m. lab  · Clear liquid diet  · If EGD and colonoscopy unrevealing tomorrow we will do capsule endoscopy as an outpatient    I discussed the patients findings and my recommendations with patient and nursing staff.    Mikey Rowe MD

## 2021-12-08 NOTE — PROGRESS NOTES
"    Name: Claudia Arnold ADMIT: 2021   : 1945  PCP: Robert Cortez MD    MRN: [id number] LOS: 2 days   AGE/SEX: 76 y.o. female  ROOM: [facility]/     Subjective   Subjective    Resting in bed. Did have one black stool reported this morning and pink-tinged stool last night. Denies any nausea or vomiting, but does feel like she has a \"stomach ache.\" States she isn't eating much as she is only on clears. No chest pain or dyspnea. No cough, fever or chills. INR 1.7 today and plans for scopes soon. HD today.     Objective   Objective   Vital Signs  Temp:  [97.6 °F (36.4 °C)-98.4 °F (36.9 °C)] 98.4 °F (36.9 °C)  Heart Rate:  [60-69] 69  Resp:  [18] 18  BP: (114-128)/(56-71) 128/71  SpO2:  [90 %-93 %] 90 %  on   ;   Device (Oxygen Therapy): room air  Body mass index is 33.71 kg/m².     Physical Exam  Vitals and nursing note reviewed.   Constitutional:       Appearance: She is obese. She is ill-appearing. She is not toxic-appearing.   HENT:      Head: Normocephalic and atraumatic.   Cardiovascular:      Rate and Rhythm: Normal rate. Rhythm irregular.      Pulses: Normal pulses.      Heart sounds: Normal heart sounds.   Pulmonary:      Effort: Pulmonary effort is normal. No respiratory distress.      Comments: Diminished throughout. On RA  Abdominal:      General: Bowel sounds are normal. There is no distension.      Palpations: Abdomen is soft.      Tenderness: There is no abdominal tenderness.   Musculoskeletal:         General: No tenderness. Swelling: BLE. Normal range of motion.      Cervical back: Normal range of motion and neck supple.   Skin:     General: Skin is warm and dry.      Findings: Bruising present.   Neurological:      Mental Status: She is alert and oriented to person, place, and time.      Sensory: No sensory deficit.      Motor: Weakness present.      Coordination: Coordination normal.   Psychiatric:         Mood and Affect: Mood normal.         Behavior: Behavior normal.     Results Review:     "   I reviewed the patient's new clinical results.  Results from last 7 days   Lab Units 12/08/21  0759 12/08/21  0014 12/07/21  1605 12/07/21  0812 12/07/21 0431 12/07/21 0431 12/07/21  0004 12/06/21  1532   WBC 10*3/mm3 5.89  --   --   --   --  5.63  --  6.96   HEMOGLOBIN g/dL 9.4* 9.4* 9.9* 9.8*   < > 9.9*   < > 10.6*   PLATELETS 10*3/mm3 193  --   --   --   --  180  --  203    < > = values in this interval not displayed.     Results from last 7 days   Lab Units 12/08/21 0759 12/07/21 0431 12/06/21  1532   SODIUM mmol/L 137 139 137   POTASSIUM mmol/L 4.0 4.2 3.6   CHLORIDE mmol/L 100 101 97*   CO2 mmol/L 23.8 26.0 24.9   BUN mg/dL 42* 28* 19   CREATININE mg/dL 3.34* 2.24* 1.66*   GLUCOSE mg/dL 78 88 120*   Estimated Creatinine Clearance: 17.2 mL/min (A) (by C-G formula based on SCr of 3.34 mg/dL (H)).  Results from last 7 days   Lab Units 12/07/21  0431 12/06/21  1532   ALBUMIN g/dL 3.90 4.30   BILIRUBIN mg/dL 0.4 0.4   ALK PHOS U/L 79 93   AST (SGOT) U/L 12 14   ALT (SGPT) U/L 10 12     Results from last 7 days   Lab Units 12/08/21  0759 12/07/21 0431 12/06/21  1532   CALCIUM mg/dL 9.5 9.7 9.7   ALBUMIN g/dL  --  3.90 4.30   MAGNESIUM mg/dL  --   --  2.0     Results from last 7 days   Lab Units 12/07/21 0431   LACTATE mmol/L 1.1     Hemoglobin A1C   Date/Time Value Ref Range Status   12/07/2021 0431 5.60 4.80 - 5.60 % Final       acetaminophen, 650 mg, Oral, TID  busPIRone, 10 mg, Oral, Q12H  epoetin lisha/lisha-epbx, 10,000 Units, Intravenous, Once per day on Mon Wed Fri  escitalopram, 10 mg, Oral, Daily  lactobacillus acidophilus, 1 capsule, Oral, QAM  midodrine, 5 mg, Oral, TID AC  multivitamin with minerals, 1 tablet, Oral, Daily  pantoprazole, 40 mg, Intravenous, Q AM  rOPINIRole, 0.25 mg, Oral, Nightly  sodium chloride, 10 mL, Intravenous, Q12H       Diet Clear Liquid       Assessment/Plan     Active Hospital Problems    Diagnosis  POA   • **Atrial fibrillation with RVR (HCC) [I48.91]  Yes   • OCTAVIANO  (obstructive sleep apnea) [G47.33]  Unknown   • Anemia [D64.9]  Unknown   • Gastrointestinal hemorrhage with melena [K92.1]  Yes   • Chronic diastolic CHF (congestive heart failure) ([facility]) [I50.32]  Yes   • S/P AVR (aortic valve replacement) [Z95.2]  Not Applicable   • CAD (coronary artery disease) [I25.10]  Yes   • ESRD (end stage renal disease) ([facility]) [N18.6]  Yes   • Pulmonary hypertension (HCC) [I27.20]  Yes   • S/P MVR (mitral valve replacement) [Z95.2]  Not Applicable   • Hypertension [I10]  Yes   • Hyperlipidemia [E78.5]  Yes   • Generalized anxiety disorder [F41.1]  Yes      Resolved Hospital Problems   No resolved problems to display.     Ms. Arnold is a 76 year old female who presented to the hospital with atrial fibrillation with RVR as well as recent GI bleeding at home.      · Atrial fibrillation with RVR: Rate much improved with IV beta blocker therapy. Cards following. Her INR on admission was 2.9 and there are plans to lower her goal of INR at discharge given recurrent GI bleeding as well as prior history of cryomaze and a left atrial appendage ligation. INR 1.7 today after vitamin K 2.5 mg x 1.  · GI bleed: Gastroenterology following. Serial H/H stable. IV PPI. Scopes timing TBD- tomorrow? Await further input.  · CAD/chronic diastolic HF/s/p AVF and MVR: Fluid removal with HD. Monitor daily weights and I/O. Cards following.  · ESRD: Nephrology to manage HD. Midodrine continued for pressure support. HD today.  · Anemia: Stable and near baseline. Monitor.  · HTN: BP stable. Monitor trends.  · MARVIN: Home regimen continued.  · Immobility syndrome/obesity: Baseline walker/WC bound. PT following- likely home with HH. Lifestyle modifications.     Discussed with patient and nursing staff.      VTE Prophylaxis - SCDs  Code Status - Full code  Disposition - Anticipate discharge next couple days pending scopes and clinical progress.    MORGAN Bella  [facility]  12/08/21  10:06 EST

## 2021-12-09 NOTE — PROGRESS NOTES
Name: Claudia Aronld ADMIT: 2021   : 1945  PCP: Robert Cortez MD    MRN: [id number] LOS: 3 days   AGE/SEX: 76 y.o. female  ROOM: S402/     Subjective   Subjective   Resting in bed. Had scopes earlier- found to have small bleeding angiectasia that was cauterized. Did have bruising in perianal region with chronic bruising to her sacrum? Unknown significance. Denies any falls or injury. She otherwise is having some neck pain from positioning during her scopes. No chest pain or dyspnea. No cough/fever/chills.      Objective   Objective   Vital Signs  Temp:  [97.3 °F (36.3 °C)-98 °F (36.7 °C)] 97.5 °F (36.4 °C)  Heart Rate:  [62-85] 85  Resp:  [16-18] 16  BP: (108-149)/(63-89) 108/69  SpO2:  [93 %-97 %] 95 %  on  Flow (L/min):  [8-10] 8;   Device (Oxygen Therapy): room air  Body mass index is 32.84 kg/m².     Physical Exam  Vitals and nursing note reviewed.   Constitutional:       Appearance: She is obese. She is ill-appearing. She is not toxic-appearing.   HENT:      Head: Normocephalic and atraumatic.   Cardiovascular:      Rate and Rhythm: Normal rate. Rhythm irregular.      Pulses: Normal pulses.      Heart sounds: Normal heart sounds.   Pulmonary:      Effort: Pulmonary effort is normal. No respiratory distress.      Comments: Diminished throughout. On RA  Abdominal:      General: Bowel sounds are normal. There is no distension.      Palpations: Abdomen is soft.      Tenderness: There is no abdominal tenderness.   Musculoskeletal:         General: No tenderness. Swelling: BLE. Normal range of motion.      Cervical back: Normal range of motion and neck supple.   Skin:     General: Skin is warm and dry.      Findings: Bruising present.   Neurological:      Mental Status: She is alert and oriented to person, place, and time.      Sensory: No sensory deficit.      Motor: Weakness present.      Coordination: Coordination normal.   Psychiatric:         Mood and Affect: Mood normal.         Behavior:  Behavior normal.     Results Review:       I reviewed the patient's new clinical results.  Results from last 7 days   Lab Units 12/09/21 0755 12/08/21  2357 12/08/21  1716 12/08/21 0759 12/07/21  0812 12/07/21 0431 12/07/21  0004 12/06/21  1532   WBC 10*3/mm3 6.67  --   --  5.89  --  5.63  --  6.96   HEMOGLOBIN g/dL 10.5*  10.5* 10.4* 10.9* 9.4*   < > 9.9*   < > 10.6*   PLATELETS 10*3/mm3 203  --   --  193  --  180  --  203    < > = values in this interval not displayed.     Results from last 7 days   Lab Units 12/09/21 0755 12/08/21 0759 12/07/21 0431 12/06/21  1532   SODIUM mmol/L 137 137 139 137   POTASSIUM mmol/L 3.7 4.0 4.2 3.6   CHLORIDE mmol/L 97* 100 101 97*   CO2 mmol/L 24.7 23.8 26.0 24.9   BUN mg/dL 21 42* 28* 19   CREATININE mg/dL 2.18* 3.34* 2.24* 1.66*   GLUCOSE mg/dL 77 78 88 120*   Estimated Creatinine Clearance: 26 mL/min (A) (by C-G formula based on SCr of 2.18 mg/dL (H)).  Results from last 7 days   Lab Units 12/09/21 0755 12/07/21 0431 12/06/21  1532   ALBUMIN g/dL 3.80 3.90 4.30   BILIRUBIN mg/dL 0.5 0.4 0.4   ALK PHOS U/L 89 79 93   AST (SGOT) U/L 14 12 14   ALT (SGPT) U/L 11 10 12     Results from last 7 days   Lab Units 12/09/21 0755 12/08/21 0759 12/07/21 0431 12/06/21  1532   CALCIUM mg/dL 9.5 9.5 9.7 9.7   ALBUMIN g/dL 3.80  --  3.90 4.30   MAGNESIUM mg/dL  --   --   --  2.0     Results from last 7 days   Lab Units 12/07/21  0431   LACTATE mmol/L 1.1     Hemoglobin A1C   Date/Time Value Ref Range Status   12/07/2021 0431 5.60 4.80 - 5.60 % Final       acetaminophen, 650 mg, Oral, TID  busPIRone, 10 mg, Oral, Q12H  epoetin lisha/lisha-epbx, 10,000 Units, Intravenous, Once per day on Mon Wed Fri  escitalopram, 10 mg, Oral, Daily  lactobacillus acidophilus, 1 capsule, Oral, QAM  midodrine, 5 mg, Oral, TID AC  multivitamin with minerals, 1 tablet, Oral, Daily  pantoprazole, 40 mg, Intravenous, Q AM  rOPINIRole, 0.25 mg, Oral, Nightly  sodium chloride, 10 mL, Intravenous, Q12H        Diet Regular; Cardiac       Assessment/Plan     Active Hospital Problems    Diagnosis  POA   • **Atrial fibrillation with RVR ([facility]) [I48.91]  Yes   • OCTAVIANO (obstructive sleep apnea) [G47.33]  Unknown   • Anemia [D64.9]  Unknown   • Gastrointestinal hemorrhage with melena [K92.1]  Yes   • Chronic diastolic CHF (congestive heart failure) ([facility]) [I50.32]  Yes   • S/P AVR (aortic valve replacement) [Z95.2]  Not Applicable   • CAD (coronary artery disease) [I25.10]  Yes   • ESRD (end stage renal disease) ([facility]) [N18.6]  Yes   • Pulmonary hypertension ([facility]) [I27.20]  Yes   • S/P MVR (mitral valve replacement) [Z95.2]  Not Applicable   • Hypertension [I10]  Yes   • Hyperlipidemia [E78.5]  Yes   • Generalized anxiety disorder [F41.1]  Yes      Resolved Hospital Problems   No resolved problems to display.     Ms. Arnold is a 76 year old female who presented to the hospital with atrial fibrillation with RVR as well as recent GI bleeding at home.      · Atrial fibrillation with RVR: Rate much improved with IV beta blocker therapy. Cards following. Her INR on admission was 2.9 and there are plans to lower her goal of INR at discharge given recurrent GI bleeding as well as prior history of cryomaze and a left atrial appendage ligation. INR subtherapeutic. Will resume warfarin when okay with GI.   · GI bleed: Gastroenterology following. Serial H/H stable. IV PPI. EGD and colonoscopy today with single angiectasia s/p APC as well as ecchymosis. Monitor for re-bleed.   · CAD/chronic diastolic HF/s/p AVF and MVR: Fluid removal with HD. Monitor daily weights and I/O. Cards following.  · ESRD: Nephrology to manage HD. Midodrine continued for pressure support. HD tomorrow.  · Anemia: Stable and near baseline. Monitor.  · HTN: BP stable. Monitor trends.  · MARVIN: Home regimen continued.  · Immobility syndrome/obesity: Baseline walker/WC bound. PT following- likely home with HH. Lifestyle modifications.     Discussed with patient and nursing  staff.     Okay for heat applied to neck.     VTE Prophylaxis - SCDs  Code Status - Full code  Disposition - Anticipate discharge possibly tomorrow if okay with GI.    MORGAN Bella  [facility]  12/09/21  14:42 EST

## 2021-12-09 NOTE — ANESTHESIA POSTPROCEDURE EVALUATION
Patient: Claudia Arnold    Procedure Summary     Date: 12/09/21 Room / [facility]:  [facility]    Anesthesia Start: 1003 Anesthesia Stop: 1049    Procedures:       ESOPHAGOGASTRODUODENOSCOPY  WITH BIOPSIES (N/A Esophagus)      COLONOSCOPY TO CECUM WITH APC TO RIGHT COLON AVM (N/A ) Diagnosis:       Iron deficiency anemia due to chronic blood loss      (Iron deficiency anemia due to chronic blood loss [D50.0])    Surgeons: Shiloh Pop MD Provider: Christiano Melchor MD    Anesthesia Type: MAC ASA Status: 3          Anesthesia Type: MAC    Vitals  Vitals Value Taken Time   /89 12/09/21 1106   Temp     Pulse 80 12/09/21 1106   Resp 16 12/09/21 1106   SpO2 96 % 12/09/21 1106           Post Anesthesia Care and Evaluation    Patient location during evaluation: bedside  Pain management: adequate  Airway patency: patent  Anesthetic complications: No anesthetic complications    Cardiovascular status: acceptable  Respiratory status: acceptable  Hydration status: acceptable

## 2021-12-09 NOTE — ANESTHESIA PREPROCEDURE EVALUATION
Anesthesia Evaluation     no history of anesthetic complications:  NPO Solid Status: > 8 hours             Airway   Mallampati: II  TM distance: >3 FB  Neck ROM: full  Dental - normal exam     Pulmonary - normal exam   (+) pneumonia , sleep apnea,   Cardiovascular     Rhythm: irregular  Rate: normal    (+) hypertension, past MI , CAD, dysrhythmias Atrial Fib, CHF , hyperlipidemia,       Neuro/Psych  GI/Hepatic/Renal/Endo    (+) morbid obesity, GERD, GI bleeding , renal disease ESRD and dialysis,     Musculoskeletal     Abdominal    Substance History      OB/GYN          Other                        Anesthesia Plan    ASA 3     MAC       Anesthetic plan, all risks, benefits, and alternatives have been provided, discussed and informed consent has been obtained with: patient.

## 2021-12-09 NOTE — PROGRESS NOTES
"    Patient Name: Claudia Arnold  :1945  76 y.o.      Patient Care Team:  Robert Cortez MD as PCP - General (Family Medicine)  Deborah Agudelo MD as Surgeon (Orthopedic Surgery)  Scott Segura MD as Consulting Physician (Cardiology)  Scar Gaxiola MD as Surgeon (Cardiothoracic Surgery)  Kathy Osuna MD as Consulting Physician (Nephrology)  Dora Astorga APRN as Nurse Practitioner (Neurosurgery)  Satish Stahl MD as Consulting Physician (Vascular Surgery)  Scar Gaxiola MD as Surgeon (Cardiothoracic Surgery)    Chief Complaint: follow up BRBPR, atrial fibrillation, chronic anticoagulation    Interval History: colonoscopy this AM with bleeding AVM, treated. She is resting in bed. Feels well.        Objective   Vital Signs  Temp:  [97.3 °F (36.3 °C)-98.2 °F (36.8 °C)] 98 °F (36.7 °C)  Heart Rate:  [57-85] 75  Resp:  [16-18] 18  BP: (108-149)/(63-89) 130/79    Intake/Output Summary (Last 24 hours) at 2021 1254  Last data filed at 2021 1059  Gross per 24 hour   Intake 2160 ml   Output 2600 ml   Net -440 ml     Flowsheet Rows      First Filed Value   Admission Height 170.2 cm (67\") Documented at 2021 1530   Admission Weight 97.5 kg (215 lb) Documented at 2021 1530          Physical Exam:   General Appearance:    Alert, cooperative, in no acute distress   Lungs:     Clear to auscultation.  Normal respiratory effort and rate.      Heart:    Regular rhythm and normal rate, normal S1 and S2, no murmurs, gallops or rubs.     Chest Wall:    No abnormalities observed   Abdomen:     Soft, nontender, positive bowel sounds.     Extremities:   no cyanosis, clubbing or edema.  No marked joint deformities.  Adequate musculoskeletal strength.       Results Review:    Results from last 7 days   Lab Units 21  0755   SODIUM mmol/L 137   POTASSIUM mmol/L 3.7   CHLORIDE mmol/L 97*   CO2 mmol/L 24.7   BUN mg/dL 21   CREATININE mg/dL 2.18*   GLUCOSE mg/dL 77 "   CALCIUM mg/dL 9.5     Results from last 7 days   Lab Units 12/07/21  0431   TROPONIN T ng/mL 0.109*     Results from last 7 days   Lab Units 12/09/21  0755   WBC 10*3/mm3 6.67   HEMOGLOBIN g/dL 10.5*  10.5*   HEMATOCRIT % 30.9*  30.9*   PLATELETS 10*3/mm3 203     Results from last 7 days   Lab Units 12/09/21  0755 12/08/21  0759 12/07/21  1605   INR  1.36* 1.76* 2.37*     Results from last 7 days   Lab Units 12/06/21  1532   MAGNESIUM mg/dL 2.0                   Medication Review:   acetaminophen, 650 mg, Oral, TID  busPIRone, 10 mg, Oral, Q12H  epoetin lisha/lisha-epbx, 10,000 Units, Intravenous, Once per day on Mon Wed Fri  escitalopram, 10 mg, Oral, Daily  lactobacillus acidophilus, 1 capsule, Oral, QAM  midodrine, 5 mg, Oral, TID AC  multivitamin with minerals, 1 tablet, Oral, Daily  pantoprazole, 40 mg, Intravenous, Q AM  rOPINIRole, 0.25 mg, Oral, Nightly  sodium chloride, 10 mL, Intravenous, Q12H              Assessment/Plan   1. Acute blood loss anemia , bleeding AVM treated. Watching H/H. Resume warfarin when ok with GI.   2. Chronic atrial fibrillation - she has had cryomaze and left atrial appendage ligation. INR was almost 3. She was given 2.5mg vitamin K. INR now 1.3. shes had a PENNIE ligation - maybe we can lower target INR to 1.8-2.5  3. Chronic diastolic heart failure  4. End stage renal disease, on hemodialysis  5. Valvular heart disease with prior aortic valve replacement, mitral valve replacement, and tricuspid valve repair. Still with severe TR and elevated mitral valve gradients, however per LEE mitral valve is functioning normally. Elevated gradients due to dialysis access.   6. History of pulmonary embolism  7. Hypotension, on midodrine at baseline.  8. Elevated troponin. Secondary to end stage renal disease, cath Jan 2019 showed normal coronaries.     MORGAN Arreguin  [facility]  12/09/21  12:54 EST  .

## 2021-12-09 NOTE — PROGRESS NOTES
[facility] Progress Note      Patient Name: Claudia Arnold  : 1945  MRN: [id number]  Primary Care Physician:  Robert Cortez MD  Date of admission: 2021    Subjective     Interval History:   Follow up ESRD.  Dialysis yesterday . 2.5 gk off. BP stable.   Off floor for endoscopy.      Review of Systems:   As noted above    Objective     Vitals:   Temp:  [97.3 °F (36.3 °C)-98.2 °F (36.8 °C)] 98 °F (36.7 °C)  Heart Rate:  [57-82] 82  Resp:  [16-18] 18  BP: (114-142)/(63-89) 114/82    Intake/Output Summary (Last 24 hours) at 2021 0923  Last data filed at 2021 0030  Gross per 24 hour   Intake 2060 ml   Output 2600 ml   Net -540 ml       Physical Exam:    Not available. Getting scopes .    Scheduled Meds:     acetaminophen, 650 mg, Oral, TID  busPIRone, 10 mg, Oral, Q12H  epoetin lisha/lisha-epbx, 10,000 Units, Intravenous, Once per day on   escitalopram, 10 mg, Oral, Daily  lactobacillus acidophilus, 1 capsule, Oral, QAM  midodrine, 5 mg, Oral, TID AC  multivitamin with minerals, 1 tablet, Oral, Daily  pantoprazole, 40 mg, Intravenous, Q AM  rOPINIRole, 0.25 mg, Oral, Nightly  sodium chloride, 10 mL, Intravenous, Q12H      IV Meds:   sodium chloride, 30 mL/hr        Results Reviewed:   I have personally reviewed the results from the time of this admission to 2021 09:23 EST     Results from last 7 days   Lab Units 21  0755 21  0759 21  0431 21  1532 21  1532   SODIUM mmol/L 137 137 139   < > 137   POTASSIUM mmol/L 3.7 4.0 4.2   < > 3.6   CHLORIDE mmol/L 97* 100 101   < > 97*   CO2 mmol/L 24.7 23.8 26.0   < > 24.9   BUN mg/dL 21 42* 28*   < > 19   CREATININE mg/dL 2.18* 3.34* 2.24*   < > 1.66*   CALCIUM mg/dL 9.5 9.5 9.7   < > 9.7   BILIRUBIN mg/dL 0.5  --  0.4  --  0.4   ALK PHOS U/L 89  --  79  --  93   ALT (SGPT) U/L 11  --  10  --  12   AST (SGOT) U/L 14  --  12  --  14   GLUCOSE mg/dL 77 78 88   < > 120*    < > = values in  this interval not displayed.       Estimated Creatinine Clearance: 26 mL/min (A) (by C-G formula based on SCr of 2.18 mg/dL (H)).    Results from last 7 days   Lab Units 12/06/21  1532   MAGNESIUM mg/dL 2.0             Results from last 7 days   Lab Units 12/09/21  0755 12/08/21  2357 12/08/21  1716 12/08/21  0759 12/08/21  0014 12/07/21  0812 12/07/21  0431 12/07/21  0004 12/06/21  1532   WBC 10*3/mm3 6.67  --   --  5.89  --   --  5.63  --  6.96   HEMOGLOBIN g/dL 10.5*  10.5* 10.4* 10.9* 9.4* 9.4*   < > 9.9*   < > 10.6*   PLATELETS 10*3/mm3 203  --   --  193  --   --  180  --  203    < > = values in this interval not displayed.       Results from last 7 days   Lab Units 12/09/21  0755 12/08/21  0759 12/07/21  1605 12/06/21  1532   INR  1.36* 1.76* 2.37* 2.95*       Assessment / Plan     ASSESSMENT:  1. ESRD.  HD MWF.  Chemistries well controlled.   2. Anemia of blood loss, CKD.  EGD and colonoscopy today .  3. Chronic diastolic heart failure .  4. Prior AVR, MVR, TV repair.  Severe TR.  5. Chronic hypotension on midodrine .    PLAN:  1. Dialysis tomorrow .    Maria Fernanda Maldonado MD  12/09/21  09:23 [facility]    [facility]  [phone]

## 2021-12-10 NOTE — PROGRESS NOTES
"    Patient Name: Claudia Arnold  :1945  76 y.o.      Patient Care Team:  Robert Cortez MD as PCP - General (Family Medicine)  Deborah Agudelo MD as Surgeon (Orthopedic Surgery)  Scott Segura MD as Consulting Physician (Cardiology)  Scar Gaxiola MD as Surgeon (Cardiothoracic Surgery)  Kathy Osuna MD as Consulting Physician (Nephrology)  Dora Astorga APRN as Nurse Practitioner (Neurosurgery)  Satish Stahl MD as Consulting Physician (Vascular Surgery)  Scar Gaxiola MD as Surgeon (Cardiothoracic Surgery)    Chief Complaint: follow up BRBPR, atrial fibrillation, chronic anticoagulation    Interval History: feels well.        Objective   Vital Signs  Temp:  [97.3 °F (36.3 °C)-98.2 °F (36.8 °C)] 97.8 °F (36.6 °C)  Heart Rate:  [] 63  Resp:  [16] 16  BP: ()/(48-85) 94/76    Intake/Output Summary (Last 24 hours) at 12/10/2021 1409  Last data filed at 12/10/2021 1100  Gross per 24 hour   Intake 420 ml   Output 2600 ml   Net -2180 ml     Flowsheet Rows      First Filed Value   Admission Height 170.2 cm (67\") Documented at 2021 1530   Admission Weight 97.5 kg (215 lb) Documented at 2021 1530          Physical Exam:   General Appearance:    Alert, cooperative, in no acute distress   Lungs:     Clear to auscultation.  Normal respiratory effort and rate.      Heart:    Regular rhythm and normal rate, normal S1 and S2, no murmurs, gallops or rubs.     Chest Wall:    No abnormalities observed   Abdomen:     Soft, nontender, positive bowel sounds.     Extremities:   no cyanosis, clubbing or edema.  No marked joint deformities.  Adequate musculoskeletal strength.       Results Review:    Results from last 7 days   Lab Units 21  0755   SODIUM mmol/L 137   POTASSIUM mmol/L 3.7   CHLORIDE mmol/L 97*   CO2 mmol/L 24.7   BUN mg/dL 21   CREATININE mg/dL 2.18*   GLUCOSE mg/dL 77   CALCIUM mg/dL 9.5     Results from last 7 days   Lab Units 21  0431 "   TROPONIN T ng/mL 0.109*     Results from last 7 days   Lab Units 12/10/21  0014 12/09/21  1610 12/09/21  0755   WBC 10*3/mm3  --   --  6.67   HEMOGLOBIN g/dL 9.8*   < > 10.5*  10.5*   HEMATOCRIT % 30.6*   < > 30.9*  30.9*   PLATELETS 10*3/mm3  --   --  203    < > = values in this interval not displayed.     Results from last 7 days   Lab Units 12/09/21  0755 12/08/21  0759 12/07/21  1605   INR  1.36* 1.76* 2.37*     Results from last 7 days   Lab Units 12/09/21  0755   MAGNESIUM mg/dL 2.1                   Medication Review:   acetaminophen, 650 mg, Oral, TID  busPIRone, 10 mg, Oral, Q12H  epoetin lisha/lisha-epbx, 10,000 Units, Intravenous, Once per day on Mon Wed Fri  escitalopram, 10 mg, Oral, Daily  hydrocortisone, 25 mg, Rectal, BID  lactobacillus acidophilus, 1 capsule, Oral, QAM  metoprolol tartrate, 25 mg, Oral, Q12H  midodrine, 5 mg, Oral, TID AC  multivitamin with minerals, 1 tablet, Oral, Daily  pantoprazole, 40 mg, Oral, Q AM  rOPINIRole, 0.25 mg, Oral, Nightly  sodium chloride, 10 mL, Intravenous, Q12H              Assessment/Plan   1. Acute blood loss anemia , bleeding AVM treated, also had hemorrhoids.  Watching H/H. Cleared to resume AC. Hgb stable.  2. Chronic atrial fibrillation - she has had cryomaze and left atrial appendage ligation. INR was almost 3. She was given 2.5mg vitamin K.  shes had a PENNIE ligation - maybe we can lower target INR around 2  3. Chronic diastolic heart failure  4. End stage renal disease, on hemodialysis  5. Valvular heart disease with prior aortic valve replacement, mitral valve replacement, and tricuspid valve repair. Still with severe TR and elevated mitral valve gradients, however per LEE mitral valve is functioning normally. Elevated gradients due to dialysis access.   6. History of pulmonary embolism  7. Hypotension, on midodrine at baseline.  8. Elevated troponin. Secondary to end stage renal disease, cath Jan 2019 showed normal coronaries.     Ok for dc. Resume  warfarin at home dose. Agree with INR with dialysis on Monday. Results to be called to [facility] clinic.Has appointment with MORGAN Topete 2/22/22    MORGAN Arreguin  [facility]  12/10/21  14:09 EST  .

## 2021-12-10 NOTE — OUTREACH NOTE
Prep Survey      Responses   [facility] facility patient discharged from? [facility]   Is LACE score < 7 ? No   Emergency Room discharge w/ pulse ox? No   Eligibility [facility]   Date of Admission 12/06/21   Date of Discharge 12/10/21   Discharge Disposition Home-Health Care Svc   Discharge diagnosis GI bleed,  anemia, A-fib RVR, ESRD on HD, Hx CHF, CAD   Does the patient have one of the following disease processes/diagnoses(primary or secondary)? Other   Does the patient have Home health ordered? Yes   What is the Home health agency?  [facility]    Is there a DME ordered? No   General alerts for this patient ESRD on HD   Prep survey completed? Yes          Gisela Mcbride RN

## 2021-12-10 NOTE — DISCHARGE SUMMARY
[facility]    Date of Admission: 12/6/2021  Date of Discharge:  12/10/2021    PCP: Robert Cortez MD    Discharge Diagnosis:   Active Hospital Problems    Diagnosis  POA   • **Atrial fibrillation with RVR ([facility]) [I48.91]  Yes   • OCTAVIANO (obstructive sleep apnea) [G47.33]  Yes   • Anemia [D64.9]  Yes   • Gastrointestinal hemorrhage with melena [K92.1]  Yes   • Chronic diastolic CHF (congestive heart failure) ([facility]) [I50.32]  Yes   • S/P AVR (aortic valve replacement) [Z95.2]  Not Applicable   • CAD (coronary artery disease) [I25.10]  Yes   • ESRD (end stage renal disease) ([facility]) [N18.6]  Yes   • Pulmonary hypertension ([facility]) [I27.20]  Yes   • S/P MVR (mitral valve replacement) [Z95.2]  Not Applicable   • Hypertension [I10]  Yes   • Hyperlipidemia [E78.5]  Yes   • Generalized anxiety disorder [F41.1]  Yes      Resolved Hospital Problems   No resolved problems to display.     Procedures Performed  Procedure(s):  ESOPHAGOGASTRODUODENOSCOPY  WITH BIOPSIES  COLONOSCOPY TO CECUM WITH APC TO RIGHT COLON AVM  12/09 0955 COLONOSCOPY  12/09 0954 UPPER GI ENDOSCOPY     Consults   Inpatient consult to Nephrology  Inpatient consult to Cardiology  Inpatient consult to Gastroenterology    Hospital Course  Please see history and physical for details. Patient is a 76 y.o. female that presented to the hospital with complaints of blood in her stools at home. She went for her scheduled dialysis and was going to call her GI doctor, but instead was sent to the ED after HD given elevated HR and low BP. She was found to be in atrial fibrillation with RVR and admitted for further care.   The patient was given IV beta blocker with improvement in her rate. She has a history of low pressures d/t her ESRD with HD. Her midodrine was increased to 5 mg with meals and metoprolol added for rate control. Cardiology managed and felt like she may be able to have a lower goal INR (possibly 1.8-2.5) in the  future given her history of cryomaze with left atrial appendage ligation. She has a known history of tissue valve repairs and warfarin initially held on admission d/t her recurrent GI bleeding. She was given vitamin K to promote clotting for scopes and now has been cleared to resume anticoagulation. She did have an elevated troponin but this was not felt to ACS and rather d/t her renal function. She has denied any chest pain or dyspnea. Pending cardiology clearance, she will be discharged with her home dosing of warfarin with planned repeat INR by Monday with either home health or while at dialysis as she was 2.9 when she came into the hospital. Her INR was 1.36 yesterday and hemoglobin stable at 9.8. She has had no further bleeding and hemodynamically stable. She will need to follow up with Cardiology in 1 week.    Given her recurrent GI bleeding, gastroenterology was consulted. She was placed on IV PPI and anticoagulation held as above. She underwent EGD and colonoscopy on 12/9 with full findings as below. She was noted to have a bleeding angiectasia that was treated with APC as well as extensive perianal bruising (patient reported chronic). Her H/H was monitored and GI has cleared for resumption of anticoagulation. She will need to follow up with them in 1-2 weeks and should call for any recurrent bleeding. She will need a repeat CBC to assess hemoglobin by Monday with HD or HH.   Nephrology did manage her ESRD during the stay. She received HD this morning and is eager for discharge. Her Bumex was held on admission and we will hold this until she follows up with them at her next HD session as BP is soft and she was newly started on BB therapy. She will need to monitor her home weights and BP. She was seen by PT who cleared for discharge home with assistance. Will get home health to follow her given her multiple issues and need for close monitoring in the home. She should see her PCP in 1-2 weeks.      Colonoscopy-    Findings:  - Diverticulosis in the sigmoid colon.  - A single bleeding colonic angioectasia. Treated with argon plasma coagulation (APC).  - Non-bleeding internal hemorrhoids.  - Blood in the ascending colon.  - The examination was otherwise normal on direct and retroflexion views.  - Diffuse ecchymosis in the perianal area and buttocks. found on perianal exam.  - No specimens collected.    Impression:  - [facility].  - follow h/h - if hemoglobin dose not improve after treatment of AVM rec CTpelvis to r/o hematoma given  extensive bruising seen    EGD-    Findings:  - Food in the esophagus.  - Small hiatal hernia.  - Gastritis. Biopsied.  - Mucosal changes in the duodenum. Biopsied.  - Normal second portion of the duodenum. Biopsied.    Impression:  - Await pathology results.  - Results will be communicated to you within 14 days - please contact the office at [phone] if you have not  received results  - Daily PPI  - Perform a colonoscopy today.    Okay to discharge from Cardiology perspective- resume home warfarin dosing and repeat PT/INR on Monday with HD. Discussed with MORGAN Kennedy.    Okay to discharge from GI perspective. Discussed with Dr. Pop.    I discussed the patient's findings and my recommendations with patient and nursing staff.    Condition on Discharge: Improved.     Temp:  [97.3 °F (36.3 °C)-98.2 °F (36.8 °C)] 97.5 °F (36.4 °C)  Heart Rate:  [] 56  Resp:  [16] 16  BP: (106-134)/(48-85) 109/48  Body mass index is 32.98 kg/m².    Physical Exam  Vitals and nursing note reviewed.   Constitutional:       Appearance: She is obese. She is ill-appearing. She is not toxic-appearing.   HENT:      Head: Normocephalic and atraumatic.   Cardiovascular:      Rate and Rhythm: Normal rate. Rhythm irregular.      Pulses: Normal pulses.      Heart sounds: Normal heart sounds.   Pulmonary:      Effort: Pulmonary effort is normal.  No respiratory distress.      Comments: Diminished throughout. On RA  Abdominal:      General: Bowel sounds are normal. There is no distension.      Palpations: Abdomen is soft.      Tenderness: There is no abdominal tenderness.   Musculoskeletal:         General: No tenderness. Swelling: BLE. Normal range of motion.      Cervical back: Normal range of motion and neck supple.   Skin:     General: Skin is warm and dry.      Findings: Bruising present.   Neurological:      Mental Status: She is alert and oriented to person, place, and time.      Sensory: No sensory deficit.      Motor: Weakness present.      Coordination: Coordination normal.   Psychiatric:         Mood and Affect: Mood normal.         Behavior: Behavior normal.        Discharge Medications      New Medications      Instructions Start Date   hydrocortisone 25 MG suppository  Commonly known as: ANUSOL-HC   25 mg, Rectal, 2 Times Daily      metoprolol tartrate 25 MG tablet  Commonly known as: LOPRESSOR   25 mg, Oral, Every 12 Hours Scheduled      pantoprazole 40 MG EC tablet  Commonly known as: PROTONIX   40 mg, Oral, Every Early Morning   Start Date: December 11, 2021        Changes to Medications      Instructions Start Date   midodrine 5 MG tablet  Commonly known as: PROAMATINE  What changed:   · medication strength  · how much to take   5 mg, Oral, 3 Times Daily Before Meals      warfarin 2 MG tablet  Commonly known as: COUMADIN  What changed:   · how much to take  · how to take this  · when to take this   TAKE 1 TABLET BY MOUTH EVERY SUNDAY, TUESDAY, AND THURSDAY, TAKE 1&1/2 TABLETS BY MOUTH ALL THE OTHER DAYS OF THE WEEK         Continue These Medications      Instructions Start Date   acetaminophen 325 MG tablet  Commonly known as: TYLENOL   650 mg, Oral, 3 Times Daily, Takes tid at 2 am, 1 pm, and hS      ALIGN PO   1 capsule, Oral, Every Morning      busPIRone 10 MG tablet  Commonly known as: BUSPAR   10 mg, Oral, 2 times daily      [facility]  MELATONIN PO   5 mg, Oral, Nightly PRN      escitalopram 10 MG tablet  Commonly known as: LEXAPRO   10 mg, Oral, Daily      MIRCERA IJ   60 mcg, Every 14 Days, During dialysis      ProRenal + D tablet tablet  Generic drug: multivitamin with minerals   1 tablet, Oral, Daily      rOPINIRole 0.25 MG tablet  Commonly known as: REQUIP   0.25 mg, Oral, Nightly      VENOFER IV   50 mg, Weekly, Adjusts weekly in IV during dialysis         Stop These Medications    bumetanide 2 MG tablet  Commonly known as: BUMEX           Diet Instructions     Diet: Regular, Cardiac, Renal      Discharge Diet:  Regular  Cardiac  Renal            Activity Instructions     Activity as Tolerated           Additional Instructions for the Follow-ups that You Need to Schedule     Ambulatory Referral to Home Health   As directed      Face to Face Visit Date: 12/10/2021    Follow-up provider for Plan of Care?: I treated the patient in an acute care facility and will not continue treatment after discharge.    Follow-up provider: MARCIANO CORTEZ [[id number]]    Reason/Clinical Findings: gi bleed, ESRD, CHF    Describe mobility limitations that make leaving home difficult: obesity, weakness, all the above    Nursing/Therapeutic Services Requested: Skilled Nursing Physical Therapy    Skilled nursing orders: Medication education (Please, obtain repeat PT/INR and H/H by Monday 12/13.) Cardiopulmonary assessments Other    PT orders: Gait Training Strengthening Transfer training    Weight Bearing Status: As Tolerated    Frequency: 1 Week 1         Discharge Follow-up with PCP   As directed       Currently Documented PCP:    Marciano Cortez MD    PCP Phone Number:    [phone]     Follow Up Details: see in 1 week         Discharge Follow-up with Specified Provider: CardiologyDr. Segura; 1 Week   As directed      To: CardiologyDr. Segura    Follow Up: 1 Week         Discharge Follow-up with Specified Provider: Dr. Pop; 2 Weeks   As directed       To: Dr. Pop    Follow Up: 2 Weeks         Discharge Follow-up with Specified Provider: Dr. Osuna; 1 Week   As directed      To: Dr. Osuna    Follow Up: 1 Week            Contact information for follow-up providers     Robert Cortez MD .    Specialty: Family Medicine  Why: see in 1 week  Contact information:  [address] [facility] [address]  [facility] [address] [phone]                   Contact information for after-discharge care     Dialysis/Infusion     [facility] .    Service: Dialysis  Contact information:  [address] [facility] [address]  [facility] [address] [phone]                           Test Results Pending at Discharge     MORGAN Bella  12/10/21  13:41 EST    Discharge time spent greater than 30 minutes.

## 2021-12-10 NOTE — DISCHARGE PLACEMENT REQUEST
"Catalina Jose Carlos ANTHONY (76 y.o. Female)             Date of Birth Social Security Number Address Home Phone MRN    1945  [id number]  [facility] [address] [phone] [id number]    Sikhism Marital Status             None        Admission Date Admission Type Admitting Provider Attending Provider Department, Room/Bed    12/6/21 Emergency Gorge Boyd MD Baumann, Patrick D, MD [address], [address]1    Discharge Date Discharge Disposition Discharge Destination           Home-Health Care [facility]              Attending Provider: Valentin Dutton MD    Allergies: No Known Allergies    Isolation: None   Infection: None   Code Status: CPR   Advance Care Planning Activity    Ht: 170.2 cm (67\")   Wt: 95.5 kg (210 lb 9.6 oz)    Admission Cmt: None   Principal Problem: Atrial fibrillation with RVR ([facility]) [I48.91]                 Active Insurance as of 12/6/2021     Primary Coverage     Payor Plan Insurance Group Employer/Plan Group    MEDICARE MEDICARE A & B      Payor Plan Address Payor Plan Phone Number Payor Plan Fax Number Effective Dates    [address] [phone]  7/1/2010 - None Entered    [facility] [address]       Subscriber Name Subscriber Birth Date Member ID       JOSE CARLOS ARNOLD GABRIELLE 1945 [id number]           Secondary Coverage     Payor Plan Insurance Group Employer/Plan Group    AETNA COMMERCIAL AETNA [id number]     Payor Plan Address Payor Plan Phone Number Payor Plan Fax Number Effective Dates    [address] [phone]  1/1/2013 - None Entered    [facility] [id number]       Subscriber Name Subscriber Birth Date Member ID       CATALINAKRISHAN NICKERSON 10[birth year]25[birth year]1943 [id number]                 Emergency Contacts      (Rel.) Home Phone Work Phone Mobile Phone    Papito Leyla (Daughter) [phone] -- [phone]    Krishan Arnold (Spouse) [phone] -- --    MOSNE ARNOLD (Son) -- -- [phone]              "

## 2021-12-10 NOTE — PROGRESS NOTES
[facility]  Inpatient Progress Note    Reason for Follow Up:RENETTA, rectal bleeding    Subjective     Interval History:   Egd, c/s yesterday with internal hemorrhoids that were oozing as well as right-sided colonic AVM that was actively bleeding and treated with APC.  No issues post procedure.  No abdominal pain nausea or vomiting    Current Facility-Administered Medications:   •  acetaminophen (TYLENOL) tablet 650 mg, 650 mg, Oral, Q4H PRN, 650 mg at 12/09/21 1136 **OR** acetaminophen (TYLENOL) 160 MG/5ML solution 650 mg, 650 mg, Oral, Q4H PRN **OR** acetaminophen (TYLENOL) suppository 650 mg, 650 mg, Rectal, Q4H PRN, Shiloh Pop MD  •  acetaminophen (TYLENOL) tablet 650 mg, 650 mg, Oral, TID, Shiloh Pop MD, 650 mg at 12/10/21 0746  •  busPIRone (BUSPAR) tablet 10 mg, 10 mg, Oral, Q12H, Shiloh Pop MD, 10 mg at 12/10/21 0745  •  epoetin lisha-epbx (RETACRIT) injection 10,000 Units, 10,000 Units, Intravenous, Once per day on Mon Wed Fri, Shiloh Pop MD, 10,000 Units at 12/10/21 0951  •  escitalopram (LEXAPRO) tablet 10 mg, 10 mg, Oral, Daily, Shiloh Pop MD, 10 mg at 12/10/21 0745  •  lactobacillus acidophilus (RISAQUAD) capsule 1 capsule, 1 capsule, Oral, QAM, Shiloh Pop MD, 1 capsule at 12/10/21 0650  •  melatonin tablet 5 mg, 5 mg, Oral, Nightly PRN, Shiloh Pop MD, 5 mg at 12/07/21 2207  •  metoprolol tartrate (LOPRESSOR) tablet 25 mg, 25 mg, Oral, Q12H, Belia Mcdonough APRN, 25 mg at 12/10/21 0745  •  midodrine (PROAMATINE) tablet 5 mg, 5 mg, Oral, TID AC, Shiloh Pop MD, 5 mg at 12/10/21 0650  •  multivitamin with minerals 1 tablet, 1 tablet, Oral, Daily, Shiloh Pop MD, 1 tablet at 12/10/21 0745  •  nitroglycerin (NITROSTAT) SL tablet 0.4 mg, 0.4 mg, Sublingual, Q5 Min PRN, Shiloh Pop MD  •  ondansetron (ZOFRAN) injection 4 mg, 4 mg, Intravenous, Q6H PRN, Shiloh Pop MD  •  pantoprazole (PROTONIX) EC tablet  40 mg, 40 mg, Oral, Q AM, Shiloh Pop MD, 40 mg at 12/10/21 0650  •  rOPINIRole (REQUIP) tablet 0.25 mg, 0.25 mg, Oral, Nightly, Shiloh Pop MD, 0.25 mg at 12/09/21 2017  •  [COMPLETED] Insert peripheral IV, , , Once **AND** sodium chloride 0.9 % flush 10 mL, 10 mL, Intravenous, PRN, Shiloh Pop MD  •  sodium chloride 0.9 % flush 10 mL, 10 mL, Intravenous, Q12H, Shiloh Pop MD, 10 mL at 12/10/21 0744  •  sodium chloride 0.9 % flush 10 mL, 10 mL, Intravenous, PRN, Shiloh Pop MD  Review of Systems:    The following systems were reviewed and negative;  constitution and gastrointestinal    Objective     Vital Signs  Temp:  [97.3 °F (36.3 °C)-98.2 °F (36.8 °C)] 97.5 °F (36.4 °C)  Heart Rate:  [] 56  Resp:  [16] 16  BP: (106-134)/(48-85) 109/48  Body mass index is 32.98 kg/m².    Intake/Output Summary (Last 24 hours) at 12/10/2021 1259  Last data filed at 12/10/2021 1100  Gross per 24 hour   Intake 420 ml   Output 2600 ml   Net -2180 ml     I/O this shift:  In: 300 [P.O.:300]  Out: 2500 [Other:2500]     Physical Exam:   General: patient awake, alert and cooperative   Eyes: Normal lids and lashes, no scleral icterus   Neck: supple, normal ROM   Skin: warm and dry, not jaundiced   Pulm:   regular and unlabored   Abdomen: soft, nontender, nondistended    Extremities: no rash or edema   Psychiatric: Normal mood and behavior; memory intact     Results Review:     I reviewed the patient's new clinical results.    Results from last 7 days   Lab Units 12/10/21  0014 12/09/21  1610 12/09/21  0755 12/08/21  1716 12/08/21  0759 12/07/21  0812 12/07/21  0431   WBC 10*3/mm3  --   --  6.67  --  5.89  --  5.63   HEMOGLOBIN g/dL 9.8* 10.6* 10.5*  10.5*   < > 9.4*   < > 9.9*   HEMATOCRIT % 30.6* 30.7* 30.9*  30.9*   < > 28.6*   < > 30.7*   PLATELETS 10*3/mm3  --   --  203  --  193  --  180    < > = values in this interval not displayed.     Results from last 7 days   Lab Units 12/09/21  0755  12/08/21  0759 12/07/21  0431 12/06/21  1532 12/06/21  1532   SODIUM mmol/L 137 137 139   < > 137   POTASSIUM mmol/L 3.7 4.0 4.2   < > 3.6   CHLORIDE mmol/L 97* 100 101   < > 97*   CO2 mmol/L 24.7 23.8 26.0   < > 24.9   BUN mg/dL 21 42* 28*   < > 19   CREATININE mg/dL 2.18* 3.34* 2.24*   < > 1.66*   CALCIUM mg/dL 9.5 9.5 9.7   < > 9.7   BILIRUBIN mg/dL 0.5  --  0.4  --  0.4   ALK PHOS U/L 89  --  79  --  93   ALT (SGPT) U/L 11  --  10  --  12   AST (SGOT) U/L 14  --  12  --  14   GLUCOSE mg/dL 77 78 88   < > 120*    < > = values in this interval not displayed.     Results from last 7 days   Lab Units 12/09/21  0755 12/08/21  0759 12/07/21  1605   INR  1.36* 1.76* 2.37*     No results found for: LIPASE    Radiology:  XR Chest 1 View   Final Result          Assessment/Plan     Patient Active Problem List   Diagnosis   • Tear of rotator cuff   • Hypertension   • Generalized anxiety disorder   • Hyperlipidemia   • IFG (impaired fasting glucose)   • Heart murmur, systolic   • Shoulder pain, bilateral   • Pain of both shoulder joints   • Chronic pain of both shoulders   • Acute congestive heart failure ([facility])   • Obesity, morbid, BMI 50 or higher ([facility])   • Fungal skin infection   • Cellulitis of lower extremity   • Aortic valve stenosis   • Tricuspid valve insufficiency   • Mitral valve stenosis   • S/P MVR (mitral valve replacement)   • Paroxysmal atrial fibrillation ([facility])   • Pulmonary hypertension ([facility])   • ESRD (end stage renal disease) ([facility])   • Gastroesophageal reflux disease without esophagitis   • Chronic idiopathic constipation   • Dependence on renal dialysis ([facility])   • Chronic kidney disease, stage 2 (mild)   • Renovascular hypertension   • GI bleed   • 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 ([facility])   • CAD (coronary artery disease)   • S/P AVR (aortic valve replacement)   • Chronic diastolic CHF (congestive heart failure) ([facility])   • Chronic pain of both shoulders   • Gastrointestinal hemorrhage   •  Gastrointestinal hemorrhage with melena   • Immobility syndrome   • Atrial fibrillation with RVR ([facility])   • OCTAVIANO (obstructive sleep apnea)   • Anemia       Assessment:  1. Iron deficiency anemia  2. Bright red blood per rectum and melena   3. Anticoagulant therapy  4. End-stage renal disease on dialysis  5. Coronary artery disease  6. Status post AVR  7. Chronic diastolic CHF  8. Atrial fibrillation on anticoagulant        Plan:  · Hemoglobin stable-continue to follow  · Follow-up biopsies from EGD-gastritis seen, nodularity in the duodenal bulb  · Bleeding seems to be a combination of AVMs plus rectal bleeding from hemorrhoids.  Continue to follow H&H.  If further bleeding occurs or anemia persists could consider outpatient capsule versus CT angio as an inpatient if significant bleeding is witnessed.  · Can restart anticoagulation if necessary    I discussed the patients findings and my recommendations with patient.    Shiloh Pop MD

## 2021-12-10 NOTE — PROGRESS NOTES
Case Management Discharge Note      Final Note: Jannet with [facility] accepts and following, DC to home with HH via [facility] for transport, pateint denies any other needs    Provided Post Acute Provider List?: Refused  Provided Post Acute Provider Quality & Resource List?: N/A    Selected Continued Care - Admitted Since 12/6/2021     Destination    No services have been selected for the patient.              Durable Medical Equipment    No services have been selected for the patient.              Dialysis/Infusion Coordination complete.    Service Provider Selected Services Address Phone Fax Patient Preferred    [facility]  [facility] [address] [address] [phone] --       Internal Comment last updated by Precious Barrett RN 12/7/2021 9278    Current chair [facility]                     Home Medical Care Coordination complete.    Service Provider Selected Services Address Phone Fax Patient Preferred    [facility] [address] [place] [address] [phone] --          Therapy    No services have been selected for the patient.              Community Resources    No services have been selected for the patient.              Community & DME    No services have been selected for the patient.                  Transportation Services  W/C Van: [facility] (private pay)    Final Discharge Disposition Code: 06 - home with home health care

## 2021-12-10 NOTE — PLAN OF CARE
Goal Outcome Evaluation:  Plan of Care Reviewed With: patient        Progress: improving  Outcome Summary: patient alert oriented let her need know dialysis given today tolerated well, up in the chair will be discharge going home in private transportation, will follow up with cardiology in one week

## 2021-12-10 NOTE — PROGRESS NOTES
[facility] Progress Note      Patient Name: Claudia Arnold  : 1945  MRN: [id number]  Primary Care Physician:  Robert Cortez MD  Date of admission: 2021    Subjective     Interval History:   Follow up ESRD.  Dialysis earlier today.  She denies any problems with dialysis.  She denies any chest pain, any shortness of breath, no nausea or vomiting currently she is just waiting to be discharged she states.    Review of Systems:   As noted above    Objective     Vitals:   Temp:  [97.3 °F (36.3 °C)-98.2 °F (36.8 °C)] 97.5 °F (36.4 °C)  Heart Rate:  [] 56  Resp:  [16] 16  BP: (106-134)/(48-85) 109/48    Intake/Output Summary (Last 24 hours) at 12/10/2021 1334  Last data filed at 12/10/2021 1100  Gross per 24 hour   Intake 420 ml   Output 2600 ml   Net -2180 ml       Physical Exam:    General Appearance: alert, oriented x 3, no acute distress   Skin: warm and dry  HEENT: oral mucosa normal, nonicteric sclera  Neck: supple, no JVD  Lungs: A few rales in the lung right base   heart: RRR, normal S1 and S2  Abdomen: soft, nontender, nondistended  Extremities: Trace lower extremity swelling no cyanosis.  Left upper extremity AV fistula  Neuro: normal speech and mental status     Scheduled Meds:     acetaminophen, 650 mg, Oral, TID  busPIRone, 10 mg, Oral, Q12H  epoetin lisha/lisha-epbx, 10,000 Units, Intravenous, Once per day on   escitalopram, 10 mg, Oral, Daily  hydrocortisone, 25 mg, Rectal, BID  lactobacillus acidophilus, 1 capsule, Oral, QAM  metoprolol tartrate, 25 mg, Oral, Q12H  midodrine, 5 mg, Oral, TID AC  multivitamin with minerals, 1 tablet, Oral, Daily  pantoprazole, 40 mg, Oral, Q AM  rOPINIRole, 0.25 mg, Oral, Nightly  sodium chloride, 10 mL, Intravenous, Q12H      IV Meds:        Results Reviewed:   I have personally reviewed the results from the time of this admission to 12/10/2021 13:34 EST     Results from last 7 days   Lab Units 21  0758  12/08/21  0759 12/07/21  0431 12/06/21  1532 12/06/21  1532   SODIUM mmol/L 137 137 139   < > 137   POTASSIUM mmol/L 3.7 4.0 4.2   < > 3.6   CHLORIDE mmol/L 97* 100 101   < > 97*   CO2 mmol/L 24.7 23.8 26.0   < > 24.9   BUN mg/dL 21 42* 28*   < > 19   CREATININE mg/dL 2.18* 3.34* 2.24*   < > 1.66*   CALCIUM mg/dL 9.5 9.5 9.7   < > 9.7   BILIRUBIN mg/dL 0.5  --  0.4  --  0.4   ALK PHOS U/L 89  --  79  --  93   ALT (SGPT) U/L 11  --  10  --  12   AST (SGOT) U/L 14  --  12  --  14   GLUCOSE mg/dL 77 78 88   < > 120*    < > = values in this interval not displayed.       Estimated Creatinine Clearance: 26.1 mL/min (A) (by C-G formula based on SCr of 2.18 mg/dL (H)).    Results from last 7 days   Lab Units 12/09/21 0755 12/06/21  1532   MAGNESIUM mg/dL 2.1 2.0             Results from last 7 days   Lab Units 12/10/21  0014 12/09/21  1610 12/09/21  0755 12/08/21  2357 12/08/21  1716 12/08/21  0759 12/08/21  0759 12/07/21  0812 12/07/21  0431 12/07/21  0004 12/06/21  1532   WBC 10*3/mm3  --   --  6.67  --   --   --  5.89  --  5.63  --  6.96   HEMOGLOBIN g/dL 9.8* 10.6* 10.5*  10.5* 10.4* 10.9*   < > 9.4*   < > 9.9*   < > 10.6*   PLATELETS 10*3/mm3  --   --  203  --   --   --  193  --  180  --  203    < > = values in this interval not displayed.       Results from last 7 days   Lab Units 12/09/21  0755 12/08/21  0759 12/07/21  1605 12/06/21  1532   INR  1.36* 1.76* 2.37* 2.95*       Assessment / Plan     ASSESSMENT/plan:  1. ESRD.  HD MWF.  Chemistries well controlled.  Dialysis earlier today without problems  2. Anemia of blood loss, CKD.  EGD and colonoscopy yesterday, bleeding from AVMs and hemorrhoids as well as had gastritis.  Frequent hemoglobin is to be followed with dialysis unit..  3. Chronic diastolic heart failure .  4. Prior AVR, MVR, TV repair.  Severe TR.  5. Chronic hypotension on midodrine .  Blood pressure overall stable    Possible DC home today          Alonso Ramesh MD  12/10/21  13:34  EST    [facility]  [phone]

## 2021-12-13 NOTE — OUTREACH NOTE
Call Center TCM Note      Responses   [facility] patient discharged from? [place]   Does the patient have one of the following disease processes/diagnoses(primary or secondary)? Other   TCM attempt successful? No   Unsuccessful attempts Attempt 1  [No PCP verbal release to attempt other contacts. ]          Rajwinder Matute RN    12/13/2021, 12:54 EST

## 2021-12-13 NOTE — OUTREACH NOTE
Call Center TCM Note      Responses   [facility] patient discharged from? [place]   Does the patient have one of the following disease processes/diagnoses(primary or secondary)? Other   TCM attempt successful? No   Unsuccessful attempts Attempt 2          Rajwinder Matute RN    12/13/2021, 13:17 EST

## 2021-12-14 NOTE — OUTREACH NOTE
Call Center TCM Note      Responses   [facility] patient discharged from? [place]   Does the patient have one of the following disease processes/diagnoses(primary or secondary)? Other   TCM attempt successful? Yes   Call start time 1049   Call end time 1052   Discharge diagnosis GI bleed,  anemia, A-fib RVR, ESRD on HD, Hx CHF, CAD   Meds reviewed with patient/caregiver? Yes   Is the patient having any side effects they believe may be caused by any medication additions or changes? No   Does the patient have all medications ordered at discharge? No   What is keeping the patient from filling the prescriptions? Prior authorization Issues  [Anusol needs PA]   Nursing Interventions Nurse provided patient education   Notified Case Management Script issues   Is the patient taking all medications as directed (includes completed medication regime)? No   What is preventing the patient from taking all medications as directed? Other   Nursing Interventions Nurse provided patient education  [Routed to ]   Does the patient have a primary care provider?  Yes   Does the patient have an appointment with their PCP within 7 days of discharge? No   What is preventing the patient from scheduling follow up appointments within 7 days of discharge? --  [Pt will call back to schedule appt as she must arrange transportation. ]   Nursing Interventions Advised patient to make appointment   Has the patient kept scheduled appointments due by today? N/A   What is the Home health agency?  [facility]   Has home health visited the patient within 72 hours of discharge? Call prior to 72 hours   Home health comments  should visit on 12/14/21   Psychosocial issues? No   Did the patient receive a copy of their discharge instructions? Yes   Nursing interventions Reviewed instructions with patient   What is the patient's perception of their health status since discharge? Improving   TCM call completed? Yes          Sayra Chu,  RN    12/14/2021, 10:52 EST

## 2021-12-14 NOTE — OUTREACH NOTE
Case Management Call Center Follow-up      Responses   [facility] Call Center Tracking Reason? Medication Clarification   Has the Call Center Case Management Follow-up issue been resolved? No   Follow-up Comments Was unable to get a hold of [facility] to see if PA is required and insurance information.  I had to leave a  and waiting for them to call back.          Shannon Epley, RN    12/14/2021, 15:42 EST

## 2021-12-14 NOTE — PROGRESS NOTES
Duodenal biopsies were normal.  Duodenal nodules were benign.    Active inflammation seen on stomach biopsy.  Recommend daily pantoprazole

## 2021-12-15 NOTE — OUTREACH NOTE
Case Management Call Center Follow-up      Responses   [facility] Call Center Tracking Reason? Medication Clarification   Has the Call Center Case Management Follow-up issue been resolved? Yes   Follow-up Comments [facility] was able to dispense the medication that was quesioned for needing a PA.           Shannon Epley, RN    12/15/2021, 16:41 EST

## 2021-12-16 NOTE — TELEPHONE ENCOUNTER
----- Message from Shiloh Pop MD sent at 12/14/2021  5:42 PM EST -----  Duodenal biopsies were normal.  Duodenal nodules were benign.    Active inflammation seen on stomach biopsy.  Recommend daily pantoprazole

## 2021-12-16 NOTE — PROGRESS NOTES
I called patient to verify if she picked up medication since the [facility] said that the pharmacy was (able to dispense.)  She had no yet tried to  the medicine but would let me know if there is a problem.     Ksenia

## 2021-12-20 NOTE — PLAN OF CARE
Problem: Patient Care Overview  Goal: Plan of Care Review  Outcome: Ongoing (interventions implemented as appropriate)   02/05/19 1723   Coping/Psychosocial   Plan of Care Reviewed With patient   Plan of Care Review   Progress improving   OTHER   Outcome Summary VSS. A wire removed. Still Vpacing less than 50% but paces through pauses otherwise afib/flutter. Cortrak removed. Pt eating 25%-50% of meals and is drinking mighty shakes with meals.Diaysis today. Tolerated. Minimal Urine production. Needs encouragement to sit in her chair and use incentive inspirometer. PT and OT following.Skin care as ordered. Patient turned q2. Will continue to monitor.       Problem: Cardiac Surgery (Adult)  Goal: Signs and Symptoms of Listed Potential Problems Will be Absent, Minimized or Managed (Cardiac Surgery)  Outcome: Ongoing (interventions implemented as appropriate)   02/05/19 1723   Goal/Outcome Evaluation   Problems Assessed (Cardiac Surgery) all   Problems Present (Cardiac Surgery) functional deficit;cardiac complications;situational response          Minocycline Counseling: Patient advised regarding possible photosensitivity and discoloration of the teeth, skin, lips, tongue and gums.  Patient instructed to avoid sunlight, if possible.  When exposed to sunlight, patients should wear protective clothing, sunglasses, and sunscreen.  The patient was instructed to call the office immediately if the following severe adverse effects occur:  hearing changes, easy bruising/bleeding, severe headache, or vision changes.  The patient verbalized understanding of the proper use and possible adverse effects of minocycline.  All of the patient's questions and concerns were addressed.

## 2021-12-23 NOTE — PROGRESS NOTES
Anticoagulation Clinic Progress Note    Anticoagulation Summary  As of 12/23/2021    INR goal:  2.0-3.0   TTR:  58.2 % (2.4 y)   INR used for dosing:  3.10 (12/22/2021)   Warfarin maintenance plan:  2 mg every Sun, Tue, Thu; 3 mg all other days   Weekly warfarin total:  18 mg   Plan last modified:  Jalen Temple, PharmD (4/16/2021)   Next INR check:  12/29/2021   Priority:  High   Target end date:      Indications    Paroxysmal atrial fibrillation (HCC) [I48.0]             Anticoagulation Episode Summary     INR check location:      Preferred lab:      Send INR reminders to:   [facility]  POOL    Comments:  Lab drawn at dialysis ([facility]) - [facility] [phone]       Anticoagulation Care Providers     Provider Role Specialty Phone number    Shiloh Gonzales APRN Referring Cardiology [phone]          Clinic Interview:  Patient Findings     Positives:  Signs/symptoms of bleeding, Change in medications, Hospital   admission, Other complaints    Negatives:  Signs/symptoms of thrombosis, Laboratory test error   suspected, Change in health, Change in alcohol use, Change in activity,   Upcoming invasive procedure, Emergency department visit, Upcoming dental   procedure, Missed doses, Extra doses, Change in diet/appetite, Bruising    Comments:  Admitted 12/6/21 - 12/9/21 with GI bleed; she denies   recurrence since discharge. She was discharged on pantoprazole. Reports   body-wide itching the past few days, for which she has taken   diphenhydramine with improvement. Reports dialysis has been checking INR   weekly; however, we have not received these results. She indicates she   will remind them to send the results to us moving forward.       Clinical Outcomes     Negatives:  Major bleeding event, Thromboembolic event,   Anticoagulation-related hospital admission, Anticoagulation-related ED   visit, Anticoagulation-related fatality    Comments:  Admitted 12/6/21 - 12/9/21 with GI bleed; she denies    recurrence since discharge. She was discharged on pantoprazole. Reports   body-wide itching the past few days, for which she has taken   diphenhydramine with improvement. Reports dialysis has been checking INR   weekly; however, we have not received these results. She indicates she   will remind them to send the results to us moving forward.         INR History:  Anticoagulation Monitoring 10/29/2021 11/5/2021 12/23/2021   INR 2.65 2.75 3.10   INR Date 10/27/2021 11/3/2021 12/22/2021   INR Goal 2.0-3.0 2.0-3.0 2.0-3.0   Trend Same Same Same   Last Week Total 18 mg 18 mg 18 mg   Next Week Total 18 mg 18 mg 17 mg   Sun 2 mg 2 mg 2 mg   Mon 3 mg 3 mg 3 mg   Tue 2 mg 2 mg 2 mg   Wed - - -   Thu - - 1 mg (12/23)   Fri 3 mg 3 mg 3 mg   Sat 3 mg 3 mg 3 mg   Visit Report - - -   Some recent data might be hidden       Plan:  1. INR was Supratherapeutic yesterday- see above in Anticoagulation Summary.   Will instruct Claudia ANTHONY Catalina to Change their warfarin regimen- see above in Anticoagulation Summary.  2. Follow up in 1 week  3. They have been instructed to call if any changes in medications, doses, concerns, etc. Patient expresses understanding and has no further questions at this time.    Jalen Temple, PharmD

## 2022-01-01 ENCOUNTER — ANTICOAGULATION VISIT (OUTPATIENT)
Dept: PHARMACY | Facility: HOSPITAL | Age: 77
End: 2022-01-01

## 2022-01-01 ENCOUNTER — PRIOR AUTHORIZATION (OUTPATIENT)
Dept: FAMILY MEDICINE CLINIC | Facility: CLINIC | Age: 77
End: 2022-01-01

## 2022-01-01 ENCOUNTER — TELEPHONE (OUTPATIENT)
Dept: FAMILY MEDICINE CLINIC | Facility: CLINIC | Age: 77
End: 2022-01-01

## 2022-01-01 ENCOUNTER — CLINICAL SUPPORT (OUTPATIENT)
Dept: ORTHOPEDIC SURGERY | Facility: CLINIC | Age: 77
End: 2022-01-01

## 2022-01-01 ENCOUNTER — APPOINTMENT (OUTPATIENT)
Dept: GENERAL RADIOLOGY | Facility: HOSPITAL | Age: 77
End: 2022-01-01

## 2022-01-01 ENCOUNTER — HOSPITAL ENCOUNTER (INPATIENT)
Facility: HOSPITAL | Age: 77
LOS: 5 days | Discharge: HOME | End: 2022-09-18
Attending: EMERGENCY MEDICINE | Admitting: INTERNAL MEDICINE

## 2022-01-01 ENCOUNTER — APPOINTMENT (OUTPATIENT)
Dept: CARDIOLOGY | Facility: HOSPITAL | Age: 77
End: 2022-01-01

## 2022-01-01 ENCOUNTER — OFFICE VISIT (OUTPATIENT)
Dept: CARDIOLOGY | Facility: CLINIC | Age: 77
End: 2022-01-01

## 2022-01-01 ENCOUNTER — TRANSITIONAL CARE MANAGEMENT TELEPHONE ENCOUNTER (OUTPATIENT)
Dept: CALL CENTER | Facility: HOSPITAL | Age: 77
End: 2022-01-01

## 2022-01-01 ENCOUNTER — OFFICE VISIT (OUTPATIENT)
Dept: FAMILY MEDICINE CLINIC | Facility: CLINIC | Age: 77
End: 2022-01-01

## 2022-01-01 ENCOUNTER — TELEPHONE (OUTPATIENT)
Dept: CARDIOLOGY | Facility: CLINIC | Age: 77
End: 2022-01-01

## 2022-01-01 ENCOUNTER — READMISSION MANAGEMENT (OUTPATIENT)
Dept: CALL CENTER | Facility: HOSPITAL | Age: 77
End: 2022-01-01

## 2022-01-01 ENCOUNTER — HOSPITAL ENCOUNTER (EMERGENCY)
Facility: HOSPITAL | Age: 77
Discharge: HOME | End: 2022-06-23
Attending: EMERGENCY MEDICINE | Admitting: EMERGENCY MEDICINE

## 2022-01-01 ENCOUNTER — PATIENT OUTREACH (OUTPATIENT)
Dept: CASE MANAGEMENT | Facility: OTHER | Age: 77
End: 2022-01-01

## 2022-01-01 ENCOUNTER — HOSPITAL ENCOUNTER (OUTPATIENT)
Dept: CARDIOLOGY | Facility: HOSPITAL | Age: 77
Discharge: HOME | End: 2022-02-22
Admitting: INTERNAL MEDICINE

## 2022-01-01 ENCOUNTER — TELEPHONE (OUTPATIENT)
Dept: ORTHOPEDIC SURGERY | Facility: CLINIC | Age: 77
End: 2022-01-01

## 2022-01-01 VITALS
OXYGEN SATURATION: 94 % | BODY MASS INDEX: 35.31 KG/M2 | SYSTOLIC BLOOD PRESSURE: 110 MMHG | HEIGHT: 67 IN | HEART RATE: 75 BPM | WEIGHT: 225 LBS | DIASTOLIC BLOOD PRESSURE: 70 MMHG

## 2022-01-01 VITALS
HEART RATE: 67 BPM | WEIGHT: 216 LBS | TEMPERATURE: 97.8 F | OXYGEN SATURATION: 97 % | BODY MASS INDEX: 33.9 KG/M2 | DIASTOLIC BLOOD PRESSURE: 56 MMHG | SYSTOLIC BLOOD PRESSURE: 107 MMHG | HEIGHT: 67 IN | RESPIRATION RATE: 16 BRPM

## 2022-01-01 VITALS
BODY MASS INDEX: 34.53 KG/M2 | DIASTOLIC BLOOD PRESSURE: 70 MMHG | OXYGEN SATURATION: 95 % | SYSTOLIC BLOOD PRESSURE: 118 MMHG | WEIGHT: 220 LBS | HEART RATE: 55 BPM | HEIGHT: 67 IN | RESPIRATION RATE: 16 BRPM

## 2022-01-01 VITALS
BODY MASS INDEX: 34.06 KG/M2 | DIASTOLIC BLOOD PRESSURE: 72 MMHG | HEART RATE: 136 BPM | SYSTOLIC BLOOD PRESSURE: 104 MMHG | WEIGHT: 217 LBS | HEIGHT: 67 IN | OXYGEN SATURATION: 95 %

## 2022-01-01 VITALS
DIASTOLIC BLOOD PRESSURE: 73 MMHG | SYSTOLIC BLOOD PRESSURE: 105 MMHG | HEART RATE: 79 BPM | BODY MASS INDEX: 37.5 KG/M2 | TEMPERATURE: 98.7 F | OXYGEN SATURATION: 95 % | RESPIRATION RATE: 18 BRPM | HEIGHT: 63 IN | WEIGHT: 211.64 LBS

## 2022-01-01 VITALS — TEMPERATURE: 97.1 F | HEIGHT: 67 IN | BODY MASS INDEX: 33.74 KG/M2 | WEIGHT: 215 LBS

## 2022-01-01 VITALS — HEIGHT: 67 IN | BODY MASS INDEX: 34.06 KG/M2 | TEMPERATURE: 97.7 F | WEIGHT: 217 LBS

## 2022-01-01 VITALS — TEMPERATURE: 98 F | HEIGHT: 67 IN | WEIGHT: 225 LBS | BODY MASS INDEX: 35.31 KG/M2

## 2022-01-01 VITALS
OXYGEN SATURATION: 94 % | HEART RATE: 58 BPM | RESPIRATION RATE: 16 BRPM | TEMPERATURE: 97.2 F | SYSTOLIC BLOOD PRESSURE: 128 MMHG | DIASTOLIC BLOOD PRESSURE: 62 MMHG

## 2022-01-01 DIAGNOSIS — I50.32 CHRONIC DIASTOLIC CONGESTIVE HEART FAILURE: ICD-10-CM

## 2022-01-01 DIAGNOSIS — I48.91 RAPID ATRIAL FIBRILLATION: Primary | ICD-10-CM

## 2022-01-01 DIAGNOSIS — Z00.00 MEDICARE ANNUAL WELLNESS VISIT, SUBSEQUENT: Primary | ICD-10-CM

## 2022-01-01 DIAGNOSIS — N18.6 ESRD (END STAGE RENAL DISEASE): ICD-10-CM

## 2022-01-01 DIAGNOSIS — M19.019 GLENOHUMERAL ARTHRITIS: ICD-10-CM

## 2022-01-01 DIAGNOSIS — I48.0 PAROXYSMAL ATRIAL FIBRILLATION: Primary | ICD-10-CM

## 2022-01-01 DIAGNOSIS — Z95.3 STATUS POST MITRAL VALVE REPLACEMENT WITH TISSUE VALVE: ICD-10-CM

## 2022-01-01 DIAGNOSIS — I48.91 ATRIAL FIBRILLATION WITH RVR: Primary | ICD-10-CM

## 2022-01-01 DIAGNOSIS — F41.1 GENERALIZED ANXIETY DISORDER: Chronic | ICD-10-CM

## 2022-01-01 DIAGNOSIS — Z99.2 ESRD (END STAGE RENAL DISEASE) ON DIALYSIS: ICD-10-CM

## 2022-01-01 DIAGNOSIS — M62.3 IMMOBILITY SYNDROME: Primary | ICD-10-CM

## 2022-01-01 DIAGNOSIS — K21.9 GASTROESOPHAGEAL REFLUX DISEASE WITHOUT ESOPHAGITIS: Chronic | ICD-10-CM

## 2022-01-01 DIAGNOSIS — Z98.890 STATUS POST TRICUSPID VALVE REPAIR: ICD-10-CM

## 2022-01-01 DIAGNOSIS — R52 PAIN: Primary | ICD-10-CM

## 2022-01-01 DIAGNOSIS — N18.6 ESRD (END STAGE RENAL DISEASE) ON DIALYSIS: ICD-10-CM

## 2022-01-01 DIAGNOSIS — I10 PRIMARY HYPERTENSION: ICD-10-CM

## 2022-01-01 DIAGNOSIS — Z95.3 STATUS POST AORTIC VALVE REPLACEMENT WITH TISSUE VALVE: ICD-10-CM

## 2022-01-01 DIAGNOSIS — I34.2 NONRHEUMATIC MITRAL VALVE STENOSIS: ICD-10-CM

## 2022-01-01 DIAGNOSIS — M19.019 GLENOHUMERAL ARTHRITIS: Primary | ICD-10-CM

## 2022-01-01 DIAGNOSIS — I51.7 RIGHT VENTRICULAR ENLARGEMENT: ICD-10-CM

## 2022-01-01 DIAGNOSIS — F41.1 GENERALIZED ANXIETY DISORDER: ICD-10-CM

## 2022-01-01 DIAGNOSIS — R53.1 GENERALIZED WEAKNESS: ICD-10-CM

## 2022-01-01 DIAGNOSIS — S70.12XA CONTUSION OF LEFT THIGH, INITIAL ENCOUNTER: Primary | ICD-10-CM

## 2022-01-01 DIAGNOSIS — Z95.3 STATUS POST AORTIC VALVE REPLACEMENT WITH TISSUE VALVE: Primary | ICD-10-CM

## 2022-01-01 DIAGNOSIS — I48.0 PAF (PAROXYSMAL ATRIAL FIBRILLATION): ICD-10-CM

## 2022-01-01 LAB
ALBUMIN SERPL-MCNC: 3.6 G/DL (ref 3.5–5.2)
ALBUMIN SERPL-MCNC: 4.1 G/DL (ref 3.5–5.2)
ALBUMIN SERPL-MCNC: 4.1 G/DL (ref 3.7–4.7)
ALBUMIN/GLOB SERPL: 2 G/DL
ALBUMIN/GLOB SERPL: 2 G/DL
ALBUMIN/GLOB SERPL: 2.1 {RATIO} (ref 1.2–2.2)
ALP SERPL-CCNC: 130 IU/L (ref 44–121)
ALP SERPL-CCNC: 71 U/L (ref 39–117)
ALP SERPL-CCNC: 87 U/L (ref 39–117)
ALT SERPL W P-5'-P-CCNC: 102 U/L (ref 1–33)
ALT SERPL W P-5'-P-CCNC: 138 U/L (ref 1–33)
ALT SERPL-CCNC: 11 IU/L (ref 0–32)
ANION GAP SERPL CALCULATED.3IONS-SCNC: 12.6 MMOL/L (ref 5–15)
ANION GAP SERPL CALCULATED.3IONS-SCNC: 14.3 MMOL/L (ref 5–15)
ANION GAP SERPL CALCULATED.3IONS-SCNC: 15 MMOL/L (ref 5–15)
ANION GAP SERPL CALCULATED.3IONS-SCNC: 16.4 MMOL/L (ref 5–15)
ANION GAP SERPL CALCULATED.3IONS-SCNC: 18.5 MMOL/L (ref 5–15)
ANION GAP SERPL CALCULATED.3IONS-SCNC: 22.6 MMOL/L (ref 5–15)
AORTIC ARCH: 2.8 CM
AORTIC DIMENSIONLESS INDEX: 0.5 (DI)
ASCENDING AORTA: 2.8 CM
AST SERPL-CCNC: 18 IU/L (ref 0–40)
AST SERPL-CCNC: 53 U/L (ref 1–32)
AST SERPL-CCNC: 84 U/L (ref 1–32)
BACTERIA SPEC AEROBE CULT: ABNORMAL
BACTERIA UR QL AUTO: ABNORMAL /HPF
BASOPHILS # BLD AUTO: 0.01 10*3/MM3 (ref 0–0.2)
BASOPHILS # BLD AUTO: 0.01 10*3/MM3 (ref 0–0.2)
BASOPHILS # BLD AUTO: 0.02 10*3/MM3 (ref 0–0.2)
BASOPHILS # BLD AUTO: 0.1 X10E3/UL (ref 0–0.2)
BASOPHILS NFR BLD AUTO: 0.1 % (ref 0–1.5)
BASOPHILS NFR BLD AUTO: 0.1 % (ref 0–1.5)
BASOPHILS NFR BLD AUTO: 0.2 % (ref 0–1.5)
BASOPHILS NFR BLD AUTO: 1 %
BH CV ECHO AV AORTIC VALVE AT ACCEL TIME CALCULATED: 81.4 MSEC
BH CV ECHO MEAS - AO MAX PG: 51 MMHG
BH CV ECHO MEAS - AO MEAN PG: 24 MMHG
BH CV ECHO MEAS - AO ROOT DIAM: 4.4 CM
BH CV ECHO MEAS - AO V2 MAX: 356.3 CM/SEC
BH CV ECHO MEAS - AO V2 VTI: 73.4 CM
BH CV ECHO MEAS - AT: 0.08 SEC
BH CV ECHO MEAS - AVA(I,D): 1.29 CM2
BH CV ECHO MEAS - EDV(MOD-SP2): 86 ML
BH CV ECHO MEAS - EDV(MOD-SP4): 66 ML
BH CV ECHO MEAS - EF(MOD-BP): 63 %
BH CV ECHO MEAS - EF(MOD-SP2): 61.6 %
BH CV ECHO MEAS - EF(MOD-SP4): 63.6 %
BH CV ECHO MEAS - ESV(MOD-SP2): 33 ML
BH CV ECHO MEAS - ESV(MOD-SP4): 24 ML
BH CV ECHO MEAS - FS: 32.9 %
BH CV ECHO MEAS - IVS/LVPW: 0.99 CM
BH CV ECHO MEAS - IVSD: 1.22 CM
BH CV ECHO MEAS - LAT PEAK E' VEL: 5.1 CM/SEC
BH CV ECHO MEAS - LV DIASTOLIC VOL/BSA (35-75): 31 CM2
BH CV ECHO MEAS - LV MASS(C)D: 209.3 GRAMS
BH CV ECHO MEAS - LV MAX PG: 8 MMHG
BH CV ECHO MEAS - LV MEAN PG: 5.4 MMHG
BH CV ECHO MEAS - LV SYSTOLIC VOL/BSA (12-30): 11.3 CM2
BH CV ECHO MEAS - LV V1 MAX: 141.4 CM/SEC
BH CV ECHO MEAS - LV V1 VTI: 34.1 CM
BH CV ECHO MEAS - LVIDD: 4.6 CM
BH CV ECHO MEAS - LVIDS: 3.1 CM
BH CV ECHO MEAS - LVOT AREA: 2.8 CM2
BH CV ECHO MEAS - LVOT DIAM: 1.88 CM
BH CV ECHO MEAS - LVPWD: 1.23 CM
BH CV ECHO MEAS - MED PEAK E' VEL: 3.8 CM/SEC
BH CV ECHO MEAS - MV A DUR: 0.08 SEC
BH CV ECHO MEAS - MV DEC SLOPE: 403.6 CM/SEC2
BH CV ECHO MEAS - MV DEC TIME: 0.68 MSEC
BH CV ECHO MEAS - MV E MAX VEL: 244 CM/SEC
BH CV ECHO MEAS - MV MAX PG: 28 MMHG
BH CV ECHO MEAS - MV MEAN PG: 12 MMHG
BH CV ECHO MEAS - MV V2 VTI: 110.9 CM
BH CV ECHO MEAS - MVA(VTI): 0.85 CM2
BH CV ECHO MEAS - PA ACC TIME: 0.1 SEC
BH CV ECHO MEAS - PA PR(ACCEL): 33.1 MMHG
BH CV ECHO MEAS - PA V2 MAX: 118.1 CM/SEC
BH CV ECHO MEAS - RAP SYSTOLE: 15 MMHG
BH CV ECHO MEAS - RV MAX PG: 1.09 MMHG
BH CV ECHO MEAS - RV V1 MAX: 52.3 CM/SEC
BH CV ECHO MEAS - RV V1 VTI: 10.5 CM
BH CV ECHO MEAS - RVOT DIAM: 2.49 CM
BH CV ECHO MEAS - RVSP: 52 MMHG
BH CV ECHO MEAS - SI(MOD-SP2): 24.9 ML/M2
BH CV ECHO MEAS - SI(MOD-SP4): 19.8 ML/M2
BH CV ECHO MEAS - SUP REN AO DIAM: 1.6 CM
BH CV ECHO MEAS - SV(LVOT): 94.7 ML
BH CV ECHO MEAS - SV(MOD-SP2): 53 ML
BH CV ECHO MEAS - SV(MOD-SP4): 42 ML
BH CV ECHO MEAS - SV(RVOT): 50.8 ML
BH CV ECHO MEAS - TAPSE (>1.6): 1.98 CM
BH CV ECHO MEAS - TR MAX PG: 37.3 MMHG
BH CV ECHO MEAS - TR MAX VEL: 305.4 CM/SEC
BH CV ECHO MEASUREMENTS AVERAGE E/E' RATIO: 54.83
BH CV LOWER VASCULAR LEFT COMMON FEMORAL AUGMENT: NORMAL
BH CV LOWER VASCULAR LEFT COMMON FEMORAL COMPETENT: NORMAL
BH CV LOWER VASCULAR LEFT COMMON FEMORAL COMPRESS: NORMAL
BH CV LOWER VASCULAR LEFT COMMON FEMORAL PHASIC: NORMAL
BH CV LOWER VASCULAR LEFT COMMON FEMORAL SPONT: NORMAL
BH CV LOWER VASCULAR LEFT DISTAL FEMORAL COMPRESS: NORMAL
BH CV LOWER VASCULAR LEFT GASTRONEMIUS COMPRESS: NORMAL
BH CV LOWER VASCULAR LEFT GREATER SAPH AK COMPRESS: NORMAL
BH CV LOWER VASCULAR LEFT GREATER SAPH BK COMPRESS: NORMAL
BH CV LOWER VASCULAR LEFT LESSER SAPH COMPRESS: NORMAL
BH CV LOWER VASCULAR LEFT MID FEMORAL AUGMENT: NORMAL
BH CV LOWER VASCULAR LEFT MID FEMORAL COMPETENT: NORMAL
BH CV LOWER VASCULAR LEFT MID FEMORAL COMPRESS: NORMAL
BH CV LOWER VASCULAR LEFT MID FEMORAL PHASIC: NORMAL
BH CV LOWER VASCULAR LEFT MID FEMORAL SPONT: NORMAL
BH CV LOWER VASCULAR LEFT PERONEAL COMPRESS: NORMAL
BH CV LOWER VASCULAR LEFT POPLITEAL AUGMENT: NORMAL
BH CV LOWER VASCULAR LEFT POPLITEAL COMPETENT: NORMAL
BH CV LOWER VASCULAR LEFT POPLITEAL COMPRESS: NORMAL
BH CV LOWER VASCULAR LEFT POPLITEAL PHASIC: NORMAL
BH CV LOWER VASCULAR LEFT POPLITEAL SPONT: NORMAL
BH CV LOWER VASCULAR LEFT POSTERIOR TIBIAL COMPRESS: NORMAL
BH CV LOWER VASCULAR LEFT PROFUNDA FEMORAL COMPRESS: NORMAL
BH CV LOWER VASCULAR LEFT PROXIMAL FEMORAL COMPRESS: NORMAL
BH CV LOWER VASCULAR LEFT SAPHENOFEMORAL JUNCTION COMPRESS: NORMAL
BH CV LOWER VASCULAR RIGHT COMMON FEMORAL AUGMENT: NORMAL
BH CV LOWER VASCULAR RIGHT COMMON FEMORAL COMPETENT: NORMAL
BH CV LOWER VASCULAR RIGHT COMMON FEMORAL COMPRESS: NORMAL
BH CV LOWER VASCULAR RIGHT COMMON FEMORAL PHASIC: NORMAL
BH CV LOWER VASCULAR RIGHT COMMON FEMORAL SPONT: NORMAL
BH CV VAS BP RIGHT ARM: NORMAL MMHG
BH CV XLRA - RV BASE: 3.6 CM
BH CV XLRA - RV LENGTH: 7.4 CM
BH CV XLRA - RV MID: 4 CM
BH CV XLRA - TDI S': 5.2 CM/SEC
BILIRUB SERPL-MCNC: 0.7 MG/DL (ref 0–1.2)
BILIRUB SERPL-MCNC: 0.8 MG/DL (ref 0–1.2)
BILIRUB SERPL-MCNC: 0.8 MG/DL (ref 0–1.2)
BILIRUB UR QL STRIP: NEGATIVE
BUN SERPL-MCNC: 27 MG/DL (ref 8–23)
BUN SERPL-MCNC: 28 MG/DL (ref 8–23)
BUN SERPL-MCNC: 41 MG/DL (ref 8–23)
BUN SERPL-MCNC: 41 MG/DL (ref 8–27)
BUN SERPL-MCNC: 45 MG/DL (ref 8–23)
BUN SERPL-MCNC: 49 MG/DL (ref 8–23)
BUN SERPL-MCNC: 52 MG/DL (ref 8–23)
BUN/CREAT SERPL: 10.9 (ref 7–25)
BUN/CREAT SERPL: 11.2 (ref 7–25)
BUN/CREAT SERPL: 11.2 (ref 7–25)
BUN/CREAT SERPL: 13 (ref 12–28)
BUN/CREAT SERPL: 7.8 (ref 7–25)
BUN/CREAT SERPL: 8.6 (ref 7–25)
BUN/CREAT SERPL: 9.4 (ref 7–25)
CALCIUM SERPL-MCNC: 9.9 MG/DL (ref 8.7–10.3)
CALCIUM SPEC-SCNC: 10.1 MG/DL (ref 8.6–10.5)
CALCIUM SPEC-SCNC: 8.9 MG/DL (ref 8.6–10.5)
CALCIUM SPEC-SCNC: 8.9 MG/DL (ref 8.6–10.5)
CALCIUM SPEC-SCNC: 9.1 MG/DL (ref 8.6–10.5)
CALCIUM SPEC-SCNC: 9.5 MG/DL (ref 8.6–10.5)
CALCIUM SPEC-SCNC: 9.6 MG/DL (ref 8.6–10.5)
CHLORIDE SERPL-SCNC: 100 MMOL/L (ref 98–107)
CHLORIDE SERPL-SCNC: 101 MMOL/L (ref 98–107)
CHLORIDE SERPL-SCNC: 104 MMOL/L (ref 98–107)
CHLORIDE SERPL-SCNC: 95 MMOL/L (ref 98–107)
CHLORIDE SERPL-SCNC: 96 MMOL/L (ref 98–107)
CHLORIDE SERPL-SCNC: 98 MMOL/L (ref 98–107)
CHLORIDE SERPL-SCNC: 99 MMOL/L (ref 96–106)
CHOLEST SERPL-MCNC: 176 MG/DL (ref 100–199)
CLARITY UR: ABNORMAL
CO2 SERPL-SCNC: 22.6 MMOL/L (ref 22–29)
CO2 SERPL-SCNC: 23.4 MMOL/L (ref 22–29)
CO2 SERPL-SCNC: 24 MMOL/L (ref 20–29)
CO2 SERPL-SCNC: 24.7 MMOL/L (ref 22–29)
CO2 SERPL-SCNC: 25 MMOL/L (ref 22–29)
CO2 SERPL-SCNC: 26.4 MMOL/L (ref 22–29)
CO2 SERPL-SCNC: 26.5 MMOL/L (ref 22–29)
COLOR UR: ABNORMAL
CREAT SERPL-MCNC: 3.14 MG/DL (ref 0.57–1)
CREAT SERPL-MCNC: 3.24 MG/DL (ref 0.57–1)
CREAT SERPL-MCNC: 3.44 MG/DL (ref 0.57–1)
CREAT SERPL-MCNC: 4.03 MG/DL (ref 0.57–1)
CREAT SERPL-MCNC: 4.38 MG/DL (ref 0.57–1)
CREAT SERPL-MCNC: 4.49 MG/DL (ref 0.57–1)
CREAT SERPL-MCNC: 4.66 MG/DL (ref 0.57–1)
DEPRECATED RDW RBC AUTO: 48.3 FL (ref 37–54)
DEPRECATED RDW RBC AUTO: 48.4 FL (ref 37–54)
DEPRECATED RDW RBC AUTO: 49.4 FL (ref 37–54)
DEPRECATED RDW RBC AUTO: 49.4 FL (ref 37–54)
DEPRECATED RDW RBC AUTO: 50.4 FL (ref 37–54)
DEPRECATED RDW RBC AUTO: 50.7 FL (ref 37–54)
EGFRCR SERPLBLD CKD-EPI 2021: 10.9 ML/MIN/1.73
EGFRCR SERPLBLD CKD-EPI 2021: 13.2 ML/MIN/1.73
EGFRCR SERPLBLD CKD-EPI 2021: 14.2 ML/MIN/1.73
EGFRCR SERPLBLD CKD-EPI 2021: 15 ML/MIN/1.73
EGFRCR SERPLBLD CKD-EPI 2021: 9.2 ML/MIN/1.73
EGFRCR SERPLBLD CKD-EPI 2021: 9.6 ML/MIN/1.73
EGFRCR SERPLBLD CKD-EPI 2021: 9.9 ML/MIN/1.73
EOSINOPHIL # BLD AUTO: 0.03 10*3/MM3 (ref 0–0.4)
EOSINOPHIL # BLD AUTO: 0.06 10*3/MM3 (ref 0–0.4)
EOSINOPHIL # BLD AUTO: 0.16 10*3/MM3 (ref 0–0.4)
EOSINOPHIL # BLD AUTO: 0.2 X10E3/UL (ref 0–0.4)
EOSINOPHIL NFR BLD AUTO: 0.3 % (ref 0.3–6.2)
EOSINOPHIL NFR BLD AUTO: 0.7 % (ref 0.3–6.2)
EOSINOPHIL NFR BLD AUTO: 1.9 % (ref 0.3–6.2)
EOSINOPHIL NFR BLD AUTO: 2 %
ERYTHROCYTE [DISTWIDTH] IN BLOOD BY AUTOMATED COUNT: 12.6 % (ref 11.7–15.4)
ERYTHROCYTE [DISTWIDTH] IN BLOOD BY AUTOMATED COUNT: 13.4 % (ref 12.3–15.4)
ERYTHROCYTE [DISTWIDTH] IN BLOOD BY AUTOMATED COUNT: 13.6 % (ref 12.3–15.4)
ERYTHROCYTE [DISTWIDTH] IN BLOOD BY AUTOMATED COUNT: 13.6 % (ref 12.3–15.4)
ERYTHROCYTE [DISTWIDTH] IN BLOOD BY AUTOMATED COUNT: 13.7 % (ref 12.3–15.4)
ERYTHROCYTE [DISTWIDTH] IN BLOOD BY AUTOMATED COUNT: 13.8 % (ref 12.3–15.4)
ERYTHROCYTE [DISTWIDTH] IN BLOOD BY AUTOMATED COUNT: 13.9 % (ref 12.3–15.4)
FOLATE SERPL-MCNC: 7.98 NG/ML (ref 4.78–24.2)
GLOBULIN SER CALC-MCNC: 2 G/DL (ref 1.5–4.5)
GLOBULIN UR ELPH-MCNC: 1.8 GM/DL
GLOBULIN UR ELPH-MCNC: 2.1 GM/DL
GLUCOSE SERPL-MCNC: 104 MG/DL (ref 65–99)
GLUCOSE SERPL-MCNC: 108 MG/DL (ref 65–99)
GLUCOSE SERPL-MCNC: 110 MG/DL (ref 65–99)
GLUCOSE SERPL-MCNC: 111 MG/DL (ref 65–99)
GLUCOSE SERPL-MCNC: 92 MG/DL (ref 65–99)
GLUCOSE SERPL-MCNC: 96 MG/DL (ref 65–99)
GLUCOSE SERPL-MCNC: 99 MG/DL (ref 65–99)
GLUCOSE UR STRIP-MCNC: NEGATIVE MG/DL
HBV SURFACE AG SERPL QL IA: NORMAL
HBV SURFACE AG SERPL QL IA: NORMAL
HCT VFR BLD AUTO: 34.3 % (ref 34–46.6)
HCT VFR BLD AUTO: 34.8 % (ref 34–46.6)
HCT VFR BLD AUTO: 35.6 % (ref 34–46.6)
HCT VFR BLD AUTO: 36.2 % (ref 34–46.6)
HCT VFR BLD AUTO: 37.2 % (ref 34–46.6)
HCT VFR BLD AUTO: 37.8 % (ref 34–46.6)
HCT VFR BLD AUTO: 40.5 % (ref 34–46.6)
HDLC SERPL-MCNC: 51 MG/DL
HGB BLD-MCNC: 11.5 G/DL (ref 11.1–15.9)
HGB BLD-MCNC: 11.9 G/DL (ref 12–15.9)
HGB BLD-MCNC: 12 G/DL (ref 12–15.9)
HGB BLD-MCNC: 12.1 G/DL (ref 12–15.9)
HGB BLD-MCNC: 12.5 G/DL (ref 12–15.9)
HGB BLD-MCNC: 12.5 G/DL (ref 12–15.9)
HGB BLD-MCNC: 13.6 G/DL (ref 12–15.9)
HGB UR QL STRIP.AUTO: ABNORMAL
HOLD SPECIMEN: NORMAL
HOLD SPECIMEN: NORMAL
HYALINE CASTS UR QL AUTO: ABNORMAL /LPF
IMM GRANULOCYTES # BLD AUTO: 0 X10E3/UL (ref 0–0.1)
IMM GRANULOCYTES # BLD AUTO: 0.03 10*3/MM3 (ref 0–0.05)
IMM GRANULOCYTES # BLD AUTO: 0.04 10*3/MM3 (ref 0–0.05)
IMM GRANULOCYTES # BLD AUTO: 0.07 10*3/MM3 (ref 0–0.05)
IMM GRANULOCYTES NFR BLD AUTO: 0 %
IMM GRANULOCYTES NFR BLD AUTO: 0.4 % (ref 0–0.5)
IMM GRANULOCYTES NFR BLD AUTO: 0.5 % (ref 0–0.5)
IMM GRANULOCYTES NFR BLD AUTO: 0.8 % (ref 0–0.5)
INR PPP: 1.94
INR PPP: 2.1
INR PPP: 2.1 (ref 0.9–1.1)
INR PPP: 2.11
INR PPP: 2.16
INR PPP: 2.19 (ref 0.9–1.1)
INR PPP: 2.2
INR PPP: 2.27
INR PPP: 2.38
INR PPP: 2.42
INR PPP: 2.49
INR PPP: 2.55
INR PPP: 2.6
INR PPP: 2.6
INR PPP: 2.62
INR PPP: 2.63
INR PPP: 2.65
INR PPP: 2.65
INR PPP: 2.68
INR PPP: 2.71
INR PPP: 2.77
INR PPP: 2.78
INR PPP: 2.8
INR PPP: 2.82
INR PPP: 2.89
INR PPP: 2.96 (ref 0.9–1.1)
INR PPP: 2.96 (ref 0.9–1.1)
INR PPP: 2.98
INR PPP: 3.02
INR PPP: 3.05 (ref 0.9–1.1)
INR PPP: 3.08
INR PPP: 3.09
INR PPP: 3.11
INR PPP: 3.12
INR PPP: 3.13
INR PPP: 3.17 (ref 0.9–1.1)
INR PPP: 3.2
INR PPP: 3.2
INR PPP: 3.25
INR PPP: 4.65
INR PPP: 6.01
KETONES UR QL STRIP: ABNORMAL
LDLC SERPL CALC-MCNC: 112 MG/DL (ref 0–99)
LEFT ATRIUM VOLUME INDEX: 45.8 ML/M2
LEUKOCYTE ESTERASE UR QL STRIP.AUTO: ABNORMAL
LYMPHOCYTES # BLD AUTO: 0.8 10*3/MM3 (ref 0.7–3.1)
LYMPHOCYTES # BLD AUTO: 0.8 X10E3/UL (ref 0.7–3.1)
LYMPHOCYTES # BLD AUTO: 0.87 10*3/MM3 (ref 0.7–3.1)
LYMPHOCYTES # BLD AUTO: 1.25 10*3/MM3 (ref 0.7–3.1)
LYMPHOCYTES NFR BLD AUTO: 10.5 % (ref 19.6–45.3)
LYMPHOCYTES NFR BLD AUTO: 12 %
LYMPHOCYTES NFR BLD AUTO: 13.6 % (ref 19.6–45.3)
LYMPHOCYTES NFR BLD AUTO: 9.8 % (ref 19.6–45.3)
MAXIMAL PREDICTED HEART RATE: 144 BPM
MAXIMAL PREDICTED HEART RATE: 144 BPM
MCH RBC QN AUTO: 32.2 PG (ref 26.6–33)
MCH RBC QN AUTO: 33 PG (ref 26.6–33)
MCH RBC QN AUTO: 33.2 PG (ref 26.6–33)
MCH RBC QN AUTO: 33.3 PG (ref 26.6–33)
MCH RBC QN AUTO: 33.6 PG (ref 26.6–33)
MCHC RBC AUTO-ENTMCNC: 33.1 G/DL (ref 31.5–35.7)
MCHC RBC AUTO-ENTMCNC: 33.4 G/DL (ref 31.5–35.7)
MCHC RBC AUTO-ENTMCNC: 33.4 G/DL (ref 31.5–35.7)
MCHC RBC AUTO-ENTMCNC: 33.5 G/DL (ref 31.5–35.7)
MCHC RBC AUTO-ENTMCNC: 33.6 G/DL (ref 31.5–35.7)
MCHC RBC AUTO-ENTMCNC: 33.6 G/DL (ref 31.5–35.7)
MCHC RBC AUTO-ENTMCNC: 34.5 G/DL (ref 31.5–35.7)
MCV RBC AUTO: 100.5 FL (ref 79–97)
MCV RBC AUTO: 96 FL (ref 79–97)
MCV RBC AUTO: 97.5 FL (ref 79–97)
MCV RBC AUTO: 98.3 FL (ref 79–97)
MCV RBC AUTO: 98.7 FL (ref 79–97)
MCV RBC AUTO: 99.4 FL (ref 79–97)
MCV RBC AUTO: 99.7 FL (ref 79–97)
MONOCYTES # BLD AUTO: 0.85 10*3/MM3 (ref 0.1–0.9)
MONOCYTES # BLD AUTO: 0.87 10*3/MM3 (ref 0.1–0.9)
MONOCYTES # BLD AUTO: 0.9 X10E3/UL (ref 0.1–0.9)
MONOCYTES # BLD AUTO: 0.99 10*3/MM3 (ref 0.1–0.9)
MONOCYTES NFR BLD AUTO: 10.3 % (ref 5–12)
MONOCYTES NFR BLD AUTO: 10.7 % (ref 5–12)
MONOCYTES NFR BLD AUTO: 10.8 % (ref 5–12)
MONOCYTES NFR BLD AUTO: 13 %
NEUTROPHILS # BLD AUTO: 4.7 X10E3/UL (ref 1.4–7)
NEUTROPHILS NFR BLD AUTO: 6.36 10*3/MM3 (ref 1.7–7)
NEUTROPHILS NFR BLD AUTO: 6.36 10*3/MM3 (ref 1.7–7)
NEUTROPHILS NFR BLD AUTO: 6.85 10*3/MM3 (ref 1.7–7)
NEUTROPHILS NFR BLD AUTO: 72 %
NEUTROPHILS NFR BLD AUTO: 74.4 % (ref 42.7–76)
NEUTROPHILS NFR BLD AUTO: 76.7 % (ref 42.7–76)
NEUTROPHILS NFR BLD AUTO: 78.2 % (ref 42.7–76)
NITRITE UR QL STRIP: NEGATIVE
NRBC BLD AUTO-RTO: 0 /100 WBC (ref 0–0.2)
NRBC BLD AUTO-RTO: 0.1 /100 WBC (ref 0–0.2)
NRBC BLD AUTO-RTO: 0.4 /100 WBC (ref 0–0.2)
PH UR STRIP.AUTO: <=5 [PH] (ref 5–8)
PLATELET # BLD AUTO: 121 10*3/MM3 (ref 140–450)
PLATELET # BLD AUTO: 123 10*3/MM3 (ref 140–450)
PLATELET # BLD AUTO: 135 10*3/MM3 (ref 140–450)
PLATELET # BLD AUTO: 140 10*3/MM3 (ref 140–450)
PLATELET # BLD AUTO: 157 10*3/MM3 (ref 140–450)
PLATELET # BLD AUTO: 196 10*3/MM3 (ref 140–450)
PLATELET # BLD AUTO: 198 X10E3/UL (ref 150–450)
PMV BLD AUTO: 10 FL (ref 6–12)
PMV BLD AUTO: 10 FL (ref 6–12)
PMV BLD AUTO: 9.4 FL (ref 6–12)
PMV BLD AUTO: 9.7 FL (ref 6–12)
PMV BLD AUTO: 9.9 FL (ref 6–12)
PMV BLD AUTO: 9.9 FL (ref 6–12)
POTASSIUM SERPL-SCNC: 3.9 MMOL/L (ref 3.5–5.2)
POTASSIUM SERPL-SCNC: 4 MMOL/L (ref 3.5–5.2)
POTASSIUM SERPL-SCNC: 4.1 MMOL/L (ref 3.5–5.2)
POTASSIUM SERPL-SCNC: 4.2 MMOL/L (ref 3.5–5.2)
POTASSIUM SERPL-SCNC: 4.4 MMOL/L (ref 3.5–5.2)
PROT SERPL-MCNC: 5.4 G/DL (ref 6–8.5)
PROT SERPL-MCNC: 6.1 G/DL (ref 6–8.5)
PROT SERPL-MCNC: 6.2 G/DL (ref 6–8.5)
PROT UR QL STRIP: ABNORMAL
PROTHROMBIN TIME: 23.1 SECONDS (ref 11.7–14.2)
PROTHROMBIN TIME: 23.8 SECONDS (ref 11.7–14.2)
PROTHROMBIN TIME: 30 SECONDS (ref 11.7–14.2)
PROTHROMBIN TIME: 30 SECONDS (ref 11.7–14.2)
PROTHROMBIN TIME: 30.7 SECONDS (ref 11.7–14.2)
PROTHROMBIN TIME: 31.6 SECONDS (ref 11.7–14.2)
QT INTERVAL: 360 MS
RBC # BLD AUTO: 3.57 10*6/MM3 (ref 3.77–5.28)
RBC # BLD AUTO: 3.57 X10E6/UL (ref 3.77–5.28)
RBC # BLD AUTO: 3.58 10*6/MM3 (ref 3.77–5.28)
RBC # BLD AUTO: 3.63 10*6/MM3 (ref 3.77–5.28)
RBC # BLD AUTO: 3.76 10*6/MM3 (ref 3.77–5.28)
RBC # BLD AUTO: 3.77 10*6/MM3 (ref 3.77–5.28)
RBC # BLD AUTO: 4.12 10*6/MM3 (ref 3.77–5.28)
RBC # UR STRIP: ABNORMAL /HPF
REF LAB TEST METHOD: ABNORMAL
SINUS: 2.9 CM
SODIUM SERPL-SCNC: 137 MMOL/L (ref 136–145)
SODIUM SERPL-SCNC: 139 MMOL/L (ref 136–145)
SODIUM SERPL-SCNC: 140 MMOL/L (ref 136–145)
SODIUM SERPL-SCNC: 141 MMOL/L (ref 136–145)
SODIUM SERPL-SCNC: 141 MMOL/L (ref 136–145)
SODIUM SERPL-SCNC: 143 MMOL/L (ref 134–144)
SODIUM SERPL-SCNC: 144 MMOL/L (ref 136–145)
SP GR UR STRIP: 1.02 (ref 1–1.03)
SQUAMOUS #/AREA URNS HPF: ABNORMAL /HPF
STJ: 3.1 CM
STRESS TARGET HR: 122 BPM
STRESS TARGET HR: 122 BPM
TRIGL SERPL-MCNC: 71 MG/DL (ref 0–149)
TROPONIN T SERPL-MCNC: 0.05 NG/ML (ref 0–0.03)
TROPONIN T SERPL-MCNC: 0.07 NG/ML (ref 0–0.03)
TROPONIN T SERPL-MCNC: 0.07 NG/ML (ref 0–0.03)
TSH SERPL DL<=0.005 MIU/L-ACNC: 1.84 UIU/ML (ref 0.45–4.5)
TSH SERPL DL<=0.05 MIU/L-ACNC: 1.8 UIU/ML (ref 0.27–4.2)
TV MEAN GRADIENT: 3 MMHG
TV PEAK GRADIENT: 6 MMHG
TV VTI: 44 CM
UROBILINOGEN UR QL STRIP: ABNORMAL
VIT B12 BLD-MCNC: 970 PG/ML (ref 211–946)
VLDLC SERPL CALC-MCNC: 13 MG/DL (ref 5–40)
WBC # BLD AUTO: 6.5 X10E3/UL (ref 3.4–10.8)
WBC # UR STRIP: ABNORMAL /HPF
WBC NRBC COR # BLD: 7.34 10*3/MM3 (ref 3.4–10.8)
WBC NRBC COR # BLD: 7.37 10*3/MM3 (ref 3.4–10.8)
WBC NRBC COR # BLD: 7.69 10*3/MM3 (ref 3.4–10.8)
WBC NRBC COR # BLD: 8.14 10*3/MM3 (ref 3.4–10.8)
WBC NRBC COR # BLD: 8.29 10*3/MM3 (ref 3.4–10.8)
WBC NRBC COR # BLD: 9.2 10*3/MM3 (ref 3.4–10.8)
WHOLE BLOOD HOLD COAG: NORMAL
WHOLE BLOOD HOLD SPECIMEN: NORMAL

## 2022-01-01 PROCEDURE — 85027 COMPLETE CBC AUTOMATED: CPT | Performed by: INTERNAL MEDICINE

## 2022-01-01 PROCEDURE — 1159F MED LIST DOCD IN RCRD: CPT | Performed by: FAMILY MEDICINE

## 2022-01-01 PROCEDURE — 87340 HEPATITIS B SURFACE AG IA: CPT | Performed by: INTERNAL MEDICINE

## 2022-01-01 PROCEDURE — 85610 PROTHROMBIN TIME: CPT | Performed by: INTERNAL MEDICINE

## 2022-01-01 PROCEDURE — 99222 1ST HOSP IP/OBS MODERATE 55: CPT

## 2022-01-01 PROCEDURE — 73030 X-RAY EXAM OF SHOULDER: CPT | Performed by: ORTHOPAEDIC SURGERY

## 2022-01-01 PROCEDURE — 1170F FXNL STATUS ASSESSED: CPT | Performed by: FAMILY MEDICINE

## 2022-01-01 PROCEDURE — 99233 SBSQ HOSP IP/OBS HIGH 50: CPT

## 2022-01-01 PROCEDURE — 99232 SBSQ HOSP IP/OBS MODERATE 35: CPT | Performed by: INTERNAL MEDICINE

## 2022-01-01 PROCEDURE — 99285 EMERGENCY DEPT VISIT HI MDM: CPT

## 2022-01-01 PROCEDURE — 85025 COMPLETE CBC W/AUTO DIFF WBC: CPT

## 2022-01-01 PROCEDURE — 80048 BASIC METABOLIC PNL TOTAL CA: CPT | Performed by: HOSPITALIST

## 2022-01-01 PROCEDURE — 20610 DRAIN/INJ JOINT/BURSA W/O US: CPT | Performed by: ORTHOPAEDIC SURGERY

## 2022-01-01 PROCEDURE — 93010 ELECTROCARDIOGRAM REPORT: CPT | Performed by: INTERNAL MEDICINE

## 2022-01-01 PROCEDURE — 36415 COLL VENOUS BLD VENIPUNCTURE: CPT

## 2022-01-01 PROCEDURE — 25010000002 DIGOXIN PER 500 MCG: Performed by: INTERNAL MEDICINE

## 2022-01-01 PROCEDURE — 80048 BASIC METABOLIC PNL TOTAL CA: CPT | Performed by: INTERNAL MEDICINE

## 2022-01-01 PROCEDURE — G0378 HOSPITAL OBSERVATION PER HR: HCPCS

## 2022-01-01 PROCEDURE — 25010000002 DIGOXIN PER 500 MCG: Performed by: EMERGENCY MEDICINE

## 2022-01-01 PROCEDURE — 82746 ASSAY OF FOLIC ACID SERUM: CPT | Performed by: HOSPITALIST

## 2022-01-01 PROCEDURE — 80053 COMPREHEN METABOLIC PANEL: CPT

## 2022-01-01 PROCEDURE — 93971 EXTREMITY STUDY: CPT

## 2022-01-01 PROCEDURE — 99214 OFFICE O/P EST MOD 30 MIN: CPT | Performed by: NURSE PRACTITIONER

## 2022-01-01 PROCEDURE — 93000 ELECTROCARDIOGRAM COMPLETE: CPT | Performed by: NURSE PRACTITIONER

## 2022-01-01 PROCEDURE — 82607 VITAMIN B-12: CPT | Performed by: HOSPITALIST

## 2022-01-01 PROCEDURE — 87086 URINE CULTURE/COLONY COUNT: CPT | Performed by: HOSPITALIST

## 2022-01-01 PROCEDURE — 93005 ELECTROCARDIOGRAM TRACING: CPT

## 2022-01-01 PROCEDURE — 84484 ASSAY OF TROPONIN QUANT: CPT | Performed by: EMERGENCY MEDICINE

## 2022-01-01 PROCEDURE — 93306 TTE W/DOPPLER COMPLETE: CPT | Performed by: INTERNAL MEDICINE

## 2022-01-01 PROCEDURE — 5A1D70Z PERFORMANCE OF URINARY FILTRATION, INTERMITTENT, LESS THAN 6 HOURS PER DAY: ICD-10-PCS | Performed by: HOSPITALIST

## 2022-01-01 PROCEDURE — 85025 COMPLETE CBC W/AUTO DIFF WBC: CPT | Performed by: HOSPITALIST

## 2022-01-01 PROCEDURE — 93306 TTE W/DOPPLER COMPLETE: CPT

## 2022-01-01 PROCEDURE — 71046 X-RAY EXAM CHEST 2 VIEWS: CPT

## 2022-01-01 PROCEDURE — 84484 ASSAY OF TROPONIN QUANT: CPT | Performed by: INTERNAL MEDICINE

## 2022-01-01 PROCEDURE — 87340 HEPATITIS B SURFACE AG IA: CPT | Performed by: HOSPITALIST

## 2022-01-01 PROCEDURE — 99215 OFFICE O/P EST HI 40 MIN: CPT | Performed by: NURSE PRACTITIONER

## 2022-01-01 PROCEDURE — 1125F AMNT PAIN NOTED PAIN PRSNT: CPT | Performed by: FAMILY MEDICINE

## 2022-01-01 PROCEDURE — 93005 ELECTROCARDIOGRAM TRACING: CPT | Performed by: EMERGENCY MEDICINE

## 2022-01-01 PROCEDURE — 99231 SBSQ HOSP IP/OBS SF/LOW 25: CPT | Performed by: INTERNAL MEDICINE

## 2022-01-01 PROCEDURE — G0439 PPPS, SUBSEQ VISIT: HCPCS | Performed by: FAMILY MEDICINE

## 2022-01-01 PROCEDURE — 81001 URINALYSIS AUTO W/SCOPE: CPT | Performed by: HOSPITALIST

## 2022-01-01 PROCEDURE — 84443 ASSAY THYROID STIM HORMONE: CPT | Performed by: HOSPITALIST

## 2022-01-01 PROCEDURE — 97110 THERAPEUTIC EXERCISES: CPT

## 2022-01-01 PROCEDURE — 85610 PROTHROMBIN TIME: CPT | Performed by: EMERGENCY MEDICINE

## 2022-01-01 PROCEDURE — 99282 EMERGENCY DEPT VISIT SF MDM: CPT

## 2022-01-01 PROCEDURE — 99222 1ST HOSP IP/OBS MODERATE 55: CPT | Performed by: INTERNAL MEDICINE

## 2022-01-01 PROCEDURE — 84484 ASSAY OF TROPONIN QUANT: CPT

## 2022-01-01 PROCEDURE — 25010000002 CEFTRIAXONE PER 250 MG: Performed by: INTERNAL MEDICINE

## 2022-01-01 PROCEDURE — 97162 PT EVAL MOD COMPLEX 30 MIN: CPT

## 2022-01-01 PROCEDURE — 85025 COMPLETE CBC W/AUTO DIFF WBC: CPT | Performed by: INTERNAL MEDICINE

## 2022-01-01 PROCEDURE — 80053 COMPREHEN METABOLIC PANEL: CPT | Performed by: INTERNAL MEDICINE

## 2022-01-01 RX ORDER — ACETAMINOPHEN 325 MG/1
650 TABLET ORAL EVERY 4 HOURS PRN
Status: DISCONTINUED | OUTPATIENT
Start: 2022-01-01 | End: 2022-01-01 | Stop reason: HOSPADM

## 2022-01-01 RX ORDER — METHYLPREDNISOLONE ACETATE 80 MG/ML
80 INJECTION, SUSPENSION INTRA-ARTICULAR; INTRALESIONAL; INTRAMUSCULAR; SOFT TISSUE
Status: COMPLETED | OUTPATIENT
Start: 2022-01-01 | End: 2022-01-01

## 2022-01-01 RX ORDER — CEFDINIR 300 MG/1
300 CAPSULE ORAL
Status: COMPLETED | OUTPATIENT
Start: 2022-01-01 | End: 2022-01-01

## 2022-01-01 RX ORDER — MIDODRINE HYDROCHLORIDE 2.5 MG/1
2.5 TABLET ORAL
Qty: 270 TABLET | Refills: 3 | Status: SHIPPED | OUTPATIENT
Start: 2022-01-01 | End: 2022-01-01 | Stop reason: HOSPADM

## 2022-01-01 RX ORDER — MIDODRINE HYDROCHLORIDE 10 MG/1
10 TABLET ORAL
Qty: 90 TABLET | Refills: 0 | Status: SHIPPED | OUTPATIENT
Start: 2022-01-01 | End: 2022-01-01 | Stop reason: SDUPTHER

## 2022-01-01 RX ORDER — ALBUMIN (HUMAN) 12.5 G/50ML
12.5 SOLUTION INTRAVENOUS AS NEEDED
Status: CANCELLED | OUTPATIENT
Start: 2022-01-01 | End: 2022-01-01

## 2022-01-01 RX ORDER — SODIUM CHLORIDE 0.9 % (FLUSH) 0.9 %
10 SYRINGE (ML) INJECTION EVERY 12 HOURS SCHEDULED
Status: DISCONTINUED | OUTPATIENT
Start: 2022-01-01 | End: 2022-01-01 | Stop reason: HOSPADM

## 2022-01-01 RX ORDER — ESCITALOPRAM OXALATE 10 MG/1
TABLET ORAL
Qty: 30 TABLET | Refills: 0 | Status: SHIPPED | OUTPATIENT
Start: 2022-01-01 | End: 2022-01-01 | Stop reason: SDUPTHER

## 2022-01-01 RX ORDER — TRIAMCINOLONE ACETONIDE 40 MG/ML
40 INJECTION, SUSPENSION INTRA-ARTICULAR; INTRAMUSCULAR
Status: COMPLETED | OUTPATIENT
Start: 2022-01-01 | End: 2022-01-01

## 2022-01-01 RX ORDER — WARFARIN SODIUM 1 MG/1
0.5 TABLET ORAL TAKE AS DIRECTED
Status: DISCONTINUED | OUTPATIENT
Start: 2022-01-01 | End: 2022-01-01

## 2022-01-01 RX ORDER — DIGOXIN 0.25 MG/ML
250 INJECTION INTRAMUSCULAR; INTRAVENOUS ONCE
Status: COMPLETED | OUTPATIENT
Start: 2022-01-01 | End: 2022-01-01

## 2022-01-01 RX ORDER — DILTIAZEM HYDROCHLORIDE 60 MG/1
60 TABLET, FILM COATED ORAL EVERY 8 HOURS SCHEDULED
Status: DISCONTINUED | OUTPATIENT
Start: 2022-01-01 | End: 2022-01-01

## 2022-01-01 RX ORDER — DILTIAZEM HYDROCHLORIDE 180 MG/1
180 CAPSULE, COATED, EXTENDED RELEASE ORAL
Qty: 90 CAPSULE | Refills: 1 | Status: SHIPPED | OUTPATIENT
Start: 2022-01-01

## 2022-01-01 RX ORDER — PANTOPRAZOLE SODIUM 40 MG/1
40 TABLET, DELAYED RELEASE ORAL
Status: DISCONTINUED | OUTPATIENT
Start: 2022-01-01 | End: 2022-01-01 | Stop reason: HOSPADM

## 2022-01-01 RX ORDER — SODIUM CHLORIDE 0.9 % (FLUSH) 0.9 %
10 SYRINGE (ML) INJECTION AS NEEDED
Status: DISCONTINUED | OUTPATIENT
Start: 2022-01-01 | End: 2022-01-01 | Stop reason: HOSPADM

## 2022-01-01 RX ORDER — ACETAMINOPHEN 325 MG/1
650 TABLET ORAL 3 TIMES DAILY
Status: DISCONTINUED | OUTPATIENT
Start: 2022-01-01 | End: 2022-01-01 | Stop reason: HOSPADM

## 2022-01-01 RX ORDER — L.ACID,PARA/B.BIFIDUM/S.THERM 8B CELL
1 CAPSULE ORAL EVERY MORNING
Status: DISCONTINUED | OUTPATIENT
Start: 2022-01-01 | End: 2022-01-01 | Stop reason: HOSPADM

## 2022-01-01 RX ORDER — ESCITALOPRAM OXALATE 10 MG/1
10 TABLET ORAL DAILY
Qty: 90 TABLET | Refills: 3 | Status: SHIPPED | OUTPATIENT
Start: 2022-01-01

## 2022-01-01 RX ORDER — BUSPIRONE HYDROCHLORIDE 10 MG/1
10 TABLET ORAL 2 TIMES DAILY
Status: DISCONTINUED | OUTPATIENT
Start: 2022-01-01 | End: 2022-01-01 | Stop reason: HOSPADM

## 2022-01-01 RX ORDER — WARFARIN SODIUM 3 MG/1
3 TABLET ORAL
Status: DISCONTINUED | OUTPATIENT
Start: 2022-01-01 | End: 2022-01-01 | Stop reason: HOSPADM

## 2022-01-01 RX ORDER — DILTIAZEM HYDROCHLORIDE 180 MG/1
180 CAPSULE, COATED, EXTENDED RELEASE ORAL
Status: DISCONTINUED | OUTPATIENT
Start: 2022-01-01 | End: 2022-01-01 | Stop reason: HOSPADM

## 2022-01-01 RX ORDER — LIDOCAINE HYDROCHLORIDE 20 MG/ML
4 INJECTION, SOLUTION EPIDURAL; INFILTRATION; INTRACAUDAL; PERINEURAL
Status: COMPLETED | OUTPATIENT
Start: 2022-01-01 | End: 2022-01-01

## 2022-01-01 RX ORDER — PANTOPRAZOLE SODIUM 40 MG/1
40 TABLET, DELAYED RELEASE ORAL
Qty: 90 TABLET | Refills: 3 | Status: SHIPPED | OUTPATIENT
Start: 2022-01-01

## 2022-01-01 RX ORDER — ONDANSETRON 2 MG/ML
4 INJECTION INTRAMUSCULAR; INTRAVENOUS EVERY 6 HOURS PRN
Status: DISCONTINUED | OUTPATIENT
Start: 2022-01-01 | End: 2022-01-01 | Stop reason: HOSPADM

## 2022-01-01 RX ORDER — ESCITALOPRAM OXALATE 10 MG/1
TABLET ORAL
Qty: 30 TABLET | Refills: 0 | OUTPATIENT
Start: 2022-01-01

## 2022-01-01 RX ORDER — MIDODRINE HYDROCHLORIDE 5 MG/1
5 TABLET ORAL
Status: DISCONTINUED | OUTPATIENT
Start: 2022-01-01 | End: 2022-01-01 | Stop reason: SDUPTHER

## 2022-01-01 RX ORDER — DILTIAZEM HYDROCHLORIDE 180 MG/1
180 CAPSULE, COATED, EXTENDED RELEASE ORAL
Qty: 30 CAPSULE | Refills: 0 | Status: SHIPPED | OUTPATIENT
Start: 2022-01-01 | End: 2022-01-01 | Stop reason: SDUPTHER

## 2022-01-01 RX ORDER — DILTIAZEM HCL IN NACL,ISO-OSM 125 MG/125
5-15 PLASTIC BAG, INJECTION (ML) INTRAVENOUS
Status: DISCONTINUED | OUTPATIENT
Start: 2022-01-01 | End: 2022-01-01

## 2022-01-01 RX ORDER — ROPINIROLE 0.5 MG/1
0.25 TABLET, FILM COATED ORAL NIGHTLY
Status: DISCONTINUED | OUTPATIENT
Start: 2022-01-01 | End: 2022-01-01 | Stop reason: HOSPADM

## 2022-01-01 RX ORDER — WARFARIN SODIUM 2 MG/1
TABLET ORAL
Qty: 120 TABLET | Refills: 1 | Status: SHIPPED | OUTPATIENT
Start: 2022-01-01

## 2022-01-01 RX ORDER — PANTOPRAZOLE SODIUM 40 MG/1
40 TABLET, DELAYED RELEASE ORAL
Qty: 30 TABLET | Refills: 4 | Status: SHIPPED | OUTPATIENT
Start: 2022-01-01 | End: 2022-01-01 | Stop reason: SDUPTHER

## 2022-01-01 RX ORDER — WARFARIN SODIUM 3 MG/1
3 TABLET ORAL
Status: DISCONTINUED | OUTPATIENT
Start: 2022-01-01 | End: 2022-01-01 | Stop reason: DRUGHIGH

## 2022-01-01 RX ORDER — ESCITALOPRAM OXALATE 10 MG/1
10 TABLET ORAL DAILY
Status: DISCONTINUED | OUTPATIENT
Start: 2022-01-01 | End: 2022-01-01 | Stop reason: HOSPADM

## 2022-01-01 RX ORDER — ALBUMIN (HUMAN) 12.5 G/50ML
12.5 SOLUTION INTRAVENOUS AS NEEDED
Status: ACTIVE | OUTPATIENT
Start: 2022-01-01 | End: 2022-01-01

## 2022-01-01 RX ORDER — LIDOCAINE HYDROCHLORIDE 10 MG/ML
2 INJECTION, SOLUTION EPIDURAL; INFILTRATION; INTRACAUDAL; PERINEURAL
Status: COMPLETED | OUTPATIENT
Start: 2022-01-01 | End: 2022-01-01

## 2022-01-01 RX ORDER — BUSPIRONE HYDROCHLORIDE 10 MG/1
10 TABLET ORAL 2 TIMES DAILY
Qty: 180 TABLET | Refills: 3 | Status: SHIPPED | OUTPATIENT
Start: 2022-01-01

## 2022-01-01 RX ORDER — CHOLECALCIFEROL (VITAMIN D3) 125 MCG
5 CAPSULE ORAL NIGHTLY PRN
Status: DISCONTINUED | OUTPATIENT
Start: 2022-01-01 | End: 2022-01-01 | Stop reason: HOSPADM

## 2022-01-01 RX ORDER — WARFARIN SODIUM 3 MG/1
3 TABLET ORAL ONCE
Status: COMPLETED | OUTPATIENT
Start: 2022-01-01 | End: 2022-01-01

## 2022-01-01 RX ORDER — MIDODRINE HYDROCHLORIDE 10 MG/1
10 TABLET ORAL
Qty: 270 TABLET | Refills: 1 | Status: SHIPPED | OUTPATIENT
Start: 2022-01-01

## 2022-01-01 RX ORDER — ASPIRIN 325 MG
325 TABLET ORAL ONCE
Status: COMPLETED | OUTPATIENT
Start: 2022-01-01 | End: 2022-01-01

## 2022-01-01 RX ORDER — WARFARIN SODIUM 3 MG/1
3 TABLET ORAL ONCE
Status: DISCONTINUED | OUTPATIENT
Start: 2022-01-01 | End: 2022-01-01

## 2022-01-01 RX ORDER — WARFARIN SODIUM 2 MG/1
2 TABLET ORAL
Status: COMPLETED | OUTPATIENT
Start: 2022-01-01 | End: 2022-01-01

## 2022-01-01 RX ORDER — ACETAMINOPHEN 650 MG/1
650 SUPPOSITORY RECTAL EVERY 4 HOURS PRN
Status: DISCONTINUED | OUTPATIENT
Start: 2022-01-01 | End: 2022-01-01 | Stop reason: HOSPADM

## 2022-01-01 RX ORDER — HYDROCORTISONE ACETATE 25 MG/1
25 SUPPOSITORY RECTAL 2 TIMES DAILY
Status: DISCONTINUED | OUTPATIENT
Start: 2022-01-01 | End: 2022-01-01

## 2022-01-01 RX ORDER — MIDODRINE HYDROCHLORIDE 5 MG/1
10 TABLET ORAL
Status: DISCONTINUED | OUTPATIENT
Start: 2022-01-01 | End: 2022-01-01 | Stop reason: HOSPADM

## 2022-01-01 RX ORDER — MIDODRINE HYDROCHLORIDE 2.5 MG/1
2.5 TABLET ORAL
Status: DISCONTINUED | OUTPATIENT
Start: 2022-01-01 | End: 2022-01-01

## 2022-01-01 RX ORDER — NITROGLYCERIN 0.4 MG/1
0.4 TABLET SUBLINGUAL
Status: DISCONTINUED | OUTPATIENT
Start: 2022-01-01 | End: 2022-01-01 | Stop reason: HOSPADM

## 2022-01-01 RX ORDER — WARFARIN SODIUM 2 MG/1
2 TABLET ORAL
Status: DISCONTINUED | OUTPATIENT
Start: 2022-01-01 | End: 2022-01-01 | Stop reason: DRUGHIGH

## 2022-01-01 RX ORDER — ACETAMINOPHEN 160 MG/5ML
650 SOLUTION ORAL EVERY 4 HOURS PRN
Status: DISCONTINUED | OUTPATIENT
Start: 2022-01-01 | End: 2022-01-01 | Stop reason: HOSPADM

## 2022-01-01 RX ADMIN — BUSPIRONE HYDROCHLORIDE 10 MG: 10 TABLET ORAL at 08:34

## 2022-01-01 RX ADMIN — LIDOCAINE HYDROCHLORIDE 2 ML: 10 INJECTION, SOLUTION EPIDURAL; INFILTRATION; INTRACAUDAL; PERINEURAL at 12:29

## 2022-01-01 RX ADMIN — PANTOPRAZOLE SODIUM 40 MG: 40 TABLET, DELAYED RELEASE ORAL at 06:34

## 2022-01-01 RX ADMIN — DILTIAZEM HYDROCHLORIDE 30 MG: 30 TABLET, FILM COATED ORAL at 23:20

## 2022-01-01 RX ADMIN — Medication 5 MG/HR: at 16:20

## 2022-01-01 RX ADMIN — PANTOPRAZOLE SODIUM 40 MG: 40 TABLET, DELAYED RELEASE ORAL at 06:39

## 2022-01-01 RX ADMIN — BUSPIRONE HYDROCHLORIDE 10 MG: 10 TABLET ORAL at 09:22

## 2022-01-01 RX ADMIN — TRIAMCINOLONE ACETONIDE 40 MG: 40 INJECTION, SUSPENSION INTRA-ARTICULAR; INTRAMUSCULAR at 12:18

## 2022-01-01 RX ADMIN — ACETAMINOPHEN 650 MG: 325 TABLET, FILM COATED ORAL at 20:05

## 2022-01-01 RX ADMIN — Medication 1 CAPSULE: at 06:34

## 2022-01-01 RX ADMIN — Medication 10 ML: at 08:35

## 2022-01-01 RX ADMIN — BUSPIRONE HYDROCHLORIDE 10 MG: 10 TABLET ORAL at 20:05

## 2022-01-01 RX ADMIN — Medication 5 MG: at 21:12

## 2022-01-01 RX ADMIN — Medication 5 MG: at 22:24

## 2022-01-01 RX ADMIN — DILTIAZEM HYDROCHLORIDE 180 MG: 180 CAPSULE, COATED, EXTENDED RELEASE ORAL at 08:26

## 2022-01-01 RX ADMIN — DIGOXIN 250 MCG: 0.25 INJECTION INTRAMUSCULAR; INTRAVENOUS at 23:37

## 2022-01-01 RX ADMIN — ASPIRIN 325 MG: 325 TABLET ORAL at 16:20

## 2022-01-01 RX ADMIN — MIDODRINE HYDROCHLORIDE 10 MG: 5 TABLET ORAL at 06:38

## 2022-01-01 RX ADMIN — DILTIAZEM HYDROCHLORIDE 30 MG: 30 TABLET, FILM COATED ORAL at 14:27

## 2022-01-01 RX ADMIN — Medication 1 CAPSULE: at 05:29

## 2022-01-01 RX ADMIN — METOPROLOL TARTRATE 25 MG: 25 TABLET, FILM COATED ORAL at 18:21

## 2022-01-01 RX ADMIN — ACETAMINOPHEN 650 MG: 325 TABLET, FILM COATED ORAL at 00:35

## 2022-01-01 RX ADMIN — WARFARIN 2 MG: 2 TABLET ORAL at 02:45

## 2022-01-01 RX ADMIN — MIDODRINE HYDROCHLORIDE 10 MG: 5 TABLET ORAL at 09:22

## 2022-01-01 RX ADMIN — DIGOXIN 250 MCG: 250 INJECTION, SOLUTION INTRAMUSCULAR; INTRAVENOUS at 17:00

## 2022-01-01 RX ADMIN — PANTOPRAZOLE SODIUM 40 MG: 40 TABLET, DELAYED RELEASE ORAL at 08:28

## 2022-01-01 RX ADMIN — METOPROLOL TARTRATE 25 MG: 25 TABLET, FILM COATED ORAL at 18:25

## 2022-01-01 RX ADMIN — ACETAMINOPHEN 650 MG: 325 TABLET, FILM COATED ORAL at 02:42

## 2022-01-01 RX ADMIN — PANTOPRAZOLE SODIUM 40 MG: 40 TABLET, DELAYED RELEASE ORAL at 07:15

## 2022-01-01 RX ADMIN — METOPROLOL TARTRATE 25 MG: 25 TABLET, FILM COATED ORAL at 13:40

## 2022-01-01 RX ADMIN — ACETAMINOPHEN 650 MG: 325 TABLET, FILM COATED ORAL at 14:28

## 2022-01-01 RX ADMIN — PANTOPRAZOLE SODIUM 40 MG: 40 TABLET, DELAYED RELEASE ORAL at 05:28

## 2022-01-01 RX ADMIN — CEFDINIR 300 MG: 300 CAPSULE ORAL at 12:00

## 2022-01-01 RX ADMIN — METOPROLOL TARTRATE 25 MG: 25 TABLET, FILM COATED ORAL at 01:46

## 2022-01-01 RX ADMIN — BUSPIRONE HYDROCHLORIDE 10 MG: 10 TABLET ORAL at 21:06

## 2022-01-01 RX ADMIN — METHYLPREDNISOLONE ACETATE 80 MG: 80 INJECTION, SUSPENSION INTRA-ARTICULAR; INTRALESIONAL; INTRAMUSCULAR; SOFT TISSUE at 13:09

## 2022-01-01 RX ADMIN — METOPROLOL TARTRATE 25 MG: 25 TABLET, FILM COATED ORAL at 05:28

## 2022-01-01 RX ADMIN — CEFTRIAXONE SODIUM 1 G: 1 INJECTION, POWDER, FOR SOLUTION INTRAMUSCULAR; INTRAVENOUS at 18:22

## 2022-01-01 RX ADMIN — ESCITALOPRAM 10 MG: 10 TABLET, FILM COATED ORAL at 08:18

## 2022-01-01 RX ADMIN — LIDOCAINE HYDROCHLORIDE 2 ML: 10 INJECTION, SOLUTION EPIDURAL; INFILTRATION; INTRACAUDAL; PERINEURAL at 12:31

## 2022-01-01 RX ADMIN — TRIAMCINOLONE ACETONIDE 40 MG: 40 INJECTION, SUSPENSION INTRA-ARTICULAR; INTRAMUSCULAR at 12:31

## 2022-01-01 RX ADMIN — ACETAMINOPHEN 650 MG: 325 TABLET, FILM COATED ORAL at 00:39

## 2022-01-01 RX ADMIN — TRIAMCINOLONE ACETONIDE 40 MG: 40 INJECTION, SUSPENSION INTRA-ARTICULAR; INTRAMUSCULAR at 12:29

## 2022-01-01 RX ADMIN — ACETAMINOPHEN 650 MG: 325 TABLET, FILM COATED ORAL at 01:47

## 2022-01-01 RX ADMIN — MIDODRINE HYDROCHLORIDE 10 MG: 5 TABLET ORAL at 11:50

## 2022-01-01 RX ADMIN — METOPROLOL TARTRATE 25 MG: 25 TABLET, FILM COATED ORAL at 14:28

## 2022-01-01 RX ADMIN — MIDODRINE HYDROCHLORIDE 10 MG: 5 TABLET ORAL at 11:29

## 2022-01-01 RX ADMIN — BUSPIRONE HYDROCHLORIDE 10 MG: 10 TABLET ORAL at 08:17

## 2022-01-01 RX ADMIN — DILTIAZEM HYDROCHLORIDE 60 MG: 60 TABLET, FILM COATED ORAL at 05:28

## 2022-01-01 RX ADMIN — ESCITALOPRAM 10 MG: 10 TABLET, FILM COATED ORAL at 08:26

## 2022-01-01 RX ADMIN — ACETAMINOPHEN 650 MG: 325 TABLET, FILM COATED ORAL at 21:06

## 2022-01-01 RX ADMIN — WARFARIN 3 MG: 3 TABLET ORAL at 18:25

## 2022-01-01 RX ADMIN — MIDODRINE HYDROCHLORIDE 10 MG: 5 TABLET ORAL at 16:41

## 2022-01-01 RX ADMIN — WARFARIN 2 MG: 2 TABLET ORAL at 18:22

## 2022-01-01 RX ADMIN — ROPINIROLE HYDROCHLORIDE 0.25 MG: 0.5 TABLET, FILM COATED ORAL at 20:05

## 2022-01-01 RX ADMIN — BUSPIRONE HYDROCHLORIDE 10 MG: 10 TABLET ORAL at 09:49

## 2022-01-01 RX ADMIN — Medication 10 ML: at 09:24

## 2022-01-01 RX ADMIN — METOPROLOL TARTRATE 25 MG: 25 TABLET, FILM COATED ORAL at 03:09

## 2022-01-01 RX ADMIN — METOPROLOL TARTRATE 25 MG: 25 TABLET, FILM COATED ORAL at 02:44

## 2022-01-01 RX ADMIN — Medication 5 MG: at 20:47

## 2022-01-01 RX ADMIN — ACETAMINOPHEN 650 MG: 325 TABLET, FILM COATED ORAL at 12:00

## 2022-01-01 RX ADMIN — DILTIAZEM HYDROCHLORIDE 60 MG: 60 TABLET, FILM COATED ORAL at 20:44

## 2022-01-01 RX ADMIN — ROPINIROLE HYDROCHLORIDE 0.25 MG: 0.5 TABLET, FILM COATED ORAL at 21:09

## 2022-01-01 RX ADMIN — ROPINIROLE HYDROCHLORIDE 0.25 MG: 0.5 TABLET, FILM COATED ORAL at 22:20

## 2022-01-01 RX ADMIN — CEFDINIR 300 MG: 300 CAPSULE ORAL at 08:26

## 2022-01-01 RX ADMIN — Medication 10 ML: at 22:22

## 2022-01-01 RX ADMIN — Medication 10 ML: at 20:06

## 2022-01-01 RX ADMIN — LIDOCAINE HYDROCHLORIDE 4 ML: 20 INJECTION, SOLUTION EPIDURAL; INFILTRATION; INTRACAUDAL; PERINEURAL at 13:09

## 2022-01-01 RX ADMIN — Medication 10 ML: at 00:42

## 2022-01-01 RX ADMIN — DILTIAZEM HYDROCHLORIDE 30 MG: 30 TABLET, FILM COATED ORAL at 06:39

## 2022-01-01 RX ADMIN — DILTIAZEM HYDROCHLORIDE 180 MG: 180 CAPSULE, COATED, EXTENDED RELEASE ORAL at 12:00

## 2022-01-01 RX ADMIN — BUSPIRONE HYDROCHLORIDE 10 MG: 10 TABLET ORAL at 08:26

## 2022-01-01 RX ADMIN — MIDODRINE HYDROCHLORIDE 10 MG: 5 TABLET ORAL at 17:36

## 2022-01-01 RX ADMIN — Medication 5 MG: at 20:05

## 2022-01-01 RX ADMIN — MIDODRINE HYDROCHLORIDE 10 MG: 5 TABLET ORAL at 08:26

## 2022-01-01 RX ADMIN — MIDODRINE HYDROCHLORIDE 5 MG: 5 TABLET ORAL at 11:22

## 2022-01-01 RX ADMIN — BUSPIRONE HYDROCHLORIDE 10 MG: 10 TABLET ORAL at 22:19

## 2022-01-01 RX ADMIN — LIDOCAINE HYDROCHLORIDE 4 ML: 20 INJECTION, SOLUTION EPIDURAL; INFILTRATION; INTRACAUDAL; PERINEURAL at 12:29

## 2022-01-01 RX ADMIN — ESCITALOPRAM 10 MG: 10 TABLET, FILM COATED ORAL at 09:49

## 2022-01-01 RX ADMIN — MIDODRINE HYDROCHLORIDE 2.5 MG: 5 TABLET ORAL at 11:22

## 2022-01-01 RX ADMIN — Medication 1 CAPSULE: at 07:16

## 2022-01-01 RX ADMIN — MIDODRINE HYDROCHLORIDE 7.5 MG: 5 TABLET ORAL at 18:25

## 2022-01-01 RX ADMIN — MIDODRINE HYDROCHLORIDE 10 MG: 5 TABLET ORAL at 11:09

## 2022-01-01 RX ADMIN — Medication 10 ML: at 08:18

## 2022-01-01 RX ADMIN — MIDODRINE HYDROCHLORIDE 2.5 MG: 5 TABLET ORAL at 07:22

## 2022-01-01 RX ADMIN — METOPROLOL TARTRATE 25 MG: 25 TABLET, FILM COATED ORAL at 02:42

## 2022-01-01 RX ADMIN — ROPINIROLE HYDROCHLORIDE 0.25 MG: 0.5 TABLET, FILM COATED ORAL at 20:43

## 2022-01-01 RX ADMIN — Medication 10 ML: at 21:06

## 2022-01-01 RX ADMIN — MIDODRINE HYDROCHLORIDE 10 MG: 5 TABLET ORAL at 18:22

## 2022-01-01 RX ADMIN — WARFARIN 3 MG: 3 TABLET ORAL at 17:36

## 2022-01-01 RX ADMIN — Medication 1 CAPSULE: at 08:26

## 2022-01-01 RX ADMIN — ACETAMINOPHEN 650 MG: 325 TABLET, FILM COATED ORAL at 18:21

## 2022-01-01 RX ADMIN — BUSPIRONE HYDROCHLORIDE 10 MG: 10 TABLET ORAL at 20:43

## 2022-01-01 RX ADMIN — Medication 10 ML: at 20:43

## 2022-01-01 RX ADMIN — MIDODRINE HYDROCHLORIDE 7.5 MG: 5 TABLET ORAL at 06:34

## 2022-01-01 RX ADMIN — Medication 10 ML: at 08:26

## 2022-01-01 RX ADMIN — MIDODRINE HYDROCHLORIDE 10 MG: 5 TABLET ORAL at 12:00

## 2022-01-01 RX ADMIN — ESCITALOPRAM 10 MG: 10 TABLET, FILM COATED ORAL at 09:22

## 2022-01-01 RX ADMIN — Medication 1 CAPSULE: at 06:38

## 2022-01-01 RX ADMIN — ESCITALOPRAM 10 MG: 10 TABLET, FILM COATED ORAL at 08:35

## 2022-01-01 RX ADMIN — MIDODRINE HYDROCHLORIDE 5 MG: 5 TABLET ORAL at 07:20

## 2022-01-10 NOTE — PROGRESS NOTES
Anticoagulation Clinic Progress Note    Anticoagulation Summary  As of 1/10/2022    INR goal:  2.0-3.0   TTR:  57.3 % (2.5 y)   INR used for dosing:  3.13 (1/5/2022)   Warfarin maintenance plan:  2 mg every Sun, Tue, Thu; 3 mg all other days   Weekly warfarin total:  18 mg   Plan last modified:  Jalen Temple, PharmD (4/16/2021)   Next INR check:  1/12/2022   Priority:  High   Target end date:      Indications    Paroxysmal atrial fibrillation (HCC) [I48.0]             Anticoagulation Episode Summary     INR check location:      Preferred lab:      Send INR reminders to:   [facility]  POOL    Comments:  Lab drawn at dialysis ([facility]) - [facility] [phone]       Anticoagulation Care Providers     Provider Role Specialty Phone number    Shiloh Gonzales APRN Referring Cardiology [phone]          Clinic Interview:  Patient Findings     Negatives:  Signs/symptoms of thrombosis, Signs/symptoms of bleeding,   Laboratory test error suspected, Change in health, Change in alcohol use,   Change in activity, Upcoming invasive procedure, Emergency department   visit, Upcoming dental procedure, Missed doses, Extra doses, Change in   medications, Change in diet/appetite, Hospital admission, Bruising, Other   complaints      Clinical Outcomes     Negatives:  Major bleeding event, Thromboembolic event,   Anticoagulation-related hospital admission, Anticoagulation-related ED   visit, Anticoagulation-related fatality        INR History:  Anticoagulation Monitoring 11/5/2021 12/23/2021 1/10/2022   INR 2.75 3.10 3.13   INR Date 11/3/2021 12/22/2021 1/5/2022   INR Goal 2.0-3.0 2.0-3.0 2.0-3.0   Trend Same Same Same   Last Week Total 18 mg 18 mg 18 mg   Next Week Total 18 mg 17 mg 17 mg   Sun 2 mg 2 mg -   Mon 3 mg 3 mg 2 mg (1/10)   Tue 2 mg 2 mg 2 mg   Wed - - -   Thu - 1 mg (12/23) -   Fri 3 mg 3 mg -   Sat 3 mg 3 mg -   Visit Report - - -   Some recent data might be hidden       Plan:  1. INR is  Supratherapeutic today- see above in Anticoagulation Summary.   Will instruct Claudia ANTHONY Catalina to Change their warfarin regimen- see above in Anticoagulation Summary.  2. Follow up in 1 week from lab test   3. They have been instructed to call if any changes in medications, doses, concerns, etc. Patient expresses understanding and has no further questions at this time.    Ruma Valles, [facility]

## 2022-01-13 NOTE — PROGRESS NOTES
Anticoagulation Clinic Progress Note    Anticoagulation Summary  As of 2022    INR goal:  2.0-3.0   TTR:  57.4 % (2.5 y)   INR used for dosin.62 (2022)   Warfarin maintenance plan:  2 mg every Sun, Tue, Thu; 3 mg all other days   Weekly warfarin total:  18 mg   No change documented:  Ruma Valles RPH   Plan last modified:  Jalen Temple, PharmD (2021)   Next INR check:  2022   Priority:  High   Target end date:      Indications    Paroxysmal atrial fibrillation (HCC) [I48.0]             Anticoagulation Episode Summary     INR check location:      Preferred lab:      Send INR reminders to:  [facility]  POOL    Comments:  Lab drawn at [facility] ([facility]) - [facility] [phone]       Anticoagulation Care Providers     Provider Role Specialty Phone number    Shiloh Gonzales APRN Referring Cardiology [phone]          Clinic Interview:  Patient Findings     Negatives:  Signs/symptoms of thrombosis, Signs/symptoms of bleeding,   Laboratory test error suspected, Change in health, Change in alcohol use,   Change in activity, Upcoming invasive procedure, Emergency department   visit, Upcoming dental procedure, Missed doses, Extra doses, Change in   medications, Change in diet/appetite, Hospital admission, Bruising, Other   complaints      Clinical Outcomes     Negatives:  Major bleeding event, Thromboembolic event,   Anticoagulation-related hospital admission, Anticoagulation-related ED   visit, Anticoagulation-related fatality        INR History:  Anticoagulation Monitoring 2021 1/10/2022 2022   INR 3.10 3.13 2.62   INR Date 2021   INR Goal 2.0-3.0 2.0-3.0 2.0-3.0   Trend Same Same Same   Last Week Total 18 mg 18 mg 17 mg   Next Week Total 17 mg 17 mg 18 mg   Sun 2 mg - 2 mg   Mon 3 mg 2 mg (1/10) 3 mg   Tue 2 mg 2 mg 2 mg   Wed - - -   Thu 1 mg () - 2 mg   Fri 3 mg - 3 mg   Sat 3 mg - 3 mg   Visit Report - - -   Some recent data  might be hidden       Plan:  1. INR is Therapeutic today- see above in Anticoagulation Summary.   Will instruct Claudia Arnold to Continue their warfarin regimen- see above in Anticoagulation Summary.  2. Follow up in 1 weeks  3.  They have been instructed to call if any changes in medications, doses, concerns, etc. Patient expresses understanding and has no further questions at this time.    Ruma Valles, [facility]

## 2022-01-20 NOTE — TELEPHONE ENCOUNTER
[facility]  - [phone] -  Patient has been having increasing shoulder pain. Pt has asked to reduce PT to 1 (one ) time a week for three (3) weeks . Pt gets a cortisone injection every 3 months. KAE - Sent to Dr. Cortez to bang . No call back needed per [facility]

## 2022-01-28 NOTE — PROGRESS NOTES
Anticoagulation Clinic Progress Note    Anticoagulation Summary  As of 2022    INR goal:  2.0-3.0   TTR:  58.1 % (2.5 y)   INR used for dosin.60 (2022)   Warfarin maintenance plan:  2 mg every Sun, Tue, Thu; 3 mg all other days   Weekly warfarin total:  18 mg   No change documented:  Rody Huynh, PharmD   Plan last modified:  Jalen Temple, PharmD (2021)   Next INR check:  2/3/2022   Priority:  High   Target end date:      Indications    Paroxysmal atrial fibrillation (HCC) [I48.0]             Anticoagulation Episode Summary     INR check location:      Preferred lab:      Send INR reminders to:  [facility]  POOL    Comments:  Lab drawn at [facility] ([facility]) - [facility] [phone]       Anticoagulation Care Providers     Provider Role Specialty Phone number    Shiloh Gonzales APRN Referring Cardiology [phone]          Clinic Interview:  Patient Findings     Negatives:  Signs/symptoms of thrombosis, Signs/symptoms of bleeding,   Laboratory test error suspected, Change in health, Change in alcohol use,   Change in activity, Upcoming invasive procedure, Emergency department   visit, Upcoming dental procedure, Missed doses, Extra doses, Change in   medications, Change in diet/appetite, Hospital admission, Bruising, Other   complaints      Clinical Outcomes     Negatives:  Major bleeding event, Thromboembolic event,   Anticoagulation-related hospital admission, Anticoagulation-related ED   visit, Anticoagulation-related fatality        INR History:  Anticoagulation Monitoring 1/10/2022 2022 2022   INR 3.13 2.62 2.60   INR Date 2022   INR Goal 2.0-3.0 2.0-3.0 2.0-3.0   Trend Same Same Same   Last Week Total 18 mg 17 mg 18 mg   Next Week Total 17 mg 18 mg 18 mg   Sun - 2 mg 2 mg   Mon 2 mg (1/10) 3 mg 3 mg   Tue 2 mg 2 mg 2 mg   Wed - - 3 mg   Thu - 2 mg -   Fri - 3 mg 3 mg   Sat - 3 mg 3 mg   Visit Report - - -   Some recent data might be  hidden       Plan:  1. INR is Therapeutic today- see above in Anticoagulation Summary.   Will instruct Claudia Arnold to Continue their warfarin regimen- see above in Anticoagulation Summary.  2. Follow up in 1 week  3. Pt has agreed to only be called if INR out of range. They have been instructed to call if any changes in medications, doses, concerns, etc. Patient expresses understanding and has no further questions at this time.    Rody Huynh, PharmD

## 2022-02-04 NOTE — PROGRESS NOTES
Anticoagulation Clinic Progress Note    Anticoagulation Summary  As of 2/4/2022    INR goal:  2.0-3.0   TTR:  58.2 % (2.5 y)   INR used for dosing:  3.12 (2/2/2022)   Warfarin maintenance plan:  2 mg every Sun, Tue, Thu; 3 mg all other days   Weekly warfarin total:  18 mg   Plan last modified:  Jalen Temple, PharmD (4/16/2021)   Next INR check:  2/10/2022   Priority:  High   Target end date:      Indications    Paroxysmal atrial fibrillation (HCC) [I48.0]             Anticoagulation Episode Summary     INR check location:      Preferred lab:      Send INR reminders to:   [facility]  POOL    Comments:  Lab drawn at dialysis ([facility]) - [facility] [phone]       Anticoagulation Care Providers     Provider Role Specialty Phone number    Shiloh Gonzales APRN Referring Cardiology [phone]          Clinic Interview:  Patient Findings     Negatives:  Signs/symptoms of thrombosis, Signs/symptoms of bleeding,   Laboratory test error suspected, Change in health, Change in alcohol use,   Change in activity, Upcoming invasive procedure, Emergency department   visit, Upcoming dental procedure, Missed doses, Extra doses, Change in   medications, Change in diet/appetite, Hospital admission, Bruising, Other   complaints      Clinical Outcomes     Negatives:  Major bleeding event, Thromboembolic event,   Anticoagulation-related hospital admission, Anticoagulation-related ED   visit, Anticoagulation-related fatality        INR History:  Anticoagulation Monitoring 1/13/2022 1/28/2022 2/4/2022   INR 2.62 2.60 3.12   INR Date 1/12/2022 1/27/2022 2/2/2022   INR Goal 2.0-3.0 2.0-3.0 2.0-3.0   Trend Same Same Same   Last Week Total 17 mg 18 mg 18 mg   Next Week Total 18 mg 18 mg 17 mg   Sun 2 mg 2 mg 2 mg   Mon 3 mg 3 mg 3 mg   Tue 2 mg 2 mg 2 mg   Wed - 3 mg 3 mg   Thu 2 mg - -   Fri 3 mg 3 mg 2 mg (2/4)   Sat 3 mg 3 mg 3 mg   Visit Report - - -   Some recent data might be hidden       Plan:  1. INR is  Supratherapeutic today- see above in Anticoagulation Summary.   Will instruct Claudia Arnold to Change their warfarin regimen- see above in Anticoagulation Summary. Slight hemorrhoidal bleeding yesterday, has occurred in the past and no further bleeding has occurred   2. Follow up in 1 weeks  3. They have been instructed to call if any changes in medications, doses, concerns, etc. Patient expresses understanding and has no further questions at this time.    Ruma Valles, [facility]

## 2022-02-08 NOTE — PROGRESS NOTES
Patient: Claudia Arnold  YOB: 1945  Date of Service: 2/8/2022    Chief Complaints: Bilateral shoulder pain    Subjective:    History of Present Illness: Pt is seen in the office today with complaints of bilateral shoulder pain with known degenerative changes she gets intermittent injections and does well with those she is on dialysis and not an elective surgery candidate.          Allergies: No Known Allergies    Medications:   Home Medications:  Current Outpatient Medications on File Prior to Visit   Medication Sig   • acetaminophen (TYLENOL) 325 MG tablet Take 650 mg by mouth 3 (Three) Times a Day. Takes tid at 2 am, 1 pm, and hS   • busPIRone (BUSPAR) 10 MG tablet Take 1 tablet by mouth 2 (two) times a day.   • [facility] MELATONIN PO Take 5 mg by mouth At Night As Needed.   • escitalopram (LEXAPRO) 10 MG tablet Take 1 tablet by mouth Daily.   • hydrocortisone (ANUSOL-HC) 25 MG suppository Insert 1 suppository into the rectum 2 (Two) Times a Day.   • Iron Sucrose (VENOFER IV) 50 mg 1 (One) Time Per Week. Adjusts weekly in IV during dialysis   • metoprolol tartrate (LOPRESSOR) 25 MG tablet TAKE 1 TABLET BY MOUTH EVERY 12 HOURS   • midodrine (PROAMATINE) 5 MG tablet Take 1 tablet by mouth 3 (Three) Times a Day Before Meals.   • pantoprazole (PROTONIX) 40 MG EC tablet TAKE 1 TABLET BY MOUTH EVERY MORNING   • Probiotic Product (ALIGN PO) Take 1 capsule by mouth Every Morning.   • ProRenal + D tablet tablet Take 1 tablet by mouth Daily.   • rOPINIRole (REQUIP) 0.25 MG tablet Take 0.25 mg by mouth Every Night.   • warfarin (COUMADIN) 2 MG tablet TAKE 1 TABLET BY MOUTH EVERY SUNDAY, TUESDAY, AND THURSDAY, TAKE 1&1/2 TABLETS BY MOUTH ALL THE OTHER DAYS OF THE WEEK (Patient taking differently: Take 2 mg by mouth Every Night. TAKE 1 TABLET BY MOUTH EVERY SUNDAY, TUESDAY, AND THURSDAY, TAKE 1&1/2 TABLETS BY MOUTH ALL THE OTHER DAYS OF THE WEEK)     No current facility-administered medications on file prior to  visit.     Current Medications:  Scheduled Meds:  Continuous Infusions:No current facility-administered medications for this visit.    PRN Meds:.    I have reviewed the patient's medical history in detail and updated the computerized patient record.  Review and summarization of old records include:    Past Medical History:   Diagnosis Date   • A-fib ([facility])     Meds   • Arthritis     w/Difficult Mobility   • Bacterial infection 03/2020   • Bifascicular block    • Bilateral lower extremity edema     Legs   • CAD (coronary artery disease)     Affecting LAD    • Cardiomyopathy ([facility])    • CHF (congestive heart failure) ([facility])     CHRONIC, DIASTOLIC   • Chronic fatigue    • Chronic kidney disease    • Cystocele, midline 09/2019   • Dialysis patient ([facility])     TUESDAY THURSDAY SATURDAY   • Edema 06/2021   • End stage renal failure on dialysis ([facility])     FOLLOWED BY DR. COLLINS SPANGLER   • Gastrointestinal hemorrhage 01/11/2021    ADMITTED TO [facility]   • Generalized anxiety disorder    • GERD (gastroesophageal reflux disease)    • Glenohumeral arthritis 04/2021   • Hematochezia 08/08/2021    ADMITTED TO [facility]   • Hemorrhoids    • Hernia, inguinal     Hx Repair   • History of echocardiogram 1/17/19-[facility]    The L Ventricular Cavity is Mild-to-Moderately Dilated; Left Ventricular Wall Thickness is Consistent w/Mild Concentric Hypertrophy; Left Atrial Cavity Size is Moderate-to-Severely Dilated; Severe AVS; Severe MVS; Moderate TVR Noted   • History of transfusion     no adverse reaction   • Hyperlipidemia     Controlled w/Meds   • Hypertension     Controlled w/Meds   • Hypokalemia    • Hypokinesis 01/22/2019    Apical Noted on Cardiac Cath   • IFG (impaired fasting glucose)    • Immobility syndrome    • Iron deficiency anemia    • LAD stenosis 01/22/2019    Mid LAD Irregularities Noted on Cardiac Cath    • Left atrial dilatation 01/17/2019    Moderate-Severe Noted on Echo   • Left ventricular dilatation 01/17/2019    Noted on Echo/LEE    • Lumbar spondylosis    • Lumbar stenosis    • Mild concentric left ventricular hypertrophy 01/17/2019    Noted on Echo/LEE   • Mitral annular calcification 01/17/2019    Severe Noted on Echo   • Moderate tricuspid valve regurgitation 01/24/2019    Noted on LEE   • Morbid obesity ([facility])    • Nonrheumatic tricuspid valve regurgitation    • NSTEMI (non-ST elevated myocardial infarction) ([facility])    • OCTAVIANO (obstructive sleep apnea)    • Osteoarthritis    • Osteoarthritis of shoulder    • PAF (paroxysmal atrial fibrillation) ([facility])    • PNA (pneumonia)    • Pulmonary hypertension ([facility]) 01/22/2019    Noted on Cardiac Cath   • Right bundle branch block 01/24/2019    Noted on LEE   • Right ventricular enlargement 06/2021   • Rotator cuff tear 04/2016   • Secondary hyperparathyroidism ([facility])    • Severe aortic stenosis 01/22/2019    Noted on Cardiac Cath & LEE on 01/24/19; S/p AVR on 01/28/19 by Dr. Gaxiola   • Severe mitral valve stenosis 01/24/2019    Noted on LEE; S/p MVR on 01/28/19 by Dr. Gaxiola   • Severe muscle deconditioning    • Shoulder pain, bilateral    • Skin yeast infection     Folds Under Skin--Nystatin/Diflucan   • Tinea unguium 10/2020    BILATERAL   • Ulcer of toe ([facility])     RIGHT FOOT   • Urge incontinence    • Vaginal atrophy    • Venous insufficiency    • Vulvar cyst 09/2019        Past Surgical History:   Procedure Laterality Date   • AORTIC VALVE REPAIR/REPLACEMENT MITRAL VALVE REPAIR/REPLACEMENT N/A 1/28/2019    Procedure: LEE STERNOTOMY AORTIC VALVE REPLACEMENT, MITRAL VALVE REPLACEMENT, TRICUSPID VALVE REPAIR, MAZE PROCEDURE, CLOSURE OF LEFT ATRIAL APPENDAGE  AND PRP;  Surgeon: Scar Gaxiola MD;  Location: [facility] OR;  Service: Cardiothoracic   • ARTERIOVENOUS FISTULA/SHUNT SURGERY Left 6/26/2019    Procedure: LEFT ARM BRACHIAL CEPHALIC FISTULA;  Surgeon: Satish Stahl MD;  Location: [facility] OR;  Service: Vascular   • CARDIAC CATHETERIZATION N/A 1/22/2019    Procedure: Coronary  angiography;  Surgeon: Rick Talavera MD;  Location: [facility] CATH INVASIVE LOCATION;  Service: Cardiology   • CARDIAC CATHETERIZATION N/A 1/22/2019    Procedure: Left Heart Cath;  Surgeon: Rick Talavera MD;  Location: [facility] CATH INVASIVE LOCATION;  Service: Cardiology   • CARDIAC CATHETERIZATION N/A 1/22/2019    Procedure: Right Heart Cath;  Surgeon: Rick Talavera MD;  Location: [facility] CATH INVASIVE LOCATION;  Service: Cardiology   • CARDIAC CATHETERIZATION N/A 1/22/2019    Procedure: Left ventriculography;  Surgeon: Rick Talavera MD;  Location: [facility] CATH INVASIVE LOCATION;  Service: Cardiology   • CARDIAC SURGERY     • COLONOSCOPY N/A 1/16/2021    MULTIPLE DIVERTICULA, TORTUOUS COLON, HEMORRHOIDS, DR. JOSE TRAN AT [facility]   • COLONOSCOPY N/A 12/9/2021    Procedure: COLONOSCOPY TO CECUM WITH APC TO RIGHT COLON AVM;  Surgeon: Shiloh Pop MD;  Location: [facility] ENDOSCOPY;  Service: Gastroenterology;  Laterality: N/A;   PRE OP-ANEMIA  POST OP - AVM RIGHT COLON, DIVERTICULOSIS, HEMORRHOIDS   • ENDOSCOPY N/A 1/16/2021    DUODENITIS, GASTRITIS, BENIGN DUOFENAL POLYP, DR. JOSE TRAN AT [facility]   • ENDOSCOPY N/A 12/9/2021    Procedure: ESOPHAGOGASTRODUODENOSCOPY  WITH BIOPSIES;  Surgeon: Shiloh Pop MD;  Location: [facility] ENDOSCOPY;  Service: Gastroenterology;  Laterality: N/A;  PRE OP - ANEMIA  POST OP - ABN DUODENAL MUCOSA, GASTRITIS, HIATAL HERNIA   • HEMORRHOIDECTOMY N/A 8/12/2021    Procedure: HEMORRHOIDECTOMY x2;  Surgeon: Pennie Rider MD;  Location: [facility] MAIN OR;  Service: General;  Laterality: N/A;   • HERNIA REPAIR Left     INGUINAL   • INSERT / REPLACE / VENOUS ACCESS CATHETER N/A 03/11/2020    TUNNEL CATHETER REMOVAL, DR. SU MICHELLE   • INSERTION HEMODIALYSIS CATHETER N/A 2/8/2019    Procedure: tunnel CATHETER PLACEMENT WITH FLUROSCOPY;  Surgeon: Satish Stahl MD;  Location: [facility] MAIN OR;  Service: Vascular   • REPLACEMENT TOTAL KNEE  Left 2007   • TOTAL KNEE ARTHROPLASTY Right 2009        Social History     Occupational History   • Occupation: retired   Tobacco Use   • Smoking status: Never Smoker   • Smokeless tobacco: Never Used   Vaping Use   • Vaping Use: Never used   Substance and Sexual Activity   • Alcohol use: Yes     Alcohol/week: 1.0 standard drink     Types: 1 Cans of beer per week     Comment: 1 monthly   • Drug use: No   • Sexual activity: Defer     Birth control/protection: Post-menopausal      Social History     Social History Narrative   • Not on file        Family History   Problem Relation Age of Onset   • Hypertension Other    • Diabetes Other    • Heart disease Neg Hx    • Stroke Neg Hx    • Arthritis Neg Hx    • Breast cancer Neg Hx    • Malig Hyperthermia Neg Hx        ROS: 14 point review of systems was performed and was negative except for documented findings in HPI and today's encounter.     Allergies: No Known Allergies  Constitutional:  Denies fever, shaking or chills   Eyes:  Denies change in visual acuity   HENT:  Denies nasal congestion or sore throat   Respiratory:  Denies cough or shortness of breath   Cardiovascular:  Denies chest pain or severe LE edema   GI:  Denies abdominal pain, nausea, vomiting, bloody stools or diarrhea   Musculoskeletal:  Numbness, tingling, or loss of motor function only as noted above in history of present illness.  : Denies painful urination or hematuria  Integument:  Denies rash, lesion or ulceration   Neurologic:  Denies headache or focal weakness  Endocrine:  Denies lymphadenopathy  Psych:  Denies confusion or change in mental status   Hem:  Denies active bleeding      Physical Exam: 76 y.o. female  Wt Readings from Last 3 Encounters:   12/10/21 95.5 kg (210 lb 9.6 oz)   11/02/21 99.8 kg (220 lb)   09/16/21 99.8 kg (220 lb)       There is no height or weight on file to calculate BMI.  No height and weight on file for this encounter.  There were no vitals filed for this  visit.  Vital signs reviewed.   General Appearance:    Alert, cooperative, in no acute distress                    Ortho exam        Exam is unchanged  .time    Assessment: Bilateral shoulder DJD    Plan: Injections  Follow up as indicated.  Ice, elevate, and rest as needed.  Discussed conservative measures of pain control including ice, bracing.  Also talked about the importance of strengthening and maintaining ideal body weight    Deborah Agudelo M.D.    Large Joint Arthrocentesis: L glenohumeral  Date/Time: 2/8/2022 12:18 PM  Consent given by: patient  Site marked: site marked  Timeout: Immediately prior to procedure a time out was called to verify the correct patient, procedure, equipment, support staff and site/side marked as required   Supporting Documentation  Indications: pain   Procedure Details  Location: shoulder - L glenohumeral  Preparation: Patient was prepped and draped in the usual sterile fashion  Needle gauge: 21G.  Approach: posterior  Medications administered: 4 mL lidocaine (cardiac); 40 mg triamcinolone acetonide 40 MG/ML  Patient tolerance: patient tolerated the procedure well with no immediate complications    Large Joint Arthrocentesis: R glenohumeral  Date/Time: 2/8/2022 12:18 PM  Consent given by: patient  Site marked: site marked  Timeout: Immediately prior to procedure a time out was called to verify the correct patient, procedure, equipment, support staff and site/side marked as required   Supporting Documentation  Indications: pain   Procedure Details  Location: shoulder - R glenohumeral  Preparation: Patient was prepped and draped in the usual sterile fashion  Needle gauge: 21G.  Approach: posterior  Medications administered: 4 mL lidocaine (cardiac); 40 mg triamcinolone acetonide 40 MG/ML  Patient tolerance: patient tolerated the procedure well with no immediate complications

## 2022-02-10 NOTE — TELEPHONE ENCOUNTER
KENYA - [facility] [phone] - asking for orders to continue Physical therapy for 2 times a week for weeks. . Sent to dr luis to review

## 2022-02-11 NOTE — PROGRESS NOTES
Anticoagulation Clinic Progress Note    Anticoagulation Summary  As of 2/11/2022    INR goal:  2.0-3.0   TTR:  57.8 % (2.6 y)   INR used for dosing:  3.02 (2/9/2022)   Warfarin maintenance plan:  3 mg every Mon, Wed, Fri; 2 mg all other days   Weekly warfarin total:  17 mg   Plan last modified:  Ruma Valles RPH (2/11/2022)   Next INR check:  2/16/2022   Priority:  High   Target end date:      Indications    Paroxysmal atrial fibrillation (HCC) [I48.0]             Anticoagulation Episode Summary     INR check location:      Preferred lab:      Send INR reminders to:   [facility]  POOL    Comments:  Lab drawn at [facility] ("[facility]) - [facility] [phone]       Anticoagulation Care Providers     Provider Role Specialty Phone number    Shiloh Gonzales APRN Referring Cardiology [phone]          Clinic Interview:  Patient Findings     Negatives:  Signs/symptoms of thrombosis, Signs/symptoms of bleeding,   Laboratory test error suspected, Change in health, Change in alcohol use,   Change in activity, Upcoming invasive procedure, Emergency department   visit, Upcoming dental procedure, Missed doses, Extra doses, Change in   medications, Change in diet/appetite, Hospital admission, Bruising, Other   complaints      Clinical Outcomes     Negatives:  Major bleeding event, Thromboembolic event,   Anticoagulation-related hospital admission, Anticoagulation-related ED   visit, Anticoagulation-related fatality        INR History:  Anticoagulation Monitoring 1/28/2022 2/4/2022 2/11/2022   INR 2.60 3.12 3.02   INR Date 1/27/2022 2/2/2022 2/9/2022   INR Goal 2.0-3.0 2.0-3.0 2.0-3.0   Trend Same Same Down   Last Week Total 18 mg 18 mg 17 mg   Next Week Total 18 mg 17 mg 17 mg   Sun 2 mg 2 mg 2 mg   Mon 3 mg 3 mg 3 mg   Tue 2 mg 2 mg 2 mg   Wed 3 mg 3 mg -   Thu - - -   Fri 3 mg 2 mg (2/4) 3 mg   Sat 3 mg 3 mg 2 mg   Visit Report - - -   Some recent data might be hidden       Plan:  1. INR is Therapeutic  today- see above in Anticoagulation Summary.   Will instruct Claudia ANTHONY Arnold to Change their warfarin regimen- see above in Anticoagulation Summary.  2. Follow up in 1 weeks  3.  They have been instructed to call if any changes in medications, doses, concerns, etc. Patient expresses understanding and has no further questions at this time.    Ruma Valles, [facility]

## 2022-02-15 NOTE — TELEPHONE ENCOUNTER
Caller: PATIENT     Relationship to patient: SELF    Best call back number: [phone]    Chief complaint: BILATERAL SHOULDER PAIN    Type of visit: CORTISONE INJECTIONS     Requested date: AFTER MAY 8TH, ON A Tuesday OR THURSDAY      Additional notes: PT. SAW DR. NICKERSON AND HAD BILATERAL SHOULDERS CORTISONE INJECTIONS  ON 02/08/22.   SHE WANTS TO SCHEDULE HER NEXT INJECTIONS IN MAY   PLEASE CALL TO ADVISE.

## 2022-02-18 NOTE — PROGRESS NOTES
Anticoagulation Clinic Progress Note    Anticoagulation Summary  As of 2022    INR goal:  2.0-3.0   TTR:  58.1 % (2.6 y)   INR used for dosin.55 (2022)   Warfarin maintenance plan:  3 mg every Mon, Wed, Fri; 2 mg all other days   Weekly warfarin total:  17 mg   No change documented:  Ruma Valles RPH   Plan last modified:  Ruma Valles RPH (2022)   Next INR check:  2022   Priority:  High   Target end date:      Indications    Paroxysmal atrial fibrillation (HCC) [I48.0]             Anticoagulation Episode Summary     INR check location:      Preferred lab:      Send INR reminders to:  [facility]  POOL    Comments:  Lab drawn at dialysis ([facility]) - [facility] [phone]       Anticoagulation Care Providers     Provider Role Specialty Phone number    Shiloh Gonzales APRN Referring Cardiology [phone]          Clinic Interview:  Patient Findings     Negatives:  Signs/symptoms of thrombosis, Signs/symptoms of bleeding,   Laboratory test error suspected, Change in health, Change in alcohol use,   Change in activity, Upcoming invasive procedure, Emergency department   visit, Upcoming dental procedure, Missed doses, Extra doses, Change in   medications, Change in diet/appetite, Hospital admission, Bruising, Other   complaints      Clinical Outcomes     Negatives:  Major bleeding event, Thromboembolic event,   Anticoagulation-related hospital admission, Anticoagulation-related ED   visit, Anticoagulation-related fatality        INR History:  Anticoagulation Monitoring 2022   INR 3.12 3.02 2.55   INR Date 2022   INR Goal 2.0-3.0 2.0-3.0 2.0-3.0   Trend Same Down Same   Last Week Total 18 mg 17 mg 17 mg   Next Week Total 17 mg 17 mg 17 mg   Sun 2 mg 2 mg 2 mg   Mon 3 mg 3 mg 3 mg   Tue 2 mg 2 mg 2 mg   Wed 3 mg - -   Th - - -   Fri 2 mg () 3 mg 3 mg   Sat 3 mg 2 mg 2 mg   Visit Report - - -   Some recent data might be  hidden       Plan:  1. INR is Therapeutic today- see above in Anticoagulation Summary.   Will instruct Claudia Arnold to Continue their warfarin regimen- see above in Anticoagulation Summary.  2. Follow up in 1 weeks  3. Pt has agreed to only be called if INR out of range. They have been instructed to call if any changes in medications, doses, concerns, etc. Patient expresses understanding and has no further questions at this time.    Ruma Valles, [facility]

## 2022-02-22 NOTE — PROGRESS NOTES
"    CARDIOLOGY        Patient Name: Claudia Arnold  :1945  Age: 76 y.o.  Primary Cardiologist: Zan Segura MD  Encounter Provider:  MORGAN Butts    Date of Service: 22          CHIEF COMPLAINT / REASON FOR OFFICE VISIT     Atrial Fibrillation      HISTORY OF PRESENT ILLNESS       HPI  Claudia Arnold is a 76 y.o. female who presents today for hospital reevaluation.     Pt has a  history significant for blood loss anemia, AVMs treated, chronic A. fib, chronic diastolic heart failure, ESRD on HD, prior aortic valve replacement, mitral valve replacement and tricuspid valve pair, severe TR, history of PE, history of hypotension on midodrine.    Patient reports that she has done well since discharge from hospital.  She states that she has no longer experiencing rectal bleeding.  She reports that some of her medications have been adjusted at dialysis.  She notes that her metoprolol tartrate has been decreased to 12.5 mg twice per day for episodes of bradycardia.  She also states that during hospitalization her midodrine was increased to 5 mg three times per day.  She states that blood pressure has been averaging in the upper 90s-low 100s systolic.  She is not experiencing any episodes of lightheadedness or dizziness.  Denies any chest pain, dyspnea at rest or with exertion, palpitations, lower extremity edema.  Overall since hospitalization she feels well.    The following portions of the patient's history were reviewed and updated as appropriate: allergies, current medications, past family history, past medical history, past social history, past surgical history and problem list.      VITAL SIGNS     Visit Vitals  /70 (BP Location: Right arm, Patient Position: Sitting, Cuff Size: Adult)   Pulse 55   Resp 16   Ht 170.2 cm (67.01\")   Wt 99.8 kg (220 lb) Comment: PT STATED WEIGHT   SpO2 95%   BMI 34.45 kg/m²         Wt Readings from Last 3 Encounters:   22 99.8 kg (220 lb)   22 " 102 kg (225 lb)   02/08/22 102 kg (225 lb)     Body mass index is 34.45 kg/m².      REVIEW OF SYSTEMS   Review of Systems   Constitutional: Positive for malaise/fatigue.   Cardiovascular: Negative for chest pain and leg swelling.   Respiratory: Negative for shortness of breath.    Neurological: Negative for light-headedness.   All other systems reviewed and are negative.          PHYSICAL EXAMINATION     Vitals and nursing note reviewed.   Constitutional:       Appearance: Normal appearance. Well-developed. Morbidly obese.   Eyes:      Conjunctiva/sclera: Conjunctivae normal.   Neck:      Vascular: No carotid bruit.   Pulmonary:      Breath sounds: Normal breath sounds.   Cardiovascular:      Normal rate. Regular rhythm. Normal S1 with normal intensity. Normal S2 with normal intensity.      Murmurs: There is no murmur.      No gallop. No click. No rub.   Musculoskeletal: Normal range of motion. Skin:     General: Skin is warm and dry.   Neurological:      Mental Status: Alert and oriented to person, place, and time.      GCS: GCS eye subscore is 4. GCS verbal subscore is 5. GCS motor subscore is 6.   Psychiatric:         Speech: Speech normal.         Behavior: Behavior normal.         Thought Content: Thought content normal.         Judgment: Judgment normal.           REVIEWED DATA     Procedures    Cardiac Procedures:  1. Bilateral lower extremity venous duplex scan 1/17/19.  Normal  2. Echocardiogram 1/17/19.  Left ventricular cavity is mild to moderately dilated.  Mild LVH.  Grade 1 diastolic dysfunction.  Normal right ventricular cavity size and systolic function noted.  Left atrial cavity size is moderate to severely dilated.  Severe aortic stenosis with peak gradient 96 mmHg and mean gradient 54 mmHg.  The calculated aortic valve area is 0.84 cm².  Severe mitral annular calcification.  Severe mitral valve stenosis with a peak gradient 22 mmHg and mean gradient 11 mmHg.  Mild to moderate tricuspid valve  regurgitation.  Estimated RVSP greater than 55 mmHg.  3. Lower extremity venous duplex scan 1/21/19.  Normal  4. Cardiac catheterization 1/22/19.  Mild luminal irregularities proximal to the mid LAD.  Otherwise normal coronary angiography.  Elevated left and right-sided filling pressures.  Moderate aortic valve stenosis.  Moderate pulmonary hypertension.  5. LEE 1/24/19.  Left ventricular systolic function is low normal.  Severe aortic valve stenosis.  Moderate mitral valve stenosis is present.  Severe tricuspid valve regurgitation is present.  Left atrial cavity size is severely dilated.  Right ventricular cavity is mildly dilated.  6. Cardioversion 1/24/19.  Successful  7. Left upper extremity venous duplex scan 1/27/19 acute left upper extremity superficial thrombophlebitis noted in the cephalic.  8. Tissue aortic valve replacement with 23 mm magna pericardial paracentesis, mitral valve replacement with 25 mm [employer] Mike porcine prosthesis, left cryo-Maze procedure and left AAA ligation on 1/28/19.  9. Limited echocardiogram 1/30/19.  Mild to moderate LVH.  EF greater than 70.  Right ventricular cavity is mildly dilated.  Left atrial cavity is moderately dilated.  No evidence of pericardial effusion.  10. LEE 2/15/19.  Wall motion was not well visualized due to artifact from the bioprosthetic aortic and mitral valves.  Left atrial cavity is dilated.  Bioprosthetic mitral valve.  Mitral valve mean gradient 11 mmHg.  Moderate tricuspid valve regurgitation present.  Estimated RVSP 35-45 mmHg.  Bioprosthetic aortic valve present.  There still appeared to be flow with the left atrial appendage.  There was no left atrial appendage thrombus.  11. Cardioversion 2/15/19.  Successful.  12. Echocardiogram 5/13/2019.  EF 65%.  Moderate LVH.  Left atrial cavity size is moderately dilated.  Mild dilation of the aortic root.  Prosthetic aortic valve is grossly normal.  Saint Mike bioprosthetic mitral valve is grossly  normal.  13. Echocardiogram 5/20/2021.  LVEF 61 to 65%.  Mild to moderate LVH.  RV cavity is moderately dilated with moderately reduced RV systolic function.  LAE.  23 mm porcine, magna bioprosthetic aortic valve.  Peak and mean gradients are elevated with maximum pressure gradient 39 mmHg, mean pressure gradient 20 mmHg.  23 mm, porcine, [employer] Mike bioprosthetic mitral valve replacement.  Peak and mean gradients are elevated with peak gradient 36 mmHg, mean gradient 17 mmHg.  Severe tricuspid valve regurgitation.  Calculated RVSP 61 mmHg.  14. LEE 6/5/2021.  LVEF 61 to 65%.  Mild to moderate LVH.  RV cavity is moderately dilated with moderately reduced RV function.  LAE.  Left atrial appendage has been ligated although there is still bidirectional flow across the small orifice.  Bioprosthetic aortic valve replacement which appears to be well-seated.  Valve replacement cannot be properly aligned in the transgastric views to obtain gradients.  Gradients across bioprosthetic mitral valve are grossly elevated with mean gradient 14 mmHg however the leaflets clearly open very well and do not appear stenotic.  Tricuspid valve is s/p annuloplasty ring repair with severe tricuspid valve regurgitation through the ring.  Calculated RVSP 48 mmHg.  Moderate plaques in the descending thoracic aorta and in the aortic arch.          BUN   Date Value Ref Range Status   12/09/2021 21 8 - 23 mg/dL Final     Creatinine   Date Value Ref Range Status   12/09/2021 2.18 (H) 0.57 - 1.00 mg/dL Final     Potassium   Date Value Ref Range Status   12/09/2021 3.7 3.5 - 5.2 mmol/L Final     ALT (SGPT)   Date Value Ref Range Status   12/09/2021 11 1 - 33 U/L Final     AST (SGOT)   Date Value Ref Range Status   12/09/2021 14 1 - 32 U/L Final           ASSESSMENT & PLAN      Diagnosis Plan   1. Status post aortic valve replacement with tissue valve     2. Status post mitral valve replacement with tissue valve     3. Status post tricuspid valve  repair     4. Chronic diastolic congestive heart failure (HCC)     5. PAF (paroxysmal atrial fibrillation) ([facility])     6. Right ventricular enlargement     7. ESRD (end stage renal disease) ([facility])           SUMMARY/DISCUSSION  1. History of aortic valve replacement, mitral valve replacement, tricuspid valve repair.  Patient with history of elevated gradients across mitral valve and aortic valve replacements.  Patient had echocardiogram prior to appointment and results were reviewed with her in clinic.  Dr. Hannah has personally reviewed the echocardiogram images and states that gradients are stable compared to prior echocardiogram.  Patient states that she is feeling well and reports that she is not experiencing any shortness of breath at rest or with exertion.  Volume status is controlled at hemodialysis.  2. Chronic diastolic heart failure.  Volume status controlled at dialysis.  3. PAF.  Heart rate currently controlled with metoprolol tartrate 12.5 mg twice per day.  Dose was decreased secondary to episodes of bradycardia.  Patient anticoagulated with Coumadin and not experiencing any bleeding complications.  4. RV enlargement  5. ESRD on hemodialysis.  Tolerating well.  Reports some generalized fatigue.  6. Follow-up with Dr. Segura in 6 months with plan to do repeat echocardiogram in about 8 to 9 months.        MEDICATIONS         Discharge Medications          Accurate as of February 22, 2022  2:04 PM. If you have any questions, ask your nurse or doctor.            Changes to Medications      Instructions Start Date   metoprolol tartrate 25 MG tablet  Commonly known as: LOPRESSOR  What changed: how much to take  Changed by: MORGAN Butts   12.5 mg, Oral, Every 12 Hours      warfarin 2 MG tablet  Commonly known as: COUMADIN  What changed:   · how much to take  · how to take this  · when to take this   TAKE 1 TABLET BY MOUTH EVERY SUNDAY, TUESDAY, AND THURSDAY, TAKE 1&1/2 TABLETS BY MOUTH ALL THE  OTHER DAYS OF THE WEEK         Continue These Medications      Instructions Start Date   acetaminophen 325 MG tablet  Commonly known as: TYLENOL   650 mg, Oral, 3 Times Daily, Takes tid at 2 am, 1 pm, and hS      ALIGN PO   1 capsule, Oral, Every Morning      busPIRone 10 MG tablet  Commonly known as: BUSPAR   10 mg, Oral, 2 times daily      [employer] MELATONIN PO   5 mg, Oral, Nightly PRN      escitalopram 10 MG tablet  Commonly known as: LEXAPRO   10 mg, Oral, Daily      hydrocortisone 2.5 % cream   No dose, route, or frequency recorded.      hydrocortisone 25 MG suppository  Commonly known as: ANUSOL-HC   25 mg, Rectal, 2 Times Daily      midodrine 5 MG tablet  Commonly known as: PROAMATINE   5 mg, Oral, 3 Times Daily Before Meals      MIRCERA IJ   75 mcg, Every 28 Days      pantoprazole 40 MG EC tablet  Commonly known as: PROTONIX   40 mg, Oral, Every Early Morning      ProRenal + D tablet tablet  Generic drug: multivitamin with minerals   1 tablet, Oral, Daily      rOPINIRole 0.25 MG tablet  Commonly known as: REQUIP   0.25 mg, Oral, Nightly      VENOFER IV   50 mg, Weekly, Adjusts weekly in IV during dialysis                 **Dragon Disclaimer:   Much of this encounter note is an electronic transcription/translation of spoken language to printed text. The electronic translation of spoken language may permit erroneous, or at times, nonsensical words or phrases to be inadvertently transcribed. Although I have reviewed the note for such errors, some may still exist.

## 2022-02-28 NOTE — PROGRESS NOTES
Anticoagulation Clinic Progress Note    Anticoagulation Summary  As of 2022    INR goal:  2.0-3.0   TTR:  58.5 % (2.6 y)   INR used for dosin.11 (2022)   Warfarin maintenance plan:  3 mg every Mon, Wed, Fri; 2 mg all other days   Weekly warfarin total:  17 mg   No change documented:  Ruma Valles RPH   Plan last modified:  Ruma Valles RPH (2022)   Next INR check:  3/4/2022   Priority:  High   Target end date:      Indications    Paroxysmal atrial fibrillation (HCC) [I48.0]             Anticoagulation Episode Summary     INR check location:      Preferred lab:      Send INR reminders to:  [facility]  POOL    Comments:  Lab drawn at dialysis ([facility]) - [facility] [phone]       Anticoagulation Care Providers     Provider Role Specialty Phone number    Shiloh Gonzales APRN Referring Cardiology [phone]          Clinic Interview:  No pertinent clinical findings have been reported.    INR History:  Anticoagulation Monitoring 2022   INR 3.02 2.55 2.11   INR Date 2022   INR Goal 2.0-3.0 2.0-3.0 2.0-3.0   Trend Down Same Same   Last Week Total 17 mg 17 mg 17 mg   Next Week Total 17 mg 17 mg 17 mg   Sun 2 mg 2 mg -   Mon 3 mg 3 mg 3 mg   Tue 2 mg 2 mg 2 mg   Wed - - 3 mg   Thu - - 2 mg   Fri 3 mg 3 mg -   Sat 2 mg 2 mg -   Visit Report - - -   Some recent data might be hidden       Plan:  1. INR is therapeutic today- see above in Anticoagulation Summary.    Claudia Arnold to continue their warfarin regimen- see above in Anticoagulation Summary.  2. Follow up in 1 week  3. Pt has agreed to only be called if INR out of range. They have been instructed to call if any changes in medications, doses, concerns, etc. Patient expresses understanding and has no further questions at this time.    Ruma Valles RPH

## 2022-03-04 NOTE — PROGRESS NOTES
Anticoagulation Clinic Progress Note    Anticoagulation Summary  As of 3/4/2022    INR goal:  2.0-3.0   TTR:  58.7 % (2.6 y)   INR used for dosin.42 (3/3/2022)   Warfarin maintenance plan:  3 mg every Mon, Wed, Fri; 2 mg all other days   Weekly warfarin total:  17 mg   No change documented:  Ruma Valles RPH   Plan last modified:  Ruma Valles RPH (2022)   Next INR check:  3/10/2022   Priority:  High   Target end date:      Indications    Paroxysmal atrial fibrillation (HCC) [I48.0]             Anticoagulation Episode Summary     INR check location:      Preferred lab:      Send INR reminders to:  [facility]  POOL    Comments:  Lab drawn at [facility] ([facility]) - [facility] [phone]       Anticoagulation Care Providers     Provider Role Specialty Phone number    Shiloh Gonzales APRN Referring Cardiology [phone]          Clinic Interview:  Patient Findings     Negatives:  Signs/symptoms of thrombosis, Signs/symptoms of bleeding,   Laboratory test error suspected, Change in health, Change in alcohol use,   Change in activity, Upcoming invasive procedure, Emergency department   visit, Upcoming dental procedure, Missed doses, Extra doses, Change in   medications, Change in diet/appetite, Hospital admission, Bruising, Other   complaints      Clinical Outcomes     Negatives:  Major bleeding event, Thromboembolic event,   Anticoagulation-related hospital admission, Anticoagulation-related ED   visit, Anticoagulation-related fatality        INR History:  Anticoagulation Monitoring 2022 2022 3/4/2022   INR 2.55 2.11 2.42   INR Date 2022 2022 3/3/2022   INR Goal 2.0-3.0 2.0-3.0 2.0-3.0   Trend Same Same Same   Last Week Total 17 mg 17 mg 17 mg   Next Week Total 17 mg 17 mg 17 mg   Sun 2 mg - 2 mg   Mon 3 mg 3 mg 3 mg   Tue 2 mg 2 mg 2 mg   Wed - 3 mg 3 mg   Thu - 2 mg -   Fri 3 mg - 3 mg   Sat 2 mg - 2 mg   Visit Report - - -   Some recent data might be hidden        Plan:  1. INR is Therapeutic today- see above in Anticoagulation Summary.   Will instruct Claudia Arnold to Continue their warfarin regimen- see above in Anticoagulation Summary.  2. Follow up in 1 week  3. . They have been instructed to call if any changes in medications, doses, concerns, etc. Patient expresses understanding and has no further questions at this time.    Ruma Valles, [facility]

## 2022-03-09 NOTE — TELEPHONE ENCOUNTER
VERBAL ORDER GIVEN TO [facility] FOR EXTENSION ON PHYSICAL THERAPY  [phone]  . DR DIXON ADVISED S

## 2022-03-11 NOTE — PROGRESS NOTES
Anticoagulation Clinic Progress Note    Anticoagulation Summary  As of 3/11/2022    INR goal:  2.0-3.0   TTR:  58.9 % (2.6 y)   INR used for dosin.71 (3/9/2022)   Warfarin maintenance plan:  3 mg every Mon, Wed, Fri; 2 mg all other days   Weekly warfarin total:  17 mg   No change documented:  Jalen Temple, PharmD   Plan last modified:  Ruma Valles RPH (2022)   Next INR check:  3/16/2022   Priority:  High   Target end date:      Indications    Paroxysmal atrial fibrillation (HCC) [I48.0]             Anticoagulation Episode Summary     INR check location:      Preferred lab:      Send INR reminders to:  [facility]  POOL    Comments:  Lab drawn at dialysis ([facility]) - [facility] [phone]       Anticoagulation Care Providers     Provider Role Specialty Phone number    Shiloh Gonzales APRN Referring Cardiology [phone]          Clinic Interview:  No pertinent clinical findings have been reported.    INR History:  Anticoagulation Monitoring 2022 3/4/2022 3/11/2022   INR 2.11 2.42 2.71   INR Date 2022 3/3/2022 3/9/2022   INR Goal 2.0-3.0 2.0-3.0 2.0-3.0   Trend Same Same Same   Last Week Total 17 mg 17 mg 17 mg   Next Week Total 17 mg 17 mg 17 mg   Sun - 2 mg 2 mg   Mon 3 mg 3 mg 3 mg   Tue 2 mg 2 mg 2 mg   Wed 3 mg 3 mg -   Thu 2 mg - -   Fri - 3 mg 3 mg   Sat - 2 mg 2 mg   Visit Report - - -   Some recent data might be hidden       Plan:  1. INR is therapeutic today- see above in Anticoagulation Summary.    Claudia GABRIELLE Arnold to continue their warfarin regimen- see above in Anticoagulation Summary.  2. Follow up in 1 week  3. Pt has requested that we only call her if INR out of range. They have been instructed to call if any changes in medications, doses, concerns, etc. Patient expresses understanding and has no further questions at this time.    Jalen Temple, PharmD

## 2022-03-17 NOTE — TELEPHONE ENCOUNTER
[facility]  [phone] calling to report a MISSED VISIT on patient and will resume care next week. KAE - Dr. Cortez advised

## 2022-03-18 NOTE — PROGRESS NOTES
Anticoagulation Clinic Progress Note    Anticoagulation Summary  As of 3/18/2022    INR goal:  2.0-3.0   TTR:  59.1 % (2.7 y)   INR used for dosin.68 (3/16/2022)   Warfarin maintenance plan:  3 mg every Mon, Wed, Fri; 2 mg all other days   Weekly warfarin total:  17 mg   No change documented:  Ruma Valles RPH   Plan last modified:  Ruma Valles RPH (2022)   Next INR check:  3/23/2022   Priority:  High   Target end date:      Indications    Paroxysmal atrial fibrillation (HCC) [I48.0]             Anticoagulation Episode Summary     INR check location:      Preferred lab:      Send INR reminders to:  [facility] [employer]  POOL    Comments:  Lab drawn at dialysis ([facility]) - [facility] [phone]       Anticoagulation Care Providers     Provider Role Specialty Phone number    Shiloh Gonzales APRN Referring Cardiology [phone]          Clinic Interview:  No pertinent clinical findings have been reported.    INR History:  Anticoagulation Monitoring 3/4/2022 3/11/2022 3/18/2022   INR 2.42 2.71 2.68   INR Date 3/3/2022 3/9/2022 3/16/2022   INR Goal 2.0-3.0 2.0-3.0 2.0-3.0   Trend Same Same Same   Last Week Total 17 mg 17 mg 17 mg   Next Week Total 17 mg 17 mg 17 mg   Sun 2 mg 2 mg 2 mg   Mon 3 mg 3 mg 3 mg   Tue 2 mg 2 mg 2 mg   Wed 3 mg - -   Thu - - -   Fri 3 mg 3 mg 3 mg   Sat 2 mg 2 mg 2 mg   Visit Report - - -   Some recent data might be hidden       Plan:  1. INR is therapeutic today- see above in Anticoagulation Summary.    Claudia Arnold to continue their warfarin regimen- see above in Anticoagulation Summary.  2. Follow up in 1 week  3. Pt has agreed to only be called if INR out of range. They have been instructed to call if any changes in medications, doses, concerns, etc. Patient expresses understanding and has no further questions at this time.    Ruma Valles RPH

## 2022-03-22 NOTE — TELEPHONE ENCOUNTER
[facility] calling to report a missed visit . Pt cancelled appointment today due to not feeling well. Will re-evaluate patient on Thursday 02/24/2022 fyi

## 2022-03-25 NOTE — PROGRESS NOTES
Anticoagulation Clinic Progress Note    Anticoagulation Summary  As of 3/25/2022    INR goal:  2.0-3.0   TTR:  59.5 % (2.7 y)   INR used for dosin.80 (3/24/2022)   Warfarin maintenance plan:  3 mg every Mon, Wed, Fri; 2 mg all other days   Weekly warfarin total:  17 mg   No change documented:  Ruma Valles RPH   Plan last modified:  Ruma Valles RPH (2022)   Next INR check:  3/31/2022   Priority:  High   Target end date:      Indications    Paroxysmal atrial fibrillation (HCC) [I48.0]             Anticoagulation Episode Summary     INR check location:      Preferred lab:      Send INR reminders to:  [facility]  POOL    Comments:  Lab drawn at dialysis ([facility]) - [facility] [phone]       Anticoagulation Care Providers     Provider Role Specialty Phone number    Shiloh Gonzales APRN Referring Cardiology [phone]          Clinic Interview:  No pertinent clinical findings have been reported.    INR History:  Anticoagulation Monitoring 3/11/2022 3/18/2022 3/25/2022   INR 2.71 2.68 2.80   INR Date 3/9/2022 3/16/2022 3/24/2022   INR Goal 2.0-3.0 2.0-3.0 2.0-3.0   Trend Same Same Same   Last Week Total 17 mg 17 mg 17 mg   Next Week Total 17 mg 17 mg 17 mg   Sun 2 mg 2 mg 2 mg   Mon 3 mg 3 mg 3 mg   Tue 2 mg 2 mg 2 mg   Wed - - 3 mg   Thu - - -   Fri 3 mg 3 mg 3 mg   Sat 2 mg 2 mg 2 mg   Visit Report - - -   Some recent data might be hidden       Plan:  1. INR is therapeutic today- see above in Anticoagulation Summary.    Claudia Arnold to continue their warfarin regimen- see above in Anticoagulation Summary.  2. Follow up in 1 week  3. Pt has agreed to only be called if INR out of range. They have been instructed to call if any changes in medications, doses, concerns, etc. Patient expresses understanding and has no further questions at this time.    Ruma Valles RPH

## 2022-03-31 NOTE — PROGRESS NOTES
Anticoagulation Clinic Progress Note    Anticoagulation Summary  As of 3/31/2022    INR goal:  2.0-3.0   TTR:  59.5 % (2.7 y)   INR used for dosing:  3.08 (3/30/2022)   Warfarin maintenance plan:  3 mg every Mon, Wed, Fri; 2 mg all other days   Weekly warfarin total:  17 mg   No change documented:  Ruma Valles RPH   Plan last modified:  Ruma Valles RPH (2/11/2022)   Next INR check:  4/6/2022   Priority:  High   Target end date:      Indications    Paroxysmal atrial fibrillation (HCC) [I48.0]             Anticoagulation Episode Summary     INR check location:      Preferred lab:      Send INR reminders to:  [facility]  POOL    Comments:  Lab drawn at dialysis ([facility]) - [facility] [phone]       Anticoagulation Care Providers     Provider Role Specialty Phone number    Shiloh Gonzales APRN Referring Cardiology [phone]          Clinic Interview:  Patient Findings     Negatives:  Signs/symptoms of thrombosis, Signs/symptoms of bleeding,   Laboratory test error suspected, Change in health, Change in alcohol use,   Change in activity, Upcoming invasive procedure, Emergency department   visit, Upcoming dental procedure, Missed doses, Extra doses, Change in   medications, Change in diet/appetite, Hospital admission, Bruising, Other   complaints      Clinical Outcomes     Negatives:  Major bleeding event, Thromboembolic event,   Anticoagulation-related hospital admission, Anticoagulation-related ED   visit, Anticoagulation-related fatality        INR History:  Anticoagulation Monitoring 3/18/2022 3/25/2022 3/31/2022   INR 2.68 2.80 3.08   INR Date 3/16/2022 3/24/2022 3/30/2022   INR Goal 2.0-3.0 2.0-3.0 2.0-3.0   Trend Same Same Same   Last Week Total 17 mg 17 mg 17 mg   Next Week Total 17 mg 17 mg 17 mg   Sun 2 mg 2 mg 2 mg   Mon 3 mg 3 mg 3 mg   Tue 2 mg 2 mg 2 mg   Wed - 3 mg -   Thu - - 2 mg   Fri 3 mg 3 mg 3 mg   Sat 2 mg 2 mg 2 mg   Visit Report - - -   Some recent data might be  hidden       Plan:  1. INR is slightly Supratherapeutic today- see above in Anticoagulation Summary.   Will instruct Claudia Arnold to Continue their warfarin regimen since only 0.08 above range- see above in Anticoagulation Summary.  2. Follow up in 1 weeks  3.  They have been instructed to call if any changes in medications, doses, concerns, etc. Patient expresses understanding and has no further questions at this time.    Ruma Valles, [facility]

## 2022-04-08 NOTE — PROGRESS NOTES
Anticoagulation Clinic Progress Note    Anticoagulation Summary  As of 2022    INR goal:  2.0-3.0   TTR:  59.6 % (2.7 y)   INR used for dosin.65 (2022)   Warfarin maintenance plan:  3 mg every Mon, Wed, Fri; 2 mg all other days   Weekly warfarin total:  17 mg   No change documented:  Ruma Valles RPH   Plan last modified:  Ruma Valles RPH (2022)   Next INR check:  2022   Priority:  High   Target end date:      Indications    Paroxysmal atrial fibrillation (HCC) [I48.0]             Anticoagulation Episode Summary     INR check location:      Preferred lab:      Send INR reminders to:  [facility]  POOL    Comments:  Lab drawn at dialysis ([facility]) - [facility] [phone]       Anticoagulation Care Providers     Provider Role Specialty Phone number    Shiloh Gonzales APRN Referring Cardiology [phone]          Clinic Interview:  No pertinent clinical findings have been reported.    INR History:  Anticoagulation Monitoring 3/25/2022 3/31/2022 2022   INR 2.80 3.08 2.65   INR Date 3/24/2022 3/30/2022 2022   INR Goal 2.0-3.0 2.0-3.0 2.0-3.0   Trend Same Same Same   Last Week Total 17 mg 17 mg 17 mg   Next Week Total 17 mg 17 mg 17 mg   Sun 2 mg 2 mg 2 mg   Mon 3 mg 3 mg 3 mg   Tue 2 mg 2 mg 2 mg   Wed 3 mg - -   Thu - 2 mg -   Fri 3 mg 3 mg 3 mg   Sat 2 mg 2 mg 2 mg   Visit Report - - -   Some recent data might be hidden       Plan:  1. INR is therapeutic today- see above in Anticoagulation Summary.    Claudia Arnold to continue their warfarin regimen- see above in Anticoagulation Summary.  2. Follow up in 1 week  3. They have been instructed to call if any changes in medications, doses, concerns, etc. Patient expresses understanding and has no further questions at this time.    Ruma Valles RPH

## 2022-04-15 NOTE — PROGRESS NOTES
Anticoagulation Clinic Progress Note    Anticoagulation Summary  As of 4/15/2022    INR goal:  2.0-3.0   TTR:  60.0 % (2.7 y)   INR used for dosin.89 (2022)   Warfarin maintenance plan:  3 mg every Mon, Wed, Fri; 2 mg all other days   Weekly warfarin total:  17 mg   No change documented:  Ruma Valles RPH   Plan last modified:  Ruma Valles RPH (2022)   Next INR check:  2022   Priority:  High   Target end date:      Indications    Paroxysmal atrial fibrillation (HCC) [I48.0]             Anticoagulation Episode Summary     INR check location:      Preferred lab:      Send INR reminders to:  [facility]  POOL    Comments:  Lab drawn at dialysis ([facility]) - [facility] [phone]       Anticoagulation Care Providers     Provider Role Specialty Phone number    Shiloh Gonzales APRN Referring Cardiology [phone]          Clinic Interview:  No pertinent clinical findings have been reported.    INR History:  Anticoagulation Monitoring 3/31/2022 2022 4/15/2022   INR 3.08 2.65 2.89   INR Date 3/30/2022 2022 2022   INR Goal 2.0-3.0 2.0-3.0 2.0-3.0   Trend Same Same Same   Last Week Total 17 mg 17 mg 17 mg   Next Week Total 17 mg 17 mg 17 mg   Sun 2 mg 2 mg 2 mg   Mon 3 mg 3 mg 3 mg   Tue 2 mg 2 mg 2 mg   Wed - - 3 mg   Thu 2 mg - -   Fri 3 mg 3 mg 3 mg   Sat 2 mg 2 mg 2 mg   Visit Report - - -   Some recent data might be hidden       Plan:  1. INR is therapeutic today- see above in Anticoagulation Summary.    Claudia Arnold to continue their warfarin regimen- see above in Anticoagulation Summary.  2. Follow up in 1 week  3.  They have been instructed to call if any changes in medications, doses, concerns, etc. Patient expresses understanding and has no further questions at this time.    Ruma Valles RPH

## 2022-05-09 NOTE — PROGRESS NOTES
Anticoagulation Clinic Progress Note    Anticoagulation Summary  As of 2022    INR goal:  2.0-3.0   TTR:  60.8 % (2.8 y)   INR used for dosin.16 (2022)   Warfarin maintenance plan:  3 mg every Mon, Wed, Fri; 2 mg all other days   Weekly warfarin total:  17 mg   No change documented:  Ruma Valles RPH   Plan last modified:  Ruma Valles RPH (2022)   Next INR check:  2022   Priority:  High   Target end date:      Indications    Paroxysmal atrial fibrillation (HCC) [I48.0]             Anticoagulation Episode Summary     INR check location:      Preferred lab:      Send INR reminders to:  [facility]  POOL    Comments:  Lab drawn at dialysis ([facility]) - [facility] [phone]       Anticoagulation Care Providers     Provider Role Specialty Phone number    Shiloh Gonzales APRN Referring Cardiology [phone]          Clinic Interview:  No pertinent clinical findings have been reported.    INR History:  Anticoagulation Monitoring 4/15/2022 2022 2022   INR 2.89 2.27 2.16   INR Date 2022   INR Goal 2.0-3.0 2.0-3.0 2.0-3.0   Trend Same Same Same   Last Week Total 17 mg 15 mg 17 mg   Next Week Total 17 mg 17 mg 17 mg   Sun 2 mg 2 mg -   Mon 3 mg 3 mg 3 mg   Tue 2 mg 2 mg 2 mg   Wed 3 mg 3 mg -   Thu - 2 mg -   Fri 3 mg 3 mg -   Sat 2 mg 2 mg -   Visit Report - - -   Some recent data might be hidden       Plan:  1. INR is therapeutic today- see above in Anticoagulation Summary.    Claudia Arnold to continue their warfarin regimen- see above in Anticoagulation Summary.  2. Follow up in 1 week  3. Pt has agreed to only be called if INR out of range. They have been instructed to call if any changes in medications, doses, concerns, etc. Patient expresses understanding and has no further questions at this time.    Ruma Valles RPH

## 2022-05-13 NOTE — PROGRESS NOTES
Anticoagulation Clinic Progress Note    Anticoagulation Summary  As of 2022    INR goal:  2.0-3.0   TTR:  61.0 % (2.8 y)   INR used for dosin.49 (2022)   Warfarin maintenance plan:  3 mg every Mon, Wed, Fri; 2 mg all other days   Weekly warfarin total:  17 mg   No change documented:  Jalen Temple, PharmD   Plan last modified:  Ruma Valles RPH (2022)   Next INR check:  2022   Priority:  High   Target end date:      Indications    Paroxysmal atrial fibrillation (HCC) [I48.0]             Anticoagulation Episode Summary     INR check location:      Preferred lab:      Send INR reminders to:  [facility]  POOL    Comments:  Lab drawn at dialysis ([facility]) - [facility] [phone]       Anticoagulation Care Providers     Provider Role Specialty Phone number    Shiloh Gonzales APRN Referring Cardiology [phone]          Clinic Interview:  No pertinent clinical findings have been reported.    INR History:  Anticoagulation Monitoring 2022   INR 2.27 2.16 2.49   INR Date 2022   INR Goal 2.0-3.0 2.0-3.0 2.0-3.0   Trend Same Same Same   Last Week Total 15 mg 17 mg 17 mg   Next Week Total 17 mg 17 mg 17 mg   Sun 2 mg - 2 mg   Mon 3 mg 3 mg 3 mg   Tue 2 mg 2 mg 2 mg   Wed 3 mg - -   Thu 2 mg - -   Fri 3 mg - 3 mg   Sat 2 mg - 2 mg   Visit Report - - -   Some recent data might be hidden       Plan:  1. INR is therapeutic today- see above in Anticoagulation Summary.    Claduia Arnold to continue their warfarin regimen- see above in Anticoagulation Summary.  2. Follow up in 1 week  3. Pt has agreed to only be called if INR out of range. They have been instructed to call if any changes in medications, doses, concerns, etc. Patient expresses understanding and has no further questions at this time.    Jalen Temple, PharmD

## 2022-05-14 NOTE — TELEPHONE ENCOUNTER
Patient is currently [facility].  Have faxed updated orders   No

## 2022-05-16 NOTE — PROGRESS NOTES
Patient: Claudia Arnold  YOB: 1945  Date of Service: 5/16/2022    Chief Complaints: Bilateral shoulder pain    Subjective:    History of Present Illness: Pt is seen in the office today with complaints of bilateral shoulder pain she has known degenerative changes in both she has significant cardiac and renal issues and is on chronic dialysis she is not an operative candidate she gets intermittent injections does okay with those still pretty miserable she is talking to her primary care next week about some other types of medication just for quality life.          Allergies:   Allergies   Allergen Reactions   • Betadine [Povidone Iodine] Itching       Medications:   Home Medications:  Current Outpatient Medications on File Prior to Visit   Medication Sig   • acetaminophen (TYLENOL) 325 MG tablet Take 650 mg by mouth 3 (Three) Times a Day. Takes tid at 2 am, 1 pm, and hS   • busPIRone (BUSPAR) 10 MG tablet Take 1 tablet by mouth 2 (two) times a day.   • [facility] MELATONIN PO Take 5 mg by mouth At Night As Needed.   • escitalopram (LEXAPRO) 10 MG tablet Take 1 tablet by mouth Daily.   • hydrocortisone (ANUSOL-HC) 25 MG suppository Insert 1 suppository into the rectum 2 (Two) Times a Day.   • hydrocortisone 2.5 % cream    • Methoxy PEG-Epoetin Beta (MIRCERA IJ) 75 mcg Every 28 (Twenty-Eight) Days.   • metoprolol tartrate (LOPRESSOR) 25 MG tablet TAKE 1 TABLET BY MOUTH EVERY 12 HOURS   • midodrine (PROAMATINE) 2.5 MG tablet Take 1 tablet by mouth 3 (Three) Times a Day Before Meals.   • midodrine (PROAMATINE) 5 MG tablet Take 1 tablet by mouth 3 (Three) Times a Day Before Meals.   • pantoprazole (PROTONIX) 40 MG EC tablet TAKE 1 TABLET BY MOUTH EVERY MORNING   • Probiotic Product (ALIGN PO) Take 1 capsule by mouth Every Morning.   • ProRenal + D tablet tablet Take 1 tablet by mouth Daily.   • rOPINIRole (REQUIP) 0.25 MG tablet Take 0.25 mg by mouth Every Night.   • warfarin (COUMADIN) 2 MG tablet TAKE 1 TABLET BY  MOUTH EVERY SUNDAY, TUESDAY, AND THURSDAY, TAKE 1&1/2 TABLETS BY MOUTH ALL THE OTHER DAYS OF THE WEEK     No current facility-administered medications on file prior to visit.     Current Medications:  Scheduled Meds:  Continuous Infusions:No current facility-administered medications for this visit.    PRN Meds:.    I have reviewed the patient's medical history in detail and updated the computerized patient record.  Review and summarization of old records include:    Past Medical History:   Diagnosis Date   • A-fib (Formerly [facility])     Meds   • Arthritis     w/Difficult Mobility   • Bacterial infection 03/2020   • Bifascicular block    • Bilateral lower extremity edema     Legs   • CAD (coronary artery disease)     Affecting LAD    • Cardiomyopathy ([facility])    • CHF (congestive heart failure) (Formerly [facility])     CHRONIC, DIASTOLIC   • Chronic fatigue    • Chronic kidney disease    • Cystocele, midline 09/2019   • Dialysis patient ([facility])     TUESDAY THURSDAY SATURDAY   • Edema 06/2021   • End stage renal failure on dialysis ([facility])     FOLLOWED BY DR. COLLINS SPANGLER   • Gastrointestinal hemorrhage 01/11/2021    ADMITTED TO [facility]   • Generalized anxiety disorder    • GERD (gastroesophageal reflux disease)    • Glenohumeral arthritis 04/2021   • Hematochezia 08/08/2021    ADMITTED TO [facility]   • Hemorrhoids    • Hernia, inguinal     Hx Repair   • History of echocardiogram 1/17/19-[facility]    The L Ventricular Cavity is Mild-to-Moderately Dilated; Left Ventricular Wall Thickness is Consistent w/Mild Concentric Hypertrophy; Left Atrial Cavity Size is Moderate-to-Severely Dilated; Severe AVS; Severe MVS; Moderate TVR Noted   • History of transfusion     no adverse reaction   • Hyperlipidemia     Controlled w/Meds   • Hypertension     Controlled w/Meds   • Hypokalemia    • Hypokinesis 01/22/2019    Apical Noted on Cardiac Cath   • IFG (impaired fasting glucose)    • Immobility syndrome    • Iron deficiency anemia    • LAD stenosis 01/22/2019    Mid LAD  Irregularities Noted on Cardiac Cath    • Left atrial dilatation 01/17/2019    Moderate-Severe Noted on Echo   • Left ventricular dilatation 01/17/2019    Noted on Echo/LEE   • Lumbar spondylosis    • Lumbar stenosis    • Mild concentric left ventricular hypertrophy 01/17/2019    Noted on Echo/LEE   • Mitral annular calcification 01/17/2019    Severe Noted on Echo   • Moderate tricuspid valve regurgitation 01/24/2019    Noted on LEE   • Morbid obesity ([facility])    • Nonrheumatic tricuspid valve regurgitation    • NSTEMI (non-ST elevated myocardial infarction) ([facility])    • OCTAVIANO (obstructive sleep apnea)    • Osteoarthritis    • Osteoarthritis of shoulder    • PAF (paroxysmal atrial fibrillation) ([facility])    • PNA (pneumonia)    • Pulmonary hypertension ([facility]) 01/22/2019    Noted on Cardiac Cath   • Right bundle branch block 01/24/2019    Noted on LEE   • Right ventricular enlargement 06/2021   • Rotator cuff tear 04/2016   • Secondary hyperparathyroidism ([facility])    • Severe aortic stenosis 01/22/2019    Noted on Cardiac Cath & LEE on 01/24/19; S/p AVR on 01/28/19 by Dr. Gaxiola   • Severe mitral valve stenosis 01/24/2019    Noted on LEE; S/p MVR on 01/28/19 by Dr. Gaxiola   • Severe muscle deconditioning    • Shoulder pain, bilateral    • Skin yeast infection     Folds Under Skin--Nystatin/Diflucan   • Tinea unguium 10/2020    BILATERAL   • Ulcer of toe ([facility])     RIGHT FOOT   • Urge incontinence    • Vaginal atrophy    • Venous insufficiency    • Vulvar cyst 09/2019        Past Surgical History:   Procedure Laterality Date   • AORTIC VALVE REPAIR/REPLACEMENT MITRAL VALVE REPAIR/REPLACEMENT N/A 1/28/2019    Procedure: LEE STERNOTOMY AORTIC VALVE REPLACEMENT, MITRAL VALVE REPLACEMENT, TRICUSPID VALVE REPAIR, MAZE PROCEDURE, CLOSURE OF LEFT ATRIAL APPENDAGE  AND PRP;  Surgeon: Scar Gaxiola MD;  Location: [facility] OR;  Service: Cardiothoracic   • ARTERIOVENOUS FISTULA/SHUNT SURGERY Left 6/26/2019    Procedure: LEFT ARM BRACHIAL  CEPHALIC FISTULA;  Surgeon: Satish Stahl MD;  Location: [facility] MAIN OR;  Service: Vascular   • CARDIAC CATHETERIZATION N/A 1/22/2019    Procedure: Coronary angiography;  Surgeon: Rick Talavera MD;  Location:  [facility] CATH INVASIVE LOCATION;  Service: Cardiology   • CARDIAC CATHETERIZATION N/A 1/22/2019    Procedure: Left Heart Cath;  Surgeon: Rick Talavera MD;  Location:  [facility] CATH INVASIVE LOCATION;  Service: Cardiology   • CARDIAC CATHETERIZATION N/A 1/22/2019    Procedure: Right Heart Cath;  Surgeon: Rick Talavera MD;  [facility];  Service: Cardiology   • CARDIAC CATHETERIZATION N/A 1/22/2019    Procedure: Left ventriculography;  Surgeon: Rick Talavera MD;  [facility];  Service: Cardiology   • CARDIAC SURGERY     • COLONOSCOPY N/A 1/16/2021    MULTIPLE DIVERTICULA, TORTUOUS COLON, HEMORRHOIDS, DR. JOSE TRAN AT [place]   • COLONOSCOPY N/A 12/9/2021    Procedure: COLONOSCOPY TO CECUM WITH APC TO RIGHT COLON AVM;  Surgeon: Shiloh Pop MD;  Location: [facility] ENDOSCOPY;  Service: Gastroenterology;  Laterality: N/A;   PRE OP-ANEMIA  POST OP - AVM RIGHT COLON, DIVERTICULOSIS, HEMORRHOIDS   • ENDOSCOPY N/A 1/16/2021    DUODENITIS, GASTRITIS, BENIGN DUOFENAL POLYP, DR. JOSE TRAN AT [facility]   • ENDOSCOPY N/A 12/9/2021    Procedure: ESOPHAGOGASTRODUODENOSCOPY  WITH BIOPSIES;  Surgeon: Shiloh Pop MD;  Location: [facility] ENDOSCOPY;  Service: Gastroenterology;  Laterality: N/A;  PRE OP - ANEMIA  POST OP - ABN DUODENAL MUCOSA, GASTRITIS, HIATAL HERNIA   • HEMORRHOIDECTOMY N/A 8/12/2021    Procedure: HEMORRHOIDECTOMY x2;  Surgeon: Pennie Rider MD;  Location: [facility] MAIN OR;  Service: General;  Laterality: N/A;   • HERNIA REPAIR Left     INGUINAL   • INSERT / REPLACE / VENOUS ACCESS CATHETER N/A 03/11/2020    TUNNEL CATHETER REMOVAL, DR. SU MICHELLE   • INSERTION HEMODIALYSIS CATHETER N/A 2/8/2019     Procedure: tunnel CATHETER PLACEMENT WITH FLUROSCOPY;  Surgeon: Satish Stahl MD;  Location: [facility] OR;  Service: Vascular   • REPLACEMENT TOTAL KNEE Left 2007   • TOTAL KNEE ARTHROPLASTY Right 2009        Social History     Occupational History   • Occupation: retired   Tobacco Use   • Smoking status: Never Smoker   • Smokeless tobacco: Never Used   Vaping Use   • Vaping Use: Never used   Substance and Sexual Activity   • Alcohol use: Yes     Alcohol/week: 1.0 standard drink     Types: 1 Cans of beer per week     Comment: 1 monthly   • Drug use: No   • Sexual activity: Defer     Birth control/protection: Post-menopausal      Social History     Social History Narrative   • Not on file        Family History   Problem Relation Age of Onset   • Hypertension Other    • Diabetes Other    • Heart disease Neg Hx    • Stroke Neg Hx    • Arthritis Neg Hx    • Breast cancer Neg Hx    • Malig Hyperthermia Neg Hx        ROS: 14 point review of systems was performed and was negative except for documented findings in HPI and today's encounter.     Allergies:   Allergies   Allergen Reactions   • Betadine [Povidone Iodine] Itching     Constitutional:  Denies fever, shaking or chills   Eyes:  Denies change in visual acuity   HENT:  Denies nasal congestion or sore throat   Respiratory:  Denies cough or shortness of breath   Cardiovascular:  Denies chest pain or severe LE edema   GI:  Denies abdominal pain, nausea, vomiting, bloody stools or diarrhea   Musculoskeletal:  Numbness, tingling, or loss of motor function only as noted above in history of present illness.  : Denies painful urination or hematuria  Integument:  Denies rash, lesion or ulceration   Neurologic:  Denies headache or focal weakness  Endocrine:  Denies lymphadenopathy  Psych:  Denies confusion or change in mental status   Hem:  Denies active bleeding      Physical Exam: 76 y.o. female  Wt Readings from Last 3 Encounters:   02/22/22 102 kg (225 lb)    02/22/22 99.8 kg (220 lb)   02/08/22 102 kg (225 lb)       There is no height or weight on file to calculate BMI.  No height and weight on file for this encounter.  There were no vitals filed for this visit.  Vital signs reviewed.   General Appearance:    Alert, cooperative, in no acute distress                    Ortho exam      Exam is unchanged    X-rays AP scapular Y and axillary lateral of both shoulders were taken to evaluate her symptoms and compared to x-rays done approximately 1 year ago she has significant degenerative changes in both with complete loss of joint space osteophyte formation no significant change       .time    Assessment: Bilateral shoulder DJD    Plan: Injections she really does not have many options with her complex medical history she is really not a surgical candidate  Follow up as indicated.  Ice, elevate, and rest as needed.  Discussed conservative measures of pain control including ice, bracing.  Plan is to proceed with injections    Deborah Agudelo M.D.    Large Joint Arthrocentesis: L glenohumeral  Date/Time: 5/17/2022 1:09 PM  Consent given by: patient  Site marked: site marked  Timeout: Immediately prior to procedure a time out was called to verify the correct patient, procedure, equipment, support staff and site/side marked as required   Supporting Documentation  Indications: pain   Procedure Details  Location: shoulder - L glenohumeral  Preparation: Patient was prepped and draped in the usual sterile fashion  Needle gauge: 21G.  Approach: posterior  Medications administered: 80 mg methylPREDNISolone acetate 80 MG/ML; 4 mL lidocaine PF 2% 2 %  Patient tolerance: patient tolerated the procedure well with no immediate complications    Large Joint Arthrocentesis: R glenohumeral  Date/Time: 5/17/2022 1:09 PM  Consent given by: patient  Site marked: site marked  Timeout: Immediately prior to procedure a time out was called to verify the correct patient, procedure, equipment, support  staff and site/side marked as required   Supporting Documentation  Indications: pain   Procedure Details  Location: shoulder - R glenohumeral  Preparation: Patient was prepped and draped in the usual sterile fashion  Needle gauge: 21G.  Approach: posterior  Medications administered: 80 mg methylPREDNISolone acetate 80 MG/ML; 4 mL lidocaine PF 2% 2 %  Patient tolerance: patient tolerated the procedure well with no immediate complications

## 2022-05-19 NOTE — PROGRESS NOTES
Anticoagulation Clinic Progress Note    Anticoagulation Summary  As of 2022    INR goal:  2.0-3.0   TTR:  60.3 % (2.8 y)   INR used for dosin.27 (2022)   Warfarin maintenance plan:  3 mg every Mon, Wed, Fri; 2 mg all other days   Weekly warfarin total:  17 mg   Plan last modified:  Ruma Valles RPH (2022)   Next INR check:  2022   Priority:  High   Target end date:      Indications    Paroxysmal atrial fibrillation (HCC) [I48.0]             Anticoagulation Episode Summary     INR check location:      Preferred lab:      Send INR reminders to:   AURA ANDERSON  POOL    Comments:  Lab drawn at [facility] ([facility]) - [facility] [phone]       Anticoagulation Care Providers     Provider Role Specialty Phone number    Shiloh Gonzales APRN Referring Cardiology [phone]          Clinic Interview:  Patient Findings     Negatives:  Signs/symptoms of thrombosis, Signs/symptoms of bleeding,   Laboratory test error suspected, Change in health, Change in alcohol use,   Change in activity, Upcoming invasive procedure, Emergency department   visit, Upcoming dental procedure, Missed doses, Extra doses, Change in   medications, Change in diet/appetite, Hospital admission, Bruising, Other   complaints      Clinical Outcomes     Negatives:  Major bleeding event, Thromboembolic event,   Anticoagulation-related hospital admission, Anticoagulation-related ED   visit, Anticoagulation-related fatality        INR History:  Anticoagulation Monitoring 2022 4/15/2022 2022   INR 2.65 2.89 2.27   INR Date 2022   INR Goal 2.0-3.0 2.0-3.0 2.0-3.0   Trend Same Same Same   Last Week Total 17 mg 17 mg 15 mg   Next Week Total 17 mg 17 mg 17 mg   Sun 2 mg 2 mg 2 mg   Mon 3 mg 3 mg 3 mg   Tue 2 mg 2 mg 2 mg   Wed - 3 mg 3 mg   Thu - - 2 mg   Fri 3 mg 3 mg 3 mg   Sat 2 mg 2 mg 2 mg   Visit Report - - -   Some recent data might be hidden       Plan:  1. INR is Therapeutic  Transition of Care  1. RUR 23% High  2. Disposition [facility]-Call report [phone]  3. DME Rollator  4. Transportation Family  5. Follow Up PCP, Specialist      CM placed call to patient's sister Dani Hilliard [phone] to inform of patient DC and recommendation for home health. Thaddeus Tovar or patient's brother Luigi Buckley will provide transportation back to the [facility]. CM attempted phone call to [place] to inform of DC and to receive call report info and identify what home health provider is used to send orders for home health PT/OT. There was no answer-CM left message. Patient receives dialysis MWF at [facility] [place]. Nurse to call report D wing nurse's station at [phone]. CM to monitor. Medicare pt has received, reviewed, and signed 2nd IM letter informing them of their right to appeal the discharge. Signed copy has been placed on pt bedside chart. Fabio Guillen MS      10:20 CM left message with  Mary Beth Mooney [phone] to further coordinate DC. Fabio Guillen MS    10:58 Vicky Mix at [facility], [phone] has stated that the patient will need a negative rapid COVID test and chest x-ray before she returns. Their fax number is [phone]. The facility uses [facility], CM will fax home health order directly to company at [phone]. CM to monitor.      Fabio Guillen MS today- see above in Anticoagulation Summary.   Will instruct Claudia Arnold to Continue their warfarin regimen- see above in Anticoagulation Summary.  2. Follow up in 1 weeks  3. They have been instructed to call if any changes in medications, doses, concerns, etc. Patient expresses understanding and has no further questions at this time.    Kathy Means RP

## 2022-05-30 NOTE — PROGRESS NOTES
Subjective   Claudia Arnold is a 76 y.o. female.     History of Present Illness     Chief Complaint:   Chief Complaint   Patient presents with   • Anxiety     Med refill / no labs    • Heartburn   • Other     Medicare Wellness Exam         Claudia Arnold 76 y.o. female who presents today for Medical Management of the below listed issues. She  has a problem list of   Patient Active Problem List   Diagnosis   • Tear of rotator cuff   • Hypertension   • Generalized anxiety disorder   • Hyperlipidemia   • IFG (impaired fasting glucose)   • Heart murmur, systolic   • Shoulder pain, bilateral   • Pain of both shoulder joints   • Chronic pain of both shoulders   • Acute congestive heart failure (HCC)   • Obesity, morbid, BMI 50 or higher ([facility])   • Fungal skin infection   • Cellulitis of lower extremity   • Aortic valve stenosis   • Tricuspid valve insufficiency   • Mitral valve stenosis   • S/P MVR (mitral valve replacement)   • Paroxysmal atrial fibrillation ([facility])   • Pulmonary hypertension ([facility])   • ESRD (end stage renal disease) ([facility])   • Gastroesophageal reflux disease without esophagitis   • Chronic idiopathic constipation   • Dependence on renal dialysis ([facility])   • Chronic kidney disease, stage 2 (mild)   • Renovascular hypertension   • GI bleed   • 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  Since the last visit, She has overall felt well.  she has been compliant with   Current Outpatient Medications:   •  acetaminophen (TYLENOL) 325 MG tablet, Take 650 mg by mouth 3 (Three) Times a Day. Takes tid at 2 am, 1 pm,  "and hS, Disp: , Rfl:   •  busPIRone (BUSPAR) 10 MG tablet, Take 1 tablet by mouth 2 (Two) Times a Day., Disp: 180 tablet, Rfl: 3  •  [facility] MELATONIN PO, Take 5 mg by mouth At Night As Needed., Disp: , Rfl:   •  escitalopram (LEXAPRO) 10 MG tablet, Take 1 tablet by mouth Daily., Disp: 90 tablet, Rfl: 3  •  hydrocortisone 2.5 % cream, , Disp: , Rfl:   •  metoprolol tartrate (LOPRESSOR) 25 MG tablet, TAKE 1 TABLET BY MOUTH EVERY 12 HOURS (Patient taking differently: Take 12.5 mg by mouth 2 (Two) Times a Day.), Disp: 60 tablet, Rfl: 3  •  midodrine (PROAMATINE) 5 MG tablet, Take 1 tablet by mouth 3 (Three) Times a Day Before Meals., Disp: 90 tablet, Rfl: 0  •  pantoprazole (PROTONIX) 40 MG EC tablet, Take 1 tablet by mouth Every Morning., Disp: 90 tablet, Rfl: 3  •  Probiotic Product (ALIGN PO), Take 1 capsule by mouth Every Morning., Disp: , Rfl:   •  rOPINIRole (REQUIP) 0.25 MG tablet, Take 0.25 mg by mouth Every Night., Disp: , Rfl:   •  warfarin (COUMADIN) 2 MG tablet, TAKE 1 TABLET BY MOUTH EVERY SUNDAY, TUESDAY, AND THURSDAY, TAKE 1&1/2 TABLETS BY MOUTH ALL THE OTHER DAYS OF THE WEEK, Disp: 120 tablet, Rfl: 1  •  hydrocortisone (ANUSOL-HC) 25 MG suppository, Insert 1 suppository into the rectum 2 (Two) Times a Day., Disp: 100 each, Rfl: 0  •  Methoxy PEG-Epoetin Beta (MIRCERA IJ), 75 mcg Every 28 (Twenty-Eight) Days., Disp: , Rfl:   •  midodrine (PROAMATINE) 2.5 MG tablet, Take 1 tablet by mouth 3 (Three) Times a Day Before Meals., Disp: 270 tablet, Rfl: 3  •  ProRenal + D tablet tablet, Take 1 tablet by mouth Daily., Disp: , Rfl: .  She denies medication side effects.    All of the other chronic condition(s) listed above are stable w/o issues.    /56   Pulse 67   Temp 97.8 °F (36.6 °C) (Skin)   Resp 16   Ht 170.2 cm (67\")   Wt 98 kg (216 lb) Comment: pt in wheel chair  SpO2 97%   BMI 33.83 kg/m²     Results for orders placed or performed in visit on 05/27/22   Protime-INR    Specimen: Blood   Result " Value Ref Range    INR 3.25              The following portions of the patient's history were reviewed and updated as appropriate: allergies, current medications, past family history, past medical history, past social history, past surgical history, and problem list.    Review of Systems   Constitutional: Negative for activity change, chills and fever.   Respiratory: Negative for cough.    Cardiovascular: Negative for chest pain.   Psychiatric/Behavioral: Negative for dysphoric mood.       Objective   Physical Exam  Constitutional:       General: She is not in acute distress.     Appearance: She is well-developed.   Cardiovascular:      Rate and Rhythm: Normal rate and regular rhythm.      Heart sounds: Murmur heard.    Systolic murmur is present with a grade of 2/6.  Pulmonary:      Effort: Pulmonary effort is normal.      Breath sounds: Normal breath sounds.   Neurological:      Mental Status: She is alert and oriented to person, place, and time.   Psychiatric:         Behavior: Behavior normal.         Thought Content: Thought content normal.             Diagnoses and all orders for this visit:    1. Medicare annual wellness visit, subsequent (Primary)    2. Generalized anxiety disorder  -     escitalopram (LEXAPRO) 10 MG tablet; Take 1 tablet by mouth Daily.  Dispense: 90 tablet; Refill: 3  -     busPIRone (BUSPAR) 10 MG tablet; Take 1 tablet by mouth 2 (Two) Times a Day.  Dispense: 180 tablet; Refill: 3    3. Gastroesophageal reflux disease without esophagitis  -     pantoprazole (PROTONIX) 40 MG EC tablet; Take 1 tablet by mouth Every Morning.  Dispense: 90 tablet; Refill: 3    4. Primary hypertension  -     Comprehensive metabolic panel  -     Lipid panel  -     CBC and Differential  -     TSH

## 2022-05-31 PROBLEM — N18.6 ESRD (END STAGE RENAL DISEASE) (HCC): Status: ACTIVE | Noted: 2022-01-01

## 2022-05-31 NOTE — PATIENT INSTRUCTIONS
Medicare Wellness  Personal Prevention Plan of Service     Date of Office Visit:    Encounter Provider:  Robert Cortez MD  Place of Service:  [facility]  Patient Name: Claudia Arnold  :  1945    As part of the Medicare Wellness portion of your visit today, we are providing you with this personalized preventive plan of services (PPPS). This plan is based upon recommendations of the [place] Preventive Services Task Force (USPSTF) and the Advisory Committee on Immunization Practices (ACIP).    This lists the preventive care services that should be considered, and provides dates of when you are due. Items listed as completed are up-to-date and do not require any further intervention.    Health Maintenance   Topic Date Due    DXA SCAN  Never done    TDAP/TD VACCINES (1 - Tdap) Never done    LIPID PANEL  2021    COVID-19 Vaccine (3 - Booster for Pfizer series) 2021    INFLUENZA VACCINE  2022    ANNUAL WELLNESS VISIT  2023    COLORECTAL CANCER SCREENING  2031    Pneumococcal Vaccine 65+  Completed    Hepatitis B  Aged Out    HEPATITIS C SCREENING  Discontinued    DIABETIC FOOT EXAM  Discontinued    HEMOGLOBIN A1C  Discontinued    DIABETIC EYE EXAM  Discontinued    URINE MICROALBUMIN  Discontinued    ZOSTER VACCINE  Discontinued       Orders Placed This Encounter   Procedures    Comprehensive metabolic panel     Order Specific Question:   Release to patient     Answer:   Immediate    Lipid panel    TSH     Order Specific Question:   Release to patient     Answer:   Immediate    CBC and Differential     Order Specific Question:   Manual Differential     Answer:   Yes       Return in about 1 year (around 2023) for Recheck.

## 2022-05-31 NOTE — PROGRESS NOTES
The ABCs of the Annual Wellness Visit  Subsequent Medicare Wellness Visit    Chief Complaint   Patient presents with   • Anxiety     Med refill / no labs    • Heartburn   • Other     Medicare Wellness Exam        Subjective    History of Present Illness:  Claudia Arnold is a 76 y.o. female who presents for a Subsequent Medicare Wellness Visit.    The following portions of the patient's history were reviewed and   updated as appropriate: allergies, current medications, past family history, past medical history, past social history, past surgical history and problem list.    Compared to one year ago, the patient feels her physical   health is the same.    Compared to one year ago, the patient feels her mental   health is the same.    Recent Hospitalizations:  This patient has had a [facility] admission record on file within the last 365 days.    Current Medical Providers:  Patient Care Team:  Robert Cortez MD as PCP - General (Family Medicine)  Deborah Agudelo MD as Surgeon (Orthopedic Surgery)  Scott Segura MD as Consulting Physician (Cardiology)  Scar Gaxiola MD as Surgeon (Cardiothoracic Surgery)  Kathy Osuna MD as Consulting Physician (Nephrology)  Dora Astorga APRN as Nurse Practitioner (Neurosurgery)  Satish Stahl MD as Consulting Physician (Vascular Surgery)  Scar Gaxiola MD as Surgeon (Cardiothoracic Surgery)  Kathy Osuna MD as Consulting Physician (Nephrology)    Outpatient Medications Prior to Visit   Medication Sig Dispense Refill   • acetaminophen (TYLENOL) 325 MG tablet Take 650 mg by mouth 3 (Three) Times a Day. Takes tid at 2 am, 1 pm, and hS     • [facility] MELATONIN PO Take 5 mg by mouth At Night As Needed.     • hydrocortisone 2.5 % cream      • metoprolol tartrate (LOPRESSOR) 25 MG tablet TAKE 1 TABLET BY MOUTH EVERY 12 HOURS (Patient taking differently: Take 12.5 mg by mouth 2 (Two) Times a Day.) 60 tablet 3   • midodrine (PROAMATINE) 5  MG tablet Take 1 tablet by mouth 3 (Three) Times a Day Before Meals. 90 tablet 0   • Probiotic Product (ALIGN PO) Take 1 capsule by mouth Every Morning.     • rOPINIRole (REQUIP) 0.25 MG tablet Take 0.25 mg by mouth Every Night.     • warfarin (COUMADIN) 2 MG tablet TAKE 1 TABLET BY MOUTH EVERY SUNDAY, TUESDAY, AND THURSDAY, TAKE 1&1/2 TABLETS BY MOUTH ALL THE OTHER DAYS OF THE WEEK 120 tablet 1   • hydrocortisone (ANUSOL-HC) 25 MG suppository Insert 1 suppository into the rectum 2 (Two) Times a Day. 100 each 0   • Methoxy PEG-Epoetin Beta (MIRCERA IJ) 75 mcg Every 28 (Twenty-Eight) Days.     • midodrine (PROAMATINE) 2.5 MG tablet Take 1 tablet by mouth 3 (Three) Times a Day Before Meals. 270 tablet 3   • ProRenal + D tablet tablet Take 1 tablet by mouth Daily.     • busPIRone (BUSPAR) 10 MG tablet Take 1 tablet by mouth 2 (two) times a day. 180 tablet 3   • escitalopram (LEXAPRO) 10 MG tablet TAKE 1 TABLET DAILY 30 tablet 0   • pantoprazole (PROTONIX) 40 MG EC tablet TAKE 1 TABLET BY MOUTH EVERY MORNING 30 tablet 4     No facility-administered medications prior to visit.       No opioid medication identified on active medication list. I have reviewed chart for other potential  high risk medication/s and harmful drug interactions in the elderly.          Aspirin is not on active medication list.  Aspirin use is not indicated based on review of current medical condition/s. Risk of harm outweighs potential benefits.  .    Patient Active Problem List   Diagnosis   • Tear of rotator cuff   • Hypertension   • Generalized anxiety disorder   • Hyperlipidemia   • IFG (impaired fasting glucose)   • Heart murmur, systolic   • Shoulder pain, bilateral   • Pain of both shoulder joints   • Chronic pain of both shoulders   • Acute congestive heart failure (HCC)   • Obesity, morbid, BMI 50 or higher (HCC)   • Fungal skin infection   • Cellulitis of lower extremity   • Aortic valve stenosis   • Tricuspid valve insufficiency   •  "Mitral valve stenosis   • S/P MVR (mitral valve replacement)   • Paroxysmal atrial fibrillation ([facility])   • Pulmonary hypertension ([facility])   • ESRD (end stage renal disease) ([facility])   • Gastroesophageal reflux disease without esophagitis   • Chronic idiopathic constipation   • Dependence on renal dialysis ([facility])   • Chronic kidney disease, stage 2 (mild)   • Renovascular hypertension   • GI bleed   • 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  ACP discussion was held with the patient during this visit. Patient has an advance directive in EMR which is still valid.           Objective    Vitals:    05/26/22 1314   BP: 107/56   Pulse: 67   Resp: 16   Temp: 97.8 °F (36.6 °C)   TempSrc: Skin   SpO2: 97%   Weight: 98 kg (216 lb)  Comment: pt in wheel chair   Height: 170.2 cm (67\")   PainSc:   6   PainLoc: Shoulder     Body mass index is 33.83 kg/m².  BMI has not been calculated during today's encounter.     Does the patient have evidence of cognitive impairment? No    Physical Exam            HEALTH RISK ASSESSMENT    Smoking Status:  Social History     Tobacco Use   Smoking Status Never Smoker   Smokeless Tobacco Never Used     Alcohol Consumption:  Social History     Substance and Sexual Activity   Alcohol Use Yes   • Alcohol/week: 1.0 standard drink   • Types: 1 Cans of beer per week    Comment: 1 monthly     Fall Risk Screen:    STEADI Fall Risk Assessment was completed, and patient is at MODERATE risk for falls. Assessment completed on:5/31/2022    Depression " Screening:  PHQ-2/PHQ-9 Depression Screening 5/31/2022   Retired PHQ-9 Total Score -   Retired Total Score -   Little Interest or Pleasure in Doing Things 0-->not at all   Feeling Down, Depressed or Hopeless 1-->several days   PHQ-9: Brief Depression Severity Measure Score 1       Health Habits and Functional and Cognitive Screening:  Functional & Cognitive Status 5/31/2022   Do you have difficulty preparing food and eating? No   Do you have difficulty bathing yourself, getting dressed or grooming yourself? No   Do you have difficulty using the toilet? No   Do you have difficulty moving around from place to place? Yes   Do you have trouble with steps or getting out of a bed or a chair? Yes   Current Diet Well Balanced Diet   Dental Exam Not up to date   Eye Exam Not up to date   Exercise (times per week) 3 times per week   Current Exercises Include Other   Current Exercise Activities Include -   Do you need help using the phone?  No   Are you deaf or do you have serious difficulty hearing?  No   Do you need help with transportation? Yes   Do you need help shopping? Yes   Do you need help preparing meals?  No   Do you need help with housework?  Yes   Do you need help with laundry? Yes   Do you need help taking your medications? No   Do you need help managing money? No   Do you ever drive or ride in a car without wearing a seat belt? No   Have you felt unusual stress, anger or loneliness in the last month? No   Who do you live with? Spouse   If you need help, do you have trouble finding someone available to you? No   Have you been bothered in the last four weeks by sexual problems? -   Do you have difficulty concentrating, remembering or making decisions? No       Age-appropriate Screening Schedule:  Refer to the list below for future screening recommendations based on patient's age, sex and/or medical conditions. Orders for these recommended tests are listed in the plan section. The patient has been provided with a  written plan.    Health Maintenance   Topic Date Due   • DXA SCAN  Never done   • TDAP/TD VACCINES (1 - Tdap) Never done   • LIPID PANEL  02/12/2021   • INFLUENZA VACCINE  08/01/2022   • DIABETIC FOOT EXAM  Discontinued   • HEMOGLOBIN A1C  Discontinued   • DIABETIC EYE EXAM  Discontinued   • URINE MICROALBUMIN  Discontinued   • ZOSTER VACCINE  Discontinued              Assessment & Plan   CMS Preventative Services Quick Reference  Risk Factors Identified During Encounter  Cardiovascular Disease  The above risks/problems have been discussed with the patient.  Follow up actions/plans if indicated are seen below in the Assessment/Plan Section.  Pertinent information has been shared with the patient in the After Visit Summary.    Diagnoses and all orders for this visit:    1. Medicare annual wellness visit, subsequent (Primary)    2. Generalized anxiety disorder  -     escitalopram (LEXAPRO) 10 MG tablet; Take 1 tablet by mouth Daily.  Dispense: 90 tablet; Refill: 3  -     busPIRone (BUSPAR) 10 MG tablet; Take 1 tablet by mouth 2 (Two) Times a Day.  Dispense: 180 tablet; Refill: 3    3. Gastroesophageal reflux disease without esophagitis  -     pantoprazole (PROTONIX) 40 MG EC tablet; Take 1 tablet by mouth Every Morning.  Dispense: 90 tablet; Refill: 3    4. Primary hypertension  -     Comprehensive metabolic panel  -     Lipid panel  -     CBC and Differential  -     TSH        Follow Up:   Return in about 1 year (around 5/31/2023) for Recheck.     An After Visit Summary and PPPS were made available to the patient.          I spent 10 minutes caring for Claudia on this date of service. This time includes time spent by me in the following activities:preparing for the visit

## 2022-06-03 NOTE — PROGRESS NOTES
Anticoagulation Clinic Progress Note    Anticoagulation Summary  As of 6/3/2022    INR goal:  2.0-3.0   TTR:  61.0 % (2.9 y)   INR used for dosin.78 (2022)   Warfarin maintenance plan:  3 mg every Mon, Wed, Fri; 2 mg all other days   Weekly warfarin total:  17 mg   Plan last modified:  Ruma Valles RPH (2022)   Next INR check:  2022   Priority:  High   Target end date:      Indications    Paroxysmal atrial fibrillation (HCC) [I48.0]             Anticoagulation Episode Summary     INR check location:      Preferred lab:      Send INR reminders to:   AURA ANDERSON  POOL    Comments:  Lab drawn at dialysis ([facility]) - [facility] [phone]       Anticoagulation Care Providers     Provider Role Specialty Phone number    Shiloh Gonzales APRN Referring Cardiology [phone]          Clinic Interview:  Patient Findings     Negatives:  Signs/symptoms of thrombosis, Signs/symptoms of bleeding,   Laboratory test error suspected, Change in health, Change in alcohol use,   Change in activity, Upcoming invasive procedure, Emergency department   visit, Upcoming dental procedure, Missed doses, Extra doses, Change in   medications, Change in diet/appetite, Hospital admission, Bruising, Other   complaints      Clinical Outcomes     Negatives:  Major bleeding event, Thromboembolic event,   Anticoagulation-related hospital admission, Anticoagulation-related ED   visit, Anticoagulation-related fatality        INR History:  Anticoagulation Monitoring 2022 2022 6/3/2022   INR 2.49 3.25 2.78   INR Date 2022   INR Goal 2.0-3.0 2.0-3.0 2.0-3.0   Trend Same Same Same   Last Week Total 17 mg 17 mg 16 mg   Next Week Total 17 mg 16 mg 17 mg   Sun 2 mg 2 mg 2 mg   Mon 3 mg 3 mg 3 mg   Tue 2 mg 2 mg 2 mg   Wed - - -   Thu - - -   Fri 3 mg 2 mg () 3 mg   Sat 2 mg 2 mg 2 mg   Visit Report - - -   Some recent data might be hidden       Plan:  1. INR is Therapeutic today-  see above in Anticoagulation Summary.   Will instruct Claudia GABRIELLE Arnold to Continue their warfarin regimen- see above in Anticoagulation Summary.  2. Follow up in 1 weeks  3. Pt has agreed to only be called if INR out of range. They have been instructed to call if any changes in medications, doses, concerns, etc. Patient expresses understanding and has no further questions at this time.    Kathy Means, PharmD

## 2022-06-10 NOTE — PROGRESS NOTES
Anticoagulation Clinic Progress Note    Anticoagulation Summary  As of 6/10/2022    INR goal:  2.0-3.0   TTR:  61.3 % (2.9 y)   INR used for dosin.77 (2022)   Warfarin maintenance plan:  3 mg every Mon, Wed, Fri; 2 mg all other days   Weekly warfarin total:  17 mg   No change documented:  Ruma Valles RPH   Plan last modified:  Ruma Valles RPH (2022)   Next INR check:  6/15/2022   Priority:  High   Target end date:      Indications    Paroxysmal atrial fibrillation (HCC) [I48.0]             Anticoagulation Episode Summary     INR check location:      Preferred lab:      Send INR reminders to:  [facility]  POOL    Comments:  Lab drawn at dialysis ([facility]) - [facility] [phone]       Anticoagulation Care Providers     Provider Role Specialty Phone number    Shiloh Gonzales APRN Referring Cardiology [phone]          Clinic Interview:  No pertinent clinical findings have been reported.    INR History:  Anticoagulation Monitoring 2022 6/3/2022 6/10/2022   INR 3.25 2.78 2.77   INR Date 2022   INR Goal 2.0-3.0 2.0-3.0 2.0-3.0   Trend Same Same Same   Last Week Total 17 mg 16 mg 17 mg   Next Week Total 16 mg 17 mg 17 mg   Sun 2 mg 2 mg 2 mg   Mon 3 mg 3 mg 3 mg   Tue 2 mg 2 mg 2 mg   Wed - - -   Thu - - -   Fri 2 mg () 3 mg 3 mg   Sat 2 mg 2 mg 2 mg   Visit Report - - -   Some recent data might be hidden       Plan:  1. INR is therapeutic today- see above in Anticoagulation Summary.    Claudia Arnold to continue their warfarin regimen- see above in Anticoagulation Summary.  2. Follow up in 1 week  3. Pt has agreed to only be called if INR out of range. They have been instructed to call if any changes in medications, doses, concerns, etc. Patient expresses understanding and has no further questions at this time.    Ruma Valles RPH

## 2022-06-17 NOTE — PROGRESS NOTES
Anticoagulation Clinic Progress Note    Anticoagulation Summary  As of 2022    INR goal:  2.0-3.0   TTR:  61.5 % (2.9 y)   INR used for dosin.63 (6/15/2022)   Warfarin maintenance plan:  3 mg every Mon, Wed, Fri; 2 mg all other days   Weekly warfarin total:  17 mg   No change documented:  Ruma Valles RPH   Plan last modified:  Ruma Valles RPH (2022)   Next INR check:  2022   Priority:  High   Target end date:      Indications    Paroxysmal atrial fibrillation (HCC) [I48.0]             Anticoagulation Episode Summary     INR check location:      Preferred lab:      Send INR reminders to:  [facility]  POOL    Comments:  Lab drawn at dialysis ([facility]) - [facility] [phone]       Anticoagulation Care Providers     Provider Role Specialty Phone number    Shiloh Gonzales APRN Referring Cardiology [phone]          Clinic Interview:  No pertinent clinical findings have been reported.    INR History:  Anticoagulation Monitoring 6/3/2022 6/10/2022 2022   INR 2.78 2.77 2.63   INR Date 2022 2022 6/15/2022   INR Goal 2.0-3.0 2.0-3.0 2.0-3.0   Trend Same Same Same   Last Week Total 16 mg 17 mg 17 mg   Next Week Total 17 mg 17 mg 17 mg   Sun 2 mg 2 mg 2 mg   Mon 3 mg 3 mg 3 mg   Tue 2 mg 2 mg 2 mg   Wed - - -   Thu - - -   Fri 3 mg 3 mg 3 mg   Sat 2 mg 2 mg 2 mg   Visit Report - - -   Some recent data might be hidden       Plan:  1. INR is therapeutic today- see above in Anticoagulation Summary.    Claudia Arnold to continue their warfarin regimen- see above in Anticoagulation Summary.  2. Follow up in 1 week  3. Pt has agreed to only be called if INR out of range. They have been instructed to call if any changes in medications, doses, concerns, etc. Patient expresses understanding and has no further questions at this time.    Ruma Valles RPH

## 2022-06-23 NOTE — ED TRIAGE NOTES
States has some discoloration to LT thigh for several days that has been worse. Sent here to R/O DVT    Patient was placed in face mask during first look triage.  Patient was wearing a face mask throughout encounter.  I wore personal protective equipment throughout the encounter.  Hand hygiene was performed before and after patient encounter.

## 2022-06-23 NOTE — PROGRESS NOTES
Anticoagulation Clinic Progress Note    Anticoagulation Summary  As of 2022    INR goal:  2.0-3.0   TTR:  61.8 % (2.9 y)   INR used for dosin.65 (2022)   Warfarin maintenance plan:  3 mg every Mon, Wed, Fri; 2 mg all other days   Weekly warfarin total:  17 mg   No change documented:  Jalen Temple, PharmD   Plan last modified:  Ruma Valles RPH (2022)   Next INR check:  2022   Priority:  High   Target end date:      Indications    Paroxysmal atrial fibrillation (HCC) [I48.0]             Anticoagulation Episode Summary     INR check location:      Preferred lab:      Send INR reminders to:  [facility]  POOL    Comments:  Lab drawn at dialysis ([facility]) - [facility] [phone]       Anticoagulation Care Providers     Provider Role Specialty Phone number    Shiloh Gonzales APRN Referring Cardiology [phone]          Clinic Interview:  Patient Findings     Positives:  Signs/symptoms of thrombosis, Bruising, Other complaints    Negatives:  Signs/symptoms of bleeding, Laboratory test error suspected,   Change in health, Change in alcohol use, Change in activity, Upcoming   invasive procedure, Emergency department visit, Upcoming dental procedure,   Missed doses, Extra doses, Change in medications, Change in diet/appetite,   Hospital admission    Comments:  Reports she has been advised to visit ED this evening for   evaluation of possible DVT noted by PCP (see 22 telephone note).       Clinical Outcomes     Negatives:  Major bleeding event, Thromboembolic event,   Anticoagulation-related hospital admission, Anticoagulation-related ED   visit, Anticoagulation-related fatality    Comments:  Reports she has been advised to visit ED this evening for   evaluation of possible DVT noted by PCP (see 22 telephone note).         INR History:  Anticoagulation Monitoring 6/10/2022 2022 2022   INR 2.77 2.63 2.65   INR Date 2022 6/15/2022 2022   INR Goal  2.0-3.0 2.0-3.0 2.0-3.0   Trend Same Same Same   Last Week Total 17 mg 17 mg 17 mg   Next Week Total 17 mg 17 mg 17 mg   Sun 2 mg 2 mg 2 mg   Mon 3 mg 3 mg 3 mg   Tue 2 mg 2 mg 2 mg   Wed - - -   Thu - - 2 mg   Fri 3 mg 3 mg 3 mg   Sat 2 mg 2 mg 2 mg   Visit Report - - -   Some recent data might be hidden       Plan:  1. INR is Therapeutic today- see above in Anticoagulation Summary. Her INR has been at least therapeutic the past several weeks.   Will instruct Claudia Arnold to Continue their warfarin regimen- see above in Anticoagulation Summary.  2. Follow up in 1 week pending. Pending findings in ED, she will contact clinic with any updates.   3. They have been instructed to call if any changes in medications, doses, concerns, etc. Patient expresses understanding and has no further questions at this time.    Jalen Temple, PharmD

## 2022-06-23 NOTE — ED PROVIDER NOTES
EMERGENCY DEPARTMENT ENCOUNTER    Room Number:  B01/01  Date of encounter:  6/23/2022  PCP: Robert Cortez MD  Historian: Patient, spouse      I used full protective equipment while examining this patient.  This includes face mask, gloves and protective eyewear.  I washed my hands before entering the room and immediately upon leaving the room      HPI:  Chief Complaint: Thigh swelling, bruising  A complete HPI/ROS/PMH/PSH/SH/FH are unobtainable due to: Nothing    Context: Claudia Arnold is a 76 y.o. female who presents to the ED c/o gradual onset, progressive worsening bruising, swelling to left medial distal thigh.  Patient denies any known trauma.  Patient states the bruising started off very small but has progressively become larger.  Patient denies any significant pain.  The area is mildly swollen.  She denies any calf pain or swelling.  Patient is on Coumadin for history of atrial fibrillation.  She had her INR checked yesterday, this was 2.65.  Patient is on dialysis Monday/Wednesday/Friday.  Patient was in contact with her family doctor and was instructed to come here for further evaluation to rule out DVT.    Review of Medical Records  I reviewed patient's last family medicine office visit from 5/31/2022.  Patient seen for anxiety, GERD, hypertension.    PAST MEDICAL HISTORY  Active Ambulatory Problems     Diagnosis Date Noted   • Tear of rotator cuff 04/28/2016   • Hypertension 05/24/2016   • Generalized anxiety disorder 05/24/2016   • Hyperlipidemia 05/24/2016   • IFG (impaired fasting glucose) 05/24/2016   • Heart murmur, systolic 05/24/2016   • Shoulder pain, bilateral 08/01/2016   • Pain of both shoulder joints 11/01/2016   • Chronic pain of both shoulders 02/03/2017   • Acute congestive heart failure (HCC) 01/16/2019   • Obesity, morbid, BMI 50 or higher (HCC) 01/16/2019   • Fungal skin infection 01/16/2019   • Cellulitis of lower extremity 01/17/2019   • Aortic valve stenosis 01/16/2019   • Tricuspid  valve insufficiency 01/16/2019   • Mitral valve stenosis 01/16/2019   • S/P MVR (mitral valve replacement) 03/27/2019   • Paroxysmal atrial fibrillation ([facility]) 05/08/2019   • Pulmonary hypertension ([facility]) 05/08/2019   • ESRD (end stage renal disease) ([facility]) 05/08/2019   • Gastroesophageal reflux disease without esophagitis 05/08/2019   • Chronic idiopathic constipation 05/08/2019   • Dependence on renal dialysis ([facility]) 06/26/2019   • Chronic kidney disease, stage 2 (mild) 08/09/2013   • Renovascular hypertension 08/09/2013   • GI bleed 01/11/2021   • 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 ([facility]) 01/11/2021   • CAD (coronary artery disease)    • S/P AVR (aortic valve replacement)    • Chronic diastolic CHF (congestive heart failure) ([facility])    • Chronic pain of both shoulders 01/11/2021   • Gastrointestinal hemorrhage 01/15/2021   • Gastrointestinal hemorrhage with melena 01/15/2021   • Immobility syndrome 05/27/2021   • Atrial fibrillation with RVR ([facility]) 12/06/2021   • OCTAVIANO (obstructive sleep apnea)    • Anemia    • ESRD (end stage renal disease) ([facility]) 05/31/2022     Resolved Ambulatory Problems     Diagnosis Date Noted   • Hematochezia 08/08/2021     Past Medical History:   Diagnosis Date   • A-fib ([facility])    • Arthritis    • Bacterial infection 03/2020   • Bifascicular block    • Bilateral lower extremity edema    • Cardiomyopathy ([facility])    • CHF (congestive heart failure) ([facility])    • Chronic fatigue    • Chronic kidney disease    • Cystocele, midline 09/2019   • Dialysis patient ([facility])    • Edema 06/2021   • End stage renal failure on dialysis ([facility])    • GERD (gastroesophageal reflux disease)    • Glenohumeral arthritis 04/2021   • Hemorrhoids    • Hernia, inguinal    • History of echocardiogram 1/17/19-[facility]   • History of transfusion    • Hypokalemia    • Hypokinesis 01/22/2019   • Iron deficiency anemia    • LAD stenosis 01/22/2019   • Left atrial dilatation 01/17/2019   • Left ventricular dilatation  01/17/2019   • Lumbar spondylosis    • Lumbar stenosis    • Mild concentric left ventricular hypertrophy 01/17/2019   • Mitral annular calcification 01/17/2019   • Moderate tricuspid valve regurgitation 01/24/2019   • Morbid obesity ([facility])    • Nonrheumatic tricuspid valve regurgitation    • NSTEMI (non-ST elevated myocardial infarction) ([facility])    • Osteoarthritis    • Osteoarthritis of shoulder    • PAF (paroxysmal atrial fibrillation) ([facility])    • PNA (pneumonia)    • Right bundle branch block 01/24/2019   • Right ventricular enlargement 06/2021   • Rotator cuff tear 04/2016   • Secondary hyperparathyroidism ([facility])    • Severe aortic stenosis 01/22/2019   • Severe mitral valve stenosis 01/24/2019   • Severe muscle deconditioning    • Skin yeast infection    • Tinea unguium 10/2020   • Ulcer of toe ([facility])    • Urge incontinence    • Vaginal atrophy    • Venous insufficiency    • Vulvar cyst 09/2019         PAST SURGICAL HISTORY  Past Surgical History:   Procedure Laterality Date   • AORTIC VALVE REPAIR/REPLACEMENT MITRAL VALVE REPAIR/REPLACEMENT N/A 1/28/2019    Procedure: LEE STERNOTOMY AORTIC VALVE REPLACEMENT, MITRAL VALVE REPLACEMENT, TRICUSPID VALVE REPAIR, MAZE PROCEDURE, CLOSURE OF LEFT ATRIAL APPENDAGE  AND PRP;  Surgeon: Scar Gaxiola MD;  Location: [facility] OR;  Service: Cardiothoracic   • ARTERIOVENOUS FISTULA/SHUNT SURGERY Left 6/26/2019    Procedure: LEFT ARM BRACHIAL CEPHALIC FISTULA;  Surgeon: Satish Stahl MD;  Location: [facility] MAIN OR;  Service: Vascular   • CARDIAC CATHETERIZATION N/A 1/22/2019    Procedure: Coronary angiography;  Surgeon: Rick Talavera MD;  Location: [facility] CATH INVASIVE LOCATION;  Service: Cardiology   • CARDIAC CATHETERIZATION N/A 1/22/2019    Procedure: Left Heart Cath;  Surgeon: Rick Talavera MD;  Location: [facility] CATH INVASIVE LOCATION;  Service: Cardiology   • CARDIAC CATHETERIZATION N/A 1/22/2019    Procedure: Right Heart Cath;  Surgeon: Sadaf  Rick MARSH MD;  Location: [facility] CATH INVASIVE LOCATION;  Service: Cardiology   • CARDIAC CATHETERIZATION N/A 1/22/2019    Procedure: Left ventriculography;  Surgeon: Rick Talavera MD;  Location: [facility] CATH INVASIVE LOCATION;  Service: Cardiology   • CARDIAC SURGERY     • COLONOSCOPY N/A 1/16/2021    MULTIPLE DIVERTICULA, TORTUOUS COLON, HEMORRHOIDS, DR. JOSE TRAN AT [place]   • COLONOSCOPY N/A 12/9/2021    Procedure: COLONOSCOPY TO CECUM WITH APC TO RIGHT COLON AVM;  Surgeon: Shiloh Pop MD;  Location: [facility] ENDOSCOPY;  Service: Gastroenterology;  Laterality: N/A;   PRE OP-ANEMIA  POST OP - AVM RIGHT COLON, DIVERTICULOSIS, HEMORRHOIDS   • ENDOSCOPY N/A 1/16/2021    DUODENITIS, GASTRITIS, BENIGN DUOFENAL POLYP, DR. JOSE TRAN AT [facility]   • ENDOSCOPY N/A 12/9/2021    Procedure: ESOPHAGOGASTRODUODENOSCOPY  WITH BIOPSIES;  Surgeon: Shiloh Pop MD;  Location: [facility] ENDOSCOPY;  Service: Gastroenterology;  Laterality: N/A;  PRE OP - ANEMIA  POST OP - ABN DUODENAL MUCOSA, GASTRITIS, HIATAL HERNIA   • HEMORRHOIDECTOMY N/A 8/12/2021    Procedure: HEMORRHOIDECTOMY x2;  Surgeon: Pennie Rider MD;  Location: [facility] MAIN OR;  Service: General;  Laterality: N/A;   • HERNIA REPAIR Left     INGUINAL   • INSERT / REPLACE / VENOUS ACCESS CATHETER N/A 03/11/2020    TUNNEL CATHETER REMOVAL, DR. SU MICHELLE   • INSERTION HEMODIALYSIS CATHETER N/A 2/8/2019    Procedure: tunnel CATHETER PLACEMENT WITH FLUROSCOPY;  Surgeon: Satish Stahl MD;  Location: [facility] MAIN OR;  Service: Vascular   • REPLACEMENT TOTAL KNEE Left 2007   • TOTAL KNEE ARTHROPLASTY Right 2009         FAMILY HISTORY  Family History   Problem Relation Age of Onset   • Hypertension Other    • Diabetes Other    • Heart disease Neg Hx    • Stroke Neg Hx    • Arthritis Neg Hx    • Breast cancer Neg Hx    • Malig Hyperthermia Neg Hx          SOCIAL HISTORY  Social History     Socioeconomic History   • Marital status:     • Number of children: 2   • Years of education: 12   • Highest education level: High school graduate   Tobacco Use   • Smoking status: Never Smoker   • Smokeless tobacco: Never Used   Vaping Use   • Vaping Use: Never used   Substance and Sexual Activity   • Alcohol use: Yes     Alcohol/week: 1.0 standard drink     Types: 1 Cans of beer per week     Comment: 1 monthly   • Drug use: No   • Sexual activity: Defer     Birth control/protection: Post-menopausal         ALLERGIES  Betadine [povidone iodine]        REVIEW OF SYSTEMS  All systems reviewed and negative except for those discussed in HPI.       PHYSICAL EXAM    I have reviewed the triage vital signs and nursing notes.    ED Triage Vitals   Temp Heart Rate Resp BP SpO2   06/23/22 1603 06/23/22 1603 06/23/22 1605 06/23/22 1606 06/23/22 1603   97.2 °F (36.2 °C) 59 16 113/64 94 %      Temp src Heart Rate Source Patient Position BP Location FiO2 (%)   06/23/22 1603 -- -- -- --   Tympanic           Physical Exam  GENERAL: Alert, oriented, not distressed  HENT: head atraumatic, no nuchal rigidity  EYES: no scleral icterus, EOMI  CV: Irregular rhythm, regular rate, no murmur  RESPIRATORY: normal effort, CTA  ABDOMEN: soft, nontender  MUSCULOSKELETAL: Full painless range of motion of lower extremities.  No bony tenderness.  Medium sized area of ecchymosis to distal left medial thigh.  No significant calf swelling or tenderness.  Good pulses distally.  NEURO: alert, moves all extremities, follows commands  SKIN: warm, dry        LAB RESULTS  Recent Results (from the past 24 hour(s))   Duplex Venous Lower Extremity LEFT    Collection Time: 06/23/22  5:53 PM   Result Value Ref Range    Target HR (85%) 122 bpm    Max. Pred. HR (100%) 144 bpm    Right Common Femoral Spont Y     Right Common Femoral Phasic Y     Right Common Femoral Augment Y     Right Common Femoral Competent Y     Right Common Femoral Compress C     Left Common Femoral Spont Y     Left Common  Femoral Phasic Y     Left Common Femoral Augment Y     Left Common Femoral Competent Y     Left Common Femoral Compress C     Left Saphenofemoral Junction Compress C     Left Profunda Femoral Compress C     Left Proximal Femoral Compress C     Left Mid Femoral Spont Y     Left Mid Femoral Phasic Y     Left Mid Femoral Augment Y     Left Mid Femoral Competent Y     Left Mid Femoral Compress C     Left Distal Femoral Compress C     Left Popliteal Spont Y     Left Popliteal Phasic Y     Left Popliteal Augment Y     Left Popliteal Competent Y     Left Popliteal Compress C     Left Posterior Tibial Compress C     Left Peroneal Compress C     Left Gastronemius Compress C     Left Greater Saph AK Compress C     Left Greater Saph BK Compress C     Left Lesser Saph Compress C        Ordered the above labs and independently reviewed the results.    MEDICATIONS GIVEN IN ER    Medications - No data to display      PROGRESS, DATA ANALYSIS, CONSULTS, AND MEDICAL DECISION MAKING    All labs have been independently reviewed by me.  All radiology studies have been reviewed by me and discussed with radiologist dictating the report.   EKG's independently viewed and interpreted by me.  Discussion below represents my analysis of pertinent findings related to patient's condition, differential diagnosis, treatment plan and final disposition.    I have discussed case with Dr. Adkins, emergency room physician.  He has performed his own bedside examination and agrees with treatment plan.    ED Course as of 06/23/22 1918   Thu Jun 23, 2022   1642 Patient presents with 5-day history of atraumatic left leg swelling and bruising.  Patient was sent in by her family doctor to rule out DVT.  Patient is on Coumadin for history of atrial fibrillation.  Neurovascularly intact distally. [EE]   1800 Patient has a normal ultrasound per technician.    Patient's symptoms are more consistent with a contusion or bruise. [EE]      ED Course User Index  [EE]  Jose Maher PA       AS OF 19:18 EDT VITALS:    BP - 128/62  HR - 58  TEMP - 97.2 °F (36.2 °C) (Tympanic)  O2 SATS - 94%        DIAGNOSIS  Final diagnoses:   Contusion of left thigh, initial encounter         DISPOSITION  Discharged      Dictated utilizing Dragon dictation     Jose Maehr PA  06/23/22 [id number]

## 2022-06-23 NOTE — TELEPHONE ENCOUNTER
Caller:     Best call back number:   Claudia Arnold (Self) [phone] (H)       Chief complaint:     PATIENT HAS BRUISE ON INNER THIGH   NOW IS A  LUMP AND THE BRUISE GETTING BIGGER AND GOING DOWN HER LEG   SHE THINKS IT MAY BE A BLOOD CLOT BUT NOT SURE.     PATIENT STATED THAT SHE SENT A [employer] MESSAGE WITH PHOTO, BUT I HAVEN'T SEEN IT YET.     CALLED OFFICE TO GET DIRECTION, AND WAS ADVISED TO HAVE HER GO TO ER     PATIENT SOUNDED HESITANT TO GO BUT WILL LET US KNOW WHAT SHE IS GOING TO DO     Patient directed to call 911 or go to their nearest emergency room.     Patient verbalized understanding: [x] Yes  [] No  If no, why?    Additional notes

## 2022-06-23 NOTE — ED PROVIDER NOTES
MD ATTESTATION NOTE    The MILADIS and I have discussed this patient's history, physical exam, and treatment plan.  I have reviewed the documentation and personally had a face to face interaction with the patient. I affirm the documentation and agree with the treatment and plan.  The attached note describes my personal findings.    I provided a substantive portion of the care of this patient. I personally performed the physical exam, in its entirety.    Claudia Arnold is a 76 y.o. female who presents to the ED c/o left leg bruise.  She reports over the weekend she developed a small area of swelling in her left leg.  She reports that she saw her nephrologist who told her that if it got bigger to come back.  She reports that it started getting purple around it and that got bigger and bigger.  She states that she texted her primary care doctor today who directed her here for further evaluation.  She reports she is on Coumadin.  She states she got a call on the way here that her INR was 2.6.  It was checked yesterday.  She denies any chest pain or shortness of breath.  She denies any injury.      On exam:  GENERAL: Awake, alert, no acute distress  SKIN: Warm, dry  HENT: Normocephalic, atraumatic  EYES: no scleral icterus  CV: regular rhythm, regular rate  RESPIRATORY: normal effort, lungs clear  ABDOMEN: soft, nontender, nondistended  MUSCULOSKELETAL: no deformity.  Left medial leg at the knee with ecchymosis but no tenderness.  Consistent with a burst blood vessel.  No tenderness in the calf.  No swelling.  NEURO: alert, moves all extremities, follows commands    Labs  Recent Results (from the past 24 hour(s))   Duplex Venous Lower Extremity LEFT    Collection Time: 06/23/22  5:53 PM   Result Value Ref Range    Target HR (85%) 122 bpm    Max. Pred. HR (100%) 144 bpm    Right Common Femoral Spont Y     Right Common Femoral Phasic Y     Right Common Femoral Augment Y     Right Common Femoral Competent Y     Right Common  Femoral Compress C     Left Common Femoral Spont Y     Left Common Femoral Phasic Y     Left Common Femoral Augment Y     Left Common Femoral Competent Y     Left Common Femoral Compress C     Left Saphenofemoral Junction Compress C     Left Profunda Femoral Compress C     Left Proximal Femoral Compress C     Left Mid Femoral Spont Y     Left Mid Femoral Phasic Y     Left Mid Femoral Augment Y     Left Mid Femoral Competent Y     Left Mid Femoral Compress C     Left Distal Femoral Compress C     Left Popliteal Spont Y     Left Popliteal Phasic Y     Left Popliteal Augment Y     Left Popliteal Competent Y     Left Popliteal Compress C     Left Posterior Tibial Compress C     Left Peroneal Compress C     Left Gastronemius Compress C     Left Greater Saph AK Compress C     Left Greater Saph BK Compress C     Left Lesser Saph Compress C        Radiology  Duplex Venous Lower Extremity LEFT    Result Date: 6/23/2022  · Normal left lower extremity venous duplex scan. · 8 x 8 x 4 mm cystic lesion at the medial knee.        Medical Decision Making:  ED Course as of 06/23/22 2240   Thu Jun 23, 2022   1642 Patient presents with 5-day history of atraumatic left leg swelling and bruising.  Patient was sent in by her family doctor to rule out DVT.  Patient is on Coumadin for history of atrial fibrillation.  Neurovascularly intact distally. [EE]   1800 Patient has a normal ultrasound per technician.    Patient's symptoms are more consistent with a contusion or bruise. [EE]      ED Course User Index  [EE] Jose Maher PA       Plan to ultrasound her leg to rule out DVT although I suspect that this is related to a burst blood vessel and will resolve with time.  She has multiple other bruises throughout her arms and legs from no and minor trauma.    PPE: The patient wore a mask and I wore an N95 mask throughout the entire patient encounter.      The patient has started, but not completed, their COVID-19 vaccination  series.    Diagnosis  Final diagnoses:   Contusion of left thigh, initial encounter        Philip Adkins MD  06/23/22 8545

## 2022-06-23 NOTE — PROGRESS NOTES
Today's left lower venous doppler preliminary report is negative for deep vein thrombosis. This preliminary report was given to Dr. Adkins in the ER.

## 2022-06-27 NOTE — OUTREACH NOTE
AMBULATORY CASE MANAGEMENT NOTE    Name and Relationship of Patient/Support Person: Claudia Arnold - Self    Outgoing call to the patient.  Introduced self and explained the role of RN-ACM.  Discussed recent ED visit and she denies any questions about dc instructions.  Discussed CCM and HRCM services.  She would like a list of transportation providers in [place].  She states that she may need to switch providers in the near future.  List of transportation providers was sent via [employer].  She declines CCM and HRCM services at this time.  Provided RN-ACM contact information.      EPIFANIO ANTHONY  Ambulatory Case Management    6/27/2022, 15:03 EDT

## 2022-06-27 NOTE — PATIENT INSTRUCTIONS
Hi Ms Claudia Arnold    Here are a few  transport companies with their phone numbers that you requested    [employer] [phone]  [employer] [phone]  [employer]  [phone]  [employer] [phone]  [employer] [phone]  [facility] (would need to apply )  [phone]    Hope this list is useful  Sincerely   DARCIE Lang

## 2022-07-01 NOTE — PROGRESS NOTES
Anticoagulation Clinic Progress Note    Anticoagulation Summary  As of 7/1/2022    INR goal:  2.0-3.0   TTR:  61.7 % (2.9 y)   INR used for dosing:  3.20 (6/29/2022)   Warfarin maintenance plan:  3 mg every Mon, Wed, Fri; 2 mg all other days   Weekly warfarin total:  17 mg   Plan last modified:  Ruma Valles RPH (2/11/2022)   Next INR check:  7/6/2022   Priority:  High   Target end date:      Indications    Paroxysmal atrial fibrillation (HCC) [I48.0]             Anticoagulation Episode Summary     INR check location:      Preferred lab:      Send INR reminders to:   [facility]  POOL    Comments:  Lab drawn at dialysis ([facility]) - [facility] [phone]       Anticoagulation Care Providers     Provider Role Specialty Phone number    Shiloh Gonzales APRN Referring Cardiology [phone]          Clinic Interview:  Patient Findings     Negatives:  Signs/symptoms of thrombosis, Signs/symptoms of bleeding,   Laboratory test error suspected, Change in health, Change in alcohol use,   Change in activity, Upcoming invasive procedure, Emergency department   visit, Upcoming dental procedure, Missed doses, Extra doses, Change in   medications, Change in diet/appetite, Hospital admission, Bruising, Other   complaints      Clinical Outcomes     Negatives:  Major bleeding event, Thromboembolic event,   Anticoagulation-related hospital admission, Anticoagulation-related ED   visit, Anticoagulation-related fatality        INR History:  Anticoagulation Monitoring 6/17/2022 6/23/2022 7/1/2022 7/1/2022   INR 2.63 2.65 3.20 -   INR Date 6/15/2022 6/22/2022 6/29/2022 -   INR Goal 2.0-3.0 2.0-3.0 2.0-3.0 2.0-3.0   Trend Same Same Same -   Last Week Total 17 mg 17 mg 17 mg -   Next Week Total 17 mg 17 mg 16 mg -   Sun 2 mg 2 mg 2 mg -   Mon 3 mg 3 mg 3 mg -   Tue 2 mg 2 mg 2 mg -   Wed - - - -   Thu - 2 mg - -   Fri 3 mg 3 mg 2 mg (7/1) -   Sat 2 mg 2 mg 2 mg -   Visit Report - - - -   Some recent data might be  hidden       Plan:  1. INR was Supratherapeutic 6/29/22 - see above in Anticoagulation Summary.   Will instruct Claudia Arnold to Change their warfarin regimen- see above in Anticoagulation Summary.  2. Follow up in 1 week  3. They have been instructed to call if any changes in medications, doses, concerns, etc. Patient expresses understanding and has no further questions at this time.    Jalen Temple, PharmD

## 2022-07-15 NOTE — PROGRESS NOTES
Anticoagulation Clinic Progress Note    Anticoagulation Summary  As of 7/15/2022    INR goal:  2.0-3.0   TTR:  60.9 % (3 y)   INR used for dosing:  3.09 (7/13/2022)   Warfarin maintenance plan:  3 mg every Mon, Fri; 2 mg all other days   Weekly warfarin total:  16 mg   Plan last modified:  Ruma Valles RPH (7/15/2022)   Next INR check:  7/20/2022   Priority:  High   Target end date:      Indications    Paroxysmal atrial fibrillation (HCC) [I48.0]             Anticoagulation Episode Summary     INR check location:      Preferred lab:      Send INR reminders to:   [facility]  POOL    Comments:  Lab drawn at dialysis ([facility]) - [facility] [phone]       Anticoagulation Care Providers     Provider Role Specialty Phone number    Shiloh Gonzales APRN Referring Cardiology [phone]          Clinic Interview:  Patient Findings     Negatives:  Signs/symptoms of thrombosis, Signs/symptoms of bleeding,   Laboratory test error suspected, Change in health, Change in alcohol use,   Change in activity, Upcoming invasive procedure, Emergency department   visit, Upcoming dental procedure, Missed doses, Extra doses, Change in   medications, Change in diet/appetite, Hospital admission, Bruising, Other   complaints      Clinical Outcomes     Negatives:  Major bleeding event, Thromboembolic event,   Anticoagulation-related hospital admission, Anticoagulation-related ED   visit, Anticoagulation-related fatality        INR History:  Anticoagulation Monitoring 6/23/2022 7/1/2022 7/1/2022 7/15/2022   INR 2.65 3.20 - 3.09   INR Date 6/22/2022 6/29/2022 - 7/13/2022   INR Goal 2.0-3.0 2.0-3.0 2.0-3.0 2.0-3.0   Trend Same Same - Down   Last Week Total 17 mg 17 mg - 17 mg   Next Week Total 17 mg 16 mg - 16 mg   Sun 2 mg 2 mg - 2 mg   Mon 3 mg 3 mg - 3 mg   Tue 2 mg 2 mg - 2 mg   Wed - - - -   Thu 2 mg - - -   Fri 3 mg 2 mg (7/1) - 3 mg   Sat 2 mg 2 mg - 2 mg   Visit Report - - - -   Some recent data might be hidden        Plan:  1. INR is Supratherapeutic today- see above in Anticoagulation Summary.   Will instruct Claudia Arnold to Change their warfarin regimen- see above in Anticoagulation Summary.  2. Follow up in 1 week  3. They have been instructed to call if any changes in medications, doses, concerns, etc. Patient expresses understanding and has no further questions at this time.    Ruma Valles, [facility]

## 2022-07-25 NOTE — PROGRESS NOTES
Anticoagulation Clinic Progress Note    Anticoagulation Summary  As of 2022    INR goal:  2.0-3.0   TTR:  60.5 % (3 y)   INR used for dosin.65 (2022)   Warfarin maintenance plan:  3 mg every Mon, Fri; 2 mg all other days   Weekly warfarin total:  16 mg   Plan last modified:  Kathy Means, PharmD (2022)   Next INR check:  8/3/2022   Priority:  High   Target end date:      Indications    Paroxysmal atrial fibrillation (HCC) [I48.0]             Anticoagulation Episode Summary     INR check location:      Preferred lab:      Send INR reminders to:   [facility]  POOL    Comments:  Lab drawn at dialysis ([facility]) - [facility] [phone]       Anticoagulation Care Providers     Provider Role Specialty Phone number    Shiloh Gonzales APRN Referring Cardiology [phone]          Clinic Interview:  Patient Findings     Positives:  Other complaints    Negatives:  Signs/symptoms of thrombosis, Signs/symptoms of bleeding,   Laboratory test error suspected, Change in health, Change in alcohol use,   Change in activity, Upcoming invasive procedure, Emergency department   visit, Upcoming dental procedure, Missed doses, Extra doses, Change in   medications, Change in diet/appetite, Hospital admission, Bruising    Comments:  Reports the lab draw from last week () was compromised and   that it was redrawn today.        Clinical Outcomes     Negatives:  Major bleeding event, Thromboembolic event,   Anticoagulation-related hospital admission, Anticoagulation-related ED   visit, Anticoagulation-related fatality    Comments:  Reports the lab draw from last week () was compromised and   that it was redrawn today.          INR History:  Anticoagulation Monitoring 2022 2022 7/15/2022 2022   INR 3.20 - 3.09 4.65   INR Date 2022 - 2022   INR Goal 2.0-3.0 2.0-3.0 2.0-3.0 2.0-3.0   Trend Same - Down Same   Last Week Total 17 mg - 17 mg 16 mg   Next Week Total 16  mg - 16 mg 16 mg   Sun 2 mg - 2 mg 2 mg   Mon 3 mg - 3 mg 3 mg   Tue 2 mg - 2 mg 2 mg   Wed - - - 2 mg   Thu - - - 2 mg   Fri 2 mg (7/1) - 3 mg 3 mg   Sat 2 mg - 2 mg 2 mg   Visit Report - - - -   Some recent data might be hidden       Plan:  1. INR is Supratherapeutic today-   see above in Anticoagulation Summary.   However, patient reports that the lab at dialysis informed her that the sample was invalid due to exposure to heat. Stated that they redrew the INR today.   Will instruct Claudia Arnold to Continue their warfarin regimen- see above in Anticoagulation Summary.  Due to potential invalidity of the INR value, I was not comfortable making dosing recommendations with this knowledge.  Instructed patient to call the [facility] if she does not receive a call from us by 2 pm tomorrow with dosing instructions.   2. Follow up in 1 day   3. They have been instructed to call if any changes in medications, doses, concerns, etc. Patient expresses understanding and has no further questions at this time.    Kathy Means, PharmD

## 2022-07-28 NOTE — PROGRESS NOTES
Anticoagulation Clinic Progress Note    Anticoagulation Summary  As of 2022    INR goal:  2.0-3.0   TTR:  60.5 % (3 y)   INR used for dosin.10 (2022)   Warfarin maintenance plan:  3 mg every Mon, Fri; 2 mg all other days   Weekly warfarin total:  16 mg   No change documented:  Kathy Means PharmD   Plan last modified:  Kathy Means PharmD (2022)   Next INR check:  2022   Priority:  High   Target end date:      Indications    Paroxysmal atrial fibrillation (HCC) [I48.0]             Anticoagulation Episode Summary     INR check location:      Preferred lab:      Send INR reminders to:  [facility]  POOL    Comments:  Lab drawn at [facility] ([facility]) - [facility] [phone]       Anticoagulation Care Providers     Provider Role Specialty Phone number    Shiloh Gonzales APRN Referring Cardiology [phone]          Clinic Interview:  Patient Findings     Negatives:  Signs/symptoms of thrombosis, Signs/symptoms of bleeding,   Laboratory test error suspected, Change in health, Change in alcohol use,   Change in activity, Upcoming invasive procedure, Emergency department   visit, Upcoming dental procedure, Missed doses, Extra doses, Change in   medications, Change in diet/appetite, Hospital admission, Bruising, Other   complaints      Clinical Outcomes     Negatives:  Major bleeding event, Thromboembolic event,   Anticoagulation-related hospital admission, Anticoagulation-related ED   visit, Anticoagulation-related fatality        INR History:  Anticoagulation Monitoring 7/15/2022 2022 2022   INR 3.09 4.65 2.10   INR Date 2022   INR Goal 2.0-3.0 2.0-3.0 2.0-3.0   Trend Down Same Same   Last Week Total 17 mg 16 mg 16 mg   Next Week Total 16 mg 16 mg 16 mg   Sun 2 mg 2 mg 2 mg   Mon 3 mg 3 mg 3 mg   Tue 2 mg 2 mg 2 mg   Wed - 2 mg 2 mg   Thu - 2 mg 2 mg   Fri 3 mg 3 mg 3 mg   Sat 2 mg 2 mg 2 mg   Visit Report - - -   Some recent data might  be hidden       Plan:  1. INR is Therapeutic today- see above in Anticoagulation Summary.   Will instruct Claudia Arnold to Continue their warfarin regimen- see above in Anticoagulation Summary.  2. Follow up in 1 weeks  3. They have been instructed to call if any changes in medications, doses, concerns, etc. Patient expresses understanding and has no further questions at this time.    Kathy Means, PharmD

## 2022-08-08 NOTE — PROGRESS NOTES
Anticoagulation Clinic Progress Note    Anticoagulation Summary  As of 2022    INR goal:  2.0-3.0   TTR:  60.9 % (3 y)   INR used for dosin.20 (2022)   Warfarin maintenance plan:  3 mg every Mon, Fri; 2 mg all other days   Weekly warfarin total:  16 mg   No change documented:  Ruma Valles RPH   Plan last modified:  Kathy Means, PharmD (2022)   Next INR check:  2022   Priority:  High   Target end date:      Indications    Paroxysmal atrial fibrillation (HCC) [I48.0]             Anticoagulation Episode Summary     INR check location:      Preferred lab:      Send INR reminders to:  [facility]  POOL    Comments:  Lab drawn at [facility] ("[facility]) - [facility] [phone]       Anticoagulation Care Providers     Provider Role Specialty Phone number    Shiloh Gonzales APRN Referring Cardiology [phone]          Clinic Interview:  No pertinent clinical findings have been reported.    INR History:  Anticoagulation Monitoring 2022   INR 4.65 2.10 2.20   INR Date 2022   INR Goal 2.0-3.0 2.0-3.0 2.0-3.0   Trend Same Same Same   Last Week Total 16 mg 16 mg 16 mg   Next Week Total 16 mg 16 mg 16 mg   Sun 2 mg 2 mg -   Mon 3 mg 3 mg 3 mg   Tue 2 mg 2 mg 2 mg   Wed 2 mg 2 mg 2 mg   Thu 2 mg 2 mg 2 mg   Fri 3 mg 3 mg -   Sat 2 mg 2 mg -   Visit Report - - -   Some recent data might be hidden       Plan:  1. INR is therapeutic today- see above in Anticoagulation Summary.    Claudia Arnold to continue their warfarin regimen- see above in Anticoagulation Summary.  2. Follow up in 1 week  3. Pt has agreed to only be called if INR out of range. They have been instructed to call if any changes in medications, doses, concerns, etc. Patient expresses understanding and has no further questions at this time.    Ruma Valles RPH

## 2022-08-15 NOTE — PROGRESS NOTES
Anticoagulation Clinic Progress Note    Anticoagulation Summary  As of 8/15/2022    INR goal:  2.0-3.0   TTR:  61.1 % (3.1 y)   INR used for dosin.38 (2022)   Warfarin maintenance plan:  3 mg every Mon, Fri; 2 mg all other days   Weekly warfarin total:  16 mg   No change documented:  Kathy Means PharmD   Plan last modified:  Kathy Means PharmD (2022)   Next INR check:  2022   Priority:  High   Target end date:      Indications    Paroxysmal atrial fibrillation (HCC) [I48.0]             Anticoagulation Episode Summary     INR check location:      Preferred lab:      Send INR reminders to:  [facility]  POOL    Comments:  Lab drawn at [facility] ([facility]) - [facility] [phone]       Anticoagulation Care Providers     Provider Role Specialty Phone number    Shiloh Gonzales APRN Referring Cardiology [phone]          Clinic Interview:  Patient Findings     Negatives:  Signs/symptoms of thrombosis, Signs/symptoms of bleeding,   Laboratory test error suspected, Change in health, Change in alcohol use,   Change in activity, Upcoming invasive procedure, Emergency department   visit, Upcoming dental procedure, Missed doses, Extra doses, Change in   medications, Change in diet/appetite, Hospital admission, Bruising, Other   complaints      Clinical Outcomes     Negatives:  Major bleeding event, Thromboembolic event,   Anticoagulation-related hospital admission, Anticoagulation-related ED   visit, Anticoagulation-related fatality        INR History:  Anticoagulation Monitoring 2022 2022 8/15/2022   INR 2.10 2.20 2.38   INR Date 2022   INR Goal 2.0-3.0 2.0-3.0 2.0-3.0   Trend Same Same Same   Last Week Total 16 mg 16 mg 16 mg   Next Week Total 16 mg 16 mg 16 mg   Sun 2 mg - 2 mg   Mon 3 mg 3 mg 3 mg   Tue 2 mg 2 mg 2 mg   Wed 2 mg 2 mg 2 mg   Thu 2 mg 2 mg 2 mg   Fri 3 mg - 3 mg   Sat 2 mg - 2 mg   Visit Report - - -   Some recent data might be  hidden       Plan:  1. INR is Therapeutic today- see above in Anticoagulation Summary.   Will instruct Claudia Arnold to Continue their warfarin regimen- see above in Anticoagulation Summary.  2. Follow up in 1 weeks  3. Pt has agreed to only be called if INR out of range. They have been instructed to call if any changes in medications, doses, concerns, etc. Patient expresses understanding and has no further questions at this time.    Kathy Means, PharmD

## 2022-08-26 NOTE — PROGRESS NOTES
Anticoagulation Clinic Progress Note    Anticoagulation Summary  As of 2022    INR goal:  2.0-3.0   TTR:  61.3 % (3.1 y)   INR used for dosin.94 (2022)   Warfarin maintenance plan:  3 mg every Wed; 2 mg all other days   Weekly warfarin total:  15 mg   Plan last modified:  Ruma Valles RPH (2022)   Next INR check:  2022   Priority:  High   Target end date:      Indications    Paroxysmal atrial fibrillation (HCC) [I48.0]             Anticoagulation Episode Summary     INR check location:      Preferred lab:      Send INR reminders to:   [facility]  POOL    Comments:  Lab drawn at dialysis ([facility]) - [facility] [phone]       Anticoagulation Care Providers     Provider Role Specialty Phone number    Shiloh Gonzales APRN Referring Cardiology [phone]          Clinic Interview:  Patient Findings     Positives:  Other complaints    Negatives:  Signs/symptoms of thrombosis, Signs/symptoms of bleeding,   Laboratory test error suspected, Change in health, Change in alcohol use,   Change in activity, Upcoming invasive procedure, Emergency department   visit, Upcoming dental procedure, Missed doses, Extra doses, Change in   medications, Change in diet/appetite, Hospital admission, Bruising    Comments:  patient reports she has been taking 3 mg on wed, 2 AOD since   INR was elevated (multiple weeks per report)      Clinical Outcomes     Negatives:  Major bleeding event, Thromboembolic event,   Anticoagulation-related hospital admission, Anticoagulation-related ED   visit, Anticoagulation-related fatality    Comments:  patient reports she has been taking 3 mg on wed, 2 AOD since   INR was elevated (multiple weeks per report)        INR History:  Anticoagulation Monitoring 2022 8/15/2022 2022   INR 2.20 2.38 1.94   INR Date 2022   INR Goal 2.0-3.0 2.0-3.0 2.0-3.0   Trend Same Same Down   Last Week Total 16 mg 16 mg 16 mg   Next Week Total 16 mg  16 mg 15 mg   Sun - 2 mg 2 mg   Mon 3 mg 3 mg 2 mg   Tue 2 mg 2 mg 2 mg   Wed 2 mg 2 mg -   Thu 2 mg 2 mg -   Fri - 3 mg 2 mg   Sat - 2 mg 2 mg   Visit Report - - -   Some recent data might be hidden       Plan:  1. INR is Subtherapeutic today- see above in Anticoagulation Summary.   Will instruct Claudia Arnold to Continue their warfarin regimen since INR was close to goal- see above in Anticoagulation Summary.  2. Follow up in 1 week  3.They have been instructed to call if any changes in medications, doses, concerns, etc. Patient expresses understanding and has no further questions at this time.    Ruma Valles [facility]

## 2022-08-29 NOTE — PROGRESS NOTES
Patient: Claudia Arnold  YOB: 1945  Date of Service: 8/29/2022    Chief Complaints: Bilateral shoulder pain    Subjective:    History of Present Illness: Pt is seen in the office today with complaints of bilateral shoulder pain she has known degenerative changes gets intermittent injections is not a great surgical candidate.          Allergies:   Allergies   Allergen Reactions   • Betadine [Povidone Iodine] Itching       Medications:   Home Medications:  Current Outpatient Medications on File Prior to Visit   Medication Sig   • acetaminophen (TYLENOL) 325 MG tablet Take 650 mg by mouth 3 (Three) Times a Day. Takes tid at 2 am, 1 pm, and hS   • busPIRone (BUSPAR) 10 MG tablet Take 1 tablet by mouth 2 (Two) Times a Day.   • [facility] MELATONIN PO Take 5 mg by mouth At Night As Needed.   • escitalopram (LEXAPRO) 10 MG tablet Take 1 tablet by mouth Daily.   • hydrocortisone (ANUSOL-HC) 25 MG suppository Insert 1 suppository into the rectum 2 (Two) Times a Day.   • hydrocortisone 2.5 % cream    • Methoxy PEG-Epoetin Beta (MIRCERA IJ) 75 mcg Every 28 (Twenty-Eight) Days.   • metoprolol tartrate (LOPRESSOR) 25 MG tablet TAKE 1 TABLET BY MOUTH EVERY 12 HOURS   • midodrine (PROAMATINE) 2.5 MG tablet Take 1 tablet by mouth 3 (Three) Times a Day Before Meals.   • midodrine (PROAMATINE) 5 MG tablet Take 1 tablet by mouth 3 (Three) Times a Day Before Meals.   • pantoprazole (PROTONIX) 40 MG EC tablet Take 1 tablet by mouth Every Morning.   • Probiotic Product (ALIGN PO) Take 1 capsule by mouth Every Morning.   • ProRenal + D tablet tablet Take 1 tablet by mouth Daily.   • rOPINIRole (REQUIP) 0.25 MG tablet Take 0.25 mg by mouth Every Night.   • warfarin (COUMADIN) 2 MG tablet TAKE 1 TABLET BY MOUTH EVERY SUNDAY, TUESDAY, AND THURSDAY, TAKE 1&1/2 TABLETS BY MOUTH ALL THE OTHER DAYS OF THE WEEK   • [DISCONTINUED] metoprolol tartrate (LOPRESSOR) 25 MG tablet TAKE 1 TABLET BY MOUTH EVERY 12 HOURS (Patient taking  differently: Take 12.5 mg by mouth 2 (Two) Times a Day.)     No current facility-administered medications on file prior to visit.     Current Medications:  Scheduled Meds:  Continuous Infusions:No current facility-administered medications for this visit.    PRN Meds:.    I have reviewed the patient's medical history in detail and updated the computerized patient record.  Review and summarization of old records include:    Past Medical History:   Diagnosis Date   • A-fib ([facility])     Meds   • Arthritis     w/Difficult Mobility   • Bacterial infection 03/2020   • Bifascicular block    • Bilateral lower extremity edema     Legs   • CAD (coronary artery disease)     Affecting LAD    • Cardiomyopathy ([facility])    • CHF (congestive heart failure) ([facility])     CHRONIC, DIASTOLIC   • Chronic fatigue    • Chronic kidney disease    • Cystocele, midline 09/2019   • Dialysis patient ([facility])     TUESDAY THURSDAY SATURDAY   • Edema 06/2021   • End stage renal failure on dialysis ([facility])     FOLLOWED BY DR. COLLINS SPANGLER   • Gastrointestinal hemorrhage 01/11/2021    ADMITTED TO [facility]   • Generalized anxiety disorder    • GERD (gastroesophageal reflux disease)    • Glenohumeral arthritis 04/2021   • Hematochezia 08/08/2021    ADMITTED TO [facility]   • Hemorrhoids    • Hernia, inguinal     Hx Repair   • History of echocardiogram 1/17/19-[facility]    The L Ventricular Cavity is Mild-to-Moderately Dilated; Left Ventricular Wall Thickness is Consistent w/Mild Concentric Hypertrophy; Left Atrial Cavity Size is Moderate-to-Severely Dilated; Severe AVS; Severe MVS; Moderate TVR Noted   • History of transfusion     no adverse reaction   • Hyperlipidemia     Controlled w/Meds   • Hypertension     Controlled w/Meds   • Hypokalemia    • Hypokinesis 01/22/2019    Apical Noted on Cardiac Cath   • IFG (impaired fasting glucose)    • Immobility syndrome    • Iron deficiency anemia    • LAD stenosis 01/22/2019    Mid LAD Irregularities Noted on Cardiac Cath    • Left  atrial dilatation 01/17/2019    Moderate-Severe Noted on Echo   • Left ventricular dilatation 01/17/2019    Noted on Echo/LEE   • Lumbar spondylosis    • Lumbar stenosis    • Mild concentric left ventricular hypertrophy 01/17/2019    Noted on Echo/LEE   • Mitral annular calcification 01/17/2019    Severe Noted on Echo   • Moderate tricuspid valve regurgitation 01/24/2019    Noted on LEE   • Morbid obesity ([facility])    • Nonrheumatic tricuspid valve regurgitation    • NSTEMI (non-ST elevated myocardial infarction) ([facility])    • OCTAVIANO (obstructive sleep apnea)    • Osteoarthritis    • Osteoarthritis of shoulder    • PAF (paroxysmal atrial fibrillation) ([facility])    • PNA (pneumonia)    • Pulmonary hypertension ([facility]) 01/22/2019    Noted on Cardiac Cath   • Right bundle branch block 01/24/2019    Noted on LEE   • Right ventricular enlargement 06/2021   • Rotator cuff tear 04/2016   • Secondary hyperparathyroidism ([facility])    • Severe aortic stenosis 01/22/2019    Noted on Cardiac Cath & LEE on 01/24/19; S/p AVR on 01/28/19 by Dr. Gaxiola   • Severe mitral valve stenosis 01/24/2019    Noted on LEE; S/p MVR on 01/28/19 by Dr. Gaxiola   • Severe muscle deconditioning    • Shoulder pain, bilateral    • Skin yeast infection     Folds Under Skin--Nystatin/Diflucan   • Tinea unguium 10/2020    BILATERAL   • Ulcer of toe ([facility])     RIGHT FOOT   • Urge incontinence    • Vaginal atrophy    • Venous insufficiency    • Vulvar cyst 09/2019        Past Surgical History:   Procedure Laterality Date   • AORTIC VALVE REPAIR/REPLACEMENT MITRAL VALVE REPAIR/REPLACEMENT N/A 1/28/2019    Procedure: LEE STERNOTOMY AORTIC VALVE REPLACEMENT, MITRAL VALVE REPLACEMENT, TRICUSPID VALVE REPAIR, MAZE PROCEDURE, CLOSURE OF LEFT ATRIAL APPENDAGE  AND PRP;  Surgeon: Scar Gaxiola MD;  Location: [facility];  Service: Cardiothoracic   • ARTERIOVENOUS FISTULA/SHUNT SURGERY Left 6/26/2019    Procedure: LEFT ARM BRACHIAL CEPHALIC FISTULA;  Surgeon: Satish Stahl  MD Antonino;  Location: [facility] MAIN OR;  Service: Vascular   • CARDIAC CATHETERIZATION N/A 1/22/2019    Procedure: Coronary angiography;  Surgeon: Rick Talavera MD;  Location: [facility] CATH INVASIVE LOCATION;  Service: Cardiology   • CARDIAC CATHETERIZATION N/A 1/22/2019    Procedure: Left Heart Cath;  Surgeon: Rick Talavera MD;  Location: [facility] CATH INVASIVE LOCATION;  Service: Cardiology   • CARDIAC CATHETERIZATION N/A 1/22/2019    Procedure: Right Heart Cath;  Surgeon: Rick Talavera MD;  Location: [facility] CATH INVASIVE LOCATION;  Service: Cardiology   • CARDIAC CATHETERIZATION N/A 1/22/2019    Procedure: Left ventriculography;  Surgeon: Rick Talavera MD;  Location: [facility] CATH INVASIVE LOCATION;  Service: Cardiology   • CARDIAC SURGERY     • COLONOSCOPY N/A 1/16/2021    MULTIPLE DIVERTICULA, TORTUOUS COLON, HEMORRHOIDS, DR. JOSE TRAN AT [place]   • COLONOSCOPY N/A 12/9/2021    Procedure: COLONOSCOPY TO CECUM WITH APC TO RIGHT COLON AVM;  Surgeon: Shiloh Pop MD;  Location: [facility] ENDOSCOPY;  Service: Gastroenterology;  Laterality: N/A;   PRE OP-ANEMIA  POST OP - AVM RIGHT COLON, DIVERTICULOSIS, HEMORRHOIDS   • ENDOSCOPY N/A 1/16/2021    DUODENITIS, GASTRITIS, BENIGN DUOFENAL POLYP, DR. JOSE TRAN AT [facility]   • ENDOSCOPY N/A 12/9/2021    Procedure: ESOPHAGOGASTRODUODENOSCOPY  WITH BIOPSIES;  Surgeon: Shiloh Pop MD;  Location: [facility] ENDOSCOPY;  Service: Gastroenterology;  Laterality: N/A;  PRE OP - ANEMIA  POST OP - ABN DUODENAL MUCOSA, GASTRITIS, HIATAL HERNIA   • HEMORRHOIDECTOMY N/A 8/12/2021    Procedure: HEMORRHOIDECTOMY x2;  Surgeon: Pennie Rider MD;  Location: [facility] MAIN OR;  Service: General;  Laterality: N/A;   • HERNIA REPAIR Left     INGUINAL   • INSERT / REPLACE / VENOUS ACCESS CATHETER N/A 03/11/2020    TUNNEL CATHETER REMOVAL, DR. SU MICHELLE   • INSERTION HEMODIALYSIS CATHETER N/A 2/8/2019    Procedure: tunnel CATHETER PLACEMENT WITH  FLUROSCOPY;  Surgeon: Satish Stahl MD;  Location: [facility] OR;  Service: Vascular   • REPLACEMENT TOTAL KNEE Left 2007   • TOTAL KNEE ARTHROPLASTY Right 2009        Social History     Occupational History   • Occupation: retired   Tobacco Use   • Smoking status: Never Smoker   • Smokeless tobacco: Never Used   Vaping Use   • Vaping Use: Never used   Substance and Sexual Activity   • Alcohol use: Yes     Alcohol/week: 1.0 standard drink     Types: 1 Cans of beer per week     Comment: 1 monthly   • Drug use: No   • Sexual activity: Defer     Birth control/protection: Post-menopausal      Social History     Social History Narrative   • Not on file        Family History   Problem Relation Age of Onset   • Hypertension Other    • Diabetes Other    • Heart disease Neg Hx    • Stroke Neg Hx    • Arthritis Neg Hx    • Breast cancer Neg Hx    • Malig Hyperthermia Neg Hx        ROS: 14 point review of systems was performed and was negative except for documented findings in HPI and today's encounter.     Allergies:   Allergies   Allergen Reactions   • Betadine [Povidone Iodine] Itching     Constitutional:  Denies fever, shaking or chills   Eyes:  Denies change in visual acuity   HENT:  Denies nasal congestion or sore throat   Respiratory:  Denies cough or shortness of breath   Cardiovascular:  Denies chest pain or severe LE edema   GI:  Denies abdominal pain, nausea, vomiting, bloody stools or diarrhea   Musculoskeletal:  Numbness, tingling, or loss of motor function only as noted above in history of present illness.  : Denies painful urination or hematuria  Integument:  Denies rash, lesion or ulceration   Neurologic:  Denies headache or focal weakness  Endocrine:  Denies lymphadenopathy  Psych:  Denies confusion or change in mental status   Hem:  Denies active bleeding      Physical Exam: 77 y.o. female  Wt Readings from Last 3 Encounters:   05/26/22 98 kg (216 lb)   05/17/22 97.5 kg (215 lb)   02/22/22 102 kg  (225 lb)       There is no height or weight on file to calculate BMI.  No height and weight on file for this encounter.  There were no vitals filed for this visit.  Vital signs reviewed.   General Appearance:    Alert, cooperative, in no acute distress                    Ortho exam    Exam is unchanged         .time    Assessment: Bilateral shoulder DJD    Plan: Injections  Follow up as indicated.  Ice, elevate, and rest as needed.  Discussed conservative measures of pain control including ice, bracing.  Also talked about the importance of strengthening and maintaining ideal body weight    Deborah Agudelo M.D.    Large Joint Arthrocentesis: L glenohumeral  Date/Time: 8/30/2022 12:29 PM  Consent given by: patient  Site marked: site marked  Timeout: Immediately prior to procedure a time out was called to verify the correct patient, procedure, equipment, support staff and site/side marked as required   Supporting Documentation  Indications: pain   Procedure Details  Location: shoulder - L glenohumeral  Preparation: Patient was prepped and draped in the usual sterile fashion  Needle gauge: 21G.  Approach: posterior  Medications administered: 4 mL lidocaine PF 2% 2 %; 2 mL lidocaine PF 1% 1 %; 40 mg triamcinolone acetonide 40 MG/ML  Patient tolerance: patient tolerated the procedure well with no immediate complications    Large Joint Arthrocentesis: R glenohumeral  Date/Time: 8/30/2022 12:31 PM  Consent given by: patient  Site marked: site marked  Timeout: Immediately prior to procedure a time out was called to verify the correct patient, procedure, equipment, support staff and site/side marked as required   Supporting Documentation  Indications: pain   Procedure Details  Location: shoulder - R glenohumeral  Preparation: Patient was prepped and draped in the usual sterile fashion  Needle gauge: 21G.  Approach: posterior  Medications administered: 2 mL lidocaine PF 1% 1 %; 40 mg triamcinolone acetonide 40 MG/ML  Patient  tolerance: patient tolerated the procedure well with no immediate complications

## 2022-09-07 NOTE — TELEPHONE ENCOUNTER
Patient called because she is currently in dialysis and they state she has a rapid heart rate. She let me speak to the nurse, [place], who was taking care of her. She states this happened on Monday as well. Her HR has been anywhere from 112-120s. SBP has been 90s-110s. Most recent vitals were 112/32 . Patient states that she has felt more weak than normal as well.    I spoke to Jie MORA. She states the patient needs to increase her metoprolol to 25mg and she can increase her midodrine to 10mg if needed for a SBP less than <100. She is already scheduled to see Dr. Segura on 9/15.    I notified patient about the recommendations and she verbalized understanding.     Stefanie Alonzo RN  Triage [facility] [place]

## 2022-09-08 NOTE — TELEPHONE ENCOUNTER
Jie,    Pt took the increased dose of metoprolol 25 mg yesterday and today. She said it has not helped her HR. Her HR is staying in the 120s. B/P is 115/87. She said she feels a little better today, less jittery. Do you have further recommendations for her?    Thank you,    Anahi Rose, RN  [place] [facility] [place]  09/08/22 13:18 EDT

## 2022-09-08 NOTE — TELEPHONE ENCOUNTER
Please have her increase metoprolol to 25 mg twice daily.  Keep an eye on blood pressure, can increase midodrine to max dose of 10 mg 3 times daily if needed for low blood pressure.  She has an appointment with me on Tuesday so I will follow-up with her then.

## 2022-09-08 NOTE — TELEPHONE ENCOUNTER
Notified pt. She verbalized understanding. She will call us if she has more issues and come in for her appt on Tuesday with Jie.    Thank you,    Anahi Rose RN  Triage [facility] [place]  09/08/22 16:09 EDT

## 2022-09-12 NOTE — PROGRESS NOTES
Anticoagulation Clinic Progress Note    Anticoagulation Summary  As of 2022    INR goal:  2.0-3.0   TTR:  61.7 % (3.1 y)   INR used for dosin.82 (2022)   Warfarin maintenance plan:  3 mg every Wed; 2 mg all other days   Weekly warfarin total:  15 mg   No change documented:  Ruma Valles RPH   Plan last modified:  Ruma Valles RPH (2022)   Next INR check:  2022   Priority:  High   Target end date:      Indications    Paroxysmal atrial fibrillation (HCC) [I48.0]             Anticoagulation Episode Summary     INR check location:      Preferred lab:      Send INR reminders to:   [facility]  POOL    Comments:  Lab drawn at [facility] - [facility] [phone]       Anticoagulation Care Providers     Provider Role Specialty Phone number    Shiloh Gonzales APRN Referring Cardiology [phone]          Clinic Interview:  No pertinent clinical findings have been reported.    INR History:  Anticoagulation Monitoring 8/15/2022 2022 2022   INR 2.38 1.94 2.82   INR Date 2022   INR Goal 2.0-3.0 2.0-3.0 2.0-3.0   Trend Same Down Same   Last Week Total 16 mg 16 mg 15 mg   Next Week Total 16 mg 15 mg 15 mg   Sun 2 mg 2 mg -   Mon 3 mg 2 mg 2 mg   Tue 2 mg 2 mg 2 mg   Wed 2 mg - -   Thu 2 mg - -   Fri 3 mg 2 mg -   Sat 2 mg 2 mg -   Visit Report - - -   Some recent data might be hidden       Plan:  1. INR is therapeutic today- see above in Anticoagulation Summary.    Claudia GABRIELLE Arnold to continue their warfarin regimen- see above in Anticoagulation Summary.  2. Follow up in 1 week  3. Pt has agreed to only be called if INR out of range. They have been instructed to call if any changes in medications, doses, concerns, etc. Patient expresses understanding and has no further questions at this time.    Ruma Valles RPH

## 2022-09-13 PROBLEM — I48.91 RAPID ATRIAL FIBRILLATION (HCC): Status: ACTIVE | Noted: 2022-01-01

## 2022-09-13 PROBLEM — I95.9 HYPOTENSION: Status: ACTIVE | Noted: 2022-01-01

## 2022-09-13 PROBLEM — R79.89 ELEVATED TROPONIN: Status: ACTIVE | Noted: 2022-01-01

## 2022-09-13 PROBLEM — R77.8 ELEVATED TROPONIN: Status: ACTIVE | Noted: 2022-01-01

## 2022-09-13 NOTE — ED PROVIDER NOTES
EMERGENCY DEPARTMENT ENCOUNTER    Room Number:  35/35  Date of encounter:  9/13/2022  PCP: Robert Cortez MD  Historian: Patient      HPI:  Chief Complaint: Tachycardia, dyspnea    A complete HPI/ROS/PMH/PSH/SH/FH are unobtainable due to: N/A    Context: Claudia Arnold is a 77 y.o. female who presents to the ED c/o progressive dyspnea and tachycardia for the past week or so.  She also feels like her legs are swollen.  She is more short of breath with exertion and supine positioning.  No chest pain.  No fevers or chills.  No cough.        Duration: Approximately 1 week  Onset: Gradual  Timing: Constant  Location: N/A  Radiation: N/A  Quality: Dyspnea  Intensity/Severity: Moderate  Progression: Worsening  Associated Symptoms: Edema, lightheadedness, fatigue  Aggravating Factors: Exertion, supine positioning  Alleviating Factors: None  Previous Episodes: Yes  Treatment before arrival: Full dialysis yesterday.  Evaluated by cardiology nurse practitioner in the office today.    Summary of prior records: patient has a history of chronic atrial fibrillation, chronic diastolic heart failure, ESRD on hemodialysis, prior aortic valve replacement, mitral valve replacement and tricuspid valve repair, severe tricuspid regurgitation.  She has a history of pulmonary embolism as well as blood loss anemia/AVMs.  She was seen in the cardiologist office today and sent to the emergency room for atrial fibrillation with RVR.    The patient was placed in a mask in triage, hand hygiene was performed before and after my interaction with the patient.  I wore a mask, safety glasses and gloves during my entire interaction with the patient.    PAST MEDICAL HISTORY  Active Ambulatory Problems     Diagnosis Date Noted   • Tear of rotator cuff 04/28/2016   • Hypertension 05/24/2016   • Generalized anxiety disorder 05/24/2016   • Hyperlipidemia 05/24/2016   • IFG (impaired fasting glucose) 05/24/2016   • Heart murmur, systolic 05/24/2016   •  Shoulder pain, bilateral 08/01/2016   • Pain of both shoulder joints 11/01/2016   • Chronic pain of both shoulders 02/03/2017   • Acute congestive heart failure (HCC) 01/16/2019   • Obesity, morbid, BMI 50 or higher ([facility]) 01/16/2019   • Fungal skin infection 01/16/2019   • Cellulitis of lower extremity 01/17/2019   • Aortic valve stenosis 01/16/2019   • Tricuspid valve insufficiency 01/16/2019   • Mitral valve stenosis 01/16/2019   • S/P MVR (mitral valve replacement) 03/27/2019   • Paroxysmal atrial fibrillation ([facility]) 05/08/2019   • Pulmonary hypertension ([facility]) 05/08/2019   • ESRD (end stage renal disease) ([facility]) 05/08/2019   • Gastroesophageal reflux disease without esophagitis 05/08/2019   • Chronic idiopathic constipation 05/08/2019   • Dependence on renal dialysis ([facility]) 06/26/2019   • Chronic kidney disease, stage 2 (mild) 08/09/2013   • Renovascular hypertension 08/09/2013   • GI bleed 01/11/2021   • 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 ([facility]) 01/11/2021   • CAD (coronary artery disease)    • S/P AVR (aortic valve replacement)    • Chronic diastolic CHF (congestive heart failure) ([facility])    • Chronic pain of both shoulders 01/11/2021   • Gastrointestinal hemorrhage 01/15/2021   • Gastrointestinal hemorrhage with melena 01/15/2021   • Immobility syndrome 05/27/2021   • Atrial fibrillation with RVR ([facility]) 12/06/2021   • OCTAVIANO (obstructive sleep apnea)    • Anemia    • ESRD (end stage renal disease) ([facility]) 05/31/2022     Resolved Ambulatory Problems     Diagnosis Date Noted   • Hematochezia 08/08/2021     Past Medical History:   Diagnosis Date   • A-fib ([facility])    • Arthritis    • Bacterial infection 03/2020   • Bifascicular block    • Bilateral lower extremity edema    • Cardiomyopathy ([facility])    • CHF (congestive heart failure) ([facility])    • Chronic fatigue    • Chronic kidney disease    • Cystocele, midline 09/2019   • Dialysis patient ([facility])    • Edema 06/2021   • End stage renal failure on dialysis ([facility])     • GERD (gastroesophageal reflux disease)    • Glenohumeral arthritis 04/2021   • Hemorrhoids    • Hernia, inguinal    • History of echocardiogram 1/17/19-[facility]   • History of transfusion    • Hypokalemia    • Hypokinesis 01/22/2019   • Iron deficiency anemia    • LAD stenosis 01/22/2019   • Left atrial dilatation 01/17/2019   • Left ventricular dilatation 01/17/2019   • Lumbar spondylosis    • Lumbar stenosis    • Mild concentric left ventricular hypertrophy 01/17/2019   • Mitral annular calcification 01/17/2019   • Moderate tricuspid valve regurgitation 01/24/2019   • Morbid obesity ([facility])    • Nonrheumatic tricuspid valve regurgitation    • NSTEMI (non-ST elevated myocardial infarction) ([facility])    • Osteoarthritis    • Osteoarthritis of shoulder    • PAF (paroxysmal atrial fibrillation) ([facility])    • PNA (pneumonia)    • Right bundle branch block 01/24/2019   • Right ventricular enlargement 06/2021   • Rotator cuff tear 04/2016   • Secondary hyperparathyroidism ([facility])    • Severe aortic stenosis 01/22/2019   • Severe mitral valve stenosis 01/24/2019   • Severe muscle deconditioning    • Skin yeast infection    • Tinea unguium 10/2020   • Ulcer of toe ([facility])    • Urge incontinence    • Vaginal atrophy    • Venous insufficiency    • Vulvar cyst 09/2019         PAST SURGICAL HISTORY  Past Surgical History:   Procedure Laterality Date   • AORTIC VALVE REPAIR/REPLACEMENT MITRAL VALVE REPAIR/REPLACEMENT N/A 1/28/2019    Procedure: LEE STERNOTOMY AORTIC VALVE REPLACEMENT, MITRAL VALVE REPLACEMENT, TRICUSPID VALVE REPAIR, MAZE PROCEDURE, CLOSURE OF LEFT ATRIAL APPENDAGE  AND PRP;  Surgeon: Scar Gaxiola MD;  Location: [facility] OR;  Service: Cardiothoracic   • ARTERIOVENOUS FISTULA/SHUNT SURGERY Left 6/26/2019    Procedure: LEFT ARM BRACHIAL CEPHALIC FISTULA;  Surgeon: Satish Stahl MD;  Location: [facility] OR;  Service: Vascular   • CARDIAC CATHETERIZATION N/A 1/22/2019    Procedure: Coronary angiography;   Surgeon: Rick Talavera MD;  Location: [facility] CATH INVASIVE LOCATION;  Service: Cardiology   • CARDIAC CATHETERIZATION N/A 1/22/2019    Procedure: Left Heart Cath;  Surgeon: Rick Talavera MD;  [facility];  Service: Cardiology   • CARDIAC CATHETERIZATION N/A 1/22/2019    Procedure: Right Heart Cath;  Surgeon: Rick Talavera MD;  Location: [facility] CATH INVASIVE LOCATION;  Service: Cardiology   • CARDIAC CATHETERIZATION N/A 1/22/2019    Procedure: Left ventriculography;  Surgeon: Rick Talavera MD;  Location: [facility] CATH INVASIVE LOCATION;  Service: Cardiology   • CARDIAC SURGERY     • COLONOSCOPY N/A 1/16/2021    MULTIPLE DIVERTICULA, TORTUOUS COLON, HEMORRHOIDS, DR. JOSE TRAN AT [place]   • COLONOSCOPY N/A 12/9/2021    Procedure: COLONOSCOPY TO CECUM WITH APC TO RIGHT COLON AVM;  Surgeon: Shiloh Pop MD;  Location: [facility] ENDOSCOPY;  Service: Gastroenterology;  Laterality: N/A;   PRE OP-ANEMIA  POST OP - AVM RIGHT COLON, DIVERTICULOSIS, HEMORRHOIDS   • ENDOSCOPY N/A 1/16/2021    DUODENITIS, GASTRITIS, BENIGN DUOFENAL POLYP, DR. JOSE RTAN AT [facility]   • ENDOSCOPY N/A 12/9/2021    Procedure: ESOPHAGOGASTRODUODENOSCOPY  WITH BIOPSIES;  Surgeon: Shiloh Pop MD;  Location: [facility] ENDOSCOPY;  Service: Gastroenterology;  Laterality: N/A;  PRE OP - ANEMIA  POST OP - ABN DUODENAL MUCOSA, GASTRITIS, HIATAL HERNIA   • HEMORRHOIDECTOMY N/A 8/12/2021    Procedure: HEMORRHOIDECTOMY x2;  Surgeon: Pennie Rider MD;  Location: [facility] MAIN OR;  Service: General;  Laterality: N/A;   • HERNIA REPAIR Left     INGUINAL   • INSERT / REPLACE / VENOUS ACCESS CATHETER N/A 03/11/2020    TUNNEL CATHETER REMOVAL, DR. SU MICHELLE   • INSERTION HEMODIALYSIS CATHETER N/A 2/8/2019    Procedure: tunnel CATHETER PLACEMENT WITH FLUROSCOPY;  Surgeon: Satish Stahl MD;  Location: [facility] MAIN OR;  Service: Vascular   • REPLACEMENT TOTAL KNEE Left 2007   •  TOTAL KNEE ARTHROPLASTY Right 2009         FAMILY HISTORY  Family History   Problem Relation Age of Onset   • Hypertension Other    • Diabetes Other    • Heart disease Neg Hx    • Stroke Neg Hx    • Arthritis Neg Hx    • Breast cancer Neg Hx    • Malig Hyperthermia Neg Hx          SOCIAL HISTORY  Social History     Socioeconomic History   • Marital status:    • Number of children: 2   • Years of education: 12   • Highest education level: High school graduate   Tobacco Use   • Smoking status: Never Smoker   • Smokeless tobacco: Never Used   Vaping Use   • Vaping Use: Never used   Substance and Sexual Activity   • Alcohol use: Not Currently     Alcohol/week: 1.0 standard drink     Types: 1 Cans of beer per week     Comment: 1 monthly   • Drug use: No   • Sexual activity: Defer     Birth control/protection: Post-menopausal         ALLERGIES  Betadine [povidone iodine]        REVIEW OF SYSTEMS  Review of Systems   Constitutional: Negative for chills and fever.   Respiratory: Positive for shortness of breath.    Cardiovascular: Positive for leg swelling. Negative for chest pain.   Gastrointestinal: Negative for diarrhea and vomiting.        All systems reviewed and negative except for those discussed in HPI.       PHYSICAL EXAM    I have reviewed the triage vital signs and nursing notes.    ED Triage Vitals   Temp Heart Rate Resp BP SpO2   09/13/22 1414 09/13/22 1414 09/13/22 1414 09/13/22 1445 09/13/22 1414   97.4 °F (36.3 °C) 64 16 90/68 96 %      Temp src Heart Rate Source Patient Position BP Location FiO2 (%)   09/13/22 1414 09/13/22 1414 09/13/22 1445 09/13/22 1445 --   Tympanic Monitor Sitting Right arm        Physical Exam   Constitutional: Pt. is oriented to person, place, and time and well-developed, well-nourished, and in no distress.  She is chronically ill-appearing.  HENT: Normocephalic and atraumatic.  JVD present.  Neck: Normal range of motion. Neck supple. No JVD present.   Cardiovascular: Slightly  tachycardic, around 120.  Irregularly irregular.  Pulmonary/Chest: Breath sounds are diminished.  No wheezes or rales are appreciated.    Abdominal: Soft, obese. There is no tenderness. There is no rebound and no guarding.   Musculoskeletal: Normal range of motion.  Mild lower extremity edema, no tenderness or deformity.   Neurological: Pt. is alert and oriented to person, place, and time.  She has no focal neurologic deficits  Skin: Skin is warm and dry. No rash noted. Pt. is not diaphoretic. No erythema.   Psychiatric: Mood, affect and judgment normal.  She is pleasant and cooperative.  Nursing note and vitals reviewed.        LAB RESULTS  Recent Results (from the past 24 hour(s))   ECG 12 Lead    Collection Time: 09/13/22  2:21 PM   Result Value Ref Range    QT Interval 360 ms   Comprehensive Metabolic Panel    Collection Time: 09/13/22  2:44 PM    Specimen: Blood   Result Value Ref Range    Glucose 110 (H) 65 - 99 mg/dL    BUN 45 (H) 8 - 23 mg/dL    Creatinine 4.03 (H) 0.57 - 1.00 mg/dL    Sodium 141 136 - 145 mmol/L    Potassium 3.9 3.5 - 5.2 mmol/L    Chloride 95 (L) 98 - 107 mmol/L    CO2 23.4 22.0 - 29.0 mmol/L    Calcium 10.1 8.6 - 10.5 mg/dL    Total Protein 6.2 6.0 - 8.5 g/dL    Albumin 4.10 3.50 - 5.20 g/dL    ALT (SGPT) 138 (H) 1 - 33 U/L    AST (SGOT) 84 (H) 1 - 32 U/L    Alkaline Phosphatase 87 39 - 117 U/L    Total Bilirubin 0.8 0.0 - 1.2 mg/dL    Globulin 2.1 gm/dL    A/G Ratio 2.0 g/dL    BUN/Creatinine Ratio 11.2 7.0 - 25.0    Anion Gap 22.6 (H) 5.0 - 15.0 mmol/L    eGFR 10.9 (L) >60.0 mL/min/1.73   Troponin    Collection Time: 09/13/22  2:44 PM    Specimen: Blood   Result Value Ref Range    Troponin T 0.067 (C) 0.000 - 0.030 ng/mL   Green Top (Gel)    Collection Time: 09/13/22  2:44 PM   Result Value Ref Range    Extra Tube Hold for add-ons.    Lavender Top    Collection Time: 09/13/22  2:44 PM   Result Value Ref Range    Extra Tube hold for add-on    Light Blue Top    Collection Time: 09/13/22   2:44 PM   Result Value Ref Range    Extra Tube Hold for add-ons.    CBC Auto Differential    Collection Time: 09/13/22  2:44 PM    Specimen: Blood   Result Value Ref Range    WBC 9.20 3.40 - 10.80 10*3/mm3    RBC 4.12 3.77 - 5.28 10*6/mm3    Hemoglobin 13.6 12.0 - 15.9 g/dL    Hematocrit 40.5 34.0 - 46.6 %    MCV 98.3 (H) 79.0 - 97.0 fL    MCH 33.0 26.6 - 33.0 pg    MCHC 33.6 31.5 - 35.7 g/dL    RDW 13.7 12.3 - 15.4 %    RDW-SD 49.4 37.0 - 54.0 fl    MPV 9.9 6.0 - 12.0 fL    Platelets 196 140 - 450 10*3/mm3    Neutrophil % 74.4 42.7 - 76.0 %    Lymphocyte % 13.6 (L) 19.6 - 45.3 %    Monocyte % 10.8 5.0 - 12.0 %    Eosinophil % 0.3 0.3 - 6.2 %    Basophil % 0.1 0.0 - 1.5 %    Immature Grans % 0.8 (H) 0.0 - 0.5 %    Neutrophils, Absolute 6.85 1.70 - 7.00 10*3/mm3    Lymphocytes, Absolute 1.25 0.70 - 3.10 10*3/mm3    Monocytes, Absolute 0.99 (H) 0.10 - 0.90 10*3/mm3    Eosinophils, Absolute 0.03 0.00 - 0.40 10*3/mm3    Basophils, Absolute 0.01 0.00 - 0.20 10*3/mm3    Immature Grans, Absolute 0.07 (H) 0.00 - 0.05 10*3/mm3    nRBC 0.4 (H) 0.0 - 0.2 /100 WBC       Ordered the above labs and independently reviewed the results.        RADIOLOGY  XR Chest 2 View    Result Date: 9/13/2022  TWO-VIEW CHEST  HISTORY: Chest pain.  FINDINGS: There is moderate cardiomegaly with sternal wires from previous cardiac surgery and this is unchanged from 12/06/2021. There is very mild vascular congestion associated with probable tiny right pleural effusion and this actually appears slightly improved since the previous exam but remain suspicious for changes of borderline or mild CHF.  This report was finalized on 9/13/2022 2:48 PM by Dr. Donny Delgado M.D.        I ordered the above noted radiological studies. Reviewed by me and discussed with radiologist.  See dictation for official radiology interpretation.      PROCEDURES    Procedures      MEDICATIONS GIVEN IN ER    Medications   sodium chloride 0.9 % flush 10 mL (has no  administration in time range)   dilTIAZem (CARDIZEM) 125 mg in 125 mL 0.7% sodium chloride  infusion (5 mg/hr Intravenous New Bag 9/13/22 1620)   digoxin (LANOXIN) injection 250 mcg (has no administration in time range)   aspirin tablet 325 mg (325 mg Oral Given 9/13/22 1620)         PROGRESS, DATA ANALYSIS, CONSULTS, AND MEDICAL DECISION MAKING    Any/all labs have been independently reviewed by me.  Any/all radiology studies have been reviewed by me and discussed with radiologist dictating the report.   EKG's independently viewed and interpreted by me.  Discussion below represents my analysis of pertinent findings related to patient's condition, differential diagnosis, treatment plan and final disposition.    Number of Diagnoses or Management Options     Amount and/or Complexity of Data Reviewed  Clinical lab tests:  Yes  Tests in the radiology section of CPT®:  Yes  Tests in the medicine section of CPT®:  Yes  Review and summarize past medical records:  (Yes-see HPI)  Independent visualization of images, tracings, or specimens: (Yes-see below)      ED Course as of 09/13/22 1641   Tue Sep 13, 2022   1554 BUN(!): 45 [WC]   1554 Creatinine(!): 4.03 [WC]   1554 Potassium: 3.9 [WC]   1554 Troponin T(!!): 0.067  Chronically elevated. [WC]   1555 EKG performed at 1421 and interpreted by me shows atrial fibrillation with a heart of 120 bpm.  There is a right bundle branch block and left anterior fascicular block with nonspecific ST-T changes and left ventricular hypertrophy. [WC]   1555 Cardiac monitor revealed atrial fibrillation as interpreted by me.  Cardiac monitoring was ordered secondary to the patient's history of dyspnea and to monitor the patient for dysrhythmia. [WC]   1555 Differential diagnosis includes but is not limited to: Pulmonary embolism, aortic dissection, acute coronary syndrome/STEMI, pneumonia/CHF/COPD exacerbation, pneumothorax, pleurisy, bronchitis, gastroesophageal reflux, referred pain,  traumatic injury/rib fractures, etc. [WC]   1607 Call out to [facility] for admission, nephrology and cardiology for consultation. [WC]   1617 Case discussed with nephrologist on-call-he will see the patient in consultation. [WC]   1625 Case discussed with Dr. Segura (cardiology)-he recommends one-time dose of digoxin at 250 mcg which I have ordered. [WC]   1639 Case discussed with Dr. Boyd ([facility])-he agrees to admit the patient to a telemetry bed. [WC]   1641 Chest x-ray independently visualized by me and discussed with Dr. Delgado (radiology)-there is moderate cardiomegaly and very mild vascular congestion with tiny right pleural effusions.  For official interpretation, see dictated report. [WC]      ED Course User Index  [WC] Alonso Mae MD       AS OF 16:41 EDT VITALS:    BP - 117/83  HR - 109  TEMP - 97.4 °F (36.3 °C) (Tympanic)  02 SATS - 98%        DIAGNOSIS  Final diagnoses:   Rapid atrial fibrillation (HCC)   ESRD (end stage renal disease) on dialysis (HCC)         DISPOSITION  Admitted-telemetry          Note Disclaimer: At [facility], we believe that sharing information builds trust and better relationships. You are receiving this note because you recently visited [facility]. It is possible you will see health information before a provider has talked with you about it. This kind of information can be easy to misunderstand. To help you fully understand what it means for your health, we urge you to discuss this note with your provider.\         Alonso Mae MD  09/13/22 1648

## 2022-09-13 NOTE — ED NOTES
Nursing report ED to floor  Claudia Arnodl  77 y.o.  female    HPI :   Chief Complaint   Patient presents with   • Rapid Heart Rate       Admitting doctor:   Gorge Boyd MD    Admitting diagnosis:   The primary encounter diagnosis was Rapid atrial fibrillation (HCC). A diagnosis of ESRD (end stage renal disease) on dialysis (HCC) was also pertinent to this visit.    Code status:   Current Code Status     Date Active Code Status Order ID Comments User Context       9/13/2022 1756 CPR (Attempt to Resuscitate) [id number]  Gorge Boyd MD ED     Advance Care Planning Activity      Questions for Current Code Status     Question Answer    Code Status (Patient has no pulse and is not breathing) CPR (Attempt to Resuscitate)    Medical Interventions (Patient has pulse or is breathing) Full Support          Allergies:   Betadine [povidone iodine]    Intake and Output  No intake or output data in the 24 hours ending 09/13/22 1847    Weight:       09/13/22 1750   Weight: 96 kg (211 lb 10.3 oz)       Most recent vitals:   Vitals:    09/13/22 1801 09/13/22 1816 09/13/22 1817 09/13/22 1831   BP: 108/88 100/71  109/69   BP Location:       Patient Position:       Pulse: (!) 134 (!) 129  108   Resp:       Temp:       TempSrc:       SpO2: 94%  96% 96%   Weight:       Height:           Active LDAs/IV Access:   Lines, Drains & Airways     Active LDAs     Name Placement date Placement time Site Days    Peripheral IV 12/10/21 0055 Posterior; Right Forearm 12/10/21  0055  Forearm  277    Peripheral IV 09/13/22 1618 Anterior;Right Forearm 09/13/22  1618  Forearm  less than 1                Labs (abnormal labs have a star):   Labs Reviewed   COMPREHENSIVE METABOLIC PANEL - Abnormal; Notable for the following components:       Result Value    Glucose 110 (*)     BUN 45 (*)     Creatinine 4.03 (*)     Chloride 95 (*)     ALT (SGPT) 138 (*)     AST (SGOT) 84 (*)     Anion Gap 22.6 (*)     eGFR 10.9 (*)     All other components within normal  limits    Narrative:     GFR Normal >60  Chronic Kidney Disease <60  Kidney Failure <15     TROPONIN (IN-HOUSE) - Abnormal; Notable for the following components:    Troponin T 0.067 (*)     All other components within normal limits    Narrative:     Troponin T Reference Range:  <= 0.03 ng/mL-   Negative for AMI  >0.03 ng/mL-     Abnormal for myocardial necrosis.  Clinicians would have to utilize clinical acumen, EKG, Troponin and serial changes to determine if it is an Acute Myocardial Infarction or myocardial injury due to an underlying chronic condition.       Results may be falsely decreased if patient taking Biotin.     CBC WITH AUTO DIFFERENTIAL - Abnormal; Notable for the following components:    MCV 98.3 (*)     Lymphocyte % 13.6 (*)     Immature Grans % 0.8 (*)     Monocytes, Absolute 0.99 (*)     Immature Grans, Absolute 0.07 (*)     nRBC 0.4 (*)     All other components within normal limits   PROTIME-INR - Abnormal; Notable for the following components:    Protime 30.0 (*)     INR 2.96 (*)     All other components within normal limits   RAINBOW DRAW    Narrative:     The following orders were created for panel order [place] Draw.  Procedure                               Abnormality         Status                     ---------                               -----------         ------                     Green Top (Gel)[[id number]]                                  Final result               Lavender Top[[id number]]                                     Final result               Gold Top - SST[[id number]]                                   In process                 Light Blue Top[[id number]]                                   Final result                 Please view results for these tests on the individual orders.   TROPONIN (IN-HOUSE)   CBC AND DIFFERENTIAL    Narrative:     The following orders were created for panel order CBC & Differential.  Procedure                               Abnormality         Status                      ---------                               -----------         ------                     CBC Auto Differential[[id number]]        Abnormal            Final result                 Please view results for these tests on the individual orders.   GREEN TOP   LAVENDER TOP   LIGHT BLUE TOP   GOLD TOP - [facility]       EKG:   ECG 12 Lead   Final Result   HEART RATE= 128  bpm   RR Interval= 469  ms   OK Interval=   ms   P Horizontal Axis=   deg   P Front Axis=   deg   QRSD Interval= 161  ms   QT Interval= 360  ms   QRS Axis= -75  deg   T Wave Axis= 43  deg   - ABNORMAL ECG -   Atrial fibrillation   RBBB and LAFB   LVH with secondary repolarization abnormality   No change from previous tracing   Electronically Signed By: Nunu Santos ([facility]) 13-Sep-2022 15:27:23   Date and Time of Study: 2022-09-13 14:21:25          Meds given in ED:   Medications   sodium chloride 0.9 % flush 10 mL (has no administration in time range)   dilTIAZem (CARDIZEM) 125 mg in 125 mL 0.7% sodium chloride  infusion (5 mg/hr Intravenous New Bag 9/13/22 1620)   aspirin tablet 325 mg (325 mg Oral Given 9/13/22 1620)   digoxin (LANOXIN) injection 250 mcg (250 mcg Intravenous Given 9/13/22 1700)       Imaging results:  No radiology results for the last day    Ambulatory status:   - assist x 2    Social issues:   Social History     Socioeconomic History   • Marital status:    • Number of children: 2   • Years of education: 12   • Highest education level: High school graduate   Tobacco Use   • Smoking status: Never Smoker   • Smokeless tobacco: Never Used   Vaping Use   • Vaping Use: Never used   Substance and Sexual Activity   • Alcohol use: Not Currently     Alcohol/week: 1.0 standard drink     Types: 1 Cans of beer per week     Comment: 1 monthly   • Drug use: No   • Sexual activity: Defer     Birth control/protection: Post-menopausal       NIH Stroke Scale:        Nursing report ED to floor:

## 2022-09-13 NOTE — CONSULTS
[facility] Consult Note      Patient Name: Claudia Arnold  : 1945  MRN: [id number]  Primary Care Physician:  Robert Cortez MD  Referring Physician: No ref. provider found  Date of admission: 2022    Subjective     Reason for Consult:  ESRD     HPI:   Claudia Arnold is a 77 y.o. female known to have history of end-stage renal disease on maintenance hemodialysis on  under the care of Dr. Kathy Osuna from our group, history of paroxysmal atrial fibrillation, severe aortic and mitral stenosis status post bioprosthetic valve replacement, history of diastolic heart failure, coronary artery disease who presented to hospital from cardiology office for A. fib with RVR.  The patient was noted to have worsening A. fib with uncontrolled heart rate for the past 5 days on dialysis.  Metoprolol dose was adjusted by the cardiology but was not successful controlling her heart rate.  Patient was scheduled for a regular visit at cardiology office today who noted a heart rate that was elevated so she was referred to the ER for management.   The patient currently laying flat on no oxygen.  Hypotensive and tachycardic with systolic blood pressure down to 88 patient continues to be tachycardic.  Denies any chest pain, no nausea, no vomiting.  Patient noted that she had dialyzed last yesterday and they were able to remove then 2 L of fluid.  Labs in ER showed proBNP of 5500, sodium 141, potassium 3.9, hemoglobin 13.6  Review of Systems:   14 point review of systems is otherwise negative except for mentioned above on HPI    Personal History     Past Medical History:   Diagnosis Date   • A-fib ([facility])     Meds   • Arthritis     w/Difficult Mobility   • Bacterial infection 2020   • Bifascicular block    • Bilateral lower extremity edema     Legs   • CAD (coronary artery disease)     Affecting LAD    • Cardiomyopathy ([facility])    • CHF (congestive heart failure) ([facility])      CHRONIC, DIASTOLIC   • Chronic fatigue    • Chronic kidney disease    • Cystocele, midline 09/2019   • Dialysis patient ([facility])     TUESDAY THURSDAY SATURDAY   • Edema 06/2021   • End stage renal failure on dialysis ([facility])     FOLLOWED BY DR. COLLINS SPANGLER   • Gastrointestinal hemorrhage 01/11/2021    ADMITTED TO [facility]   • Generalized anxiety disorder    • GERD (gastroesophageal reflux disease)    • Glenohumeral arthritis 04/2021   • Hematochezia 08/08/2021    ADMITTED TO [facility]   • Hemorrhoids    • Hernia, inguinal     Hx Repair   • History of echocardiogram 1/17/19-[facility]    The L Ventricular Cavity is Mild-to-Moderately Dilated; Left Ventricular Wall Thickness is Consistent w/Mild Concentric Hypertrophy; Left Atrial Cavity Size is Moderate-to-Severely Dilated; Severe AVS; Severe MVS; Moderate TVR Noted   • History of transfusion     no adverse reaction   • Hyperlipidemia     Controlled w/Meds   • Hypertension     Controlled w/Meds   • Hypokalemia    • Hypokinesis 01/22/2019    Apical Noted on Cardiac Cath   • IFG (impaired fasting glucose)    • Immobility syndrome    • Iron deficiency anemia    • LAD stenosis 01/22/2019    Mid LAD Irregularities Noted on Cardiac Cath    • Left atrial dilatation 01/17/2019    Moderate-Severe Noted on Echo   • Left ventricular dilatation 01/17/2019    Noted on Echo/LEE   • Lumbar spondylosis    • Lumbar stenosis    • Mild concentric left ventricular hypertrophy 01/17/2019    Noted on Echo/LEE   • Mitral annular calcification 01/17/2019    Severe Noted on Echo   • Moderate tricuspid valve regurgitation 01/24/2019    Noted on LEE   • Morbid obesity ([facility])    • Nonrheumatic tricuspid valve regurgitation    • NSTEMI (non-ST elevated myocardial infarction) ([facility])    • OCTAVIANO (obstructive sleep apnea)    • Osteoarthritis    • Osteoarthritis of shoulder    • PAF (paroxysmal atrial fibrillation) ([facility])    • PNA (pneumonia)    • Pulmonary hypertension ([facility]) 01/22/2019    Noted on Cardiac Cath   • Right  bundle branch block 01/24/2019    Noted on LEE   • Right ventricular enlargement 06/2021   • Rotator cuff tear 04/2016   • Secondary hyperparathyroidism (HCC)    • Severe aortic stenosis 01/22/2019    Noted on Cardiac Cath & LEE on 01/24/19; S/p AVR on 01/28/19 by Dr. Gaxiola   • Severe mitral valve stenosis 01/24/2019    Noted on LEE; S/p MVR on 01/28/19 by Dr. Gaxiola   • Severe muscle deconditioning    • Shoulder pain, bilateral    • Skin yeast infection     Folds Under Skin--Nystatin/Diflucan   • Tinea unguium 10/2020    BILATERAL   • Ulcer of toe (HCC)     RIGHT FOOT   • Urge incontinence    • Vaginal atrophy    • Venous insufficiency    • Vulvar cyst 09/2019       Past Surgical History:   Procedure Laterality Date   • AORTIC VALVE REPAIR/REPLACEMENT MITRAL VALVE REPAIR/REPLACEMENT N/A 1/28/2019    Procedure: LEE STERNOTOMY AORTIC VALVE REPLACEMENT, MITRAL VALVE REPLACEMENT, TRICUSPID VALVE REPAIR, MAZE PROCEDURE, CLOSURE OF LEFT ATRIAL APPENDAGE  AND PRP;  Surgeon: Scar Gaxiola MD;  Location: [facility] MAIN OR;  Service: Cardiothoracic   • ARTERIOVENOUS FISTULA/SHUNT SURGERY Left 6/26/2019    Procedure: LEFT ARM BRACHIAL CEPHALIC FISTULA;  Surgeon: Satish Stahl MD;  Location: [facility] MAIN OR;  Service: Vascular   • CARDIAC CATHETERIZATION N/A 1/22/2019    Procedure: Coronary angiography;  Surgeon: Rick Talavera MD;  Location: [facility] CATH INVASIVE LOCATION;  Service: Cardiology   • CARDIAC CATHETERIZATION N/A 1/22/2019    Procedure: Left Heart Cath;  Surgeon: Rick Talavera MD;  Location: [facility] CATH INVASIVE LOCATION;  Service: Cardiology   • CARDIAC CATHETERIZATION N/A 1/22/2019    Procedure: Right Heart Cath;  Surgeon: Rick Talavera MD;  Location: [facility] CATH INVASIVE LOCATION;  Service: Cardiology   • CARDIAC CATHETERIZATION N/A 1/22/2019    Procedure: Left ventriculography;  Surgeon: Rick Talavera MD;  Location: [facility] CATH INVASIVE LOCATION;  Service: Cardiology    • CARDIAC SURGERY     • COLONOSCOPY N/A 1/16/2021    MULTIPLE DIVERTICULA, TORTUOUS COLON, HEMORRHOIDS, DR. JOSE TRAN AT [place]   • COLONOSCOPY N/A 12/9/2021    Procedure: COLONOSCOPY TO CECUM WITH APC TO RIGHT COLON AVM;  Surgeon: Shiloh Pop MD;  Location: [facility] ENDOSCOPY;  Service: Gastroenterology;  Laterality: N/A;   PRE OP-ANEMIA  POST OP - AVM RIGHT COLON, DIVERTICULOSIS, HEMORRHOIDS   • ENDOSCOPY N/A 1/16/2021    DUODENITIS, GASTRITIS, BENIGN DUOFENAL POLYP, DR. JOSE TRAN AT [facility]   • ENDOSCOPY N/A 12/9/2021    Procedure: ESOPHAGOGASTRODUODENOSCOPY  WITH BIOPSIES;  Surgeon: Shiloh Pop MD;  [facility] ENDOSCOPY;  Service: Gastroenterology;  Laterality: N/A;  PRE OP - ANEMIA  POST OP - ABN DUODENAL MUCOSA, GASTRITIS, HIATAL HERNIA   • HEMORRHOIDECTOMY N/A 8/12/2021    Procedure: HEMORRHOIDECTOMY x2;  Surgeon: Pennie Rider MD;  Location: [facility] MAIN OR;  Service: General;  Laterality: N/A;   • HERNIA REPAIR Left     INGUINAL   • INSERT / REPLACE / VENOUS ACCESS CATHETER N/A 03/11/2020    TUNNEL CATHETER REMOVAL, DR. SU MICHELLE   • INSERTION HEMODIALYSIS CATHETER N/A 2/8/2019    Procedure: tunnel CATHETER PLACEMENT WITH FLUROSCOPY;  Surgeon: Satish Stahl MD;  Location: [facility] OR;  Service: Vascular   • REPLACEMENT TOTAL KNEE Left 2007   • TOTAL KNEE ARTHROPLASTY Right 2009       Family History: family history includes Diabetes in an other family member; Hypertension in an other family member.    Social History:  reports that she has never smoked. She has never used smokeless tobacco. She reports previous alcohol use of about 1.0 standard drink of alcohol per week. She reports that she does not use drugs.    Home Medications:  Prior to Admission medications    Medication Sig Start Date End Date Taking? Authorizing Provider   acetaminophen (TYLENOL) 325 MG tablet Take 650 mg by mouth 3 (Three) Times a Day. Takes tid at 2 am, 1 pm, and hS    Provider,  MD Presley   busPIRone (BUSPAR) 10 MG tablet Take 1 tablet by mouth 2 (Two) Times a Day. 5/31/22   Robert Cortez MD   [facility] MELATONIN PO Take 5 mg by mouth At Night As Needed.    Presley Adhikari MD   escitalopram (LEXAPRO) 10 MG tablet Take 1 tablet by mouth Daily. 5/31/22   Robert Cortez MD   hydrocortisone (ANUSOL-HC) 25 MG suppository Insert 1 suppository into the rectum 2 (Two) Times a Day. 12/10/21   Misty Summers APRN   hydrocortisone 2.5 % cream  1/16/22   Presley Adhikari MD   Methoxy PEG-Epoetin Beta (MIRCERA IJ) 75 mcg Every 28 (Twenty-Eight) Days. 1/12/22 1/11/23  Presley Adhikari MD   metoprolol tartrate (LOPRESSOR) 25 MG tablet TAKE 1 TABLET BY MOUTH EVERY 12 HOURS 8/29/22   Scott Segura MD   midodrine (PROAMATINE) 2.5 MG tablet Take 1 tablet by mouth 3 (Three) Times a Day Before Meals. 4/19/22   Scott Segura MD   midodrine (PROAMATINE) 5 MG tablet Take 1 tablet by mouth 3 (Three) Times a Day Before Meals. 12/10/21   Misty Summers APRN   pantoprazole (PROTONIX) 40 MG EC tablet Take 1 tablet by mouth Every Morning. 5/31/22   Robert Cortez MD   Probiotic Product (ALIGN PO) Take 1 capsule by mouth Every Morning.    Presley Adhikari MD   rOPINIRole (REQUIP) 0.25 MG tablet Take 0.25 mg by mouth Every Night. 5/15/20   Presley Adhikari MD   warfarin (COUMADIN) 2 MG tablet TAKE 1 TABLET BY MOUTH EVERY SUNDAY, TUESDAY, AND THURSDAY, TAKE 1&1/2 TABLETS BY MOUTH ALL THE OTHER DAYS OF THE WEEK 4/28/22   Scott Segura MD   ProRenal + D tablet tablet Take 1 tablet by mouth Daily. 2/12/21 9/13/22  Presley Adhikari MD       Allergies:  Allergies   Allergen Reactions   • Betadine [Povidone Iodine] Itching       Objective     Vitals:   Temp:  [97.4 °F (36.3 °C)] 97.4 °F (36.3 °C)  Heart Rate:  [] 109  Resp:  [16] 16  BP: ()/(68-83) 117/83  No intake or output data in the 24 hours ending 09/13/22 1651    Physical Exam:   Constitutional: Awake,  alert, no acute distress.  HEENT: Sclera anicteric, no conjunctival injection  Neck: Supple, no thyromegaly, no lymphadenopathy, trachea at midline, no JVD  Respiratory: Clear to auscultation bilaterally, nonlabored respiration  Cardiovascular: RRR, no murmurs, no rubs or gallops, no carotid bruit  Gastrointestinal: Positive bowel sounds, abdomen is soft, nontender and nondistended  : No palpable bladder  Musculoskeletal: 1-2+ pitting edema bilateral lower extremities edema, no clubbing or cyanosis  Psychiatric: Appropriate affect, cooperative  Neurologic: Oriented x3, moving all extremities, normal speech and mental status  Skin: Warm and dry       Scheduled Meds:     digoxin, 250 mcg, Intravenous, Once      IV Meds:   dilTIAZem, 5-15 mg/hr, Last Rate: 5 mg/hr (09/13/22 1620)        Results Reviewed:   I have personally reviewed the results from the time of this admission to 9/13/2022 16:51 EDT     Lab Results   Component Value Date    GLUCOSE 110 (H) 09/13/2022    CALCIUM 10.1 09/13/2022     09/13/2022    K 3.9 09/13/2022    CO2 23.4 09/13/2022    CL 95 (L) 09/13/2022    BUN 45 (H) 09/13/2022    CREATININE 4.03 (H) 09/13/2022    EGFRIFAFRI  12/08/2021      Comment:      <15 Indicative of kidney failure.    EGFRIFNONA 22 (L) 12/09/2021    BCR 11.2 09/13/2022    ANIONGAP 22.6 (H) 09/13/2022      Lab Results   Component Value Date    MG 2.1 12/09/2021    PHOS 6.0 (H) 08/13/2021    ALBUMIN 4.10 09/13/2022           Assessment / Plan     ASSESSMENT:  1. End-stage renal disease on maintenance hemodialysis on Monday Wednesday Friday.  Currently patient is hypotensive with blood pressure down to 90 systolic and in A. fib with RVR.  She does have some signs of mild hypervolemia.  We will hold on dialysis now given hemodynamic instability and till her heart rate is controlled  2.  chronic hypotension on midodrine  3. History of hypertension blood pressure is soft today  4. A. fib with RVR.  Patient was given 1 dose  of digoxin and started on Cardizem drip.  Followed by cardiology  5. Diastolic heart failure  6. Volume overload  7. Hyperphosphatemia: Not on binders  8. High anion gap metabolic acidosis.  Likely secondary to chronic kidney disease.  We will check lactic acid  9. Bioprosthetic mitral and aortic valves on anticoagulation        PLAN:  · No acute indication for dialysis today.  Patient is on room air laying flat and she is not complaining of shortness of breath.  She does show signs of hypervolemia.  Reluctant to start dialysis tonight given hemodynamic instability with A. fib RVR and hypotension.   ·  Plan to dialyze tomorrow if hemodynamically stable.  · Continue surveillance labs.  · Avoid nephrotoxic agents  · Continue midodrine for hemodynamic support        Thank you for involving us in the care of Claudia GABRIELLE Arnold.  Please feel free to call with any questions.    Chas Spicer MD  09/13/22  16:51 EDT    [facility]  [phone]      Much of this encounter note is an electronic transcription/translation of spoken language to printed text. The electronic translation of spoken language may permit erroneous, or at times, nonsensical words or phrases to be inadvertently transcribed; Although I have reviewed the note for such errors, some may still exist.

## 2022-09-13 NOTE — H&P
Internal medicine history and physical  INTERNAL MEDICINE   [facility]       Patient Identification:  Name: Claudia Arnold  Age: 77 y.o.  Sex: female  :  1945  MRN: [id number]                   Primary Care Physician: Robert Cortez MD                               Date of admission:2022    Chief Complaint: Has been feeling progressively weak with activity related shortness of breath for the last 5 to 6 days.  Noted by dialysis center that her heart rate is very high and was sent to the cardiology service and from there patient was attempted to be admitted directly but because of the bed shortages sent to the emergency room.  Patient feels otherwise fine at rest and denies any other symptoms.    History of Present Illness:   Patient is a 77-year-old female who has end-stage renal disease on hemodialysis and has prior history of paroxysmal atrial fibrillation and history of aortic and mitral stenosis for which he has had surgical intervention and valve replacement and diastolic congestive heart failure was in his usual state of health until 5 - 6 days ago when she started noticing getting out of breath easily with activity.  When she is at rest not doing much she is fine.  She was complaining of the symptoms at the dialysis center and was noted to have rapid heart rate.  Patient was referred to cardiology service and evaluation confirmed presence of atrial fibrillation and rapid ventricular response for which patient was referred for direct admission which could not be accomplished and patient was subsequently sent to the emergency room.  Patient denies any fever or chills.  Denies any orthopnea or PND.  In the emergency room patient was noted to have hypotension with heart rate of 144 bpm with rate going as high as 160s.  Cardiology service was consulted initially digoxin was provided and patient eventually required Cardizem drip which caused her to have drop in blood pressure.   Patient is being admitted for further care.      Past Medical History:  Past Medical History:   Diagnosis Date   • A-fib ([facility])     Meds   • Arthritis     w/Difficult Mobility   • Bacterial infection 03/2020   • Bifascicular block    • Bilateral lower extremity edema     Legs   • CAD (coronary artery disease)     Affecting LAD    • Cardiomyopathy ([facility])    • CHF (congestive heart failure) ([facility])     CHRONIC, DIASTOLIC   • Chronic fatigue    • Chronic kidney disease    • Cystocele, midline 09/2019   • Dialysis patient ([facility])     TUESDAY THURSDAY SATURDAY   • Edema 06/2021   • End stage renal failure on dialysis ([facility])     FOLLOWED BY DR. COLLINS SPANGLER   • Gastrointestinal hemorrhage 01/11/2021    ADMITTED TO [facility]   • Generalized anxiety disorder    • GERD (gastroesophageal reflux disease)    • Glenohumeral arthritis 04/2021   • Hematochezia 08/08/2021    ADMITTED TO [facility]   • Hemorrhoids    • Hernia, inguinal     Hx Repair   • History of echocardiogram 1/17/19-[facility]    The L Ventricular Cavity is Mild-to-Moderately Dilated; Left Ventricular Wall Thickness is Consistent w/Mild Concentric Hypertrophy; Left Atrial Cavity Size is Moderate-to-Severely Dilated; Severe AVS; Severe MVS; Moderate TVR Noted   • History of transfusion     no adverse reaction   • Hyperlipidemia     Controlled w/Meds   • Hypertension     Controlled w/Meds   • Hypokalemia    • Hypokinesis 01/22/2019    Apical Noted on Cardiac Cath   • IFG (impaired fasting glucose)    • Immobility syndrome    • Iron deficiency anemia    • LAD stenosis 01/22/2019    Mid LAD Irregularities Noted on Cardiac Cath    • Left atrial dilatation 01/17/2019    Moderate-Severe Noted on Echo   • Left ventricular dilatation 01/17/2019    Noted on Echo/LEE   • Lumbar spondylosis    • Lumbar stenosis    • Mild concentric left ventricular hypertrophy 01/17/2019    Noted on Echo/LEE   • Mitral annular calcification 01/17/2019    Severe Noted on Echo   • Moderate tricuspid valve  regurgitation 01/24/2019    Noted on LEE   • Morbid obesity ([facility])    • Nonrheumatic tricuspid valve regurgitation    • NSTEMI (non-ST elevated myocardial infarction) ([facility])    • OCTAVIANO (obstructive sleep apnea)    • Osteoarthritis    • Osteoarthritis of shoulder    • PAF (paroxysmal atrial fibrillation) ([facility])    • PNA (pneumonia)    • Pulmonary hypertension ([facility]) 01/22/2019    Noted on Cardiac Cath   • Right bundle branch block 01/24/2019    Noted on LEE   • Right ventricular enlargement 06/2021   • Rotator cuff tear 04/2016   • Secondary hyperparathyroidism ([facility])    • Severe aortic stenosis 01/22/2019    Noted on Cardiac Cath & LEE on 01/24/19; S/p AVR on 01/28/19 by Dr. Gaxiola   • Severe mitral valve stenosis 01/24/2019    Noted on LEE; S/p MVR on 01/28/19 by Dr. Gaxiola   • Severe muscle deconditioning    • Shoulder pain, bilateral    • Skin yeast infection     Folds Under Skin--Nystatin/Diflucan   • Tinea unguium 10/2020    BILATERAL   • Ulcer of toe ([facility])     RIGHT FOOT   • Urge incontinence    • Vaginal atrophy    • Venous insufficiency    • Vulvar cyst 09/2019     Past Surgical History:  Past Surgical History:   Procedure Laterality Date   • AORTIC VALVE REPAIR/REPLACEMENT MITRAL VALVE REPAIR/REPLACEMENT N/A 1/28/2019    Procedure: LEE STERNOTOMY AORTIC VALVE REPLACEMENT, MITRAL VALVE REPLACEMENT, TRICUSPID VALVE REPAIR, MAZE PROCEDURE, CLOSURE OF LEFT ATRIAL APPENDAGE  AND PRP;  Surgeon: Scar Gaxiola MD;  Location: [facility] OR;  Service: Cardiothoracic   • ARTERIOVENOUS FISTULA/SHUNT SURGERY Left 6/26/2019    Procedure: LEFT ARM BRACHIAL CEPHALIC FISTULA;  Surgeon: Satish Stahl MD;  Location: [facility] OR;  Service: Vascular   • CARDIAC CATHETERIZATION N/A 1/22/2019    Procedure: Coronary angiography;  Surgeon: Rick Talavera MD;  Location: [facility] CATH INVASIVE LOCATION;  Service: Cardiology   • CARDIAC CATHETERIZATION N/A 1/22/2019    Procedure: Left Heart Cath;  Surgeon: Sadaf  Rick MARSH MD;  Location: [facility] CATH INVASIVE LOCATION;  Service: Cardiology   • CARDIAC CATHETERIZATION N/A 1/22/2019    Procedure: Right Heart Cath;  Surgeon: Rick Talavera MD;  Location: [facility] CATH INVASIVE LOCATION;  Service: Cardiology   • CARDIAC CATHETERIZATION N/A 1/22/2019    Procedure: Left ventriculography;  Surgeon: Rick Talavera MD;  Location: [facility] CATH INVASIVE LOCATION;  Service: Cardiology   • CARDIAC SURGERY     • COLONOSCOPY N/A 1/16/2021    MULTIPLE DIVERTICULA, TORTUOUS COLON, HEMORRHOIDS, DR. JOSE TRAN AT [place]   • COLONOSCOPY N/A 12/9/2021    Procedure: COLONOSCOPY TO CECUM WITH APC TO RIGHT COLON AVM;  Surgeon: Shiloh Pop MD;  Location: [facility] ENDOSCOPY;  Service: Gastroenterology;  Laterality: N/A;   PRE OP-ANEMIA  POST OP - AVM RIGHT COLON, DIVERTICULOSIS, HEMORRHOIDS   • ENDOSCOPY N/A 1/16/2021    DUODENITIS, GASTRITIS, BENIGN DUOFENAL POLYP, DR. JOSE TRAN AT [facility]   • ENDOSCOPY N/A 12/9/2021    Procedure: ESOPHAGOGASTRODUODENOSCOPY  WITH BIOPSIES;  Surgeon: Shiloh Pop MD;  Location: [facility] ENDOSCOPY;  Service: Gastroenterology;  Laterality: N/A;  PRE OP - ANEMIA  POST OP - ABN DUODENAL MUCOSA, GASTRITIS, HIATAL HERNIA   • HEMORRHOIDECTOMY N/A 8/12/2021    Procedure: HEMORRHOIDECTOMY x2;  Surgeon: Pennie Rider MD;  Location: [facility] MAIN OR;  Service: General;  Laterality: N/A;   • HERNIA REPAIR Left     INGUINAL   • INSERT / REPLACE / VENOUS ACCESS CATHETER N/A 03/11/2020    TUNNEL CATHETER REMOVAL, DR. SU MICHELLE   • INSERTION HEMODIALYSIS CATHETER N/A 2/8/2019    Procedure: tunnel CATHETER PLACEMENT WITH FLUROSCOPY;  Surgeon: Satish Stahl MD;  Location: [facility] MAIN OR;  Service: Vascular   • REPLACEMENT TOTAL KNEE Left 2007   • TOTAL KNEE ARTHROPLASTY Right 2009      Home Meds:  (Not in a hospital admission)    Current Meds:     Current Facility-Administered Medications:   •  dilTIAZem (CARDIZEM) 125 mg in 125 mL  0.7% sodium chloride  infusion, 5-15 mg/hr, Intravenous, Titrated, Alonso Mae MD, Last Rate: 5 mL/hr at 09/13/22 1620, 5 mg/hr at 09/13/22 1620  •  sodium chloride 0.9 % flush 10 mL, 10 mL, Intravenous, PRN, Alonso Mae MD    Current Outpatient Medications:   •  acetaminophen (TYLENOL) 325 MG tablet, Take 650 mg by mouth 3 (Three) Times a Day. Takes tid at 2 am, 1 pm, and hS, Disp: , Rfl:   •  busPIRone (BUSPAR) 10 MG tablet, Take 1 tablet by mouth 2 (Two) Times a Day., Disp: 180 tablet, Rfl: 3  •  [employer] MELATONIN PO, Take 5 mg by mouth At Night As Needed., Disp: , Rfl:   •  escitalopram (LEXAPRO) 10 MG tablet, Take 1 tablet by mouth Daily., Disp: 90 tablet, Rfl: 3  •  hydrocortisone (ANUSOL-HC) 25 MG suppository, Insert 1 suppository into the rectum 2 (Two) Times a Day., Disp: 100 each, Rfl: 0  •  hydrocortisone 2.5 % cream, , Disp: , Rfl:   •  Methoxy PEG-Epoetin Beta (MIRCERA IJ), 75 mcg Every 28 (Twenty-Eight) Days., Disp: , Rfl:   •  metoprolol tartrate (LOPRESSOR) 25 MG tablet, TAKE 1 TABLET BY MOUTH EVERY 12 HOURS, Disp: 60 tablet, Rfl: 3  •  midodrine (PROAMATINE) 2.5 MG tablet, Take 1 tablet by mouth 3 (Three) Times a Day Before Meals., Disp: 270 tablet, Rfl: 3  •  midodrine (PROAMATINE) 5 MG tablet, Take 1 tablet by mouth 3 (Three) Times a Day Before Meals., Disp: 90 tablet, Rfl: 0  •  pantoprazole (PROTONIX) 40 MG EC tablet, Take 1 tablet by mouth Every Morning., Disp: 90 tablet, Rfl: 3  •  Probiotic Product (ALIGN PO), Take 1 capsule by mouth Every Morning., Disp: , Rfl:   •  rOPINIRole (REQUIP) 0.25 MG tablet, Take 0.25 mg by mouth Every Night., Disp: , Rfl:   •  warfarin (COUMADIN) 2 MG tablet, TAKE 1 TABLET BY MOUTH EVERY SUNDAY, TUESDAY, AND THURSDAY, TAKE 1&1/2 TABLETS BY MOUTH ALL THE OTHER DAYS OF THE WEEK, Disp: 120 tablet, Rfl: 1  Allergies:  Allergies   Allergen Reactions   • Betadine [Povidone Iodine] Itching     Social History:   Social History     Tobacco Use   • Smoking status:  "Never Smoker   • Smokeless tobacco: Never Used   Substance Use Topics   • Alcohol use: Not Currently     Alcohol/week: 1.0 standard drink     Types: 1 Cans of beer per week     Comment: 1 monthly      Family History:  Family History   Problem Relation Age of Onset   • Hypertension Other    • Diabetes Other    • Heart disease Neg Hx    • Stroke Neg Hx    • Arthritis Neg Hx    • Breast cancer Neg Hx    • Malig Hyperthermia Neg Hx           Review of Systems  See history of present illness and past medical history.   Constitutional: Negative for chills and fever.   Respiratory: Positive for shortness of breath.    Cardiovascular: Positive for leg swelling. Negative for chest pain.   Gastrointestinal: Negative for diarrhea and vomiting.     Vitals:   BP (!) 118/107   Pulse 108   Temp 97.4 °F (36.3 °C) (Tympanic)   Resp 16   Ht 160 cm (63\")   Wt 96 kg (211 lb 10.3 oz)   SpO2 93%   BMI 37.49 kg/m²   I/O: No intake or output data in the 24 hours ending 09/13/22 1751  Exam:  Patient is examined using the personal protective equipment as per guidelines from infection control for this particular patient as enacted.  Hand washing was performed before and after patient interaction.  General Appearance:   Alert cooperative pleasant female who does not appear to be in any acute distress   Head:    Normocephalic, without obvious abnormality, atraumatic   Eyes:    PERRL, conjunctiva/corneas clear, EOM's intact, both eyes   Ears:    Normal external ear canals, both ears   Nose:   Nares normal, septum midline, mucosa normal, no drainage    or sinus tenderness   Throat:   Lips, tongue, gums normal; oral mucosa pink and moist   Neck:   Supple, symmetrical, trachea midline, no adenopathy;     thyroid:  no enlargement/tenderness/nodules; no carotid    bruit or JVD   Back:     Symmetric, no curvature, ROM normal, no CVA tenderness   Lungs:     Clear to auscultation bilaterally, respirations unlabored   Chest Wall:    No tenderness " or deformity    Heart:   S1-S2 irregularly irregular   Abdomen:    Obese soft nontender   Extremities:  Trace edema noted   Pulses:   Pulses palpable in all extremities; symmetric all extremities   Skin:   Skin color normal, Skin is warm and dry,  no rashes or palpable lesions   Neurologic:  Alert and oriented and grossly nonfocal       Data Review:      I reviewed the patient's new clinical results.  Results from last 7 days   Lab Units 09/13/22  1444   WBC 10*3/mm3 9.20   HEMOGLOBIN g/dL 13.6   PLATELETS 10*3/mm3 196     Results from last 7 days   Lab Units 09/13/22  1444   SODIUM mmol/L 141   POTASSIUM mmol/L 3.9   CHLORIDE mmol/L 95*   CO2 mmol/L 23.4   BUN mg/dL 45*   CREATININE mg/dL 4.03*   CALCIUM mg/dL 10.1   GLUCOSE mg/dL 110*     No radiology results for the last 30 days.  ECG 12 Lead   Final Result   HEART RATE= 128  bpm   RR Interval= 469  ms   ND Interval=   ms   P Horizontal Axis=   deg   P Front Axis=   deg   QRSD Interval= 161  ms   QT Interval= 360  ms   QRS Axis= -75  deg   T Wave Axis= 43  deg   - ABNORMAL ECG -   Atrial fibrillation   RBBB and LAFB   LVH with secondary repolarization abnormality   No change from previous tracing   Electronically Signed By: Nunu Santos ([facility] 13-Sep-2022 15:27:23   Date and Time of Study: 2022-09-13 14:21:25        Microbiology Results (last 10 days)     ** No results found for the last 240 hours. **          Assessment:  Active Hospital Problems    Diagnosis  POA   • **Atrial fibrillation with RVR ([facility]) [I48.91]  Yes   • Rapid atrial fibrillation ([facility]) [I48.91]  Yes   • Elevated troponin [R77.8]  Unknown   • Hypotension [I95.9]  Unknown   • ESRD (end stage renal disease) ([facility]) [N18.6]  Yes   • OCTAVIANO (obstructive sleep apnea) [G47.33]  Yes   • Anemia [D64.9]  Yes   • Chronic diastolic CHF (congestive heart failure) ([facility]) [I50.32]  Yes   • S/P AVR (aortic valve replacement) [Z95.2]  Not Applicable   • S/P MVR (mitral valve replacement) [Z95.2]  Not Applicable    • Aortic valve stenosis [I35.0]  Yes     Added automatically from request for surgery [id number]         Plan: See admitting orders  · Management of atrial fibrillation with rapid ventricular response has been initiated by cardiology service in the emergency room  · Nephrology service consultation for need for dialysis the patient does not appear to be in volume overload.  · Monitor her renal function  · Check serial troponin the patient does not have any episodes of chest pain  · Continue with pulse ox monitoring and further management as her condition evolves.  Gorge Boyd MD   9/13/2022  17:51 EDT  Much of this encounter note is an electronic transcription/translation of spoken language to printed text. The electronic translation of spoken language may permit erroneous, or at times, nonsensical words or phrases to be inadvertently transcribed; Although I have reviewed the note for such errors, some may still exist

## 2022-09-13 NOTE — PROGRESS NOTES
"    CARDIOLOGY        Patient Name: Claudia Arnold  :1945  Age: 77 y.o.  Primary Cardiologist: Zan Segura MD  Encounter Provider:  MORGAN Butts    Date of Service: 22          CHIEF COMPLAINT / REASON FOR OFFICE VISIT     Rapid Heart Rate (6 month f/u)      HISTORY OF PRESENT ILLNESS       HPI  Claudia Arnold is a 77 y.o. female who presents today for evaluation of tachycardia.     Pt has a  history significant for blood loss anemia, AVMs treated, chronic A. fib, chronic diastolic heart failure, ESRD on HD, prior aortic valve replacement, mitral valve replacement and tricuspid valve pair, severe TR, history of PE, history of hypotension on midodrine.    For the past week we have been receiving calls from patient's dialysis center that heart rate has been elevated in the 130s-140s.  Patient is on a low-dose metoprolol tartrate secondary to bradycardia.  This was increased to 25 mg twice daily however patient continues to have tachycardia.  Patient reports symptoms of dyspnea with very minimal exertion, episodes of lightheadedness as well as generalized fatigue.  She states that she has had decreased urine output compared to normal.  She states that in dialysis the lowest her blood pressure has reached is as a systolic of 90s.  Patient's INR 1.94 on .  On  patient INR 2.82.    The following portions of the patient's history were reviewed and updated as appropriate: allergies, current medications, past family history, past medical history, past social history, past surgical history and problem list.      VITAL SIGNS     Visit Vitals  /72 (BP Location: Right arm, Patient Position: Sitting, Cuff Size: Adult)   Pulse (!) 136   Ht 170.2 cm (67\")   Wt 98.4 kg (217 lb)   SpO2 95%   BMI 33.99 kg/m²         Wt Readings from Last 3 Encounters:   22 98.4 kg (217 lb)   22 98.4 kg (217 lb)   22 98 kg (216 lb)     Body mass index is 33.99 kg/m².      REVIEW OF SYSTEMS "   Review of Systems   Constitutional: Positive for malaise/fatigue.   Cardiovascular: Positive for dyspnea on exertion, irregular heartbeat, leg swelling and palpitations. Negative for chest pain.   Respiratory: Negative for shortness of breath.    Neurological: Positive for light-headedness.   All other systems reviewed and are negative.          PHYSICAL EXAMINATION     Vitals and nursing note reviewed.   Constitutional:       Appearance: Normal appearance. Well-developed. Morbidly obese.   Eyes:      Conjunctiva/sclera: Conjunctivae normal.   Neck:      Vascular: No carotid bruit.   Pulmonary:      Breath sounds: Normal breath sounds.   Cardiovascular:      Tachycardia present. Irregularly irregular rhythm. Normal S1 with normal intensity. Normal S2 with normal intensity.      Murmurs: There is no murmur.      No gallop. No click. No rub.   Musculoskeletal: Normal range of motion. Skin:     General: Skin is warm and dry.   Neurological:      Mental Status: Alert and oriented to person, place, and time.      GCS: GCS eye subscore is 4. GCS verbal subscore is 5. GCS motor subscore is 6.   Psychiatric:         Speech: Speech normal.         Behavior: Behavior normal.         Thought Content: Thought content normal.         Judgment: Judgment normal.           REVIEWED DATA       ECG 12 Lead    Date/Time: 9/13/2022 11:53 AM  Performed by: Shiloh Gonzales APRN  Authorized by: Shiloh Gonzales APRN   Comparison: compared with previous ECG from 12/6/2021  Rhythm: atrial fibrillation  Rate: tachycardic  BPM: 119  Conduction: right bundle branch block  QRS axis: left    Clinical impression: abnormal EKG            Cardiac Procedures:  1. Bilateral lower extremity venous duplex scan 1/17/19.  Normal  2. Echocardiogram 1/17/19.  Left ventricular cavity is mild to moderately dilated.  Mild LVH.  Grade 1 diastolic dysfunction.  Normal right ventricular cavity size and systolic function noted.  Left atrial cavity size is  moderate to severely dilated.  Severe aortic stenosis with peak gradient 96 mmHg and mean gradient 54 mmHg.  The calculated aortic valve area is 0.84 cm².  Severe mitral annular calcification.  Severe mitral valve stenosis with a peak gradient 22 mmHg and mean gradient 11 mmHg.  Mild to moderate tricuspid valve regurgitation.  Estimated RVSP greater than 55 mmHg.  3. Lower extremity venous duplex scan 1/21/19.  Normal  4. Cardiac catheterization 1/22/19.  Mild luminal irregularities proximal to the mid LAD.  Otherwise normal coronary angiography.  Elevated left and right-sided filling pressures.  Moderate aortic valve stenosis.  Moderate pulmonary hypertension.  5. LEE 1/24/19.  Left ventricular systolic function is low normal.  Severe aortic valve stenosis.  Moderate mitral valve stenosis is present.  Severe tricuspid valve regurgitation is present.  Left atrial cavity size is severely dilated.  Right ventricular cavity is mildly dilated.  6. Cardioversion 1/24/19.  Successful  7. Left upper extremity venous duplex scan 1/27/19 acute left upper extremity superficial thrombophlebitis noted in the cephalic.  8. Tissue aortic valve replacement with 23 mm magna pericardial paracentesis, mitral valve replacement with 25 mm Saint Mike porcine prosthesis, left cryo-Maze procedure and left AAA ligation on 1/28/19.  9. Limited echocardiogram 1/30/19.  Mild to moderate LVH.  EF greater than 70.  Right ventricular cavity is mildly dilated.  Left atrial cavity is moderately dilated.  No evidence of pericardial effusion.  10. LEE 2/15/19.  Wall motion was not well visualized due to artifact from the bioprosthetic aortic and mitral valves.  Left atrial cavity is dilated.  Bioprosthetic mitral valve.  Mitral valve mean gradient 11 mmHg.  Moderate tricuspid valve regurgitation present.  Estimated RVSP 35-45 mmHg.  Bioprosthetic aortic valve present.  There still appeared to be flow with the left atrial appendage.  There was no  left atrial appendage thrombus.  11. Cardioversion 2/15/19.  Successful.  12. Echocardiogram 5/13/2019.  EF 65%.  Moderate LVH.  Left atrial cavity size is moderately dilated.  Mild dilation of the aortic root.  Prosthetic aortic valve is grossly normal.  Saint Mike bioprosthetic mitral valve is grossly normal.  13. Echocardiogram 5/20/2021.  LVEF 61 to 65%.  Mild to moderate LVH.  RV cavity is moderately dilated with moderately reduced RV systolic function.  LAE.  23 mm porcine, magna bioprosthetic aortic valve.  Peak and mean gradients are elevated with maximum pressure gradient 39 mmHg, mean pressure gradient 20 mmHg.  23 mm, porcine, Saint Mike bioprosthetic mitral valve replacement.  Peak and mean gradients are elevated with peak gradient 36 mmHg, mean gradient 17 mmHg.  Severe tricuspid valve regurgitation.  Calculated RVSP 61 mmHg.  14. LEE 6/5/2021.  LVEF 61 to 65%.  Mild to moderate LVH.  RV cavity is moderately dilated with moderately reduced RV function.  LAE.  Left atrial appendage has been ligated although there is still bidirectional flow across the small orifice.  Bioprosthetic aortic valve replacement which appears to be well-seated.  Valve replacement cannot be properly aligned in the transgastric views to obtain gradients.  Gradients across bioprosthetic mitral valve are grossly elevated with mean gradient 14 mmHg however the leaflets clearly open very well and do not appear stenotic.  Tricuspid valve is s/p annuloplasty ring repair with severe tricuspid valve regurgitation through the ring.  Calculated RVSP 48 mmHg.  Moderate plaques in the descending thoracic aorta and in the aortic arch.  15. Echocardiogram 2/22/2022.  LVEF 61 to 65%.  Mild hypertrophy.  RV cavity is moderately dilated with moderately reduced RV systolic function.  Left atrial cavity is severely dilated.  Right atrial cavity is moderately dilated.  There is a bioprosthetic aortic valve.  With peak and mean gradients that are  elevated.  Maximum pressure gradient 51 mmHg, mean pressure gradient 24 mmHg.  There is a epic Saint Mike bioprosthetic mitral valve present with peak and mean gradients that are elevated.  Mitral valve peak gradient 28 mmHg.  Mean gradient 12 mmHg.  Moderate tricuspid valve regurgitation.  Calculated RVSP 52 mmHg.  Aortic root is dilated at 4.4 cm.        Lipid Panel    Lipid Panel 5/31/22   Total Cholesterol 176   Triglycerides 71   HDL Cholesterol 51   VLDL Cholesterol 13   LDL Cholesterol  112 (A)   (A) Abnormal value            BUN   Date Value Ref Range Status   05/31/2022 41 (H) 8 - 27 mg/dL Final   12/09/2021 21 8 - 23 mg/dL Final     Creatinine   Date Value Ref Range Status   05/31/2022 3.14 (H) 0.57 - 1.00 mg/dL Final   12/09/2021 2.18 (H) 0.57 - 1.00 mg/dL Final     Potassium   Date Value Ref Range Status   05/31/2022 4.1 3.5 - 5.2 mmol/L Final   12/09/2021 3.7 3.5 - 5.2 mmol/L Final     ALT (SGPT)   Date Value Ref Range Status   05/31/2022 11 0 - 32 IU/L Final   12/09/2021 11 1 - 33 U/L Final     AST (SGOT)   Date Value Ref Range Status   05/31/2022 18 0 - 40 IU/L Final   12/09/2021 14 1 - 32 U/L Final           ASSESSMENT & PLAN     Diagnoses and all orders for this visit:    1. Atrial fibrillation with RVR (HCC) (Primary)  · Patient has been in atrial fibrillation with RVR for the past week.  · Patient with low blood pressure, systolic blood pressure in the 90s-100s, no room to uptitrate beta-blockers or calcium channel blockers  · Patient with end-stage renal disease on chronic dialysis, cannot add digoxin  · Amiodarone would be poor choice for patient in the outpatient setting  · Discussed case with Dr. Segura who agrees that patient would benefit with admission for rate control and EP evaluation  · Anticoagulated with warfarin, INR 1.94 on 8/24; 2.82 on 9/7.    2. Status post aortic valve replacement with tissue valve  3. Status post mitral valve replacement with tissue valve  · Gradients  elevated on most recent echocardiogram, please see full report.    4. Chronic diastolic congestive heart failure (HCC)  · Volume status mainly controlled with hemodialysis    5. ESRD (end stage renal disease) (HCC)  · On dialysis, due tomorrow.        Patient in need of hospitalization, unfortunately there are no beds available.  Spoke with on-call physician for [facility] who states that only option for admission is to send patient through the emergency department.  Spoke with patient and family member and they are in agreement.  Called report to DARCIE Ceron in triage for the emergency department.  Patient will need consultation with electrophysiology, she has seen Dr. Jarrell in the past.  She will also need consultation with nephrology (her nephrologist is Dr. Kathy Saab).  Patient is due for dialysis tomorrow.    Time Spent: I spent 93 minutes caring for Claudia on this date of service. This time includes time spent by me in the following activities: preparing for the visit, reviewing tests, performing a medically appropriate examination and/or evaluation, counseling and educating the patient/family/caregiver, referring and communicating with other health care professionals, documenting information in the medical record, independently interpreting results and communicating that information with the patient/family/caregiver and care coordination.   I spent 3 minutes on the separately reported service of ECG. This time is not included in the time used to support the E/M service also reported today.      Future Appointments       Provider Department Center    12/13/2022 12:30 PM [facility]    6/1/2023 10:00 AM Robert Cortez MD [facility] PRIMARY CARE [facility]            MEDICATIONS         Discharge Medications          Accurate as of September 13, 2022  1:02 PM. If you have any questions, ask your nurse or doctor.            Continue  These Medications      Instructions Start Date   acetaminophen 325 MG tablet  Commonly known as: TYLENOL   650 mg, Oral, 3 Times Daily, Takes tid at 2 am, 1 pm, and hS      ALIGN PO   1 capsule, Oral, Every Morning      busPIRone 10 MG tablet  Commonly known as: BUSPAR   10 mg, Oral, 2 Times Daily      [employer] MELATONIN PO   5 mg, Oral, Nightly PRN      escitalopram 10 MG tablet  Commonly known as: LEXAPRO   10 mg, Oral, Daily      hydrocortisone 2.5 % cream   No dose, route, or frequency recorded.      hydrocortisone 25 MG suppository  Commonly known as: ANUSOL-HC   25 mg, Rectal, 2 Times Daily      metoprolol tartrate 25 MG tablet  Commonly known as: LOPRESSOR   TAKE 1 TABLET BY MOUTH EVERY 12 HOURS      midodrine 5 MG tablet  Commonly known as: PROAMATINE   5 mg, Oral, 3 Times Daily Before Meals      midodrine 2.5 MG tablet  Commonly known as: PROAMATINE   2.5 mg, Oral, 3 Times Daily Before Meals      MIRCERA IJ   75 mcg, Every 28 Days      pantoprazole 40 MG EC tablet  Commonly known as: PROTONIX   40 mg, Oral, Every Early Morning      ProRenal + D tablet tablet  Generic drug: multivitamin with minerals   1 tablet, Oral, Daily      rOPINIRole 0.25 MG tablet  Commonly known as: REQUIP   0.25 mg, Oral, Nightly      warfarin 2 MG tablet  Commonly known as: COUMADIN   TAKE 1 TABLET BY MOUTH EVERY SUNDAY, TUESDAY, AND THURSDAY, TAKE 1&1/2 TABLETS BY MOUTH ALL THE OTHER DAYS OF THE WEEK                 **Dragon Disclaimer:   Much of this encounter note is an electronic transcription/translation of spoken language to printed text. The electronic translation of spoken language may permit erroneous, or at times, nonsensical words or phrases to be inadvertently transcribed. Although I have reviewed the note for such errors, some may still exist.

## 2022-09-14 NOTE — NURSING NOTE
Dialysis complete, removed 2 liters with this treatment and no complications noted.  Vital signs are in epic.

## 2022-09-14 NOTE — CONSULTS
Date of Consultation: 22    Referral Provider: Gorge Boyd MD     Reason for Consultation: Rapid atrial fibrillation.    Encounter Provider: Scott Segura MD    Group of Service: [facility]     Patient Name: Claudia Arnold    :1945    Chief complaint: Palpitations, shortness of breath.    History of Present Illness:      This is a very pleasant 77 year-old female who I know well from the office.  She has a history of tissue aortic valve replacement, tissue mitral valve replacement, tricuspid valve repair, left cryo maze, and left atrial appendage ligation on 2019 by Dr. Gaxiola.  She did have rapid atrial fibrillation and rapid atrial flutter afterwards, and she ultimately was cardioverted on 2/15/2019.  She has had a difficult time tolerating beta-blockers and other oral medications because of hypotension in the past.  She also has end-stage renal disease, and is on dialysis.    She recently was noted to have more elevated heart rates during dialysis.  She has been in atrial fibrillation, and it has been difficult to adjust her beta-blockers as an outpatient.  Midodrine was even added to the regimen, although she continues to be hypotensive and tachycardic.  She saw MORGAN Topete, again today, and was found to be in rapid atrial fibrillation with heart rates as high as the 160s.  She was also hypotensive, and was referred to the emergency department.  She was placed on a minimum amount of Cardizem in the ER, although got hypotensive again.  She has been given 1 dose of IV digoxin with caution given her end-stage renal disease.  She is short of breath more than previously, although she does not appear to be in overt congestive heart failure.    Past Medical History:   Diagnosis Date   • A-fib ([facility])     Meds   • Arthritis     w/Difficult Mobility   • Bacterial infection 2020   • Bifascicular block    • Bilateral lower extremity edema     Legs   • CAD (coronary  artery disease)     Affecting LAD    • Cardiomyopathy ([facility])    • CHF (congestive heart failure) ([facility])     CHRONIC, DIASTOLIC   • Chronic fatigue    • Chronic kidney disease    • Cystocele, midline 09/2019   • Dialysis patient ([facility])     TUESDAY THURSDAY SATURDAY   • Edema 06/2021   • End stage renal failure on dialysis ([facility])     FOLLOWED BY DR. COLLINS SPANGLER   • Gastrointestinal hemorrhage 01/11/2021    ADMITTED TO [facility]   • Generalized anxiety disorder    • GERD (gastroesophageal reflux disease)    • Glenohumeral arthritis 04/2021   • Hematochezia 08/08/2021    ADMITTED TO [facility]   • Hemorrhoids    • Hernia, inguinal     Hx Repair   • History of echocardiogram 1/17/19-[facility]    The L Ventricular Cavity is Mild-to-Moderately Dilated; Left Ventricular Wall Thickness is Consistent w/Mild Concentric Hypertrophy; Left Atrial Cavity Size is Moderate-to-Severely Dilated; Severe AVS; Severe MVS; Moderate TVR Noted   • History of transfusion     no adverse reaction   • Hyperlipidemia     Controlled w/Meds   • Hypertension     Controlled w/Meds   • Hypokalemia    • Hypokinesis 01/22/2019    Apical Noted on Cardiac Cath   • IFG (impaired fasting glucose)    • Immobility syndrome    • Iron deficiency anemia    • LAD stenosis 01/22/2019    Mid LAD Irregularities Noted on Cardiac Cath    • Left atrial dilatation 01/17/2019    Moderate-Severe Noted on Echo   • Left ventricular dilatation 01/17/2019    Noted on Echo/LEE   • Lumbar spondylosis    • Lumbar stenosis    • Mild concentric left ventricular hypertrophy 01/17/2019    Noted on Echo/LEE   • Mitral annular calcification 01/17/2019    Severe Noted on Echo   • Moderate tricuspid valve regurgitation 01/24/2019    Noted on LEE   • Morbid obesity ([facility])    • Nonrheumatic tricuspid valve regurgitation    • NSTEMI (non-ST elevated myocardial infarction) ([facility])    • OCTAVIANO (obstructive sleep apnea)    • Osteoarthritis    • Osteoarthritis of shoulder    • PAF (paroxysmal atrial fibrillation)  ([facility])    • PNA (pneumonia)    • Pulmonary hypertension ([facility]) 01/22/2019    Noted on Cardiac Cath   • Right bundle branch block 01/24/2019    Noted on LEE   • Right ventricular enlargement 06/2021   • Rotator cuff tear 04/2016   • Secondary hyperparathyroidism ([facility])    • Severe aortic stenosis 01/22/2019    Noted on Cardiac Cath & LEE on 01/24/19; S/p AVR on 01/28/19 by Dr. Gaxiola   • Severe mitral valve stenosis 01/24/2019    Noted on LEE; S/p MVR on 01/28/19 by Dr. Gaxiola   • Severe muscle deconditioning    • Shoulder pain, bilateral    • Skin yeast infection     Folds Under Skin--Nystatin/Diflucan   • Tinea unguium 10/2020    BILATERAL   • Ulcer of toe ([facility])     RIGHT FOOT   • Urge incontinence    • Vaginal atrophy    • Venous insufficiency    • Vulvar cyst 09/2019         Past Surgical History:   Procedure Laterality Date   • AORTIC VALVE REPAIR/REPLACEMENT MITRAL VALVE REPAIR/REPLACEMENT N/A 1/28/2019    Procedure: LEE STERNOTOMY AORTIC VALVE REPLACEMENT, MITRAL VALVE REPLACEMENT, TRICUSPID VALVE REPAIR, MAZE PROCEDURE, CLOSURE OF LEFT ATRIAL APPENDAGE  AND PRP;  Surgeon: Scar Gaxiola MD;  Location: [facility] MAIN OR;  Service: Cardiothoracic   • ARTERIOVENOUS FISTULA/SHUNT SURGERY Left 6/26/2019    Procedure: LEFT ARM BRACHIAL CEPHALIC FISTULA;  Surgeon: Satish Stahl MD;  Location: [facility] MAIN OR;  Service: Vascular   • CARDIAC CATHETERIZATION N/A 1/22/2019    Procedure: Coronary angiography;  Surgeon: Rick Talavera MD;  Location: [facility] CATH INVASIVE LOCATION;  Service: Cardiology   • CARDIAC CATHETERIZATION N/A 1/22/2019    Procedure: Left Heart Cath;  Surgeon: Rick Talavera MD;  Location: [facility] CATH INVASIVE LOCATION;  Service: Cardiology   • CARDIAC CATHETERIZATION N/A 1/22/2019    Procedure: Right Heart Cath;  Surgeon: Rick Talavera MD;  Location: [facility] CATH INVASIVE LOCATION;  Service: Cardiology   • CARDIAC CATHETERIZATION N/A 1/22/2019    Procedure: Left  ventriculography;  Surgeon: Rick Talavera MD;  Location: [facility] CATH INVASIVE LOCATION;  Service: Cardiology   • CARDIAC SURGERY     • COLONOSCOPY N/A 1/16/2021    MULTIPLE DIVERTICULA, TORTUOUS COLON, HEMORRHOIDS, DR. JOSE TRAN AT [place]   • COLONOSCOPY N/A 12/9/2021    Procedure: COLONOSCOPY TO CECUM WITH APC TO RIGHT COLON AVM;  Surgeon: Shiloh Pop MD;  Location: [facility] ENDOSCOPY;  Service: Gastroenterology;  Laterality: N/A;   PRE OP-ANEMIA  POST OP - AVM RIGHT COLON, DIVERTICULOSIS, HEMORRHOIDS   • ENDOSCOPY N/A 1/16/2021    DUODENITIS, GASTRITIS, BENIGN DUOFENAL POLYP, DR. JOSE TRAN AT [facility]   • ENDOSCOPY N/A 12/9/2021    Procedure: ESOPHAGOGASTRODUODENOSCOPY  WITH BIOPSIES;  Surgeon: Shiloh Pop MD;  Location: [facility] ENDOSCOPY;  Service: Gastroenterology;  Laterality: N/A;  PRE OP - ANEMIA  POST OP - ABN DUODENAL MUCOSA, GASTRITIS, HIATAL HERNIA   • HEMORRHOIDECTOMY N/A 8/12/2021    Procedure: HEMORRHOIDECTOMY x2;  Surgeon: Pennie Rider MD;  Location: [facility] MAIN OR;  Service: General;  Laterality: N/A;   • HERNIA REPAIR Left     INGUINAL   • INSERT / REPLACE / VENOUS ACCESS CATHETER N/A 03/11/2020    TUNNEL CATHETER REMOVAL, DR. SU MICHELLE   • INSERTION HEMODIALYSIS CATHETER N/A 2/8/2019    Procedure: tunnel CATHETER PLACEMENT WITH FLUROSCOPY;  Surgeon: Satihs Stahl MD;  Location: [facility] MAIN OR;  Service: Vascular   • REPLACEMENT TOTAL KNEE Left 2007   • TOTAL KNEE ARTHROPLASTY Right 2009         Allergies   Allergen Reactions   • Betadine [Povidone Iodine] Itching         No current facility-administered medications on file prior to encounter.     Current Outpatient Medications on File Prior to Encounter   Medication Sig Dispense Refill   • acetaminophen (TYLENOL) 325 MG tablet Take 650 mg by mouth 3 (Three) Times a Day. Takes tid at 2 am, 1 pm, and hS     • busPIRone (BUSPAR) 10 MG tablet Take 1 tablet by mouth 2 (Two) Times a Day. 180 tablet 3   •  [facility] MELATONIN PO Take 5 mg by mouth At Night As Needed.     • escitalopram (LEXAPRO) 10 MG tablet Take 1 tablet by mouth Daily. 90 tablet 3   • hydrocortisone (ANUSOL-HC) 25 MG suppository Insert 1 suppository into the rectum 2 (Two) Times a Day. 100 each 0   • hydrocortisone 2.5 % cream      • Methoxy PEG-Epoetin Beta (MIRCERA IJ) 75 mcg Every 28 (Twenty-Eight) Days.     • metoprolol tartrate (LOPRESSOR) 25 MG tablet TAKE 1 TABLET BY MOUTH EVERY 12 HOURS 60 tablet 3   • midodrine (PROAMATINE) 2.5 MG tablet Take 1 tablet by mouth 3 (Three) Times a Day Before Meals. 270 tablet 3   • midodrine (PROAMATINE) 5 MG tablet Take 1 tablet by mouth 3 (Three) Times a Day Before Meals. 90 tablet 0   • pantoprazole (PROTONIX) 40 MG EC tablet Take 1 tablet by mouth Every Morning. 90 tablet 3   • Probiotic Product (ALIGN PO) Take 1 capsule by mouth Every Morning.     • rOPINIRole (REQUIP) 0.25 MG tablet Take 0.25 mg by mouth Every Night.     • warfarin (COUMADIN) 2 MG tablet TAKE 1 TABLET BY MOUTH EVERY SUNDAY, TUESDAY, AND THURSDAY, TAKE 1&1/2 TABLETS BY MOUTH ALL THE OTHER DAYS OF THE WEEK 120 tablet 1   • [DISCONTINUED] ProRenal + D tablet tablet Take 1 tablet by mouth Daily.           Social History     Socioeconomic History   • Marital status:    • Number of children: 2   • Years of education: 12   • Highest education level: High school graduate   Tobacco Use   • Smoking status: Never Smoker   • Smokeless tobacco: Never Used   Vaping Use   • Vaping Use: Never used   Substance and Sexual Activity   • Alcohol use: Not Currently     Alcohol/week: 1.0 standard drink     Types: 1 Cans of beer per week     Comment: 1 monthly   • Drug use: No   • Sexual activity: Defer     Birth control/protection: Post-menopausal         Family History   Problem Relation Age of Onset   • Hypertension Other    • Diabetes Other    • Heart disease Neg Hx    • Stroke Neg Hx    • Arthritis Neg Hx    • Breast cancer Neg Hx    • Malig Hyperthermia  "Neg Hx        REVIEW OF SYSTEMS:   Pertinent positives were noted in the HPI above.  Otherwise, all other systems were reviewed, and are negative.     Objective:     Vitals:    09/13/22 2001 09/13/22 2016 09/13/22 2031 09/13/22 2046   BP: 123/89 134/77 114/97 97/81   BP Location:       Patient Position:       Pulse: (!) 131 116 (!) 125 109   Resp:       Temp:       TempSrc:       SpO2: 99% 92% (!) 82% (!) 89%   Weight:       Height:         Body mass index is 37.49 kg/m².  Flowsheet Rows    Flowsheet Row First Filed Value   Admission Height 160 cm (63\") Documented at 09/13/2022 1750   Admission Weight 96 kg (211 lb 10.3 oz) Documented at 09/13/2022 1750           General:    No acute distress, alert and oriented x4, pleasant                   Head:    Normocephalic, atraumatic.   Eyes:          Conjunctivae and sclerae normal, no icterus, PERRLA   Throat:   No oral lesions, no thrush, oral mucosa moist.    Neck:   Supple, trachea midline.   Lungs:     Clear to auscultation bilaterally     Heart:   Irregularly irregular rhythm with a tachycardic rate. II/VI SM throughout.   Abdomen:     Soft, non-tender, non-distended, positive bowel sounds.    Extremities:  1-2+ edema lower extremities (chronic)   Pulses:   Pulses palpable and equal bilaterally.    Skin:   No bleeding or rash.   Neuro:   Non-focal.  Moves all extremities well.    Psychiatric:   Normal mood and affect.         Lab Review:                Results from last 7 days   Lab Units 09/13/22  1444   SODIUM mmol/L 141   POTASSIUM mmol/L 3.9   CHLORIDE mmol/L 95*   CO2 mmol/L 23.4   BUN mg/dL 45*   CREATININE mg/dL 4.03*   GLUCOSE mg/dL 110*   CALCIUM mg/dL 10.1     Results from last 7 days   Lab Units 09/13/22  1824 09/13/22  1444   TROPONIN T ng/mL 0.053* 0.067*     Results from last 7 days   Lab Units 09/13/22  1444   WBC 10*3/mm3 9.20   HEMOGLOBIN g/dL 13.6   HEMATOCRIT % 40.5   PLATELETS 10*3/mm3 196     Results from last 7 days   Lab Units 09/13/22  1444 " 09/07/22  0000   INR  2.96* 2.82                   EKG (reviewed by me personally): Atrial fibrillation with rapid ventricular response.  Diffuse nonspecific ST changes      Assessment:   1.  Paroxysmal atrial fibrillation with RVR  2.  Baseline hypotension, on midodrine  3.  End-stage renal disease, on hemodialysis  4.  Status post tissue AVR, tissue MVR, and tricuspid valve repair on 1/28/2019 by Dr. Gaxiola  5.  Status post left cryo maze and left atrial appendage ligation on 1/28/2019  6.  Oliguric acute kidney injury post cardiac surgery, resulting in ESRD  7.  Bifascicular block (right bundle branch block and left anterior fascicular block)  8.  Chronic lower extremity edema  9.  Chronic diastolic CHF  10.  Chronic anticoagulation with warfarin  11.  Troponin elevation, secondary to end-stage renal disease (not ischemic)  12.  Moderately enlarged and moderately reduced right ventricular function    Plan:       Unfortunately, the atrial fibrillation is going to be very difficult to control.  She is rapid, and is actually hypotensive at baseline on midodrine.  She has responded poorly to outpatient attempts to control this with beta-blockers.  She actually got hypotensive in the ER after minimal Cardizem was given.  I am going to give her 1 more dose of IV digoxin at 250 mcg.  However, I cannot use this long-term because she is on hemodialysis.      I am going to ask electrophysiology to see her.  She may need to be cardioverted, although she has severe left atrial enlargement, and I suspect she might go back into atrial fibrillation even with amiodarone.  However, I will wait on their recommendations.  If a cardioversion is obtained, she did have a subtherapeutic INR of 1.9 recently, and a LEE would be required.  She also might potentially be a candidate for a pacemaker and AV node ablation, although I will also defer this to our EP service.  She is anticoagulated with warfarin.  She does not appear to be in  overt congestive heart failure.  She has baseline chronic lower extremity edema which I feel is multifactorial.    Thank you very much for this consult.    Zan Segura MD

## 2022-09-14 NOTE — PLAN OF CARE
Goal Outcome Evaluation:Admitted form ED a 72 year old female because of A-fib with RVR and on cardizem drip at 5 ml/hr. Still tachycardic as high as 115 . NPO post MN as ordered,Sh also a dialysis patient and supoded to have dialysis today.

## 2022-09-14 NOTE — PROGRESS NOTES
[facility] Clinical Pharmacy Services: Clinical Pharmacy Consult - Warfarin Dosing/Monitoring    Results from last 7 days   Lab Units 09/14/22  0451 09/13/22  1444   INR  2.96* 2.96*     Dose Ordered: 3 mg today. Continuing home regimen of 2 mg on Sun/Mon/Tue/Thu/Fri/Sat and 3 mg on Wed.   Comments: No new drug interactions.     Pharmacy will continue to follow until discharge or discontinuation of warfarin.    Gita Duff, PharmD  Clinical Pharmacist

## 2022-09-14 NOTE — ED NOTES
.  Nursing report ED to floor  Claudia Arnold  77 y.o.  female    HPI :   Chief Complaint   Patient presents with   • Rapid Heart Rate       Admitting doctor:   Gorge Boyd MD    Admitting diagnosis:   The primary encounter diagnosis was Rapid atrial fibrillation (HCC). A diagnosis of ESRD (end stage renal disease) on dialysis (HCC) was also pertinent to this visit.    Code status:   Current Code Status     Date Active Code Status Order ID Comments User Context       9/13/2022 1756 CPR (Attempt to Resuscitate) [id number]  Gorge Boyd MD ED     Advance Care Planning Activity      Questions for Current Code Status     Question Answer    Code Status (Patient has no pulse and is not breathing) CPR (Attempt to Resuscitate)    Medical Interventions (Patient has pulse or is breathing) Full Support          Allergies:   Betadine [povidone iodine]    Intake and Output  No intake or output data in the 24 hours ending 09/13/22 2101    Weight:       09/13/22 1750   Weight: 96 kg (211 lb 10.3 oz)       Most recent vitals:   Vitals:    09/13/22 1817 09/13/22 1831 09/13/22 1846 09/13/22 1916   BP:  109/69 98/69 119/93   BP Location:       Patient Position:       Pulse:  108 (!) 133 110   Resp:       Temp:       TempSrc:       SpO2: 96% 96% 92% 94%   Weight:       Height:           Active LDAs/IV Access:   Lines, Drains & Airways     Active LDAs     Name Placement date Placement time Site Days    Peripheral IV 12/10/21 0055 Posterior; Right Forearm 12/10/21  0055  Forearm  277    Peripheral IV 09/13/22 1618 Anterior;Right Forearm 09/13/22  1618  Forearm  less than 1                Labs (abnormal labs have a star):   Labs Reviewed   COMPREHENSIVE METABOLIC PANEL - Abnormal; Notable for the following components:       Result Value    Glucose 110 (*)     BUN 45 (*)     Creatinine 4.03 (*)     Chloride 95 (*)     ALT (SGPT) 138 (*)     AST (SGOT) 84 (*)     Anion Gap 22.6 (*)     eGFR 10.9 (*)     All other components within normal  limits    Narrative:     GFR Normal >60  Chronic Kidney Disease <60  Kidney Failure <15     TROPONIN (IN-HOUSE) - Abnormal; Notable for the following components:    Troponin T 0.067 (*)     All other components within normal limits    Narrative:     Troponin T Reference Range:  <= 0.03 ng/mL-   Negative for AMI  >0.03 ng/mL-     Abnormal for myocardial necrosis.  Clinicians would have to utilize clinical acumen, EKG, Troponin and serial changes to determine if it is an Acute Myocardial Infarction or myocardial injury due to an underlying chronic condition.       Results may be falsely decreased if patient taking Biotin.     TROPONIN (IN-HOUSE) - Abnormal; Notable for the following components:    Troponin T 0.053 (*)     All other components within normal limits    Narrative:     Troponin T Reference Range:  <= 0.03 ng/mL-   Negative for AMI  >0.03 ng/mL-     Abnormal for myocardial necrosis.  Clinicians would have to utilize clinical acumen, EKG, Troponin and serial changes to determine if it is an Acute Myocardial Infarction or myocardial injury due to an underlying chronic condition.       Results may be falsely decreased if patient taking Biotin.     CBC WITH AUTO DIFFERENTIAL - Abnormal; Notable for the following components:    MCV 98.3 (*)     Lymphocyte % 13.6 (*)     Immature Grans % 0.8 (*)     Monocytes, Absolute 0.99 (*)     Immature Grans, Absolute 0.07 (*)     nRBC 0.4 (*)     All other components within normal limits   PROTIME-INR - Abnormal; Notable for the following components:    Protime 30.0 (*)     INR 2.96 (*)     All other components within normal limits   RAINBOW DRAW    Narrative:     The following orders were created for panel order [place] Draw.  Procedure                               Abnormality         Status                     ---------                               -----------         ------                     Green Top (Gel)[[id number]]                                  Final result                Lavender Top[[id number]]                                     Final result               Gold Top - SST[[id number]]                                   Final result               Light Blue Top[[id number]]                                   Final result                 Please view results for these tests on the individual orders.   CBC AND DIFFERENTIAL    Narrative:     The following orders were created for panel order CBC & Differential.  Procedure                               Abnormality         Status                     ---------                               -----------         ------                     CBC Auto Differential[[id number]]        Abnormal            Final result                 Please view results for these tests on the individual orders.   GREEN TOP   LAVENDER TOP   GOLD TOP - SST   LIGHT BLUE TOP       EKG:   ECG 12 Lead   Final Result   HEART RATE= 128  bpm   RR Interval= 469  ms   IL Interval=   ms   P Horizontal Axis=   deg   P Front Axis=   deg   QRSD Interval= 161  ms   QT Interval= 360  ms   QRS Axis= -75  deg   T Wave Axis= 43  deg   - ABNORMAL ECG -   Atrial fibrillation   RBBB and LAFB   LVH with secondary repolarization abnormality   No change from previous tracing   Electronically Signed By: Nunu Santos ([facility]) 13-Sep-2022 15:27:23   Date and Time of Study: 2022-09-13 14:21:25          Meds given in ED:   Medications   sodium chloride 0.9 % flush 10 mL (has no administration in time range)   dilTIAZem (CARDIZEM) 125 mg in 125 mL 0.7% sodium chloride  infusion (5 mg/hr Intravenous New Bag 9/13/22 1620)   aspirin tablet 325 mg (325 mg Oral Given 9/13/22 1620)   digoxin (LANOXIN) injection 250 mcg (250 mcg Intravenous Given 9/13/22 1700)       Imaging results:  No radiology results for the last day    Ambulatory status:   - Up with assist x2    Social issues:   Social History     Socioeconomic History   • Marital status:    • Number of children: 2   • Years of education:  12   • Highest education level: High school graduate   Tobacco Use   • Smoking status: Never Smoker   • Smokeless tobacco: Never Used   Vaping Use   • Vaping Use: Never used   Substance and Sexual Activity   • Alcohol use: Not Currently     Alcohol/week: 1.0 standard drink     Types: 1 Cans of beer per week     Comment: 1 monthly   • Drug use: No   • Sexual activity: Defer     Birth control/protection: Post-menopausal       NIH Stroke Scale:        Nursing report ED to floor:

## 2022-09-14 NOTE — PROGRESS NOTES
LOS: 1 day   Patient Care Team:  Robert Cortez MD as PCP - General (Family Medicine)  Deborah Agudelo MD as Surgeon (Orthopedic Surgery)  Scott Segura MD as Consulting Physician (Cardiology)  Scar Gaxiola MD as Surgeon (Cardiothoracic Surgery)  Kathy Osuna MD as Consulting Physician (Nephrology)  Dora Astorga APRN as Nurse Practitioner (Neurosurgery)  Satish Stahl MD as Consulting Physician (Vascular Surgery)  Scar Gaxiola MD as Surgeon (Cardiothoracic Surgery)  Kathy Osuna MD as Consulting Physician (Nephrology)    Chief Complaint: Follow-up persistent atrial fibrillation with RVR, hypotension, tissue aortic valve replacement, tissue mitral valve replacement.    Interval History: Rate was slightly better when I saw her earlier today on minimum Cardizem.  Her systolic blood pressure was right around 100.  No chest pain.  No increasing shortness of breath overnight.    Vital Signs:  Temp:  [97.3 °F (36.3 °C)-98.1 °F (36.7 °C)] 97.3 °F (36.3 °C)  Heart Rate:  [] 81  Resp:  [16-18] 16  BP: ()/() 98/76    Intake/Output Summary (Last 24 hours) at 9/14/2022 1721  Last data filed at 9/14/2022 1400  Gross per 24 hour   Intake 120 ml   Output --   Net 120 ml       Physical Exam:   General Appearance:    No acute distress, alert and oriented x4   Lungs:     Clear to auscultation bilaterally     Heart:    Irregularly irregular rhythm with a tachycardic rate. II/VI SM throughout.   Abdomen:     Soft, nontender, nondistended.    Extremities:   1-2+ edema.     Results Review:    Results from last 7 days   Lab Units 09/14/22  0451   SODIUM mmol/L 141   POTASSIUM mmol/L 4.2   CHLORIDE mmol/L 96*   CO2 mmol/L 26.5   BUN mg/dL 52*   CREATININE mg/dL 4.66*   GLUCOSE mg/dL 99   CALCIUM mg/dL 9.6     Results from last 7 days   Lab Units 09/14/22  0451 09/13/22  1824 09/13/22  1444   TROPONIN T ng/mL 0.068* 0.053* 0.067*     Results from last 7 days   Lab  Units 09/14/22  0451   WBC 10*3/mm3 8.14   HEMOGLOBIN g/dL 12.1   HEMATOCRIT % 36.2   PLATELETS 10*3/mm3 157     Results from last 7 days   Lab Units 09/14/22  0451 09/13/22  1444   INR  2.96* 2.96*                   I reviewed the patient's new clinical results.        Assessment:  1.  Paroxysmal atrial fibrillation with RVR  2.  Baseline hypotension, on midodrine  3.  End-stage renal disease, on hemodialysis  4.  Status post tissue AVR, tissue MVR, and tricuspid valve repair on 1/28/2019 by Dr. Gaxiola  5.  Status post left cryo maze and left atrial appendage ligation on 1/28/2019  6.  Oliguric acute kidney injury post cardiac surgery, resulting in ESRD  7.  Bifascicular block (right bundle branch block and left anterior fascicular block)  8.  Chronic lower extremity edema  9.  Chronic diastolic CHF  10.  Chronic anticoagulation with warfarin  11.  Troponin elevation, secondary to end-stage renal disease (not ischemic)  12.  Moderately enlarged and moderately reduced right ventricular function    Plan:  -Rate slightly better on minimal Cardizem.  She did get 2 doses of IV digoxin yesterday, although this is not a viable long-term option given her end-stage renal disease.    -Electrophysiology evaluation for further recommendations. Her midodrine has been increased to 7.5 mg 3 times a day, and she is back on metoprolol 25 mg twice a day.    -Continue warfarin.  INR therapeutic.    Scott Segura MD  09/14/22  17:21 EDT

## 2022-09-14 NOTE — PLAN OF CARE
Problem: Adult Inpatient Plan of Care  Goal: Plan of Care Review  Outcome: Ongoing, Progressing  Flowsheets (Taken 9/14/2022 1636)  Progress: improving  Plan of Care Reviewed With: patient  Outcome Evaluation: Remains in Afib rate 's on Cardizem drip. Waiting EP consult. Dialysis done today. Will continue to monitor.

## 2022-09-14 NOTE — PROGRESS NOTES
[facility] Clinical Pharmacy Services: Warfarin Dosing/Monitoring Consult    Claudia Arnold is a 77 y.o. female, estimated creatinine clearance is 12.9 mL/min (A) (by C-G formula based on SCr of 4.03 mg/dL (H)). weighing 96 kg (211 lb 10.3 oz).    Results from last 7 days   Lab Units 09/13/22  1444   INR  2.96*   HEMOGLOBIN g/dL 13.6   HEMATOCRIT % 40.5   PLATELETS 10*3/mm3 196     Prior to admission anticoagulation: 3 mg Wed and 2 mg AOD ([facility] AC pt)     Hospital Anticoagulation:  Consulting provider: Oliver  Start date: 9/14  Indication: A Fib - requiring full anticoagulation  Target INR: 2 - 3  Expected duration: life   Bridge Therapy: No      Potential food or drug interactions: no new    Education complete?/Date: No; plan for follow up TBD    Assessment/Plan:  Verified w/ rn pt has not taken their warfarin 9/13    Warfarin 3 mg wed and 2 mg all other days, first dose now  Monitor for any signs or symptoms of bleeding  Follow up daily INRs and dose adjustments    Pharmacy will continue to follow until discharge or discontinuation of warfarin.     Yuki Young [facility]  Clinical Pharmacist

## 2022-09-14 NOTE — PROGRESS NOTES
[facility] Medicine Services  PROGRESS NOTE    Patient Name: Claudia Arnold  : 1945  MRN: [id number]    Date of Admission: 2022  Primary Care Physician: Robert Cortez MD    Subjective   Subjective     CC:  Follow-up for recent palpitations and shortness of breath    HPI:  Palpitations and shortness of breath have improved.  Patient notes she is going to receive dialysis later today.  No current chest pain.  No other new events.    Review of Systems  No current fevers or chills  No current shortness of breath or cough  No current nausea, vomiting, or diarrhea    Objective   Objective     Vital Signs:   Temp:  [97.4 °F (36.3 °C)-97.5 °F (36.4 °C)] 97.5 °F (36.4 °C)  Heart Rate:  [] 103  Resp:  [16-18] 18  BP: ()/() 102/73        Physical Exam:  Constitutional:Awake, alert, chronically debilitated  HENT: NCAT, mucous membranes moist, neck supple  Respiratory: No cough, no wheezes, normal inspiration, nonlabored breathing  Cardiovascular: Irregular, borderline tachycardic, normal radial pulses  Gastrointestinal: Positive bowel sounds, soft, nontender, nondistended  Musculoskeletal: Elderly frail and chronically debilitated appearance, obese BMI is 37, positive for lower extremity edema  Psychiatric: Appropriate affect, cooperative, conversational  Neurologic: No slurred speech or facial droop, follows commands  Skin: No rashes or jaundice, warm      Results Reviewed:  Results from last 7 days   Lab Units 22  0451 22  1444   WBC 10*3/mm3 8.14 9.20   HEMOGLOBIN g/dL 12.1 13.6   HEMATOCRIT % 36.2 40.5   PLATELETS 10*3/mm3 157 196   INR  2.96* 2.96*     Results from last 7 days   Lab Units 22  0451 22  1824 22  1444   SODIUM mmol/L 141  --  141   POTASSIUM mmol/L 4.2  --  3.9   CHLORIDE mmol/L 96*  --  95*   CO2 mmol/L 26.5  --  23.4   BUN mg/dL 52*  --  45*   CREATININE mg/dL 4.66*  --  4.03*   GLUCOSE mg/dL 99  --  110*   CALCIUM mg/dL 9.6  --  10.1   ALT  (SGPT) U/L 102*  --  138*   AST (SGOT) U/L 53*  --  84*   TROPONIN T ng/mL 0.068* 0.053* 0.067*     Estimated Creatinine Clearance: 11.1 mL/min (A) (by C-G formula based on SCr of 4.66 mg/dL (H)).    Microbiology Results Abnormal     None          Imaging Results (Last 24 Hours)     Procedure Component Value Units Date/Time    XR Chest 2 View [[id number]] Collected: 09/13/22 1447     Updated: 09/13/22 1451    Narrative:      TWO-VIEW CHEST     HISTORY: Chest pain.     FINDINGS: There is moderate cardiomegaly with sternal wires from  previous cardiac surgery and this is unchanged from 12/06/2021. There is  very mild vascular congestion associated with probable tiny right  pleural effusion and this actually appears slightly improved since the  previous exam but remain suspicious for changes of borderline or mild  CHF.     This report was finalized on 9/13/2022 2:48 PM by Dr. Donny Delgado M.D.             Results for orders placed during the hospital encounter of 02/22/22    Adult Transthoracic Echo Complete w/ Color, Spectral and Contrast if Necessary Per Protocol    Interpretation Summary  · Left ventricular ejection fraction appears to be 61 - 65%. Left ventricular systolic function is normal.  · Left ventricular wall thickness is consistent with mild concentric hypertrophy.  · The right ventricular cavity is moderately dilated. Moderately reduced right ventricular systolic function noted.  · The left atrial cavity is severely dilated.  · The right atrial cavity is moderately dilated  · There is a Magna Pericardial Tissue Valve bioprosthetic aortic valve present. The aortic valve peak and mean gradients are elevated.  · Aortic valve maximum pressure gradient is 51 mmHg. Aortic valve mean pressure gradient is 24 mmHg.  · There is a 25 mm, porcine, Epic St Mike bioprosthetic mitral valve present. The mitral valve peak and mean gradients are elevated. The prosthetic mitral valve is normal.  · The mitral valve peak  gradient is 28 mmHg. The mitral valve mean gradient is 12 mmHg  · Moderate tricuspid valve regurgitation is present.  · Calculated right ventricular systolic pressure from tricuspid regurgitation is 52 mmHg.  · The aortic root is dilated at 4.4 cm  · There is no evidence of pericardial effusion.      I have reviewed the medications:  Scheduled Meds:acetaminophen, 650 mg, Oral, TID  busPIRone, 10 mg, Oral, BID  escitalopram, 10 mg, Oral, Daily  hydrocortisone, 25 mg, Rectal, BID  lactobacillus acidophilus, 1 capsule, Oral, QAM  metoprolol tartrate, 25 mg, Oral, Q12H  midodrine, 2.5 mg, Oral, TID AC  midodrine, 5 mg, Oral, TID AC  pantoprazole, 40 mg, Oral, Q AM  rOPINIRole, 0.25 mg, Oral, Nightly  sodium chloride, 10 mL, Intravenous, Q12H  warfarin, 2 mg, Oral, Once per day on Sun Mon Tue Thu Fri Sat  warfarin, 3 mg, Oral, Once per day on Wed      Continuous Infusions:dilTIAZem, 5-15 mg/hr, Last Rate: 5 mg/hr (09/14/22 1057)  Pharmacy to dose warfarin,       PRN Meds:.•  acetaminophen **OR** acetaminophen **OR** acetaminophen  •  albumin human  •  melatonin  •  nitroglycerin  •  ondansetron  •  Pharmacy to dose warfarin  •  sodium chloride  •  sodium chloride    Assessment & Plan   Assessment & Plan     Active Hospital Problems    Diagnosis  POA   • **Atrial fibrillation with RVR ([facility]) [I48.91]  Yes   • Rapid atrial fibrillation ([facility]) [I48.91]  Yes   • Elevated troponin [R77.8]  Unknown   • Hypotension [I95.9]  Unknown   • ESRD (end stage renal disease) ([facility]) [N18.6]  Yes   • OCTAVIANO (obstructive sleep apnea) [G47.33]  Yes   • Chronic diastolic CHF (congestive heart failure) ([facility]) [I50.32]  Yes   • S/P AVR (aortic valve replacement) [Z95.2]  Not Applicable   • S/P MVR (mitral valve replacement) [Z95.2]  Not Applicable   • Aortic valve stenosis [I35.0]  Yes      Resolved Hospital Problems   No resolved problems to display.        Brief Hospital Course to date:  Claudia Arnold is a 77 y.o. female presents the hospital with  atrial fibrillation with RVR and hypotension.    Atrial fibrillation:  Initially placed on diltiazem drip.  Cardiology following.  Electrophysiology.  Continue home cardiac medications as appropriate.    Elevated troponin:  Likely supply demand mismatch.  Also likely elevated due to renal disease.    End-stage renal disease:  Nephrology consulted.  Continue dialysis as indicated.    OCTAVIANO:  Nighttime oxygen as needed.    Pulmonary hypertension and chronic heart failure:  Volume management per dialysis.  Chest x-ray images reviewed and perhaps mild pulmonary edema.    History of aortic valve replacement and reportedly left atrial appendage ablation:  Chronically on warfarin.  Valvular heart issues noted.    Debility: PT      DVT Prophylaxis: Anticoagulated      Disposition: Pending    CODE STATUS:   Code Status and Medical Interventions:   Ordered at: 09/13/22 1756     Code Status (Patient has no pulse and is not breathing):    CPR (Attempt to Resuscitate)     Medical Interventions (Patient has pulse or is breathing):    Full Support       Avinash Be MD  09/14/22

## 2022-09-14 NOTE — PROGRESS NOTES
[facility] Progress Note      Patient Name: Claudia Arnold  : 1945  MRN: [id number]  Primary Care Physician:  Robert Cortez MD  Date of admission: 2022    Subjective     Interval History:   The patient was seen and examined today for follow-up on ESRD   Patient denies any chest pain or shortness of breath.  Continues to have A. fib with RVR.  Was given 1 dose of digoxin yesterday and she continues to be on Cardizem drip.    Review of Systems:   As noted above    Objective     Vitals:   Temp:  [97.4 °F (36.3 °C)] 97.4 °F (36.3 °C)  Heart Rate:  [] 110  Resp:  [16-18] 18  BP: ()/() 108/87    Intake/Output Summary (Last 24 hours) at 2022 0705  Last data filed at 2022 2241  Gross per 24 hour   Intake 0 ml   Output --   Net 0 ml       Physical Exam:    General Appearance: alert, oriented x 3, no acute distress   Skin: warm and dry  HEENT: oral mucosa normal, nonicteric sclera  Neck: supple, no JVD  Lungs: CTA  Heart: RRR, normal S1 and S2  Abdomen: soft, nontender, nondistended  : no palpable bladder  Extremities: no edema, cyanosis or clubbing  Neuro: normal speech and mental status     Scheduled Meds:     acetaminophen, 650 mg, Oral, TID  busPIRone, 10 mg, Oral, BID  escitalopram, 10 mg, Oral, Daily  hydrocortisone, 25 mg, Rectal, BID  lactobacillus acidophilus, 1 capsule, Oral, QAM  metoprolol tartrate, 25 mg, Oral, Q12H  midodrine, 2.5 mg, Oral, TID AC  midodrine, 5 mg, Oral, TID AC  pantoprazole, 40 mg, Oral, Q AM  rOPINIRole, 0.25 mg, Oral, Nightly  sodium chloride, 10 mL, Intravenous, Q12H  warfarin, 2 mg, Oral, Once per day on Sun Mon Tue Thu Fri Sat  warfarin, 3 mg, Oral, Once per day on Wed      IV Meds:   dilTIAZem, 5-15 mg/hr, Last Rate: 5 mg/hr (22 1620)  Pharmacy to dose warfarin,         Results Reviewed:   I have personally reviewed the results from the time of this admission to 2022 07:05 EDT     Results from last 7 days    Lab Units 09/14/22  0451 09/13/22  1444   SODIUM mmol/L 141 141   POTASSIUM mmol/L 4.2 3.9   CHLORIDE mmol/L 96* 95*   CO2 mmol/L 26.5 23.4   BUN mg/dL 52* 45*   CREATININE mg/dL 4.66* 4.03*   CALCIUM mg/dL 9.6 10.1   BILIRUBIN mg/dL 0.7 0.8   ALK PHOS U/L 71 87   ALT (SGPT) U/L 102* 138*   AST (SGOT) U/L 53* 84*   GLUCOSE mg/dL 99 110*       Estimated Creatinine Clearance: 11.1 mL/min (A) (by C-G formula based on SCr of 4.66 mg/dL (H)).                Results from last 7 days   Lab Units 09/14/22  0451 09/13/22  1444   WBC 10*3/mm3 8.14 9.20   HEMOGLOBIN g/dL 12.1 13.6   PLATELETS 10*3/mm3 157 196       Results from last 7 days   Lab Units 09/14/22  0451 09/13/22  1444   INR  2.96* 2.96*       Assessment / Plan     ASSESSMENT:    1. End-stage renal disease on maintenance hemodialysis on Monday Wednesday Friday.   chronic hypotension on midodrine  2. History of hypertension blood pressure is soft today on midodrine per cardiology  3. A. fib with RVR.  Cardiology consulted.  Contemplating cardioversion versus AV node ablation and pacemaker implantation.  Heart rate is better today  4. Diastolic heart failure  5. Volume overload  6. Hyperphosphatemia: Not on binders  7. High anion gap metabolic acidosis.  Likely secondary to chronic kidney disease.  We will check lactic acid  8. Bioprosthetic mitral and aortic valves on anticoagulation      PLAN:  · Will dialyze today per schedule and  attempt UF as tolerated.  · Continue surveillance labs      Thank you for involving us in the care of Claudia GABRIELLE Arnold.  Please feel free to call with any questions.    Chas Spicer MD  09/14/22  07:05 EDT    [facility]  [phone]      Much of this encounter note is an electronic transcription/translation of spoken language to printed text. The electronic translation of spoken language may permit erroneous, or at times, nonsensical words or phrases to be inadvertently transcribed; Although I have  reviewed the note for such errors, some may still exist.

## 2022-09-15 NOTE — PROGRESS NOTES
[facility] Clinical Pharmacy Services: Warfarin Dosing/Monitoring Consult    Claudia Arnold is a 77 y.o. female, estimated creatinine clearance is 15.1 mL/min (A) (by C-G formula based on SCr of 3.44 mg/dL (H)). weighing 96 kg (211 lb 10.3 oz).    Results from last 7 days   Lab Units 09/15/22  0535 09/14/22  0451 09/13/22  1444   INR  3.17* 2.96* 2.96*   HEMOGLOBIN g/dL 12.0 12.1 13.6   HEMATOCRIT % 34.8 36.2 40.5   PLATELETS 10*3/mm3 135* 157 196     Prior to admission anticoagulation: 3 mg Weds and 2 mg all other days    Hospital Anticoagulation:  Consulting provider: Gorge Boyd MD -admitting  Start date: [facility] med  Indication: A Fib - requiring full anticoagulation  Target INR: 2 - 3  Expected duration: lifetime   Bridge Therapy: No     Potential food or drug interactions: Digoxin may enhance the anticoagulant effect of Warfarin. - Received 2 dose  Heart failure - hepatic congestion can decrease warfarin metabolism    Education complete?/Date: No; plan for follow up TBD    Assessment/Plan:  Dose: hold dose today for elevated INR.  Monitor for any signs or symptoms of bleeding  Follow up daily INRs and dose adjustments    Pharmacy will continue to follow until discharge or discontinuation of warfarin.     Vick Zepeda [facility]  Clinical Pharmacist

## 2022-09-15 NOTE — CASE MANAGEMENT/SOCIAL WORK
Discharge Planning Assessment  [facility]     Patient Name: Claudia Arnold  MRN: [id number]  Today's Date: 9/15/2022    Admit Date: 9/13/2022     Discharge Needs Assessment     Row Name 09/15/22 1709       Living Environment    People in Home spouse    Current Living Arrangements home    Primary Care Provided by self;spouse/significant other    Provides Primary Care For no one, unable/limited ability to care for self    Family Caregiver if Needed spouse    Quality of Family Relationships unable to assess    Able to Return to Prior Arrangements yes       Resource/Environmental Concerns    Resource/Environmental Concerns none       Transition Planning    Patient/Family Anticipates Transition to home with family    Patient/Family Anticipated Services at Transition none    Transportation Anticipated other (see comments)       Discharge Needs Assessment    Readmission Within the Last 30 Days no previous admission in last 30 days    Equipment Currently Used at Home walker, rolling;hospital bed;wheelchair, motorized;shower chair;ramp;grab bar;other (see comments)  Gait belt    Concerns to be Addressed care coordination/care conferences;discharge planning    Anticipated Changes Related to Illness none    Equipment Needed After Discharge none    Outpatient/Agency/Support Group Needs outpatient hemodialysis    Provided Post Acute Provider List? Refused    Refused Provider List Comment Currently refusing HH    Provided Post Acute Provider Quality & Resource List? Refused    Current Discharge Risk chronically ill;dependent with mobility/activities of daily living               Discharge Plan     Row Name 09/15/22 4908       Plan    Plan Home with spouse and continue HD at [facility].  PT rec HH at D/C but pt refusing.    Patient/Family in Agreement with Plan yes    Plan Comments Met with pt. at bedside. Explained roll of . Face sheet and pharmacy verified. Pt lives with her spouse in a single story home  with a basement. There is a ramp to enter home.  DME equipment includes a walker, hospital bed, wheelchair, gait belt, shower chair, grab bars, and ramp.  Pt is requires some assist with ADLs. [facility] and has used [facility] in the past. PT recommending HH at D/C but pt. currently refusing HH. Pt’s PCP is Robert Cortez MD. Uses [facility] at [facility] and [facility] . Pt does not drive. States she uses [facility] for transport. Pt receives HD at [facility]. Explained that CCP would follow to assess for discharge needs.  Based on the interdisciplinary assessments, the recommended discharge plan is Home with spouse and HH but pt is currently refusing HH. Phani Lara RN-BC              Continued Care and Services - Admitted Since 9/13/2022    Coordination has not been started for this encounter.       Expected Discharge Date and Time     Expected Discharge Date Expected Discharge Time    Sep 16, 2022          Demographic Summary     Row Name 09/15/22 1707       General Information    Admission Type inpatient    Arrived From emergency department    Required Notices Provided Important Message from Medicare    Reason for Consult discharge planning    Preferred Language English               Functional Status     Row Name 09/15/22 1703       Functional Status    Usual Activity Tolerance fair    Current Activity Tolerance fair       Functional Status, IADL    Medications assistive equipment and person    Meal Preparation assistive equipment and person    Housekeeping assistive equipment and person    Laundry assistive equipment and person    Shopping assistive equipment and person       Mental Status    General Appearance WDL WDL       Mental Status Summary    Recent Changes in Mental Status/Cognitive Functioning no changes       Employment/    Employment Status retired                      Phani Lara RN

## 2022-09-15 NOTE — PROGRESS NOTES
[facility] Medicine Services  PROGRESS NOTE    Patient Name: Claudia Arnold  : 1945  MRN: [id number]    Date of Admission: 2022  Primary Care Physician: Robert Cortez MD    Subjective   Subjective     CC:  Follow-up for recent palpitations and shortness of breath    HPI:  Patient feels pretty well.  She is agreeable to try oral medication recommended by electrophysiology and hope she will be able to get off the diltiazem drip.  No current chest pain reported.  No palpitations or lightheadedness.        Review of Systems  No current fevers or chills  No current shortness of breath or cough  No current nausea, vomiting, or diarrhea    Objective   Objective     Vital Signs:   Temp:  [97.3 °F (36.3 °C)-98.1 °F (36.7 °C)] 97.8 °F (36.6 °C)  Heart Rate:  [] 83  Resp:  [16-20] 20  BP: ()/(57-91) 110/76        Physical Exam:  Constitutional:Awake, alert, chronically debilitated  HENT: NCAT, mucous membranes moist, neck supple  Respiratory: No cough, no wheezes, normal inspiration, nonlabored breathing  Cardiovascular: Irregular, now normal rate, normal radial pulses  Gastrointestinal: Positive bowel sounds, soft, nontender, nondistended  Musculoskeletal: Elderly frail and chronically debilitated appearance, obese BMI is 37, positive for lower extremity edema  Psychiatric: Appropriate affect, cooperative, conversational  Neurologic: No slurred speech or facial droop, follows commands  Skin: No rashes or jaundice, warm      Results Reviewed:  Results from last 7 days   Lab Units 09/15/22  0522  0451 22  1444   WBC 10*3/mm3 7.34 8.14 9.20   HEMOGLOBIN g/dL 12.0 12.1 13.6   HEMATOCRIT % 34.8 36.2 40.5   PLATELETS 10*3/mm3 135* 157 196   INR  3.17* 2.96* 2.96*     Results from last 7 days   Lab Units 09/15/22  0535 221 22  1824 22  1444   SODIUM mmol/L 144 141  --  141   POTASSIUM mmol/L 4.1 4.2  --  3.9   CHLORIDE mmol/L 104 96*  --  95*   CO2 mmol/L 25.0  26.5  --  23.4   BUN mg/dL 27* 52*  --  45*   CREATININE mg/dL 3.44* 4.66*  --  4.03*   GLUCOSE mg/dL 104* 99  --  110*   CALCIUM mg/dL 9.1 9.6  --  10.1   ALT (SGPT) U/L  --  102*  --  138*   AST (SGOT) U/L  --  53*  --  84*   TROPONIN T ng/mL  --  0.068* 0.053* 0.067*     Estimated Creatinine Clearance: 15.1 mL/min (A) (by C-G formula based on SCr of 3.44 mg/dL (H)).    Microbiology Results Abnormal     None          Imaging Results (Last 24 Hours)     ** No results found for the last 24 hours. **          Results for orders placed during the hospital encounter of 02/22/22    Adult Transthoracic Echo Complete w/ Color, Spectral and Contrast if Necessary Per Protocol    Interpretation Summary  · Left ventricular ejection fraction appears to be 61 - 65%. Left ventricular systolic function is normal.  · Left ventricular wall thickness is consistent with mild concentric hypertrophy.  · The right ventricular cavity is moderately dilated. Moderately reduced right ventricular systolic function noted.  · The left atrial cavity is severely dilated.  · The right atrial cavity is moderately dilated  · There is a Magna Pericardial Tissue Valve bioprosthetic aortic valve present. The aortic valve peak and mean gradients are elevated.  · Aortic valve maximum pressure gradient is 51 mmHg. Aortic valve mean pressure gradient is 24 mmHg.  · There is a 25 mm, porcine, [employer] bioprosthetic mitral valve present. The mitral valve peak and mean gradients are elevated. The prosthetic mitral valve is normal.  · The mitral valve peak gradient is 28 mmHg. The mitral valve mean gradient is 12 mmHg  · Moderate tricuspid valve regurgitation is present.  · Calculated right ventricular systolic pressure from tricuspid regurgitation is 52 mmHg.  · The aortic root is dilated at 4.4 cm  · There is no evidence of pericardial effusion.      I have reviewed the medications:  Scheduled Meds:acetaminophen, 650 mg, Oral, TID  busPIRone, 10 mg,  Oral, BID  dilTIAZem, 30 mg, Oral, Q8H  escitalopram, 10 mg, Oral, Daily  hydrocortisone, 25 mg, Rectal, BID  lactobacillus acidophilus, 1 capsule, Oral, QAM  metoprolol tartrate, 25 mg, Oral, Q12H  midodrine, 7.5 mg, Oral, TID AC  pantoprazole, 40 mg, Oral, Q AM  rOPINIRole, 0.25 mg, Oral, Nightly  sodium chloride, 10 mL, Intravenous, Q12H      Continuous Infusions:dilTIAZem, 5-15 mg/hr, Last Rate: 5 mg/hr (09/14/22 7115)  Pharmacy to dose warfarin,       PRN Meds:.•  acetaminophen **OR** acetaminophen **OR** acetaminophen  •  melatonin  •  nitroglycerin  •  ondansetron  •  Pharmacy to dose warfarin  •  sodium chloride  •  sodium chloride    Assessment & Plan   Assessment & Plan     Active Hospital Problems    Diagnosis  POA   • **Atrial fibrillation with RVR ([facility]) [I48.91]  Yes   • Elevated troponin [R77.8]  Yes   • Hypotension [I95.9]  Yes   • ESRD (end stage renal disease) ([facility]) [N18.6]  Yes   • OCTAVIANO (obstructive sleep apnea) [G47.33]  Yes   • Chronic diastolic CHF (congestive heart failure) ([facility]) [I50.32]  Yes   • S/P AVR (aortic valve replacement) [Z95.2]  Not Applicable   • Pulmonary hypertension ([facility]) [I27.20]  Yes   • Paroxysmal atrial fibrillation ([facility]) [I48.0]  Yes   • S/P MVR (mitral valve replacement) [Z95.2]  Not Applicable   • Aortic valve stenosis [I35.0]  Yes      Resolved Hospital Problems   No resolved problems to display.        Brief Hospital Course to date:  Claudia Arnold is a 77 y.o. female presents the hospital with atrial fibrillation with RVR and hypotension.    Discussion/plan for today:  Oral diltiazem has been started.  We will try to titrate off of the IV diltiazem drip.  Monitor blood pressure carefully to see if she tolerates blood pressure medicine.  INR is slightly elevated today.  Plan discussed with pharmacist and adjust dosing    Atrial fibrillation:  Initially placed on diltiazem drip transition to oral.  Cardiology following.  Electrophysiology.  Continue home cardiac  medications as appropriate.    Elevated troponin:  Likely supply demand mismatch.  Also likely elevated due to renal disease.    End-stage renal disease:  Nephrology consulted.  Continue dialysis as indicated.    OCTAVIANO:  Nighttime oxygen as needed.    Pulmonary hypertension and chronic heart failure:  Volume management per dialysis.  Chest x-ray images reviewed and perhaps mild pulmonary edema.    History of aortic valve replacement and reportedly left atrial appendage ablation:  Chronically on warfarin.  Valvular heart issues noted.    Debility: PT      DVT Prophylaxis: Anticoagulated      Disposition: Pending    CODE STATUS:   Code Status and Medical Interventions:   Ordered at: 09/13/22 1756     Code Status (Patient has no pulse and is not breathing):    CPR (Attempt to Resuscitate)     Medical Interventions (Patient has pulse or is breathing):    Full Support       Avinash Be MD  09/15/22

## 2022-09-15 NOTE — PLAN OF CARE
Goal Outcome Evaluation:            Pt  continues on Cardizem drip,parameters to d/c not met.Still Afib,HR much improved,sustaining below 100 bpm.Pt reports feeling much better,to see Electrophysiology today.Pain mgt effective with tylenol.No acute events overnight.WCTM.

## 2022-09-15 NOTE — PROGRESS NOTES
LOS: 2 days   Patient Care Team:  Robert Cortez MD as PCP - General (Family Medicine)  Deborah Agudelo MD as Surgeon (Orthopedic Surgery)  Scott Segura MD as Consulting Physician (Cardiology)  Scar Gaxiola MD as Surgeon (Cardiothoracic Surgery)  Kathy Osuna MD as Consulting Physician (Nephrology)  Dora Astorga APRN as Nurse Practitioner (Neurosurgery)  Satish Stahl MD as Consulting Physician (Vascular Surgery)  Scar Gaxiola MD as Surgeon (Cardiothoracic Surgery)  Kathy Osuna MD as Consulting Physician (Nephrology)    Chief Complaint: Follow-up persistent atrial fibrillation with RVR, hypotension, tissue aortic valve replacement, tissue mitral valve replacement.    Interval History: Rate is better, and blood pressure is still tenuous.  No shortness of breath or chest pain.  No acute events.    Vital Signs:  Temp:  [97.8 °F (36.6 °C)-98.1 °F (36.7 °C)] 98 °F (36.7 °C)  Heart Rate:  [] 87  Resp:  [18-20] 20  BP: ()/(57-91) 105/80    Intake/Output Summary (Last 24 hours) at 9/15/2022 1556  Last data filed at 9/15/2022 1427  Gross per 24 hour   Intake 550 ml   Output 2000 ml   Net -1450 ml       Physical Exam:   General Appearance:    No acute distress, alert and oriented x4   Lungs:     Clear to auscultation bilaterally     Heart:    Irregularly irregular rhythm with a normal rate. II/VI SM throughout.   Abdomen:     Soft, nontender, nondistended.    Extremities:   1-2+ edema.     Results Review:    Results from last 7 days   Lab Units 09/15/22  0535   SODIUM mmol/L 144   POTASSIUM mmol/L 4.1   CHLORIDE mmol/L 104   CO2 mmol/L 25.0   BUN mg/dL 27*   CREATININE mg/dL 3.44*   GLUCOSE mg/dL 104*   CALCIUM mg/dL 9.1     Results from last 7 days   Lab Units 09/14/22  0451 09/13/22  1824 09/13/22  1444   TROPONIN T ng/mL 0.068* 0.053* 0.067*     Results from last 7 days   Lab Units 09/15/22  0535   WBC 10*3/mm3 7.34   HEMOGLOBIN g/dL 12.0   HEMATOCRIT  % 34.8   PLATELETS 10*3/mm3 135*     Results from last 7 days   Lab Units 09/15/22  0535 09/14/22  0451 09/13/22  1444   INR  3.17* 2.96* 2.96*                   I reviewed the patient's new clinical results.        Assessment:  1.  Paroxysmal atrial fibrillation with RVR  2.  Baseline hypotension, on midodrine  3.  End-stage renal disease, on hemodialysis  4.  Status post tissue AVR, tissue MVR, and tricuspid valve repair on 1/28/2019 by Dr. Gaxiola  5.  Status post left cryo maze and left atrial appendage ligation on 1/28/2019  6.  Oliguric acute kidney injury post cardiac surgery, resulting in ESRD  7.  Bifascicular block (right bundle branch block and left anterior fascicular block)  8.  Chronic lower extremity edema  9.  Chronic diastolic CHF  10.  Chronic anticoagulation with warfarin  11.  Troponin elevation, secondary to end-stage renal disease (not ischemic)  12.  Moderately enlarged and moderately reduced right ventricular function    Plan:  -Discussed with EP -stop Cardizem drip.  Try oral Cardizem 30 mg 3 times a day along with the metoprolol 25 mg twice a day.  I increased her midodrine to 10 mg 3 times a day to allow for more blood pressure.  If this does not work over the next several days, she will likely need a pacemaker placement and AV node ablation.  The key will be to see if she can tolerate dialysis with this regimen.    -Continue warfarin.  INR therapeutic.    Scott Segura MD  09/15/22  15:56 EDT

## 2022-09-15 NOTE — THERAPY EVALUATION
Acute Care - Physical Therapy Initial Evaluation  [facility]     Patient Name: Claudia Arnold  : 1945  MRN: [id number]  Today's Date: 9/15/2022   Onset of Illness/Injury or Date of Surgery: 22  Visit Dx:     ICD-10-CM ICD-9-CM   1. Rapid atrial fibrillation ([facility])  I48.91 427.31   2. ESRD (end stage renal disease) on dialysis ([facility])  N18.6 585.6    Z99.2 V45.11   3. Generalized weakness  R53.1 780.79     Patient Active Problem List   Diagnosis   • Tear of rotator cuff   • Hypertension   • Generalized anxiety disorder   • Hyperlipidemia   • IFG (impaired fasting glucose)   • Heart murmur, systolic   • Shoulder pain, bilateral   • Pain of both shoulder joints   • Chronic pain of both shoulders   • Acute congestive heart failure ([facility])   • Obesity, morbid, BMI 50 or higher ([facility])   • Fungal skin infection   • Cellulitis of lower extremity   • Aortic valve stenosis   • Tricuspid valve insufficiency   • Mitral valve stenosis   • S/P MVR (mitral valve replacement)   • Paroxysmal atrial fibrillation ([facility])   • Pulmonary hypertension ([facility])   • ESRD (end stage renal disease) ([facility])   • Gastroesophageal reflux disease without esophagitis   • Chronic idiopathic constipation   • Dependence on renal dialysis ([facility])   • Chronic kidney disease, stage 2 (mild)   • Renovascular hypertension   • GI bleed   • 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 ([facility])   • CAD (coronary artery disease)   • S/P AVR (aortic valve replacement)   • Chronic diastolic CHF (congestive heart failure) ([facility])   • Chronic pain of both shoulders   • Gastrointestinal hemorrhage   • Gastrointestinal hemorrhage with melena   • Immobility syndrome   • Atrial fibrillation with RVR ([facility])   • OCTAVIANO (obstructive sleep apnea)   • Anemia   • ESRD (end stage renal disease) ([facility])   • Elevated troponin   • Hypotension     Past Medical History:   Diagnosis Date   • A-fib ([facility])     Meds   • Arthritis     w/Difficult Mobility   • Bacterial infection  03/2020   • Bifascicular block    • Bilateral lower extremity edema     Legs   • CAD (coronary artery disease)     Affecting LAD    • Cardiomyopathy ([facility])    • CHF (congestive heart failure) ([facility])     CHRONIC, DIASTOLIC   • Chronic fatigue    • Chronic kidney disease    • Cystocele, midline 09/2019   • Dialysis patient ([facility])     TUESDAY THURSDAY SATURDAY   • Edema 06/2021   • End stage renal failure on dialysis ([facility])     FOLLOWED BY DR. COLLINS SPANGLER   • Gastrointestinal hemorrhage 01/11/2021    ADMITTED TO [facility]   • Generalized anxiety disorder    • GERD (gastroesophageal reflux disease)    • Glenohumeral arthritis 04/2021   • Hematochezia 08/08/2021    ADMITTED TO [facility]   • Hemorrhoids    • Hernia, inguinal     Hx Repair   • History of echocardiogram 1/17/19-[facility]    The L Ventricular Cavity is Mild-to-Moderately Dilated; Left Ventricular Wall Thickness is Consistent w/Mild Concentric Hypertrophy; Left Atrial Cavity Size is Moderate-to-Severely Dilated; Severe AVS; Severe MVS; Moderate TVR Noted   • History of transfusion     no adverse reaction   • Hyperlipidemia     Controlled w/Meds   • Hypertension     Controlled w/Meds   • Hypokalemia    • Hypokinesis 01/22/2019    Apical Noted on Cardiac Cath   • IFG (impaired fasting glucose)    • Immobility syndrome    • Iron deficiency anemia    • LAD stenosis 01/22/2019    Mid LAD Irregularities Noted on Cardiac Cath    • Left atrial dilatation 01/17/2019    Moderate-Severe Noted on Echo   • Left ventricular dilatation 01/17/2019    Noted on Echo/LEE   • Lumbar spondylosis    • Lumbar stenosis    • Mild concentric left ventricular hypertrophy 01/17/2019    Noted on Echo/LEE   • Mitral annular calcification 01/17/2019    Severe Noted on Echo   • Moderate tricuspid valve regurgitation 01/24/2019    Noted on LEE   • Morbid obesity ([facility])    • Nonrheumatic tricuspid valve regurgitation    • NSTEMI (non-ST elevated myocardial infarction) ([facility])    • OCTAVIANO (obstructive sleep apnea)     • Osteoarthritis    • Osteoarthritis of shoulder    • PAF (paroxysmal atrial fibrillation) ([facility])    • PNA (pneumonia)    • Pulmonary hypertension ([facility]) 01/22/2019    Noted on Cardiac Cath   • Right bundle branch block 01/24/2019    Noted on LEE   • Right ventricular enlargement 06/2021   • Rotator cuff tear 04/2016   • Secondary hyperparathyroidism ([facility])    • Severe aortic stenosis 01/22/2019    Noted on Cardiac Cath & LEE on 01/24/19; S/p AVR on 01/28/19 by Dr. Gaxiola   • Severe mitral valve stenosis 01/24/2019    Noted on LEE; S/p MVR on 01/28/19 by Dr. Gaxiola   • Severe muscle deconditioning    • Shoulder pain, bilateral    • Skin yeast infection     Folds Under Skin--Nystatin/Diflucan   • Tinea unguium 10/2020    BILATERAL   • Ulcer of toe ([facility])     RIGHT FOOT   • Urge incontinence    • Vaginal atrophy    • Venous insufficiency    • Vulvar cyst 09/2019     Past Surgical History:   Procedure Laterality Date   • AORTIC VALVE REPAIR/REPLACEMENT MITRAL VALVE REPAIR/REPLACEMENT N/A 1/28/2019    Procedure: LEE STERNOTOMY AORTIC VALVE REPLACEMENT, MITRAL VALVE REPLACEMENT, TRICUSPID VALVE REPAIR, MAZE PROCEDURE, CLOSURE OF LEFT ATRIAL APPENDAGE  AND PRP;  Surgeon: Scar Gaxiola MD;  Location: [facility] OR;  Service: Cardiothoracic   • ARTERIOVENOUS FISTULA/SHUNT SURGERY Left 6/26/2019    Procedure: LEFT ARM BRACHIAL CEPHALIC FISTULA;  Surgeon: Satish Stahl MD;  Location: [facility] MAIN OR;  Service: Vascular   • CARDIAC CATHETERIZATION N/A 1/22/2019    Procedure: Coronary angiography;  Surgeon: Rick Talavera MD;  Location: [facility] INVASIVE LOCATION;  Service: Cardiology   • CARDIAC CATHETERIZATION N/A 1/22/2019    Procedure: Left Heart Cath;  Surgeon: Rick Talavera MD;  Location: [facility] CATH INVASIVE LOCATION;  Service: Cardiology   • CARDIAC CATHETERIZATION N/A 1/22/2019    Procedure: Right Heart Cath;  Surgeon: Rick Talavera MD;  Location: [facility] INVASIVE LOCATION;   Service: Cardiology   • CARDIAC CATHETERIZATION N/A 1/22/2019    Procedure: Left ventriculography;  Surgeon: Rick Talavera MD;  Location: [facility] CATH INVASIVE LOCATION;  Service: Cardiology   • CARDIAC SURGERY     • COLONOSCOPY N/A 1/16/2021    MULTIPLE DIVERTICULA, TORTUOUS COLON, HEMORRHOIDS, DR. JOSE TRAN AT [place]   • COLONOSCOPY N/A 12/9/2021    Procedure: COLONOSCOPY TO CECUM WITH APC TO RIGHT COLON AVM;  Surgeon: Shiloh Pop MD;  Location: [facility] ENDOSCOPY;  Service: Gastroenterology;  Laterality: N/A;   PRE OP-ANEMIA  POST OP - AVM RIGHT COLON, DIVERTICULOSIS, HEMORRHOIDS   • ENDOSCOPY N/A 1/16/2021    DUODENITIS, GASTRITIS, BENIGN DUOFENAL POLYP, DR. JOSE TRAN AT [facility]   • ENDOSCOPY N/A 12/9/2021    Procedure: ESOPHAGOGASTRODUODENOSCOPY  WITH BIOPSIES;  Surgeon: Shiloh Pop MD;  Location: [facility] ENDOSCOPY;  Service: Gastroenterology;  Laterality: N/A;  PRE OP - ANEMIA  POST OP - ABN DUODENAL MUCOSA, GASTRITIS, HIATAL HERNIA   • HEMORRHOIDECTOMY N/A 8/12/2021    Procedure: HEMORRHOIDECTOMY x2;  Surgeon: Pennie Rider MD;  Location: [facility] MAIN OR;  Service: General;  Laterality: N/A;   • HERNIA REPAIR Left     INGUINAL   • INSERT / REPLACE / VENOUS ACCESS CATHETER N/A 03/11/2020    TUNNEL CATHETER REMOVAL, DR. SU MICHELLE   • INSERTION HEMODIALYSIS CATHETER N/A 2/8/2019    Procedure: tunnel CATHETER PLACEMENT WITH FLUROSCOPY;  Surgeon: Satish Stahl MD;  Location: [facility] MAIN OR;  Service: Vascular   • REPLACEMENT TOTAL KNEE Left 2007   • TOTAL KNEE ARTHROPLASTY Right 2009     PT Assessment (last 12 hours)     PT Evaluation and Treatment     Row Name 09/15/22 1454          Physical Therapy Time and Intention    Subjective Information no complaints  -LH     Document Type evaluation  -     Mode of Treatment individual therapy;physical therapy  -LH     Patient Effort excellent  -LH     Symptoms Noted During/After Treatment dizziness  pt reports some  "dizziness at rest due to \"new medicine\" RN bedside and aware  -     Row Name 09/15/22 1454          General Information    Patient Profile Reviewed yes  -     Onset of Illness/Injury or Date of Surgery 09/13/22  -     Patient Observations alert;cooperative;agree to therapy  -     General Observations of Patient pt supine in bed no acute distress  -     Prior Level of Function independent:  -     Equipment Currently Used at Home wheelchair;walker, rolling  -     Pertinent History of Current Functional Problem ESRD, afib  -     Existing Precautions/Restrictions fall  -     Risks Reviewed patient:  -     Benefits Reviewed patient:  -     Row Name 09/15/22 1454          Living Environment    Current Living Arrangements home  -     Home Accessibility wheelchair accessible  ramp  -     People in Home spouse  -     Row Name 09/15/22 1454          Home Use of Assistive/Adaptive Equipment    Equipment Currently Used at Home wheelchair;walker, rolling  -     Row Name 09/15/22 1454          Pain    Pretreatment Pain Rating 0/10 - no pain  -     Posttreatment Pain Rating 0/10 - no pain  -[facility] Name 09/15/22 1454          Cognition    Affect/Mental Status (Cognition) WNL  -     Orientation Status (Cognition) oriented x 3  -[facility] Name 09/15/22 1454          Range of Motion Comprehensive    General Range of Motion bilateral lower extremity ROM WFL  -[facility] Name 09/15/22 1454          Strength Comprehensive (MMT)    Comment, General Manual Muscle Testing (MMT) Assessment BLEs grossly at least 3/5 except R great toe limited  -[facility] Name 09/15/22 1454          Bed Mobility    Bed Mobility supine-sit  -     Supine-Sit [place] (Bed Mobility) standby assist;verbal cues  -     Assistive Device (Bed Mobility) bed rails  -     Row Name 09/15/22 1454          Transfers    Transfers stand-sit transfer;sit-stand transfer;bed-chair transfer  -     Bed-Chair [place] " (Transfers) standby assist;contact guard;verbal cues  -     Assistive Device (Bed-Chair Transfers) walker, front-wheeled  -     Sit-Stand [place] (Transfers) standby assist;contact guard;verbal cues  -     Stand-Sit [place] (Transfers) standby assist;contact guard;verbal cues  -     Row Name 09/15/22 1454          Sit-Stand Transfer    Assistive Device (Sit-Stand Transfers) walker, front-wheeled  -     Row Name 09/15/22 1454          Stand-Sit Transfer    Assistive Device (Stand-Sit Transfers) walker, front-wheeled  -[facility] Name 09/15/22 1454          Gait/Stairs (Locomotion)    [place] Level (Gait) standby assist;contact guard;verbal cues  -     Assistive Device (Gait) walker, front-wheeled  -     Distance in Feet (Gait) 3  -     Pattern (Gait) step-to  -     Deviations/Abnormal Patterns (Gait) shine decreased  -     Bilateral Gait Deviations forward flexed posture;heel strike decreased  -     Comment, (Gait/Stairs) several steps bed to chair  -     Row Name 09/15/22 1454          Safety Issues, Functional Mobility    Impairments Affecting Function (Mobility) endurance/activity tolerance  -[facility] Name 09/15/22 1454          Balance    Balance Assessment sitting static balance;standing static balance  -     Static Sitting Balance independent  -     Position, Sitting Balance unsupported;sitting edge of bed  -     Static Standing Balance standby assist  -     Position/Device Used, Standing Balance supported;walker, front-wheeled  -     Row Name 09/15/22 1454          Motor Skills    Therapeutic Exercise --  APs, LAQs, MIP x 10  -     Row Name 09/15/22 1454          Plan of Care Review    Plan of Care Reviewed With patient  -     Outcome Evaluation Pt 78 yo female admitted w dyspnea, tachycardia, noted afib, ESRD. Pt baseline ambulatory w walker, uses WC for community and HD, spouse assist. On PT exam, Pt pivoted OOB SBA rwx, increased time to complete  tasks. Pt may benefit from skilled PT acutely to address functional deficits, anticipate HHC at [place], pt has all appropriate DME.  -     Row Name 09/15/22 1454          Positioning and Restraints    Pre-Treatment Position in bed  -     Post Treatment Position chair  -     In Chair reclined;call light within reach;encouraged to call for assist;exit alarm on  -[facility] Name 09/15/22 1454          Therapy Assessment/Plan (PT)    Rehab Potential (PT) good, to achieve stated therapy goals  -     Criteria for Skilled Interventions Met (PT) yes  -     Therapy Frequency (PT) 6 times/wk  -[facility] Name 09/15/22 1454          PT Evaluation Complexity    History, PT Evaluation Complexity 1-2 personal factors and/or comorbidities  -     Examination of Body Systems (PT Eval Complexity) total of 3 or more elements  -     Clinical Presentation (PT Evaluation Complexity) evolving  -     Clinical Decision Making (PT Evaluation Complexity) moderate complexity  -     Overall Complexity (PT Evaluation Complexity) moderate complexity  -[facility] Name 09/15/22 1454          Physical Therapy Goals    Bed Mobility Goal Selection (PT) bed mobility, PT goal 1  -     Transfer Goal Selection (PT) transfer, PT goal 1  [facility]     Gait Training Goal Selection (PT) gait training, PT goal 1  -[facility] Name 09/15/22 1454          Bed Mobility Goal 1 (PT)    Activity/Assistive Device (Bed Mobility Goal 1, PT) bed mobility activities, all  -     [place] Level/Cues Needed (Bed Mobility Goal 1, PT) modified independence  -     Time Frame (Bed Mobility Goal 1, PT) 1 week  -[facility] Name 09/15/22 1454          Transfer Goal 1 (PT)    Activity/Assistive Device (Transfer Goal 1, PT) sit-to-stand/stand-to-sit;bed-to-chair/chair-to-bed;walker, rolling  -     [place] Level/Cues Needed (Transfer Goal 1, PT) modified independence  -     Time Frame (Transfer Goal 1, PT) 1 week  -[facility] Name 09/15/22 1454          Gait  Training Goal 1 (PT)    Activity/Assistive Device (Gait Training Goal 1, PT) assistive device use;walker, rolling  -     [place] Level (Gait Training Goal 1, PT) modified independence  -     Distance (Gait Training Goal 1, PT) 60  -     Time Frame (Gait Training Goal 1, PT) 1 week  -           User Key  (r) = Recorded By, (t) = Taken By, (c) = Cosigned By    Initials Name Provider Type     Dora Suazo, PT Physical Therapist                Physical Therapy Education                 Title: PT OT SLP Therapies (In Progress)     Topic: Physical Therapy (In Progress)     Point: Mobility training (In Progress)     Learning Progress Summary           Patient Acceptance, E, NR by  at 9/15/2022 1503                   Point: Home exercise program (In Progress)     Learning Progress Summary           Patient Acceptance, E, NR by  at 9/15/2022 1503                   Point: Body mechanics (In Progress)     Learning Progress Summary           Patient Acceptance, E, NR by  at 9/15/2022 1503                   Point: Precautions (In Progress)     Learning Progress Summary           Patient Acceptance, E, NR by  at 9/15/2022 1503                               User Key     Initials Effective Dates Name Provider Type Discipline     06/16/21 -  Dora Suazo, PT Physical Therapist PT              PT Recommendation and Plan  Anticipated Discharge Disposition (PT): home with assist, home with home health  Planned Therapy Interventions (PT): balance training, bed mobility training, gait training, home exercise program, ROM (range of motion), stair training, strengthening, stretching, transfer training  Therapy Frequency (PT): 6 times/wk  Plan of Care Reviewed With: patient  Outcome Evaluation: Pt 78 yo female admitted w dyspnea, tachycardia, noted afib, ESRD. Pt baseline ambulatory w walker, uses WC for community and HD, spouse assist. On PT exam, Pt pivoted OOB SBA rwx, increased time to complete tasks. Pt may  benefit from skilled PT acutely to address functional deficits, anticipate HHC at [place], pt has all appropriate DME.   Outcome Measures     Row Name 09/15/22 1500             How much help from another person do you currently need...    Turning from your back to your side while in flat bed without using bedrails? 4  -LH      Moving from lying on back to sitting on the side of a flat bed without bedrails? 4  -LH      Moving to and from a bed to a chair (including a wheelchair)? 3  -LH      Standing up from a chair using your arms (e.g., wheelchair, bedside chair)? 3  -LH      Climbing 3-5 steps with a railing? 2  -LH      To walk in hospital room? 3  -      AM-PAC 6 Clicks Score (PT) 19  -              Functional Assessment    Outcome Measure Options AM-PAC 6 Clicks Basic Mobility (PT)  -            User Key  (r) = Recorded By, (t) = Taken By, (c) = Cosigned By    Initials Name Provider Type     Dora Suazo, PT Physical Therapist                 Time Calculation:    PT Charges     Row Name 09/15/22 1504             Time Calculation    Start Time 1356  -      Stop Time 1420  -      Time Calculation (min) 24 min  -      PT Received On 09/15/22  -      PT - Next Appointment 09/16/22  -      PT Goal Re-Cert Due Date 09/22/22  -              Time Calculation- PT    Total Timed Code Minutes- PT 15 minute(s)  -            User Key  (r) = Recorded By, (t) = Taken By, (c) = Cosigned By    Initials Name Provider Type     Dora Suazo, PT Physical Therapist              Therapy Charges for Today     Code Description Service Date Service Provider Modifiers Qty    77531809794 HC PT EVAL MOD COMPLEXITY 2 9/15/2022 Dora Suazo, PT GP 1    08200940369 HC PT THER PROC EA 15 MIN 9/15/2022 Dora Suazo, PT GP 1          PT G-Codes  Outcome Measure Options: AM-PAC 6 Clicks Basic Mobility (PT)  AM-PAC 6 Clicks Score (PT): 19    Dora Suazo PT  9/15/2022

## 2022-09-15 NOTE — CASE MANAGEMENT/SOCIAL WORK
Continued Stay Note  [facility]     Patient Name: Claudia Arnold  MRN: [id number]  Today's Date: 9/15/2022    Admit Date: 9/13/2022     Discharge Plan     Row Name 09/15/22 1712       Plan    Plan Home with spouse and continue HD at [facility].  PT rec HH at D/C but pt refusing.    Patient/Family in Agreement with Plan yes    Plan Comments Met with pt. at bedside. Explained roll of . Face sheet and pharmacy verified. Pt lives with her spouse in a single story home with a basement. There is a ramp to enter home.  DME equipment includes a walker, hospital bed, wheelchair, gait belt, shower chair, grab bars, and ramp.  Pt is requires some assist with ADLs. Pt has been to [facility] and has used [facility] in the past. PT recommending HH at D/C but pt. currently refusing HH. Pt’s PCP is Robert Cortez MD. Uses [facility] at [facility] and [facility] . Pt does not drive. States she uses [facility] for transport. Pt receives HD at [facility]. Explained that CCP would follow to assess for discharge needs.  Based on the interdisciplinary assessments, the recommended discharge plan is Home with spouse and HH but pt is currently refusing HH. Phani Lara RN-BC               Discharge Codes    No documentation.               Expected Discharge Date and Time     Expected Discharge Date Expected Discharge Time    Sep 16, 2022             Phani Lara RN

## 2022-09-15 NOTE — PROGRESS NOTES
[facility] Progress Note      Patient Name: Claudia Arnold  : 1945  MRN: [id number]  Primary Care Physician:  Robert Cortez MD  Date of admission: 2022    Subjective     Interval History:   The patient was seen and examined today for follow-up on ESRD   Patient seen doing well today.  Seems that her heart rate has been better controlled.  Blood pressure has improved  Dialyzed yesterday with no reported problems  Review of Systems:   As noted above    Objective     Vitals:   Temp:  [97.3 °F (36.3 °C)-98.1 °F (36.7 °C)] 97.8 °F (36.6 °C)  Heart Rate:  [] 83  Resp:  [16-20] 20  BP: ()/(57-91) 110/76    Intake/Output Summary (Last 24 hours) at 9/15/2022 0945  Last data filed at 9/15/2022 0500  Gross per 24 hour   Intake 120 ml   Output 2000 ml   Net -1880 ml       Physical Exam:    General Appearance: alert, oriented x 3, no acute distress   Skin: warm and dry  HEENT: oral mucosa normal, nonicteric sclera  Neck: supple, no JVD  Lungs: CTA  Heart: Irregular rhythm, normal S1 and S2  Abdomen: soft, nontender, nondistended  : no palpable bladder  Extremities: Trace edema, cyanosis or clubbing  Neuro: normal speech and mental status     Scheduled Meds:     acetaminophen, 650 mg, Oral, TID  busPIRone, 10 mg, Oral, BID  escitalopram, 10 mg, Oral, Daily  hydrocortisone, 25 mg, Rectal, BID  lactobacillus acidophilus, 1 capsule, Oral, QAM  metoprolol tartrate, 25 mg, Oral, Q12H  midodrine, 7.5 mg, Oral, TID AC  pantoprazole, 40 mg, Oral, Q AM  rOPINIRole, 0.25 mg, Oral, Nightly  sodium chloride, 10 mL, Intravenous, Q12H      IV Meds:   dilTIAZem, 5-15 mg/hr, Last Rate: 5 mg/hr (22 0135)  Pharmacy to dose warfarin,         Results Reviewed:   I have personally reviewed the results from the time of this admission to 9/15/2022 09:45 EDT     Results from last 7 days   Lab Units 09/15/22  0535 22  0451 22  1444   SODIUM mmol/L 144 141 141   POTASSIUM mmol/L  4.1 4.2 3.9   CHLORIDE mmol/L 104 96* 95*   CO2 mmol/L 25.0 26.5 23.4   BUN mg/dL 27* 52* 45*   CREATININE mg/dL 3.44* 4.66* 4.03*   CALCIUM mg/dL 9.1 9.6 10.1   BILIRUBIN mg/dL  --  0.7 0.8   ALK PHOS U/L  --  71 87   ALT (SGPT) U/L  --  102* 138*   AST (SGOT) U/L  --  53* 84*   GLUCOSE mg/dL 104* 99 110*       Estimated Creatinine Clearance: 15.1 mL/min (A) (by C-G formula based on SCr of 3.44 mg/dL (H)).                Results from last 7 days   Lab Units 09/15/22  0535 09/14/22  0451 09/13/22  1444   WBC 10*3/mm3 7.34 8.14 9.20   HEMOGLOBIN g/dL 12.0 12.1 13.6   PLATELETS 10*3/mm3 135* 157 196       Results from last 7 days   Lab Units 09/15/22  0535 09/14/22  0451 09/13/22  1444   INR  3.17* 2.96* 2.96*       Assessment / Plan     ASSESSMENT:    1. End-stage renal disease on maintenance hemodialysis on Monday Wednesday Friday.   chronic hypotension on midodrine  2. History of hypertension blood pressure is soft today on midodrine per cardiology  3. A. fib with RVR.  Cardiology consulted.  Contemplating cardioversion versus AV node ablation and pacemaker implantation.  Heart rate is better today  4. Diastolic heart failure  5. Volume overload  6. Hyperphosphatemia: Not on binders  7. High anion gap metabolic acidosis.  Likely secondary to chronic kidney disease.  resolved   8. Bioprosthetic mitral and aortic valves on anticoagulation      PLAN:  · Will dialyze  per schedule and  attempt UF as tolerated.  Plan for dialysis tomorrow  · Continue surveillance labs         Thank you for involving us in the care of Claudia Arnold.  Please feel free to call with any questions.    Chas Spicer MD  09/15/22  09:45 EDT    [facility]  [phone]      Much of this encounter note is an electronic transcription/translation of spoken language to printed text. The electronic translation of spoken language may permit erroneous, or at times, nonsensical words or phrases to be inadvertently  transcribed; Although I have reviewed the note for such errors, some may still exist.

## 2022-09-15 NOTE — PLAN OF CARE
Problem: Adult Inpatient Plan of Care  Goal: Plan of Care Review  Outcome: Ongoing, Progressing  Flowsheets (Taken 9/15/2022 0524)  Progress: improving  Plan of Care Reviewed With: patient  Outcome Evaluation: Vitals stable. Remains in Afib - rate controlled. Switched to PO Cardizem, drip discontinued. Worked with PT - up to chair. Dialysis tomorrow. Midodrine dose increased. Will continue to monitor.

## 2022-09-15 NOTE — PLAN OF CARE
Goal Outcome Evaluation:  Plan of Care Reviewed With: patient           Outcome Evaluation: Pt 78 yo female admitted w dyspnea, tachycardia, noted afib, ESRD. Pt baseline ambulatory w walker, uses WC for community and HD, spouse assist. On PT exam, Pt pivoted OOB SBA rwx, increased time to complete tasks. Pt may benefit from skilled PT acutely to address functional deficits, anticipate HHC at [place], pt has all appropriate DME.      ..Patient was not wearing a face mask during this therapy encounter. Therapist used appropriate personal protective equipment including eye protection, mask, and gloves.  Mask used was standard procedure mask. Appropriate PPE was worn during the entire therapy session. Hand hygiene was completed before and after therapy session. Patient is not in enhanced droplet precautions.

## 2022-09-15 NOTE — CONSULTS
Electrophysiology Hospital Consult            Patient Name: Claudia Arnold  Age/Sex: 77 y.o. female  : 1945  MRN: [id number]    Date of Admission: 2022  Date of Encounter Visit: 09/15/22  Encounter Provider: MORGAN Wadsworth  Referring Provider: Gorge Boyd MD  Place of Service: [facility] CARDIOLOGY  Patient Care Team:  Robert Cortez MD as PCP - General (Family Medicine)  Deborah Agudelo MD as Surgeon (Orthopedic Surgery)  Scott Segura MD as Consulting Physician (Cardiology)  Scar Gaxiola MD as Surgeon (Cardiothoracic Surgery)  Kathy Osuna MD as Consulting Physician (Nephrology)  Dora Astorga APRN as Nurse Practitioner (Neurosurgery)  Satish Stahl MD as Consulting Physician (Vascular Surgery)  Scar Gaxiola MD as Surgeon (Cardiothoracic Surgery)  Kathy Osuna MD as Consulting Physician (Nephrology)      Subjective:   EP Consultation for: persistent atrial fibrillation    Chief Complaint: shortness of breath    History of Present Illness:  Claudia Arnold is a 77 y.o. female who is followed by Dr. Segura.  She has a history of aortic valve replacement, tissue mitral valve replacement, tricuspid valve repair, left cryo maze, and left atrial appendage ligation in 2019 with Dr. Gaxiola.  After the surgery she did have trouble with atrial fibrillation and flutter with RVR. She has end stage renal disease and is on dialysis so she had a difficult time tolerating rate control medications.      Recently she has been having trouble with rapid heart rates during dialysis.  Her beta blocker was increased but she was hypotensive so it was felt that there was no room to uptitrate her medications anymore.  She saw Jie Gonzales in the office  and was in AF with RVR with rates in the 160s.  She was also hypotensive so she was sent to the ED.  On admission she was given one dose of digoxin but cautiously due to kidney  function.  She was started on a diltiazem drip at 5mg and her HR came down to the 80s.  She was also started on midodrine for her BP.     We have been asked to see her for her atrial fibrillation.       Dr. Jarrell and I saw the patient this morning. She says that she has had atrial fibrillation since her surgery in 2019.  She says that for a long time it was well controlled with metoprolol but recently she was told at dialysis that her HR was elevated.  She does have occasional shortness of breath when she is tachycardic but says that she really does not notice her AF.  She says when her BP was low at dialysis she was not symptomatic.  She denies any complaints at this time.  HR is currently 85 on bedside telemetry.     Past Medical History:  Past Medical History:   Diagnosis Date   • A-fib ([facility])     Meds   • Arthritis     w/Difficult Mobility   • Bacterial infection 03/2020   • Bifascicular block    • Bilateral lower extremity edema     Legs   • CAD (coronary artery disease)     Affecting LAD    • Cardiomyopathy ([facility])    • CHF (congestive heart failure) ([facility])     CHRONIC, DIASTOLIC   • Chronic fatigue    • Chronic kidney disease    • Cystocele, midline 09/2019   • Dialysis patient ([facility])     TUESDAY THURSDAY SATURDAY   • Edema 06/2021   • End stage renal failure on dialysis ([facility])     FOLLOWED BY DR. COLLINS SPANGLER   • Gastrointestinal hemorrhage 01/11/2021    ADMITTED TO [facility]   • Generalized anxiety disorder    • GERD (gastroesophageal reflux disease)    • Glenohumeral arthritis 04/2021   • Hematochezia 08/08/2021    ADMITTED TO [facility]   • Hemorrhoids    • Hernia, inguinal     Hx Repair   • History of echocardiogram 1/17/19-[facility]    The L Ventricular Cavity is Mild-to-Moderately Dilated; Left Ventricular Wall Thickness is Consistent w/Mild Concentric Hypertrophy; Left Atrial Cavity Size is Moderate-to-Severely Dilated; Severe AVS; Severe MVS; Moderate TVR Noted   • History of transfusion     no adverse reaction   •  Hyperlipidemia     Controlled w/Meds   • Hypertension     Controlled w/Meds   • Hypokalemia    • Hypokinesis 01/22/2019    Apical Noted on Cardiac Cath   • IFG (impaired fasting glucose)    • Immobility syndrome    • Iron deficiency anemia    • LAD stenosis 01/22/2019    Mid LAD Irregularities Noted on Cardiac Cath    • Left atrial dilatation 01/17/2019    Moderate-Severe Noted on Echo   • Left ventricular dilatation 01/17/2019    Noted on Echo/LEE   • Lumbar spondylosis    • Lumbar stenosis    • Mild concentric left ventricular hypertrophy 01/17/2019    Noted on Echo/LEE   • Mitral annular calcification 01/17/2019    Severe Noted on Echo   • Moderate tricuspid valve regurgitation 01/24/2019    Noted on LEE   • Morbid obesity ([facility])    • Nonrheumatic tricuspid valve regurgitation    • NSTEMI (non-ST elevated myocardial infarction) ([facility])    • OCTAVIANO (obstructive sleep apnea)    • Osteoarthritis    • Osteoarthritis of shoulder    • PAF (paroxysmal atrial fibrillation) ([facility])    • PNA (pneumonia)    • Pulmonary hypertension ([facility]) 01/22/2019    Noted on Cardiac Cath   • Right bundle branch block 01/24/2019    Noted on LEE   • Right ventricular enlargement 06/2021   • Rotator cuff tear 04/2016   • Secondary hyperparathyroidism ([facility])    • Severe aortic stenosis 01/22/2019    Noted on Cardiac Cath & LEE on 01/24/19; S/p AVR on 01/28/19 by Dr. Gaxiola   • Severe mitral valve stenosis 01/24/2019    Noted on LEE; S/p MVR on 01/28/19 by Dr. Gaxiola   • Severe muscle deconditioning    • Shoulder pain, bilateral    • Skin yeast infection     Folds Under Skin--Nystatin/Diflucan   • Tinea unguium 10/2020    BILATERAL   • Ulcer of toe ([facility])     RIGHT FOOT   • Urge incontinence    • Vaginal atrophy    • Venous insufficiency    • Vulvar cyst 09/2019       Past Surgical History:   Procedure Laterality Date   • AORTIC VALVE REPAIR/REPLACEMENT MITRAL VALVE REPAIR/REPLACEMENT N/A 1/28/2019    Procedure: LEE STERNOTOMY AORTIC VALVE REPLACEMENT,  MITRAL VALVE REPLACEMENT, TRICUSPID VALVE REPAIR, MAZE PROCEDURE, CLOSURE OF LEFT ATRIAL APPENDAGE  AND PRP;  Surgeon: Scar Gaxiola MD;  Location: [facility] MAIN OR;  Service: Cardiothoracic   • ARTERIOVENOUS FISTULA/SHUNT SURGERY Left 6/26/2019    Procedure: LEFT ARM BRACHIAL CEPHALIC FISTULA;  Surgeon: Satish Stahl MD;  Location: [facility];  Service: Vascular   • CARDIAC CATHETERIZATION N/A 1/22/2019    Procedure: Coronary angiography;  Surgeon: Rick Talavera MD;  Location:  [facility] CATH INVASIVE LOCATION;  Service: Cardiology   • CARDIAC CATHETERIZATION N/A 1/22/2019    Procedure: Left Heart Cath;  Surgeon: Rick Talavera MD;  Location:  [facility]ATH [facility];  Service: Cardiology   • CARDIAC CATHETERIZATION N/A 1/22/2019    Procedure: Right Heart Cath;  Surgeon: Rick Talavera MD;  Location:  [facility] CATH [facility]IVE [facility];  Service: Cardiology   • CARDIAC CATHETERIZATION N/A 1/22/2019    Procedure: Left ventriculography;  Surgeon: Rick Talavera MD;  Location: [facility] CATH [facility]ASIVE LOC[facility];  Service: Cardiology   • CARDIAC SURGERY     • COLONOSCOPY N/A 1/16/2021    MULTIPLE DIVERTICULA, TORTUOUS COLON, HEMORRHOIDS, DR. JOSE TRAN AT [facility]   • COLONOSCOPY N/A 12/9/2021    Procedure: COLONOSCOPY TO CECUM WITH APC TO RIGHT COLON AVM;  Surgeon: Shiloh Pop MD;  Location: [facility] ENDOSCOPY;  Service: Gastroenterology;  Laterality: N/A;   PRE OP-ANEMIA  POST OP - AVM RIGHT COLON, DIVERTICULOSIS, HEMORRHOIDS   • ENDOSCOPY N/A 1/16/2021    DUODENITIS, GASTRITIS, BENIGN DUOFENAL POLYP, DR. JOSE TRAN AT [facility]   • ENDOSCOPY N/A 12/9/2021    Procedure: ESOPHAGOGASTRODUODENOSCOPY  WITH BIOPSIES;  Surgeon: Shiloh Pop MD;  Location: [facility] ENDOSCOPY;  Service: Gastroenterology;  Laterality: N/A;  PRE OP - ANEMIA  POST OP - ABN DUODENAL MUCOSA, GASTRITIS, HIATAL HERNIA   • HEMORRHOIDECTOMY N/A 8/12/2021    Procedure: HEMORRHOIDECTOMY x2;  Surgeon:  Pennie Rider MD;  Location: [facility] MAIN OR;  Service: General;  Laterality: N/A;   • HERNIA REPAIR Left     INGUINAL   • INSERT / REPLACE / VENOUS ACCESS CATHETER N/A 03/11/2020    TUNNEL CATHETER REMOVAL, DR. SU MICHELLE   • INSERTION HEMODIALYSIS CATHETER N/A 2/8/2019    Procedure: tunnel CATHETER PLACEMENT WITH FLUROSCOPY;  Surgeon: Satish Stahl MD;  Location: [facility] MAIN OR;  Service: Vascular   • REPLACEMENT TOTAL KNEE Left 2007   • TOTAL KNEE ARTHROPLASTY Right 2009       Home Medications:   Medications Prior to Admission   Medication Sig Dispense Refill Last Dose   • acetaminophen (TYLENOL) 325 MG tablet Take 650 mg by mouth 3 (Three) Times a Day. Takes tid at 2 am, 1 pm, and hS   9/14/2022 at Unknown time   • busPIRone (BUSPAR) 10 MG tablet Take 1 tablet by mouth 2 (Two) Times a Day. 180 tablet 3 9/13/2022 at Unknown time   • escitalopram (LEXAPRO) 10 MG tablet Take 1 tablet by mouth Daily. 90 tablet 3 9/13/2022 at Unknown time   • metoprolol tartrate (LOPRESSOR) 25 MG tablet TAKE 1 TABLET BY MOUTH EVERY 12 HOURS 60 tablet 3 9/13/2022 at Unknown time   • midodrine (PROAMATINE) 2.5 MG tablet Take 1 tablet by mouth 3 (Three) Times a Day Before Meals. 270 tablet 3 9/13/2022 at Unknown time   • midodrine (PROAMATINE) 5 MG tablet Take 1 tablet by mouth 3 (Three) Times a Day Before Meals. 90 tablet 0 9/13/2022 at Unknown time   • Probiotic Product (ALIGN PO) Take 1 capsule by mouth Every Morning.   9/13/2022 at Unknown time   • [employer] MELATONIN PO Take 5 mg by mouth At Night As Needed.      • hydrocortisone 2.5 % cream       • Methoxy PEG-Epoetin Beta (MIRCERA IJ) 75 mcg Every 28 (Twenty-Eight) Days.      • pantoprazole (PROTONIX) 40 MG EC tablet Take 1 tablet by mouth Every Morning. 90 tablet 3 Unknown at Unknown time   • rOPINIRole (REQUIP) 0.25 MG tablet Take 0.25 mg by mouth Every Night.   9/12/2022   • warfarin (COUMADIN) 2 MG tablet TAKE 1 TABLET BY MOUTH EVERY SUNDAY, TUESDAY, AND THURSDAY,  TAKE 1&1/2 TABLETS BY MOUTH ALL THE OTHER DAYS OF THE WEEK 120 tablet 1 9/12/2022       Allergies:  Allergies   Allergen Reactions   • Betadine [Povidone Iodine] Itching       Past Social History:  Social History     Socioeconomic History   • Marital status:    • Number of children: 2   • Years of education: 12   • Highest education level: High school graduate   Tobacco Use   • Smoking status: Never Smoker   • Smokeless tobacco: Never Used   Vaping Use   • Vaping Use: Never used   Substance and Sexual Activity   • Alcohol use: Not Currently     Alcohol/week: 1.0 standard drink     Types: 1 Cans of beer per week     Comment: 1 monthly   • Drug use: No   • Sexual activity: Defer     Birth control/protection: Post-menopausal       Past Family History:  Family History   Problem Relation Age of Onset   • Hypertension Other    • Diabetes Other    • Heart disease Neg Hx    • Stroke Neg Hx    • Arthritis Neg Hx    • Breast cancer Neg Hx    • Malig Hyperthermia Neg Hx        Review of Systems: All systems reviewed. Pertinent positives identified in HPI. All other systems are negative.     14 point ROS was performed and is negative except as outlined in HPI.     Objective:     Objective:  Vital Signs (last 24 hours)       09/14 0700  09/15 0659 09/15 0700  09/15 1056   Most Recent      Temp (°F) 97.3 -  98.1      97.8     97.8 (36.6) 09/15 0711    Heart Rate 71 -  123      83     83 09/15 0711    Resp 16 -  20      20     20 09/15 0711    BP 98/76 -  117/75      110/76     110/76 09/15 0711    SpO2 (%) 91 -  96      92     92 09/15 0711        Temp:  [97.3 °F (36.3 °C)-98.1 °F (36.7 °C)] 97.8 °F (36.6 °C)  Heart Rate:  [] 83  Resp:  [16-20] 20  BP: ()/(57-91) 110/76  Body mass index is 37.49 kg/m².        Physical Exam:     General Appearance: No acute distress, well developed and well nourished.   Eyes: Conjunctiva and lids: No erythema, swelling, or discharge. Sclera non-icteric.   HENT: Atraumatic,  normocephalic. External eyes, ears, and nose normal.   Respiratory: No signs of respiratory distress. Respiration rhythm and depth normal.   Clear to auscultation. No rales, crackles, rhonchi, or wheezing auscultated.   Cardiovascular:  Jugular Venous Pressure: Normal  Heart Rate and Rhythm: Normal, Heart Sounds: Normal S1 and S2. No S3 or S4 noted.  Murmurs: No murmurs noted. No rubs, thrills, or gallops.   Arterial Pulses: Posterior tibialis and dorsalis pedis pulses normal.   Lower Extremities: No edema noted.  Gastrointestinal:  Abdomen soft, non-distended, non-tender.  Musculoskeletal: Normal movement of extremities  Skin: Warm and dry.   Psychiatric: Patient alert and oriented to person, place, and time. Speech and behavior appropriate. Normal mood and affect.    Labs:   Lab Review:     Results from last 7 days   Lab Units 09/15/22  0535 09/14/22  0451 09/13/22  1444   SODIUM mmol/L 144 141 141   POTASSIUM mmol/L 4.1 4.2 3.9   CHLORIDE mmol/L 104 96* 95*   CO2 mmol/L 25.0 26.5 23.4   BUN mg/dL 27* 52* 45*   CREATININE mg/dL 3.44* 4.66* 4.03*   GLUCOSE mg/dL 104* 99 110*   CALCIUM mg/dL 9.1 9.6 10.1   AST (SGOT) U/L  --  53* 84*   ALT (SGPT) U/L  --  102* 138*     Results from last 7 days   Lab Units 09/14/22  0451 09/13/22  1824 09/13/22  1444   TROPONIN T ng/mL 0.068* 0.053* 0.067*     Results from last 7 days   Lab Units 09/15/22  0535   WBC 10*3/mm3 7.34   HEMOGLOBIN g/dL 12.0   HEMATOCRIT % 34.8   PLATELETS 10*3/mm3 135*     Results from last 7 days   Lab Units 09/15/22  0535 09/14/22  0451 09/13/22  1444   INR  3.17* 2.96* 2.96*                                   EKG:         I personally viewed and interpreted the patient's EKG/Telemetry tracings.    Assessment:       Atrial fibrillation with RVR (HCC)    Aortic valve stenosis    S/P MVR (mitral valve replacement)    Paroxysmal atrial fibrillation (HCC)    Pulmonary hypertension (HCC)    S/P AVR (aortic valve replacement)    Chronic diastolic CHF  (congestive heart failure) (HCC)    OCTAVIANO (obstructive sleep apnea)    ESRD (end stage renal disease) (HCC)    Elevated troponin    Hypotension        Plan:   Dr. Juarez and I saw this patient.      Discussed pace and ablate strategy vs. Rate control.  She would like to make further attempts at rate control.  There is concern that her BP will not be able to tolerate additional medications, especially during dialysis. It has been stable on diltiazem drip. We will plan to start oral diltiazem (30mg Q8hr) and stop the drip.  She is to have dialysis tomorrow.  If she is unable to tolerate the diltiazem and metoprolol then we will further discuss pace and ablate.  We would likely do a leadless pacemaker due to ESRD and dialysis.      Thank you for allowing me to participate in the care of Claudia GABRIELLE Arnold. Feel free to contact me directly with any further questions or concerns.    MORGAN Wadsworth  [facility]  09/15/22  10:56 EDT

## 2022-09-16 PROBLEM — N30.00 ACUTE CYSTITIS WITHOUT HEMATURIA: Status: ACTIVE | Noted: 2022-01-01

## 2022-09-16 NOTE — SIGNIFICANT NOTE
Patient now complaining of dysuria.  Patient thinks she is getting a UTI.  Urinalysis returned pyuria.  Culture is pending.  Plan to start 3-day course of ceftriaxone and monitor response.  Electronically signed by Avinash Be MD, 09/16/22, 1:57 PM EDT.

## 2022-09-16 NOTE — PLAN OF CARE
Goal Outcome Evaluation:               VSS.Pt Afib.HR sustaining below 100.Pt  scheduled for dialysis today.Denies any pain/discomfort.WCTM.

## 2022-09-16 NOTE — PROGRESS NOTES
LOS: 3 days   Patient Care Team:  Robert Cortez MD as PCP - General (Family Medicine)  Deborah Agudelo MD as Surgeon (Orthopedic Surgery)  Scott Segura MD as Consulting Physician (Cardiology)  Scar Gaxiola MD as Surgeon (Cardiothoracic Surgery)  Kathy Osuna MD as Consulting Physician (Nephrology)  Dora Astorga APRN as Nurse Practitioner (Neurosurgery)  Satish Stahl MD as Consulting Physician (Vascular Surgery)  Scar Gaxiola MD as Surgeon (Cardiothoracic Surgery)  Kathy Osuna MD as Consulting Physician (Nephrology)    Chief Complaint: Follow-up persistent atrial fibrillation with RVR, hypotension, tissue aortic valve replacement, tissue mitral valve replacement.    Interval History: Rate is better, and blood pressure slightly better.  No shortness of breath or chest pain.  No acute events.    Vital Signs:  Temp:  [97.4 °F (36.3 °C)-98 °F (36.7 °C)] 97.9 °F (36.6 °C)  Heart Rate:  [82-97] 88  Resp:  [18-20] 18  BP: ()/(67-93) 121/93    Intake/Output Summary (Last 24 hours) at 9/16/2022 1418  Last data filed at 9/16/2022 0900  Gross per 24 hour   Intake 890 ml   Output --   Net 890 ml       Physical Exam:   General Appearance:    No acute distress, alert and oriented x4   Lungs:     Clear to auscultation bilaterally     Heart:    Irregularly irregular rhythm with a normal rate. II/VI SM throughout.   Abdomen:     Soft, nontender, nondistended.    Extremities:   1-2+ edema.     Results Review:    Results from last 7 days   Lab Units 09/16/22  0440   SODIUM mmol/L 139   POTASSIUM mmol/L 4.0   CHLORIDE mmol/L 100   CO2 mmol/L 24.7   BUN mg/dL 41*   CREATININE mg/dL 4.38*   GLUCOSE mg/dL 108*   CALCIUM mg/dL 8.9     Results from last 7 days   Lab Units 09/14/22  0451 09/13/22  1824 09/13/22  1444   TROPONIN T ng/mL 0.068* 0.053* 0.067*     Results from last 7 days   Lab Units 09/16/22  0440   WBC 10*3/mm3 7.69   HEMOGLOBIN g/dL 11.9*   HEMATOCRIT % 35.6    PLATELETS 10*3/mm3 140     Results from last 7 days   Lab Units 09/16/22  0440 09/15/22  0535 09/14/22  0451   INR  3.05* 3.17* 2.96*                   I reviewed the patient's new clinical results.        Assessment:  1.  Paroxysmal atrial fibrillation with RVR  2.  Baseline hypotension, on midodrine  3.  End-stage renal disease, on hemodialysis  4.  Status post tissue AVR, tissue MVR, and tricuspid valve repair on 1/28/2019 by Dr. Gaxiola  5.  Status post left cryo maze and left atrial appendage ligation on 1/28/2019  6.  Oliguric acute kidney injury post cardiac surgery, resulting in ESRD  7.  Bifascicular block (right bundle branch block and left anterior fascicular block)  8.  Chronic lower extremity edema  9.  Chronic diastolic CHF  10.  Chronic anticoagulation with warfarin  11.  Troponin elevation, secondary to end-stage renal disease (not ischemic)  12.  Moderately enlarged and moderately reduced right ventricular function    Plan:  -Discussed with EP.  Increased midodrine to 10 tid yesterday.  Blood pressure is better.  Increase diltiazem to 60 mg 3 times a day, and continue metoprolol 25 mg twice a day.    -Watch blood pressure and heart rate, especially on dialysis.    -Continue warfarin.    -If still having rapid rates by next week, potential pacemaker and AV node ablation.    Scott Segura MD  09/16/22  14:18 EDT

## 2022-09-16 NOTE — NURSING NOTE
HD WITHOUT INCIDENT OR C/O. TOLERATED WELL. REMOVED 3 L AS ORDERED. NO MEDS ADMINISTERED. AVF NEEDLES REMOVED X 2. HEMOSTASIS ACHIEVED.  STABLE, NO C/O UPON COMPLETION OF HD.

## 2022-09-16 NOTE — PROGRESS NOTES
[facility] Clinical Pharmacy Services: Warfarin Dosing/Monitoring Consult    Claudia Arnold is a 77 y.o. female, estimated creatinine clearance is 11.9 mL/min (A) (by C-G formula based on SCr of 4.38 mg/dL (H)). weighing 96 kg (211 lb 10.3 oz).    Results from last 7 days   Lab Units 09/16/22  0440 09/15/22  0535 09/14/22  0451 09/13/22  1444   INR  3.05* 3.17* 2.96* 2.96*   HEMOGLOBIN g/dL 11.9* 12.0 12.1 13.6   HEMATOCRIT % 35.6 34.8 36.2 40.5   PLATELETS 10*3/mm3 140 135* 157 196     Prior to admission anticoagulation: 3 mg Weds and 2 mg all other days    Hospital Anticoagulation:  Consulting provider: Gorge Boyd MD -admitting  Start date: pta med  Indication: A Fib - requiring full anticoagulation  Target INR: 2 - 3  Expected duration: lifetime   Bridge Therapy: No      Potential food or drug interactions: Digoxin may enhance the anticoagulant effect of Warfarin. - Received 2 doses on  9/14  Heart failure - hepatic congestion can decrease warfarin metabolism    Education complete?/Date: No; plan for follow up TBD    Assessment/Plan:  Dose: INR trending down and just a touch above goal, will schedule a 1 time dose of 2 mg today then follow up INR in AM  Monitor for any signs or symptoms of bleeding   Follow up daily INRs and dose adjustments     Pharmacy will continue to follow until discharge or discontinuation of warfarin.     Vick Zepeda [facility]  Clinical Pharmacist

## 2022-09-16 NOTE — PROGRESS NOTES
Electrophysiology Follow-Up Note      Patient Name: Claudia Arnold  Age/Sex: 77 y.o. female  : 1945  MRN: [id number]      Day of Service: 22       Chief Complaint/Follow-up: persistent AF     S: says she is feeling better this AM. HR much better controlled, occasionally 110s. No complaints overnight.       Temp:  [97.4 °F (36.3 °C)-98 °F (36.7 °C)] 97.4 °F (36.3 °C)  Heart Rate:  [] 97  Resp:  [18-20] 18  BP: ()/(67-84) 118/79     PHYSICAL EXAM:    General Appearance: No acute distress, well developed and well nourished.   Eyes: Conjunctiva and lids: No erythema, swelling, or discharge. Sclera non-icteric.   HENT: Atraumatic, normocephalic. External eyes, ears, and nose normal.   Respiratory: No signs of respiratory distress. Respiration rhythm and depth normal.   Clear to auscultation. No rales, crackles, rhonchi, or wheezing auscultated.   Cardiovascular:  Heart Rate and Rhythm: irregularly irregular, Heart Sounds: Normal S1 and S2. No S3 or S4 noted.  Gastrointestinal:  Abdomen soft, non-distended, non-tender.  Musculoskeletal: Normal movement of extremities  Skin: Warm and dry.   Psychiatric: Patient alert and oriented to person, place, and time. Speech and behavior appropriate. Normal mood and affect.       ECG/TELE:         Results from last 7 days   Lab Units 09/16/22  0440 09/15/22  0535 09/14/22  0451   SODIUM mmol/L 139 144 141   POTASSIUM mmol/L 4.0 4.1 4.2   CHLORIDE mmol/L 100 104 96*   CO2 mmol/L 24.7 25.0 26.5   BUN mg/dL 41* 27* 52*   CREATININE mg/dL 4.38* 3.44* 4.66*   GLUCOSE mg/dL 108* 104* 99   CALCIUM mg/dL 8.9 9.1 9.6     Results from last 7 days   Lab Units 09/16/22  0440 09/15/22  0535 22  0451   WBC 10*3/mm3 7.69 7.34 8.14   HEMOGLOBIN g/dL 11.9* 12.0 12.1   HEMATOCRIT % 35.6 34.8 36.2   PLATELETS 10*3/mm3 140 135* 157     Results from last 7 days   Lab Units 22  0440 09/15/22  0535 22  0451   INR  3.05* 3.17* 2.96*     Results from last  7 days   Lab Units 09/14/22  0451 09/13/22  1824 09/13/22  1444   TROPONIN T ng/mL 0.068* 0.053* 0.067*               Current Medications:   Scheduled Meds:acetaminophen, 650 mg, Oral, TID  busPIRone, 10 mg, Oral, BID  dilTIAZem, 30 mg, Oral, Q8H  escitalopram, 10 mg, Oral, Daily  hydrocortisone, 25 mg, Rectal, BID  lactobacillus acidophilus, 1 capsule, Oral, QAM  metoprolol tartrate, 25 mg, Oral, Q12H  midodrine, 10 mg, Oral, TID AC  pantoprazole, 40 mg, Oral, Q AM  rOPINIRole, 0.25 mg, Oral, Nightly  sodium chloride, 10 mL, Intravenous, Q12H            Atrial fibrillation with RVR ([facility])    Aortic valve stenosis    S/P MVR (mitral valve replacement)    Paroxysmal atrial fibrillation ([facility])    Pulmonary hypertension ([facility])    S/P AVR (aortic valve replacement)    Chronic diastolic CHF (congestive heart failure) ([facility])    OCTAVIANO (obstructive sleep apnea)    ESRD (end stage renal disease) ([facility])    Elevated troponin    Hypotension       Plan:   1. Atrial fibrillation w RVR-- pursing rate control at this time. HR much better controlled after addition of diltiazem.  BP stable. We will see how dialysis goes today. May need to increase diltiazem if BP will allow.     2. Anticoagulation-- INR 3.0 this AM. Currently holding warfarin at this time.     3. Pacemaker and AV node ablation-- we discussed again this AM. She would like to continue rate control given she is asymptomatic.  If we are unable to control her HR or if she is unable to tolerate dialysis then we will proceed with pace and ablate strategy.       We will see how she does with dialysis today. If her HR and BP remain stable then I think she could possibly go home over the weekend. Would consider a monitor at discharge to assess heart rate and better guide medication adjustments.  She would need follow up with us in 1-2 weeks.       MORGAN Wadsworth  09/16/22  07:54 EDT

## 2022-09-16 NOTE — PROGRESS NOTES
[facility] Medicine Services  PROGRESS NOTE    Patient Name: Claudia Arnold  : 1945  MRN: [id number]    Date of Admission: 2022  Primary Care Physician: Robert Cortez MD    Subjective   Subjective     CC:  Follow-up for recent palpitations and shortness of breath    HPI:  Patient feels well.  No current cardiac symptoms.  She denies lightheadedness and feels she is tolerating cardiac medicines.      Review of Systems  No current fevers or chills  No current shortness of breath or cough  No current nausea, vomiting, or diarrhea    Objective   Objective     Vital Signs:   Temp:  [97.4 °F (36.3 °C)-98 °F (36.7 °C)] 97.4 °F (36.3 °C)  Heart Rate:  [] 97  Resp:  [18-20] 18  BP: ()/(67-84) 118/79        Physical Exam:  Constitutional:Awake, alert, chronically debilitated  HENT: NCAT, mucous membranes moist, neck supple  Respiratory: No cough, no wheezes, normal inspiration, nonlabored breathing  Cardiovascular: Irregular, now normal rate in the 80-90s, normal radial pulses  Gastrointestinal: Positive bowel sounds, soft, nontender, nondistended  Musculoskeletal: Elderly frail and chronically debilitated appearance, obese BMI is 37, positive for lower extremity edema  Psychiatric: Appropriate affect, cooperative, conversational  Neurologic: No slurred speech or facial droop, follows commands  Skin: No rashes or jaundice, warm      Results Reviewed:  Results from last 7 days   Lab Units 09/16/22  0440 09/15/22  0535 09/14/22  0451   WBC 10*3/mm3 7.69 7.34 8.14   HEMOGLOBIN g/dL 11.9* 12.0 12.1   HEMATOCRIT % 35.6 34.8 36.2   PLATELETS 10*3/mm3 140 135* 157   INR  3.05* 3.17* 2.96*     Results from last 7 days   Lab Units 09/16/22  0440 09/15/22  0535 221 22  1824 22  1444   SODIUM mmol/L 139 144 141  --  141   POTASSIUM mmol/L 4.0 4.1 4.2  --  3.9   CHLORIDE mmol/L 100 104 96*  --  95*   CO2 mmol/L 24.7 25.0 26.5  --  23.4   BUN mg/dL 41* 27* 52*  --  45*   CREATININE  mg/dL 4.38* 3.44* 4.66*  --  4.03*   GLUCOSE mg/dL 108* 104* 99  --  110*   CALCIUM mg/dL 8.9 9.1 9.6  --  10.1   ALT (SGPT) U/L  --   --  102*  --  138*   AST (SGOT) U/L  --   --  53*  --  84*   TROPONIN T ng/mL  --   --  0.068* 0.053* 0.067*     Estimated Creatinine Clearance: 11.9 mL/min (A) (by C-G formula based on SCr of 4.38 mg/dL (H)).    Microbiology Results Abnormal     None          Imaging Results (Last 24 Hours)     ** No results found for the last 24 hours. **          Results for orders placed during the hospital encounter of 02/22/22    Adult Transthoracic Echo Complete w/ Color, Spectral and Contrast if Necessary Per Protocol    Interpretation Summary  · Left ventricular ejection fraction appears to be 61 - 65%. Left ventricular systolic function is normal.  · Left ventricular wall thickness is consistent with mild concentric hypertrophy.  · The right ventricular cavity is moderately dilated. Moderately reduced right ventricular systolic function noted.  · The left atrial cavity is severely dilated.  · The right atrial cavity is moderately dilated  · There is a Magna Pericardial Tissue Valve bioprosthetic aortic valve present. The aortic valve peak and mean gradients are elevated.  · Aortic valve maximum pressure gradient is 51 mmHg. Aortic valve mean pressure gradient is 24 mmHg.  · There is a 25 mm, porcine, Epic [employer] Mike bioprosthetic mitral valve present. The mitral valve peak and mean gradients are elevated. The prosthetic mitral valve is normal.  · The mitral valve peak gradient is 28 mmHg. The mitral valve mean gradient is 12 mmHg  · Moderate tricuspid valve regurgitation is present.  · Calculated right ventricular systolic pressure from tricuspid regurgitation is 52 mmHg.  · The aortic root is dilated at 4.4 cm  · There is no evidence of pericardial effusion.      I have reviewed the medications:  Scheduled Meds:acetaminophen, 650 mg, Oral, TID  busPIRone, 10 mg, Oral, BID  dilTIAZem, 30 mg,  Oral, Q8H  escitalopram, 10 mg, Oral, Daily  lactobacillus acidophilus, 1 capsule, Oral, QAM  metoprolol tartrate, 25 mg, Oral, Q12H  midodrine, 10 mg, Oral, TID AC  pantoprazole, 40 mg, Oral, Q AM  rOPINIRole, 0.25 mg, Oral, Nightly  sodium chloride, 10 mL, Intravenous, Q12H  warfarin, 2 mg, Oral, Once      Continuous Infusions:Pharmacy to dose warfarin,       PRN Meds:.•  acetaminophen **OR** acetaminophen **OR** acetaminophen  •  melatonin  •  nitroglycerin  •  ondansetron  •  Pharmacy to dose warfarin  •  sodium chloride  •  sodium chloride    Assessment & Plan   Assessment & Plan     Active Hospital Problems    Diagnosis  POA   • **Atrial fibrillation with RVR ([facility]) [I48.91]  Yes   • Elevated troponin [R77.8]  Yes   • Hypotension [I95.9]  Yes   • ESRD (end stage renal disease) ([facility]) [N18.6]  Yes   • OCTAVIANO (obstructive sleep apnea) [G47.33]  Yes   • Chronic diastolic CHF (congestive heart failure) ([facility]) [I50.32]  Yes   • S/P AVR (aortic valve replacement) [Z95.2]  Not Applicable   • Pulmonary hypertension ([facility]) [I27.20]  Yes   • Paroxysmal atrial fibrillation ([facility]) [I48.0]  Yes   • S/P MVR (mitral valve replacement) [Z95.2]  Not Applicable   • Aortic valve stenosis [I35.0]  Yes      Resolved Hospital Problems   No resolved problems to display.        Brief Hospital Course to date:  Claudia Arnold is a 77 y.o. female presents the hospital with atrial fibrillation with RVR and hypotension.    Discussion/plan for today:  Continue oral diltiazem and metoprolol.  Midodrine has been increased to avoid hypotension while on these medication.  Rate is getting better controlled.  Diltiazem drip is discontinued.  INR remains slightly elevated today.  Warfarin dosing discussed on multidisciplinary rounds and adjusted with the assistance of pharmacy.  Case discussed with cardiology today.    Atrial fibrillation:  Initially placed on diltiazem drip transition to oral rate control.  Cardiology following.  Electrophysiology  assisting.      Elevated troponin:  Likely supply demand mismatch.  Also likely elevated due to renal disease.  Not felt to be MI or ACS    End-stage renal disease:  Nephrology consulted.  Continue dialysis as indicated.    OCTAVIANO:  Nighttime oxygen as needed.    Pulmonary hypertension and chronic heart failure:  Volume management per dialysis.  Chest x-ray images reviewed and perhaps mild pulmonary edema.     History of aortic valve replacement and reportedly left atrial appendage ablation:  Chronically on warfarin.  Valvular heart issues noted.    Debility: PT      Treatment plan discussed with the patient who is in agreement.    DVT Prophylaxis: Anticoagulated      Disposition: Home tomorrow if cleared by cardiology    CODE STATUS:   Code Status and Medical Interventions:   Ordered at: 09/13/22 1756     Code Status (Patient has no pulse and is not breathing):    CPR (Attempt to Resuscitate)     Medical Interventions (Patient has pulse or is breathing):    Full Support       Avinash Be MD  09/16/22

## 2022-09-17 NOTE — PLAN OF CARE
Problem: Adult Inpatient Plan of Care  Goal: Plan of Care Review  Outcome: Ongoing, Progressing  Flowsheets (Taken 9/17/2022 2999)  Progress: improving  Plan of Care Reviewed With: patient  Outcome Evaluation: Vitals stable. Remains in Afib, rate controlled. Next dialysis tx Monday. Family visited. Will continue to monitor.

## 2022-09-17 NOTE — PROGRESS NOTES
[facility] Progress Note      Patient Name: Claudia Arnold  : 1945  MRN: [id number]  Primary Care Physician:  Robert Cortez MD  Date of admission: 2022    Subjective     Interval History:   The patient was seen and examined today for follow-up on ESRD     Seen and examined.  Denies any chest pain or shortness of breath.  Review of Systems:   As noted above    Objective     Vitals:   Temp:  [97.1 °F (36.2 °C)-98.3 °F (36.8 °C)] 97.6 °F (36.4 °C)  Heart Rate:  [] 87  Resp:  [16-18] 16  BP: ()/(50-93) 93/82    Intake/Output Summary (Last 24 hours) at 2022 1211  Last data filed at 2022 1200  Gross per 24 hour   Intake 210 ml   Output 3000 ml   Net -2790 ml       Physical Exam:    General Appearance: alert, oriented x 3, no acute distress   Skin: warm and dry  HEENT: oral mucosa normal, nonicteric sclera  Neck: supple, no JVD  Lungs: CTA  Heart: Irregular rhythm, normal S1 and S2  Abdomen: soft, nontender, nondistended  : no palpable bladder  Extremities: Trace edema, cyanosis or clubbing  Neuro: normal speech and mental status     Scheduled Meds:     acetaminophen, 650 mg, Oral, TID  busPIRone, 10 mg, Oral, BID  cefdinir, 300 mg, Oral, Q24H  dilTIAZem CD, 180 mg, Oral, Q24H  escitalopram, 10 mg, Oral, Daily  lactobacillus acidophilus, 1 capsule, Oral, QAM  metoprolol tartrate, 25 mg, Oral, Q12H  midodrine, 10 mg, Oral, TID AC  pantoprazole, 40 mg, Oral, Q AM  rOPINIRole, 0.25 mg, Oral, Nightly  sodium chloride, 10 mL, Intravenous, Q12H  warfarin, 3 mg, Oral, Once      IV Meds:   Pharmacy to dose warfarin,         Results Reviewed:   I have personally reviewed the results from the time of this admission to 2022 12:11 EDT     Results from last 7 days   Lab Units 22  0540 22  0440 09/15/22  0535 22  0451 22  1444   SODIUM mmol/L 137 139 144 141 141   POTASSIUM mmol/L 4.1 4.0 4.1 4.2 3.9   CHLORIDE mmol/L 98 100 104 96* 95*    CO2 mmol/L 26.4 24.7 25.0 26.5 23.4   BUN mg/dL 28* 41* 27* 52* 45*   CREATININE mg/dL 3.24* 4.38* 3.44* 4.66* 4.03*   CALCIUM mg/dL 8.9 8.9 9.1 9.6 10.1   BILIRUBIN mg/dL  --   --   --  0.7 0.8   ALK PHOS U/L  --   --   --  71 87   ALT (SGPT) U/L  --   --   --  102* 138*   AST (SGOT) U/L  --   --   --  53* 84*   GLUCOSE mg/dL 96 108* 104* 99 110*       Estimated Creatinine Clearance: 16 mL/min (A) (by C-G formula based on SCr of 3.24 mg/dL (H)).                Results from last 7 days   Lab Units 09/17/22  0540 09/16/22 0440 09/15/22  0535 09/14/22  0451 09/13/22  1444   WBC 10*3/mm3 7.37 7.69 7.34 8.14 9.20   HEMOGLOBIN g/dL 12.5 11.9* 12.0 12.1 13.6   PLATELETS 10*3/mm3 123* 140 135* 157 196       Results from last 7 days   Lab Units 09/17/22  0540 09/16/22  0440 09/15/22  0535 09/14/22  0451 09/13/22  1444   INR  2.19* 3.05* 3.17* 2.96* 2.96*       Assessment / Plan     ASSESSMENT:    1. End-stage renal disease on maintenance hemodialysis on Monday Wednesday Friday.    2. History of hypertension blood pressure is soft today on midodrine per cardiology  3. A. fib with RVR.  Cardiology consulted.  Contemplating cardioversion versus AV node ablation and pacemaker implantation.  Heart rate is better today  4. Diastolic heart failure  5. Volume overload  6. Hyperphosphatemia: Not on binders  7. High anion gap metabolic acidosis.  Likely secondary to chronic kidney disease.  resolved   8. Bioprosthetic mitral and aortic valves on anticoagulation      PLAN:    1.  Hemodialysis on Monday  2.  Surveillance labs      Thank you for involving us in the care of Claudia Arnold.  Please feel free to call with any questions.    Sheila Terrazas MD  09/17/22  12:11 EDT    [facility]  [phone]      Much of this encounter note is an electronic transcription/translation of spoken language to printed text. The electronic translation of spoken language may permit erroneous, or at times, nonsensical  words or phrases to be inadvertently transcribed; Although I have reviewed the note for such errors, some may still exist.

## 2022-09-17 NOTE — PLAN OF CARE
Goal Outcome Evaluation:  Plan of Care Reviewed With: patient        Progress: improving  Outcome Evaluation: Vital signs stable. Remains in AFIB but controlled and tolerating increase of Cardizem po. Minimal urine output but post dialysis and is pt baseline per patient. IV abx daily for bladder infection. Pt slept and rested this evening and between care. AM labs. Pt safety maintained and needs met at this time. Will continue to monitor.

## 2022-09-17 NOTE — PROGRESS NOTES
"DAILY PROGRESS NOTE  [facility]    Patient Identification:  Name: Claudia Arnold  Age: 77 y.o.  Sex: female  :  1945  MRN: [id number]         Primary Care Physician: Robert Cortez MD      Subjective  Patient with no acute complaints.    Objective:  General Appearance:  Comfortable, well-appearing, in no acute distress and not in pain (Obese.).    Vital signs: (most recent): Blood pressure 106/70, pulse 91, temperature 97.6 °F (36.4 °C), temperature source Oral, resp. rate 18, height 160 cm (63\"), weight 96 kg (211 lb 10.3 oz), SpO2 93 %, not currently breastfeeding.    Lungs:  Normal effort and normal respiratory rate.  Breath sounds clear to auscultation.    Heart: Normal rate.  Irregular rhythm.    Extremities: There is no dependent edema.    Neurological: Patient is alert and oriented to person, place and time.    Skin:  Warm and dry.                Vital signs in last 24 hours:  Temp:  [97.1 °F (36.2 °C)-98.3 °F (36.8 °C)] 97.6 °F (36.4 °C)  Heart Rate:  [] 91  Resp:  [16-18] 18  BP: (101-121)/(50-93) 106/70    Intake/Output:    Intake/Output Summary (Last 24 hours) at 2022 1008  Last data filed at 2022 0703  Gross per 24 hour   Intake 0 ml   Output 3000 ml   Net -3000 ml         Results from last 7 days   Lab Units 22  0540 220 09/15/22  0535 22  04522  1444   WBC 10*3/mm3 7.37 7.69 7.34 8.14 9.20   HEMOGLOBIN g/dL 12.5 11.9* 12.0 12.1 13.6   PLATELETS 10*3/mm3 123* 140 135* 157 196     Results from last 7 days   Lab Units 22  0540 22  0440 09/15/22  0535 22  0451 22  1444   SODIUM mmol/L 137 139 144 141 141   POTASSIUM mmol/L 4.1 4.0 4.1 4.2 3.9   CHLORIDE mmol/L 98 100 104 96* 95*   CO2 mmol/L 26.4 24.7 25.0 26.5 23.4   BUN mg/dL 28* 41* 27* 52* 45*   CREATININE mg/dL 3.24* 4.38* 3.44* 4.66* 4.03*   GLUCOSE mg/dL 96 108* 104* 99 110*   Estimated Creatinine Clearance: 16 mL/min (A) (by C-G formula based on SCr of " 3.24 mg/dL (H)).  Results from last 7 days   Lab Units 09/17/22  0540 09/16/22  0440 09/15/22  0535 09/14/22  0451 09/13/22  1444   CALCIUM mg/dL 8.9 8.9 9.1 9.6 10.1   ALBUMIN g/dL  --   --   --  3.60 4.10     Results from last 7 days   Lab Units 09/14/22  0451 09/13/22  1444   ALBUMIN g/dL 3.60 4.10   BILIRUBIN mg/dL 0.7 0.8   ALK PHOS U/L 71 87   AST (SGOT) U/L 53* 84*   ALT (SGPT) U/L 102* 138*       Assessment:    Atrial fibrillation with RVR ([facility])    Aortic valve stenosis    S/P MVR (mitral valve replacement)    Paroxysmal atrial fibrillation ([facility])    Pulmonary hypertension ([facility])    S/P AVR (aortic valve replacement)    Chronic diastolic CHF (congestive heart failure) ([facility])    OCTAVIANO (obstructive sleep apnea)    ESRD (end stage renal disease) ([facility])    Elevated troponin    Hypotension    Acute cystitis without hematuria    Brief Hospital Course to date:  Claudia Arnold is a 77 y.o. female presents the hospital with atrial fibrillation with RVR and hypotension.     Atrial fibrillation:  Initially placed on diltiazem drip transition to oral rate control.  Cardiology following.  Electrophysiology assisting.    Hypotension:  Midodrine initiated and increased dose.   Elevated troponin:  Likely supply demand mismatch.  Also likely elevated due to renal disease.  Not felt to be MI or ACS  ESRD-HDD  Nephrology input appreciated.   OCTAVIANO:  Nighttime oxygen as needed.  Pulmonary hypertension and chronic heart failure:  Volume management per dialysis.  Chest x-ray images reviewed and perhaps mild pulmonary edema.    History of aortic valve replacement and reportedly left atrial appendage ablation:  Chronically on warfarin.  Valvular heart issues noted.  Macrocytosis:  Check B12, folate, TSH.  UTI/simple cystitis:  Switch to oral antibiotics-short course.   Debility: PT      DVT Prophylaxis: Anticoagulated     All problems new to this examiner.    Plan:  Please see above.  Discharge when stable from cardiology point of  view.    Aldo Campa MD  9/17/2022  10:08 EDT

## 2022-09-17 NOTE — PROGRESS NOTES
[facility] Clinical Pharmacy Services: Warfarin Dosing/Monitoring Consult    Claudia Arnold is a 77 y.o. female, estimated creatinine clearance is 16 mL/min (A) (by C-G formula based on SCr of 3.24 mg/dL (H)). weighing 96 kg (211 lb 10.3 oz).    Results from last 7 days   Lab Units 09/17/22  0540 09/16/22  0440 09/15/22  0535 09/14/22  0451 09/13/22  1444   INR  2.19* 3.05* 3.17* 2.96* 2.96*   HEMOGLOBIN g/dL 12.5 11.9* 12.0 12.1 13.6   HEMATOCRIT % 37.8 35.6 34.8 36.2 40.5   PLATELETS 10*3/mm3 123* 140 135* 157 196     Prior to admission anticoagulation: 3 mg Weds and 2 mg all other days    Hospital Anticoagulation:  Consulting provider: Gorge Boyd MD -admitting  Start date: [facility] med  Indication: A Fib - requiring full anticoagulation  Target INR: 2 - 3  Expected duration: lifetime   Bridge Therapy: No      Potential food or drug interactions: Digoxin may enhance the anticoagulant effect of Warfarin. - Received 2 doses on  9/14  Heart failure - hepatic congestion can decrease warfarin metabolism    Education complete?/Date: No; plan for follow up TBD    Assessment/Plan:  Dose: INR with significant decrease overnight (3.05-->2.19) - will order warfarin 3 mg PO x1 to be given this evening. Plan to resume home dose tomorrow if INR stable.   Monitor for any signs or symptoms of bleeding   Follow up daily INRs and dose adjustments 9/18    Pharmacy will continue to follow until discharge or discontinuation of warfarin.     Nahid Guallpa, [facility]  Clinical Pharmacist

## 2022-09-17 NOTE — PROGRESS NOTES
LOS: 4 days   Patient Care Team:  Robert Cortez MD as PCP - General (Family Medicine)  Deborah Agudelo MD as Surgeon (Orthopedic Surgery)  Scott Segura MD as Consulting Physician (Cardiology)  Scar Gaxiola MD as Surgeon (Cardiothoracic Surgery)  Kathy Osuna MD as Consulting Physician (Nephrology)  Dora Astorga APRN as Nurse Practitioner (Neurosurgery)  Satish Sthal MD as Consulting Physician (Vascular Surgery)  Scar Gaxiola MD as Surgeon (Cardiothoracic Surgery)  Kathy Osuna MD as Consulting Physician (Nephrology)    Chief Complaint:   Atrial Fibrillation with RVR    Interval History:   Stable.  Tolerated HD yesterday without issue.  HR and BP have been fine.     Objective   Vital Signs  Temp:  [97.1 °F (36.2 °C)-98.3 °F (36.8 °C)] 97.6 °F (36.4 °C)  Heart Rate:  [] 91  Resp:  [16-18] 18  BP: (101-121)/(50-93) 106/70    Intake/Output Summary (Last 24 hours) at 9/17/2022 0928  Last data filed at 9/17/2022 0703  Gross per 24 hour   Intake 0 ml   Output 3000 ml   Net -3000 ml       Review of Systems   Constitutional: Negative.   Cardiovascular: Negative.    Respiratory: Negative.    Gastrointestinal: Negative.    Genitourinary: Positive for dysuria.       Physical Exam  Vitals and nursing note reviewed.   Constitutional:       General: She is not in acute distress.     Appearance: She is not diaphoretic.   HENT:      Mouth/Throat:      Pharynx: No oropharyngeal exudate.   Eyes:      General: No scleral icterus.  Neck:      Trachea: No tracheal deviation.   Cardiovascular:      Rate and Rhythm: Normal rate. Rhythm irregular.   Pulmonary:      Effort: Pulmonary effort is normal. No respiratory distress.      Breath sounds: Normal breath sounds.   Abdominal:      General: There is no distension.      Palpations: Abdomen is soft.   Skin:     General: Skin is warm and dry.   Neurological:      Mental Status: She is alert and oriented to person, place,  and time.   Psychiatric:         Behavior: Behavior normal.         Thought Content: Thought content normal.         Judgment: Judgment normal.           Results Review:      Results from last 7 days   Lab Units 09/17/22  0540 09/16/22  0440 09/15/22  0535   SODIUM mmol/L 137 139 144   POTASSIUM mmol/L 4.1 4.0 4.1   CHLORIDE mmol/L 98 100 104   CO2 mmol/L 26.4 24.7 25.0   BUN mg/dL 28* 41* 27*   CREATININE mg/dL 3.24* 4.38* 3.44*   GLUCOSE mg/dL 96 108* 104*   CALCIUM mg/dL 8.9 8.9 9.1     Results from last 7 days   Lab Units 09/14/22  0451 09/13/22  1824 09/13/22  1444   TROPONIN T ng/mL 0.068* 0.053* 0.067*     Results from last 7 days   Lab Units 09/17/22  0540 09/16/22  0440 09/15/22  0535   WBC 10*3/mm3 7.37 7.69 7.34   HEMOGLOBIN g/dL 12.5 11.9* 12.0   HEMATOCRIT % 37.8 35.6 34.8   PLATELETS 10*3/mm3 123* 140 135*     Results from last 7 days   Lab Units 09/17/22  0540 09/16/22  0440 09/15/22  0535   INR  2.19* 3.05* 3.17*                   I reviewed the patient's new clinical results.  I personally viewed and interpreted the patient's EKG/Telemetry data        Medication Review:   acetaminophen, 650 mg, Oral, TID  busPIRone, 10 mg, Oral, BID  cefTRIAXone, 1 g, Intravenous, Q24H  dilTIAZem, 60 mg, Oral, Q8H  escitalopram, 10 mg, Oral, Daily  lactobacillus acidophilus, 1 capsule, Oral, QAM  metoprolol tartrate, 25 mg, Oral, Q12H  midodrine, 10 mg, Oral, TID AC  pantoprazole, 40 mg, Oral, Q AM  rOPINIRole, 0.25 mg, Oral, Nightly  sodium chloride, 10 mL, Intravenous, Q12H        Pharmacy to dose warfarin,         Assessment & Plan       Atrial fibrillation with RVR (HCC)    Aortic valve stenosis    S/P MVR (mitral valve replacement)    Paroxysmal atrial fibrillation (HCC)    Pulmonary hypertension (HCC)    S/P AVR (aortic valve replacement)    Chronic diastolic CHF (congestive heart failure) (HCC)    OCTAVIANO (obstructive sleep apnea)    ESRD (end stage renal disease) (HCC)    Elevated troponin    Hypotension     Acute cystitis without hematuria    Will consolidate diltiazem to once daily dosing, I anticipate that at least in regards to the afib she could be discharged tomorrow.      Alex Jarrell MD  09/17/22  09:28 EDT

## 2022-09-18 NOTE — PROGRESS NOTES
[facility] Clinical Pharmacy Services: Warfarin Dosing/Monitoring Consult    Claudia Arnold is a 77 y.o. female, estimated creatinine clearance is 11.6 mL/min (A) (by C-G formula based on SCr of 4.49 mg/dL (H)). weighing 96 kg (211 lb 10.3 oz).    Results from last 7 days   Lab Units 09/18/22  0527 09/17/22  0540 09/16/22  0440 09/15/22  0535 09/14/22  0451   INR  2.10* 2.19* 3.05* 3.17* 2.96*   HEMOGLOBIN g/dL 12.5 12.5 11.9* 12.0 12.1   HEMATOCRIT % 37.2 37.8 35.6 34.8 36.2   PLATELETS 10*3/mm3 121* 123* 140 135* 157     Prior to admission anticoagulation: Per [facility], the patient's home warfarin regimen is  3 mg every Wednesday and 2 mg all other days of the week (15 mg/week).    Hospital Anticoagulation:  Consulting provider: Dr. Boyd  Start date: 9/13/22  Indication: Atrial fibrillation  Target INR: 2 - 3  Expected duration: Lifelong  Bridge Therapy: No    Potential food or drug interactions:   Escitalopram (major-home med)-concurrent use reduces [id number] platelet aggregation, resulting in an increased risk of bleeding.    Tylenol scheduled (moderate-home med)-concurrent use inhibits warfarin metabolism and interferes with clotting factor formation, resulting in an increased risk in bleeding.   Protonix (moderate-home med)-concurrent use inhibits [id number] warfarin metabolism, resulting in an increase in INR and an increased risk of bleeding.   Requip (moderate-home med)-concurrent use may enhance the anticoagulant effect of warfarin due to competition for protein binding binding sits.    Potential disease interactions:  Patient with heart failure exacerbation, which can increase INR due to reduced warfarin metabolism from hepatic congestion and increase bleeding risk.    Education complete?/Date: Yes;  Clinic    INR Assessment:  Date INR Dose   9/16 3.05 2 mg   9/17 2.19 3 mg   9/18 2.10      Assessment/Plan:  INR is trending down today, but is still therapeutic at 2.10.  Will give another 3 mg tonight, and assess INR tomorrow morning to determine further dosing. Continue to dose by levels.  Monitor for any signs or symptoms of bleeding. Hematocrit has remained stable for the past couple of days.  Follow up daily INRs and dose adjustments.    Pharmacy will continue to follow until discharge or discontinuation of warfarin.     Flores Love Pharm.D., [facility]   Clinical Pharmacist

## 2022-09-18 NOTE — PLAN OF CARE
Goal Outcome Evaluation:  Plan of Care Reviewed With: patient        Progress: improving  Outcome Evaluation: Vital signs stable. Pt remains in Afib and rate controlled. No complaints or concerns. Plan for dialysis monday. Possible d/c home today. Pt needs met at this time. Will continue to monitor.

## 2022-09-18 NOTE — OUTREACH NOTE
Prep Survey    Flowsheet Row Responses   [facility] patient discharged from? [place]   Is LACE score < 7 ? No   Emergency Room discharge w/ pulse ox? No   Eligibility [facility]   Date of Admission 09/13/22   Date of Discharge 09/18/22   Discharge Disposition Home or Self Care   Discharge diagnosis Atrial fibrillation,    Hypotension   Does the patient have one of the following disease processes/diagnoses(primary or secondary)? Other   Does the patient have Home health ordered? No   Is there a DME ordered? No   Comments regarding appointments HD at [facility]   Prep survey completed? Yes          AARTI VASQUEZ - Registered Nurse

## 2022-09-18 NOTE — PLAN OF CARE
Goal Outcome Evaluation:      No c/o pain or soa. Remains in afib with controlled rate. Patient discharged home with son.

## 2022-09-18 NOTE — PROGRESS NOTES
LOS: 5 days   Patient Care Team:  Robert Cortez MD as PCP - General (Family Medicine)  Deborah Agudelo MD as Surgeon (Orthopedic Surgery)  Scott Segura MD as Consulting Physician (Cardiology)  Scar Gaxiola MD as Surgeon (Cardiothoracic Surgery)  Kathy Osuna MD as Consulting Physician (Nephrology)  Dora Astorga APRN as Nurse Practitioner (Neurosurgery)  Satish Stahl MD as Consulting Physician (Vascular Surgery)  Scar Gaxiola MD as Surgeon (Cardiothoracic Surgery)  Kathy Osuna MD as Consulting Physician (Nephrology)    Chief Complaint:   Atrial Fibrillation with RVR    Interval History:   Stable.  No acute events.     Objective   Vital Signs  Temp:  [97.4 °F (36.3 °C)-98.3 °F (36.8 °C)] 97.4 °F (36.3 °C)  Heart Rate:  [70-96] 86  Resp:  [16-18] 18  BP: ()/(69-93) 98/76    Intake/Output Summary (Last 24 hours) at 9/18/2022 1002  Last data filed at 9/18/2022 0500  Gross per 24 hour   Intake 910 ml   Output --   Net 910 ml       Review of Systems   Constitutional: Negative.   Cardiovascular: Negative.    Respiratory: Negative.    Gastrointestinal: Negative.    Genitourinary: Positive for dysuria.       Physical Exam  Vitals and nursing note reviewed.   Constitutional:       General: She is not in acute distress.     Appearance: She is not diaphoretic.   HENT:      Mouth/Throat:      Pharynx: No oropharyngeal exudate.   Eyes:      General: No scleral icterus.  Neck:      Trachea: No tracheal deviation.   Cardiovascular:      Rate and Rhythm: Normal rate. Rhythm irregular.   Pulmonary:      Effort: Pulmonary effort is normal. No respiratory distress.      Breath sounds: Normal breath sounds.   Abdominal:      General: There is no distension.      Palpations: Abdomen is soft.   Skin:     General: Skin is warm and dry.   Neurological:      Mental Status: She is alert and oriented to person, place, and time.   Psychiatric:         Behavior: Behavior  normal.         Thought Content: Thought content normal.         Judgment: Judgment normal.           Results Review:      Results from last 7 days   Lab Units 09/18/22  0527 09/17/22  0540 09/16/22  0440   SODIUM mmol/L 140 137 139   POTASSIUM mmol/L 4.4 4.1 4.0   CHLORIDE mmol/L 101 98 100   CO2 mmol/L 22.6 26.4 24.7   BUN mg/dL 49* 28* 41*   CREATININE mg/dL 4.49* 3.24* 4.38*   GLUCOSE mg/dL 111* 96 108*   CALCIUM mg/dL 9.5 8.9 8.9     Results from last 7 days   Lab Units 09/14/22  0451 09/13/22  1824 09/13/22  1444   TROPONIN T ng/mL 0.068* 0.053* 0.067*     Results from last 7 days   Lab Units 09/18/22  0527 09/17/22  0540 09/16/22  0440   WBC 10*3/mm3 8.29 7.37 7.69   HEMOGLOBIN g/dL 12.5 12.5 11.9*   HEMATOCRIT % 37.2 37.8 35.6   PLATELETS 10*3/mm3 121* 123* 140     Results from last 7 days   Lab Units 09/18/22  0527 09/17/22  0540 09/16/22  0440   INR  2.10* 2.19* 3.05*                   I reviewed the patient's new clinical results.  I personally viewed and interpreted the patient's EKG/Telemetry data        Medication Review:   acetaminophen, 650 mg, Oral, TID  busPIRone, 10 mg, Oral, BID  dilTIAZem CD, 180 mg, Oral, Q24H  escitalopram, 10 mg, Oral, Daily  lactobacillus acidophilus, 1 capsule, Oral, QAM  metoprolol tartrate, 25 mg, Oral, Q12H  midodrine, 10 mg, Oral, TID AC  pantoprazole, 40 mg, Oral, Q AM  rOPINIRole, 0.25 mg, Oral, Nightly  sodium chloride, 10 mL, Intravenous, Q12H  warfarin, 3 mg, Oral, Once        Pharmacy to dose warfarin,         Assessment & Plan       Atrial fibrillation with RVR (HCC)    Aortic valve stenosis    S/P MVR (mitral valve replacement)    Paroxysmal atrial fibrillation (HCC)    Pulmonary hypertension (HCC)    S/P AVR (aortic valve replacement)    Chronic diastolic CHF (congestive heart failure) (HCC)    OCTAVIANO (obstructive sleep apnea)    ESRD (end stage renal disease) (HCC)    Elevated troponin    Hypotension    Acute cystitis without hematuria    Ok with discharge on  diltiazem 120 daily and metoprolol tartrate 25 mg Q12 HR.  Continue midodrine.   Warfarin for anticoagulation.     Follow-up in cardiology clinic in one month.     Alex Jarrell MD  09/18/22  10:02 EDT

## 2022-09-18 NOTE — DISCHARGE SUMMARY
PHYSICIAN DISCHARGE SUMMARY                                                                        [facility]    Patient Identification:  Name: Claudia Arnold  Age: 77 y.o.  Sex: female  :  1945  MRN: [id number]  Primary Care Physician: Robert Cortez MD    Admit date: 2022  Discharge date and time: 2022     Discharged Condition: good    Discharge Diagnoses:   Atrial fibrillation RVR: Rate now controlled.  Hypotension: Good response to titrating up midodrine.  Elevated troponin: Likely supply demand mismatch in addition to renal failure.    ESRD-HDD  OCTAVIANO:  Pulmonary hypertension and chronic heart failure:  History of aortic valve replacement and reportedly left atrial appendage ablation:  Macrocytosis: B12, folate, TSH normal.  UTI/simple cystitis:  Morbid obesity.    Hospital Course:  Pleasant 77-year-old female with multiple medical issues as noted above.  She presents with symptomatic atrial fibrillation with rapid ventricular response.  Please H&P for full details.  Patient was admitted and her cardiologist consulted on the case.  Initially she started on night diltiazem drip for rate control.  She already has an issue with hypotension and was on midodrine.  The dose of Midrin had to be titrated up to compensate for the increased diltiazem dosing.  She was able to be titrated off the drip and is on a combination of oral diltiazem as well as metoprolol.  Rate control has been doing well with this present regime.  INR was slightly above therapeutic range on presentation.  Pharmacy manage Coumadin during hospitalization.  She does have an INR checked on a weekly basis at dialysis and will be having it checked again tomorrow.  Presently she is feeling well with good rate control and vital signs stable and can be discharged remainder of her treatment follow-up as an outpatient.        Consults:     Consults     Date and  Time Order Name Status Description    9/13/2022  9:16 PM Cardiac Electrophysiologist Inpatient Consult Completed     9/13/2022  4:02 PM [facility] (on-call MD unless specified) Completed     9/13/2022  4:02 PM Nephrology (on -call MD unless specified) Completed     9/13/2022  4:02 PM [facility] (on-call MD unless specified) Details              Discharge Exam:  Afebrile vital signs stable.  Well-developed obese female no apparent distress.  Lungs clear to auscultation good air movement.  Heart regular rate with irregular rhythm.  Extremities with trace PT pitting edema bilaterally  Alert oriented conversant cooperative and pleasant.     Disposition:  Home    Patient Instructions:      Discharge Medications      New Medications      Instructions Start Date   dilTIAZem  MG 24 hr capsule  Commonly known as: CARDIZEM CD   180 mg, Oral, Every 24 Hours Scheduled   Start Date: September 19, 2022        Changes to Medications      Instructions Start Date   midodrine 10 MG tablet  Commonly known as: PROAMATINE  What changed:   · medication strength  · how much to take  · Another medication with the same name was removed. Continue taking this medication, and follow the directions you see here.   10 mg, Oral, 3 Times Daily Before Meals         Continue These Medications      Instructions Start Date   acetaminophen 325 MG tablet  Commonly known as: TYLENOL   650 mg, Oral, 3 Times Daily, Takes tid at 2 am, 1 pm, and hS      ALIGN PO   1 capsule, Oral, Every Morning      busPIRone 10 MG tablet  Commonly known as: BUSPAR   10 mg, Oral, 2 Times Daily      [employer] MELATONIN PO   5 mg, Oral, Nightly PRN      escitalopram 10 MG tablet  Commonly known as: LEXAPRO   10 mg, Oral, Daily      metoprolol tartrate 25 MG tablet  Commonly known as: LOPRESSOR   TAKE 1 TABLET BY MOUTH EVERY 12 HOURS      MIRCERA IJ   75 mcg, Every 28 Days      pantoprazole 40 MG EC tablet  Commonly known as: PROTONIX   40 mg, Oral, Every Early Morning      rOPINIRole 0.25  MG tablet  Commonly known as: REQUIP   0.25 mg, Oral, Nightly      warfarin 2 MG tablet  Commonly known as: COUMADIN   TAKE 1 TABLET BY MOUTH EVERY SUNDAY, TUESDAY, AND THURSDAY, TAKE 1&1/2 TABLETS BY MOUTH ALL THE OTHER DAYS OF THE WEEK           Diet Instructions     Diet: Cardiac      Discharge Diet: Cardiac        Future Appointments   Date Time Provider Department Center   12/13/2022 12:30 PM Deborah Agudelo MD [facility]   6/1/2023 10:00 AM Robert Cortez MD [id number]     Additional Instructions for the Follow-ups that You Need to Schedule     Discharge Follow-up with PCP   As directed       Currently Documented PCP:    Robert Cortez MD    PCP Phone Number:    [phone]     Follow Up Details: 1 week         Discharge Follow-up with Specialty: Cardiology.  Nephrology.   As directed      Specialty: Cardiology.  Nephrology.    Follow Up Details: As directed.            Follow-up Information     Robert Cortez MD .    Specialty: Family Medicine  Why: 1 week  Contact information:  [address] [place] [address]  [phone]                       Discharge Order (From admission, onward)     Start     Ordered    09/18/22 1147  Discharge patient  Once        Expected Discharge Date: 09/18/22    Discharge Disposition: Home or Self Care    Physician of Record for Attribution - Please select from Treatment Team: ALDO CAMPA [[id number]]    Review needed by CMO to determine Physician of Record: No       Question Answer Comment   Physician of Record for Attribution - Please select from Treatment Team ALDO CAMPA    Review needed by CMO to determine Physician of Record No        09/18/22 1151                  Total time spent discharging patient including evaluation,post hospitalization follow up,  medication and post hospitalization instructions and education total time exceeds 30 minutes.    Signed:  Aldo Campa MD  9/18/2022  11:52 EDT    EMR  Dragon/Transcription disclaimer:   Much of this encounter note is an electronic transcription/translation of spoken language to printed text. The electronic translation of spoken language may permit erroneous, or at times, nonsensical words or phrases to be inadvertently transcribed; Although I have reviewed the note for such errors, some may still exist.

## 2022-09-18 NOTE — PROGRESS NOTES
[facility] Progress Note      Patient Name: Claudia Arnold  : 1945  MRN: [id number]  Primary Care Physician:  Robert Cortez MD  Date of admission: 2022    Subjective     Interval History:   The patient was seen and examined today for follow-up on ESRD     Seen and examined.  Denies any chest pain or shortness of breath.    Review of Systems:   As noted above    Objective     Vitals:   Temp:  [97.4 °F (36.3 °C)-97.9 °F (36.6 °C)] 97.4 °F (36.3 °C)  Heart Rate:  [70-86] 79  Resp:  [18] 18  BP: ()/(69-89) 99/71    Intake/Output Summary (Last 24 hours) at 2022 1311  Last data filed at 2022 0739  Gross per 24 hour   Intake 700 ml   Output --   Net 700 ml       Physical Exam:    General Appearance: alert, oriented x 3, no acute distress   Skin: warm and dry  HEENT: oral mucosa normal, nonicteric sclera  Neck: supple, no JVD  Lungs: CTA  Heart: Irregular rhythm, normal S1 and S2  Abdomen: soft, nontender, nondistended  : no palpable bladder  Extremities: Trace edema, cyanosis or clubbing  Neuro: normal speech and mental status     Scheduled Meds:     acetaminophen, 650 mg, Oral, TID  busPIRone, 10 mg, Oral, BID  dilTIAZem CD, 180 mg, Oral, Q24H  escitalopram, 10 mg, Oral, Daily  lactobacillus acidophilus, 1 capsule, Oral, QAM  metoprolol tartrate, 25 mg, Oral, Q12H  midodrine, 10 mg, Oral, TID AC  pantoprazole, 40 mg, Oral, Q AM  rOPINIRole, 0.25 mg, Oral, Nightly  sodium chloride, 10 mL, Intravenous, Q12H  warfarin, 3 mg, Oral, Once      IV Meds:   Pharmacy to dose warfarin,         Results Reviewed:   I have personally reviewed the results from the time of this admission to 2022 13:11 EDT     Results from last 7 days   Lab Units 22  0527 22  0540 22  0440 09/15/22  0535 22  0451 22  1444   SODIUM mmol/L 140 137 139   < > 141 141   POTASSIUM mmol/L 4.4 4.1 4.0   < > 4.2 3.9   CHLORIDE mmol/L 101 98 100   < > 96* 95*   CO2  mmol/L 22.6 26.4 24.7   < > 26.5 23.4   BUN mg/dL 49* 28* 41*   < > 52* 45*   CREATININE mg/dL 4.49* 3.24* 4.38*   < > 4.66* 4.03*   CALCIUM mg/dL 9.5 8.9 8.9   < > 9.6 10.1   BILIRUBIN mg/dL  --   --   --   --  0.7 0.8   ALK PHOS U/L  --   --   --   --  71 87   ALT (SGPT) U/L  --   --   --   --  102* 138*   AST (SGOT) U/L  --   --   --   --  53* 84*   GLUCOSE mg/dL 111* 96 108*   < > 99 110*    < > = values in this interval not displayed.       Estimated Creatinine Clearance: 11.6 mL/min (A) (by C-G formula based on SCr of 4.49 mg/dL (H)).                Results from last 7 days   Lab Units 09/18/22 0527 09/17/22  0540 09/16/22 0440 09/15/22  0535 09/14/22  0451   WBC 10*3/mm3 8.29 7.37 7.69 7.34 8.14   HEMOGLOBIN g/dL 12.5 12.5 11.9* 12.0 12.1   PLATELETS 10*3/mm3 121* 123* 140 135* 157       Results from last 7 days   Lab Units 09/18/22 0527 09/17/22  0540 09/16/22  0440 09/15/22  0535 09/14/22  0451   INR  2.10* 2.19* 3.05* 3.17* 2.96*       Assessment / Plan     ASSESSMENT:    1. End-stage renal disease on maintenance hemodialysis on Monday Wednesday Friday.    2. History of hypertension blood pressure is soft today on midodrine per cardiology  3. A. fib with RVR.    4. Diastolic heart failure  5. Volume overload  6. Hyperphosphatemia: Not on binders  7. High anion gap metabolic acidosis.  Likely secondary to chronic kidney disease.  resolved   8. Bioprosthetic mitral and aortic valves on anticoagulation      PLAN:    1.  Hemodialysis on Monday .  Okay for discharge from kidney standpoint  2.  Surveillance labs      Thank you for involving us in the care of Claudia Arnold.  Please feel free to call with any questions.    Sheila Terrazas MD  09/18/22  13:11 EDT    [facility]  [phone]      Much of this encounter note is an electronic transcription/translation of spoken language to printed text. The electronic translation of spoken language may permit erroneous, or at times,  nonsensical words or phrases to be inadvertently transcribed; Although I have reviewed the note for such errors, some may still exist.

## 2022-09-19 NOTE — PROGRESS NOTES
Discharge Planning Assessment  [facility]     Patient Name: Claudia Arnold  MRN: [id number]  Today's Date: 9/19/2022    Admit Date: 9/13/2022     Discharge Needs Assessment    No documentation.                Discharge Plan     Row Name 09/19/22 1807       Plan    Plan home    Final Discharge Disposition Code 01 - home or self-care    Final Note home              Continued Care and Services - Discharged on 9/18/2022 Admission date: 9/13/2022 - Discharge disposition: Home or Self Care   Coordination has not been started for this encounter.       Expected Discharge Date and Time     Expected Discharge Date Expected Discharge Time    Sep 18, 2022          Demographic Summary    No documentation.                Functional Status    No documentation.                Psychosocial    No documentation.                Abuse/Neglect    No documentation.                Legal    No documentation.                Substance Abuse    No documentation.                Patient Forms    No documentation.                   Jessica Brennan RN

## 2022-09-19 NOTE — OUTREACH NOTE
Call Center TCM Note    Flowsheet Row Responses   [facility] patient discharged from? [place]   Does the patient have one of the following disease processes/diagnoses(primary or secondary)? Other   TCM attempt successful? No   Unsuccessful attempts Attempt 1  [States she is in dialysis.  No recent verbal release on file.]          Maria Fernanda Shaw LPN    9/19/2022, 10:42 EDT

## 2022-09-19 NOTE — OUTREACH NOTE
Call Center TCM Note    Flowsheet Row Responses   [facility] patient discharged from? [place]   Does the patient have one of the following disease processes/diagnoses(primary or secondary)? Other   TCM attempt successful? No   Unsuccessful attempts Attempt 2          Maria Fernanda Shaw LPN    9/19/2022, 14:23 EDT

## 2022-09-20 NOTE — OUTREACH NOTE
Call Center TCM Note    Flowsheet Row Responses   [facility] patient discharged from? [place]   Does the patient have one of the following disease processes/diagnoses(primary or secondary)? Other   TCM attempt successful? Yes   Discharge diagnosis Atrial fibrillation,    Hypotension   Meds reviewed with patient/caregiver? Yes   Is the patient having any side effects they believe may be caused by any medication additions or changes? No   Does the patient have all medications ordered at discharge? Yes   Is the patient taking all medications as directed (includes completed medication regime)? Yes   Comments Declines my assist to sched TCM APPT with PCP Dr Cortez, states she will call as she has several appts to sched.    Has home health visited the patient within 72 hours of discharge? N/A   Psychosocial issues? No   Did the patient receive a copy of their discharge instructions? Yes   Nursing interventions Reviewed instructions with patient   What is the patient's perception of their health status since discharge? Improving   Is the patient/caregiver able to teach back signs and symptoms related to disease process for when to call PCP? Yes   Is the patient/caregiver able to teach back signs and symptoms related to disease process for when to call 911? Yes   Is the patient/caregiver able to teach back the hierarchy of who to call/visit for symptoms/problems? PCP, Specialist, Home health nurse, Urgent Care, ED, 911 Yes   If the patient is a current smoker, are they able to teach back resources for cessation? Not a smoker   TCM call completed? Yes   Wrap up additional comments D/C DX: Atrial fibrillation,  Hypotension ** Pt feels a littel better, still kind of weak and shakey. New rx's Diltiazem CD and Midodrine in place. No questions at this time. Declines my assist to sched TCM APPT with PCP Dr Cortez, states she will call as she has several appts to sched.           Rjawinder Barnett MA    9/20/2022, 16:03  EDT

## 2022-09-23 NOTE — PROGRESS NOTES
Anticoagulation Clinic Progress Note    Anticoagulation Summary  As of 2022    INR goal:  2.0-3.0   TTR:  61.9 % (3.2 y)   INR used for dosin.60 (2022)   Warfarin maintenance plan:  3 mg every Wed; 2 mg all other days   Weekly warfarin total:  15 mg   No change documented:  Ruma Valles RPH   Plan last modified:  Ruma Valles RPH (2022)   Next INR check:  2022   Priority:  High   Target end date:      Indications    Paroxysmal atrial fibrillation (HCC) [I48.0]             Anticoagulation Episode Summary     INR check location:      Preferred lab:      Send INR reminders to:   [facility]  POOL    Comments:  Lab drawn at [facility] ([facility]) - [facility] [phone]       Anticoagulation Care Providers     Provider Role Specialty Phone number    Shiloh Gonzales APRN Referring Cardiology [phone]          Clinic Interview:  Patient Findings     Positives:  Hospital admission    Negatives:  Signs/symptoms of thrombosis, Signs/symptoms of bleeding,   Laboratory test error suspected, Change in health, Change in alcohol use,   Change in activity, Upcoming invasive procedure, Emergency department   visit, Upcoming dental procedure, Missed doses, Extra doses, Change in   medications, Change in diet/appetite, Bruising, Other complaints    Comments:  admitted - for symptomatic afib, started on diltiazem      Clinical Outcomes     Negatives:  Major bleeding event, Thromboembolic event,   Anticoagulation-related hospital admission, Anticoagulation-related ED   visit, Anticoagulation-related fatality    Comments:  admitted - for symptomatic afib, started on diltiazem        INR History:  Anticoagulation Monitoring 2022   INR 1.94 2.82 2.60   INR Date 2022   INR Goal 2.0-3.0 2.0-3.0 2.0-3.0   Trend Down Same Same   Last Week Total 16 mg 15 mg 16 mg   Next Week Total 15 mg 15 mg 15 mg   Sun 2 mg - 2 mg   Mon 2 mg 2  mg -   Tue 2 mg 2 mg -   Wed - - -   Thu - - -   Fri 2 mg - 2 mg   Sat 2 mg - 2 mg   Visit Report - - -   Some recent data might be hidden       Plan:  1. INR is Therapeutic today- see above in Anticoagulation Summary.   Will instruct Claudia GABRIELLE Arnold to Continue their warfarin regimen- see above in Anticoagulation Summary.  2. Follow up in 1 week  3. They have been instructed to call if any changes in medications, doses, concerns, etc. Patient expresses understanding and has no further questions at this time.    Ruma Valles, LTAC, located within [facility]

## 2022-09-27 NOTE — TELEPHONE ENCOUNTER
09/27/22   Pt needs refill on  Diltiazem and Midodrine was only give 30 days from when left Hospital. Request refills to go to [facility] 90 day supply.   Phani oliver

## 2022-09-30 NOTE — TELEPHONE ENCOUNTER
PATIENT IS NEEDING AN ORDER FOR A HOSPITAL BED REPAIR    WOULD NOT STOP RUNNING AND WILL NEED TO ORDER A NEW MOTOR       ([facility])       PLEASE CALL/ ADVISE   DIALYSIS NUMBER [id number]  Claudia Arnold (Self) [phone] (H)

## 2022-10-04 NOTE — TELEPHONE ENCOUNTER
Called and spoke with patient and gave her the recommendations.  She verbalizes understanding.  She wants to stay on it for now.  She will call or follow up with PCP if it gets worse.    Jie Chatterjee RN  [facility] Triage  10/04/22 16:17 EDT

## 2022-10-04 NOTE — TELEPHONE ENCOUNTER
GM pt//LOV with you 9/13/22    She was hospitalized at [facility] for afib RVR from 9/13-9/18.  She was started on PO diltiazem  mg daily at that time.      She is calling to report some redness with blisters on left foot.  This started right after hospitalization.  She has had 3 blisters.  Smaller than dime size.  First one was on the bottom of her foot and it popped.  She has 2 remaining on her outer foot.  Foot is a little red on outside part.  Both her feet itch but that is normal for her.  Also, a few days ago she started with a light red rash on her back, no open areas or blsit.  It is not painful, but it itches.   She also complains of weakness which she related to being in the hospital.  She has mild SOA, but that isn't new.      She is a HD patient, last had dialysis yesterday and the nurses reported her HR and BP were good.    She said diltiazem is the only new med she has started.  She hasn't changed soaps/detergents.    She is wondering if you think the rashes/blisters could be a reaction to diltiazem, and/or if you have any recommendations?    Her next appt is with  on 10/25.    Thank you!    Jie Chatterjee RN  [facility] Triage  10/04/22 10:00 EDT

## 2022-10-04 NOTE — TELEPHONE ENCOUNTER
Vijay, I know that patient was followed by you guys in the hospital for her difficult to control atrial fibrillation.  I know that diltiazem can cause a rash but before I recommend discontinuing it I would like your recommendations.

## 2022-10-07 NOTE — TELEPHONE ENCOUNTER
"Claudia Arnold called to report that she developed hives on her upper torso and back yesterday.  Patient stated she is currently at dialysis and asked her nephrologist to look at rash and stated that patient has hives.  Patient reports that she put \"salve\" on but has not taken an oral medications.         Reviewed Vijay's recommendations with patient.  Patient stated her HR is around 100 while on diltiazem and expressed concern about stopping it.  Patient stated \"it's keeping me alive\". Patient is asking for recommendations so that she may remain on diltiazem.    Please let me know how you would like to proceed.    Thank you,  Ariela Suazo, RN  Triage Nurse [facility]  "

## 2022-10-07 NOTE — TELEPHONE ENCOUNTER
Reviewed recommendations with Claudia Arnold and the patient verbalized understanding of the recommendations.    Request patient call office early next week with update.  Encouraged patient to call answering service ([phone]) if she has any issues or concerns over the weekend.  Patient stated she will do this.    Thank you,  Ariela Suazo RN  Triage Nurse [facility]

## 2022-10-07 NOTE — PROGRESS NOTES
Anticoagulation Clinic Progress Note    Anticoagulation Summary  As of 10/7/2022    INR goal:  2.0-3.0   TTR:  61.7 % (3.2 y)   INR used for dosin.01 (10/5/2022)   Warfarin maintenance plan:  3 mg every Wed; 2 mg all other days   Weekly warfarin total:  15 mg   Plan last modified:  Ruma Valles RPH (2022)   Next INR check:  10/10/2022   Priority:  High   Target end date:      Indications    Paroxysmal atrial fibrillation (HCC) [I48.0]             Anticoagulation Episode Summary     INR check location:      Preferred lab:      [facility]    Comments:  Lab drawn at dialysis ([facility]) - [facility] [phone]       Anticoagulation Care Providers     Provider Role Specialty Phone number    Shiloh Gonzales APRN Referring Cardiology [phone]          Clinic Interview:  Patient Findings     Negatives:  Signs/symptoms of thrombosis, Signs/symptoms of bleeding,   Laboratory test error suspected, Change in health, Change in alcohol use,   Change in activity, Upcoming invasive procedure, Emergency department   visit, Upcoming dental procedure, Missed doses, Extra doses, Change in   medications, Change in diet/appetite, Hospital admission, Bruising, Other   complaints    Comments:  Reports she is having a reaction to her diltiazem and has   blisters and hives.  No other changes to report.       Clinical Outcomes     Negatives:  Major bleeding event, Thromboembolic event,   Anticoagulation-related hospital admission, Anticoagulation-related ED   visit, Anticoagulation-related fatality    Comments:  Reports she is having a reaction to her diltiazem and has   blisters and hives.  No other changes to report.         INR History:  Anticoagulation Monitoring 2022 2022 10/7/2022   INR 2.60 2.98 6.01   INR Date 2022 2022 10/5/2022   INR Goal 2.0-3.0 2.0-3.0 2.0-3.0   Trend Same Same Same   Last Week Total 16 mg 15 mg 15 mg   Next Week Total 15 mg 15  mg 9 mg   Sun 2 mg 2 mg Hold (10/9)   Mon - - -   Tue - - -   Wed - - -   Thu - - -   Fri 2 mg 2 mg Hold (10/7)   Sat 2 mg 2 mg Hold (10/8)   Visit Report - - -   Some recent data might be hidden       Plan:  1. INR is Supratherapeutic today- see above in Anticoagulation Summary.   Will instruct Claudia ANTHONY Arnold to HOLD  their warfarin regimen- see above in Anticoagulation Summary.  2. Follow up in 3 days   3. They have been instructed to call if any changes in medications, doses, concerns, etc. Patient expresses understanding and has no further questions at this time.    Kathy Means, PharmD

## 2022-10-12 NOTE — PROGRESS NOTES
Anticoagulation Clinic Progress Note    Anticoagulation Summary  As of 10/12/2022    INR goal:  2.0-3.0   TTR:  61.4 % (3.2 y)   INR used for dosing:  3.11 (10/10/2022)   Warfarin maintenance plan:  3 mg every Wed; 2 mg all other days   Weekly warfarin total:  15 mg   No change documented:  Kathy Means, PharmD   Plan last modified:  Ruma Valles RPH (8/26/2022)   Next INR check:  10/17/2022   Priority:  High   Target end date:      Indications    Paroxysmal atrial fibrillation (HCC) [I48.0]             Anticoagulation Episode Summary     INR check location:      Preferred lab:      Send INR reminders to:  [facility] [employer]  POOL    Comments:  Lab drawn at [facility] ([facility]) - [facility] [phone]       Anticoagulation Care Providers     Provider Role Specialty Phone number    Shiloh Gonzales APRN Referring Cardiology [phone]          Clinic Interview:  Patient Findings     Positives:  Missed doses    Comments:  Reports she held her warfarin since Friday when INR was so   high.  She thought we would be able to get the INR from Monday but   [facility] takes 24-48 hours to turnaround.        Clinical Outcomes     Comments:  Reports she held her warfarin since Friday when INR was so   high.  She thought we would be able to get the INR from Monday but   [facility] takes 24-48 hours to turnaround.          INR History:  Anticoagulation Monitoring 9/30/2022 10/7/2022 10/12/2022   INR 2.98 6.01 3.11   INR Date 9/26/2022 10/5/2022 10/10/2022   INR Goal 2.0-3.0 2.0-3.0 2.0-3.0   Trend Same Same Same   Last Week Total 15 mg 15 mg 5 mg   Next Week Total 15 mg 9 mg 15 mg   Sun 2 mg Hold (10/9) 2 mg   Mon - - -   Tue - - -   Wed - - 3 mg   Thu - - 2 mg   Fri 2 mg Hold (10/7) 2 mg   Sat 2 mg Hold (10/8) 2 mg   Visit Report - - -   Some recent data might be hidden       Plan:  1. INR is Supratherapeutic today- see above in Anticoagulation Summary.   Will instruct Claudia Arnold to Continue their warfarin  regimen- see above in Anticoagulation Summary.  2. Follow up in 2 days   3.  They have been instructed to call if any changes in medications, doses, concerns, etc. Patient expresses understanding and has no further questions at this time.    Kathy Means, PharmD

## 2022-10-15 NOTE — TELEPHONE ENCOUNTER
Nata,    This patient's daughter in law called over the weekend.    The patient sustained a fall and broke both of her legs and is admitted at [facility] for treatment.  The family called to see about how to get information from our system to the [facility] system. I let her know that they need to submit a records request to our medical records office and we will forward all information we have on the patient to them.    Thanks,    Veronica

## 2022-11-02 NOTE — PROGRESS NOTES
Anticoagulation Clinic Progress Note    Anticoagulation Summary  As of 5/27/2022    INR goal:  2.0-3.0   TTR:  61.1 % (2.9 y)   INR used for dosing:  3.25 (5/25/2022)   Warfarin maintenance plan:  3 mg every Mon, Wed, Fri; 2 mg all other days   Weekly warfarin total:  17 mg   Plan last modified:  Ruma Valles RPH (2/11/2022)   Next INR check:  6/1/2022   Priority:  High   Target end date:      Indications    Paroxysmal atrial fibrillation (HCC) [I48.0]             Anticoagulation Episode Summary     INR check location:      Preferred lab:      Send INR reminders to:   [facility]  POOL    Comments:  Lab drawn at dialysis ([facility]) - [facility] [phone]       Anticoagulation Care Providers     Provider Role Specialty Phone number    Shiloh Gonzales APRN Referring Cardiology [phone]          Clinic Interview:  Patient Findings     Positives:  Change in health    Negatives:  Signs/symptoms of thrombosis, Signs/symptoms of bleeding,   Laboratory test error suspected, Change in alcohol use, Change in   activity, Upcoming invasive procedure, Emergency department visit,   Upcoming dental procedure, Missed doses, Extra doses, Change in   medications, Change in diet/appetite, Hospital admission, Bruising, Other   complaints    Comments:  Kidney infection       Clinical Outcomes     Negatives:  Major bleeding event, Thromboembolic event,   Anticoagulation-related hospital admission, Anticoagulation-related ED   visit, Anticoagulation-related fatality    Comments:  Kidney infection         INR History:  Anticoagulation Monitoring 5/9/2022 5/13/2022 5/27/2022   INR 2.16 2.49 3.25   INR Date 5/4/2022 5/11/2022 5/25/2022   INR Goal 2.0-3.0 2.0-3.0 2.0-3.0   Trend Same Same Same   Last Week Total 17 mg 17 mg 17 mg   Next Week Total 17 mg 17 mg 16 mg   Sun - 2 mg 2 mg   Mon 3 mg 3 mg 3 mg   Tue 2 mg 2 mg 2 mg   Wed - - -   Thu - - -   Fri - 3 mg 2 mg (5/27)   Sat - 2 mg 2 mg   Visit Report - - -   Some  Lumbar Laminectomy recent data might be hidden       Plan:  1. INR is Supratherapeutic today- see above in Anticoagulation Summary.   Will instruct Claudia Arnold to Change their warfarin regimen- see above in Anticoagulation Summary.  2. Follow up in 1 week  3. They have been instructed to call if any changes in medications, doses, concerns, etc. Patient expresses understanding and has no further questions at this time.    Ruma Valles [facility]   Lumbar Laminectomy

## 2023-08-19 NOTE — PLAN OF CARE
Problem: Adult Inpatient Plan of Care  Goal: Plan of Care Review  Outcome: Ongoing, Progressing  Flowsheets  Taken 1/14/2021 1839 by Sandhya Navarrete, RN  Progress: no change  Outcome Summary: Pt still Afib RVR this morning, additional dose IV dig given, rate better and changing btwn Afib and SR w/PACs. Amio gtt started, handling well, BP improved with rate better between 70-80 mostly. Plan for C-scope prep possibly tomorrow after HD if rate/rhythm stabilize. Will closely  monitor.  Taken 1/12/2021 1801 by Brandy Ruelas, RN  Plan of Care Reviewed With: patient   Goal Outcome Evaluation:  Plan of Care Reviewed With: patient  Progress: no change  Outcome Summary: Pt still Afib RVR this morning, additional dose IV dig given, rate better and changing btwn Afib and SR w/PACs. Amio gtt started, handling well, BP improved with rate better between 70-80 mostly. Plan for C-scope prep possibly tomorrow after HD if rate/rhythm stabilize. Will closely  monitor.  
"Goal Outcome Evaluation:  Plan of Care Reviewed With: patient  Progress: no change  Outcome Summary: Monitoring H&H every 8 hours, last level 8.2. Remains on Protonix gtt and clear liquid diet. Anticipating preparation for c-scope soon, GI wanting INR close to 2. IV 250mcg of Digoxin given at 1951 for elevated HR, converted to SR after and remains in SR with PACs and RBBB. BP low prior to conversion to SR, but improved and now WNL. Confirmed with EKG. Patient opened up about feelings of being \"down\" and \"not able to see the light at the end of the tunnel,\" given current situation. RN provided empathetic listening and reassurance. RN spoke with patient's daughter, Leyla, and updated on current status. Tramadol x1 administered for bilateral shoulder pain. VSS and awaiting AM labs.  "
Goal Outcome Evaluation:  Plan of Care Reviewed With: patient     Outcome Summary: Pt admitted from ED this shift for 3 day history of rectal bleeding, x 6 episodes at home, and two since arrival per pt. Pt positive for fecal occult stool. Hgb 8.5 in ED, recheck now 8.9. MD ordered 1 unit of PRBC with dialysis. Hemodialysis M-W-F via LUE fistula. Pt uses home walker and wheelchair, which is at bedside. Plan is to add Protonix drip, serial H and H, 1 units PRBC with HD, hold warfarin, clear liquid diet, strict I/0, nephrology consult. Treat chronic shoulder pain with tylenol. Edema to BLE, skin clean, dry, intact. VSS. Will continue to monitor.   
Goal Outcome Evaluation:  Plan of Care Reviewed With: patient  Progress: improving  Outcome Summary: egd/ colonscopy today- patient tolerated well, patient remains on amio drip- metoprolol added and will be switched to oral amio tomorrow- HR has remained in the low 100's most of shift, encrouaged turns, vss, will continue to monitor closely  
Goal Outcome Evaluation:  Plan of Care Reviewed With: patient  Progress: improving  Outcome Summary: patient to be d/c to home after HD today  
Goal Outcome Evaluation:  Plan of Care Reviewed With: patient  Progress: improving  Outcome Summary: pleasant pt, no acute changes overnight. HR remains elevated, 's. VSS otherwise. remains on amio drip. pain meds given x1 per pt request. wctm.  
Goal Outcome Evaluation:  Plan of Care Reviewed With: patient  Progress: no change  Outcome Summary: Pt A & 0 x 4. At beginning of shift, pt reported difficulty sleeping overnight due to shoulder pain. Spoke with MD in room who ordered tramadol prn. Pt recieved 1st dose at 1400 and reports great pain relief. Pt continues on IV Protonix drip for GI Bleed. Pt reports one episode of nausea this am. Remains on clear liquid diet. Pt did have two episodes of bloody output from rectum. Reported to GI, who ordered cardiology to be consulted regarding INR of 4.82. Cardiology notes state pt is on coumadin for paroxsymal afib, and ordered Vitamin K. Plan is to perform scope once INR is stable. MWF dialysis pt, orders called. VSS. Will continue to monitor  
Goal Outcome Evaluation:  Plan of Care Reviewed With: patient  Progress: no change  Outcome Summary: pleasant pt, completed bowel prep. NPO at 0000. IV protonix and IV abx. RA. in and out of NSR w/PAC's and AFib RVR. tramadone given x1 per pt request, c/o pain in shoulders and L arm. no acute changes. wctm.  
Goal Outcome Evaluation:  Plan of Care Reviewed With: patient  Progress: no change  Outcome Summary: pleasant pt, in and out of AFib RVR and NSR w/ PAC's. continuing w/ amio and protonix gtt. HR elevated, 80's-100's. pt reported dark & tarry stool. tramadone given x1 per pt request. K+ decreased at 3.3, one time dose replacement administered. wctm.  
No

## 2024-05-03 NOTE — PROGRESS NOTES
Anticoagulation Clinic Progress Note    Anticoagulation Summary  As of 2022    INR goal:  2.0-3.0   TTR:  62.1 % (3.2 y)   INR used for dosin.98 (2022)   Warfarin maintenance plan:  3 mg every Wed; 2 mg all other days   Weekly warfarin total:  15 mg   No change documented:  Ruma Valles RPH   Plan last modified:  Ruma Valles RPH (2022)   Next INR check:  10/3/2022   Priority:  High   Target end date:      Indications    Paroxysmal atrial fibrillation (HCC) [I48.0]             Anticoagulation Episode Summary     INR check location:      Preferred lab:      Send INR reminders to:  [facility]  POOL    Comments:  Lab drawn at [facility] ([facility] - [facility] [phone]       Anticoagulation Care Providers     Provider Role Specialty Phone number    Shiloh Gonzales APRN Referring Cardiology [phone]          Clinic Interview:  No pertinent clinical findings have been reported.    INR History:  Anticoagulation Monitoring 2022   INR 2.82 2.60 2.98   INR Date 2022   INR Goal 2.0-3.0 2.0-3.0 2.0-3.0   Trend Same Same Same   Last Week Total 15 mg 16 mg 15 mg   Next Week Total 15 mg 15 mg 15 mg   Sun - 2 mg 2 mg   Mon 2 mg - -   Tue 2 mg - -   Wed - - -   Thu - - -   Fri - 2 mg 2 mg   Sat - 2 mg 2 mg   Visit Report - - -   Some recent data might be hidden       Plan:  1. INR is therapeutic today- see above in Anticoagulation Summary.    Claudia Arnold to continue their warfarin regimen- see above in Anticoagulation Summary.  2. Follow up in 1 week  3. Pt has agreed to only be called if INR out of range. They have been instructed to call if any changes in medications, doses, concerns, etc. Patient expresses understanding and has no further questions at this time.    Ruma Valles RPH   Yes

## 2024-10-16 NOTE — PROGRESS NOTES
Anticoagulation Clinic Progress Note    Anticoagulation Summary  As of 2021    INR goal:  2.0-3.0   TTR:  51.2 % (2 y)   INR used for dosin.52 (2021)   Warfarin maintenance plan:  2 mg every Sun, Tue, Thu; 3 mg all other days   Weekly warfarin total:  18 mg   Plan last modified:  Jalen Temple RPH (2021)   Next INR check:  2021   Priority:  High   Target end date:      Indications    Paroxysmal atrial fibrillation (CMS/HCC) [I48.0]             Anticoagulation Episode Summary     INR check location:      Preferred lab:      Send INR reminders to:   AURA ANDERSON  POOL    Comments:  Lab drawn at dialysis ([facility]) - [facility] [phone]       Anticoagulation Care Providers     Provider Role Specialty Phone number    Shiloh Gonzales APRN Referring Cardiology [phone]          Clinic Interview:  Patient Findings     Negatives:  Signs/symptoms of thrombosis, Signs/symptoms of bleeding,   Laboratory test error suspected, Change in health, Change in alcohol use,   Change in activity, Upcoming invasive procedure, Emergency department   visit, Upcoming dental procedure, Missed doses, Extra doses, Change in   medications, Change in diet/appetite, Hospital admission, Bruising, Other   complaints      Clinical Outcomes     Negatives:  Major bleeding event, Thromboembolic event,   Anticoagulation-related hospital admission, Anticoagulation-related ED   visit, Anticoagulation-related fatality        INR History:  Anticoagulation Monitoring 2021   INR 2.53 2.32 2.52   INR Date 2021   INR Goal 2.0-3.0 2.0-3.0 2.0-3.0   Trend Same Same Same   Last Week Total 18 mg 18 mg 18 mg   Next Week Total 18 mg 18 mg 18 mg   Sun - 2 mg 2 mg   Mon 3 mg 3 mg 3 mg   Tue 2 mg 2 mg 2 mg   Wed - - -   Thu - - -   Fri - 3 mg 3 mg   Sat - 3 mg 3 mg   Visit Report - - -   Some recent data might be hidden       Plan:  1. INR is Therapeutic today- see above in  Anticoagulation Summary.   Will instruct Claudia Arnold to Continue their warfarin regimen- see above in Anticoagulation Summary.  2. Follow up in 1 weeks  3. Pt has agreed to only be called if INR out of range. They have been instructed to call if any changes in medications, doses, concerns, etc. Patient expresses understanding and has no further questions at this time.    Nolvia Streeter, [facility]   FROM  Neck circumference <16cm  TM distance <6cm  Interincisor distance <2 finger breadths

## (undated) DEVICE — TOWEL,OR,DSP,ST,BLUE,STD,4/PK,20PK/CS: Brand: MEDLINE

## (undated) DEVICE — PK PERFUS CUST W/CARDIOPLEGIA

## (undated) DEVICE — NDL HYPO PRECISIONGLIDE REG 25G 1 1/2

## (undated) DEVICE — GOWN SURG AERO CHROME XL

## (undated) DEVICE — PENCL ES MEGADINE EZ/CLEAN BUTN W/HOLSTR 10FT

## (undated) DEVICE — DECANT BG O JET

## (undated) DEVICE — MYOCARDIAL PROBE NON-PYROGENIC: Brand: DEROYAL

## (undated) DEVICE — CLAMP INSERT: Brand: STEALTH® CLAMP INSERT

## (undated) DEVICE — BIOPATCH™ ANTIMICROBIAL DRESSING WITH CHLORHEXIDINE GLUCONATE IS A HYDROPHILLIC POLYURETHANE ABSORPTIVE FOAM WITH CHLORHEXIDINE GLUCONATE (CHG) WHICH INHIBITS BACTERIAL GROWTH UNDER THE DRESSING. THE DRESSING IS INTENDED TO BE USED TO ABSORB EXUDATE, COVER A WOUND CAUSED BY VASCULAR AND NONVASCULAR PERCUTANEOUS MEDICAL DEVICES DURING SURGERY, AS WELL AS REDUCE LOCAL INFECTION AND COLONIZATION OF MICROORGANISMS.: Brand: BIOPATCH

## (undated) DEVICE — CATH DIAG IMPULSE FR5 5F 100CM

## (undated) DEVICE — SUT SILK 3/0 TIES 18IN A184H

## (undated) DEVICE — TEMP PACING WIRE: Brand: MYO/WIRE

## (undated) DEVICE — ST. SORBAVIEW ULTIMATE IJ SYSTEM A,C: Brand: CENTURION

## (undated) DEVICE — TUBING, SUCTION, 1/4" X 10', STRAIGHT: Brand: MEDLINE

## (undated) DEVICE — CATHETER,URETHRAL,REDRUBBER,STERILE,20FR: Brand: MEDLINE

## (undated) DEVICE — CONTAINER,SPECIMEN,OR STERILE,4OZ: Brand: MEDLINE

## (undated) DEVICE — APC PROBE 2200 A, SINGLE USE OD 2.3MM/6.9FR; L. 2.2M/7.2FT: Brand: ERBE

## (undated) DEVICE — GLV SURG BIOGEL LTX PF 7

## (undated) DEVICE — SCANLAN® SUTURE BOOT™ INSTRUMENT JAW COVERS - ORIGINAL YELLOW, STANDARD PKG (5 PAIR/CARTRIDGE, 1 CARTRIDGE/PKG): Brand: SCANLAN® SUTURE BOOT™ INSTRUMENT JAW COVERS

## (undated) DEVICE — CATH VENT DLP W/CONN MALL NOVNT SILICON 16FR 16IN

## (undated) DEVICE — NDL HYPO ECLPS SFTY 22G 1 1/2IN

## (undated) DEVICE — ADAPT CLN BIOGUARD AIR/H2O DISP

## (undated) DEVICE — SPNG LAP 18X18IN LF STRL PK/5

## (undated) DEVICE — GLIDESHEATH BASIC HYDROPHILIC COATED INTRODUCER SHEATH: Brand: GLIDESHEATH

## (undated) DEVICE — ADHS SKIN DERMABOND TOP ADVANCED

## (undated) DEVICE — Device

## (undated) DEVICE — IRRIGATOR BULB ASEPTO 60CC STRL

## (undated) DEVICE — STERILE LATEX POWDER-FREE SURGICAL GLOVESWITH NITRILE COATING: Brand: PROTEXIS

## (undated) DEVICE — KT ORCA ORCAPOD DISP STRL

## (undated) DEVICE — CVR PROB 96IN LF STRL

## (undated) DEVICE — PK ATS CUST W CARDIOTOMY RESEVOIR

## (undated) DEVICE — SUT ETHIBOND 2/0 CV V5  30IN PXX52

## (undated) DEVICE — ST ACC MICROPUNCTURE STFF .018 ECHO/PLDM/TP 4F/10CM 21G/7CM

## (undated) DEVICE — CATH DIAG IMPULSE FL3.5 5F 100CM

## (undated) DEVICE — SOL NACL 0.9PCT 1000ML

## (undated) DEVICE — SOL ISO/ALC RUB 70PCT 4OZ

## (undated) DEVICE — GW INQWIRE FC PTFE STR .035IN 150

## (undated) DEVICE — SYR LUERLOK 20CC

## (undated) DEVICE — 28 FR RIGHT ANGLE – SOFT PVC CATHETER: Brand: PVC THORACIC CATHETERS

## (undated) DEVICE — THE TORRENT IRRIGATION SCOPE CONNECTOR IS USED WITH THE TORRENT IRRIGATION TUBING TO PROVIDE IRRIGATION FLUIDS SUCH AS STERILE WATER DURING GASTROINTESTINAL ENDOSCOPIC PROCEDURES WHEN USED IN CONJUNCTION WITH AN IRRIGATION PUMP (OR ELECTROSURGICAL UNIT).: Brand: TORRENT

## (undated) DEVICE — GOWN,NON-REINFORCED,SIRUS,SET IN SLV,XL: Brand: MEDLINE

## (undated) DEVICE — SPNG GZ WOVN 4X4IN 12PLY 10/BX STRL

## (undated) DEVICE — INTENDED FOR TISSUE SEPARATION, AND OTHER PROCEDURES THAT REQUIRE A SHARP SURGICAL BLADE TO PUNCTURE OR CUT.: Brand: BARD-PARKER ® CARBON RIB-BACK BLADES

## (undated) DEVICE — SUT SILK 3/0 SH CR5 18IN C0135

## (undated) DEVICE — BITEBLOCK OMNI BLOC

## (undated) DEVICE — DRSNG SURESITE WNDW 2.38X2.75

## (undated) DEVICE — CVR PROB GEN PURP W ISOSILK 6X48

## (undated) DEVICE — GLV SURG SENSICARE PI LF PF 7.5 GRN STRL

## (undated) DEVICE — RADIFOCUS GLIDEWIRE: Brand: GLIDEWIRE

## (undated) DEVICE — WIPE INST MEROCEL

## (undated) DEVICE — PANTY KNIT WASHABLE LG/XL BRN/GRN LF

## (undated) DEVICE — ORGANIZER SUT SHELIGH 3T 213013

## (undated) DEVICE — PREP SOL POVIDONE/IODINE BT 4OZ

## (undated) DEVICE — GLV SURG BIOGEL LTX PF 7 1/2

## (undated) DEVICE — SENSR O2 OXIMAX FNGR A/ 18IN NONSTR

## (undated) DEVICE — GW EMR FIX EXCHG J STD .035 3MM 260CM

## (undated) DEVICE — CANN VENI DLP SGL/STAGE RT/ANGL MTL/TP 28F

## (undated) DEVICE — CANN O2 ETCO2 FITS ALL CONN CO2 SMPL A/ 7IN DISP LF

## (undated) DEVICE — ST TOURNI COMPL A/ 7IN

## (undated) DEVICE — CATH DIAG IMPULSE AL1 5F 100CM

## (undated) DEVICE — BALN PRESS WEDGE 5F 110CM

## (undated) DEVICE — SENSR CERBRL O2 PK/2

## (undated) DEVICE — 8 FOOT DISPOSABLE EXTENSION CABLE WITH SAFE CONNECT / ALLIGATOR CLIP

## (undated) DEVICE — 3M™ MEDIPORE™ H SOFT CLOTH SURGICAL TAPE 2862, 2 INCH X 10 YARD (5CM X 9,1M), 12 ROLLS/CASE: Brand: 3M™ MEDIPORE™

## (undated) DEVICE — ANTIBACTERIAL UNDYED BRAIDED (POLYGLACTIN 910), SYNTHETIC ABSORBABLE SUTURE: Brand: COATED VICRYL

## (undated) DEVICE — ADAPT ANTEGRADE RETRGR

## (undated) DEVICE — PK HEART OPN 40

## (undated) DEVICE — SYS PERFUS SEP PLATLT W TIPS CUST

## (undated) DEVICE — SUT ETHLN 2/0 PS 18IN 585H

## (undated) DEVICE — PATIENT RETURN ELECTRODE, SINGLE-USE, CONTACT QUALITY MONITORING, ADULT, WITH 9FT CORD, FOR PATIENTS WEIGING OVER 33LBS. (15KG): Brand: MEGADYNE

## (undated) DEVICE — LOU MINOR PROCEDURE: Brand: MEDLINE INDUSTRIES, INC.

## (undated) DEVICE — TOWEL,OR,DSP,ST,WHITE,DLX,4/PK,20PK/CS: Brand: MEDLINE

## (undated) DEVICE — CONN REDUCING CANN/PUMP 1/2X3/8X3/8

## (undated) DEVICE — 3M™ STERI-STRIP™ REINFORCED ADHESIVE SKIN CLOSURES, R1546, 1/4 IN X 4 IN (6 MM X 100 MM), 10 STRIPS/ENVELOPE: Brand: 3M™ STERI-STRIP™

## (undated) DEVICE — LN SMPL CO2 SHTRM SD STREAM W/M LUER

## (undated) DEVICE — SOL IRR NACL 0.9PCT BT 1000ML

## (undated) DEVICE — CULT AER/ANAEROB FASTIDIOUS BACT

## (undated) DEVICE — SPNG DISECTOR KTNER XRAY COTN 1/4X9/16IN PK/5

## (undated) DEVICE — SUT ETHIB 2/0 CV V5 30IN 10X52

## (undated) DEVICE — FRCP BIOP RADLJAW4 HOT 2.2X240 BX40

## (undated) DEVICE — EACH LANGSTON DUAL LUMEN CATHETER IS INDICATED FOR DELIVERY OF CONTRAST MEDIUM IN ANGIOGRAPHIC STUDIES AND FOR SIMULTANEOUS PRESSURE MEASUREMENT FROM TWO SITES. THIS TYPE OF PRESSURE MEASUREMENT IS USEFUL IN DETERMINING TRANSVALVULAR, INTRAVASCULAR AND INTRAVENTRICULAR PRESSURE GRADIENTS.: Brand: LANGSTON® DUAL LUMEN CATHETER

## (undated) DEVICE — GLV SURG BIOGEL LTX PF 6 1/2

## (undated) DEVICE — SINGLE STAGE VENOUS RETURN CANNULA: Brand: THIN-FLEX SINGLE STAGE VENOUS DRAINAGE CANNULA

## (undated) DEVICE — OASIS DRAIN, DUAL, IN-LINE, ATS COMPATIBLE: Brand: OASIS

## (undated) DEVICE — ERBE NESSY®PLATE 170 SPLIT; 168CM²; CABLE 3M: Brand: ERBE

## (undated) DEVICE — CANN RETRGR STYLET RSCP 15F

## (undated) DEVICE — PROB CRYOABL CARD CARDIOBLATE/CRYOFLEX 25TO100MM 10CM 1P/U

## (undated) DEVICE — PK CATH CARD 40

## (undated) DEVICE — BANDAGE,GAUZE,BULKEE II,4.5"X4.1YD,STRL: Brand: MEDLINE

## (undated) DEVICE — CANN VESL CORNRY STR W/4MM BALN

## (undated) DEVICE — SYR LUERLOK 5CC

## (undated) DEVICE — HEMOCONCENTRATOR PERFUS LPS06

## (undated) DEVICE — MARKR SKIN W/RULR AND LBL

## (undated) DEVICE — KT MANIFLD CARDIAC

## (undated) DEVICE — KT CATH TAL PALINDROME SAPPHIRE 14.5F23CM

## (undated) DEVICE — FRCP BX RADJAW4 NDL 2.8 240CM LG OG BX40

## (undated) DEVICE — SOL IRR H2O BTL 1000ML STRL

## (undated) DEVICE — CANN AORT ROOT DLP VNT/8IN 14G 7F

## (undated) DEVICE — DRP SURG UTIL W/TPE 2/LAYR 15X26IN DISP

## (undated) DEVICE — ENSEAL TRIO TEMPERATURE CONTOLLED TISSUE SEALING TECHNOLOGY DISPOSABLE TISSUE SEALING DEVICE TAPTRONIC TRIGGER ACTIVATED POWER 3MM CURVED JAW: Brand: ENSEAL

## (undated) DEVICE — SOL NS 500ML

## (undated) DEVICE — PK AV FISTL/SHNT 40

## (undated) DEVICE — TBG ART PRESS 60 IN

## (undated) DEVICE — CANN VESL CORNRY STR W/6MM BALN

## (undated) DEVICE — CANN ART EOPA 3D NV W/CONN 20F

## (undated) DEVICE — GOWN,PREVENTION PLUS,XXLARGE,STERILE: Brand: MEDLINE

## (undated) DEVICE — APPL CHLORAPREP W/TINT 10.5ML PERC STRL

## (undated) DEVICE — PINNACLE R/O II INTRODUCER SHEATH WITH RADIOPAQUE MARKER: Brand: PINNACLE

## (undated) DEVICE — SUT SILK 2/0 TIES 18IN A185H

## (undated) DEVICE — GLV SURG BIOGEL LTX PF 8 1/2

## (undated) DEVICE — DRN WND CH RND FUL/FLUT NO/TROC 3/8IN 28F

## (undated) DEVICE — OPTIFOAM GENTLE SA, POSTOP, 4X12: Brand: MEDLINE

## (undated) DEVICE — TP UMB COTN 1/8X36 U12T

## (undated) DEVICE — SUT PROLN 6/0 BV1 D/A 30IN 8709H

## (undated) DEVICE — 28 FR STRAIGHT – SOFT PVC CATHETER: Brand: PVC THORACIC CATHETERS

## (undated) DEVICE — DRP SLUSH MACH FOR STND ALONE OM-ORS-321